# Patient Record
Sex: FEMALE | Race: WHITE | NOT HISPANIC OR LATINO | Employment: FULL TIME | URBAN - METROPOLITAN AREA
[De-identification: names, ages, dates, MRNs, and addresses within clinical notes are randomized per-mention and may not be internally consistent; named-entity substitution may affect disease eponyms.]

---

## 2017-07-20 ENCOUNTER — TRANSFERRED RECORDS (OUTPATIENT)
Dept: HEALTH INFORMATION MANAGEMENT | Facility: CLINIC | Age: 52
End: 2017-07-20

## 2017-08-21 ENCOUNTER — TRANSFERRED RECORDS (OUTPATIENT)
Dept: HEALTH INFORMATION MANAGEMENT | Facility: CLINIC | Age: 52
End: 2017-08-21

## 2017-09-10 ASSESSMENT — ENCOUNTER SYMPTOMS
MUSCLE CRAMPS: 0
SNORES LOUDLY: 0
POSTURAL DYSPNEA: 1
COUGH DISTURBING SLEEP: 0
NECK PAIN: 0
BACK PAIN: 0
RESPIRATORY PAIN: 1
ARTHRALGIAS: 1
SPUTUM PRODUCTION: 0
MYALGIAS: 1
JOINT SWELLING: 1
DYSPNEA ON EXERTION: 1
HEMOPTYSIS: 0
SHORTNESS OF BREATH: 1
WHEEZING: 1
COUGH: 0
STIFFNESS: 0
MUSCLE WEAKNESS: 0

## 2017-09-12 ENCOUNTER — PRE VISIT (OUTPATIENT)
Dept: CARDIOLOGY | Facility: CLINIC | Age: 52
End: 2017-09-12

## 2017-09-12 NOTE — TELEPHONE ENCOUNTER
New Pulmonary Hypertension Patient Form       Patient Name: Taina Singh Age: 52F  [birth year] MRN: [id number]   Referring Provider: Dr. Pattno       Date Test Results        7/20/2017 PFT FEV1/FVC: 77.0/80.1/96  FEV1: 2.20/2.97/74     FVC: 2.86/3.76/76  DLCO:      Not in Chart Echo, 6MWT, EKG, CT, VQ, Liver US, all labs, Angio

## 2017-09-13 ENCOUNTER — OFFICE VISIT (OUTPATIENT)
Dept: CARDIOLOGY | Facility: CLINIC | Age: 52
End: 2017-09-13
Attending: INTERNAL MEDICINE
Payer: COMMERCIAL

## 2017-09-13 VITALS
HEIGHT: 66 IN | WEIGHT: 254.4 LBS | DIASTOLIC BLOOD PRESSURE: 66 MMHG | BODY MASS INDEX: 40.88 KG/M2 | SYSTOLIC BLOOD PRESSURE: 102 MMHG | HEART RATE: 64 BPM | OXYGEN SATURATION: 97 %

## 2017-09-13 DIAGNOSIS — I27.20 PULMONARY HYPERTENSION (H): Primary | ICD-10-CM

## 2017-09-13 PROCEDURE — 99203 OFFICE O/P NEW LOW 30 MIN: CPT | Performed by: INTERNAL MEDICINE

## 2017-09-13 PROCEDURE — 99213 OFFICE O/P EST LOW 20 MIN: CPT | Mod: ZF

## 2017-09-13 RX ORDER — AMPICILLIN TRIHYDRATE 500 MG
1000 CAPSULE ORAL DAILY
COMMUNITY
Start: 2008-10-09 | End: 2019-06-27

## 2017-09-13 RX ORDER — METAPROTERENOL SULFATE 10 MG
500 TABLET ORAL DAILY
COMMUNITY
Start: 2017-01-01

## 2017-09-13 RX ORDER — NAPROXEN 500 MG/1
TABLET ORAL
Status: ON HOLD | COMMUNITY
Start: 2017-08-21 | End: 2018-03-14

## 2017-09-13 RX ORDER — HYDROXYCHLOROQUINE SULFATE 200 MG/1
200 TABLET, FILM COATED ORAL 2 TIMES DAILY
COMMUNITY
Start: 2017-07-24 | End: 2018-08-30

## 2017-09-13 RX ORDER — PILOCARPINE HYDROCHLORIDE 5 MG/1
5 TABLET, FILM COATED ORAL
COMMUNITY
Start: 2017-08-17 | End: 2017-12-29

## 2017-09-13 ASSESSMENT — PAIN SCALES - GENERAL: PAINLEVEL: NO PAIN (0)

## 2017-09-13 NOTE — PROGRESS NOTES
Eliezer Herbert M.D.  Cardiovascular Medicine    I personally saw and examined this patient, discussed care with housestaff and other consultants, reviewed current laboratories and imaging studies, and conveyed impression and diagnostic/therapeutic plan to patient.    Valentina Patton  [phone] (Work)  [phone] (Fax)  [address] [place] [address]    Tayler Aviles MD (Oncologist)  [phone] (Work)  [phone] (Fax)  [address] [facility] [place] [address]  [place] [address]    Problem List  1. Lupus  2. Sjogrens  3. Remote history MALT lymphoma (surgery)  4. Elevated BMI  5. Diastolic dysfunction of the left ventricle  6. Penicillin and sulfa  7. Polyserositis with pleurisy    8. History of iron deficiency  9  + KARLIE speckled 1:640, + rheumatoid factor, negative CCP  10. Vitamin D deficiency    Dear Doctors:    Thank you for allowing us to see your patient in the [facility] of the [facility].  We appreciate your thoughtfulness in thinking of us.      History    Ms Trent is a 52 year old female with a longstanding history of fairly quiescent SLE overlap who recently developed an increase in her disease burden characterized by polyserositis and arthritis, that has responded to medications an dietary changes.  She no longer has pleurisy or joint swelling.  She has no interim history of cerebritis, nephritis, skin rash, abdominal pain, eye changes.      She has no history, laboratory or imaging studies suggestive of congenital heart disease, liver disease, myeloproliferative disease, pulmonary embolism, clotting disorders, or other diseases associated with pulmonary hypertension.    She has no history of drug or alcohol excess.      She was following what turned out to be a high gluten diet but was without characteristic rash or GI symptoms.    She has no history of hypertension, sleep disordered breathing, and her lipids are not elevated.       General  "Symptoms: No  Skin Symptoms: No  HENT Symptoms: No  EYE SYMPTOMS: No  HEART SYMPTOMS: No  LUNG SYMPTOMS: Yes  INTESTINAL SYMPTOMS: No  URINARY SYMPTOMS: No  GYNECOLOGIC SYMPTOMS: No  BREAST SYMPTOMS: No  SKELETAL SYMPTOMS: Yes  BLOOD SYMPTOMS: No  NERVOUS SYSTEM SYMPTOMS: No  MENTAL HEALTH SYMPTOMS: No  Cough: No  Sputum or phlegm: No  Coughing up blood: No  Difficulty breating or shortness of breath: Yes  Snoring: No  Wheezing: Yes  Difficulty breathing on exertion: Yes  Respiratory pain: Yes  Nighttime Cough: No  Difficulty breathing when lying flat: Yes  Back pain: No  Muscle aches: Yes  Neck pain: No  Swollen joints: Yes  Joint pain: Yes  Bone pain: No  Muscle cramps: No  Muscle weakness: No  Joint stiffness: No  Bone fracture: No      Surgery: MALT lymphoma, tonsil removal, , endometrial biopsy    Family history: multiple auto-immune diseases males and females including alopecia, RA, Lupus      Objective  /66 (BP Location: Left arm, Cuff Size: Adult Large)  Pulse 64  Ht 1.676 m (5' 6\")  Wt 115.4 kg (254 lb 6.4 oz)  SpO2 97%  BMI 41.06 kg/m2   Constitutional: alert, oriented, normal gait and station, normal mentation.  Oral: moist mucous membrans  Lymph: without pathologic adenopathy  Chest: clear to ausculation and percussion  Cor: No evidence of left or right ventricular activity.  Rhythm is regular.  S1 normal, S2 split physiologically. Murmurs are not present  Abdomen: without tenderness, rebound, guarding, masses, ascites  Extremities: Edema not present  Neuro: no focal defects, normal mentation  Skin: without open lesions  Psych: oriented, verbal, mental status in tact    Wt Readings from Last 5 Encounters:   17 115.4 kg (254 lb 6.4 oz)       Meds  Current Outpatient Prescriptions   Medication     Calcium-Magnesium 300-300 MG TABS     hydroxychloroquine (PLAQUENIL) 200 MG tablet     Omega-3 Fatty Acids (OMEGA-3 FISH OIL) 500 MG CAPS     naproxen (NAPROSYN) 500 MG tablet     " "Multiple Vitamins-Minerals (WOMENS MULTI PO)     CALCIUM PO     pilocarpine (SALAGEN) 5 MG tablet     Cholecalciferol (D 1000) 1000 UNITS CAPS     No current facility-administered medications for this visit.          Labs      Imaging   [facility]   [address], [address], [place] [address]   Main: [phone]  [email or web address]                                                 Transthoracic Echo Report   CHILANGO GIBBONS ID: [id number] Age: 52 : 1965 Ordering Provider: NGUYEN PETERS   Exam Date: 2017 14:43 Gender: F Sonographer: BDB   Accession #: [id number] Height: 66 in BSA: 2.24 m  BP: 108 / 62   Weight: 261 lbs BMI: 42.1 kg/m          Site: [facility]   Location: Outpatient Rhythm: Normal Sinus Rhythm   Procedure Components: 2D imaging, Color Doppler, Spectral Doppler   Indications: Murmur; Systemic lupus erythematosus, unspecified SLE type, unspecified organ   involvement status   Technical Quality: Contrast: None     Final Conclusion   Visually estimated ejection fraction is 55-60%.   Mild left ventricular hypertrophy.   Estimated pulmonary artery pressure of 31 mmHg + RA pressure.   Dilated IVC size, <50% collapse with respiration.   Estimated EF: 55-60%   FINDINGS   Left Ventricle Normal left ventricular size. Visually estimated ejection fraction is 55-60%.   Mild left ventricular hypertrophy.   Diastolic Function \"Pseudonormal\" left ventricular filling pattern. E/e' ratio 8-15 is   indeterminate for filling pressure.   Right Ventricle Normal right ventricular size and function.   Left Atrium Mild left atrial enlargement.   Right Atrium Mild right atrial enlargement.   Aortic Valve Normal aortic valve. No aortic stenosis. No significant aortic regurgitation.   Mitral Valve Normal mitral valve. No mitral stenosis. Mild mitral regurgitation.   Tricuspid Valve Normal tricuspid valve. No tricuspid stenosis. Mild " tricuspid regurgitation.   Estimated pulmonary artery pressure   of 31 mmHg + RA pressure.   Pulmonic Valve Normal pulmonic valve without significant stenosis or regurgitation.   Pericardium Normal pericardium.   Aorta Measured aortic root diameter is normal in size.   Inferior Vena Cava Dilated IVC size, <50% collapse with respiration.   MEASUREMENTS  (Male / Female) Normal Values   2D MEASUREMENTS AND LV FUNCTION   IVS Diastolic Thickness           1.16 cm               < 1.1 cm / < 1.0 cm   LV Diastolic Diameter PLAX        5.36 cm               4.2 - 5.9 / 3.9 - 5.3 cm   LVPW Diastolic Thickness          1.09 cm               < 1.1 cm / < 1.0 cm   LV Systolic Diameter PLAX         2.89 cm   LVOT Diameter                     2 cm   LVOT Stroke Volume                91.4 ml   LA Systolic Diameter LX           4.95 cm               3.0 - 4.0 / 2.7 - 3.8 cm   LV Ejection Fraction MOD BP       62.9 %                >= 55  %   LA Volume Index                   32 ml/m               16 - 34 ml/m    Sinuses of Valsalva Diameter(d)   3.4 cm   Ascending Aorta Diameter(s)       3.1 cm                < 3.7 cm     DIASTOLOGY   Mitral E Point Velocity           1.09 m/sec            0.70 - 1.02 m/sec   Mitral A Point Velocity           0.698 m/sec           0.06 - 1.06 m/sec   Mitral E to A Ratio               1.56                  1.1 - 2.1   MV Deceleration Time              158 msec              167 - 231 msec   LV E' Lateral Velocity            0.0979 m/sec   Mitral E to LV E' Lateral Ratio   11.1   LV E' Septal Velocity             0.0783 m/sec   Mitral E to LV E' Septal Ratio    13.9     AORTIC VALVE   AV Peak Velocity                  1.82 m/sec            < 2.0 m/sec   AV Peak Gradient                  13.2 mmHg   AV Mean Gradient                  7 mmHg   AV Velocity Time Integral         37.9 cm   LVOT Peak Velocity                1.35 m/sec   LVOT Velocity Time Integral       29.1 cm   AV Area Cont Eq  "vti               2.41 cm    AV Area Cont Eq pk                2.33 cm    AV Dimensionless Index            0.768     MITRAL VALVE   MR Peak Velocity                  453 cm/sec   MR Velocity Time Integral         143 cm     TRICUSPID VALVE AND ESTIMATED PRESSURES   TR Peak Velocity                  2.77 m/sec   TR Peak Gradient                  30.7 mmHg    Assessment/Plan     We discussed the ambiguous findings of the IVC enlargement with normal respiratory variation, the evidence of left ventricular relaxation abnormalities and a PA pressure either at the upper limits of normal or slightly elevated.  We discussed right heart catherization or watchful waiting with follow-up echocardiogram and visit in 4 months.  She opted for the latte and I think this is reasonable as she is \"at risk\" but no definitely symptomatic.      We encouraged her to return soon should she be not doing well and then would proceed to right catherization.      Thank you again.    Eliezer Herbert M.D.    CC: physicians above      "

## 2017-09-13 NOTE — MR AVS SNAPSHOT
After Visit Summary   9/13/2017    Taina Singh    MRN: [id number]           Patient Information     Date Of Birth          1965        Visit Information        Provider Department      9/13/2017 9:00 AM Eliezer Herbert MD [facility]        Today's Diagnoses     Pulmonary hypertension (H)    -  1      Care Instructions    Medication Changes:  No medication changes at this time. Please continue current medication regiment.      Patient Instructions:  Continue staying active, eating a low sodium, low fat diet.    Check-In  Time Check-In Location Estimated Length Procedure   Name         60 minutes Echocardiogram (Echo)**   Procedure Preparations & Instructions     This is a non-invasive procedure and does NOT require any preparation             Follow up Appointment Information:  Follow up in February with Dr. Herbert with Labs and Echo prior to appointment.      We are located on the third floor of the Clinic and Surgery Center ([facility]) on the [facility].  Our address is     [address] [place] [address] [place] [address] [place] [address]    Thank you for allowing us to be a part of your care here at the [facility]    If you have questions or concerns please contact us at:    Alecia Lubin RN, BSN   Pablo Mcmillan (Schedule,P.A.)  Nurse Coordinator     Clinic   Pulmonary Hypertension   Pulmonary Hypertension  [facility]  (P)[phone]    (P) [phone]        (F)[phone]    ** Please note that you will NOT receive a reminder call regarding your scheduled testing, reminder calls are for provider appointments only.  If you are scheduled for testing within the [facility] system you may receive a call regarding pre-registration for billing purposes only.**     Remember to weigh yourself daily after voiding and before you consume any food  or beverages and log the numbers.  If you have gained/lost 2 pounds overnight or 5 pounds in a week contact us immediately for medication adjustments or further instructions.   **Please call us immediately if you have any syncope, chest pain, edema, or decline in your functional status.    Support Group:  Pulmonary Hypertension Association  https://www.phassociation.org/    [place] - [facility] Support Group  [place], [place]  Leader: Sudhir Pagan  Phone: [phone]  Email: [email or web address]            Follow-ups after your visit        Your next 10 appointments already scheduled     Feb 14, 2018  9:30 AM CST   Lab with  LAB    [facility]    [address] [place] [id number] [phone]            Feb 14, 2018 10:00 AM CST   Ech Complete with [id number]    [facility] Echo ([facility])    [address] [place] [id number] [phone]           1. Please bring or wear a comfortable two-piece outfit. 2. You may eat, drink and take your normal medicines. 3. For any questions that cannot be answered, please contact the ordering physician            Feb 14, 2018 11:00 AM CST   (Arrive by 10:45 AM)   RETURN PRIMARY PULMONARY with Eliezer Herbert MD   [facility] ([facility])    [address] [place] [id number] [phone]              Future tests that were ordered for you today     Open Future Orders        Priority Expected Expires Ordered    Echocardiogram Complete Routine  9/13/2018 9/13/2017            Who to contact     If you have questions or need follow up information about today's clinic visit or your schedule please contact [facility] directly at [phone].  Normal or non-critical lab and imaging results will be communicated to you by MyChart, letter or phone within 4 business days after the clinic has received the  "results. If you do not hear from us within 7 days, please contact the clinic through [employer] or phone. If you have a critical or abnormal lab result, we will notify you by phone as soon as possible.  Submit refill requests through [employer] or call your pharmacy and they will forward the refill request to us. Please allow 3 business days for your refill to be completed.          Additional Information About Your Visit        [facility] Information     [employer] gives you secure access to your electronic health record. If you see a primary care provider, you can also send messages to your care team and make appointments. If you have questions, please call your primary care clinic.  If you do not have a primary care provider, please call [phone] and they will assist you.        Care EveryWhere ID     This is your Care EveryWhere ID. This could be used by other organizations to access your [facility] medical records  [id number]        Your Vitals Were     Pulse Height Pulse Oximetry BMI (Body Mass Index)          64 1.676 m (5' 6\") 97% 41.06 kg/m2         Blood Pressure from Last 3 Encounters:   09/13/17 102/66    Weight from Last 3 Encounters:   09/13/17 115.4 kg (254 lb 6.4 oz)               Primary Care Provider    None Specified       No primary provider on file.        Equal Access to Services     ELIO BAKER : Errol valenciao Sopoly, waaxda lulucianadaha, qaybta kaalmada adeamberda, raisa mathew. So [facility] [phone].    ATENCIÓN: Si habla español, tiene a vyas disposición servicios gratuitos de asistencia lingüística. Llame al [phone].    We comply with applicable federal civil rights laws and [place] laws. We do not discriminate on the basis of race, color, national origin, age, disability sex, sexual orientation or gender identity.            Thank you!     Thank you for choosing [facility]  for your care. Our goal is always to provide you with excellent care. Hearing " back from our patients is one way we can continue to improve our services. Please take a few minutes to complete the written survey that you may receive in the mail after your visit with us. Thank you!             Your Updated Medication List - Protect others around you: Learn how to safely use, store and throw away your medicines at www.disposemymeds.org.          This list is accurate as of: 9/13/17 10:11 AM.  Always use your most recent med list.                   Brand Name Dispense Instructions for use Diagnosis    CALCIUM PO           Calcium-Magnesium 300-300 MG Tabs           D 1000 1000 UNITS Caps           hydroxychloroquine 200 MG tablet    PLAQUENIL          naproxen 500 MG tablet    NAPROSYN          Omega-3 Fish Oil 500 MG Caps           pilocarpine 5 MG tablet    SALAGEN     Take 5 mg by mouth        WOMENS MULTI PO

## 2017-09-13 NOTE — LETTER
9/13/2017      RE: Taina Singh  [address] [facility] [address]       Dear Colleague,    Thank you for the opportunity to participate in the care of your patient, Taina Singh, at the [facility] at [facility]. Please see a copy of my visit note below.          Eliezer Herbert M.D.  Cardiovascular Medicine    I personally saw and examined this patient, discussed care with housestaff and other consultants, reviewed current laboratories and imaging studies, and conveyed impression and diagnostic/therapeutic plan to patient.    Valentina Patton  [phone] (Work)  [phone] (Fax)  [address] [place] [address]    Tayler Aviles MD (Oncologist)  [phone] (Work)  [phone] (Fax)  [address] [facility] [address]  [place] [address]    Problem List  1. Lupus  2. Sjogrens  3. Remote history MALT lymphoma (surgery)  4. Elevated BMI  5. Diastolic dysfunction of the left ventricle  6. Penicillin and sulfa  7. Polyserositis with pleurisy    8. History of iron deficiency  9  + KARLIE speckled 1:640, + rheumatoid factor, negative CCP  10. Vitamin D deficiency    Dear Doctors:    Thank you for allowing us to see your patient in the [facility] of the [facility].  We appreciate your thoughtfulness in thinking of us.      History    Ms Trent is a 52 year old female with a longstanding history of fairly quiescent SLE overlap who recently developed an increase in her disease burden characterized by polyserositis and arthritis, that has responded to medications an dietary changes.  She no longer has pleurisy or joint swelling.  She has no interim history of cerebritis, nephritis, skin rash, abdominal pain, eye changes.      She has no history, laboratory or imaging studies suggestive of congenital heart disease, liver disease, myeloproliferative disease, pulmonary embolism, clotting disorders, or other diseases  "associated with pulmonary hypertension.    She has no history of drug or alcohol excess.      She was following what turned out to be a high gluten diet but was without characteristic rash or GI symptoms.    She has no history of hypertension, sleep disordered breathing, and her lipids are not elevated.       General Symptoms: No  Skin Symptoms: No  HENT Symptoms: No  EYE SYMPTOMS: No  HEART SYMPTOMS: No  LUNG SYMPTOMS: Yes  INTESTINAL SYMPTOMS: No  URINARY SYMPTOMS: No  GYNECOLOGIC SYMPTOMS: No  BREAST SYMPTOMS: No  SKELETAL SYMPTOMS: Yes  BLOOD SYMPTOMS: No  NERVOUS SYSTEM SYMPTOMS: No  MENTAL HEALTH SYMPTOMS: No  Cough: No  Sputum or phlegm: No  Coughing up blood: No  Difficulty breating or shortness of breath: Yes  Snoring: No  Wheezing: Yes  Difficulty breathing on exertion: Yes  Respiratory pain: Yes  Nighttime Cough: No  Difficulty breathing when lying flat: Yes  Back pain: No  Muscle aches: Yes  Neck pain: No  Swollen joints: Yes  Joint pain: Yes  Bone pain: No  Muscle cramps: No  Muscle weakness: No  Joint stiffness: No  Bone fracture: No      Surgery: MALT lymphoma, tonsil removal, , endometrial biopsy    Family history: multiple auto-immune diseases males and females including alopecia, RA, Lupus      Objective  /66 (BP Location: Left arm, Cuff Size: Adult Large)  Pulse 64  Ht 1.676 m (5' 6\")  Wt 115.4 kg (254 lb 6.4 oz)  SpO2 97%  BMI 41.06 kg/m2   Constitutional: alert, oriented, normal gait and station, normal mentation.  Oral: moist mucous membrans  Lymph: without pathologic adenopathy  Chest: clear to ausculation and percussion  Cor: No evidence of left or right ventricular activity.  Rhythm is regular.  S1 normal, S2 split physiologically. Murmurs are not present  Abdomen: without tenderness, rebound, guarding, masses, ascites  Extremities: Edema not present  Neuro: no focal defects, normal mentation  Skin: without open lesions  Psych: oriented, verbal, mental status in " "tact    Wt Readings from Last 5 Encounters:   17 115.4 kg (254 lb 6.4 oz)       Meds  Current Outpatient Prescriptions   Medication     Calcium-Magnesium 300-300 MG TABS     hydroxychloroquine (PLAQUENIL) 200 MG tablet     Omega-3 Fatty Acids (OMEGA-3 FISH OIL) 500 MG CAPS     naproxen (NAPROSYN) 500 MG tablet     Multiple Vitamins-Minerals (WOMENS MULTI PO)     CALCIUM PO     pilocarpine (SALAGEN) 5 MG tablet     Cholecalciferol (D 1000) 1000 UNITS CAPS     No current facility-administered medications for this visit.          Labs      Imaging   [facility]   [address], [address], [place] [address]   Main: [phone]  [email or web address]                                                 Transthoracic Echo Report   CHILANGO GIBBONS   [place] ID: [id number] Age: 52 : 1965 Ordering Provider: NGUYEN PETERS   Exam Date: 2017 14:43 Gender: F Sonographer: HAJA   Accession #: [id number] Height: 66 in BSA: 2.24 m  BP: 108 / 62   Weight: 261 lbs BMI: 42.1 kg/m          Site: [facility]   Location: Outpatient Rhythm: Normal Sinus Rhythm   Procedure Components: 2D imaging, Color Doppler, Spectral Doppler   Indications: Murmur; Systemic lupus erythematosus, unspecified SLE type, unspecified organ   involvement status   Technical Quality: Contrast: None     Final Conclusion   Visually estimated ejection fraction is 55-60%.   Mild left ventricular hypertrophy.   Estimated pulmonary artery pressure of 31 mmHg + RA pressure.   Dilated IVC size, <50% collapse with respiration.   Estimated EF: 55-60%   FINDINGS   Left Ventricle Normal left ventricular size. Visually estimated ejection fraction is 55-60%.   Mild left ventricular hypertrophy.   Diastolic Function \"Pseudonormal\" left ventricular filling pattern. E/e' ratio 8-15 is   indeterminate for filling pressure.   Right Ventricle Normal right ventricular size and function.   Left Atrium " Mild left atrial enlargement.   Right Atrium Mild right atrial enlargement.   Aortic Valve Normal aortic valve. No aortic stenosis. No significant aortic regurgitation.   Mitral Valve Normal mitral valve. No mitral stenosis. Mild mitral regurgitation.   Tricuspid Valve Normal tricuspid valve. No tricuspid stenosis. Mild tricuspid regurgitation.   Estimated pulmonary artery pressure   of 31 mmHg + RA pressure.   Pulmonic Valve Normal pulmonic valve without significant stenosis or regurgitation.   Pericardium Normal pericardium.   Aorta Measured aortic root diameter is normal in size.   Inferior Vena Cava Dilated IVC size, <50% collapse with respiration.   MEASUREMENTS  (Male / Female) Normal Values   2D MEASUREMENTS AND LV FUNCTION   IVS Diastolic Thickness           1.16 cm               < 1.1 cm / < 1.0 cm   LV Diastolic Diameter PLAX        5.36 cm               4.2 - 5.9 / 3.9 - 5.3 cm   LVPW Diastolic Thickness          1.09 cm               < 1.1 cm / < 1.0 cm   LV Systolic Diameter PLAX         2.89 cm   LVOT Diameter                     2 cm   LVOT Stroke Volume                91.4 ml   LA Systolic Diameter LX           4.95 cm               3.0 - 4.0 / 2.7 - 3.8 cm   LV Ejection Fraction MOD BP       62.9 %                >= 55  %   LA Volume Index                   32 ml/m               16 - 34 ml/m    Sinuses of Valsalva Diameter(d)   3.4 cm   Ascending Aorta Diameter(s)       3.1 cm                < 3.7 cm     DIASTOLOGY   Mitral E Point Velocity           1.09 m/sec            0.70 - 1.02 m/sec   Mitral A Point Velocity           0.698 m/sec           0.06 - 1.06 m/sec   Mitral E to A Ratio               1.56                  1.1 - 2.1   MV Deceleration Time              158 msec              167 - 231 msec   LV E' Lateral Velocity            0.0979 m/sec   Mitral E to LV E' Lateral Ratio   11.1   LV E' Septal Velocity             0.0783 m/sec   Mitral E to LV E' Septal Ratio    13.9     AORTIC VALVE    "AV Peak Velocity                  1.82 m/sec            < 2.0 m/sec   AV Peak Gradient                  13.2 mmHg   AV Mean Gradient                  7 mmHg   AV Velocity Time Integral         37.9 cm   LVOT Peak Velocity                1.35 m/sec   LVOT Velocity Time Integral       29.1 cm   AV Area Cont Eq vti               2.41 cm    AV Area Cont Eq pk                2.33 cm    AV Dimensionless Index            0.768     MITRAL VALVE   MR Peak Velocity                  453 cm/sec   MR Velocity Time Integral         143 cm     TRICUSPID VALVE AND ESTIMATED PRESSURES   TR Peak Velocity                  2.77 m/sec   TR Peak Gradient                  30.7 mmHg    Assessment/Plan     We discussed the ambiguous findings of the IVC enlargement with normal respiratory variation, the evidence of left ventricular relaxation abnormalities and a PA pressure either at the upper limits of normal or slightly elevated.  We discussed right heart catherization or watchful waiting with follow-up echocardiogram and visit in 4 months.  She opted for the latte and I think this is reasonable as she is \"at risk\" but no definitely symptomatic.      We encouraged her to return soon should she be not doing well and then would proceed to right catherization.      Thank you again.    Eliezer Herbert M.D.    CC: physicians above        "

## 2017-09-13 NOTE — PATIENT INSTRUCTIONS
Medication Changes:  No medication changes at this time. Please continue current medication regiment.      Patient Instructions:  Continue staying active, eating a low sodium, low fat diet.    Check-In  Time Check-In Location Estimated Length Procedure   Name         60 minutes Echocardiogram (Echo)**   Procedure Preparations & Instructions     This is a non-invasive procedure and does NOT require any preparation             Follow up Appointment Information:  Follow up in February with Dr. Herbert with Labs and Echo prior to appointment.      We are located on the third floor of the [facility] on the [facility].  Our address is     [address] [place] [address] on [address]rd [place] [address]    Thank you for allowing us to be a part of your care here at the [facility]    If you have questions or concerns please contact us at:    Alecia Lubin RN, BSN   Pablo Mcmillan (Schedule,P.A.)  Nurse Coordinator     Clinic   Pulmonary Hypertension   Pulmonary Hypertension  [facility]  (P)[phone]    (P) [phone]        (F)[phone]    ** Please note that you will NOT receive a reminder call regarding your scheduled testing, reminder calls are for provider appointments only.  If you are scheduled for testing within the [employer] system you may receive a call regarding pre-registration for billing purposes only.**     Remember to weigh yourself daily after voiding and before you consume any food or beverages and log the numbers.  If you have gained/lost 2 pounds overnight or 5 pounds in a week contact us immediately for medication adjustments or further instructions.   **Please call us immediately if you have any syncope, chest pain, edema, or decline in your functional status.    Support Group:  Pulmonary Hypertension  Association  [email or web address].[email or web address]    [place] - [facility] Support Group  [place], [place]  Leader: Sudhir Pagan  Phone: [phone]  Email: [email or web address]

## 2017-09-13 NOTE — NURSING NOTE
Cardiac Testing: Patient given instructions regarding  echocardiogram . Discussed purpose, preparation, procedure and when to expect results reported back to the patient. Patient demonstrated understanding of this information and agreed to call with further questions or concerns.    Med Reconcile: Reviewed and verified all current medications with the patient. The updated medication list was printed and given to the patient.    Return Appointment: Patient given instructions regarding scheduling next clinic visit. Patient demonstrated understanding of this information and agreed to call with further questions or concerns.    Patient stated she understood all health information given and agreed to call with further questions or concerns.    Medication Changes:  No medication changes at this time. Please continue current medication regiment.      Patient Instructions:  Continue staying active, eating a low sodium, low fat diet.    Check-In  Time Check-In Location Estimated Length Procedure   Name         60 minutes Echocardiogram (Echo)**   Procedure Preparations & Instructions     This is a non-invasive procedure and does NOT require any preparation             Follow up Appointment Information:  Follow up in February with Dr. Herbert with Labs and Echo prior to appointment.

## 2017-09-13 NOTE — NURSING NOTE
Chief Complaint   Patient presents with     Follow Up For     New PH patient referred by Dr. Patton     Vitals were taken and medications were reconciled.     Jose Kumari MA  8:23 AM

## 2017-10-01 ENCOUNTER — HEALTH MAINTENANCE LETTER (OUTPATIENT)
Age: 52
End: 2017-10-01

## 2017-10-30 ENCOUNTER — TELEPHONE (OUTPATIENT)
Dept: RHEUMATOLOGY | Facility: CLINIC | Age: 52
End: 2017-10-30

## 2017-10-30 NOTE — TELEPHONE ENCOUNTER
Patient has seen a Rheumatologist at [facility], [facility] and [facility]. Patient stated that this is not a 4th opinion, but patient would like to keep all her care in one location. Emailed release to patient at [email or web address] and confirmed that it was received by the patient. Awaiting release to complete Care Everywhere for [facility], [facility].   Jayleen Gan CMA  10/30/2017 10:23 AM

## 2017-10-30 NOTE — TELEPHONE ENCOUNTER
Lupus Patient Intake Form    Referring provider/clinic: Self    Primary privider/clinic: no specific provider, [facility]    Have you been in the hospital because of Lupus? No.     Has your doctor ever told you that you have SLE (systemic lupus erythematosus)? No. If yes, when were you diagnosed? 20 years ago.  What symptoms did you have? Butterfly rash, joint pain. What are your flare symptoms? Achy joints and fatigue.    Who diagnosed you with lupus? [facility], Dr. Tierney.    Have you ever been told that you have fibromyalgia? No .    The following set of questions can be answered yes, no or I don't know. Do you currently have or have you had in the past any of the following symptoms?      Butterfly rash on the face Yes     Sun sensativity (e.g. easy burning, feeling sick in the sun?) Yes     Scarring rash No    Mouth or nose sores Yes     Joint pain or swelling Yes      Fluid in your lungs or around your heart (serositis) No    Blood disorder (e.g. anemia, low blood count) Doesn't know      History of kidney problems No    Neurologic disorder (e.g. Seizures, stroke) No    Lupus blood tests showing that you have lupus Yes     Immune system disorder other than lupus Yes  If yes, what immune disorder did you have? sjogren's    High blood pressure No    Diabetes no    High cholesterol No    History of heart problems Yes  If yes, what heart problems do you have? Pulmonary hypertension    Any other disease that I haven't mentioned? no    Hair loss Yes     Raynaud's (hands sensitive to cold) Yes     Skin rashes No    Dry eyes Yes      Use of artificial tears for dry eyes Yes     Dry mouth Yes      History of blood clots No    History of miscarriages Yes, 6 miscarriages  If yes, at what week in pregnancy? Very early on, within the first month    Chronic cough, shortness of breath or chest pain with breathing Yes     Depression or anxiety No    Thinking or memory problems No    Have you been on any of these  medications:      Prednisone Yes     Methotrexate No    Imuran (Azathioprine) No     Cellcept (mycophenolate) No    Hydroxychloroquine Yes     Cytoxan (Cyclophosphamide) No    Do you currently smoke or have you ever smoked in the past? no     How many alcoholic beverages do you drink per week? 1    Do you use any stimulant drugs or cold medicine? no    Is there a family history or diagnosis of an autoimmune disease? Yes  If yes, please list: arthritis    Are there any other symptoms or concerns that I haven't mentioned that you would like to discuss with the doctor? no    Would you like us to contact you about taking part in furture research studies? Yes   Jayleen Gan CMA  10/30/2017 9:58 AM

## 2017-11-03 ENCOUNTER — DOCUMENTATION ONLY (OUTPATIENT)
Dept: RHEUMATOLOGY | Facility: CLINIC | Age: 52
End: 2017-11-03

## 2017-11-08 ENCOUNTER — CARE COORDINATION (OUTPATIENT)
Dept: CARDIOLOGY | Facility: CLINIC | Age: 52
End: 2017-11-08

## 2017-11-08 NOTE — PROGRESS NOTES
Patient left voicemail complaining of lung pain, and would like to know if this has anything to do with pulmonary hypertension. Writer left return voicemail requesting call back. Will discuss at that time.

## 2017-11-09 ENCOUNTER — CARE COORDINATION (OUTPATIENT)
Dept: CARDIOLOGY | Facility: CLINIC | Age: 52
End: 2017-11-09

## 2017-11-20 ENCOUNTER — OFFICE VISIT (OUTPATIENT)
Dept: CARDIOLOGY | Facility: CLINIC | Age: 52
End: 2017-11-20
Attending: NURSE PRACTITIONER
Payer: COMMERCIAL

## 2017-11-20 VITALS
BODY MASS INDEX: 41.75 KG/M2 | SYSTOLIC BLOOD PRESSURE: 123 MMHG | HEIGHT: 66 IN | OXYGEN SATURATION: 97 % | DIASTOLIC BLOOD PRESSURE: 73 MMHG | HEART RATE: 64 BPM | WEIGHT: 259.8 LBS

## 2017-11-20 DIAGNOSIS — I27.20 PULMONARY HYPERTENSION (H): Primary | ICD-10-CM

## 2017-11-20 DIAGNOSIS — R07.89 LEFT-SIDED CHEST WALL PAIN: Primary | ICD-10-CM

## 2017-11-20 DIAGNOSIS — R06.09 DYSPNEA ON EXERTION: ICD-10-CM

## 2017-11-20 DIAGNOSIS — I27.20 PULMONARY HYPERTENSION (H): ICD-10-CM

## 2017-11-20 LAB
ANION GAP SERPL CALCULATED.3IONS-SCNC: 6 MMOL/L (ref 3–14)
BUN SERPL-MCNC: 13 MG/DL (ref 7–30)
CALCIUM SERPL-MCNC: 9.1 MG/DL (ref 8.5–10.1)
CHLORIDE SERPL-SCNC: 102 MMOL/L (ref 94–109)
CO2 SERPL-SCNC: 30 MMOL/L (ref 20–32)
CREAT SERPL-MCNC: 0.55 MG/DL (ref 0.52–1.04)
ERYTHROCYTE [DISTWIDTH] IN BLOOD BY AUTOMATED COUNT: 14.6 % (ref 10–15)
ERYTHROCYTE [SEDIMENTATION RATE] IN BLOOD BY WESTERGREN METHOD: 71 MM/H (ref 0–30)
GFR SERPL CREATININE-BSD FRML MDRD: >90 ML/MIN/1.7M2
GLUCOSE SERPL-MCNC: 101 MG/DL (ref 70–99)
HCT VFR BLD AUTO: 35.5 % (ref 35–47)
HGB BLD-MCNC: 11.3 G/DL (ref 11.7–15.7)
MCH RBC QN AUTO: 26.4 PG (ref 26.5–33)
MCHC RBC AUTO-ENTMCNC: 31.8 G/DL (ref 31.5–36.5)
MCV RBC AUTO: 83 FL (ref 78–100)
NT-PROBNP SERPL-MCNC: 546 PG/ML (ref 0–125)
PLATELET # BLD AUTO: 215 10E9/L (ref 150–450)
POTASSIUM SERPL-SCNC: 4.2 MMOL/L (ref 3.4–5.3)
RBC # BLD AUTO: 4.28 10E12/L (ref 3.8–5.2)
SODIUM SERPL-SCNC: 137 MMOL/L (ref 133–144)
WBC # BLD AUTO: 4.9 10E9/L (ref 4–11)

## 2017-11-20 PROCEDURE — 93010 ELECTROCARDIOGRAM REPORT: CPT | Mod: ZP | Performed by: INTERNAL MEDICINE

## 2017-11-20 PROCEDURE — 80048 BASIC METABOLIC PNL TOTAL CA: CPT | Performed by: NURSE PRACTITIONER

## 2017-11-20 PROCEDURE — 99212 OFFICE O/P EST SF 10 MIN: CPT | Mod: ZF

## 2017-11-20 PROCEDURE — 93005 ELECTROCARDIOGRAM TRACING: CPT | Mod: ZF

## 2017-11-20 PROCEDURE — 83880 ASSAY OF NATRIURETIC PEPTIDE: CPT | Performed by: NURSE PRACTITIONER

## 2017-11-20 PROCEDURE — 85652 RBC SED RATE AUTOMATED: CPT | Performed by: NURSE PRACTITIONER

## 2017-11-20 PROCEDURE — 36415 COLL VENOUS BLD VENIPUNCTURE: CPT | Performed by: NURSE PRACTITIONER

## 2017-11-20 PROCEDURE — 85027 COMPLETE CBC AUTOMATED: CPT | Performed by: NURSE PRACTITIONER

## 2017-11-20 PROCEDURE — 99214 OFFICE O/P EST MOD 30 MIN: CPT | Mod: ZP | Performed by: NURSE PRACTITIONER

## 2017-11-20 ASSESSMENT — PAIN SCALES - GENERAL: PAINLEVEL: NO PAIN (0)

## 2017-11-20 NOTE — PROGRESS NOTES
November 20, 2017      Ms. Taina Singh is a pleasant 52 year old female with a past medical history of :    Problem List  1. Lupus  2. Sjogrens  3. Remote history MALT lymphoma (surgery)  4. Elevated BMI  5. Diastolic dysfunction of the left ventricle  6. Penicillin and sulfa allergy  7. Polyserositis with pleurisy    8. History of iron deficiency  9  + KARLIE speckled 1:640, + rheumatoid factor, negative CCP  10. Vitamin D deficiency    Today, she is accompanied by: no one; here alone      Current Symptoms  1. Any ER visits or hospital admissions since last appointment: No    2. Shortness of breath: No ; generally she does not have limitations from breathing.  3. Dizziness or lightheadedness: No  4. Any syncopal episodes or falls: No  5. Chest pain or chest tightness: No  6. Nausea, vomiting, diarrhea, constipation: No  7. Swelling in the legs: No  8. Bloating in the abdomen: No  9. PND; Waking up at night coughing, choking or SOB: No  10. Any medication intolerances: No  11. Reported headaches: No  12. Jaw pain or bone/joint pain: No  13. On IV Prostacyclin: No; current dose: N/A    Recent lupus flare in July 2016. Resulted in pleurisy which lead on to evaluation for pulmonary hypertension.  She has seen Dr. Herbert prior, but the plan was to let Taina become adjusted to her lupus medications.  She returns to clinic with complaints of soreness of inhalation of her left lung.  She also feels the sensation with coughing and sneezing. She also feels more sensitive to cold air.      PAST MEDICAL HISTORY:  No past medical history on file.      FAMILY HISTORY:  No family history on file.      SOCIAL HISTORY:  Social History   Substance Use Topics     Smoking status: Never Smoker     Smokeless tobacco: Not on file     Alcohol use Not on file         CURRENT MEDICATIONS:  Current Outpatient Prescriptions   Medication Sig Dispense Refill     Calcium-Magnesium 300-300 MG TABS daily        hydroxychloroquine (PLAQUENIL) 200  "MG tablet Take 200 mg by mouth daily        Omega-3 Fatty Acids (OMEGA-3 FISH OIL) 500 MG CAPS Take 500 mg by mouth daily        Multiple Vitamins-Minerals (WOMENS MULTI PO) Take by mouth daily        pilocarpine (SALAGEN) 5 MG tablet Take 5 mg by mouth       Cholecalciferol (D 1000) 1000 UNITS CAPS Take 1,000 Units by mouth daily        naproxen (NAPROSYN) 500 MG tablet        CALCIUM PO            ROS:   10 point ROS of systems including Constitutional, Eyes, Respiratory, Cardiovascular, Gastroenterology, Genitourinary, Integumentary, Muscularskeletal, Psychiatric were all negative except for pertinent positives noted in my HPI.    EXAM:  /73 (BP Location: Left arm, Patient Position: Chair, Cuff Size: Adult Large)  Pulse 64  Ht 1.676 m (5' 6\")  Wt 117.8 kg (259 lb 12.8 oz)  SpO2 97%  BMI 41.93 kg/m2  General: appears comfortable, alert and articulate  Head: normocephalic, atraumatic  Eyes: anicteric sclera, EOMI  Neck: no adenopathy  Orophyarynx: moist mucosa, no lesions, dentition intact  Heart: regular, S1/S2, no murmur, gallop, rub, estimated JVP wnl  Lungs: clear, no rales or wheezing  Abdomen: soft, non-tender, bowel sounds present, no hepatosplenomegaly  Extremities: no clubbing, cyanosis or edema  Neurological: normal speech and affect, no gross motor deficits      Weight  Wt Readings from Last 10 Encounters:   11/20/17 117.8 kg (259 lb 12.8 oz)   09/13/17 115.4 kg (254 lb 6.4 oz)       Labs:    CBC RESULTS:  Lab Results   Component Value Date    WBC 4.9 11/20/2017    RBC 4.28 11/20/2017    HGB 11.3 (L) 11/20/2017    HCT 35.5 11/20/2017    MCV 83 11/20/2017    MCH 26.4 (L) 11/20/2017    MCHC 31.8 11/20/2017    RDW 14.6 11/20/2017     11/20/2017       CMP RESULTS:  Lab Results   Component Value Date     11/20/2017    POTASSIUM 4.2 11/20/2017    CHLORIDE 102 11/20/2017    CO2 30 11/20/2017    ANIONGAP 6 11/20/2017     (H) 11/20/2017    BUN 13 11/20/2017    CR 0.55 11/20/2017    " "GFRESTIMATED >90 11/20/2017    GFRESTBLACK >90 11/20/2017    ANDERSON 9.1 11/20/2017        INR RESULTS:  No results found for: INR    BNP RESULTS:  No results found for: NTBNPI  No results found for: BNP      Recent Tests:      Echocardiogram (8/22/2017):  Final Conclusion   Visually estimated ejection fraction is 55-60%.   Mild left ventricular hypertrophy.   Estimated pulmonary artery pressure of 31 mmHg + RA pressure.   Dilated IVC size, <50% collapse with respiration.   Estimated EF: 55-60%   FINDINGS   Left Ventricle Normal left ventricular size. Visually estimated ejection fraction is 55-60%.   Mild left ventricular hypertrophy.   Diastolic Function \"Pseudonormal\" left ventricular filling pattern. E/e' ratio 8-15 is   indeterminate for filling pressure.   Right Ventricle Normal right ventricular size and function.   Left Atrium Mild left atrial enlargement.   Right Atrium Mild right atrial enlargement.   Aortic Valve Normal aortic valve. No aortic stenosis. No significant aortic regurgitation.   Mitral Valve Normal mitral valve. No mitral stenosis. Mild mitral regurgitation.   Tricuspid Valve Normal tricuspid valve. No tricuspid stenosis. Mild tricuspid regurgitation.   Estimated pulmonary artery pressure   of 31 mmHg + RA pressure.   Pulmonic Valve Normal pulmonic valve without significant stenosis or regurgitation.   Pericardium Normal pericardium.   Aorta Measured aortic root diameter is normal in size.      Assessment and Plan:   Ms. Taina Singh is a 52 year old female with pulmonary hypertension and Lupus with a history of pleurisy. WHO Group I, NYHA Functional Class I/II.    Mrs. Singh presents for follow up, after her initial visit with Dr. Herbert on 9/13/2017.  She has since noticed an increase in left chest-wall pain, with coughing and deep breathing. The pain is not reproducible with palpation or muscular in nature. An EKG was completed in clinic and showed NSR.  An ESR was also added on to her " lab work, and that result was elevated at 71 mm/h.  Clinically, VSS; her weight is elevated by 5 lbs, but she does not have JVD, lower leg edema, or increased abdominal girth.  She reports minimal symptoms otherwise.  Since the EKG was normal, we advised Mrs. Singh follow up with rheumatology to be seen for the elevated ESR and left lung pleuritic pain.  I also suggested she try over the counter anti-inflammatory medication such as 600 mg Ibuprofen, 2-3 times a day as needed. She will keep her scheduled follow up appointment on February 14, at 11 AM with Dr. Herbert. I advised she return to clinic sooner if her symptoms worsen.    It was a pleasure seeing Ms. Taina Singh at the [facility].       Sincerely,  Jennifer Young, LISSETT, CNP.

## 2017-11-20 NOTE — NURSING NOTE
Chief Complaint   Patient presents with     Follow Up For     Return PH - Follow up. OK per Alecia     Vitals were taken and medications were reconciled.  TERRI Rosado  1:36 PM

## 2017-11-20 NOTE — LETTER
11/20/2017      RE: Taina Singh  [address]  [place] [address]       Dear Colleague,    Thank you for the opportunity to participate in the care of your patient, Taina Singh, at the [facility]. Please see a copy of my visit note below.    November 20, 2017      Ms. Taina Singh is a pleasant 52 year old female with a past medical history of :    Problem List  1. Lupus  2. Sjogrens  3. Remote history MALT lymphoma (surgery)  4. Elevated BMI  5. Diastolic dysfunction of the left ventricle  6. Penicillin and sulfa allergy  7. Polyserositis with pleurisy    8. History of iron deficiency  9  + KARLIE speckled 1:640, + rheumatoid factor, negative CCP  10. Vitamin D deficiency    Today, she is accompanied by: no one; here alone      Current Symptoms  1. Any ER visits or hospital admissions since last appointment: No    2. Shortness of breath: No ; generally she does not have limitations from breathing.  3. Dizziness or lightheadedness: No  4. Any syncopal episodes or falls: No  5. Chest pain or chest tightness: No  6. Nausea, vomiting, diarrhea, constipation: No  7. Swelling in the legs: No  8. Bloating in the abdomen: No  9. PND; Waking up at night coughing, choking or SOB: No  10. Any medication intolerances: No  11. Reported headaches: No  12. Jaw pain or bone/joint pain: No  13. On IV Prostacyclin: No; current dose: N/A    Recent lupus flare in July 2016. Resulted in pleurisy which lead on to evaluation for pulmonary hypertension.  She has seen Dr. Herbert prior, but the plan was to let Taina become adjusted to her lupus medications.  She returns to clinic with complaints of soreness of inhalation of her left lung.  She also feels the sensation with coughing and sneezing. She also feels more sensitive to cold air.      PAST MEDICAL HISTORY:  No past medical history on file.      FAMILY HISTORY:  No family history on file.      SOCIAL HISTORY:  Social  "History   Substance Use Topics     Smoking status: Never Smoker     Smokeless tobacco: Not on file     Alcohol use Not on file         CURRENT MEDICATIONS:  Current Outpatient Prescriptions   Medication Sig Dispense Refill     Calcium-Magnesium 300-300 MG TABS daily        hydroxychloroquine (PLAQUENIL) 200 MG tablet Take 200 mg by mouth daily        Omega-3 Fatty Acids (OMEGA-3 FISH OIL) 500 MG CAPS Take 500 mg by mouth daily        Multiple Vitamins-Minerals (WOMENS MULTI PO) Take by mouth daily        pilocarpine (SALAGEN) 5 MG tablet Take 5 mg by mouth       Cholecalciferol (D 1000) 1000 UNITS CAPS Take 1,000 Units by mouth daily        naproxen (NAPROSYN) 500 MG tablet        CALCIUM PO            ROS:   10 point ROS of systems including Constitutional, Eyes, Respiratory, Cardiovascular, Gastroenterology, Genitourinary, Integumentary, Muscularskeletal, Psychiatric were all negative except for pertinent positives noted in my HPI.    EXAM:  /73 (BP Location: Left arm, Patient Position: Chair, Cuff Size: Adult Large)  Pulse 64  Ht 1.676 m (5' 6\")  Wt 117.8 kg (259 lb 12.8 oz)  SpO2 97%  BMI 41.93 kg/m2  General: appears comfortable, alert and articulate  Head: normocephalic, atraumatic  Eyes: anicteric sclera, EOMI  Neck: no adenopathy  Orophyarynx: moist mucosa, no lesions, dentition intact  Heart: regular, S1/S2, no murmur, gallop, rub, estimated JVP wnl  Lungs: clear, no rales or wheezing  Abdomen: soft, non-tender, bowel sounds present, no hepatosplenomegaly  Extremities: no clubbing, cyanosis or edema  Neurological: normal speech and affect, no gross motor deficits      Weight  Wt Readings from Last 10 Encounters:   11/20/17 117.8 kg (259 lb 12.8 oz)   09/13/17 115.4 kg (254 lb 6.4 oz)       Labs:    CBC RESULTS:  Lab Results   Component Value Date    WBC 4.9 11/20/2017    RBC 4.28 11/20/2017    HGB 11.3 (L) 11/20/2017    HCT 35.5 11/20/2017    MCV 83 11/20/2017    MCH 26.4 (L) 11/20/2017    MCHC " "31.8 11/20/2017    RDW 14.6 11/20/2017     11/20/2017       CMP RESULTS:  Lab Results   Component Value Date     11/20/2017    POTASSIUM 4.2 11/20/2017    CHLORIDE 102 11/20/2017    CO2 30 11/20/2017    ANIONGAP 6 11/20/2017     (H) 11/20/2017    BUN 13 11/20/2017    CR 0.55 11/20/2017    GFRESTIMATED >90 11/20/2017    GFRESTBLACK >90 11/20/2017    ANDERSON 9.1 11/20/2017        INR RESULTS:  No results found for: INR    BNP RESULTS:  No results found for: NTBNPI  No results found for: BNP      Recent Tests:      Echocardiogram (8/22/2017):  Final Conclusion   Visually estimated ejection fraction is 55-60%.   Mild left ventricular hypertrophy.   Estimated pulmonary artery pressure of 31 mmHg + RA pressure.   Dilated IVC size, <50% collapse with respiration.   Estimated EF: 55-60%   FINDINGS   Left Ventricle Normal left ventricular size. Visually estimated ejection fraction is 55-60%.   Mild left ventricular hypertrophy.   Diastolic Function \"Pseudonormal\" left ventricular filling pattern. E/e' ratio 8-15 is   indeterminate for filling pressure.   Right Ventricle Normal right ventricular size and function.   Left Atrium Mild left atrial enlargement.   Right Atrium Mild right atrial enlargement.   Aortic Valve Normal aortic valve. No aortic stenosis. No significant aortic regurgitation.   Mitral Valve Normal mitral valve. No mitral stenosis. Mild mitral regurgitation.   Tricuspid Valve Normal tricuspid valve. No tricuspid stenosis. Mild tricuspid regurgitation.   Estimated pulmonary artery pressure   of 31 mmHg + RA pressure.   Pulmonic Valve Normal pulmonic valve without significant stenosis or regurgitation.   Pericardium Normal pericardium.   Aorta Measured aortic root diameter is normal in size.      Assessment and Plan:   Ms. Taina Singh is a 52 year old female with pulmonary hypertension and Lupus with a history of pleurisy. WHO Group I, NYHA Functional Class I/II.    Mrs. Singh presents for " follow up, after her initial visit with Dr. Herbert on 9/13/2017.  She has since noticed an increase in left chest-wall pain, with coughing and deep breathing. The pain is not reproducible with palpation or muscular in nature. An EKG was completed in clinic and showed NSR.  An ESR was also added on to her lab work, and that result was elevated at 71 mm/h.  Clinically, VSS; her weight is elevated by 5 lbs, but she does not have JVD, lower leg edema, or increased abdominal girth.  She reports minimal symptoms otherwise.  Since the EKG was normal, we advised Mrs. Singh follow up with rheumatology to be seen for the elevated ESR and left lung pleuritic pain.  I also suggested she try over the counter anti-inflammatory medication such as 600 mg Ibuprofen, 2-3 times a day as needed. She will keep her scheduled follow up appointment on February 14, at 11 AM with Dr. Herbert. I advised she return to clinic sooner if her symptoms worsen.    It was a pleasure seeing Ms. Taina Singh at the [facility]pert[facility].       Sincerely,  Jennifer Young, APRN, CNP.

## 2017-11-20 NOTE — PATIENT INSTRUCTIONS
Medication Changes:   1. Try OTC ibuprofen 600 mg, 2-3 times a day as needed for discomfort.  See how you feel after 7 days, and if the pain improves.    Patient Instructions:  1. Continue staying active, eating a low sodium, low fat diet.    Follow up Appointment Information:  1. Follow up with Rheumatology (and we will try and get you in sooner).    2. Otherwise follow up as scheduled on February 14th at 11:00 with Dr. Herbert. Arrive at 10:45 A for the lab.    Results:  1. I will send you a MYChart note on the lab results.  We are located on the third floor of the [facility] ([facility] on the [facility].  Our address is     [address] [place] [address] [place] [address] [place] [address]      Thank you for allowing us to be a part of your care here at the [facility]    If you have questions or concerns please contact us at:    Alison Troncoso RN, BSN    Pablo Mcmillan (Schedule,P.A.)  Nurse Coordinator     Clinic   Pulmonary Hypertension   Pulmonary Hypertension  [facility]  [phone]    (P) [phone]        (F)[phone]                ** Please note that you will NOT receive a reminder call regarding your scheduled testing, reminder calls are for provider appointments only.  If you are scheduled for testing within the [place] system you may receive a call regarding pre-registration for billing purposes only.**     Remember to weigh yourself daily after voiding and before you consume any food or beverages and log the numbers.  If you have gained/lost 2 pounds overnight or 5 pounds in a week contact us immediately for medication adjustments or further instructions.  **Please call us immediately if you have any syncope, chest pain, edema, or decline in your functional status.    Support Group:  Pulmonary Hypertension  Association  Https://www.phassociation.org/  **Look at the Events Tab** They even have Support Groups that you can call into    [facility] Support Group  First Saturday of the Month from 1-3 PM   [facility] [address]  Leader: Sudhir Pagan  Phone: [phone]  Email: [email or web address]

## 2017-11-20 NOTE — MR AVS SNAPSHOT
After Visit Summary   11/20/2017    Taina Singh    MRN: [id number]           Patient Information     Date Of Birth          1965        Visit Information        Provider Department      11/20/2017 1:45 PM Jennifer Young APRN [facility]        Today's Diagnoses     Left-sided chest wall pain    -  1      Care Instructions    Medication Changes:   1. Try OTC ibuprofen 600 mg, 2-3 times a day as needed for discomfort.  See how you feel after 7 days, and if the pain improves.    Patient Instructions:  1. Continue staying active, eating a low sodium, low fat diet.    Follow up Appointment Information:  1. Follow up with Rheumatology (and we will try and get you in sooner).    2. Otherwise follow up as scheduled on February 14th at 11:00 with Dr. Herbert. Arrive at 10:45 A for the lab.    Results:  1. I will send you a MYChart note on the lab results.  We are located on the third floor of the Clinic and Surgery Center ([facility]) on the [facility].  Our address is     [address] [place] [address] on [address]rd [place] [address]      Thank you for allowing us to be a part of your care here at the [facility]    If you have questions or concerns please contact us at:    Alison Troncoso RN, BSN    Pablo Mcmillan (Schedule,P.A.)  Nurse Coordinator     Clinic   Pulmonary Hypertension   Pulmonary Hypertension  [facility]  [phone]    (P) [phone]        (F)[phone]                ** Please note that you will NOT receive a reminder call regarding your scheduled testing, reminder calls are for provider appointments only.  If you are scheduled for testing within the [place] system you may receive a call regarding pre-registration for billing purposes only.**     Remember to weigh yourself daily after voiding and before you consume any  food or beverages and log the numbers.  If you have gained/lost 2 pounds overnight or 5 pounds in a week contact us immediately for medication adjustments or further instructions.  **Please call us immediately if you have any syncope, chest pain, edema, or decline in your functional status.    Support Group:  Pulmonary Hypertension Association  Https://www.phassociation.org/  **Look at the Events Tab** They even have Support Groups that you can call into    [facility] PH Support Group  First Saturday of the Month from 1-3 PM   Location: [address] [place] [address]  Leader: Sudhir Pagan  Phone: [phone]  Email: [email or web address]              Follow-ups after your visit        Follow-up notes from your care team     Return for with Piedad, Return PH, with, Labs.      Your next 10 appointments already scheduled     Feb 14, 2018  9:30 AM CST   Lab with  LAB   [facility] ([facility])    [address] [place] [id number] [phone]            Feb 14, 2018 10:00 AM CST   Ech Complete with [id number]    [facility] ([facility])    [address] [place] [id number] [phone]           1. Please bring or wear a comfortable two-piece outfit. 2. You may eat, drink and take your normal medicines. 3. For any questions that cannot be answered, please contact the ordering physician            Feb 14, 2018 11:00 AM CST   (Arrive by 10:45 AM)   RETURN PRIMARY PULMONARY with Eliezer Herbert MD   [facility] ([facility])    [address] [place] [id number] [phone]              Who to contact     If you have questions or need follow up information about today's clinic visit or your schedule please contact [facility] directly at [phone].  Normal or non-critical lab and imaging results will be communicated to you by  "MyChart, letter or phone within 4 business days after the clinic has received the results. If you do not hear from us within 7 days, please contact the clinic through [employer] or phone. If you have a critical or abnormal lab result, we will notify you by phone as soon as possible.  Submit refill requests through [employer] or call your pharmacy and they will forward the refill request to us. Please allow 3 business days for your refill to be completed.          Additional Information About Your Visit        [employer] Information     [employer] gives you secure access to your electronic health record. If you see a primary care provider, you can also send messages to your care team and make appointments. If you have questions, please call your primary care clinic.  If you do not have a primary care provider, please call [phone] and they will assist you.        Care EveryWhere ID     This is your Care EveryWhere ID. This could be used by other organizations to access your [facility] medical records  [id number]        Your Vitals Were     Pulse Height Pulse Oximetry BMI (Body Mass Index)          64 1.676 m (5' 6\") 97% 41.93 kg/m2         Blood Pressure from Last 3 Encounters:   11/20/17 123/73   09/13/17 102/66    Weight from Last 3 Encounters:   11/20/17 117.8 kg (259 lb 12.8 oz)   09/13/17 115.4 kg (254 lb 6.4 oz)              We Performed the Following     CRP cardiac risk     EKG 12-lead, tracing only (Same Day)     Erythrocyte sedimentation rate auto        Primary Care Provider    Physician No Ref-Primary       NO REF-PRIMARY PHYSICIAN        Equal Access to Services     ELIO BAKER : Hadii aad ku hadasho Soomaali, waaxda luqadaha, qaybta kaalmada adeegyada, raisa crum . So [facility] [phone].    ATENCIÓN: Si habla español, tiene a vyas disposición servicios gratuitos de asistencia lingüística. Llame al [phone].    We comply with applicable federal civil rights laws and [place] " laws. We do not discriminate on the basis of race, color, national origin, age, disability, sex, sexual orientation, or gender identity.            Thank you!     Thank you for choosing [facility]  for your care. Our goal is always to provide you with excellent care. Hearing back from our patients is one way we can continue to improve our services. Please take a few minutes to complete the written survey that you may receive in the mail after your visit with us. Thank you!             Your Updated Medication List - Protect others around you: Learn how to safely use, store and throw away your medicines at www.disposemymeds.org.          This list is accurate as of: 11/20/17  2:41 PM.  Always use your most recent med list.                   Brand Name Dispense Instructions for use Diagnosis    CALCIUM PO           Calcium-Magnesium 300-300 MG Tabs      daily        D 1000 1000 UNITS Caps      Take 1,000 Units by mouth daily        hydroxychloroquine 200 MG tablet    PLAQUENIL     Take 200 mg by mouth daily        naproxen 500 MG tablet    NAPROSYN          Omega-3 Fish Oil 500 MG Caps      Take 500 mg by mouth daily        pilocarpine 5 MG tablet    SALAGEN     Take 5 mg by mouth        WOMENS MULTI PO      Take by mouth daily

## 2017-11-21 LAB — INTERPRETATION ECG - MUSE: NORMAL

## 2017-11-22 NOTE — TELEPHONE ENCOUNTER
Called patient's Home number on file [phone] (home) and spoke with patient and offered an appointment on 12/29/2017 with Dr. Marroquin. Patient accepted the date and time and denies any questions at this time. Patient was was instructed to arrive 15 minutes prior to appointment and asked to bring a current medication list. Patient verbalized understanding.    Care Everywhere is needed from [facility] and [facility].      Message has been sent to Clinic Coordinator for assistance with scheduling.  Jayleen Gan CMA  11/22/2017 8:42 AM

## 2017-12-13 ENCOUNTER — TRANSFERRED RECORDS (OUTPATIENT)
Dept: HEALTH INFORMATION MANAGEMENT | Facility: CLINIC | Age: 52
End: 2017-12-13

## 2017-12-13 LAB
HPV ABSTRACT: NORMAL
PAP-ABSTRACT: NORMAL

## 2017-12-29 ENCOUNTER — RADIANT APPOINTMENT (OUTPATIENT)
Dept: CT IMAGING | Facility: CLINIC | Age: 52
End: 2017-12-29
Attending: INTERNAL MEDICINE
Payer: COMMERCIAL

## 2017-12-29 ENCOUNTER — OFFICE VISIT (OUTPATIENT)
Dept: RHEUMATOLOGY | Facility: CLINIC | Age: 52
End: 2017-12-29
Attending: INTERNAL MEDICINE
Payer: COMMERCIAL

## 2017-12-29 VITALS
WEIGHT: 259.3 LBS | HEIGHT: 66 IN | OXYGEN SATURATION: 99 % | SYSTOLIC BLOOD PRESSURE: 124 MMHG | HEART RATE: 63 BPM | BODY MASS INDEX: 41.67 KG/M2 | DIASTOLIC BLOOD PRESSURE: 76 MMHG

## 2017-12-29 DIAGNOSIS — M32.9 SYSTEMIC LUPUS ERYTHEMATOSUS, UNSPECIFIED SLE TYPE, UNSPECIFIED ORGAN INVOLVEMENT STATUS (H): Primary | ICD-10-CM

## 2017-12-29 DIAGNOSIS — R07.89 LEFT-SIDED CHEST WALL PAIN: ICD-10-CM

## 2017-12-29 DIAGNOSIS — M32.9 SYSTEMIC LUPUS ERYTHEMATOSUS, UNSPECIFIED SLE TYPE, UNSPECIFIED ORGAN INVOLVEMENT STATUS (H): ICD-10-CM

## 2017-12-29 LAB
ALBUMIN UR-MCNC: NEGATIVE MG/DL
ALT SERPL W P-5'-P-CCNC: 35 U/L (ref 0–50)
APPEARANCE UR: CLEAR
AST SERPL W P-5'-P-CCNC: 26 U/L (ref 0–45)
BACTERIA #/AREA URNS HPF: ABNORMAL /HPF
BILIRUB UR QL STRIP: NEGATIVE
COLOR UR AUTO: ABNORMAL
CREAT UR-MCNC: 23 MG/DL
CREAT UR-MCNC: 23 MG/DL
CRP SERPL-MCNC: 3.2 MG/L (ref 0–8)
DAT POLY-SP REAG RBC QL: NORMAL
ERYTHROCYTE [SEDIMENTATION RATE] IN BLOOD BY WESTERGREN METHOD: 49 MM/H (ref 0–30)
FERRITIN SERPL-MCNC: 163 NG/ML (ref 8–252)
GLUCOSE UR STRIP-MCNC: NEGATIVE MG/DL
HGB UR QL STRIP: NEGATIVE
IRON SATN MFR SERPL: 19 % (ref 15–46)
IRON SERPL-MCNC: 58 UG/DL (ref 35–180)
KETONES UR STRIP-MCNC: NEGATIVE MG/DL
LEUKOCYTE ESTERASE UR QL STRIP: NEGATIVE
MUCOUS THREADS #/AREA URNS LPF: PRESENT /LPF
NITRATE UR QL: NEGATIVE
PH UR STRIP: 6 PH (ref 5–7)
PROT UR-MCNC: <0.05 G/L
PROT/CREAT 24H UR: NORMAL G/G CR (ref 0–0.2)
RADIOLOGIST FLAGS: NORMAL
RBC #/AREA URNS AUTO: <1 /HPF (ref 0–2)
SOURCE: ABNORMAL
SP GR UR STRIP: 1 (ref 1–1.03)
SQUAMOUS #/AREA URNS AUTO: <1 /HPF (ref 0–1)
TIBC SERPL-MCNC: 315 UG/DL (ref 240–430)
TSH SERPL DL<=0.005 MIU/L-ACNC: 0.87 MU/L (ref 0.4–4)
UROBILINOGEN UR STRIP-MCNC: 0 MG/DL (ref 0–2)
WBC #/AREA URNS AUTO: <1 /HPF (ref 0–2)

## 2017-12-29 PROCEDURE — 85652 RBC SED RATE AUTOMATED: CPT | Performed by: INTERNAL MEDICINE

## 2017-12-29 PROCEDURE — 82595 ASSAY OF CRYOGLOBULIN: CPT | Performed by: INTERNAL MEDICINE

## 2017-12-29 PROCEDURE — 86146 BETA-2 GLYCOPROTEIN ANTIBODY: CPT | Performed by: INTERNAL MEDICINE

## 2017-12-29 PROCEDURE — 82728 ASSAY OF FERRITIN: CPT | Performed by: INTERNAL MEDICINE

## 2017-12-29 PROCEDURE — 36415 COLL VENOUS BLD VENIPUNCTURE: CPT | Performed by: INTERNAL MEDICINE

## 2017-12-29 PROCEDURE — 99212 OFFICE O/P EST SF 10 MIN: CPT | Mod: ZF

## 2017-12-29 PROCEDURE — 83540 ASSAY OF IRON: CPT | Performed by: INTERNAL MEDICINE

## 2017-12-29 PROCEDURE — 86225 DNA ANTIBODY NATIVE: CPT | Performed by: INTERNAL MEDICINE

## 2017-12-29 PROCEDURE — 83516 IMMUNOASSAY NONANTIBODY: CPT | Performed by: INTERNAL MEDICINE

## 2017-12-29 PROCEDURE — 82784 ASSAY IGA/IGD/IGG/IGM EACH: CPT | Performed by: INTERNAL MEDICINE

## 2017-12-29 PROCEDURE — 84450 TRANSFERASE (AST) (SGOT): CPT | Performed by: INTERNAL MEDICINE

## 2017-12-29 PROCEDURE — 83516 IMMUNOASSAY NONANTIBODY: CPT | Mod: 91 | Performed by: INTERNAL MEDICINE

## 2017-12-29 PROCEDURE — 84443 ASSAY THYROID STIM HORMONE: CPT | Performed by: INTERNAL MEDICINE

## 2017-12-29 PROCEDURE — 86880 COOMBS TEST DIRECT: CPT | Performed by: INTERNAL MEDICINE

## 2017-12-29 PROCEDURE — 86140 C-REACTIVE PROTEIN: CPT | Performed by: INTERNAL MEDICINE

## 2017-12-29 PROCEDURE — 86160 COMPLEMENT ANTIGEN: CPT | Performed by: INTERNAL MEDICINE

## 2017-12-29 PROCEDURE — 00000402 ZZHCL STATISTIC TOTAL PROTEIN: Performed by: INTERNAL MEDICINE

## 2017-12-29 PROCEDURE — 81001 URINALYSIS AUTO W/SCOPE: CPT | Performed by: INTERNAL MEDICINE

## 2017-12-29 PROCEDURE — 83550 IRON BINDING TEST: CPT | Performed by: INTERNAL MEDICINE

## 2017-12-29 PROCEDURE — 86800 THYROGLOBULIN ANTIBODY: CPT | Performed by: INTERNAL MEDICINE

## 2017-12-29 PROCEDURE — 84460 ALANINE AMINO (ALT) (SGPT): CPT | Performed by: INTERNAL MEDICINE

## 2017-12-29 PROCEDURE — 86376 MICROSOMAL ANTIBODY EACH: CPT | Performed by: INTERNAL MEDICINE

## 2017-12-29 PROCEDURE — 84156 ASSAY OF PROTEIN URINE: CPT | Performed by: INTERNAL MEDICINE

## 2017-12-29 PROCEDURE — 86256 FLUORESCENT ANTIBODY TITER: CPT | Performed by: INTERNAL MEDICINE

## 2017-12-29 PROCEDURE — 84165 PROTEIN E-PHORESIS SERUM: CPT | Performed by: INTERNAL MEDICINE

## 2017-12-29 PROCEDURE — 82306 VITAMIN D 25 HYDROXY: CPT | Performed by: INTERNAL MEDICINE

## 2017-12-29 PROCEDURE — 81401 MOPATH PROCEDURE LEVEL 2: CPT | Performed by: INTERNAL MEDICINE

## 2017-12-29 RX ORDER — PILOCARPINE HYDROCHLORIDE 5 MG/1
5 TABLET, FILM COATED ORAL 4 TIMES DAILY PRN
Qty: 120 TABLET | Refills: 11 | Status: SHIPPED | OUTPATIENT
Start: 2017-12-29 | End: 2019-05-26

## 2017-12-29 RX ORDER — PREDNISONE 20 MG/1
TABLET ORAL
Qty: 60 TABLET | Refills: 1 | Status: SHIPPED | OUTPATIENT
Start: 2017-12-29 | End: 2018-04-04

## 2017-12-29 RX ORDER — PREDNISONE 5 MG/1
TABLET ORAL
Qty: 90 TABLET | Refills: 1 | Status: SHIPPED | OUTPATIENT
Start: 2017-12-29 | End: 2018-04-04

## 2017-12-29 ASSESSMENT — PAIN SCALES - GENERAL: PAINLEVEL: MILD PAIN (3)

## 2017-12-29 NOTE — LETTER
12/29/2017       RE: Taina Singh  [address] [place] [address]     Dear Colleague,    Thank you for referring your patient, Taina Singh, to the [facility] RHEUMATOLOGY at [facility]. Please see a copy of my visit note below.    Rheumatology Consult Note    Reason for referral: SLE    Referring physician: Delio Felton MD          HPI:    Taina Singh is a 52 year old  female who was referred to our clinic for evaluation and management of her SLE.      She was diagnosed with lupus at age 27. Her 1st sx were malar rash and fatigue. No skin biopsy was done (reportedly had +KARLIE). She was treated with prednisone taper and HCQ. HCQ helped and she tolerated it well. She was diagnosed with Sjogren's at the same time. Had dryness of eyes and mouth. No lip biopsy to confirm the Dx.      Reportedly she had very mild stable lupus for many years and eventually at some point, stopped taking HCQ (can't remember when)    She was diagnosed with MALT lymphoma at 44, had enlarged LN over R parotid gland, on biopsy it was MALT lymphoma. Tx was resection only, no radiation or chemo.    About 3 yr ago, had flu shot and 2 days later, developed pleuritic CP and was seen at urgent care. That's why she does not want to get flu shot. She was seen in ER 2 days after UC visit. She was treated with prednisone.      She lost 100 lbs by exercise over past 2 yr, felt amazing. In 7/2017, started not feeling well. She had extreme fatigue. Had AM stiffness and pain over hands. Touched base with her previous rheumatologist (Dr. Rangel) and was told to have lupus flare and was put on  mg qd. Afterwards, developed pleuritic CP which has not been resolved.    She was given prednisone 40 mg qd x 5 days (on 12/16/2017) by pulmonologist in [facility]. It helped a lot but pleuritic CP recurred after stopping it.    She is seeking 2nd opinion for m/o her lupus.    She was told to have pulm HTN  and was evaluated for it.    No triggers for current flare.    No flare of malar rash. Has fatigue. No current hair loss. Uses blink eyedrops, restasis burned her eyes. She has been prescribed salagen for dry mouth, has not used it. Arthritis of hands have resolved on HCQ.    Last HCQ eye exam was 2 mo ago.    ROS:  A comprehensive ROS was done, positives are per HPI.    It is negative for fever, discoid rash, photosensitivity, Raynaud's, oral ulcers, nasal ulcers, dysphagia, cough, h/o serositis, N/V, heartburn, diarrhea, abdominal pain, blood in stool or urine, h/o proteinuria, myalgia, headaches, seizures, psychosis, numbness, tingling, myelitis, easy bruising, depression, anxiety, memory problem, h/o thrombosis, or blood transfusion.    Past Medical History:   Diagnosis Date     Gluten intolerance      Iron deficiency      Lupus      MALT lymphoma (H) age 44     Sjogren's syndrome (H)      Vitamin D deficiency      Past Surgical History:   Procedure Laterality Date     BIOPSY/REMOVAL, LYMPH NODE(S)        SECTION  age 32     PAROTIDECTOMY       TONSILLECTOMY       Family History   Problem Relation Age of Onset     Alopecia Brother      Rheumatoid Arthritis Brother      Social History     Social History     Marital status:      Spouse name: N/A     Number of children: N/A     Years of education: 1     Occupational History           , 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    Allergies   Allergen Reactions     Penicillins Rash     Sulfa Drugs Rash       Outpatient Encounter Prescriptions as of 2017   Medication Sig Dispense Refill     Calcium-Magnesium 300-300 MG TABS daily        hydroxychloroquine  "(PLAQUENIL) 200 MG tablet Take 200 mg by mouth 2 times daily        Omega-3 Fatty Acids (OMEGA-3 FISH OIL) 500 MG CAPS Take 500 mg by mouth daily        Multiple Vitamins-Minerals (WOMENS MULTI PO) Take by mouth daily        CALCIUM PO        Cholecalciferol (D 1000) 1000 UNITS CAPS Take 1,000 Units by mouth daily        naproxen (NAPROSYN) 500 MG tablet        pilocarpine (SALAGEN) 5 MG tablet Take 5 mg by mouth       No facility-administered encounter medications on file as of 2017.          Her records were reviewed.    2D-Echo 2017:    ECHO COMPLETE WO CONTRAST CHILANGO GIBBONS 2017 14:43     [facility]   [address], [address], [place] [address]   Main: [phone]  [email or web address]                                                 Transthoracic Echo Report   CHILANGO GIBBONS   Excellian ID: [id number] Age: 52 : 1965 Ordering Provider: NGUYEN PETERS   Exam Date: 2017 14:43 Gender: F Sonographer: BDB   Accession #: [id number] Height: 66 in BSA: 2.24 m  BP: 108 / 62   Weight: 261 lbs BMI: 42.1 kg/m          Site: [facility]   Location: Outpatient Rhythm: Normal Sinus Rhythm   Procedure Components: 2D imaging, Color Doppler, Spectral Doppler   Indications: Murmur; Systemic lupus erythematosus, unspecified SLE type, unspecified organ   involvement status   Technical Quality: Contrast: None     Final Conclusion   Visually estimated ejection fraction is 55-60%.   Mild left ventricular hypertrophy.   Estimated pulmonary artery pressure of 31 mmHg + RA pressure.   Dilated IVC size, <50% collapse with respiration.   Estimated EF: 55-60%   FINDINGS   Left Ventricle Normal left ventricular size. Visually estimated ejection fraction is 55-60%.   Mild left ventricular hypertrophy.   Diastolic Function \"Pseudonormal\" left ventricular filling pattern. E/e' ratio 8-15 is   indeterminate for filling pressure.   Right Ventricle " "Normal right ventricular size and function.   Left Atrium Mild left atrial enlargement.   Right Atrium Mild right atrial enlargement.   Aortic Valve Normal aortic valve. No aortic stenosis. No significant aortic regurgitation.   Mitral Valve Normal mitral valve. No mitral stenosis. Mild mitral regurgitation.   Tricuspid Valve Normal tricuspid valve. No tricuspid stenosis. Mild tricuspid regurgitation.   Estimated pulmonary artery pressure   of 31 mmHg + RA pressure.   Pulmonic Valve Normal pulmonic valve without significant stenosis or regurgitation.   Pericardium Normal pericardium.   Aorta Measured aortic root diameter is normal in size.   Inferior Vena Cava Dilated IVC size, <50% collapse with respiration.    Component      Latest Ref Rng & Units 11/20/2017   Sodium      133 - 144 mmol/L 137   Potassium      3.4 - 5.3 mmol/L 4.2   Chloride      94 - 109 mmol/L 102   Carbon Dioxide      20 - 32 mmol/L 30   Anion Gap      3 - 14 mmol/L 6   Glucose      70 - 99 mg/dL 101 (H)   Urea Nitrogen      7 - 30 mg/dL 13   Creatinine      0.52 - 1.04 mg/dL 0.55   GFR Estimate      >60 mL/min/1.7m2 >90   GFR Estimate If Black      >60 mL/min/1.7m2 >90   Calcium      8.5 - 10.1 mg/dL 9.1   WBC      4.0 - 11.0 10e9/L 4.9   RBC Count      3.8 - 5.2 10e12/L 4.28   Hemoglobin      11.7 - 15.7 g/dL 11.3 (L)   Hematocrit      35.0 - 47.0 % 35.5   MCV      78 - 100 fl 83   MCH      26.5 - 33.0 pg 26.4 (L)   MCHC      31.5 - 36.5 g/dL 31.8   RDW      10.0 - 15.0 % 14.6   Platelet Count      150 - 450 10e9/L 215   N-Terminal Pro Bnp      0 - 125 pg/mL 546 (H)   Sed Rate      0 - 30 mm/h 71 (H)             Ph.E:    /76  Pulse 63  Ht 1.676 m (5' 6\")  Wt 117.6 kg (259 lb 4.8 oz)  SpO2 99%  BMI 41.85 kg/m2      Constitutional: WD/WN. Pleasant. In no acute distress.  Eyes: EOM intact, PERRLA, sclera anicteric, conj not injected  HEENT: No oral ulcers or thrush. Normal salivary pool.  Neck: No cervical LAP or thyromegaly  Chest: " Clear to auscultation bilaterally  CV: RRR, no murmurs/ rubs or gallops. No edema, clubbing or cyanosis.   GI: Abdomen is soft and non tender.   MS: No synovitis. Cool joints. No tenderness of the joints. No  joint deformities. Full ROM of the joints. No nodules. No Jaccoud's deformity.  Skin: No skin rash, malar rash, livedo, periungual erythema, alopecia, digital ulcers or nail changes.  Neuro: A&O x 3. Grossly non focal, muscular power 5/5 in all ext  Psych: NL affect and mood    Assessment/ plan:    1-SLE. 51 yo WF with +fh/o RA and personal hx of SLE presents for 2nd opinion of m/o her SLE. She was diagnosed with SLE at age 27. Her lupus has been marked by +KARLIE (highest titer 1:640, speckled and nucleolar patterns), arthritis, malar rash, serositis (pleuritic CP) supported by +SSA/SSB Ab, low C3, +RF, high ESR/CRP. She had neg anti-RNP, anti-Sm, anti-DNA, acL IgM/G/A, LAC, cryo and anti-CCP. Had NL C3. She was treated with prednisone and HCQ at the time of Dx. Can't remember how long she was on HCQ and why HCQ was stopped, but it probably was stopped because of stable disease, no report on HCQ toxicity.    Reportedly her SLE was mild all these years.    Has secondary Sjogren's with sicca, +SSA/SSB Ab and h/o MALT lymphoma at age 44 in remission. Uses blink eyedrops and salagen. No lip biopsy was done to confirm Dx of Sjogren's.    Her lupus flared in 7/2017 with no triggers when she presented with arthritis, HCQ was resumed, arthritis resolved. Mild pulm HTN on 2D-Echo 8/2017, will be monitored, R cardiac cath has been considered to be done in future.    In 12/2017, was prescribed prednisone 40 mg for only 5 days for pleuritic CP, CP recurred after stopping prednisone.     Her major complaint is ongoing CP. Her lupus is active with pleuritic CP. Lupus Dx and tx plan was discussed with her.    Recommend:    Labs today    Chest CT without contrast     Continue  mg bid    Prednisone taper 82-47-77-15-10-5  mg a day each for 5 days then stop    Consider adding AZA, if pleuritic CP recurs off prednisone.    Continue salagen    2-HCQ monitoring. Was reminded to have eye exam.      PLAN: Follow up in 3-4 months.      MEDICATION CHANGES: as above    Orders Placed This Encounter   Procedures     CT Chest w/o contrast (High Resolution)     Complement C3     Complement C4     CRP inflammation     DNA double stranded antibodies     Erythrocyte sedimentation rate auto     Vitamin D Deficiency     Routine UA with micro reflex to culture     Protein  random urine with Creat Ratio     Creatinine random urine     AST     ALT     Beta 2 Glycoprotein 1 Antibody IgG     Beta 2 Glycoprotein 1 Antibody IgM     Thyroid peroxidase antibody     Anti thyroglobulin antibody     TSH with free T4 reflex     Cryoglobulin quantitative     Protein electrophoresis     Immunoglobulins A G and M     Tissue transglutaminase antibody IgA     Deamidated Giladin Peptide Cari IgA IgG     Ferritin     Iron and iron binding capacity     Endomysial Antibody IgA by IFA     Thiopurine Methyltransferase Genotyping     Direct antiglobulin test (DATG)

## 2017-12-29 NOTE — MR AVS SNAPSHOT
After Visit Summary   12/29/2017    Taina Singh    MRN: [id number]           Patient Information     Date Of Birth          1965        Visit Information        Provider Department      12/29/2017 11:00 AM Killian Marroquin MD [facility] Rhe[facility]        Today's Diagnoses     Systemic lupus erythematosus, unspecified SLE type, unspecified organ involvement status (H)    -  1      Care Instructions    Labs today    Chest CT without contrast    Prednisone taper 48-20-98-15-10-5 mg a day each for 5 days then stop    Return in 3-4 months          Follow-ups after your visit        Your next 10 appointments already scheduled     Dec 29, 2017 12:45 PM CST   Lab with  LAB   [facility] ([facility])    [address] [place] [id number] [phone]            Feb 14, 2018  9:30 AM CST   Lab with  LAB   [facility] ([facility])    [address] [place] [id number]   [phone]            Feb 14, 2018 10:00 AM CST   Ech Complete with UCECHCR4   [facility] ([facility])    [address] [place] [address] [id number] [phone]           1. Please bring or wear a comfortable two-piece outfit. 2. You may eat, drink and take your normal medicines. 3. For any questions that cannot be answered, please contact the ordering physician            Feb 14, 2018 11:00 AM CST   (Arrive by 10:45 AM)   RETURN PRIMARY PULMONARY with Eliezer Herbert MD   [facility] ([facility])    [address] [place] [id number] [phone]            Apr 25, 2018 11:00 AM CDT   (Arrive by 10:45 AM)   RETURN LUPUS with Killian Marroquin MD   [facility] ([facility])    [address] [place] [phone] [phone]              Future tests that were  ordered for you today     Open Future Orders        Priority Expected Expires Ordered    CT Chest w/o contrast (High Resolution) Routine  7/27/2018 12/29/2017    Thiopurine Methyltransferase Genotyping Routine  12/29/2018 12/29/2017    Tissue transglutaminase antibody IgA Routine  1/28/2018 12/29/2017    Deamidated Giladin Peptide Cari IgA IgG Routine  1/28/2018 12/29/2017    Ferritin Routine  12/29/2018 12/29/2017    Iron and iron binding capacity Routine  12/29/2018 12/29/2017    Endomysial Antibody IgA by IFA Routine  1/28/2018 12/29/2017    Complement C3 Routine  12/29/2018 12/29/2017    Complement C4 Routine  12/29/2018 12/29/2017    CRP inflammation Routine  12/29/2018 12/29/2017    DNA double stranded antibodies Routine  12/29/2018 12/29/2017    Erythrocyte sedimentation rate auto Routine  12/29/2018 12/29/2017    Vitamin D Deficiency Routine  12/29/2018 12/29/2017    Routine UA with micro reflex to culture Routine  12/29/2018 12/29/2017    Protein  random urine with Creat Ratio Routine  12/29/2018 12/29/2017    Creatinine random urine Routine  12/29/2018 12/29/2017    AST Routine  12/29/2018 12/29/2017    ALT Routine  12/29/2018 12/29/2017    Direct antiglobulin test (DATG) Routine  12/29/2018 12/29/2017    Beta 2 Glycoprotein 1 Antibody IgG Routine  12/29/2018 12/29/2017    Beta 2 Glycoprotein 1 Antibody IgM Routine  12/29/2018 12/29/2017    Thyroid peroxidase antibody Routine  12/29/2018 12/29/2017    Anti thyroglobulin antibody Routine  12/29/2018 12/29/2017    TSH with free T4 reflex Routine  12/29/2018 12/29/2017    Cryoglobulin quantitative Routine  12/29/2018 12/29/2017    Protein electrophoresis Routine  12/29/2018 12/29/2017    Immunoglobulins A G and M Routine  12/29/2018 12/29/2017            Who to contact     If you have questions or need follow up information about today's clinic visit or your schedule please contact [facility] RHEUMATOLOGY directly at [phone].  Normal or non-critical lab and  "imaging results will be communicated to you by MyChart, letter or phone within 4 business days after the clinic has received the results. If you do not hear from us within 7 days, please contact the clinic through [employer] or phone. If you have a critical or abnormal lab result, we will notify you by phone as soon as possible.  Submit refill requests through [employer] or call your pharmacy and they will forward the refill request to us. Please allow 3 business days for your refill to be completed.          Additional Information About Your Visit        [employer] Information     [employer] gives you secure access to your electronic health record. If you see a primary care provider, you can also send messages to your care team and make appointments. If you have questions, please call your primary care clinic.  If you do not have a primary care provider, please call [phone] and they will assist you.        Care EveryWhere ID     This is your Care EveryWhere ID. This could be used by other organizations to access your [facility] medical records  [id number]        Your Vitals Were     Pulse Height Pulse Oximetry BMI (Body Mass Index)          63 1.676 m (5' 6\") 99% 41.85 kg/m2         Blood Pressure from Last 3 Encounters:   12/29/17 124/76   11/20/17 123/73   09/13/17 102/66    Weight from Last 3 Encounters:   12/29/17 117.6 kg (259 lb 4.8 oz)   11/20/17 117.8 kg (259 lb 12.8 oz)   09/13/17 115.4 kg (254 lb 6.4 oz)                 Today's Medication Changes          These changes are accurate as of: 12/29/17 12:28 PM.  If you have any questions, ask your nurse or doctor.               Start taking these medicines.        Dose/Directions    * predniSONE 20 MG tablet   Commonly known as:  DELTASONE   Used for:  Systemic lupus erythematosus, unspecified SLE type, unspecified organ involvement status (H)   Started by:  Killian Marroquin MD        46-48-49-15-10-5 mg a day each for 5 days then stop, take along with 5 mg size "   Quantity:  60 tablet   Refills:  1       * predniSONE 5 MG tablet   Commonly known as:  DELTASONE   Used for:  Systemic lupus erythematosus, unspecified SLE type, unspecified organ involvement status (H)   Started by:  Killian Marroquin MD        60-22-52-15-10-5 mg a day each for 5 days then stop, take along with 20 mg size   Quantity:  90 tablet   Refills:  1       * Notice:  This list has 2 medication(s) that are the same as other medications prescribed for you. Read the directions carefully, and ask your doctor or other care provider to review them with you.      These medicines have changed or have updated prescriptions.        Dose/Directions    pilocarpine 5 MG tablet   Commonly known as:  SALAGEN   This may have changed:    - when to take this  - reasons to take this   Used for:  Systemic lupus erythematosus, unspecified SLE type, unspecified organ involvement status (H)   Changed by:  Killian Marroquin MD        Dose:  5 mg   Take 1 tablet (5 mg) by mouth 4 times daily as needed   Quantity:  120 tablet   Refills:  11            Where to get your medicines      These medications were sent to [id number] - "[facility] [facility] [place] - [address] AT [address] [facility]  [address], "[employer] [place] [address]     Phone:  [phone]     pilocarpine 5 MG tablet    predniSONE 20 MG tablet    predniSONE 5 MG tablet                Primary Care Provider Fax #    Physician No Ref-Primary [phone]       No address on file        Equal Access to Services     ELIO BAKER AH: Hadii rose Elliott, waaxda luqadaha, qaybta kaalmada adeegyada, raisa mathew. So [facility] [phone].    ATENCIÓN: Si habla marcelloañol, tiene a vyas disposición servicios gratuitos de asistencia lingüística. Llame al [phone].    We comply with applicable federal civil rights laws and [place] laws. We do not discriminate on the basis of race, color, national  origin, age, disability, sex, sexual orientation, or gender identity.            Thank you!     Thank you for choosing [facility] RHEUMATOLOGY  for your care. Our goal is always to provide you with excellent care. Hearing back from our patients is one way we can continue to improve our services. Please take a few minutes to complete the written survey that you may receive in the mail after your visit with us. Thank you!             Your Updated Medication List - Protect others around you: Learn how to safely use, store and throw away your medicines at www.disposemymeds.org.          This list is accurate as of: 12/29/17 12:28 PM.  Always use your most recent med list.                   Brand Name Dispense Instructions for use Diagnosis    CALCIUM PO           Calcium-Magnesium 300-300 MG Tabs      daily        D 1000 1000 UNITS Caps      Take 1,000 Units by mouth daily        hydroxychloroquine 200 MG tablet    PLAQUENIL     Take 200 mg by mouth 2 times daily        naproxen 500 MG tablet    NAPROSYN          Omega-3 Fish Oil 500 MG Caps      Take 500 mg by mouth daily        pilocarpine 5 MG tablet    SALAGEN    120 tablet    Take 1 tablet (5 mg) by mouth 4 times daily as needed    Systemic lupus erythematosus, unspecified SLE type, unspecified organ involvement status (H)       * predniSONE 20 MG tablet    DELTASONE    60 tablet    48-85-87-15-10-5 mg a day each for 5 days then stop, take along with 5 mg size    Systemic lupus erythematosus, unspecified SLE type, unspecified organ involvement status (H)       * predniSONE 5 MG tablet    DELTASONE    90 tablet    07-04-09-15-10-5 mg a day each for 5 days then stop, take along with 20 mg size    Systemic lupus erythematosus, unspecified SLE type, unspecified organ involvement status (H)       WOMENS MULTI PO      Take by mouth daily        * Notice:  This list has 2 medication(s) that are the same as other medications prescribed for you. Read the directions carefully,  and ask your doctor or other care provider to review them with you.

## 2017-12-29 NOTE — NURSING NOTE
"Chief Complaint   Patient presents with     Consult     Consult for Lupus        Initial /76  Pulse 63  Ht 1.676 m (5' 6\")  Wt 117.6 kg (259 lb 4.8 oz)  SpO2 99%  BMI 41.85 kg/m2 Estimated body mass index is 41.85 kg/(m^2) as calculated from the following:    Height as of this encounter: 1.676 m (5' 6\").    Weight as of this encounter: 117.6 kg (259 lb 4.8 oz).  Medication Reconciliation: complete   Devora Dominguez CMA    "

## 2017-12-29 NOTE — LETTER
Patient:  Taina Singh  :   1965  MRN:     [id number]        Ms.Kelly Singh  [address] [facility] [address]        2017    Dear ,    We are writing to inform you of your test results. Some changes on CT but nothing major or worrisome, recommend follow up with your lung specialist and repeat CT in a year.      Resulted Orders   CT Chest w/o contrast (High Resolution)   Result Value Ref Range    Radiologist flags Lung nodule     Narrative    EXAMINATION: CT CHEST W/O CONTRAST, 2017 1:08 PM    TECHNIQUE:  Helical CT images from the thoracic inlet through the lung  bases were obtained without IV contrast during inspiration and  expiration according to high-resolution chest CT protocol. Contrast  dose: None    COMPARISON: None    HISTORY: eval for ILD, pleural effusion, pt has lupus, Sjogren's, h/o  MALT and pleurisy and SOB; Systemic lupus erythematosus, unspecified  SLE type, unspecified organ involvement status (H)    FINDINGS:    At least 75% collapse of the trachea and mainstem bronchi on the end  expiratory views. Diffuse lobular air trapping on end expiratory  images. Bibasilar platelike atelectasis. No pneumothorax or pleural  effusion. Scattered solid pulmonary nodules, for example a 4 mm  lingular nodule (series 5 image 145) and a 4 mm right upper lobe  nodule (image 73).     The heart size is normal. Mild three-vessel coronary artery calcific  atherosclerosis. Dilation of the main pulmonary artery to 4.1 cm. No  pericardial effusion. Normal caliber and configuration of the thoracic  great vessels. Numerous prominent though nonenlarged mediastinal lymph  nodes.    Limited views of the abdomen reveal no worrisome pathology. No  worrisome bony or soft tissue lesions.      Impression    IMPRESSION:   1. At least moderate tracheobronchomalacia.  2. 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  3. Scattered subcentimeter pulmonary nodules measuring up to 4 mm.  Consider follow-up chest CT in one year to establish stability.    [Consider Follow Up: Lung nodule]    This report will be copied to the [facility] to ensure a  provider acknowledges the finding.     I have personally reviewed the examination and initial interpretation  and I agree with the findings.    MD Killian KEATING MD

## 2017-12-29 NOTE — PATIENT INSTRUCTIONS
Labs today    Chest CT without contrast    Prednisone taper 12-54-17-15-10-5 mg a day each for 5 days then stop    Return in 3-4 months

## 2017-12-29 NOTE — LETTER
Patient:  Taina Singh  :   1965  MRN:     [id number]        Ms.Kelly Singh  [address] [facility] [address]        2018    Dear ,    We are writing to inform you of your test results. Celiac work up is negative. ESR is improved. Elevated anti-DNA and low C3/C4 support that lupus is flaring. TPMT is normal which means we could use imuran to control lupus, if prednisone taper turns out to be not enough.      Resulted Orders   Complement C3   Result Value Ref Range    Complement C3 72 (L) 76 - 169 mg/dL   Complement C4   Result Value Ref Range    Complement C4 6 (L) 15 - 50 mg/dL   CRP inflammation   Result Value Ref Range    CRP Inflammation 3.2 0.0 - 8.0 mg/L   DNA double stranded antibodies   Result Value Ref Range    DNA-ds >379 (H) <10 IU/mL      Comment:      Positive   Erythrocyte sedimentation rate auto   Result Value Ref Range    Sed Rate 49 (H) 0 - 30 mm/h   Vitamin D Deficiency   Result Value Ref Range    Vitamin D Deficiency screening 51 20 - 75 ug/L      Comment:      Season, race, dietary intake, and treatment affect the concentration of   25-hydroxy-Vitamin D. Values may decrease during winter months and increase   during summer months. Values 20-29 ug/L may indicate Vitamin D insufficiency   and values <20 ug/L may indicate Vitamin D deficiency.  Vitamin D determination is routinely performed by an immunoassay specific for   25 hydroxyvitamin D3.  If an individual is on vitamin D2 (ergocalciferol)   supplementation, please specify 25 OH vitamin D2 and D3 level determination by   LCMSMS test [id number].     Routine UA with micro reflex to culture   Result Value Ref Range    Color Urine Straw     Appearance Urine Clear     Glucose Urine Negative NEG^Negative mg/dL    Bilirubin Urine Negative NEG^Negative    Ketones Urine Negative NEG^Negative mg/dL    Specific Gravity Urine 1.005 1.003 - 1.035    Blood Urine Negative NEG^Negative    pH Urine 6.0 5.0 - 7.0 pH    Protein  Albumin Urine Negative NEG^Negative mg/dL    Urobilinogen mg/dL 0.0 0.0 - 2.0 mg/dL    Nitrite Urine Negative NEG^Negative    Leukocyte Esterase Urine Negative NEG^Negative    Source Midstream Urine     WBC Urine <1 0 - 2 /HPF    RBC Urine <1 0 - 2 /HPF    Bacteria Urine Few (A) NEG^Negative /HPF    Squamous Epithelial /HPF Urine <1 0 - 1 /HPF    Mucous Urine Present (A) NEG^Negative /LPF   Protein  random urine with Creat Ratio   Result Value Ref Range    Protein Random Urine <0.05 g/L    Protein Total Urine g/gr Creatinine Unable to calculate due to low value 0 - 0.2 g/g Cr   Creatinine random urine   Result Value Ref Range    Creatinine Urine Random 23 mg/dL   AST   Result Value Ref Range    AST 26 0 - 45 U/L   ALT   Result Value Ref Range    ALT 35 0 - 50 U/L   Direct antiglobulin test (DATG)   Result Value Ref Range    HEENA  Broad Spectrum Neg    Beta 2 Glycoprotein 1 Antibody IgG   Result Value Ref Range    Beta 2 Glycoprotein 1 Antibody IgG <0.6 <7 U/mL      Comment:      Negative   Beta 2 Glycoprotein 1 Antibody IgM   Result Value Ref Range    Beta 2 Glycoprotein 1 Antibody IgM <0.9 <7 U/mL      Comment:      Negative   Thyroid peroxidase antibody   Result Value Ref Range    Thyroid Peroxidase Antibody <10 <35 IU/mL   Anti thyroglobulin antibody   Result Value Ref Range    Thyroglobulin Antibody <20 <40 IU/mL   TSH with free T4 reflex   Result Value Ref Range    TSH 0.87 0.40 - 4.00 mU/L   Cryoglobulin quantitative   Result Value Ref Range    Cryoglobulin Trace (A) NEG^Negative %      Comment:      This test was developed and its performance characteristics determined by the   [facility],  Special Chemistry Laboratory. It has   not been cleared or approved by the FDA. The laboratory is regulated under   CLIA as qualified to perform high-complexity testing. This test is used for   clinical purposes. It should not be regarded as investigational or for   research.     Protein  electrophoresis   Result Value Ref Range    Albumin Fraction 4.2 3.7 - 5.1 g/dL    Alpha 1 Fraction 0.4 0.2 - 0.4 g/dL    Alpha 2 Fraction 0.8 0.5 - 0.9 g/dL    Beta Fraction 1.0 0.6 - 1.0 g/dL    Gamma Fraction 1.6 0.7 - 1.6 g/dL    Monoclonal Peak 0.0 0.0 g/dL    ELP Interpretation:       Essentially normal electrophoretic pattern. No monoclonal protein seen. Pathologic   significance requires clinical correlation. Olga Payton M.D., Ph.D.      Immunoglobulins A G and M   Result Value Ref Range    IGG 1560 695 - 1620 mg/dL     (H) 70 - 380 mg/dL    IGM 92 60 - 265 mg/dL   Tissue transglutaminase antibody IgA   Result Value Ref Range    Tissue Transglutaminase Antibody IgA 4 <7 U/mL      Comment:      Negative  The tTG-IgA assay has limited utility for patients with decreased levels of   IgA. Screening for celiac disease should include IgA testing to rule out   selective IgA deficiency and to guide selection and interpretation of   serological testing. tTG-IgG testing may be positive in celiac disease   patients with IgA deficiency.     Deamidated Giladin Peptide Cari IgA IgG   Result Value Ref Range    Deamidated Gliadin Cari, IgA 2 <7 U/mL      Comment:      Negative    Deamidated Gliadin Cari, IgG 5 <7 U/mL      Comment:      Negative   Ferritin   Result Value Ref Range    Ferritin 163 8 - 252 ng/mL   Iron and iron binding capacity   Result Value Ref Range    Iron 58 35 - 180 ug/dL    Iron Binding Cap 315 240 - 430 ug/dL    Iron Saturation Index 19 15 - 46 %   Endomysial Antibody IgA by IFA   Result Value Ref Range    Endomysial Antibody IgA by IFA <1:10 <1:10      Comment:      (Note)  INTERPRETIVE INFORMATION: Endomysial Antibody, IgA Titer  The endomysial antigen has been identified as the protein   cross-linking enzyme known as tissue transglutaminase.  Performed by [facility],  [address] [place] [id number] [phone]  [email or web address], Oscar Enriquez MD, Lab. Director     Thiopurine  Methyltransferase Genotyping   Result Value Ref Range    TPMT Genotype Specimen Whole Blood     TPMT Genotype Neg/Neg     TPMT Predicted Phenotype Normal       Comment:      (Note)  Interpretation: None of the analyzed thiopurine   S-methyltransferase (TPMT) gene variants were detected in   this individual. This result predicts normal thiopurine   metabolism and normal risk of drug-related myelotoxicity.  Recommendation: This result does not replace the need for   therapeutic drug or clinical monitoring. TPMT enzyme   activity and risk of adverse reactions to thiopurines may   be affected by additional genetic and non-genetic factors   that are not detected by this test.  This result has been reviewed and approved by Lucio Treadwell, Ph.D.  Background Information: Thiopurine Methyltransferase (TPMT)                          Genotyping, 4 Variants  Characteristics: Thiopurine therapy is used in the   treatment of autoimmune diseases, inflammatory bowel   disease and acute lymphoblastic leukemia and is also used   to prevent rejection after solid organ transplant.   Thiopurine drugs (eg, Azathioprine, 6-mercaptopurine,   6-thioguanine) are antimetabolites and must b  e metabolized   to 6-thioguanine nucleotides (6-TGN) for activity. The   inactivation of thiopurine drugs is primarily catalyzed by   TPMT. Variants in the TPMT gene can lead to low TPMT enzyme   activity, resulting in accumulation of cytotoxic   metabolites and increased risk for drug-related   myelotoxicity with standard doses of thiopurine drugs.   Incidence of TPMT deficiency: In the general population,   approximately 0.3 percent of individuals have low TPMT   activity and 10 percent have intermediate TPMT activity.   Allele Frequencies:   TPMT *2:  0.420575,  0.0,  0.02834,   Mediterranean 0.08046, Georgian 0.68520,    0.56841  TPMT *3A:  0.08152,  0.0369756,    0.0356, Mediterranean 0.0254, Mexican  0.0533, Middle   Eastern 0.0114  TPMT *3B:  0.0,  0.0,  0.086117,   Mediterranean 0.30975, Congolese 0.09867,    0.56621  TPMT *3C:  0.0495,  0.0157,  0.542572,   Mediterranean   0.71364, Congolese 0.93885,    0.77124  Inheritance: Autosomal co-dominant.  Cause: TPMT gene variants resulting in enzyme deficiency.   Variants Tested: TPMT deficiency alleles: *2 (c.238G>C;   p.Cdy76Tim), *3A (c.[460G>A;719A>G];   p.[Jvc430Akk;Qzg015Mor]), *3B (c.460G>A; p.Vhu256Yut), *3C   (c.719A>G; p.Qlj344Hqx).   No variants detected is predictive of *1 functional alleles   and normal TPMT enzyme activity.  (Variants are numbered according to [id number] transcript)  Clinical Sensitivity: 95 percent.   Methodology: Polymerase chain reaction (PCR) and   fluorescence monitoring.  Analytical Sensitivity and Specificity: 99 percent.  Limitations: Only the targeted TPMT variants will be   detected by this panel. Because the complex *3A allele   contains the variants found in the *3B and *3C alleles,   this test cannot distinguish the 3A/Negative genotype   (intermediate enzyme activity) from the rare *3B/*3C   genotype (no or low enzyme activity). This test does not   asse  ss for TPMT variants associated with ultra-high enzyme   activity. Genotyping will reflect donor status in patients   who have received allogenic stem cell or bone marrow   transplants. TPMT enzyme activity, drug metabolism and risk   for adverse reactions to thiopurines may be affected by   additional genetic and non-genetic factors not evaluated by   this test. Diagnostic errors can occur due to rare sequence   variations. Genotyping does not replace the need for   therapeutic drug monitoring and clinical observation.   Counseling and informed consent are recommended for genetic   testing. Consent forms are available online at   [email or web address].  Test developed and characteristics determined by [facility]    Laboratories. See Compliance Statement C: [facility]  Performed by [facility],  [address] [place] [id number] [phone]  [email or web address], Oscar Enriquez MD, Lab. Director     Creatinine urine calculation only   Result Value Ref Range    Creatinine Urine 23 mg/dL     Killian Marroquin MD

## 2017-12-30 NOTE — PROGRESS NOTES
Some changes on CT but nothing major or worrisome, recommend follow up with your lung specialist and repeat CT in a year.

## 2018-01-01 LAB — ENDOMYSIUM IGA TITR SER IF: NORMAL {TITER}

## 2018-01-02 LAB
ALBUMIN SERPL ELPH-MCNC: 4.2 G/DL (ref 3.7–5.1)
ALPHA1 GLOB SERPL ELPH-MCNC: 0.4 G/DL (ref 0.2–0.4)
ALPHA2 GLOB SERPL ELPH-MCNC: 0.8 G/DL (ref 0.5–0.9)
B-GLOBULIN SERPL ELPH-MCNC: 1 G/DL (ref 0.6–1)
B2 GLYCOPROT1 IGG SERPL IA-ACNC: <0.6 U/ML
B2 GLYCOPROT1 IGM SERPL IA-ACNC: <0.9 U/ML
C3 SERPL-MCNC: 72 MG/DL (ref 76–169)
C4 SERPL-MCNC: 6 MG/DL (ref 15–50)
DEPRECATED CALCIDIOL+CALCIFEROL SERPL-MC: 51 UG/L (ref 20–75)
DSDNA AB SER-ACNC: >379 IU/ML
GAMMA GLOB SERPL ELPH-MCNC: 1.6 G/DL (ref 0.7–1.6)
GLIADIN IGA SER-ACNC: 2 U/ML
GLIADIN IGG SER-ACNC: 5 U/ML
IGA SERPL-MCNC: 473 MG/DL (ref 70–380)
IGG SERPL-MCNC: 1560 MG/DL (ref 695–1620)
IGM SERPL-MCNC: 92 MG/DL (ref 60–265)
M PROTEIN SERPL ELPH-MCNC: 0 G/DL
PROT PATTERN SERPL ELPH-IMP: NORMAL
THYROGLOB AB SERPL IA-ACNC: <20 IU/ML (ref 0–40)
THYROPEROXIDASE AB SERPL-ACNC: <10 IU/ML
TTG IGA SER-ACNC: 4 U/ML

## 2018-01-03 NOTE — PROGRESS NOTES
Sed rate is improved but high anti-DNA and low C3/C4 are suggestive of active lupus which explains your chest pain. Is your pain any better?

## 2018-01-04 LAB — CRYOGLOB SER QL: ABNORMAL %

## 2018-01-05 LAB
CRP SERPL HS-MCNC: 2.9 MG/L
SPECIMEN SOURCE: NORMAL
TPMT GENE MUT ANL BLD/T: NORMAL
TPMT GENE MUT ANL BLD/T: NORMAL

## 2018-02-06 ASSESSMENT — ENCOUNTER SYMPTOMS
SHORTNESS OF BREATH: 1
POSTURAL DYSPNEA: 0
DYSPNEA ON EXERTION: 0
SPUTUM PRODUCTION: 0
HEMOPTYSIS: 0
COUGH: 0
WHEEZING: 0
COUGH DISTURBING SLEEP: 0
SNORES LOUDLY: 0

## 2018-02-13 ENCOUNTER — PRE VISIT (OUTPATIENT)
Dept: CARDIOLOGY | Facility: CLINIC | Age: 53
End: 2018-02-13

## 2018-02-13 DIAGNOSIS — R06.09 DOE (DYSPNEA ON EXERTION): ICD-10-CM

## 2018-02-13 DIAGNOSIS — I51.89 DIASTOLIC DYSFUNCTION: Primary | ICD-10-CM

## 2018-02-13 DIAGNOSIS — M32.8 OTHER FORMS OF SYSTEMIC LUPUS ERYTHEMATOSUS, UNSPECIFIED ORGAN INVOLVEMENT STATUS (H): ICD-10-CM

## 2018-02-13 PROBLEM — M35.00 SICCA SYNDROME (H): Status: ACTIVE | Noted: 2018-02-13

## 2018-02-13 PROBLEM — E61.1 IRON DEFICIENCY: Status: ACTIVE | Noted: 2018-02-13

## 2018-02-13 PROBLEM — C88.40 MALT LYMPHOMA: Status: ACTIVE | Noted: 2018-02-13

## 2018-02-13 PROBLEM — M32.9 SYSTEMIC LUPUS ERYTHEMATOSUS (H): Status: ACTIVE | Noted: 2018-02-13

## 2018-02-13 PROBLEM — M35.00 SJOGREN'S SYNDROME (H): Status: ACTIVE | Noted: 2018-02-13

## 2018-02-14 ENCOUNTER — RADIANT APPOINTMENT (OUTPATIENT)
Dept: CARDIOLOGY | Facility: CLINIC | Age: 53
End: 2018-02-14
Payer: COMMERCIAL

## 2018-02-14 ENCOUNTER — OFFICE VISIT (OUTPATIENT)
Dept: CARDIOLOGY | Facility: CLINIC | Age: 53
End: 2018-02-14
Attending: INTERNAL MEDICINE
Payer: COMMERCIAL

## 2018-02-14 VITALS
SYSTOLIC BLOOD PRESSURE: 107 MMHG | OXYGEN SATURATION: 97 % | HEIGHT: 66 IN | HEART RATE: 75 BPM | BODY MASS INDEX: 41.14 KG/M2 | WEIGHT: 256 LBS | DIASTOLIC BLOOD PRESSURE: 71 MMHG

## 2018-02-14 DIAGNOSIS — I51.89 DIASTOLIC DYSFUNCTION: ICD-10-CM

## 2018-02-14 DIAGNOSIS — M32.8 OTHER FORMS OF SYSTEMIC LUPUS ERYTHEMATOSUS, UNSPECIFIED ORGAN INVOLVEMENT STATUS (H): ICD-10-CM

## 2018-02-14 DIAGNOSIS — I27.20 PULMONARY HYPERTENSION (H): Primary | ICD-10-CM

## 2018-02-14 DIAGNOSIS — R06.09 DOE (DYSPNEA ON EXERTION): ICD-10-CM

## 2018-02-14 DIAGNOSIS — I27.20 PULMONARY HYPERTENSION (H): ICD-10-CM

## 2018-02-14 LAB
ANION GAP SERPL CALCULATED.3IONS-SCNC: 5 MMOL/L (ref 3–14)
BUN SERPL-MCNC: 14 MG/DL (ref 7–30)
CALCIUM SERPL-MCNC: 9 MG/DL (ref 8.5–10.1)
CHLORIDE SERPL-SCNC: 102 MMOL/L (ref 94–109)
CO2 SERPL-SCNC: 31 MMOL/L (ref 20–32)
CREAT SERPL-MCNC: 0.63 MG/DL (ref 0.52–1.04)
ERYTHROCYTE [DISTWIDTH] IN BLOOD BY AUTOMATED COUNT: 15 % (ref 10–15)
GFR SERPL CREATININE-BSD FRML MDRD: >90 ML/MIN/1.7M2
GLUCOSE SERPL-MCNC: 75 MG/DL (ref 70–99)
HCT VFR BLD AUTO: 39.5 % (ref 35–47)
HGB BLD-MCNC: 12.6 G/DL (ref 11.7–15.7)
MCH RBC QN AUTO: 27 PG (ref 26.5–33)
MCHC RBC AUTO-ENTMCNC: 31.9 G/DL (ref 31.5–36.5)
MCV RBC AUTO: 85 FL (ref 78–100)
NT-PROBNP SERPL-MCNC: 140 PG/ML (ref 0–125)
PLATELET # BLD AUTO: 231 10E9/L (ref 150–450)
POTASSIUM SERPL-SCNC: 3.9 MMOL/L (ref 3.4–5.3)
RBC # BLD AUTO: 4.67 10E12/L (ref 3.8–5.2)
SODIUM SERPL-SCNC: 138 MMOL/L (ref 133–144)
WBC # BLD AUTO: 4.9 10E9/L (ref 4–11)

## 2018-02-14 PROCEDURE — G0463 HOSPITAL OUTPT CLINIC VISIT: HCPCS | Mod: 25,ZF

## 2018-02-14 PROCEDURE — 99214 OFFICE O/P EST MOD 30 MIN: CPT | Mod: GC | Performed by: INTERNAL MEDICINE

## 2018-02-14 PROCEDURE — 85027 COMPLETE CBC AUTOMATED: CPT | Performed by: INTERNAL MEDICINE

## 2018-02-14 PROCEDURE — 80048 BASIC METABOLIC PNL TOTAL CA: CPT | Performed by: INTERNAL MEDICINE

## 2018-02-14 PROCEDURE — 83880 ASSAY OF NATRIURETIC PEPTIDE: CPT | Performed by: INTERNAL MEDICINE

## 2018-02-14 PROCEDURE — 36415 COLL VENOUS BLD VENIPUNCTURE: CPT | Performed by: INTERNAL MEDICINE

## 2018-02-14 RX ORDER — LIDOCAINE 40 MG/G
CREAM TOPICAL
Status: CANCELLED | OUTPATIENT
Start: 2018-02-14

## 2018-02-14 ASSESSMENT — PAIN SCALES - GENERAL: PAINLEVEL: NO PAIN (0)

## 2018-02-14 NOTE — NURSING NOTE
Procedures and/or Testing: Patient given instructions regarding  V/Q Scan. Discussed purpose, preparation, procedure and when to expect results reported back to the patient. Patient demonstrated understanding of this information and agreed to call with further questions or concerns.  Right Heart Catheterization: Patient was instructed regarding right heart catheterization, including discussion of the procedure, preparation, intra-procedural steps, and recovery at home. Patient demonstrated understanding of this information and agreed to call with further questions or concerns.  Med Reconcile: Reviewed and verified all current medications with the patient. The updated medication list was printed and given to the patient.  Diet: Patient instructed regarding a heart healthy diet, including discussion of reduced fat and sodium intake. Patient demonstrated understanding of this information and agreed to call with further questions or concerns.  Return Appointment: Patient given instructions regarding scheduling next clinic visit. Patient demonstrated understanding of this information and agreed to call with further questions or concerns.  Patient stated she understood all health information given and agreed to call with further questions or concerns.     Medication Changes:   No medication changes at this time. Please continue current medication regiment.     Patient Instructions:  Continue staying active and eat a heart healthy diet.      Check-In  Time Check-In Location Estimated Length Procedure   Name        [facility]  waiting room 60-90 minutes Right Heart Catheterization**     Procedure Preparations & Instructions     This is an invasive procedure that DOES require preparation:    - Nothing to eat for 6 hours   - You may have clear liquids up until the time of your procedure  - A ride should be arranged for you in the instance you are unable to drive home, however you should be able to function as you normally would  after the procedure     *For Patients with Diabetes: ? DO NOT take any oral diabetic medication, short-acting diabetes medications/insulin, humalog or regular insulin the morning of your test  ? Take   dose of long-acting insulin (Lantus, Levemir) the day of your test  ? Remember to  bring your glucometer and insulin with you to take after your test if needed     *For Patients on anticoagulants: ? Your Coumadin Clinic will give you instructions on medication adjustments or bridging prior to the procedure      Check-In  Time Check-In Location Estimated Length Procedure   Name        [facility]   waiting room 60 minutes VQ/Lung Perfusion Scan**   Procedure Preparations & Instructions     This is a non-invasive procedure and does NOT require any preparation       Follow up Appointment Information:  After testing    Results:  At today's appointment we discussed your Echocardiogram and Labs.  Component      Latest Ref Rng & Units 2/14/2018   Sodium      133 - 144 mmol/L 138   Potassium      3.4 - 5.3 mmol/L 3.9   Chloride      94 - 109 mmol/L 102   Carbon Dioxide      20 - 32 mmol/L 31   Anion Gap      3 - 14 mmol/L 5   Glucose      70 - 99 mg/dL 75   Urea Nitrogen      7 - 30 mg/dL 14   Creatinine      0.52 - 1.04 mg/dL 0.63   GFR Estimate      >60 mL/min/1.7m2 >90   GFR Estimate If Black      >60 mL/min/1.7m2 >90   Calcium      8.5 - 10.1 mg/dL 9.0   WBC      4.0 - 11.0 10e9/L 4.9   RBC Count      3.8 - 5.2 10e12/L 4.67   Hemoglobin      11.7 - 15.7 g/dL 12.6   Hematocrit      35.0 - 47.0 % 39.5   MCV      78 - 100 fl 85   MCH      26.5 - 33.0 pg 27.0   MCHC      31.5 - 36.5 g/dL 31.9   RDW      10.0 - 15.0 % 15.0   Platelet Count      150 - 450 10e9/L 231   N-Terminal Pro Bnp      0 - 125 pg/mL 140 (H)

## 2018-02-14 NOTE — LETTER
2/14/2018      RE: Taina Singh  [address]  [place] [address]       Dear Colleague,    Thank you for the opportunity to participate in the care of your patient, Taina Singh, at the [facility]. Please see a copy of my visit note below.      Ms. Taina Singh is a pleasant 52 year old female with a past medical history of :    Problem List  1. Lupus  2. Sjogrens  3. Remote history MALT lymphoma (surgery)  4. Elevated BMI  5. Diastolic dysfunction of the left ventricle  6. Penicillin and sulfa allergy  7. Polyserositis with pleurisy    8. History of iron deficiency  9  + KARLIE speckled 1:640, + rheumatoid factor, negative CCP  10. Vitamin D deficiency    Current Symptoms  1. Any ER visits or hospital admissions since last appointment: No  2. Shortness of breath: No ; generally she does not have limitations from breathing.  3. Dizziness or lightheadedness: No  4. Any syncopal episodes or falls: No  5. Chest pain or chest tightness: No  6. Nausea, vomiting, diarrhea, constipation: No  7. Swelling in the legs: No  8. Bloating in the abdomen: No  9. PND; Waking up at night coughing, choking or SOB: No  10. Any medication intolerances: No  11. Reported headaches: No  12. Jaw pain or bone/joint pain: No  13. On IV Prostacyclin: No; current dose: N/A    Last lupus flare in September 2016, Resulting in pleurisy which is now resolving with PO steroid taper.  This had led to evaluation for pulmonary hypertension.  She has seen Dr. Herbert prior, but the plan was to let Taina become adjusted to her lupus medications.  She was last seen November 2017 with complaints of soreness of inhalation of her left lung, sensation worse with coughing and sneezing. She switched to a new rheumatologist, placed on plaquenil and steroids, and feels improved from her last visit.  Still has some discomfort but much better than before.  Underwent a high resolution CT scan of her  chest in December that has some findings but nothing major.    PAST MEDICAL HISTORY:  Past Medical History:   Diagnosis Date     Diastolic dysfunction 2/13/2018     Gluten intolerance      Iron deficiency      Iron deficiency 2/13/2018     Lupus      MALT lymphoma (H) age 44     MALT lymphoma (H) 2/13/2018     Other forms of systemic lupus erythematosus (H) 2/13/2018     Sicca syndrome (H) 2/13/2018     Sjogren's syndrome (H)      Sjogren's syndrome (H) 2/13/2018     Systemic lupus erythematosus (H) 2/13/2018     Vitamin D deficiency      FAMILY HISTORY:  Family History   Problem Relation Age of Onset     Alopecia Brother      Rheumatoid Arthritis Brother      SOCIAL HISTORY:  Social History   Substance Use Topics     Smoking status: Never Smoker     Smokeless tobacco: Never Used     Alcohol use No     CURRENT MEDICATIONS:  Current Outpatient Prescriptions   Medication Sig Dispense Refill     pilocarpine (SALAGEN) 5 MG tablet Take 1 tablet (5 mg) by mouth 4 times daily as needed 120 tablet 11     predniSONE (DELTASONE) 5 MG tablet 91-04-63-15-10-5 mg a day each for 5 days then stop, take along with 20 mg size 90 tablet 1     Calcium-Magnesium 300-300 MG TABS daily        hydroxychloroquine (PLAQUENIL) 200 MG tablet Take 200 mg by mouth 2 times daily        Omega-3 Fatty Acids (OMEGA-3 FISH OIL) 500 MG CAPS Take 500 mg by mouth daily        Multiple Vitamins-Minerals (WOMENS MULTI PO) Take by mouth daily        Cholecalciferol (D 1000) 1000 UNITS CAPS Take 1,000 Units by mouth daily        predniSONE (DELTASONE) 20 MG tablet 00-72-34-15-10-5 mg a day each for 5 days then stop, take along with 5 mg size 60 tablet 1     naproxen (NAPROSYN) 500 MG tablet        CALCIUM PO        ROS:   10 point ROS of systems including Constitutional, Eyes, Respiratory, Cardiovascular, Gastroenterology, Genitourinary, Integumentary, Muscularskeletal, Psychiatric were all negative except for pertinent positives noted in my  "HPI.    EXAM:  /71 (BP Location: Right arm, Patient Position: Chair, Cuff Size: Adult Large)  Pulse 75  Ht 1.676 m (5' 6\")  Wt 116.1 kg (256 lb)  SpO2 97%  BMI 41.32 kg/m2  General: appears comfortable, alert and articulate.  obese  Head: normocephalic, atraumatic  Eyes: anicteric sclera, EOMI  Neck: no adenopathy  Orophyarynx: moist mucosa, no lesions, dentition intact  Heart: regular, S1/S2, no murmur, gallop, rub, estimated JVP wnl  Lungs: clear, no rales or wheezing  Abdomen: soft, non-tender, bowel sounds present, no hepatosplenomegaly  Extremities: no clubbing, cyanosis or edema  Neurological: normal speech and affect, no gross motor deficits    Weight  Wt Readings from Last 10 Encounters:   02/14/18 116.1 kg (256 lb)   12/29/17 117.6 kg (259 lb 4.8 oz)   11/20/17 117.8 kg (259 lb 12.8 oz)   09/13/17 115.4 kg (254 lb 6.4 oz)     Labs:  CBC RESULTS:  Lab Results   Component Value Date    WBC 4.9 02/14/2018    RBC 4.67 02/14/2018    HGB 12.6 02/14/2018    HCT 39.5 02/14/2018    MCV 85 02/14/2018    MCH 27.0 02/14/2018    MCHC 31.9 02/14/2018    RDW 15.0 02/14/2018     02/14/2018     CMP RESULTS:  Lab Results   Component Value Date     02/14/2018    POTASSIUM 3.9 02/14/2018    CHLORIDE 102 02/14/2018    CO2 31 02/14/2018    ANIONGAP 5 02/14/2018    GLC 75 02/14/2018    BUN 14 02/14/2018    CR 0.63 02/14/2018    GFRESTIMATED >90 02/14/2018    GFRESTBLACK >90 02/14/2018    ANDERSON 9.0 02/14/2018    ALT 35 12/29/2017    AST 26 12/29/2017      Recent Tests:  High resolution CT Chest  IMPRESSION:   1. At least moderate tracheobronchomalacia.  2. 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  3. Scattered subcentimeter pulmonary nodules measuring up to 4 mm.  Consider follow-up chest CT in one year to establish stability.    Echocardiogram (8/22/2017):  Final Conclusion   Visually " "estimated ejection fraction is 55-60%.   Mild left ventricular hypertrophy.   Estimated pulmonary artery pressure of 31 mmHg + RA pressure.   Dilated IVC size, <50% collapse with respiration.   Estimated EF: 55-60%   FINDINGS   Left Ventricle Normal left ventricular size. Visually estimated ejection fraction is 55-60%.   Mild left ventricular hypertrophy.   Diastolic Function \"Pseudonormal\" left ventricular filling pattern. E/e' ratio 8-15 is   indeterminate for filling pressure.   Right Ventricle Normal right ventricular size and function.   Left Atrium Mild left atrial enlargement.   Right Atrium Mild right atrial enlargement.   Aortic Valve Normal aortic valve. No aortic stenosis. No significant aortic regurgitation.   Mitral Valve Normal mitral valve. No mitral stenosis. Mild mitral regurgitation.   Tricuspid Valve Normal tricuspid valve. No tricuspid stenosis. Mild tricuspid regurgitation.   Estimated pulmonary artery pressure   of 31 mmHg + RA pressure.   Pulmonic Valve Normal pulmonic valve without significant stenosis or regurgitation.   Pericardium Normal pericardium.   Aorta Measured aortic root diameter is normal in size.      Assessment and Plan:   Problem List  1. Lupus  2. Sjogrens  3. Remote history MALT lymphoma (surgery)  4. Elevated BMI  5. Diastolic dysfunction of the left ventricle  6. Penicillin and sulfa allergy  7. Polyserositis with pleurisy    8. History of iron deficiency  9  + KARLIE speckled 1:640, + rheumatoid factor, negative CCP  10. Vitamin D deficiency    PA was enlarged on last CT scan.  She has not had a right heart catheterization.  We described the risks and benefits and patient agreed to to undergo the procedure at her convenience within the next month.  In addition we will get a ventilation perfusion scan to evaluate for blood clots since her symptoms of pleuritic pain have not completely resolved.  If these 2 studies checkout as normal then we mentioned to the patient that she " does not necessarily have to follow up with us and she can continue seeing her rheumatologist and her primary care doctor.  If she does have pulmonary hypertension then we will consider starting her on therapies for such.      It was a pleasure seeing Ms. Taina Singh at the [facility].     Mohan Gillespie MD PGY4  Cardiology Fellow  [phone]    I personally saw and examined this patient, reviewed imaging and laboratory studies, confirmed physical examination and discussed results and plan with patient and or family.    Please do not hesitate to contact me if you have any questions/concerns.     Sincerely,     Eliezer Herbert MD

## 2018-02-14 NOTE — MR AVS SNAPSHOT
After Visit Summary   2/14/2018    Taina Singh    MRN: [id number]           Patient Information     Date Of Birth          1965        Visit Information        Provider Department      2/14/2018 11:00 AM Eliezer Herbert MD [facility]        Today's Diagnoses     Pulmonary hypertension    -  1      Care Instructions    Medication Changes:   No medication changes at this time. Please continue current medication regiment.     Patient Instructions:  Continue staying active and eat a heart healthy diet.      Check-In  Time Check-In Location Estimated Length Procedure   Name        [facility]  waiting room 60-90 minutes Right Heart Catheterization**     Procedure Preparations & Instructions     This is an invasive procedure that DOES require preparation:    - Nothing to eat for 6 hours   - You may have clear liquids up until the time of your procedure  - A ride should be arranged for you in the instance you are unable to drive home, however you should be able to function as you normally would after the procedure     *For Patients with Diabetes: ? DO NOT take any oral diabetic medication, short-acting diabetes medications/insulin, humalog or regular insulin the morning of your test  ? Take   dose of long-acting insulin (Lantus, Levemir) the day of your test  ? Remember to  bring your glucometer and insulin with you to take after your test if needed     *For Patients on anticoagulants: ? Your Coumadin Clinic will give you instructions on medication adjustments or bridging prior to the procedure      Check-In  Time Check-In Location Estimated Length Procedure   Name        [facility]   waiting room 60 minutes VQ/Lung Perfusion Scan**   Procedure Preparations & Instructions     This is a non-invasive procedure and does NOT require any preparation       Follow up Appointment Information:  After testing    Results:  At today's appointment we discussed your Echocardiogram and Labs.  Component       Latest Ref Rng & Units 2/14/2018   Sodium      133 - 144 mmol/L 138   Potassium      3.4 - 5.3 mmol/L 3.9   Chloride      94 - 109 mmol/L 102   Carbon Dioxide      20 - 32 mmol/L 31   Anion Gap      3 - 14 mmol/L 5   Glucose      70 - 99 mg/dL 75   Urea Nitrogen      7 - 30 mg/dL 14   Creatinine      0.52 - 1.04 mg/dL 0.63   GFR Estimate      >60 mL/min/1.7m2 >90   GFR Estimate If Black      >60 mL/min/1.7m2 >90   Calcium      8.5 - 10.1 mg/dL 9.0   WBC      4.0 - 11.0 10e9/L 4.9   RBC Count      3.8 - 5.2 10e12/L 4.67   Hemoglobin      11.7 - 15.7 g/dL 12.6   Hematocrit      35.0 - 47.0 % 39.5   MCV      78 - 100 fl 85   MCH      26.5 - 33.0 pg 27.0   MCHC      31.5 - 36.5 g/dL 31.9   RDW      10.0 - 15.0 % 15.0   Platelet Count      150 - 450 10e9/L 231   N-Terminal Pro Bnp      0 - 125 pg/mL 140 (H)     We are located on the third floor of the Clinic and Surgery Center ([facility]) on the [facility].  Our address is     [address] [place] [address] on 3rd Floor   [place] [id number]      Thank you for allowing us to be a part of your care here at the [facility]    If you have questions or concerns please contact us at:    Alison Troncoso, RN, BSN    Pablo Mcmillan (Schedule,P.A.)  Nurse Coordinator     Clinic   Pulmonary Hypertension   Pulmonary Hypertension  [facility]  [phone]    (P) [phone]        (F)[phone]                ** Please note that you will NOT receive a reminder call regarding your scheduled testing, reminder calls are for provider appointments only.  If you are scheduled for testing within the [facility] system you may receive a call regarding pre-registration for billing purposes only.**     Remember to weigh yourself daily after voiding and before you consume any food or beverages and log the numbers.  If you have gained/lost 2 pounds overnight or 5  pounds in a week contact us immediately for medication adjustments or further instructions.  **Please call us immediately if you have any syncope, chest pain, edema, or decline in your functional status.    Support Group:  Pulmonary Hypertension Association  Https://www.phassociation.org/  **Look at the Events Tab** They even have Support Groups that you can call into    [facility] Support Group  First Saturday of the Month from 1-3 PM   Location: [address] [place] [address]  Leader: Sudhir Pagan  Phone: [phone]  Email: [email or web address]          Follow-ups after your visit        Your next 10 appointments already scheduled     Mar 14, 2018  8:00 AM CDT   (Arrive by 7:45 AM)   NM LUNG SCAN VENTILATION AND PERFUSION with UUNM2   [facility]    [address] [id number] [phone]           Please bring a list of your medicines to the exam. (Include vitamins, minerals and over-the-counter drugs.) You should wear comfortable clothes. Leave your valuables at home. Please bring related prior results and films.  Tell your doctor:   If you are breastfeeding or may be pregnant.   If you have had a barium test within the past few days. Barium may change the results of certain exams.   If you think you may need sedation (medicine to help you relax).  You may eat and drink as normal.  Please call your Imaging Department at your exam site with any questions.            Mar 14, 2018  9:30 AM CDT   Procedure - 2.5 hour with U2A ROOM 1   [address] [facility] ([facility])    [address] [address]               Mar 14, 2018 10:30 AM CDT   Heart Cath Right Heart Cath with [id number]   [facility]  [facility] ([facility])    [address] [id number] [phone]            Mar 14, 2018  12:30 PM CDT   (Arrive by 12:15 PM)   RETURN PRIMARY PULMONARY with Eliezer Herbert MD   [facility] ([facility])    [address]  [address]  [facility] [address] [phone]            Apr 25, 2018 11:00 AM CDT   (Arrive by 10:45 AM)   RETURN LUPUS with Killian Marroquin MD   [facility] Rheumatology ([facility])    [address]  [address]  [facility] [address] [phone]              Future tests that were ordered for you today     Open Future Orders        Priority Expected Expires Ordered    NM Lung Scan Ventilation and Perfusion Routine  2/14/2019 2/14/2018    Heart Cath Right heart cath Routine  2/14/2019 2/14/2018            Who to contact     If you have questions or need follow up information about today's clinic visit or your schedule please contact [facility] directly at [phone].  Normal or non-critical lab and imaging results will be communicated to you by Phraxishart, letter or phone within 4 business days after the clinic has received the results. If you do not hear from us within 7 days, please contact the clinic through [employer] or phone. If you have a critical or abnormal lab result, we will notify you by phone as soon as possible.  Submit refill requests through [employer]Ã¢â€žÂ¢ or call your pharmacy and they will forward the refill request to us. Please allow 3 business days for your refill to be completed.          Additional Information About Your Visit        Inside[employer]Ã¢â€žÂ¢ Information     [employer]Ã¢â€žÂ¢ gives you secure access to your electronic health record. If you see a primary care provider, you can also send messages to your care team and make appointments. If you have questions, please call your primary care clinic.  If you do not have a primary care provider, please call [phone] and they will assist you.        Care EveryWhere ID     This is your Care EveryWhere ID. This could be used by other  "organizations to access your [place] medical records  [id number]        Your Vitals Were     Pulse Height Pulse Oximetry BMI (Body Mass Index)          75 1.676 m (5' 6\") 97% 41.32 kg/m2         Blood Pressure from Last 3 Encounters:   02/14/18 107/71   12/29/17 124/76   11/20/17 123/73    Weight from Last 3 Encounters:   02/14/18 116.1 kg (256 lb)   12/29/17 117.6 kg (259 lb 4.8 oz)   11/20/17 117.8 kg (259 lb 12.8 oz)               Primary Care Provider Fax #    Physician No Ref-Primary [phone]       No address on file        Equal Access to Services     ELIO BAKER : Errol Elliott, roby hung, ben kaalmaelliott live, raisa crum . So [facility] [phone].    ATENCIÓN: Si habla español, tiene a vyas disposición servicios gratuitos de asistencia lingüística. Llame al [phone].    We comply with applicable federal civil rights laws and [place] laws. We do not discriminate on the basis of race, color, national origin, age, disability, sex, sexual orientation, or gender identity.            Thank you!     Thank you for choosing [facility]  for your care. Our goal is always to provide you with excellent care. Hearing back from our patients is one way we can continue to improve our services. Please take a few minutes to complete the written survey that you may receive in the mail after your visit with us. Thank you!             Your Updated Medication List - Protect others around you: Learn how to safely use, store and throw away your medicines at www.disposemymeds.org.          This list is accurate as of 2/14/18 12:17 PM.  Always use your most recent med list.                   Brand Name Dispense Instructions for use Diagnosis    CALCIUM PO           Calcium-Magnesium 300-300 MG Tabs      daily        D 1000 1000 UNITS Caps      Take 1,000 Units by mouth daily        hydroxychloroquine 200 MG tablet    PLAQUENIL     Take 200 mg by mouth 2 times " daily        naproxen 500 MG tablet    NAPROSYN          Omega-3 Fish Oil 500 MG Caps      Take 500 mg by mouth daily        pilocarpine 5 MG tablet    SALAGEN    120 tablet    Take 1 tablet (5 mg) by mouth 4 times daily as needed    Systemic lupus erythematosus, unspecified SLE type, unspecified organ involvement status (H)       * predniSONE 20 MG tablet    DELTASONE    60 tablet    05-91-08-15-10-5 mg a day each for 5 days then stop, take along with 5 mg size    Systemic lupus erythematosus, unspecified SLE type, unspecified organ involvement status (H)       * predniSONE 5 MG tablet    DELTASONE    90 tablet    96-17-31-15-10-5 mg a day each for 5 days then stop, take along with 20 mg size    Systemic lupus erythematosus, unspecified SLE type, unspecified organ involvement status (H)       WOMENS MULTI PO      Take by mouth daily        * Notice:  This list has 2 medication(s) that are the same as other medications prescribed for you. Read the directions carefully, and ask your doctor or other care provider to review them with you.

## 2018-02-14 NOTE — NURSING NOTE
Chief Complaint   Patient presents with     Follow Up For     f/u appt for pulmonary hypertension with echo and labs prior     Vitals were taken and medications were reconciled.   Liz Delgado    10:12 AM

## 2018-02-14 NOTE — PATIENT INSTRUCTIONS
Medication Changes:   No medication changes at this time. Please continue current medication regiment.     Patient Instructions:  Continue staying active and eat a heart healthy diet.      Check-In  Time Check-In Location Estimated Length Procedure   Name        [facility]  waiting room 60-90 minutes Right Heart Catheterization**     Procedure Preparations & Instructions     This is an invasive procedure that DOES require preparation:    - Nothing to eat for 6 hours   - You may have clear liquids up until the time of your procedure  - A ride should be arranged for you in the instance you are unable to drive home, however you should be able to function as you normally would after the procedure     *For Patients with Diabetes: ? DO NOT take any oral diabetic medication, short-acting diabetes medications/insulin, humalog or regular insulin the morning of your test  ? Take   dose of long-acting insulin (Lantus, Levemir) the day of your test  ? Remember to  bring your glucometer and insulin with you to take after your test if needed     *For Patients on anticoagulants: ? Your Coumadin Clinic will give you instructions on medication adjustments or bridging prior to the procedure      Check-In  Time Check-In Location Estimated Length Procedure   Name        [facility]   waiting room 60 minutes VQ/Lung Perfusion Scan**   Procedure Preparations & Instructions     This is a non-invasive procedure and does NOT require any preparation       Follow up Appointment Information:  After testing    Results:  At today's appointment we discussed your Echocardiogram and Labs.  Component      Latest Ref Rng & Units 2/14/2018   Sodium      133 - 144 mmol/L 138   Potassium      3.4 - 5.3 mmol/L 3.9   Chloride      94 - 109 mmol/L 102   Carbon Dioxide      20 - 32 mmol/L 31   Anion Gap      3 - 14 mmol/L 5   Glucose      70 - 99 mg/dL 75   Urea Nitrogen      7 - 30 mg/dL 14   Creatinine      0.52 - 1.04 mg/dL 0.63   GFR Estimate      >60 mL/min/1.7m2  >90   GFR Estimate If Black      >60 mL/min/1.7m2 >90   Calcium      8.5 - 10.1 mg/dL 9.0   WBC      4.0 - 11.0 10e9/L 4.9   RBC Count      3.8 - 5.2 10e12/L 4.67   Hemoglobin      11.7 - 15.7 g/dL 12.6   Hematocrit      35.0 - 47.0 % 39.5   MCV      78 - 100 fl 85   MCH      26.5 - 33.0 pg 27.0   MCHC      31.5 - 36.5 g/dL 31.9   RDW      10.0 - 15.0 % 15.0   Platelet Count      150 - 450 10e9/L 231   N-Terminal Pro Bnp      0 - 125 pg/mL 140 (H)     We are located on the third floor of the [facility] on the [facility].  Our address is     [address] [place] [address] on 3rd [place] [address]      Thank you for allowing us to be a part of your care here at the [facility]    If you have questions or concerns please contact us at:    Alison Troncoso RN, BSN    Pablo Mcmillan (Schedule,P.A.)  Nurse Coordinator     Clinic   Pulmonary Hypertension   Pulmonary Hypertension  [facility]  (P)[phone]    (P) [phone]        (F)[phone]                ** Please note that you will NOT receive a reminder call regarding your scheduled testing, reminder calls are for provider appointments only.  If you are scheduled for testing within the [facility] system you may receive a call regarding pre-registration for billing purposes only.**     Remember to weigh yourself daily after voiding and before you consume any food or beverages and log the numbers.  If you have gained/lost 2 pounds overnight or 5 pounds in a week contact us immediately for medication adjustments or further instructions.  **Please call us immediately if you have any syncope, chest pain, edema, or decline in your functional status.    Support Group:  Pulmonary Hypertension Association  Https://www.phassociation.org/  **Look at the Events Tab** They even have Support Groups that you can call  [facility] PH Support Group  First Saturday of the Month from 1-3 PM   Location: [address] [place] [address]  Leader: Sudhir Pagan  Phone: [phone]  Email: [email or web address]

## 2018-02-14 NOTE — PROGRESS NOTES
Ms. Taina Singh is a pleasant 52 year old female with a past medical history of :    Problem List  1. Lupus  2. Sjogrens  3. Remote history MALT lymphoma (surgery)  4. Elevated BMI  5. Diastolic dysfunction of the left ventricle  6. Penicillin and sulfa allergy  7. Polyserositis with pleurisy    8. History of iron deficiency  9  + KARLIE speckled 1:640, + rheumatoid factor, negative CCP  10. Vitamin D deficiency    Current Symptoms  1. Any ER visits or hospital admissions since last appointment: No  2. Shortness of breath: No ; generally she does not have limitations from breathing.  3. Dizziness or lightheadedness: No  4. Any syncopal episodes or falls: No  5. Chest pain or chest tightness: No  6. Nausea, vomiting, diarrhea, constipation: No  7. Swelling in the legs: No  8. Bloating in the abdomen: No  9. PND; Waking up at night coughing, choking or SOB: No  10. Any medication intolerances: No  11. Reported headaches: No  12. Jaw pain or bone/joint pain: No  13. On IV Prostacyclin: No; current dose: N/A    Last lupus flare in September 2016, Resulting in pleurisy which is now resolving with PO steroid taper.  This had led to evaluation for pulmonary hypertension.  She has seen Dr. Herbert prior, but the plan was to let Taina become adjusted to her lupus medications.  She was last seen November 2017 with complaints of soreness of inhalation of her left lung, sensation worse with coughing and sneezing. She switched to a new rheumatologist, placed on plaquenil and steroids, and feels improved from her last visit.  Still has some discomfort but much better than before.  Underwent a high resolution CT scan of her chest in December that has some findings but nothing major.    PAST MEDICAL HISTORY:  Past Medical History:   Diagnosis Date     Diastolic dysfunction 2/13/2018     Gluten intolerance      Iron deficiency      Iron deficiency 2/13/2018     Lupus      MALT lymphoma (H) age 44     MALT lymphoma (H) 2/13/2018  "    Other forms of systemic lupus erythematosus (H) 2/13/2018     Sicca syndrome (H) 2/13/2018     Sjogren's syndrome (H)      Sjogren's syndrome (H) 2/13/2018     Systemic lupus erythematosus (H) 2/13/2018     Vitamin D deficiency      FAMILY HISTORY:  Family History   Problem Relation Age of Onset     Alopecia Brother      Rheumatoid Arthritis Brother      SOCIAL HISTORY:  Social History   Substance Use Topics     Smoking status: Never Smoker     Smokeless tobacco: Never Used     Alcohol use No     CURRENT MEDICATIONS:  Current Outpatient Prescriptions   Medication Sig Dispense Refill     pilocarpine (SALAGEN) 5 MG tablet Take 1 tablet (5 mg) by mouth 4 times daily as needed 120 tablet 11     predniSONE (DELTASONE) 5 MG tablet 06-78-93-15-10-5 mg a day each for 5 days then stop, take along with 20 mg size 90 tablet 1     Calcium-Magnesium 300-300 MG TABS daily        hydroxychloroquine (PLAQUENIL) 200 MG tablet Take 200 mg by mouth 2 times daily        Omega-3 Fatty Acids (OMEGA-3 FISH OIL) 500 MG CAPS Take 500 mg by mouth daily        Multiple Vitamins-Minerals (WOMENS MULTI PO) Take by mouth daily        Cholecalciferol (D 1000) 1000 UNITS CAPS Take 1,000 Units by mouth daily        predniSONE (DELTASONE) 20 MG tablet 83-06-85-15-10-5 mg a day each for 5 days then stop, take along with 5 mg size 60 tablet 1     naproxen (NAPROSYN) 500 MG tablet        CALCIUM PO        ROS:   10 point ROS of systems including Constitutional, Eyes, Respiratory, Cardiovascular, Gastroenterology, Genitourinary, Integumentary, Muscularskeletal, Psychiatric were all negative except for pertinent positives noted in my HPI.    EXAM:  /71 (BP Location: Right arm, Patient Position: Chair, Cuff Size: Adult Large)  Pulse 75  Ht 1.676 m (5' 6\")  Wt 116.1 kg (256 lb)  SpO2 97%  BMI 41.32 kg/m2  General: appears comfortable, alert and articulate.  obese  Head: normocephalic, atraumatic  Eyes: anicteric sclera, EOMI  Neck: no " adenopathy  Orophyarynx: moist mucosa, no lesions, dentition intact  Heart: regular, S1/S2, no murmur, gallop, rub, estimated JVP wnl  Lungs: clear, no rales or wheezing  Abdomen: soft, non-tender, bowel sounds present, no hepatosplenomegaly  Extremities: no clubbing, cyanosis or edema  Neurological: normal speech and affect, no gross motor deficits    Weight  Wt Readings from Last 10 Encounters:   02/14/18 116.1 kg (256 lb)   12/29/17 117.6 kg (259 lb 4.8 oz)   11/20/17 117.8 kg (259 lb 12.8 oz)   09/13/17 115.4 kg (254 lb 6.4 oz)     Labs:  CBC RESULTS:  Lab Results   Component Value Date    WBC 4.9 02/14/2018    RBC 4.67 02/14/2018    HGB 12.6 02/14/2018    HCT 39.5 02/14/2018    MCV 85 02/14/2018    MCH 27.0 02/14/2018    MCHC 31.9 02/14/2018    RDW 15.0 02/14/2018     02/14/2018     CMP RESULTS:  Lab Results   Component Value Date     02/14/2018    POTASSIUM 3.9 02/14/2018    CHLORIDE 102 02/14/2018    CO2 31 02/14/2018    ANIONGAP 5 02/14/2018    GLC 75 02/14/2018    BUN 14 02/14/2018    CR 0.63 02/14/2018    GFRESTIMATED >90 02/14/2018    GFRESTBLACK >90 02/14/2018    ANDERSON 9.0 02/14/2018    ALT 35 12/29/2017    AST 26 12/29/2017      Recent Tests:  High resolution CT Chest  IMPRESSION:   1. At least moderate tracheobronchomalacia.  2. 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  3. Scattered subcentimeter pulmonary nodules measuring up to 4 mm.  Consider follow-up chest CT in one year to establish stability.    Echocardiogram (8/22/2017):  Final Conclusion   Visually estimated ejection fraction is 55-60%.   Mild left ventricular hypertrophy.   Estimated pulmonary artery pressure of 31 mmHg + RA pressure.   Dilated IVC size, <50% collapse with respiration.   Estimated EF: 55-60%   FINDINGS   Left Ventricle Normal left ventricular size. Visually estimated ejection fraction is  "55-60%.   Mild left ventricular hypertrophy.   Diastolic Function \"Pseudonormal\" left ventricular filling pattern. E/e' ratio 8-15 is   indeterminate for filling pressure.   Right Ventricle Normal right ventricular size and function.   Left Atrium Mild left atrial enlargement.   Right Atrium Mild right atrial enlargement.   Aortic Valve Normal aortic valve. No aortic stenosis. No significant aortic regurgitation.   Mitral Valve Normal mitral valve. No mitral stenosis. Mild mitral regurgitation.   Tricuspid Valve Normal tricuspid valve. No tricuspid stenosis. Mild tricuspid regurgitation.   Estimated pulmonary artery pressure   of 31 mmHg + RA pressure.   Pulmonic Valve Normal pulmonic valve without significant stenosis or regurgitation.   Pericardium Normal pericardium.   Aorta Measured aortic root diameter is normal in size.      Assessment and Plan:   Problem List  1. Lupus  2. Sjogrens  3. Remote history MALT lymphoma (surgery)  4. Elevated BMI  5. Diastolic dysfunction of the left ventricle  6. Penicillin and sulfa allergy  7. Polyserositis with pleurisy    8. History of iron deficiency  9  + KARLIE speckled 1:640, + rheumatoid factor, negative CCP  10. Vitamin D deficiency    PA was enlarged on last CT scan.  She has not had a right heart catheterization.  We described the risks and benefits and patient agreed to to undergo the procedure at her convenience within the next month.  In addition we will get a ventilation perfusion scan to evaluate for blood clots since her symptoms of pleuritic pain have not completely resolved.  If these 2 studies checkout as normal then we mentioned to the patient that she does not necessarily have to follow up with us and she can continue seeing her rheumatologist and her primary care doctor.  If she does have pulmonary hypertension then we will consider starting her on therapies for such.      It was a pleasure seeing Ms. Taina Singh at the [facility] " Hypertension Clinic.     Mohan Gillespie MD PGY4  Cardiology Fellow  [phone]    I personally saw and examined this patient, reviewed imaging and laboratory studies, confirmed physical examination and discussed results and plan with patient and or family.

## 2018-02-23 DIAGNOSIS — M32.9 SYSTEMIC LUPUS ERYTHEMATOSUS, UNSPECIFIED SLE TYPE, UNSPECIFIED ORGAN INVOLVEMENT STATUS (H): Primary | ICD-10-CM

## 2018-02-23 LAB
ALBUMIN UR-MCNC: NEGATIVE MG/DL
ALT SERPL W P-5'-P-CCNC: 31 U/L (ref 0–50)
APPEARANCE UR: CLEAR
AST SERPL W P-5'-P-CCNC: 27 U/L (ref 0–45)
BILIRUB UR QL STRIP: NEGATIVE
COLOR UR AUTO: YELLOW
CREAT UR-MCNC: 99 MG/DL
CRP SERPL-MCNC: 4.2 MG/L (ref 0–8)
ERYTHROCYTE [SEDIMENTATION RATE] IN BLOOD BY WESTERGREN METHOD: 35 MM/H (ref 0–30)
GLUCOSE UR STRIP-MCNC: NEGATIVE MG/DL
HGB UR QL STRIP: NEGATIVE
KETONES UR STRIP-MCNC: NEGATIVE MG/DL
LEUKOCYTE ESTERASE UR QL STRIP: NEGATIVE
NITRATE UR QL: NEGATIVE
PH UR STRIP: 5.5 PH (ref 5–7)
PROT UR-MCNC: 0.17 G/L
PROT/CREAT 24H UR: 0.18 G/G CR (ref 0–0.2)
SOURCE: NORMAL
SP GR UR STRIP: 1.02 (ref 1–1.03)
UROBILINOGEN UR STRIP-ACNC: 0.2 EU/DL (ref 0.2–1)

## 2018-02-23 PROCEDURE — 84156 ASSAY OF PROTEIN URINE: CPT | Performed by: INTERNAL MEDICINE

## 2018-02-23 PROCEDURE — 84450 TRANSFERASE (AST) (SGOT): CPT | Performed by: INTERNAL MEDICINE

## 2018-02-23 PROCEDURE — 36415 COLL VENOUS BLD VENIPUNCTURE: CPT | Performed by: INTERNAL MEDICINE

## 2018-02-23 PROCEDURE — 81003 URINALYSIS AUTO W/O SCOPE: CPT | Performed by: INTERNAL MEDICINE

## 2018-02-23 PROCEDURE — 86140 C-REACTIVE PROTEIN: CPT | Performed by: INTERNAL MEDICINE

## 2018-02-23 PROCEDURE — 86225 DNA ANTIBODY NATIVE: CPT | Performed by: INTERNAL MEDICINE

## 2018-02-23 PROCEDURE — 86160 COMPLEMENT ANTIGEN: CPT | Performed by: INTERNAL MEDICINE

## 2018-02-23 PROCEDURE — 85652 RBC SED RATE AUTOMATED: CPT | Performed by: INTERNAL MEDICINE

## 2018-02-23 PROCEDURE — 84460 ALANINE AMINO (ALT) (SGPT): CPT | Performed by: INTERNAL MEDICINE

## 2018-02-23 NOTE — LETTER
Patient:  Taina Singh  :   1965  MRN:     [id number]        Ms.Kelly Singh  [address] [facility] [address]        2018    Dear ,    We are writing to inform you of your test results. Sed rate is improved but lupus markers are still abnormal (anti-DNA, C3, C4) which is suggestive of active lupus.      Resulted Orders   Complement C3   Result Value Ref Range    Complement C3 64 (L) 76 - 169 mg/dL   Complement C4   Result Value Ref Range    Complement C4 5 (L) 15 - 50 mg/dL   CRP, inflammation   Result Value Ref Range    CRP Inflammation 4.2 0.0 - 8.0 mg/L   DNA double stranded antibodies   Result Value Ref Range    DNA-ds >379 (H) <10 IU/mL      Comment:      Positive   ESR: Erythrocyte sedimentation rate   Result Value Ref Range    Sed Rate 35 (H) 0 - 30 mm/h   AST   Result Value Ref Range    AST 27 0 - 45 U/L   ALT   Result Value Ref Range    ALT 31 0 - 50 U/L   *UA reflex to Microscopic and Culture ([place] [facility] (except [place] and [place])   Result Value Ref Range    Color Urine Yellow     Appearance Urine Clear     Glucose Urine Negative NEG^Negative mg/dL    Bilirubin Urine Negative NEG^Negative    Ketones Urine Negative NEG^Negative mg/dL    Specific Gravity Urine 1.025 1.003 - 1.035    Blood Urine Negative NEG^Negative    pH Urine 5.5 5.0 - 7.0 pH    Protein Albumin Urine Negative NEG^Negative mg/dL    Urobilinogen Urine 0.2 0.2 - 1.0 EU/dL    Nitrite Urine Negative NEG^Negative    Leukocyte Esterase Urine Negative NEG^Negative    Source Midstream Urine    Protein  random urine with Creat Ratio   Result Value Ref Range    Protein Random Urine 0.17 g/L    Protein Total Urine g/gr Creatinine 0.18 0 - 0.2 g/g Cr   Creatinine random urine   Result Value Ref Range    Creatinine Urine Random 99 mg/dL       Killian Marroquin MD

## 2018-02-26 LAB
C3 SERPL-MCNC: 64 MG/DL (ref 76–169)
C4 SERPL-MCNC: 5 MG/DL (ref 15–50)

## 2018-02-27 LAB — DSDNA AB SER-ACNC: >379 IU/ML

## 2018-02-28 NOTE — PROGRESS NOTES
Sed rate is improved but lupus markers are still abnormal (anti-DNA, C3, C4) which is suggestive of active lupus.

## 2018-03-09 DIAGNOSIS — I27.20 PULMONARY HYPERTENSION (H): ICD-10-CM

## 2018-03-09 DIAGNOSIS — R06.09 DYSPNEA ON EXERTION: ICD-10-CM

## 2018-03-13 NOTE — PROGRESS NOTES
Ms. Chilango Singh is a pleasant 52 year old female with a past medical history of :    Problem List  1. Lupus  2. Sjogrens  3. Remote history MALT lymphoma (surgery)  4. Elevated BMI  5. Diastolic dysfunction of the left ventricle  6. Penicillin and sulfa allergy  7. Polyserositis with pleurisy    8. History of iron deficiency  9  + KARLIE speckled 1:640, + rheumatoid factor, negative CCP  10. Vitamin D deficiency    1. HR 83 bpm  2. /79/114 mmHg  3. RA  3/8/3   4. RV 34/8  5. PA 34/15/20  6. PCW 10  7. PA sat 71.5%   8. PCW sat n/a  9. Hgb 10.7 g/dL  10. VO2: 407 ml/kgxmin  12. CO 11.9  13. CI 5.4   14. TD CO 8.6   15. TD CI 3.9   16. PVR 0.8   Name: CHILANGO SINGH  MRN: [id number]  : 1965  Study Date: 2018 09:21 AM  Age: 52 yrs  Gender: Female  [place] [facility] [place] Study: , Pulmonary hypertension  Ordering Physician: JOYCE CARDOSO  Referring Physician: JOYCE CARDOSO  Performed By: Wogn Gutierrez RDCS     BSA: 2.2 m2  Height: 66 in  Weight: 259 lb  BP: 124/76 mmHg  _____________________________________________________________________________  __        Procedure  Echocardiogram with two-dimensional, color and spectral Doppler performed.  _____________________________________________________________________________  __        Interpretation Summary  Normal biventricular size, thickness, global, and regional systolic function,  LVEF=60-65%.  PASP cannot be estimated, however PA pressure is probably normal based on  normal PA acceleration time (102 msec.)  No significant valvular abnormalities are noted.  Normal inferior vena cava with normal respiratory variation (estimated RA  pressure 3 mmHg.)  No pericardial effusion.  Previous study not available for comparison.  _____________________________________________________________________________  __        Left Ventricle  Left ventricular function, chamber size, wall motion, and wall thickness are  normal.The EF is  60-65%. Left ventricular diastolic function is normal.  Diastolic Doppler findings (E/E' ratio and/or other parameters) suggest left  ventricular filling pressures are normal.     Right Ventricle  Right ventricular function, chamber size, wall motion, and thickness are  normal. TAPSE 2.5 cm, S' 12 cm/s.     Atria  Both atria appear normal. The atrial septum is intact as assessed by color  Doppler .     Mitral Valve  The mitral valve is normal.        Aortic Valve  Aortic valve is normal in structure and function.     Tricuspid Valve  The tricuspid valve is normal. Trace tricuspid insufficiency is present. The  peak velocity of the tricuspid regurgitant jet is not obtainable. Pulmonary  artery systolic pressure cannot be assessed.     Pulmonic Valve  The pulmonic valve is normal. Mild pulmonic insufficiency is present.     Vessels  The aorta root is normal. The thoracic aorta is normal. The pulmonary artery  cannot be assessed. The inferior vena cava was normal in size with preserved  respiratory variability.     Pericardium  No pericardial effusion is present.      Examination:NM LUNG SCAN VENTILATION AND PERFUSION, 3/14/2018 8:24 AM      Indication:  Chronic PE; Pulmonary hypertension      Additional Information: none     Technique:     The patient received 2 mCi of Tc-99m DTPA aerosol for ventilation and  7 mCi of Tc-99m MAA for perfusion. Multiple images were obtained of  the lungs in Anterior, posterior, HERNANDES, RPO, LPO, and  Uzbek projections.     Comparison: Chest x-ray same-day     Findings:     There are matched ventilation/perfusion defects in both lungs. No  mismatched perfusion defect to suggest pulmonary emboli.          Impression:  Pulmonary emboli is not present.    Results for CHILANGO GIBBONS (MRN [id number]) as of 3/13/2018 17:02   Ref. Range 2/14/2018 08:19 2/14/2018 10:13 2/23/2018 14:43 2/23/2018 14:45   Sodium Latest Ref Range: 133 - 144 mmol/L 138      Potassium Latest Ref Range: 3.4 - 5.3 mmol/L  3.9      Chloride Latest Ref Range: 94 - 109 mmol/L 102      Carbon Dioxide Latest Ref Range: 20 - 32 mmol/L 31      Urea Nitrogen Latest Ref Range: 7 - 30 mg/dL 14      Creatinine Latest Ref Range: 0.52 - 1.04 mg/dL 0.63      GFR Estimate Latest Ref Range: >60 mL/min/1.7m2 >90      GFR Estimate If Black Latest Ref Range: >60 mL/min/1.7m2 >90      Calcium Latest Ref Range: 8.5 - 10.1 mg/dL 9.0      Anion Gap Latest Ref Range: 3 - 14 mmol/L 5      ALT Latest Ref Range: 0 - 50 U/L   31    AST Latest Ref Range: 0 - 45 U/L   27    Creatinine Urine Random Latest Units: mg/dL    99   CRP Inflammation Latest Ref Range: 0.0 - 8.0 mg/L   4.2    N-Terminal Pro Bnp Latest Ref Range: 0 - 125 pg/mL 140 (H)      Protein Random Urine Latest Units: g/L    0.17   Protein Total Urine g/gr Creatinine Latest Ref Range: 0 - 0.2 g/g Cr    0.18   Glucose Latest Ref Range: 70 - 99 mg/dL 75      WBC Latest Ref Range: 4.0 - 11.0 10e9/L 4.9      Hemoglobin Latest Ref Range: 11.7 - 15.7 g/dL 12.6      Hematocrit Latest Ref Range: 35.0 - 47.0 % 39.5      Platelet Count Latest Ref Range: 150 - 450 10e9/L 231      RBC Count Latest Ref Range: 3.8 - 5.2 10e12/L 4.67      MCV Latest Ref Range: 78 - 100 fl 85      MCH Latest Ref Range: 26.5 - 33.0 pg 27.0      MCHC Latest Ref Range: 31.5 - 36.5 g/dL 31.9      RDW Latest Ref Range: 10.0 - 15.0 % 15.0      Sed Rate Latest Ref Range: 0 - 30 mm/h   35 (H)        The heart catherization shows no evidence of PH and VQ scan without evidence of emboli.  Patient reassured and no further follow-up with us is necessary.

## 2018-03-14 ENCOUNTER — HOSPITAL ENCOUNTER (OUTPATIENT)
Dept: GENERAL RADIOLOGY | Facility: CLINIC | Age: 53
End: 2018-03-14
Attending: INTERNAL MEDICINE | Admitting: INTERNAL MEDICINE
Payer: COMMERCIAL

## 2018-03-14 ENCOUNTER — OFFICE VISIT (OUTPATIENT)
Dept: CARDIOLOGY | Facility: CLINIC | Age: 53
End: 2018-03-14
Attending: INTERNAL MEDICINE
Payer: COMMERCIAL

## 2018-03-14 ENCOUNTER — HOSPITAL ENCOUNTER (OUTPATIENT)
Dept: NUCLEAR MEDICINE | Facility: CLINIC | Age: 53
Setting detail: NUCLEAR MEDICINE
End: 2018-03-14
Attending: INTERNAL MEDICINE
Payer: COMMERCIAL

## 2018-03-14 ENCOUNTER — HOSPITAL ENCOUNTER (OUTPATIENT)
Facility: CLINIC | Age: 53
Discharge: HOME | End: 2018-03-14
Attending: INTERNAL MEDICINE | Admitting: INTERNAL MEDICINE
Payer: COMMERCIAL

## 2018-03-14 ENCOUNTER — APPOINTMENT (OUTPATIENT)
Dept: MEDSURG UNIT | Facility: CLINIC | Age: 53
End: 2018-03-14
Attending: INTERNAL MEDICINE
Payer: COMMERCIAL

## 2018-03-14 VITALS
BODY MASS INDEX: 43.66 KG/M2 | HEART RATE: 82 BPM | SYSTOLIC BLOOD PRESSURE: 128 MMHG | OXYGEN SATURATION: 94 % | HEIGHT: 66 IN | WEIGHT: 271.7 LBS | DIASTOLIC BLOOD PRESSURE: 62 MMHG

## 2018-03-14 VITALS
HEART RATE: 82 BPM | BODY MASS INDEX: 41.14 KG/M2 | SYSTOLIC BLOOD PRESSURE: 128 MMHG | TEMPERATURE: 97.8 F | WEIGHT: 256 LBS | HEIGHT: 66 IN | DIASTOLIC BLOOD PRESSURE: 62 MMHG | OXYGEN SATURATION: 94 % | RESPIRATION RATE: 18 BRPM

## 2018-03-14 DIAGNOSIS — R06.09 DYSPNEA ON EXERTION: ICD-10-CM

## 2018-03-14 DIAGNOSIS — I27.20 PULMONARY HYPERTENSION (H): ICD-10-CM

## 2018-03-14 DIAGNOSIS — I27.20 PULMONARY HYPERTENSION (H): Primary | ICD-10-CM

## 2018-03-14 PROCEDURE — 34300033 ZZH RX 343: Performed by: INTERNAL MEDICINE

## 2018-03-14 PROCEDURE — 27210982 ZZH KIT RT HC TOTES DISP CR7

## 2018-03-14 PROCEDURE — 27210788 ZZH MANIFOLD CR3

## 2018-03-14 PROCEDURE — G0463 HOSPITAL OUTPT CLINIC VISIT: HCPCS | Mod: 25

## 2018-03-14 PROCEDURE — A9540 TC99M MAA: HCPCS | Performed by: INTERNAL MEDICINE

## 2018-03-14 PROCEDURE — 25000125 ZZHC RX 250: Performed by: INTERNAL MEDICINE

## 2018-03-14 PROCEDURE — 27211181 ZZH BALLOON TIP PRESSURE CR5

## 2018-03-14 PROCEDURE — 27210210 NM LUNG SCAN VENTILATION AND PERFUSION

## 2018-03-14 PROCEDURE — 93451 RIGHT HEART CATH: CPT | Mod: 26 | Performed by: INTERNAL MEDICINE

## 2018-03-14 PROCEDURE — 40000166 ZZH STATISTIC PP CARE STAGE 1

## 2018-03-14 PROCEDURE — 93451 RIGHT HEART CATH: CPT

## 2018-03-14 PROCEDURE — A9567 TECHNETIUM TC-99M AEROSOL: HCPCS | Performed by: INTERNAL MEDICINE

## 2018-03-14 PROCEDURE — 40000986 XR CHEST 2 VW

## 2018-03-14 PROCEDURE — 99213 OFFICE O/P EST LOW 20 MIN: CPT | Mod: ZP | Performed by: INTERNAL MEDICINE

## 2018-03-14 PROCEDURE — 27210806 ZZH SHEATH CR5

## 2018-03-14 RX ORDER — LIDOCAINE 40 MG/G
CREAM TOPICAL
Status: COMPLETED | OUTPATIENT
Start: 2018-03-14 | End: 2018-03-14

## 2018-03-14 RX ADMIN — Medication 7 MCI.: at 07:40

## 2018-03-14 RX ADMIN — LIDOCAINE: 40 CREAM TOPICAL at 09:11

## 2018-03-14 RX ADMIN — KIT FOR THE PREPARATION OF TECHNETIUM TC 99M PENTETATE 2 MCI.: 20 INJECTION, POWDER, LYOPHILIZED, FOR SOLUTION INTRAVENOUS; RESPIRATORY (INHALATION) at 07:40

## 2018-03-14 ASSESSMENT — PAIN SCALES - GENERAL: PAINLEVEL: NO PAIN (0)

## 2018-03-14 NOTE — LETTER
3/14/2018      RE: Chilango Singh  [address] [facility] [address]       Dear Colleague,    Thank you for the opportunity to participate in the care of your patient, Chilango Singh, at the [facility] HEART [facility]. Please see a copy of my visit note below.      Ms. Chilango Singh is a pleasant 52 year old female with a past medical history of :    Problem List  1. Lupus  2. Sjogrens  3. Remote history MALT lymphoma (surgery)  4. Elevated BMI  5. Diastolic dysfunction of the left ventricle  6. Penicillin and sulfa allergy  7. Polyserositis with pleurisy    8. History of iron deficiency  9  + KARLIE speckled 1:640, + rheumatoid factor, negative CCP  10. Vitamin D deficiency    1. HR 83 bpm  2. /79/114 mmHg  3. RA  3/8/3   4. RV 34/8  5. PA 34/15/20  6. PCW 10  7. PA sat 71.5%   8. PCW sat n/a  9. Hgb 10.7 g/dL  10. VO2: 407 ml/kgxmin  12. CO 11.9  13. CI 5.4   14. TD CO 8.6   15. TD CI 3.9   16. PVR 0.8   Name: CHILANGO SINGH  MRN: [id number]  : 1965  Study Date: 2018 09:21 AM  Age: 52 yrs  Gender: Female  [place] [facility] [place]  Reason For Study: , Pulmonary hypertension  Ordering Physician: JOYCE CARDOSO  Referring Physician: JOYCE CARDOSO  Performed By: Wong Gutierrez RDCS     BSA: 2.2 m2  Height: 66 in  Weight: 259 lb  BP: 124/76 mmHg  _____________________________________________________________________________  __        Procedure  Echocardiogram with two-dimensional, color and spectral Doppler performed.  _____________________________________________________________________________  __        Interpretation Summary  Normal biventricular size, thickness, global, and regional systolic function,  LVEF=60-65%.  PASP cannot be estimated, however PA pressure is probably normal based on  normal PA acceleration time (102 msec.)  No significant valvular abnormalities are noted.  Normal inferior vena cava with normal respiratory variation  (estimated RA  pressure 3 mmHg.)  No pericardial effusion.  Previous study not available for comparison.  _____________________________________________________________________________  __        Left Ventricle  Left ventricular function, chamber size, wall motion, and wall thickness are  normal.The EF is 60-65%. Left ventricular diastolic function is normal.  Diastolic Doppler findings (E/E' ratio and/or other parameters) suggest left  ventricular filling pressures are normal.     Right Ventricle  Right ventricular function, chamber size, wall motion, and thickness are  normal. TAPSE 2.5 cm, S' 12 cm/s.     Atria  Both atria appear normal. The atrial septum is intact as assessed by color  Doppler .     Mitral Valve  The mitral valve is normal.        Aortic Valve  Aortic valve is normal in structure and function.     Tricuspid Valve  The tricuspid valve is normal. Trace tricuspid insufficiency is present. The  peak velocity of the tricuspid regurgitant jet is not obtainable. Pulmonary  artery systolic pressure cannot be assessed.     Pulmonic Valve  The pulmonic valve is normal. Mild pulmonic insufficiency is present.     Vessels  The aorta root is normal. The thoracic aorta is normal. The pulmonary artery  cannot be assessed. The inferior vena cava was normal in size with preserved  respiratory variability.     Pericardium  No pericardial effusion is present.      Examination:NM LUNG SCAN VENTILATION AND PERFUSION, 3/14/2018 8:24 AM      Indication:  Chronic PE; Pulmonary hypertension      Additional Information: none     Technique:     The patient received 2 mCi of Tc-99m DTPA aerosol for ventilation and  7 mCi of Tc-99m MAA for perfusion. Multiple images were obtained of  the lungs in Anterior, posterior, HERNANDES, RPO, LPO, and  ELYSE projections.     Comparison: Chest x-ray same-day     Findings:     There are matched ventilation/perfusion defects in both lungs. No  mismatched perfusion defect to suggest pulmonary  emboli.          Impression:  Pulmonary emboli is not present.    Results for CHILANGO GIBBONS (MRN [id number]) as of 3/13/2018 17:02   Ref. Range 2/14/2018 08:19 2/14/2018 10:13 2/23/2018 14:43 2/23/2018 14:45   Sodium Latest Ref Range: 133 - 144 mmol/L 138      Potassium Latest Ref Range: 3.4 - 5.3 mmol/L 3.9      Chloride Latest Ref Range: 94 - 109 mmol/L 102      Carbon Dioxide Latest Ref Range: 20 - 32 mmol/L 31      Urea Nitrogen Latest Ref Range: 7 - 30 mg/dL 14      Creatinine Latest Ref Range: 0.52 - 1.04 mg/dL 0.63      GFR Estimate Latest Ref Range: >60 mL/min/1.7m2 >90      GFR Estimate If Black Latest Ref Range: >60 mL/min/1.7m2 >90      Calcium Latest Ref Range: 8.5 - 10.1 mg/dL 9.0      Anion Gap Latest Ref Range: 3 - 14 mmol/L 5      ALT Latest Ref Range: 0 - 50 U/L   31    AST Latest Ref Range: 0 - 45 U/L   27    Creatinine Urine Random Latest Units: mg/dL    99   CRP Inflammation Latest Ref Range: 0.0 - 8.0 mg/L   4.2    N-Terminal Pro Bnp Latest Ref Range: 0 - 125 pg/mL 140 (H)      Protein Random Urine Latest Units: g/L    0.17   Protein Total Urine g/gr Creatinine Latest Ref Range: 0 - 0.2 g/g Cr    0.18   Glucose Latest Ref Range: 70 - 99 mg/dL 75      WBC Latest Ref Range: 4.0 - 11.0 10e9/L 4.9      Hemoglobin Latest Ref Range: 11.7 - 15.7 g/dL 12.6      Hematocrit Latest Ref Range: 35.0 - 47.0 % 39.5      Platelet Count Latest Ref Range: 150 - 450 10e9/L 231      RBC Count Latest Ref Range: 3.8 - 5.2 10e12/L 4.67      MCV Latest Ref Range: 78 - 100 fl 85      MCH Latest Ref Range: 26.5 - 33.0 pg 27.0      MCHC Latest Ref Range: 31.5 - 36.5 g/dL 31.9      RDW Latest Ref Range: 10.0 - 15.0 % 15.0      Sed Rate Latest Ref Range: 0 - 30 mm/h   35 (H)        The heart catherization shows no evidence of PH and VQ scan without evidence of emboli.  Patient reassured and no further follow-up with us is necessary.    Please do not hesitate to contact me if you have any questions/concerns.     Sincerely,      Eliezer Herbert MD

## 2018-03-14 NOTE — PROGRESS NOTES
Pt back from IR s/p RHC.  VSS.  Pt alert and oriented x4.  Pt denies any pain.  Rt neck site F/D/I.  Pt's son at the bedside.  1100-Pt tolerated po well.  D/C instructions went over with and given to pt, all questions answered.  PIV D/C'ed, catheter intact.  Pt tolerating ambulation in the hallway.  1115-Pt D/C'ed to home with her son.

## 2018-03-14 NOTE — PROCEDURES
PRELIMINARY CARDIAC CATH REPORT:     PROCEDURES PERFORMED:   Right Heart Catheterization    PHYSICIANS:  Attending Physician: Dr Rohit MD  Cardiology Fellow: Rafi Kramer MD    INDICATION:  Taina Singh is a 52 year old female with lupus, sjogren's, polyserositis with concern for PAH is here for RHC as outpatient on elective basis.    DESCRIPTION:  1. Consent obtained with discussion of risks.  All questions were answered.  2. Sterile prep and procedure.  3. Location with Sheaths:   Rt IJ  7 Fr 10 cm [short]  4. Access: Local anesthetic with lidocaine.  A micropuncture 21 guage needle with ultrasound guidance was used to establish vascular access using a modified Seldinger technique.  5. Diagnostic Catheters:   4 Fr  [place]  6. Estimated blood loss: < 5 ml    MEDICATIONS:  Heart rate, BP, respiration, oxygen saturation and patient responses were monitored throughout the procedure with the assistance of the RN under my supervision.    Procedures:    HEMODYNAMICS:  BSA 2.3  1. HR 83 bpm  2. /79/114 mmHg  3. RA  3/8/3   4. RV 34/8  5. PA 34/15/20  6. PCW 10  7. PA sat 71.5%   8. PCW sat n/a  9. Hgb 10.7 g/dL  10. VO2: 407 ml/kgxmin  12. CO 11.9  13. CI 5.4   14. TD CO 8.6   15. TD CI 3.9   16. PVR 0.8          Sheath Removal:  The R IJ sheath was manually removed in the cardiac catheterization laboratory.    Fluoroscopy Time: 0 min    COMPLICATIONS:  1. None    SUMMARY:   >> Normal right sided filling pressures.  >> Normal left sided filling pressures.  >> Normal PA pressures  >> Hyperdynamic cardiac output, 8.6 L/min with index 3.9 L/min/m2 by TD method                                                          PLAN:   >>. Discharge today per protocol    The attending interventional cardiologist was present and supervised all critical aspects the procedure.    See CVIS report for final draft.    Rafi Kramer MD  Cardiology Fellow    Dr. Bee  Cardiology Staff

## 2018-03-14 NOTE — DISCHARGE INSTRUCTIONS
[facility]                        Interventional Cardiology  Discharge Instructions   Post Right Heart Cath      AFTER YOU GO HOME:    DO drink plenty of fluids    DO resume your regular diet and medications unless otherwise instructed by your Primary Physician    Do Not scrub the procedure site vigorously    No lotion or powder to the puncture site for 3 days    CALL YOUR PRIMARY PHYSICIAN IF: You may resume all normal activity.  Monitor neck site for bleeding, swelling, or voice changes. If you notice bleeding or swelling immediately apply pressure to the site and call number below to speak with Cardiology Fellow.  If you experience any changes in your breathing you should call your doctor immediately or come to the closest Emergency Department.  Do not drive yourself.    ADDITIONAL INSTRUCTIONS: Medications: You are to resume all home medications including anticoagulation therapy unless otherwise advised by your primary cardiologist or nurse coordinator.    Follow Up: Per your primary cardiology team    If you have any questions or concerns regarding your procedure site please call [phone] at anytime and ask for Cardiology Fellow on call.  They are available 24 hours a day.  You may also contact the Cardiology Clinic after hours number at [phone].                                                       Telephone Numbers [phone] Monday-Friday 8:00 am to 4:30 pm    [phone] After 4:30 pm Monday-Friday, Weekends & Holidays  Ask for Interventional Cardiologist on call. Someone is on call 24 hours/day   [facility] toll free number [phone] Monday-Friday 8:00 am to 4:30 pm   [facility] Emergency Dept [phone]

## 2018-03-14 NOTE — IP AVS SNAPSHOT
[address] [address]                                       After Visit Summary   3/14/2018    Taina Singh    MRN: [id number]           After Visit Summary Signature Page     I have received my discharge instructions, and my questions have been answered. I have discussed any challenges I see with this plan with the nurse or doctor.    ..........................................................................................................................................  Patient/Patient Representative Signature      ..........................................................................................................................................  Patient Representative Print Name and Relationship to Patient    ..................................................               ................................................  Date                                            Time    ..........................................................................................................................................  Reviewed by Signature/Title    ...................................................              ..............................................  Date                                                            Time

## 2018-03-14 NOTE — NURSING NOTE
Chief Complaint   Patient presents with     Follow Up For     Return for PH F/U with V/Q and Right heart catheterization prior     Vitals were taken and medications were reconciled.  Shekhar Venegas, TERRI  11:53 AM

## 2018-03-14 NOTE — PATIENT INSTRUCTIONS
Medication Changes:  None    Patient Instructions:  Continue staying active and eat a heart healthy diet.      Follow up Appointment Information:  You will only need to follow up with us on an as needed basis.  Please call us if your symptoms worsen or fail to improve for a follow up appointment.  Thank you      Results:  During your clinic visit your VQ scan, right heart catheterization and CXR were reviewed with you.     We are located on the third floor of the [facility] on the [facility].  Our address is     [address] [place] [address] on [address]rd [place] [address]    Thank you for allowing us to be a part of your care here at the [facility]    If you have questions or concerns please contact us at:    Karen Alarcon, RN, BSN, PHN Pablo Mcmillan (Schedule,P.A.)  Nurse Coordinator     Clinic   Pulmonary Hypertension   Pulmonary Hypertension  [facility]  (P)[phone]    (P) [phone]        (F) [phone]    ** Please note that you will NOT receive a reminder call regarding your scheduled testing, reminder calls are for provider appointments only.  If you are scheduled for testing within the [facility] system you may receive a call regarding pre-registration for billing purposes only.**     Remember to weigh yourself daily after voiding and before you consume any food or beverages and log the numbers.  If you have gained/lost 2 pounds overnight or 5 pounds in a week contact us immediately for medication adjustments or further instructions.   **Please call us immediately if you have any syncope, chest pain, edema, or decline in your functional status.    Support Group:  Pulmonary Hypertension Association  Https://www.phassociation.org/  **Look at the Events Tab** They even have Support Groups that you can call into    [facility]  Support Group  First Saturday of the Month from 1-3 PM   Location: [address] [place] [address] [facility] [address]  Leader: Sudhir Pagan  Phone: [phone]  Email: [email or web address]

## 2018-03-14 NOTE — PROGRESS NOTES
D: Pt arrived in cath lab for Right Heart Catheterization  I: Right heart catheterization completed per MD.  7fr sheath removed from RIJ site, manual pressure applied until hemostasis achieved.  A: RIJsite clean, dry and intact. Soft, no hematoma.  P: Pt to transfer to  for discharge.  Pt educated on watching neck site for bleeding, hematoma, pressure on airway and voice changes.

## 2018-03-14 NOTE — MR AVS SNAPSHOT
After Visit Summary   3/14/2018    Taina Singh    MRN: [id number]           Patient Information     Date Of Birth          1965        Visit Information        Provider Department      3/14/2018 12:30 PM Eliezer Herbert MD [facility]        Today's Diagnoses     Pulmonary hypertension    -  1    Dyspnea on exertion          Care Instructions    Medication Changes:  None    Patient Instructions:  Continue staying active and eat a heart healthy diet.      Follow up Appointment Information:  You will only need to follow up with us on an as needed basis.  Please call us if your symptoms worsen or fail to improve for a follow up appointment.  Thank you      Results:  During your clinic visit your VQ scan, right heart catheterization and CXR were reviewed with you.     We are located on the third floor of the [facility] ([facility] on the [facility].  Our address is     [address] [place] [address] [place] [address] [place] [address]    Thank you for allowing us to be a part of your care here at the [facility]    If you have questions or concerns please contact us at:    Karen Alarcon RN, BSN, PHN Pablo Mcmillan (Schedule,P.A.)  Nurse Coordinator     Clinic   Pulmonary Hypertension   Pulmonary Hypertension  [facility]  (P)[phone]    (P) [phone]        (F) [phone]    ** Please note that you will NOT receive a reminder call regarding your scheduled testing, reminder calls are for provider appointments only.  If you are scheduled for testing within the [facility] system you may receive a call regarding pre-registration for billing purposes only.**     Remember to weigh yourself daily after voiding and before you consume any food or beverages and log the numbers.  If you have gained/lost 2 pounds overnight or 5 pounds in a  week contact us immediately for medication adjustments or further instructions.   **Please call us immediately if you have any syncope, chest pain, edema, or decline in your functional status.    Support Group:  Pulmonary Hypertension Association  Https://www.phassociation.org/  **Look at the Events Tab** They even have Support Groups that you can call into    [facility] Support Group  First Saturday of the Month from 1-3 PM   Location: [address] [place] [address]  Leader: Sudhir Pagan  Phone: [phone]  Email: [email or web address]            Follow-ups after your visit        Your next 10 appointments already scheduled     Apr 25, 2018 11:00 AM CDT   (Arrive by 10:45 AM)   RETURN LUPUS with Killian Marroquin MD   [facility] ([facility])    [address] [place] [address]  [address]  [facility] [address] [phone]              Future tests that were ordered for you today     Open Future Orders        Priority Expected Expires Ordered    XR Chest 2 Views Routine 3/14/2018 3/14/2019 3/14/2018            Who to contact     If you have questions or need follow up information about today's clinic visit or your schedule please contact [facility] directly at [phone].  Normal or non-critical lab and imaging results will be communicated to you by LogicLibraryhart, letter or phone within 4 business days after the clinic has received the results. If you do not hear from us within 7 days, please contact the clinic through LogicLibraryhart or phone. If you have a critical or abnormal lab result, we will notify you by phone as soon as possible.  Submit refill requests through [employer] or call your pharmacy and they will forward the refill request to us. Please allow 3 business days for your refill to be completed.          Additional Information About Your Visit        [employer]isp[employer] Information     [employer] gives you secure access to your electronic health record. If you see a primary care  "provider, you can also send messages to your care team and make appointments. If you have questions, please call your primary care clinic.  If you do not have a primary care provider, please call [phone] and they will assist you.        Care EveryWhere ID     This is your Care EveryWhere ID. This could be used by other organizations to access your [place] medical records  [id number]        Your Vitals Were     Pulse Height Pulse Oximetry BMI (Body Mass Index)          82 1.676 m (5' 5.98\") 94% 43.88 kg/m2         Blood Pressure from Last 3 Encounters:   03/14/18 128/62   03/14/18 128/62   02/14/18 107/71    Weight from Last 3 Encounters:   03/14/18 123.2 kg (271 lb 11.2 oz)   03/14/18 116.1 kg (256 lb)   02/14/18 116.1 kg (256 lb)              Today, you had the following     No orders found for display       Primary Care Provider Fax #    Physician No Ref-Primary [phone]       No address on file        Equal Access to Services     [facility] : Hadii aad ku hadasho Sopoly, waaxda luqadaha, qaybta kaalmada adedeliayaelliott, raisa crum . So [facility] [phone].    ATENCIÓN: Si habla español, tiene a vyas disposición servicios gratuitos de asistencia lingüística. Llame al [phone].    We comply with applicable federal civil rights laws and [place] laws. We do not discriminate on the basis of race, color, national origin, age, disability, sex, sexual orientation, or gender identity.            Thank you!     Thank you for choosing [facility]  for your care. Our goal is always to provide you with excellent care. Hearing back from our patients is one way we can continue to improve our services. Please take a few minutes to complete the written survey that you may receive in the mail after your visit with us. Thank you!             Your Updated Medication List - Protect others around you: Learn how to safely use, store and throw away your medicines at " www.disposemymeds.org.          This list is accurate as of 3/14/18  7:42 PM.  Always use your most recent med list.                   Brand Name Dispense Instructions for use Diagnosis    azaTHIOprine 50 MG tablet    IMURAN    30 tablet    Take 1 tablet (50 mg) by mouth daily TAKE BY MOUTH WITH FOOD.    Systemic lupus erythematosus, unspecified SLE type, unspecified organ involvement status (H)       Calcium-Magnesium 300-300 MG Tabs      daily        D 1000 1000 UNITS Caps      Take 1,000 Units by mouth daily        hydroxychloroquine 200 MG tablet    PLAQUENIL     Take 200 mg by mouth 2 times daily        Omega-3 Fish Oil 500 MG Caps      Take 500 mg by mouth daily        pilocarpine 5 MG tablet    SALAGEN    120 tablet    Take 1 tablet (5 mg) by mouth 4 times daily as needed    Systemic lupus erythematosus, unspecified SLE type, unspecified organ involvement status (H)       * predniSONE 20 MG tablet    DELTASONE    60 tablet    07-65-40-15-10-5 mg a day each for 5 days then stop, take along with 5 mg size    Systemic lupus erythematosus, unspecified SLE type, unspecified organ involvement status (H)       * predniSONE 5 MG tablet    DELTASONE    90 tablet    81-79-83-15-10-5 mg a day each for 5 days then stop, take along with 20 mg size    Systemic lupus erythematosus, unspecified SLE type, unspecified organ involvement status (H)       WOMENS MULTI PO      Take by mouth daily        * Notice:  This list has 2 medication(s) that are the same as other medications prescribed for you. Read the directions carefully, and ask your doctor or other care provider to review them with you.

## 2018-03-14 NOTE — IP AVS SNAPSHOT
MRN:[id number]                      After Visit Summary   3/14/2018    Taina Singh    MRN: [id number]           Visit Information        Department      3/14/2018  8:46 AM [address] [facility]          Review of your medicines      UNREVIEWED medicines. Ask your doctor about these medicines        Dose / Directions    azaTHIOprine 50 MG tablet   Commonly known as:  IMURAN   Used for:  Systemic lupus erythematosus, unspecified SLE type, unspecified organ involvement status (H)        Dose:  50 mg   Take 1 tablet (50 mg) by mouth daily TAKE BY MOUTH WITH FOOD.   Quantity:  30 tablet   Refills:  2       Calcium-Magnesium 300-300 MG Tabs        daily   Refills:  0       D 1000 1000 UNITS Caps        Dose:  1000 Units   Take 1,000 Units by mouth daily   Refills:  0       hydroxychloroquine 200 MG tablet   Commonly known as:  PLAQUENIL        Dose:  200 mg   Take 200 mg by mouth 2 times daily   Refills:  0       Omega-3 Fish Oil 500 MG Caps        Dose:  500 mg   Take 500 mg by mouth daily   Refills:  0       pilocarpine 5 MG tablet   Commonly known as:  SALAGEN   Used for:  Systemic lupus erythematosus, unspecified SLE type, unspecified organ involvement status (H)        Dose:  5 mg   Take 1 tablet (5 mg) by mouth 4 times daily as needed   Quantity:  120 tablet   Refills:  11       * predniSONE 20 MG tablet   Commonly known as:  DELTASONE   Used for:  Systemic lupus erythematosus, unspecified SLE type, unspecified organ involvement status (H)        72-13-80-15-10-5 mg a day each for 5 days then stop, take along with 5 mg size   Quantity:  60 tablet   Refills:  1       * predniSONE 5 MG tablet   Commonly known as:  DELTASONE   Used for:  Systemic lupus erythematosus, unspecified SLE type, unspecified organ involvement status (H)        58-41-67-15-10-5 mg a day each for 5 days then stop, take along with 20 mg size   Quantity:  90 tablet   Refills:  1       WOMENS MULTI PO        Take by mouth  daily   Refills:  0       * Notice:  This list has 2 medication(s) that are the same as other medications prescribed for you. Read the directions carefully, and ask your doctor or other care provider to review them with you.             Protect others around you: Learn how to safely use, store and throw away your medicines at www.disposemymeds.org.         Follow-ups after your visit        Your next 10 appointments already scheduled     Mar 14, 2018 12:00 PM CDT   (Arrive by 11:45 AM)   XR CHEST 2 VIEWS with UUXR3   [facility] [place],  Radiology ([facility] [place])    [address] [facility] [id number] [phone]           Please bring a list of your current medicines to your exam. (Include vitamins, minerals and over-thecounter medicines.) Leave your valuables at home.  Tell your doctor if there is a chance you may be pregnant.  You do not need to do anything special for this exam.            Mar 14, 2018 12:30 PM CDT   (Arrive by 12:15 PM)   RETURN PRIMARY PULMONARY with Eliezer Herbert MD   [facility] ([facility])    [address] [place] [address]  [address]  [facility] [id number] [phone]            Apr 25, 2018 11:00 AM CDT   (Arrive by 10:45 AM)   RETURN LUPUS with Killian Marroquin MD   [facility] Rheumatology ([facility])    [address] [place] [address]  [address]  [facility] [id number] [phone]               Care Instructions        Further instructions from your care team       [facility]                        Interventional Cardiology  Discharge Instructions   Post Right Heart Cath      AFTER YOU GO HOME:    DO drink plenty of fluids    DO resume your regular diet and medications unless otherwise instructed by your Primary Physician    Do Not scrub the procedure site vigorously    No lotion or powder to the puncture site for 3 days    CALL YOUR PRIMARY PHYSICIAN  IF: You may resume all normal activity.  Monitor neck site for bleeding, swelling, or voice changes. If you notice bleeding or swelling immediately apply pressure to the site and call number below to speak with Cardiology Fellow.  If you experience any changes in your breathing you should call your doctor immediately or come to the closest Emergency Department.  Do not drive yourself.    ADDITIONAL INSTRUCTIONS: Medications: You are to resume all home medications including anticoagulation therapy unless otherwise advised by your primary cardiologist or nurse coordinator.    Follow Up: Per your primary cardiology team    If you have any questions or concerns regarding your procedure site please call [phone] at anytime and ask for Cardiology Fellow on call.  They are available 24 hours a day.  You may also contact the Cardiology Clinic after hours number at [phone].                                                       Telephone Numbers [phone] Monday-Friday 8:00 am to 4:30 pm    [phone] After 4:30 pm Monday-Friday, Weekends & Holidays  Ask for Interventional Cardiologist on call. Someone is on call 24 hours/day   [facility] toll free number [phone] Monday-Friday 8:00 am to 4:30 pm   [facility] Emergency Dept [phone]                    Additional Information About Your Visit        [employer] Information     [employer] gives you secure access to your electronic health record. If you see a primary care provider, you can also send messages to your care team and make appointments. If you have questions, please call your primary care clinic.  If you do not have a primary care provider, please call [phone] and they will assist you.        Care EveryWhere ID     This is your Care EveryWhere ID. This could be used by other organizations to access your [place] medical records  [id number]        Your Vitals Were     Blood Pressure Pulse Temperature Respirations Height Weight    127/62 (BP  "Location: Right arm, Cuff Size: Adult Regular) 68 97.8  F (36.6  C) (Oral) 18 1.676 m (5' 5.98\") 116.1 kg (256 lb)    Pulse Oximetry BMI (Body Mass Index)                93% 41.34 kg/m2           Primary Care Provider Fax #    Physician No Ref-Primary [phone]      Equal Access to Services     [facility]: Hadii aad ku hadasho Soomaali, waaxda luqadaha, qaybta kaalmada adeegyada, waxay antelmoin elieln adedelia lew lamackenzien . So [facility] [phone].    ATENCIÓN: Si habla espjuan diego, tiene a vyas disposición servicios gratuitos de asistencia lingüística. Llame al [phone].    We comply with applicable federal civil rights laws and [place] laws. We do not discriminate on the basis of race, color, national origin, age, disability, sex, sexual orientation, or gender identity.            Thank you!     Thank you for choosing [facility] for your care. Our goal is always to provide you with excellent care. Hearing back from our patients is one way we can continue to improve our services. Please take a few minutes to complete the written survey that you may receive in the mail after you visit with us. Thank you!             Medication List: This is a list of all your medications and when to take them. Check marks below indicate your daily home schedule. Keep this list as a reference.      Medications           Morning Afternoon Evening Bedtime As Needed    azaTHIOprine 50 MG tablet   Commonly known as:  IMURAN   Take 1 tablet (50 mg) by mouth daily TAKE BY MOUTH WITH FOOD.                                Calcium-Magnesium 300-300 MG Tabs   daily                                D 1000 1000 UNITS Caps   Take 1,000 Units by mouth daily                                hydroxychloroquine 200 MG tablet   Commonly known as:  PLAQUENIL   Take 200 mg by mouth 2 times daily                                Omega-3 Fish Oil 500 MG Caps   Take 500 mg by mouth daily                                pilocarpine 5 MG tablet   Commonly known as:  " SALAGEN   Take 1 tablet (5 mg) by mouth 4 times daily as needed                                * predniSONE 20 MG tablet   Commonly known as:  DELTASONE   29-58-39-15-10-5 mg a day each for 5 days then stop, take along with 5 mg size                                * predniSONE 5 MG tablet   Commonly known as:  DELTASONE   53-84-89-15-10-5 mg a day each for 5 days then stop, take along with 20 mg size                                WOMENS MULTI PO   Take by mouth daily                                * Notice:  This list has 2 medication(s) that are the same as other medications prescribed for you. Read the directions carefully, and ask your doctor or other care provider to review them with you.

## 2018-03-15 ENCOUNTER — MYC MEDICAL ADVICE (OUTPATIENT)
Dept: RHEUMATOLOGY | Facility: CLINIC | Age: 53
End: 2018-03-15

## 2018-03-15 DIAGNOSIS — M32.9 SYSTEMIC LUPUS ERYTHEMATOSUS, UNSPECIFIED SLE TYPE, UNSPECIFIED ORGAN INVOLVEMENT STATUS (H): Primary | ICD-10-CM

## 2018-03-15 DIAGNOSIS — M32.9 SYSTEMIC LUPUS ERYTHEMATOSUS, UNSPECIFIED SLE TYPE, UNSPECIFIED ORGAN INVOLVEMENT STATUS (H): ICD-10-CM

## 2018-03-16 DIAGNOSIS — M32.9 SYSTEMIC LUPUS ERYTHEMATOSUS, UNSPECIFIED SLE TYPE, UNSPECIFIED ORGAN INVOLVEMENT STATUS (H): ICD-10-CM

## 2018-03-16 LAB
ALBUMIN SERPL-MCNC: 3.5 G/DL (ref 3.4–5)
ALT SERPL W P-5'-P-CCNC: 27 U/L (ref 0–50)
AST SERPL W P-5'-P-CCNC: 18 U/L (ref 0–45)
BASOPHILS # BLD AUTO: 0 10E9/L (ref 0–0.2)
BASOPHILS NFR BLD AUTO: 0.2 %
CREAT SERPL-MCNC: 0.51 MG/DL (ref 0.52–1.04)
DIFFERENTIAL METHOD BLD: ABNORMAL
EOSINOPHIL # BLD AUTO: 0.1 10E9/L (ref 0–0.7)
EOSINOPHIL NFR BLD AUTO: 1.3 %
ERYTHROCYTE [DISTWIDTH] IN BLOOD BY AUTOMATED COUNT: 14.5 % (ref 10–15)
GFR SERPL CREATININE-BSD FRML MDRD: >90 ML/MIN/1.7M2
HCT VFR BLD AUTO: 36.9 % (ref 35–47)
HGB BLD-MCNC: 12.1 G/DL (ref 11.7–15.7)
LYMPHOCYTES # BLD AUTO: 0.5 10E9/L (ref 0.8–5.3)
LYMPHOCYTES NFR BLD AUTO: 9.3 %
MCH RBC QN AUTO: 27.3 PG (ref 26.5–33)
MCHC RBC AUTO-ENTMCNC: 32.8 G/DL (ref 31.5–36.5)
MCV RBC AUTO: 83 FL (ref 78–100)
MONOCYTES # BLD AUTO: 0.4 10E9/L (ref 0–1.3)
MONOCYTES NFR BLD AUTO: 7.5 %
NEUTROPHILS # BLD AUTO: 4.6 10E9/L (ref 1.6–8.3)
NEUTROPHILS NFR BLD AUTO: 81.7 %
PLATELET # BLD AUTO: 224 10E9/L (ref 150–450)
RBC # BLD AUTO: 4.43 10E12/L (ref 3.8–5.2)
WBC # BLD AUTO: 5.6 10E9/L (ref 4–11)

## 2018-03-16 PROCEDURE — 85025 COMPLETE CBC W/AUTO DIFF WBC: CPT | Performed by: INTERNAL MEDICINE

## 2018-03-16 PROCEDURE — 84460 ALANINE AMINO (ALT) (SGPT): CPT | Performed by: INTERNAL MEDICINE

## 2018-03-16 PROCEDURE — 84450 TRANSFERASE (AST) (SGOT): CPT | Performed by: INTERNAL MEDICINE

## 2018-03-16 PROCEDURE — 36415 COLL VENOUS BLD VENIPUNCTURE: CPT | Performed by: INTERNAL MEDICINE

## 2018-03-16 PROCEDURE — 82565 ASSAY OF CREATININE: CPT | Performed by: INTERNAL MEDICINE

## 2018-03-16 PROCEDURE — 82040 ASSAY OF SERUM ALBUMIN: CPT | Performed by: INTERNAL MEDICINE

## 2018-03-16 NOTE — LETTER
Patient:  Taina Singh  :   1965  MRN:     [id number]        Ms.Kelly Singh  [address] [facility] [address]        2018    Dear ,    We are writing to inform you of your test results. Labs look good. Please increase imuran to 50 mg twice a day with repeat imuran monitoring labs in 4 weeks.      Resulted Orders   ALT   Result Value Ref Range    ALT 27 0 - 50 U/L   Albumin level   Result Value Ref Range    Albumin 3.5 3.4 - 5.0 g/dL   AST   Result Value Ref Range    AST 18 0 - 45 U/L   CBC with platelets differential   Result Value Ref Range    WBC 5.6 4.0 - 11.0 10e9/L    RBC Count 4.43 3.8 - 5.2 10e12/L    Hemoglobin 12.1 11.7 - 15.7 g/dL    Hematocrit 36.9 35.0 - 47.0 %    MCV 83 78 - 100 fl    MCH 27.3 26.5 - 33.0 pg    MCHC 32.8 31.5 - 36.5 g/dL    RDW 14.5 10.0 - 15.0 %    Platelet Count 224 150 - 450 10e9/L    Diff Method Automated Method     % Neutrophils 81.7 %    % Lymphocytes 9.3 %    % Monocytes 7.5 %    % Eosinophils 1.3 %    % Basophils 0.2 %    Absolute Neutrophil 4.6 1.6 - 8.3 10e9/L    Absolute Lymphocytes 0.5 (L) 0.8 - 5.3 10e9/L    Absolute Monocytes 0.4 0.0 - 1.3 10e9/L    Absolute Eosinophils 0.1 0.0 - 0.7 10e9/L    Absolute Basophils 0.0 0.0 - 0.2 10e9/L   Creatinine   Result Value Ref Range    Creatinine 0.51 (L) 0.52 - 1.04 mg/dL    GFR Estimate >90 >60 mL/min/1.7m2      Comment:      Non  GFR Calc    GFR Estimate If Black >90 >60 mL/min/1.7m2      Comment:       GFR Calc       Killian Marroquin MD

## 2018-03-19 RX ORDER — AZATHIOPRINE 50 MG/1
50 TABLET ORAL 2 TIMES DAILY
Qty: 60 TABLET | Refills: 2 | Status: SHIPPED | OUTPATIENT
Start: 2018-03-19 | End: 2018-04-04

## 2018-03-19 NOTE — PROGRESS NOTES
Labs look good. Please increase imuran to 50 mg twice a day with repeat imuran monitoring labs in 4 weeks.

## 2018-03-21 DIAGNOSIS — R06.09 DYSPNEA ON EXERTION: ICD-10-CM

## 2018-03-21 DIAGNOSIS — I27.20 PULMONARY HYPERTENSION (H): ICD-10-CM

## 2018-03-21 DIAGNOSIS — M32.9 SYSTEMIC LUPUS ERYTHEMATOSUS, UNSPECIFIED SLE TYPE, UNSPECIFIED ORGAN INVOLVEMENT STATUS (H): ICD-10-CM

## 2018-03-21 LAB
ALBUMIN UR-MCNC: NEGATIVE MG/DL
APPEARANCE UR: CLEAR
BILIRUB UR QL STRIP: NEGATIVE
COLOR UR AUTO: YELLOW
GLUCOSE UR STRIP-MCNC: NEGATIVE MG/DL
HCG SERPL QL: NEGATIVE
HGB UR QL STRIP: NEGATIVE
KETONES UR STRIP-MCNC: NEGATIVE MG/DL
LEUKOCYTE ESTERASE UR QL STRIP: NEGATIVE
NITRATE UR QL: NEGATIVE
NON-SQ EPI CELLS #/AREA URNS LPF: NORMAL /LPF
PH UR STRIP: 6.5 PH (ref 5–7)
RBC #/AREA URNS AUTO: NORMAL /HPF
SOURCE: NORMAL
SP GR UR STRIP: <=1.005 (ref 1–1.03)
UROBILINOGEN UR STRIP-ACNC: 0.2 EU/DL (ref 0.2–1)
WBC #/AREA URNS AUTO: NORMAL /HPF

## 2018-03-21 PROCEDURE — 86160 COMPLEMENT ANTIGEN: CPT | Mod: 59 | Performed by: INTERNAL MEDICINE

## 2018-03-21 PROCEDURE — 84156 ASSAY OF PROTEIN URINE: CPT | Performed by: INTERNAL MEDICINE

## 2018-03-21 PROCEDURE — 86160 COMPLEMENT ANTIGEN: CPT | Performed by: INTERNAL MEDICINE

## 2018-03-21 PROCEDURE — 81001 URINALYSIS AUTO W/SCOPE: CPT | Performed by: INTERNAL MEDICINE

## 2018-03-21 PROCEDURE — 84703 CHORIONIC GONADOTROPIN ASSAY: CPT | Performed by: INTERNAL MEDICINE

## 2018-03-21 PROCEDURE — 86225 DNA ANTIBODY NATIVE: CPT | Performed by: INTERNAL MEDICINE

## 2018-03-21 PROCEDURE — 36415 COLL VENOUS BLD VENIPUNCTURE: CPT | Performed by: INTERNAL MEDICINE

## 2018-03-21 NOTE — LETTER
Patient:  Taina Singh  :   1965  MRN:     [id number]        Ms.Kelly Singh  [address] [facility] [address]        2018    Dear ,    We are writing to inform you of your test results. Labs show stable but active lupus labs.      Resulted Orders   DNA double stranded antibodies   Result Value Ref Range    DNA-ds >379 (H) <10 IU/mL      Comment:      Positive   Complement C4   Result Value Ref Range    Complement C4 4 (L) 15 - 50 mg/dL   Complement C3   Result Value Ref Range    Complement C3 69 (L) 76 - 169 mg/dL   Protein  random urine with Creat Ratio   Result Value Ref Range    Protein Random Urine <0.05 g/L    Protein Total Urine g/gr Creatinine Unable to calculate due to low value 0 - 0.2 g/g Cr   Creatinine random urine   Result Value Ref Range    Creatinine Urine Random 17 mg/dL   Creatinine urine calculation only   Result Value Ref Range    Creatinine Urine 17 mg/dL       Killian Marroquin MD

## 2018-03-22 LAB
C3 SERPL-MCNC: 69 MG/DL (ref 76–169)
C4 SERPL-MCNC: 4 MG/DL (ref 15–50)
CREAT UR-MCNC: 17 MG/DL
CREAT UR-MCNC: 17 MG/DL
PROT UR-MCNC: <0.05 G/L
PROT/CREAT 24H UR: NORMAL G/G CR (ref 0–0.2)

## 2018-03-23 LAB — DSDNA AB SER-ACNC: >379 IU/ML

## 2018-04-04 ENCOUNTER — OFFICE VISIT (OUTPATIENT)
Dept: RHEUMATOLOGY | Facility: CLINIC | Age: 53
End: 2018-04-04
Attending: INTERNAL MEDICINE
Payer: COMMERCIAL

## 2018-04-04 VITALS
HEART RATE: 70 BPM | WEIGHT: 272.8 LBS | TEMPERATURE: 98.1 F | DIASTOLIC BLOOD PRESSURE: 77 MMHG | BODY MASS INDEX: 43.84 KG/M2 | SYSTOLIC BLOOD PRESSURE: 121 MMHG | HEIGHT: 66 IN | OXYGEN SATURATION: 96 %

## 2018-04-04 DIAGNOSIS — M32.9 SYSTEMIC LUPUS ERYTHEMATOSUS, UNSPECIFIED SLE TYPE, UNSPECIFIED ORGAN INVOLVEMENT STATUS (H): Primary | ICD-10-CM

## 2018-04-04 DIAGNOSIS — Z51.81 MEDICATION MONITORING ENCOUNTER: ICD-10-CM

## 2018-04-04 PROCEDURE — 90471 IMMUNIZATION ADMIN: CPT

## 2018-04-04 PROCEDURE — 25000125 ZZHC RX 250: Mod: ZF | Performed by: INTERNAL MEDICINE

## 2018-04-04 PROCEDURE — G0463 HOSPITAL OUTPT CLINIC VISIT: HCPCS | Mod: 25,ZF

## 2018-04-04 PROCEDURE — 90750 HZV VACC RECOMBINANT IM: CPT | Mod: ZF | Performed by: INTERNAL MEDICINE

## 2018-04-04 PROCEDURE — 96372 THER/PROPH/DIAG INJ SC/IM: CPT | Mod: ZF

## 2018-04-04 RX ORDER — AZATHIOPRINE 50 MG/1
50 TABLET ORAL 2 TIMES DAILY
Qty: 60 TABLET | Refills: 2 | Status: SHIPPED | OUTPATIENT
Start: 2018-04-04 | End: 2018-04-11

## 2018-04-04 RX ADMIN — ZOSTER VACCINE RECOMBINANT, ADJUVANTED 0.5 ML: KIT at 10:27

## 2018-04-04 ASSESSMENT — PAIN SCALES - GENERAL: PAINLEVEL: MODERATE PAIN (5)

## 2018-04-04 NOTE — LETTER
4/4/2018      RE: Taina Singh  [address] [place] [address]       Rheumatology F/U Note    Reason for visit: SLE, ongoing pleuritic CP          HPI:    Taina Singh is a 52 year old  female who was referred to our clinic for evaluation and management of her SLE.      She was diagnosed with lupus at age 27. Her 1st sx were malar rash and fatigue. No skin biopsy was done (reportedly had +KARLIE). She was treated with prednisone taper and HCQ. HCQ helped and she tolerated it well. She was diagnosed with Sjogren's at the same time. Had dryness of eyes and mouth. No lip biopsy to confirm the Dx.      Reportedly she had very mild stable lupus for many years and eventually at some point, stopped taking HCQ (can't remember when)    She was diagnosed with MALT lymphoma at 44, had enlarged LN over R parotid gland, on biopsy it was MALT lymphoma. Tx was resection only, no radiation or chemo.    About 3 yr ago, had flu shot and 2 days later, developed pleuritic CP and was seen at urgent care. That's why she does not want to get flu shot. She was seen in ER 2 days after UC visit. She was treated with prednisone.      She lost 100 lbs by exercise over past 2 yr, felt amazing. In 7/2017, started not feeling well. She had extreme fatigue. Had AM stiffness and pain over hands. Touched base with her previous rheumatologist (Dr. Rangel) and was told to have lupus flare and was put on  mg qd. Afterwards, developed pleuritic CP which has not been resolved.    She was given prednisone 40 mg qd x 5 days (on 12/16/2017) by pulmonologist in [facility]. It helped a lot but pleuritic CP recurred after stopping it.    She is seeking 2nd opinion for m/o her lupus.    She was told to have pulm HTN and was evaluated for it.    No triggers for current flare.    No flare of malar rash. Has fatigue. No current hair loss. Uses blink eyedrops, restasis burned her eyes. She has been prescribed salagen for dry mouth, has not used it.  Arthritis of hands have resolved on HCQ.    Last HCQ eye exam was 2 mo ago.    Today: On AZA 50 mg qd since 2018, increased to 100 mg qd in 3/19/2018. Her arthralgia is intermittent and mild, it's better. Has fatigue.    Her major compalint is continues pressure over her chest. Pain is pleuritic, it hurts by sneezing, taking deep breath.        ROS:  A comprehensive ROS was done, positives are per HPI.    It is negative for fever, discoid rash, photosensitivity, Raynaud's, oral ulcers, nasal ulcers, dysphagia, cough, h/o serositis, N/V, heartburn, diarrhea, abdominal pain, blood in stool or urine, h/o proteinuria, myalgia, headaches, seizures, psychosis, numbness, tingling, myelitis, easy bruising, depression, anxiety, memory problem, h/o thrombosis, or blood transfusion.    Past Medical History:   Diagnosis Date     Diastolic dysfunction 2018     Gluten intolerance     Patient is not gluten intolerant     Iron deficiency      Iron deficiency 2018     Lupus      MALT lymphoma (H) age 44     MALT lymphoma (H) 2018     Other forms of systemic lupus erythematosus (H) 2018     Sicca syndrome (H) 2018     Sjogren's syndrome (H)      Sjogren's syndrome (H) 2018     Systemic lupus erythematosus (H) 2018     Vitamin D deficiency      Past Surgical History:   Procedure Laterality Date     BIOPSY/REMOVAL, LYMPH NODE(S)        SECTION  age 32     HC HYSTEROS W PERMANENT FALLOPAIN IMPLANT       PAROTIDECTOMY       TONSILLECTOMY       Family History   Problem Relation Age of Onset     Alopecia Brother      Rheumatoid Arthritis Brother      Social History     Social History     Marital status:      Spouse name: N/A     Number of children: N/A     Years of education: 1     Occupational History           , 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    Allergies   Allergen Reactions     Penicillins Rash     Sulfa Drugs Rash       Outpatient Encounter Prescriptions as of 2018   Medication Sig Dispense Refill     azaTHIOprine (IMURAN) 50 MG tablet Take 1 tablet (50 mg) by mouth 2 times daily TAKE BY MOUTH WITH FOOD. 60 tablet 2     pilocarpine (SALAGEN) 5 MG tablet Take 1 tablet (5 mg) by mouth 4 times daily as needed 120 tablet 11     predniSONE (DELTASONE) 20 MG tablet 97-12-22-15-10-5 mg a day each for 5 days then stop, take along with 5 mg size 60 tablet 1     predniSONE (DELTASONE) 5 MG tablet 23-63-89-15-10-5 mg a day each for 5 days then stop, take along with 20 mg size 90 tablet 1     Calcium-Magnesium 300-300 MG TABS daily        hydroxychloroquine (PLAQUENIL) 200 MG tablet Take 200 mg by mouth 2 times daily        Omega-3 Fatty Acids (OMEGA-3 FISH OIL) 500 MG CAPS Take 500 mg by mouth daily        Multiple Vitamins-Minerals (WOMENS MULTI PO) Take by mouth daily        Cholecalciferol (D 1000) 1000 UNITS CAPS Take 1,000 Units by mouth daily        No facility-administered encounter medications on file as of 2018.          Her records were reviewed.    2D-Echo 2017:    ECHO COMPLETE WO CONTRAST CHILANGO GIBBONS 2017 14:43     [facility] [place] [address], [address], [place] [address]   Main: [phone]  [email or web address] In Store                                                 Transthoracic Echo Report   CHILANGO GIBBONS   Excellian ID: [id number] Age: 52 : 1965 Ordering Provider: NGUYEN PETERS   Exam Date: 2017 14:43 Gender: F Sonographer: HAJA   Accession #: [id number] Height: 66 in BSA: 2.24 m  BP: 108 / 62   Weight: 261 lbs BMI: 42.1 kg/m          Site: [facility]   Location: Outpatient Rhythm: Normal Sinus  "Rhythm   Procedure Components: 2D imaging, Color Doppler, Spectral Doppler   Indications: Murmur; Systemic lupus erythematosus, unspecified SLE type, unspecified organ   involvement status   Technical Quality: Contrast: None     Final Conclusion   Visually estimated ejection fraction is 55-60%.   Mild left ventricular hypertrophy.   Estimated pulmonary artery pressure of 31 mmHg + RA pressure.   Dilated IVC size, <50% collapse with respiration.   Estimated EF: 55-60%   FINDINGS   Left Ventricle Normal left ventricular size. Visually estimated ejection fraction is 55-60%.   Mild left ventricular hypertrophy.   Diastolic Function \"Pseudonormal\" left ventricular filling pattern. E/e' ratio 8-15 is   indeterminate for filling pressure.   Right Ventricle Normal right ventricular size and function.   Left Atrium Mild left atrial enlargement.   Right Atrium Mild right atrial enlargement.   Aortic Valve Normal aortic valve. No aortic stenosis. No significant aortic regurgitation.   Mitral Valve Normal mitral valve. No mitral stenosis. Mild mitral regurgitation.   Tricuspid Valve Normal tricuspid valve. No tricuspid stenosis. Mild tricuspid regurgitation.   Estimated pulmonary artery pressure   of 31 mmHg + RA pressure.   Pulmonic Valve Normal pulmonic valve without significant stenosis or regurgitation.   Pericardium Normal pericardium.   Aorta Measured aortic root diameter is normal in size.   Inferior Vena Cava Dilated IVC size, <50% collapse with respiration.    Component      Latest Ref Rng & Units 11/20/2017   Sodium      133 - 144 mmol/L 137   Potassium      3.4 - 5.3 mmol/L 4.2   Chloride      94 - 109 mmol/L 102   Carbon Dioxide      20 - 32 mmol/L 30   Anion Gap      3 - 14 mmol/L 6   Glucose      70 - 99 mg/dL 101 (H)   Urea Nitrogen      7 - 30 mg/dL 13   Creatinine      0.52 - 1.04 mg/dL 0.55   GFR Estimate      >60 mL/min/1.7m2 >90   GFR Estimate If Black      >60 mL/min/1.7m2 >90   Calcium      8.5 - 10.1 " mg/dL 9.1   WBC      4.0 - 11.0 10e9/L 4.9   RBC Count      3.8 - 5.2 10e12/L 4.28   Hemoglobin      11.7 - 15.7 g/dL 11.3 (L)   Hematocrit      35.0 - 47.0 % 35.5   MCV      78 - 100 fl 83   MCH      26.5 - 33.0 pg 26.4 (L)   MCHC      31.5 - 36.5 g/dL 31.8   RDW      10.0 - 15.0 % 14.6   Platelet Count      150 - 450 10e9/L 215   N-Terminal Pro Bnp      0 - 125 pg/mL 546 (H)   Sed Rate      0 - 30 mm/h 71 (H)     Component      Latest Ref Rng & Units 12/29/2017   Color Urine       Straw   Appearance Urine       Clear   Glucose Urine      NEG:Negative mg/dL Negative   Bilirubin Urine      NEG:Negative Negative   Ketones Urine      NEG:Negative mg/dL Negative   Specific Gravity Urine      1.003 - 1.035 1.005   Blood Urine      NEG:Negative Negative   pH Urine      5.0 - 7.0 pH 6.0   Protein Albumin Urine      NEG:Negative mg/dL Negative   Urobilinogen mg/dL      0.0 - 2.0 mg/dL 0.0   Nitrite Urine      NEG:Negative Negative   Leukocyte Esterase Urine      NEG:Negative Negative   Source       Midstream Urine   WBC Urine      0 - 2 /HPF <1   RBC Urine      0 - 2 /HPF <1   Bacteria Urine      NEG:Negative /HPF Few (A)   Squamous Epithelial /HPF Urine      0 - 1 /HPF <1   Mucous Urine      NEG:Negative /LPF Present (A)   Albumin Fraction      3.7 - 5.1 g/dL 4.2   Alpha 1 Fraction      0.2 - 0.4 g/dL 0.4   Alpha 2 Fraction      0.5 - 0.9 g/dL 0.8   Beta Fraction      0.6 - 1.0 g/dL 1.0   Gamma Fraction      0.7 - 1.6 g/dL 1.6   Monoclonal Peak      0.0 g/dL 0.0   ELP Interpretation:       Essentially normal electrophoretic pattern. No monoclonal protein seen. Pathologic . . .   IGG      695 - 1620 mg/dL 1560   IGA      70 - 380 mg/dL 473 (H)   IGM      60 - 265 mg/dL 92   Iron      35 - 180 ug/dL 58   Iron Binding Cap      240 - 430 ug/dL 315   Iron Saturation Index      15 - 46 % 19   TPMT Genotype Specimen       Whole Blood   TPMT Genotype       Neg/Neg   TPMT Predicted Phenotype       Normal   Protein Random Urine       g/L <0.05   Protein Total Urine g/gr Creatinine      0 - 0.2 g/g Cr Unable to calculate due to low value   Deamidated Gliadin Cari, IgA      <7 U/mL 2   Deamidated Gliadin Cari, IgG      <7 U/mL 5   Complement C3      76 - 169 mg/dL 72 (L)   Complement C4      15 - 50 mg/dL 6 (L)   CRP Inflammation      0.0 - 8.0 mg/L 3.2   DNA-ds      <10 IU/mL >379 (H)   Sed Rate      0 - 30 mm/h 49 (H)   Vitamin D Deficiency screening      20 - 75 ug/L 51   Creatinine Urine Random      mg/dL 23   AST      0 - 45 U/L 26   ALT      0 - 50 U/L 35   HEENA  Broad Spectrum       Neg   Beta 2 Glycoprotein 1 Antibody IgG      <7 U/mL <0.6   Beta 2 Glycoprotein 1 Antibody IgM      <7 U/mL <0.9   Thyroid Peroxidase Antibody      <35 IU/mL <10   Thyroglobulin Antibody      <40 IU/mL <20   TSH      0.40 - 4.00 mU/L 0.87   Cryoglobulin      NEG:Negative % Trace (A)   Tissue Transglutaminase Antibody IgA      <7 U/mL 4   Ferritin      8 - 252 ng/mL 163   Endomysial Antibody IgA by IFA      <1:10 <1:10   CRP Cardiac Risk      mg/L 2.9   Creatinine Urine      mg/dL 23     Component      Latest Ref Rng & Units 2/23/2018   Color Urine       Yellow   Appearance Urine       Clear   Glucose Urine      NEG:Negative mg/dL Negative   Bilirubin Urine      NEG:Negative Negative   Ketones Urine      NEG:Negative mg/dL Negative   Specific Gravity Urine      1.003 - 1.035 1.025   Blood Urine      NEG:Negative Negative   pH Urine      5.0 - 7.0 pH 5.5   Protein Albumin Urine      NEG:Negative mg/dL Negative   Urobilinogen Urine      0.2 - 1.0 EU/dL 0.2   Nitrite Urine      NEG:Negative Negative   Leukocyte Esterase Urine      NEG:Negative Negative   Source       Midstream Urine   Protein Random Urine      g/L 0.17   Protein Total Urine g/gr Creatinine      0 - 0.2 g/g Cr 0.18   Complement C3      76 - 169 mg/dL 64 (L)   Complement C4      15 - 50 mg/dL 5 (L)   CRP Inflammation      0.0 - 8.0 mg/L 4.2   DNA-ds      <10 IU/mL >379 (H)   Sed Rate      0 - 30 mm/h  35 (H)   AST      0 - 45 U/L 27   ALT      0 - 50 U/L 31   Creatinine Urine Random      mg/dL 99     Component      Latest Ref Rng & Units 3/16/2018   WBC      4.0 - 11.0 10e9/L 5.6   RBC Count      3.8 - 5.2 10e12/L 4.43   Hemoglobin      11.7 - 15.7 g/dL 12.1   Hematocrit      35.0 - 47.0 % 36.9   MCV      78 - 100 fl 83   MCH      26.5 - 33.0 pg 27.3   MCHC      31.5 - 36.5 g/dL 32.8   RDW      10.0 - 15.0 % 14.5   Platelet Count      150 - 450 10e9/L 224   Diff Method       Automated Method   % Neutrophils      % 81.7   % Lymphocytes      % 9.3   % Monocytes      % 7.5   % Eosinophils      % 1.3   % Basophils      % 0.2   Absolute Neutrophil      1.6 - 8.3 10e9/L 4.6   Absolute Lymphocytes      0.8 - 5.3 10e9/L 0.5 (L)   Absolute Monocytes      0.0 - 1.3 10e9/L 0.4   Absolute Eosinophils      0.0 - 0.7 10e9/L 0.1   Absolute Basophils      0.0 - 0.2 10e9/L 0.0   Creatinine      0.52 - 1.04 mg/dL 0.51 (L)   GFR Estimate      >60 mL/min/1.7m2 >90   GFR Estimate If Black      >60 mL/min/1.7m2 >90   ALT      0 - 50 U/L 27   Albumin      3.4 - 5.0 g/dL 3.5   AST      0 - 45 U/L 18       Component      Latest Ref Rng & Units 3/21/2018   Color Urine       Yellow   Appearance Urine       Clear   Glucose Urine      NEG:Negative mg/dL Negative   Bilirubin Urine      NEG:Negative Negative   Ketones Urine      NEG:Negative mg/dL Negative   Specific Gravity Urine      1.003 - 1.035 <=1.005   pH Urine      5.0 - 7.0 pH 6.5   Protein Albumin Urine      NEG:Negative mg/dL Negative   Urobilinogen Urine      0.2 - 1.0 EU/dL 0.2   Nitrite Urine      NEG:Negative Negative   Blood Urine      NEG:Negative Negative   Leukocyte Esterase Urine      NEG:Negative Negative   Source       Midstream Urine   WBC Urine      OTO5:0 - 5 /HPF 0 - 5   RBC Urine      OTO2:O - 2 /HPF O - 2   Squamous Epithelial /LPF Urine      FEW:Few /LPF Few   Protein Random Urine      g/L <0.05   Protein Total Urine g/gr Creatinine      0 - 0.2 g/g Cr Unable to  calculate due to low value   DNA-ds      <10 IU/mL >379 (H)   Complement C4      15 - 50 mg/dL 4 (L)   Complement C3      76 - 169 mg/dL 69 (L)   Creatinine Urine Random      mg/dL 17   Creatinine Urine      mg/dL 17     EXAMINATION: CT CHEST W/O CONTRAST, 12/29/2017 1:08 PM     TECHNIQUE:  Helical CT images from the thoracic inlet through the lung  bases were obtained without IV contrast during inspiration and  expiration according to high-resolution chest CT protocol. Contrast  dose: None     COMPARISON: None     HISTORY: eval for ILD, pleural effusion, pt has lupus, Sjogren's, h/o  MALT and pleurisy and SOB; Systemic lupus erythematosus, unspecified  SLE type, unspecified organ involvement status (H)     FINDINGS:     At least 75% collapse of the trachea and mainstem bronchi on the end  expiratory views. Diffuse lobular air trapping on end expiratory  images. Bibasilar platelike atelectasis. No pneumothorax or pleural  effusion. Scattered solid pulmonary nodules, for example a 4 mm  lingular nodule (series 5 image 145) and a 4 mm right upper lobe  nodule (image 73).      The heart size is normal. Mild three-vessel coronary artery calcific  atherosclerosis. Dilation of the main pulmonary artery to 4.1 cm. No  pericardial effusion. Normal caliber and configuration of the thoracic  great vessels. Numerous prominent though nonenlarged mediastinal lymph  nodes.     Limited views of the abdomen reveal no worrisome pathology. No  worrisome bony or soft tissue lesions.         IMPRESSION:   1. At least moderate tracheobronchomalacia.  2. 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  3. Scattered subcentimeter pulmonary nodules measuring up to 4 mm.  Consider follow-up chest CT in one year to establish stability.     [Consider Follow Up: Lung nodule]     This report will be copied to the [facility] to ensure a  provider acknowledges the finding.      I have personally reviewed the examination and initial interpretation  and I agree with the findings.     AUSTIN PACE MD    Examination:NM LUNG SCAN VENTILATION AND PERFUSION, 3/14/2018 8:24 AM      Indication:  Chronic PE; Pulmonary hypertension      Additional Information: none     Technique:     The patient received 2 mCi of Tc-99m DTPA aerosol for ventilation and  7 mCi of Tc-99m MAA for perfusion. Multiple images were obtained of  the lungs in Anterior, posterior, HERNANDES, RPO, LPO, and  ELYSE projections.     Comparison: Chest x-ray same-day     Findings:     There are matched ventilation/perfusion defects in both lungs. No  mismatched perfusion defect to suggest pulmonary emboli.          Impression:  Pulmonary emboli is not present.     I have personally reviewed the examination and initial interpretation  and I agree with the findings.     DONNIE GARCIA MD        Ph.E:    /77  Pulse 70  Temp 98.1  F (36.7  C) (Oral)  Ht 1.676 m (5' 5.98\")  Wt 123.7 kg (272 lb 12.8 oz)  SpO2 96%  BMI 44.06 kg/m2      Constitutional: WD/WN. Pleasant. In no acute distress.  Eyes: EOM intact, PERRLA, sclera anicteric, conj not injected  HEENT: No oral ulcers or thrush. Normal salivary pool.  Neck: No cervical LAP or thyromegaly  Chest: Clear to auscultation bilaterally  CV: RRR, no murmurs/ rubs or gallops. No edema, clubbing or cyanosis.   GI: Abdomen is soft and non tender.   MS: No synovitis. Cool joints. No tenderness of the joints. No  joint deformities. Full ROM of the joints. No nodules. No Jaccoud's deformity.  Skin: No skin rash, malar rash, livedo, periungual erythema, alopecia, digital ulcers or nail changes.  Neuro: A&O x 3. Grossly non focal, muscular power 5/5 in all ext  Psych: NL affect and mood    Assessment/ plan:    1-SLE. 54 yo WF with +fh/o RA and personal hx of SLE presented in 12/2017 for 2nd opinion of m/o her SLE. She was " diagnosed with SLE at age 27. Her lupus has been marked by +KARLIE (highest titer 1:640, speckled and nucleolar patterns), +anti-DNA, arthritis, malar rash, serositis (pleuritic CP) supported by +SSA/SSB Ab, low C3/low C4, +RF, high ESR/CRP. She had neg anti-RNP, anti-Sm, acL IgM/G/A, LAC, cryo and anti-CCP.     Had NL C3 at the time of Dx. She was treated with prednisone and HCQ at the time of Dx. Can't remember how long she was on HCQ and why HCQ was stopped, but it probably was stopped because of stable disease, no report on HCQ toxicity. Reportedly her SLE was mild all these years.    Has secondary Sjogren's with sicca, +SSA/SSB Ab and h/o MALT lymphoma at age 44 in remission. Uses blink eyedrops and salagen. No lip biopsy was done to confirm Dx of Sjogren's.    Her lupus flared in 7/2017 with no triggers when she presented with arthritis, HCQ was resumed, arthritis resolved. Mild pulm HTN on 2D-Echo 8/2017 but neg R cardiac cath and VQ scan in 3/2018, also neg 2D-Echo.    In 12/2017, was prescribed prednisone 40 mg for only 5 days for pleuritic CP, CP recurred after stopping prednisone.     Her major complaint at initial visit with me in 12/2017 was ongoing CP. AZA was added in 2/2018 with start dose of 50 mg qd and was increased to 50 mg bid about 2wk ago. Tolerates AZA well, no SEs, no toxicity on the labs. Now off prednisone. Both prednisone and ibuprofen (600 mg bid prn) help with pleuritic CP. Now on HCQ+AZA has ongoing pleuritic CP which is her major complaint. She is worries about taking prednisone or ibuprofen for so long given potential SES. Worried that ibuprofen affects her kidneys.      Her lupus is active with pleuritic CP. ESR improved since initial visit with me but +anti-DNA, low C3/C4 are unchanged. Chest CT in 12/2017 without contrast showed air trapping due to lupus/Sjogren's, no pleural effusion. Lung nodules need f/u in a year. No pericardail effusion on 2D-echo and neg VQ scan for PE in  3/2018 so chest CT with contrast is not needed. She is APL negative.    Lupus Dx and tx plan was discussed with her.    Recommend:    Labs on 4/9, if labs ok and still has symptoms, will increase imuran to 3 tab a day with follow up labs 3 weeks later    If after 4 weeks, still has symptoms, will switch imuran to cellcept 500 mg po bid    Ok to take ibuprofen up to 800 mg three times a day as needed    Continue  mg bid    Continue salagen for dry mouth    2-HCQ monitoring. Was reminded to have eye exam.    3-AZA monitoring. Labs on 4/9    4-NSAIDs monitoring. Labs q6 mo    5-Shingrix vaccine, 1st dose today. Recommended shingrix. CDC recommends this vaccine 2 doses,  by 2-6 months for adults 50 years and older. It is killed virus and ok to be used in immunocompromised patients. Shingrix reduces the risk of shingles and PHN by more than 90% in people 50 and older.         PLAN: Follow up in May      MEDICATION CHANGES: none    Orders Placed This Encounter   Procedures     Complement C3     Complement C4     Creatinine     CRP inflammation     Erythrocyte sedimentation rate auto     DNA double stranded antibodies     AST     ALT     Albumin level     CBC with platelets differential     Routine UA with micro reflex to culture     Protein  random urine with Creat Ratio     Creatinine random urine       Killian Marroquin MD

## 2018-04-04 NOTE — PROGRESS NOTES
Rheumatology F/U Note    Reason for visit: SLE, ongoing pleuritic CP          HPI:    Taina Singh is a 52 year old  female who was referred to our clinic for evaluation and management of her SLE.      She was diagnosed with lupus at age 27. Her 1st sx were malar rash and fatigue. No skin biopsy was done (reportedly had +KARLIE). She was treated with prednisone taper and HCQ. HCQ helped and she tolerated it well. She was diagnosed with Sjogren's at the same time. Had dryness of eyes and mouth. No lip biopsy to confirm the Dx.      Reportedly she had very mild stable lupus for many years and eventually at some point, stopped taking HCQ (can't remember when)    She was diagnosed with MALT lymphoma at 44, had enlarged LN over R parotid gland, on biopsy it was MALT lymphoma. Tx was resection only, no radiation or chemo.    About 3 yr ago, had flu shot and 2 days later, developed pleuritic CP and was seen at urgent care. That's why she does not want to get flu shot. She was seen in ER 2 days after UC visit. She was treated with prednisone.      She lost 100 lbs by exercise over past 2 yr, felt amazing. In 7/2017, started not feeling well. She had extreme fatigue. Had AM stiffness and pain over hands. Touched base with her previous rheumatologist (Dr. Rangel) and was told to have lupus flare and was put on  mg qd. Afterwards, developed pleuritic CP which has not been resolved.    She was given prednisone 40 mg qd x 5 days (on 12/16/2017) by pulmonologist in [facility]. It helped a lot but pleuritic CP recurred after stopping it.    She is seeking 2nd opinion for m/o her lupus.    She was told to have pulm HTN and was evaluated for it.    No triggers for current flare.    No flare of malar rash. Has fatigue. No current hair loss. Uses blink eyedrops, restasis burned her eyes. She has been prescribed salagen for dry mouth, has not used it. Arthritis of hands have resolved on HCQ.    Last HCQ eye exam was 2 mo  ago.    Today: On AZA 50 mg qd since 2018, increased to 100 mg qd in 3/19/2018. Her arthralgia is intermittent and mild, it's better. Has fatigue.    Her major compalint is continues pressure over her chest. Pain is pleuritic, it hurts by sneezing, taking deep breath.        ROS:  A comprehensive ROS was done, positives are per HPI.    It is negative for fever, discoid rash, photosensitivity, Raynaud's, oral ulcers, nasal ulcers, dysphagia, cough, h/o serositis, N/V, heartburn, diarrhea, abdominal pain, blood in stool or urine, h/o proteinuria, myalgia, headaches, seizures, psychosis, numbness, tingling, myelitis, easy bruising, depression, anxiety, memory problem, h/o thrombosis, or blood transfusion.    Past Medical History:   Diagnosis Date     Diastolic dysfunction 2018     Gluten intolerance     Patient is not gluten intolerant     Iron deficiency      Iron deficiency 2018     Lupus      MALT lymphoma (H) age 44     MALT lymphoma (H) 2018     Other forms of systemic lupus erythematosus (H) 2018     Sicca syndrome (H) 2018     Sjogren's syndrome (H)      Sjogren's syndrome (H) 2018     Systemic lupus erythematosus (H) 2018     Vitamin D deficiency      Past Surgical History:   Procedure Laterality Date     BIOPSY/REMOVAL, LYMPH NODE(S)        SECTION  age 32     HC HYSTEROS W PERMANENT FALLOPAIN IMPLANT       PAROTIDECTOMY       TONSILLECTOMY       Family History   Problem Relation Age of Onset     Alopecia Brother      Rheumatoid Arthritis Brother      Social History     Social History     Marital status:      Spouse name: N/A     Number of children: N/A     Years of education: 1     Occupational History           , 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    Allergies   Allergen Reactions     Penicillins Rash     Sulfa Drugs Rash       Outpatient Encounter Prescriptions as of 2018   Medication Sig Dispense Refill     azaTHIOprine (IMURAN) 50 MG tablet Take 1 tablet (50 mg) by mouth 2 times daily TAKE BY MOUTH WITH FOOD. 60 tablet 2     pilocarpine (SALAGEN) 5 MG tablet Take 1 tablet (5 mg) by mouth 4 times daily as needed 120 tablet 11     predniSONE (DELTASONE) 20 MG tablet 01-48-46-15-10-5 mg a day each for 5 days then stop, take along with 5 mg size 60 tablet 1     predniSONE (DELTASONE) 5 MG tablet 00-50-20-15-10-5 mg a day each for 5 days then stop, take along with 20 mg size 90 tablet 1     Calcium-Magnesium 300-300 MG TABS daily        hydroxychloroquine (PLAQUENIL) 200 MG tablet Take 200 mg by mouth 2 times daily        Omega-3 Fatty Acids (OMEGA-3 FISH OIL) 500 MG CAPS Take 500 mg by mouth daily        Multiple Vitamins-Minerals (WOMENS MULTI PO) Take by mouth daily        Cholecalciferol (D 1000) 1000 UNITS CAPS Take 1,000 Units by mouth daily        No facility-administered encounter medications on file as of 2018.          Her records were reviewed.    2D-Echo 2017:    ECHO COMPLETE WO CONTRAST CHILANGO GIBBONS 2017 14:43     [facility] [place] [id number] [facility], [address], [place] [address]   Main: [phone]  [email or web address]                                                 Transthoracic Echo Report   CHILANGO GIBBONS   Excellian ID: [id number] Age: 52 : 1965 Ordering Provider: NGUYEN PETERS   Exam Date: 2017 14:43 Gender: F Sonographer: HAJA   Accession #: [id number] Height: 66 in BSA: 2.24 m  BP: 108 / 62   Weight: 261 lbs BMI: 42.1 kg/m          Site: [facility]   Location: Outpatient Rhythm: Normal Sinus Rhythm   Procedure Components: 2D imaging, Color Doppler, Spectral Doppler    "Indications: Murmur; Systemic lupus erythematosus, unspecified SLE type, unspecified organ   involvement status   Technical Quality: Contrast: None     Final Conclusion   Visually estimated ejection fraction is 55-60%.   Mild left ventricular hypertrophy.   Estimated pulmonary artery pressure of 31 mmHg + RA pressure.   Dilated IVC size, <50% collapse with respiration.   Estimated EF: 55-60%   FINDINGS   Left Ventricle Normal left ventricular size. Visually estimated ejection fraction is 55-60%.   Mild left ventricular hypertrophy.   Diastolic Function \"Pseudonormal\" left ventricular filling pattern. E/e' ratio 8-15 is   indeterminate for filling pressure.   Right Ventricle Normal right ventricular size and function.   Left Atrium Mild left atrial enlargement.   Right Atrium Mild right atrial enlargement.   Aortic Valve Normal aortic valve. No aortic stenosis. No significant aortic regurgitation.   Mitral Valve Normal mitral valve. No mitral stenosis. Mild mitral regurgitation.   Tricuspid Valve Normal tricuspid valve. No tricuspid stenosis. Mild tricuspid regurgitation.   Estimated pulmonary artery pressure   of 31 mmHg + RA pressure.   Pulmonic Valve Normal pulmonic valve without significant stenosis or regurgitation.   Pericardium Normal pericardium.   Aorta Measured aortic root diameter is normal in size.   Inferior Vena Cava Dilated IVC size, <50% collapse with respiration.    Component      Latest Ref Rng & Units 11/20/2017   Sodium      133 - 144 mmol/L 137   Potassium      3.4 - 5.3 mmol/L 4.2   Chloride      94 - 109 mmol/L 102   Carbon Dioxide      20 - 32 mmol/L 30   Anion Gap      3 - 14 mmol/L 6   Glucose      70 - 99 mg/dL 101 (H)   Urea Nitrogen      7 - 30 mg/dL 13   Creatinine      0.52 - 1.04 mg/dL 0.55   GFR Estimate      >60 mL/min/1.7m2 >90   GFR Estimate If Black      >60 mL/min/1.7m2 >90   Calcium      8.5 - 10.1 mg/dL 9.1   WBC      4.0 - 11.0 10e9/L 4.9   RBC Count      3.8 - 5.2 10e12/L " 4.28   Hemoglobin      11.7 - 15.7 g/dL 11.3 (L)   Hematocrit      35.0 - 47.0 % 35.5   MCV      78 - 100 fl 83   MCH      26.5 - 33.0 pg 26.4 (L)   MCHC      31.5 - 36.5 g/dL 31.8   RDW      10.0 - 15.0 % 14.6   Platelet Count      150 - 450 10e9/L 215   N-Terminal Pro Bnp      0 - 125 pg/mL 546 (H)   Sed Rate      0 - 30 mm/h 71 (H)     Component      Latest Ref Rng & Units 12/29/2017   Color Urine       Straw   Appearance Urine       Clear   Glucose Urine      NEG:Negative mg/dL Negative   Bilirubin Urine      NEG:Negative Negative   Ketones Urine      NEG:Negative mg/dL Negative   Specific Gravity Urine      1.003 - 1.035 1.005   Blood Urine      NEG:Negative Negative   pH Urine      5.0 - 7.0 pH 6.0   Protein Albumin Urine      NEG:Negative mg/dL Negative   Urobilinogen mg/dL      0.0 - 2.0 mg/dL 0.0   Nitrite Urine      NEG:Negative Negative   Leukocyte Esterase Urine      NEG:Negative Negative   Source       Midstream Urine   WBC Urine      0 - 2 /HPF <1   RBC Urine      0 - 2 /HPF <1   Bacteria Urine      NEG:Negative /HPF Few (A)   Squamous Epithelial /HPF Urine      0 - 1 /HPF <1   Mucous Urine      NEG:Negative /LPF Present (A)   Albumin Fraction      3.7 - 5.1 g/dL 4.2   Alpha 1 Fraction      0.2 - 0.4 g/dL 0.4   Alpha 2 Fraction      0.5 - 0.9 g/dL 0.8   Beta Fraction      0.6 - 1.0 g/dL 1.0   Gamma Fraction      0.7 - 1.6 g/dL 1.6   Monoclonal Peak      0.0 g/dL 0.0   ELP Interpretation:       Essentially normal electrophoretic pattern. No monoclonal protein seen. Pathologic . . .   IGG      695 - 1620 mg/dL 1560   IGA      70 - 380 mg/dL 473 (H)   IGM      60 - 265 mg/dL 92   Iron      35 - 180 ug/dL 58   Iron Binding Cap      240 - 430 ug/dL 315   Iron Saturation Index      15 - 46 % 19   TPMT Genotype Specimen       Whole Blood   TPMT Genotype       Neg/Neg   TPMT Predicted Phenotype       Normal   Protein Random Urine      g/L <0.05   Protein Total Urine g/gr Creatinine      0 - 0.2 g/g Cr Unable  to calculate due to low value   Deamidated Gliadin Cari, IgA      <7 U/mL 2   Deamidated Gliadin Cari, IgG      <7 U/mL 5   Complement C3      76 - 169 mg/dL 72 (L)   Complement C4      15 - 50 mg/dL 6 (L)   CRP Inflammation      0.0 - 8.0 mg/L 3.2   DNA-ds      <10 IU/mL >379 (H)   Sed Rate      0 - 30 mm/h 49 (H)   Vitamin D Deficiency screening      20 - 75 ug/L 51   Creatinine Urine Random      mg/dL 23   AST      0 - 45 U/L 26   ALT      0 - 50 U/L 35   HEENA  Broad Spectrum       Neg   Beta 2 Glycoprotein 1 Antibody IgG      <7 U/mL <0.6   Beta 2 Glycoprotein 1 Antibody IgM      <7 U/mL <0.9   Thyroid Peroxidase Antibody      <35 IU/mL <10   Thyroglobulin Antibody      <40 IU/mL <20   TSH      0.40 - 4.00 mU/L 0.87   Cryoglobulin      NEG:Negative % Trace (A)   Tissue Transglutaminase Antibody IgA      <7 U/mL 4   Ferritin      8 - 252 ng/mL 163   Endomysial Antibody IgA by IFA      <1:10 <1:10   CRP Cardiac Risk      mg/L 2.9   Creatinine Urine      mg/dL 23     Component      Latest Ref Rng & Units 2/23/2018   Color Urine       Yellow   Appearance Urine       Clear   Glucose Urine      NEG:Negative mg/dL Negative   Bilirubin Urine      NEG:Negative Negative   Ketones Urine      NEG:Negative mg/dL Negative   Specific Gravity Urine      1.003 - 1.035 1.025   Blood Urine      NEG:Negative Negative   pH Urine      5.0 - 7.0 pH 5.5   Protein Albumin Urine      NEG:Negative mg/dL Negative   Urobilinogen Urine      0.2 - 1.0 EU/dL 0.2   Nitrite Urine      NEG:Negative Negative   Leukocyte Esterase Urine      NEG:Negative Negative   Source       Midstream Urine   Protein Random Urine      g/L 0.17   Protein Total Urine g/gr Creatinine      0 - 0.2 g/g Cr 0.18   Complement C3      76 - 169 mg/dL 64 (L)   Complement C4      15 - 50 mg/dL 5 (L)   CRP Inflammation      0.0 - 8.0 mg/L 4.2   DNA-ds      <10 IU/mL >379 (H)   Sed Rate      0 - 30 mm/h 35 (H)   AST      0 - 45 U/L 27   ALT      0 - 50 U/L 31   Creatinine Urine  Random      mg/dL 99     Component      Latest Ref Rng & Units 3/16/2018   WBC      4.0 - 11.0 10e9/L 5.6   RBC Count      3.8 - 5.2 10e12/L 4.43   Hemoglobin      11.7 - 15.7 g/dL 12.1   Hematocrit      35.0 - 47.0 % 36.9   MCV      78 - 100 fl 83   MCH      26.5 - 33.0 pg 27.3   MCHC      31.5 - 36.5 g/dL 32.8   RDW      10.0 - 15.0 % 14.5   Platelet Count      150 - 450 10e9/L 224   Diff Method       Automated Method   % Neutrophils      % 81.7   % Lymphocytes      % 9.3   % Monocytes      % 7.5   % Eosinophils      % 1.3   % Basophils      % 0.2   Absolute Neutrophil      1.6 - 8.3 10e9/L 4.6   Absolute Lymphocytes      0.8 - 5.3 10e9/L 0.5 (L)   Absolute Monocytes      0.0 - 1.3 10e9/L 0.4   Absolute Eosinophils      0.0 - 0.7 10e9/L 0.1   Absolute Basophils      0.0 - 0.2 10e9/L 0.0   Creatinine      0.52 - 1.04 mg/dL 0.51 (L)   GFR Estimate      >60 mL/min/1.7m2 >90   GFR Estimate If Black      >60 mL/min/1.7m2 >90   ALT      0 - 50 U/L 27   Albumin      3.4 - 5.0 g/dL 3.5   AST      0 - 45 U/L 18       Component      Latest Ref Rng & Units 3/21/2018   Color Urine       Yellow   Appearance Urine       Clear   Glucose Urine      NEG:Negative mg/dL Negative   Bilirubin Urine      NEG:Negative Negative   Ketones Urine      NEG:Negative mg/dL Negative   Specific Gravity Urine      1.003 - 1.035 <=1.005   pH Urine      5.0 - 7.0 pH 6.5   Protein Albumin Urine      NEG:Negative mg/dL Negative   Urobilinogen Urine      0.2 - 1.0 EU/dL 0.2   Nitrite Urine      NEG:Negative Negative   Blood Urine      NEG:Negative Negative   Leukocyte Esterase Urine      NEG:Negative Negative   Source       Midstream Urine   WBC Urine      OTO5:0 - 5 /HPF 0 - 5   RBC Urine      OTO2:O - 2 /HPF O - 2   Squamous Epithelial /LPF Urine      FEW:Few /LPF Few   Protein Random Urine      g/L <0.05   Protein Total Urine g/gr Creatinine      0 - 0.2 g/g Cr Unable to calculate due to low value   DNA-ds      <10 IU/mL >379 (H)   Complement C4       15 - 50 mg/dL 4 (L)   Complement C3      76 - 169 mg/dL 69 (L)   Creatinine Urine Random      mg/dL 17   Creatinine Urine      mg/dL 17     EXAMINATION: CT CHEST W/O CONTRAST, 12/29/2017 1:08 PM     TECHNIQUE:  Helical CT images from the thoracic inlet through the lung  bases were obtained without IV contrast during inspiration and  expiration according to high-resolution chest CT protocol. Contrast  dose: None     COMPARISON: None     HISTORY: eval for ILD, pleural effusion, pt has lupus, Sjogren's, h/o  MALT and pleurisy and SOB; Systemic lupus erythematosus, unspecified  SLE type, unspecified organ involvement status (H)     FINDINGS:     At least 75% collapse of the trachea and mainstem bronchi on the end  expiratory views. Diffuse lobular air trapping on end expiratory  images. Bibasilar platelike atelectasis. No pneumothorax or pleural  effusion. Scattered solid pulmonary nodules, for example a 4 mm  lingular nodule (series 5 image 145) and a 4 mm right upper lobe  nodule (image 73).      The heart size is normal. Mild three-vessel coronary artery calcific  atherosclerosis. Dilation of the main pulmonary artery to 4.1 cm. No  pericardial effusion. Normal caliber and configuration of the thoracic  great vessels. Numerous prominent though nonenlarged mediastinal lymph  nodes.     Limited views of the abdomen reveal no worrisome pathology. No  worrisome bony or soft tissue lesions.         IMPRESSION:   1. At least moderate tracheobronchomalacia.  2. 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  3. Scattered subcentimeter pulmonary nodules measuring up to 4 mm.  Consider follow-up chest CT in one year to establish stability.     [Consider Follow Up: Lung nodule]     This report will be copied to the [facility] to ensure a  provider acknowledges the finding.      I have personally  "reviewed the examination and initial interpretation  and I agree with the findings.     AUSTIN PACE MD    Examination:NM LUNG SCAN VENTILATION AND PERFUSION, 3/14/2018 8:24 AM      Indication:  Chronic PE; Pulmonary hypertension      Additional Information: none     Technique:     The patient received 2 mCi of Tc-99m DTPA aerosol for ventilation and  7 mCi of Tc-99m MAA for perfusion. Multiple images were obtained of  the lungs in Anterior, posterior, HERNANDES, RPO, LPO, and  Salvadorean projections.     Comparison: Chest x-ray same-day     Findings:     There are matched ventilation/perfusion defects in both lungs. No  mismatched perfusion defect to suggest pulmonary emboli.          Impression:  Pulmonary emboli is not present.     I have personally reviewed the examination and initial interpretation  and I agree with the findings.     DONNIE GARCIA MD        Ph.E:    /77  Pulse 70  Temp 98.1  F (36.7  C) (Oral)  Ht 1.676 m (5' 5.98\")  Wt 123.7 kg (272 lb 12.8 oz)  SpO2 96%  BMI 44.06 kg/m2      Constitutional: WD/WN. Pleasant. In no acute distress.  Eyes: EOM intact, PERRLA, sclera anicteric, conj not injected  HEENT: No oral ulcers or thrush. Normal salivary pool.  Neck: No cervical LAP or thyromegaly  Chest: Clear to auscultation bilaterally  CV: RRR, no murmurs/ rubs or gallops. No edema, clubbing or cyanosis.   GI: Abdomen is soft and non tender.   MS: No synovitis. Cool joints. No tenderness of the joints. No  joint deformities. Full ROM of the joints. No nodules. No Jaccoud's deformity.  Skin: No skin rash, malar rash, livedo, periungual erythema, alopecia, digital ulcers or nail changes.  Neuro: A&O x 3. Grossly non focal, muscular power 5/5 in all ext  Psych: NL affect and mood    Assessment/ plan:    1-SLE. 54 yo WF with +fh/o RA and personal hx of SLE presented in 12/2017 for 2nd opinion of m/o her SLE. She was diagnosed with SLE at age 27. Her lupus has been marked by +KARLIE (highest titer 1:640, " speckled and nucleolar patterns), +anti-DNA, arthritis, malar rash, serositis (pleuritic CP) supported by +SSA/SSB Ab, low C3/low C4, +RF, high ESR/CRP. She had neg anti-RNP, anti-Sm, acL IgM/G/A, LAC, cryo and anti-CCP.     Had NL C3 at the time of Dx. She was treated with prednisone and HCQ at the time of Dx. Can't remember how long she was on HCQ and why HCQ was stopped, but it probably was stopped because of stable disease, no report on HCQ toxicity. Reportedly her SLE was mild all these years.    Has secondary Sjogren's with sicca, +SSA/SSB Ab and h/o MALT lymphoma at age 44 in remission. Uses blink eyedrops and salagen. No lip biopsy was done to confirm Dx of Sjogren's.    Her lupus flared in 7/2017 with no triggers when she presented with arthritis, HCQ was resumed, arthritis resolved. Mild pulm HTN on 2D-Echo 8/2017 but neg R cardiac cath and VQ scan in 3/2018, also neg 2D-Echo.    In 12/2017, was prescribed prednisone 40 mg for only 5 days for pleuritic CP, CP recurred after stopping prednisone.     Her major complaint at initial visit with me in 12/2017 was ongoing CP. AZA was added in 2/2018 with start dose of 50 mg qd and was increased to 50 mg bid about 2wk ago. Tolerates AZA well, no SEs, no toxicity on the labs. Now off prednisone. Both prednisone and ibuprofen (600 mg bid prn) help with pleuritic CP. Now on HCQ+AZA has ongoing pleuritic CP which is her major complaint. She is worries about taking prednisone or ibuprofen for so long given potential SES. Worried that ibuprofen affects her kidneys.      Her lupus is active with pleuritic CP. ESR improved since initial visit with me but +anti-DNA, low C3/C4 are unchanged. Chest CT in 12/2017 without contrast showed air trapping due to lupus/Sjogren's, no pleural effusion. Lung nodules need f/u in a year. No pericardail effusion on 2D-echo and neg VQ scan for PE in 3/2018 so chest CT with contrast is not needed. She is APL negative.    Lupus Dx and tx  plan was discussed with her.    Recommend:    Labs on 4/9, if labs ok and still has symptoms, will increase imuran to 3 tab a day with follow up labs 3 weeks later    If after 4 weeks, still has symptoms, will switch imuran to cellcept 500 mg po bid    Ok to take ibuprofen up to 800 mg three times a day as needed    Continue  mg bid    Continue salagen for dry mouth    2-HCQ monitoring. Was reminded to have eye exam.    3-AZA monitoring. Labs on 4/9    4-NSAIDs monitoring. Labs q6 mo    5-Shingrix vaccine, 1st dose today. Recommended shingrix. CDC recommends this vaccine 2 doses,  by 2-6 months for adults 50 years and older. It is killed virus and ok to be used in immunocompromised patients. Shingrix reduces the risk of shingles and PHN by more than 90% in people 50 and older.         PLAN: Follow up in May      MEDICATION CHANGES: none    Orders Placed This Encounter   Procedures     Complement C3     Complement C4     Creatinine     CRP inflammation     Erythrocyte sedimentation rate auto     DNA double stranded antibodies     AST     ALT     Albumin level     CBC with platelets differential     Routine UA with micro reflex to culture     Protein  random urine with Creat Ratio     Creatinine random urine

## 2018-04-04 NOTE — NURSING NOTE
"Chief Complaint   Patient presents with     RECHECK     SLE/ Sjogrens       Initial /77  Pulse 70  Temp 98.1  F (36.7  C) (Oral)  Ht 1.676 m (5' 5.98\")  Wt 123.7 kg (272 lb 12.8 oz)  SpO2 96%  BMI 44.06 kg/m2 Estimated body mass index is 44.06 kg/(m^2) as calculated from the following:    Height as of this encounter: 1.676 m (5' 5.98\").    Weight as of this encounter: 123.7 kg (272 lb 12.8 oz).  Medication Reconciliation: complete   Julio Keane, ASUNCION      "
Improved

## 2018-04-04 NOTE — MR AVS SNAPSHOT
After Visit Summary   4/4/2018    Taina Singh    MRN: [id number]           Patient Information     Date Of Birth          1965        Visit Information        Provider Department      4/4/2018 9:00 AM Killian Marroquin MD [facility] Rheumatology        Today's Diagnoses     Systemic lupus erythematosus, unspecified SLE type, unspecified organ involvement status (H)    -  1    Medication monitoring encounter          Care Instructions    Labs on 4/9, if labs ok and still have symptoms, increase imuran to 3 tab a day with follow up labs 3 weeks later    If after 4 weeks, still have symptoms, will switch imuran to cellcept    Ok to take ibuprofen up to 800 mg three times a day as needed    No chest CT needed    Shingrix today and again in 2-6 months    May 23 at noon add on              Follow-ups after your visit        Future tests that were ordered for you today     Open Future Orders        Priority Expected Expires Ordered    Protein  random urine with Creat Ratio Routine  4/4/2019 4/4/2018    Creatinine random urine Routine  4/4/2019 4/4/2018    Complement C3 Routine  4/4/2019 4/4/2018    Complement C4 Routine  4/4/2019 4/4/2018    Creatinine Routine  4/4/2019 4/4/2018    CRP inflammation Routine  4/4/2019 4/4/2018    Erythrocyte sedimentation rate auto Routine  4/4/2019 4/4/2018    DNA double stranded antibodies Routine  4/4/2019 4/4/2018    AST Routine  4/4/2019 4/4/2018    ALT Routine  4/4/2019 4/4/2018    Albumin level Routine  4/4/2019 4/4/2018    CBC with platelets differential Routine  4/4/2019 4/4/2018    Routine UA with micro reflex to culture Routine  4/4/2019 4/4/2018            Who to contact     If you have questions or need follow up information about today's clinic visit or your schedule please contact [facility] RHEUMATOLOGY directly at [phone].  Normal or non-critical lab and imaging results will be communicated to you by MyChart, letter or phone within 4 business days  "after the clinic has received the results. If you do not hear from us within 7 days, please contact the clinic through [facility] [employer] or phone. If you have a critical or abnormal lab result, we will notify you by phone as soon as possible.  Submit refill requests through [employer] or call your pharmacy and they will forward the refill request to us. Please allow 3 business days for your refill to be completed.          Additional Information About Your Visit        [facility] [employer] Information     [facility] gives you secure access to your electronic health record. If you see a primary care provider, you can also send messages to your care team and make appointments. If you have questions, please call your primary care clinic.  If you do not have a primary care provider, please call [phone] and they will assist you.        Care EveryWhere ID     This is your Care EveryWhere ID. This could be used by other organizations to access your [facility] medical records  [id number]        Your Vitals Were     Pulse Temperature Height Pulse Oximetry BMI (Body Mass Index)       70 98.1  F (36.7  C) (Oral) 1.676 m (5' 5.98\") 96% 44.06 kg/m2        Blood Pressure from Last 3 Encounters:   04/04/18 121/77   03/14/18 128/62   03/14/18 128/62    Weight from Last 3 Encounters:   04/04/18 123.7 kg (272 lb 12.8 oz)   03/14/18 123.2 kg (271 lb 11.2 oz)   03/14/18 116.1 kg (256 lb)                 Today's Medication Changes          These changes are accurate as of 4/4/18  4:54 PM.  If you have any questions, ask your nurse or doctor.               Stop taking these medicines if you haven't already. Please contact your care team if you have questions.     predniSONE 20 MG tablet   Commonly known as:  DELTASONE   Stopped by:  Killian Marroquin MD           predniSONE 5 MG tablet   Commonly known as:  DELTASONE   Stopped by:  Killian Marroquin MD                Where to get your medicines      These medications were sent to [id number] - " [place], [place] - [address] [address] AT [facility] [address]  [address] [facility], [place] [address]    Hours:  24-hours Phone:  [phone]     azaTHIOprine 50 MG tablet                Primary Care Provider Fax #    Physician No Ref-Primary [phone]       No address on file        Equal Access to Services     [facility] : Hadii aad ku hadasho Soomaali, waaxda luqadaha, qaybta kaalmada adeegyada, waxay idiin hayaan adeeg joslynbenny laraven . So [facility] [phone].    ATENCIÓN: Si habla español, tiene a vyas disposición servicios gratuitos de asistencia lingüística. Primitivoame al [phone].    We comply with applicable federal civil rights laws and [place] laws. We do not discriminate on the basis of race, color, national origin, age, disability, sex, sexual orientation, or gender identity.            Thank you!     Thank you for choosing [facility] RHEUMATOLOGY  for your care. Our goal is always to provide you with excellent care. Hearing back from our patients is one way we can continue to improve our services. Please take a few minutes to complete the written survey that you may receive in the mail after your visit with us. Thank you!             Your Updated Medication List - Protect others around you: Learn how to safely use, store and throw away your medicines at www.disposemymeds.org.          This list is accurate as of 4/4/18  4:54 PM.  Always use your most recent med list.                   Brand Name Dispense Instructions for use Diagnosis    azaTHIOprine 50 MG tablet    IMURAN    60 tablet    Take 1 tablet (50 mg) by mouth 2 times daily TAKE BY MOUTH WITH FOOD.    Systemic lupus erythematosus, unspecified SLE type, unspecified organ involvement status (H)       Calcium-Magnesium 300-300 MG Tabs      daily        D 1000 1000 UNITS Caps      Take 1,000 Units by mouth daily        hydroxychloroquine 200 MG tablet    PLAQUENIL     Take 200 mg by mouth 2 times daily        Omega-3 Fish Oil  500 MG Caps      Take 500 mg by mouth daily        pilocarpine 5 MG tablet    SALAGEN    120 tablet    Take 1 tablet (5 mg) by mouth 4 times daily as needed    Systemic lupus erythematosus, unspecified SLE type, unspecified organ involvement status (H)       WOMENS MULTI PO      Take by mouth daily

## 2018-04-04 NOTE — PATIENT INSTRUCTIONS
Labs on 4/9, if labs ok and still have symptoms, increase imuran to 3 tab a day with follow up labs 3 weeks later    If after 4 weeks, still have symptoms, will switch imuran to cellcept    Ok to take ibuprofen up to 800 mg three times a day as needed    No chest CT needed    Shingrix today and again in 2-6 months    May 23 at noon add on

## 2018-04-11 DIAGNOSIS — M32.9 SYSTEMIC LUPUS ERYTHEMATOSUS, UNSPECIFIED SLE TYPE, UNSPECIFIED ORGAN INVOLVEMENT STATUS (H): ICD-10-CM

## 2018-04-11 DIAGNOSIS — Z51.81 MEDICATION MONITORING ENCOUNTER: ICD-10-CM

## 2018-04-11 LAB
ALBUMIN SERPL-MCNC: 3.4 G/DL (ref 3.4–5)
ALBUMIN UR-MCNC: NEGATIVE MG/DL
ALT SERPL W P-5'-P-CCNC: 26 U/L (ref 0–50)
APPEARANCE UR: CLEAR
AST SERPL W P-5'-P-CCNC: 26 U/L (ref 0–45)
BASOPHILS # BLD AUTO: 0 10E9/L (ref 0–0.2)
BASOPHILS NFR BLD AUTO: 0.3 %
BILIRUB UR QL STRIP: NEGATIVE
COLOR UR AUTO: YELLOW
CREAT SERPL-MCNC: 0.74 MG/DL (ref 0.52–1.04)
CREAT UR-MCNC: 22 MG/DL
CREAT UR-MCNC: 26 MG/DL
CRP SERPL-MCNC: 23.6 MG/L (ref 0–8)
DIFFERENTIAL METHOD BLD: ABNORMAL
EOSINOPHIL # BLD AUTO: 0.1 10E9/L (ref 0–0.7)
EOSINOPHIL NFR BLD AUTO: 3.6 %
ERYTHROCYTE [DISTWIDTH] IN BLOOD BY AUTOMATED COUNT: 14.1 % (ref 10–15)
ERYTHROCYTE [SEDIMENTATION RATE] IN BLOOD BY WESTERGREN METHOD: 69 MM/H (ref 0–30)
GFR SERPL CREATININE-BSD FRML MDRD: 82 ML/MIN/1.7M2
GLUCOSE UR STRIP-MCNC: NEGATIVE MG/DL
HCT VFR BLD AUTO: 35 % (ref 35–47)
HGB BLD-MCNC: 11.4 G/DL (ref 11.7–15.7)
HGB UR QL STRIP: NEGATIVE
KETONES UR STRIP-MCNC: NEGATIVE MG/DL
LEUKOCYTE ESTERASE UR QL STRIP: NEGATIVE
LYMPHOCYTES # BLD AUTO: 0.4 10E9/L (ref 0.8–5.3)
LYMPHOCYTES NFR BLD AUTO: 10.1 %
MCH RBC QN AUTO: 27 PG (ref 26.5–33)
MCHC RBC AUTO-ENTMCNC: 32.6 G/DL (ref 31.5–36.5)
MCV RBC AUTO: 83 FL (ref 78–100)
MONOCYTES # BLD AUTO: 0.4 10E9/L (ref 0–1.3)
MONOCYTES NFR BLD AUTO: 10.3 %
NEUTROPHILS # BLD AUTO: 2.7 10E9/L (ref 1.6–8.3)
NEUTROPHILS NFR BLD AUTO: 75.7 %
NITRATE UR QL: NEGATIVE
PH UR STRIP: 6 PH (ref 5–7)
PLATELET # BLD AUTO: 192 10E9/L (ref 150–450)
PROT UR-MCNC: <0.05 G/L
PROT/CREAT 24H UR: NORMAL G/G CR (ref 0–0.2)
RBC # BLD AUTO: 4.23 10E12/L (ref 3.8–5.2)
RBC #/AREA URNS AUTO: NORMAL /HPF
SOURCE: NORMAL
SP GR UR STRIP: <=1.005 (ref 1–1.03)
UROBILINOGEN UR STRIP-ACNC: 0.2 EU/DL (ref 0.2–1)
WBC # BLD AUTO: 3.6 10E9/L (ref 4–11)
WBC #/AREA URNS AUTO: NORMAL /HPF

## 2018-04-11 PROCEDURE — 86225 DNA ANTIBODY NATIVE: CPT | Performed by: INTERNAL MEDICINE

## 2018-04-11 PROCEDURE — 85652 RBC SED RATE AUTOMATED: CPT | Performed by: INTERNAL MEDICINE

## 2018-04-11 PROCEDURE — 81001 URINALYSIS AUTO W/SCOPE: CPT | Performed by: INTERNAL MEDICINE

## 2018-04-11 PROCEDURE — 82565 ASSAY OF CREATININE: CPT | Performed by: INTERNAL MEDICINE

## 2018-04-11 PROCEDURE — 85025 COMPLETE CBC W/AUTO DIFF WBC: CPT | Performed by: INTERNAL MEDICINE

## 2018-04-11 PROCEDURE — 82040 ASSAY OF SERUM ALBUMIN: CPT | Performed by: INTERNAL MEDICINE

## 2018-04-11 PROCEDURE — 84156 ASSAY OF PROTEIN URINE: CPT | Performed by: INTERNAL MEDICINE

## 2018-04-11 PROCEDURE — 36415 COLL VENOUS BLD VENIPUNCTURE: CPT | Performed by: INTERNAL MEDICINE

## 2018-04-11 PROCEDURE — 86140 C-REACTIVE PROTEIN: CPT | Performed by: INTERNAL MEDICINE

## 2018-04-11 PROCEDURE — 86160 COMPLEMENT ANTIGEN: CPT | Performed by: INTERNAL MEDICINE

## 2018-04-11 PROCEDURE — 84450 TRANSFERASE (AST) (SGOT): CPT | Performed by: INTERNAL MEDICINE

## 2018-04-11 PROCEDURE — 84460 ALANINE AMINO (ALT) (SGPT): CPT | Performed by: INTERNAL MEDICINE

## 2018-04-11 RX ORDER — AZATHIOPRINE 50 MG/1
150 TABLET ORAL 2 TIMES DAILY
Qty: 90 TABLET | Refills: 2 | Status: SHIPPED | OUTPATIENT
Start: 2018-04-11 | End: 2018-05-18

## 2018-04-11 NOTE — LETTER
Patient:  Taina Singh  :   1965  MRN:     [id number]        Ms.Kelly Singh  [address] [place] [address]        2018    Dear ,    We are writing to inform you of your test results. Labs are suggestive of active lupus. Will switch imuran to cellcept if no improvement after being on higher dose of imuran for a month.      Resulted Orders   Complement C3   Result Value Ref Range    Complement C3 48 (L) 76 - 169 mg/dL   Complement C4   Result Value Ref Range    Complement C4 <2 (L) 15 - 50 mg/dL   Creatinine   Result Value Ref Range    Creatinine 0.74 0.52 - 1.04 mg/dL    GFR Estimate 82 >60 mL/min/1.7m2      Comment:      Non  GFR Calc    GFR Estimate If Black >90 >60 mL/min/1.7m2      Comment:       GFR Calc   CRP inflammation   Result Value Ref Range    CRP Inflammation 23.6 (H) 0.0 - 8.0 mg/L   Erythrocyte sedimentation rate auto   Result Value Ref Range    Sed Rate 69 (H) 0 - 30 mm/h   DNA double stranded antibodies   Result Value Ref Range    DNA-ds >379 (H) <10 IU/mL      Comment:      Positive   AST   Result Value Ref Range    AST 26 0 - 45 U/L   ALT   Result Value Ref Range    ALT 26 0 - 50 U/L   Albumin level   Result Value Ref Range    Albumin 3.4 3.4 - 5.0 g/dL   CBC with platelets differential   Result Value Ref Range    WBC 3.6 (L) 4.0 - 11.0 10e9/L    RBC Count 4.23 3.8 - 5.2 10e12/L    Hemoglobin 11.4 (L) 11.7 - 15.7 g/dL    Hematocrit 35.0 35.0 - 47.0 %    MCV 83 78 - 100 fl    MCH 27.0 26.5 - 33.0 pg    MCHC 32.6 31.5 - 36.5 g/dL    RDW 14.1 10.0 - 15.0 %    Platelet Count 192 150 - 450 10e9/L    Diff Method Automated Method     % Neutrophils 75.7 %    % Lymphocytes 10.1 %    % Monocytes 10.3 %    % Eosinophils 3.6 %    % Basophils 0.3 %    Absolute Neutrophil 2.7 1.6 - 8.3 10e9/L    Absolute Lymphocytes 0.4 (L) 0.8 - 5.3 10e9/L    Absolute Monocytes 0.4 0.0 - 1.3 10e9/L    Absolute Eosinophils 0.1 0.0 - 0.7 10e9/L    Absolute  Basophils 0.0 0.0 - 0.2 10e9/L   Protein  random urine with Creat Ratio   Result Value Ref Range    Protein Random Urine <0.05 g/L    Protein Total Urine g/gr Creatinine Unable to calculate due to low value 0 - 0.2 g/g Cr   Creatinine random urine   Result Value Ref Range    Creatinine Urine Random 26 mg/dL   UA with Microscopic   Result Value Ref Range    Color Urine Yellow     Appearance Urine Clear     Glucose Urine Negative NEG^Negative mg/dL    Bilirubin Urine Negative NEG^Negative    Ketones Urine Negative NEG^Negative mg/dL    Specific Gravity Urine <=1.005 1.003 - 1.035    pH Urine 6.0 5.0 - 7.0 pH    Protein Albumin Urine Negative NEG^Negative mg/dL    Urobilinogen Urine 0.2 0.2 - 1.0 EU/dL    Nitrite Urine Negative NEG^Negative    Blood Urine Negative NEG^Negative    Leukocyte Esterase Urine Negative NEG^Negative    Source Midstream Urine     WBC Urine 0 - 5 OTO5^0 - 5 /HPF    RBC Urine O - 2 OTO2^O - 2 /HPF   Creatinine urine calculation only   Result Value Ref Range    Creatinine Urine 22 mg/dL     Killian Marroquin MD

## 2018-04-12 LAB
C3 SERPL-MCNC: 48 MG/DL (ref 76–169)
C4 SERPL-MCNC: <2 MG/DL (ref 15–50)

## 2018-04-15 LAB — DSDNA AB SER-ACNC: >379 IU/ML

## 2018-04-24 NOTE — PROGRESS NOTES
Labs are suggestive of active lupus. Will switch imuran to cellcept if no improvement after being on higher dose of imuran for a month.

## 2018-04-25 DIAGNOSIS — M32.9 SYSTEMIC LUPUS ERYTHEMATOSUS, UNSPECIFIED SLE TYPE, UNSPECIFIED ORGAN INVOLVEMENT STATUS (H): Primary | ICD-10-CM

## 2018-05-10 ENCOUNTER — MYC MEDICAL ADVICE (OUTPATIENT)
Dept: RHEUMATOLOGY | Facility: CLINIC | Age: 53
End: 2018-05-10

## 2018-05-10 DIAGNOSIS — M35.00 SJOGREN'S SYNDROME (H): ICD-10-CM

## 2018-05-10 DIAGNOSIS — M32.9 SYSTEMIC LUPUS ERYTHEMATOSUS, UNSPECIFIED SLE TYPE, UNSPECIFIED ORGAN INVOLVEMENT STATUS (H): Primary | ICD-10-CM

## 2018-05-11 NOTE — TELEPHONE ENCOUNTER
Urine Tests   Creatinine Random Urine   Protein Random Urine   Routine UA w/microscope Reflex to Culture     Blood   Albumin Level   ALT   AST   CBC with/Platelet Differential   Complement C3   Complement C4   Creatnine   CRP Inflammation   DNA Double Stranded Antibodies   Erythrocyte Sedimintation Rate     All of it!

## 2018-05-11 NOTE — TELEPHONE ENCOUNTER
Patient has standing order labs in, but no lab orders for additional testing requested (no complement C3 or C4, CRp, ESR, our DNA DS, urine studies)    Will ask provider if wanting these added as not on lab protocol

## 2018-05-12 DIAGNOSIS — M32.9 SYSTEMIC LUPUS ERYTHEMATOSUS, UNSPECIFIED SLE TYPE, UNSPECIFIED ORGAN INVOLVEMENT STATUS (H): ICD-10-CM

## 2018-05-12 LAB
ALBUMIN UR-MCNC: NEGATIVE MG/DL
APPEARANCE UR: CLEAR
BASOPHILS # BLD AUTO: 0 10E9/L (ref 0–0.2)
BASOPHILS NFR BLD AUTO: 0.3 %
BILIRUB UR QL STRIP: NEGATIVE
COLOR UR AUTO: YELLOW
CREAT UR-MCNC: 16 MG/DL
DIFFERENTIAL METHOD BLD: ABNORMAL
EOSINOPHIL # BLD AUTO: 0 10E9/L (ref 0–0.7)
EOSINOPHIL NFR BLD AUTO: 0.3 %
ERYTHROCYTE [DISTWIDTH] IN BLOOD BY AUTOMATED COUNT: 14.7 % (ref 10–15)
ERYTHROCYTE [SEDIMENTATION RATE] IN BLOOD BY WESTERGREN METHOD: 26 MM/H (ref 0–30)
GLUCOSE UR STRIP-MCNC: NEGATIVE MG/DL
HCT VFR BLD AUTO: 37.4 % (ref 35–47)
HGB BLD-MCNC: 12.1 G/DL (ref 11.7–15.7)
HGB UR QL STRIP: NEGATIVE
KETONES UR STRIP-MCNC: NEGATIVE MG/DL
LEUKOCYTE ESTERASE UR QL STRIP: NEGATIVE
LYMPHOCYTES # BLD AUTO: 0.3 10E9/L (ref 0.8–5.3)
LYMPHOCYTES NFR BLD AUTO: 4.8 %
MCH RBC QN AUTO: 27.4 PG (ref 26.5–33)
MCHC RBC AUTO-ENTMCNC: 32.4 G/DL (ref 31.5–36.5)
MCV RBC AUTO: 85 FL (ref 78–100)
MONOCYTES # BLD AUTO: 0.6 10E9/L (ref 0–1.3)
MONOCYTES NFR BLD AUTO: 8.4 %
NEUTROPHILS # BLD AUTO: 6.1 10E9/L (ref 1.6–8.3)
NEUTROPHILS NFR BLD AUTO: 86.2 %
NITRATE UR QL: NEGATIVE
PH UR STRIP: 5.5 PH (ref 5–7)
PLATELET # BLD AUTO: 226 10E9/L (ref 150–450)
PROT UR-MCNC: <0.05 G/L
PROT/CREAT 24H UR: NORMAL G/G CR (ref 0–0.2)
RBC # BLD AUTO: 4.42 10E12/L (ref 3.8–5.2)
SOURCE: NORMAL
SP GR UR STRIP: <=1.005 (ref 1–1.03)
UROBILINOGEN UR STRIP-ACNC: 0.2 EU/DL (ref 0.2–1)
WBC # BLD AUTO: 7.1 10E9/L (ref 4–11)

## 2018-05-12 PROCEDURE — 86160 COMPLEMENT ANTIGEN: CPT | Performed by: INTERNAL MEDICINE

## 2018-05-12 PROCEDURE — 85652 RBC SED RATE AUTOMATED: CPT | Performed by: INTERNAL MEDICINE

## 2018-05-12 PROCEDURE — 86225 DNA ANTIBODY NATIVE: CPT | Performed by: INTERNAL MEDICINE

## 2018-05-12 PROCEDURE — 84460 ALANINE AMINO (ALT) (SGPT): CPT | Performed by: INTERNAL MEDICINE

## 2018-05-12 PROCEDURE — 81003 URINALYSIS AUTO W/O SCOPE: CPT | Performed by: INTERNAL MEDICINE

## 2018-05-12 PROCEDURE — 84450 TRANSFERASE (AST) (SGOT): CPT | Performed by: INTERNAL MEDICINE

## 2018-05-12 PROCEDURE — 85025 COMPLETE CBC W/AUTO DIFF WBC: CPT | Performed by: INTERNAL MEDICINE

## 2018-05-12 PROCEDURE — 86140 C-REACTIVE PROTEIN: CPT | Performed by: INTERNAL MEDICINE

## 2018-05-12 PROCEDURE — 36415 COLL VENOUS BLD VENIPUNCTURE: CPT | Performed by: INTERNAL MEDICINE

## 2018-05-12 PROCEDURE — 82040 ASSAY OF SERUM ALBUMIN: CPT | Performed by: INTERNAL MEDICINE

## 2018-05-12 PROCEDURE — 82565 ASSAY OF CREATININE: CPT | Performed by: INTERNAL MEDICINE

## 2018-05-12 PROCEDURE — 84156 ASSAY OF PROTEIN URINE: CPT | Performed by: INTERNAL MEDICINE

## 2018-05-12 NOTE — LETTER
Patient:  Taina Singh  :   1965  MRN:     [id number]        Ms.Kelly Singh  [address] [place] [id number]        May 29, 2018    Dear ,    We are writing to inform you of your test results. As we discussed inflammation markers (ESR, CRP) are back to normal but anti-DNA and complements remained abnormal. We switched imuran to cellcept to have a better control of your lupus.      Results for orders placed or performed in visit on 18   Albumin level   Result Value Ref Range    Albumin 3.6 3.4 - 5.0 g/dL   ALT   Result Value Ref Range    ALT 28 0 - 50 U/L   AST   Result Value Ref Range    AST 20 0 - 45 U/L   CBC with platelets differential   Result Value Ref Range    WBC 7.1 4.0 - 11.0 10e9/L    RBC Count 4.42 3.8 - 5.2 10e12/L    Hemoglobin 12.1 11.7 - 15.7 g/dL    Hematocrit 37.4 35.0 - 47.0 %    MCV 85 78 - 100 fl    MCH 27.4 26.5 - 33.0 pg    MCHC 32.4 31.5 - 36.5 g/dL    RDW 14.7 10.0 - 15.0 %    Platelet Count 226 150 - 450 10e9/L    Diff Method Automated Method     % Neutrophils 86.2 %    % Lymphocytes 4.8 %    % Monocytes 8.4 %    % Eosinophils 0.3 %    % Basophils 0.3 %    Absolute Neutrophil 6.1 1.6 - 8.3 10e9/L    Absolute Lymphocytes 0.3 (L) 0.8 - 5.3 10e9/L    Absolute Monocytes 0.6 0.0 - 1.3 10e9/L    Absolute Eosinophils 0.0 0.0 - 0.7 10e9/L    Absolute Basophils 0.0 0.0 - 0.2 10e9/L   Creatinine   Result Value Ref Range    Creatinine 0.63 0.52 - 1.04 mg/dL    GFR Estimate >90 >60 mL/min/1.7m2    GFR Estimate If Black >90 >60 mL/min/1.7m2   Protein  random urine with Creat Ratio   Result Value Ref Range    Protein Random Urine <0.05 g/L    Protein Total Urine g/gr Creatinine Unable to calculate due to low value 0 - 0.2 g/g Cr   C3   Result Value Ref Range    Complement C3 46 (L) 76 - 169 mg/dL   C4   Result Value Ref Range    Complement C4 3 (L) 15 - 50 mg/dL   CRP inflammation   Result Value Ref Range    CRP Inflammation <2.9 0.0 - 8.0 mg/L   ESR   Result Value Ref Range     Sed Rate 26 0 - 30 mm/h   DNA double stranded antibodies   Result Value Ref Range    DNA-ds >379 (H) <10 IU/mL   *UA reflex to Microscopic and Culture ([place] [facility] (except [place] and [place])   Result Value Ref Range    Color Urine Yellow     Appearance Urine Clear     Glucose Urine Negative NEG^Negative mg/dL    Bilirubin Urine Negative NEG^Negative    Ketones Urine Negative NEG^Negative mg/dL    Specific Gravity Urine <=1.005 1.003 - 1.035    Blood Urine Negative NEG^Negative    pH Urine 5.5 5.0 - 7.0 pH    Protein Albumin Urine Negative NEG^Negative mg/dL    Urobilinogen Urine 0.2 0.2 - 1.0 EU/dL    Nitrite Urine Negative NEG^Negative    Leukocyte Esterase Urine Negative NEG^Negative    Source Midstream Urine    Creatinine urine calculation only   Result Value Ref Range    Creatinine Urine 16 mg/dL       Killian Marroquin MD

## 2018-05-14 LAB
ALBUMIN SERPL-MCNC: 3.6 G/DL (ref 3.4–5)
ALT SERPL W P-5'-P-CCNC: 28 U/L (ref 0–50)
AST SERPL W P-5'-P-CCNC: 20 U/L (ref 0–45)
C3 SERPL-MCNC: 46 MG/DL (ref 76–169)
C4 SERPL-MCNC: 3 MG/DL (ref 15–50)
CREAT SERPL-MCNC: 0.63 MG/DL (ref 0.52–1.04)
CRP SERPL-MCNC: <2.9 MG/L (ref 0–8)
GFR SERPL CREATININE-BSD FRML MDRD: >90 ML/MIN/1.7M2

## 2018-05-15 LAB — DSDNA AB SER-ACNC: >379 IU/ML

## 2018-05-17 ENCOUNTER — MYC MEDICAL ADVICE (OUTPATIENT)
Dept: RHEUMATOLOGY | Facility: CLINIC | Age: 53
End: 2018-05-17

## 2018-05-17 DIAGNOSIS — L93.0 LUPUS ERYTHEMATOSUS, UNSPECIFIED FORM: Primary | ICD-10-CM

## 2018-05-18 NOTE — TELEPHONE ENCOUNTER
Killian Marroquin MD Beard, Madeline, RN        Caller: Unspecified (Yesterday, 12:38 PM)       Phone Number: [phone]                     She probably  assumed I made the appointment when I said I would see her as add on but I do not make appointments. I have a cancellation on Fri 2:30 pm, could we offer it to her? AZA is not working, I recommend it to be switched to cellcept 500 mg bid with repeat labs in 1 month,recommend to continue prednisone at 15 mg a day.       Pt will take appt on Friday May 25th.  Have rescheduled PFTs for that day.

## 2018-05-19 RX ORDER — MYCOPHENOLATE MOFETIL 500 MG/1
500 TABLET ORAL 2 TIMES DAILY
Qty: 60 TABLET | Refills: 2 | Status: SHIPPED | OUTPATIENT
Start: 2018-05-19 | End: 2018-06-13

## 2018-05-25 ENCOUNTER — OFFICE VISIT (OUTPATIENT)
Dept: RHEUMATOLOGY | Facility: CLINIC | Age: 53
End: 2018-05-25
Attending: INTERNAL MEDICINE
Payer: COMMERCIAL

## 2018-05-25 VITALS
HEART RATE: 76 BPM | TEMPERATURE: 97.9 F | DIASTOLIC BLOOD PRESSURE: 77 MMHG | OXYGEN SATURATION: 96 % | BODY MASS INDEX: 43.94 KG/M2 | HEIGHT: 66 IN | SYSTOLIC BLOOD PRESSURE: 131 MMHG | WEIGHT: 273.4 LBS

## 2018-05-25 DIAGNOSIS — M35.00 SICCA SYNDROME (H): Primary | ICD-10-CM

## 2018-05-25 DIAGNOSIS — M35.00 SJOGREN'S SYNDROME (H): ICD-10-CM

## 2018-05-25 DIAGNOSIS — M32.9 SYSTEMIC LUPUS ERYTHEMATOSUS, UNSPECIFIED SLE TYPE, UNSPECIFIED ORGAN INVOLVEMENT STATUS (H): Primary | ICD-10-CM

## 2018-05-25 DIAGNOSIS — M32.9 SYSTEMIC LUPUS ERYTHEMATOSUS, UNSPECIFIED SLE TYPE, UNSPECIFIED ORGAN INVOLVEMENT STATUS (H): ICD-10-CM

## 2018-05-25 LAB
6 MIN WALK (FT): 1215 FT
6 MIN WALK (M): 370 M

## 2018-05-25 PROCEDURE — G0463 HOSPITAL OUTPT CLINIC VISIT: HCPCS | Mod: 25,ZF

## 2018-05-25 PROCEDURE — 25000128 H RX IP 250 OP 636: Mod: ZF | Performed by: INTERNAL MEDICINE

## 2018-05-25 PROCEDURE — 96372 THER/PROPH/DIAG INJ SC/IM: CPT | Mod: ZF

## 2018-05-25 RX ORDER — METHYLPREDNISOLONE ACETATE 80 MG/ML
80 INJECTION, SUSPENSION INTRA-ARTICULAR; INTRALESIONAL; INTRAMUSCULAR; SOFT TISSUE ONCE
Status: COMPLETED | OUTPATIENT
Start: 2018-05-25 | End: 2018-05-25

## 2018-05-25 RX ADMIN — METHYLPREDNISOLONE ACETATE 80 MG: 80 INJECTION, SUSPENSION INTRA-ARTICULAR; INTRALESIONAL; INTRAMUSCULAR; SOFT TISSUE at 15:32

## 2018-05-25 ASSESSMENT — PAIN SCALES - GENERAL: PAINLEVEL: MODERATE PAIN (5)

## 2018-05-25 NOTE — MR AVS SNAPSHOT
After Visit Summary   5/25/2018    Taina Singh    MRN: [id number]           Patient Information     Date Of Birth          1965        Visit Information        Provider Department      5/25/2018 2:30 PM Killian Marroquin MD [facility] Rheumatology        Today's Diagnoses     Systemic lupus erythematosus, unspecified SLE type, unspecified organ involvement status (H)    -  1      Care Instructions    Labs in 2 weeks from now, and if no toxicity will increase cellcept to 2 tab twice a day and start tapering prednisone down 12.5-10-7.5-5-2.5 mg a day each for one week then stop    July 11 at noon add on and will do second dose of shingrix then    Depomedrol 80 mg IM today              Follow-ups after your visit        Your next 10 appointments already scheduled     May 25, 2018  4:00 PM CDT   Six Minute Walk with UC PFL 6 MINUTE WALK 1   [facility] Pulmonary Function Testing ([facility])    [address]  [address] [facility] [address]   [phone]            May 25, 2018  4:30 PM CDT   FULL PULMONARY FUNCTION with UC PFL D   [facility] Pulmonary Function Testing ([facility])    [address]  [address] [facility] [address]   [phone]            Jul 11, 2018 12:00 PM CDT   (Arrive by 11:45 AM)   RETURN LUPUS with Killian Marroquin MD   [facility] Rheumatology ([facility])    [address] [place] [address]  [address] [place] [address]   [phone]              Future tests that were ordered for you today     Open Future Orders        Priority Expected Expires Ordered    CBC with platelets differential Routine  6/8/2018 5/25/2018    Complement C3 Routine  6/8/2018 5/25/2018    Complement C4 Routine  6/8/2018 5/25/2018    Creatinine Routine  6/8/2018 5/25/2018    CRP inflammation Routine  6/8/2018 5/25/2018    Erythrocyte sedimentation rate auto Routine  6/8/2018 5/25/2018    DNA double stranded  "antibodies Routine  6/8/2018 5/25/2018    Routine UA with micro reflex to culture Routine  6/8/2018 5/25/2018    Protein  random urine with Creat Ratio Routine  6/8/2018 5/25/2018    Creatinine random urine Routine  6/8/2018 5/25/2018    Albumin level Routine  6/8/2018 5/25/2018    ALT Routine  6/8/2018 5/25/2018    AST Routine  6/8/2018 5/25/2018            Who to contact     If you have questions or need follow up information about today's clinic visit or your schedule please contact [facility] RHEUMATOLOGY directly at [phone].  Normal or non-critical lab and imaging results will be communicated to you by [employer], letter or phone within 4 business days after the clinic has received the results. If you do not hear from us within 7 days, please contact the clinic through [facility] or phone. If you have a critical or abnormal lab result, we will notify you by phone as soon as possible.  Submit refill requests through [facility] or call your pharmacy and they will forward the refill request to us. Please allow 3 business days for your refill to be completed.          Additional Information About Your Visit        Fieldbook Information     [facility] gives you secure access to your electronic health record. If you see a primary care provider, you can also send messages to your care team and make appointments. If you have questions, please call your primary care clinic.  If you do not have a primary care provider, please call [phone] and they will assist you.        Care EveryWhere ID     This is your Care EveryWhere ID. This could be used by other organizations to access your [facility] medical records  [id number]        Your Vitals Were     Pulse Temperature Height Pulse Oximetry BMI (Body Mass Index)       76 97.9  F (36.6  C) (Oral) 1.676 m (5' 5.98\") 96% 44.15 kg/m2        Blood Pressure from Last 3 Encounters:   05/25/18 131/77   04/04/18 121/77   03/14/18 128/62    Weight from Last 3 Encounters:   05/25/18 124 kg " (273 lb 6.4 oz)   04/04/18 123.7 kg (272 lb 12.8 oz)   03/14/18 123.2 kg (271 lb 11.2 oz)               Primary Care Provider Fax #    Physician No Ref-Primary [phone]       No address on file        Equal Access to Services     ELIO SAMUEL : Errol teixeira eriktatiana Somadhuali, waaxda luqadaha, qaybta kaalmada adeegyada, waxmiguel angel deng elielfahad paris joslynbenny mathew. So [facility] [phone].    ATENCIÓN: Si habla español, tiene a vyas disposición servicios gratuitos de asistencia lingüística. Primitivoame al [phone].    We comply with applicable federal civil rights laws and [place] laws. We do not discriminate on the basis of race, color, national origin, age, disability, sex, sexual orientation, or gender identity.            Thank you!     Thank you for choosing [facility] RHEUMATOLOGY  for your care. Our goal is always to provide you with excellent care. Hearing back from our patients is one way we can continue to improve our services. Please take a few minutes to complete the written survey that you may receive in the mail after your visit with us. Thank you!             Your Updated Medication List - Protect others around you: Learn how to safely use, store and throw away your medicines at www.disposemymeds.org.          This list is accurate as of 5/25/18  3:34 PM.  Always use your most recent med list.                   Brand Name Dispense Instructions for use Diagnosis    Calcium-Magnesium 300-300 MG Tabs      daily        D 1000 1000 units Caps      Take 1,000 Units by mouth daily        hydroxychloroquine 200 MG tablet    PLAQUENIL     Take 200 mg by mouth 2 times daily        mycophenolate 500 MG tablet    GENERIC EQUIVALENT    60 tablet    Take 1 tablet (500 mg) by mouth 2 times daily    Lupus erythematosus, unspecified form       Omega-3 Fish Oil 500 MG Caps      Take 500 mg by mouth daily        pilocarpine 5 MG tablet    SALAGEN    120 tablet    Take 1 tablet (5 mg) by mouth 4 times daily as needed    Systemic  lupus erythematosus, unspecified SLE type, unspecified organ involvement status (H)       traMADol 50 MG tablet    ULTRAM    30 tablet    Take 1 tablet (50 mg) by mouth every 12 hours as needed for pain Take 1 tablet as needed for pain.  No driving or alcohol use while taking medication.    Systemic lupus erythematosus, unspecified SLE type, unspecified organ involvement status (H)       WOMENS MULTI PO      Take by mouth daily

## 2018-05-25 NOTE — PATIENT INSTRUCTIONS
Labs in 2 weeks from now, and if no toxicity will increase cellcept to 2 tab twice a day and start tapering prednisone down 12.5-10-7.5-5-2.5 mg a day each for one week then stop    July 11 at noon add on and will do second dose of shingrix then    Depomedrol 80 mg IM today

## 2018-05-25 NOTE — LETTER
5/25/2018      RE: Taina Singh  [address] [place] [address]       Rheumatology F/U Note    Reason for visit: SLE, ongoing pleuritic CP    Date of last visit: 4/4/2018    DOS: 5/25/2018      HPI:    Taina Singh is a 52 year old  female who was referred to our clinic for evaluation and management of her SLE.      She was diagnosed with lupus at age 27. Her 1st sx were malar rash and fatigue. No skin biopsy was done (reportedly had +KARLIE). She was treated with prednisone taper and HCQ. HCQ helped and she tolerated it well. She was diagnosed with Sjogren's at the same time. Had dryness of eyes and mouth. No lip biopsy to confirm the Dx.      Reportedly she had very mild stable lupus for many years and eventually at some point, stopped taking HCQ (can't remember when)    She was diagnosed with MALT lymphoma at 44, had enlarged LN over R parotid gland, on biopsy it was MALT lymphoma. Tx was resection only, no radiation or chemo.    About 3 yr ago, had flu shot and 2 days later, developed pleuritic CP and was seen at urgent care. That's why she does not want to get flu shot. She was seen in ER 2 days after UC visit. She was treated with prednisone.      She lost 100 lbs by exercise over past 2 yr, felt amazing. In 7/2017, started not feeling well. She had extreme fatigue. Had AM stiffness and pain over hands. Touched base with her previous rheumatologist (Dr. Rangel) and was told to have lupus flare and was put on  mg qd. Afterwards, developed pleuritic CP which has not been resolved.    She was given prednisone 40 mg qd x 5 days (on 12/16/2017) by pulmonologist in [facility]. It helped a lot but pleuritic CP recurred after stopping it.    She is seeking 2nd opinion for m/o her lupus.    She was told to have pulm HTN and was evaluated for it.    No triggers for current flare.    No flare of malar rash. Has fatigue. No current hair loss. Uses blink eyedrops, restasis burned her eyes. She has been  prescribed salagen for dry mouth, has not used it. Arthritis of hands have resolved on HCQ.    Last HCQ eye exam was 2 mo ago.    2018: On AZA 50 mg qd since 2018, increased to 100 mg qd in 3/19/2018. Her arthralgia is intermittent and mild, it's better. Has fatigue.    Her major compalint is continues pressure over her chest. Pain is pleuritic, it hurts by sneezing, taking deep breath.      Today: AZA was maxed at 150 mg qd, despite this, she continues to have active lupus with pleuritic CP. AZA was switched to MMF 1 gr qd on . Tolertaes it well. Still requires 20 mg of prednisone and ibuprofen prn to control the pleuritic CP. No fatigue, joint pain.    ROS:  A comprehensive ROS was done, positives are per HPI.    Past Medical History:   Diagnosis Date     Diastolic dysfunction 2018     Gluten intolerance     Patient is not gluten intolerant     Iron deficiency      Iron deficiency 2018     Lupus      MALT lymphoma (H) age 44     MALT lymphoma (H) 2018     Other forms of systemic lupus erythematosus (H) 2018     Sicca syndrome (H) 2018     Sjogren's syndrome (H)      Sjogren's syndrome (H) 2018     Systemic lupus erythematosus (H) 2018     Vitamin D deficiency      Past Surgical History:   Procedure Laterality Date     BIOPSY/REMOVAL, LYMPH NODE(S)        SECTION  age 32     HC HYSTEROS W PERMANENT FALLOPAIN IMPLANT       PAROTIDECTOMY       TONSILLECTOMY       Family History   Problem Relation Age of Onset     Alopecia Brother      Rheumatoid Arthritis Brother      Social History     Social History     Marital status:      Spouse name: N/A     Number of children: N/A     Years of education: 1     Occupational History           , 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     Social History Main Topics     Smoking status: Never Smoker     Smokeless tobacco: Never Used     Alcohol use No     Drug use: No     Sexual activity: Not on file     Other Topics Concern     Not on  file     Social History Narrative     Going through divorce but it's not stress factor for her.    Allergies   Allergen Reactions     Penicillins Rash     Sulfa Drugs Rash       Outpatient Encounter Prescriptions as of 2018   Medication Sig Dispense Refill     Calcium-Magnesium 300-300 MG TABS daily        Cholecalciferol (D 1000) 1000 UNITS CAPS Take 1,000 Units by mouth daily        hydroxychloroquine (PLAQUENIL) 200 MG tablet Take 200 mg by mouth 2 times daily        Multiple Vitamins-Minerals (WOMENS MULTI PO) Take by mouth daily        mycophenolate (GENERIC EQUIVALENT) 500 MG tablet Take 1 tablet (500 mg) by mouth 2 times daily 60 tablet 2     Omega-3 Fatty Acids (OMEGA-3 FISH OIL) 500 MG CAPS Take 500 mg by mouth daily        pilocarpine (SALAGEN) 5 MG tablet Take 1 tablet (5 mg) by mouth 4 times daily as needed 120 tablet 11     traMADol (ULTRAM) 50 MG tablet Take 1 tablet (50 mg) by mouth every 12 hours as needed for pain Take 1 tablet as needed for pain.  No driving or alcohol use while taking medication. 30 tablet 1     [] methylPREDNISolone acetate (DEPO-MEDROL) injection 80 mg        No facility-administered encounter medications on file as of 2018.          Her records were reviewed.    2D-Echo 2017:    ECHO COMPLETE WO CONTRAST EDWIGE, KELLY 2017 14:43     [facility] [address], [address], [place] [address]   Main: [phone]  [email or web address]                                                 Transthoracic Echo Report   EDWIGE, KELLY   Excellian ID: [id number] Age: 52 : 1965 Ordering Provider: NGUYEN PETERS   Exam Date: 2017 14:43 Gender: F Sonographer: BDB   Accession #: [id number] Height: 66 in BSA: 2.24 m  BP: 108 / 62   Weight: 261 lbs BMI: 42.1 kg/m          Site: [facility]   Location: Outpatient Rhythm: Normal Sinus Rhythm   Procedure Components: 2D imaging, Color Doppler,  "Spectral Doppler   Indications: Murmur; Systemic lupus erythematosus, unspecified SLE type, unspecified organ   involvement status   Technical Quality: Contrast: None     Final Conclusion   Visually estimated ejection fraction is 55-60%.   Mild left ventricular hypertrophy.   Estimated pulmonary artery pressure of 31 mmHg + RA pressure.   Dilated IVC size, <50% collapse with respiration.   Estimated EF: 55-60%   FINDINGS   Left Ventricle Normal left ventricular size. Visually estimated ejection fraction is 55-60%.   Mild left ventricular hypertrophy.   Diastolic Function \"Pseudonormal\" left ventricular filling pattern. E/e' ratio 8-15 is   indeterminate for filling pressure.   Right Ventricle Normal right ventricular size and function.   Left Atrium Mild left atrial enlargement.   Right Atrium Mild right atrial enlargement.   Aortic Valve Normal aortic valve. No aortic stenosis. No significant aortic regurgitation.   Mitral Valve Normal mitral valve. No mitral stenosis. Mild mitral regurgitation.   Tricuspid Valve Normal tricuspid valve. No tricuspid stenosis. Mild tricuspid regurgitation.   Estimated pulmonary artery pressure   of 31 mmHg + RA pressure.   Pulmonic Valve Normal pulmonic valve without significant stenosis or regurgitation.   Pericardium Normal pericardium.   Aorta Measured aortic root diameter is normal in size.   Inferior Vena Cava Dilated IVC size, <50% collapse with respiration.    Component      Latest Ref Rng & Units 11/20/2017   Sodium      133 - 144 mmol/L 137   Potassium      3.4 - 5.3 mmol/L 4.2   Chloride      94 - 109 mmol/L 102   Carbon Dioxide      20 - 32 mmol/L 30   Anion Gap      3 - 14 mmol/L 6   Glucose      70 - 99 mg/dL 101 (H)   Urea Nitrogen      7 - 30 mg/dL 13   Creatinine      0.52 - 1.04 mg/dL 0.55   GFR Estimate      >60 mL/min/1.7m2 >90   GFR Estimate If Black      >60 mL/min/1.7m2 >90   Calcium      8.5 - 10.1 mg/dL 9.1   WBC      4.0 - 11.0 10e9/L 4.9   RBC Count      " 3.8 - 5.2 10e12/L 4.28   Hemoglobin      11.7 - 15.7 g/dL 11.3 (L)   Hematocrit      35.0 - 47.0 % 35.5   MCV      78 - 100 fl 83   MCH      26.5 - 33.0 pg 26.4 (L)   MCHC      31.5 - 36.5 g/dL 31.8   RDW      10.0 - 15.0 % 14.6   Platelet Count      150 - 450 10e9/L 215   N-Terminal Pro Bnp      0 - 125 pg/mL 546 (H)   Sed Rate      0 - 30 mm/h 71 (H)     Component      Latest Ref Rng & Units 12/29/2017   Color Urine       Straw   Appearance Urine       Clear   Glucose Urine      NEG:Negative mg/dL Negative   Bilirubin Urine      NEG:Negative Negative   Ketones Urine      NEG:Negative mg/dL Negative   Specific Gravity Urine      1.003 - 1.035 1.005   Blood Urine      NEG:Negative Negative   pH Urine      5.0 - 7.0 pH 6.0   Protein Albumin Urine      NEG:Negative mg/dL Negative   Urobilinogen mg/dL      0.0 - 2.0 mg/dL 0.0   Nitrite Urine      NEG:Negative Negative   Leukocyte Esterase Urine      NEG:Negative Negative   Source       Midstream Urine   WBC Urine      0 - 2 /HPF <1   RBC Urine      0 - 2 /HPF <1   Bacteria Urine      NEG:Negative /HPF Few (A)   Squamous Epithelial /HPF Urine      0 - 1 /HPF <1   Mucous Urine      NEG:Negative /LPF Present (A)   Albumin Fraction      3.7 - 5.1 g/dL 4.2   Alpha 1 Fraction      0.2 - 0.4 g/dL 0.4   Alpha 2 Fraction      0.5 - 0.9 g/dL 0.8   Beta Fraction      0.6 - 1.0 g/dL 1.0   Gamma Fraction      0.7 - 1.6 g/dL 1.6   Monoclonal Peak      0.0 g/dL 0.0   ELP Interpretation:       Essentially normal electrophoretic pattern. No monoclonal protein seen. Pathologic . . .   IGG      695 - 1620 mg/dL 1560   IGA      70 - 380 mg/dL 473 (H)   IGM      60 - 265 mg/dL 92   Iron      35 - 180 ug/dL 58   Iron Binding Cap      240 - 430 ug/dL 315   Iron Saturation Index      15 - 46 % 19   TPMT Genotype Specimen       Whole Blood   TPMT Genotype       Neg/Neg   TPMT Predicted Phenotype       Normal   Protein Random Urine      g/L <0.05   Protein Total Urine g/gr Creatinine      0 -  0.2 g/g Cr Unable to calculate due to low value   Deamidated Gliadin Cari, IgA      <7 U/mL 2   Deamidated Gliadin Cari, IgG      <7 U/mL 5   Complement C3      76 - 169 mg/dL 72 (L)   Complement C4      15 - 50 mg/dL 6 (L)   CRP Inflammation      0.0 - 8.0 mg/L 3.2   DNA-ds      <10 IU/mL >379 (H)   Sed Rate      0 - 30 mm/h 49 (H)   Vitamin D Deficiency screening      20 - 75 ug/L 51   Creatinine Urine Random      mg/dL 23   AST      0 - 45 U/L 26   ALT      0 - 50 U/L 35   HEENA  Broad Spectrum       Neg   Beta 2 Glycoprotein 1 Antibody IgG      <7 U/mL <0.6   Beta 2 Glycoprotein 1 Antibody IgM      <7 U/mL <0.9   Thyroid Peroxidase Antibody      <35 IU/mL <10   Thyroglobulin Antibody      <40 IU/mL <20   TSH      0.40 - 4.00 mU/L 0.87   Cryoglobulin      NEG:Negative % Trace (A)   Tissue Transglutaminase Antibody IgA      <7 U/mL 4   Ferritin      8 - 252 ng/mL 163   Endomysial Antibody IgA by IFA      <1:10 <1:10   CRP Cardiac Risk      mg/L 2.9   Creatinine Urine      mg/dL 23     Component      Latest Ref Rng & Units 2/23/2018   Color Urine       Yellow   Appearance Urine       Clear   Glucose Urine      NEG:Negative mg/dL Negative   Bilirubin Urine      NEG:Negative Negative   Ketones Urine      NEG:Negative mg/dL Negative   Specific Gravity Urine      1.003 - 1.035 1.025   Blood Urine      NEG:Negative Negative   pH Urine      5.0 - 7.0 pH 5.5   Protein Albumin Urine      NEG:Negative mg/dL Negative   Urobilinogen Urine      0.2 - 1.0 EU/dL 0.2   Nitrite Urine      NEG:Negative Negative   Leukocyte Esterase Urine      NEG:Negative Negative   Source       Midstream Urine   Protein Random Urine      g/L 0.17   Protein Total Urine g/gr Creatinine      0 - 0.2 g/g Cr 0.18   Complement C3      76 - 169 mg/dL 64 (L)   Complement C4      15 - 50 mg/dL 5 (L)   CRP Inflammation      0.0 - 8.0 mg/L 4.2   DNA-ds      <10 IU/mL >379 (H)   Sed Rate      0 - 30 mm/h 35 (H)   AST      0 - 45 U/L 27   ALT      0 - 50 U/L 31    Creatinine Urine Random      mg/dL 99     Component      Latest Ref Rng & Units 3/16/2018   WBC      4.0 - 11.0 10e9/L 5.6   RBC Count      3.8 - 5.2 10e12/L 4.43   Hemoglobin      11.7 - 15.7 g/dL 12.1   Hematocrit      35.0 - 47.0 % 36.9   MCV      78 - 100 fl 83   MCH      26.5 - 33.0 pg 27.3   MCHC      31.5 - 36.5 g/dL 32.8   RDW      10.0 - 15.0 % 14.5   Platelet Count      150 - 450 10e9/L 224   Diff Method       Automated Method   % Neutrophils      % 81.7   % Lymphocytes      % 9.3   % Monocytes      % 7.5   % Eosinophils      % 1.3   % Basophils      % 0.2   Absolute Neutrophil      1.6 - 8.3 10e9/L 4.6   Absolute Lymphocytes      0.8 - 5.3 10e9/L 0.5 (L)   Absolute Monocytes      0.0 - 1.3 10e9/L 0.4   Absolute Eosinophils      0.0 - 0.7 10e9/L 0.1   Absolute Basophils      0.0 - 0.2 10e9/L 0.0   Creatinine      0.52 - 1.04 mg/dL 0.51 (L)   GFR Estimate      >60 mL/min/1.7m2 >90   GFR Estimate If Black      >60 mL/min/1.7m2 >90   ALT      0 - 50 U/L 27   Albumin      3.4 - 5.0 g/dL 3.5   AST      0 - 45 U/L 18       Component      Latest Ref Rng & Units 3/21/2018   Color Urine       Yellow   Appearance Urine       Clear   Glucose Urine      NEG:Negative mg/dL Negative   Bilirubin Urine      NEG:Negative Negative   Ketones Urine      NEG:Negative mg/dL Negative   Specific Gravity Urine      1.003 - 1.035 <=1.005   pH Urine      5.0 - 7.0 pH 6.5   Protein Albumin Urine      NEG:Negative mg/dL Negative   Urobilinogen Urine      0.2 - 1.0 EU/dL 0.2   Nitrite Urine      NEG:Negative Negative   Blood Urine      NEG:Negative Negative   Leukocyte Esterase Urine      NEG:Negative Negative   Source       Midstream Urine   WBC Urine      OTO5:0 - 5 /HPF 0 - 5   RBC Urine      OTO2:O - 2 /HPF O - 2   Squamous Epithelial /LPF Urine      FEW:Few /LPF Few   Protein Random Urine      g/L <0.05   Protein Total Urine g/gr Creatinine      0 - 0.2 g/g Cr Unable to calculate due to low value   DNA-ds      <10 IU/mL >379 (H)    Complement C4      15 - 50 mg/dL 4 (L)   Complement C3      76 - 169 mg/dL 69 (L)   Creatinine Urine Random      mg/dL 17   Creatinine Urine      mg/dL 17     EXAMINATION: CT CHEST W/O CONTRAST, 12/29/2017 1:08 PM     TECHNIQUE:  Helical CT images from the thoracic inlet through the lung  bases were obtained without IV contrast during inspiration and  expiration according to high-resolution chest CT protocol. Contrast  dose: None     COMPARISON: None     HISTORY: eval for ILD, pleural effusion, pt has lupus, Sjogren's, h/o  MALT and pleurisy and SOB; Systemic lupus erythematosus, unspecified  SLE type, unspecified organ involvement status (H)     FINDINGS:     At least 75% collapse of the trachea and mainstem bronchi on the end  expiratory views. Diffuse lobular air trapping on end expiratory  images. Bibasilar platelike atelectasis. No pneumothorax or pleural  effusion. Scattered solid pulmonary nodules, for example a 4 mm  lingular nodule (series 5 image 145) and a 4 mm right upper lobe  nodule (image 73).      The heart size is normal. Mild three-vessel coronary artery calcific  atherosclerosis. Dilation of the main pulmonary artery to 4.1 cm. No  pericardial effusion. Normal caliber and configuration of the thoracic  great vessels. Numerous prominent though nonenlarged mediastinal lymph  nodes.     Limited views of the abdomen reveal no worrisome pathology. No  worrisome bony or soft tissue lesions.         IMPRESSION:   1. At least moderate tracheobronchomalacia.  2. 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  3. Scattered subcentimeter pulmonary nodules measuring up to 4 mm.  Consider follow-up chest CT in one year to establish stability.     [Consider Follow Up: Lung nodule]     This report will be copied to the [facility] to ensure a  provider acknowledges the finding.      I  have personally reviewed the examination and initial interpretation  and I agree with the findings.     AUSTIN PACE MD    Examination:NM LUNG SCAN VENTILATION AND PERFUSION, 3/14/2018 8:24 AM      Indication:  Chronic PE; Pulmonary hypertension      Additional Information: none     Technique:     The patient received 2 mCi of Tc-99m DTPA aerosol for ventilation and  7 mCi of Tc-99m MAA for perfusion. Multiple images were obtained of  the lungs in Anterior, posterior, HERNANDES, RPO, LPO, and  ELYSE projections.     Comparison: Chest x-ray same-day     Findings:     There are matched ventilation/perfusion defects in both lungs. No  mismatched perfusion defect to suggest pulmonary emboli.          Impression:  Pulmonary emboli is not present.     I have personally reviewed the examination and initial interpretation  and I agree with the findings.     DONNIE GARCIA MD    Component      Latest Ref Rng & Units 5/12/2018   WBC      4.0 - 11.0 10e9/L 7.1   RBC Count      3.8 - 5.2 10e12/L 4.42   Hemoglobin      11.7 - 15.7 g/dL 12.1   Hematocrit      35.0 - 47.0 % 37.4   MCV      78 - 100 fl 85   MCH      26.5 - 33.0 pg 27.4   MCHC      31.5 - 36.5 g/dL 32.4   RDW      10.0 - 15.0 % 14.7   Platelet Count      150 - 450 10e9/L 226   Diff Method       Automated Method   % Neutrophils      % 86.2   % Lymphocytes      % 4.8   % Monocytes      % 8.4   % Eosinophils      % 0.3   % Basophils      % 0.3   Absolute Neutrophil      1.6 - 8.3 10e9/L 6.1   Absolute Lymphocytes      0.8 - 5.3 10e9/L 0.3 (L)   Absolute Monocytes      0.0 - 1.3 10e9/L 0.6   Absolute Eosinophils      0.0 - 0.7 10e9/L 0.0   Absolute Basophils      0.0 - 0.2 10e9/L 0.0   Color Urine       Yellow   Appearance Urine       Clear   Glucose Urine      NEG:Negative mg/dL Negative   Bilirubin Urine      NEG:Negative Negative   Ketones Urine      NEG:Negative mg/dL Negative   Specific Gravity Urine      1.003 - 1.035 <=1.005   Blood Urine      NEG:Negative  "Negative   pH Urine      5.0 - 7.0 pH 5.5   Protein Albumin Urine      NEG:Negative mg/dL Negative   Urobilinogen Urine      0.2 - 1.0 EU/dL 0.2   Nitrite Urine      NEG:Negative Negative   Leukocyte Esterase Urine      NEG:Negative Negative   Source       Midstream Urine   Creatinine      0.52 - 1.04 mg/dL 0.63   GFR Estimate      >60 mL/min/1.7m2 >90   GFR Estimate If Black      >60 mL/min/1.7m2 >90   Protein Random Urine      g/L <0.05   Protein Total Urine g/gr Creatinine      0 - 0.2 g/g Cr Unable to calculate due to low value   Albumin      3.4 - 5.0 g/dL 3.6   ALT      0 - 50 U/L 28   AST      0 - 45 U/L 20   Complement C3      76 - 169 mg/dL 46 (L)   Complement C4      15 - 50 mg/dL 3 (L)   CRP Inflammation      0.0 - 8.0 mg/L <2.9   Sed Rate      0 - 30 mm/h 26   DNA-ds      <10 IU/mL >379 (H)   Creatinine Urine      mg/dL 16       Ph.E:    /77  Pulse 76  Temp 97.9  F (36.6  C) (Oral)  Ht 1.676 m (5' 5.98\")  Wt 124 kg (273 lb 6.4 oz)  SpO2 96%  BMI 44.15 kg/m2      Constitutional: WD/WN. Pleasant. In no acute distress.  Eyes: EOM intact, PERRLA, sclera anicteric, conj not injected  HEENT: No oral ulcers or thrush. Normal salivary pool.  Neck: No cervical LAP or thyromegaly  Chest: Clear to auscultation bilaterally  CV: RRR, no murmurs/ rubs or gallops. No edema, clubbing or cyanosis.   GI: Abdomen is soft and non tender.   MS: No synovitis. Cool joints. No tenderness of the joints. No  joint deformities. Full ROM of the joints. No nodules. No Jaccoud's deformity.  Skin: No skin rash, malar rash, livedo, periungual erythema, alopecia, digital ulcers or nail changes.  Neuro: A&O x 3. Grossly non focal, muscular power 5/5 in all ext  Psych: NL affect and mood    Assessment/ plan:    1-SLE. 52 yo WF with +fh/o RA and personal hx of SLE presented in 12/2017 for 2nd opinion of m/o her SLE. She was diagnosed with SLE at age 27. Her lupus has been marked by +KARLIE (highest titer 1:640, speckled and " nucleolar patterns), +anti-DNA, arthritis, malar rash, serositis (pleuritic CP) supported by +SSA/SSB Ab, low C3/low C4, +RF, high ESR/CRP. She had neg anti-RNP, anti-Sm, acL IgM/G/A, LAC, cryo and anti-CCP.     Had NL C3 at the time of Dx. She was treated with prednisone and HCQ at the time of Dx. Can't remember how long she was on HCQ and why HCQ was stopped, but it probably was stopped because of stable disease, no report on HCQ toxicity. Reportedly her SLE was mild all these years.    Has secondary Sjogren's with sicca, +SSA/SSB Ab and h/o MALT lymphoma at age 44 in remission. Uses blink eyedrops and salagen. No lip biopsy was done to confirm Dx of Sjogren's.    Her lupus flared in 7/2017 with no triggers when she presented with arthritis, HCQ was resumed, arthritis resolved. Mild pulm HTN on 2D-Echo 8/2017 but neg R cardiac cath and VQ scan in 3/2018, also neg 2D-Echo.    In 12/2017, was prescribed prednisone 40 mg for only 5 days for pleuritic CP, CP recurred after stopping prednisone.     Her major complaint at initial visit with me in 12/2017 was ongoing CP. AZA was added in 2/2018 with start dose of 50 mg qd and slowly was increased to max 150 mg qd. Tolerated AZA well, no SEs, no toxicity on the labs but failed it and required to go back on prednisone 20 mg qd (current dose).     Her lupus is active with pleuritic CP. ESR/CRP normalized on prednisone+AZA+HCQ but +anti-DNA, low C3/C4 are unchanged. Chest CT in 12/2017 without contrast showed air trapping due to lupus/Sjogren's, no pleural effusion. Lung nodules need f/u in a year. No pericardail effusion on 2D-echo and neg VQ scan for PE in 3/2018 so chest CT with contrast is not needed. She is APL negative.    Lupus Dx and tx plan was discussed with her.    AZA was switched to  mg po bid on 5/18/2018 as she failed AZA.    Still has some residual pleuritic CP and get chest tightness feeling on taking a deep breath. 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    Will try depomedrol shot to see if it is more effective than oral prednisone.    Recommend:    Labs in 2 weeks from now, and if no toxicity will increase cellcept to 2 tab of 500 mg twice a day and start tapering prednisone down 12.5-10-7.5-5-2.5 mg a day each for one week then stop    Depomedrol 80 mg IM today    Will follow results of PFTs    Continue  mg bid    Continue salagen for dry mouth    2-HCQ monitoring. Was reminded to have eye exam.    3-MMF monitoring. Labs in 2 wk then q3mo    4-NSAIDs monitoring. Labs q6 mo    5-Shingrix vaccine, 1st dose 4/4/2018    PLAN: July 11 at noon add on and will do second dose of shingrix then    MEDICATION CHANGES: as above            Orders Placed This Encounter   Procedures     Albumin level     ALT     AST     CBC with platelets differential     Complement C3     Complement C4     Creatinine     CRP inflammation     Erythrocyte sedimentation rate auto     DNA double stranded antibodies     Routine UA with micro reflex to culture     Protein  random urine with Creat Ratio     Creatinine random urine           Killian Marroquin MD

## 2018-05-25 NOTE — PROGRESS NOTES
Rheumatology F/U Note    Reason for visit: SLE, ongoing pleuritic CP    Date of last visit: 4/4/2018    DOS: 5/25/2018      HPI:    Taina Singh is a 52 year old  female who was referred to our clinic for evaluation and management of her SLE.      She was diagnosed with lupus at age 27. Her 1st sx were malar rash and fatigue. No skin biopsy was done (reportedly had +KARLIE). She was treated with prednisone taper and HCQ. HCQ helped and she tolerated it well. She was diagnosed with Sjogren's at the same time. Had dryness of eyes and mouth. No lip biopsy to confirm the Dx.      Reportedly she had very mild stable lupus for many years and eventually at some point, stopped taking HCQ (can't remember when)    She was diagnosed with MALT lymphoma at 44, had enlarged LN over R parotid gland, on biopsy it was MALT lymphoma. Tx was resection only, no radiation or chemo.    About 3 yr ago, had flu shot and 2 days later, developed pleuritic CP and was seen at urgent care. That's why she does not want to get flu shot. She was seen in ER 2 days after UC visit. She was treated with prednisone.      She lost 100 lbs by exercise over past 2 yr, felt amazing. In 7/2017, started not feeling well. She had extreme fatigue. Had AM stiffness and pain over hands. Touched base with her previous rheumatologist (Dr. Rangel) and was told to have lupus flare and was put on  mg qd. Afterwards, developed pleuritic CP which has not been resolved.    She was given prednisone 40 mg qd x 5 days (on 12/16/2017) by pulmonologist in [facility]. It helped a lot but pleuritic CP recurred after stopping it.    She is seeking 2nd opinion for m/o her lupus.    She was told to have pulm HTN and was evaluated for it.    No triggers for current flare.    No flare of malar rash. Has fatigue. No current hair loss. Uses blink eyedrops, restasis burned her eyes. She has been prescribed salagen for dry mouth, has not used it. Arthritis of hands have  resolved on HCQ.    Last HCQ eye exam was 2 mo ago.    2018: On AZA 50 mg qd since 2018, increased to 100 mg qd in 3/19/2018. Her arthralgia is intermittent and mild, it's better. Has fatigue.    Her major compalint is continues pressure over her chest. Pain is pleuritic, it hurts by sneezing, taking deep breath.      Today: AZA was maxed at 150 mg qd, despite this, she continues to have active lupus with pleuritic CP. AZA was switched to MMF 1 gr qd on . Tolertaes it well. Still requires 20 mg of prednisone and ibuprofen prn to control the pleuritic CP. No fatigue, joint pain.    ROS:  A comprehensive ROS was done, positives are per HPI.    Past Medical History:   Diagnosis Date     Diastolic dysfunction 2018     Gluten intolerance     Patient is not gluten intolerant     Iron deficiency      Iron deficiency 2018     Lupus      MALT lymphoma (H) age 44     MALT lymphoma (H) 2018     Other forms of systemic lupus erythematosus (H) 2018     Sicca syndrome (H) 2018     Sjogren's syndrome (H)      Sjogren's syndrome (H) 2018     Systemic lupus erythematosus (H) 2018     Vitamin D deficiency      Past Surgical History:   Procedure Laterality Date     BIOPSY/REMOVAL, LYMPH NODE(S)        SECTION  age 32     HC HYSTEROS W PERMANENT FALLOPAIN IMPLANT       PAROTIDECTOMY       TONSILLECTOMY       Family History   Problem Relation Age of Onset     Alopecia Brother      Rheumatoid Arthritis Brother      Social History     Social History     Marital status:      Spouse name: N/A     Number of children: N/A     Years of education: 1     Occupational History           , 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     Social History Main Topics     Smoking status: Never Smoker     Smokeless tobacco: Never Used     Alcohol use No     Drug use: No     Sexual activity: Not on file     Other Topics Concern     Not on file     Social History Narrative     Going through divorce but it's not  stress factor for her.    Allergies   Allergen Reactions     Penicillins Rash     Sulfa Drugs Rash       Outpatient Encounter Prescriptions as of 2018   Medication Sig Dispense Refill     Calcium-Magnesium 300-300 MG TABS daily        Cholecalciferol (D 1000) 1000 UNITS CAPS Take 1,000 Units by mouth daily        hydroxychloroquine (PLAQUENIL) 200 MG tablet Take 200 mg by mouth 2 times daily        Multiple Vitamins-Minerals (WOMENS MULTI PO) Take by mouth daily        mycophenolate (GENERIC EQUIVALENT) 500 MG tablet Take 1 tablet (500 mg) by mouth 2 times daily 60 tablet 2     Omega-3 Fatty Acids (OMEGA-3 FISH OIL) 500 MG CAPS Take 500 mg by mouth daily        pilocarpine (SALAGEN) 5 MG tablet Take 1 tablet (5 mg) by mouth 4 times daily as needed 120 tablet 11     traMADol (ULTRAM) 50 MG tablet Take 1 tablet (50 mg) by mouth every 12 hours as needed for pain Take 1 tablet as needed for pain.  No driving or alcohol use while taking medication. 30 tablet 1     [] methylPREDNISolone acetate (DEPO-MEDROL) injection 80 mg        No facility-administered encounter medications on file as of 2018.          Her records were reviewed.    2D-Echo 2017:    ECHO COMPLETE WO CONTRAST CHILANGO GIBBONS 2017 14:43     [facility] [place] [id number], [address], [place] [address]   Main: [phone]  [email or web address]                                                 Transthoracic Echo Report   CHILANGO GIBBONS   Excellian ID: [id number] Age: 52 : 1965 Ordering Provider: NGUYEN PETERS   Exam Date: 2017 14:43 Gender: F Sonographer: HAJA   Accession #: [id number] Height: 66 in BSA: 2.24 m  BP: 108 / 62   Weight: 261 lbs BMI: 42.1 kg/m          Site: [facility]   Location: Outpatient Rhythm: Normal Sinus Rhythm   Procedure Components: 2D imaging, Color Doppler, Spectral Doppler   Indications: Murmur; Systemic lupus erythematosus,  "unspecified SLE type, unspecified organ   involvement status   Technical Quality: Contrast: None     Final Conclusion   Visually estimated ejection fraction is 55-60%.   Mild left ventricular hypertrophy.   Estimated pulmonary artery pressure of 31 mmHg + RA pressure.   Dilated IVC size, <50% collapse with respiration.   Estimated EF: 55-60%   FINDINGS   Left Ventricle Normal left ventricular size. Visually estimated ejection fraction is 55-60%.   Mild left ventricular hypertrophy.   Diastolic Function \"Pseudonormal\" left ventricular filling pattern. E/e' ratio 8-15 is   indeterminate for filling pressure.   Right Ventricle Normal right ventricular size and function.   Left Atrium Mild left atrial enlargement.   Right Atrium Mild right atrial enlargement.   Aortic Valve Normal aortic valve. No aortic stenosis. No significant aortic regurgitation.   Mitral Valve Normal mitral valve. No mitral stenosis. Mild mitral regurgitation.   Tricuspid Valve Normal tricuspid valve. No tricuspid stenosis. Mild tricuspid regurgitation.   Estimated pulmonary artery pressure   of 31 mmHg + RA pressure.   Pulmonic Valve Normal pulmonic valve without significant stenosis or regurgitation.   Pericardium Normal pericardium.   Aorta Measured aortic root diameter is normal in size.   Inferior Vena Cava Dilated IVC size, <50% collapse with respiration.    Component      Latest Ref Rng & Units 11/20/2017   Sodium      133 - 144 mmol/L 137   Potassium      3.4 - 5.3 mmol/L 4.2   Chloride      94 - 109 mmol/L 102   Carbon Dioxide      20 - 32 mmol/L 30   Anion Gap      3 - 14 mmol/L 6   Glucose      70 - 99 mg/dL 101 (H)   Urea Nitrogen      7 - 30 mg/dL 13   Creatinine      0.52 - 1.04 mg/dL 0.55   GFR Estimate      >60 mL/min/1.7m2 >90   GFR Estimate If Black      >60 mL/min/1.7m2 >90   Calcium      8.5 - 10.1 mg/dL 9.1   WBC      4.0 - 11.0 10e9/L 4.9   RBC Count      3.8 - 5.2 10e12/L 4.28   Hemoglobin      11.7 - 15.7 g/dL 11.3 (L) "   Hematocrit      35.0 - 47.0 % 35.5   MCV      78 - 100 fl 83   MCH      26.5 - 33.0 pg 26.4 (L)   MCHC      31.5 - 36.5 g/dL 31.8   RDW      10.0 - 15.0 % 14.6   Platelet Count      150 - 450 10e9/L 215   N-Terminal Pro Bnp      0 - 125 pg/mL 546 (H)   Sed Rate      0 - 30 mm/h 71 (H)     Component      Latest Ref Rng & Units 12/29/2017   Color Urine       Straw   Appearance Urine       Clear   Glucose Urine      NEG:Negative mg/dL Negative   Bilirubin Urine      NEG:Negative Negative   Ketones Urine      NEG:Negative mg/dL Negative   Specific Gravity Urine      1.003 - 1.035 1.005   Blood Urine      NEG:Negative Negative   pH Urine      5.0 - 7.0 pH 6.0   Protein Albumin Urine      NEG:Negative mg/dL Negative   Urobilinogen mg/dL      0.0 - 2.0 mg/dL 0.0   Nitrite Urine      NEG:Negative Negative   Leukocyte Esterase Urine      NEG:Negative Negative   Source       Midstream Urine   WBC Urine      0 - 2 /HPF <1   RBC Urine      0 - 2 /HPF <1   Bacteria Urine      NEG:Negative /HPF Few (A)   Squamous Epithelial /HPF Urine      0 - 1 /HPF <1   Mucous Urine      NEG:Negative /LPF Present (A)   Albumin Fraction      3.7 - 5.1 g/dL 4.2   Alpha 1 Fraction      0.2 - 0.4 g/dL 0.4   Alpha 2 Fraction      0.5 - 0.9 g/dL 0.8   Beta Fraction      0.6 - 1.0 g/dL 1.0   Gamma Fraction      0.7 - 1.6 g/dL 1.6   Monoclonal Peak      0.0 g/dL 0.0   ELP Interpretation:       Essentially normal electrophoretic pattern. No monoclonal protein seen. Pathologic . . .   IGG      695 - 1620 mg/dL 1560   IGA      70 - 380 mg/dL 473 (H)   IGM      60 - 265 mg/dL 92   Iron      35 - 180 ug/dL 58   Iron Binding Cap      240 - 430 ug/dL 315   Iron Saturation Index      15 - 46 % 19   TPMT Genotype Specimen       Whole Blood   TPMT Genotype       Neg/Neg   TPMT Predicted Phenotype       Normal   Protein Random Urine      g/L <0.05   Protein Total Urine g/gr Creatinine      0 - 0.2 g/g Cr Unable to calculate due to low value   Deamidated Gliadin  Cari, IgA      <7 U/mL 2   Deamidated Gliadin Cari, IgG      <7 U/mL 5   Complement C3      76 - 169 mg/dL 72 (L)   Complement C4      15 - 50 mg/dL 6 (L)   CRP Inflammation      0.0 - 8.0 mg/L 3.2   DNA-ds      <10 IU/mL >379 (H)   Sed Rate      0 - 30 mm/h 49 (H)   Vitamin D Deficiency screening      20 - 75 ug/L 51   Creatinine Urine Random      mg/dL 23   AST      0 - 45 U/L 26   ALT      0 - 50 U/L 35   HEENA  Broad Spectrum       Neg   Beta 2 Glycoprotein 1 Antibody IgG      <7 U/mL <0.6   Beta 2 Glycoprotein 1 Antibody IgM      <7 U/mL <0.9   Thyroid Peroxidase Antibody      <35 IU/mL <10   Thyroglobulin Antibody      <40 IU/mL <20   TSH      0.40 - 4.00 mU/L 0.87   Cryoglobulin      NEG:Negative % Trace (A)   Tissue Transglutaminase Antibody IgA      <7 U/mL 4   Ferritin      8 - 252 ng/mL 163   Endomysial Antibody IgA by IFA      <1:10 <1:10   CRP Cardiac Risk      mg/L 2.9   Creatinine Urine      mg/dL 23     Component      Latest Ref Rng & Units 2/23/2018   Color Urine       Yellow   Appearance Urine       Clear   Glucose Urine      NEG:Negative mg/dL Negative   Bilirubin Urine      NEG:Negative Negative   Ketones Urine      NEG:Negative mg/dL Negative   Specific Gravity Urine      1.003 - 1.035 1.025   Blood Urine      NEG:Negative Negative   pH Urine      5.0 - 7.0 pH 5.5   Protein Albumin Urine      NEG:Negative mg/dL Negative   Urobilinogen Urine      0.2 - 1.0 EU/dL 0.2   Nitrite Urine      NEG:Negative Negative   Leukocyte Esterase Urine      NEG:Negative Negative   Source       Midstream Urine   Protein Random Urine      g/L 0.17   Protein Total Urine g/gr Creatinine      0 - 0.2 g/g Cr 0.18   Complement C3      76 - 169 mg/dL 64 (L)   Complement C4      15 - 50 mg/dL 5 (L)   CRP Inflammation      0.0 - 8.0 mg/L 4.2   DNA-ds      <10 IU/mL >379 (H)   Sed Rate      0 - 30 mm/h 35 (H)   AST      0 - 45 U/L 27   ALT      0 - 50 U/L 31   Creatinine Urine Random      mg/dL 99     Component      Latest Ref  Rng & Units 3/16/2018   WBC      4.0 - 11.0 10e9/L 5.6   RBC Count      3.8 - 5.2 10e12/L 4.43   Hemoglobin      11.7 - 15.7 g/dL 12.1   Hematocrit      35.0 - 47.0 % 36.9   MCV      78 - 100 fl 83   MCH      26.5 - 33.0 pg 27.3   MCHC      31.5 - 36.5 g/dL 32.8   RDW      10.0 - 15.0 % 14.5   Platelet Count      150 - 450 10e9/L 224   Diff Method       Automated Method   % Neutrophils      % 81.7   % Lymphocytes      % 9.3   % Monocytes      % 7.5   % Eosinophils      % 1.3   % Basophils      % 0.2   Absolute Neutrophil      1.6 - 8.3 10e9/L 4.6   Absolute Lymphocytes      0.8 - 5.3 10e9/L 0.5 (L)   Absolute Monocytes      0.0 - 1.3 10e9/L 0.4   Absolute Eosinophils      0.0 - 0.7 10e9/L 0.1   Absolute Basophils      0.0 - 0.2 10e9/L 0.0   Creatinine      0.52 - 1.04 mg/dL 0.51 (L)   GFR Estimate      >60 mL/min/1.7m2 >90   GFR Estimate If Black      >60 mL/min/1.7m2 >90   ALT      0 - 50 U/L 27   Albumin      3.4 - 5.0 g/dL 3.5   AST      0 - 45 U/L 18       Component      Latest Ref Rng & Units 3/21/2018   Color Urine       Yellow   Appearance Urine       Clear   Glucose Urine      NEG:Negative mg/dL Negative   Bilirubin Urine      NEG:Negative Negative   Ketones Urine      NEG:Negative mg/dL Negative   Specific Gravity Urine      1.003 - 1.035 <=1.005   pH Urine      5.0 - 7.0 pH 6.5   Protein Albumin Urine      NEG:Negative mg/dL Negative   Urobilinogen Urine      0.2 - 1.0 EU/dL 0.2   Nitrite Urine      NEG:Negative Negative   Blood Urine      NEG:Negative Negative   Leukocyte Esterase Urine      NEG:Negative Negative   Source       Midstream Urine   WBC Urine      OTO5:0 - 5 /HPF 0 - 5   RBC Urine      OTO2:O - 2 /HPF O - 2   Squamous Epithelial /LPF Urine      FEW:Few /LPF Few   Protein Random Urine      g/L <0.05   Protein Total Urine g/gr Creatinine      0 - 0.2 g/g Cr Unable to calculate due to low value   DNA-ds      <10 IU/mL >379 (H)   Complement C4      15 - 50 mg/dL 4 (L)   Complement C3      76 -  169 mg/dL 69 (L)   Creatinine Urine Random      mg/dL 17   Creatinine Urine      mg/dL 17     EXAMINATION: CT CHEST W/O CONTRAST, 12/29/2017 1:08 PM     TECHNIQUE:  Helical CT images from the thoracic inlet through the lung  bases were obtained without IV contrast during inspiration and  expiration according to high-resolution chest CT protocol. Contrast  dose: None     COMPARISON: None     HISTORY: eval for ILD, pleural effusion, pt has lupus, Sjogren's, h/o  MALT and pleurisy and SOB; Systemic lupus erythematosus, unspecified  SLE type, unspecified organ involvement status (H)     FINDINGS:     At least 75% collapse of the trachea and mainstem bronchi on the end  expiratory views. Diffuse lobular air trapping on end expiratory  images. Bibasilar platelike atelectasis. No pneumothorax or pleural  effusion. Scattered solid pulmonary nodules, for example a 4 mm  lingular nodule (series 5 image 145) and a 4 mm right upper lobe  nodule (image 73).      The heart size is normal. Mild three-vessel coronary artery calcific  atherosclerosis. Dilation of the main pulmonary artery to 4.1 cm. No  pericardial effusion. Normal caliber and configuration of the thoracic  great vessels. Numerous prominent though nonenlarged mediastinal lymph  nodes.     Limited views of the abdomen reveal no worrisome pathology. No  worrisome bony or soft tissue lesions.         IMPRESSION:   1. At least moderate tracheobronchomalacia.  2. 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  3. Scattered subcentimeter pulmonary nodules measuring up to 4 mm.  Consider follow-up chest CT in one year to establish stability.     [Consider Follow Up: Lung nodule]     This report will be copied to the [facility] to ensure a  provider acknowledges the finding.      I have personally reviewed the examination and initial  interpretation  and I agree with the findings.     AUSTIN PACE MD    Examination:NM LUNG SCAN VENTILATION AND PERFUSION, 3/14/2018 8:24 AM      Indication:  Chronic PE; Pulmonary hypertension      Additional Information: none     Technique:     The patient received 2 mCi of Tc-99m DTPA aerosol for ventilation and  7 mCi of Tc-99m MAA for perfusion. Multiple images were obtained of  the lungs in Anterior, posterior, HERNANDES, RPO, LPO, and  Indian projections.     Comparison: Chest x-ray same-day     Findings:     There are matched ventilation/perfusion defects in both lungs. No  mismatched perfusion defect to suggest pulmonary emboli.          Impression:  Pulmonary emboli is not present.     I have personally reviewed the examination and initial interpretation  and I agree with the findings.     DONNIE GARCIA MD    Component      Latest Ref Rng & Units 5/12/2018   WBC      4.0 - 11.0 10e9/L 7.1   RBC Count      3.8 - 5.2 10e12/L 4.42   Hemoglobin      11.7 - 15.7 g/dL 12.1   Hematocrit      35.0 - 47.0 % 37.4   MCV      78 - 100 fl 85   MCH      26.5 - 33.0 pg 27.4   MCHC      31.5 - 36.5 g/dL 32.4   RDW      10.0 - 15.0 % 14.7   Platelet Count      150 - 450 10e9/L 226   Diff Method       Automated Method   % Neutrophils      % 86.2   % Lymphocytes      % 4.8   % Monocytes      % 8.4   % Eosinophils      % 0.3   % Basophils      % 0.3   Absolute Neutrophil      1.6 - 8.3 10e9/L 6.1   Absolute Lymphocytes      0.8 - 5.3 10e9/L 0.3 (L)   Absolute Monocytes      0.0 - 1.3 10e9/L 0.6   Absolute Eosinophils      0.0 - 0.7 10e9/L 0.0   Absolute Basophils      0.0 - 0.2 10e9/L 0.0   Color Urine       Yellow   Appearance Urine       Clear   Glucose Urine      NEG:Negative mg/dL Negative   Bilirubin Urine      NEG:Negative Negative   Ketones Urine      NEG:Negative mg/dL Negative   Specific Gravity Urine      1.003 - 1.035 <=1.005   Blood Urine      NEG:Negative Negative   pH Urine      5.0 - 7.0 pH 5.5   Protein Albumin  "Urine      NEG:Negative mg/dL Negative   Urobilinogen Urine      0.2 - 1.0 EU/dL 0.2   Nitrite Urine      NEG:Negative Negative   Leukocyte Esterase Urine      NEG:Negative Negative   Source       Midstream Urine   Creatinine      0.52 - 1.04 mg/dL 0.63   GFR Estimate      >60 mL/min/1.7m2 >90   GFR Estimate If Black      >60 mL/min/1.7m2 >90   Protein Random Urine      g/L <0.05   Protein Total Urine g/gr Creatinine      0 - 0.2 g/g Cr Unable to calculate due to low value   Albumin      3.4 - 5.0 g/dL 3.6   ALT      0 - 50 U/L 28   AST      0 - 45 U/L 20   Complement C3      76 - 169 mg/dL 46 (L)   Complement C4      15 - 50 mg/dL 3 (L)   CRP Inflammation      0.0 - 8.0 mg/L <2.9   Sed Rate      0 - 30 mm/h 26   DNA-ds      <10 IU/mL >379 (H)   Creatinine Urine      mg/dL 16       Ph.E:    /77  Pulse 76  Temp 97.9  F (36.6  C) (Oral)  Ht 1.676 m (5' 5.98\")  Wt 124 kg (273 lb 6.4 oz)  SpO2 96%  BMI 44.15 kg/m2      Constitutional: WD/WN. Pleasant. In no acute distress.  Eyes: EOM intact, PERRLA, sclera anicteric, conj not injected  HEENT: No oral ulcers or thrush. Normal salivary pool.  Neck: No cervical LAP or thyromegaly  Chest: Clear to auscultation bilaterally  CV: RRR, no murmurs/ rubs or gallops. No edema, clubbing or cyanosis.   GI: Abdomen is soft and non tender.   MS: No synovitis. Cool joints. No tenderness of the joints. No  joint deformities. Full ROM of the joints. No nodules. No Jaccoud's deformity.  Skin: No skin rash, malar rash, livedo, periungual erythema, alopecia, digital ulcers or nail changes.  Neuro: A&O x 3. Grossly non focal, muscular power 5/5 in all ext  Psych: NL affect and mood    Assessment/ plan:    1-SLE. 52 yo WF with +fh/o RA and personal hx of SLE presented in 12/2017 for 2nd opinion of m/o her SLE. She was diagnosed with SLE at age 27. Her lupus has been marked by +KARLIE (highest titer 1:640, speckled and nucleolar patterns), +anti-DNA, arthritis, malar rash, serositis " (pleuritic CP) supported by +SSA/SSB Ab, low C3/low C4, +RF, high ESR/CRP. She had neg anti-RNP, anti-Sm, acL IgM/G/A, LAC, cryo and anti-CCP.     Had NL C3 at the time of Dx. She was treated with prednisone and HCQ at the time of Dx. Can't remember how long she was on HCQ and why HCQ was stopped, but it probably was stopped because of stable disease, no report on HCQ toxicity. Reportedly her SLE was mild all these years.    Has secondary Sjogren's with sicca, +SSA/SSB Ab and h/o MALT lymphoma at age 44 in remission. Uses blink eyedrops and salagen. No lip biopsy was done to confirm Dx of Sjogren's.    Her lupus flared in 7/2017 with no triggers when she presented with arthritis, HCQ was resumed, arthritis resolved. Mild pulm HTN on 2D-Echo 8/2017 but neg R cardiac cath and VQ scan in 3/2018, also neg 2D-Echo.    In 12/2017, was prescribed prednisone 40 mg for only 5 days for pleuritic CP, CP recurred after stopping prednisone.     Her major complaint at initial visit with me in 12/2017 was ongoing CP. AZA was added in 2/2018 with start dose of 50 mg qd and slowly was increased to max 150 mg qd. Tolerated AZA well, no SEs, no toxicity on the labs but failed it and required to go back on prednisone 20 mg qd (current dose).     Her lupus is active with pleuritic CP. ESR/CRP normalized on prednisone+AZA+HCQ but +anti-DNA, low C3/C4 are unchanged. Chest CT in 12/2017 without contrast showed air trapping due to lupus/Sjogren's, no pleural effusion. Lung nodules need f/u in a year. No pericardail effusion on 2D-echo and neg VQ scan for PE in 3/2018 so chest CT with contrast is not needed. She is APL negative.    Lupus Dx and tx plan was discussed with her.    AZA was switched to  mg po bid on 5/18/2018 as she failed AZA.    Still has some residual pleuritic CP and get chest tightness feeling on taking a deep breath. Is scheduled to do PFTs for evaluation of lupus shrinking lung syndrome.Still has some residual  pleuritic CP and get chest tightness feeling on taking a deep breath. 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    Will try depomedrol shot to see if it is more effective than oral prednisone.    Recommend:    Labs in 2 weeks from now, and if no toxicity will increase cellcept to 2 tab of 500 mg twice a day and start tapering prednisone down 12.5-10-7.5-5-2.5 mg a day each for one week then stop    Depomedrol 80 mg IM today    Will follow results of PFTs    Continue  mg bid    Continue salagen for dry mouth    2-HCQ monitoring. Was reminded to have eye exam.    3-MMF monitoring. Labs in 2 wk then q3mo    4-NSAIDs monitoring. Labs q6 mo    5-Shingrix vaccine, 1st dose 4/4/2018    PLAN: July 11 at noon add on and will do second dose of shingrix then    MEDICATION CHANGES: as above            Orders Placed This Encounter   Procedures     Albumin level     ALT     AST     CBC with platelets differential     Complement C3     Complement C4     Creatinine     CRP inflammation     Erythrocyte sedimentation rate auto     DNA double stranded antibodies     Routine UA with micro reflex to culture     Protein  random urine with Creat Ratio     Creatinine random urine

## 2018-05-25 NOTE — LETTER
Patient:  Taina Singh  :   1965  MRN:     [id number]        Ms.Kelly Singh  [address] [place] [address]        2018    Dear ,    We are writing to inform you of your test results. Results are in, PFTs show moderate severe restriction of the lungs. This could be due to lupus as we suspected. I recommend an evaluation by a pulmonologist at the , also another chest CT (high resolution without contrast). Cellcept is 1st line recommended therapy for inflammation of the lungs due to lupus.      Results for orders placed or performed in visit on 18   General PFT Lab (Please always keep checked)   Result Value Ref Range    FVC-Pred 3.66 L    FVC-Pre 1.89 L    FVC-%Pred-Pre 51 %    FEV1-Pre 1.54 L    FEV1-%Pred-Pre 52 %    FEV1FVC-Pred 80 %    FEV1FVC-Pre 81 %    FEFMax-Pred 6.99 L/sec    FEFMax-Pre 4.71 L/sec    FEFMax-%Pred-Pre 67 %    FEF2575-Pred 2.79 L/sec    FEF2575-Pre 1.67 L/sec    VOO0981-%Pred-Pre 59 %    ExpTime-Pre 7.23 sec    FIFMax-Pre 2.82 L/sec    VC-Pred 3.70 L    VC-Pre 2.04 L    VC-%Pred-Pre 55 %    IC-Pred 3.34 L    IC-Pre 1.86 L    IC-%Pred-Pre 55 %    ERV-Pred 0.36 L    ERV-Pre 0.18 L    ERV-%Pred-Pre 50 %    FEV1FEV6-Pred 82 %    FEV1FEV6-Pre 81 %    FRCPleth-Pred 2.80 L    FRCPleth-Pre 1.71 L    FRCPleth-%Pred-Pre 61 %    RVPleth-Pred 1.85 L    RVPleth-Pre 1.53 L    RVPleth-%Pred-Pre 82 %    TLCPleth-Pred 5.26 L    TLCPleth-Pre 3.57 L    TLCPleth-%Pred-Pre 67 %    DLCOunc-Pred 23.38 ml/min/mmHg    DLCOunc-Pre 16.54 ml/min/mmHg    DLCOunc-%Pred-Pre 70 %    VA-Pre 2.98 L    VA-%Pred-Pre 55 %    FEV1SVC-Pred 79 %    FEV1SVC-Pre 75 %    Narrative    The FEV1 and FVC are reduced but the FEV1/FVC ratio is normal.  The inspiratory flow rates are reduced.  The TLC and FRC are reduced.  The diffusing capacity is normal.  However, the diffusing capacity was not corrected for the patient's hemoglobin.  IMPRESSION:  Moderately Severe  Restriction.  Normal  Diffusion.  Walk distance is normal on room air with significant desaturation but no hypoxia.  ____________________________________________KERVIN TOYN   6 minute walk test   Result Value Ref Range    6 min walk (FT) 1215 ft    6 Min Walk (M) 370 m       Killian Marroquin MD

## 2018-05-29 NOTE — PROGRESS NOTES
As we discussed inflammation markers (ESR, CRP) are back to normal but anti-DNA and complements remained abnormal. We switched imuran to cellcept to have a better control of your lupus.

## 2018-06-11 LAB
DLCOUNC-%PRED-PRE: 70 %
DLCOUNC-PRE: 16.54 ML/MIN/MMHG
DLCOUNC-PRED: 23.38 ML/MIN/MMHG
ERV-%PRED-PRE: 50 %
ERV-PRE: 0.18 L
ERV-PRED: 0.36 L
EXPTIME-PRE: 7.23 SEC
FEF2575-%PRED-PRE: 59 %
FEF2575-PRE: 1.67 L/SEC
FEF2575-PRED: 2.79 L/SEC
FEFMAX-%PRED-PRE: 67 %
FEFMAX-PRE: 4.71 L/SEC
FEFMAX-PRED: 6.99 L/SEC
FEV1-%PRED-PRE: 52 %
FEV1-PRE: 1.54 L
FEV1FEV6-PRE: 81 %
FEV1FEV6-PRED: 82 %
FEV1FVC-PRE: 81 %
FEV1FVC-PRED: 80 %
FEV1SVC-PRE: 75 %
FEV1SVC-PRED: 79 %
FIFMAX-PRE: 2.82 L/SEC
FRCPLETH-%PRED-PRE: 61 %
FRCPLETH-PRE: 1.71 L
FRCPLETH-PRED: 2.8 L
FVC-%PRED-PRE: 51 %
FVC-PRE: 1.89 L
FVC-PRED: 3.66 L
IC-%PRED-PRE: 55 %
IC-PRE: 1.86 L
IC-PRED: 3.34 L
RVPLETH-%PRED-PRE: 82 %
RVPLETH-PRE: 1.53 L
RVPLETH-PRED: 1.85 L
TLCPLETH-%PRED-PRE: 67 %
TLCPLETH-PRE: 3.57 L
TLCPLETH-PRED: 5.26 L
VA-%PRED-PRE: 55 %
VA-PRE: 2.98 L
VC-%PRED-PRE: 55 %
VC-PRE: 2.04 L
VC-PRED: 3.7 L

## 2018-06-12 ENCOUNTER — TELEPHONE (OUTPATIENT)
Dept: RHEUMATOLOGY | Facility: CLINIC | Age: 53
End: 2018-06-12

## 2018-06-12 ENCOUNTER — RADIANT APPOINTMENT (OUTPATIENT)
Dept: CT IMAGING | Facility: CLINIC | Age: 53
End: 2018-06-12
Attending: INTERNAL MEDICINE
Payer: COMMERCIAL

## 2018-06-12 DIAGNOSIS — M32.9 SYSTEMIC LUPUS ERYTHEMATOSUS, UNSPECIFIED SLE TYPE, UNSPECIFIED ORGAN INVOLVEMENT STATUS (H): ICD-10-CM

## 2018-06-12 DIAGNOSIS — M32.9 SYSTEMIC LUPUS ERYTHEMATOSUS, UNSPECIFIED SLE TYPE, UNSPECIFIED ORGAN INVOLVEMENT STATUS (H): Primary | ICD-10-CM

## 2018-06-12 LAB
ALBUMIN SERPL-MCNC: 3.6 G/DL (ref 3.4–5)
ALBUMIN UR-MCNC: NEGATIVE MG/DL
ALT SERPL W P-5'-P-CCNC: 35 U/L (ref 0–50)
APPEARANCE UR: ABNORMAL
AST SERPL W P-5'-P-CCNC: 23 U/L (ref 0–45)
BASOPHILS # BLD AUTO: 0 10E9/L (ref 0–0.2)
BASOPHILS NFR BLD AUTO: 0.4 %
BILIRUB UR QL STRIP: NEGATIVE
COLOR UR AUTO: YELLOW
CREAT SERPL-MCNC: 0.68 MG/DL (ref 0.52–1.04)
CREAT UR-MCNC: 25 MG/DL
CREAT UR-MCNC: 25 MG/DL
CRP SERPL-MCNC: 11 MG/L (ref 0–8)
DIFFERENTIAL METHOD BLD: ABNORMAL
EOSINOPHIL # BLD AUTO: 0 10E9/L (ref 0–0.7)
EOSINOPHIL NFR BLD AUTO: 0.3 %
ERYTHROCYTE [DISTWIDTH] IN BLOOD BY AUTOMATED COUNT: 15 % (ref 10–15)
ERYTHROCYTE [SEDIMENTATION RATE] IN BLOOD BY WESTERGREN METHOD: 34 MM/H (ref 0–30)
GFR SERPL CREATININE-BSD FRML MDRD: >90 ML/MIN/1.7M2
GLUCOSE UR STRIP-MCNC: NEGATIVE MG/DL
HCT VFR BLD AUTO: 38.3 % (ref 35–47)
HGB BLD-MCNC: 12.3 G/DL (ref 11.7–15.7)
HGB UR QL STRIP: ABNORMAL
IMM GRANULOCYTES # BLD: 0.1 10E9/L (ref 0–0.4)
IMM GRANULOCYTES NFR BLD: 1.2 %
KETONES UR STRIP-MCNC: NEGATIVE MG/DL
LEUKOCYTE ESTERASE UR QL STRIP: NEGATIVE
LYMPHOCYTES # BLD AUTO: 0.4 10E9/L (ref 0.8–5.3)
LYMPHOCYTES NFR BLD AUTO: 5.4 %
MCH RBC QN AUTO: 26.6 PG (ref 26.5–33)
MCHC RBC AUTO-ENTMCNC: 32.1 G/DL (ref 31.5–36.5)
MCV RBC AUTO: 83 FL (ref 78–100)
MONOCYTES # BLD AUTO: 0.4 10E9/L (ref 0–1.3)
MONOCYTES NFR BLD AUTO: 5.8 %
NEUTROPHILS # BLD AUTO: 6.4 10E9/L (ref 1.6–8.3)
NEUTROPHILS NFR BLD AUTO: 86.9 %
NITRATE UR QL: NEGATIVE
NRBC # BLD AUTO: 0 10*3/UL
NRBC BLD AUTO-RTO: 0 /100
PH UR STRIP: 7 PH (ref 5–7)
PLATELET # BLD AUTO: 248 10E9/L (ref 150–450)
PROT UR-MCNC: 0.05 G/L
PROT/CREAT 24H UR: 0.21 G/G CR (ref 0–0.2)
RBC # BLD AUTO: 4.62 10E12/L (ref 3.8–5.2)
RBC #/AREA URNS AUTO: 2 /HPF (ref 0–2)
SOURCE: ABNORMAL
SP GR UR STRIP: 1.01 (ref 1–1.03)
SQUAMOUS #/AREA URNS AUTO: <1 /HPF (ref 0–1)
UROBILINOGEN UR STRIP-MCNC: 0 MG/DL (ref 0–2)
WBC # BLD AUTO: 7.4 10E9/L (ref 4–11)
WBC #/AREA URNS AUTO: <1 /HPF (ref 0–5)

## 2018-06-12 NOTE — LETTER
Patient:  Taina Singh  :   1965  MRN:     [id number]        Ms.Kelly Singh  [address] [place] [id number]        2018    Dear ,    We are writing to inform you of your test results. Labs are unchanged except elevation of ESR/CRP (inflammation markers).        Results for orders placed or performed in visit on 18   Albumin level   Result Value Ref Range    Albumin 3.6 3.4 - 5.0 g/dL   ALT   Result Value Ref Range    ALT 35 0 - 50 U/L   AST   Result Value Ref Range    AST 23 0 - 45 U/L   CBC with platelets differential   Result Value Ref Range    WBC 7.4 4.0 - 11.0 10e9/L    RBC Count 4.62 3.8 - 5.2 10e12/L    Hemoglobin 12.3 11.7 - 15.7 g/dL    Hematocrit 38.3 35.0 - 47.0 %    MCV 83 78 - 100 fl    MCH 26.6 26.5 - 33.0 pg    MCHC 32.1 31.5 - 36.5 g/dL    RDW 15.0 10.0 - 15.0 %    Platelet Count 248 150 - 450 10e9/L    Diff Method Automated Method     % Neutrophils 86.9 %    % Lymphocytes 5.4 %    % Monocytes 5.8 %    % Eosinophils 0.3 %    % Basophils 0.4 %    % Immature Granulocytes 1.2 %    Nucleated RBCs 0 0 /100    Absolute Neutrophil 6.4 1.6 - 8.3 10e9/L    Absolute Lymphocytes 0.4 (L) 0.8 - 5.3 10e9/L    Absolute Monocytes 0.4 0.0 - 1.3 10e9/L    Absolute Eosinophils 0.0 0.0 - 0.7 10e9/L    Absolute Basophils 0.0 0.0 - 0.2 10e9/L    Abs Immature Granulocytes 0.1 0 - 0.4 10e9/L    Absolute Nucleated RBC 0.0    Creatinine   Result Value Ref Range    Creatinine 0.68 0.52 - 1.04 mg/dL    GFR Estimate >90 >60 mL/min/1.7m2    GFR Estimate If Black >90 >60 mL/min/1.7m2   Complement C3   Result Value Ref Range    Complement C3 50 (L) 76 - 169 mg/dL   Complement C4   Result Value Ref Range    Complement C4 3 (L) 15 - 50 mg/dL   CRP inflammation   Result Value Ref Range    CRP Inflammation 11.0 (H) 0.0 - 8.0 mg/L   Erythrocyte sedimentation rate auto   Result Value Ref Range    Sed Rate 34 (H) 0 - 30 mm/h   DNA double stranded antibodies   Result Value Ref Range    DNA-ds >379  (H) <10 IU/mL   Routine UA with micro reflex to culture   Result Value Ref Range    Color Urine Yellow     Appearance Urine Slightly Cloudy     Glucose Urine Negative NEG^Negative mg/dL    Bilirubin Urine Negative NEG^Negative    Ketones Urine Negative NEG^Negative mg/dL    Specific Gravity Urine 1.008 1.003 - 1.035    Blood Urine Small (A) NEG^Negative    pH Urine 7.0 5.0 - 7.0 pH    Protein Albumin Urine Negative NEG^Negative mg/dL    Urobilinogen mg/dL 0.0 0.0 - 2.0 mg/dL    Nitrite Urine Negative NEG^Negative    Leukocyte Esterase Urine Negative NEG^Negative    Source Midstream Urine     WBC Urine <1 0 - 5 /HPF    RBC Urine 2 0 - 2 /HPF    Squamous Epithelial /HPF Urine <1 0 - 1 /HPF   Protein  random urine with Creat Ratio   Result Value Ref Range    Protein Random Urine 0.05 g/L    Protein Total Urine g/gr Creatinine 0.21 (H) 0 - 0.2 g/g Cr   Creatinine random urine   Result Value Ref Range    Creatinine Urine Random 25 mg/dL   Creatinine urine calculation only   Result Value Ref Range    Creatinine Urine 25 mg/dL       Killian Marroquin MD

## 2018-06-12 NOTE — PROGRESS NOTES
Results are in, PFTs show moderate severe restriction of the lungs. This could be due to lupus as we suspected. I recommend an evaluation by a pulmonologist at the , also another chest CT (high resolution without contrast). Cellcept is 1st line recommended therapy for inflammation of the lungs due to lupus.

## 2018-06-12 NOTE — TELEPHONE ENCOUNTER
Notes      Killian Marroquin MD at 6/12/2018  1:15 AM      Status: Signed            Results are in, PFTs show moderate severe restriction of the lungs. This could be due to lupus as we suspected. I recommend an evaluation by a pulmonologist at the , also another chest CT (high resolution without contrast). Cellcept is 1st line recommended therapy for inflammation of the lungs due to lupus.                   Called and discussed with pt.  She is agreeable to plan and happy to be moving forward.  Pt had not further questions.  She will call me if she has any questions or needs help getting CT set up.    Sophie Alvarado RN  R[facility]ology [facility]

## 2018-06-12 NOTE — LETTER
Patient:  Taina Singh  :   1965  MRN:     [id number]        Ms.Kelly Singh  [address] [facility] [address]        2018    Dear ,    We are writing to inform you of your test results. There is no ILD, but please keep your appointment with pulmonary due to abnormal PFTs. Small fluid in R lung (pleural effusion) is most likely due to lupus. I know you already had a R heart cath for evaluation of pulmonary HTN.      Results for orders placed or performed in visit on 18   CT Chest w/o contrast (High Resolution)    Narrative    CT CHEST W/O CONTRAST     Technique: CT imaging obtained through the chest withoutintravenous  contrast. Coronal and axial MIP reformatted images produced.  Both  inspiratory and expiratory images obtained.    History: ; Systemic lupus erythematosus, unspecified SLE type,  unspecified organ involvement status (H)    Comparison: CT chest 2017    Findings: http://www.atsjournals.org/doi/pdf/10.1164/rccm.2009-040GL  Features of UIP:  Reticular abnormality: No  Subpleural, basal predominance: No  Honeycombing with or without traction bronchiectasis: No    Absence of features inconsistent with UIP pattern: No. Peribronchial  bronchovascular subsegmental atelectasis scattered in the right  greater than left lung bases.  https://www.thoracic.org/statements/resources/interstitial-lung-diseas  /kmsxopsdhzppfn-rc-PLKm.pdf  https://www.thoracic.org/statements/resources/interstitial-lung-diseas  /idio02.pdf    Remainder of exam:  Heart size is within normal limits. Trace pericardial effusion. Mild  scattered coronary artery calcifications. The main pulmonary artery is  enlarged and measures 3.9 cm. The thoracic aorta is normal caliber. No  enlarged thoracic lymph nodes by CT short axis size criteria, however  there are numerous prominent subcentimeter mediastinal lymph nodes  (particularly prevascular). No suspicious thyroid nodule. Asymmetric  left breast  glandular tissue stable.    Greater than 75% collapse of the tracheobronchial airways with  evidence of air trapping on expiratory images. Unchanged scattered  sub-5 mm pulmonary nodules, with example nodules as described below  from series 5:  Image 51: Solid 3 mm pulmonary nodule in the right upper lobe apex.  Image 108: Three solid 2 mm pulmonary nodules along the minor fissure.  Image 153: Solid subpleural 3 mm pulmonary nodule along the posterior  left lower lobe.  Image 117: Solid 2 mm pulmonary nodule in the left upper lobe.     Upper abdomen: Limited noncontrast evaluation of the upper abdomen  demonstrates normal adrenal glands and is unchanged small splenule  anterior to the spleen. Otherwise the upper abdomen is unremarkable.    No aggressive osseous lesion.      Impression    Impression:     1. New small right pleural effusion with overlying  atelectasis/consolidation.  2. Unchanged tracheobronchomalacia.  3. Stable sub-5 mm pulmonary nodules since 12/29/2017.  4. Scattered atelectasis in the lung bases.   5. Dilation of the main pulmonary artery to 3.9 cm, suggestive of  pulmonary hypertension.    I have personally reviewed the examination and initial interpretation  and I agree with the findings.    MD Killian AMARAL MD

## 2018-06-13 DIAGNOSIS — L93.0 LUPUS ERYTHEMATOSUS, UNSPECIFIED FORM: ICD-10-CM

## 2018-06-13 LAB
C3 SERPL-MCNC: 50 MG/DL (ref 76–169)
C4 SERPL-MCNC: 3 MG/DL (ref 15–50)
DSDNA AB SER-ACNC: >379 IU/ML

## 2018-06-13 RX ORDER — MYCOPHENOLATE MOFETIL 500 MG/1
1000 TABLET ORAL 2 TIMES DAILY
Qty: 120 TABLET | Refills: 2 | Status: SHIPPED | OUTPATIENT
Start: 2018-06-13 | End: 2018-06-22

## 2018-06-13 NOTE — PROGRESS NOTES
Increased inflammation markers, please increase your cellcept to 2 tab of 500 mg twice a day with repeat labs in 2 weeks.

## 2018-06-13 NOTE — TELEPHONE ENCOUNTER
Called and spoke with pt, reviewed Dr. Marroquin's note to her.  She will increase her cellcept to 1000mg BID.  Pt will also have labs repeated in 2 weeks.  No further questions, but does require a refill on her medication at the new dose.    Sophie Alvarado RN  Rheumatology Clinic

## 2018-06-13 NOTE — PROGRESS NOTES
There is no ILD, but please keep your appointment with pulmonary due to abnormal PFTs. Small fluid in R lung (pleural effusion) is most likely due to lupus. I know you already had a R heart cath for evaluation of pulmonary HTN.

## 2018-06-15 DIAGNOSIS — J84.9 ILD (INTERSTITIAL LUNG DISEASE) (H): Primary | ICD-10-CM

## 2018-06-18 ENCOUNTER — TRANSFERRED RECORDS (OUTPATIENT)
Dept: HEALTH INFORMATION MANAGEMENT | Facility: CLINIC | Age: 53
End: 2018-06-18

## 2018-06-20 ENCOUNTER — MYC MEDICAL ADVICE (OUTPATIENT)
Dept: RHEUMATOLOGY | Facility: CLINIC | Age: 53
End: 2018-06-20

## 2018-06-20 DIAGNOSIS — M32.9 SYSTEMIC LUPUS ERYTHEMATOSUS, UNSPECIFIED SLE TYPE, UNSPECIFIED ORGAN INVOLVEMENT STATUS (H): ICD-10-CM

## 2018-06-21 ENCOUNTER — MYC MEDICAL ADVICE (OUTPATIENT)
Dept: RHEUMATOLOGY | Facility: CLINIC | Age: 53
End: 2018-06-21

## 2018-06-21 DIAGNOSIS — M35.00 SJOGREN'S SYNDROME, WITH UNSPECIFIED ORGAN INVOLVEMENT (H): ICD-10-CM

## 2018-06-21 DIAGNOSIS — L93.0 LUPUS ERYTHEMATOSUS, UNSPECIFIED FORM: ICD-10-CM

## 2018-06-21 DIAGNOSIS — Z79.899 HIGH RISK MEDICATIONS (NOT ANTICOAGULANTS) LONG-TERM USE: Primary | ICD-10-CM

## 2018-06-21 DIAGNOSIS — M32.9 SYSTEMIC LUPUS ERYTHEMATOSUS, UNSPECIFIED SLE TYPE, UNSPECIFIED ORGAN INVOLVEMENT STATUS (H): ICD-10-CM

## 2018-06-21 RX ORDER — PREDNISONE 20 MG/1
30 TABLET ORAL DAILY
Qty: 45 TABLET | Refills: 3 | Status: SHIPPED | OUTPATIENT
Start: 2018-06-21 | End: 2018-07-11

## 2018-06-21 RX ORDER — PREDNISONE 5 MG/1
30 TABLET ORAL DAILY
Qty: 60 TABLET | Refills: 3 | Status: SHIPPED | OUTPATIENT
Start: 2018-06-21 | End: 2018-07-11

## 2018-06-21 NOTE — TELEPHONE ENCOUNTER
I spoke to pt regarding prednisone dose. Pt states she is on a taper & she is currently on 30 mg. Pt requested that EYAL Barrios handle her medication & other concerns.    Route to EYAL Barrios.    Callie Mejias LPN

## 2018-06-21 NOTE — TELEPHONE ENCOUNTER
Called and discussed with pt, she has increased prednisone back to 30 mg a day due to increased arthritis pain.  Pt is about to taper down to 25 mg a day but does need a refill.    Pt is also requesting a refill of tramadol due to it helping her to sleep at night.  Last seen: 5/25/18  Pending appt: 7/11/18  Medication: Tramadol   Last filled: 5/30/18  Qty: 30        Sophie Alvarado RN  Rheumatology Clinic

## 2018-06-22 RX ORDER — MYCOPHENOLATE MOFETIL 500 MG/1
1500 TABLET ORAL 2 TIMES DAILY
Qty: 180 TABLET | Refills: 2 | Status: SHIPPED | OUTPATIENT
Start: 2018-06-22 | End: 2018-08-30

## 2018-06-22 RX ORDER — TRAMADOL HYDROCHLORIDE 50 MG/1
50 TABLET ORAL EVERY 12 HOURS PRN
Qty: 30 TABLET | Refills: 2 | Status: SHIPPED | OUTPATIENT
Start: 2018-06-22 | End: 2018-08-29

## 2018-06-22 NOTE — TELEPHONE ENCOUNTER
Discussed with Dr. Marroquin, labs are ok to use from Monday.  Pt should increase cellcept to 1500 mg BID and then have labs, full set rechecked in one month.     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  This was just follow up for that due to lupus, they just like to do yearly checkups to make sure.    Pt will increase cellcept to 1500 mg BID.  Will send new prescription to pharmacy.  Pt will repeat all labs in one month.    Sophie Alvarado RN  Rheumatology Clinic

## 2018-07-02 ENCOUNTER — TRANSFERRED RECORDS (OUTPATIENT)
Dept: HEALTH INFORMATION MANAGEMENT | Facility: CLINIC | Age: 53
End: 2018-07-02

## 2018-07-03 LAB
ABSOLUTE BASOPHILS (EXTERNAL): 0 (ref 0–0.2)
ALBUMIN SERPL-MCNC: 3.7 G/DL (ref 3.4–5)
ALT SERPL-CCNC: 19 U/L (ref 7–52)
AST SERPL-CCNC: 22 U/L (ref 13–39)
BASOPHILS NFR BLD AUTO: 0.1 % (ref 0–2)
CREAT SERPL-MCNC: 1.16 MG/DL (ref 0.6–1.3)
EOSINOPHIL # BLD AUTO: 0 10*3/UL (ref 0–0.6)
EOSINOPHIL NFR BLD AUTO: 0.1 % (ref 0–7)
ERYTHROCYTE [DISTWIDTH] IN BLOOD BY AUTOMATED COUNT: ABNORMAL %
GFR SERPL CREATININE-BSD FRML MDRD: 54.4 ML/MIN/1.73M2
HCT VFR BLD AUTO: ABNORMAL %
HEMOGLOBIN: 12 G/DL (ref 11.3–15.2)
LYMPHOCYTES # BLD AUTO: 0.5 10*3/UL (ref 0.4–3.6)
LYMPHOCYTES NFR BLD AUTO: 5.7 % (ref 14–41)
MCH RBC QN AUTO: ABNORMAL PG
MCHC RBC AUTO-ENTMCNC: ABNORMAL G/DL
MCV RBC AUTO: ABNORMAL FL
MONOCYTES # BLD AUTO: 0.4 10*3/UL (ref 0.2–1.3)
MONOCYTES NFR BLD AUTO: 4.6 % (ref 6–15)
NEUTROPHILS # BLD AUTO: 8 10*3/UL (ref 1.3–6.6)
NEUTROPHILS NFR BLD AUTO: 88.8 % (ref 43–74)
PLATELET COUNT - QUEST: 241 10^9/L (ref 113–364)
RBC # BLD AUTO: ABNORMAL 10^12/L
WBC # BLD AUTO: 9.1 10^9/L (ref 3–8.9)

## 2018-07-03 NOTE — LETTER
Patient:  Taina Singh  :   1965  MRN:     [id number]        Ms.Kelly Singh  [address] [place] [address]        2018    Dear ,    We are writing to inform you of your test results. They are stable.      Results for orders placed or performed in visit on 18   Creatinine   Result Value Ref Range    Creatinine 1.16 0.6 - 1.3 mg/dL    GFR Estimate 54.4 ml/min/1.73m2   AST   Result Value Ref Range    AST 22 13.0 - 39.0 U/L   ALT   Result Value Ref Range    ALT 19 7.0 - 52.0 U/L   Albumin level   Result Value Ref Range    Albumin 3.7 3.4 - 5.0 g/dL   CBC with platelets differential   Result Value Ref Range    WBC 9.1 (A) 3.0 - 8.9 10^9/L    RBC Count  10^12/L    Hemoglobin 12.0 (A) 11.3 - 15.2 g/dL    Hematocrit  %    MCV  fl    MCH  pg    MCHC  g/dL    RDW  %    Platelet Count 241 113.0 - 364.0 10^9/L    % Neutrophils 88.8 (A) 43.0 - 74.0 %    % Lymphocytes 5.7 (A) 14.0 - 41.0 %    % Monocytes 4.6 (A) 6.0 - 15.0 %    % Eosinophils 0.1 0.0 - 7.0 %    % Basophils 0.1 0.0 - 2.0 %    Absolute Neutrophil 8.0 (A) 1.3 - 6.6    Absolute Lymphocytes 0.5 0.4 - 3.6    Absolute Monocytes 0.4 0.2 - 1.3    Absolute Eosinophils 0.0 0.0 - 0.6    Absolute Basophils (External) 0.0 0.0 - 0.2       Killian Marroquin MD

## 2018-07-07 DIAGNOSIS — M32.9 SYSTEMIC LUPUS ERYTHEMATOSUS, UNSPECIFIED SLE TYPE, UNSPECIFIED ORGAN INVOLVEMENT STATUS (H): ICD-10-CM

## 2018-07-07 DIAGNOSIS — M35.00 SJOGREN'S SYNDROME, WITH UNSPECIFIED ORGAN INVOLVEMENT (H): ICD-10-CM

## 2018-07-07 DIAGNOSIS — Z79.899 HIGH RISK MEDICATIONS (NOT ANTICOAGULANTS) LONG-TERM USE: ICD-10-CM

## 2018-07-07 LAB
ALBUMIN UR-MCNC: NEGATIVE MG/DL
APPEARANCE UR: CLEAR
BASOPHILS # BLD AUTO: 0 10E9/L (ref 0–0.2)
BASOPHILS NFR BLD AUTO: 0.1 %
BILIRUB UR QL STRIP: NEGATIVE
COLOR UR AUTO: YELLOW
CREAT UR-MCNC: 23 MG/DL
DIFFERENTIAL METHOD BLD: ABNORMAL
EOSINOPHIL # BLD AUTO: 0 10E9/L (ref 0–0.7)
EOSINOPHIL NFR BLD AUTO: 0.3 %
ERYTHROCYTE [DISTWIDTH] IN BLOOD BY AUTOMATED COUNT: 15.3 % (ref 10–15)
ERYTHROCYTE [SEDIMENTATION RATE] IN BLOOD BY WESTERGREN METHOD: 35 MM/H (ref 0–30)
GLUCOSE UR STRIP-MCNC: NEGATIVE MG/DL
HCT VFR BLD AUTO: 36 % (ref 35–47)
HGB BLD-MCNC: 11.7 G/DL (ref 11.7–15.7)
HGB UR QL STRIP: ABNORMAL
KETONES UR STRIP-MCNC: NEGATIVE MG/DL
LEUKOCYTE ESTERASE UR QL STRIP: NEGATIVE
LYMPHOCYTES # BLD AUTO: 0.3 10E9/L (ref 0.8–5.3)
LYMPHOCYTES NFR BLD AUTO: 4 %
MCH RBC QN AUTO: 26.9 PG (ref 26.5–33)
MCHC RBC AUTO-ENTMCNC: 32.5 G/DL (ref 31.5–36.5)
MCV RBC AUTO: 83 FL (ref 78–100)
MONOCYTES # BLD AUTO: 0.3 10E9/L (ref 0–1.3)
MONOCYTES NFR BLD AUTO: 3.7 %
NEUTROPHILS # BLD AUTO: 6.5 10E9/L (ref 1.6–8.3)
NEUTROPHILS NFR BLD AUTO: 91.9 %
NITRATE UR QL: NEGATIVE
NON-SQ EPI CELLS #/AREA URNS LPF: ABNORMAL /LPF
PH UR STRIP: 5.5 PH (ref 5–7)
PLATELET # BLD AUTO: 224 10E9/L (ref 150–450)
PROT UR-MCNC: 0.07 G/L
PROT/CREAT 24H UR: 0.29 G/G CR (ref 0–0.2)
RBC # BLD AUTO: 4.35 10E12/L (ref 3.8–5.2)
RBC #/AREA URNS AUTO: ABNORMAL /HPF
SOURCE: ABNORMAL
SP GR UR STRIP: 1.01 (ref 1–1.03)
UROBILINOGEN UR STRIP-ACNC: 0.2 EU/DL (ref 0.2–1)
WBC # BLD AUTO: 7 10E9/L (ref 4–11)
WBC #/AREA URNS AUTO: ABNORMAL /HPF

## 2018-07-07 PROCEDURE — 82040 ASSAY OF SERUM ALBUMIN: CPT | Performed by: INTERNAL MEDICINE

## 2018-07-07 PROCEDURE — 84450 TRANSFERASE (AST) (SGOT): CPT | Mod: 59 | Performed by: INTERNAL MEDICINE

## 2018-07-07 PROCEDURE — 85025 COMPLETE CBC W/AUTO DIFF WBC: CPT | Performed by: INTERNAL MEDICINE

## 2018-07-07 PROCEDURE — 84156 ASSAY OF PROTEIN URINE: CPT | Performed by: INTERNAL MEDICINE

## 2018-07-07 PROCEDURE — 86225 DNA ANTIBODY NATIVE: CPT | Performed by: INTERNAL MEDICINE

## 2018-07-07 PROCEDURE — 36415 COLL VENOUS BLD VENIPUNCTURE: CPT | Performed by: INTERNAL MEDICINE

## 2018-07-07 PROCEDURE — 86140 C-REACTIVE PROTEIN: CPT | Performed by: INTERNAL MEDICINE

## 2018-07-07 PROCEDURE — 85652 RBC SED RATE AUTOMATED: CPT | Performed by: INTERNAL MEDICINE

## 2018-07-07 PROCEDURE — 82565 ASSAY OF CREATININE: CPT | Performed by: INTERNAL MEDICINE

## 2018-07-07 PROCEDURE — 86160 COMPLEMENT ANTIGEN: CPT | Performed by: INTERNAL MEDICINE

## 2018-07-07 PROCEDURE — 84460 ALANINE AMINO (ALT) (SGPT): CPT | Performed by: INTERNAL MEDICINE

## 2018-07-07 PROCEDURE — 81001 URINALYSIS AUTO W/SCOPE: CPT | Performed by: INTERNAL MEDICINE

## 2018-07-07 NOTE — LETTER
Patient:  Taina Singh  :   1965  MRN:     [id number]        Ms.Kelly Singh  [address] [place] [id number]        2018    Dear ,    We are writing to inform you of your test results. Unchanged labs (as we discussed).      Results for orders placed or performed in visit on 18   Albumin level   Result Value Ref Range    Albumin 3.5 3.4 - 5.0 g/dL   ALT   Result Value Ref Range    ALT 27 0 - 50 U/L   AST   Result Value Ref Range    AST 21 0 - 45 U/L   CBC with platelets differential   Result Value Ref Range    WBC 7.0 4.0 - 11.0 10e9/L    RBC Count 4.35 3.8 - 5.2 10e12/L    Hemoglobin 11.7 11.7 - 15.7 g/dL    Hematocrit 36.0 35.0 - 47.0 %    MCV 83 78 - 100 fl    MCH 26.9 26.5 - 33.0 pg    MCHC 32.5 31.5 - 36.5 g/dL    RDW 15.3 (H) 10.0 - 15.0 %    Platelet Count 224 150 - 450 10e9/L    Diff Method Automated Method     % Neutrophils 91.9 %    % Lymphocytes 4.0 %    % Monocytes 3.7 %    % Eosinophils 0.3 %    % Basophils 0.1 %    Absolute Neutrophil 6.5 1.6 - 8.3 10e9/L    Absolute Lymphocytes 0.3 (L) 0.8 - 5.3 10e9/L    Absolute Monocytes 0.3 0.0 - 1.3 10e9/L    Absolute Eosinophils 0.0 0.0 - 0.7 10e9/L    Absolute Basophils 0.0 0.0 - 0.2 10e9/L   Creatinine   Result Value Ref Range    Creatinine 0.63 0.52 - 1.04 mg/dL    GFR Estimate >90 >60 mL/min/1.7m2    GFR Estimate If Black >90 >60 mL/min/1.7m2   Complement C3   Result Value Ref Range    Complement C3 51 (L) 76 - 169 mg/dL   Complement C4   Result Value Ref Range    Complement C4 5 (L) 15 - 50 mg/dL   Creatinine random urine   Result Value Ref Range    Creatinine Urine Random 24 mg/dL   CRP inflammation   Result Value Ref Range    CRP Inflammation 11.2 (H) 0.0 - 8.0 mg/L   DNA double stranded antibodies   Result Value Ref Range    DNA-ds >379 (H) <10 IU/mL   Protein  random urine with Creat Ratio   Result Value Ref Range    Protein Random Urine 0.07 g/L    Protein Total Urine g/gr Creatinine 0.29 (H) 0 - 0.2 g/g Cr    Erythrocyte sedimentation rate auto   Result Value Ref Range    Sed Rate 35 (H) 0 - 30 mm/h   UA with Microscopic   Result Value Ref Range    Color Urine Yellow     Appearance Urine Clear     Glucose Urine Negative NEG^Negative mg/dL    Bilirubin Urine Negative NEG^Negative    Ketones Urine Negative NEG^Negative mg/dL    Specific Gravity Urine 1.010 1.003 - 1.035    pH Urine 5.5 5.0 - 7.0 pH    Protein Albumin Urine Negative NEG^Negative mg/dL    Urobilinogen Urine 0.2 0.2 - 1.0 EU/dL    Nitrite Urine Negative NEG^Negative    Blood Urine Trace (A) NEG^Negative    Leukocyte Esterase Urine Negative NEG^Negative    Source Midstream Urine     WBC Urine 0 - 5 OTO5^0 - 5 /HPF    RBC Urine O - 2 OTO2^O - 2 /HPF    Squamous Epithelial /LPF Urine Few FEW^Few /LPF   Creatinine urine calculation only   Result Value Ref Range    Creatinine Urine 23 mg/dL       Killian Marroquin MD

## 2018-07-09 LAB
ALBUMIN SERPL-MCNC: 3.5 G/DL (ref 3.4–5)
ALT SERPL W P-5'-P-CCNC: 27 U/L (ref 0–50)
AST SERPL W P-5'-P-CCNC: 21 U/L (ref 0–45)
C3 SERPL-MCNC: 51 MG/DL (ref 76–169)
C4 SERPL-MCNC: 5 MG/DL (ref 15–50)
CREAT SERPL-MCNC: 0.63 MG/DL (ref 0.52–1.04)
CREAT UR-MCNC: 24 MG/DL
CRP SERPL-MCNC: 11.2 MG/L (ref 0–8)
DSDNA AB SER-ACNC: >379 IU/ML
GFR SERPL CREATININE-BSD FRML MDRD: >90 ML/MIN/1.7M2

## 2018-07-11 ENCOUNTER — OFFICE VISIT (OUTPATIENT)
Dept: RHEUMATOLOGY | Facility: CLINIC | Age: 53
End: 2018-07-11
Attending: INTERNAL MEDICINE
Payer: COMMERCIAL

## 2018-07-11 VITALS
OXYGEN SATURATION: 96 % | HEIGHT: 66 IN | SYSTOLIC BLOOD PRESSURE: 129 MMHG | BODY MASS INDEX: 44.03 KG/M2 | DIASTOLIC BLOOD PRESSURE: 80 MMHG | HEART RATE: 79 BPM | WEIGHT: 274 LBS

## 2018-07-11 DIAGNOSIS — M32.9 SYSTEMIC LUPUS ERYTHEMATOSUS, UNSPECIFIED SLE TYPE, UNSPECIFIED ORGAN INVOLVEMENT STATUS (H): Primary | ICD-10-CM

## 2018-07-11 DIAGNOSIS — Z23 ENCOUNTER FOR IMMUNIZATION: ICD-10-CM

## 2018-07-11 PROCEDURE — 90471 IMMUNIZATION ADMIN: CPT | Mod: ZF

## 2018-07-11 PROCEDURE — 25000125 ZZHC RX 250: Mod: ZF | Performed by: INTERNAL MEDICINE

## 2018-07-11 PROCEDURE — 90750 HZV VACC RECOMBINANT IM: CPT | Mod: ZF | Performed by: INTERNAL MEDICINE

## 2018-07-11 PROCEDURE — G0463 HOSPITAL OUTPT CLINIC VISIT: HCPCS

## 2018-07-11 RX ORDER — PREDNISONE 5 MG/1
TABLET ORAL
Qty: 30 TABLET | Refills: 0
Start: 2018-07-11 | End: 2018-11-16

## 2018-07-11 RX ADMIN — ZOSTER VACCINE RECOMBINANT, ADJUVANTED 0.5 ML: KIT at 13:51

## 2018-07-11 ASSESSMENT — PAIN SCALES - GENERAL: PAINLEVEL: NO PAIN (0)

## 2018-07-11 NOTE — MR AVS SNAPSHOT
After Visit Summary   7/11/2018    Taina Singh    MRN: [id number]           Patient Information     Date Of Birth          1965        Visit Information        Provider Department      7/11/2018 12:00 PM Killian Marroquin MD [facility] Rheumatology        Today's Diagnoses     Systemic lupus erythematosus, unspecified SLE type, unspecified organ involvement status (H)    -  1    Encounter for immunization          Care Instructions    Preventive Care:    Diabetic Eye Exam Screening: During our visit today, we discussed that it is recommended you receive diabetic eye exam screening. Please call or make an appointment with your primary care provider to discuss this with them. You may also call the [facility] scheduling line ([phone]) to set up an eye exam at one of the [facility]ics.    Stay on cellcept and plaquenil    Taper prednisone to 15 mg a day x one week then 10 mg a day and stay on that dose    Will wait for Dr. Marvin's opinion regarding adding rituximab, or benlysta    Labs on 8/24/2018    Shingrix today    Return in 3 months          Follow-ups after your visit        Follow-up notes from your care team     Return in about 3 months (around 10/11/2018).      Your next 10 appointments already scheduled     Aug 24, 2018  7:30 AM CDT   SIX MINUTE WALK with UC PFL B, UC PFL C   [facility] Pulmonary Function Testing ([facility])    [address] [place] [id number]   [phone]            Aug 24, 2018  9:00 AM CDT   (Arrive by 8:45 AM)   New Interstitial Lung with Joanne Marvin MD   [facility] ([facility])    [address] [place] [id number]   [phone]            Nov 16, 2018  3:00 PM CST   (Arrive by 2:45 PM)   RETURN LUPUS with Killian Marroquin MD   [facility] Rheumatology ([facility])    [address] [place] [address]  [address]  [facility] [address]   [phone]              Future tests that were ordered for you today     Open Future Orders        Priority Expected Expires Ordered    ALT Routine  7/11/2019 7/11/2018    Albumin level Routine  7/11/2019 7/11/2018    AST Routine  7/11/2019 7/11/2018    CBC with platelets differential Routine  7/11/2019 7/11/2018    Complement C3 Routine  7/11/2019 7/11/2018    Complement C4 Routine  7/11/2019 7/11/2018    Creatinine Routine  7/11/2019 7/11/2018    CRP inflammation Routine  7/11/2019 7/11/2018    Erythrocyte sedimentation rate auto Routine  7/11/2019 7/11/2018    DNA double stranded antibodies Routine  7/11/2019 7/11/2018    Routine UA with micro reflex to culture Routine  7/11/2019 7/11/2018    Protein  random urine with Creat Ratio Routine  7/11/2019 7/11/2018    Creatinine random urine Routine  7/11/2019 7/11/2018            Who to contact     If you have questions or need follow up information about today's clinic visit or your schedule please contact [facility] RHEUMATOLOGY directly at [phone].  Normal or non-critical lab and imaging results will be communicated to you by Kivahart, letter or phone within 4 business days after the clinic has received the results. If you do not hear from us within 7 days, please contact the clinic through [employer] or phone. If you have a critical or abnormal lab result, we will notify you by phone as soon as possible.  Submit refill requests through [email or web address] or call your pharmacy and they will forward the refill request to us. Please allow 3 business days for your refill to be completed.          Additional Information About Your Visit        Kivahart Information     [email or web address] gives you secure access to your electronic health record. If you see a primary care provider, you can also send messages to your care team and make appointments. If you have questions, please call your primary care clinic.  If you do not have a primary care provider, please  "call [phone] and they will assist you.        Care EveryWhere ID     This is your Care EveryWhere ID. This could be used by other organizations to access your [facility] medical records  [id number]        Your Vitals Were     Pulse Height Pulse Oximetry BMI (Body Mass Index)          79 1.676 m (5' 6\") 96% 44.22 kg/m2         Blood Pressure from Last 3 Encounters:   07/11/18 129/80   05/25/18 131/77   04/04/18 121/77    Weight from Last 3 Encounters:   07/11/18 124.3 kg (274 lb)   05/25/18 124 kg (273 lb 6.4 oz)   04/04/18 123.7 kg (272 lb 12.8 oz)                 Today's Medication Changes          These changes are accurate as of 7/11/18  1:40 PM.  If you have any questions, ask your nurse or doctor.               These medicines have changed or have updated prescriptions.        Dose/Directions    predniSONE 5 MG tablet   Commonly known as:  DELTASONE   This may have changed:    - how much to take  - how to take this  - when to take this  - additional instructions  - Another medication with the same name was removed. Continue taking this medication, and follow the directions you see here.   Used for:  Systemic lupus erythematosus, unspecified SLE type, unspecified organ involvement status (H)   Changed by:  Killian Marroquin MD        15 mg a day x 7 days then 10 mg a day   Quantity:  30 tablet   Refills:  0            Where to get your medicines      Some of these will need a paper prescription and others can be bought over the counter.  Ask your nurse if you have questions.     You don't need a prescription for these medications     predniSONE 5 MG tablet                Primary Care Provider Fax #    Physician No Ref-Primary [phone]       No address on file        Equal Access to Services     BLAYNE BAKER : roby Erazo qaybta kaalmada adeegyada, waxay idiin hayaan adeeg kharash la'aan ah. So [facility] [phone].    ATENCIÓN: Si habla español, tiene a vyas disposición " servicios gratuitos de asistencia lingüística. Gerry perez [phone].    We comply with applicable federal civil rights laws and [place] laws. We do not discriminate on the basis of race, color, national origin, age, disability, sex, sexual orientation, or gender identity.            Thank you!     Thank you for choosing [facility] RHEUMATOLOGY  for your care. Our goal is always to provide you with excellent care. Hearing back from our patients is one way we can continue to improve our services. Please take a few minutes to complete the written survey that you may receive in the mail after your visit with us. Thank you!             Your Updated Medication List - Protect others around you: Learn how to safely use, store and throw away your medicines at www.disposemymeds.org.          This list is accurate as of 7/11/18  1:40 PM.  Always use your most recent med list.                   Brand Name Dispense Instructions for use Diagnosis    Calcium-Magnesium 300-300 MG Tabs      daily        D 1000 1000 units Caps      Take 1,000 Units by mouth daily        hydroxychloroquine 200 MG tablet    PLAQUENIL     Take 200 mg by mouth 2 times daily        mycophenolate 500 MG tablet    GENERIC EQUIVALENT    180 tablet    Take 3 tablets (1,500 mg) by mouth 2 times daily    Lupus erythematosus, unspecified form       Omega-3 Fish Oil 500 MG Caps      Take 500 mg by mouth daily        pilocarpine 5 MG tablet    SALAGEN    120 tablet    Take 1 tablet (5 mg) by mouth 4 times daily as needed    Systemic lupus erythematosus, unspecified SLE type, unspecified organ involvement status (H)       predniSONE 5 MG tablet    DELTASONE    30 tablet    15 mg a day x 7 days then 10 mg a day    Systemic lupus erythematosus, unspecified SLE type, unspecified organ involvement status (H)       traMADol 50 MG tablet    ULTRAM    30 tablet    Take 1 tablet (50 mg) by mouth every 12 hours as needed for pain Take 1 tablet as needed for pain.  No  driving or alcohol use while taking medication.    Systemic lupus erythematosus, unspecified SLE type, unspecified organ involvement status (H)       WOMENS MULTI PO      Take by mouth daily

## 2018-07-11 NOTE — PROGRESS NOTES
Rheumatology F/U Note    Reason for visit: SLE, ongoing pleuritic CP    Date of last visit: 5/25/2018    DOS: 7/11/2018      HPI:    Taina Singh is a 52 year old  female who was referred to our clinic for evaluation and management of her SLE.    She was diagnosed with lupus at age 27. Her 1st sx were malar rash and fatigue. No skin biopsy was done (reportedly had +KARLIE). She was treated with prednisone taper and HCQ. HCQ helped and she tolerated it well. She was diagnosed with Sjogren's at the same time. Had dryness of eyes and mouth. No lip biopsy to confirm the Dx.    Reportedly she had very mild stable lupus for many years and eventually at some point, stopped taking HCQ (can't remember when)    She was diagnosed with MALT lymphoma at 44, had enlarged LN over R parotid gland, on biopsy it was MALT lymphoma. Tx was resection only, no radiation or chemo.    About 3 yr ago, had flu shot and 2 days later, developed pleuritic CP and was seen at urgent care. That's why she does not want to get flu shot. She was seen in ER 2 days after UC visit. She was treated with prednisone.    She lost 100 lbs by exercise over past 2 yr, felt amazing. In 7/2017, started not feeling well. She had extreme fatigue. Had AM stiffness and pain over hands. Touched base with her previous rheumatologist (Dr. Rangel) and was told to have lupus flare and was put on  mg qd. Afterwards, developed pleuritic CP which has not been resolved.    She was given prednisone 40 mg qd x 5 days (on 12/16/2017) by pulmonologist in [facility]. It helped a lot but pleuritic CP recurred after stopping it.    She was told to have pulm HTN and was evaluated for it.    No triggers for current flare.    No flare of malar rash. No current hair loss. Uses blink eyedrops, restasis burned her eyes. She has been prescribed salagen for dry mouth, has not used it. Arthritis of hands have resolved on HCQ.    Last HCQ eye exam was 1 week ago.    4/2018: On AZA  50 mg qd since 2/8/2018, increased to 100 mg qd in 3/19/2018. Her arthralgia is intermittent and mild, it's better. Has fatigue.  5/2018: Started on mycophenalate 1500 mg, continues prednisone 30 mg and plaquenil 200 mg twice a day.     Her major complaint is continues pressure over her chest. Pain is pleuritic, it hurts by sneezing, taking deep breath.      Today: Ms. Singh feels an improvement in her symptoms. She says there is a baseline pleuritic chest pain, but her fatigue and overall day-to-day function has improved to her satisfaction. She says morning stiffness usually only lasts about 10 minutes. She complains of occasional episodes of flashing lights in her left eye, however she is being seen by her opthamologist. She has a OTC scheduled for Monday as well. Ms. Singh feels as though the mycophenalate has made the biggest difference in her improvement. She continues to taper the prednisone, currently on 20 mg daily. She also has continued the plaquenil 200 mg twice a day.     ROS:  A comprehensive ROS was done, positives are per HPI.  General: fatigue improving   Eyes: Left eye has episodes of flashing lights that self resolve  ENT: Occasional running nose. No epistaxis, sinus pressure/pain, tinnitus  CV: Continues to have pleuritic chest pain, some SOB with exertion  Resp: No cough, sputum production, wheezing, SOB  GI: No N/V/D/C, abdominal pain  : No polyuria, dysuria, hematuria, incontinence  MSK: Joint pain has improved. No functional deficets or joint swelling  Integumentary: Skin breakdown on the corner of her mouths, but no rash, dryness, lesions, wounds  Neurological: No weakness, balance, seizures, mental function issues    Past Medical History:   Diagnosis Date     Diastolic dysfunction 2/13/2018     Gluten intolerance     Patient is not gluten intolerant     Iron deficiency      Iron deficiency 2/13/2018     Lupus      MALT lymphoma (H) age 44     MALT lymphoma (H) 2/13/2018     Other  forms of systemic lupus erythematosus (H) 2018     Sicca syndrome (H) 2018     Sjogren's syndrome (H)      Sjogren's syndrome (H) 2018     Systemic lupus erythematosus (H) 2018     Vitamin D deficiency      Past Surgical History:   Procedure Laterality Date     BIOPSY/REMOVAL, LYMPH NODE(S)        SECTION  age 32     HC HYSTEROS W PERMANENT FALLOPAIN IMPLANT       PAROTIDECTOMY       TONSILLECTOMY       Family History   Problem Relation Age of Onset     Alopecia Brother      Rheumatoid Arthritis Brother      Social History     Social History     Marital status:      Spouse name: N/A     Number of children: N/A     Years of education: 1     Occupational History           , 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    Allergies   Allergen Reactions     Penicillins Rash     Sulfa Drugs Rash       Outpatient Encounter Prescriptions as of 2018   Medication Sig Dispense Refill     Calcium-Magnesium 300-300 MG TABS daily        Cholecalciferol (D 1000) 1000 UNITS CAPS Take 1,000 Units by mouth daily        hydroxychloroquine (PLAQUENIL) 200 MG tablet Take 200 mg by mouth 2 times daily        Multiple Vitamins-Minerals (WOMENS MULTI PO) Take by mouth daily        mycophenolate (GENERIC EQUIVALENT) 500 MG tablet Take 3 tablets (1,500 mg) by mouth 2 times daily 180 tablet 2     Omega-3 Fatty Acids (OMEGA-3 FISH OIL) 500 MG CAPS Take 500 mg by mouth daily        pilocarpine (SALAGEN) 5 MG tablet Take 1 tablet (5 mg) by mouth 4 times daily as needed 120 tablet 11     predniSONE (DELTASONE) 5 MG tablet 15 mg a day x 7 days then 10 mg a day 30 tablet 0     traMADol (ULTRAM) 50 MG tablet Take 1 tablet (50 mg) by  mouth every 12 hours as needed for pain Take 1 tablet as needed for pain.  No driving or alcohol use while taking medication. 30 tablet 2     [DISCONTINUED] predniSONE (DELTASONE) 20 MG tablet Take 1.5 tablets (30 mg) by mouth daily 45 tablet 3     [DISCONTINUED] predniSONE (DELTASONE) 5 MG tablet Take 6 tablets (30 mg) by mouth daily 60 tablet 3     Facility-Administered Encounter Medications as of 2018   Medication Dose Route Frequency Provider Last Rate Last Dose     [COMPLETED] zoster vaccine recombinant adjuvanted (SHINGRIX) injection 0.5 mL  0.5 mL Intramuscular Once Killian Marroquin MD   0.5 mL at 18 1351         Her records were reviewed.    2D-Echo 2017:    ECHO COMPLETE WO CONTRAST CHILANGO GIBBONS 2017 14:43     [facility] [place] [id number], [address], [place] [address]   Main: [phone]  [email or web address]                                                 Transthoracic Echo Report   EDWIGE CHILANGO   Excellian ID: [id number] Age: 52 : 1965 Ordering Provider: NGUYEN PETERS   Exam Date: 2017 14:43 Gender: F Sonographer: BDB   Accession #: [id number] Height: 66 in BSA: 2.24 m  BP: 108 / 62   Weight: 261 lbs BMI: 42.1 kg/m          Site: [facility]   Location: Outpatient Rhythm: Normal Sinus Rhythm   Procedure Components: 2D imaging, Color Doppler, Spectral Doppler   Indications: Murmur; Systemic lupus erythematosus, unspecified SLE type, unspecified organ   involvement status   Technical Quality: Contrast: None     Final Conclusion   Visually estimated ejection fraction is 55-60%.   Mild left ventricular hypertrophy.   Estimated pulmonary artery pressure of 31 mmHg + RA pressure.   Dilated IVC size, <50% collapse with respiration.   Estimated EF: 55-60%   FINDINGS   Left Ventricle Normal left ventricular size. Visually estimated ejection fraction is 55-60%.   Mild left ventricular hypertrophy.    "Diastolic Function \"Pseudonormal\" left ventricular filling pattern. E/e' ratio 8-15 is   indeterminate for filling pressure.   Right Ventricle Normal right ventricular size and function.   Left Atrium Mild left atrial enlargement.   Right Atrium Mild right atrial enlargement.   Aortic Valve Normal aortic valve. No aortic stenosis. No significant aortic regurgitation.   Mitral Valve Normal mitral valve. No mitral stenosis. Mild mitral regurgitation.   Tricuspid Valve Normal tricuspid valve. No tricuspid stenosis. Mild tricuspid regurgitation.   Estimated pulmonary artery pressure   of 31 mmHg + RA pressure.   Pulmonic Valve Normal pulmonic valve without significant stenosis or regurgitation.   Pericardium Normal pericardium.   Aorta Measured aortic root diameter is normal in size.   Inferior Vena Cava Dilated IVC size, <50% collapse with respiration.    Component      Latest Ref Rng & Units 11/20/2017   Sodium      133 - 144 mmol/L 137   Potassium      3.4 - 5.3 mmol/L 4.2   Chloride      94 - 109 mmol/L 102   Carbon Dioxide      20 - 32 mmol/L 30   Anion Gap      3 - 14 mmol/L 6   Glucose      70 - 99 mg/dL 101 (H)   Urea Nitrogen      7 - 30 mg/dL 13   Creatinine      0.52 - 1.04 mg/dL 0.55   GFR Estimate      >60 mL/min/1.7m2 >90   GFR Estimate If Black      >60 mL/min/1.7m2 >90   Calcium      8.5 - 10.1 mg/dL 9.1   WBC      4.0 - 11.0 10e9/L 4.9   RBC Count      3.8 - 5.2 10e12/L 4.28   Hemoglobin      11.7 - 15.7 g/dL 11.3 (L)   Hematocrit      35.0 - 47.0 % 35.5   MCV      78 - 100 fl 83   MCH      26.5 - 33.0 pg 26.4 (L)   MCHC      31.5 - 36.5 g/dL 31.8   RDW      10.0 - 15.0 % 14.6   Platelet Count      150 - 450 10e9/L 215   N-Terminal Pro Bnp      0 - 125 pg/mL 546 (H)   Sed Rate      0 - 30 mm/h 71 (H)     Component      Latest Ref Rng & Units 12/29/2017   Color Urine       Straw   Appearance Urine       Clear   Glucose Urine      NEG:Negative mg/dL Negative   Bilirubin Urine      NEG:Negative Negative "   Ketones Urine      NEG:Negative mg/dL Negative   Specific Gravity Urine      1.003 - 1.035 1.005   Blood Urine      NEG:Negative Negative   pH Urine      5.0 - 7.0 pH 6.0   Protein Albumin Urine      NEG:Negative mg/dL Negative   Urobilinogen mg/dL      0.0 - 2.0 mg/dL 0.0   Nitrite Urine      NEG:Negative Negative   Leukocyte Esterase Urine      NEG:Negative Negative   Source       Midstream Urine   WBC Urine      0 - 2 /HPF <1   RBC Urine      0 - 2 /HPF <1   Bacteria Urine      NEG:Negative /HPF Few (A)   Squamous Epithelial /HPF Urine      0 - 1 /HPF <1   Mucous Urine      NEG:Negative /LPF Present (A)   Albumin Fraction      3.7 - 5.1 g/dL 4.2   Alpha 1 Fraction      0.2 - 0.4 g/dL 0.4   Alpha 2 Fraction      0.5 - 0.9 g/dL 0.8   Beta Fraction      0.6 - 1.0 g/dL 1.0   Gamma Fraction      0.7 - 1.6 g/dL 1.6   Monoclonal Peak      0.0 g/dL 0.0   ELP Interpretation:       Essentially normal electrophoretic pattern. No monoclonal protein seen. Pathologic . . .   IGG      695 - 1620 mg/dL 1560   IGA      70 - 380 mg/dL 473 (H)   IGM      60 - 265 mg/dL 92   Iron      35 - 180 ug/dL 58   Iron Binding Cap      240 - 430 ug/dL 315   Iron Saturation Index      15 - 46 % 19   TPMT Genotype Specimen       Whole Blood   TPMT Genotype       Neg/Neg   TPMT Predicted Phenotype       Normal   Protein Random Urine      g/L <0.05   Protein Total Urine g/gr Creatinine      0 - 0.2 g/g Cr Unable to calculate due to low value   Deamidated Gliadin Cari, IgA      <7 U/mL 2   Deamidated Gliadin Cari, IgG      <7 U/mL 5   Complement C3      76 - 169 mg/dL 72 (L)   Complement C4      15 - 50 mg/dL 6 (L)   CRP Inflammation      0.0 - 8.0 mg/L 3.2   DNA-ds      <10 IU/mL >379 (H)   Sed Rate      0 - 30 mm/h 49 (H)   Vitamin D Deficiency screening      20 - 75 ug/L 51   Creatinine Urine Random      mg/dL 23   AST      0 - 45 U/L 26   ALT      0 - 50 U/L 35   HEENA  Broad Spectrum       Neg   Beta 2 Glycoprotein 1 Antibody IgG      <7 U/mL  <0.6   Beta 2 Glycoprotein 1 Antibody IgM      <7 U/mL <0.9   Thyroid Peroxidase Antibody      <35 IU/mL <10   Thyroglobulin Antibody      <40 IU/mL <20   TSH      0.40 - 4.00 mU/L 0.87   Cryoglobulin      NEG:Negative % Trace (A)   Tissue Transglutaminase Antibody IgA      <7 U/mL 4   Ferritin      8 - 252 ng/mL 163   Endomysial Antibody IgA by IFA      <1:10 <1:10   CRP Cardiac Risk      mg/L 2.9   Creatinine Urine      mg/dL 23     Component      Latest Ref Rng & Units 2/23/2018   Color Urine       Yellow   Appearance Urine       Clear   Glucose Urine      NEG:Negative mg/dL Negative   Bilirubin Urine      NEG:Negative Negative   Ketones Urine      NEG:Negative mg/dL Negative   Specific Gravity Urine      1.003 - 1.035 1.025   Blood Urine      NEG:Negative Negative   pH Urine      5.0 - 7.0 pH 5.5   Protein Albumin Urine      NEG:Negative mg/dL Negative   Urobilinogen Urine      0.2 - 1.0 EU/dL 0.2   Nitrite Urine      NEG:Negative Negative   Leukocyte Esterase Urine      NEG:Negative Negative   Source       Midstream Urine   Protein Random Urine      g/L 0.17   Protein Total Urine g/gr Creatinine      0 - 0.2 g/g Cr 0.18   Complement C3      76 - 169 mg/dL 64 (L)   Complement C4      15 - 50 mg/dL 5 (L)   CRP Inflammation      0.0 - 8.0 mg/L 4.2   DNA-ds      <10 IU/mL >379 (H)   Sed Rate      0 - 30 mm/h 35 (H)   AST      0 - 45 U/L 27   ALT      0 - 50 U/L 31   Creatinine Urine Random      mg/dL 99     Component      Latest Ref Rng & Units 3/16/2018   WBC      4.0 - 11.0 10e9/L 5.6   RBC Count      3.8 - 5.2 10e12/L 4.43   Hemoglobin      11.7 - 15.7 g/dL 12.1   Hematocrit      35.0 - 47.0 % 36.9   MCV      78 - 100 fl 83   MCH      26.5 - 33.0 pg 27.3   MCHC      31.5 - 36.5 g/dL 32.8   RDW      10.0 - 15.0 % 14.5   Platelet Count      150 - 450 10e9/L 224   Diff Method       Automated Method   % Neutrophils      % 81.7   % Lymphocytes      % 9.3   % Monocytes      % 7.5   % Eosinophils      % 1.3   %  Basophils      % 0.2   Absolute Neutrophil      1.6 - 8.3 10e9/L 4.6   Absolute Lymphocytes      0.8 - 5.3 10e9/L 0.5 (L)   Absolute Monocytes      0.0 - 1.3 10e9/L 0.4   Absolute Eosinophils      0.0 - 0.7 10e9/L 0.1   Absolute Basophils      0.0 - 0.2 10e9/L 0.0   Creatinine      0.52 - 1.04 mg/dL 0.51 (L)   GFR Estimate      >60 mL/min/1.7m2 >90   GFR Estimate If Black      >60 mL/min/1.7m2 >90   ALT      0 - 50 U/L 27   Albumin      3.4 - 5.0 g/dL 3.5   AST      0 - 45 U/L 18       Component      Latest Ref Rng & Units 3/21/2018   Color Urine       Yellow   Appearance Urine       Clear   Glucose Urine      NEG:Negative mg/dL Negative   Bilirubin Urine      NEG:Negative Negative   Ketones Urine      NEG:Negative mg/dL Negative   Specific Gravity Urine      1.003 - 1.035 <=1.005   pH Urine      5.0 - 7.0 pH 6.5   Protein Albumin Urine      NEG:Negative mg/dL Negative   Urobilinogen Urine      0.2 - 1.0 EU/dL 0.2   Nitrite Urine      NEG:Negative Negative   Blood Urine      NEG:Negative Negative   Leukocyte Esterase Urine      NEG:Negative Negative   Source       Midstream Urine   WBC Urine      OTO5:0 - 5 /HPF 0 - 5   RBC Urine      OTO2:O - 2 /HPF O - 2   Squamous Epithelial /LPF Urine      FEW:Few /LPF Few   Protein Random Urine      g/L <0.05   Protein Total Urine g/gr Creatinine      0 - 0.2 g/g Cr Unable to calculate due to low value   DNA-ds      <10 IU/mL >379 (H)   Complement C4      15 - 50 mg/dL 4 (L)   Complement C3      76 - 169 mg/dL 69 (L)   Creatinine Urine Random      mg/dL 17   Creatinine Urine      mg/dL 17     EXAMINATION: CT CHEST W/O CONTRAST, 12/29/2017 1:08 PM   TECHNIQUE:  Helical CT images from the thoracic inlet through the lung  bases were obtained without IV contrast during inspiration and  expiration according to high-resolution chest CT protocol. Contrast  dose: None  COMPARISON: None   HISTORY: eval for ILD, pleural effusion, pt has lupus, Sjogren's, h/o  MALT and pleurisy and SOB;  Systemic lupus erythematosus, unspecified  SLE type, unspecified organ involvement status (H)   FINDINGS:  At least 75% collapse of the trachea and mainstem bronchi on the end  expiratory views. Diffuse lobular air trapping on end expiratory  images. Bibasilar platelike atelectasis. No pneumothorax or pleural  effusion. Scattered solid pulmonary nodules, for example a 4 mm  lingular nodule (series 5 image 145) and a 4 mm right upper lobe  nodule (image 73).    The heart size is normal. Mild three-vessel coronary artery calcific  atherosclerosis. Dilation of the main pulmonary artery to 4.1 cm. No  pericardial effusion. Normal caliber and configuration of the thoracic  great vessels. Numerous prominent though nonenlarged mediastinal lymph  nodes.  Limited views of the abdomen reveal no worrisome pathology. No  worrisome bony or soft tissue lesions.    IMPRESSION:   1. At least moderate tracheobronchomalacia.  2. 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  3. Scattered subcentimeter pulmonary nodules measuring up to 4 mm.  Consider follow-up chest CT in one year to establish stability.     [Consider Follow Up: Lung nodule]     This report will be copied to the [facility] to ensure a  provider acknowledges the finding.      I have personally reviewed the examination and initial interpretation  and I agree with the findings.     AUSTIN PACE MD    Examination:NM LUNG SCAN VENTILATION AND PERFUSION, 3/14/2018 8:24 AM    Indication:  Chronic PE; Pulmonary hypertension    Additional Information: none   Technique:   The patient received 2 mCi of Tc-99m DTPA aerosol for ventilation and  7 mCi of Tc-99m MAA for perfusion. Multiple images were obtained of  the lungs in Anterior, posterior, HERNANDES, RPO, LPO, and  Mongolian projections.   Comparison: Chest x-ray same-day   Findings:     There are matched  ventilation/perfusion defects in both lungs. No  mismatched perfusion defect to suggest pulmonary emboli.      Impression:  Pulmonary emboli is not present.     I have personally reviewed the examination and initial interpretation  and I agree with the findings.     DONNIE GARCIA MD    Component      Latest Ref Rng & Units 5/12/2018   WBC      4.0 - 11.0 10e9/L 7.1   RBC Count      3.8 - 5.2 10e12/L 4.42   Hemoglobin      11.7 - 15.7 g/dL 12.1   Hematocrit      35.0 - 47.0 % 37.4   MCV      78 - 100 fl 85   MCH      26.5 - 33.0 pg 27.4   MCHC      31.5 - 36.5 g/dL 32.4   RDW      10.0 - 15.0 % 14.7   Platelet Count      150 - 450 10e9/L 226   Diff Method       Automated Method   % Neutrophils      % 86.2   % Lymphocytes      % 4.8   % Monocytes      % 8.4   % Eosinophils      % 0.3   % Basophils      % 0.3   Absolute Neutrophil      1.6 - 8.3 10e9/L 6.1   Absolute Lymphocytes      0.8 - 5.3 10e9/L 0.3 (L)   Absolute Monocytes      0.0 - 1.3 10e9/L 0.6   Absolute Eosinophils      0.0 - 0.7 10e9/L 0.0   Absolute Basophils      0.0 - 0.2 10e9/L 0.0   Color Urine       Yellow   Appearance Urine       Clear   Glucose Urine      NEG:Negative mg/dL Negative   Bilirubin Urine      NEG:Negative Negative   Ketones Urine      NEG:Negative mg/dL Negative   Specific Gravity Urine      1.003 - 1.035 <=1.005   Blood Urine      NEG:Negative Negative   pH Urine      5.0 - 7.0 pH 5.5   Protein Albumin Urine      NEG:Negative mg/dL Negative   Urobilinogen Urine      0.2 - 1.0 EU/dL 0.2   Nitrite Urine      NEG:Negative Negative   Leukocyte Esterase Urine      NEG:Negative Negative   Source       Midstream Urine   Creatinine      0.52 - 1.04 mg/dL 0.63   GFR Estimate      >60 mL/min/1.7m2 >90   GFR Estimate If Black      >60 mL/min/1.7m2 >90   Protein Random Urine      g/L <0.05   Protein Total Urine g/gr Creatinine      0 - 0.2 g/g Cr Unable to calculate due to low value   Albumin      3.4 - 5.0 g/dL 3.6   ALT      0 - 50 U/L 28    AST      0 - 45 U/L 20   Complement C3      76 - 169 mg/dL 46 (L)   Complement C4      15 - 50 mg/dL 3 (L)   CRP Inflammation      0.0 - 8.0 mg/L <2.9   Sed Rate      0 - 30 mm/h 26   DNA-ds      <10 IU/mL >379 (H)   Creatinine Urine      mg/dL 16     Component      Latest Ref Rng & Units 7/7/2018   WBC      4.0 - 11.0 10e9/L 7.0   RBC Count      3.8 - 5.2 10e12/L 4.35   Hemoglobin      11.7 - 15.7 g/dL 11.7   Hematocrit      35.0 - 47.0 % 36.0   MCV      78 - 100 fl 83   MCH      26.5 - 33.0 pg 26.9   MCHC      31.5 - 36.5 g/dL 32.5   RDW      10.0 - 15.0 % 15.3 (H)   Platelet Count      150 - 450 10e9/L 224   Diff Method       Automated Method   % Neutrophils      % 91.9   % Lymphocytes      % 4.0   % Monocytes      % 3.7   % Eosinophils      % 0.3   % Basophils      % 0.1   Absolute Neutrophil      1.6 - 8.3 10e9/L 6.5   Absolute Lymphocytes      0.8 - 5.3 10e9/L 0.3 (L)   Absolute Monocytes      0.0 - 1.3 10e9/L 0.3   Absolute Eosinophils      0.0 - 0.7 10e9/L 0.0   Absolute Basophils      0.0 - 0.2 10e9/L 0.0   Color Urine       Yellow   Appearance Urine       Clear   Glucose Urine      NEG:Negative mg/dL Negative   Bilirubin Urine      NEG:Negative Negative   Ketones Urine      NEG:Negative mg/dL Negative   Specific Gravity Urine      1.003 - 1.035 1.010   pH Urine      5.0 - 7.0 pH 5.5   Protein Albumin Urine      NEG:Negative mg/dL Negative   Urobilinogen Urine      0.2 - 1.0 EU/dL 0.2   Nitrite Urine      NEG:Negative Negative   Blood Urine      NEG:Negative Trace (A)   Leukocyte Esterase Urine      NEG:Negative Negative   Source       Midstream Urine   WBC Urine      OTO5:0 - 5 /HPF 0 - 5   RBC Urine      OTO2:O - 2 /HPF O - 2   Squamous Epithelial /LPF Urine      FEW:Few /LPF Few   Creatinine      0.52 - 1.04 mg/dL 0.63   GFR Estimate      >60 mL/min/1.7m2 >90   GFR Estimate If Black      >60 mL/min/1.7m2 >90   Protein Random Urine      g/L 0.07   Protein Total Urine g/gr Creatinine      0 - 0.2 g/g Cr  "0.29 (H)   Albumin      3.4 - 5.0 g/dL 3.5   ALT      0 - 50 U/L 27   AST      0 - 45 U/L 21   Complement C3      76 - 169 mg/dL 51 (L)   Complement C4      15 - 50 mg/dL 5 (L)   Creatinine Urine Random      mg/dL 24   CRP Inflammation      0.0 - 8.0 mg/L 11.2 (H)   DNA-ds      <10 IU/mL >379 (H)   Sed Rate      0 - 30 mm/h 35 (H)   Creatinine Urine      mg/dL 23     PFTs 5/2018 (The FEV1 and FVC are reduced but the FEV1/FVC ratio is normal.  The inspiratory flow rates are reduced.  The TLC and FRC are reduced.  The diffusing capacity is normal.  However, the diffusing capacity was not corrected for the patient's hemoglobin.  IMPRESSION:  Moderately Severe  Restriction.  Normal Diffusion.  Walk distance is normal on room air with significant desaturation but no hypoxia.  ____________________________________________KERVIN TONY.    Ph.E:    /80  Pulse 79  Ht 1.676 m (5' 6\")  Wt 124.3 kg (274 lb)  SpO2 96%  BMI 44.22 kg/m2      Constitutional: WD/WN. Pleasant. In no acute distress.   Eyes: EOM intact, PERRLA, sclera anicteric, conj not injected  HEENT: No oral ulcers or thrush. Normal salivary pool. Skin breakdown on the corner of her mouth  Neck: No cervical LAP or thyromegaly  Chest: Clear to auscultation bilaterally  CV: RRR, no murmurs/ rubs or gallops. No edema, clubbing or cyanosis.   GI: Abdomen is soft and non tender.   MS: No synovitis. Cool joints. No tenderness of the joints. No swelling. Normal ROM.  No joint deformities. Full ROM of the joints. No nodules. No Jaccoud's deformity.  Skin: No skin rash, malar rash, livedo, periungual erythema, alopecia, digital ulcers or nail changes.  Neuro: A&O x 3. Grossly non focal, muscular power 5/5 in all ext  Psych: NL affect and mood    Assessment/ plan:    1-SLE. 54 yo WF with +fh/o RA and personal hx of SLE presented in 12/2017 for 2nd opinion of m/o her SLE. She was diagnosed with SLE at age 27. Her lupus has been marked by +KARLIE (highest titer " 1:640, speckled and nucleolar patterns), +anti-DNA, arthritis, malar rash, serositis (pleuritic CP) supported by +SSA/SSB Ab, low C3/low C4, +RF, high ESR/CRP. She had neg anti-RNP, anti-Sm, acL IgM/G/A, LAC, cryo and anti-CCP.     Had NL C3 at the time of Dx. She was treated with prednisone and HCQ at the time of Dx. Can't remember how long she was on HCQ and why HCQ was stopped, but it probably was stopped because of stable disease, no report on HCQ toxicity. Reportedly her SLE was mild all these years.    Has secondary Sjogren's with sicca, +SSA/SSB Ab and h/o MALT lymphoma at age 44 in remission. Uses blink eyedrops and [place]. No lip biopsy was done to confirm Dx of Sjogren's.    Her lupus flared in 7/2017 with no triggers when she presented with arthritis, HCQ was resumed, arthritis resolved. Mild pulm HTN on 2D-Echo 8/2017 but neg R cardiac cath and VQ scan in 3/2018, also neg 2D-Echo.    In 12/2017, was prescribed prednisone 40 mg for only 5 days for pleuritic CP, CP recurred after stopping prednisone.     Her major complaint at initial visit with me in 12/2017 was ongoing CP. AZA was added in 2/2018 with start dose of 50 mg qd and slowly was increased to max 150 mg qd. Tolerated AZA well, no SEs, no toxicity on the labs but failed it and required to go back on prednisone 20 mg qd (current dose).     Her lupus is active with pleuritic CP. ESR/CRP normalized on prednisone+AZA+HCQ but +anti-DNA, low C3/C4 are unchanged. Chest CT in 12/2017 without contrast showed air trapping due to lupus/Sjogren's, no pleural effusion. Lung nodules need f/u in a year. No pericardail effusion on 2D-echo and neg VQ scan for PE in 3/2018 so chest CT with contrast is not needed. She is APL negative.    Lupus Dx and tx plan was discussed with her.    AZA was switched to  mg po bid on 5/18/2018 as she failed AZA. She is now on max dose of MMF 1500 mg bid. Her labs are unchanged with persistently elevated anti-DNA, low C3/C4  and high ESR/CRP, but just started to feel a lot better (regarding fatigue, arthralgia, still has residual pleuritic CP) after adding MMF and is wiling to taper her prednisone down.     She is awaiting an appointment with Dr. Marvin in pulmonary clinic in 8/2018. 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    Will give MMF more time to see if it helps with serology and wait for Dr. Marvin's opinion regarding next Tx step, adding rituximab (if bad lung disease) vs belimumamb (good candidate for this FDA approved drug given positive lupus serology, being  and expect improvement of fatigue/arthralgia/serositis and should allow tapering prednisone down)        Recommend:    Patient is to get repeat labs at the time of her pulmonologist follow up in 8/2018  Will follow up with pulmonary in regards to ILD.     Continue the plaquenil 200 mg twice a day as well as mycophenalate 1500 mg bid  Prednisone to be tapered down to 15mg for one week, then 10 mg until follow up with pulmonologist    2-HCQ monitoring. Was reminded to have eye exam    3-MMF monitoring. Labs q3mo, no toxicity    4-Shingrix vaccine. Recommended shingrix. CDC recommends this vaccine 2 doses,  by 2-6 months for adults 50 years and older. It is killed virus and ok to be used in immunocompromised patients. Shingrix reduces the risk of shingles and PHN by more than 90% in people 50 and older.     AE include: pain, redness and swelling at the inj site, myalgia, fatigue, headaches, shivering, fever and stomach upset.    2nd dose of shingrix today      RTC 3 months        Orders Placed This Encounter   Procedures     ALT     Albumin level     AST     CBC with platelets differential     Complement C3     Complement C4     Creatinine     CRP inflammation      Erythrocyte sedimentation rate auto     DNA double stranded antibodies     Routine UA with micro reflex to culture     Protein  random urine with Creat Ratio     Creatinine random urine       Seen and staffed with Adela De Santiago DO    PGY-III IM resident    Attending Note: I saw and evaluated the patient with Dr. Camarena. I agree with the assessment and plan.    Killian Marroquin MD

## 2018-07-11 NOTE — LETTER
7/11/2018      RE: Taina Singh  [address] [place] [address]       Rheumatology F/U Note    Reason for visit: SLE, ongoing pleuritic CP    Date of last visit: 5/25/2018    DOS: 7/11/2018      HPI:    Taina Singh is a 52 year old  female who was referred to our clinic for evaluation and management of her SLE.    She was diagnosed with lupus at age 27. Her 1st sx were malar rash and fatigue. No skin biopsy was done (reportedly had +KARLIE). She was treated with prednisone taper and HCQ. HCQ helped and she tolerated it well. She was diagnosed with Sjogren's at the same time. Had dryness of eyes and mouth. No lip biopsy to confirm the Dx.    Reportedly she had very mild stable lupus for many years and eventually at some point, stopped taking HCQ (can't remember when)    She was diagnosed with MALT lymphoma at 44, had enlarged LN over R parotid gland, on biopsy it was MALT lymphoma. Tx was resection only, no radiation or chemo.    About 3 yr ago, had flu shot and 2 days later, developed pleuritic CP and was seen at urgent care. That's why she does not want to get flu shot. She was seen in ER 2 days after UC visit. She was treated with prednisone.    She lost 100 lbs by exercise over past 2 yr, felt amazing. In 7/2017, started not feeling well. She had extreme fatigue. Had AM stiffness and pain over hands. Touched base with her previous rheumatologist (Dr. Rangel) and was told to have lupus flare and was put on  mg qd. Afterwards, developed pleuritic CP which has not been resolved.    She was given prednisone 40 mg qd x 5 days (on 12/16/2017) by pulmonologist in [facility]. It helped a lot but pleuritic CP recurred after stopping it.    She was told to have pulm HTN and was evaluated for it.    No triggers for current flare.    No flare of malar rash. No current hair loss. Uses blink eyedrops, restasis burned her eyes. She has been prescribed salagen for dry mouth, has not used it. Arthritis of hands  have resolved on HCQ.    Last HCQ eye exam was 1 week ago.    4/2018: On AZA 50 mg qd since 2/8/2018, increased to 100 mg qd in 3/19/2018. Her arthralgia is intermittent and mild, it's better. Has fatigue.  5/2018: Started on mycophenalate 1500 mg, continues prednisone 30 mg and plaquenil 200 mg twice a day.     Her major complaint is continues pressure over her chest. Pain is pleuritic, it hurts by sneezing, taking deep breath.      Today: Ms. Singh feels an improvement in her symptoms. She says there is a baseline pleuritic chest pain, but her fatigue and overall day-to-day function has improved to her satisfaction. She says morning stiffness usually only lasts about 10 minutes. She complains of occasional episodes of flashing lights in her left eye, however she is being seen by her opthamologist. She has a OTC scheduled for Monday as well. Ms. Singh feels as though the mycophenalate has made the biggest difference in her improvement. She continues to taper the prednisone, currently on 20 mg daily. She also has continued the plaquenil 200 mg twice a day.     ROS:  A comprehensive ROS was done, positives are per HPI.  General: fatigue improving   Eyes: Left eye has episodes of flashing lights that self resolve  ENT: Occasional running nose. No epistaxis, sinus pressure/pain, tinnitus  CV: Continues to have pleuritic chest pain, some SOB with exertion  Resp: No cough, sputum production, wheezing, SOB  GI: No N/V/D/C, abdominal pain  : No polyuria, dysuria, hematuria, incontinence  MSK: Joint pain has improved. No functional deficets or joint swelling  Integumentary: Skin breakdown on the corner of her mouths, but no rash, dryness, lesions, wounds  Neurological: No weakness, balance, seizures, mental function issues    Past Medical History:   Diagnosis Date     Diastolic dysfunction 2/13/2018     Gluten intolerance     Patient is not gluten intolerant     Iron deficiency      Iron deficiency 2/13/2018      Lupus      MALT lymphoma (H) age 44     MALT lymphoma (H) 2018     Other forms of systemic lupus erythematosus (H) 2018     Sicca syndrome (H) 2018     Sjogren's syndrome (H)      Sjogren's syndrome (H) 2018     Systemic lupus erythematosus (H) 2018     Vitamin D deficiency      Past Surgical History:   Procedure Laterality Date     BIOPSY/REMOVAL, LYMPH NODE(S)        SECTION  age 32     HC HYSTEROS W PERMANENT FALLOPAIN IMPLANT       PAROTIDECTOMY       TONSILLECTOMY       Family History   Problem Relation Age of Onset     Alopecia Brother      Rheumatoid Arthritis Brother      Social History     Social History     Marital status:      Spouse name: N/A     Number of children: N/A     Years of education: 1     Occupational History           , 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    Allergies   Allergen Reactions     Penicillins Rash     Sulfa Drugs Rash       Outpatient Encounter Prescriptions as of 2018   Medication Sig Dispense Refill     Calcium-Magnesium 300-300 MG TABS daily        Cholecalciferol (D 1000) 1000 UNITS CAPS Take 1,000 Units by mouth daily        hydroxychloroquine (PLAQUENIL) 200 MG tablet Take 200 mg by mouth 2 times daily        Multiple Vitamins-Minerals (WOMENS MULTI PO) Take by mouth daily        mycophenolate (GENERIC EQUIVALENT) 500 MG tablet Take 3 tablets (1,500 mg) by mouth 2 times daily 180 tablet 2     Omega-3 Fatty Acids (OMEGA-3 FISH OIL) 500 MG CAPS Take 500 mg by mouth daily        pilocarpine (SALAGEN) 5 MG tablet Take 1 tablet (5 mg) by mouth 4 times daily as needed 120 tablet 11     predniSONE (DELTASONE) 5 MG tablet 15 mg a day x 7 days then 10 mg a  day 30 tablet 0     traMADol (ULTRAM) 50 MG tablet Take 1 tablet (50 mg) by mouth every 12 hours as needed for pain Take 1 tablet as needed for pain.  No driving or alcohol use while taking medication. 30 tablet 2     [DISCONTINUED] predniSONE (DELTASONE) 20 MG tablet Take 1.5 tablets (30 mg) by mouth daily 45 tablet 3     [DISCONTINUED] predniSONE (DELTASONE) 5 MG tablet Take 6 tablets (30 mg) by mouth daily 60 tablet 3     Facility-Administered Encounter Medications as of 2018   Medication Dose Route Frequency Provider Last Rate Last Dose     [COMPLETED] zoster vaccine recombinant adjuvanted (SHINGRIX) injection 0.5 mL  0.5 mL Intramuscular Once Killian Marroquin MD   0.5 mL at 18 1351         Her records were reviewed.    2D-Echo 2017:    ECHO COMPLETE WO CONTRAST CHILANGO GIBBONS 2017 14:43     [facility] [place] [id number] [facility], [address], [place] [address]   Main: [phone]  [email or web address]                                                 Transthoracic Echo Report   CHILANGO GIBBONS   Excellian ID: [id number] Age: 52 : 1965 Ordering Provider: NGUYEN PETERS   Exam Date: 2017 14:43 Gender: F Sonographer: HAJA   Accession #: [id number] Height: 66 in BSA: 2.24 m  BP: 108 / 62   Weight: 261 lbs BMI: 42.1 kg/m          Site: [facility]   Location: Outpatient Rhythm: Normal Sinus Rhythm   Procedure Components: 2D imaging, Color Doppler, Spectral Doppler   Indications: Murmur; Systemic lupus erythematosus, unspecified SLE type, unspecified organ   involvement status   Technical Quality: Contrast: None     Final Conclusion   Visually estimated ejection fraction is 55-60%.   Mild left ventricular hypertrophy.   Estimated pulmonary artery pressure of 31 mmHg + RA pressure.   Dilated IVC size, <50% collapse with respiration.   Estimated EF: 55-60%   FINDINGS   Left Ventricle Normal left ventricular size. Visually estimated  "ejection fraction is 55-60%.   Mild left ventricular hypertrophy.   Diastolic Function \"Pseudonormal\" left ventricular filling pattern. E/e' ratio 8-15 is   indeterminate for filling pressure.   Right Ventricle Normal right ventricular size and function.   Left Atrium Mild left atrial enlargement.   Right Atrium Mild right atrial enlargement.   Aortic Valve Normal aortic valve. No aortic stenosis. No significant aortic regurgitation.   Mitral Valve Normal mitral valve. No mitral stenosis. Mild mitral regurgitation.   Tricuspid Valve Normal tricuspid valve. No tricuspid stenosis. Mild tricuspid regurgitation.   Estimated pulmonary artery pressure   of 31 mmHg + RA pressure.   Pulmonic Valve Normal pulmonic valve without significant stenosis or regurgitation.   Pericardium Normal pericardium.   Aorta Measured aortic root diameter is normal in size.   Inferior Vena Cava Dilated IVC size, <50% collapse with respiration.    Component      Latest Ref Rng & Units 11/20/2017   Sodium      133 - 144 mmol/L 137   Potassium      3.4 - 5.3 mmol/L 4.2   Chloride      94 - 109 mmol/L 102   Carbon Dioxide      20 - 32 mmol/L 30   Anion Gap      3 - 14 mmol/L 6   Glucose      70 - 99 mg/dL 101 (H)   Urea Nitrogen      7 - 30 mg/dL 13   Creatinine      0.52 - 1.04 mg/dL 0.55   GFR Estimate      >60 mL/min/1.7m2 >90   GFR Estimate If Black      >60 mL/min/1.7m2 >90   Calcium      8.5 - 10.1 mg/dL 9.1   WBC      4.0 - 11.0 10e9/L 4.9   RBC Count      3.8 - 5.2 10e12/L 4.28   Hemoglobin      11.7 - 15.7 g/dL 11.3 (L)   Hematocrit      35.0 - 47.0 % 35.5   MCV      78 - 100 fl 83   MCH      26.5 - 33.0 pg 26.4 (L)   MCHC      31.5 - 36.5 g/dL 31.8   RDW      10.0 - 15.0 % 14.6   Platelet Count      150 - 450 10e9/L 215   N-Terminal Pro Bnp      0 - 125 pg/mL 546 (H)   Sed Rate      0 - 30 mm/h 71 (H)     Component      Latest Ref Rng & Units 12/29/2017   Color Urine       Straw   Appearance Urine       Clear   Glucose Urine      " NEG:Negative mg/dL Negative   Bilirubin Urine      NEG:Negative Negative   Ketones Urine      NEG:Negative mg/dL Negative   Specific Gravity Urine      1.003 - 1.035 1.005   Blood Urine      NEG:Negative Negative   pH Urine      5.0 - 7.0 pH 6.0   Protein Albumin Urine      NEG:Negative mg/dL Negative   Urobilinogen mg/dL      0.0 - 2.0 mg/dL 0.0   Nitrite Urine      NEG:Negative Negative   Leukocyte Esterase Urine      NEG:Negative Negative   Source       Midstream Urine   WBC Urine      0 - 2 /HPF <1   RBC Urine      0 - 2 /HPF <1   Bacteria Urine      NEG:Negative /HPF Few (A)   Squamous Epithelial /HPF Urine      0 - 1 /HPF <1   Mucous Urine      NEG:Negative /LPF Present (A)   Albumin Fraction      3.7 - 5.1 g/dL 4.2   Alpha 1 Fraction      0.2 - 0.4 g/dL 0.4   Alpha 2 Fraction      0.5 - 0.9 g/dL 0.8   Beta Fraction      0.6 - 1.0 g/dL 1.0   Gamma Fraction      0.7 - 1.6 g/dL 1.6   Monoclonal Peak      0.0 g/dL 0.0   ELP Interpretation:       Essentially normal electrophoretic pattern. No monoclonal protein seen. Pathologic . . .   IGG      695 - 1620 mg/dL 1560   IGA      70 - 380 mg/dL 473 (H)   IGM      60 - 265 mg/dL 92   Iron      35 - 180 ug/dL 58   Iron Binding Cap      240 - 430 ug/dL 315   Iron Saturation Index      15 - 46 % 19   TPMT Genotype Specimen       Whole Blood   TPMT Genotype       Neg/Neg   TPMT Predicted Phenotype       Normal   Protein Random Urine      g/L <0.05   Protein Total Urine g/gr Creatinine      0 - 0.2 g/g Cr Unable to calculate due to low value   Deamidated Gliadin Cari, IgA      <7 U/mL 2   Deamidated Gliadin Cari, IgG      <7 U/mL 5   Complement C3      76 - 169 mg/dL 72 (L)   Complement C4      15 - 50 mg/dL 6 (L)   CRP Inflammation      0.0 - 8.0 mg/L 3.2   DNA-ds      <10 IU/mL >379 (H)   Sed Rate      0 - 30 mm/h 49 (H)   Vitamin D Deficiency screening      20 - 75 ug/L 51   Creatinine Urine Random      mg/dL 23   AST      0 - 45 U/L 26   ALT      0 - 50 U/L 35   HEENA   Broad Spectrum       Neg   Beta 2 Glycoprotein 1 Antibody IgG      <7 U/mL <0.6   Beta 2 Glycoprotein 1 Antibody IgM      <7 U/mL <0.9   Thyroid Peroxidase Antibody      <35 IU/mL <10   Thyroglobulin Antibody      <40 IU/mL <20   TSH      0.40 - 4.00 mU/L 0.87   Cryoglobulin      NEG:Negative % Trace (A)   Tissue Transglutaminase Antibody IgA      <7 U/mL 4   Ferritin      8 - 252 ng/mL 163   Endomysial Antibody IgA by IFA      <1:10 <1:10   CRP Cardiac Risk      mg/L 2.9   Creatinine Urine      mg/dL 23     Component      Latest Ref Rng & Units 2/23/2018   Color Urine       Yellow   Appearance Urine       Clear   Glucose Urine      NEG:Negative mg/dL Negative   Bilirubin Urine      NEG:Negative Negative   Ketones Urine      NEG:Negative mg/dL Negative   Specific Gravity Urine      1.003 - 1.035 1.025   Blood Urine      NEG:Negative Negative   pH Urine      5.0 - 7.0 pH 5.5   Protein Albumin Urine      NEG:Negative mg/dL Negative   Urobilinogen Urine      0.2 - 1.0 EU/dL 0.2   Nitrite Urine      NEG:Negative Negative   Leukocyte Esterase Urine      NEG:Negative Negative   Source       Midstream Urine   Protein Random Urine      g/L 0.17   Protein Total Urine g/gr Creatinine      0 - 0.2 g/g Cr 0.18   Complement C3      76 - 169 mg/dL 64 (L)   Complement C4      15 - 50 mg/dL 5 (L)   CRP Inflammation      0.0 - 8.0 mg/L 4.2   DNA-ds      <10 IU/mL >379 (H)   Sed Rate      0 - 30 mm/h 35 (H)   AST      0 - 45 U/L 27   ALT      0 - 50 U/L 31   Creatinine Urine Random      mg/dL 99     Component      Latest Ref Rng & Units 3/16/2018   WBC      4.0 - 11.0 10e9/L 5.6   RBC Count      3.8 - 5.2 10e12/L 4.43   Hemoglobin      11.7 - 15.7 g/dL 12.1   Hematocrit      35.0 - 47.0 % 36.9   MCV      78 - 100 fl 83   MCH      26.5 - 33.0 pg 27.3   MCHC      31.5 - 36.5 g/dL 32.8   RDW      10.0 - 15.0 % 14.5   Platelet Count      150 - 450 10e9/L 224   Diff Method       Automated Method   % Neutrophils      % 81.7   %  Lymphocytes      % 9.3   % Monocytes      % 7.5   % Eosinophils      % 1.3   % Basophils      % 0.2   Absolute Neutrophil      1.6 - 8.3 10e9/L 4.6   Absolute Lymphocytes      0.8 - 5.3 10e9/L 0.5 (L)   Absolute Monocytes      0.0 - 1.3 10e9/L 0.4   Absolute Eosinophils      0.0 - 0.7 10e9/L 0.1   Absolute Basophils      0.0 - 0.2 10e9/L 0.0   Creatinine      0.52 - 1.04 mg/dL 0.51 (L)   GFR Estimate      >60 mL/min/1.7m2 >90   GFR Estimate If Black      >60 mL/min/1.7m2 >90   ALT      0 - 50 U/L 27   Albumin      3.4 - 5.0 g/dL 3.5   AST      0 - 45 U/L 18       Component      Latest Ref Rng & Units 3/21/2018   Color Urine       Yellow   Appearance Urine       Clear   Glucose Urine      NEG:Negative mg/dL Negative   Bilirubin Urine      NEG:Negative Negative   Ketones Urine      NEG:Negative mg/dL Negative   Specific Gravity Urine      1.003 - 1.035 <=1.005   pH Urine      5.0 - 7.0 pH 6.5   Protein Albumin Urine      NEG:Negative mg/dL Negative   Urobilinogen Urine      0.2 - 1.0 EU/dL 0.2   Nitrite Urine      NEG:Negative Negative   Blood Urine      NEG:Negative Negative   Leukocyte Esterase Urine      NEG:Negative Negative   Source       Midstream Urine   WBC Urine      OTO5:0 - 5 /HPF 0 - 5   RBC Urine      OTO2:O - 2 /HPF O - 2   Squamous Epithelial /LPF Urine      FEW:Few /LPF Few   Protein Random Urine      g/L <0.05   Protein Total Urine g/gr Creatinine      0 - 0.2 g/g Cr Unable to calculate due to low value   DNA-ds      <10 IU/mL >379 (H)   Complement C4      15 - 50 mg/dL 4 (L)   Complement C3      76 - 169 mg/dL 69 (L)   Creatinine Urine Random      mg/dL 17   Creatinine Urine      mg/dL 17     EXAMINATION: CT CHEST W/O CONTRAST, 12/29/2017 1:08 PM   TECHNIQUE:  Helical CT images from the thoracic inlet through the lung  bases were obtained without IV contrast during inspiration and  expiration according to high-resolution chest CT protocol. Contrast  dose: None  COMPARISON: None   HISTORY: eval for  ILD, pleural effusion, pt has lupus, Sjogren's, h/o  MALT and pleurisy and SOB; Systemic lupus erythematosus, unspecified  SLE type, unspecified organ involvement status (H)   FINDINGS:  At least 75% collapse of the trachea and mainstem bronchi on the end  expiratory views. Diffuse lobular air trapping on end expiratory  images. Bibasilar platelike atelectasis. No pneumothorax or pleural  effusion. Scattered solid pulmonary nodules, for example a 4 mm  lingular nodule (series 5 image 145) and a 4 mm right upper lobe  nodule (image 73).    The heart size is normal. Mild three-vessel coronary artery calcific  atherosclerosis. Dilation of the main pulmonary artery to 4.1 cm. No  pericardial effusion. Normal caliber and configuration of the thoracic  great vessels. Numerous prominent though nonenlarged mediastinal lymph  nodes.  Limited views of the abdomen reveal no worrisome pathology. No  worrisome bony or soft tissue lesions.    IMPRESSION:   1. At least moderate tracheobronchomalacia.  2. 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  3. Scattered subcentimeter pulmonary nodules measuring up to 4 mm.  Consider follow-up chest CT in one year to establish stability.     [Consider Follow Up: Lung nodule]     This report will be copied to the [facility] to ensure a  provider acknowledges the finding.      I have personally reviewed the examination and initial interpretation  and I agree with the findings.     AUSTIN PACE MD    Examination:NM LUNG SCAN VENTILATION AND PERFUSION, 3/14/2018 8:24 AM    Indication:  Chronic PE; Pulmonary hypertension    Additional Information: none   Technique:   The patient received 2 mCi of Tc-99m DTPA aerosol for ventilation and  7 mCi of Tc-99m MAA for perfusion. Multiple images were obtained of  the lungs in Anterior, posterior, HERNANDES, RPO, LPO, and  ELYSE  projections.   Comparison: Chest x-ray same-day   Findings:     There are matched ventilation/perfusion defects in both lungs. No  mismatched perfusion defect to suggest pulmonary emboli.      Impression:  Pulmonary emboli is not present.     I have personally reviewed the examination and initial interpretation  and I agree with the findings.     DONNIE GARCIA MD    Component      Latest Ref Rng & Units 5/12/2018   WBC      4.0 - 11.0 10e9/L 7.1   RBC Count      3.8 - 5.2 10e12/L 4.42   Hemoglobin      11.7 - 15.7 g/dL 12.1   Hematocrit      35.0 - 47.0 % 37.4   MCV      78 - 100 fl 85   MCH      26.5 - 33.0 pg 27.4   MCHC      31.5 - 36.5 g/dL 32.4   RDW      10.0 - 15.0 % 14.7   Platelet Count      150 - 450 10e9/L 226   Diff Method       Automated Method   % Neutrophils      % 86.2   % Lymphocytes      % 4.8   % Monocytes      % 8.4   % Eosinophils      % 0.3   % Basophils      % 0.3   Absolute Neutrophil      1.6 - 8.3 10e9/L 6.1   Absolute Lymphocytes      0.8 - 5.3 10e9/L 0.3 (L)   Absolute Monocytes      0.0 - 1.3 10e9/L 0.6   Absolute Eosinophils      0.0 - 0.7 10e9/L 0.0   Absolute Basophils      0.0 - 0.2 10e9/L 0.0   Color Urine       Yellow   Appearance Urine       Clear   Glucose Urine      NEG:Negative mg/dL Negative   Bilirubin Urine      NEG:Negative Negative   Ketones Urine      NEG:Negative mg/dL Negative   Specific Gravity Urine      1.003 - 1.035 <=1.005   Blood Urine      NEG:Negative Negative   pH Urine      5.0 - 7.0 pH 5.5   Protein Albumin Urine      NEG:Negative mg/dL Negative   Urobilinogen Urine      0.2 - 1.0 EU/dL 0.2   Nitrite Urine      NEG:Negative Negative   Leukocyte Esterase Urine      NEG:Negative Negative   Source       Midstream Urine   Creatinine      0.52 - 1.04 mg/dL 0.63   GFR Estimate      >60 mL/min/1.7m2 >90   GFR Estimate If Black      >60 mL/min/1.7m2 >90   Protein Random Urine      g/L <0.05   Protein Total Urine g/gr Creatinine      0 - 0.2 g/g Cr Unable to  calculate due to low value   Albumin      3.4 - 5.0 g/dL 3.6   ALT      0 - 50 U/L 28   AST      0 - 45 U/L 20   Complement C3      76 - 169 mg/dL 46 (L)   Complement C4      15 - 50 mg/dL 3 (L)   CRP Inflammation      0.0 - 8.0 mg/L <2.9   Sed Rate      0 - 30 mm/h 26   DNA-ds      <10 IU/mL >379 (H)   Creatinine Urine      mg/dL 16     Component      Latest Ref Rng & Units 7/7/2018   WBC      4.0 - 11.0 10e9/L 7.0   RBC Count      3.8 - 5.2 10e12/L 4.35   Hemoglobin      11.7 - 15.7 g/dL 11.7   Hematocrit      35.0 - 47.0 % 36.0   MCV      78 - 100 fl 83   MCH      26.5 - 33.0 pg 26.9   MCHC      31.5 - 36.5 g/dL 32.5   RDW      10.0 - 15.0 % 15.3 (H)   Platelet Count      150 - 450 10e9/L 224   Diff Method       Automated Method   % Neutrophils      % 91.9   % Lymphocytes      % 4.0   % Monocytes      % 3.7   % Eosinophils      % 0.3   % Basophils      % 0.1   Absolute Neutrophil      1.6 - 8.3 10e9/L 6.5   Absolute Lymphocytes      0.8 - 5.3 10e9/L 0.3 (L)   Absolute Monocytes      0.0 - 1.3 10e9/L 0.3   Absolute Eosinophils      0.0 - 0.7 10e9/L 0.0   Absolute Basophils      0.0 - 0.2 10e9/L 0.0   Color Urine       Yellow   Appearance Urine       Clear   Glucose Urine      NEG:Negative mg/dL Negative   Bilirubin Urine      NEG:Negative Negative   Ketones Urine      NEG:Negative mg/dL Negative   Specific Gravity Urine      1.003 - 1.035 1.010   pH Urine      5.0 - 7.0 pH 5.5   Protein Albumin Urine      NEG:Negative mg/dL Negative   Urobilinogen Urine      0.2 - 1.0 EU/dL 0.2   Nitrite Urine      NEG:Negative Negative   Blood Urine      NEG:Negative Trace (A)   Leukocyte Esterase Urine      NEG:Negative Negative   Source       Midstream Urine   WBC Urine      OTO5:0 - 5 /HPF 0 - 5   RBC Urine      OTO2:O - 2 /HPF O - 2   Squamous Epithelial /LPF Urine      FEW:Few /LPF Few   Creatinine      0.52 - 1.04 mg/dL 0.63   GFR Estimate      >60 mL/min/1.7m2 >90   GFR Estimate If Black      >60 mL/min/1.7m2 >90   Protein  "Random Urine      g/L 0.07   Protein Total Urine g/gr Creatinine      0 - 0.2 g/g Cr 0.29 (H)   Albumin      3.4 - 5.0 g/dL 3.5   ALT      0 - 50 U/L 27   AST      0 - 45 U/L 21   Complement C3      76 - 169 mg/dL 51 (L)   Complement C4      15 - 50 mg/dL 5 (L)   Creatinine Urine Random      mg/dL 24   CRP Inflammation      0.0 - 8.0 mg/L 11.2 (H)   DNA-ds      <10 IU/mL >379 (H)   Sed Rate      0 - 30 mm/h 35 (H)   Creatinine Urine      mg/dL 23     PFTs 5/2018 (The FEV1 and FVC are reduced but the FEV1/FVC ratio is normal.  The inspiratory flow rates are reduced.  The TLC and FRC are reduced.  The diffusing capacity is normal.  However, the diffusing capacity was not corrected for the patient's hemoglobin.  IMPRESSION:  Moderately Severe  Restriction.  Normal Diffusion.  Walk distance is normal on room air with significant desaturation but no hypoxia.  ____________________________________________KERVIN TONY.    Ph.E:    /80  Pulse 79  Ht 1.676 m (5' 6\")  Wt 124.3 kg (274 lb)  SpO2 96%  BMI 44.22 kg/m2      Constitutional: WD/WN. Pleasant. In no acute distress.   Eyes: EOM intact, PERRLA, sclera anicteric, conj not injected  HEENT: No oral ulcers or thrush. Normal salivary pool. Skin breakdown on the corner of her mouth  Neck: No cervical LAP or thyromegaly  Chest: Clear to auscultation bilaterally  CV: RRR, no murmurs/ rubs or gallops. No edema, clubbing or cyanosis.   GI: Abdomen is soft and non tender.   MS: No synovitis. Cool joints. No tenderness of the joints. No swelling. Normal ROM.  No joint deformities. Full ROM of the joints. No nodules. No Jaccoud's deformity.  Skin: No skin rash, malar rash, livedo, periungual erythema, alopecia, digital ulcers or nail changes.  Neuro: A&O x 3. Grossly non focal, muscular power 5/5 in all ext  Psych: NL affect and mood    Assessment/ plan:    1-SLE. 54 yo WF with +fh/o RA and personal hx of SLE presented in 12/2017 for 2nd opinion of m/o her SLE. She was " diagnosed with SLE at age 27. Her lupus has been marked by +KARLIE (highest titer 1:640, speckled and nucleolar patterns), +anti-DNA, arthritis, malar rash, serositis (pleuritic CP) supported by +SSA/SSB Ab, low C3/low C4, +RF, high ESR/CRP. She had neg anti-RNP, anti-Sm, acL IgM/G/A, LAC, cryo and anti-CCP.     Had NL C3 at the time of Dx. She was treated with prednisone and HCQ at the time of Dx. Can't remember how long she was on HCQ and why HCQ was stopped, but it probably was stopped because of stable disease, no report on HCQ toxicity. Reportedly her SLE was mild all these years.    Has secondary Sjogren's with sicca, +SSA/SSB Ab and h/o MALT lymphoma at age 44 in remission. Uses blink eyedrops and salagen. No lip biopsy was done to confirm Dx of Sjogren's.    Her lupus flared in 7/2017 with no triggers when she presented with arthritis, HCQ was resumed, arthritis resolved. Mild pulm HTN on 2D-Echo 8/2017 but neg R cardiac cath and VQ scan in 3/2018, also neg 2D-Echo.    In 12/2017, was prescribed prednisone 40 mg for only 5 days for pleuritic CP, CP recurred after stopping prednisone.     Her major complaint at initial visit with me in 12/2017 was ongoing CP. AZA was added in 2/2018 with start dose of 50 mg qd and slowly was increased to max 150 mg qd. Tolerated AZA well, no SEs, no toxicity on the labs but failed it and required to go back on prednisone 20 mg qd (current dose).     Her lupus is active with pleuritic CP. ESR/CRP normalized on prednisone+AZA+HCQ but +anti-DNA, low C3/C4 are unchanged. Chest CT in 12/2017 without contrast showed air trapping due to lupus/Sjogren's, no pleural effusion. Lung nodules need f/u in a year. No pericardail effusion on 2D-echo and neg VQ scan for PE in 3/2018 so chest CT with contrast is not needed. She is APL negative.    Lupus Dx and tx plan was discussed with her.    AZA was switched to  mg po bid on 5/18/2018 as she failed AZA. She is now on max dose of MMF  1500 mg bid. Her labs are unchanged with persistently elevated anti-DNA, low C3/C4 and high ESR/CRP, but just started to feel a lot better (regarding fatigue, arthralgia, still has residual pleuritic CP) after adding MMF and is wiling to taper her prednisone down.     She is awaiting an appointment with Dr. Marvin in pulmonary clinic in 8/2018. 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    Will give MMF more time to see if it helps with serology and wait for Dr. Marvin's opinion regarding next Tx step, adding rituximab (if bad lung disease) vs belimumamb (good candidate for this FDA approved drug given positive lupus serology, being  and expect improvement of fatigue/arthralgia/serositis and should allow tapering prednisone down)        Recommend:    Patient is to get repeat labs at the time of her pulmonologist follow up in 8/2018  Will follow up with pulmonary in regards to ILD.     Continue the plaquenil 200 mg twice a day as well as mycophenalate 1500 mg bid  Prednisone to be tapered down to 15mg for one week, then 10 mg until follow up with pulmonologist    2-HCQ monitoring. Was reminded to have eye exam    3-MMF monitoring. Labs q3mo, no toxicity    4-Shingrix vaccine. Recommended shingrix. CDC recommends this vaccine 2 doses,  by 2-6 months for adults 50 years and older. It is killed virus and ok to be used in immunocompromised patients. Shingrix reduces the risk of shingles and PHN by more than 90% in people 50 and older.     AE include: pain, redness and swelling at the inj site, myalgia, fatigue, headaches, shivering, fever and stomach upset.    2nd dose of shingrix today      RTC 3 months        Orders Placed This Encounter   Procedures     ALT     Albumin level     AST     CBC with platelets differential      Complement C3     Complement C4     Creatinine     CRP inflammation     Erythrocyte sedimentation rate auto     DNA double stranded antibodies     Routine UA with micro reflex to culture     Protein  random urine with Creat Ratio     Creatinine random urine       Seen and staffed with Adela De Santiago DO    PGY-III IM resident    Attending Note: I saw and evaluated the patient with Dr. Camarena. I agree with the assessment and plan.    Killian Marroquin MD

## 2018-07-11 NOTE — PATIENT INSTRUCTIONS
Preventive Care:    Diabetic Eye Exam Screening: During our visit today, we discussed that it is recommended you receive diabetic eye exam screening. Please call or make an appointment with your primary care provider to discuss this with them. You may also call the [facility] scheduling line ([phone]) to set up an eye exam at one of the [facility].    Stay on cellcept and plaquenil    Taper prednisone to 15 mg a day x one week then 10 mg a day and stay on that dose    Will wait for Dr. Marvin's opinion regarding adding rituximab, or benlysta    Labs on 8/24/2018    Shingrix today    Return in 3 months

## 2018-07-11 NOTE — NURSING NOTE
"Chief Complaint   Patient presents with     RECHECK     lupus      /80  Pulse 79  Ht 1.676 m (5' 6\")  Wt 124.3 kg (274 lb)  SpO2 96%  BMI 44.22 kg/m2  Julio Keane, CMA    "

## 2018-08-09 DIAGNOSIS — Z11.1 SCREENING FOR TUBERCULOSIS: Primary | ICD-10-CM

## 2018-08-10 DIAGNOSIS — L93.0 LUPUS ERYTHEMATOSUS, UNSPECIFIED FORM: ICD-10-CM

## 2018-08-10 DIAGNOSIS — M32.9 SYSTEMIC LUPUS ERYTHEMATOSUS, UNSPECIFIED SLE TYPE, UNSPECIFIED ORGAN INVOLVEMENT STATUS (H): ICD-10-CM

## 2018-08-10 LAB
ALBUMIN SERPL-MCNC: 3.5 G/DL (ref 3.4–5)
ALT SERPL W P-5'-P-CCNC: 29 U/L (ref 0–50)
AST SERPL W P-5'-P-CCNC: 20 U/L (ref 0–45)
BASOPHILS # BLD AUTO: 0 10E9/L (ref 0–0.2)
BASOPHILS NFR BLD AUTO: 0.3 %
CREAT SERPL-MCNC: 0.66 MG/DL (ref 0.52–1.04)
CRP SERPL-MCNC: 23.3 MG/L (ref 0–8)
DIFFERENTIAL METHOD BLD: ABNORMAL
EOSINOPHIL # BLD AUTO: 0 10E9/L (ref 0–0.7)
EOSINOPHIL NFR BLD AUTO: 0.3 %
ERYTHROCYTE [DISTWIDTH] IN BLOOD BY AUTOMATED COUNT: 14.9 % (ref 10–15)
ERYTHROCYTE [SEDIMENTATION RATE] IN BLOOD BY WESTERGREN METHOD: 34 MM/H (ref 0–30)
GFR SERPL CREATININE-BSD FRML MDRD: >90 ML/MIN/1.7M2
HCT VFR BLD AUTO: 35 % (ref 35–47)
HGB BLD-MCNC: 11.3 G/DL (ref 11.7–15.7)
LYMPHOCYTES # BLD AUTO: 0.3 10E9/L (ref 0.8–5.3)
LYMPHOCYTES NFR BLD AUTO: 5.4 %
MCH RBC QN AUTO: 27.1 PG (ref 26.5–33)
MCHC RBC AUTO-ENTMCNC: 32.3 G/DL (ref 31.5–36.5)
MCV RBC AUTO: 84 FL (ref 78–100)
MONOCYTES # BLD AUTO: 0.3 10E9/L (ref 0–1.3)
MONOCYTES NFR BLD AUTO: 5.6 %
NEUTROPHILS # BLD AUTO: 5.2 10E9/L (ref 1.6–8.3)
NEUTROPHILS NFR BLD AUTO: 88.4 %
PLATELET # BLD AUTO: 202 10E9/L (ref 150–450)
RBC # BLD AUTO: 4.17 10E12/L (ref 3.8–5.2)
WBC # BLD AUTO: 5.9 10E9/L (ref 4–11)

## 2018-08-10 PROCEDURE — 86160 COMPLEMENT ANTIGEN: CPT | Performed by: INTERNAL MEDICINE

## 2018-08-10 PROCEDURE — 84450 TRANSFERASE (AST) (SGOT): CPT | Performed by: INTERNAL MEDICINE

## 2018-08-10 PROCEDURE — 82040 ASSAY OF SERUM ALBUMIN: CPT | Performed by: INTERNAL MEDICINE

## 2018-08-10 PROCEDURE — 84460 ALANINE AMINO (ALT) (SGPT): CPT | Performed by: INTERNAL MEDICINE

## 2018-08-10 PROCEDURE — 85025 COMPLETE CBC W/AUTO DIFF WBC: CPT | Performed by: INTERNAL MEDICINE

## 2018-08-10 PROCEDURE — 82565 ASSAY OF CREATININE: CPT | Performed by: INTERNAL MEDICINE

## 2018-08-10 PROCEDURE — 86225 DNA ANTIBODY NATIVE: CPT | Performed by: INTERNAL MEDICINE

## 2018-08-10 PROCEDURE — 36415 COLL VENOUS BLD VENIPUNCTURE: CPT | Performed by: INTERNAL MEDICINE

## 2018-08-10 PROCEDURE — 85652 RBC SED RATE AUTOMATED: CPT | Performed by: INTERNAL MEDICINE

## 2018-08-10 PROCEDURE — 86140 C-REACTIVE PROTEIN: CPT | Performed by: INTERNAL MEDICINE

## 2018-08-13 DIAGNOSIS — Z11.1 SCREENING FOR TUBERCULOSIS: ICD-10-CM

## 2018-08-13 DIAGNOSIS — M32.9 SYSTEMIC LUPUS ERYTHEMATOSUS, UNSPECIFIED SLE TYPE, UNSPECIFIED ORGAN INVOLVEMENT STATUS (H): ICD-10-CM

## 2018-08-13 LAB
ALBUMIN UR-MCNC: NEGATIVE MG/DL
APPEARANCE UR: CLEAR
BILIRUB UR QL STRIP: NEGATIVE
COLOR UR AUTO: YELLOW
CREAT UR-MCNC: 24 MG/DL
GLUCOSE UR STRIP-MCNC: NEGATIVE MG/DL
HGB UR QL STRIP: ABNORMAL
KETONES UR STRIP-MCNC: NEGATIVE MG/DL
LEUKOCYTE ESTERASE UR QL STRIP: NEGATIVE
NITRATE UR QL: NEGATIVE
PH UR STRIP: 6 PH (ref 5–7)
PROT UR-MCNC: 0.05 G/L
PROT/CREAT 24H UR: 0.23 G/G CR (ref 0–0.2)
RBC #/AREA URNS AUTO: ABNORMAL /HPF
SOURCE: ABNORMAL
SP GR UR STRIP: 1 (ref 1–1.03)
UROBILINOGEN UR STRIP-ACNC: 0.2 EU/DL (ref 0.2–1)
WBC #/AREA URNS AUTO: ABNORMAL /HPF

## 2018-08-13 PROCEDURE — 36415 COLL VENOUS BLD VENIPUNCTURE: CPT | Performed by: INTERNAL MEDICINE

## 2018-08-13 PROCEDURE — 86480 TB TEST CELL IMMUN MEASURE: CPT | Performed by: INTERNAL MEDICINE

## 2018-08-13 PROCEDURE — 86704 HEP B CORE ANTIBODY TOTAL: CPT | Performed by: INTERNAL MEDICINE

## 2018-08-13 PROCEDURE — 87340 HEPATITIS B SURFACE AG IA: CPT | Performed by: INTERNAL MEDICINE

## 2018-08-13 PROCEDURE — 81001 URINALYSIS AUTO W/SCOPE: CPT | Performed by: INTERNAL MEDICINE

## 2018-08-13 PROCEDURE — 84156 ASSAY OF PROTEIN URINE: CPT | Performed by: INTERNAL MEDICINE

## 2018-08-13 PROCEDURE — 86803 HEPATITIS C AB TEST: CPT | Performed by: INTERNAL MEDICINE

## 2018-08-13 NOTE — LETTER
Patient:  Taina Singh  :   1965  MRN:     [id number]        Ms.Kelly Singh  [address] [facility] [address]        August 15, 2018    Dear ,    We are writing to inform you of your test results. Indeterminate TB test could be because of prednisone+cellcept, we could go ahead and schedule benlysta. Will repeat TB test in 3 months.      Results for orders placed or performed in visit on 18   Hepatitis B surface antigen   Result Value Ref Range    Hep B Surface Agn Nonreactive NR^Nonreactive   Hepatitis C antibody   Result Value Ref Range    Hepatitis C Antibody Nonreactive NR^Nonreactive   Hepatitis B core antibody   Result Value Ref Range    Hepatitis B Core Cari Nonreactive NR^Nonreactive   Protein  random urine with Creat Ratio   Result Value Ref Range    Protein Random Urine 0.05 g/L    Protein Total Urine g/gr Creatinine 0.23 (H) 0 - 0.2 g/g Cr   Creatinine random urine   Result Value Ref Range    Creatinine Urine Random 24 mg/dL   UA with Microscopic reflex to Culture   Result Value Ref Range    Color Urine Yellow     Appearance Urine Clear     Glucose Urine Negative NEG^Negative mg/dL    Bilirubin Urine Negative NEG^Negative    Ketones Urine Negative NEG^Negative mg/dL    Specific Gravity Urine 1.005 1.003 - 1.035    pH Urine 6.0 5.0 - 7.0 pH    Protein Albumin Urine Negative NEG^Negative mg/dL    Urobilinogen Urine 0.2 0.2 - 1.0 EU/dL    Nitrite Urine Negative NEG^Negative    Blood Urine Trace (A) NEG^Negative    Leukocyte Esterase Urine Negative NEG^Negative    Source Midstream Urine     WBC Urine 0 - 5 OTO5^0 - 5 /HPF    RBC Urine O - 2 OTO2^O - 2 /HPF   Quantiferon TB Gold Plus   Result Value Ref Range    Quantiferon-TB Gold Plus Result Indeterminate (A) NEG^Negative    TB1 Ag minus Nil Value 0.00 IU/mL    TB2 Ag minus Nil Value 0.00 IU/mL    Mitogen minus Nil Result 0.15 IU/mL    Nil Result 0.05 IU/mL       Killian Marroquin MD

## 2018-08-14 LAB
HBV CORE AB SERPL QL IA: NONREACTIVE
HBV SURFACE AG SERPL QL IA: NONREACTIVE
HCV AB SERPL QL IA: NONREACTIVE

## 2018-08-15 LAB
C3 SERPL-MCNC: 52 MG/DL (ref 76–169)
C4 SERPL-MCNC: 4 MG/DL (ref 15–50)
GAMMA INTERFERON BACKGROUND BLD IA-ACNC: 0.05 IU/ML
M TB IFN-G BLD-IMP: ABNORMAL
M TB IFN-G CD4+ BCKGRND COR BLD-ACNC: 0.15 IU/ML
MITOGEN IGNF BCKGRD COR BLD-ACNC: 0 IU/ML
MITOGEN IGNF BCKGRD COR BLD-ACNC: 0 IU/ML

## 2018-08-16 ENCOUNTER — MYC MEDICAL ADVICE (OUTPATIENT)
Dept: RHEUMATOLOGY | Facility: CLINIC | Age: 53
End: 2018-08-16

## 2018-08-16 DIAGNOSIS — L93.0 LUPUS ERYTHEMATOSUS, UNSPECIFIED FORM: ICD-10-CM

## 2018-08-16 LAB — DSDNA AB SER-ACNC: >379 IU/ML

## 2018-08-16 NOTE — TELEPHONE ENCOUNTER
Called and spoke with pt,    She is having pain on left hand side of her back at the bottom of her rib cage, it is constant sharp pain.  Taking a deep breath does not change the quality of pain.  Heating pad does help take away some of the pain.  Is using Ibuprofen and tramadol, does help with pain. She is wondering what it could be.      Will send onto provider for review.    Sophie Alvarado RN  R[facility]umatology Clinic

## 2018-08-16 NOTE — PROGRESS NOTES
Indeterminate TB test could be because of prednisone+cellcept, we could go ahead and schedule benlysta. Will repeat TB test in 3 months.

## 2018-08-16 NOTE — TELEPHONE ENCOUNTER
Notes      Killian Marroquin MD at 8/15/2018 10:54 PM      Status: Signed            Indeterminate TB test could be because of prednisone+cellcept, we could go ahead and schedule benlysta. Will repeat TB test in 3 months.               My Chart message sent to pt.    Called and updated infusion center that pt is ok to be scheduled.      Sophie Alvarado RN  Rheumatology Clinic

## 2018-08-24 ENCOUNTER — OFFICE VISIT (OUTPATIENT)
Dept: PULMONOLOGY | Facility: CLINIC | Age: 53
End: 2018-08-24
Attending: INTERNAL MEDICINE
Payer: COMMERCIAL

## 2018-08-24 VITALS
OXYGEN SATURATION: 97 % | DIASTOLIC BLOOD PRESSURE: 77 MMHG | WEIGHT: 282.7 LBS | SYSTOLIC BLOOD PRESSURE: 137 MMHG | BODY MASS INDEX: 45.63 KG/M2 | RESPIRATION RATE: 16 BRPM | HEART RATE: 98 BPM

## 2018-08-24 DIAGNOSIS — M32.9 SYSTEMIC LUPUS ERYTHEMATOSUS, UNSPECIFIED SLE TYPE, UNSPECIFIED ORGAN INVOLVEMENT STATUS (H): ICD-10-CM

## 2018-08-24 DIAGNOSIS — J90 PLEURAL EFFUSION: Primary | ICD-10-CM

## 2018-08-24 DIAGNOSIS — J84.9 ILD (INTERSTITIAL LUNG DISEASE) (H): ICD-10-CM

## 2018-08-24 DIAGNOSIS — J21.9 BRONCHIOLITIS: ICD-10-CM

## 2018-08-24 LAB
6 MIN WALK (FT): 1150 FT
6 MIN WALK (M): 351 M

## 2018-08-24 PROCEDURE — G0463 HOSPITAL OUTPT CLINIC VISIT: HCPCS | Mod: ZF

## 2018-08-24 RX ORDER — ALBUTEROL SULFATE 90 UG/1
2 AEROSOL, METERED RESPIRATORY (INHALATION) EVERY 6 HOURS PRN
Qty: 1 INHALER | Refills: 11 | Status: SHIPPED | OUTPATIENT
Start: 2018-08-24 | End: 2018-08-28

## 2018-08-24 ASSESSMENT — PAIN SCALES - GENERAL: PAINLEVEL: NO PAIN (0)

## 2018-08-24 NOTE — MR AVS SNAPSHOT
After Visit Summary   8/24/2018    Taina Singh    MRN: [id number]           Patient Information     Date Of Birth          1965        Visit Information        Provider Department      8/24/2018 9:00 AM Joanne Marvin MD [facility]        Today's Diagnoses     Pleural effusion    -  1    Systemic lupus erythematosus, unspecified SLE type, unspecified organ involvement status (H)        Bronchiolitis           Follow-ups after your visit        Follow-up notes from your care team     Return in about 6 months (around 2/24/2019).      Your next 10 appointments already scheduled     Nov 16, 2018  3:00 PM CST   (Arrive by 2:45 PM)   RETURN LUPUS with Killian Marroquin MD   [facility] ([facility])    [address]  [address]  [facility] [address] [phone]            Feb 28, 2019  7:30 AM CST   XR CHEST 2 VIEWS with UCXR1   [facility] [place] [facility] ([facility])    [address]  1st Floor  [facility] [address] [phone]           Please bring a list of your current medicines to your exam. (Include vitamins, minerals and over-thecounter medicines.) Leave your valuables at home.  Tell your doctor if there is a chance you may be pregnant.  You do not need to do anything special for this exam.            Feb 28, 2019  8:00 AM CST   FULL PULMONARY FUNCTION with  PFL B   [facility] ([facility])    [address]  3rd Floor  [facility] [address] [phone]            Feb 28, 2019  9:00 AM CST   (Arrive by 8:45 AM)   Return Interstitial Lung with Joanne Marvin MD   [facility] ([facility])    [address]  [address]  [facility] [address] [phone]              Future tests that were ordered for you today     Open Future Orders         Priority Expected Expires Ordered    General PFT Lab (Please always keep checked) Routine 2/24/2019 8/24/2019 8/24/2018    Pulmonary Function Test Routine 2/24/2019 8/24/2019 8/24/2018    XR Chest 2 Views Routine 8/24/2018 9/23/2018 8/24/2018            Who to contact     If you have questions or need follow up information about today's clinic visit or your schedule please contact [facility] directly at [phone].  Normal or non-critical lab and imaging results will be communicated to you by Invuphart, letter or phone within 4 business days after the clinic has received the results. If you do not hear from us within 7 days, please contact the clinic through Zscaler or phone. If you have a critical or abnormal lab result, we will notify you by phone as soon as possible.  Submit refill requests through Zscaler or call your pharmacy and they will forward the refill request to us. Please allow 3 business days for your refill to be completed.          Additional Information About Your Visit        Zscaler Information     Zscaler gives you secure access to your electronic health record. If you see a primary care provider, you can also send messages to your care team and make appointments. If you have questions, please call your primary care clinic.  If you do not have a primary care provider, please call [phone] and they will assist you.        Care EveryWhere ID     This is your Care EveryWhere ID. This could be used by other organizations to access your [place] medical records  [id number]        Your Vitals Were     Pulse Respirations Pulse Oximetry BMI (Body Mass Index)          98 16 97% 45.63 kg/m2         Blood Pressure from Last 3 Encounters:   08/24/18 137/77   07/11/18 129/80   05/25/18 131/77    Weight from Last 3 Encounters:   08/24/18 128.2 kg (282 lb 11.2 oz)   07/11/18 124.3 kg (274 lb)   05/25/18 124 kg (273 lb 6.4 oz)                 Today's Medication Changes           These changes are accurate as of 8/24/18  9:30 AM.  If you have any questions, ask your nurse or doctor.               Start taking these medicines.        Dose/Directions    albuterol 108 (90 Base) MCG/ACT inhaler   Commonly known as:  PROAIR HFA/PROVENTIL HFA/VENTOLIN HFA   Used for:  Bronchiolitis   Started by:  Joanne Marvin MD        Dose:  2 puff   Inhale 2 puffs into the lungs every 6 hours as needed for shortness of breath / dyspnea or wheezing   Quantity:  1 Inhaler   Refills:  11       mometasone-formoterol 100-5 MCG/ACT oral inhaler   Commonly known as:  DULERA   Used for:  Bronchiolitis   Started by:  Joanne Marvin MD        Dose:  2 puff   Inhale 2 puffs into the lungs 2 times daily   Quantity:  1 Inhaler   Refills:  11            Where to get your medicines      These medications [id number] [address]  [address] [place] [address] [facility] [address]    Hours:  24-hours Phone:  [phone]     albuterol 108 (90 Base) MCG/ACT inhaler    mometasone-formoterol 100-5 MCG/ACT oral inhaler                Primary Care Provider Fax #    Physician No Ref-Primary [phone]       No address on file        Equal Access to Services     BLAYNE BAKER : Hadsara valenciao Sopoly, waaxda luqadaha, qaybta kaalmada adeegyada, raisa crum . So [facility] [phone].    ATENCIÓN: Si habla español, tiene a vyas disposición servicios gratuitos de asistencia lingüística. Llame al [phone].    We comply with applicable federal civil rights laws and [place] laws. We do not discriminate on the basis of race, color, national origin, age, disability, sex, sexual orientation, or gender identity.            Thank you!     Thank you for choosing [facility]  for your care. Our goal is always to provide you with excellent care. Hearing back from our patients is one  way we can continue to improve our services. Please take a few minutes to complete the written survey that you may receive in the mail after your visit with us. Thank you!             Your Updated Medication List - Protect others around you: Learn how to safely use, store and throw away your medicines at www.disposemymeds.org.          This list is accurate as of 8/24/18  9:30 AM.  Always use your most recent med list.                   Brand Name Dispense Instructions for use Diagnosis    albuterol 108 (90 Base) MCG/ACT inhaler    PROAIR HFA/PROVENTIL HFA/VENTOLIN HFA    1 Inhaler    Inhale 2 puffs into the lungs every 6 hours as needed for shortness of breath / dyspnea or wheezing    Bronchiolitis       Calcium-Magnesium 300-300 MG Tabs      daily        D 1000 1000 units Caps      Take 1,000 Units by mouth daily        hydroxychloroquine 200 MG tablet    PLAQUENIL     Take 200 mg by mouth 2 times daily        mometasone-formoterol 100-5 MCG/ACT oral inhaler    DULERA    1 Inhaler    Inhale 2 puffs into the lungs 2 times daily    Bronchiolitis       mycophenolate 500 MG tablet    GENERIC EQUIVALENT    180 tablet    Take 3 tablets (1,500 mg) by mouth 2 times daily    Lupus erythematosus, unspecified form       Omega-3 Fish Oil 500 MG Caps      Take 500 mg by mouth daily        pilocarpine 5 MG tablet    SALAGEN    120 tablet    Take 1 tablet (5 mg) by mouth 4 times daily as needed    Systemic lupus erythematosus, unspecified SLE type, unspecified organ involvement status (H)       predniSONE 5 MG tablet    DELTASONE    30 tablet    15 mg a day x 7 days then 10 mg a day    Systemic lupus erythematosus, unspecified SLE type, unspecified organ involvement status (H)       traMADol 50 MG tablet    ULTRAM    30 tablet    Take 1 tablet (50 mg) by mouth every 12 hours as needed for pain Take 1 tablet as needed for pain.  No driving or alcohol use while taking medication.    Systemic lupus erythematosus, unspecified SLE  type, unspecified organ involvement status (H)       WOMENS MULTI PO      Take by mouth daily

## 2018-08-24 NOTE — TELEPHONE ENCOUNTER
Called to follow up on PA, will take 10 days for determination.    Sophie Alvarado RN  Rheumatology Clinic

## 2018-08-24 NOTE — PROGRESS NOTES
[facility]    Reason for Visit  Taina Singh is a 53 year old year old female who is being seen for Interstitial Lung Disease (ILD) (Patient is being seen for ILD follow up)    HPI    Ms. Singh is a 53-year-old who is a new patient referred by Dr. Shannon for evaluation of abnormal chest CT scan with possible ILD.  She has lupus which was diagnosed at age 27.  She was in her usual state of health, and 3 years ago embarked on weight loss and exercise.  She lost about 100 pounds over the course of a year and feels that that rapid weight loss and exercise might have triggered her lupus.  3 years ago ago, she was on no medications for her lupus.  She has had a lot of pain including pleuritic chest pain and shortness of breath.  She noted dyspnea approximately 1 year ago and then eventually developed pleuritic chest pain felt to be serositis secondary to lupus.  She feels as if she cannot get a full breath in.  She has not been able to exercise because of the lupus flare over the past year.  She had PFTs today and 3 months ago.  She was given albuterol today in the PFT lab and felt that it helped her breathing significantly.  She has been on prednisone for approximately the past year and currently is on 30 mg daily.  The lowest prednisone dose that she has been on since November 2017 is 15 mg daily.  She has gained approximately 40 pounds in the past year per her report.  She denies cough, fevers, new skin rashes, or wheezing.  Her joints are under good control with her current regimen.  She continues to have pleuritic chest pain but uses tramadol for the pain.  She takes mycophenolate 1500 mg twice daily in addition to the prednisone.  Dr. Shannon has started paperwork to start belimumab.    She denies exposure to asbestos, silica, mold, pet birds, hot tubs.  She used feather pillows until age 49.  She denies exposure to mold and does not play breast or wind  instruments.  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      Current Outpatient Prescriptions   Medication     albuterol (PROAIR HFA/PROVENTIL HFA/VENTOLIN HFA) 108 (90 Base) MCG/ACT inhaler     Calcium-Magnesium 300-300 MG TABS     Cholecalciferol (D 1000) 1000 UNITS CAPS     hydroxychloroquine (PLAQUENIL) 200 MG tablet     mometasone-formoterol (DULERA) 100-5 MCG/ACT oral inhaler     Multiple Vitamins-Minerals (WOMENS MULTI PO)     mycophenolate (GENERIC EQUIVALENT) 500 MG tablet     Omega-3 Fatty Acids (OMEGA-3 FISH OIL) 500 MG CAPS     pilocarpine (SALAGEN) 5 MG tablet     predniSONE (DELTASONE) 5 MG tablet     traMADol (ULTRAM) 50 MG tablet     No current facility-administered medications for this visit.      Allergies   Allergen Reactions     Penicillins Rash     Sulfa Drugs Rash     Past Medical History:   Diagnosis Date     Bronchiolitis      Diastolic dysfunction 2018     Gluten intolerance     Patient is not gluten intolerant     Iron deficiency      Iron deficiency 2018     Lupus     dx age 27; + DNA-ds, KARLIE, SSA, SSB and RF; malar rash, serositis (pleuritic CP)     MALT lymphoma (H) age 44    No chemo or radiation     Other forms of systemic lupus erythematosus (H) 2018     Sjogren's syndrome (H)     secondary Sjogrens, secondary to lupus     Systemic lupus erythematosus (H) 2018     Vitamin D deficiency        Past Surgical History:   Procedure Laterality Date     BIOPSY/REMOVAL, LYMPH NODE(S)        SECTION  age 32     HC HYSTEROS W PERMANENT FALLOPAIN IMPLANT       PAROTIDECTOMY       TONSILLECTOMY         Social History     Social History     Marital status:      Spouse name: N/A     Number of children: N/A     Years of education: 1     Occupational History           , 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     Social History Main Topics     Smoking status: Never Smoker     Smokeless tobacco: Never Used     Alcohol use No     Drug use: No     Sexual activity: Not on file     Other  Topics Concern     Not on file     Social History Narrative    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  Denies exposure to asbestos, silica, hot tubs, feather pillows (used to until age 49), pet birds, mold, does not play brass/wind instruments.        Family History   Problem Relation Age of Onset     Alopecia Brother      Rheumatoid Arthritis Brother      LUNG DISEASE No family hx of              ROS Pulmonary  A complete ROS was otherwise negative except as noted in the HPI.    Vitals: /77 (BP Location: Right arm, Patient Position: Chair, Cuff Size: Adult Large)  Pulse 98  Resp 16  Wt 128.2 kg (282 lb 11.2 oz)  SpO2 97%  BMI 45.63 kg/m2    Exam:   GENERAL APPEARANCE: Well developed, well nourished, obese, alert, and in no apparent distress.  LYMPHATICS: No significant cervical, or supraclavicular nodes.  RESP: good air flow throughout.  No crackles. No rhonchi. No wheezes.  CV: Normal S1, S2, regular rhythm, normal rate. + systolic murmur.  No LE edema.   ABD: BS+, soft, nontender.  MS: extremities normal. No clubbing. No cyanosis.  SKIN: no rash on limited exam.  NEURO: Mentation intact, speech normal, normal gait and stance.  PSYCH: mentation appears normal. and affect normal/bright.    Results:  Recent Results (from the past 168 hour(s))   6 minute walk test    Collection Time: 08/24/18 12:00 AM   Result Value Ref Range    6 min walk (FT) 1150 ft    6 Min Walk (M) 351 m   General PFT Lab (Please always keep checked)    Collection Time: 08/24/18  7:33 AM   Result Value Ref Range    FVC-Pred 3.65 L    FVC-Pre 1.49 L    FVC-%Pred-Pre 40 %    FEV1-Pre 1.23 L    FEV1-%Pred-Pre 42 %    FEV1FVC-Pred 80 %    FEV1FVC-Pre 82 %    FEFMax-Pred 6.98 L/sec    FEFMax-Pre 4.62 L/sec    FEFMax-%Pred-Pre 66 %    FEF2575-Pred 2.78 L/sec    FEF2575-Pre 1.30 L/sec    [id number]-%Pred-Pre 46 %    FEF2575-Post 2.02 L/sec    EJD4484-%Pred-Post 72 %    ExpTime-Pre 6.60 sec    FIFMax-Pre 3.53 L/sec    VC-Pred 3.69 L    VC-Pre 1.72 L     VC-%Pred-Pre 46 %    IC-Pred 3.38 L    IC-Pre 1.58 L    IC-%Pred-Pre 46 %    ERV-Pred 0.31 L    ERV-Pre 0.14 L    ERV-%Pred-Pre 44 %    FEV1FEV6-Pred 82 %    FEV1FEV6-Pre 82 %    FRCPleth-Pred 2.80 L    FRCPleth-Pre 1.70 L    FRCPleth-%Pred-Pre 60 %    RVPleth-Pred 1.85 L    RVPleth-Pre 1.56 L    RVPleth-%Pred-Pre 84 %    TLCPleth-Pred 5.26 L    TLCPleth-Pre 3.28 L    TLCPleth-%Pred-Pre 62 %    DLCOunc-Pred 23.35 ml/min/mmHg    DLCOunc-Pre 16.03 ml/min/mmHg    DLCOunc-%Pred-Pre 68 %    DLCOcor-Pre 17.25 ml/min/mmHg    DLCOcor-%Pred-Pre 73 %    VA-Pre 2.78 L    VA-%Pred-Pre 51 %    FEV1SVC-Pred 79 %    FEV1SVC-Pre 71 %       I reviewed pulmonary function test that was performed today along with six minute walk test.  PFT today shows severe restriction with normal diffusion.  There is no significant bronchodilator response.  Spirometry and TLC are reduced compared to 3 months ago, but diffusion is in the normal range and stable.  6 walk walk test today shows slightly reduced walk distance with no hypoxia on room air.  I also reviewed chest CT scan performed in June 2018.  There is no interstitial lung disease.  There are small multiple pulmonary nodules 2-3 mm in size that are unchanged compared to December 2017.  There is air trapping and a new small right pleural effusion.  There is also atelectasis in both lungs.    I reviewed results with the patient.      Assessment and plan:   Ms. Singh is a 53-year-old who is a new patient referred by Dr. Shannon for dyspnea and abnormal PFT.    1.  Bronchiolitis.  Dr. Shannon was concerned that she might have ILD based on her outside chest CT report from July.  However, our chest CT from June shows atelectasis and no ILD.  There is evidence of small airways disease (bronchiolitis) on the CT.  I do believe that her dyspnea is multifactorial including obesity but also small airways disease.  She had significant symptomatic benefit from albuterol in the PFT lab.  It would be  reasonable to start treatment for the bronchiolitis which I think is associated with her lupus.  I will start Dulera 100 2 puffs twice daily.  I counseled her to gargle with water after use.  I will have the clinic assistant teach her how to use the inhaler today and give her a spacer.  I also will give her a prescription for albuterol as needed.  I do not recommend making any other changes to her regimen at this time as she does not have interstitial lung disease.  She will return in 6 months with full PFT.  2.  Lung nodules.  The chest CT scan shows multiple lung nodules 2-3 mm in size.  Per Fleischner criteria, these do not require follow-up in a low risk patient.  3.  Right pleural effusion.  Chest CT in June showed a new small right pleural effusion.  This likely is due to lupus pleuritis.  I will get a follow-up chest x-ray today.    4.  Restrictive PFT.  This most likely is multifactorial including obesity, possible shrinking lung syndrome associated with lupus, and pleuritis.  I will recheck in 6 months when she returns to clinic.  She does not have interstitial lung disease on her chest CT scan.

## 2018-08-24 NOTE — LETTER
8/24/2018       RE: Taina Singh  [address] [place] [address]     Dear Colleague,    Thank you for referring your patient, Taina Singh, to the [facility] at [facility]. Please see a copy of my visit note below.    [facility]    Reason for Visit  Taina Singh is a 53 year old year old female who is being seen for Interstitial Lung Disease (ILD) (Patient is being seen for ILD follow up)    HPI    Ms. Singh is a 53-year-old who is a new patient referred by Dr. Shannon for evaluation of abnormal chest CT scan with possible ILD.  She has lupus which was diagnosed at age 27.  She was in her usual state of health, and 3 years ago embarked on weight loss and exercise.  She lost about 100 pounds over the course of a year and feels that that rapid weight loss and exercise might have triggered her lupus.  3 years ago ago, she was on no medications for her lupus.  She has had a lot of pain including pleuritic chest pain and shortness of breath.  She noted dyspnea approximately 1 year ago and then eventually developed pleuritic chest pain felt to be serositis secondary to lupus.  She feels as if she cannot get a full breath in.  She has not been able to exercise because of the lupus flare over the past year.  She had PFTs today and 3 months ago.  She was given albuterol today in the PFT lab and felt that it helped her breathing significantly.  She has been on prednisone for approximately the past year and currently is on 30 mg daily.  The lowest prednisone dose that she has been on since November 2017 is 15 mg daily.  She has gained approximately 40 pounds in the past year per her report.  She denies cough, fevers, new skin rashes, or wheezing.  Her joints are under good control with her current regimen.  She continues to have pleuritic chest pain but uses tramadol for the pain.  She takes  mycophenolate 1500 mg twice daily in addition to the prednisone.  Dr. Shannon has started paperwork to start belimumab.    She denies exposure to asbestos, silica, mold, pet birds, hot tubs.  She used feather pillows until age 49.  She denies exposure to mold and does not play breast or wind instruments.  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      Current Outpatient Prescriptions   Medication     albuterol (PROAIR HFA/PROVENTIL HFA/VENTOLIN HFA) 108 (90 Base) MCG/ACT inhaler     Calcium-Magnesium 300-300 MG TABS     Cholecalciferol (D 1000) 1000 UNITS CAPS     hydroxychloroquine (PLAQUENIL) 200 MG tablet     mometasone-formoterol (DULERA) 100-5 MCG/ACT oral inhaler     Multiple Vitamins-Minerals (WOMENS MULTI PO)     mycophenolate (GENERIC EQUIVALENT) 500 MG tablet     Omega-3 Fatty Acids (OMEGA-3 FISH OIL) 500 MG CAPS     pilocarpine (SALAGEN) 5 MG tablet     predniSONE (DELTASONE) 5 MG tablet     traMADol (ULTRAM) 50 MG tablet     No current facility-administered medications for this visit.      Allergies   Allergen Reactions     Penicillins Rash     Sulfa Drugs Rash     Past Medical History:   Diagnosis Date     Bronchiolitis      Diastolic dysfunction 2018     Gluten intolerance     Patient is not gluten intolerant     Iron deficiency      Iron deficiency 2018     Lupus     dx age 27; + DNA-ds, KARLIE, SSA, SSB and RF; malar rash, serositis (pleuritic CP)     MALT lymphoma (H) age 44    No chemo or radiation     Other forms of systemic lupus erythematosus (H) 2018     Sjogren's syndrome (H)     secondary Sjogrens, secondary to lupus     Systemic lupus erythematosus (H) 2018     Vitamin D deficiency        Past Surgical History:   Procedure Laterality Date     BIOPSY/REMOVAL, LYMPH NODE(S)        SECTION  age 32     HC HYSTEROS W PERMANENT FALLOPAIN IMPLANT       PAROTIDECTOMY       TONSILLECTOMY         Social History     Social History     Marital status:      Spouse name: N/A      Number of children: N/A     Years of education: 1     Occupational History           , 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     Social History Main Topics     Smoking status: Never Smoker     Smokeless tobacco: Never Used     Alcohol use No     Drug use: No     Sexual activity: Not on file     Other Topics Concern     Not on file     Social History Narrative    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  Denies exposure to asbestos, silica, hot tubs, feather pillows (used to until age 49), pet birds, mold, does not play brass/wind instruments.        Family History   Problem Relation Age of Onset     Alopecia Brother      Rheumatoid Arthritis Brother      LUNG DISEASE No family hx of              ROS Pulmonary  A complete ROS was otherwise negative except as noted in the HPI.    Vitals: /77 (BP Location: Right arm, Patient Position: Chair, Cuff Size: Adult Large)  Pulse 98  Resp 16  Wt 128.2 kg (282 lb 11.2 oz)  SpO2 97%  BMI 45.63 kg/m2    Exam:   GENERAL APPEARANCE: Well developed, well nourished, obese, alert, and in no apparent distress.  LYMPHATICS: No significant cervical, or supraclavicular nodes.  RESP: good air flow throughout.  No crackles. No rhonchi. No wheezes.  CV: Normal S1, S2, regular rhythm, normal rate. + systolic murmur.  No LE edema.   ABD: BS+, soft, nontender.  MS: extremities normal. No clubbing. No cyanosis.  SKIN: no rash on limited exam.  NEURO: Mentation intact, speech normal, normal gait and stance.  PSYCH: mentation appears normal. and affect normal/bright.    Results:  Recent Results (from the past 168 hour(s))   6 minute walk test    Collection Time: 18 12:00 AM   Result Value Ref Range    6 min walk (FT) 1150 ft    6 Min Walk (M) 351 m   General PFT Lab (Please always keep checked)    Collection Time: 18  7:33 AM   Result Value Ref Range    FVC-Pred 3.65 L    FVC-Pre 1.49 L    FVC-%Pred-Pre 40 %    FEV1-Pre 1.23 L    FEV1-%Pred-Pre 42 %    FEV1FVC-Pred 80 %    FEV1FVC-Pre 82 %     FEFMax-Pred 6.98 L/sec    FEFMax-Pre 4.62 L/sec    FEFMax-%Pred-Pre 66 %    FEF2575-Pred 2.78 L/sec    FEF2575-Pre 1.30 L/sec    FVS6239-%Pred-Pre 46 %    FEF2575-Post 2.02 L/sec    MXE2441-%Pred-Post 72 %    ExpTime-Pre 6.60 sec    FIFMax-Pre 3.53 L/sec    VC-Pred 3.69 L    VC-Pre 1.72 L    VC-%Pred-Pre 46 %    IC-Pred 3.38 L    IC-Pre 1.58 L    IC-%Pred-Pre 46 %    ERV-Pred 0.31 L    ERV-Pre 0.14 L    ERV-%Pred-Pre 44 %    FEV1FEV6-Pred 82 %    FEV1FEV6-Pre 82 %    FRCPleth-Pred 2.80 L    FRCPleth-Pre 1.70 L    FRCPleth-%Pred-Pre 60 %    RVPleth-Pred 1.85 L    RVPleth-Pre 1.56 L    RVPleth-%Pred-Pre 84 %    TLCPleth-Pred 5.26 L    TLCPleth-Pre 3.28 L    TLCPleth-%Pred-Pre 62 %    DLCOunc-Pred 23.35 ml/min/mmHg    DLCOunc-Pre 16.03 ml/min/mmHg    DLCOunc-%Pred-Pre 68 %    DLCOcor-Pre 17.25 ml/min/mmHg    DLCOcor-%Pred-Pre 73 %    VA-Pre 2.78 L    VA-%Pred-Pre 51 %    FEV1SVC-Pred 79 %    FEV1SVC-Pre 71 %       I reviewed pulmonary function test that was performed today along with six minute walk test.  PFT today shows severe restriction with normal diffusion.  There is no significant bronchodilator response.  Spirometry and TLC are reduced compared to 3 months ago, but diffusion is in the normal range and stable.  6 walk walk test today shows slightly reduced walk distance with no hypoxia on room air.  I also reviewed chest CT scan performed in June 2018.  There is no interstitial lung disease.  There are small multiple pulmonary nodules 2-3 mm in size that are unchanged compared to December 2017.  There is air trapping and a new small right pleural effusion.  There is also atelectasis in both lungs.    I reviewed results with the patient.      Assessment and plan:   Ms. Singh is a 53-year-old who is a new patient referred by Dr. Shannon for dyspnea and abnormal PFT.    1.  Bronchiolitis.  Dr. Shannon was concerned that she might have ILD based on her outside chest CT report from July.  However, our chest CT  from June shows atelectasis and no ILD.  There is evidence of small airways disease (bronchiolitis) on the CT.  I do believe that her dyspnea is multifactorial including obesity but also small airways disease.  She had significant symptomatic benefit from albuterol in the PFT lab.  It would be reasonable to start treatment for the bronchiolitis which I think is associated with her lupus.  I will start Dulera 100 2 puffs twice daily.  I counseled her to gargle with water after use.  I will have the clinic assistant teach her how to use the inhaler today and give her a spacer.  I also will give her a prescription for albuterol as needed.  I do not recommend making any other changes to her regimen at this time as she does not have interstitial lung disease.  She will return in 6 months with full PFT.  2.  Lung nodules.  The chest CT scan shows multiple lung nodules 2-3 mm in size.  Per Fleischner criteria, these do not require follow-up in a low risk patient.  3.  Right pleural effusion.  Chest CT in June showed a new small right pleural effusion.  This likely is due to lupus pleuritis.  I will get a follow-up chest x-ray today.    4.  Restrictive PFT.  This most likely is multifactorial including obesity, possible shrinking lung syndrome associated with lupus, and pleuritis.  I will recheck in 6 months when she returns to clinic.  She does not have interstitial lung disease on her chest CT scan.            Again, thank you for allowing me to participate in the care of your patient.      Sincerely,    Joanne Marvin MD

## 2018-08-24 NOTE — NURSING NOTE
Chief Complaint   Patient presents with     Interstitial Lung Disease (ILD)     Patient is being seen for ILD follow up      Ella Hwang CMA at 8:43 AM on 8/24/2018

## 2018-08-28 LAB
DLCOCOR-%PRED-PRE: 73 %
DLCOCOR-PRE: 17.25 ML/MIN/MMHG
DLCOUNC-%PRED-PRE: 68 %
DLCOUNC-PRE: 16.03 ML/MIN/MMHG
DLCOUNC-PRED: 23.35 ML/MIN/MMHG
ERV-%PRED-PRE: 44 %
ERV-PRE: 0.14 L
ERV-PRED: 0.31 L
EXPTIME-PRE: 6.6 SEC
FEF2575-%PRED-POST: 72 %
FEF2575-%PRED-PRE: 46 %
FEF2575-POST: 2.02 L/SEC
FEF2575-PRE: 1.3 L/SEC
FEF2575-PRED: 2.78 L/SEC
FEFMAX-%PRED-PRE: 66 %
FEFMAX-PRE: 4.62 L/SEC
FEFMAX-PRED: 6.98 L/SEC
FEV1-%PRED-PRE: 42 %
FEV1-PRE: 1.23 L
FEV1FEV6-PRE: 82 %
FEV1FEV6-PRED: 82 %
FEV1FVC-PRE: 82 %
FEV1FVC-PRED: 80 %
FEV1SVC-PRE: 71 %
FEV1SVC-PRED: 79 %
FIFMAX-PRE: 3.53 L/SEC
FRCPLETH-%PRED-PRE: 60 %
FRCPLETH-PRE: 1.7 L
FRCPLETH-PRED: 2.8 L
FVC-%PRED-PRE: 40 %
FVC-PRE: 1.49 L
FVC-PRED: 3.65 L
IC-%PRED-PRE: 46 %
IC-PRE: 1.58 L
IC-PRED: 3.38 L
RVPLETH-%PRED-PRE: 84 %
RVPLETH-PRE: 1.56 L
RVPLETH-PRED: 1.85 L
TLCPLETH-%PRED-PRE: 62 %
TLCPLETH-PRE: 3.28 L
TLCPLETH-PRED: 5.26 L
VA-%PRED-PRE: 51 %
VA-PRE: 2.78 L
VC-%PRED-PRE: 46 %
VC-PRE: 1.72 L
VC-PRED: 3.69 L

## 2018-08-29 ENCOUNTER — MYC REFILL (OUTPATIENT)
Dept: RHEUMATOLOGY | Facility: CLINIC | Age: 53
End: 2018-08-29

## 2018-08-29 ENCOUNTER — TELEPHONE (OUTPATIENT)
Dept: PULMONOLOGY | Facility: CLINIC | Age: 53
End: 2018-08-29

## 2018-08-29 DIAGNOSIS — M32.9 SYSTEMIC LUPUS ERYTHEMATOSUS, UNSPECIFIED SLE TYPE, UNSPECIFIED ORGAN INVOLVEMENT STATUS (H): ICD-10-CM

## 2018-08-29 RX ORDER — TRAMADOL HYDROCHLORIDE 50 MG/1
50 TABLET ORAL EVERY 12 HOURS PRN
Qty: 30 TABLET | Refills: 2 | Status: SHIPPED | OUTPATIENT
Start: 2018-08-29 | End: 2018-11-16

## 2018-08-29 NOTE — TELEPHONE ENCOUNTER
Last seen: 7/11/18   Pending appt: 11/16/18  Medication: Tramadol   Last filled: 8/8/18  Qty: 30     reviewed, no other narcotics from other providers.    Sophie Alvarado RN  Rheumatology Clinic

## 2018-08-29 NOTE — TELEPHONE ENCOUNTER
Central Prior Authorization Team   Phone: [phone]        Prior Authorization Retail Medication Request    Medication/Dose: mometasone-formoterol (DULERA) 100-5 MCG/ACT oral inhaler  ICD code (if different than what is on RX):  Bronchiolitis [J21.9]   Previously Tried and Failed:    Rationale:      Insurance Name:  [employer]  BIN: [id number]  Group: [id number]  Insurance ID:  [id number]  PCN: [id number]    Pharmacy Information (if different than what is on RX)  [facility]:  [facility]  Phone:  [phone]

## 2018-08-29 NOTE — TELEPHONE ENCOUNTER
Message from JacoboUpson:  Ankush Encinas RN Wed Aug 29, 2018 8:55 AM        ----- Message -----   From: Taina Singh   Sent: 8/29/2018 1:02 AM   To: Adult Rheum Triage[facility]  Subject: Medication Renewal Request     Original authorizing provider: MD Taina Graham would like a refill of the following medications:  traMADol (ULTRAM) 50 MG tablet [Killian Marroquin MD]    Preferred pharmacy: [id number] - [place] - [address] [facility]    Comment:

## 2018-08-30 RX ORDER — MYCOPHENOLATE MOFETIL 500 MG/1
1500 TABLET ORAL 2 TIMES DAILY
Qty: 540 TABLET | Refills: 0 | Status: SHIPPED | OUTPATIENT
Start: 2018-08-30 | End: 2018-11-16

## 2018-08-30 RX ORDER — HYDROXYCHLOROQUINE SULFATE 200 MG/1
200 TABLET, FILM COATED ORAL 2 TIMES DAILY
Qty: 180 TABLET | Refills: 1 | Status: SHIPPED | OUTPATIENT
Start: 2018-08-30 | End: 2018-11-16

## 2018-08-30 NOTE — TELEPHONE ENCOUNTER
PA has been approved.  [facility] is requesting a copy of PA when we have it. They will schedule pt after that.    Pt is aware.  Will keep tramadol and prednisone at [facility], will transfer HCQ and MMF to [facility].  Sent to Dr. Marroquin for signature.    Sophie Alvarado RN  Rheumatology Clinic

## 2018-08-30 NOTE — TELEPHONE ENCOUNTER
.Central Prior Authorization Team   [phone]    Prior Authorization Not Needed per Insurance    Medication: mometasone-formoterol (DULERA) 100-5 MCG/ACT oral inhaler-PA NOT NEEDED   Insurance Company: [facility] - Phone [phone] Fax [phone]  Expected CoPay:      Ph[facility] the Rx[facility] [address] [place] [address]  Pharmacy Notified: Yes  Patient Notified: Yes    Called the pharmacy and pharmacy stated that PA is not needed.

## 2018-09-04 DIAGNOSIS — J90 PLEURAL EFFUSION: ICD-10-CM

## 2018-09-04 PROCEDURE — 71046 X-RAY EXAM CHEST 2 VIEWS: CPT | Mod: FY

## 2018-09-07 ENCOUNTER — CARE COORDINATION (OUTPATIENT)
Dept: PULMONOLOGY | Facility: CLINIC | Age: 53
End: 2018-09-07

## 2018-09-07 NOTE — PROGRESS NOTES
Patient sent a [facility] message wanting to know the results of her chest x-ray from last week.  Notified patient that small pleural effusion is still present.  Dr. Marvin is out of town and will review x-ray next week.

## 2018-10-29 ENCOUNTER — MYC MEDICAL ADVICE (OUTPATIENT)
Dept: PULMONOLOGY | Facility: CLINIC | Age: 53
End: 2018-10-29

## 2018-11-12 DIAGNOSIS — Z79.899 HIGH RISK MEDICATIONS (NOT ANTICOAGULANTS) LONG-TERM USE: ICD-10-CM

## 2018-11-12 DIAGNOSIS — R53.83 OTHER FATIGUE: ICD-10-CM

## 2018-11-12 DIAGNOSIS — M32.9 SYSTEMIC LUPUS ERYTHEMATOSUS, UNSPECIFIED SLE TYPE, UNSPECIFIED ORGAN INVOLVEMENT STATUS (H): ICD-10-CM

## 2018-11-12 LAB
ALBUMIN SERPL-MCNC: 3.2 G/DL (ref 3.4–5)
ALBUMIN UR-MCNC: 30 MG/DL
ALT SERPL W P-5'-P-CCNC: 30 U/L (ref 0–50)
APPEARANCE UR: CLEAR
AST SERPL W P-5'-P-CCNC: 19 U/L (ref 0–45)
BACTERIA #/AREA URNS HPF: ABNORMAL /HPF
BILIRUB UR QL STRIP: NEGATIVE
COLOR UR AUTO: YELLOW
CREAT SERPL-MCNC: 0.83 MG/DL (ref 0.52–1.04)
CREAT UR-MCNC: 176 MG/DL
CRP SERPL-MCNC: 16.4 MG/L (ref 0–8)
ERYTHROCYTE [DISTWIDTH] IN BLOOD BY AUTOMATED COUNT: 14.9 % (ref 10–15)
ERYTHROCYTE [SEDIMENTATION RATE] IN BLOOD BY WESTERGREN METHOD: 26 MM/H (ref 0–30)
FERRITIN SERPL-MCNC: 160 NG/ML (ref 8–252)
GFR SERPL CREATININE-BSD FRML MDRD: 73 ML/MIN/1.7M2
GLUCOSE UR STRIP-MCNC: NEGATIVE MG/DL
HCT VFR BLD AUTO: 36 % (ref 35–47)
HGB BLD-MCNC: 11.3 G/DL (ref 11.7–15.7)
HGB UR QL STRIP: ABNORMAL
IRON SATN MFR SERPL: 10 % (ref 15–46)
IRON SERPL-MCNC: 27 UG/DL (ref 35–180)
KETONES UR STRIP-MCNC: NEGATIVE MG/DL
LEUKOCYTE ESTERASE UR QL STRIP: NEGATIVE
MCH RBC QN AUTO: 26.6 PG (ref 26.5–33)
MCHC RBC AUTO-ENTMCNC: 31.4 G/DL (ref 31.5–36.5)
MCV RBC AUTO: 85 FL (ref 78–100)
NITRATE UR QL: NEGATIVE
NON-SQ EPI CELLS #/AREA URNS LPF: ABNORMAL /LPF
PH UR STRIP: 6 PH (ref 5–7)
PLATELET # BLD AUTO: 255 10E9/L (ref 150–450)
RBC # BLD AUTO: 4.25 10E12/L (ref 3.8–5.2)
RBC #/AREA URNS AUTO: ABNORMAL /HPF
SOURCE: ABNORMAL
SP GR UR STRIP: 1.02 (ref 1–1.03)
TIBC SERPL-MCNC: 264 UG/DL (ref 240–430)
UROBILINOGEN UR STRIP-ACNC: 0.2 EU/DL (ref 0.2–1)
WBC # BLD AUTO: 7.3 10E9/L (ref 4–11)
WBC #/AREA URNS AUTO: ABNORMAL /HPF

## 2018-11-12 PROCEDURE — 86140 C-REACTIVE PROTEIN: CPT | Performed by: INTERNAL MEDICINE

## 2018-11-12 PROCEDURE — 86225 DNA ANTIBODY NATIVE: CPT | Performed by: INTERNAL MEDICINE

## 2018-11-12 PROCEDURE — 82565 ASSAY OF CREATININE: CPT | Performed by: INTERNAL MEDICINE

## 2018-11-12 PROCEDURE — 83550 IRON BINDING TEST: CPT | Performed by: INTERNAL MEDICINE

## 2018-11-12 PROCEDURE — 81001 URINALYSIS AUTO W/SCOPE: CPT | Performed by: INTERNAL MEDICINE

## 2018-11-12 PROCEDURE — 82728 ASSAY OF FERRITIN: CPT | Performed by: INTERNAL MEDICINE

## 2018-11-12 PROCEDURE — 85652 RBC SED RATE AUTOMATED: CPT | Performed by: INTERNAL MEDICINE

## 2018-11-12 PROCEDURE — 84460 ALANINE AMINO (ALT) (SGPT): CPT | Performed by: INTERNAL MEDICINE

## 2018-11-12 PROCEDURE — 84450 TRANSFERASE (AST) (SGOT): CPT | Performed by: INTERNAL MEDICINE

## 2018-11-12 PROCEDURE — 86160 COMPLEMENT ANTIGEN: CPT | Performed by: INTERNAL MEDICINE

## 2018-11-12 PROCEDURE — 36415 COLL VENOUS BLD VENIPUNCTURE: CPT | Performed by: INTERNAL MEDICINE

## 2018-11-12 PROCEDURE — 85027 COMPLETE CBC AUTOMATED: CPT | Performed by: INTERNAL MEDICINE

## 2018-11-12 PROCEDURE — 83540 ASSAY OF IRON: CPT | Performed by: INTERNAL MEDICINE

## 2018-11-12 PROCEDURE — 82040 ASSAY OF SERUM ALBUMIN: CPT | Performed by: INTERNAL MEDICINE

## 2018-11-12 PROCEDURE — 86160 COMPLEMENT ANTIGEN: CPT | Mod: 59 | Performed by: INTERNAL MEDICINE

## 2018-11-12 PROCEDURE — 84156 ASSAY OF PROTEIN URINE: CPT | Performed by: INTERNAL MEDICINE

## 2018-11-12 NOTE — LETTER
Patient:  Taina Singh  :   1965  MRN:     [id number]        Ms.Kelly Singh  [address] [facility] [address]        November 15, 2018    Dear ,    We are writing to inform you of your test results. Unchanged labs except improvement of inflammation markers (ESR, CRP), let's discuss plan of care tomorrow.      Results for orders placed or performed in visit on 18   Albumin level   Result Value Ref Range    Albumin 3.2 (L) 3.4 - 5.0 g/dL   ALT   Result Value Ref Range    ALT 30 0 - 50 U/L   AST   Result Value Ref Range    AST 19 0 - 45 U/L   CBC with platelets   Result Value Ref Range    WBC 7.3 4.0 - 11.0 10e9/L    RBC Count 4.25 3.8 - 5.2 10e12/L    Hemoglobin 11.3 (L) 11.7 - 15.7 g/dL    Hematocrit 36.0 35.0 - 47.0 %    MCV 85 78 - 100 fl    MCH 26.6 26.5 - 33.0 pg    MCHC 31.4 (L) 31.5 - 36.5 g/dL    RDW 14.9 10.0 - 15.0 %    Platelet Count 255 150 - 450 10e9/L   Complement C3   Result Value Ref Range    Complement C3 48 (L) 76 - 169 mg/dL   Complement C4   Result Value Ref Range    Complement C4 3 (L) 15 - 50 mg/dL   Creatinine   Result Value Ref Range    Creatinine 0.83 0.52 - 1.04 mg/dL    GFR Estimate 73 >60 mL/min/1.7m2    GFR Estimate If Black 88 >60 mL/min/1.7m2   CRP inflammation   Result Value Ref Range    CRP Inflammation 16.4 (H) 0.0 - 8.0 mg/L   DNA double stranded antibodies   Result Value Ref Range    DNA-ds >379 (H) <10 IU/mL   Erythrocyte sedimentation rate auto   Result Value Ref Range    Sed Rate 26 0 - 30 mm/h   Protein  random urine with Creat Ratio   Result Value Ref Range    Protein Random Urine 0.58 g/L    Protein Total Urine g/gr Creatinine 0.34 (H) 0 - 0.2 g/g Cr   Ferritin   Result Value Ref Range    Ferritin 160 8 - 252 ng/mL   Iron and iron binding capacity   Result Value Ref Range    Iron 27 (L) 35 - 180 ug/dL    Iron Binding Cap 264 240 - 430 ug/dL    Iron Saturation Index 10 (L) 15 - 46 %   UA with Microscopic reflex to Culture   Result Value Ref Range     Color Urine Yellow     Appearance Urine Clear     Glucose Urine Negative NEG^Negative mg/dL    Bilirubin Urine Negative NEG^Negative    Ketones Urine Negative NEG^Negative mg/dL    Specific Gravity Urine 1.025 1.003 - 1.035    pH Urine 6.0 5.0 - 7.0 pH    Protein Albumin Urine 30 (A) NEG^Negative mg/dL    Urobilinogen Urine 0.2 0.2 - 1.0 EU/dL    Nitrite Urine Negative NEG^Negative    Blood Urine Trace (A) NEG^Negative    Leukocyte Esterase Urine Negative NEG^Negative    Source Midstream Urine     WBC Urine 0 - 5 OTO5^0 - 5 /HPF    RBC Urine O - 2 OTO2^O - 2 /HPF    Squamous Epithelial /LPF Urine Few FEW^Few /LPF    Bacteria Urine Few (A) NEG^Negative /HPF   Creatinine urine calculation only   Result Value Ref Range    Creatinine Urine 176 mg/dL       Killian Marroquin MD

## 2018-11-13 LAB
C3 SERPL-MCNC: 48 MG/DL (ref 76–169)
C4 SERPL-MCNC: 3 MG/DL (ref 15–50)

## 2018-11-14 LAB
DSDNA AB SER-ACNC: >379 IU/ML
PROT UR-MCNC: 0.58 G/L
PROT/CREAT 24H UR: 0.34 G/G CR (ref 0–0.2)

## 2018-11-16 ENCOUNTER — OFFICE VISIT (OUTPATIENT)
Dept: RHEUMATOLOGY | Facility: CLINIC | Age: 53
End: 2018-11-16
Attending: INTERNAL MEDICINE
Payer: COMMERCIAL

## 2018-11-16 VITALS
WEIGHT: 282.6 LBS | HEART RATE: 79 BPM | OXYGEN SATURATION: 95 % | HEIGHT: 66 IN | DIASTOLIC BLOOD PRESSURE: 78 MMHG | BODY MASS INDEX: 45.42 KG/M2 | SYSTOLIC BLOOD PRESSURE: 136 MMHG | TEMPERATURE: 98.9 F

## 2018-11-16 DIAGNOSIS — M32.9 SYSTEMIC LUPUS ERYTHEMATOSUS, UNSPECIFIED SLE TYPE, UNSPECIFIED ORGAN INVOLVEMENT STATUS (H): ICD-10-CM

## 2018-11-16 DIAGNOSIS — L93.0 LUPUS ERYTHEMATOSUS, UNSPECIFIED FORM: ICD-10-CM

## 2018-11-16 PROCEDURE — G0463 HOSPITAL OUTPT CLINIC VISIT: HCPCS | Mod: ZF

## 2018-11-16 RX ORDER — METHYLPREDNISOLONE SODIUM SUCCINATE 125 MG/2ML
125 INJECTION, POWDER, LYOPHILIZED, FOR SOLUTION INTRAMUSCULAR; INTRAVENOUS ONCE
Status: CANCELLED | OUTPATIENT
Start: 2018-11-16

## 2018-11-16 RX ORDER — PREDNISONE 5 MG/1
TABLET ORAL
Qty: 150 TABLET | Refills: 1 | Status: SHIPPED | OUTPATIENT
Start: 2018-11-16 | End: 2018-12-31

## 2018-11-16 RX ORDER — MYCOPHENOLATE MOFETIL 500 MG/1
1500 TABLET ORAL 2 TIMES DAILY
Qty: 540 TABLET | Refills: 1 | Status: SHIPPED | OUTPATIENT
Start: 2018-11-16 | End: 2019-05-17

## 2018-11-16 RX ORDER — DIPHENHYDRAMINE HCL 25 MG
25 CAPSULE ORAL ONCE
Status: CANCELLED
Start: 2018-11-16

## 2018-11-16 RX ORDER — ACETAMINOPHEN 325 MG/1
650 TABLET ORAL ONCE
Status: CANCELLED
Start: 2018-11-16

## 2018-11-16 RX ORDER — HYDROXYCHLOROQUINE SULFATE 200 MG/1
200 TABLET, FILM COATED ORAL 2 TIMES DAILY
Qty: 180 TABLET | Refills: 1 | Status: SHIPPED | OUTPATIENT
Start: 2018-11-16 | End: 2019-10-16

## 2018-11-16 RX ORDER — TRAMADOL HYDROCHLORIDE 50 MG/1
50 TABLET ORAL EVERY 12 HOURS PRN
Qty: 30 TABLET | Refills: 2 | Status: SHIPPED | OUTPATIENT
Start: 2018-11-16 | End: 2019-02-21

## 2018-11-16 RX ORDER — PREDNISONE 2.5 MG/1
TABLET ORAL
Qty: 60 TABLET | Refills: 1 | Status: SHIPPED | OUTPATIENT
Start: 2018-11-16 | End: 2019-02-08

## 2018-11-16 ASSESSMENT — PAIN SCALES - GENERAL: PAINLEVEL: MILD PAIN (3)

## 2018-11-16 NOTE — PATIENT INSTRUCTIONS
Will change benlysta pre-meds to as needed    Start tapering the prednisone down: 17.5-15-12.5 mg a day each for one week then 10 mg a day    Labs in 2 months    Return in 3-4 months

## 2018-11-16 NOTE — PROGRESS NOTES
Rheumatology F/U Note    Reason for visit: SLE, ongoing pleuritic CP    Date of last visit: 7/11/2018    DOS: 11/16/2018      HPI:    Taina Singh is a 52 year old  female who was referred to our clinic for evaluation and management of her SLE.    She was diagnosed with lupus at age 27. Her 1st sx were malar rash and fatigue. No skin biopsy was done (reportedly had +KARLIE). She was treated with prednisone taper and HCQ. HCQ helped and she tolerated it well. She was diagnosed with Sjogren's at the same time. Had dryness of eyes and mouth. No lip biopsy to confirm the Dx.    Reportedly she had very mild stable lupus for many years and eventually at some point, stopped taking HCQ (can't remember when)    She was diagnosed with MALT lymphoma at 44, had enlarged LN over R parotid gland, on biopsy it was MALT lymphoma. Tx was resection only, no radiation or chemo.    About 3 yr ago, had flu shot and 2 days later, developed pleuritic CP and was seen at urgent care. That's why she does not want to get flu shot. She was seen in ER 2 days after UC visit. She was treated with prednisone.    She lost 100 lbs by exercise over past 2 yr, felt amazing. In 7/2017, started not feeling well. She had extreme fatigue. Had AM stiffness and pain over hands. Touched base with her previous rheumatologist (Dr. Rangel) and was told to have lupus flare and was put on  mg qd. Afterwards, developed pleuritic CP which has not been resolved.    She was given prednisone 40 mg qd x 5 days (on 12/16/2017) by pulmonologist in [facility]. It helped a lot but pleuritic CP recurred after stopping it.    She was told to have pulm HTN and was evaluated for it.    No triggers for current flare.    No flare of malar rash. No current hair loss. Uses blink eyedrops, restasis burned her eyes. She has been prescribed salagen for dry mouth, has not used it. Arthritis of hands have resolved on HCQ.    Last HCQ eye exam was 1 week ago.    4/2018: On  AZA 50 mg qd since 2/8/2018, increased to 100 mg qd in 3/19/2018. Her arthralgia is intermittent and mild, it's better. Has fatigue.  5/2018: Started on mycophenalate 1500 mg, continues prednisone 30 mg and plaquenil 200 mg twice a day.     Her major complaint is continues pressure over her chest. Pain is pleuritic, it hurts by sneezing, taking deep breath.      7/2018: Ms. Singh feels an improvement in her symptoms. She says there is a baseline pleuritic chest pain, but her fatigue and overall day-to-day function has improved to her satisfaction. She says morning stiffness usually only lasts about 10 minutes. She complains of occasional episodes of flashing lights in her left eye, however she is being seen by her opthamologist. She has a OTC scheduled for Monday as well. Ms. Singh feels as though the mycophenalate has made the biggest difference in her improvement. She continues to taper the prednisone, currently on 20 mg daily. She also has continued the plaquenil 200 mg twice a day.           Today: On benlysta infusion since beginning of Sept 2018. Chest still feels less tight, breathing is much better. Sometimes hands ache but it does lot last long.      Benlysta makes her tired, but overall feels her lupus sx are much improved on benlysta.        ROS:  A comprehensive ROS was done, positives are per HPI.  General: fatigue improving   Eyes: Left eye has episodes of flashing lights that self resolve  ENT: Occasional running nose. No epistaxis, sinus pressure/pain, tinnitus  CV: Continues to have pleuritic chest pain, some SOB with exertion  Resp: No cough, sputum production, wheezing, SOB  GI: No N/V/D/C, abdominal pain  : No polyuria, dysuria, hematuria, incontinence  MSK: Joint pain has improved. No functional deficets or joint swelling  Integumentary: Skin breakdown on the corner of her mouths, but no rash, dryness, lesions, wounds  Neurological: No weakness, balance, seizures, mental function  issues    Past Medical History:   Diagnosis Date     Bronchiolitis     Chest CT 2018 shows air trapping; bronchiolitis possibly related to lupus     Diastolic dysfunction 2018     Gluten intolerance     Patient is not gluten intolerant     Iron deficiency      Iron deficiency 2018     Lupus     dx age 27; + DNA-ds, KARLIE, SSA, SSB and RF; malar rash, serositis (pleuritic CP)     MALT lymphoma (H) age 44    No chemo or radiation     Other forms of systemic lupus erythematosus (H) 2018     Sjogren's syndrome (H)     secondary Sjogrens, secondary to lupus     Vitamin D deficiency      Past Surgical History:   Procedure Laterality Date     BIOPSY/REMOVAL, LYMPH NODE(S)        SECTION  age 32     HC HYSTEROS W PERMANENT FALLOPAIN IMPLANT       PAROTIDECTOMY       TONSILLECTOMY       Family History   Problem Relation Age of Onset     Alopecia Brother      Rheumatoid Arthritis Brother      LUNG DISEASE No family hx of      Social History     Social History     Marital status:      Spouse name: N/A     Number of children: N/A     Years of education: 1     Occupational History           , 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     Social History Main Topics     Smoking status: Never Smoker     Smokeless tobacco: Never Used     Alcohol use No     Drug use: No     Sexual activity: Not on file     Other Topics Concern     Not on file     Social History Narrative    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  Denies exposure to asbestos, silica, hot tubs, feather pillows (used to until age 49), pet birds, mold, does not play brass/wind instruments.      Going through divorce but it's not stress factor for her.    Allergies   Allergen Reactions     Penicillins Rash     Sulfa Drugs Rash       Outpatient Encounter Prescriptions as of 2018   Medication Sig Dispense Refill     albuterol (PROAIR HFA/PROVENTIL HFA/VENTOLIN HFA) 108 (90 Base) MCG/ACT inhaler Inhale 2 puffs into the lungs every 6 hours as needed for shortness of  breath / dyspnea or wheezing 3 Inhaler 3     Calcium-Magnesium 300-300 MG TABS daily        Cholecalciferol (D 1000) 1000 UNITS CAPS Take 1,000 Units by mouth daily        hydroxychloroquine (PLAQUENIL) 200 MG tablet Take 1 tablet (200 mg) by mouth 2 times daily 180 tablet 1     mometasone-formoterol (DULERA) 100-5 MCG/ACT oral inhaler Inhale 2 puffs into the lungs 2 times daily 3 Inhaler 3     Multiple Vitamins-Minerals (WOMENS MULTI PO) Take by mouth daily        mycophenolate (GENERIC EQUIVALENT) 500 MG tablet Take 3 tablets (1,500 mg) by mouth 2 times daily 540 tablet 0     Omega-3 Fatty Acids (OMEGA-3 FISH OIL) 500 MG CAPS Take 500 mg by mouth daily        pilocarpine (SALAGEN) 5 MG tablet Take 1 tablet (5 mg) by mouth 4 times daily as needed 120 tablet 11     predniSONE (DELTASONE) 5 MG tablet 15 mg a day x 7 days then 10 mg a day 30 tablet 0     traMADol (ULTRAM) 50 MG tablet Take 1 tablet (50 mg) by mouth every 12 hours as needed for pain Take 1 tablet as needed for pain.  No driving or alcohol use while taking medication. 30 tablet 2     No facility-administered encounter medications on file as of 2018.          Her records were reviewed.    2D-Echo 2017:    ECHO COMPLETE WO CONTRAST EDWIGE, KELLY 2017 14:43     [facility] [place] [id number], [address], [place] [address]   Main: [phone]  [email or web address]                                                 Transthoracic Echo Report   EDWIGE, KELLY   Kaeian ID: [id number] Age: 52 : 1965 Ordering Provider: NGUYEN PETERS   Exam Date: 2017 14:43 Gender: F Sonographer: BDB   Accession #: [id number] Height: 66 in BSA: 2.24 m  BP: 108 / 62   Weight: 261 lbs BMI: 42.1 kg/m          Site: [facility]   Location: Outpatient Rhythm: Normal Sinus Rhythm   Procedure Components: 2D imaging, Color Doppler, Spectral Doppler   Indications: Murmur; Systemic lupus  "erythematosus, unspecified SLE type, unspecified organ   involvement status   Technical Quality: Contrast: None     Final Conclusion   Visually estimated ejection fraction is 55-60%.   Mild left ventricular hypertrophy.   Estimated pulmonary artery pressure of 31 mmHg + RA pressure.   Dilated IVC size, <50% collapse with respiration.   Estimated EF: 55-60%   FINDINGS   Left Ventricle Normal left ventricular size. Visually estimated ejection fraction is 55-60%.   Mild left ventricular hypertrophy.   Diastolic Function \"Pseudonormal\" left ventricular filling pattern. E/e' ratio 8-15 is   indeterminate for filling pressure.   Right Ventricle Normal right ventricular size and function.   Left Atrium Mild left atrial enlargement.   Right Atrium Mild right atrial enlargement.   Aortic Valve Normal aortic valve. No aortic stenosis. No significant aortic regurgitation.   Mitral Valve Normal mitral valve. No mitral stenosis. Mild mitral regurgitation.   Tricuspid Valve Normal tricuspid valve. No tricuspid stenosis. Mild tricuspid regurgitation.   Estimated pulmonary artery pressure   of 31 mmHg + RA pressure.   Pulmonic Valve Normal pulmonic valve without significant stenosis or regurgitation.   Pericardium Normal pericardium.   Aorta Measured aortic root diameter is normal in size.   Inferior Vena Cava Dilated IVC size, <50% collapse with respiration.    Component      Latest Ref Rng & Units 11/20/2017   Sodium      133 - 144 mmol/L 137   Potassium      3.4 - 5.3 mmol/L 4.2   Chloride      94 - 109 mmol/L 102   Carbon Dioxide      20 - 32 mmol/L 30   Anion Gap      3 - 14 mmol/L 6   Glucose      70 - 99 mg/dL 101 (H)   Urea Nitrogen      7 - 30 mg/dL 13   Creatinine      0.52 - 1.04 mg/dL 0.55   GFR Estimate      >60 mL/min/1.7m2 >90   GFR Estimate If Black      >60 mL/min/1.7m2 >90   Calcium      8.5 - 10.1 mg/dL 9.1   WBC      4.0 - 11.0 10e9/L 4.9   RBC Count      3.8 - 5.2 10e12/L 4.28   Hemoglobin      11.7 - 15.7 g/dL " 11.3 (L)   Hematocrit      35.0 - 47.0 % 35.5   MCV      78 - 100 fl 83   MCH      26.5 - 33.0 pg 26.4 (L)   MCHC      31.5 - 36.5 g/dL 31.8   RDW      10.0 - 15.0 % 14.6   Platelet Count      150 - 450 10e9/L 215   N-Terminal Pro Bnp      0 - 125 pg/mL 546 (H)   Sed Rate      0 - 30 mm/h 71 (H)     Component      Latest Ref Rng & Units 12/29/2017   Color Urine       Straw   Appearance Urine       Clear   Glucose Urine      NEG:Negative mg/dL Negative   Bilirubin Urine      NEG:Negative Negative   Ketones Urine      NEG:Negative mg/dL Negative   Specific Gravity Urine      1.003 - 1.035 1.005   Blood Urine      NEG:Negative Negative   pH Urine      5.0 - 7.0 pH 6.0   Protein Albumin Urine      NEG:Negative mg/dL Negative   Urobilinogen mg/dL      0.0 - 2.0 mg/dL 0.0   Nitrite Urine      NEG:Negative Negative   Leukocyte Esterase Urine      NEG:Negative Negative   Source       Midstream Urine   WBC Urine      0 - 2 /HPF <1   RBC Urine      0 - 2 /HPF <1   Bacteria Urine      NEG:Negative /HPF Few (A)   Squamous Epithelial /HPF Urine      0 - 1 /HPF <1   Mucous Urine      NEG:Negative /LPF Present (A)   Albumin Fraction      3.7 - 5.1 g/dL 4.2   Alpha 1 Fraction      0.2 - 0.4 g/dL 0.4   Alpha 2 Fraction      0.5 - 0.9 g/dL 0.8   Beta Fraction      0.6 - 1.0 g/dL 1.0   Gamma Fraction      0.7 - 1.6 g/dL 1.6   Monoclonal Peak      0.0 g/dL 0.0   ELP Interpretation:       Essentially normal electrophoretic pattern. No monoclonal protein seen. Pathologic . . .   IGG      695 - 1620 mg/dL 1560   IGA      70 - 380 mg/dL 473 (H)   IGM      60 - 265 mg/dL 92   Iron      35 - 180 ug/dL 58   Iron Binding Cap      240 - 430 ug/dL 315   Iron Saturation Index      15 - 46 % 19   TPMT Genotype Specimen       Whole Blood   TPMT Genotype       Neg/Neg   TPMT Predicted Phenotype       Normal   Protein Random Urine      g/L <0.05   Protein Total Urine g/gr Creatinine      0 - 0.2 g/g Cr Unable to calculate due to low value    Deamidated Gliadin Cari, IgA      <7 U/mL 2   Deamidated Gliadin Cari, IgG      <7 U/mL 5   Complement C3      76 - 169 mg/dL 72 (L)   Complement C4      15 - 50 mg/dL 6 (L)   CRP Inflammation      0.0 - 8.0 mg/L 3.2   DNA-ds      <10 IU/mL >379 (H)   Sed Rate      0 - 30 mm/h 49 (H)   Vitamin D Deficiency screening      20 - 75 ug/L 51   Creatinine Urine Random      mg/dL 23   AST      0 - 45 U/L 26   ALT      0 - 50 U/L 35   HEENA  Broad Spectrum       Neg   Beta 2 Glycoprotein 1 Antibody IgG      <7 U/mL <0.6   Beta 2 Glycoprotein 1 Antibody IgM      <7 U/mL <0.9   Thyroid Peroxidase Antibody      <35 IU/mL <10   Thyroglobulin Antibody      <40 IU/mL <20   TSH      0.40 - 4.00 mU/L 0.87   Cryoglobulin      NEG:Negative % Trace (A)   Tissue Transglutaminase Antibody IgA      <7 U/mL 4   Ferritin      8 - 252 ng/mL 163   Endomysial Antibody IgA by IFA      <1:10 <1:10   CRP Cardiac Risk      mg/L 2.9   Creatinine Urine      mg/dL 23     Component      Latest Ref Rng & Units 2/23/2018   Color Urine       Yellow   Appearance Urine       Clear   Glucose Urine      NEG:Negative mg/dL Negative   Bilirubin Urine      NEG:Negative Negative   Ketones Urine      NEG:Negative mg/dL Negative   Specific Gravity Urine      1.003 - 1.035 1.025   Blood Urine      NEG:Negative Negative   pH Urine      5.0 - 7.0 pH 5.5   Protein Albumin Urine      NEG:Negative mg/dL Negative   Urobilinogen Urine      0.2 - 1.0 EU/dL 0.2   Nitrite Urine      NEG:Negative Negative   Leukocyte Esterase Urine      NEG:Negative Negative   Source       Midstream Urine   Protein Random Urine      g/L 0.17   Protein Total Urine g/gr Creatinine      0 - 0.2 g/g Cr 0.18   Complement C3      76 - 169 mg/dL 64 (L)   Complement C4      15 - 50 mg/dL 5 (L)   CRP Inflammation      0.0 - 8.0 mg/L 4.2   DNA-ds      <10 IU/mL >379 (H)   Sed Rate      0 - 30 mm/h 35 (H)   AST      0 - 45 U/L 27   ALT      0 - 50 U/L 31   Creatinine Urine Random      mg/dL 99      Component      Latest Ref Rng & Units 3/16/2018   WBC      4.0 - 11.0 10e9/L 5.6   RBC Count      3.8 - 5.2 10e12/L 4.43   Hemoglobin      11.7 - 15.7 g/dL 12.1   Hematocrit      35.0 - 47.0 % 36.9   MCV      78 - 100 fl 83   MCH      26.5 - 33.0 pg 27.3   MCHC      31.5 - 36.5 g/dL 32.8   RDW      10.0 - 15.0 % 14.5   Platelet Count      150 - 450 10e9/L 224   Diff Method       Automated Method   % Neutrophils      % 81.7   % Lymphocytes      % 9.3   % Monocytes      % 7.5   % Eosinophils      % 1.3   % Basophils      % 0.2   Absolute Neutrophil      1.6 - 8.3 10e9/L 4.6   Absolute Lymphocytes      0.8 - 5.3 10e9/L 0.5 (L)   Absolute Monocytes      0.0 - 1.3 10e9/L 0.4   Absolute Eosinophils      0.0 - 0.7 10e9/L 0.1   Absolute Basophils      0.0 - 0.2 10e9/L 0.0   Creatinine      0.52 - 1.04 mg/dL 0.51 (L)   GFR Estimate      >60 mL/min/1.7m2 >90   GFR Estimate If Black      >60 mL/min/1.7m2 >90   ALT      0 - 50 U/L 27   Albumin      3.4 - 5.0 g/dL 3.5   AST      0 - 45 U/L 18       Component      Latest Ref Rng & Units 3/21/2018   Color Urine       Yellow   Appearance Urine       Clear   Glucose Urine      NEG:Negative mg/dL Negative   Bilirubin Urine      NEG:Negative Negative   Ketones Urine      NEG:Negative mg/dL Negative   Specific Gravity Urine      1.003 - 1.035 <=1.005   pH Urine      5.0 - 7.0 pH 6.5   Protein Albumin Urine      NEG:Negative mg/dL Negative   Urobilinogen Urine      0.2 - 1.0 EU/dL 0.2   Nitrite Urine      NEG:Negative Negative   Blood Urine      NEG:Negative Negative   Leukocyte Esterase Urine      NEG:Negative Negative   Source       Midstream Urine   WBC Urine      OTO5:0 - 5 /HPF 0 - 5   RBC Urine      OTO2:O - 2 /HPF O - 2   Squamous Epithelial /LPF Urine      FEW:Few /LPF Few   Protein Random Urine      g/L <0.05   Protein Total Urine g/gr Creatinine      0 - 0.2 g/g Cr Unable to calculate due to low value   DNA-ds      <10 IU/mL >379 (H)   Complement C4      15 - 50 mg/dL 4  (L)   Complement C3      76 - 169 mg/dL 69 (L)   Creatinine Urine Random      mg/dL 17   Creatinine Urine      mg/dL 17     EXAMINATION: CT CHEST W/O CONTRAST, 12/29/2017 1:08 PM   TECHNIQUE:  Helical CT images from the thoracic inlet through the lung  bases were obtained without IV contrast during inspiration and  expiration according to high-resolution chest CT protocol. Contrast  dose: None  COMPARISON: None   HISTORY: eval for ILD, pleural effusion, pt has lupus, Sjogren's, h/o  MALT and pleurisy and SOB; Systemic lupus erythematosus, unspecified  SLE type, unspecified organ involvement status (H)   FINDINGS:  At least 75% collapse of the trachea and mainstem bronchi on the end  expiratory views. Diffuse lobular air trapping on end expiratory  images. Bibasilar platelike atelectasis. No pneumothorax or pleural  effusion. Scattered solid pulmonary nodules, for example a 4 mm  lingular nodule (series 5 image 145) and a 4 mm right upper lobe  nodule (image 73).    The heart size is normal. Mild three-vessel coronary artery calcific  atherosclerosis. Dilation of the main pulmonary artery to 4.1 cm. No  pericardial effusion. Normal caliber and configuration of the thoracic  great vessels. Numerous prominent though nonenlarged mediastinal lymph  nodes.  Limited views of the abdomen reveal no worrisome pathology. No  worrisome bony or soft tissue lesions.    IMPRESSION:   1. At least moderate tracheobronchomalacia.  2. 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  3. Scattered subcentimeter pulmonary nodules measuring up to 4 mm.  Consider follow-up chest CT in one year to establish stability.     [Consider Follow Up: Lung nodule]     This report will be copied to the [facility] to ensure a  provider acknowledges the finding.      I have personally reviewed the examination and initial  interpretation  and I agree with the findings.     AUSTIN PACE MD    Examination:NM LUNG SCAN VENTILATION AND PERFUSION, 3/14/2018 8:24 AM    Indication:  Chronic PE; Pulmonary hypertension    Additional Information: none   Technique:   The patient received 2 mCi of Tc-99m DTPA aerosol for ventilation and  7 mCi of Tc-99m MAA for perfusion. Multiple images were obtained of  the lungs in Anterior, posterior, HERNANDES, RPO, LPO, and  ELYSE projections.   Comparison: Chest x-ray same-day   Findings:     There are matched ventilation/perfusion defects in both lungs. No  mismatched perfusion defect to suggest pulmonary emboli.      Impression:  Pulmonary emboli is not present.     I have personally reviewed the examination and initial interpretation  and I agree with the findings.     DONNIE GARCIA MD    Component      Latest Ref Rng & Units 5/12/2018   WBC      4.0 - 11.0 10e9/L 7.1   RBC Count      3.8 - 5.2 10e12/L 4.42   Hemoglobin      11.7 - 15.7 g/dL 12.1   Hematocrit      35.0 - 47.0 % 37.4   MCV      78 - 100 fl 85   MCH      26.5 - 33.0 pg 27.4   MCHC      31.5 - 36.5 g/dL 32.4   RDW      10.0 - 15.0 % 14.7   Platelet Count      150 - 450 10e9/L 226   Diff Method       Automated Method   % Neutrophils      % 86.2   % Lymphocytes      % 4.8   % Monocytes      % 8.4   % Eosinophils      % 0.3   % Basophils      % 0.3   Absolute Neutrophil      1.6 - 8.3 10e9/L 6.1   Absolute Lymphocytes      0.8 - 5.3 10e9/L 0.3 (L)   Absolute Monocytes      0.0 - 1.3 10e9/L 0.6   Absolute Eosinophils      0.0 - 0.7 10e9/L 0.0   Absolute Basophils      0.0 - 0.2 10e9/L 0.0   Color Urine       Yellow   Appearance Urine       Clear   Glucose Urine      NEG:Negative mg/dL Negative   Bilirubin Urine      NEG:Negative Negative   Ketones Urine      NEG:Negative mg/dL Negative   Specific Gravity Urine      1.003 - 1.035 <=1.005   Blood Urine      NEG:Negative Negative   pH Urine      5.0 - 7.0 pH 5.5   Protein Albumin Urine       NEG:Negative mg/dL Negative   Urobilinogen Urine      0.2 - 1.0 EU/dL 0.2   Nitrite Urine      NEG:Negative Negative   Leukocyte Esterase Urine      NEG:Negative Negative   Source       Midstream Urine   Creatinine      0.52 - 1.04 mg/dL 0.63   GFR Estimate      >60 mL/min/1.7m2 >90   GFR Estimate If Black      >60 mL/min/1.7m2 >90   Protein Random Urine      g/L <0.05   Protein Total Urine g/gr Creatinine      0 - 0.2 g/g Cr Unable to calculate due to low value   Albumin      3.4 - 5.0 g/dL 3.6   ALT      0 - 50 U/L 28   AST      0 - 45 U/L 20   Complement C3      76 - 169 mg/dL 46 (L)   Complement C4      15 - 50 mg/dL 3 (L)   CRP Inflammation      0.0 - 8.0 mg/L <2.9   Sed Rate      0 - 30 mm/h 26   DNA-ds      <10 IU/mL >379 (H)   Creatinine Urine      mg/dL 16     Component      Latest Ref Rng & Units 7/7/2018   WBC      4.0 - 11.0 10e9/L 7.0   RBC Count      3.8 - 5.2 10e12/L 4.35   Hemoglobin      11.7 - 15.7 g/dL 11.7   Hematocrit      35.0 - 47.0 % 36.0   MCV      78 - 100 fl 83   MCH      26.5 - 33.0 pg 26.9   MCHC      31.5 - 36.5 g/dL 32.5   RDW      10.0 - 15.0 % 15.3 (H)   Platelet Count      150 - 450 10e9/L 224   Diff Method       Automated Method   % Neutrophils      % 91.9   % Lymphocytes      % 4.0   % Monocytes      % 3.7   % Eosinophils      % 0.3   % Basophils      % 0.1   Absolute Neutrophil      1.6 - 8.3 10e9/L 6.5   Absolute Lymphocytes      0.8 - 5.3 10e9/L 0.3 (L)   Absolute Monocytes      0.0 - 1.3 10e9/L 0.3   Absolute Eosinophils      0.0 - 0.7 10e9/L 0.0   Absolute Basophils      0.0 - 0.2 10e9/L 0.0   Color Urine       Yellow   Appearance Urine       Clear   Glucose Urine      NEG:Negative mg/dL Negative   Bilirubin Urine      NEG:Negative Negative   Ketones Urine      NEG:Negative mg/dL Negative   Specific Gravity Urine      1.003 - 1.035 1.010   pH Urine      5.0 - 7.0 pH 5.5   Protein Albumin Urine      NEG:Negative mg/dL Negative   Urobilinogen Urine      0.2 - 1.0 EU/dL 0.2    Nitrite Urine      NEG:Negative Negative   Blood Urine      NEG:Negative Trace (A)   Leukocyte Esterase Urine      NEG:Negative Negative   Source       Midstream Urine   WBC Urine      OTO5:0 - 5 /HPF 0 - 5   RBC Urine      OTO2:O - 2 /HPF O - 2   Squamous Epithelial /LPF Urine      FEW:Few /LPF Few   Creatinine      0.52 - 1.04 mg/dL 0.63   GFR Estimate      >60 mL/min/1.7m2 >90   GFR Estimate If Black      >60 mL/min/1.7m2 >90   Protein Random Urine      g/L 0.07   Protein Total Urine g/gr Creatinine      0 - 0.2 g/g Cr 0.29 (H)   Albumin      3.4 - 5.0 g/dL 3.5   ALT      0 - 50 U/L 27   AST      0 - 45 U/L 21   Complement C3      76 - 169 mg/dL 51 (L)   Complement C4      15 - 50 mg/dL 5 (L)   Creatinine Urine Random      mg/dL 24   CRP Inflammation      0.0 - 8.0 mg/L 11.2 (H)   DNA-ds      <10 IU/mL >379 (H)   Sed Rate      0 - 30 mm/h 35 (H)   Creatinine Urine      mg/dL 23     PFTs 5/2018 (The FEV1 and FVC are reduced but the FEV1/FVC ratio is normal.  The inspiratory flow rates are reduced.  The TLC and FRC are reduced.  The diffusing capacity is normal.  However, the diffusing capacity was not corrected for the patient's hemoglobin.  IMPRESSION:  Moderately Severe  Restriction.  Normal Diffusion.  Walk distance is normal on room air with significant desaturation but no hypoxia.  ____________________________________________KERVIN TONY.      Component      Latest Ref Rng & Units 11/12/2018   Color Urine       Yellow   Appearance Urine       Clear   Glucose Urine      NEG:Negative mg/dL Negative   Bilirubin Urine      NEG:Negative Negative   Ketones Urine      NEG:Negative mg/dL Negative   Specific Gravity Urine      1.003 - 1.035 1.025   pH Urine      5.0 - 7.0 pH 6.0   Protein Albumin Urine      NEG:Negative mg/dL 30 (A)   Urobilinogen Urine      0.2 - 1.0 EU/dL 0.2   Nitrite Urine      NEG:Negative Negative   Blood Urine      NEG:Negative Trace (A)   Leukocyte Esterase Urine      NEG:Negative  "Negative   Source       Midstream Urine   WBC Urine      OTO5:0 - 5 /HPF 0 - 5   RBC Urine      OTO2:O - 2 /HPF O - 2   Squamous Epithelial /LPF Urine      FEW:Few /LPF Few   Bacteria Urine      NEG:Negative /HPF Few (A)   WBC      4.0 - 11.0 10e9/L 7.3   RBC Count      3.8 - 5.2 10e12/L 4.25   Hemoglobin      11.7 - 15.7 g/dL 11.3 (L)   Hematocrit      35.0 - 47.0 % 36.0   MCV      78 - 100 fl 85   MCH      26.5 - 33.0 pg 26.6   MCHC      31.5 - 36.5 g/dL 31.4 (L)   RDW      10.0 - 15.0 % 14.9   Platelet Count      150 - 450 10e9/L 255   Creatinine      0.52 - 1.04 mg/dL 0.83   GFR Estimate      >60 mL/min/1.7m2 73   GFR Estimate If Black      >60 mL/min/1.7m2 88   Iron      35 - 180 ug/dL 27 (L)   Iron Binding Cap      240 - 430 ug/dL 264   Iron Saturation Index      15 - 46 % 10 (L)   Protein Random Urine      g/L 0.58   Protein Total Urine g/gr Creatinine      0 - 0.2 g/g Cr 0.34 (H)   Albumin      3.4 - 5.0 g/dL 3.2 (L)   ALT      0 - 50 U/L 30   AST      0 - 45 U/L 19   Complement C3      76 - 169 mg/dL 48 (L)   Complement C4      15 - 50 mg/dL 3 (L)   CRP Inflammation      0.0 - 8.0 mg/L 16.4 (H)   DNA-ds      <10 IU/mL >379 (H)   Sed Rate      0 - 30 mm/h 26   Ferritin      8 - 252 ng/mL 160   Creatinine Urine      mg/dL 176     Ph.E:    /78   Pulse 79   Temp 98.9  F (37.2  C) (Oral)   Ht 1.676 m (5' 6\")   Wt 128.2 kg (282 lb 9.6 oz)   SpO2 95%   BMI 45.61 kg/m        Constitutional: WD/WN. Pleasant. In no acute distress.   Eyes: EOM intact, PERRLA, sclera anicteric, conj not injected  HEENT: No oral ulcers or thrush. Normal salivary pool.   Neck: No cervical LAP or thyromegaly  Chest: Clear to auscultation bilaterally  CV: RRR, no murmurs/ rubs or gallops. No edema, clubbing or cyanosis.   GI: Abdomen is soft and non tender.   MS: No synovitis. Cool joints. No tenderness of the joints. No swelling. Normal ROM.  No joint deformities. Full ROM of the joints. No nodules. No Jaccoud's " deformity.  Skin: No skin rash, malar rash, livedo, periungual erythema, alopecia, digital ulcers or nail changes.  Neuro: A&O x 3. Grossly non focal, muscular power 5/5 in all ext  Psych: NL affect and mood    Assessment/ plan:    1-SLE. 52 yo WF with +fh/o RA and personal hx of SLE presented in 12/2017 for 2nd opinion of m/o her SLE. She was diagnosed with SLE at age 27. Her lupus has been marked by +KARLIE (highest titer 1:640, speckled and nucleolar patterns), +anti-DNA, arthritis, malar rash, serositis (pleuritic CP) supported by +SSA/SSB Ab, low C3/low C4, +RF, high ESR/CRP. She had neg anti-RNP, anti-Sm, acL IgM/G/A, LAC, cryo and anti-CCP.     Had NL C3 at the time of Dx. She was treated with prednisone and HCQ at the time of Dx. Can't remember how long she was on HCQ and why HCQ was stopped, but it probably was stopped because of stable disease, no report on HCQ toxicity. Reportedly her SLE was mild all these years.    Has secondary Sjogren's with sicca, +SSA/SSB Ab and h/o MALT lymphoma at age 44 in remission. Uses blink eyedrops and salagen. No lip biopsy was done to confirm Dx of Sjogren's.    Her lupus flared in 7/2017 with no triggers when she presented with arthritis, HCQ was resumed, arthritis resolved. Mild pulm HTN on 2D-Echo 8/2017 but neg R cardiac cath and VQ scan in 3/2018, also neg 2D-Echo.    In 12/2017, was prescribed prednisone 40 mg for only 5 days for pleuritic CP, CP recurred after stopping prednisone.     Her major complaint at initial visit with me in 12/2017 was ongoing CP. AZA was added in 2/2018 with start dose of 50 mg qd and slowly was increased to max 150 mg qd. Tolerated AZA well, no SEs, no toxicity on the labs but failed it and required to go back on prednisone 20 mg qd (current dose).     Her lupus is active with pleuritic CP. ESR/CRP normalized on prednisone+AZA+HCQ but +anti-DNA, low C3/C4 are unchanged. Chest CT in 12/2017 without contrast showed air trapping due to  lupus/Sjogren's, no pleural effusion. Lung nodules need f/u in a year. No pericardail effusion on 2D-echo and neg VQ scan for PE in 3/2018 so chest CT with contrast is not needed. She is APL negative.    Lupus Dx and tx plan was discussed with her.    AZA was switched to  mg po bid on 5/18/2018 as she failed AZA. She is now on max dose of MMF 1500 mg bid. Her labs are unchanged with persistently elevated anti-DNA, low C3/C4 and high ESR/CRP, but just started to feel a lot better (regarding fatigue, arthralgia, still has residual pleuritic CP) after adding MMF.     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 Also possible shrinking lung syn sec lupus or obesity is responsible for restrictive lung disease plus pleural effusion. She was prescribed albuterol and dulera inh which have helped.      Benlysta was added in 9/2018 to help with pleuritic CP. No change in lupus serology (anti-DNA, C3, C4), but ESR/CRP have improved. Overall she feels a lot better after adding benlysta but it makes her tired. Will remove pre-meds as no allergic reaction to benlysta and pre-meds could cause fatigue.      Recommend:      Will follow up with pulmonary.    Continue the plaquenil 200 mg twice a day as well as mycophenalate 1500 mg bid and benlysta infusion qmo (peak benefit is 6 mo)    Will change benlysta pre-meds to as needed    Start tapering the prednisone down: 17.5-15-12.5 mg a day each for one week then 10 mg a day    Labs in 2 months    2-HCQ monitoring. Was reminded to have eye exam    3-MMF monitoring. Labs q3mo, no toxicity        RTC 3 months        Orders Placed This Encounter   Procedures     Complement C4     Complement C3     CBC with platelets  differential     CRP inflammation     DNA double stranded antibodies     Erythrocyte sedimentation rate auto     Creatinine     AST     ALT     Albumin level     Routine UA with Micro Reflex to Culture     Protein  random urine with Creat Ratio     Creatinine random urine

## 2018-11-16 NOTE — LETTER
11/16/2018      RE: Taina Singh  [address]  [place] [address]           PaRheumatology F/U Note    Reason for visit: SLE, ongoing pleuritic CP    Date of last visit: 7/11/2018    DOS: 11/16/2018      HPI:    Taina Singh is a 52 year old  female who was referred to our clinic for evaluation and management of her SLE.    She was diagnosed with lupus at age 27. Her 1st sx were malar rash and fatigue. No skin biopsy was done (reportedly had +KARLIE). She was treated with prednisone taper and HCQ. HCQ helped and she tolerated it well. She was diagnosed with Sjogren's at the same time. Had dryness of eyes and mouth. No lip biopsy to confirm the Dx.    Reportedly she had very mild stable lupus for many years and eventually at some point, stopped taking HCQ (can't remember when)    She was diagnosed with MALT lymphoma at 44, had enlarged LN over R parotid gland, on biopsy it was MALT lymphoma. Tx was resection only, no radiation or chemo.    About 3 yr ago, had flu shot and 2 days later, developed pleuritic CP and was seen at urgent care. That's why she does not want to get flu shot. She was seen in ER 2 days after UC visit. She was treated with prednisone.    She lost 100 lbs by exercise over past 2 yr, felt amazing. In 7/2017, started not feeling well. She had extreme fatigue. Had AM stiffness and pain over hands. Touched base with her previous rheumatologist (Dr. Rangel) and was told to have lupus flare and was put on  mg qd. Afterwards, developed pleuritic CP which has not been resolved.    She was given prednisone 40 mg qd x 5 days (on 12/16/2017) by pulmonologist in [facility]. It helped a lot but pleuritic CP recurred after stopping it.    She was told to have pulm HTN and was evaluated for it.    No triggers for current flare.    No flare of malar rash. No current hair loss. Uses blink eyedrops, restasis burned her eyes. She has been prescribed salagen for dry mouth, has not used it. Arthritis of  hands have resolved on HCQ.    Last HCQ eye exam was 1 week ago.    4/2018: On AZA 50 mg qd since 2/8/2018, increased to 100 mg qd in 3/19/2018. Her arthralgia is intermittent and mild, it's better. Has fatigue.  5/2018: Started on mycophenalate 1500 mg, continues prednisone 30 mg and plaquenil 200 mg twice a day.     Her major complaint is continues pressure over her chest. Pain is pleuritic, it hurts by sneezing, taking deep breath.      7/2018: Ms. Singh feels an improvement in her symptoms. She says there is a baseline pleuritic chest pain, but her fatigue and overall day-to-day function has improved to her satisfaction. She says morning stiffness usually only lasts about 10 minutes. She complains of occasional episodes of flashing lights in her left eye, however she is being seen by her opthamologist. She has a OTC scheduled for Monday as well. Ms. Singh feels as though the mycophenalate has made the biggest difference in her improvement. She continues to taper the prednisone, currently on 20 mg daily. She also has continued the plaquenil 200 mg twice a day.           Today: On benlysta infusion since beginning of Sept 2018. Chest still feels less tight, breathing is much better. Sometimes hands ache but it does lot last long.      Benlysta makes her tired, but overall feels her lupus sx are much improved on benlysta.        ROS:  A comprehensive ROS was done, positives are per HPI.  General: fatigue improving   Eyes: Left eye has episodes of flashing lights that self resolve  ENT: Occasional running nose. No epistaxis, sinus pressure/pain, tinnitus  CV: Continues to have pleuritic chest pain, some SOB with exertion  Resp: No cough, sputum production, wheezing, SOB  GI: No N/V/D/C, abdominal pain  : No polyuria, dysuria, hematuria, incontinence  MSK: Joint pain has improved. No functional deficets or joint swelling  Integumentary: Skin breakdown on the corner of her mouths, but no rash, dryness,  lesions, wounds  Neurological: No weakness, balance, seizures, mental function issues    Past Medical History:   Diagnosis Date     Bronchiolitis     Chest CT 2018 shows air trapping; bronchiolitis possibly related to lupus     Diastolic dysfunction 2018     Gluten intolerance     Patient is not gluten intolerant     Iron deficiency      Iron deficiency 2018     Lupus     dx age 27; + DNA-ds, KARLIE, SSA, SSB and RF; malar rash, serositis (pleuritic CP)     MALT lymphoma (H) age 44    No chemo or radiation     Other forms of systemic lupus erythematosus (H) 2018     Sjogren's syndrome (H)     secondary Sjogrens, secondary to lupus     Vitamin D deficiency      Past Surgical History:   Procedure Laterality Date     BIOPSY/REMOVAL, LYMPH NODE(S)        SECTION  age 32     HC HYSTEROS W PERMANENT FALLOPAIN IMPLANT       PAROTIDECTOMY       TONSILLECTOMY       Family History   Problem Relation Age of Onset     Alopecia Brother      Rheumatoid Arthritis Brother      LUNG DISEASE No family hx of      Social History     Social History     Marital status:      Spouse name: N/A     Number of children: N/A     Years of education: 1     Occupational History           , 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     Social History Main Topics     Smoking status: Never Smoker     Smokeless tobacco: Never Used     Alcohol use No     Drug use: No     Sexual activity: Not on file     Other Topics Concern     Not on file     Social History Narrative    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  Denies exposure to asbestos, silica, hot tubs, feather pillows (used to until age 49), pet birds, mold, does not play brass/wind instruments.      Going through divorce but it's not stress factor for her.    Allergies   Allergen Reactions     Penicillins Rash     Sulfa Drugs Rash       Outpatient Encounter Prescriptions as of 2018   Medication Sig Dispense Refill     albuterol (PROAIR HFA/PROVENTIL HFA/VENTOLIN HFA) 108 (90 Base) MCG/ACT  inhaler Inhale 2 puffs into the lungs every 6 hours as needed for shortness of breath / dyspnea or wheezing 3 Inhaler 3     Calcium-Magnesium 300-300 MG TABS daily        Cholecalciferol (D 1000) 1000 UNITS CAPS Take 1,000 Units by mouth daily        hydroxychloroquine (PLAQUENIL) 200 MG tablet Take 1 tablet (200 mg) by mouth 2 times daily 180 tablet 1     mometasone-formoterol (DULERA) 100-5 MCG/ACT oral inhaler Inhale 2 puffs into the lungs 2 times daily 3 Inhaler 3     Multiple Vitamins-Minerals (WOMENS MULTI PO) Take by mouth daily        mycophenolate (GENERIC EQUIVALENT) 500 MG tablet Take 3 tablets (1,500 mg) by mouth 2 times daily 540 tablet 0     Omega-3 Fatty Acids (OMEGA-3 FISH OIL) 500 MG CAPS Take 500 mg by mouth daily        pilocarpine (SALAGEN) 5 MG tablet Take 1 tablet (5 mg) by mouth 4 times daily as needed 120 tablet 11     predniSONE (DELTASONE) 5 MG tablet 15 mg a day x 7 days then 10 mg a day 30 tablet 0     traMADol (ULTRAM) 50 MG tablet Take 1 tablet (50 mg) by mouth every 12 hours as needed for pain Take 1 tablet as needed for pain.  No driving or alcohol use while taking medication. 30 tablet 2     No facility-administered encounter medications on file as of 2018.          Her records were reviewed.    2D-Echo 2017:    ECHO COMPLETE WO CONTRAST CHILANGO GIBBONS 2017 14:43     [facility]   [address], [address], [place] [address]   Main: [phone]  [email or web address]                                                 Transthoracic Echo Report   CHILANGO GIBBONS   Excellian ID: [id number] Age: 52 : 1965 Ordering Provider: NGUYEN PETERS   Exam Date: 2017 14:43 Gender: F Sonographer: HAJA   Accession #: [id number] Height: 66 in BSA: 2.24 m  BP: 108 / 62   Weight: 261 lbs BMI: 42.1 kg/m          Site: [facility]   Location: Outpatient Rhythm: Normal Sinus Rhythm   Procedure Components: 2D  "imaging, Color Doppler, Spectral Doppler   Indications: Murmur; Systemic lupus erythematosus, unspecified SLE type, unspecified organ   involvement status   Technical Quality: Contrast: None     Final Conclusion   Visually estimated ejection fraction is 55-60%.   Mild left ventricular hypertrophy.   Estimated pulmonary artery pressure of 31 mmHg + RA pressure.   Dilated IVC size, <50% collapse with respiration.   Estimated EF: 55-60%   FINDINGS   Left Ventricle Normal left ventricular size. Visually estimated ejection fraction is 55-60%.   Mild left ventricular hypertrophy.   Diastolic Function \"Pseudonormal\" left ventricular filling pattern. E/e' ratio 8-15 is   indeterminate for filling pressure.   Right Ventricle Normal right ventricular size and function.   Left Atrium Mild left atrial enlargement.   Right Atrium Mild right atrial enlargement.   Aortic Valve Normal aortic valve. No aortic stenosis. No significant aortic regurgitation.   Mitral Valve Normal mitral valve. No mitral stenosis. Mild mitral regurgitation.   Tricuspid Valve Normal tricuspid valve. No tricuspid stenosis. Mild tricuspid regurgitation.   Estimated pulmonary artery pressure   of 31 mmHg + RA pressure.   Pulmonic Valve Normal pulmonic valve without significant stenosis or regurgitation.   Pericardium Normal pericardium.   Aorta Measured aortic root diameter is normal in size.   Inferior Vena Cava Dilated IVC size, <50% collapse with respiration.    Component      Latest Ref Rng & Units 11/20/2017   Sodium      133 - 144 mmol/L 137   Potassium      3.4 - 5.3 mmol/L 4.2   Chloride      94 - 109 mmol/L 102   Carbon Dioxide      20 - 32 mmol/L 30   Anion Gap      3 - 14 mmol/L 6   Glucose      70 - 99 mg/dL 101 (H)   Urea Nitrogen      7 - 30 mg/dL 13   Creatinine      0.52 - 1.04 mg/dL 0.55   GFR Estimate      >60 mL/min/1.7m2 >90   GFR Estimate If Black      >60 mL/min/1.7m2 >90   Calcium      8.5 - 10.1 mg/dL 9.1   WBC      4.0 - 11.0 10e9/L " 4.9   RBC Count      3.8 - 5.2 10e12/L 4.28   Hemoglobin      11.7 - 15.7 g/dL 11.3 (L)   Hematocrit      35.0 - 47.0 % 35.5   MCV      78 - 100 fl 83   MCH      26.5 - 33.0 pg 26.4 (L)   MCHC      31.5 - 36.5 g/dL 31.8   RDW      10.0 - 15.0 % 14.6   Platelet Count      150 - 450 10e9/L 215   N-Terminal Pro Bnp      0 - 125 pg/mL 546 (H)   Sed Rate      0 - 30 mm/h 71 (H)     Component      Latest Ref Rng & Units 12/29/2017   Color Urine       Straw   Appearance Urine       Clear   Glucose Urine      NEG:Negative mg/dL Negative   Bilirubin Urine      NEG:Negative Negative   Ketones Urine      NEG:Negative mg/dL Negative   Specific Gravity Urine      1.003 - 1.035 1.005   Blood Urine      NEG:Negative Negative   pH Urine      5.0 - 7.0 pH 6.0   Protein Albumin Urine      NEG:Negative mg/dL Negative   Urobilinogen mg/dL      0.0 - 2.0 mg/dL 0.0   Nitrite Urine      NEG:Negative Negative   Leukocyte Esterase Urine      NEG:Negative Negative   Source       Midstream Urine   WBC Urine      0 - 2 /HPF <1   RBC Urine      0 - 2 /HPF <1   Bacteria Urine      NEG:Negative /HPF Few (A)   Squamous Epithelial /HPF Urine      0 - 1 /HPF <1   Mucous Urine      NEG:Negative /LPF Present (A)   Albumin Fraction      3.7 - 5.1 g/dL 4.2   Alpha 1 Fraction      0.2 - 0.4 g/dL 0.4   Alpha 2 Fraction      0.5 - 0.9 g/dL 0.8   Beta Fraction      0.6 - 1.0 g/dL 1.0   Gamma Fraction      0.7 - 1.6 g/dL 1.6   Monoclonal Peak      0.0 g/dL 0.0   ELP Interpretation:       Essentially normal electrophoretic pattern. No monoclonal protein seen. Pathologic . . .   IGG      695 - 1620 mg/dL 1560   IGA      70 - 380 mg/dL 473 (H)   IGM      60 - 265 mg/dL 92   Iron      35 - 180 ug/dL 58   Iron Binding Cap      240 - 430 ug/dL 315   Iron Saturation Index      15 - 46 % 19   TPMT Genotype Specimen       Whole Blood   TPMT Genotype       Neg/Neg   TPMT Predicted Phenotype       Normal   Protein Random Urine      g/L <0.05   Protein Total Urine g/gr  Creatinine      0 - 0.2 g/g Cr Unable to calculate due to low value   Deamidated Gliadin Cari, IgA      <7 U/mL 2   Deamidated Gliadin Cari, IgG      <7 U/mL 5   Complement C3      76 - 169 mg/dL 72 (L)   Complement C4      15 - 50 mg/dL 6 (L)   CRP Inflammation      0.0 - 8.0 mg/L 3.2   DNA-ds      <10 IU/mL >379 (H)   Sed Rate      0 - 30 mm/h 49 (H)   Vitamin D Deficiency screening      20 - 75 ug/L 51   Creatinine Urine Random      mg/dL 23   AST      0 - 45 U/L 26   ALT      0 - 50 U/L 35   HEENA  Broad Spectrum       Neg   Beta 2 Glycoprotein 1 Antibody IgG      <7 U/mL <0.6   Beta 2 Glycoprotein 1 Antibody IgM      <7 U/mL <0.9   Thyroid Peroxidase Antibody      <35 IU/mL <10   Thyroglobulin Antibody      <40 IU/mL <20   TSH      0.40 - 4.00 mU/L 0.87   Cryoglobulin      NEG:Negative % Trace (A)   Tissue Transglutaminase Antibody IgA      <7 U/mL 4   Ferritin      8 - 252 ng/mL 163   Endomysial Antibody IgA by IFA      <1:10 <1:10   CRP Cardiac Risk      mg/L 2.9   Creatinine Urine      mg/dL 23     Component      Latest Ref Rng & Units 2/23/2018   Color Urine       Yellow   Appearance Urine       Clear   Glucose Urine      NEG:Negative mg/dL Negative   Bilirubin Urine      NEG:Negative Negative   Ketones Urine      NEG:Negative mg/dL Negative   Specific Gravity Urine      1.003 - 1.035 1.025   Blood Urine      NEG:Negative Negative   pH Urine      5.0 - 7.0 pH 5.5   Protein Albumin Urine      NEG:Negative mg/dL Negative   Urobilinogen Urine      0.2 - 1.0 EU/dL 0.2   Nitrite Urine      NEG:Negative Negative   Leukocyte Esterase Urine      NEG:Negative Negative   Source       Midstream Urine   Protein Random Urine      g/L 0.17   Protein Total Urine g/gr Creatinine      0 - 0.2 g/g Cr 0.18   Complement C3      76 - 169 mg/dL 64 (L)   Complement C4      15 - 50 mg/dL 5 (L)   CRP Inflammation      0.0 - 8.0 mg/L 4.2   DNA-ds      <10 IU/mL >379 (H)   Sed Rate      0 - 30 mm/h 35 (H)   AST      0 - 45 U/L 27   ALT       0 - 50 U/L 31   Creatinine Urine Random      mg/dL 99     Component      Latest Ref Rng & Units 3/16/2018   WBC      4.0 - 11.0 10e9/L 5.6   RBC Count      3.8 - 5.2 10e12/L 4.43   Hemoglobin      11.7 - 15.7 g/dL 12.1   Hematocrit      35.0 - 47.0 % 36.9   MCV      78 - 100 fl 83   MCH      26.5 - 33.0 pg 27.3   MCHC      31.5 - 36.5 g/dL 32.8   RDW      10.0 - 15.0 % 14.5   Platelet Count      150 - 450 10e9/L 224   Diff Method       Automated Method   % Neutrophils      % 81.7   % Lymphocytes      % 9.3   % Monocytes      % 7.5   % Eosinophils      % 1.3   % Basophils      % 0.2   Absolute Neutrophil      1.6 - 8.3 10e9/L 4.6   Absolute Lymphocytes      0.8 - 5.3 10e9/L 0.5 (L)   Absolute Monocytes      0.0 - 1.3 10e9/L 0.4   Absolute Eosinophils      0.0 - 0.7 10e9/L 0.1   Absolute Basophils      0.0 - 0.2 10e9/L 0.0   Creatinine      0.52 - 1.04 mg/dL 0.51 (L)   GFR Estimate      >60 mL/min/1.7m2 >90   GFR Estimate If Black      >60 mL/min/1.7m2 >90   ALT      0 - 50 U/L 27   Albumin      3.4 - 5.0 g/dL 3.5   AST      0 - 45 U/L 18       Component      Latest Ref Rng & Units 3/21/2018   Color Urine       Yellow   Appearance Urine       Clear   Glucose Urine      NEG:Negative mg/dL Negative   Bilirubin Urine      NEG:Negative Negative   Ketones Urine      NEG:Negative mg/dL Negative   Specific Gravity Urine      1.003 - 1.035 <=1.005   pH Urine      5.0 - 7.0 pH 6.5   Protein Albumin Urine      NEG:Negative mg/dL Negative   Urobilinogen Urine      0.2 - 1.0 EU/dL 0.2   Nitrite Urine      NEG:Negative Negative   Blood Urine      NEG:Negative Negative   Leukocyte Esterase Urine      NEG:Negative Negative   Source       Midstream Urine   WBC Urine      OTO5:0 - 5 /HPF 0 - 5   RBC Urine      OTO2:O - 2 /HPF O - 2   Squamous Epithelial /LPF Urine      FEW:Few /LPF Few   Protein Random Urine      g/L <0.05   Protein Total Urine g/gr Creatinine      0 - 0.2 g/g Cr Unable to calculate due to low value   DNA-ds       <10 IU/mL >379 (H)   Complement C4      15 - 50 mg/dL 4 (L)   Complement C3      76 - 169 mg/dL 69 (L)   Creatinine Urine Random      mg/dL 17   Creatinine Urine      mg/dL 17     EXAMINATION: CT CHEST W/O CONTRAST, 12/29/2017 1:08 PM   TECHNIQUE:  Helical CT images from the thoracic inlet through the lung  bases were obtained without IV contrast during inspiration and  expiration according to high-resolution chest CT protocol. Contrast  dose: None  COMPARISON: None   HISTORY: eval for ILD, pleural effusion, pt has lupus, Sjogren's, h/o  MALT and pleurisy and SOB; Systemic lupus erythematosus, unspecified  SLE type, unspecified organ involvement status (H)   FINDINGS:  At least 75% collapse of the trachea and mainstem bronchi on the end  expiratory views. Diffuse lobular air trapping on end expiratory  images. Bibasilar platelike atelectasis. No pneumothorax or pleural  effusion. Scattered solid pulmonary nodules, for example a 4 mm  lingular nodule (series 5 image 145) and a 4 mm right upper lobe  nodule (image 73).    The heart size is normal. Mild three-vessel coronary artery calcific  atherosclerosis. Dilation of the main pulmonary artery to 4.1 cm. No  pericardial effusion. Normal caliber and configuration of the thoracic  great vessels. Numerous prominent though nonenlarged mediastinal lymph  nodes.  Limited views of the abdomen reveal no worrisome pathology. No  worrisome bony or soft tissue lesions.    IMPRESSION:   1. At least moderate tracheobronchomalacia.  2. 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  3. Scattered subcentimeter pulmonary nodules measuring up to 4 mm.  Consider follow-up chest CT in one year to establish stability.     [Consider Follow Up: Lung nodule]     This report will be copied to the [facility] to ensure a  provider acknowledges the finding.      I  have personally reviewed the examination and initial interpretation  and I agree with the findings.     AUSTIN PACE MD    Examination:NM LUNG SCAN VENTILATION AND PERFUSION, 3/14/2018 8:24 AM    Indication:  Chronic PE; Pulmonary hypertension    Additional Information: none   Technique:   The patient received 2 mCi of Tc-99m DTPA aerosol for ventilation and  7 mCi of Tc-99m MAA for perfusion. Multiple images were obtained of  the lungs in Anterior, posterior, HERNANDES, RPO, LPO, and  ELYSE projections.   Comparison: Chest x-ray same-day   Findings:     There are matched ventilation/perfusion defects in both lungs. No  mismatched perfusion defect to suggest pulmonary emboli.      Impression:  Pulmonary emboli is not present.     I have personally reviewed the examination and initial interpretation  and I agree with the findings.     DONNIE GARCIA MD    Component      Latest Ref Rng & Units 5/12/2018   WBC      4.0 - 11.0 10e9/L 7.1   RBC Count      3.8 - 5.2 10e12/L 4.42   Hemoglobin      11.7 - 15.7 g/dL 12.1   Hematocrit      35.0 - 47.0 % 37.4   MCV      78 - 100 fl 85   MCH      26.5 - 33.0 pg 27.4   MCHC      31.5 - 36.5 g/dL 32.4   RDW      10.0 - 15.0 % 14.7   Platelet Count      150 - 450 10e9/L 226   Diff Method       Automated Method   % Neutrophils      % 86.2   % Lymphocytes      % 4.8   % Monocytes      % 8.4   % Eosinophils      % 0.3   % Basophils      % 0.3   Absolute Neutrophil      1.6 - 8.3 10e9/L 6.1   Absolute Lymphocytes      0.8 - 5.3 10e9/L 0.3 (L)   Absolute Monocytes      0.0 - 1.3 10e9/L 0.6   Absolute Eosinophils      0.0 - 0.7 10e9/L 0.0   Absolute Basophils      0.0 - 0.2 10e9/L 0.0   Color Urine       Yellow   Appearance Urine       Clear   Glucose Urine      NEG:Negative mg/dL Negative   Bilirubin Urine      NEG:Negative Negative   Ketones Urine      NEG:Negative mg/dL Negative   Specific Gravity Urine      1.003 - 1.035 <=1.005   Blood Urine      NEG:Negative Negative   pH Urine       5.0 - 7.0 pH 5.5   Protein Albumin Urine      NEG:Negative mg/dL Negative   Urobilinogen Urine      0.2 - 1.0 EU/dL 0.2   Nitrite Urine      NEG:Negative Negative   Leukocyte Esterase Urine      NEG:Negative Negative   Source       Midstream Urine   Creatinine      0.52 - 1.04 mg/dL 0.63   GFR Estimate      >60 mL/min/1.7m2 >90   GFR Estimate If Black      >60 mL/min/1.7m2 >90   Protein Random Urine      g/L <0.05   Protein Total Urine g/gr Creatinine      0 - 0.2 g/g Cr Unable to calculate due to low value   Albumin      3.4 - 5.0 g/dL 3.6   ALT      0 - 50 U/L 28   AST      0 - 45 U/L 20   Complement C3      76 - 169 mg/dL 46 (L)   Complement C4      15 - 50 mg/dL 3 (L)   CRP Inflammation      0.0 - 8.0 mg/L <2.9   Sed Rate      0 - 30 mm/h 26   DNA-ds      <10 IU/mL >379 (H)   Creatinine Urine      mg/dL 16     Component      Latest Ref Rng & Units 7/7/2018   WBC      4.0 - 11.0 10e9/L 7.0   RBC Count      3.8 - 5.2 10e12/L 4.35   Hemoglobin      11.7 - 15.7 g/dL 11.7   Hematocrit      35.0 - 47.0 % 36.0   MCV      78 - 100 fl 83   MCH      26.5 - 33.0 pg 26.9   MCHC      31.5 - 36.5 g/dL 32.5   RDW      10.0 - 15.0 % 15.3 (H)   Platelet Count      150 - 450 10e9/L 224   Diff Method       Automated Method   % Neutrophils      % 91.9   % Lymphocytes      % 4.0   % Monocytes      % 3.7   % Eosinophils      % 0.3   % Basophils      % 0.1   Absolute Neutrophil      1.6 - 8.3 10e9/L 6.5   Absolute Lymphocytes      0.8 - 5.3 10e9/L 0.3 (L)   Absolute Monocytes      0.0 - 1.3 10e9/L 0.3   Absolute Eosinophils      0.0 - 0.7 10e9/L 0.0   Absolute Basophils      0.0 - 0.2 10e9/L 0.0   Color Urine       Yellow   Appearance Urine       Clear   Glucose Urine      NEG:Negative mg/dL Negative   Bilirubin Urine      NEG:Negative Negative   Ketones Urine      NEG:Negative mg/dL Negative   Specific Gravity Urine      1.003 - 1.035 1.010   pH Urine      5.0 - 7.0 pH 5.5   Protein Albumin Urine      NEG:Negative mg/dL Negative    Urobilinogen Urine      0.2 - 1.0 EU/dL 0.2   Nitrite Urine      NEG:Negative Negative   Blood Urine      NEG:Negative Trace (A)   Leukocyte Esterase Urine      NEG:Negative Negative   Source       Midstream Urine   WBC Urine      OTO5:0 - 5 /HPF 0 - 5   RBC Urine      OTO2:O - 2 /HPF O - 2   Squamous Epithelial /LPF Urine      FEW:Few /LPF Few   Creatinine      0.52 - 1.04 mg/dL 0.63   GFR Estimate      >60 mL/min/1.7m2 >90   GFR Estimate If Black      >60 mL/min/1.7m2 >90   Protein Random Urine      g/L 0.07   Protein Total Urine g/gr Creatinine      0 - 0.2 g/g Cr 0.29 (H)   Albumin      3.4 - 5.0 g/dL 3.5   ALT      0 - 50 U/L 27   AST      0 - 45 U/L 21   Complement C3      76 - 169 mg/dL 51 (L)   Complement C4      15 - 50 mg/dL 5 (L)   Creatinine Urine Random      mg/dL 24   CRP Inflammation      0.0 - 8.0 mg/L 11.2 (H)   DNA-ds      <10 IU/mL >379 (H)   Sed Rate      0 - 30 mm/h 35 (H)   Creatinine Urine      mg/dL 23     PFTs 5/2018 (The FEV1 and FVC are reduced but the FEV1/FVC ratio is normal.  The inspiratory flow rates are reduced.  The TLC and FRC are reduced.  The diffusing capacity is normal.  However, the diffusing capacity was not corrected for the patient's hemoglobin.  IMPRESSION:  Moderately Severe  Restriction.  Normal Diffusion.  Walk distance is normal on room air with significant desaturation but no hypoxia.  ____________________________________________KERVIN TONY.      Component      Latest Ref Rng & Units 11/12/2018   Color Urine       Yellow   Appearance Urine       Clear   Glucose Urine      NEG:Negative mg/dL Negative   Bilirubin Urine      NEG:Negative Negative   Ketones Urine      NEG:Negative mg/dL Negative   Specific Gravity Urine      1.003 - 1.035 1.025   pH Urine      5.0 - 7.0 pH 6.0   Protein Albumin Urine      NEG:Negative mg/dL 30 (A)   Urobilinogen Urine      0.2 - 1.0 EU/dL 0.2   Nitrite Urine      NEG:Negative Negative   Blood Urine      NEG:Negative Trace (A)  "  Leukocyte Esterase Urine      NEG:Negative Negative   Source       Midstream Urine   WBC Urine      OTO5:0 - 5 /HPF 0 - 5   RBC Urine      OTO2:O - 2 /HPF O - 2   Squamous Epithelial /LPF Urine      FEW:Few /LPF Few   Bacteria Urine      NEG:Negative /HPF Few (A)   WBC      4.0 - 11.0 10e9/L 7.3   RBC Count      3.8 - 5.2 10e12/L 4.25   Hemoglobin      11.7 - 15.7 g/dL 11.3 (L)   Hematocrit      35.0 - 47.0 % 36.0   MCV      78 - 100 fl 85   MCH      26.5 - 33.0 pg 26.6   MCHC      31.5 - 36.5 g/dL 31.4 (L)   RDW      10.0 - 15.0 % 14.9   Platelet Count      150 - 450 10e9/L 255   Creatinine      0.52 - 1.04 mg/dL 0.83   GFR Estimate      >60 mL/min/1.7m2 73   GFR Estimate If Black      >60 mL/min/1.7m2 88   Iron      35 - 180 ug/dL 27 (L)   Iron Binding Cap      240 - 430 ug/dL 264   Iron Saturation Index      15 - 46 % 10 (L)   Protein Random Urine      g/L 0.58   Protein Total Urine g/gr Creatinine      0 - 0.2 g/g Cr 0.34 (H)   Albumin      3.4 - 5.0 g/dL 3.2 (L)   ALT      0 - 50 U/L 30   AST      0 - 45 U/L 19   Complement C3      76 - 169 mg/dL 48 (L)   Complement C4      15 - 50 mg/dL 3 (L)   CRP Inflammation      0.0 - 8.0 mg/L 16.4 (H)   DNA-ds      <10 IU/mL >379 (H)   Sed Rate      0 - 30 mm/h 26   Ferritin      8 - 252 ng/mL 160   Creatinine Urine      mg/dL 176     Ph.E:    /78   Pulse 79   Temp 98.9  F (37.2  C) (Oral)   Ht 1.676 m (5' 6\")   Wt 128.2 kg (282 lb 9.6 oz)   SpO2 95%   BMI 45.61 kg/m         Constitutional: WD/WN. Pleasant. In no acute distress.   Eyes: EOM intact, PERRLA, sclera anicteric, conj not injected  HEENT: No oral ulcers or thrush. Normal salivary pool.   Neck: No cervical LAP or thyromegaly  Chest: Clear to auscultation bilaterally  CV: RRR, no murmurs/ rubs or gallops. No edema, clubbing or cyanosis.   GI: Abdomen is soft and non tender.   MS: No synovitis. Cool joints. No tenderness of the joints. No swelling. Normal ROM.  No joint deformities. Full ROM of the " joints. No nodules. No Jaccoud's deformity.  Skin: No skin rash, malar rash, livedo, periungual erythema, alopecia, digital ulcers or nail changes.  Neuro: A&O x 3. Grossly non focal, muscular power 5/5 in all ext  Psych: NL affect and mood    Assessment/ plan:    1-SLE. 52 yo WF with +fh/o RA and personal hx of SLE presented in 12/2017 for 2nd opinion of m/o her SLE. She was diagnosed with SLE at age 27. Her lupus has been marked by +KARLIE (highest titer 1:640, speckled and nucleolar patterns), +anti-DNA, arthritis, malar rash, serositis (pleuritic CP) supported by +SSA/SSB Ab, low C3/low C4, +RF, high ESR/CRP. She had neg anti-RNP, anti-Sm, acL IgM/G/A, LAC, cryo and anti-CCP.     Had NL C3 at the time of Dx. She was treated with prednisone and HCQ at the time of Dx. Can't remember how long she was on HCQ and why HCQ was stopped, but it probably was stopped because of stable disease, no report on HCQ toxicity. Reportedly her SLE was mild all these years.    Has secondary Sjogren's with sicca, +SSA/SSB Ab and h/o MALT lymphoma at age 44 in remission. Uses blink eyedrops and salagen. No lip biopsy was done to confirm Dx of Sjogren's.    Her lupus flared in 7/2017 with no triggers when she presented with arthritis, HCQ was resumed, arthritis resolved. Mild pulm HTN on 2D-Echo 8/2017 but neg R cardiac cath and VQ scan in 3/2018, also neg 2D-Echo.    In 12/2017, was prescribed prednisone 40 mg for only 5 days for pleuritic CP, CP recurred after stopping prednisone.     Her major complaint at initial visit with me in 12/2017 was ongoing CP. AZA was added in 2/2018 with start dose of 50 mg qd and slowly was increased to max 150 mg qd. Tolerated AZA well, no SEs, no toxicity on the labs but failed it and required to go back on prednisone 20 mg qd (current dose).     Her lupus is active with pleuritic CP. ESR/CRP normalized on prednisone+AZA+HCQ but +anti-DNA, low C3/C4 are unchanged. Chest CT in 12/2017 without contrast  showed air trapping due to lupus/Sjogren's, no pleural effusion. Lung nodules need f/u in a year. No pericardail effusion on 2D-echo and neg VQ scan for PE in 3/2018 so chest CT with contrast is not needed. She is APL negative.    Lupus Dx and tx plan was discussed with her.    AZA was switched to  mg po bid on 5/18/2018 as she failed AZA. She is now on max dose of MMF 1500 mg bid. Her labs are unchanged with persistently elevated anti-DNA, low C3/C4 and high ESR/CRP, but just started to feel a lot better (regarding fatigue, arthralgia, still has residual pleuritic CP) after adding MMF.     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 Also possible shrinking lung syn sec lupus or obesity is responsible for restrictive lung disease plus pleural effusion. She was prescribed albuterol and dulera inh which have helped.      Benlysta was added in 9/2018 to help with pleuritic CP. No change in lupus serology (anti-DNA, C3, C4), but ESR/CRP have improved. Overall she feels a lot better after adding benlysta but it makes her tired. Will remove pre-meds as no allergic reaction to benlysta and pre-meds could cause fatigue.      Recommend:      Will follow up with pulmonary.    Continue the plaquenil 200 mg twice a day as well as mycophenalate 1500 mg bid and benlysta infusion qmo (peak benefit is 6 mo)    Will change benlysta pre-meds to as needed    Start tapering the prednisone down: 17.5-15-12.5 mg a day each for one week then 10 mg a day    Labs in 2 months    2-HCQ monitoring. Was reminded to have eye exam    3-MMF monitoring. Labs q3mo, no toxicity        RTC 3 months        Orders Placed This Encounter   Procedures     Complement C4     Complement  C3     CBC with platelets differential     CRP inflammation     DNA double stranded antibodies     Erythrocyte sedimentation rate auto     Creatinine     AST     ALT     Albumin level     Routine UA with Micro Reflex to Culture     Protein  random urine with Creat Ratio     Creatinine random urine         Killian Marroquin MD

## 2018-11-16 NOTE — MR AVS SNAPSHOT
After Visit Summary   11/16/2018    Taina Singh    MRN: [id number]           Patient Information     Date Of Birth          1965        Visit Information        Provider Department      11/16/2018 3:00 PM Killian Marroquin MD [facility] Rheumatology        Today's Diagnoses     Lupus erythematosus, unspecified form        Systemic lupus erythematosus, unspecified SLE type, unspecified organ involvement status (H)          Care Instructions    Will change benlysta pre-meds to as needed    Start tapering the prednisone down: 17.5-15-12.5 mg a day each for one week then 10 mg a day    Labs in 2 months    Return in 3-4 months          Follow-ups after your visit        Your next 10 appointments already scheduled     Feb 28, 2019  8:00 AM CST   FULL PULMONARY FUNCTION with  PFL B   [facility] Pulmonary Function Testing ([facility])    [address]  3rd Floor  [facility] [address]   [phone]            Feb 28, 2019  9:00 AM CST   (Arrive by 8:45 AM)   Return Interstitial Lung with Joanne Marvin MD   [facility] ([facility])    [address]  [address]  [facility] [address]   [phone]            Mar 22, 2019  1:30 PM CDT   (Arrive by 1:15 PM)   RETURN LUPUS with Killian Marroquin MD   [facility] Rheumatology ([facility])    [address] [place] [address]  [address]  [facility] [address]   [phone]              Future tests that were ordered for you today     Open Future Orders        Priority Expected Expires Ordered    Albumin level Routine  5/18/2019 11/16/2018    Routine UA with Micro Reflex to Culture Routine  5/18/2019 11/16/2018    Protein  random urine with Creat Ratio Routine  5/18/2019 11/16/2018    Creatinine random urine Routine  5/18/2019 11/16/2018    Complement C4 Routine  5/18/2019 11/16/2018    Complement C3 Routine  5/18/2019 11/16/2018    CBC with  "platelets differential Routine  5/18/2019 11/16/2018    CRP inflammation Routine  5/18/2019 11/16/2018    DNA double stranded antibodies Routine  5/18/2019 11/16/2018    Erythrocyte sedimentation rate auto Routine  5/18/2019 11/16/2018    Creatinine Routine  5/18/2019 11/16/2018    AST Routine  5/18/2019 11/16/2018    ALT Routine  5/18/2019 11/16/2018            Who to contact     If you have questions or need follow up information about today's clinic visit or your schedule please contact [facility] RHEUMATOLOGY directly at [phone].  Normal or non-critical lab and imaging results will be communicated to you by Fisgohart, letter or phone within 4 business days after the clinic has received the results. If you do not hear from us within 7 days, please contact the clinic through [employer] or phone. If you have a critical or abnormal lab result, we will notify you by phone as soon as possible.  Submit refill requests through [employer] or call your pharmacy and they will forward the refill request to us. Please allow 3 business days for your refill to be completed.          Additional Information About Your Visit        FisgoharStroho Information     [employer] gives you secure access to your electronic health record. If you see a primary care provider, you can also send messages to your care team and make appointments. If you have questions, please call your primary care clinic.  If you do not have a primary care provider, please call [phone] and they will assist you.        Care EveryWhere ID     This is your Care EveryWhere ID. This could be used by other organizations to access your [facility] medical records  [id number]        Your Vitals Were     Pulse Temperature Height Pulse Oximetry BMI (Body Mass Index)       79 98.9  F (37.2  C) (Oral) 1.676 m (5' 6\") 95% 45.61 kg/m2        Blood Pressure from Last 3 Encounters:   11/16/18 136/78   08/24/18 137/77   07/11/18 129/80    Weight from Last 3 Encounters:   11/16/18 128.2 " kg (282 lb 9.6 oz)   08/24/18 128.2 kg (282 lb 11.2 oz)   07/11/18 124.3 kg (274 lb)                 Today's Medication Changes          These changes are accurate as of 11/16/18  3:42 PM.  If you have any questions, ask your nurse or doctor.               These medicines have changed or have updated prescriptions.        Dose/Directions    * predniSONE 5 MG tablet   Commonly known as:  DELTASONE   This may have changed:  additional instructions   Used for:  Lupus erythematosus, unspecified form   Changed by:  Killian Marroquin MD        17.5-15-12.5 mg a day each for one week then 10 mg a day   Quantity:  150 tablet   Refills:  1       * predniSONE 2.5 MG tablet   Commonly known as:  DELTASONE   This may have changed:  You were already taking a medication with the same name, and this prescription was added. Make sure you understand how and when to take each.   Used for:  Lupus erythematosus, unspecified form   Changed by:  Killian Marroquin MD        17.5-15-12.5 mg a day each for one week then 10 mg a day   Quantity:  60 tablet   Refills:  1       * Notice:  This list has 2 medication(s) that are the same as other medications prescribed for you. Read the directions carefully, and ask your doctor or other care provider to review them with you.         Where to get your medicines      These medications were sent to [facility] [address] [address]     Phone:  [phone]     hydroxychloroquine 200 MG tablet    mycophenolate 500 MG tablet    predniSONE 2.5 MG tablet    predniSONE 5 MG tablet         Some of these will need a paper prescription and others can be bought over the counter.  Ask your nurse if you have questions.     Bring a paper prescription for each of these medications     traMADol 50 MG tablet               Information about OPIOIDS     PRESCRIPTION OPIOIDS: WHAT YOU NEED TO KNOW   We gave you an opioid (narcotic) pain  medicine. It is important to manage your pain, but opioids are not always the best choice. You should first try all the other options your care team gave you. Take this medicine for as short a time (and as few doses) as possible.    Some activities can increase your pain, such as bandage changes or therapy sessions. It may help to take your pain medicine 30 to 60 minutes before these activities. Reduce your stress by getting enough sleep, working on hobbies you enjoy and practicing relaxation or meditation. Talk to your care team about ways to manage your pain beyond prescription opioids.    These medicines have risks:    DO NOT drive when on new or higher doses of pain medicine. These medicines can affect your alertness and reaction times, and you could be arrested for driving under the influence (DUI). If you need to use opioids long-term, talk to your care team about driving.    DO NOT operate heavy machinery    DO NOT do any other dangerous activities while taking these medicines.    DO NOT drink any alcohol while taking these medicines.     If the opioid prescribed includes acetaminophen, DO NOT take with any other medicines that contain acetaminophen. Read all labels carefully. Look for the word  acetaminophen  or  Tylenol.  Ask your pharmacist if you have questions or are unsure.    You can get addicted to pain medicines, especially if you have a history of addiction (chemical, alcohol or substance dependence). Talk to your care team about ways to reduce this risk.    All opioids tend to cause constipation. Drink plenty of water and eat foods that have a lot of fiber, such as fruits, vegetables, prune juice, apple juice and high-fiber cereal. Take a laxative (Miralax, milk of magnesia, Colace, Senna) if you don t move your bowels at least every other day. Other side effects include upset stomach, sleepiness, dizziness, throwing up, tolerance (needing more of the medicine to have the same effect), physical  dependence and slowed breathing.    Store your pills in a secure place, locked if possible. We will not replace any lost or stolen medicine. If you don t finish your medicine, please throw away (dispose) as directed by your pharmacist. The [place] Pollution [employer] has more information about safe disposal: https://www.pca.[facility].[place].us/living-green/managing-unwanted-medications         Primary Care Provider Fax #    Physician No Ref-Primary [phone]       No address on file        Equal Access to Services     ELIO BAKER : Hadii aad ku hadasho Soomaali, waaxda luqadaha, qaybta kaalmada adeegyada, waxay idiin haymirin [place] richeydivinebenny crum . So [facility] [phone].    ATENCIÓN: Si habla español, tiene a vyas disposición servicios gratuitos de asistencia lingüística. [facility] [phone].    We comply with applicable federal civil rights laws and [place] laws. We do not discriminate on the basis of race, color, national origin, age, disability, sex, sexual orientation, or gender identity.            Thank you!     Thank you for choosing [facility] RHEUMATOLOGY  for your care. Our goal is always to provide you with excellent care. Hearing back from our patients is one way we can continue to improve our services. Please take a few minutes to complete the written survey that you may receive in the mail after your visit with us. Thank you!             Your Updated Medication List - Protect others around you: Learn how to safely use, store and throw away your medicines at www.disposemymeds.org.          This list is accurate as of 11/16/18  3:42 PM.  Always use your most recent med list.                   Brand Name Dispense Instructions for use Diagnosis    albuterol 108 (90 Base) MCG/ACT inhaler    PROAIR HFA/PROVENTIL HFA/VENTOLIN HFA    3 Inhaler    Inhale 2 puffs into the lungs every 6 hours as needed for shortness of breath / dyspnea or wheezing    Bronchiolitis       Calcium-Magnesium 300-300 MG Tabs       daily        D 1000 1000 units Caps      Take 1,000 Units by mouth daily        hydroxychloroquine 200 MG tablet    PLAQUENIL    180 tablet    Take 1 tablet (200 mg) by mouth 2 times daily    Lupus erythematosus, unspecified form       mometasone-formoterol 100-5 MCG/ACT oral inhaler    DULERA    3 Inhaler    Inhale 2 puffs into the lungs 2 times daily    Bronchiolitis       mycophenolate 500 MG tablet    GENERIC EQUIVALENT    540 tablet    Take 3 tablets (1,500 mg) by mouth 2 times daily    Lupus erythematosus, unspecified form       Omega-3 Fish Oil 500 MG capsule      Take 500 mg by mouth daily        pilocarpine 5 MG tablet    SALAGEN    120 tablet    Take 1 tablet (5 mg) by mouth 4 times daily as needed    Systemic lupus erythematosus, unspecified SLE type, unspecified organ involvement status (H)       * predniSONE 5 MG tablet    DELTASONE    150 tablet    17.5-15-12.5 mg a day each for one week then 10 mg a day    Lupus erythematosus, unspecified form       * predniSONE 2.5 MG tablet    DELTASONE    60 tablet    17.5-15-12.5 mg a day each for one week then 10 mg a day    Lupus erythematosus, unspecified form       traMADol 50 MG tablet    ULTRAM    30 tablet    Take 1 tablet (50 mg) by mouth every 12 hours as needed for pain Take 1 tablet as needed for pain.  No driving or alcohol use while taking medication.    Systemic lupus erythematosus, unspecified SLE type, unspecified organ involvement status (H)       WOMENS MULTI PO      Take by mouth daily        * Notice:  This list has 2 medication(s) that are the same as other medications prescribed for you. Read the directions carefully, and ask your doctor or other care provider to review them with you.

## 2018-11-16 NOTE — PROGRESS NOTES
Unchanged labs except improvement of inflammation markers (ESR, CRP), let's discuss plan of care tomorrow.

## 2018-12-20 ENCOUNTER — MYC MEDICAL ADVICE (OUTPATIENT)
Dept: RHEUMATOLOGY | Facility: CLINIC | Age: 53
End: 2018-12-20

## 2018-12-20 DIAGNOSIS — L93.0 LUPUS ERYTHEMATOSUS, UNSPECIFIED FORM: ICD-10-CM

## 2018-12-31 DIAGNOSIS — L93.0 LUPUS ERYTHEMATOSUS, UNSPECIFIED FORM: ICD-10-CM

## 2018-12-31 DIAGNOSIS — M32.9 SYSTEMIC LUPUS ERYTHEMATOSUS, UNSPECIFIED SLE TYPE, UNSPECIFIED ORGAN INVOLVEMENT STATUS (H): ICD-10-CM

## 2018-12-31 LAB
ALBUMIN SERPL-MCNC: 3.5 G/DL (ref 3.4–5)
ALBUMIN UR-MCNC: ABNORMAL MG/DL
ALT SERPL W P-5'-P-CCNC: 35 U/L (ref 0–50)
APPEARANCE UR: CLEAR
AST SERPL W P-5'-P-CCNC: 21 U/L (ref 0–45)
BASOPHILS # BLD AUTO: 0 10E9/L (ref 0–0.2)
BASOPHILS NFR BLD AUTO: 0.3 %
BILIRUB UR QL STRIP: NEGATIVE
COLOR UR AUTO: YELLOW
CREAT SERPL-MCNC: 0.58 MG/DL (ref 0.52–1.04)
CREAT UR-MCNC: 18 MG/DL
CRP SERPL-MCNC: 6 MG/L (ref 0–8)
DIFFERENTIAL METHOD BLD: ABNORMAL
EOSINOPHIL # BLD AUTO: 0 10E9/L (ref 0–0.7)
EOSINOPHIL NFR BLD AUTO: 0 %
ERYTHROCYTE [DISTWIDTH] IN BLOOD BY AUTOMATED COUNT: 14.6 % (ref 10–15)
ERYTHROCYTE [SEDIMENTATION RATE] IN BLOOD BY WESTERGREN METHOD: 35 MM/H (ref 0–30)
GFR SERPL CREATININE-BSD FRML MDRD: >90 ML/MIN/{1.73_M2}
GLUCOSE UR STRIP-MCNC: NEGATIVE MG/DL
HCT VFR BLD AUTO: 37.9 % (ref 35–47)
HGB BLD-MCNC: 12 G/DL (ref 11.7–15.7)
HGB UR QL STRIP: ABNORMAL
KETONES UR STRIP-MCNC: NEGATIVE MG/DL
LEUKOCYTE ESTERASE UR QL STRIP: NEGATIVE
LYMPHOCYTES # BLD AUTO: 0.4 10E9/L (ref 0.8–5.3)
LYMPHOCYTES NFR BLD AUTO: 5.3 %
MCH RBC QN AUTO: 26.8 PG (ref 26.5–33)
MCHC RBC AUTO-ENTMCNC: 31.7 G/DL (ref 31.5–36.5)
MCV RBC AUTO: 85 FL (ref 78–100)
MONOCYTES # BLD AUTO: 0.4 10E9/L (ref 0–1.3)
MONOCYTES NFR BLD AUTO: 6.3 %
NEUTROPHILS # BLD AUTO: 6.2 10E9/L (ref 1.6–8.3)
NEUTROPHILS NFR BLD AUTO: 88.1 %
NITRATE UR QL: NEGATIVE
PH UR STRIP: 7 PH (ref 5–7)
PLATELET # BLD AUTO: 234 10E9/L (ref 150–450)
PROT UR-MCNC: 0.16 G/L
PROT/CREAT 24H UR: 0.92 G/G CR (ref 0–0.2)
RBC # BLD AUTO: 4.48 10E12/L (ref 3.8–5.2)
RBC #/AREA URNS AUTO: NORMAL /HPF
SOURCE: ABNORMAL
SP GR UR STRIP: 1.01 (ref 1–1.03)
UROBILINOGEN UR STRIP-ACNC: 0.2 EU/DL (ref 0.2–1)
WBC # BLD AUTO: 7 10E9/L (ref 4–11)
WBC #/AREA URNS AUTO: NORMAL /HPF

## 2018-12-31 PROCEDURE — 36415 COLL VENOUS BLD VENIPUNCTURE: CPT | Performed by: INTERNAL MEDICINE

## 2018-12-31 PROCEDURE — 86225 DNA ANTIBODY NATIVE: CPT | Performed by: INTERNAL MEDICINE

## 2018-12-31 PROCEDURE — 82565 ASSAY OF CREATININE: CPT | Performed by: INTERNAL MEDICINE

## 2018-12-31 PROCEDURE — 84156 ASSAY OF PROTEIN URINE: CPT | Performed by: INTERNAL MEDICINE

## 2018-12-31 PROCEDURE — 82040 ASSAY OF SERUM ALBUMIN: CPT | Performed by: INTERNAL MEDICINE

## 2018-12-31 PROCEDURE — 81001 URINALYSIS AUTO W/SCOPE: CPT | Performed by: INTERNAL MEDICINE

## 2018-12-31 PROCEDURE — 84460 ALANINE AMINO (ALT) (SGPT): CPT | Performed by: INTERNAL MEDICINE

## 2018-12-31 PROCEDURE — 85652 RBC SED RATE AUTOMATED: CPT | Performed by: INTERNAL MEDICINE

## 2018-12-31 PROCEDURE — 86140 C-REACTIVE PROTEIN: CPT | Performed by: INTERNAL MEDICINE

## 2018-12-31 PROCEDURE — 84450 TRANSFERASE (AST) (SGOT): CPT | Performed by: INTERNAL MEDICINE

## 2018-12-31 PROCEDURE — 85025 COMPLETE CBC W/AUTO DIFF WBC: CPT | Performed by: INTERNAL MEDICINE

## 2018-12-31 PROCEDURE — 86160 COMPLEMENT ANTIGEN: CPT | Performed by: INTERNAL MEDICINE

## 2018-12-31 RX ORDER — PREDNISONE 5 MG/1
10 TABLET ORAL DAILY
Qty: 150 TABLET | Refills: 1 | Status: SHIPPED | OUTPATIENT
Start: 2018-12-31 | End: 2019-02-08

## 2018-12-31 NOTE — LETTER
Patient:  Taina Singh  :   1965  MRN:     [id number]        Ms.Kelly Singh  [address] [facility] [address]        2019    Dear ,    We are writing to inform you of your test results. Lupus markers (anti-DNA, C3, C4) are still unchanged, but CRP (inflammation marker) is back to normal. How are you feeling now? Benlysta peak benefit is 6 months (will be around 2019). Do you want to give it a chance to work? Or do you want to discuss changing meds?    Results for orders placed or performed in visit on 18   Protein  random urine with Creat Ratio   Result Value Ref Range    Protein Random Urine 0.16 g/L    Protein Total Urine g/gr Creatinine 0.92 (H) 0 - 0.2 g/g Cr   Albumin level   Result Value Ref Range    Albumin 3.5 3.4 - 5.0 g/dL   ALT   Result Value Ref Range    ALT 35 0 - 50 U/L   AST   Result Value Ref Range    AST 21 0 - 45 U/L   Creatinine   Result Value Ref Range    Creatinine 0.58 0.52 - 1.04 mg/dL    GFR Estimate >90 >60 mL/min/[1.73_m2]    GFR Estimate If Black >90 >60 mL/min/[1.73_m2]   Erythrocyte sedimentation rate auto   Result Value Ref Range    Sed Rate 35 (H) 0 - 30 mm/h   DNA double stranded antibodies   Result Value Ref Range    DNA-ds >379 (H) <10 IU/mL   CRP inflammation   Result Value Ref Range    CRP Inflammation 6.0 0.0 - 8.0 mg/L   CBC with platelets differential   Result Value Ref Range    WBC 7.0 4.0 - 11.0 10e9/L    RBC Count 4.48 3.8 - 5.2 10e12/L    Hemoglobin 12.0 11.7 - 15.7 g/dL    Hematocrit 37.9 35.0 - 47.0 %    MCV 85 78 - 100 fl    MCH 26.8 26.5 - 33.0 pg    MCHC 31.7 31.5 - 36.5 g/dL    RDW 14.6 10.0 - 15.0 %    Platelet Count 234 150 - 450 10e9/L    % Neutrophils 88.1 %    % Lymphocytes 5.3 %    % Monocytes 6.3 %    % Eosinophils 0.0 %    % Basophils 0.3 %    Absolute Neutrophil 6.2 1.6 - 8.3 10e9/L    Absolute Lymphocytes 0.4 (L) 0.8 - 5.3 10e9/L    Absolute Monocytes 0.4 0.0 - 1.3 10e9/L    Absolute Eosinophils 0.0 0.0 - 0.7  10e9/L    Absolute Basophils 0.0 0.0 - 0.2 10e9/L    Diff Method Automated Method    Complement C3   Result Value Ref Range    Complement C3 55 (L) 76 - 169 mg/dL   Complement C4   Result Value Ref Range    Complement C4 4 (L) 15 - 50 mg/dL   *UA reflex to Microscopic and Culture ([place] [facility] (except [place] and [place])   Result Value Ref Range    Color Urine Yellow     Appearance Urine Clear     Glucose Urine Negative NEG^Negative mg/dL    Bilirubin Urine Negative NEG^Negative    Ketones Urine Negative NEG^Negative mg/dL    Specific Gravity Urine 1.010 1.003 - 1.035    Blood Urine Trace (A) NEG^Negative    pH Urine 7.0 5.0 - 7.0 pH    Protein Albumin Urine Trace (A) NEG^Negative mg/dL    Urobilinogen Urine 0.2 0.2 - 1.0 EU/dL    Nitrite Urine Negative NEG^Negative    Leukocyte Esterase Urine Negative NEG^Negative    Source Midstream Urine    Creatinine urine calculation only   Result Value Ref Range    Creatinine Urine 18 mg/dL   Urine Microscopic   Result Value Ref Range    WBC Urine 0 - 5 OTO5^0 - 5 /HPF    RBC Urine O - 2 OTO2^O - 2 /HPF       Killian Marroquin MD

## 2019-01-02 NOTE — RESULT ENCOUNTER NOTE
Lupus markers (anti-DNA, C3, C4) are still unchanged, but CRP (inflammation marker) is back to normal. How are you feeling now? Zanelysta peak benefit is 6 months (will be around March 2019). Do you want to give it a chance to work? Or do you want to discuss changing meds?

## 2019-02-05 ENCOUNTER — MYC MEDICAL ADVICE (OUTPATIENT)
Dept: RHEUMATOLOGY | Facility: CLINIC | Age: 54
End: 2019-02-05

## 2019-02-05 DIAGNOSIS — L93.0 LUPUS ERYTHEMATOSUS, UNSPECIFIED FORM: ICD-10-CM

## 2019-02-08 NOTE — TELEPHONE ENCOUNTER
Called and spoke with pt, she got a sinus and double ear infection in January.  Feels like those had resolved, but did end up increasing prednisone to 30 mg a day.  She has tapered down the 20 mg a day and will continue to taper, however due to needing to increase prednisone.  Due to high co-pay through [facility], she is requesting that we send to [facility].    Discussed back pain, started around the time of when her illness started and has not improved.  She states that it is in between her shoulder blades, spreads down to her rib cage, and is dully and achy and constant.  Pt has been advised that if need be, any cardiac symptoms or anything that would indicate that it could be cardiac in nature, she should be assessed at the ER. She stated that she would.  She is wondering if this could be costochondritis.  Pt states that nothing makes it worse, and she has taken Ibuprofen and tylenol in the day and uses her tramadol at night.  Did advise pt that she should not take ibuprofen when she is on prednisone, pt understands.    Will send to Dr. Chamberlain to review and advise.  Pt is aware that Dr. chamberlain is out of the office and she may not hear anything until Monday.      Sophie Alvarado RN  Rheumatology Clinic

## 2019-02-10 RX ORDER — PREDNISONE 5 MG/1
TABLET ORAL
Qty: 150 TABLET | Refills: 1 | Status: SHIPPED | OUTPATIENT
Start: 2019-02-10 | End: 2019-03-06

## 2019-02-10 RX ORDER — PREDNISONE 2.5 MG/1
TABLET ORAL
Qty: 60 TABLET | Refills: 1 | Status: SHIPPED | OUTPATIENT
Start: 2019-02-10 | End: 2019-03-06

## 2019-02-11 NOTE — TELEPHONE ENCOUNTER
Pain could be still pleurisy sec active lupus; however I recommend her to see PCP to discuss stress testing as risk of CAD is higher in lupus pts. Rx are signed. Thanks

## 2019-02-13 NOTE — TELEPHONE ENCOUNTER
Called and and spoke with pt, relayed information from provider.  Pt will follow up and is aware that prednisone taper has been ordered.    Sophie Alvarado RN  Rheumatology Clinic

## 2019-02-21 ENCOUNTER — MYC REFILL (OUTPATIENT)
Dept: RHEUMATOLOGY | Facility: CLINIC | Age: 54
End: 2019-02-21

## 2019-02-21 ENCOUNTER — DOCUMENTATION ONLY (OUTPATIENT)
Dept: CARE COORDINATION | Facility: CLINIC | Age: 54
End: 2019-02-21

## 2019-02-21 DIAGNOSIS — M32.9 SYSTEMIC LUPUS ERYTHEMATOSUS, UNSPECIFIED SLE TYPE, UNSPECIFIED ORGAN INVOLVEMENT STATUS (H): ICD-10-CM

## 2019-02-22 RX ORDER — TRAMADOL HYDROCHLORIDE 50 MG/1
50 TABLET ORAL EVERY 12 HOURS PRN
Qty: 30 TABLET | Refills: 2 | Status: SHIPPED | OUTPATIENT
Start: 2019-02-22 | End: 2019-05-07

## 2019-02-22 NOTE — TELEPHONE ENCOUNTER
Last seen: 11/16/2018  Pending appt: 3/22/2019  Medication: Tramadol    Last filled: 1/27/19  Qty: 30     review:      Will send to provider for review and signature.    Sophie Alvarado RN  Rheumatology Clinic

## 2019-02-25 NOTE — TELEPHONE ENCOUNTER
Script faxed to [facility] [employer] on [address] [facility] per patient request. Patient notified via [employer] message.   Jayleen Gan CMA  2/25/2019 9:23 AM

## 2019-03-06 ENCOUNTER — OFFICE VISIT (OUTPATIENT)
Dept: RHEUMATOLOGY | Facility: CLINIC | Age: 54
End: 2019-03-06
Attending: INTERNAL MEDICINE
Payer: COMMERCIAL

## 2019-03-06 VITALS
HEART RATE: 75 BPM | BODY MASS INDEX: 46.56 KG/M2 | WEIGHT: 289.7 LBS | OXYGEN SATURATION: 96 % | DIASTOLIC BLOOD PRESSURE: 85 MMHG | HEIGHT: 66 IN | SYSTOLIC BLOOD PRESSURE: 136 MMHG | TEMPERATURE: 98.2 F

## 2019-03-06 DIAGNOSIS — L93.0 LUPUS ERYTHEMATOSUS, UNSPECIFIED FORM: ICD-10-CM

## 2019-03-06 DIAGNOSIS — M70.72 BURSITIS OF LEFT HIP, UNSPECIFIED BURSA: ICD-10-CM

## 2019-03-06 DIAGNOSIS — M32.9 SYSTEMIC LUPUS ERYTHEMATOSUS, UNSPECIFIED SLE TYPE, UNSPECIFIED ORGAN INVOLVEMENT STATUS (H): Primary | ICD-10-CM

## 2019-03-06 DIAGNOSIS — M32.9 SYSTEMIC LUPUS ERYTHEMATOSUS, UNSPECIFIED SLE TYPE, UNSPECIFIED ORGAN INVOLVEMENT STATUS (H): ICD-10-CM

## 2019-03-06 LAB
ALBUMIN SERPL-MCNC: 3.2 G/DL (ref 3.4–5)
ALBUMIN UR-MCNC: NEGATIVE MG/DL
ALT SERPL W P-5'-P-CCNC: 31 U/L (ref 0–50)
APPEARANCE UR: CLEAR
AST SERPL W P-5'-P-CCNC: 23 U/L (ref 0–45)
BASOPHILS # BLD AUTO: 0 10E9/L (ref 0–0.2)
BASOPHILS NFR BLD AUTO: 0.4 %
BILIRUB UR QL STRIP: NEGATIVE
C3 SERPL-MCNC: 50 MG/DL (ref 76–169)
C4 SERPL-MCNC: 3 MG/DL (ref 15–50)
COLOR UR AUTO: YELLOW
CREAT SERPL-MCNC: 0.58 MG/DL (ref 0.52–1.04)
CREAT UR-MCNC: 34 MG/DL
CRP SERPL-MCNC: 11.4 MG/L (ref 0–8)
DIFFERENTIAL METHOD BLD: ABNORMAL
EOSINOPHIL # BLD AUTO: 0 10E9/L (ref 0–0.7)
EOSINOPHIL NFR BLD AUTO: 0.4 %
ERYTHROCYTE [DISTWIDTH] IN BLOOD BY AUTOMATED COUNT: 13.9 % (ref 10–15)
ERYTHROCYTE [SEDIMENTATION RATE] IN BLOOD BY WESTERGREN METHOD: 62 MM/H (ref 0–30)
GFR SERPL CREATININE-BSD FRML MDRD: >90 ML/MIN/{1.73_M2}
GLUCOSE UR STRIP-MCNC: NEGATIVE MG/DL
HCT VFR BLD AUTO: 35.2 % (ref 35–47)
HGB BLD-MCNC: 11.2 G/DL (ref 11.7–15.7)
HGB UR QL STRIP: ABNORMAL
IMM GRANULOCYTES # BLD: 0 10E9/L (ref 0–0.4)
IMM GRANULOCYTES NFR BLD: 0.6 %
KETONES UR STRIP-MCNC: NEGATIVE MG/DL
LEUKOCYTE ESTERASE UR QL STRIP: ABNORMAL
LYMPHOCYTES # BLD AUTO: 0.6 10E9/L (ref 0.8–5.3)
LYMPHOCYTES NFR BLD AUTO: 12.3 %
MCH RBC QN AUTO: 26.9 PG (ref 26.5–33)
MCHC RBC AUTO-ENTMCNC: 31.8 G/DL (ref 31.5–36.5)
MCV RBC AUTO: 85 FL (ref 78–100)
MONOCYTES # BLD AUTO: 0.6 10E9/L (ref 0–1.3)
MONOCYTES NFR BLD AUTO: 11.9 %
NEUTROPHILS # BLD AUTO: 3.4 10E9/L (ref 1.6–8.3)
NEUTROPHILS NFR BLD AUTO: 74.4 %
NITRATE UR QL: NEGATIVE
NRBC # BLD AUTO: 0 10*3/UL
NRBC BLD AUTO-RTO: 0 /100
PH UR STRIP: 6 PH (ref 5–7)
PLATELET # BLD AUTO: 234 10E9/L (ref 150–450)
PROT UR-MCNC: 0.3 G/L
PROT/CREAT 24H UR: 0.89 G/G CR (ref 0–0.2)
RBC # BLD AUTO: 4.16 10E12/L (ref 3.8–5.2)
RBC #/AREA URNS AUTO: 3 /HPF (ref 0–2)
SOURCE: ABNORMAL
SP GR UR STRIP: 1.01 (ref 1–1.03)
SQUAMOUS #/AREA URNS AUTO: 1 /HPF (ref 0–1)
UROBILINOGEN UR STRIP-MCNC: 0 MG/DL (ref 0–2)
WBC # BLD AUTO: 4.6 10E9/L (ref 4–11)
WBC #/AREA URNS AUTO: 7 /HPF (ref 0–5)

## 2019-03-06 PROCEDURE — 82040 ASSAY OF SERUM ALBUMIN: CPT | Performed by: INTERNAL MEDICINE

## 2019-03-06 PROCEDURE — 86140 C-REACTIVE PROTEIN: CPT | Performed by: INTERNAL MEDICINE

## 2019-03-06 PROCEDURE — 86225 DNA ANTIBODY NATIVE: CPT | Performed by: INTERNAL MEDICINE

## 2019-03-06 PROCEDURE — 86160 COMPLEMENT ANTIGEN: CPT | Performed by: INTERNAL MEDICINE

## 2019-03-06 PROCEDURE — 81001 URINALYSIS AUTO W/SCOPE: CPT | Performed by: INTERNAL MEDICINE

## 2019-03-06 PROCEDURE — 84450 TRANSFERASE (AST) (SGOT): CPT | Performed by: INTERNAL MEDICINE

## 2019-03-06 PROCEDURE — 84156 ASSAY OF PROTEIN URINE: CPT | Performed by: INTERNAL MEDICINE

## 2019-03-06 PROCEDURE — G0463 HOSPITAL OUTPT CLINIC VISIT: HCPCS | Mod: ZF

## 2019-03-06 PROCEDURE — 36415 COLL VENOUS BLD VENIPUNCTURE: CPT | Performed by: INTERNAL MEDICINE

## 2019-03-06 PROCEDURE — 85025 COMPLETE CBC W/AUTO DIFF WBC: CPT | Performed by: INTERNAL MEDICINE

## 2019-03-06 PROCEDURE — 85652 RBC SED RATE AUTOMATED: CPT | Performed by: INTERNAL MEDICINE

## 2019-03-06 PROCEDURE — 82565 ASSAY OF CREATININE: CPT | Performed by: INTERNAL MEDICINE

## 2019-03-06 PROCEDURE — 84460 ALANINE AMINO (ALT) (SGPT): CPT | Performed by: INTERNAL MEDICINE

## 2019-03-06 RX ORDER — PREDNISONE 1 MG/1
TABLET ORAL
Qty: 120 TABLET | Refills: 2 | Status: SHIPPED | OUTPATIENT
Start: 2019-03-06 | End: 2019-05-17 | Stop reason: DRUGHIGH

## 2019-03-06 RX ORDER — LIDOCAINE 50 MG/G
1 PATCH TOPICAL EVERY 24 HOURS
Qty: 30 PATCH | Refills: 3 | Status: SHIPPED | OUTPATIENT
Start: 2019-03-06 | End: 2019-05-17

## 2019-03-06 RX ORDER — PREDNISONE 5 MG/1
TABLET ORAL
Qty: 30 TABLET | Refills: 0
Start: 2019-03-06 | End: 2019-05-14

## 2019-03-06 ASSESSMENT — MIFFLIN-ST. JEOR: SCORE: 1945.82

## 2019-03-06 ASSESSMENT — PAIN SCALES - GENERAL: PAINLEVEL: MODERATE PAIN (4)

## 2019-03-06 NOTE — PROGRESS NOTES
Rheumatology F/U Note    Reason for visit: SLE, ongoing pleuritic CP    Date of last visit: 11/16/2018    DOS: 3/6/2019      HPI:    Taina Singh is a 52 year old  female who was referred to our clinic for evaluation and management of her SLE.    She was diagnosed with lupus at age 27. Her 1st sx were malar rash and fatigue. No skin biopsy was done (reportedly had +KARLIE). She was treated with prednisone taper and HCQ. HCQ helped and she tolerated it well. She was diagnosed with Sjogren's at the same time. Had dryness of eyes and mouth. No lip biopsy to confirm the Dx.    Reportedly she had very mild stable lupus for many years and eventually at some point, stopped taking HCQ (can't remember when)    She was diagnosed with MALT lymphoma at 44, had enlarged LN over R parotid gland, on biopsy it was MALT lymphoma. Tx was resection only, no radiation or chemo.    About 3 yr ago, had flu shot and 2 days later, developed pleuritic CP and was seen at urgent care. That's why she does not want to get flu shot. She was seen in ER 2 days after UC visit. She was treated with prednisone.    She lost 100 lbs by exercise over past 2 yr, felt amazing. In 7/2017, started not feeling well. She had extreme fatigue. Had AM stiffness and pain over hands. Touched base with her previous rheumatologist (Dr. Rangel) and was told to have lupus flare and was put on  mg qd. Afterwards, developed pleuritic CP which has not been resolved.    She was given prednisone 40 mg qd x 5 days (on 12/16/2017) by pulmonologist in [facility]. It helped a lot but pleuritic CP recurred after stopping it.    She was told to have pulm HTN and was evaluated for it.    No triggers for current flare.    No flare of malar rash. No current hair loss. Uses blink eyedrops, restasis burned her eyes. She has been prescribed salagen for dry mouth, has not used it. Arthritis of hands have resolved on HCQ.    Last HCQ eye exam was 1 week ago.    4/2018: On AZA  50 mg qd since 2/8/2018, increased to 100 mg qd in 3/19/2018. Her arthralgia is intermittent and mild, it's better. Has fatigue.  5/2018: Started on mycophenalate 1500 mg, continues prednisone 30 mg and plaquenil 200 mg twice a day.     Her major complaint is continues pressure over her chest. Pain is pleuritic, it hurts by sneezing, taking deep breath.      7/2018: Ms. Singh feels an improvement in her symptoms. She says there is a baseline pleuritic chest pain, but her fatigue and overall day-to-day function has improved to her satisfaction. She says morning stiffness usually only lasts about 10 minutes. She complains of occasional episodes of flashing lights in her left eye, however she is being seen by her opthamologist. She has a OTC scheduled for Monday as well. Ms. Singh feels as though the mycophenalate has made the biggest difference in her improvement. She continues to taper the prednisone, currently on 20 mg daily. She also has continued the plaquenil 200 mg twice a day.           11/2018: On benlysta infusion since beginning of Sept 2018. Chest still feels less tight, breathing is much better. Sometimes hands ache but it does lot last long.      Benlysta makes her tired, but overall feels her lupus sx are much improved on benlysta.        Today: Feels tired and achy, it is intermittent and moves around. The L hip pain is consistent for the past 7 days, she moved up her appointment from 3/22 to today to get a bursitis shot. Had bad sinus and ear infection in 1/2019. She still thinks benlysta has helped her a lot. Last night, her R shoulder was hurting so bad that she could not move it but it's better today. Pain comes and goes. Breathing is better after adding benlysta, she is not gasping for air anymore which is huge improvement for her. She feels pressure over her chest and low back.    Is getting benlysta tomorrow.      Her prednisone dose is 10 mg a day.      Leaving to [place] for  vacation.      ROS:  A comprehensive ROS was done, positives are per HPI.    Past Medical History:   Diagnosis Date     Bronchiolitis     Chest CT 2018 shows air trapping; bronchiolitis possibly related to lupus     Diastolic dysfunction 2018     Gluten intolerance     Patient is not gluten intolerant     Iron deficiency      Iron deficiency 2018     Lupus     dx age 27; + DNA-ds, KARLIE, SSA, SSB and RF; malar rash, serositis (pleuritic CP)     MALT lymphoma (H) age 44    No chemo or radiation     Other forms of systemic lupus erythematosus (H) 2018     Sjogren's syndrome (H)     secondary Sjogrens, secondary to lupus     Vitamin D deficiency      Past Surgical History:   Procedure Laterality Date     BIOPSY/REMOVAL, LYMPH NODE(S)        SECTION  age 32     HC HYSTEROS W PERMANENT FALLOPAIN IMPLANT       PAROTIDECTOMY       TONSILLECTOMY       Family History   Problem Relation Age of Onset     Alopecia Brother      Rheumatoid Arthritis Brother      LUNG DISEASE No family hx of      Social History     Socioeconomic History     Marital status:      Spouse name: Not on file     Number of children: Not on file     Years of education: 1     Highest education level: Not on file   Occupational History     Comment: , 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   Social Needs     Financial resource strain: Not on file     Food insecurity:     Worry: Not on file     Inability: Not on file     Transportation needs:     Medical: Not on file     Non-medical: Not on file   Tobacco Use     Smoking status: Never Smoker     Smokeless tobacco: Never Used   Substance and Sexual Activity     Alcohol use: No     Drug use: No     Sexual activity: Not on file   Lifestyle     Physical activity:     Days per week: Not on file     Minutes per session: Not on file     Stress: Not on file   Relationships     Social connections:     Talks on phone: Not on file     Gets together: Not on file     Attends Orthodox service: Not on file      Active member of club or organization: Not on file     Attends meetings of clubs or organizations: Not on file     Relationship status: Not on file     Intimate partner violence:     Fear of current or ex partner: Not on file     Emotionally abused: Not on file     Physically abused: Not on file     Forced sexual activity: Not on file   Other Topics Concern     Parent/sibling w/ CABG, MI or angioplasty before 65F 55M? 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  Denies exposure to asbestos, silica, hot tubs, feather pillows (used to until age 49), pet birds, mold, does not play brass/wind instruments.      Going through divorce but it's not stress factor for her.    Allergies   Allergen Reactions     Penicillins Rash     Sulfa Drugs Rash       Outpatient Encounter Medications as of 3/6/2019   Medication Sig Dispense Refill     albuterol (PROAIR HFA/PROVENTIL HFA/VENTOLIN HFA) 108 (90 Base) MCG/ACT inhaler Inhale 2 puffs into the lungs every 6 hours as needed for shortness of breath / dyspnea or wheezing 3 Inhaler 3     Calcium-Magnesium 300-300 MG TABS daily        Cholecalciferol (D 1000) 1000 UNITS CAPS Take 1,000 Units by mouth daily        hydroxychloroquine (PLAQUENIL) 200 MG tablet Take 1 tablet (200 mg) by mouth 2 times daily 180 tablet 1     mometasone-formoterol (DULERA) 100-5 MCG/ACT oral inhaler Inhale 2 puffs into the lungs 2 times daily 3 Inhaler 3     Multiple Vitamins-Minerals (WOMENS MULTI PO) Take by mouth daily        mycophenolate (GENERIC EQUIVALENT) 500 MG tablet Take 3 tablets (1,500 mg) by mouth 2 times daily 540 tablet 1     Omega-3 Fatty Acids (OMEGA-3 FISH OIL) 500 MG CAPS Take 500 mg by mouth daily        pilocarpine (SALAGEN) 5 MG tablet Take 1 tablet (5 mg) by mouth 4 times daily as needed 120 tablet 11     predniSONE (DELTASONE) 2.5 MG tablet 17.5-15-12.5 mg a day each for one week then 10 mg a day. 60 tablet 1     predniSONE (DELTASONE) 5 MG tablet  "17.5-15-12.5 mg a day each for one week then 10 mg a day. 150 tablet 1     traMADol (ULTRAM) 50 MG tablet Take 1 tablet (50 mg) by mouth every 12 hours as needed for pain Take 1 tablet as needed for pain.  No driving or alcohol use while taking medication. 30 tablet 2     No facility-administered encounter medications on file as of 3/6/2019.          Her records were reviewed.    2D-Echo 2017:    ECHO COMPLETE WO CONTRAST CHILANGO GIBBONS 2017 14:43     [facility]   [address], [address], [place] [address]   Main: [phone]  [email or web address]                                                 Transthoracic Echo Report   CHILANGO GIBBONS   Excellian ID: [id number] Age: 52 : 1965 Ordering Provider: NGUYEN PETERS   Exam Date: 2017 14:43 Gender: F Sonographer: HAJA   Accession #: [id number] Height: 66 in BSA: 2.24 m  BP: 108 / 62   Weight: 261 lbs BMI: 42.1 kg/m          Site: [facility]   Location: Outpatient Rhythm: Normal Sinus Rhythm   Procedure Components: 2D imaging, Color Doppler, Spectral Doppler   Indications: Murmur; Systemic lupus erythematosus, unspecified SLE type, unspecified organ   involvement status   Technical Quality: Contrast: None     Final Conclusion   Visually estimated ejection fraction is 55-60%.   Mild left ventricular hypertrophy.   Estimated pulmonary artery pressure of 31 mmHg + RA pressure.   Dilated IVC size, <50% collapse with respiration.   Estimated EF: 55-60%   FINDINGS   Left Ventricle Normal left ventricular size. Visually estimated ejection fraction is 55-60%.   Mild left ventricular hypertrophy.   Diastolic Function \"Pseudonormal\" left ventricular filling pattern. E/e' ratio 8-15 is   indeterminate for filling pressure.   Right Ventricle Normal right ventricular size and function.   Left Atrium Mild left atrial enlargement.   Right Atrium Mild right atrial enlargement.   Aortic Valve " Normal aortic valve. No aortic stenosis. No significant aortic regurgitation.   Mitral Valve Normal mitral valve. No mitral stenosis. Mild mitral regurgitation.   Tricuspid Valve Normal tricuspid valve. No tricuspid stenosis. Mild tricuspid regurgitation.   Estimated pulmonary artery pressure   of 31 mmHg + RA pressure.   Pulmonic Valve Normal pulmonic valve without significant stenosis or regurgitation.   Pericardium Normal pericardium.   Aorta Measured aortic root diameter is normal in size.   Inferior Vena Cava Dilated IVC size, <50% collapse with respiration.    Component      Latest Ref Rng & Units 11/20/2017   Sodium      133 - 144 mmol/L 137   Potassium      3.4 - 5.3 mmol/L 4.2   Chloride      94 - 109 mmol/L 102   Carbon Dioxide      20 - 32 mmol/L 30   Anion Gap      3 - 14 mmol/L 6   Glucose      70 - 99 mg/dL 101 (H)   Urea Nitrogen      7 - 30 mg/dL 13   Creatinine      0.52 - 1.04 mg/dL 0.55   GFR Estimate      >60 mL/min/1.7m2 >90   GFR Estimate If Black      >60 mL/min/1.7m2 >90   Calcium      8.5 - 10.1 mg/dL 9.1   WBC      4.0 - 11.0 10e9/L 4.9   RBC Count      3.8 - 5.2 10e12/L 4.28   Hemoglobin      11.7 - 15.7 g/dL 11.3 (L)   Hematocrit      35.0 - 47.0 % 35.5   MCV      78 - 100 fl 83   MCH      26.5 - 33.0 pg 26.4 (L)   MCHC      31.5 - 36.5 g/dL 31.8   RDW      10.0 - 15.0 % 14.6   Platelet Count      150 - 450 10e9/L 215   N-Terminal Pro Bnp      0 - 125 pg/mL 546 (H)   Sed Rate      0 - 30 mm/h 71 (H)     Component      Latest Ref Rng & Units 12/29/2017   Color Urine       Straw   Appearance Urine       Clear   Glucose Urine      NEG:Negative mg/dL Negative   Bilirubin Urine      NEG:Negative Negative   Ketones Urine      NEG:Negative mg/dL Negative   Specific Gravity Urine      1.003 - 1.035 1.005   Blood Urine      NEG:Negative Negative   pH Urine      5.0 - 7.0 pH 6.0   Protein Albumin Urine      NEG:Negative mg/dL Negative   Urobilinogen mg/dL      0.0 - 2.0 mg/dL 0.0   Nitrite Urine       NEG:Negative Negative   Leukocyte Esterase Urine      NEG:Negative Negative   Source       Midstream Urine   WBC Urine      0 - 2 /HPF <1   RBC Urine      0 - 2 /HPF <1   Bacteria Urine      NEG:Negative /HPF Few (A)   Squamous Epithelial /HPF Urine      0 - 1 /HPF <1   Mucous Urine      NEG:Negative /LPF Present (A)   Albumin Fraction      3.7 - 5.1 g/dL 4.2   Alpha 1 Fraction      0.2 - 0.4 g/dL 0.4   Alpha 2 Fraction      0.5 - 0.9 g/dL 0.8   Beta Fraction      0.6 - 1.0 g/dL 1.0   Gamma Fraction      0.7 - 1.6 g/dL 1.6   Monoclonal Peak      0.0 g/dL 0.0   ELP Interpretation:       Essentially normal electrophoretic pattern. No monoclonal protein seen. Pathologic . . .   IGG      695 - 1620 mg/dL 1560   IGA      70 - 380 mg/dL 473 (H)   IGM      60 - 265 mg/dL 92   Iron      35 - 180 ug/dL 58   Iron Binding Cap      240 - 430 ug/dL 315   Iron Saturation Index      15 - 46 % 19   TPMT Genotype Specimen       Whole Blood   TPMT Genotype       Neg/Neg   TPMT Predicted Phenotype       Normal   Protein Random Urine      g/L <0.05   Protein Total Urine g/gr Creatinine      0 - 0.2 g/g Cr Unable to calculate due to low value   Deamidated Gliadin Cari, IgA      <7 U/mL 2   Deamidated Gliadin Cari, IgG      <7 U/mL 5   Complement C3      76 - 169 mg/dL 72 (L)   Complement C4      15 - 50 mg/dL 6 (L)   CRP Inflammation      0.0 - 8.0 mg/L 3.2   DNA-ds      <10 IU/mL >379 (H)   Sed Rate      0 - 30 mm/h 49 (H)   Vitamin D Deficiency screening      20 - 75 ug/L 51   Creatinine Urine Random      mg/dL 23   AST      0 - 45 U/L 26   ALT      0 - 50 U/L 35   HEENA  Broad Spectrum       Neg   Beta 2 Glycoprotein 1 Antibody IgG      <7 U/mL <0.6   Beta 2 Glycoprotein 1 Antibody IgM      <7 U/mL <0.9   Thyroid Peroxidase Antibody      <35 IU/mL <10   Thyroglobulin Antibody      <40 IU/mL <20   TSH      0.40 - 4.00 mU/L 0.87   Cryoglobulin      NEG:Negative % Trace (A)   Tissue Transglutaminase Antibody IgA      <7 U/mL 4    Ferritin      8 - 252 ng/mL 163   Endomysial Antibody IgA by IFA      <1:10 <1:10   CRP Cardiac Risk      mg/L 2.9   Creatinine Urine      mg/dL 23     Component      Latest Ref Rng & Units 2/23/2018   Color Urine       Yellow   Appearance Urine       Clear   Glucose Urine      NEG:Negative mg/dL Negative   Bilirubin Urine      NEG:Negative Negative   Ketones Urine      NEG:Negative mg/dL Negative   Specific Gravity Urine      1.003 - 1.035 1.025   Blood Urine      NEG:Negative Negative   pH Urine      5.0 - 7.0 pH 5.5   Protein Albumin Urine      NEG:Negative mg/dL Negative   Urobilinogen Urine      0.2 - 1.0 EU/dL 0.2   Nitrite Urine      NEG:Negative Negative   Leukocyte Esterase Urine      NEG:Negative Negative   Source       Midstream Urine   Protein Random Urine      g/L 0.17   Protein Total Urine g/gr Creatinine      0 - 0.2 g/g Cr 0.18   Complement C3      76 - 169 mg/dL 64 (L)   Complement C4      15 - 50 mg/dL 5 (L)   CRP Inflammation      0.0 - 8.0 mg/L 4.2   DNA-ds      <10 IU/mL >379 (H)   Sed Rate      0 - 30 mm/h 35 (H)   AST      0 - 45 U/L 27   ALT      0 - 50 U/L 31   Creatinine Urine Random      mg/dL 99     Component      Latest Ref Rng & Units 3/16/2018   WBC      4.0 - 11.0 10e9/L 5.6   RBC Count      3.8 - 5.2 10e12/L 4.43   Hemoglobin      11.7 - 15.7 g/dL 12.1   Hematocrit      35.0 - 47.0 % 36.9   MCV      78 - 100 fl 83   MCH      26.5 - 33.0 pg 27.3   MCHC      31.5 - 36.5 g/dL 32.8   RDW      10.0 - 15.0 % 14.5   Platelet Count      150 - 450 10e9/L 224   Diff Method       Automated Method   % Neutrophils      % 81.7   % Lymphocytes      % 9.3   % Monocytes      % 7.5   % Eosinophils      % 1.3   % Basophils      % 0.2   Absolute Neutrophil      1.6 - 8.3 10e9/L 4.6   Absolute Lymphocytes      0.8 - 5.3 10e9/L 0.5 (L)   Absolute Monocytes      0.0 - 1.3 10e9/L 0.4   Absolute Eosinophils      0.0 - 0.7 10e9/L 0.1   Absolute Basophils      0.0 - 0.2 10e9/L 0.0   Creatinine      0.52 -  1.04 mg/dL 0.51 (L)   GFR Estimate      >60 mL/min/1.7m2 >90   GFR Estimate If Black      >60 mL/min/1.7m2 >90   ALT      0 - 50 U/L 27   Albumin      3.4 - 5.0 g/dL 3.5   AST      0 - 45 U/L 18       Component      Latest Ref Rng & Units 3/21/2018   Color Urine       Yellow   Appearance Urine       Clear   Glucose Urine      NEG:Negative mg/dL Negative   Bilirubin Urine      NEG:Negative Negative   Ketones Urine      NEG:Negative mg/dL Negative   Specific Gravity Urine      1.003 - 1.035 <=1.005   pH Urine      5.0 - 7.0 pH 6.5   Protein Albumin Urine      NEG:Negative mg/dL Negative   Urobilinogen Urine      0.2 - 1.0 EU/dL 0.2   Nitrite Urine      NEG:Negative Negative   Blood Urine      NEG:Negative Negative   Leukocyte Esterase Urine      NEG:Negative Negative   Source       Midstream Urine   WBC Urine      OTO5:0 - 5 /HPF 0 - 5   RBC Urine      OTO2:O - 2 /HPF O - 2   Squamous Epithelial /LPF Urine      FEW:Few /LPF Few   Protein Random Urine      g/L <0.05   Protein Total Urine g/gr Creatinine      0 - 0.2 g/g Cr Unable to calculate due to low value   DNA-ds      <10 IU/mL >379 (H)   Complement C4      15 - 50 mg/dL 4 (L)   Complement C3      76 - 169 mg/dL 69 (L)   Creatinine Urine Random      mg/dL 17   Creatinine Urine      mg/dL 17     EXAMINATION: CT CHEST W/O CONTRAST, 12/29/2017 1:08 PM   TECHNIQUE:  Helical CT images from the thoracic inlet through the lung  bases were obtained without IV contrast during inspiration and  expiration according to high-resolution chest CT protocol. Contrast  dose: None  COMPARISON: None   HISTORY: eval for ILD, pleural effusion, pt has lupus, Sjogren's, h/o  MALT and pleurisy and SOB; Systemic lupus erythematosus, unspecified  SLE type, unspecified organ involvement status (H)   FINDINGS:  At least 75% collapse of the trachea and mainstem bronchi on the end  expiratory views. Diffuse lobular air trapping on end expiratory  images. Bibasilar platelike atelectasis. No  pneumothorax or pleural  effusion. Scattered solid pulmonary nodules, for example a 4 mm  lingular nodule (series 5 image 145) and a 4 mm right upper lobe  nodule (image 73).    The heart size is normal. Mild three-vessel coronary artery calcific  atherosclerosis. Dilation of the main pulmonary artery to 4.1 cm. No  pericardial effusion. Normal caliber and configuration of the thoracic  great vessels. Numerous prominent though nonenlarged mediastinal lymph  nodes.  Limited views of the abdomen reveal no worrisome pathology. No  worrisome bony or soft tissue lesions.    IMPRESSION:   1. At least moderate tracheobronchomalacia.  2. 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  3. Scattered subcentimeter pulmonary nodules measuring up to 4 mm.  Consider follow-up chest CT in one year to establish stability.     [Consider Follow Up: Lung nodule]     This report will be copied to the [facility] to ensure a  provider acknowledges the finding.      I have personally reviewed the examination and initial interpretation  and I agree with the findings.     AUSTIN PACE MD    Examination:NM LUNG SCAN VENTILATION AND PERFUSION, 3/14/2018 8:24 AM    Indication:  Chronic PE; Pulmonary hypertension    Additional Information: none   Technique:   The patient received 2 mCi of Tc-99m DTPA aerosol for ventilation and  7 mCi of Tc-99m MAA for perfusion. Multiple images were obtained of  the lungs in Anterior, posterior, HERNANDES, RPO, LPO, and  ELYSE projections.   Comparison: Chest x-ray same-day   Findings:     There are matched ventilation/perfusion defects in both lungs. No  mismatched perfusion defect to suggest pulmonary emboli.      Impression:  Pulmonary emboli is not present.     I have personally reviewed the examination and initial interpretation  and I agree with the findings.     DONNIE GARCIA,  MD    Component      Latest Ref Rng & Units 5/12/2018   WBC      4.0 - 11.0 10e9/L 7.1   RBC Count      3.8 - 5.2 10e12/L 4.42   Hemoglobin      11.7 - 15.7 g/dL 12.1   Hematocrit      35.0 - 47.0 % 37.4   MCV      78 - 100 fl 85   MCH      26.5 - 33.0 pg 27.4   MCHC      31.5 - 36.5 g/dL 32.4   RDW      10.0 - 15.0 % 14.7   Platelet Count      150 - 450 10e9/L 226   Diff Method       Automated Method   % Neutrophils      % 86.2   % Lymphocytes      % 4.8   % Monocytes      % 8.4   % Eosinophils      % 0.3   % Basophils      % 0.3   Absolute Neutrophil      1.6 - 8.3 10e9/L 6.1   Absolute Lymphocytes      0.8 - 5.3 10e9/L 0.3 (L)   Absolute Monocytes      0.0 - 1.3 10e9/L 0.6   Absolute Eosinophils      0.0 - 0.7 10e9/L 0.0   Absolute Basophils      0.0 - 0.2 10e9/L 0.0   Color Urine       Yellow   Appearance Urine       Clear   Glucose Urine      NEG:Negative mg/dL Negative   Bilirubin Urine      NEG:Negative Negative   Ketones Urine      NEG:Negative mg/dL Negative   Specific Gravity Urine      1.003 - 1.035 <=1.005   Blood Urine      NEG:Negative Negative   pH Urine      5.0 - 7.0 pH 5.5   Protein Albumin Urine      NEG:Negative mg/dL Negative   Urobilinogen Urine      0.2 - 1.0 EU/dL 0.2   Nitrite Urine      NEG:Negative Negative   Leukocyte Esterase Urine      NEG:Negative Negative   Source       Midstream Urine   Creatinine      0.52 - 1.04 mg/dL 0.63   GFR Estimate      >60 mL/min/1.7m2 >90   GFR Estimate If Black      >60 mL/min/1.7m2 >90   Protein Random Urine      g/L <0.05   Protein Total Urine g/gr Creatinine      0 - 0.2 g/g Cr Unable to calculate due to low value   Albumin      3.4 - 5.0 g/dL 3.6   ALT      0 - 50 U/L 28   AST      0 - 45 U/L 20   Complement C3      76 - 169 mg/dL 46 (L)   Complement C4      15 - 50 mg/dL 3 (L)   CRP Inflammation      0.0 - 8.0 mg/L <2.9   Sed Rate      0 - 30 mm/h 26   DNA-ds      <10 IU/mL >379 (H)   Creatinine Urine      mg/dL 16     Component      Latest Ref Rng &  Units 7/7/2018   WBC      4.0 - 11.0 10e9/L 7.0   RBC Count      3.8 - 5.2 10e12/L 4.35   Hemoglobin      11.7 - 15.7 g/dL 11.7   Hematocrit      35.0 - 47.0 % 36.0   MCV      78 - 100 fl 83   MCH      26.5 - 33.0 pg 26.9   MCHC      31.5 - 36.5 g/dL 32.5   RDW      10.0 - 15.0 % 15.3 (H)   Platelet Count      150 - 450 10e9/L 224   Diff Method       Automated Method   % Neutrophils      % 91.9   % Lymphocytes      % 4.0   % Monocytes      % 3.7   % Eosinophils      % 0.3   % Basophils      % 0.1   Absolute Neutrophil      1.6 - 8.3 10e9/L 6.5   Absolute Lymphocytes      0.8 - 5.3 10e9/L 0.3 (L)   Absolute Monocytes      0.0 - 1.3 10e9/L 0.3   Absolute Eosinophils      0.0 - 0.7 10e9/L 0.0   Absolute Basophils      0.0 - 0.2 10e9/L 0.0   Color Urine       Yellow   Appearance Urine       Clear   Glucose Urine      NEG:Negative mg/dL Negative   Bilirubin Urine      NEG:Negative Negative   Ketones Urine      NEG:Negative mg/dL Negative   Specific Gravity Urine      1.003 - 1.035 1.010   pH Urine      5.0 - 7.0 pH 5.5   Protein Albumin Urine      NEG:Negative mg/dL Negative   Urobilinogen Urine      0.2 - 1.0 EU/dL 0.2   Nitrite Urine      NEG:Negative Negative   Blood Urine      NEG:Negative Trace (A)   Leukocyte Esterase Urine      NEG:Negative Negative   Source       Midstream Urine   WBC Urine      OTO5:0 - 5 /HPF 0 - 5   RBC Urine      OTO2:O - 2 /HPF O - 2   Squamous Epithelial /LPF Urine      FEW:Few /LPF Few   Creatinine      0.52 - 1.04 mg/dL 0.63   GFR Estimate      >60 mL/min/1.7m2 >90   GFR Estimate If Black      >60 mL/min/1.7m2 >90   Protein Random Urine      g/L 0.07   Protein Total Urine g/gr Creatinine      0 - 0.2 g/g Cr 0.29 (H)   Albumin      3.4 - 5.0 g/dL 3.5   ALT      0 - 50 U/L 27   AST      0 - 45 U/L 21   Complement C3      76 - 169 mg/dL 51 (L)   Complement C4      15 - 50 mg/dL 5 (L)   Creatinine Urine Random      mg/dL 24   CRP Inflammation      0.0 - 8.0 mg/L 11.2 (H)   DNA-ds      <10  IU/mL >379 (H)   Sed Rate      0 - 30 mm/h 35 (H)   Creatinine Urine      mg/dL 23     PFTs 5/2018 (The FEV1 and FVC are reduced but the FEV1/FVC ratio is normal.  The inspiratory flow rates are reduced.  The TLC and FRC are reduced.  The diffusing capacity is normal.  However, the diffusing capacity was not corrected for the patient's hemoglobin.  IMPRESSION:  Moderately Severe  Restriction.  Normal Diffusion.  Walk distance is normal on room air with significant desaturation but no hypoxia.  ____________________________________________KERVIN TONY.      Component      Latest Ref Rng & Units 11/12/2018   Color Urine       Yellow   Appearance Urine       Clear   Glucose Urine      NEG:Negative mg/dL Negative   Bilirubin Urine      NEG:Negative Negative   Ketones Urine      NEG:Negative mg/dL Negative   Specific Gravity Urine      1.003 - 1.035 1.025   pH Urine      5.0 - 7.0 pH 6.0   Protein Albumin Urine      NEG:Negative mg/dL 30 (A)   Urobilinogen Urine      0.2 - 1.0 EU/dL 0.2   Nitrite Urine      NEG:Negative Negative   Blood Urine      NEG:Negative Trace (A)   Leukocyte Esterase Urine      NEG:Negative Negative   Source       Midstream Urine   WBC Urine      OTO5:0 - 5 /HPF 0 - 5   RBC Urine      OTO2:O - 2 /HPF O - 2   Squamous Epithelial /LPF Urine      FEW:Few /LPF Few   Bacteria Urine      NEG:Negative /HPF Few (A)   WBC      4.0 - 11.0 10e9/L 7.3   RBC Count      3.8 - 5.2 10e12/L 4.25   Hemoglobin      11.7 - 15.7 g/dL 11.3 (L)   Hematocrit      35.0 - 47.0 % 36.0   MCV      78 - 100 fl 85   MCH      26.5 - 33.0 pg 26.6   MCHC      31.5 - 36.5 g/dL 31.4 (L)   RDW      10.0 - 15.0 % 14.9   Platelet Count      150 - 450 10e9/L 255   Creatinine      0.52 - 1.04 mg/dL 0.83   GFR Estimate      >60 mL/min/1.7m2 73   GFR Estimate If Black      >60 mL/min/1.7m2 88   Iron      35 - 180 ug/dL 27 (L)   Iron Binding Cap      240 - 430 ug/dL 264   Iron Saturation Index      15 - 46 % 10 (L)   Protein Random  "Urine      g/L 0.58   Protein Total Urine g/gr Creatinine      0 - 0.2 g/g Cr 0.34 (H)   Albumin      3.4 - 5.0 g/dL 3.2 (L)   ALT      0 - 50 U/L 30   AST      0 - 45 U/L 19   Complement C3      76 - 169 mg/dL 48 (L)   Complement C4      15 - 50 mg/dL 3 (L)   CRP Inflammation      0.0 - 8.0 mg/L 16.4 (H)   DNA-ds      <10 IU/mL >379 (H)   Sed Rate      0 - 30 mm/h 26   Ferritin      8 - 252 ng/mL 160   Creatinine Urine      mg/dL 176     Ph.E:    /85   Pulse 75   Temp 98.2  F (36.8  C) (Oral)   Ht 1.676 m (5' 6\")   Wt 131.4 kg (289 lb 11.2 oz)   SpO2 96%   BMI 46.76 kg/m        Constitutional: WD/WN. Pleasant. In no acute distress.   Eyes: EOM intact, PERRLA, sclera anicteric, conj not injected  HEENT: No oral ulcers or thrush. Normal salivary pool.   Neck: No cervical LAP or thyromegaly  Chest: Clear to auscultation bilaterally  CV: RRR, no murmurs/ rubs or gallops. No edema, clubbing or cyanosis.   GI: Abdomen is soft and non tender.   MS: No synovitis. Cool joints. No tenderness of the joints. No swelling. Normal ROM.  No joint deformities. Full ROM of the joints. No nodules. No Jaccoud's deformity.  Skin: No skin rash, malar rash, livedo, periungual erythema, alopecia, digital ulcers or nail changes.  Neuro: A&O x 3. Grossly non focal, muscular power 5/5 in all ext  Psych: NL affect and mood    Assessment/ plan:    1-SLE. 54 yo WF with +fh/o RA and personal hx of SLE presented in 12/2017 for 2nd opinion of m/o her SLE. She was diagnosed with SLE at age 27. Her lupus has been marked by +KARLIE (highest titer 1:640, speckled and nucleolar patterns), +anti-DNA, arthritis, malar rash, serositis (pleuritic CP) supported by +SSA/SSB Ab, low C3/low C4, +RF, high ESR/CRP. She had neg anti-RNP, anti-Sm, acL IgM/G/A, LAC, cryo and anti-CCP.     Had NL C3 at the time of Dx. She was treated with prednisone and HCQ at the time of Dx. Can't remember how long she was on HCQ and why HCQ was stopped, but it probably " was stopped because of stable disease, no report on HCQ toxicity. Reportedly her SLE was mild all these years.    Has secondary Sjogren's with sicca, +SSA/SSB Ab and h/o MALT lymphoma at age 44 in remission. Uses blink eyedrops and salagen. No lip biopsy was done to confirm Dx of Sjogren's.    Her lupus flared in 7/2017 with no triggers when she presented with arthritis, HCQ was resumed, arthritis resolved. Mild pulm HTN on 2D-Echo 8/2017 but neg R cardiac cath and VQ scan in 3/2018, also neg 2D-Echo.    In 12/2017, was prescribed prednisone 40 mg for only 5 days for pleuritic CP, CP recurred after stopping prednisone.     Her major complaint at initial visit with me in 12/2017 was ongoing CP. AZA was added in 2/2018 with start dose of 50 mg qd and slowly was increased to max 150 mg qd. Tolerated AZA well, no SEs, no toxicity on the labs but failed it and required to go back on prednisone 20 mg qd (current dose).     Her lupus is active with pleuritic CP. ESR/CRP normalized on prednisone+AZA+HCQ but +anti-DNA, low C3/C4 are unchanged. Chest CT in 12/2017 without contrast showed air trapping due to lupus/Sjogren's, no pleural effusion. Lung nodules need f/u in a year. No pericardail effusion on 2D-echo and neg VQ scan for PE in 3/2018 so chest CT with contrast is not needed. She is APL negative.    Lupus Dx and tx plan was discussed with her.    AZA was switched to  mg po bid on 5/18/2018 as she failed AZA. She is now on max dose of MMF 1500 mg bid. Her labs are unchanged with persistently elevated anti-DNA, low C3/C4 and high ESR/CRP, but just started to feel a lot better (regarding fatigue, arthralgia, still has residual pleuritic CP) after adding MMF.     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 Also possible shrinking lung syn sec lupus or obesity is responsible for restrictive lung disease plus pleural effusion. She was prescribed albuterol and dulera inh which have helped.      Benlysta was added in 9/2018 to help with pleuritic CP. No change in lupus serology (anti-DNA, C3, C4), but ESR/CRP have improved. Overall she feels a lot better after adding benlysta but it makes her tired. Will remove pre-meds as no allergic reaction to benlysta and pre-meds could cause fatigue.      Recommend:      Will follow up with pulmonary.    Continue the plaquenil 200 mg twice a day as well as mycophenalate 1500 mg bid and benlysta infusion qmo (peak benefit is 6 mo)    Will change benlysta pre-meds to as needed    Start tapering the prednisone down: 17.5-15-12.5 mg a day each for one week then 10 mg a day    Labs in 2 months    2-HCQ monitoring. Was reminded to have eye exam    3-MMF monitoring. Labs q3mo, no toxicity        RTC 3 months        No orders of the defined types were placed in this encounter.

## 2019-03-06 NOTE — LETTER
3/6/2019      RE: Taina Singh  [address] [place] [address]       Rheumatology F/U Note    Reason for visit: SLE, ongoing pleuritic CP    Date of last visit: 11/16/2018    DOS: 3/6/2019      HPI:    Taina Singh is a 52 year old  female who was referred to our clinic for evaluation and management of her SLE.    She was diagnosed with lupus at age 27. Her 1st sx were malar rash and fatigue. No skin biopsy was done (reportedly had +KARLIE). She was treated with prednisone taper and HCQ. HCQ helped and she tolerated it well. She was diagnosed with Sjogren's at the same time. Had dryness of eyes and mouth. No lip biopsy to confirm the Dx.    Reportedly she had very mild stable lupus for many years and eventually at some point, stopped taking HCQ (can't remember when)    She was diagnosed with MALT lymphoma at 44, had enlarged LN over R parotid gland, on biopsy it was MALT lymphoma. Tx was resection only, no radiation or chemo.    About 3 yr ago, had flu shot and 2 days later, developed pleuritic CP and was seen at urgent care. That's why she does not want to get flu shot. She was seen in ER 2 days after UC visit. She was treated with prednisone.    She lost 100 lbs by exercise over past 2 yr, felt amazing. In 7/2017, started not feeling well. She had extreme fatigue. Had AM stiffness and pain over hands. Touched base with her previous rheumatologist (Dr. Rangel) and was told to have lupus flare and was put on  mg qd. Afterwards, developed pleuritic CP which has not been resolved.    She was given prednisone 40 mg qd x 5 days (on 12/16/2017) by pulmonologist in [facility]. It helped a lot but pleuritic CP recurred after stopping it.    She was told to have pulm HTN and was evaluated for it.    No triggers for current flare.    No flare of malar rash. No current hair loss. Uses blink eyedrops, restasis burned her eyes. She has been prescribed salagen for dry mouth, has not used it. Arthritis of hands have  resolved on HCQ.    Last HCQ eye exam was 1 week ago.    4/2018: On AZA 50 mg qd since 2/8/2018, increased to 100 mg qd in 3/19/2018. Her arthralgia is intermittent and mild, it's better. Has fatigue.  5/2018: Started on mycophenalate 1500 mg, continues prednisone 30 mg and plaquenil 200 mg twice a day.     Her major complaint is continues pressure over her chest. Pain is pleuritic, it hurts by sneezing, taking deep breath.      7/2018: Ms. Singh feels an improvement in her symptoms. She says there is a baseline pleuritic chest pain, but her fatigue and overall day-to-day function has improved to her satisfaction. She says morning stiffness usually only lasts about 10 minutes. She complains of occasional episodes of flashing lights in her left eye, however she is being seen by her opthamologist. She has a OTC scheduled for Monday as well. Ms. Singh feels as though the mycophenalate has made the biggest difference in her improvement. She continues to taper the prednisone, currently on 20 mg daily. She also has continued the plaquenil 200 mg twice a day.           11/2018: On benlysta infusion since beginning of Sept 2018. Chest still feels less tight, breathing is much better. Sometimes hands ache but it does lot last long.      Benlysta makes her tired, but overall feels her lupus sx are much improved on benlysta.        Today: Feels tired and achy, it is intermittent and moves around. The L hip pain is consistent for the past 7 days, she moved up her appointment from 3/22 to today to get a bursitis shot. Had bad sinus and ear infection in 1/2019. She still thinks benlysta has helped her a lot. Last night, her R shoulder was hurting so bad that she could not move it but it's better today. Pain comes and goes. Breathing is better after adding benlysta, she is not gasping for air anymore which is huge improvement for her. She feels pressure over her chest and low back.    Is getting benlysta tomorrow.      Her  prednisone dose is 10 mg a day.      Leaving to [place] for vacation.      ROS:  A comprehensive ROS was done, positives are per HPI.    Past Medical History:   Diagnosis Date     Bronchiolitis     Chest CT 2018 shows air trapping; bronchiolitis possibly related to lupus     Diastolic dysfunction 2018     Gluten intolerance     Patient is not gluten intolerant     Iron deficiency      Iron deficiency 2018     Lupus     dx age 27; + DNA-ds, KARLIE, SSA, SSB and RF; malar rash, serositis (pleuritic CP)     MALT lymphoma (H) age 44    No chemo or radiation     Other forms of systemic lupus erythematosus (H) 2018     Sjogren's syndrome (H)     secondary Sjogrens, secondary to lupus     Vitamin D deficiency      Past Surgical History:   Procedure Laterality Date     BIOPSY/REMOVAL, LYMPH NODE(S)        SECTION  age 32     HC HYSTEROS W PERMANENT FALLOPAIN IMPLANT       PAROTIDECTOMY       TONSILLECTOMY       Family History   Problem Relation Age of Onset     Alopecia Brother      Rheumatoid Arthritis Brother      LUNG DISEASE No family hx of      Social History     Socioeconomic History     Marital status:      Spouse name: Not on file     Number of children: Not on file     Years of education: 1     Highest education level: Not on file   Occupational History     Comment: , 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   Social Needs     Financial resource strain: Not on file     Food insecurity:     Worry: Not on file     Inability: Not on file     Transportation needs:     Medical: Not on file     Non-medical: Not on file   Tobacco Use     Smoking status: Never Smoker     Smokeless tobacco: Never Used   Substance and Sexual Activity     Alcohol use: No     Drug use: No     Sexual activity: Not on file   Lifestyle     Physical activity:     Days per week: Not on file     Minutes per session: Not on file     Stress: Not on file   Relationships     Social connections:     Talks on phone: Not on file     Gets  together: Not on file     Attends [employer]ism service: Not on file     Active member of club or organization: Not on file     Attends meetings of clubs or organizations: Not on file     Relationship status: Not on file     Intimate partner violence:     Fear of current or ex partner: Not on file     Emotionally abused: Not on file     Physically abused: Not on file     Forced sexual activity: Not on file   Other Topics Concern     Parent/sibling w/ CABG, MI or angioplasty before 65F 55M? 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  Denies exposure to asbestos, silica, hot tubs, feather pillows (used to until age 49), pet birds, mold, does not play brass/wind instruments.      Going through divorce but it's not stress factor for her.    Allergies   Allergen Reactions     Penicillins Rash     Sulfa Drugs Rash       Outpatient Encounter Medications as of 3/6/2019   Medication Sig Dispense Refill     albuterol (PROAIR HFA/PROVENTIL HFA/VENTOLIN HFA) 108 (90 Base) MCG/ACT inhaler Inhale 2 puffs into the lungs every 6 hours as needed for shortness of breath / dyspnea or wheezing 3 Inhaler 3     Calcium-Magnesium 300-300 MG TABS daily        Cholecalciferol (D 1000) 1000 UNITS CAPS Take 1,000 Units by mouth daily        hydroxychloroquine (PLAQUENIL) 200 MG tablet Take 1 tablet (200 mg) by mouth 2 times daily 180 tablet 1     mometasone-formoterol (DULERA) 100-5 MCG/ACT oral inhaler Inhale 2 puffs into the lungs 2 times daily 3 Inhaler 3     Multiple Vitamins-Minerals (WOMENS MULTI PO) Take by mouth daily        mycophenolate (GENERIC EQUIVALENT) 500 MG tablet Take 3 tablets (1,500 mg) by mouth 2 times daily 540 tablet 1     Omega-3 Fatty Acids (OMEGA-3 FISH OIL) 500 MG CAPS Take 500 mg by mouth daily        pilocarpine (SALAGEN) 5 MG tablet Take 1 tablet (5 mg) by mouth 4 times daily as needed 120 tablet 11     predniSONE (DELTASONE) 2.5 MG tablet 17.5-15-12.5 mg a day each for one week then 10 mg a  "day. 60 tablet 1     predniSONE (DELTASONE) 5 MG tablet 17.5-15-12.5 mg a day each for one week then 10 mg a day. 150 tablet 1     traMADol (ULTRAM) 50 MG tablet Take 1 tablet (50 mg) by mouth every 12 hours as needed for pain Take 1 tablet as needed for pain.  No driving or alcohol use while taking medication. 30 tablet 2     No facility-administered encounter medications on file as of 3/6/2019.          Her records were reviewed.    2D-Echo 2017:    ECHO COMPLETE WO CONTRAST CHILANGO GIBBONS 2017 14:43     [facility]   [address], [address], [place] [address]   Main: [phone]  [email or web address]                                                 Transthoracic Echo Report   CHILANGO GIBBONS   Excellian ID: [id number] Age: 52 : 1965 Ordering Provider: NGUYEN PETERS   Exam Date: 2017 14:43 Gender: F Sonographer: HAJA   Accession #: [id number] Height: 66 in BSA: 2.24 m  BP: 108 / 62   Weight: 261 lbs BMI: 42.1 kg/m          Site: [facility] [place]   Location: Outpatient Rhythm: Normal Sinus Rhythm   Procedure Components: 2D imaging, Color Doppler, Spectral Doppler   Indications: Murmur; Systemic lupus erythematosus, unspecified SLE type, unspecified organ   involvement status   Technical Quality: Contrast: None     Final Conclusion   Visually estimated ejection fraction is 55-60%.   Mild left ventricular hypertrophy.   Estimated pulmonary artery pressure of 31 mmHg + RA pressure.   Dilated IVC size, <50% collapse with respiration.   Estimated EF: 55-60%   FINDINGS   Left Ventricle Normal left ventricular size. Visually estimated ejection fraction is 55-60%.   Mild left ventricular hypertrophy.   Diastolic Function \"Pseudonormal\" left ventricular filling pattern. E/e' ratio 8-15 is   indeterminate for filling pressure.   Right Ventricle Normal right ventricular size and function.   Left Atrium Mild left atrial enlargement.   Right " Atrium Mild right atrial enlargement.   Aortic Valve Normal aortic valve. No aortic stenosis. No significant aortic regurgitation.   Mitral Valve Normal mitral valve. No mitral stenosis. Mild mitral regurgitation.   Tricuspid Valve Normal tricuspid valve. No tricuspid stenosis. Mild tricuspid regurgitation.   Estimated pulmonary artery pressure   of 31 mmHg + RA pressure.   Pulmonic Valve Normal pulmonic valve without significant stenosis or regurgitation.   Pericardium Normal pericardium.   Aorta Measured aortic root diameter is normal in size.   Inferior Vena Cava Dilated IVC size, <50% collapse with respiration.    Component      Latest Ref Rng & Units 11/20/2017   Sodium      133 - 144 mmol/L 137   Potassium      3.4 - 5.3 mmol/L 4.2   Chloride      94 - 109 mmol/L 102   Carbon Dioxide      20 - 32 mmol/L 30   Anion Gap      3 - 14 mmol/L 6   Glucose      70 - 99 mg/dL 101 (H)   Urea Nitrogen      7 - 30 mg/dL 13   Creatinine      0.52 - 1.04 mg/dL 0.55   GFR Estimate      >60 mL/min/1.7m2 >90   GFR Estimate If Black      >60 mL/min/1.7m2 >90   Calcium      8.5 - 10.1 mg/dL 9.1   WBC      4.0 - 11.0 10e9/L 4.9   RBC Count      3.8 - 5.2 10e12/L 4.28   Hemoglobin      11.7 - 15.7 g/dL 11.3 (L)   Hematocrit      35.0 - 47.0 % 35.5   MCV      78 - 100 fl 83   MCH      26.5 - 33.0 pg 26.4 (L)   MCHC      31.5 - 36.5 g/dL 31.8   RDW      10.0 - 15.0 % 14.6   Platelet Count      150 - 450 10e9/L 215   N-Terminal Pro Bnp      0 - 125 pg/mL 546 (H)   Sed Rate      0 - 30 mm/h 71 (H)     Component      Latest Ref Rng & Units 12/29/2017   Color Urine       Straw   Appearance Urine       Clear   Glucose Urine      NEG:Negative mg/dL Negative   Bilirubin Urine      NEG:Negative Negative   Ketones Urine      NEG:Negative mg/dL Negative   Specific Gravity Urine      1.003 - 1.035 1.005   Blood Urine      NEG:Negative Negative   pH Urine      5.0 - 7.0 pH 6.0   Protein Albumin Urine      NEG:Negative mg/dL Negative    Urobilinogen mg/dL      0.0 - 2.0 mg/dL 0.0   Nitrite Urine      NEG:Negative Negative   Leukocyte Esterase Urine      NEG:Negative Negative   Source       Midstream Urine   WBC Urine      0 - 2 /HPF <1   RBC Urine      0 - 2 /HPF <1   Bacteria Urine      NEG:Negative /HPF Few (A)   Squamous Epithelial /HPF Urine      0 - 1 /HPF <1   Mucous Urine      NEG:Negative /LPF Present (A)   Albumin Fraction      3.7 - 5.1 g/dL 4.2   Alpha 1 Fraction      0.2 - 0.4 g/dL 0.4   Alpha 2 Fraction      0.5 - 0.9 g/dL 0.8   Beta Fraction      0.6 - 1.0 g/dL 1.0   Gamma Fraction      0.7 - 1.6 g/dL 1.6   Monoclonal Peak      0.0 g/dL 0.0   ELP Interpretation:       Essentially normal electrophoretic pattern. No monoclonal protein seen. Pathologic . . .   IGG      695 - 1620 mg/dL 1560   IGA      70 - 380 mg/dL 473 (H)   IGM      60 - 265 mg/dL 92   Iron      35 - 180 ug/dL 58   Iron Binding Cap      240 - 430 ug/dL 315   Iron Saturation Index      15 - 46 % 19   TPMT Genotype Specimen       Whole Blood   TPMT Genotype       Neg/Neg   TPMT Predicted Phenotype       Normal   Protein Random Urine      g/L <0.05   Protein Total Urine g/gr Creatinine      0 - 0.2 g/g Cr Unable to calculate due to low value   Deamidated Gliadin Cari, IgA      <7 U/mL 2   Deamidated Gliadin Cari, IgG      <7 U/mL 5   Complement C3      76 - 169 mg/dL 72 (L)   Complement C4      15 - 50 mg/dL 6 (L)   CRP Inflammation      0.0 - 8.0 mg/L 3.2   DNA-ds      <10 IU/mL >379 (H)   Sed Rate      0 - 30 mm/h 49 (H)   Vitamin D Deficiency screening      20 - 75 ug/L 51   Creatinine Urine Random      mg/dL 23   AST      0 - 45 U/L 26   ALT      0 - 50 U/L 35   HEENA  Broad Spectrum       Neg   Beta 2 Glycoprotein 1 Antibody IgG      <7 U/mL <0.6   Beta 2 Glycoprotein 1 Antibody IgM      <7 U/mL <0.9   Thyroid Peroxidase Antibody      <35 IU/mL <10   Thyroglobulin Antibody      <40 IU/mL <20   TSH      0.40 - 4.00 mU/L 0.87   Cryoglobulin      NEG:Negative % Trace (A)    Tissue Transglutaminase Antibody IgA      <7 U/mL 4   Ferritin      8 - 252 ng/mL 163   Endomysial Antibody IgA by IFA      <1:10 <1:10   CRP Cardiac Risk      mg/L 2.9   Creatinine Urine      mg/dL 23     Component      Latest Ref Rng & Units 2/23/2018   Color Urine       Yellow   Appearance Urine       Clear   Glucose Urine      NEG:Negative mg/dL Negative   Bilirubin Urine      NEG:Negative Negative   Ketones Urine      NEG:Negative mg/dL Negative   Specific Gravity Urine      1.003 - 1.035 1.025   Blood Urine      NEG:Negative Negative   pH Urine      5.0 - 7.0 pH 5.5   Protein Albumin Urine      NEG:Negative mg/dL Negative   Urobilinogen Urine      0.2 - 1.0 EU/dL 0.2   Nitrite Urine      NEG:Negative Negative   Leukocyte Esterase Urine      NEG:Negative Negative   Source       Midstream Urine   Protein Random Urine      g/L 0.17   Protein Total Urine g/gr Creatinine      0 - 0.2 g/g Cr 0.18   Complement C3      76 - 169 mg/dL 64 (L)   Complement C4      15 - 50 mg/dL 5 (L)   CRP Inflammation      0.0 - 8.0 mg/L 4.2   DNA-ds      <10 IU/mL >379 (H)   Sed Rate      0 - 30 mm/h 35 (H)   AST      0 - 45 U/L 27   ALT      0 - 50 U/L 31   Creatinine Urine Random      mg/dL 99     Component      Latest Ref Rng & Units 3/16/2018   WBC      4.0 - 11.0 10e9/L 5.6   RBC Count      3.8 - 5.2 10e12/L 4.43   Hemoglobin      11.7 - 15.7 g/dL 12.1   Hematocrit      35.0 - 47.0 % 36.9   MCV      78 - 100 fl 83   MCH      26.5 - 33.0 pg 27.3   MCHC      31.5 - 36.5 g/dL 32.8   RDW      10.0 - 15.0 % 14.5   Platelet Count      150 - 450 10e9/L 224   Diff Method       Automated Method   % Neutrophils      % 81.7   % Lymphocytes      % 9.3   % Monocytes      % 7.5   % Eosinophils      % 1.3   % Basophils      % 0.2   Absolute Neutrophil      1.6 - 8.3 10e9/L 4.6   Absolute Lymphocytes      0.8 - 5.3 10e9/L 0.5 (L)   Absolute Monocytes      0.0 - 1.3 10e9/L 0.4   Absolute Eosinophils      0.0 - 0.7 10e9/L 0.1   Absolute  Basophils      0.0 - 0.2 10e9/L 0.0   Creatinine      0.52 - 1.04 mg/dL 0.51 (L)   GFR Estimate      >60 mL/min/1.7m2 >90   GFR Estimate If Black      >60 mL/min/1.7m2 >90   ALT      0 - 50 U/L 27   Albumin      3.4 - 5.0 g/dL 3.5   AST      0 - 45 U/L 18       Component      Latest Ref Rng & Units 3/21/2018   Color Urine       Yellow   Appearance Urine       Clear   Glucose Urine      NEG:Negative mg/dL Negative   Bilirubin Urine      NEG:Negative Negative   Ketones Urine      NEG:Negative mg/dL Negative   Specific Gravity Urine      1.003 - 1.035 <=1.005   pH Urine      5.0 - 7.0 pH 6.5   Protein Albumin Urine      NEG:Negative mg/dL Negative   Urobilinogen Urine      0.2 - 1.0 EU/dL 0.2   Nitrite Urine      NEG:Negative Negative   Blood Urine      NEG:Negative Negative   Leukocyte Esterase Urine      NEG:Negative Negative   Source       Midstream Urine   WBC Urine      OTO5:0 - 5 /HPF 0 - 5   RBC Urine      OTO2:O - 2 /HPF O - 2   Squamous Epithelial /LPF Urine      FEW:Few /LPF Few   Protein Random Urine      g/L <0.05   Protein Total Urine g/gr Creatinine      0 - 0.2 g/g Cr Unable to calculate due to low value   DNA-ds      <10 IU/mL >379 (H)   Complement C4      15 - 50 mg/dL 4 (L)   Complement C3      76 - 169 mg/dL 69 (L)   Creatinine Urine Random      mg/dL 17   Creatinine Urine      mg/dL 17     EXAMINATION: CT CHEST W/O CONTRAST, 12/29/2017 1:08 PM   TECHNIQUE:  Helical CT images from the thoracic inlet through the lung  bases were obtained without IV contrast during inspiration and  expiration according to high-resolution chest CT protocol. Contrast  dose: None  COMPARISON: None   HISTORY: eval for ILD, pleural effusion, pt has lupus, Sjogren's, h/o  MALT and pleurisy and SOB; Systemic lupus erythematosus, unspecified  SLE type, unspecified organ involvement status (H)   FINDINGS:  At least 75% collapse of the trachea and mainstem bronchi on the end  expiratory views. Diffuse lobular air trapping on end  expiratory  images. Bibasilar platelike atelectasis. No pneumothorax or pleural  effusion. Scattered solid pulmonary nodules, for example a 4 mm  lingular nodule (series 5 image 145) and a 4 mm right upper lobe  nodule (image 73).    The heart size is normal. Mild three-vessel coronary artery calcific  atherosclerosis. Dilation of the main pulmonary artery to 4.1 cm. No  pericardial effusion. Normal caliber and configuration of the thoracic  great vessels. Numerous prominent though nonenlarged mediastinal lymph  nodes.  Limited views of the abdomen reveal no worrisome pathology. No  worrisome bony or soft tissue lesions.    IMPRESSION:   1. At least moderate tracheobronchomalacia.  2. 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  3. Scattered subcentimeter pulmonary nodules measuring up to 4 mm.  Consider follow-up chest CT in one year to establish stability.     [Consider Follow Up: Lung nodule]     This report will be copied to the [facility] to ensure a  provider acknowledges the finding.      I have personally reviewed the examination and initial interpretation  and I agree with the findings.     AUSTIN PACE MD    Examination:NM LUNG SCAN VENTILATION AND PERFUSION, 3/14/2018 8:24 AM    Indication:  Chronic PE; Pulmonary hypertension    Additional Information: none   Technique:   The patient received 2 mCi of Tc-99m DTPA aerosol for ventilation and  7 mCi of Tc-99m MAA for perfusion. Multiple images were obtained of  the lungs in Anterior, posterior, HERNANDES, RPO, LPO, and  Greek projections.   Comparison: Chest x-ray same-day   Findings:     There are matched ventilation/perfusion defects in both lungs. No  mismatched perfusion defect to suggest pulmonary emboli.      Impression:  Pulmonary emboli is not present.     I have personally reviewed the examination and initial  interpretation  and I agree with the findings.     DONNIE GARCIA MD    Component      Latest Ref Rng & Units 5/12/2018   WBC      4.0 - 11.0 10e9/L 7.1   RBC Count      3.8 - 5.2 10e12/L 4.42   Hemoglobin      11.7 - 15.7 g/dL 12.1   Hematocrit      35.0 - 47.0 % 37.4   MCV      78 - 100 fl 85   MCH      26.5 - 33.0 pg 27.4   MCHC      31.5 - 36.5 g/dL 32.4   RDW      10.0 - 15.0 % 14.7   Platelet Count      150 - 450 10e9/L 226   Diff Method       Automated Method   % Neutrophils      % 86.2   % Lymphocytes      % 4.8   % Monocytes      % 8.4   % Eosinophils      % 0.3   % Basophils      % 0.3   Absolute Neutrophil      1.6 - 8.3 10e9/L 6.1   Absolute Lymphocytes      0.8 - 5.3 10e9/L 0.3 (L)   Absolute Monocytes      0.0 - 1.3 10e9/L 0.6   Absolute Eosinophils      0.0 - 0.7 10e9/L 0.0   Absolute Basophils      0.0 - 0.2 10e9/L 0.0   Color Urine       Yellow   Appearance Urine       Clear   Glucose Urine      NEG:Negative mg/dL Negative   Bilirubin Urine      NEG:Negative Negative   Ketones Urine      NEG:Negative mg/dL Negative   Specific Gravity Urine      1.003 - 1.035 <=1.005   Blood Urine      NEG:Negative Negative   pH Urine      5.0 - 7.0 pH 5.5   Protein Albumin Urine      NEG:Negative mg/dL Negative   Urobilinogen Urine      0.2 - 1.0 EU/dL 0.2   Nitrite Urine      NEG:Negative Negative   Leukocyte Esterase Urine      NEG:Negative Negative   Source       Midstream Urine   Creatinine      0.52 - 1.04 mg/dL 0.63   GFR Estimate      >60 mL/min/1.7m2 >90   GFR Estimate If Black      >60 mL/min/1.7m2 >90   Protein Random Urine      g/L <0.05   Protein Total Urine g/gr Creatinine      0 - 0.2 g/g Cr Unable to calculate due to low value   Albumin      3.4 - 5.0 g/dL 3.6   ALT      0 - 50 U/L 28   AST      0 - 45 U/L 20   Complement C3      76 - 169 mg/dL 46 (L)   Complement C4      15 - 50 mg/dL 3 (L)   CRP Inflammation      0.0 - 8.0 mg/L <2.9   Sed Rate      0 - 30 mm/h 26   DNA-ds      <10 IU/mL >379  (H)   Creatinine Urine      mg/dL 16     Component      Latest Ref Rng & Units 7/7/2018   WBC      4.0 - 11.0 10e9/L 7.0   RBC Count      3.8 - 5.2 10e12/L 4.35   Hemoglobin      11.7 - 15.7 g/dL 11.7   Hematocrit      35.0 - 47.0 % 36.0   MCV      78 - 100 fl 83   MCH      26.5 - 33.0 pg 26.9   MCHC      31.5 - 36.5 g/dL 32.5   RDW      10.0 - 15.0 % 15.3 (H)   Platelet Count      150 - 450 10e9/L 224   Diff Method       Automated Method   % Neutrophils      % 91.9   % Lymphocytes      % 4.0   % Monocytes      % 3.7   % Eosinophils      % 0.3   % Basophils      % 0.1   Absolute Neutrophil      1.6 - 8.3 10e9/L 6.5   Absolute Lymphocytes      0.8 - 5.3 10e9/L 0.3 (L)   Absolute Monocytes      0.0 - 1.3 10e9/L 0.3   Absolute Eosinophils      0.0 - 0.7 10e9/L 0.0   Absolute Basophils      0.0 - 0.2 10e9/L 0.0   Color Urine       Yellow   Appearance Urine       Clear   Glucose Urine      NEG:Negative mg/dL Negative   Bilirubin Urine      NEG:Negative Negative   Ketones Urine      NEG:Negative mg/dL Negative   Specific Gravity Urine      1.003 - 1.035 1.010   pH Urine      5.0 - 7.0 pH 5.5   Protein Albumin Urine      NEG:Negative mg/dL Negative   Urobilinogen Urine      0.2 - 1.0 EU/dL 0.2   Nitrite Urine      NEG:Negative Negative   Blood Urine      NEG:Negative Trace (A)   Leukocyte Esterase Urine      NEG:Negative Negative   Source       Midstream Urine   WBC Urine      OTO5:0 - 5 /HPF 0 - 5   RBC Urine      OTO2:O - 2 /HPF O - 2   Squamous Epithelial /LPF Urine      FEW:Few /LPF Few   Creatinine      0.52 - 1.04 mg/dL 0.63   GFR Estimate      >60 mL/min/1.7m2 >90   GFR Estimate If Black      >60 mL/min/1.7m2 >90   Protein Random Urine      g/L 0.07   Protein Total Urine g/gr Creatinine      0 - 0.2 g/g Cr 0.29 (H)   Albumin      3.4 - 5.0 g/dL 3.5   ALT      0 - 50 U/L 27   AST      0 - 45 U/L 21   Complement C3      76 - 169 mg/dL 51 (L)   Complement C4      15 - 50 mg/dL 5 (L)   Creatinine Urine Random      mg/dL  24   CRP Inflammation      0.0 - 8.0 mg/L 11.2 (H)   DNA-ds      <10 IU/mL >379 (H)   Sed Rate      0 - 30 mm/h 35 (H)   Creatinine Urine      mg/dL 23     PFTs 5/2018 (The FEV1 and FVC are reduced but the FEV1/FVC ratio is normal.  The inspiratory flow rates are reduced.  The TLC and FRC are reduced.  The diffusing capacity is normal.  However, the diffusing capacity was not corrected for the patient's hemoglobin.  IMPRESSION:  Moderately Severe  Restriction.  Normal Diffusion.  Walk distance is normal on room air with significant desaturation but no hypoxia.  ____________________________________________KERVIN TONY.      Component      Latest Ref Rng & Units 11/12/2018   Color Urine       Yellow   Appearance Urine       Clear   Glucose Urine      NEG:Negative mg/dL Negative   Bilirubin Urine      NEG:Negative Negative   Ketones Urine      NEG:Negative mg/dL Negative   Specific Gravity Urine      1.003 - 1.035 1.025   pH Urine      5.0 - 7.0 pH 6.0   Protein Albumin Urine      NEG:Negative mg/dL 30 (A)   Urobilinogen Urine      0.2 - 1.0 EU/dL 0.2   Nitrite Urine      NEG:Negative Negative   Blood Urine      NEG:Negative Trace (A)   Leukocyte Esterase Urine      NEG:Negative Negative   Source       Midstream Urine   WBC Urine      OTO5:0 - 5 /HPF 0 - 5   RBC Urine      OTO2:O - 2 /HPF O - 2   Squamous Epithelial /LPF Urine      FEW:Few /LPF Few   Bacteria Urine      NEG:Negative /HPF Few (A)   WBC      4.0 - 11.0 10e9/L 7.3   RBC Count      3.8 - 5.2 10e12/L 4.25   Hemoglobin      11.7 - 15.7 g/dL 11.3 (L)   Hematocrit      35.0 - 47.0 % 36.0   MCV      78 - 100 fl 85   MCH      26.5 - 33.0 pg 26.6   MCHC      31.5 - 36.5 g/dL 31.4 (L)   RDW      10.0 - 15.0 % 14.9   Platelet Count      150 - 450 10e9/L 255   Creatinine      0.52 - 1.04 mg/dL 0.83   GFR Estimate      >60 mL/min/1.7m2 73   GFR Estimate If Black      >60 mL/min/1.7m2 88   Iron      35 - 180 ug/dL 27 (L)   Iron Binding Cap      240 - 430 ug/dL 264  "  Iron Saturation Index      15 - 46 % 10 (L)   Protein Random Urine      g/L 0.58   Protein Total Urine g/gr Creatinine      0 - 0.2 g/g Cr 0.34 (H)   Albumin      3.4 - 5.0 g/dL 3.2 (L)   ALT      0 - 50 U/L 30   AST      0 - 45 U/L 19   Complement C3      76 - 169 mg/dL 48 (L)   Complement C4      15 - 50 mg/dL 3 (L)   CRP Inflammation      0.0 - 8.0 mg/L 16.4 (H)   DNA-ds      <10 IU/mL >379 (H)   Sed Rate      0 - 30 mm/h 26   Ferritin      8 - 252 ng/mL 160   Creatinine Urine      mg/dL 176     Ph.E:    /85   Pulse 75   Temp 98.2  F (36.8  C) (Oral)   Ht 1.676 m (5' 6\")   Wt 131.4 kg (289 lb 11.2 oz)   SpO2 96%   BMI 46.76 kg/m         Constitutional: WD/WN. Pleasant. In no acute distress.   Eyes: EOM intact, PERRLA, sclera anicteric, conj not injected  HEENT: No oral ulcers or thrush. Normal salivary pool.   Neck: No cervical LAP or thyromegaly  Chest: Clear to auscultation bilaterally  CV: RRR, no murmurs/ rubs or gallops. No edema, clubbing or cyanosis.   GI: Abdomen is soft and non tender.   MS: No synovitis. Cool joints. No tenderness of the joints. No swelling. Normal ROM.  No joint deformities. Full ROM of the joints. No nodules. No Jaccoud's deformity.  Skin: No skin rash, malar rash, livedo, periungual erythema, alopecia, digital ulcers or nail changes.  Neuro: A&O x 3. Grossly non focal, muscular power 5/5 in all ext  Psych: NL affect and mood    Assessment/ plan:    1-SLE. 52 yo WF with +fh/o RA and personal hx of SLE presented in 12/2017 for 2nd opinion of m/o her SLE. She was diagnosed with SLE at age 27. Her lupus has been marked by +KARLIE (highest titer 1:640, speckled and nucleolar patterns), +anti-DNA, arthritis, malar rash, serositis (pleuritic CP) supported by +SSA/SSB Ab, low C3/low C4, +RF, high ESR/CRP. She had neg anti-RNP, anti-Sm, acL IgM/G/A, LAC, cryo and anti-CCP.     Had NL C3 at the time of Dx. She was treated with prednisone and HCQ at the time of Dx. Can't remember how " long she was on HCQ and why HCQ was stopped, but it probably was stopped because of stable disease, no report on HCQ toxicity. Reportedly her SLE was mild all these years.    Has secondary Sjogren's with sicca, +SSA/SSB Ab and h/o MALT lymphoma at age 44 in remission. Uses blink eyedrops and salagen. No lip biopsy was done to confirm Dx of Sjogren's.    Her lupus flared in 7/2017 with no triggers when she presented with arthritis, HCQ was resumed, arthritis resolved. Mild pulm HTN on 2D-Echo 8/2017 but neg R cardiac cath and VQ scan in 3/2018, also neg 2D-Echo.    In 12/2017, was prescribed prednisone 40 mg for only 5 days for pleuritic CP, CP recurred after stopping prednisone.     Her major complaint at initial visit with me in 12/2017 was ongoing CP. AZA was added in 2/2018 with start dose of 50 mg qd and slowly was increased to max 150 mg qd. Tolerated AZA well, no SEs, no toxicity on the labs but failed it and required to go back on prednisone 20 mg qd (current dose).     Her lupus is active with pleuritic CP. ESR/CRP normalized on prednisone+AZA+HCQ but +anti-DNA, low C3/C4 are unchanged. Chest CT in 12/2017 without contrast showed air trapping due to lupus/Sjogren's, no pleural effusion. Lung nodules need f/u in a year. No pericardail effusion on 2D-echo and neg VQ scan for PE in 3/2018 so chest CT with contrast is not needed. She is APL negative.    Lupus Dx and tx plan was discussed with her.    AZA was switched to  mg po bid on 5/18/2018 as she failed AZA. She is now on max dose of MMF 1500 mg bid. Her labs are unchanged with persistently elevated anti-DNA, low C3/C4 and high ESR/CRP, but just started to feel a lot better (regarding fatigue, arthralgia, still has residual pleuritic CP) after adding MMF.     Had interstitial changes at the base of lung on outside chest CT (done 7/2018 for f/u MALT lymphoma, also showed stable pulm nodules with trace R pleural and pericardial effusion) and abnormal  PFTs (mod-severe restriction 5/2018).Was seen by Dr. 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 Also possible shrinking lung syn sec lupus or obesity is responsible for restrictive lung disease plus pleural effusion. She was prescribed albuterol and dulera inh which have helped.      Benlysta was added in 9/2018 to help with pleuritic CP. No change in lupus serology (anti-DNA, C3, C4), but ESR/CRP have improved. Overall she feels a lot better after adding benlysta but it makes her tired. Will remove pre-meds as no allergic reaction to benlysta and pre-meds could cause fatigue.      Recommend:      Will follow up with pulmonary.    Continue the plaquenil 200 mg twice a day as well as mycophenalate 1500 mg bid and benlysta infusion qmo (peak benefit is 6 mo)    Will change benlysta pre-meds to as needed    Start tapering the prednisone down: 17.5-15-12.5 mg a day each for one week then 10 mg a day    Labs in 2 months    2-HCQ monitoring. Was reminded to have eye exam    3-MMF monitoring. Labs q3mo, no toxicity        RTC 3 months        No orders of the defined types were placed in this encounter.      Killian Marroquin MD

## 2019-03-06 NOTE — LETTER
Patient:  Taina Singh  :   1965  MRN:     [id number]        Ms.Kelly Singh  [address] [facility] [address]        2019    Dear ,    We are writing to inform you of your test results. Increased sed rate/CRP and mild anemia correlate with increased pain/flare you have, hope it improves after your next benlysta dose.      Results for orders placed or performed in visit on 19   Protein  random urine with Creat Ratio   Result Value Ref Range    Protein Random Urine 0.30 g/L    Protein Total Urine g/gr Creatinine 0.89 (H) 0 - 0.2 g/g Cr   UA with Microscopic reflex to Culture   Result Value Ref Range    Color Urine Yellow     Appearance Urine Clear     Glucose Urine Negative NEG^Negative mg/dL    Bilirubin Urine Negative NEG^Negative    Ketones Urine Negative NEG^Negative mg/dL    Specific Gravity Urine 1.006 1.003 - 1.035    Blood Urine Small (A) NEG^Negative    pH Urine 6.0 5.0 - 7.0 pH    Protein Albumin Urine Negative NEG^Negative mg/dL    Urobilinogen mg/dL 0.0 0.0 - 2.0 mg/dL    Nitrite Urine Negative NEG^Negative    Leukocyte Esterase Urine Trace (A) NEG^Negative    Source Midstream Urine     WBC Urine 7 (H) 0 - 5 /HPF    RBC Urine 3 (H) 0 - 2 /HPF    Squamous Epithelial /HPF Urine 1 0 - 1 /HPF   Erythrocyte sedimentation rate auto   Result Value Ref Range    Sed Rate 62 (H) 0 - 30 mm/h   DNA double stranded antibodies   Result Value Ref Range    DNA-ds >379 (H) <10 IU/mL   CRP inflammation   Result Value Ref Range    CRP Inflammation 11.4 (H) 0.0 - 8.0 mg/L   Complement C3   Result Value Ref Range    Complement C3 50 (L) 76 - 169 mg/dL   Complement C4   Result Value Ref Range    Complement C4 3 (L) 15 - 50 mg/dL   Creatinine   Result Value Ref Range    Creatinine 0.58 0.52 - 1.04 mg/dL    GFR Estimate >90 >60 mL/min/[1.73_m2]    GFR Estimate If Black >90 >60 mL/min/[1.73_m2]   CBC with platelets differential   Result Value Ref Range    WBC 4.6 4.0 - 11.0 10e9/L    RBC Count  4.16 3.8 - 5.2 10e12/L    Hemoglobin 11.2 (L) 11.7 - 15.7 g/dL    Hematocrit 35.2 35.0 - 47.0 %    MCV 85 78 - 100 fl    MCH 26.9 26.5 - 33.0 pg    MCHC 31.8 31.5 - 36.5 g/dL    RDW 13.9 10.0 - 15.0 %    Platelet Count 234 150 - 450 10e9/L    Diff Method Automated Method     % Neutrophils 74.4 %    % Lymphocytes 12.3 %    % Monocytes 11.9 %    % Eosinophils 0.4 %    % Basophils 0.4 %    % Immature Granulocytes 0.6 %    Nucleated RBCs 0 0 /100    Absolute Neutrophil 3.4 1.6 - 8.3 10e9/L    Absolute Lymphocytes 0.6 (L) 0.8 - 5.3 10e9/L    Absolute Monocytes 0.6 0.0 - 1.3 10e9/L    Absolute Eosinophils 0.0 0.0 - 0.7 10e9/L    Absolute Basophils 0.0 0.0 - 0.2 10e9/L    Abs Immature Granulocytes 0.0 0 - 0.4 10e9/L    Absolute Nucleated RBC 0.0    AST   Result Value Ref Range    AST 23 0 - 45 U/L   Albumin level   Result Value Ref Range    Albumin 3.2 (L) 3.4 - 5.0 g/dL   ALT   Result Value Ref Range    ALT 31 0 - 50 U/L   Creatinine urine calculation only   Result Value Ref Range    Creatinine Urine 34 mg/dL       Killian Marroquin MD

## 2019-03-06 NOTE — PATIENT INSTRUCTIONS
Labs today    When you return from your trip, you could try going down on the prednsione 1 mg down every month till you reach 5 mg a day and stay on 5 mg a day    Recommend prevnar (pneumonia shot), you could my chart me when you return     Lidoderm patch for L hip pain    Return in 3-4 months

## 2019-03-07 LAB — DSDNA AB SER-ACNC: >379 IU/ML

## 2019-03-08 NOTE — RESULT ENCOUNTER NOTE
Increased sed rate/CRP and mild anemia correlate with increased pain/flare you have, hope it improves after your next benlysta dose.

## 2019-03-11 ENCOUNTER — TELEPHONE (OUTPATIENT)
Dept: RHEUMATOLOGY | Facility: CLINIC | Age: 54
End: 2019-03-11

## 2019-03-11 NOTE — TELEPHONE ENCOUNTER
Prior Authorization Retail Medication Request    Medication/Dose: lidocaine patch 5%  ICD code (if different than what is on RX):  M70.72   Previously Tried and Failed:    Rationale:      Insurance Name:    Insurance ID:        Pharmacy Information (if different than what is on RX)  Name:    Phone:

## 2019-03-11 NOTE — TELEPHONE ENCOUNTER
Central Prior Authorization Team   Phone: [phone]      PA Initiation    Medication: lidocaine patch 5%-PA Pending  Insurance Company: [employer] - Phone [phone] Fax [phone]  Pharmacy Filling the Rx: [facility] - [place], [place] - [address] [place] [address]  Filling Pharmacy Phone: [phone]  Filling Pharmacy Fax: [phone]  Start Date: 3/11/2019

## 2019-03-18 NOTE — TELEPHONE ENCOUNTER
PRIOR AUTHORIZATION DENIED    Medication: lidocaine patch 5%-DENIED    Denial Date: 3/13/2019    Denial Rational:           Appeal Information:

## 2019-03-21 NOTE — TELEPHONE ENCOUNTER
Called insurance to have denial faxed over as we have not received it yet.    Prior auth was denied due to diagnosis of Bursitis of left hip, unspecified bursa [M70.72] is not covered.    Covered diagnosis are below.

## 2019-04-28 ENCOUNTER — MYC MEDICAL ADVICE (OUTPATIENT)
Dept: RHEUMATOLOGY | Facility: CLINIC | Age: 54
End: 2019-04-28

## 2019-04-28 DIAGNOSIS — M32.9 SYSTEMIC LUPUS ERYTHEMATOSUS, UNSPECIFIED SLE TYPE, UNSPECIFIED ORGAN INVOLVEMENT STATUS (H): Primary | ICD-10-CM

## 2019-05-07 ENCOUNTER — MYC REFILL (OUTPATIENT)
Dept: RHEUMATOLOGY | Facility: CLINIC | Age: 54
End: 2019-05-07

## 2019-05-07 DIAGNOSIS — M32.9 SYSTEMIC LUPUS ERYTHEMATOSUS, UNSPECIFIED SLE TYPE, UNSPECIFIED ORGAN INVOLVEMENT STATUS (H): ICD-10-CM

## 2019-05-08 DIAGNOSIS — L93.0 LUPUS ERYTHEMATOSUS, UNSPECIFIED FORM: ICD-10-CM

## 2019-05-09 RX ORDER — TRAMADOL HYDROCHLORIDE 50 MG/1
50 TABLET ORAL EVERY 12 HOURS PRN
Qty: 30 TABLET | Refills: 2 | Status: SHIPPED | OUTPATIENT
Start: 2019-05-09 | End: 2019-07-03

## 2019-05-09 NOTE — TELEPHONE ENCOUNTER
Last seen: 3/6/19  Pending appt: 7/3/19  Medication: Tramadol   Last filled: 2/22/19  Qty: 30        Sophie Alvarado RN  [facility]ology [facility]

## 2019-05-09 NOTE — TELEPHONE ENCOUNTER
cellcept      Last Written Prescription Date:  11/16/18  Last Fill Quantity: 540 # refills: 1  Last Office Visit:3/6/19  Future Office visit:  7/3/19    CBC RESULTS:   Recent Labs   Lab Test 03/06/19  1046   WBC 4.6   RBC 4.16   HGB 11.2*   HCT 35.2   MCV 85   MCH 26.9   MCHC 31.8   RDW 13.9          Creatinine   Date Value Ref Range Status   03/06/2019 0.58 0.52 - 1.04 mg/dL Final   ]    Liver Function Studies -   Recent Labs   Lab Test 03/06/19  1046   ALBUMIN 3.2*   AST 23   ALT 31       Routing refill request to provider for review/approval because:  Drug not on the G, P or [facility] refill protocol

## 2019-05-09 NOTE — TELEPHONE ENCOUNTER
Signed tramadol script is ready for the patient and has been faxed to the [facility] pharmacy per patient request. Patient notified via [employer] message.   Jayleen Gan CMA  5/9/2019 3:01 PM

## 2019-05-10 ENCOUNTER — TELEPHONE (OUTPATIENT)
Dept: RHEUMATOLOGY | Facility: CLINIC | Age: 54
End: 2019-05-10

## 2019-05-10 DIAGNOSIS — M32.9 SYSTEMIC LUPUS ERYTHEMATOSUS, UNSPECIFIED SLE TYPE, UNSPECIFIED ORGAN INVOLVEMENT STATUS (H): Primary | ICD-10-CM

## 2019-05-10 RX ORDER — MYCOPHENOLATE MOFETIL 500 MG/1
TABLET ORAL
Qty: 540 TABLET | Refills: 0 | OUTPATIENT
Start: 2019-05-10

## 2019-05-10 NOTE — TELEPHONE ENCOUNTER
Discussed Cytoxan with pt, she really wants to go to [facility] as they will do it on the weekend.  She does understand that it may take a little longer due to the fact that they are not in our system.    Sophie Alvarado RN  Rheumatology Clinic

## 2019-05-14 ENCOUNTER — MYC MEDICAL ADVICE (OUTPATIENT)
Dept: RHEUMATOLOGY | Facility: CLINIC | Age: 54
End: 2019-05-14

## 2019-05-14 DIAGNOSIS — L93.0 LUPUS ERYTHEMATOSUS, UNSPECIFIED FORM: ICD-10-CM

## 2019-05-14 RX ORDER — PREDNISONE 5 MG/1
15 TABLET ORAL DAILY
Qty: 90 TABLET | Refills: 1 | Status: SHIPPED | OUTPATIENT
Start: 2019-05-14 | End: 2019-06-05

## 2019-05-17 ENCOUNTER — ALLIED HEALTH/NURSE VISIT (OUTPATIENT)
Dept: PHARMACY | Facility: CLINIC | Age: 54
End: 2019-05-17
Payer: COMMERCIAL

## 2019-05-17 DIAGNOSIS — M32.9 SYSTEMIC LUPUS ERYTHEMATOSUS, UNSPECIFIED SLE TYPE, UNSPECIFIED ORGAN INVOLVEMENT STATUS (H): Primary | ICD-10-CM

## 2019-05-17 DIAGNOSIS — Z78.9 TAKES DIETARY SUPPLEMENTS: ICD-10-CM

## 2019-05-17 DIAGNOSIS — J21.9 BRONCHIOLITIS: ICD-10-CM

## 2019-05-17 DIAGNOSIS — R06.00 DYSPNEA, UNSPECIFIED TYPE: ICD-10-CM

## 2019-05-17 PROCEDURE — 99605 MTMS BY PHARM NP 15 MIN: CPT | Performed by: PHARMACIST

## 2019-05-17 PROCEDURE — 99607 MTMS BY PHARM ADDL 15 MIN: CPT | Performed by: PHARMACIST

## 2019-05-17 NOTE — PROGRESS NOTES
SUBJECTIVE/OBJECTIVE:                           Taina Singh is a 54 year old female called for an initial visit for Medication Therapy Management.  She was referred to me from Dr. Marroquin.    Chief Complaint: Cytoxan education    Allergies/ADRs: Reviewed in Epic  Tobacco: No tobacco use  Alcohol: occassionally     Medication Adherence/Access:  no issues reported    SLE:   Hydroxychloroquine 200 mg BID  Prednisone 15 mg daily   vitamin D 1,000 units daily (not taking)  Tramadol 50 mg BID PRN  Pilocarpine 5 mg four times daily PRN    Reports taking tramadol more like once daily, sometimes not at all. Pain is mostly generalized or in chest sometimes. She did not try the lidocaine patch as it was not covered. The tramadol is effective for this pain. She is not using pilocarpine at this time, but would like this to stay on her medication list in the event she needs it again.    Plan in place to switch to cyclophosphamide (awaiting coverage) with both IV and oral Mesna. Other medications include IV fluid and ondansetron (IV and oral available). She was previously on Benlysta. Last seen by Dr. Marroquin 3/6/2019. See MyChart encounter from 4/28 for details on medication plan - stop cellcept and stop Benlysta. Plan for cytoxan monthly x 6 months.     We reviewed Cytoxan in depth today including general dosing and mechanism, risks/precuations, side effects (both common/serious -including infection risk as well as malignancy), and monitoring. She was able to read about cyclophosphamide online prior to our conversation. She was glad our conversation as aligning with what she read online. We also reviewed purpose of other medications including ondansetron, mesna, and IV fluids. Encouraged hydration especially around dosing days to help reduce bladder toxicity. Reviewed potential for hair loss which is typically reversible after the therapy is completed. Also emphasized infection risk given this is a potent immunosuppressant. We  also discussed notifying clinic if she were to have signs of infection/need an antibiotic while she is on cyclophosphamide, as we will want to err on the side of caution. She is aware of these risks given prior immunosuppression, and use of Benlysta. She would appreciate more information on the medication if available.    Appears she started hydroxychloroquine in July 2017 per Epic records. Appears she did have an eye exam in 2018 per chart notes, was reminded to have her eye exam at last visit with Dr. Marroquin. She did get two doses of Shingrix in 2018. Dr. Marroquin recommended she receive Prevnar-13.     SOB/bronchiolitis:   Albuterol HFA PRN (using once daily)    No reported concerns. She thinks the albuterol is helpful when needed. She was previously taking the Dulera but has not taken this in quite some time due to expense. She is not aware that she tried any formulary alternatives. She was last seen by Pulmonology 8/2018. Plan per Dr. Marroquin's note was to follow-up with pulmonology.    Supplements:   Fish oil 500 mg daily   Multivitamin daily  Calcium/Magnesium daily  Vitamin D (not taking)    She reports not taking the vitamin D supplement. No reported side effects or concerns.     Vitamin D Deficiency Screening Results:  Lab Results   Component Value Date    VITDT 51 12/29/2017     ASSESSMENT:                             Current medications were reviewed today.     Medication Adherence: fair, needs improvement - see below    SLE: Needs improvement. As above, plan in place for change to cyclophosphamide - awaiting determination. She did not have further questions about the medications at the end of our visit today. Will send medication guide via [employer]. No major drug-drug interactions with current medications and cytoxan (per [employer]). There is overlapping immunosuppressive risk with prednisone/cytoxan, however, the team is aware of these risks and will likely attempt taper once the new regimen has been  started.     SOB/bronchiolitis: Unchanged. Appears she stopped Dulera previously due to cost. Unclear if this is needed at this time based on patient report, however, we discussed ability to consider formulary alternatives if she needs going forward. She is appreciative of this information.     Supplements: Needs improvement. We reviewed bone health today given she continues on daily prednisone. Encouraged re-start of vitamin D because of this.     PLAN:                            Medications Deleted Today  -lidocaine patch (not-covered)  -Dulera (has not taken for a long time, d/t expense)  -mycophenolate (switching to Cytoxan)  -prednisone 1 mg (dose adjustment)    1. Taina to re-start vitamin D 1,000 units daily   2. Additional Cytoxan information available via [employer]     Future Considerations  -re-assess vaccine status    I spent 30 minutes with this patient today. I offer these suggestions for consideration by Dr. Marroquin. A copy of the visit note was provided to the patient's referring provider.    Will follow up in 1-month for medication check-in    The patient was sent via [employer] a summary of these recommendations as an after visit summary.     Elif GarciaD   MT[facility] Pharmacist   Adult Rheumatology and IBD  Phone: [phone]

## 2019-05-20 ENCOUNTER — HOSPITAL ENCOUNTER (OUTPATIENT)
Dept: MRI IMAGING | Facility: CLINIC | Age: 54
Discharge: HOME | End: 2019-05-20
Attending: INTERNAL MEDICINE | Admitting: INTERNAL MEDICINE
Payer: COMMERCIAL

## 2019-05-20 DIAGNOSIS — M32.9 SYSTEMIC LUPUS ERYTHEMATOSUS, UNSPECIFIED SLE TYPE, UNSPECIFIED ORGAN INVOLVEMENT STATUS (H): ICD-10-CM

## 2019-05-20 PROCEDURE — 25500064 ZZH RX 255 OP 636: Performed by: INTERNAL MEDICINE

## 2019-05-20 PROCEDURE — 75561 CARDIAC MRI FOR MORPH W/DYE: CPT

## 2019-05-20 PROCEDURE — A9585 GADOBUTROL INJECTION: HCPCS | Performed by: INTERNAL MEDICINE

## 2019-05-20 PROCEDURE — 75561 CARDIAC MRI FOR MORPH W/DYE: CPT | Mod: 26 | Performed by: INTERNAL MEDICINE

## 2019-05-20 RX ORDER — GADOBUTROL 604.72 MG/ML
7.5 INJECTION INTRAVENOUS ONCE
Status: COMPLETED | OUTPATIENT
Start: 2019-05-20 | End: 2019-05-20

## 2019-05-20 RX ADMIN — GADOBUTROL 7.5 ML: 604.72 INJECTION INTRAVENOUS at 12:15

## 2019-05-21 NOTE — PATIENT INSTRUCTIONS
Recommendations from today's MTM visit:                                                    MTM (medication therapy management) is a service provided by a clinical pharmacist designed to help you get the most of out of your medicines.   Today we reviewed what your medicines are for, how to know if they are working, that your medicines are safe and how to make your medicine regimen as easy as possible.     1. Consider restarting your vitamin D 1,000 units daily. As we discussed, this is especially important given your long term use of prednisone and its impact on your bone health. Your goal for calcium is roughly 1,200 mg daily (through diet and supplements) and around 1,000 units of vitamin D daily.    2. See below for more information regarding cyclophosphamide.     3. Please reach out if you have further questions.    Next MTM visit: 1 month after medication start for check-in    To schedule another MTM appointment, please call the clinic directly or you may call the MTM scheduling line at [phone] or toll-free at [phone].     My Clinical Pharmacist's contact information:                                                      It was a pleasure talking with you today!  Please feel free to contact me with any questions or concerns you have.      Elif GarciaD   MTM Pharmacist   [facility]ology [facility]  Phone: [phone]    You may receive a survey about the MTM services you received by email and/or US Mail.  I would appreciate your feedback to help me serve you better in the future. Your comments will be anonymous.      Patient Education     Cyclophosphamide injection  Brand Names: Cytoxan, Neosar  What is this medicine?  CYCLOPHOSPHAMIDE (sye kloe NEGRO fa mide) is a chemotherapy drug. It slows the growth of cancer cells. This medicine is used to treat many types of cancer like lymphoma, myeloma, leukemia, breast cancer, and ovarian cancer, to name a few.  How should I use this medicine?  This  drug is usually given as an injection into a vein or muscle or by infusion into a vein. It is administered in a hospital or clinic by a specially trained health care professional.  Talk to your pediatrician regarding the use of this medicine in children. Special care may be needed.  What side effects may I notice from receiving this medicine?  Side effects that you should report to your doctor or health care professional as soon as possible:    allergic reactions like skin rash, itching or hives, swelling of the face, lips, or tongue    low blood counts - this medicine may decrease the number of white blood cells, red blood cells and platelets. You may be at increased risk for infections and bleeding.    signs of infection - fever or chills, cough, sore throat, pain or difficulty passing urine    signs of decreased platelets or bleeding - bruising, pinpoint red spots on the skin, black, tarry stools, blood in the urine    signs of decreased red blood cells - unusually weak or tired, fainting spells, lightheadedness    breathing problems    dark urine    dizziness    palpitations    swelling of the ankles, feet, hands    trouble passing urine or change in the amount of urine    weight gain    yellowing of the eyes or skin  Side effects that usually do not require medical attention (report to your doctor or health care professional if they continue or are bothersome):    changes in nail or skin color    hair loss    missed menstrual periods    mouth sores    nausea, vomiting  What may interact with this medicine?  This medicine may interact with the following medications:    amiodarone    amphotericin B    azathioprine    certain antiviral medicines for HIV or AIDS such as protease inhibitors (e.g., indinavir, ritonavir) and zidovudine    certain blood pressure medications such as benazepril, captopril, enalapril, fosinopril, lisinopril, moexipril, monopril, perindopril, quinapril, ramipril, trandolapril    certain  cancer medications such as anthracyclines (e.g., daunorubicin, doxorubicin), busulfan, cytarabine, paclitaxel, pentostatin, tamoxifen, trastuzumab    certain diuretics such as chlorothiazide, chlorthalidone, hydrochlorothiazide, indapamide, metolazone    certain medicines that treat or prevent blood clots like warfarin    certain muscle relaxants such as succinylcholine    cyclosporine    etanercept    indomethacin    medicines to increase blood counts like filgrastim, pegfilgrastim, sargramostim    medicines used as general anesthesia    metronidazole    natalizumab  What if I miss a dose?  It is important not to miss your dose. Call your doctor or health care professional if you are unable to keep an appointment.  Where should I keep my medicine?  This drug is given in a hospital or clinic and will not be stored at home.  What should I tell my health care provider before I take this medicine?  They need to know if you have any of these conditions:    blood disorders    history of other chemotherapy    infection    kidney disease    liver disease    recent or ongoing radiation therapy    tumors in the bone marrow    an unusual or allergic reaction to cyclophosphamide, other chemotherapy, other medicines, foods, dyes, or preservatives    pregnant or trying to get pregnant    breast-feeding  What should I watch for while using this medicine?  Visit your doctor for checks on your progress. This drug may make you feel generally unwell. This is not uncommon, as chemotherapy can affect healthy cells as well as cancer cells. Report any side effects. Continue your course of treatment even though you feel ill unless your doctor tells you to stop.  Drink water or other fluids as directed. Urinate often, even at night.  In some cases, you may be given additional medicines to help with side effects. Follow all directions for their use.  Call your doctor or health care professional for advice if you get a fever, chills or sore  throat, or other symptoms of a cold or flu. Do not treat yourself. This drug decreases your body's ability to fight infections. Try to avoid being around people who are sick.  This medicine may increase your risk to bruise or bleed. Call your doctor or health care professional if you notice any unusual bleeding.  Be careful brushing and flossing your teeth or using a toothpick because you may get an infection or bleed more easily. If you have any dental work done, tell your dentist you are receiving this medicine.  You may get drowsy or dizzy. Do not drive, use machinery, or do anything that needs mental alertness until you know how this medicine affects you.  Do not become pregnant while taking this medicine or for 1 year after stopping it. Women should inform their doctor if they wish to become pregnant or think they might be pregnant. Men should not father a child while taking this medicine and for 4 months after stopping it. There is a potential for serious side effects to an unborn child. Talk to your health care professional or pharmacist for more information. Do not breast-feed an infant while taking this medicine.  This medicine may interfere with the ability to have a child. This medicine has caused ovarian failure in some women. This medicine has caused reduced sperm counts in some men. You should talk with your doctor or health care professional if you are concerned about your fertility.  If you are going to have surgery, tell your doctor or health care professional that you have taken this medicine.  NOTE:This sheet is a summary. It may not cover all possible information. If you have questions about this medicine, talk to your doctor, pharmacist, or health care provider. Copyright  2018 [employer]

## 2019-05-23 ENCOUNTER — TRANSFERRED RECORDS (OUTPATIENT)
Dept: HEALTH INFORMATION MANAGEMENT | Facility: CLINIC | Age: 54
End: 2019-05-23

## 2019-05-26 ENCOUNTER — MYC REFILL (OUTPATIENT)
Dept: RHEUMATOLOGY | Facility: CLINIC | Age: 54
End: 2019-05-26

## 2019-05-26 DIAGNOSIS — M32.9 SYSTEMIC LUPUS ERYTHEMATOSUS, UNSPECIFIED SLE TYPE, UNSPECIFIED ORGAN INVOLVEMENT STATUS (H): ICD-10-CM

## 2019-05-28 ENCOUNTER — MYC MEDICAL ADVICE (OUTPATIENT)
Dept: RHEUMATOLOGY | Facility: CLINIC | Age: 54
End: 2019-05-28

## 2019-05-28 DIAGNOSIS — M32.9 SYSTEMIC LUPUS ERYTHEMATOSUS, UNSPECIFIED SLE TYPE, UNSPECIFIED ORGAN INVOLVEMENT STATUS (H): Primary | ICD-10-CM

## 2019-05-28 RX ORDER — PREDNISONE 10 MG/1
30 TABLET ORAL DAILY
Qty: 90 TABLET | Refills: 1 | Status: SHIPPED | OUTPATIENT
Start: 2019-05-28 | End: 2019-06-05

## 2019-05-28 NOTE — TELEPHONE ENCOUNTER
"Pt called as she is miserable and her inflammation is \"through the roof\", as she waits for the approval of the new infusion.  Pt is taking tramadol twice a day and in between those doses, pt is taking tylenol and ibuprofen.    Pt is wondering if she can increase the prednisone, until the approval of the new infusion.    Please follow up with pt at [phone].  Thank you!  "

## 2019-05-28 NOTE — TELEPHONE ENCOUNTER
Killian Marroquin MD Beard, Madeline, RN   Caller: Unspecified (Today,  7:06 AM)             Yes. She could go up to 30 mg every day till she gets the infusion.     Called and updated pt with Dr. Marroquin's recommendation.  She is working with the infusion center to get infusion.    Called and spoke with gertrude Coughlin, pt is fine to go ahead with infusions, no prior authorizations is needed.    Sophie Alvarado RN  Rheumatology Clinic

## 2019-05-28 NOTE — TELEPHONE ENCOUNTER
Per Madyson in [facility] Utilization Management, pt does not need a PA, can call to schedule infusion.  Pt aware.    Sophie Alvarado RN  Rheumatology Clinic

## 2019-05-28 NOTE — TELEPHONE ENCOUNTER
Called and discussed with pt in previous telephone encounter, see for completion of this message.    Sophie Alvarado RN  Rheumatology Clinic

## 2019-05-29 RX ORDER — PILOCARPINE HYDROCHLORIDE 5 MG/1
5 TABLET, FILM COATED ORAL 4 TIMES DAILY PRN
Qty: 360 TABLET | Refills: 3 | Status: SHIPPED | OUTPATIENT
Start: 2019-05-29 | End: 2020-10-29

## 2019-06-01 ENCOUNTER — MYC MEDICAL ADVICE (OUTPATIENT)
Dept: RHEUMATOLOGY | Facility: CLINIC | Age: 54
End: 2019-06-01

## 2019-06-01 DIAGNOSIS — M32.9 SYSTEMIC LUPUS ERYTHEMATOSUS, UNSPECIFIED SLE TYPE, UNSPECIFIED ORGAN INVOLVEMENT STATUS (H): Primary | ICD-10-CM

## 2019-06-03 NOTE — TELEPHONE ENCOUNTER
Discussed urine culture results, with Dr. Marroquin, no UTI, she would like pt to have urine test prior to appt on Wednesday.  Pt understands, no further questions.    Sophie Alvarado RN  Rheumatology Clinic

## 2019-06-03 NOTE — TELEPHONE ENCOUNTER
Pt called to find out lab results from 6/1/19, as her cytoxan infusion had been cancelled due to blood in the urine.      Pt is feeling miserable and if there is an infection, she would like to get on antibiotics, ASAP.    Please follow up with pt as soon as possible, per pt.  Thank you!

## 2019-06-03 NOTE — TELEPHONE ENCOUNTER
Killian Marroquin MD Beard, Madeline, RN             Yes, have another opening on 6/5.     I asked them to order urine culture but they did not.      Called and spoke with pt,  She will come see Dr. Marroquin on 6/5/19 at 9:30.  They did do a urine culture but we have not seen the results of the culture report.  Pt has results, but is not able to get them to us.  Will call infusion center to get results.    Waiting for results to be faxed over.    Sophie Alvarado RN  Rheumatology Clinic

## 2019-06-05 ENCOUNTER — OFFICE VISIT (OUTPATIENT)
Dept: NEPHROLOGY | Facility: CLINIC | Age: 54
End: 2019-06-05
Attending: INTERNAL MEDICINE
Payer: COMMERCIAL

## 2019-06-05 ENCOUNTER — OFFICE VISIT (OUTPATIENT)
Dept: RHEUMATOLOGY | Facility: CLINIC | Age: 54
End: 2019-06-05
Attending: INTERNAL MEDICINE
Payer: COMMERCIAL

## 2019-06-05 VITALS
HEIGHT: 66 IN | RESPIRATION RATE: 18 BRPM | SYSTOLIC BLOOD PRESSURE: 141 MMHG | DIASTOLIC BLOOD PRESSURE: 82 MMHG | WEIGHT: 275.1 LBS | HEART RATE: 92 BPM | BODY MASS INDEX: 44.21 KG/M2

## 2019-06-05 VITALS
WEIGHT: 275.8 LBS | SYSTOLIC BLOOD PRESSURE: 138 MMHG | HEIGHT: 66 IN | HEART RATE: 97 BPM | TEMPERATURE: 98.6 F | DIASTOLIC BLOOD PRESSURE: 88 MMHG | OXYGEN SATURATION: 95 % | BODY MASS INDEX: 44.32 KG/M2

## 2019-06-05 DIAGNOSIS — R31.29 OTHER MICROSCOPIC HEMATURIA: ICD-10-CM

## 2019-06-05 DIAGNOSIS — N05.9 NEPHRITIS AND NEPHROPATHY: ICD-10-CM

## 2019-06-05 DIAGNOSIS — R80.9 PROTEINURIA: Primary | ICD-10-CM

## 2019-06-05 DIAGNOSIS — C85.80 OTHER SPECIFIED TYPE OF NON-HODGKIN LYMPHOMA, UNSPECIFIED BODY REGION (H): ICD-10-CM

## 2019-06-05 DIAGNOSIS — N04.9 NEPHROTIC SYNDROME: ICD-10-CM

## 2019-06-05 DIAGNOSIS — L93.0 LUPUS ERYTHEMATOSUS, UNSPECIFIED FORM: Primary | ICD-10-CM

## 2019-06-05 DIAGNOSIS — M32.9 SYSTEMIC LUPUS ERYTHEMATOSUS, UNSPECIFIED SLE TYPE, UNSPECIFIED ORGAN INVOLVEMENT STATUS (H): ICD-10-CM

## 2019-06-05 DIAGNOSIS — L93.0 LUPUS ERYTHEMATOSUS, UNSPECIFIED FORM: ICD-10-CM

## 2019-06-05 DIAGNOSIS — R31.9 HEMATURIA, UNSPECIFIED TYPE: Primary | ICD-10-CM

## 2019-06-05 LAB
ALBUMIN SERPL-MCNC: 2.9 G/DL (ref 3.4–5)
ALBUMIN UR-MCNC: >499 MG/DL
ANION GAP SERPL CALCULATED.3IONS-SCNC: 8 MMOL/L (ref 3–14)
APPEARANCE UR: ABNORMAL
BILIRUB UR QL STRIP: NEGATIVE
BUN SERPL-MCNC: 21 MG/DL (ref 7–30)
CALCIUM SERPL-MCNC: 9.2 MG/DL (ref 8.5–10.1)
CHLORIDE SERPL-SCNC: 101 MMOL/L (ref 94–109)
CHOLEST SERPL-MCNC: 208 MG/DL
CO2 SERPL-SCNC: 26 MMOL/L (ref 20–32)
COLOR UR AUTO: YELLOW
CREAT SERPL-MCNC: 0.49 MG/DL (ref 0.52–1.04)
CREAT UR-MCNC: 240 MG/DL
CREAT UR-MCNC: 258 MG/DL
CRP SERPL-MCNC: 25.7 MG/L (ref 0–8)
ERYTHROCYTE [DISTWIDTH] IN BLOOD BY AUTOMATED COUNT: 14.1 % (ref 10–15)
ERYTHROCYTE [SEDIMENTATION RATE] IN BLOOD BY WESTERGREN METHOD: 40 MM/H (ref 0–30)
GFR SERPL CREATININE-BSD FRML MDRD: >90 ML/MIN/{1.73_M2}
GLUCOSE SERPL-MCNC: 109 MG/DL (ref 70–99)
GLUCOSE UR STRIP-MCNC: NEGATIVE MG/DL
HCT VFR BLD AUTO: 39.3 % (ref 35–47)
HDLC SERPL-MCNC: 51 MG/DL
HGB BLD-MCNC: 12.3 G/DL (ref 11.7–15.7)
HGB UR QL STRIP: ABNORMAL
HIV 1+2 AB+HIV1 P24 AG SERPL QL IA: NONREACTIVE
HYALINE CASTS #/AREA URNS LPF: 1 /LPF (ref 0–2)
KETONES UR STRIP-MCNC: 5 MG/DL
LDH SERPL L TO P-CCNC: 252 U/L (ref 81–234)
LDLC SERPL CALC-MCNC: 129 MG/DL
LEUKOCYTE ESTERASE UR QL STRIP: ABNORMAL
MCH RBC QN AUTO: 25.6 PG (ref 26.5–33)
MCHC RBC AUTO-ENTMCNC: 31.3 G/DL (ref 31.5–36.5)
MCV RBC AUTO: 82 FL (ref 78–100)
MICROALBUMIN UR-MCNC: 2810 MG/L
MICROALBUMIN/CREAT UR: 1089.15 MG/G CR (ref 0–25)
MUCOUS THREADS #/AREA URNS LPF: PRESENT /LPF
NITRATE UR QL: NEGATIVE
NONHDLC SERPL-MCNC: 157 MG/DL
PH UR STRIP: 6 PH (ref 5–7)
PHOSPHATE SERPL-MCNC: 4.2 MG/DL (ref 2.5–4.5)
PLATELET # BLD AUTO: 302 10E9/L (ref 150–450)
POTASSIUM SERPL-SCNC: 4.3 MMOL/L (ref 3.4–5.3)
PROT UR-MCNC: 4.5 G/L
PROT/CREAT 24H UR: 1.88 G/G CR (ref 0–0.2)
RBC # BLD AUTO: 4.81 10E12/L (ref 3.8–5.2)
RBC #/AREA URNS AUTO: 24 /HPF (ref 0–2)
SODIUM SERPL-SCNC: 134 MMOL/L (ref 133–144)
SOURCE: ABNORMAL
SP GR UR STRIP: 1.03 (ref 1–1.03)
SQUAMOUS #/AREA URNS AUTO: 2 /HPF (ref 0–1)
TRIGL SERPL-MCNC: 138 MG/DL
UROBILINOGEN UR STRIP-MCNC: 2 MG/DL (ref 0–2)
WBC # BLD AUTO: 8.7 10E9/L (ref 4–11)
WBC #/AREA URNS AUTO: 16 /HPF (ref 0–5)

## 2019-06-05 PROCEDURE — 81001 URINALYSIS AUTO W/SCOPE: CPT | Performed by: INTERNAL MEDICINE

## 2019-06-05 PROCEDURE — 36415 COLL VENOUS BLD VENIPUNCTURE: CPT | Performed by: INTERNAL MEDICINE

## 2019-06-05 PROCEDURE — 86160 COMPLEMENT ANTIGEN: CPT | Performed by: INTERNAL MEDICINE

## 2019-06-05 PROCEDURE — 80061 LIPID PANEL: CPT | Performed by: INTERNAL MEDICINE

## 2019-06-05 PROCEDURE — 80069 RENAL FUNCTION PANEL: CPT | Performed by: INTERNAL MEDICINE

## 2019-06-05 PROCEDURE — 83615 LACTATE (LD) (LDH) ENZYME: CPT | Performed by: INTERNAL MEDICINE

## 2019-06-05 PROCEDURE — 86225 DNA ANTIBODY NATIVE: CPT | Performed by: INTERNAL MEDICINE

## 2019-06-05 PROCEDURE — G0463 HOSPITAL OUTPT CLINIC VISIT: HCPCS | Mod: 27

## 2019-06-05 PROCEDURE — 86255 FLUORESCENT ANTIBODY SCREEN: CPT | Performed by: INTERNAL MEDICINE

## 2019-06-05 PROCEDURE — G0463 HOSPITAL OUTPT CLINIC VISIT: HCPCS | Mod: ZF

## 2019-06-05 PROCEDURE — 84156 ASSAY OF PROTEIN URINE: CPT | Performed by: INTERNAL MEDICINE

## 2019-06-05 PROCEDURE — 82043 UR ALBUMIN QUANTITATIVE: CPT | Performed by: INTERNAL MEDICINE

## 2019-06-05 PROCEDURE — 85652 RBC SED RATE AUTOMATED: CPT | Performed by: INTERNAL MEDICINE

## 2019-06-05 PROCEDURE — 87389 HIV-1 AG W/HIV-1&-2 AB AG IA: CPT | Performed by: INTERNAL MEDICINE

## 2019-06-05 PROCEDURE — 85027 COMPLETE CBC AUTOMATED: CPT | Performed by: INTERNAL MEDICINE

## 2019-06-05 PROCEDURE — 87086 URINE CULTURE/COLONY COUNT: CPT | Performed by: INTERNAL MEDICINE

## 2019-06-05 PROCEDURE — 86140 C-REACTIVE PROTEIN: CPT | Performed by: INTERNAL MEDICINE

## 2019-06-05 RX ORDER — PREDNISONE 10 MG/1
TABLET ORAL
Qty: 100 TABLET | Refills: 1 | Status: SHIPPED | OUTPATIENT
Start: 2019-06-28 | End: 2019-07-16

## 2019-06-05 RX ORDER — LIDOCAINE 40 MG/G
CREAM TOPICAL
Status: CANCELLED | OUTPATIENT
Start: 2019-06-05

## 2019-06-05 RX ORDER — NICOTINE POLACRILEX 4 MG
15-30 LOZENGE BUCCAL
Status: CANCELLED | OUTPATIENT
Start: 2019-06-05

## 2019-06-05 RX ORDER — DEXTROSE MONOHYDRATE 25 G/50ML
25-50 INJECTION, SOLUTION INTRAVENOUS
Status: CANCELLED | OUTPATIENT
Start: 2019-06-05

## 2019-06-05 RX ORDER — PREDNISONE 20 MG/1
20 TABLET ORAL DAILY
Qty: 90 TABLET | Refills: 0 | Status: ON HOLD | OUTPATIENT
Start: 2019-06-05 | End: 2019-06-07

## 2019-06-05 RX ORDER — PREDNISONE 20 MG/1
60 TABLET ORAL DAILY
Qty: 90 TABLET | Refills: 1 | Status: SHIPPED | OUTPATIENT
Start: 2019-06-05 | End: 2019-06-05

## 2019-06-05 ASSESSMENT — MIFFLIN-ST. JEOR
SCORE: 1877.77
SCORE: 1874.6

## 2019-06-05 ASSESSMENT — PAIN SCALES - GENERAL
PAINLEVEL: SEVERE PAIN (6)
PAINLEVEL: MILD PAIN (3)

## 2019-06-05 NOTE — LETTER
6/5/2019       RE: Taina Singh  [address]  [place] [address]     Dear Colleague,    Thank you for referring your patient, Taina Singh, to the [facility] NEPHROLOGY at [facility]. Please see a copy of my visit note below.    Nephrology Clinic    Taina Singh MRN:[id number] YOB: 1965  Date of Service: 06/05/2019  Primary care provider: ([place]), [facility]  Requesting physician: Dr Marroquin     ASSESSMENT AND RECOMMENDATIONS:    Taina Singh is a 54 year old female who presents for evaluation of new hematuria and worsening proteinuria. 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   Other possibilities are MPGN (sec to Lymphoma , unlikely with no known lymphoma recurrence). With uncontrolled Lupus other etiologies are very unlikely , but a renal Bx is definitely indicated here for further plans of her care. Very low dose NSAIDs used over the past yr unlikely to explain the current pattern on renal findings.  UA with >24 RBC blood and protein  UPCR 1.88g/g from 0.2-0.4g/g since 6/2018, S/albumin 3.2-3.6  Cancer screens uptodate, Endometritis on endometrial Bx in 2016 , Mammogram -ve. Anti cardiolipins screens 2007 -ve, LPA -ve 2002, no hx of  clots.  -- ordered Renal panel , C3, C4 , ANCA , UACR , non fasting lipids, hep B/C -ve , will get HIV rechecked -ve in 2006  -- Renal USG  -- scheduled for a renal Bx on 6/7/19 , and will plan for a renal clinic follow up after that in 1-2 wks  -- Counseled at length regarding the treatment options and the Bx procedure  -- Rheumatology (dr Marroquin) planning for Cytoxan on 6/15 possibly and increasing prednisone to 60mg daily  -- She will need to be started on Losartan 25mg hopefully next wk after the Bx to help with proteinuria   -- Advised to avoid all NSAIDs for now  -- will follow on labs and Bx and update her     She was seen and discussed with Dr Chavarria       REASON FOR CONSULT: New onset hematuria and proteinuria    HISTORY OF PRESENT ILLNESS:   Taina Singh is a 54 year old female who presents for evaluation of new hematuria and worsening proteinuria  She has a hx of Lupus since age 29yrs diagnosed with arthralgia and 2 miscarriages, Sjogren , and sicca as well as MALT lymphoma (2006) sp Rt Parathyroidectomy (3/2006) with CD 20 + following with [place] oncology since then annually (never received Ritux), she had well controlled Lupus and stopped taking prednisone and HCQ until about 3 yrs back developed pleuritic CP and arthritis and restarted HCQ 200mg every day. Restarted on prednisone 40mg with some improvement in CP back in 11/2018, but unable to taper steroids and her main concerns remain CP ongoing with last cardiac MRI with pericarditis.  She has been using tramadol BiD , has used tylenol and Ibuprofen 400mg once daily for about a yr.  She had started working out and lost 100lbs.  She had Creatinine 0.5-0.6 wo any change in renal functions over the years, She had minimal proteinuria since 6/2018 with bland urine on UA. Urine with minimal proteins and trace blood since 6/2018. On her last UA while on schedule to get CYC on 6/3 she was noted to have microscopic hematuria and CYC was held , she has  presented to be seen in Rheum clinic today with 1.88g/g of UPCR and numerous RBC on her UA. Nephrology was consulted for possible LN concerns  She has had 5 pregnancies and 1 live birth , most miscarriages were in first trimester and no complications with eclampsia/pre-ecalampsia. She was on Lupus treatment at that time  She has no Hx of Nephrolithiasis.    Treatment hx:  No concerns for noncompliance  HCQ and pred on and off since 2006 4/2018: On AZA 50 mg qd since 2/8/2018, increased to 100 mg qd in 3/19/2018. Stopped with no response  5/2018: Started on mycophenalate 1500 mg, continues prednisone 30 mg and plaquenil 200 mg twice a day.   09/2018: On benlysta infusion since beginning of Sept 2018. Chest still feels less tight, breathing is much better. Sometimes hands ache but it does lot last long. Benlysta held for 7 wks now  In the pasty 7wks has been on steroids , plaquenil. 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 There is no  myocardial fibrosis or myocarditis. Normal biventricular size and systolic function; LVEF 62%, RVEF 60%.      PAST MEDICAL HISTORY:  Past Medical History:   Diagnosis Date     Bronchiolitis     Chest CT 6/2018 shows air trapping; bronchiolitis possibly related to lupus     Diastolic dysfunction 2/13/2018     Gluten intolerance     Patient is not gluten intolerant     Iron deficiency      Iron deficiency 2/13/2018     Lupus     dx age 27; + DNA-ds, KARLIE, SSA, SSB and RF; malar rash, serositis (pleuritic CP)     MALT lymphoma (H) age 44    No chemo or radiation     Other forms of systemic lupus erythematosus (H) 2/13/2018     Sjogren's syndrome (H)     secondary Sjogrens, secondary to lupus     Vitamin D deficiency      PAST SURGICAL HISTORY:  Past Surgical History:    Procedure Laterality Date     BIOPSY/REMOVAL, LYMPH NODE(S)        SECTION  age 32     HC HYSTEROS W PERMANENT FALLOPAIN IMPLANT       PAROTIDECTOMY       TONSILLECTOMY       MEDICATIONS:  Prescription Medications as of 2019       Rx Number Disp Refills Start End Last Dispensed Date Next Fill Date Owning Pharmacy    albuterol (PROAIR HFA/PROVENTIL HFA/VENTOLIN HFA) 108 (90 Base) MCG/ACT inhaler  3 Inhaler 3 2018    [facility] [address]    Sig: Inhale 2 puffs into the lungs every 6 hours as needed for shortness of breath / dyspnea or wheezing    Class: E-Prescribe    Route: Inhalation    Calcium-Magnesium 300-300 MG TABS    2017        Sig: daily     Class: Historical    hydroxychloroquine (PLAQUENIL) 200 MG tablet  180 tablet 1 2018    [facility] [address]    Sig: Take 1 tablet (200 mg) by mouth 2 times daily    Class: E-Prescribe    Route: Oral    Multiple Vitamins-Minerals (WOMENS MULTI PO)    2017        Sig: Take by mouth daily     Class: Historical    Route: Oral    Omega-3 Fatty Acids (OMEGA-3 FISH OIL) 500 MG CAPS    2017        Sig: Take 500 mg by mouth daily     Class: Historical    Route: Oral    predniSONE (DELTASONE) 10 MG tablet  100 tablet 1 2019    [facility] [address] [place] [address] [facility] [id number] [facility]    Sig: Take 50 mg daily x 7 days, then 40 mg daily x 7 days, then 30 mg daily x 7 days, then 25 mg daily    Class: E-Prescribe    predniSONE (DELTASONE) 20 MG tablet  90 tablet 0 2019    [facility] [id number]  [place], [place] - [address] [facility]    Sig: Take 1 tablet (20 mg) by mouth daily For one month    Class: E-Prescribe    Route: Oral    traMADol (ULTRAM) 50 MG tablet  30 tablet 2 2019        Sig: Take 1 tablet (50 mg) by mouth every 12 hours as needed for pain  Take 1 tablet as needed for pain.  No driving or alcohol use while taking medication.    Class: Local Print    Route: Oral    Cholecalciferol (D 1000) 1000 UNITS CAPS    10/9/2008        Sig: Take 1,000 Units by mouth daily     Class: Historical    Route: Oral    pilocarpine (SALAGEN) 5 MG tablet  360 tablet 3 2019    [facility] - [place], [place] - [address] [facility]    Sig: Take 1 tablet (5 mg) by mouth 4 times daily as needed    Class: E-Prescribe    Route: Oral         ALLERGIES:    Allergies   Allergen Reactions     Penicillins Rash     Sulfa Drugs Rash     REVIEW OF SYSTEMS:  A comprehensive review of systems was performed and found to be negative except as described here or above.   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   Social Needs     Financial resource strain: Not on file     Food insecurity:     Worry: Not on file     Inability: Not on file     Transportation needs:     Medical: Not on file     Non-medical: Not on file   Tobacco Use     Smoking status: Never Smoker     Smokeless tobacco: Never Used   Substance and Sexual Activity     Alcohol use: No     Drug use: No     Sexual activity: Not on file   Lifestyle     Physical activity:     Days per week: Not on file     Minutes per session: Not on file     Stress: Not on file   Relationships     Social connections:     Talks on phone: Not on file     Gets together: Not on file     Attends Christian service: Not on file     Active member of club or organization: Not on file     Attends meetings of clubs or organizations: Not on file     Relationship status: Not on file     Intimate partner violence:     Fear of current or ex partner: Not on file     Emotionally abused: Not on file      "Physically abused: Not on file     Forced sexual activity: Not on file   Other Topics Concern     Parent/sibling w/ CABG, MI or angioplasty before 65F 55M? 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  Denies exposure to asbestos, silica, hot tubs, feather pillows (used to until age 49), pet birds, mold, does not play brass/wind instruments.      FAMILY MEDICAL HISTORY:   Family History   Problem Relation Age of Onset     Alopecia Brother      Rheumatoid Arthritis Brother      LUNG DISEASE No family hx of      PHYSICAL EXAM:   /82   Pulse 92   Resp 18   Ht 1.676 m (5' 6\")   Wt 124.8 kg (275 lb 1.6 oz)   BMI 44.40 kg/m        138/88, 97/min, 98.6'F, BMI 44.5, Wt 275lbs    GENERAL APPEARANCE: alert and no distress  EYES: nonicteric  HENT: mouth without ulcers or lesions  NECK: supple, no adenopathy  RESP: lungs clear to auscultation   CV: regular rhythm, normal rate, + rub  ABDOMEN: soft, nontender, normal bowel sounds, no HSM   Extremities: no clubbing, cyanosis, or edema  MS: no evidence of inflammation in joints, no muscle tenderness  SKIN: no rash  NEURO: mentation intact and speech normal  PSYCH: affect normal/bright   LABS:   CMP  Recent Labs   Lab Test 03/06/19  1046 12/31/18  1330 11/12/18  1443 08/10/18  1052  02/14/18  0819  11/20/17  1240   NA  --   --   --   --   --  138  --  137   POTASSIUM  --   --   --   --   --  3.9  --  4.2   CHLORIDE  --   --   --   --   --  102  --  102   CO2  --   --   --   --   --  31  --  30   ANIONGAP  --   --   --   --   --  5  --  6   GLC  --   --   --   --   --  75  --  101*   BUN  --   --   --   --   --  14  --  13   CR 0.58 0.58 0.83 0.66   < > 0.63  --  0.55   GFRESTIMATED >90 >90 73 >90   < > >90  --  >90   GFRESTBLACK >90 >90 88 >90   < > >90  --  >90   ANDERSON  --   --   --   --   --  9.0  --  9.1   ALBUMIN 3.2* 3.5 3.2* 3.5   < >  --   --   --    AST 23 21 19 20   < >  --    < >  --    ALT 31 35 30 29   < >  --    < >  --     < > = values " in this interval not displayed.     CBC  Recent Labs   Lab Test 03/06/19  1046 12/31/18  1330 11/12/18  1443 08/10/18  1052   HGB 11.2* 12.0 11.3* 11.3*   WBC 4.6 7.0 7.3 5.9   RBC 4.16 4.48 4.25 4.17   HCT 35.2 37.9 36.0 35.0   MCV 85 85 85 84   MCH 26.9 26.8 26.6 27.1   MCHC 31.8 31.7 31.4* 32.3   RDW 13.9 14.6 14.9 14.9    234 255 202     INRNo lab results found.  ABGNo lab results found.   URINE STUDIES  Recent Labs   Lab Test 06/05/19  0829 03/06/19  1048 12/31/18  1351 11/12/18  1450 08/13/18  1610 07/07/18  1116   COLOR Yellow Yellow Yellow Yellow Yellow Yellow   APPEARANCE Slightly Cloudy Clear Clear Clear Clear Clear   URINEGLC Negative Negative Negative Negative Negative Negative   URINEBILI Negative Negative Negative Negative Negative Negative   URINEKETONE 5* Negative Negative Negative Negative Negative   SG 1.027 1.006 1.010 1.025 1.005 1.010   UBLD Small* Small* Trace* Trace* Trace* Trace*   URINEPH 6.0 6.0 7.0 6.0 6.0 5.5   PROTEIN >499* Negative Trace* 30* Negative Negative   UROBILINOGEN  --   --  0.2 0.2 0.2 0.2   NITRITE Negative Negative Negative Negative Negative Negative   LEUKEST Trace* Trace* Negative Negative Negative Negative   RBCU 24* 3* O - 2 O - 2 O - 2 O - 2   WBCU 16* 7* 0 - 5 0 - 5 0 - 5 0 - 5     Recent Labs   Lab Test 06/05/19  0829 03/06/19  1048 12/31/18  1350 11/12/18  1450 08/13/18  1610 07/07/18  1116 06/12/18  1042 05/12/18  1258 04/11/18  1655 03/21/18  1750 02/23/18  1445 12/29/17  1254   UTPG 1.88* 0.89* 0.92* 0.34* 0.23* 0.29* 0.21* Unable to calculate due to low value Unable to calculate due to low value Unable to calculate due to low value 0.18 Unable to calculate due to low value     PTH  No lab results found.  IRON STUDIES  Recent Labs   Lab Test 11/12/18  1443 12/29/17  1302   IRON 27* 58    315   IRONSAT 10* 19   COLLETTE 160 163     IMAGING:  All imaging studies reviewed by me.   Danae Elmore MD     I was present with the fellow during the history and  exam.  I discussed the case with the fellow and agree with the findings as documented in the assessment and plan.  Nedra Chavarria      Again, thank you for allowing me to participate in the care of your patient.      Sincerely,    Nedra Chavarria MD

## 2019-06-05 NOTE — LETTER
Patient:  Taina Singh  :   1965  MRN:     [id number]        Ms.Kelly Singh  [address]  [place] [address]        2019    Dear ,    We are writing to inform you of your test results. No UTI, will wait for biopsy result, hope it went well and you are not in pain.    Results for orders placed or performed in visit on 19   Protein  random urine with Creat Ratio   Result Value Ref Range    Protein Random Urine 4.50 g/L    Protein Total Urine g/gr Creatinine 1.88 (H) 0 - 0.2 g/g Cr   Routine UA with Micro Reflex to Culture   Result Value Ref Range    Color Urine Yellow     Appearance Urine Slightly Cloudy     Glucose Urine Negative NEG^Negative mg/dL    Bilirubin Urine Negative NEG^Negative    Ketones Urine 5 (A) NEG^Negative mg/dL    Specific Gravity Urine 1.027 1.003 - 1.035    Blood Urine Small (A) NEG^Negative    pH Urine 6.0 5.0 - 7.0 pH    Protein Albumin Urine >499 (A) NEG^Negative mg/dL    Urobilinogen mg/dL 2.0 0.0 - 2.0 mg/dL    Nitrite Urine Negative NEG^Negative    Leukocyte Esterase Urine Trace (A) NEG^Negative    Source Midstream Urine     WBC Urine 16 (H) 0 - 5 /HPF    RBC Urine 24 (H) 0 - 2 /HPF    Squamous Epithelial /HPF Urine 2 (H) 0 - 1 /HPF    Mucous Urine Present (A) NEG^Negative /LPF    Hyaline Casts 1 0 - 2 /LPF   Creatinine urine calculation only   Result Value Ref Range    Creatinine Urine 240 mg/dL   Urine Culture Aerobic Bacterial   Result Value Ref Range    Specimen Description Midstream Urine     Special Requests Specimen received in preservative     Culture Micro       10,000 to 50,000 colonies/mL  mixed urogenital pascual       Killian Marroquin MD

## 2019-06-05 NOTE — PROGRESS NOTES
Nephrology Clinic    Taina Singh MRN:[id number] YOB: 1965  Date of Service: 06/05/2019  Primary care provider: ([place]), [facility]  Requesting physician: Dr Marroquin     ASSESSMENT AND RECOMMENDATIONS:    Taina Singh is a 54 year old female who presents for evaluation of new hematuria and worsening proteinuria. 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   Other possibilities are MPGN (sec to Lymphoma , unlikely with no known lymphoma recurrence). With uncontrolled Lupus other etiologies are very unlikely , but a renal Bx is definitely indicated here for further plans of her care. Very low dose NSAIDs used over the past yr unlikely to explain the current pattern on renal findings.  UA with >24 RBC blood and protein  UPCR 1.88g/g from 0.2-0.4g/g since 6/2018, S/albumin 3.2-3.6  Cancer screens uptodate, Endometritis on endometrial Bx in 2016 , Mammogram -ve. Anti cardiolipins screens 2007 -ve, LPA -ve 2002, no hx of clots.  -- ordered Renal panel , C3, C4 , ANCA , UACR , non fasting lipids, hep B/C -ve , will get HIV rechecked -ve in 2006  -- Renal USG  -- scheduled for a renal Bx on 6/7/19 , and will plan for a renal clinic follow up after that in 1-2 wks  -- Counseled at length  regarding the treatment options and the Bx procedure  -- Rheumatology (dr Marroquin) planning for Cytoxan on 6/15 possibly and increasing prednisone to 60mg daily  -- She will need to be started on Losartan 25mg hopefully next wk after the Bx to help with proteinuria   -- Advised to avoid all NSAIDs for now  -- will follow on labs and Bx and update her     She was seen and discussed with Dr Chavarria       REASON FOR CONSULT: New onset hematuria and proteinuria    HISTORY OF PRESENT ILLNESS:   Taina Singh is a 54 year old female who presents for evaluation of new hematuria and worsening proteinuria  She has a hx of Lupus since age 29yrs diagnosed with arthralgia and 2 miscarriages, Sjogren , and sicca as well as MALT lymphoma (2006) sp Rt Parathyroidectomy (3/2006) with CD 20 + following with [place] oncology since then annually (never received Ritux), she had well controlled Lupus and stopped taking prednisone and HCQ until about 3 yrs back developed pleuritic CP and arthritis and restarted HCQ 200mg every day. Restarted on prednisone 40mg with some improvement in CP back in 11/2018, but unable to taper steroids and her main concerns remain CP ongoing with last cardiac MRI with pericarditis.  She has been using tramadol BiD , has used tylenol and Ibuprofen 400mg once daily for about a yr.  She had started working out and lost 100lbs.  She had Creatinine 0.5-0.6 wo any change in renal functions over the years, She had minimal proteinuria since 6/2018 with bland urine on UA. Urine with minimal proteins and trace blood since 6/2018. On her last UA while on schedule to get CYC on 6/3 she was noted to have microscopic hematuria and CYC was held , she has presented to be seen in Rheum clinic today with 1.88g/g of UPCR and numerous RBC on her UA. Nephrology was consulted for possible LN concerns  She has had 5 pregnancies and 1 live birth , most miscarriages were in first trimester and no complications with  eclampsia/pre-ecalampsia. She was on Lupus treatment at that time  She has no Hx of Nephrolithiasis.    Treatment hx:  No concerns for noncompliance  HCQ and pred on and off since 2018: On AZA 50 mg qd since 2018, increased to 100 mg qd in 3/19/2018. Stopped with no response  2018: Started on mycophenalate 1500 mg, continues prednisone 30 mg and plaquenil 200 mg twice a day.   2018: On benlysta infusion since beginning of 2018. Chest still feels less tight, breathing is much better. Sometimes hands ache but it does lot last long. Benlysta held for 7 wks now  In the pasty 7wks has been on steroids , plaquenil. 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 There is no  myocardial fibrosis or myocarditis. Normal biventricular size and systolic function; LVEF 62%, RVEF 60%.      PAST MEDICAL HISTORY:  Past Medical History:   Diagnosis Date     Bronchiolitis     Chest CT 2018 shows air trapping; bronchiolitis possibly related to lupus     Diastolic dysfunction 2018     Gluten intolerance     Patient is not gluten intolerant     Iron deficiency      Iron deficiency 2018     Lupus     dx age 27; + DNA-ds, KARLIE, SSA, SSB and RF; malar rash, serositis (pleuritic CP)     MALT lymphoma (H) age 44    No chemo or radiation     Other forms of systemic lupus erythematosus (H) 2018     Sjogren's syndrome (H)     secondary Sjogrens, secondary to lupus     Vitamin D deficiency      PAST SURGICAL HISTORY:  Past Surgical History:   Procedure Laterality Date     BIOPSY/REMOVAL, LYMPH NODE(S)        SECTION  age 32     HC HYSTEROS W PERMANENT FALLOPAIN IMPLANT       PAROTIDECTOMY       TONSILLECTOMY       MEDICATIONS:  Prescription Medications as of 2019       Rx Number Disp  Refills Start End Last Dispensed Date Next Fill Date Owning Pharmacy    albuterol (PROAIR HFA/PROVENTIL HFA/VENTOLIN HFA) 108 (90 Base) MCG/ACT inhaler  3 Inhaler 3 12/31/2018    [facility] - [address]    Sig: Inhale 2 puffs into the lungs every 6 hours as needed for shortness of breath / dyspnea or wheezing    Class: E-Prescribe    Route: Inhalation    Calcium-Magnesium 300-300 MG TABS    6/1/2017        Sig: daily     Class: Historical    hydroxychloroquine (PLAQUENIL) 200 MG tablet  180 tablet 1 11/16/2018    [facility] [address]    Sig: Take 1 tablet (200 mg) by mouth 2 times daily    Class: E-Prescribe    Route: Oral    Multiple Vitamins-Minerals (WOMENS MULTI PO)    8/1/2017        Sig: Take by mouth daily     Class: Historical    Route: Oral    Omega-3 Fatty Acids (OMEGA-3 FISH OIL) 500 MG CAPS    1/1/2017        Sig: Take 500 mg by mouth daily     Class: Historical    Route: Oral    predniSONE (DELTASONE) 10 MG tablet  100 tablet 1 6/28/2019    [id number] - [place], [place] - [address] [facility]    Sig: Take 50 mg daily x 7 days, then 40 mg daily x 7 days, then 30 mg daily x 7 days, then 25 mg daily    Class: E-Prescribe    predniSONE (DELTASONE) 20 MG tablet  90 tablet 0 6/5/2019    [facility] - [place], [place] - [address] [facility]    Sig: Take 1 tablet (20 mg) by mouth daily For one month    Class: E-Prescribe    Route: Oral    traMADol (ULTRAM) 50 MG tablet  30 tablet 2 5/9/2019        Sig: Take 1 tablet (50 mg) by mouth every 12 hours as needed for pain Take 1 tablet as needed for pain.  No driving or alcohol use while taking medication.    Class: Local Print    Route: Oral    Cholecalciferol (D 1000) 1000 UNITS CAPS    10/9/2008        Sig: Take 1,000 Units by mouth daily     Class: Historical    Route: Oral     pilocarpine (SALAGEN) 5 MG tablet  360 tablet 3 2019    [facility] - [place], [place] - [address] [facility]    Sig: Take 1 tablet (5 mg) by mouth 4 times daily as needed    Class: E-Prescribe    Route: Oral         ALLERGIES:    Allergies   Allergen Reactions     Penicillins Rash     Sulfa Drugs Rash     REVIEW OF SYSTEMS:  A comprehensive review of systems was performed and found to be negative except as described here or above.   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   Social Needs     Financial resource strain: Not on file     Food insecurity:     Worry: Not on file     Inability: Not on file     Transportation needs:     Medical: Not on file     Non-medical: Not on file   Tobacco Use     Smoking status: Never Smoker     Smokeless tobacco: Never Used   Substance and Sexual Activity     Alcohol use: No     Drug use: No     Sexual activity: Not on file   Lifestyle     Physical activity:     Days per week: Not on file     Minutes per session: Not on file     Stress: Not on file   Relationships     Social connections:     Talks on phone: Not on file     Gets together: Not on file     Attends Jewish service: Not on file     Active member of club or organization: Not on file     Attends meetings of clubs or organizations: Not on file     Relationship status: Not on file     Intimate partner violence:     Fear of current or ex partner: Not on file     Emotionally abused: Not on file     Physically abused: Not on file     Forced sexual activity: Not on file   Other Topics Concern     Parent/sibling w/ CABG, MI or angioplasty before 65F 55M? 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  Denies exposure to asbestos, silica, hot  "tubs, feather pillows (used to until age 49), pet birds, mold, does not play brass/wind instruments.      FAMILY MEDICAL HISTORY:   Family History   Problem Relation Age of Onset     Alopecia Brother      Rheumatoid Arthritis Brother      LUNG DISEASE No family hx of      PHYSICAL EXAM:   /82   Pulse 92   Resp 18   Ht 1.676 m (5' 6\")   Wt 124.8 kg (275 lb 1.6 oz)   BMI 44.40 kg/m       138/88, 97/min, 98.6'F, BMI 44.5, Wt 275lbs    GENERAL APPEARANCE: alert and no distress  EYES: nonicteric  HENT: mouth without ulcers or lesions  NECK: supple, no adenopathy  RESP: lungs clear to auscultation   CV: regular rhythm, normal rate, + rub  ABDOMEN: soft, nontender, normal bowel sounds, no HSM   Extremities: no clubbing, cyanosis, or edema  MS: no evidence of inflammation in joints, no muscle tenderness  SKIN: no rash  NEURO: mentation intact and speech normal  PSYCH: affect normal/bright   LABS:   CMP  Recent Labs   Lab Test 03/06/19  1046 12/31/18  1330 11/12/18  1443 08/10/18  1052  02/14/18  0819  11/20/17  1240   NA  --   --   --   --   --  138  --  137   POTASSIUM  --   --   --   --   --  3.9  --  4.2   CHLORIDE  --   --   --   --   --  102  --  102   CO2  --   --   --   --   --  31  --  30   ANIONGAP  --   --   --   --   --  5  --  6   GLC  --   --   --   --   --  75  --  101*   BUN  --   --   --   --   --  14  --  13   CR 0.58 0.58 0.83 0.66   < > 0.63  --  0.55   GFRESTIMATED >90 >90 73 >90   < > >90  --  >90   GFRESTBLACK >90 >90 88 >90   < > >90  --  >90   ANDERSON  --   --   --   --   --  9.0  --  9.1   ALBUMIN 3.2* 3.5 3.2* 3.5   < >  --   --   --    AST 23 21 19 20   < >  --    < >  --    ALT 31 35 30 29   < >  --    < >  --     < > = values in this interval not displayed.     CBC  Recent Labs   Lab Test 03/06/19  1046 12/31/18  1330 11/12/18  1443 08/10/18  1052   HGB 11.2* 12.0 11.3* 11.3*   WBC 4.6 7.0 7.3 5.9   RBC 4.16 4.48 4.25 4.17   HCT 35.2 37.9 36.0 35.0   MCV 85 85 85 84   MCH 26.9 26.8 26.6 " 27.1   MCHC 31.8 31.7 31.4* 32.3   RDW 13.9 14.6 14.9 14.9    234 255 202     INRNo lab results found.  ABGNo lab results found.   URINE STUDIES  Recent Labs   Lab Test 06/05/19  0829 03/06/19  1048 12/31/18  1351 11/12/18  1450 08/13/18  1610 07/07/18  1116   COLOR Yellow Yellow Yellow Yellow Yellow Yellow   APPEARANCE Slightly Cloudy Clear Clear Clear Clear Clear   URINEGLC Negative Negative Negative Negative Negative Negative   URINEBILI Negative Negative Negative Negative Negative Negative   URINEKETONE 5* Negative Negative Negative Negative Negative   SG 1.027 1.006 1.010 1.025 1.005 1.010   UBLD Small* Small* Trace* Trace* Trace* Trace*   URINEPH 6.0 6.0 7.0 6.0 6.0 5.5   PROTEIN >499* Negative Trace* 30* Negative Negative   UROBILINOGEN  --   --  0.2 0.2 0.2 0.2   NITRITE Negative Negative Negative Negative Negative Negative   LEUKEST Trace* Trace* Negative Negative Negative Negative   RBCU 24* 3* O - 2 O - 2 O - 2 O - 2   WBCU 16* 7* 0 - 5 0 - 5 0 - 5 0 - 5     Recent Labs   Lab Test 06/05/19  0829 03/06/19  1048 12/31/18  1350 11/12/18  1450 08/13/18  1610 07/07/18  1116 06/12/18  1042 05/12/18  1258 04/11/18  1655 03/21/18  1750 02/23/18  1445 12/29/17  1254   UTPG 1.88* 0.89* 0.92* 0.34* 0.23* 0.29* 0.21* Unable to calculate due to low value Unable to calculate due to low value Unable to calculate due to low value 0.18 Unable to calculate due to low value     PTH  No lab results found.  IRON STUDIES  Recent Labs   Lab Test 11/12/18  1443 12/29/17  1302   IRON 27* 58    315   IRONSAT 10* 19   COLLETTE 160 163     IMAGING:  All imaging studies reviewed by me.   Danae Elmore MD     I was present with the fellow during the history and exam.  I discussed the case with the fellow and agree with the findings as documented in the assessment and plan.  Nedra Chavarria

## 2019-06-05 NOTE — LETTER
Patient:  Taina Singh  :   1965  MRN:     [id number]        Ms.Kelly Singh  [address] [facility] [address]        2019    Dear ,    We are writing to inform you of your test results. They still show active lupus.      Results for orders placed or performed in visit on 19   Erythrocyte sedimentation rate auto   Result Value Ref Range    Sed Rate 40 (H) 0 - 30 mm/h   CRP inflammation   Result Value Ref Range    CRP Inflammation 25.7 (H) 0.0 - 8.0 mg/L   Complement C4   Result Value Ref Range    Complement C4 3 (L) 15 - 50 mg/dL   Complement C3   Result Value Ref Range    Complement C3 53 (L) 76 - 169 mg/dL   DNA double stranded antibodies   Result Value Ref Range    DNA-ds >379 (H) <10 IU/mL       Killian Marroquin MD

## 2019-06-05 NOTE — NURSING NOTE
"Chief Complaint   Patient presents with     Consult     Kidney consult     Blood pressure 141/82, pulse 92, resp. rate 18, height 1.676 m (5' 6\"), weight 124.8 kg (275 lb 1.6 oz).    Carmella Orlando CMA on 6/5/2019 at 10:40 AM    "

## 2019-06-05 NOTE — LETTER
6/5/2019      RE: Taina Singh  [address] [place] [address]       Rheumatology F/U Note    Reason for visit: urgent visit for lupus flare    Date of last visit: 3/6/2019    DOS: 6/5/2019      HPI:    Taina Singh is a 52 year old  female who was referred to our clinic for evaluation and management of her SLE.    She was diagnosed with lupus at age 27. Her 1st sx were malar rash and fatigue. No skin biopsy was done (reportedly had +KARLIE). She was treated with prednisone taper and HCQ. HCQ helped and she tolerated it well. She was diagnosed with Sjogren's at the same time. Had dryness of eyes and mouth. No lip biopsy to confirm the Dx.    Reportedly she had very mild stable lupus for many years and eventually at some point, stopped taking HCQ (can't remember when)    She was diagnosed with MALT lymphoma at 44, had enlarged LN over R parotid gland, on biopsy it was MALT lymphoma. Tx was resection only, no radiation or chemo.    About 3 yr ago, had flu shot and 2 days later, developed pleuritic CP and was seen at urgent care. That's why she does not want to get flu shot. She was seen in ER 2 days after UC visit. She was treated with prednisone.    She lost 100 lbs by exercise over past 2 yr, felt amazing. In 7/2017, started not feeling well. She had extreme fatigue. Had AM stiffness and pain over hands. Touched base with her previous rheumatologist (Dr. Rangel) and was told to have lupus flare and was put on  mg qd. Afterwards, developed pleuritic CP which has not been resolved.    She was given prednisone 40 mg qd x 5 days (on 12/16/2017) by pulmonologist in [facility]. It helped a lot but pleuritic CP recurred after stopping it.    She was told to have pulm HTN and was evaluated for it.    No triggers for current flare.    No flare of malar rash. No current hair loss. Uses blink eyedrops, restasis burned her eyes. She has been prescribed salagen for dry mouth, has not used it. Arthritis of hands have  resolved on HCQ.    Last HCQ eye exam was 1 week ago.    4/2018: On AZA 50 mg qd since 2/8/2018, increased to 100 mg qd in 3/19/2018. Her arthralgia is intermittent and mild, it's better. Has fatigue.  5/2018: Started on mycophenalate 1500 mg, continues prednisone 30 mg and plaquenil 200 mg twice a day.     Her major complaint is continues pressure over her chest. Pain is pleuritic, it hurts by sneezing, taking deep breath.      7/2018: Ms. Singh feels an improvement in her symptoms. She says there is a baseline pleuritic chest pain, but her fatigue and overall day-to-day function has improved to her satisfaction. She says morning stiffness usually only lasts about 10 minutes. She complains of occasional episodes of flashing lights in her left eye, however she is being seen by her opthamologist. She has a OTC scheduled for Monday as well. Ms. Singh feels as though the mycophenalate has made the biggest difference in her improvement. She continues to taper the prednisone, currently on 20 mg daily. She also has continued the plaquenil 200 mg twice a day.           11/2018: On benlysta infusion since beginning of Sept 2018. Chest still feels less tight, breathing is much better. Sometimes hands ache but it does lot last long.      Benlysta makes her tired, but overall feels her lupus sx are much improved on benlysta.        3/2019: Feels tired and achy, it is intermittent and moves around. The L hip pain is consistent for the past 7 days, she moved up her appointment from 3/22 to today to get a bursitis shot. Had bad sinus and ear infection in 1/2019. She still thinks benlysta has helped her a lot. Last night, her R shoulder was hurting so bad that she could not move it but it's better today. Pain comes and goes. Breathing is better after adding benlysta, she is not gasping for air anymore which is huge improvement for her. She feels pressure over her chest and low back.    Is getting benlysta tomorrow.      Her  prednisone dose is 10 mg a day.      Leaving to [place] for vacation.    Today: She reports severe diffuse arthralgia affecting all her joints with pain level 8/10, today is 6/10. No SOB. Feels pressure like CP.      Has severe fatigue.    Last benlysta was 7 wk ago.          ROS:  A comprehensive ROS was done, positives are per HPI.    Past Medical History:   Diagnosis Date     Bronchiolitis     Chest CT 2018 shows air trapping; bronchiolitis possibly related to lupus     Diastolic dysfunction 2018     Gluten intolerance     Patient is not gluten intolerant     Iron deficiency      Iron deficiency 2018     Lupus     dx age 27; + DNA-ds, KARLIE, SSA, SSB and RF; malar rash, serositis (pleuritic CP)     MALT lymphoma (H) age 44    No chemo or radiation     Other forms of systemic lupus erythematosus (H) 2018     Sjogren's syndrome (H)     secondary Sjogrens, secondary to lupus     Vitamin D deficiency      Past Surgical History:   Procedure Laterality Date     BIOPSY/REMOVAL, LYMPH NODE(S)        SECTION  age 32     HC HYSTEROS W PERMANENT FALLOPAIN IMPLANT       PAROTIDECTOMY       TONSILLECTOMY       Family History   Problem Relation Age of Onset     Alopecia Brother      Rheumatoid Arthritis Brother      LUNG DISEASE No family hx of      Social History     Socioeconomic History     Marital status:      Spouse name: Not on file     Number of children: Not on file     Years of education: 1     Highest education level: Not on file   Occupational History     Comment: , 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   Social Needs     Financial resource strain: Not on file     Food insecurity:     Worry: Not on file     Inability: Not on file     Transportation needs:     Medical: Not on file     Non-medical: Not on file   Tobacco Use     Smoking status: Never Smoker     Smokeless tobacco: Never Used   Substance and Sexual Activity     Alcohol use: No     Drug use: No     Sexual activity: Not on file    Lifestyle     Physical activity:     Days per week: Not on file     Minutes per session: Not on file     Stress: Not on file   Relationships     Social connections:     Talks on phone: Not on file     Gets together: Not on file     Attends Restoration service: Not on file     Active member of club or organization: Not on file     Attends meetings of clubs or organizations: Not on file     Relationship status: Not on file     Intimate partner violence:     Fear of current or ex partner: Not on file     Emotionally abused: Not on file     Physically abused: Not on file     Forced sexual activity: Not on file   Other Topics Concern     Parent/sibling w/ CABG, MI or angioplasty before 65F 55M? 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  Denies exposure to asbestos, silica, hot tubs, feather pillows (used to until age 49), pet birds, mold, does not play brass/wind instruments.      Going through divorce but it's not stress factor for her.    Allergies   Allergen Reactions     Penicillins Rash     Sulfa Drugs Rash       Outpatient Encounter Medications as of 6/5/2019   Medication Sig Dispense Refill     albuterol (PROAIR HFA/PROVENTIL HFA/VENTOLIN HFA) 108 (90 Base) MCG/ACT inhaler Inhale 2 puffs into the lungs every 6 hours as needed for shortness of breath / dyspnea or wheezing 3 Inhaler 3     Calcium-Magnesium 300-300 MG TABS daily        hydroxychloroquine (PLAQUENIL) 200 MG tablet Take 1 tablet (200 mg) by mouth 2 times daily 180 tablet 1     Multiple Vitamins-Minerals (WOMENS MULTI PO) Take by mouth daily        Omega-3 Fatty Acids (OMEGA-3 FISH OIL) 500 MG CAPS Take 500 mg by mouth daily        predniSONE (DELTASONE) 10 MG tablet Take 3 tablets (30 mg) by mouth daily 90 tablet 1     traMADol (ULTRAM) 50 MG tablet Take 1 tablet (50 mg) by mouth every 12 hours as needed for pain Take 1 tablet as needed for pain.  No driving or alcohol use while taking medication. 30 tablet 2      "Cholecalciferol (D 1000) 1000 UNITS CAPS Take 1,000 Units by mouth daily        pilocarpine (SALAGEN) 5 MG tablet Take 1 tablet (5 mg) by mouth 4 times daily as needed (Patient not taking: Reported on 2019) 360 tablet 3     predniSONE (DELTASONE) 5 MG tablet Take 15 mg by mouth daily. (Patient not taking: Reported on 2019) 90 tablet 1     No facility-administered encounter medications on file as of 2019.          Her records were reviewed.    2D-Echo 2017:    ECHO COMPLETE WO CONTRAST CHILANGO GIBBONS 2017 14:43     [facility]   [address], [address], [place] [address]   Main: [phone]  [email or web address]                                                 Transthoracic Echo Report   CHILANGO GIBBONS   Kaeian ID: [id number] Age: 52 : 1965 Ordering Provider: NGUYEN PETERS   Exam Date: 2017 14:43 Gender: F Sonographer: HAJA   Accession #: [id number] Height: 66 in BSA: 2.24 m  BP: 108 / 62   Weight: 261 lbs BMI: 42.1 kg/m          Site: [facility]   Location: Outpatient Rhythm: Normal Sinus Rhythm   Procedure Components: 2D imaging, Color Doppler, Spectral Doppler   Indications: Murmur; Systemic lupus erythematosus, unspecified SLE type, unspecified organ   involvement status   Technical Quality: Contrast: None     Final Conclusion   Visually estimated ejection fraction is 55-60%.   Mild left ventricular hypertrophy.   Estimated pulmonary artery pressure of 31 mmHg + RA pressure.   Dilated IVC size, <50% collapse with respiration.   Estimated EF: 55-60%   FINDINGS   Left Ventricle Normal left ventricular size. Visually estimated ejection fraction is 55-60%.   Mild left ventricular hypertrophy.   Diastolic Function \"Pseudonormal\" left ventricular filling pattern. E/e' ratio 8-15 is   indeterminate for filling pressure.   Right Ventricle Normal right ventricular size and function.   Left Atrium Mild left atrial " enlargement.   Right Atrium Mild right atrial enlargement.   Aortic Valve Normal aortic valve. No aortic stenosis. No significant aortic regurgitation.   Mitral Valve Normal mitral valve. No mitral stenosis. Mild mitral regurgitation.   Tricuspid Valve Normal tricuspid valve. No tricuspid stenosis. Mild tricuspid regurgitation.   Estimated pulmonary artery pressure   of 31 mmHg + RA pressure.   Pulmonic Valve Normal pulmonic valve without significant stenosis or regurgitation.   Pericardium Normal pericardium.   Aorta Measured aortic root diameter is normal in size.   Inferior Vena Cava Dilated IVC size, <50% collapse with respiration.    Component      Latest Ref Rng & Units 11/20/2017   Sodium      133 - 144 mmol/L 137   Potassium      3.4 - 5.3 mmol/L 4.2   Chloride      94 - 109 mmol/L 102   Carbon Dioxide      20 - 32 mmol/L 30   Anion Gap      3 - 14 mmol/L 6   Glucose      70 - 99 mg/dL 101 (H)   Urea Nitrogen      7 - 30 mg/dL 13   Creatinine      0.52 - 1.04 mg/dL 0.55   GFR Estimate      >60 mL/min/1.7m2 >90   GFR Estimate If Black      >60 mL/min/1.7m2 >90   Calcium      8.5 - 10.1 mg/dL 9.1   WBC      4.0 - 11.0 10e9/L 4.9   RBC Count      3.8 - 5.2 10e12/L 4.28   Hemoglobin      11.7 - 15.7 g/dL 11.3 (L)   Hematocrit      35.0 - 47.0 % 35.5   MCV      78 - 100 fl 83   MCH      26.5 - 33.0 pg 26.4 (L)   MCHC      31.5 - 36.5 g/dL 31.8   RDW      10.0 - 15.0 % 14.6   Platelet Count      150 - 450 10e9/L 215   N-Terminal Pro Bnp      0 - 125 pg/mL 546 (H)   Sed Rate      0 - 30 mm/h 71 (H)     Component      Latest Ref Rng & Units 12/29/2017   Color Urine       Straw   Appearance Urine       Clear   Glucose Urine      NEG:Negative mg/dL Negative   Bilirubin Urine      NEG:Negative Negative   Ketones Urine      NEG:Negative mg/dL Negative   Specific Gravity Urine      1.003 - 1.035 1.005   Blood Urine      NEG:Negative Negative   pH Urine      5.0 - 7.0 pH 6.0   Protein Albumin Urine      NEG:Negative mg/dL  Negative   Urobilinogen mg/dL      0.0 - 2.0 mg/dL 0.0   Nitrite Urine      NEG:Negative Negative   Leukocyte Esterase Urine      NEG:Negative Negative   Source       Midstream Urine   WBC Urine      0 - 2 /HPF <1   RBC Urine      0 - 2 /HPF <1   Bacteria Urine      NEG:Negative /HPF Few (A)   Squamous Epithelial /HPF Urine      0 - 1 /HPF <1   Mucous Urine      NEG:Negative /LPF Present (A)   Albumin Fraction      3.7 - 5.1 g/dL 4.2   Alpha 1 Fraction      0.2 - 0.4 g/dL 0.4   Alpha 2 Fraction      0.5 - 0.9 g/dL 0.8   Beta Fraction      0.6 - 1.0 g/dL 1.0   Gamma Fraction      0.7 - 1.6 g/dL 1.6   Monoclonal Peak      0.0 g/dL 0.0   ELP Interpretation:       Essentially normal electrophoretic pattern. No monoclonal protein seen. Pathologic . . .   IGG      695 - 1620 mg/dL 1560   IGA      70 - 380 mg/dL 473 (H)   IGM      60 - 265 mg/dL 92   Iron      35 - 180 ug/dL 58   Iron Binding Cap      240 - 430 ug/dL 315   Iron Saturation Index      15 - 46 % 19   TPMT Genotype Specimen       Whole Blood   TPMT Genotype       Neg/Neg   TPMT Predicted Phenotype       Normal   Protein Random Urine      g/L <0.05   Protein Total Urine g/gr Creatinine      0 - 0.2 g/g Cr Unable to calculate due to low value   Deamidated Gliadin Cari, IgA      <7 U/mL 2   Deamidated Gliadin Cari, IgG      <7 U/mL 5   Complement C3      76 - 169 mg/dL 72 (L)   Complement C4      15 - 50 mg/dL 6 (L)   CRP Inflammation      0.0 - 8.0 mg/L 3.2   DNA-ds      <10 IU/mL >379 (H)   Sed Rate      0 - 30 mm/h 49 (H)   Vitamin D Deficiency screening      20 - 75 ug/L 51   Creatinine Urine Random      mg/dL 23   AST      0 - 45 U/L 26   ALT      0 - 50 U/L 35   HEENA  Broad Spectrum       Neg   Beta 2 Glycoprotein 1 Antibody IgG      <7 U/mL <0.6   Beta 2 Glycoprotein 1 Antibody IgM      <7 U/mL <0.9   Thyroid Peroxidase Antibody      <35 IU/mL <10   Thyroglobulin Antibody      <40 IU/mL <20   TSH      0.40 - 4.00 mU/L 0.87   Cryoglobulin      NEG:Negative %  Trace (A)   Tissue Transglutaminase Antibody IgA      <7 U/mL 4   Ferritin      8 - 252 ng/mL 163   Endomysial Antibody IgA by IFA      <1:10 <1:10   CRP Cardiac Risk      mg/L 2.9   Creatinine Urine      mg/dL 23     Component      Latest Ref Rng & Units 2/23/2018   Color Urine       Yellow   Appearance Urine       Clear   Glucose Urine      NEG:Negative mg/dL Negative   Bilirubin Urine      NEG:Negative Negative   Ketones Urine      NEG:Negative mg/dL Negative   Specific Gravity Urine      1.003 - 1.035 1.025   Blood Urine      NEG:Negative Negative   pH Urine      5.0 - 7.0 pH 5.5   Protein Albumin Urine      NEG:Negative mg/dL Negative   Urobilinogen Urine      0.2 - 1.0 EU/dL 0.2   Nitrite Urine      NEG:Negative Negative   Leukocyte Esterase Urine      NEG:Negative Negative   Source       Midstream Urine   Protein Random Urine      g/L 0.17   Protein Total Urine g/gr Creatinine      0 - 0.2 g/g Cr 0.18   Complement C3      76 - 169 mg/dL 64 (L)   Complement C4      15 - 50 mg/dL 5 (L)   CRP Inflammation      0.0 - 8.0 mg/L 4.2   DNA-ds      <10 IU/mL >379 (H)   Sed Rate      0 - 30 mm/h 35 (H)   AST      0 - 45 U/L 27   ALT      0 - 50 U/L 31   Creatinine Urine Random      mg/dL 99     Component      Latest Ref Rng & Units 3/16/2018   WBC      4.0 - 11.0 10e9/L 5.6   RBC Count      3.8 - 5.2 10e12/L 4.43   Hemoglobin      11.7 - 15.7 g/dL 12.1   Hematocrit      35.0 - 47.0 % 36.9   MCV      78 - 100 fl 83   MCH      26.5 - 33.0 pg 27.3   MCHC      31.5 - 36.5 g/dL 32.8   RDW      10.0 - 15.0 % 14.5   Platelet Count      150 - 450 10e9/L 224   Diff Method       Automated Method   % Neutrophils      % 81.7   % Lymphocytes      % 9.3   % Monocytes      % 7.5   % Eosinophils      % 1.3   % Basophils      % 0.2   Absolute Neutrophil      1.6 - 8.3 10e9/L 4.6   Absolute Lymphocytes      0.8 - 5.3 10e9/L 0.5 (L)   Absolute Monocytes      0.0 - 1.3 10e9/L 0.4   Absolute Eosinophils      0.0 - 0.7 10e9/L 0.1   Absolute  Basophils      0.0 - 0.2 10e9/L 0.0   Creatinine      0.52 - 1.04 mg/dL 0.51 (L)   GFR Estimate      >60 mL/min/1.7m2 >90   GFR Estimate If Black      >60 mL/min/1.7m2 >90   ALT      0 - 50 U/L 27   Albumin      3.4 - 5.0 g/dL 3.5   AST      0 - 45 U/L 18       Component      Latest Ref Rng & Units 3/21/2018   Color Urine       Yellow   Appearance Urine       Clear   Glucose Urine      NEG:Negative mg/dL Negative   Bilirubin Urine      NEG:Negative Negative   Ketones Urine      NEG:Negative mg/dL Negative   Specific Gravity Urine      1.003 - 1.035 <=1.005   pH Urine      5.0 - 7.0 pH 6.5   Protein Albumin Urine      NEG:Negative mg/dL Negative   Urobilinogen Urine      0.2 - 1.0 EU/dL 0.2   Nitrite Urine      NEG:Negative Negative   Blood Urine      NEG:Negative Negative   Leukocyte Esterase Urine      NEG:Negative Negative   Source       Midstream Urine   WBC Urine      OTO5:0 - 5 /HPF 0 - 5   RBC Urine      OTO2:O - 2 /HPF O - 2   Squamous Epithelial /LPF Urine      FEW:Few /LPF Few   Protein Random Urine      g/L <0.05   Protein Total Urine g/gr Creatinine      0 - 0.2 g/g Cr Unable to calculate due to low value   DNA-ds      <10 IU/mL >379 (H)   Complement C4      15 - 50 mg/dL 4 (L)   Complement C3      76 - 169 mg/dL 69 (L)   Creatinine Urine Random      mg/dL 17   Creatinine Urine      mg/dL 17     EXAMINATION: CT CHEST W/O CONTRAST, 12/29/2017 1:08 PM   TECHNIQUE:  Helical CT images from the thoracic inlet through the lung  bases were obtained without IV contrast during inspiration and  expiration according to high-resolution chest CT protocol. Contrast  dose: None  COMPARISON: None   HISTORY: eval for ILD, pleural effusion, pt has lupus, Sjogren's, h/o  MALT and pleurisy and SOB; Systemic lupus erythematosus, unspecified  SLE type, unspecified organ involvement status (H)   FINDINGS:  At least 75% collapse of the trachea and mainstem bronchi on the end  expiratory views. Diffuse lobular air trapping on end  expiratory  images. Bibasilar platelike atelectasis. No pneumothorax or pleural  effusion. Scattered solid pulmonary nodules, for example a 4 mm  lingular nodule (series 5 image 145) and a 4 mm right upper lobe  nodule (image 73).    The heart size is normal. Mild three-vessel coronary artery calcific  atherosclerosis. Dilation of the main pulmonary artery to 4.1 cm. No  pericardial effusion. Normal caliber and configuration of the thoracic  great vessels. Numerous prominent though nonenlarged mediastinal lymph  nodes.  Limited views of the abdomen reveal no worrisome pathology. No  worrisome bony or soft tissue lesions.    IMPRESSION:   1. At least moderate tracheobronchomalacia.  2. 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  3. Scattered subcentimeter pulmonary nodules measuring up to 4 mm.  Consider follow-up chest CT in one year to establish stability.     [Consider Follow Up: Lung nodule]     This report will be copied to the [facility] to ensure a  provider acknowledges the finding.      I have personally reviewed the examination and initial interpretation  and I agree with the findings.     AUSTIN PACE MD    Examination:NM LUNG SCAN VENTILATION AND PERFUSION, 3/14/2018 8:24 AM    Indication:  Chronic PE; Pulmonary hypertension    Additional Information: none   Technique:   The patient received 2 mCi of Tc-99m DTPA aerosol for ventilation and  7 mCi of Tc-99m MAA for perfusion. Multiple images were obtained of  the lungs in Anterior, posterior, HERNANDES, RPO, LPO, and  Namibian projections.   Comparison: Chest x-ray same-day   Findings:     There are matched ventilation/perfusion defects in both lungs. No  mismatched perfusion defect to suggest pulmonary emboli.      Impression:  Pulmonary emboli is not present.     I have personally reviewed the examination and initial  interpretation  and I agree with the findings.     DONNIE GARCIA MD    Component      Latest Ref Rng & Units 5/12/2018   WBC      4.0 - 11.0 10e9/L 7.1   RBC Count      3.8 - 5.2 10e12/L 4.42   Hemoglobin      11.7 - 15.7 g/dL 12.1   Hematocrit      35.0 - 47.0 % 37.4   MCV      78 - 100 fl 85   MCH      26.5 - 33.0 pg 27.4   MCHC      31.5 - 36.5 g/dL 32.4   RDW      10.0 - 15.0 % 14.7   Platelet Count      150 - 450 10e9/L 226   Diff Method       Automated Method   % Neutrophils      % 86.2   % Lymphocytes      % 4.8   % Monocytes      % 8.4   % Eosinophils      % 0.3   % Basophils      % 0.3   Absolute Neutrophil      1.6 - 8.3 10e9/L 6.1   Absolute Lymphocytes      0.8 - 5.3 10e9/L 0.3 (L)   Absolute Monocytes      0.0 - 1.3 10e9/L 0.6   Absolute Eosinophils      0.0 - 0.7 10e9/L 0.0   Absolute Basophils      0.0 - 0.2 10e9/L 0.0   Color Urine       Yellow   Appearance Urine       Clear   Glucose Urine      NEG:Negative mg/dL Negative   Bilirubin Urine      NEG:Negative Negative   Ketones Urine      NEG:Negative mg/dL Negative   Specific Gravity Urine      1.003 - 1.035 <=1.005   Blood Urine      NEG:Negative Negative   pH Urine      5.0 - 7.0 pH 5.5   Protein Albumin Urine      NEG:Negative mg/dL Negative   Urobilinogen Urine      0.2 - 1.0 EU/dL 0.2   Nitrite Urine      NEG:Negative Negative   Leukocyte Esterase Urine      NEG:Negative Negative   Source       Midstream Urine   Creatinine      0.52 - 1.04 mg/dL 0.63   GFR Estimate      >60 mL/min/1.7m2 >90   GFR Estimate If Black      >60 mL/min/1.7m2 >90   Protein Random Urine      g/L <0.05   Protein Total Urine g/gr Creatinine      0 - 0.2 g/g Cr Unable to calculate due to low value   Albumin      3.4 - 5.0 g/dL 3.6   ALT      0 - 50 U/L 28   AST      0 - 45 U/L 20   Complement C3      76 - 169 mg/dL 46 (L)   Complement C4      15 - 50 mg/dL 3 (L)   CRP Inflammation      0.0 - 8.0 mg/L <2.9   Sed Rate      0 - 30 mm/h 26   DNA-ds      <10 IU/mL >379  (H)   Creatinine Urine      mg/dL 16     Component      Latest Ref Rng & Units 7/7/2018   WBC      4.0 - 11.0 10e9/L 7.0   RBC Count      3.8 - 5.2 10e12/L 4.35   Hemoglobin      11.7 - 15.7 g/dL 11.7   Hematocrit      35.0 - 47.0 % 36.0   MCV      78 - 100 fl 83   MCH      26.5 - 33.0 pg 26.9   MCHC      31.5 - 36.5 g/dL 32.5   RDW      10.0 - 15.0 % 15.3 (H)   Platelet Count      150 - 450 10e9/L 224   Diff Method       Automated Method   % Neutrophils      % 91.9   % Lymphocytes      % 4.0   % Monocytes      % 3.7   % Eosinophils      % 0.3   % Basophils      % 0.1   Absolute Neutrophil      1.6 - 8.3 10e9/L 6.5   Absolute Lymphocytes      0.8 - 5.3 10e9/L 0.3 (L)   Absolute Monocytes      0.0 - 1.3 10e9/L 0.3   Absolute Eosinophils      0.0 - 0.7 10e9/L 0.0   Absolute Basophils      0.0 - 0.2 10e9/L 0.0   Color Urine       Yellow   Appearance Urine       Clear   Glucose Urine      NEG:Negative mg/dL Negative   Bilirubin Urine      NEG:Negative Negative   Ketones Urine      NEG:Negative mg/dL Negative   Specific Gravity Urine      1.003 - 1.035 1.010   pH Urine      5.0 - 7.0 pH 5.5   Protein Albumin Urine      NEG:Negative mg/dL Negative   Urobilinogen Urine      0.2 - 1.0 EU/dL 0.2   Nitrite Urine      NEG:Negative Negative   Blood Urine      NEG:Negative Trace (A)   Leukocyte Esterase Urine      NEG:Negative Negative   Source       Midstream Urine   WBC Urine      OTO5:0 - 5 /HPF 0 - 5   RBC Urine      OTO2:O - 2 /HPF O - 2   Squamous Epithelial /LPF Urine      FEW:Few /LPF Few   Creatinine      0.52 - 1.04 mg/dL 0.63   GFR Estimate      >60 mL/min/1.7m2 >90   GFR Estimate If Black      >60 mL/min/1.7m2 >90   Protein Random Urine      g/L 0.07   Protein Total Urine g/gr Creatinine      0 - 0.2 g/g Cr 0.29 (H)   Albumin      3.4 - 5.0 g/dL 3.5   ALT      0 - 50 U/L 27   AST      0 - 45 U/L 21   Complement C3      76 - 169 mg/dL 51 (L)   Complement C4      15 - 50 mg/dL 5 (L)   Creatinine Urine Random      mg/dL  24   CRP Inflammation      0.0 - 8.0 mg/L 11.2 (H)   DNA-ds      <10 IU/mL >379 (H)   Sed Rate      0 - 30 mm/h 35 (H)   Creatinine Urine      mg/dL 23     PFTs 5/2018 (The FEV1 and FVC are reduced but the FEV1/FVC ratio is normal.  The inspiratory flow rates are reduced.  The TLC and FRC are reduced.  The diffusing capacity is normal.  However, the diffusing capacity was not corrected for the patient's hemoglobin.  IMPRESSION:  Moderately Severe  Restriction.  Normal Diffusion.  Walk distance is normal on room air with significant desaturation but no hypoxia.  ____________________________________________KERVIN TONY.      Component      Latest Ref Rng & Units 11/12/2018   Color Urine       Yellow   Appearance Urine       Clear   Glucose Urine      NEG:Negative mg/dL Negative   Bilirubin Urine      NEG:Negative Negative   Ketones Urine      NEG:Negative mg/dL Negative   Specific Gravity Urine      1.003 - 1.035 1.025   pH Urine      5.0 - 7.0 pH 6.0   Protein Albumin Urine      NEG:Negative mg/dL 30 (A)   Urobilinogen Urine      0.2 - 1.0 EU/dL 0.2   Nitrite Urine      NEG:Negative Negative   Blood Urine      NEG:Negative Trace (A)   Leukocyte Esterase Urine      NEG:Negative Negative   Source       Midstream Urine   WBC Urine      OTO5:0 - 5 /HPF 0 - 5   RBC Urine      OTO2:O - 2 /HPF O - 2   Squamous Epithelial /LPF Urine      FEW:Few /LPF Few   Bacteria Urine      NEG:Negative /HPF Few (A)   WBC      4.0 - 11.0 10e9/L 7.3   RBC Count      3.8 - 5.2 10e12/L 4.25   Hemoglobin      11.7 - 15.7 g/dL 11.3 (L)   Hematocrit      35.0 - 47.0 % 36.0   MCV      78 - 100 fl 85   MCH      26.5 - 33.0 pg 26.6   MCHC      31.5 - 36.5 g/dL 31.4 (L)   RDW      10.0 - 15.0 % 14.9   Platelet Count      150 - 450 10e9/L 255   Creatinine      0.52 - 1.04 mg/dL 0.83   GFR Estimate      >60 mL/min/1.7m2 73   GFR Estimate If Black      >60 mL/min/1.7m2 88   Iron      35 - 180 ug/dL 27 (L)   Iron Binding Cap      240 - 430 ug/dL 264    Iron Saturation Index      15 - 46 % 10 (L)   Protein Random Urine      g/L 0.58   Protein Total Urine g/gr Creatinine      0 - 0.2 g/g Cr 0.34 (H)   Albumin      3.4 - 5.0 g/dL 3.2 (L)   ALT      0 - 50 U/L 30   AST      0 - 45 U/L 19   Complement C3      76 - 169 mg/dL 48 (L)   Complement C4      15 - 50 mg/dL 3 (L)   CRP Inflammation      0.0 - 8.0 mg/L 16.4 (H)   DNA-ds      <10 IU/mL >379 (H)   Sed Rate      0 - 30 mm/h 26   Ferritin      8 - 252 ng/mL 160   Creatinine Urine      mg/dL 176     Component      Latest Ref Rng & Units 6/5/2019           8:29 AM   Color Urine       Yellow   Appearance Urine       Slightly Cloudy   Glucose Urine      NEG:Negative mg/dL Negative   Bilirubin Urine      NEG:Negative Negative   Ketones Urine      NEG:Negative mg/dL 5 (A)   Specific Gravity Urine      1.003 - 1.035 1.027   Blood Urine      NEG:Negative Small (A)   pH Urine      5.0 - 7.0 pH 6.0   Protein Albumin Urine      NEG:Negative mg/dL >499 (A)   Urobilinogen mg/dL      0.0 - 2.0 mg/dL 2.0   Nitrite Urine      NEG:Negative Negative   Leukocyte Esterase Urine      NEG:Negative Trace (A)   Source       Midstream Urine   WBC Urine      0 - 5 /HPF 16 (H)   RBC Urine      0 - 2 /HPF 24 (H)   Squamous Epithelial /HPF Urine      0 - 1 /HPF 2 (H)   Mucous Urine      NEG:Negative /LPF Present (A)   Hyaline Casts      0 - 2 /LPF 1   Sodium      133 - 144 mmol/L    Potassium      3.4 - 5.3 mmol/L    Chloride      94 - 109 mmol/L    Carbon Dioxide      20 - 32 mmol/L    Anion Gap      3 - 14 mmol/L    Glucose      70 - 99 mg/dL    Urea Nitrogen      7 - 30 mg/dL    Creatinine      0.52 - 1.04 mg/dL    GFR Estimate      >60 mL/min/1.73:m2    GFR Estimate If Black      >60 mL/min/1.73:m2    Calcium      8.5 - 10.1 mg/dL    Phosphorus      2.5 - 4.5 mg/dL    Albumin      3.4 - 5.0 g/dL    WBC      4.0 - 11.0 10e9/L    RBC Count      3.8 - 5.2 10e12/L    Hemoglobin      11.7 - 15.7 g/dL    Hematocrit      35.0 - 47.0 %    MCV       78 - 100 fl    MCH      26.5 - 33.0 pg    MCHC      31.5 - 36.5 g/dL    RDW      10.0 - 15.0 %    Platelet Count      150 - 450 10e9/L    Specimen Description       Midstream Urine   Special Requests       Specimen received in preservative   Culture Micro       10,000 to 50,000 colonies/mL . . .   Creatinine Urine      mg/dL 240   Albumin Urine mg/L      mg/L    Albumin Urine mg/g Cr      0 - 25 mg/g Cr    Protein Random Urine      g/L 4.50   Protein Total Urine g/gr Creatinine      0 - 0.2 g/g Cr 1.88 (H)   Neutrophil Cytoplasmic Antibody      <1:10 titer    Neutrophil Cytoplasmic Antibody Pattern          Lactate Dehydrogenase      81 - 234 U/L    HIV Antigen Antibody Combo      NR:Nonreactive        Sed Rate      0 - 30 mm/h    CRP Inflammation      0.0 - 8.0 mg/L    Complement C4      15 - 50 mg/dL    Complement C3      76 - 169 mg/dL    DNA-ds      <10 IU/mL      Component      Latest Ref Rng & Units 6/5/2019           8:29 AM   Color Urine          Appearance Urine          Glucose Urine      NEG:Negative mg/dL    Bilirubin Urine      NEG:Negative    Ketones Urine      NEG:Negative mg/dL    Specific Gravity Urine      1.003 - 1.035    Blood Urine      NEG:Negative    pH Urine      5.0 - 7.0 pH    Protein Albumin Urine      NEG:Negative mg/dL    Urobilinogen mg/dL      0.0 - 2.0 mg/dL    Nitrite Urine      NEG:Negative    Leukocyte Esterase Urine      NEG:Negative    Source          WBC Urine      0 - 5 /HPF    RBC Urine      0 - 2 /HPF    Squamous Epithelial /HPF Urine      0 - 1 /HPF    Mucous Urine      NEG:Negative /LPF    Hyaline Casts      0 - 2 /LPF    Sodium      133 - 144 mmol/L    Potassium      3.4 - 5.3 mmol/L    Chloride      94 - 109 mmol/L    Carbon Dioxide      20 - 32 mmol/L    Anion Gap      3 - 14 mmol/L    Glucose      70 - 99 mg/dL    Urea Nitrogen      7 - 30 mg/dL    Creatinine      0.52 - 1.04 mg/dL    GFR Estimate      >60 mL/min/1.73:m2    GFR Estimate If Black      >60 mL/min/1.73:m2     Calcium      8.5 - 10.1 mg/dL    Phosphorus      2.5 - 4.5 mg/dL    Albumin      3.4 - 5.0 g/dL    WBC      4.0 - 11.0 10e9/L    RBC Count      3.8 - 5.2 10e12/L    Hemoglobin      11.7 - 15.7 g/dL    Hematocrit      35.0 - 47.0 %    MCV      78 - 100 fl    MCH      26.5 - 33.0 pg    MCHC      31.5 - 36.5 g/dL    RDW      10.0 - 15.0 %    Platelet Count      150 - 450 10e9/L    Specimen Description          Special Requests          Culture Micro          Creatinine Urine      mg/dL 258   Albumin Urine mg/L      mg/L 2,810   Albumin Urine mg/g Cr      0 - 25 mg/g Cr 1,089.15 (H)   Protein Random Urine      g/L    Protein Total Urine g/gr Creatinine      0 - 0.2 g/g Cr    Neutrophil Cytoplasmic Antibody      <1:10 titer    Neutrophil Cytoplasmic Antibody Pattern          Lactate Dehydrogenase      81 - 234 U/L    HIV Antigen Antibody Combo      NR:Nonreactive        Sed Rate      0 - 30 mm/h    CRP Inflammation      0.0 - 8.0 mg/L    Complement C4      15 - 50 mg/dL    Complement C3      76 - 169 mg/dL    DNA-ds      <10 IU/mL      Component      Latest Ref Rng & Units 6/5/2019          11:57 AM   Color Urine          Appearance Urine          Glucose Urine      NEG:Negative mg/dL    Bilirubin Urine      NEG:Negative    Ketones Urine      NEG:Negative mg/dL    Specific Gravity Urine      1.003 - 1.035    Blood Urine      NEG:Negative    pH Urine      5.0 - 7.0 pH    Protein Albumin Urine      NEG:Negative mg/dL    Urobilinogen mg/dL      0.0 - 2.0 mg/dL    Nitrite Urine      NEG:Negative    Leukocyte Esterase Urine      NEG:Negative    Source          WBC Urine      0 - 5 /HPF    RBC Urine      0 - 2 /HPF    Squamous Epithelial /HPF Urine      0 - 1 /HPF    Mucous Urine      NEG:Negative /LPF    Hyaline Casts      0 - 2 /LPF    Sodium      133 - 144 mmol/L 134   Potassium      3.4 - 5.3 mmol/L 4.3   Chloride      94 - 109 mmol/L 101   Carbon Dioxide      20 - 32 mmol/L 26   Anion Gap      3 - 14 mmol/L 8    Glucose      70 - 99 mg/dL 109 (H)   Urea Nitrogen      7 - 30 mg/dL 21   Creatinine      0.52 - 1.04 mg/dL 0.49 (L)   GFR Estimate      >60 mL/min/1.73:m2 >90   GFR Estimate If Black      >60 mL/min/1.73:m2 >90   Calcium      8.5 - 10.1 mg/dL 9.2   Phosphorus      2.5 - 4.5 mg/dL 4.2   Albumin      3.4 - 5.0 g/dL 2.9 (L)   WBC      4.0 - 11.0 10e9/L 8.7   RBC Count      3.8 - 5.2 10e12/L 4.81   Hemoglobin      11.7 - 15.7 g/dL 12.3   Hematocrit      35.0 - 47.0 % 39.3   MCV      78 - 100 fl 82   MCH      26.5 - 33.0 pg 25.6 (L)   MCHC      31.5 - 36.5 g/dL 31.3 (L)   RDW      10.0 - 15.0 % 14.1   Platelet Count      150 - 450 10e9/L 302   Specimen Description          Special Requests          Culture Micro          Creatinine Urine      mg/dL    Albumin Urine mg/L      mg/L    Albumin Urine mg/g Cr      0 - 25 mg/g Cr    Protein Random Urine      g/L    Protein Total Urine g/gr Creatinine      0 - 0.2 g/g Cr    Neutrophil Cytoplasmic Antibody      <1:10 titer <1:10   Neutrophil Cytoplasmic Antibody Pattern       The ANCA IFA is <1:10.  No further testing will be performed.   Lactate Dehydrogenase      81 - 234 U/L 252 (H)   HIV Antigen Antibody Combo      NR:Nonreactive     Nonreactive   Sed Rate      0 - 30 mm/h 40 (H)   CRP Inflammation      0.0 - 8.0 mg/L 25.7 (H)   Complement C4      15 - 50 mg/dL 3 (L)   Complement C3      76 - 169 mg/dL 53 (L)   DNA-ds      <10 IU/mL >379 (H)   [facility]                                                      CMR Report       MRN:                  [id number]                                  Name:           CHILANGO GIBBONS                                   :                  1965                                  Scan Date:   2019 11:11:32                                   Electronically signed by Fer Corea 2019-May-20 14:19:00     SUMMARY    ==========================================================================================================     Clinical history: 54-year old female with SLE, poly-serositis, and chronic pleuritic chest pain. CMR to  assess for cardiac involvement (sepideh-myocarditis).  Comparison CMR: None.      1. The LV is normal in cavity size and wall thickness. The global systolic function is normal. The LVEF is  62%.   There are no regional wall motion abnormalities.     2. The RV is normal in cavity size. The global systolic function is normal. The RVEF is 60%.      3. Both atria are normal in size.     4. There is no significant valvular disease.      5. Late gadolinium enhancement imaging shows no MI, fibrosis or infiltrative disease.      6. The is mild pericardial thickening and tethering, with circumferential pericardial enhancement. There is  no pericardial effusion.       7. There is no intracardiac thrombus.     8. The main PA is moderately enlarged, measuring 3.8 cm.      CONCLUSIONS: Pericardial thickening and enhancement consistent with inflammatory pericarditis. There is no  myocardial fibrosis or myocarditis. Normal biventricular size and systolic function; LVEF 62%, RVEF 60%.      CORE EXAM   ==========================================================================================================     MEASUREMENTS   ----------------------------------------------------------------------------------------      VOLUMETRIC ANALYSIS       ----------------------------------------------  .--------------------------------------------------------.                    LV    Reference  RV    Reference   +------+-----------+------+-----------+------+-----------+   EDV   ml          162   ()   159   ()          ml/m^2     69.2   (56-90)   67.9   (53-90)     ESV   ml           62   (22-59)     64   (15-68)           ml/m^2     26.5   (14-33)   27.4   (11-37)     CO    L/min       7.30             6.93                     L/min/m^2   3.1              3.0               MASS                                                 SV    ml          100   ()    95   ()          ml/m^2     42.7   (37-62)   40.6   (36-60)     EF    %            62   (59-77)     60   (55-79)    '------+-----------+------+-----------+------+-----------'             CARDIAC OUTPUT HR:  73 BPM      LA DIMENSIONS (LV SYSTOLE)       ----------------------------------------------          DIAMETER:  3.9 cm         AORTIC ROOT DIMENSIONS       ----------------------------------------------          SINUS OF VALSALVA:  2.9 cm          AORTIC ROOT SIZE:  Normal        ANATOMY   ----------------------------------------------------------------------------------------      LEFT VENTRICLE       ----------------------------------------------          WALL THICKNESS:  Normal          CAVITY SIZE:  Normal         RIGHT VENTRICLE       ----------------------------------------------          WALL THICKNESS:  Normal          CONTRACTILITY:  Normal          CAVITY SIZE:  Normal         INTERVENTRICULAR SEPTUM       ----------------------------------------------               VENTRICULAR SEPTUM:  Normal          INTERATRIAL SEPTUM       ----------------------------------------------               ATRIAL SEPTUM:          LIPOMATOUS HYPERTROPHY          LEFT ATRIUM       ----------------------------------------------          CAVITY SIZE:  Normal         RIGHT ATRIUM       ----------------------------------------------          CAVITY SIZE:  Normal         PERICARDIUM       ----------------------------------------------          THICKENED          THICKNESS:  5 mm          EFFUSION:  None         PLEURAL EFFUSION       ----------------------------------------------               None         VALVES   ----------------------------------------------------------------------------------------       AORTIC VALVE       ----------------------------------------------          AORTIC VALVE LEAFLETS:  Trileaflet         MITRAL VALVE       ----------------------------------------------          MITRAL VALVE LEAFLETS:  Normal Leaflets         TRICUSPID VALVE       ----------------------------------------------          TRICUSPID VALVE LEAFLETS:  Normal Leaflets         PULMONIC VALVE       ----------------------------------------------          PULMONIC VALVE LEAFLETS:  Normal Leaflets        17 SEGMENT   ----------------------------------------------------------------------------------------  .------------------------------------------------------------------------------------------.   Segments            Wall Motion   Hyperenhancement  Stress Perfusion  Interpretation   +--------------------+--------------+------------------+------------------+----------------+   Base Anterior       Normal/Hyper  None                                Normal            Base Anteroseptal   Normal/Hyper  None                                Normal            Base Inferoseptal   Normal/Hyper  None                                Normal            Base Inferior       Normal/Hyper  None                                Normal            Base Inferolateral  Normal/Hyper  None                                Normal            Base Anterolateral  Normal/Hyper  None                                Normal            Mid Anterior        Normal/Hyper  None                                Normal            Mid Anteroseptal    Normal/Hyper  None                                Normal            Mid Inferoseptal    Normal/Hyper  None                                Normal            Mid Inferior        Normal/Hyper  None                                Normal            Mid Inferolateral   Normal/Hyper  None                                Normal            Mid Anterolateral    Normal/Hyper  None                                Normal            Apical Anterior     Normal/Hyper  None                                Normal            Apical Septal       Normal/Hyper  None                                Normal            Apical Inferior     Normal/Hyper  None                                Normal            Apical Lateral      Normal/Hyper  None                                Normal            Bradshaw                Normal/Hyper  None                                Normal           +--------------------+--------------+------------------+------------------+----------------+   RV Segments         Wall Motion   Hyperenhancement  Stress Perfusion  Interpretation   +--------------------+--------------+------------------+------------------+----------------+   RV Basal Anterior   Normal/Hyper  None                                Normal            RV Basal Inferior   Normal/Hyper  None                                Normal            RV Mid              Normal/Hyper  None                                Normal            RV Apical           Normal/Hyper  None                                Normal           '--------------------+--------------+------------------+------------------+----------------'         FINDINGS       ----------------------------------------------          INFARCT/SCAR SIZE:  0 %        SCAN INFO   ==========================================================================================================     GENERAL   ----------------------------------------------------------------------------------------      CONTRAST AGENT       ----------------------------------------------          TYPE:  Gadavist          VOLUME ADMINISTERED:  10 ml          DOSAGE FOR 0.5M:  0.04 mmol/kg          SERUM CREATININE:  0.58 sCr          CREATININE DATE:  2019-03-06 00:00:00          SEDATION        "----------------------------------------------          SEDATION USED?:  No         VITALS       ----------------------------------------------          HEIGHT:  66.00 in          HEIGHT:  167.64 cm          WEIGHT:  289.00 lbs          WEIGHT:  131.09 kgs          BSA:  2.34 m^2         PULSE SEQUENCES       ----------------------------------------------          Single-Shot SSFP, IR GRE - Segmented, IR SSFP - Single Shot, SSFP Cine          SETUP       ----------------------------------------------          TYPE:  Clinical          INPATIENT:  No          INCOMPLETE SCAN:  No          REASON(S) FOR SCAN:  Cardiomyopathy, Pericardial Evaluation           REFERRING PHYSICIAN:  ROBE VAZQUEZ          ATTENDING PHYSICIAN:  ORBE VAZQUEZ        Ph.E:    /88 (BP Location: Left arm, Patient Position: Sitting, Cuff Size: Adult Large)   Pulse 97   Temp 98.6  F (37  C) (Oral)   Ht 1.676 m (5' 6\")   Wt 125.1 kg (275 lb 12.8 oz)   SpO2 95%   BMI 44.52 kg/m         Constitutional: WD/WN. Pleasant. In no acute distress.   Eyes: EOM intact, PERRLA, sclera anicteric, conj not injected  HEENT: No oral ulcers or thrush. Normal salivary pool.   Neck: No cervical LAP or thyromegaly  Chest: Clear to auscultation bilaterally  CV: RRR, no murmurs/ rubs or gallops. No edema, clubbing or cyanosis.   GI: Abdomen is soft and non tender.   MS: No synovitis. Cool joints. No tenderness of the joints. No swelling. Normal ROM.  No joint deformities. Full ROM of the joints. No nodules. No Jaccoud's deformity.  Skin: No skin rash, malar rash, livedo, periungual erythema, alopecia, digital ulcers or nail changes.  Neuro: A&O x 3. Grossly non focal, muscular power 5/5 in all ext  Psych: NL affect and mood    Assessment/ plan:    1-SLE. 53 yo WF with +fh/o RA and personal hx of SLE presented in 12/2017 for 2nd opinion of m/o her SLE. She was diagnosed with SLE at age 27. Her lupus has been marked by +KARLIE (highest titer " 1:640, speckled and nucleolar patterns), +anti-DNA, arthritis, malar rash, serositis (pleuritic CP) supported by +SSA/SSB Ab, low C3/low C4, +RF, high ESR/CRP. She had neg anti-RNP, anti-Sm, acL IgM/G/A, LAC, cryo and anti-CCP.     Had NL C3 at the time of Dx. She was treated with prednisone and HCQ at the time of Dx. Can't remember how long she was on HCQ and why HCQ was stopped, but it probably was stopped because of stable disease, no report on HCQ toxicity. Reportedly her SLE was mild all these years.    Has secondary Sjogren's with sicca, +SSA/SSB Ab and h/o MALT lymphoma at age 44 in remission. Uses blink eyedrops and salagen. No lip biopsy was done to confirm Dx of Sjogren's.    Her lupus flared in 7/2017 with no triggers when she presented with arthritis, HCQ was resumed, arthritis resolved. Mild pulm HTN on 2D-Echo 8/2017 but neg R cardiac cath and VQ scan in 3/2018, also neg 2D-Echo.    In 12/2017, was prescribed prednisone 40 mg for only 5 days for pleuritic CP, CP recurred after stopping prednisone.     Her major complaint at initial visit with me in 12/2017 was ongoing CP. AZA was added in 2/2018 with start dose of 50 mg qd and slowly was increased to max 150 mg qd. Tolerated AZA well, no SEs, no toxicity on the labs but failed it and required to go back on prednisone 20 mg qd (current dose).     Her lupus is active with pleuritic CP. ESR/CRP normalized on prednisone+AZA+HCQ but +anti-DNA, low C3/C4 are unchanged. Chest CT in 12/2017 without contrast showed air trapping due to lupus/Sjogren's, no pleural effusion. Lung nodules need f/u in a year. No pericardail effusion on 2D-echo and neg VQ scan for PE in 3/2018 so chest CT with contrast is not needed. She is APL negative.    Lupus Dx and tx plan was discussed with her.    AZA was switched to  mg po bid on 5/18/2018 as she failed AZA. She is now on max dose of MMF 1500 mg bid. Her labs are unchanged with persistently elevated anti-DNA, low C3/C4  and high ESR/CRP, but just started to feel a lot better (regarding fatigue, arthralgia, still has residual pleuritic CP) after adding MMF.     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 Also possible shrinking lung syn sec lupus or obesity is responsible for restrictive lung disease plus pleural effusion. She was prescribed albuterol and dulera inh which have helped.      Benlysta was added in 9/2018 to help with pleuritic CP. No change in lupus serology (anti-DNA, C3, C4), but ESR/CRP have improved. Overall she felt a lot better after adding benlysta but it made her tired.     I could not taper her off the prednisone, she continued to have active IA, fatigue and pleuritic CP. I recommended switching benlysta to rituximab given her h/o lymphoma, unfortunately her insurance denied. At the same time, Taina misunderstood and stopped her cellcept as thought we were switching MMF to rituximab while the plan was to add rituximab to MMF. She has been off MMF for couple of months. Had cardiac MRI on 5/20 which showed active pericarditis. I advised to switch to cytoxan 750 mg/m2 qmo x 6 mo. On Saturday 6/1/2019, received a call from infusion center reporting blood in the urine and if it was ok to proceed with cytoxan. I was concerned as that was very new. We canceled the cytoxan infusion. UCx was negative for UTI. Repeat U/A today is showing protein and blood, with h/o stopping MMF, very concerning for lupus nephritis. She never had lupus nephritis as part of her lupus and it's possible that it was covered by MMF and showed up off MMF. Renal biopsy is till recommended to confirm Dx, evaluate degree of severity, chronicity and rule out other diagnoses. I spoke  to renal team and dr. Elmore and Aura kindly agreed to see her today as urgent consult as add on to their schedule and scheduled renal biopsy for Friday 6/7/2019. I would re-schedule cytoxan to 6/15/2019 and schedule pulse solumedrol 1 gr every day x 3 days. Meanwhile, will increase prednisone to 60 mg every day. Cytoxan risks were discussed again.        Recommend:      For today, increase prednisone to 60 mg a day    Starting Sat Madyson 15: Cytoxan as planned monthly x 6    Pulse IV solumedrol 1 gram a day x 3 days followed by prednisone 60 mg a day x one month, 50-40-30 mg a day each for one week then 25 mg a day after renal biopsy    Will follow up with pulmonary.    Continue the plaquenil 200 mg twice a day       2-HCQ monitoring. Was reminded to have eye exam    3-PCP prophylaxis. Pentamidine monthly        Keep July appointment, then schedule one for September        Orders Placed This Encounter   Procedures     DNA double stranded antibodies     Complement C3     Complement C4     CRP inflammation     Erythrocyte sedimentation rate auto             Killian Marroquin MD

## 2019-06-05 NOTE — PROGRESS NOTES
Rheumatology F/U Note    Reason for visit: urgent visit for lupus flare    Date of last visit: 3/6/2019    DOS: 6/5/2019      HPI:    Taina Singh is a 52 year old  female who was referred to our clinic for evaluation and management of her SLE.    She was diagnosed with lupus at age 27. Her 1st sx were malar rash and fatigue. No skin biopsy was done (reportedly had +KARLIE). She was treated with prednisone taper and HCQ. HCQ helped and she tolerated it well. She was diagnosed with Sjogren's at the same time. Had dryness of eyes and mouth. No lip biopsy to confirm the Dx.    Reportedly she had very mild stable lupus for many years and eventually at some point, stopped taking HCQ (can't remember when)    She was diagnosed with MALT lymphoma at 44, had enlarged LN over R parotid gland, on biopsy it was MALT lymphoma. Tx was resection only, no radiation or chemo.    About 3 yr ago, had flu shot and 2 days later, developed pleuritic CP and was seen at urgent care. That's why she does not want to get flu shot. She was seen in ER 2 days after UC visit. She was treated with prednisone.    She lost 100 lbs by exercise over past 2 yr, felt amazing. In 7/2017, started not feeling well. She had extreme fatigue. Had AM stiffness and pain over hands. Touched base with her previous rheumatologist (Dr. Rangel) and was told to have lupus flare and was put on  mg qd. Afterwards, developed pleuritic CP which has not been resolved.    She was given prednisone 40 mg qd x 5 days (on 12/16/2017) by pulmonologist in [facility]. It helped a lot but pleuritic CP recurred after stopping it.    She was told to have pulm HTN and was evaluated for it.    No triggers for current flare.    No flare of malar rash. No current hair loss. Uses blink eyedrops, restasis burned her eyes. She has been prescribed salagen for dry mouth, has not used it. Arthritis of hands have resolved on HCQ.    Last HCQ eye exam was 1 week ago.    4/2018: On  AZA 50 mg qd since 2/8/2018, increased to 100 mg qd in 3/19/2018. Her arthralgia is intermittent and mild, it's better. Has fatigue.  5/2018: Started on mycophenalate 1500 mg, continues prednisone 30 mg and plaquenil 200 mg twice a day.     Her major complaint is continues pressure over her chest. Pain is pleuritic, it hurts by sneezing, taking deep breath.      7/2018: Ms. Singh feels an improvement in her symptoms. She says there is a baseline pleuritic chest pain, but her fatigue and overall day-to-day function has improved to her satisfaction. She says morning stiffness usually only lasts about 10 minutes. She complains of occasional episodes of flashing lights in her left eye, however she is being seen by her opthamologist. She has a OTC scheduled for Monday as well. Ms. Singh feels as though the mycophenalate has made the biggest difference in her improvement. She continues to taper the prednisone, currently on 20 mg daily. She also has continued the plaquenil 200 mg twice a day.           11/2018: On benlysta infusion since beginning of Sept 2018. Chest still feels less tight, breathing is much better. Sometimes hands ache but it does lot last long.      Benlysta makes her tired, but overall feels her lupus sx are much improved on benlysta.        3/2019: Feels tired and achy, it is intermittent and moves around. The L hip pain is consistent for the past 7 days, she moved up her appointment from 3/22 to today to get a bursitis shot. Had bad sinus and ear infection in 1/2019. She still thinks benlysta has helped her a lot. Last night, her R shoulder was hurting so bad that she could not move it but it's better today. Pain comes and goes. Breathing is better after adding benlysta, she is not gasping for air anymore which is huge improvement for her. She feels pressure over her chest and low back.    Is getting benlysta tomorrow.      Her prednisone dose is 10 mg a day.      Leaving to [place] for  vacation.    Today: She reports severe diffuse arthralgia affecting all her joints with pain level 8/10, today is 6/10. No SOB. Feels pressure like CP.      Has severe fatigue.    Last benlysta was 7 wk ago.          ROS:  A comprehensive ROS was done, positives are per HPI.    Past Medical History:   Diagnosis Date     Bronchiolitis     Chest CT 2018 shows air trapping; bronchiolitis possibly related to lupus     Diastolic dysfunction 2018     Gluten intolerance     Patient is not gluten intolerant     Iron deficiency      Iron deficiency 2018     Lupus     dx age 27; + DNA-ds, KARLIE, SSA, SSB and RF; malar rash, serositis (pleuritic CP)     MALT lymphoma (H) age 44    No chemo or radiation     Other forms of systemic lupus erythematosus (H) 2018     Sjogren's syndrome (H)     secondary Sjogrens, secondary to lupus     Vitamin D deficiency      Past Surgical History:   Procedure Laterality Date     BIOPSY/REMOVAL, LYMPH NODE(S)        SECTION  age 32     HC HYSTEROS W PERMANENT FALLOPAIN IMPLANT       PAROTIDECTOMY       TONSILLECTOMY       Family History   Problem Relation Age of Onset     Alopecia Brother      Rheumatoid Arthritis Brother      LUNG DISEASE No family hx of      Social History     Socioeconomic History     Marital status:      Spouse name: Not on file     Number of children: Not on file     Years of education: 1     Highest education level: Not on file   Occupational History     Comment: , 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   Social Needs     Financial resource strain: Not on file     Food insecurity:     Worry: Not on file     Inability: Not on file     Transportation needs:     Medical: Not on file     Non-medical: Not on file   Tobacco Use     Smoking status: Never Smoker     Smokeless tobacco: Never Used   Substance and Sexual Activity     Alcohol use: No     Drug use: No     Sexual activity: Not on file   Lifestyle     Physical activity:     Days per week: Not on file      Minutes per session: Not on file     Stress: Not on file   Relationships     Social connections:     Talks on phone: Not on file     Gets together: Not on file     Attends Quaker service: Not on file     Active member of club or organization: Not on file     Attends meetings of clubs or organizations: Not on file     Relationship status: Not on file     Intimate partner violence:     Fear of current or ex partner: Not on file     Emotionally abused: Not on file     Physically abused: Not on file     Forced sexual activity: Not on file   Other Topics Concern     Parent/sibling w/ CABG, MI or angioplasty before 65F 55M? 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  Denies exposure to asbestos, silica, hot tubs, feather pillows (used to until age 49), pet birds, mold, does not play brass/wind instruments.      Going through divorce but it's not stress factor for her.    Allergies   Allergen Reactions     Penicillins Rash     Sulfa Drugs Rash       Outpatient Encounter Medications as of 6/5/2019   Medication Sig Dispense Refill     albuterol (PROAIR HFA/PROVENTIL HFA/VENTOLIN HFA) 108 (90 Base) MCG/ACT inhaler Inhale 2 puffs into the lungs every 6 hours as needed for shortness of breath / dyspnea or wheezing 3 Inhaler 3     Calcium-Magnesium 300-300 MG TABS daily        hydroxychloroquine (PLAQUENIL) 200 MG tablet Take 1 tablet (200 mg) by mouth 2 times daily 180 tablet 1     Multiple Vitamins-Minerals (WOMENS MULTI PO) Take by mouth daily        Omega-3 Fatty Acids (OMEGA-3 FISH OIL) 500 MG CAPS Take 500 mg by mouth daily        predniSONE (DELTASONE) 10 MG tablet Take 3 tablets (30 mg) by mouth daily 90 tablet 1     traMADol (ULTRAM) 50 MG tablet Take 1 tablet (50 mg) by mouth every 12 hours as needed for pain Take 1 tablet as needed for pain.  No driving or alcohol use while taking medication. 30 tablet 2     Cholecalciferol (D 1000) 1000 UNITS CAPS Take 1,000 Units by mouth daily         "pilocarpine (SALAGEN) 5 MG tablet Take 1 tablet (5 mg) by mouth 4 times daily as needed (Patient not taking: Reported on 2019) 360 tablet 3     predniSONE (DELTASONE) 5 MG tablet Take 15 mg by mouth daily. (Patient not taking: Reported on 2019) 90 tablet 1     No facility-administered encounter medications on file as of 2019.          Her records were reviewed.    2D-Echo 2017:    ECHO COMPLETE WO CONTRAST CHILANGO GIBBONS 2017 14:43     [facility], [place], [facility] by [facility]   [address], [address], [place] [address]   Main: [phone]  [email or web address]                                                 Transthoracic Echo Report   CHILANGO GIBBONS   Excellian ID: [id number] Age: 52 : 1965 Ordering Provider: NGUYEN PETERS   Exam Date: 2017 14:43 Gender: F Sonographer: BDB   Accession #: [id number] Height: 66 in BSA: 2.24 m  BP: 108 / 62   Weight: 261 lbs BMI: 42.1 kg/m          Site: [facility]   Location: Outpatient Rhythm: Normal Sinus Rhythm   Procedure Components: 2D imaging, Color Doppler, Spectral Doppler   Indications: Murmur; Systemic lupus erythematosus, unspecified SLE type, unspecified organ   involvement status   Technical Quality: Contrast: None     Final Conclusion   Visually estimated ejection fraction is 55-60%.   Mild left ventricular hypertrophy.   Estimated pulmonary artery pressure of 31 mmHg + RA pressure.   Dilated IVC size, <50% collapse with respiration.   Estimated EF: 55-60%   FINDINGS   Left Ventricle Normal left ventricular size. Visually estimated ejection fraction is 55-60%.   Mild left ventricular hypertrophy.   Diastolic Function \"Pseudonormal\" left ventricular filling pattern. E/e' ratio 8-15 is   indeterminate for filling pressure.   Right Ventricle Normal right ventricular size and function.   Left Atrium Mild left atrial enlargement.   Right Atrium Mild right atrial enlargement.   Aortic Valve Normal " aortic valve. No aortic stenosis. No significant aortic regurgitation.   Mitral Valve Normal mitral valve. No mitral stenosis. Mild mitral regurgitation.   Tricuspid Valve Normal tricuspid valve. No tricuspid stenosis. Mild tricuspid regurgitation.   Estimated pulmonary artery pressure   of 31 mmHg + RA pressure.   Pulmonic Valve Normal pulmonic valve without significant stenosis or regurgitation.   Pericardium Normal pericardium.   Aorta Measured aortic root diameter is normal in size.   Inferior Vena Cava Dilated IVC size, <50% collapse with respiration.    Component      Latest Ref Rng & Units 11/20/2017   Sodium      133 - 144 mmol/L 137   Potassium      3.4 - 5.3 mmol/L 4.2   Chloride      94 - 109 mmol/L 102   Carbon Dioxide      20 - 32 mmol/L 30   Anion Gap      3 - 14 mmol/L 6   Glucose      70 - 99 mg/dL 101 (H)   Urea Nitrogen      7 - 30 mg/dL 13   Creatinine      0.52 - 1.04 mg/dL 0.55   GFR Estimate      >60 mL/min/1.7m2 >90   GFR Estimate If Black      >60 mL/min/1.7m2 >90   Calcium      8.5 - 10.1 mg/dL 9.1   WBC      4.0 - 11.0 10e9/L 4.9   RBC Count      3.8 - 5.2 10e12/L 4.28   Hemoglobin      11.7 - 15.7 g/dL 11.3 (L)   Hematocrit      35.0 - 47.0 % 35.5   MCV      78 - 100 fl 83   MCH      26.5 - 33.0 pg 26.4 (L)   MCHC      31.5 - 36.5 g/dL 31.8   RDW      10.0 - 15.0 % 14.6   Platelet Count      150 - 450 10e9/L 215   N-Terminal Pro Bnp      0 - 125 pg/mL 546 (H)   Sed Rate      0 - 30 mm/h 71 (H)     Component      Latest Ref Rng & Units 12/29/2017   Color Urine       Straw   Appearance Urine       Clear   Glucose Urine      NEG:Negative mg/dL Negative   Bilirubin Urine      NEG:Negative Negative   Ketones Urine      NEG:Negative mg/dL Negative   Specific Gravity Urine      1.003 - 1.035 1.005   Blood Urine      NEG:Negative Negative   pH Urine      5.0 - 7.0 pH 6.0   Protein Albumin Urine      NEG:Negative mg/dL Negative   Urobilinogen mg/dL      0.0 - 2.0 mg/dL 0.0   Nitrite Urine       NEG:Negative Negative   Leukocyte Esterase Urine      NEG:Negative Negative   Source       Midstream Urine   WBC Urine      0 - 2 /HPF <1   RBC Urine      0 - 2 /HPF <1   Bacteria Urine      NEG:Negative /HPF Few (A)   Squamous Epithelial /HPF Urine      0 - 1 /HPF <1   Mucous Urine      NEG:Negative /LPF Present (A)   Albumin Fraction      3.7 - 5.1 g/dL 4.2   Alpha 1 Fraction      0.2 - 0.4 g/dL 0.4   Alpha 2 Fraction      0.5 - 0.9 g/dL 0.8   Beta Fraction      0.6 - 1.0 g/dL 1.0   Gamma Fraction      0.7 - 1.6 g/dL 1.6   Monoclonal Peak      0.0 g/dL 0.0   ELP Interpretation:       Essentially normal electrophoretic pattern. No monoclonal protein seen. Pathologic . . .   IGG      695 - 1620 mg/dL 1560   IGA      70 - 380 mg/dL 473 (H)   IGM      60 - 265 mg/dL 92   Iron      35 - 180 ug/dL 58   Iron Binding Cap      240 - 430 ug/dL 315   Iron Saturation Index      15 - 46 % 19   TPMT Genotype Specimen       Whole Blood   TPMT Genotype       Neg/Neg   TPMT Predicted Phenotype       Normal   Protein Random Urine      g/L <0.05   Protein Total Urine g/gr Creatinine      0 - 0.2 g/g Cr Unable to calculate due to low value   Deamidated Gliadin Cari, IgA      <7 U/mL 2   Deamidated Gliadin Cari, IgG      <7 U/mL 5   Complement C3      76 - 169 mg/dL 72 (L)   Complement C4      15 - 50 mg/dL 6 (L)   CRP Inflammation      0.0 - 8.0 mg/L 3.2   DNA-ds      <10 IU/mL >379 (H)   Sed Rate      0 - 30 mm/h 49 (H)   Vitamin D Deficiency screening      20 - 75 ug/L 51   Creatinine Urine Random      mg/dL 23   AST      0 - 45 U/L 26   ALT      0 - 50 U/L 35   HEENA  Broad Spectrum       Neg   Beta 2 Glycoprotein 1 Antibody IgG      <7 U/mL <0.6   Beta 2 Glycoprotein 1 Antibody IgM      <7 U/mL <0.9   Thyroid Peroxidase Antibody      <35 IU/mL <10   Thyroglobulin Antibody      <40 IU/mL <20   TSH      0.40 - 4.00 mU/L 0.87   Cryoglobulin      NEG:Negative % Trace (A)   Tissue Transglutaminase Antibody IgA      <7 U/mL 4   Ferritin       8 - 252 ng/mL 163   Endomysial Antibody IgA by IFA      <1:10 <1:10   CRP Cardiac Risk      mg/L 2.9   Creatinine Urine      mg/dL 23     Component      Latest Ref Rng & Units 2/23/2018   Color Urine       Yellow   Appearance Urine       Clear   Glucose Urine      NEG:Negative mg/dL Negative   Bilirubin Urine      NEG:Negative Negative   Ketones Urine      NEG:Negative mg/dL Negative   Specific Gravity Urine      1.003 - 1.035 1.025   Blood Urine      NEG:Negative Negative   pH Urine      5.0 - 7.0 pH 5.5   Protein Albumin Urine      NEG:Negative mg/dL Negative   Urobilinogen Urine      0.2 - 1.0 EU/dL 0.2   Nitrite Urine      NEG:Negative Negative   Leukocyte Esterase Urine      NEG:Negative Negative   Source       Midstream Urine   Protein Random Urine      g/L 0.17   Protein Total Urine g/gr Creatinine      0 - 0.2 g/g Cr 0.18   Complement C3      76 - 169 mg/dL 64 (L)   Complement C4      15 - 50 mg/dL 5 (L)   CRP Inflammation      0.0 - 8.0 mg/L 4.2   DNA-ds      <10 IU/mL >379 (H)   Sed Rate      0 - 30 mm/h 35 (H)   AST      0 - 45 U/L 27   ALT      0 - 50 U/L 31   Creatinine Urine Random      mg/dL 99     Component      Latest Ref Rng & Units 3/16/2018   WBC      4.0 - 11.0 10e9/L 5.6   RBC Count      3.8 - 5.2 10e12/L 4.43   Hemoglobin      11.7 - 15.7 g/dL 12.1   Hematocrit      35.0 - 47.0 % 36.9   MCV      78 - 100 fl 83   MCH      26.5 - 33.0 pg 27.3   MCHC      31.5 - 36.5 g/dL 32.8   RDW      10.0 - 15.0 % 14.5   Platelet Count      150 - 450 10e9/L 224   Diff Method       Automated Method   % Neutrophils      % 81.7   % Lymphocytes      % 9.3   % Monocytes      % 7.5   % Eosinophils      % 1.3   % Basophils      % 0.2   Absolute Neutrophil      1.6 - 8.3 10e9/L 4.6   Absolute Lymphocytes      0.8 - 5.3 10e9/L 0.5 (L)   Absolute Monocytes      0.0 - 1.3 10e9/L 0.4   Absolute Eosinophils      0.0 - 0.7 10e9/L 0.1   Absolute Basophils      0.0 - 0.2 10e9/L 0.0   Creatinine      0.52 - 1.04 mg/dL 0.51  (L)   GFR Estimate      >60 mL/min/1.7m2 >90   GFR Estimate If Black      >60 mL/min/1.7m2 >90   ALT      0 - 50 U/L 27   Albumin      3.4 - 5.0 g/dL 3.5   AST      0 - 45 U/L 18       Component      Latest Ref Rng & Units 3/21/2018   Color Urine       Yellow   Appearance Urine       Clear   Glucose Urine      NEG:Negative mg/dL Negative   Bilirubin Urine      NEG:Negative Negative   Ketones Urine      NEG:Negative mg/dL Negative   Specific Gravity Urine      1.003 - 1.035 <=1.005   pH Urine      5.0 - 7.0 pH 6.5   Protein Albumin Urine      NEG:Negative mg/dL Negative   Urobilinogen Urine      0.2 - 1.0 EU/dL 0.2   Nitrite Urine      NEG:Negative Negative   Blood Urine      NEG:Negative Negative   Leukocyte Esterase Urine      NEG:Negative Negative   Source       Midstream Urine   WBC Urine      OTO5:0 - 5 /HPF 0 - 5   RBC Urine      OTO2:O - 2 /HPF O - 2   Squamous Epithelial /LPF Urine      FEW:Few /LPF Few   Protein Random Urine      g/L <0.05   Protein Total Urine g/gr Creatinine      0 - 0.2 g/g Cr Unable to calculate due to low value   DNA-ds      <10 IU/mL >379 (H)   Complement C4      15 - 50 mg/dL 4 (L)   Complement C3      76 - 169 mg/dL 69 (L)   Creatinine Urine Random      mg/dL 17   Creatinine Urine      mg/dL 17     EXAMINATION: CT CHEST W/O CONTRAST, 12/29/2017 1:08 PM   TECHNIQUE:  Helical CT images from the thoracic inlet through the lung  bases were obtained without IV contrast during inspiration and  expiration according to high-resolution chest CT protocol. Contrast  dose: None  COMPARISON: None   HISTORY: eval for ILD, pleural effusion, pt has lupus, Sjogren's, h/o  MALT and pleurisy and SOB; Systemic lupus erythematosus, unspecified  SLE type, unspecified organ involvement status (H)   FINDINGS:  At least 75% collapse of the trachea and mainstem bronchi on the end  expiratory views. Diffuse lobular air trapping on end expiratory  images. Bibasilar platelike atelectasis. No pneumothorax or  pleural  effusion. Scattered solid pulmonary nodules, for example a 4 mm  lingular nodule (series 5 image 145) and a 4 mm right upper lobe  nodule (image 73).    The heart size is normal. Mild three-vessel coronary artery calcific  atherosclerosis. Dilation of the main pulmonary artery to 4.1 cm. No  pericardial effusion. Normal caliber and configuration of the thoracic  great vessels. Numerous prominent though nonenlarged mediastinal lymph  nodes.  Limited views of the abdomen reveal no worrisome pathology. No  worrisome bony or soft tissue lesions.    IMPRESSION:   1. At least moderate tracheobronchomalacia.  2. 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  3. Scattered subcentimeter pulmonary nodules measuring up to 4 mm.  Consider follow-up chest CT in one year to establish stability.     [Consider Follow Up: Lung nodule]     This report will be copied to the [facility] to ensure a  provider acknowledges the finding.      I have personally reviewed the examination and initial interpretation  and I agree with the findings.     AUSTIN PACE MD    Examination:NM LUNG SCAN VENTILATION AND PERFUSION, 3/14/2018 8:24 AM    Indication:  Chronic PE; Pulmonary hypertension    Additional Information: none   Technique:   The patient received 2 mCi of Tc-99m DTPA aerosol for ventilation and  7 mCi of Tc-99m MAA for perfusion. Multiple images were obtained of  the lungs in Anterior, posterior, HERNANDES, RPO, LPO, and  K[place]akh projections.   Comparison: Chest x-ray same-day   Findings:     There are matched ventilation/perfusion defects in both lungs. No  mismatched perfusion defect to suggest pulmonary emboli.      Impression:  Pulmonary emboli is not present.     I have personally reviewed the examination and initial interpretation  and I agree with the findings.     DONNIE GARCIA MD    Component       Latest Ref Rng & Units 5/12/2018   WBC      4.0 - 11.0 10e9/L 7.1   RBC Count      3.8 - 5.2 10e12/L 4.42   Hemoglobin      11.7 - 15.7 g/dL 12.1   Hematocrit      35.0 - 47.0 % 37.4   MCV      78 - 100 fl 85   MCH      26.5 - 33.0 pg 27.4   MCHC      31.5 - 36.5 g/dL 32.4   RDW      10.0 - 15.0 % 14.7   Platelet Count      150 - 450 10e9/L 226   Diff Method       Automated Method   % Neutrophils      % 86.2   % Lymphocytes      % 4.8   % Monocytes      % 8.4   % Eosinophils      % 0.3   % Basophils      % 0.3   Absolute Neutrophil      1.6 - 8.3 10e9/L 6.1   Absolute Lymphocytes      0.8 - 5.3 10e9/L 0.3 (L)   Absolute Monocytes      0.0 - 1.3 10e9/L 0.6   Absolute Eosinophils      0.0 - 0.7 10e9/L 0.0   Absolute Basophils      0.0 - 0.2 10e9/L 0.0   Color Urine       Yellow   Appearance Urine       Clear   Glucose Urine      NEG:Negative mg/dL Negative   Bilirubin Urine      NEG:Negative Negative   Ketones Urine      NEG:Negative mg/dL Negative   Specific Gravity Urine      1.003 - 1.035 <=1.005   Blood Urine      NEG:Negative Negative   pH Urine      5.0 - 7.0 pH 5.5   Protein Albumin Urine      NEG:Negative mg/dL Negative   Urobilinogen Urine      0.2 - 1.0 EU/dL 0.2   Nitrite Urine      NEG:Negative Negative   Leukocyte Esterase Urine      NEG:Negative Negative   Source       Midstream Urine   Creatinine      0.52 - 1.04 mg/dL 0.63   GFR Estimate      >60 mL/min/1.7m2 >90   GFR Estimate If Black      >60 mL/min/1.7m2 >90   Protein Random Urine      g/L <0.05   Protein Total Urine g/gr Creatinine      0 - 0.2 g/g Cr Unable to calculate due to low value   Albumin      3.4 - 5.0 g/dL 3.6   ALT      0 - 50 U/L 28   AST      0 - 45 U/L 20   Complement C3      76 - 169 mg/dL 46 (L)   Complement C4      15 - 50 mg/dL 3 (L)   CRP Inflammation      0.0 - 8.0 mg/L <2.9   Sed Rate      0 - 30 mm/h 26   DNA-ds      <10 IU/mL >379 (H)   Creatinine Urine      mg/dL 16     Component      Latest Ref Rng & Units 7/7/2018    WBC      4.0 - 11.0 10e9/L 7.0   RBC Count      3.8 - 5.2 10e12/L 4.35   Hemoglobin      11.7 - 15.7 g/dL 11.7   Hematocrit      35.0 - 47.0 % 36.0   MCV      78 - 100 fl 83   MCH      26.5 - 33.0 pg 26.9   MCHC      31.5 - 36.5 g/dL 32.5   RDW      10.0 - 15.0 % 15.3 (H)   Platelet Count      150 - 450 10e9/L 224   Diff Method       Automated Method   % Neutrophils      % 91.9   % Lymphocytes      % 4.0   % Monocytes      % 3.7   % Eosinophils      % 0.3   % Basophils      % 0.1   Absolute Neutrophil      1.6 - 8.3 10e9/L 6.5   Absolute Lymphocytes      0.8 - 5.3 10e9/L 0.3 (L)   Absolute Monocytes      0.0 - 1.3 10e9/L 0.3   Absolute Eosinophils      0.0 - 0.7 10e9/L 0.0   Absolute Basophils      0.0 - 0.2 10e9/L 0.0   Color Urine       Yellow   Appearance Urine       Clear   Glucose Urine      NEG:Negative mg/dL Negative   Bilirubin Urine      NEG:Negative Negative   Ketones Urine      NEG:Negative mg/dL Negative   Specific Gravity Urine      1.003 - 1.035 1.010   pH Urine      5.0 - 7.0 pH 5.5   Protein Albumin Urine      NEG:Negative mg/dL Negative   Urobilinogen Urine      0.2 - 1.0 EU/dL 0.2   Nitrite Urine      NEG:Negative Negative   Blood Urine      NEG:Negative Trace (A)   Leukocyte Esterase Urine      NEG:Negative Negative   Source       Midstream Urine   WBC Urine      OTO5:0 - 5 /HPF 0 - 5   RBC Urine      OTO2:O - 2 /HPF O - 2   Squamous Epithelial /LPF Urine      FEW:Few /LPF Few   Creatinine      0.52 - 1.04 mg/dL 0.63   GFR Estimate      >60 mL/min/1.7m2 >90   GFR Estimate If Black      >60 mL/min/1.7m2 >90   Protein Random Urine      g/L 0.07   Protein Total Urine g/gr Creatinine      0 - 0.2 g/g Cr 0.29 (H)   Albumin      3.4 - 5.0 g/dL 3.5   ALT      0 - 50 U/L 27   AST      0 - 45 U/L 21   Complement C3      76 - 169 mg/dL 51 (L)   Complement C4      15 - 50 mg/dL 5 (L)   Creatinine Urine Random      mg/dL 24   CRP Inflammation      0.0 - 8.0 mg/L 11.2 (H)   DNA-ds      <10 IU/mL >379 (H)    Sed Rate      0 - 30 mm/h 35 (H)   Creatinine Urine      mg/dL 23     PFTs 5/2018 (The FEV1 and FVC are reduced but the FEV1/FVC ratio is normal.  The inspiratory flow rates are reduced.  The TLC and FRC are reduced.  The diffusing capacity is normal.  However, the diffusing capacity was not corrected for the patient's hemoglobin.  IMPRESSION:  Moderately Severe  Restriction.  Normal Diffusion.  Walk distance is normal on room air with significant desaturation but no hypoxia.  ____________________________________________KERVIN TONY.      Component      Latest Ref Rng & Units 11/12/2018   Color Urine       Yellow   Appearance Urine       Clear   Glucose Urine      NEG:Negative mg/dL Negative   Bilirubin Urine      NEG:Negative Negative   Ketones Urine      NEG:Negative mg/dL Negative   Specific Gravity Urine      1.003 - 1.035 1.025   pH Urine      5.0 - 7.0 pH 6.0   Protein Albumin Urine      NEG:Negative mg/dL 30 (A)   Urobilinogen Urine      0.2 - 1.0 EU/dL 0.2   Nitrite Urine      NEG:Negative Negative   Blood Urine      NEG:Negative Trace (A)   Leukocyte Esterase Urine      NEG:Negative Negative   Source       Midstream Urine   WBC Urine      OTO5:0 - 5 /HPF 0 - 5   RBC Urine      OTO2:O - 2 /HPF O - 2   Squamous Epithelial /LPF Urine      FEW:Few /LPF Few   Bacteria Urine      NEG:Negative /HPF Few (A)   WBC      4.0 - 11.0 10e9/L 7.3   RBC Count      3.8 - 5.2 10e12/L 4.25   Hemoglobin      11.7 - 15.7 g/dL 11.3 (L)   Hematocrit      35.0 - 47.0 % 36.0   MCV      78 - 100 fl 85   MCH      26.5 - 33.0 pg 26.6   MCHC      31.5 - 36.5 g/dL 31.4 (L)   RDW      10.0 - 15.0 % 14.9   Platelet Count      150 - 450 10e9/L 255   Creatinine      0.52 - 1.04 mg/dL 0.83   GFR Estimate      >60 mL/min/1.7m2 73   GFR Estimate If Black      >60 mL/min/1.7m2 88   Iron      35 - 180 ug/dL 27 (L)   Iron Binding Cap      240 - 430 ug/dL 264   Iron Saturation Index      15 - 46 % 10 (L)   Protein Random Urine      g/L 0.58    Protein Total Urine g/gr Creatinine      0 - 0.2 g/g Cr 0.34 (H)   Albumin      3.4 - 5.0 g/dL 3.2 (L)   ALT      0 - 50 U/L 30   AST      0 - 45 U/L 19   Complement C3      76 - 169 mg/dL 48 (L)   Complement C4      15 - 50 mg/dL 3 (L)   CRP Inflammation      0.0 - 8.0 mg/L 16.4 (H)   DNA-ds      <10 IU/mL >379 (H)   Sed Rate      0 - 30 mm/h 26   Ferritin      8 - 252 ng/mL 160   Creatinine Urine      mg/dL 176     Component      Latest Ref Rng & Units 6/5/2019           8:29 AM   Color Urine       Yellow   Appearance Urine       Slightly Cloudy   Glucose Urine      NEG:Negative mg/dL Negative   Bilirubin Urine      NEG:Negative Negative   Ketones Urine      NEG:Negative mg/dL 5 (A)   Specific Gravity Urine      1.003 - 1.035 1.027   Blood Urine      NEG:Negative Small (A)   pH Urine      5.0 - 7.0 pH 6.0   Protein Albumin Urine      NEG:Negative mg/dL >499 (A)   Urobilinogen mg/dL      0.0 - 2.0 mg/dL 2.0   Nitrite Urine      NEG:Negative Negative   Leukocyte Esterase Urine      NEG:Negative Trace (A)   Source       Midstream Urine   WBC Urine      0 - 5 /HPF 16 (H)   RBC Urine      0 - 2 /HPF 24 (H)   Squamous Epithelial /HPF Urine      0 - 1 /HPF 2 (H)   Mucous Urine      NEG:Negative /LPF Present (A)   Hyaline Casts      0 - 2 /LPF 1   Sodium      133 - 144 mmol/L    Potassium      3.4 - 5.3 mmol/L    Chloride      94 - 109 mmol/L    Carbon Dioxide      20 - 32 mmol/L    Anion Gap      3 - 14 mmol/L    Glucose      70 - 99 mg/dL    Urea Nitrogen      7 - 30 mg/dL    Creatinine      0.52 - 1.04 mg/dL    GFR Estimate      >60 mL/min/1.73:m2    GFR Estimate If Black      >60 mL/min/1.73:m2    Calcium      8.5 - 10.1 mg/dL    Phosphorus      2.5 - 4.5 mg/dL    Albumin      3.4 - 5.0 g/dL    WBC      4.0 - 11.0 10e9/L    RBC Count      3.8 - 5.2 10e12/L    Hemoglobin      11.7 - 15.7 g/dL    Hematocrit      35.0 - 47.0 %    MCV      78 - 100 fl    MCH      26.5 - 33.0 pg    MCHC      31.5 - 36.5 g/dL    RDW       10.0 - 15.0 %    Platelet Count      150 - 450 10e9/L    Specimen Description       Midstream Urine   Special Requests       Specimen received in preservative   Culture Micro       10,000 to 50,000 colonies/mL . . .   Creatinine Urine      mg/dL 240   Albumin Urine mg/L      mg/L    Albumin Urine mg/g Cr      0 - 25 mg/g Cr    Protein Random Urine      g/L 4.50   Protein Total Urine g/gr Creatinine      0 - 0.2 g/g Cr 1.88 (H)   Neutrophil Cytoplasmic Antibody      <1:10 titer    Neutrophil Cytoplasmic Antibody Pattern          Lactate Dehydrogenase      81 - 234 U/L    HIV Antigen Antibody Combo      NR:Nonreactive        Sed Rate      0 - 30 mm/h    CRP Inflammation      0.0 - 8.0 mg/L    Complement C4      15 - 50 mg/dL    Complement C3      76 - 169 mg/dL    DNA-ds      <10 IU/mL      Component      Latest Ref Rng & Units 6/5/2019           8:29 AM   Color Urine          Appearance Urine          Glucose Urine      NEG:Negative mg/dL    Bilirubin Urine      NEG:Negative    Ketones Urine      NEG:Negative mg/dL    Specific Gravity Urine      1.003 - 1.035    Blood Urine      NEG:Negative    pH Urine      5.0 - 7.0 pH    Protein Albumin Urine      NEG:Negative mg/dL    Urobilinogen mg/dL      0.0 - 2.0 mg/dL    Nitrite Urine      NEG:Negative    Leukocyte Esterase Urine      NEG:Negative    Source          WBC Urine      0 - 5 /HPF    RBC Urine      0 - 2 /HPF    Squamous Epithelial /HPF Urine      0 - 1 /HPF    Mucous Urine      NEG:Negative /LPF    Hyaline Casts      0 - 2 /LPF    Sodium      133 - 144 mmol/L    Potassium      3.4 - 5.3 mmol/L    Chloride      94 - 109 mmol/L    Carbon Dioxide      20 - 32 mmol/L    Anion Gap      3 - 14 mmol/L    Glucose      70 - 99 mg/dL    Urea Nitrogen      7 - 30 mg/dL    Creatinine      0.52 - 1.04 mg/dL    GFR Estimate      >60 mL/min/1.73:m2    GFR Estimate If Black      >60 mL/min/1.73:m2    Calcium      8.5 - 10.1 mg/dL    Phosphorus      2.5 - 4.5 mg/dL    Albumin       3.4 - 5.0 g/dL    WBC      4.0 - 11.0 10e9/L    RBC Count      3.8 - 5.2 10e12/L    Hemoglobin      11.7 - 15.7 g/dL    Hematocrit      35.0 - 47.0 %    MCV      78 - 100 fl    MCH      26.5 - 33.0 pg    MCHC      31.5 - 36.5 g/dL    RDW      10.0 - 15.0 %    Platelet Count      150 - 450 10e9/L    Specimen Description          Special Requests          Culture Micro          Creatinine Urine      mg/dL 258   Albumin Urine mg/L      mg/L 2,810   Albumin Urine mg/g Cr      0 - 25 mg/g Cr 1,089.15 (H)   Protein Random Urine      g/L    Protein Total Urine g/gr Creatinine      0 - 0.2 g/g Cr    Neutrophil Cytoplasmic Antibody      <1:10 titer    Neutrophil Cytoplasmic Antibody Pattern          Lactate Dehydrogenase      81 - 234 U/L    HIV Antigen Antibody Combo      NR:Nonreactive        Sed Rate      0 - 30 mm/h    CRP Inflammation      0.0 - 8.0 mg/L    Complement C4      15 - 50 mg/dL    Complement C3      76 - 169 mg/dL    DNA-ds      <10 IU/mL      Component      Latest Ref Rng & Units 6/5/2019          11:57 AM   Color Urine          Appearance Urine          Glucose Urine      NEG:Negative mg/dL    Bilirubin Urine      NEG:Negative    Ketones Urine      NEG:Negative mg/dL    Specific Gravity Urine      1.003 - 1.035    Blood Urine      NEG:Negative    pH Urine      5.0 - 7.0 pH    Protein Albumin Urine      NEG:Negative mg/dL    Urobilinogen mg/dL      0.0 - 2.0 mg/dL    Nitrite Urine      NEG:Negative    Leukocyte Esterase Urine      NEG:Negative    Source          WBC Urine      0 - 5 /HPF    RBC Urine      0 - 2 /HPF    Squamous Epithelial /HPF Urine      0 - 1 /HPF    Mucous Urine      NEG:Negative /LPF    Hyaline Casts      0 - 2 /LPF    Sodium      133 - 144 mmol/L 134   Potassium      3.4 - 5.3 mmol/L 4.3   Chloride      94 - 109 mmol/L 101   Carbon Dioxide      20 - 32 mmol/L 26   Anion Gap      3 - 14 mmol/L 8   Glucose      70 - 99 mg/dL 109 (H)   Urea Nitrogen      7 - 30 mg/dL 21   Creatinine       0.52 - 1.04 mg/dL 0.49 (L)   GFR Estimate      >60 mL/min/1.73:m2 >90   GFR Estimate If Black      >60 mL/min/1.73:m2 >90   Calcium      8.5 - 10.1 mg/dL 9.2   Phosphorus      2.5 - 4.5 mg/dL 4.2   Albumin      3.4 - 5.0 g/dL 2.9 (L)   WBC      4.0 - 11.0 10e9/L 8.7   RBC Count      3.8 - 5.2 10e12/L 4.81   Hemoglobin      11.7 - 15.7 g/dL 12.3   Hematocrit      35.0 - 47.0 % 39.3   MCV      78 - 100 fl 82   MCH      26.5 - 33.0 pg 25.6 (L)   MCHC      31.5 - 36.5 g/dL 31.3 (L)   RDW      10.0 - 15.0 % 14.1   Platelet Count      150 - 450 10e9/L 302   Specimen Description          Special Requests          Culture Micro          Creatinine Urine      mg/dL    Albumin Urine mg/L      mg/L    Albumin Urine mg/g Cr      0 - 25 mg/g Cr    Protein Random Urine      g/L    Protein Total Urine g/gr Creatinine      0 - 0.2 g/g Cr    Neutrophil Cytoplasmic Antibody      <1:10 titer <1:10   Neutrophil Cytoplasmic Antibody Pattern       The ANCA IFA is <1:10.  No further testing will be performed.   Lactate Dehydrogenase      81 - 234 U/L 252 (H)   HIV Antigen Antibody Combo      NR:Nonreactive     Nonreactive   Sed Rate      0 - 30 mm/h 40 (H)   CRP Inflammation      0.0 - 8.0 mg/L 25.7 (H)   Complement C4      15 - 50 mg/dL 3 (L)   Complement C3      76 - 169 mg/dL 53 (L)   DNA-ds      <10 IU/mL >379 (H)   [facility]                                                      CMR Report       MRN:                  [id number]                                  Name:           CHILANGO GIBBONS                                   :                  1965                                  Scan Date:   2019 11:11:32                                   Electronically signed by Fer Corea 2019-May-20 14:19:00     SUMMARY   ==========================================================================================================     Clinical history: 54-year old female with SLE, poly-serositis, and chronic pleuritic  chest pain. CMR to  assess for cardiac involvement (sepideh-myocarditis).  Comparison CMR: None.      1. The LV is normal in cavity size and wall thickness. The global systolic function is normal. The LVEF is  62%.   There are no regional wall motion abnormalities.     2. The RV is normal in cavity size. The global systolic function is normal. The RVEF is 60%.      3. Both atria are normal in size.     4. There is no significant valvular disease.      5. Late gadolinium enhancement imaging shows no MI, fibrosis or infiltrative disease.      6. The is mild pericardial thickening and tethering, with circumferential pericardial enhancement. There is  no pericardial effusion.       7. There is no intracardiac thrombus.     8. The main PA is moderately enlarged, measuring 3.8 cm.      CONCLUSIONS: Pericardial thickening and enhancement consistent with inflammatory pericarditis. There is no  myocardial fibrosis or myocarditis. Normal biventricular size and systolic function; LVEF 62%, RVEF 60%.      CORE EXAM   ==========================================================================================================     MEASUREMENTS   ----------------------------------------------------------------------------------------      VOLUMETRIC ANALYSIS       ----------------------------------------------  .--------------------------------------------------------.                    LV    Reference  RV    Reference   +------+-----------+------+-----------+------+-----------+   EDV   ml          162   ()   159   ()          ml/m^2     69.2   (56-90)   67.9   (53-90)     ESV   ml           62   (22-59)     64   (15-68)           ml/m^2     26.5   (14-33)   27.4   (11-37)     CO    L/min      7.30             6.93                     L/min/m^2   3.1              3.0               MASS                                                 SV    ml          100   ()     95   ()          ml/m^2     42.7   (37-62)   40.6   (36-60)     EF    %            62   (59-77)     60   (55-79)    '------+-----------+------+-----------+------+-----------'             CARDIAC OUTPUT HR:  73 BPM      LA DIMENSIONS (LV SYSTOLE)       ----------------------------------------------          DIAMETER:  3.9 cm         AORTIC ROOT DIMENSIONS       ----------------------------------------------          SINUS OF VALSALVA:  2.9 cm          AORTIC ROOT SIZE:  Normal        ANATOMY   ----------------------------------------------------------------------------------------      LEFT VENTRICLE       ----------------------------------------------          WALL THICKNESS:  Normal          CAVITY SIZE:  Normal         RIGHT VENTRICLE       ----------------------------------------------          WALL THICKNESS:  Normal          CONTRACTILITY:  Normal          CAVITY SIZE:  Normal         INTERVENTRICULAR SEPTUM       ----------------------------------------------               VENTRICULAR SEPTUM:  Normal          INTERATRIAL SEPTUM       ----------------------------------------------               ATRIAL SEPTUM:          LIPOMATOUS HYPERTROPHY          LEFT ATRIUM       ----------------------------------------------          CAVITY SIZE:  Normal         RIGHT ATRIUM       ----------------------------------------------          CAVITY SIZE:  Normal         PERICARDIUM       ----------------------------------------------          THICKENED          THICKNESS:  5 mm          EFFUSION:  None         PLEURAL EFFUSION       ----------------------------------------------               None         VALVES   ----------------------------------------------------------------------------------------      AORTIC VALVE       ----------------------------------------------          AORTIC VALVE LEAFLETS:  Trileaflet         MITRAL VALVE       ----------------------------------------------          MITRAL  VALVE LEAFLETS:  Normal Leaflets         TRICUSPID VALVE       ----------------------------------------------          TRICUSPID VALVE LEAFLETS:  Normal Leaflets         PULMONIC VALVE       ----------------------------------------------          PULMONIC VALVE LEAFLETS:  Normal Leaflets        17 SEGMENT   ----------------------------------------------------------------------------------------  .------------------------------------------------------------------------------------------.   Segments            Wall Motion   Hyperenhancement  Stress Perfusion  Interpretation   +--------------------+--------------+------------------+------------------+----------------+   Base Anterior       Normal/Hyper  None                                Normal            Base Anteroseptal   Normal/Hyper  None                                Normal            Base Inferoseptal   Normal/Hyper  None                                Normal            Base Inferior       Normal/Hyper  None                                Normal            Base Inferolateral  Normal/Hyper  None                                Normal            Base Anterolateral  Normal/Hyper  None                                Normal            Mid Anterior        Normal/Hyper  None                                Normal            Mid Anteroseptal    Normal/Hyper  None                                Normal            Mid Inferoseptal    Normal/Hyper  None                                Normal            Mid Inferior        Normal/Hyper  None                                Normal            Mid Inferolateral   Normal/Hyper  None                                Normal            Mid Anterolateral   Normal/Hyper  None                                Normal            Apical Anterior     Normal/Hyper  None                                Normal            Apical Septal       Normal/Hyper  None                                 Normal            Apical Inferior     Normal/Hyper  None                                Normal            Apical Lateral      Normal/Hyper  None                                Normal            [place]                Normal/Hyper  None                                Normal           +--------------------+--------------+------------------+------------------+----------------+   RV Segments         Wall Motion   Hyperenhancement  Stress Perfusion  Interpretation   +--------------------+--------------+------------------+------------------+----------------+   RV Basal Anterior   Normal/Hyper  None                                Normal            RV Basal Inferior   Normal/Hyper  None                                Normal            RV Mid              Normal/Hyper  None                                Normal            RV Apical           Normal/Hyper  None                                Normal           '--------------------+--------------+------------------+------------------+----------------'         FINDINGS       ----------------------------------------------          INFARCT/SCAR SIZE:  0 %        SCAN INFO   ==========================================================================================================     GENERAL   ----------------------------------------------------------------------------------------      CONTRAST AGENT       ----------------------------------------------          TYPE:  Gadavist          VOLUME ADMINISTERED:  10 ml          DOSAGE FOR 0.5M:  0.04 mmol/kg          SERUM CREATININE:  0.58 sCr          CREATININE DATE:  2019-03-06 00:00:00          SEDATION       ----------------------------------------------          SEDATION USED?:  No         VITALS       ----------------------------------------------          HEIGHT:  66.00 in          HEIGHT:  167.64 cm          WEIGHT:  289.00 lbs          WEIGHT:  131.09  "kgs          BSA:  2.34 m^2         PULSE SEQUENCES       ----------------------------------------------          Single-Shot SSFP, IR GRE - Segmented, IR SSFP - Single Shot, SSFP Cine          SETUP       ----------------------------------------------          TYPE:  Clinical          INPATIENT:  No          INCOMPLETE SCAN:  No          REASON(S) FOR SCAN:  Cardiomyopathy, Pericardial Evaluation           REFERRING PHYSICIAN:  ROBE VAZQUEZ          ATTENDING PHYSICIAN:  ROBE VAZQUEZ        Ph.E:    /88 (BP Location: Left arm, Patient Position: Sitting, Cuff Size: Adult Large)   Pulse 97   Temp 98.6  F (37  C) (Oral)   Ht 1.676 m (5' 6\")   Wt 125.1 kg (275 lb 12.8 oz)   SpO2 95%   BMI 44.52 kg/m        Constitutional: WD/WN. Pleasant. In no acute distress.   Eyes: EOM intact, PERRLA, sclera anicteric, conj not injected  HEENT: No oral ulcers or thrush. Normal salivary pool.   Neck: No cervical LAP or thyromegaly  Chest: Clear to auscultation bilaterally  CV: RRR, no murmurs/ rubs or gallops. No edema, clubbing or cyanosis.   GI: Abdomen is soft and non tender.   MS: No synovitis. Cool joints. No tenderness of the joints. No swelling. Normal ROM.  No joint deformities. Full ROM of the joints. No nodules. No Jaccoud's deformity.  Skin: No skin rash, malar rash, livedo, periungual erythema, alopecia, digital ulcers or nail changes.  Neuro: A&O x 3. Grossly non focal, muscular power 5/5 in all ext  Psych: NL affect and mood    Assessment/ plan:    1-SLE. 55 yo WF with +fh/o RA and personal hx of SLE presented in 12/2017 for 2nd opinion of m/o her SLE. She was diagnosed with SLE at age 27. Her lupus has been marked by +KARLIE (highest titer 1:640, speckled and nucleolar patterns), +anti-DNA, arthritis, malar rash, serositis (pleuritic CP) supported by +SSA/SSB Ab, low C3/low C4, +RF, high ESR/CRP. She had neg anti-RNP, anti-Sm, acL IgM/G/A, LAC, cryo and anti-CCP.     Had NL C3 at the time " of Dx. She was treated with prednisone and HCQ at the time of Dx. Can't remember how long she was on HCQ and why HCQ was stopped, but it probably was stopped because of stable disease, no report on HCQ toxicity. Reportedly her SLE was mild all these years.    Has secondary Sjogren's with sicca, +SSA/SSB Ab and h/o MALT lymphoma at age 44 in remission. Uses blink eyedrops and salagen. No lip biopsy was done to confirm Dx of Sjogren's.    Her lupus flared in 7/2017 with no triggers when she presented with arthritis, HCQ was resumed, arthritis resolved. Mild pulm HTN on 2D-Echo 8/2017 but neg R cardiac cath and VQ scan in 3/2018, also neg 2D-Echo.    In 12/2017, was prescribed prednisone 40 mg for only 5 days for pleuritic CP, CP recurred after stopping prednisone.     Her major complaint at initial visit with me in 12/2017 was ongoing CP. AZA was added in 2/2018 with start dose of 50 mg qd and slowly was increased to max 150 mg qd. Tolerated AZA well, no SEs, no toxicity on the labs but failed it and required to go back on prednisone 20 mg qd (current dose).     Her lupus is active with pleuritic CP. ESR/CRP normalized on prednisone+AZA+HCQ but +anti-DNA, low C3/C4 are unchanged. Chest CT in 12/2017 without contrast showed air trapping due to lupus/Sjogren's, no pleural effusion. Lung nodules need f/u in a year. No pericardail effusion on 2D-echo and neg VQ scan for PE in 3/2018 so chest CT with contrast is not needed. She is APL negative.    Lupus Dx and tx plan was discussed with her.    AZA was switched to  mg po bid on 5/18/2018 as she failed AZA. She is now on max dose of MMF 1500 mg bid. Her labs are unchanged with persistently elevated anti-DNA, low C3/C4 and high ESR/CRP, but just started to feel a lot better (regarding fatigue, arthralgia, still has residual pleuritic CP) after adding MMF.     Had interstitial changes at the base of lung on outside chest CT (done 7/2018 for f/u MALT lymphoma, also  showed stable pulm nodules with trace R pleural and pericardial effusion) and abnormal PFTs (mod-severe restriction 5/2018).Was seen by Dr. 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 Also possible shrinking lung syn sec lupus or obesity is responsible for restrictive lung disease plus pleural effusion. She was prescribed albuterol and dulera inh which have helped.      Benlysta was added in 9/2018 to help with pleuritic CP. No change in lupus serology (anti-DNA, C3, C4), but ESR/CRP have improved. Overall she felt a lot better after adding benlysta but it made her tired.     I could not taper her off the prednisone, she continued to have active IA, fatigue and pleuritic CP. I recommended switching benlysta to rituximab given her h/o lymphoma, unfortunately her insurance denied. At the same time, Taina misunderstood and stopped her cellcept as thought we were switching MMF to rituximab while the plan was to add rituximab to MMF. She has been off MMF for couple of months. Had cardiac MRI on 5/20 which showed active pericarditis. I advised to switch to cytoxan 750 mg/m2 qmo x 6 mo. On Saturday 6/1/2019, received a call from infusion center reporting blood in the urine and if it was ok to proceed with cytoxan. I was concerned as that was very new. We canceled the cytoxan infusion. UCx was negative for UTI. Repeat U/A today is showing protein and blood, with h/o stopping MMF, very concerning for lupus nephritis. She never had lupus nephritis as part of her lupus and it's possible that it was covered by MMF and showed up off MMF. Renal biopsy is till recommended to confirm Dx, evaluate degree of severity, chronicity and rule out other diagnoses. I spoke to renal team and dr. Elmore and Aura kindly agreed to see her today as urgent consult as add on to their schedule and scheduled renal biopsy for Friday 6/7/2019. I would re-schedule cytoxan to 6/15/2019 and schedule pulse solumedrol 1 gr every day  x 3 days. Meanwhile, will increase prednisone to 60 mg every day. Cytoxan risks were discussed again.        Recommend:      For today, increase prednisone to 60 mg a day    Starting Sat Madyson 15: Cytoxan as planned monthly x 6    Pulse IV solumedrol 1 gram a day x 3 days followed by prednisone 60 mg a day x one month, 50-40-30 mg a day each for one week then 25 mg a day after renal biopsy    Will follow up with pulmonary.    Continue the plaquenil 200 mg twice a day       2-HCQ monitoring. Was reminded to have eye exam    3-PCP prophylaxis. Pentamidine monthly        Keep July appointment, then schedule one for September        Orders Placed This Encounter   Procedures     DNA double stranded antibodies     Complement C3     Complement C4     CRP inflammation     Erythrocyte sedimentation rate auto

## 2019-06-05 NOTE — PATIENT INSTRUCTIONS
For today, increase prednisone to 60 mg a day    Starting Sat Madyson 15: Cytoxan as planned monthly x 6    Pentamidine monthly    Pulse IV solumedrol 1 gram a day x 3 days followed by prednisone 60 mg a day x one month, 50-40-30 mg a day each for one week then 25 mg a day    Keep July appointment, then schedule one for September

## 2019-06-05 NOTE — NURSING NOTE
"Chief Complaint   Patient presents with     RECHECK     Lupus     /88 (BP Location: Left arm, Patient Position: Sitting, Cuff Size: Adult Large)   Pulse 97   Temp 98.6  F (37  C) (Oral)   Ht 1.676 m (5' 6\")   Wt 125.1 kg (275 lb 12.8 oz)   SpO2 95%   BMI 44.52 kg/m    Charu Elmore CMA  "

## 2019-06-06 ENCOUNTER — TELEPHONE (OUTPATIENT)
Dept: RHEUMATOLOGY | Facility: CLINIC | Age: 54
End: 2019-06-06

## 2019-06-06 DIAGNOSIS — L93.0 LUPUS ERYTHEMATOSUS, UNSPECIFIED FORM: ICD-10-CM

## 2019-06-06 DIAGNOSIS — Z29.89 NEED FOR PNEUMOCYSTIS PROPHYLAXIS: ICD-10-CM

## 2019-06-06 LAB
BACTERIA SPEC CULT: NORMAL
C3 SERPL-MCNC: 53 MG/DL (ref 76–169)
C4 SERPL-MCNC: 3 MG/DL (ref 15–50)
DSDNA AB SER-ACNC: >379 IU/ML
Lab: NORMAL
SPECIMEN SOURCE: NORMAL

## 2019-06-07 ENCOUNTER — HOSPITAL ENCOUNTER (OUTPATIENT)
Dept: ULTRASOUND IMAGING | Facility: CLINIC | Age: 54
End: 2019-06-07
Attending: RADIOLOGY | Admitting: RADIOLOGY
Payer: COMMERCIAL

## 2019-06-07 ENCOUNTER — ANCILLARY PROCEDURE (OUTPATIENT)
Dept: ULTRASOUND IMAGING | Facility: CLINIC | Age: 54
End: 2019-06-07
Payer: COMMERCIAL

## 2019-06-07 ENCOUNTER — APPOINTMENT (OUTPATIENT)
Dept: MEDSURG UNIT | Facility: CLINIC | Age: 54
End: 2019-06-07
Attending: RADIOLOGY
Payer: COMMERCIAL

## 2019-06-07 ENCOUNTER — HOSPITAL ENCOUNTER (OUTPATIENT)
Facility: CLINIC | Age: 54
Discharge: HOME | End: 2019-06-07
Attending: RADIOLOGY | Admitting: INTERNAL MEDICINE
Payer: COMMERCIAL

## 2019-06-07 VITALS
WEIGHT: 275 LBS | OXYGEN SATURATION: 95 % | HEIGHT: 66 IN | BODY MASS INDEX: 44.2 KG/M2 | SYSTOLIC BLOOD PRESSURE: 120 MMHG | RESPIRATION RATE: 16 BRPM | DIASTOLIC BLOOD PRESSURE: 62 MMHG

## 2019-06-07 VITALS
HEART RATE: 89 BPM | SYSTOLIC BLOOD PRESSURE: 134 MMHG | RESPIRATION RATE: 16 BRPM | TEMPERATURE: 98.7 F | DIASTOLIC BLOOD PRESSURE: 64 MMHG | OXYGEN SATURATION: 96 %

## 2019-06-07 DIAGNOSIS — N04.9 NEPHROTIC SYNDROME: ICD-10-CM

## 2019-06-07 DIAGNOSIS — R80.9 PROTEINURIA: ICD-10-CM

## 2019-06-07 PROBLEM — Z29.89 NEED FOR PNEUMOCYSTIS PROPHYLAXIS: Status: ACTIVE | Noted: 2019-06-07

## 2019-06-07 LAB
ANION GAP SERPL CALCULATED.3IONS-SCNC: 8 MMOL/L (ref 3–14)
BASOPHILS # BLD AUTO: 0 10E9/L (ref 0–0.2)
BASOPHILS NFR BLD AUTO: 0.1 %
BUN SERPL-MCNC: 26 MG/DL (ref 7–30)
CALCIUM SERPL-MCNC: 9.2 MG/DL (ref 8.5–10.1)
CHLORIDE SERPL-SCNC: 103 MMOL/L (ref 94–109)
CO2 SERPL-SCNC: 24 MMOL/L (ref 20–32)
CREAT SERPL-MCNC: 0.55 MG/DL (ref 0.52–1.04)
DIFFERENTIAL METHOD BLD: ABNORMAL
EOSINOPHIL # BLD AUTO: 0 10E9/L (ref 0–0.7)
EOSINOPHIL NFR BLD AUTO: 0 %
ERYTHROCYTE [DISTWIDTH] IN BLOOD BY AUTOMATED COUNT: 14.1 % (ref 10–15)
GFR SERPL CREATININE-BSD FRML MDRD: >90 ML/MIN/{1.73_M2}
GLUCOSE SERPL-MCNC: 116 MG/DL (ref 70–99)
HCT VFR BLD AUTO: 37.2 % (ref 35–47)
HGB BLD-MCNC: 11.4 G/DL (ref 11.7–15.7)
HGB BLD-MCNC: 11.4 G/DL (ref 11.7–15.7)
IMM GRANULOCYTES # BLD: 0.1 10E9/L (ref 0–0.4)
IMM GRANULOCYTES NFR BLD: 1.3 %
INR PPP: 0.95 (ref 0.86–1.14)
LYMPHOCYTES # BLD AUTO: 0.3 10E9/L (ref 0.8–5.3)
LYMPHOCYTES NFR BLD AUTO: 3.9 %
MCH RBC QN AUTO: 25.1 PG (ref 26.5–33)
MCHC RBC AUTO-ENTMCNC: 30.6 G/DL (ref 31.5–36.5)
MCV RBC AUTO: 82 FL (ref 78–100)
MONOCYTES # BLD AUTO: 0.3 10E9/L (ref 0–1.3)
MONOCYTES NFR BLD AUTO: 3.7 %
NEUTROPHILS # BLD AUTO: 7.2 10E9/L (ref 1.6–8.3)
NEUTROPHILS NFR BLD AUTO: 91 %
NRBC # BLD AUTO: 0 10*3/UL
NRBC BLD AUTO-RTO: 0 /100
PLATELET # BLD AUTO: 262 10E9/L (ref 150–450)
POTASSIUM SERPL-SCNC: 4.3 MMOL/L (ref 3.4–5.3)
RBC # BLD AUTO: 4.55 10E12/L (ref 3.8–5.2)
SODIUM SERPL-SCNC: 136 MMOL/L (ref 133–144)
WBC # BLD AUTO: 7.9 10E9/L (ref 4–11)

## 2019-06-07 PROCEDURE — 85018 HEMOGLOBIN: CPT | Performed by: RADIOLOGY

## 2019-06-07 PROCEDURE — 88350 IMFLUOR EA ADDL 1ANTB STN PX: CPT | Performed by: RADIOLOGY

## 2019-06-07 PROCEDURE — 88305 TISSUE EXAM BY PATHOLOGIST: CPT | Performed by: RADIOLOGY

## 2019-06-07 PROCEDURE — 25000125 ZZHC RX 250: Performed by: GENERAL ACUTE CARE HOSPITAL

## 2019-06-07 PROCEDURE — 25000132 ZZH RX MED GY IP 250 OP 250 PS 637: Performed by: GENERAL ACUTE CARE HOSPITAL

## 2019-06-07 PROCEDURE — 40000168 ZZH STATISTIC PP CARE STAGE 3

## 2019-06-07 PROCEDURE — 85025 COMPLETE CBC W/AUTO DIFF WBC: CPT | Performed by: INTERNAL MEDICINE

## 2019-06-07 PROCEDURE — 88313 SPECIAL STAINS GROUP 2: CPT | Performed by: RADIOLOGY

## 2019-06-07 PROCEDURE — 88346 IMFLUOR 1ST 1ANTB STAIN PX: CPT | Performed by: RADIOLOGY

## 2019-06-07 PROCEDURE — 80048 BASIC METABOLIC PNL TOTAL CA: CPT | Performed by: INTERNAL MEDICINE

## 2019-06-07 PROCEDURE — 76942 ECHO GUIDE FOR BIOPSY: CPT | Mod: TC

## 2019-06-07 PROCEDURE — 88348 ELECTRON MICROSCOPY DX: CPT | Performed by: RADIOLOGY

## 2019-06-07 PROCEDURE — 85610 PROTHROMBIN TIME: CPT | Performed by: INTERNAL MEDICINE

## 2019-06-07 RX ORDER — ALPRAZOLAM 0.5 MG
0.5 TABLET ORAL ONCE
Status: DISCONTINUED | OUTPATIENT
Start: 2019-06-07 | End: 2019-06-07

## 2019-06-07 RX ORDER — PREDNISONE 20 MG/1
60 TABLET ORAL DAILY
Qty: 90 TABLET | Refills: 0 | Status: SHIPPED | OUTPATIENT
Start: 2019-06-07 | End: 2019-09-06

## 2019-06-07 RX ORDER — NICOTINE POLACRILEX 4 MG
15-30 LOZENGE BUCCAL
Status: DISCONTINUED | OUTPATIENT
Start: 2019-06-07 | End: 2019-06-07 | Stop reason: HOSPADM

## 2019-06-07 RX ORDER — NICOTINE POLACRILEX 4 MG
15-30 LOZENGE BUCCAL
Status: DISCONTINUED | OUTPATIENT
Start: 2019-06-07 | End: 2019-06-08 | Stop reason: HOSPADM

## 2019-06-07 RX ORDER — LORAZEPAM 0.5 MG/1
1 TABLET ORAL ONCE
Status: COMPLETED | OUTPATIENT
Start: 2019-06-07 | End: 2019-06-07

## 2019-06-07 RX ORDER — DEXTROSE MONOHYDRATE 25 G/50ML
25-50 INJECTION, SOLUTION INTRAVENOUS
Status: DISCONTINUED | OUTPATIENT
Start: 2019-06-07 | End: 2019-06-08 | Stop reason: HOSPADM

## 2019-06-07 RX ORDER — ACETAMINOPHEN 325 MG/1
650 TABLET ORAL EVERY 4 HOURS PRN
Status: DISCONTINUED | OUTPATIENT
Start: 2019-06-07 | End: 2019-06-07 | Stop reason: HOSPADM

## 2019-06-07 RX ORDER — PENTAMIDINE ISETHIONATE 300 MG/300MG
300 INHALANT RESPIRATORY (INHALATION) ONCE
Status: CANCELLED | OUTPATIENT
Start: 2019-06-07

## 2019-06-07 RX ORDER — LIDOCAINE HYDROCHLORIDE 10 MG/ML
30 INJECTION, SOLUTION INFILTRATION; PERINEURAL ONCE
Status: COMPLETED | OUTPATIENT
Start: 2019-06-07 | End: 2019-06-07

## 2019-06-07 RX ORDER — ALBUTEROL SULFATE 0.83 MG/ML
2.5 SOLUTION RESPIRATORY (INHALATION) ONCE
Status: CANCELLED | OUTPATIENT
Start: 2019-06-07

## 2019-06-07 RX ORDER — LIDOCAINE 40 MG/G
CREAM TOPICAL
Status: DISCONTINUED | OUTPATIENT
Start: 2019-06-07 | End: 2019-06-08 | Stop reason: HOSPADM

## 2019-06-07 RX ORDER — DEXTROSE MONOHYDRATE 25 G/50ML
25-50 INJECTION, SOLUTION INTRAVENOUS
Status: DISCONTINUED | OUTPATIENT
Start: 2019-06-07 | End: 2019-06-07 | Stop reason: HOSPADM

## 2019-06-07 RX ADMIN — LIDOCAINE HYDROCHLORIDE 9 ML: 10 INJECTION, SOLUTION EPIDURAL; INFILTRATION; INTRACAUDAL; PERINEURAL at 10:03

## 2019-06-07 RX ADMIN — LORAZEPAM 1 MG: 0.5 TABLET ORAL at 09:17

## 2019-06-07 RX ADMIN — ACETAMINOPHEN 650 MG: 325 TABLET, FILM COATED ORAL at 12:42

## 2019-06-07 ASSESSMENT — MIFFLIN-ST. JEOR: SCORE: 1874.14

## 2019-06-07 NOTE — PROGRESS NOTES
Vital signs remained stable. Pt ate/drank/walked around the unit/voided without issues. Note: per pt voided urine was clear yellow without blood. Rt flank site remained CDI. No hematoma. Hg recheck 4 hours post procedure at 1414 was 11.4 (unchanged), Updated MD. ELIZABETH hood'jimenez Reviewed discharge information with patient. Patient verbalized understanding and did not have any further questions. Pt wheeled out of unit in a wheelchair by son at 1452.

## 2019-06-07 NOTE — DISCHARGE INSTRUCTIONS
[facility]    Interventional Radiology  Patient Instructions Following Biopsy    AFTER YOU GO HOME  ? If you were given sedation DO NOT drive or operate machinery at home or at work for at least 24 hours  ? DO relax and take it easy for 48 hours, no strenuous activity for 24 hours  ? DO drink plenty of fluids  ? DO resume your regular diet, unless otherwise instructed by your Primary Physician  ? Keep the dressing dry and in place for 24 hours.  ? DO NOT SMOKE FOR AT LEAST 24 HOURS, if you have been given any medications that were to help you relax or sedate you during your procedure  ? DO NOT drink alcoholic beverages the day of your procedure  ? DO NOT do any strenuous exercise or lifting (> 10 lbs) for at least 7 days following your procedure  ? DO NOT take a bath or shower for at least 12 hours following your procedure  ? Remove dressing after shower the next day. Replace with Band aid for 2 days.  Never leave a wet dressing in place.  ? DO NOT make any important or legal decisions for 24 hours following your procedure  ? There should be minimum drainage from the biopsy site    CALL THE PHYSICIAN IF:  ? You start bleeding from the procedure site.  If you do start to bleed from that site, lie down flat and hold pressure on the site for a minimum of 10 minutes.  Your physician will tell you if you need to return to the hospital  ? You develop nausea or vomiting  ? You have excessive swelling, redness, or tenderness at the site  ? You have drainage that looks like it is infected.  ? You experience severe pain  ? You develop hives or a rash or unexplained itching  ? You develop shortness of breath  ? You develop a temperature of 101 degrees F or greater  ? You develop bloody clots or red urine after you are discharged  ? You develop chest pain or cough up blood, lightheadedness or fainting    ADDITIONAL INSTRUCTIONS  If you are taking Coumadin, restart tonight.  Follow up with your Coumadin  Clinic or Primary Care MD to have your INR rechecked.    [facility] INTERVENTIONAL RADIOLOGY DEPARTMENT  Procedure Physician: Dr. Rogel, and Dr. Early        Date of procedure: June 7, 2019  Telephone Numbers: [phone] Monday-Friday 8:00 am to 4:30 pm  [phone] After 4:30 pm Monday-Friday, Weekends & Holidays.   Ask for the Interventional Radiologist on call.  Someone is on call 24 hrs/day  [facility] toll free number: [phone] Monday-Friday 8:00 am to 4:30 pm  [facility] Emergency Dept: [phone]

## 2019-06-07 NOTE — PROGRESS NOTES
Pt arrived to  for renal US biopsy. VSS. IV inserted and labs pending. H&P up to date. Consent needs to be signed.     0922: Pt administered 1mg ativan PO per orders. Labs WNL. Consent signed and patient wheeled in bed to procedure.

## 2019-06-07 NOTE — PROGRESS NOTES
Pt back from U/S s/p right kidney biopsy.  VSS.  Pt alert and oriented x4.  Pt denies any pain.  Rt flank site F/D/I.  Pt's family in the gold waiting room.

## 2019-06-07 NOTE — IP AVS SNAPSHOT
MRN:[id number]                      After Visit Summary   6/7/2019    Taina Singh    MRN: [id number]           Visit Information        Provider Department      6/7/2019  9:00 AM UUUS2 [facility], [place], Ultrasound           Review of your medicines      Notice    This visit is during an admission. Changes to the med list made in this visit will be reflected in the After Visit Summary of the admission.           Protect others around you: Learn how to safely use, store and throw away your medicines at www.disposemymeds.org.       Follow-ups after your visit       Your next 10 appointments already scheduled    Jun 13, 2019  7:30 AM CDT  FULL PULMONARY FUNCTION with  PFL C  [facility] Pulmonary Function Testing ([facility]) [address]  3rd Floor  [facility] [address]  [phone]      Jun 13, 2019  8:30 AM CDT  (Arrive by 8:15 AM)  Return Interstitial Lung with Joanne Marvin MD  [facility] ([facility]) [address]  [address]  [facility] [address]  [phone]      Jun 27, 2019 12:00 PM CDT  Lab with  LAB  [facility] ([facility]) [address]  1st Floor  [facility] [address]  [phone]      Jun 27, 2019  1:00 PM CDT  (Arrive by 12:30 PM)  Return Visit with Maynor Miranda MD  [facility] Nephrology ([facility]) [address]  [address]  [facility] [address]  [phone]      Jul 03, 2019  9:30 AM CDT  (Arrive by 9:15 AM)  RETURN LUPUS with Killian Marroquin MD  [facility] Rheumatology ([facility]) [address]  [address]  [facility] [address]  [phone]      Sep 06, 2019  3:00 PM CDT  (Arrive by 2:45 PM)  RETURN LUPUS with Killian Marroquin MD  [facility] Rheumatology ([facility]) [address] [place] [address]  [address]  [facility]  [id number] [phone]         Care Instructions       Further instructions from your care team       [facility]    Interventional Radiology  Patient Instructions Following Biopsy    AFTER YOU GO HOME  ? If you were given sedation DO NOT drive or operate machinery at home or at work for at least 24 hours  ? DO relax and take it easy for 48 hours, no strenuous activity for 24 hours  ? DO drink plenty of fluids  ? DO resume your regular diet, unless otherwise instructed by your Primary Physician  ? Keep the dressing dry and in place for 24 hours.  ? DO NOT SMOKE FOR AT LEAST 24 HOURS, if you have been given any medications that were to help you relax or sedate you during your procedure  ? DO NOT drink alcoholic beverages the day of your procedure  ? DO NOT do any strenuous exercise or lifting (> 10 lbs) for at least 7 days following your procedure  ? DO NOT take a bath or shower for at least 12 hours following your procedure  ? Remove dressing after shower the next day. Replace with Band aid for 2 days.  Never leave a wet dressing in place.  ? DO NOT make any important or legal decisions for 24 hours following your procedure  ? There should be minimum drainage from the biopsy site    CALL THE PHYSICIAN IF:  ? You start bleeding from the procedure site.  If you do start to bleed from that site, lie down flat and hold pressure on the site for a minimum of 10 minutes.  Your physician will tell you if you need to return to the hospital  ? You develop nausea or vomiting  ? You have excessive swelling, redness, or tenderness at the site  ? You have drainage that looks like it is infected.  ? You experience severe pain  ? You develop hives or a rash or unexplained itching  ? You develop shortness of breath  ? You develop a temperature of 101 degrees F or greater  ? You develop bloody clots or red urine after you are discharged  ? You develop chest pain or cough up blood, lightheadedness or  "fainting    ADDITIONAL INSTRUCTIONS  If you are taking Coumadin, restart tonight.  Follow up with your Coumadin Clinic or Primary Care MD to have your INR rechecked.    [facility] INTERVENTIONAL RADIOLOGY DEPARTMENT  Procedure Physician: Dr. Rogel, and Dr. Early        Date of procedure: June 7, 2019  Telephone Numbers: [phone] Monday-Friday 8:00 am to 4:30 pm  [phone] After 4:30 pm Monday-Friday, Weekends & Holidays.   Ask for the Interventional Radiologist on call.  Someone is on call 24 hrs/day  [facility] toll free number: [phone] Monday-Friday 8:00 am to 4:30 pm  [facility] Emergency Dept: [phone]          Additional Information About Your Visit       [employer] Information    [employer] gives you secure access to your electronic health record. If you see a primary care provider, you can also send messages to your care team and make appointments. If you have questions, please call your primary care clinic.  If you do not have a primary care provider, please call [phone] and they will assist you.       Care EveryWhere ID    This is your Care EveryWhere ID. This could be used by other organizations to access your [place] medical records  [id number]       Your Vitals Were     Blood Pressure   120/62 (BP Location: Left arm)          Respirations   16          Height   1.676 m (5' 6\")          Weight   124.7 kg (275 lb)          Pulse Oximetry   95%             BMI (Body Mass Index)   44.39 kg/m           Primary Care Provider    [facility]      Equal Access to Services    ELIO BAKER : Hadii aad ku hadasho Soomaali, waaxda luqadaha, qaybta kaalmada adeegyada, waxay idiin haymirin [place] crum . So [facility] [phone].    ATENCIÓN: Si habla español, tiene a vyas disposición servicios gratuitos de asistencia lingüística. Llame al [phone].    We comply with applicable federal civil rights laws and [place] laws. We do not discriminate on the basis of race, color, national origin, " age, disability, sex, sexual orientation, or gender identity.           Thank you!    Thank you for choosing [facility] for your care. Our goal is always to provide you with excellent care. Hearing back from our patients is one way we can continue to improve our services. Please take a few minutes to complete the written survey that you may receive in the mail after you visit with us. Thank you!         Medication List     Notice    This visit is during an admission. Changes to the med list made in this visit will be reflected in the After Visit Summary of the admission.

## 2019-06-07 NOTE — IP AVS SNAPSHOT
[facility], [place], [address] [address]  Phone:  [phone]                                    After Visit Summary   6/7/2019    Taina Singh    MRN: [id number]           After Visit Summary Signature Page    I have received my discharge instructions, and my questions have been answered. I have discussed any challenges I see with this plan with the nurse or doctor.    ..........................................................................................................................................  Patient/Patient Representative Signature      ..........................................................................................................................................  Patient Representative Print Name and Relationship to Patient    ..................................................               ................................................  Date                                   Time    ..........................................................................................................................................  Reviewed by Signature/Title    ...................................................              ..............................................  Date                                               Time          22EPIC Rev 08/18

## 2019-06-08 NOTE — PROGRESS NOTES
06/07/2019   Procedure: native kidney biopsy  Physician: Dann Gao MD   Consesnt:  Taina Singh     The indications and risks of the kidney biopsy, including bleeding severe enough to require hospitalization, transfusion, surgery, or even loss of the kidney or death, were explained, understood and agreed.  The kidney was visualized under ultrasound and biopsy site marked.  Patient was prepped and draped in the usual sterile manner.  Biopsy site was anesthetized with 1% lidocaine.  Biopsy passes with a 18 ga needle under direct ultrasound guidance were made and tissue was sent to pathology.  There were no immediate complications.  Pressure was held over biopsy site and patient will be observed for signs of bleeding or other complications. Attending Dr. Early was present for the key portions of the procedure.   Patient remained hemodynamically stable post procedure, he had no hematuria and post procedure hgb= 11.4 g/dl no change from the Hgb prior to the procedure in the morning.   Patient discharged home is stable condition.      Dann Gao M.D.  Nephrology Fellow  Pager: [phone]

## 2019-06-10 DIAGNOSIS — O12.13 PROTEINURIA AFFECTING PREGNANCY, ANTEPARTUM, THIRD TRIMESTER: Primary | ICD-10-CM

## 2019-06-11 LAB
ANCA AB PATTERN SER IF-IMP: NORMAL
C-ANCA TITR SER IF: NORMAL {TITER}

## 2019-06-14 ENCOUNTER — TELEPHONE (OUTPATIENT)
Dept: RHEUMATOLOGY | Facility: CLINIC | Age: 54
End: 2019-06-14

## 2019-06-14 NOTE — TELEPHONE ENCOUNTER
Spoke with Taina to connect with her PCP regarding the Xeralto medication; which is through [facility].  Told her to connect with them ASAP so they can get this medication figured out for her so she will not go without through the weekend.      Taina was in agreement and said she will connect with them as soon as we get off the phone.     Maria A Alfaro MSN, RN  Rheumatology RN Care Coordinator  [facility]

## 2019-06-14 NOTE — TELEPHONE ENCOUNTER
[facility] Call Center    Phone Message    May a detailed message be left on voicemail: yes    Reason for Call: Medication Question or concern regarding medication   Prescription Clarification  Name of Medication: Xeralto  Prescribing Provider: Lopez   Pharmacy: [facility]    What on the order needs clarification? Pt needs a different blood thinner as insurance will not cover this RX for Xerlato. Please call pt today as she just got hospital yesterday.           Action Taken: Message routed to:  [facility] ([facility] Rheum

## 2019-06-15 DIAGNOSIS — M32.9 SYSTEMIC LUPUS ERYTHEMATOSUS, UNSPECIFIED SLE TYPE, UNSPECIFIED ORGAN INVOLVEMENT STATUS (H): Primary | ICD-10-CM

## 2019-06-15 RX ORDER — ONDANSETRON 4 MG/1
4 TABLET, FILM COATED ORAL EVERY 6 HOURS PRN
Qty: 12 TABLET | Refills: 0 | Status: SHIPPED | OUTPATIENT
Start: 2019-06-15 | End: 2019-08-07

## 2019-06-17 ENCOUNTER — TELEPHONE (OUTPATIENT)
Dept: RHEUMATOLOGY | Facility: CLINIC | Age: 54
End: 2019-06-17

## 2019-06-18 ENCOUNTER — MYC MEDICAL ADVICE (OUTPATIENT)
Dept: RHEUMATOLOGY | Facility: CLINIC | Age: 54
End: 2019-06-18

## 2019-06-24 DIAGNOSIS — M32.9 SYSTEMIC LUPUS ERYTHEMATOSUS, UNSPECIFIED SLE TYPE, UNSPECIFIED ORGAN INVOLVEMENT STATUS (H): Primary | ICD-10-CM

## 2019-06-24 NOTE — TELEPHONE ENCOUNTER
Completed and faxed. Please see telephone encounter from 6/17/19.    Sophie Alvarado RN  Rheumatology Clinic

## 2019-06-24 NOTE — TELEPHONE ENCOUNTER
Called and reviewed form with pt.  Form and requested medical records have been sent to requested number.  Will mail copy of form to pt and upload into chart.    Sophie Alvarado RN  Rheumatology Clinic

## 2019-06-27 ENCOUNTER — OFFICE VISIT (OUTPATIENT)
Dept: NEPHROLOGY | Facility: CLINIC | Age: 54
End: 2019-06-27
Attending: INTERNAL MEDICINE
Payer: COMMERCIAL

## 2019-06-27 ENCOUNTER — TELEPHONE (OUTPATIENT)
Dept: RHEUMATOLOGY | Facility: CLINIC | Age: 54
End: 2019-06-27

## 2019-06-27 VITALS
SYSTOLIC BLOOD PRESSURE: 127 MMHG | HEART RATE: 87 BPM | TEMPERATURE: 98 F | OXYGEN SATURATION: 97 % | DIASTOLIC BLOOD PRESSURE: 78 MMHG | BODY MASS INDEX: 43.98 KG/M2 | WEIGHT: 272.5 LBS

## 2019-06-27 DIAGNOSIS — O12.13 PROTEINURIA AFFECTING PREGNANCY, ANTEPARTUM, THIRD TRIMESTER: ICD-10-CM

## 2019-06-27 DIAGNOSIS — N06.4 ISOLATED PROTEINURIA WITH DIFFUSE ENDOCAPILLARY PROLIFERATIVE GLOMERULONEPHRITIS: Primary | ICD-10-CM

## 2019-06-27 DIAGNOSIS — Z79.899 HIGH RISK MEDICATIONS (NOT ANTICOAGULANTS) LONG-TERM USE: ICD-10-CM

## 2019-06-27 DIAGNOSIS — M32.14 LUPUS NEPHRITIS, ISN/RPS CLASS IV (H): ICD-10-CM

## 2019-06-27 DIAGNOSIS — Z79.52 LONG TERM SYSTEMIC STEROID USER: ICD-10-CM

## 2019-06-27 DIAGNOSIS — M32.9 SYSTEMIC LUPUS ERYTHEMATOSUS, UNSPECIFIED SLE TYPE, UNSPECIFIED ORGAN INVOLVEMENT STATUS (H): ICD-10-CM

## 2019-06-27 LAB
ALBUMIN SERPL-MCNC: 3.3 G/DL (ref 3.4–5)
ALBUMIN UR-MCNC: >499 MG/DL
ALT SERPL W P-5'-P-CCNC: 39 U/L (ref 0–50)
ANION GAP SERPL CALCULATED.3IONS-SCNC: 4 MMOL/L (ref 3–14)
APPEARANCE UR: ABNORMAL
AST SERPL W P-5'-P-CCNC: 13 U/L (ref 0–45)
BACTERIA #/AREA URNS HPF: ABNORMAL /HPF
BASOPHILS # BLD AUTO: 0 10E9/L (ref 0–0.2)
BASOPHILS NFR BLD AUTO: 0.1 %
BILIRUB UR QL STRIP: NEGATIVE
BUN SERPL-MCNC: 33 MG/DL (ref 7–30)
CALCIUM SERPL-MCNC: 8.8 MG/DL (ref 8.5–10.1)
CAOX CRY #/AREA URNS HPF: ABNORMAL /HPF
CHLORIDE SERPL-SCNC: 104 MMOL/L (ref 94–109)
CO2 SERPL-SCNC: 28 MMOL/L (ref 20–32)
COLOR UR AUTO: YELLOW
CREAT SERPL-MCNC: 0.63 MG/DL (ref 0.52–1.04)
CREAT UR-MCNC: 244 MG/DL
CREAT UR-MCNC: 244 MG/DL
CRP SERPL-MCNC: <2.9 MG/L (ref 0–8)
DIFFERENTIAL METHOD BLD: ABNORMAL
EOSINOPHIL # BLD AUTO: 0 10E9/L (ref 0–0.7)
EOSINOPHIL NFR BLD AUTO: 0 %
ERYTHROCYTE [DISTWIDTH] IN BLOOD BY AUTOMATED COUNT: 16.4 % (ref 10–15)
ERYTHROCYTE [SEDIMENTATION RATE] IN BLOOD BY WESTERGREN METHOD: 8 MM/H (ref 0–30)
GFR SERPL CREATININE-BSD FRML MDRD: >90 ML/MIN/{1.73_M2}
GLUCOSE SERPL-MCNC: 107 MG/DL (ref 70–99)
GLUCOSE UR STRIP-MCNC: NEGATIVE MG/DL
HCT VFR BLD AUTO: 39.3 % (ref 35–47)
HGB BLD-MCNC: 11.8 G/DL (ref 11.7–15.7)
HGB UR QL STRIP: ABNORMAL
IMM GRANULOCYTES # BLD: 0.1 10E9/L (ref 0–0.4)
IMM GRANULOCYTES NFR BLD: 0.7 %
KETONES UR STRIP-MCNC: NEGATIVE MG/DL
LEUKOCYTE ESTERASE UR QL STRIP: ABNORMAL
LYMPHOCYTES # BLD AUTO: 0.2 10E9/L (ref 0.8–5.3)
LYMPHOCYTES NFR BLD AUTO: 2.1 %
MCH RBC QN AUTO: 25.9 PG (ref 26.5–33)
MCHC RBC AUTO-ENTMCNC: 30 G/DL (ref 31.5–36.5)
MCV RBC AUTO: 86 FL (ref 78–100)
MICROALBUMIN UR-MCNC: 2310 MG/L
MICROALBUMIN/CREAT UR: 946.72 MG/G CR (ref 0–25)
MONOCYTES # BLD AUTO: 0.6 10E9/L (ref 0–1.3)
MONOCYTES NFR BLD AUTO: 7.3 %
MUCOUS THREADS #/AREA URNS LPF: PRESENT /LPF
NEUTROPHILS # BLD AUTO: 6.8 10E9/L (ref 1.6–8.3)
NEUTROPHILS NFR BLD AUTO: 89.8 %
NITRATE UR QL: NEGATIVE
NRBC # BLD AUTO: 0 10*3/UL
NRBC BLD AUTO-RTO: 0 /100
PH UR STRIP: 5 PH (ref 5–7)
PHOSPHATE SERPL-MCNC: 3.4 MG/DL (ref 2.5–4.5)
PLATELET # BLD AUTO: 253 10E9/L (ref 150–450)
POTASSIUM SERPL-SCNC: 4.4 MMOL/L (ref 3.4–5.3)
PROT UR-MCNC: 3.47 G/L
PROT/CREAT 24H UR: 1.42 G/G CR (ref 0–0.2)
RBC # BLD AUTO: 4.55 10E12/L (ref 3.8–5.2)
RBC #/AREA URNS AUTO: 26 /HPF (ref 0–2)
SODIUM SERPL-SCNC: 135 MMOL/L (ref 133–144)
SOURCE: ABNORMAL
SP GR UR STRIP: 1.02 (ref 1–1.03)
SQUAMOUS #/AREA URNS AUTO: 5 /HPF (ref 0–1)
UROBILINOGEN UR STRIP-MCNC: 0 MG/DL (ref 0–2)
WBC # BLD AUTO: 7.5 10E9/L (ref 4–11)
WBC #/AREA URNS AUTO: 86 /HPF (ref 0–5)

## 2019-06-27 PROCEDURE — 84450 TRANSFERASE (AST) (SGOT): CPT | Performed by: INTERNAL MEDICINE

## 2019-06-27 PROCEDURE — 83876 ASSAY MYELOPEROXIDASE: CPT | Performed by: INTERNAL MEDICINE

## 2019-06-27 PROCEDURE — 85652 RBC SED RATE AUTOMATED: CPT | Performed by: INTERNAL MEDICINE

## 2019-06-27 PROCEDURE — 86160 COMPLEMENT ANTIGEN: CPT | Performed by: INTERNAL MEDICINE

## 2019-06-27 PROCEDURE — 82043 UR ALBUMIN QUANTITATIVE: CPT | Performed by: INTERNAL MEDICINE

## 2019-06-27 PROCEDURE — 84460 ALANINE AMINO (ALT) (SGPT): CPT | Performed by: INTERNAL MEDICINE

## 2019-06-27 PROCEDURE — 80069 RENAL FUNCTION PANEL: CPT | Performed by: INTERNAL MEDICINE

## 2019-06-27 PROCEDURE — G0463 HOSPITAL OUTPT CLINIC VISIT: HCPCS | Mod: ZF

## 2019-06-27 PROCEDURE — 85025 COMPLETE CBC W/AUTO DIFF WBC: CPT | Performed by: INTERNAL MEDICINE

## 2019-06-27 PROCEDURE — 87086 URINE CULTURE/COLONY COUNT: CPT | Performed by: INTERNAL MEDICINE

## 2019-06-27 PROCEDURE — 84156 ASSAY OF PROTEIN URINE: CPT | Performed by: INTERNAL MEDICINE

## 2019-06-27 PROCEDURE — 86140 C-REACTIVE PROTEIN: CPT | Performed by: INTERNAL MEDICINE

## 2019-06-27 PROCEDURE — 86255 FLUORESCENT ANTIBODY SCREEN: CPT | Performed by: INTERNAL MEDICINE

## 2019-06-27 PROCEDURE — 86225 DNA ANTIBODY NATIVE: CPT | Performed by: INTERNAL MEDICINE

## 2019-06-27 PROCEDURE — 36415 COLL VENOUS BLD VENIPUNCTURE: CPT | Performed by: INTERNAL MEDICINE

## 2019-06-27 PROCEDURE — 81001 URINALYSIS AUTO W/SCOPE: CPT | Performed by: INTERNAL MEDICINE

## 2019-06-27 PROCEDURE — 83516 IMMUNOASSAY NONANTIBODY: CPT | Performed by: INTERNAL MEDICINE

## 2019-06-27 RX ORDER — PANTOPRAZOLE SODIUM 20 MG/1
40 TABLET, DELAYED RELEASE ORAL 2 TIMES DAILY
COMMUNITY
Start: 2019-06-27 | End: 2022-01-17

## 2019-06-27 RX ORDER — CHOLECALCIFEROL (VITAMIN D3) 50 MCG
1 TABLET ORAL DAILY
Qty: 90 TABLET | Refills: 11 | Status: SHIPPED | OUTPATIENT
Start: 2019-06-27 | End: 2020-07-20

## 2019-06-27 RX ORDER — LISINOPRIL 10 MG/1
10 TABLET ORAL DAILY
Qty: 90 TABLET | Refills: 3 | Status: SHIPPED | OUTPATIENT
Start: 2019-06-27 | End: 2019-08-07

## 2019-06-27 ASSESSMENT — PAIN SCALES - GENERAL: PAINLEVEL: MILD PAIN (2)

## 2019-06-27 NOTE — PROGRESS NOTES
Nephrology Clinic    Maynor Miranda MD  2019     Name: Taina Singh  MRN: [id number]  Age: 54 year old  : 1965  Referring provider: Referred Self     Assessment and Plan:    # CKD Stage 1 / Lupus Nephritis Class IV:   - Baseline creatinine 0.5-0.6  - GFR is in the 90's  - Per kidney biopsy on 19: 14/ are affected by the lupus nephritis.    - Result: Diffuse global lupus nephritis, ISN/RPS class IV-S (a). Mild to moderate arteriosclerosis.   - Currently on 60 mg Prednisone and Cytoxan infusions.    - Will continue for 6 months; 1st Cytoxan infusion has been completed; next on 19.   - Will increase Cholecalciferol to 2000 mg daily.   - Discussed maintenance immunosuppression (ex. CellCept) after 6 months of current treatment.   - Will continue to monitor kidney functions (creatinine, proteinuria, hematuria, etc.).   - Discussed another biopsy in 6 months to gauge effect of the current treatment on the kidneys.   - Discussed starting dapsone in the future (cannot use Pentamidine or Bactrim).   - Will complete blood work per Rheumatology, and follow up in Neph clinic every 3 months.     # Hematuria and Proteinuria:  - Moderate proteinuria as of 19.   - Hematuria 24 as of 19.  - Protein albumin urine >499 as of 19.   - Will start Lisinopril to try and control proteinuria.    - Dosage: Lisinopril 10 mg daily    - Recommended measuring blood pressure at home occasionally.     # Hx of Systemic Lupus Erythematosus: ***   - Lupus has been uncontrolled on multiple treatments and now Rheum planning for Cytoxan in recent future (possibly 6/15/19). With Low complements and elevated dsDNA despite treatment , ELP -ve in 2017.   - Lupus Nephritis: Class IV.    - Currently on Plaquenil and steroids; prednisone 60 mg.   - With uncontrolled Lupus other etiologies are very unlikely, but a renal Bx is definitely indicated here for further plans of her care. Very low dose NSAIDs used  over the past yr unlikely to explain the current pattern on renal findings.   - Other possibilities are MPGN (sec to Lymphoma , unlikely with no known lymphoma recurrence).   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     Reason For Visit:   Taina Singh is a 54 year old female who presents for evaluation of Lupus-related symptoms.     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 Restarted on prednisone 40mg with some improvement in CP back in 11/2018, but unable to taper steroids and her main concerns remain CP ongoing with last cardiac MRI with pericarditis.    She has been using tramadol BiD , has used tylenol and Ibuprofen 400mg once daily for about a yr.  She had started working out and lost 100 lbs, which triggered a Lupus flare, which started approximately 2 years ago.   .  She had Creatinine 0.5-0.6 wo any change in renal functions over the years, She had minimal proteinuria since 6/2018 with bland urine on UA.  Urine with minimal proteins and trace blood since 6/2018. Her proteinuria recently spiked to 1.88 g/gr on 6/5/19. On her last UA while on schedule to get CYC on 6/3 she was noted to have microscopic hematuria and CYC was held, she has presented to be seen in Rheum clinic today with 1.88g/g of UPCR and numerous RBC on her UA. Nephrology was consulted for possible LN concerns. Her latest urinalysis on 6/5/19 showed the same concerns.     She has had 5 pregnancies and 1 live birth , most miscarriages were in first trimester and no complications with eclampsia/pre-ecalampsia. She was on Lupus treatment at that time.  She has no Hx of Nephrolithiasis.    Interval History:  She was last seen in clinic on 6/5/19 by Dr. Elmore; please see that note for further details. Since this last visit, the patient has been in and out of the hospital, and was most recently admitted at [facility] on 6/18/19 for abdominal pain. She was admitted to [facility] on 6/10/19 and [facility] on 6/7/19, also for the abdominal pain and shortness of breath. The patient denies any diarrhea accompanying the abdominal pain. The patient has also been experiencing an unknown chest issue that causes shortness of breath with activity and pain. The patient reports that her providers have come to the conclusion that all of these symptoms are related to her ongoing Lupus flare.    Today, the patient reports that she has continued to experience upper abdominal pain and the unknown chest issue. The patient describes feeling frustrated about lack of answers from other providers, but agrees that her symptoms are likely those of her Lupus flare. The patient exhibits bilateral lower leg edema today, but connects this to her recent hospital stay and expects improvement within a week. She denies pleural and joint pain today.      Review of Systems:   Pertinent items are noted in HPI or as below, remainder of complete ROS is negative.      Active  Medications:     Current Outpatient Medications:      albuterol (PROAIR HFA/PROVENTIL HFA/VENTOLIN HFA) 108 (90 Base) MCG/ACT inhaler, Inhale 2 puffs into the lungs every 6 hours as needed for shortness of breath / dyspnea or wheezing, Disp: 3 Inhaler, Rfl: 3     Calcium-Magnesium 300-300 MG TABS, daily , Disp: , Rfl:      hydroxychloroquine (PLAQUENIL) 200 MG tablet, Take 1 tablet (200 mg) by mouth 2 times daily, Disp: 180 tablet, Rfl: 1     lisinopril (PRINIVIL/ZESTRIL) 10 MG tablet, Take 1 tablet (10 mg) by mouth daily, Disp: 90 tablet, Rfl: 3     Multiple Vitamins-Minerals (WOMENS MULTI PO), Take by mouth daily , Disp: , Rfl:      Omega-3 Fatty Acids (OMEGA-3 FISH OIL) 500 MG CAPS, Take 500 mg by mouth daily , Disp: , Rfl:      pantoprazole (PROTONIX) 20 MG EC tablet, Take 2 tablets (40 mg) by mouth 2 times daily, Disp: , Rfl:      [START ON 6/28/2019] predniSONE (DELTASONE) 10 MG tablet, Take 50 mg daily x 7 days, then 40 mg daily x 7 days, then 30 mg daily x 7 days, then 25 mg daily, Disp: 100 tablet, Rfl: 1     predniSONE (DELTASONE) 20 MG tablet, Take 3 tablets (60 mg) by mouth daily For one month, Disp: 90 tablet, Rfl: 0     traMADol (ULTRAM) 50 MG tablet, Take 1 tablet (50 mg) by mouth every 12 hours as needed for pain Take 1 tablet as needed for pain.  No driving or alcohol use while taking medication., Disp: 30 tablet, Rfl: 2     vitamin D3 (CHOLECALCIFEROL) 2000 units (50 mcg) tablet, Take 1 tablet (2,000 Units) by mouth daily, Disp: 90 tablet, Rfl: 11     pilocarpine (SALAGEN) 5 MG tablet, Take 1 tablet (5 mg) by mouth 4 times daily as needed (Patient not taking: Reported on 6/5/2019), Disp: 360 tablet, Rfl: 3     Allergies:   Pentamidine; Penicillins; and Sulfa drugs      Past Medical History:  Bronchiolitis  Diastolic dysfunction  Iron deficiency  Lupus Class IV (H)  MALT Lymphoma (H)  Sjogren's Syndrome (H)  Vitamin D Deficiency   Other forms of Systemic Lupus Erythematosus (H)  Sicca Syndrome  (H)  Iron Deficiency  Hematuria  Other type of non-Hodgkin Lymphoma, unspecified body region (H)  Need for Pneumocystis Prophylaxis     Past Surgical History:  Biopsy/Removal Lymph Nodes   Section (age 32)  HC Hysteros W/ Permanent Fallopian Implant  Parotidectomy  Tonsillectomy    Family History:   Alopecia (Brother)  Rheumatoid Arthritis (brother)     Social History:   Social History     Socioeconomic History     Marital status:      Spouse name: Not on file     Number of children: Not on file     Years of education: 1     Highest education level: Not on file   Occupational History     Comment: , 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   Social Needs     Financial resource strain: Not on file     Food insecurity:     Worry: Not on file     Inability: Not on file     Transportation needs:     Medical: Not on file     Non-medical: Not on file   Tobacco Use     Smoking status: Never Smoker     Smokeless tobacco: Never Used   Substance and Sexual Activity     Alcohol use: No     Drug use: No     Sexual activity: Not on file   Lifestyle     Physical activity:     Days per week: Not on file     Minutes per session: Not on file     Stress: Not on file   Relationships     Social connections:     Talks on phone: Not on file     Gets together: Not on file     Attends Islam service: Not on file     Active member of club or organization: Not on file     Attends meetings of clubs or organizations: Not on file     Relationship status: Not on file     Intimate partner violence:     Fear of current or ex partner: Not on file     Emotionally abused: Not on file     Physically abused: Not on file     Forced sexual activity: Not on file   Other Topics Concern     Parent/sibling w/ CABG, MI or angioplasty before 65F 55M? 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  Denies exposure to asbestos, silica, hot tubs, feather pillows (used to until age 49), pet birds, mold, does not play brass/wind instruments.         Physical Exam:  /78   Pulse 87   Temp 98  F (36.7  C) (Oral)   Wt 123.6 kg (272 lb 8 oz)   SpO2 97%   BMI 43.98 kg/m       Laboratory:  CMP  Recent Labs   Lab Test 06/27/19  1209 06/07/19  0719 06/05/19  1157 03/06/19  1046 12/31/18  1330 11/12/18  1443  02/14/18  0819    136 134  --   --   --   --  138   POTASSIUM 4.4 4.3 4.3  --   --   --   --  3.9   CHLORIDE 104 103 101  --   --   --   --  102   CO2 28 24 26  --   --   --   --  31   ANIONGAP 4 8 8  --   --   --   --  5   * 116* 109*  --   --   --   --  75   BUN 33* 26 21  --   --   --   --  14   CR 0.63 0.55 0.49* 0.58 0.58 0.83   < > 0.63   GFRESTIMATED >90 >90 >90 >90 >90 73   < > >90   GFRESTBLACK >90 >90 >90 >90 >90 88   < > >90   ANDERSON 8.8 9.2 9.2  --   --   --   --  9.0   PHOS 3.4  --  4.2  --   --   --   --   --    ALBUMIN 3.3*  --  2.9* 3.2* 3.5 3.2*   < >  --    AST 13  --   --  23 21 19   < >  --    ALT 39  --   --  31 35 30   < >  --     < > = values in this interval not displayed.     CBC  Recent Labs   Lab Test 06/27/19  1209 06/07/19  1414 06/07/19  0719 06/05/19  1157 03/06/19  1046   HGB 11.8 11.4* 11.4* 12.3 11.2*   WBC 7.5  --  7.9 8.7 4.6   RBC 4.55  --  4.55 4.81 4.16   HCT 39.3  --  37.2 39.3 35.2   MCV 86  --  82 82 85   MCH 25.9*  --  25.1* 25.6* 26.9   MCHC 30.0*  --  30.6* 31.3* 31.8   RDW 16.4*  --  14.1 14.1 13.9     --  262 302 234     INR  Recent Labs   Lab Test 06/07/19  0719   INR 0.95     ABG  No lab results found.   URINE STUDIES  Recent Labs   Lab Test 06/27/19  1159 06/05/19  0829 03/06/19  1048 12/31/18  1351 11/12/18  1450 08/13/18  1610 07/07/18  1116   COLOR Yellow Yellow Yellow Yellow Yellow Yellow Yellow   APPEARANCE Cloudy Slightly Cloudy Clear Clear Clear Clear Clear   URINEGLC Negative Negative Negative Negative Negative Negative Negative   URINEBILI Negative Negative Negative Negative Negative Negative Negative   URINEKETONE Negative 5* Negative Negative Negative Negative Negative    SG 1.025 1.027 1.006 1.010 1.025 1.005 1.010   UBLD Small* Small* Small* Trace* Trace* Trace* Trace*   URINEPH 5.0 6.0 6.0 7.0 6.0 6.0 5.5   PROTEIN >499* >499* Negative Trace* 30* Negative Negative   UROBILINOGEN  --   --   --  0.2 0.2 0.2 0.2   NITRITE Negative Negative Negative Negative Negative Negative Negative   LEUKEST Large* Trace* Trace* Negative Negative Negative Negative   RBCU 26* 24* 3* O - 2 O - 2 O - 2 O - 2   WBCU 86* 16* 7* 0 - 5 0 - 5 0 - 5 0 - 5     Recent Labs   Lab Test 06/27/19  1159 06/05/19  0829 03/06/19  1048 12/31/18  1350 11/12/18  1450 08/13/18  1610 07/07/18  1116 06/12/18  1042 05/12/18  1258 04/11/18  1655 03/21/18  1750 02/23/18  1445 12/29/17  1254   UTPG 1.42* 1.88* 0.89* 0.92* 0.34* 0.23* 0.29* 0.21* Unable to calculate due to low value Unable to calculate due to low value Unable to calculate due to low value 0.18 Unable to calculate due to low value     PTH  No lab results found.  IRON STUDIES   Recent Labs   Lab Test 11/12/18  1443 12/29/17  1302   IRON 27* 58    315   IRONSAT 10* 19   COLLETTE 160 163       Imaging:     Scribe Disclosure:   Taylor MILLER, am serving as a scribe to document services personally performed by Maynor Miranda MD at this visit, based upon the provider's statements to me. All documentation has been reviewed by the aforementioned provider prior to being entered into the official medical record.

## 2019-06-27 NOTE — NURSING NOTE
"Chief Complaint   Patient presents with     RECHECK     f/u     Vital signs:  Temp: 98  F (36.7  C) Temp src: Oral BP: 127/78 Pulse: 87     SpO2: 97 %       Weight: 123.6 kg (272 lb 8 oz)  Estimated body mass index is 43.98 kg/m  as calculated from the following:    Height as of 6/7/19: 1.676 m (5' 6\").    Weight as of this encounter: 123.6 kg (272 lb 8 oz).        Alejandra Carter    "

## 2019-06-27 NOTE — PATIENT INSTRUCTIONS
-Please start lisinopril at 10 mg daily  -Please have your kidney function tested in 1 week (order has been placed in the computer)  -Otherwise, please return to clinic in 3 months  -Please let us know if you have questions at [phone]

## 2019-06-27 NOTE — LETTER
2019      RE: Taina Singh  [address] [place] [address]         Nephrology Clinic    Maynor Miranda MD  2019     Name: Taina Singh  MRN: [id number]  Age: 54 year old  : 1965  Referring provider: Referred Self     Assessment and Plan:    1. 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  Surprisingly endocapillary proliferation was not as profound in the context of so many necrotizing lesions (activity index  and chronicity index is ).  Of note, ANCA was negative, complements remain low and ds-DNA high with accompanying microscopic hematuria and albuminuria (~1000 mg/g).  She has preserved renal function with minimal chronic changes on renal biopsy.  She has received her first dose of cytoxan (1700 mg, NIH dosing) and continues on high dose prednisone.  - She will continue cytoxan Q month x 6 total with next dose in mid July   - She continues on prednisone at 40 mg BID to be tapered per Rheumatology  - She is also on plaquenil 200 mg BID  - We will start RAAS blockade with lisinopril today at 10 mg daily and recheck a renal panel in ~1 week  - She did not tolerate PJP prophylaxis with inhaled pentamidine and is expected to start atovaquone per Rheumatology  - We will increase cholecalciferol to 2000 units daily and continue calcium supplements and pantoprazole to minimize adverse events related to high dose prednisone  - Discussed maintenance immunosuppression (ex. CellCept) after 6 months of current treatment   - Will continue to monitor for remission (creatinine, proteinuria, hematuria, etc.)   - Discussed another biopsy in 6-12 months to gauge effect of the current treatment on the kidneys   - Will complete blood work per Rheumatology, and follow up in Neph clinic every 3 months    2. BP/Volume status: Normotensive (BP <130/80) and slightly volume expanded on exam.    - No pressing need for  diuretic at this time  - We will start lisinopril for renoprotection in the context of lupus nephritis with albuminuria and monitor for hypotension and electrolyte disturbances    3. Anemia: Mild (hgb 11.8) and likely due to inflammatory state.    -No intervention needed at this time    4.  Electrolytes & Acid/Base: No pressing issues.      5.  PE and splenic infarct: Incidental finding.  Unable to locate w/u for antiphospholipid antibodies.  On Xarelto.    -monitor for now    Follow-up: Return in about 3 months (around 9/27/2019).     Reason For Visit:   Taina Singh is a 54 year old female who presents for evaluation of Lupus-related symptoms.     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  She had well controlled Lupus and stopped taking prednisone and HCQ until about 3 yrs back developed pleuritic CP and arthritis and restarted HCQ 200mg every day. Restarted on prednisone 40mg with some improvement in CP back in 11/2018, but unable to taper steroids and her main concerns remain CP ongoing with last cardiac MRI with pericarditis.    She has been using tramadol BiD , has used tylenol and Ibuprofen 400mg once daily for about a yr.  She had started working out and lost 100 lbs, which triggered a Lupus flare, which started approximately 2 years ago.   .  She had Creatinine 0.5-0.6 wo any change in renal functions over the years, She had minimal proteinuria since 6/2018 with bland urine on UA. Urine with minimal proteins and trace blood since 6/2018. Her proteinuria recently spiked to 1.88 g/gr on 6/5/19. On her last UA while on schedule to get CYC on 6/3 she was noted to have microscopic hematuria and CYC was held, she has presented to be seen in  Rheum clinic today with 1.88g/g of UPCR and numerous RBC on her UA. Nephrology was consulted for possible LN concerns. Her latest urinalysis on 6/5/19 showed the same concerns.     She has had 5 pregnancies and 1 live birth , most miscarriages were in first trimester and no complications with eclampsia/pre-eclampsia. She was on Lupus treatment at that time.  She has no Hx of Nephrolithiasis.    Interval History:  She was last seen in clinic on 6/5/19 by Dr. Elmore; please see that note for further details. Since this last visit, the patient has been in and out of the hospital, and was most recently admitted at [facility] on 6/18/19 for abdominal pain. She was admitted to [facility] previously on 6/10/19 and [facility] on 6/7/19, also for the abdominal pain and shortness of breath. 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  The patient denies any diarrhea accompanying the abdominal pain. During the hospitalization a PE and splenic infarct were discovered as well.  She has since started Xarelto.      She mainly feels fatigue. Her chest pain seems improved though she does continue to have dyspnea with exertion.  The patient exhibits bilateral lower leg edema today, but connects this to her recent hospital stay and expects improvement within a week. She denies joint pain today.  Her recent kidney biopsy was explained in detail.  She recently started cytoxan without any adverse reaction. Her main complaint is fatigue.         Review of Systems:   Pertinent items are noted in HPI or as below, remainder of complete ROS is negative.      Active Medications:     Current Outpatient Medications:      albuterol (PROAIR HFA/PROVENTIL HFA/VENTOLIN HFA) 108 (90 Base) MCG/ACT inhaler, Inhale 2 puffs into the lungs every 6 hours as needed for shortness of breath / dyspnea or wheezing, Disp: 3 Inhaler, Rfl: 3     Calcium-Magnesium 300-300 MG TABS, daily , Disp: , Rfl:       hydroxychloroquine (PLAQUENIL) 200 MG tablet, Take 1 tablet (200 mg) by mouth 2 times daily, Disp: 180 tablet, Rfl: 1     lisinopril (PRINIVIL/ZESTRIL) 10 MG tablet, Take 1 tablet (10 mg) by mouth daily, Disp: 90 tablet, Rfl: 3     Multiple Vitamins-Minerals (WOMENS MULTI PO), Take by mouth daily , Disp: , Rfl:      Omega-3 Fatty Acids (OMEGA-3 FISH OIL) 500 MG CAPS, Take 500 mg by mouth daily , Disp: , Rfl:      pantoprazole (PROTONIX) 20 MG EC tablet, Take 2 tablets (40 mg) by mouth 2 times daily, Disp: , Rfl:      [START ON 2019] predniSONE (DELTASONE) 10 MG tablet, Take 50 mg daily x 7 days, then 40 mg daily x 7 days, then 30 mg daily x 7 days, then 25 mg daily, Disp: 100 tablet, Rfl: 1     predniSONE (DELTASONE) 20 MG tablet, Take 3 tablets (60 mg) by mouth daily For one month, Disp: 90 tablet, Rfl: 0     traMADol (ULTRAM) 50 MG tablet, Take 1 tablet (50 mg) by mouth every 12 hours as needed for pain Take 1 tablet as needed for pain.  No driving or alcohol use while taking medication., Disp: 30 tablet, Rfl: 2     vitamin D3 (CHOLECALCIFEROL) 2000 units (50 mcg) tablet, Take 1 tablet (2,000 Units) by mouth daily, Disp: 90 tablet, Rfl: 11     pilocarpine (SALAGEN) 5 MG tablet, Take 1 tablet (5 mg) by mouth 4 times daily as needed (Patient not taking: Reported on 2019), Disp: 360 tablet, Rfl: 3     Allergies:   Pentamidine; Penicillins; and Sulfa drugs      Past Medical History:  Bronchiolitis  Diastolic dysfunction  Iron deficiency  Lupus Class IV (H)  MALT Lymphoma (H)  Sjogren's Syndrome (H)  Vitamin D Deficiency   Other forms of Systemic Lupus Erythematosus (H)  Sicca Syndrome (H)  Iron Deficiency  Hematuria  Other type of non-Hodgkin Lymphoma, unspecified body region (H)  Need for Pneumocystis Prophylaxis     Past Surgical History:  Biopsy/Removal Lymph Nodes   Section (age 32)  HC Hysteros W/ Permanent Fallopian Implant  Parotidectomy  Tonsillectomy    Family History:   Alopecia  (Brother)  Rheumatoid Arthritis (brother)     Social History:   Social History     Socioeconomic History     Marital status:      Spouse name: Not on file     Number of children: Not on file     Years of education: 1     Highest education level: Not on file   Occupational History     Comment: , 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   Social Needs     Financial resource strain: Not on file     Food insecurity:     Worry: Not on file     Inability: Not on file     Transportation needs:     Medical: Not on file     Non-medical: Not on file   Tobacco Use     Smoking status: Never Smoker     Smokeless tobacco: Never Used   Substance and Sexual Activity     Alcohol use: No     Drug use: No     Sexual activity: Not on file   Lifestyle     Physical activity:     Days per week: Not on file     Minutes per session: Not on file     Stress: Not on file   Relationships     Social connections:     Talks on phone: Not on file     Gets together: Not on file     Attends Sabianism service: Not on file     Active member of club or organization: Not on file     Attends meetings of clubs or organizations: Not on file     Relationship status: Not on file     Intimate partner violence:     Fear of current or ex partner: Not on file     Emotionally abused: Not on file     Physically abused: Not on file     Forced sexual activity: Not on file   Other Topics Concern     Parent/sibling w/ CABG, MI or angioplasty before 65F 55M? 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  Denies exposure to asbestos, silica, hot tubs, feather pillows (used to until age 49), pet birds, mold, does not play brass/wind instruments.        Physical Exam:  /78   Pulse 87   Temp 98  F (36.7  C) (Oral)   Wt 123.6 kg (272 lb 8 oz)   SpO2 97%   BMI 43.98 kg/m      GENERAL APPEARANCE: alert and no distress  EYES: nonicteric  HENT: mouth without ulcers or lesions  NECK: supple, no adenopathy  RESP: lungs clear to auscultation   CV: regular  rhythm, normal rate,  ABDOMEN: soft, nontender, nondistended   Extremities: trace lower extremity edema  MS: no evidence of inflammation in joints, no muscle tenderness  SKIN: no concerning rash  NEURO: mentation intact and speech normal  PSYCH: affect normal/bright   Laboratory:  CMP  Recent Labs   Lab Test 06/27/19  1209 06/07/19  0719 06/05/19  1157 03/06/19  1046 12/31/18  1330 11/12/18  1443  02/14/18  0819    136 134  --   --   --   --  138   POTASSIUM 4.4 4.3 4.3  --   --   --   --  3.9   CHLORIDE 104 103 101  --   --   --   --  102   CO2 28 24 26  --   --   --   --  31   ANIONGAP 4 8 8  --   --   --   --  5   * 116* 109*  --   --   --   --  75   BUN 33* 26 21  --   --   --   --  14   CR 0.63 0.55 0.49* 0.58 0.58 0.83   < > 0.63   GFRESTIMATED >90 >90 >90 >90 >90 73   < > >90   GFRESTBLACK >90 >90 >90 >90 >90 88   < > >90   ANDERSON 8.8 9.2 9.2  --   --   --   --  9.0   PHOS 3.4  --  4.2  --   --   --   --   --    ALBUMIN 3.3*  --  2.9* 3.2* 3.5 3.2*   < >  --    AST 13  --   --  23 21 19   < >  --    ALT 39  --   --  31 35 30   < >  --     < > = values in this interval not displayed.     CBC  Recent Labs   Lab Test 06/27/19  1209 06/07/19  1414 06/07/19  0719 06/05/19  1157 03/06/19  1046   HGB 11.8 11.4* 11.4* 12.3 11.2*   WBC 7.5  --  7.9 8.7 4.6   RBC 4.55  --  4.55 4.81 4.16   HCT 39.3  --  37.2 39.3 35.2   MCV 86  --  82 82 85   MCH 25.9*  --  25.1* 25.6* 26.9   MCHC 30.0*  --  30.6* 31.3* 31.8   RDW 16.4*  --  14.1 14.1 13.9     --  262 302 234     INR  Recent Labs   Lab Test 06/07/19  0719   INR 0.95     URINE STUDIES  Recent Labs   Lab Test 06/27/19  1159 06/05/19  0829 03/06/19  1048 12/31/18  1351 11/12/18  1450 08/13/18  1610 07/07/18  1116   COLOR Yellow Yellow Yellow Yellow Yellow Yellow Yellow   APPEARANCE Cloudy Slightly Cloudy Clear Clear Clear Clear Clear   URINEGLC Negative Negative Negative Negative Negative Negative Negative   URINEBILI Negative Negative Negative Negative  Negative Negative Negative   URINEKETONE Negative 5* Negative Negative Negative Negative Negative   SG 1.025 1.027 1.006 1.010 1.025 1.005 1.010   UBLD Small* Small* Small* Trace* Trace* Trace* Trace*   URINEPH 5.0 6.0 6.0 7.0 6.0 6.0 5.5   PROTEIN >499* >499* Negative Trace* 30* Negative Negative   UROBILINOGEN  --   --   --  0.2 0.2 0.2 0.2   NITRITE Negative Negative Negative Negative Negative Negative Negative   LEUKEST Large* Trace* Trace* Negative Negative Negative Negative   RBCU 26* 24* 3* O - 2 O - 2 O - 2 O - 2   WBCU 86* 16* 7* 0 - 5 0 - 5 0 - 5 0 - 5     Recent Labs   Lab Test 06/27/19  1159 06/05/19  0829 03/06/19  1048 12/31/18  1350 11/12/18  1450 08/13/18  1610 07/07/18  1116 06/12/18  1042 05/12/18  1258 04/11/18  1655 03/21/18  1750 02/23/18  1445 12/29/17  1254   UTPG 1.42* 1.88* 0.89* 0.92* 0.34* 0.23* 0.29* 0.21* Unable to calculate due to low value Unable to calculate due to low value Unable to calculate due to low value 0.18 Unable to calculate due to low value     PTH  No lab results found.  IRON STUDIES   Recent Labs   Lab Test 11/12/18  1443 12/29/17  1302   IRON 27* 58    315   IRONSAT 10* 19   COLLETTE 160 163       Renal U/S:  6/7/2019:  Right kidney: Measures 13.7 cm in length. Parenchyma is of normal  thickness and echogenicity. No focal mass. No hydronephrosis.     Left kidney: Measures 14 cm in length. Parenchyma is of normal  thickness and echogenicity. No focal mass. No hydronephrosis.                                                                   Kidney Biopsy:  6/7/2019:  FINAL DIAGNOSIS:   Kidney; percutaneous needle biopsy:   -Diffuse global lupus nephritis, ISN/RPS class IV-S (a)   -Mild to moderate arteriosclerosis     COMMENT:   There is mild mesangial and endocapillary proliferation, focal leukocytic   infiltrates, and crescentic lesions   involving 14 out of 26 glomeruli, mainly cellular with necrotizing   lesions.  There are minimal chronic   changes.  The activity  index of the proliferative component is 9/24 and   the chronicity index is 1/12.     Scribe Disclosure:   I, Taylor Russ, am serving as a scribe to document services personally performed by Maynor Miranda MD at this visit, based upon the provider's statements to me. All documentation has been reviewed by the aforementioned provider prior to being entered into the official medical record.     Total time spent was 50 minutes, and more than 50% of face to face time was spent in counseling and/or coordination of care regarding principles of multidisciplinary care, medication management, and chronic kidney disease education.  Maynor Miranda MD

## 2019-06-27 NOTE — LETTER
Patient:  Taina Singh  :   1965  MRN:     [id number]        Ms.Kelly Singh  [address] [facility] [address]        2019    Dear ,    We are writing to inform you of your test results. Inflammation markers (ESR, CRP) are back to normal.      Results for orders placed or performed in visit on 19   DNA double stranded antibodies   Result Value Ref Range    DNA-ds >379 (H) <10 IU/mL   Complement C4   Result Value Ref Range    Complement C4 5 (L) 15 - 50 mg/dL   Complement C3   Result Value Ref Range    Complement C3 39 (L) 76 - 169 mg/dL   Albumin Random Urine Quantitative with Creat Ratio   Result Value Ref Range    Creatinine Urine 244 mg/dL    Albumin Urine mg/L 2,310 mg/L    Albumin Urine mg/g Cr 946.72 (H) 0 - 25 mg/g Cr   Protein  random urine with Creat Ratio   Result Value Ref Range    Protein Random Urine 3.47 g/L    Protein Total Urine g/gr Creatinine 1.42 (H) 0 - 0.2 g/g Cr   Renal panel   Result Value Ref Range    Sodium 135 133 - 144 mmol/L    Potassium 4.4 3.4 - 5.3 mmol/L    Chloride 104 94 - 109 mmol/L    Carbon Dioxide 28 20 - 32 mmol/L    Anion Gap 4 3 - 14 mmol/L    Glucose 107 (H) 70 - 99 mg/dL    Urea Nitrogen 33 (H) 7 - 30 mg/dL    Creatinine 0.63 0.52 - 1.04 mg/dL    GFR Estimate >90 >60 mL/min/[1.73_m2]    GFR Estimate If Black >90 >60 mL/min/[1.73_m2]    Calcium 8.8 8.5 - 10.1 mg/dL    Phosphorus 3.4 2.5 - 4.5 mg/dL    Albumin 3.3 (L) 3.4 - 5.0 g/dL   ANCA Vasculitis panel   Result Value Ref Range    Myeloperoxidase Antibody IgG <0.2 0.0 - 0.9 AI    Proteinase 3 Antibody IgG <0.2 0.0 - 0.9 AI   ANCA IgG by IFA with Reflex to Titer   Result Value Ref Range    Neutrophil Cytoplasmic Antibody <1:10 <1:10 [titer]    Neutrophil Cytoplasmic Antibody Pattern       The ANCA IFA is <1:10.  No further testing will be performed.   UA with Microscopic reflex to Culture   Result Value Ref Range    Color Urine Yellow     Appearance Urine Cloudy     Glucose Urine Negative  NEG^Negative mg/dL    Bilirubin Urine Negative NEG^Negative    Ketones Urine Negative NEG^Negative mg/dL    Specific Gravity Urine 1.025 1.003 - 1.035    Blood Urine Small (A) NEG^Negative    pH Urine 5.0 5.0 - 7.0 pH    Protein Albumin Urine >499 (A) NEG^Negative mg/dL    Urobilinogen mg/dL 0.0 0.0 - 2.0 mg/dL    Nitrite Urine Negative NEG^Negative    Leukocyte Esterase Urine Large (A) NEG^Negative    Source Midstream Urine     WBC Urine 86 (H) 0 - 5 /HPF    RBC Urine 26 (H) 0 - 2 /HPF    Bacteria Urine Few (A) NEG^Negative /HPF    Squamous Epithelial /HPF Urine 5 (H) 0 - 1 /HPF    Mucous Urine Present (A) NEG^Negative /LPF    Calcium Oxalate Many (A) NEG^Negative /HPF   Creatinine random urine   Result Value Ref Range    Creatinine Urine Random 244 mg/dL   Erythrocyte sedimentation rate auto   Result Value Ref Range    Sed Rate 8 0 - 30 mm/h   CRP inflammation   Result Value Ref Range    CRP Inflammation <2.9 0.0 - 8.0 mg/L   CBC with platelets differential   Result Value Ref Range    WBC 7.5 4.0 - 11.0 10e9/L    RBC Count 4.55 3.8 - 5.2 10e12/L    Hemoglobin 11.8 11.7 - 15.7 g/dL    Hematocrit 39.3 35.0 - 47.0 %    MCV 86 78 - 100 fl    MCH 25.9 (L) 26.5 - 33.0 pg    MCHC 30.0 (L) 31.5 - 36.5 g/dL    RDW 16.4 (H) 10.0 - 15.0 %    Platelet Count 253 150 - 450 10e9/L    Diff Method Automated Method     % Neutrophils 89.8 %    % Lymphocytes 2.1 %    % Monocytes 7.3 %    % Eosinophils 0.0 %    % Basophils 0.1 %    % Immature Granulocytes 0.7 %    Nucleated RBCs 0 0 /100    Absolute Neutrophil 6.8 1.6 - 8.3 10e9/L    Absolute Lymphocytes 0.2 (L) 0.8 - 5.3 10e9/L    Absolute Monocytes 0.6 0.0 - 1.3 10e9/L    Absolute Eosinophils 0.0 0.0 - 0.7 10e9/L    Absolute Basophils 0.0 0.0 - 0.2 10e9/L    Abs Immature Granulocytes 0.1 0 - 0.4 10e9/L    Absolute Nucleated RBC 0.0    AST   Result Value Ref Range    AST 13 0 - 45 U/L   ALT   Result Value Ref Range    ALT 39 0 - 50 U/L   Urine Culture Aerobic Bacterial   Result Value  Ref Range    Specimen Description Midstream Urine     Culture Micro       50,000 to 100,000 colonies/mL  mixed urogenital pascual      Culture Micro Susceptibility testing not routinely done        Killian Marroquin MD

## 2019-06-28 LAB
ANCA AB PATTERN SER IF-IMP: NORMAL
BACTERIA SPEC CULT: NORMAL
BACTERIA SPEC CULT: NORMAL
C-ANCA TITR SER IF: NORMAL {TITER}
C3 SERPL-MCNC: 39 MG/DL (ref 76–169)
C4 SERPL-MCNC: 5 MG/DL (ref 15–50)
DSDNA AB SER-ACNC: >379 IU/ML
MYELOPEROXIDASE AB SER-ACNC: <0.2 AI (ref 0–0.9)
PROTEINASE3 IGG SER-ACNC: <0.2 AI (ref 0–0.9)
SPECIMEN SOURCE: NORMAL

## 2019-07-03 ENCOUNTER — OFFICE VISIT (OUTPATIENT)
Dept: RHEUMATOLOGY | Facility: CLINIC | Age: 54
End: 2019-07-03
Attending: INTERNAL MEDICINE
Payer: COMMERCIAL

## 2019-07-03 VITALS
HEART RATE: 75 BPM | OXYGEN SATURATION: 98 % | HEIGHT: 66 IN | BODY MASS INDEX: 43.84 KG/M2 | SYSTOLIC BLOOD PRESSURE: 132 MMHG | WEIGHT: 272.8 LBS | DIASTOLIC BLOOD PRESSURE: 83 MMHG

## 2019-07-03 DIAGNOSIS — M32.9 SYSTEMIC LUPUS ERYTHEMATOSUS, UNSPECIFIED SLE TYPE, UNSPECIFIED ORGAN INVOLVEMENT STATUS (H): Primary | ICD-10-CM

## 2019-07-03 PROCEDURE — G0463 HOSPITAL OUTPT CLINIC VISIT: HCPCS | Mod: ZF

## 2019-07-03 RX ORDER — ATOVAQUONE 750 MG/5ML
1500 SUSPENSION ORAL DAILY
Qty: 210 ML | Refills: 5 | Status: SHIPPED | OUTPATIENT
Start: 2019-07-03 | End: 2020-06-19

## 2019-07-03 RX ORDER — TRAMADOL HYDROCHLORIDE 50 MG/1
50 TABLET ORAL EVERY 12 HOURS PRN
Qty: 30 TABLET | Refills: 2 | Status: SHIPPED | OUTPATIENT
Start: 2019-07-03 | End: 2019-08-22

## 2019-07-03 ASSESSMENT — PAIN SCALES - GENERAL: PAINLEVEL: NO PAIN (0)

## 2019-07-03 ASSESSMENT — MIFFLIN-ST. JEOR: SCORE: 1864.16

## 2019-07-03 NOTE — LETTER
7/3/2019       RE: Taina Singh  [address] [place] [address]     Dear Colleague,    Thank you for referring your patient, Taina Singh, to the [facility] RHEUMATOLOGY at [facility]. Please see a copy of my visit note below.      Rheumatology F/U Note    Reason for visit: f/u for lupus flare    Date of last visit: 6/5/2019    DOS: 7/3/2019      HPI:    Taina Singh is a 52 year old  female who was referred to our clinic for evaluation and management of her SLE.    She was diagnosed with lupus at age 27. Her 1st sx were malar rash and fatigue. No skin biopsy was done (reportedly had +KARLIE). She was treated with prednisone taper and HCQ. HCQ helped and she tolerated it well. She was diagnosed with Sjogren's at the same time. Had dryness of eyes and mouth. No lip biopsy to confirm the Dx.    Reportedly she had very mild stable lupus for many years and eventually at some point, stopped taking HCQ (can't remember when)    She was diagnosed with MALT lymphoma at 44, had enlarged LN over R parotid gland, on biopsy it was MALT lymphoma. Tx was resection only, no radiation or chemo.    About 3 yr ago, had flu shot and 2 days later, developed pleuritic CP and was seen at urgent care. That's why she does not want to get flu shot. She was seen in ER 2 days after UC visit. She was treated with prednisone.    She lost 100 lbs by exercise over past 2 yr, felt amazing. In 7/2017, started not feeling well. She had extreme fatigue. Had AM stiffness and pain over hands. Touched base with her previous rheumatologist (Dr. Rangel) and was told to have lupus flare and was put on  mg qd. Afterwards, developed pleuritic CP which has not been resolved.    She was given prednisone 40 mg qd x 5 days (on 12/16/2017) by pulmonologist in [facility]. It helped a lot but pleuritic CP recurred after stopping it.    She was told to have pulm HTN and was evaluated for it.    No triggers for current  flare.    No flare of malar rash. No current hair loss. Uses blink eyedrops, restasis burned her eyes. She has been prescribed salagen for dry mouth, has not used it. Arthritis of hands have resolved on HCQ.    Last HCQ eye exam was 1 week ago.    4/2018: On AZA 50 mg qd since 2/8/2018, increased to 100 mg qd in 3/19/2018. Her arthralgia is intermittent and mild, it's better. Has fatigue.  5/2018: Started on mycophenalate 1500 mg, continues prednisone 30 mg and plaquenil 200 mg twice a day.     Her major complaint is continues pressure over her chest. Pain is pleuritic, it hurts by sneezing, taking deep breath.      7/2018: Ms. Singh feels an improvement in her symptoms. She says there is a baseline pleuritic chest pain, but her fatigue and overall day-to-day function has improved to her satisfaction. She says morning stiffness usually only lasts about 10 minutes. She complains of occasional episodes of flashing lights in her left eye, however she is being seen by her opthamologist. She has a OTC scheduled for Monday as well. Ms. Singh feels as though the mycophenalate has made the biggest difference in her improvement. She continues to taper the prednisone, currently on 20 mg daily. She also has continued the plaquenil 200 mg twice a day.       11/2018: On benlysta infusion since beginning of Sept 2018. Chest still feels less tight, breathing is much better. Sometimes hands ache but it does lot last long. Benlysta makes her tired, but overall feels her lupus sx are much improved on benlysta.      3/2019: Feels tired and achy, it is intermittent and moves around. The L hip pain is consistent for the past 7 days, she moved up her appointment from 3/22 to today to get a bursitis shot. Had bad sinus and ear infection in 1/2019. She still thinks benlysta has helped her a lot. Last night, her R shoulder was hurting so bad that she could not move it but it's better today. Pain comes and goes. Breathing is better  after adding benlysta, she is not gasping for air anymore which is huge improvement for her. She feels pressure over her chest and low back.    Is getting benlysta tomorrow.    Her prednisone dose is 10 mg a day.    Leaving to [place] for vacation.    6/5/2019: She reports severe diffuse arthralgia affecting all her joints with pain level 8/10, today is 6/10. No SOB. Feels pressure like CP. Has severe fatigue. Last benlysta was 7 wk ago.    Today: Since last visit 1 month ago, patient was hospitalized x2 (both 6/10-6/13 & 6/18-6/22) at [facility] for acute abdominal pain. 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 Team felt that 2nd admission was 2/2 tapering of steroids. She was pulsed during first admission and then got Cytoxan as outpatient in between admissions on 6/15.     Incidentally, patient found to have small splenic infarct and acute PE on CT scan. She was started on Xarelto.    Also had her kidney biopsy on 6/7/19. Biopsy showed diffuse global lupus nephritis, ISN/RPS class IV. She has also followed up with nephrology this past week and started on lisinopril. No reports of hypotension.    Since leaving hospital, biggest complaint is leg strength/conditioning. She thinks this is related to laying in bed while hospitalized. Going to start PT as soon as it is set up.       ROS:  A comprehensive ROS was done, positives are per HPI.    Past Medical History:   Diagnosis Date     Bronchiolitis     Chest CT 6/2018 shows air trapping; bronchiolitis possibly related to lupus     Diastolic dysfunction 2/13/2018     Gluten intolerance     Patient is not gluten intolerant     Iron deficiency      Iron deficiency 2/13/2018     Lupus (H)     dx age 27; + DNA-ds, KARLIE, SSA, SSB and RF; malar rash, serositis (pleuritic CP)     MALT lymphoma (H) age 44    No chemo or radiation     Other forms of systemic lupus erythematosus (H) 2/13/2018     Sjogren's syndrome (H)     secondary Sjogrens, secondary to lupus     Vitamin D deficiency       Past Surgical History:   Procedure Laterality Date     BIOPSY/REMOVAL, LYMPH NODE(S)        SECTION  age 32     HC HYSTEROS W PERMANENT FALLOPAIN IMPLANT       PAROTIDECTOMY       TONSILLECTOMY       Family History   Problem Relation Age of Onset     Alopecia Brother      Rheumatoid Arthritis Brother      LUNG DISEASE No family hx of      Social History     Socioeconomic History     Marital status:      Spouse name: Not on file     Number of children: Not on file     Years of education: 1     Highest education level: Not on file   Occupational History     Comment: , 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   Social Needs     Financial resource strain: Not on file     Food insecurity:     Worry: Not on file     Inability: Not on file     Transportation needs:     Medical: Not on file     Non-medical: Not on file   Tobacco Use     Smoking status: Never Smoker     Smokeless tobacco: Never Used   Substance and Sexual Activity     Alcohol use: No     Drug use: No     Sexual activity: Not on file   Lifestyle     Physical activity:     Days per week: Not on file     Minutes per session: Not on file     Stress: Not on file   Relationships     Social connections:     Talks on phone: Not on file     Gets together: Not on file     Attends Rastafari service: Not on file     Active member of club or organization: Not on file     Attends meetings of clubs or organizations: Not on file     Relationship status: Not on file     Intimate partner violence:     Fear of current or ex partner: Not on file     Emotionally abused: Not on file     Physically abused: Not on file     Forced sexual activity: Not on file   Other Topics Concern     Parent/sibling w/ CABG, MI or angioplasty before 65F 55M? 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  Denies exposure to asbestos, silica, hot tubs, feather pillows (used to until age 49), pet birds, mold, does not play brass/wind instruments.      Going through divorce  but it's not stress factor for her.    Allergies   Allergen Reactions     Pentamidine Shortness Of Breath     Penicillins Rash     Sulfa Drugs Rash       Outpatient Encounter Medications as of 7/3/2019   Medication Sig Dispense Refill     albuterol (PROAIR HFA/PROVENTIL HFA/VENTOLIN HFA) 108 (90 Base) MCG/ACT inhaler Inhale 2 puffs into the lungs every 6 hours as needed for shortness of breath / dyspnea or wheezing 3 Inhaler 3     Calcium-Magnesium 300-300 MG TABS daily        hydroxychloroquine (PLAQUENIL) 200 MG tablet Take 1 tablet (200 mg) by mouth 2 times daily 180 tablet 1     lisinopril (PRINIVIL/ZESTRIL) 10 MG tablet Take 1 tablet (10 mg) by mouth daily 90 tablet 3     Multiple Vitamins-Minerals (WOMENS MULTI PO) Take by mouth daily        Omega-3 Fatty Acids (OMEGA-3 FISH OIL) 500 MG CAPS Take 500 mg by mouth daily        pantoprazole (PROTONIX) 20 MG EC tablet Take 2 tablets (40 mg) by mouth 2 times daily       pilocarpine (SALAGEN) 5 MG tablet Take 1 tablet (5 mg) by mouth 4 times daily as needed 360 tablet 3     predniSONE (DELTASONE) 10 MG tablet Take 50 mg daily x 7 days, then 40 mg daily x 7 days, then 30 mg daily x 7 days, then 25 mg daily 100 tablet 1     predniSONE (DELTASONE) 20 MG tablet Take 3 tablets (60 mg) by mouth daily For one month 90 tablet 0     traMADol (ULTRAM) 50 MG tablet Take 1 tablet (50 mg) by mouth every 12 hours as needed for pain Take 1 tablet as needed for pain.  No driving or alcohol use while taking medication. 30 tablet 2     vitamin D3 (CHOLECALCIFEROL) 2000 units (50 mcg) tablet Take 1 tablet (2,000 Units) by mouth daily 90 tablet 11     [] ondansetron (ZOFRAN) 4 MG tablet Take 1 tablet (4 mg) by mouth every 6 hours as needed for nausea 12 tablet 0     No facility-administered encounter medications on file as of 7/3/2019.          Her records were reviewed.    2D-Echo 2017:    ECHO COMPLETE WO CONTRAST CHILANGO GIBBONS 2017 14:43     [facility] and  "[facility]   [address], [address], [place] [address]   Main: [phone]  [email or web address]                                                 Transthoracic Echo Report   CHILANGO GIBBONS ID: [id number] Age: 52 : 1965 Ordering Provider: NGUYEN PETERS   Exam Date: 2017 14:43 Gender: F Sonographer: HAJA   Accession #: [id number] Height: 66 in BSA: 2.24 m  BP: 108 / 62   Weight: 261 lbs BMI: 42.1 kg/m        Site: [facility]   Location: Outpatient Rhythm: Normal Sinus Rhythm   Procedure Components: 2D imaging, Color Doppler, Spectral Doppler   Indications: Murmur; Systemic lupus erythematosus, unspecified SLE type, unspecified organ   involvement status   Technical Quality: Contrast: None     Final Conclusion   Visually estimated ejection fraction is 55-60%.   Mild left ventricular hypertrophy.   Estimated pulmonary artery pressure of 31 mmHg + RA pressure.   Dilated IVC size, <50% collapse with respiration.   Estimated EF: 55-60%   FINDINGS   Left Ventricle Normal left ventricular size. Visually estimated ejection fraction is 55-60%.   Mild left ventricular hypertrophy.   Diastolic Function \"Pseudonormal\" left ventricular filling pattern. E/e' ratio 8-15 is   indeterminate for filling pressure.   Right Ventricle Normal right ventricular size and function.   Left Atrium Mild left atrial enlargement.   Right Atrium Mild right atrial enlargement.   Aortic Valve Normal aortic valve. No aortic stenosis. No significant aortic regurgitation.   Mitral Valve Normal mitral valve. No mitral stenosis. Mild mitral regurgitation.   Tricuspid Valve Normal tricuspid valve. No tricuspid stenosis. Mild tricuspid regurgitation.   Estimated pulmonary artery pressure   of 31 mmHg + RA pressure.   Pulmonic Valve Normal pulmonic valve without significant stenosis or regurgitation.   Pericardium Normal pericardium.   Aorta Measured aortic root diameter is normal in " size.   Inferior Vena Cava Dilated IVC size, <50% collapse with respiration.    Component      Latest Ref Rng & Units 11/20/2017   Sodium      133 - 144 mmol/L 137   Potassium      3.4 - 5.3 mmol/L 4.2   Chloride      94 - 109 mmol/L 102   Carbon Dioxide      20 - 32 mmol/L 30   Anion Gap      3 - 14 mmol/L 6   Glucose      70 - 99 mg/dL 101 (H)   Urea Nitrogen      7 - 30 mg/dL 13   Creatinine      0.52 - 1.04 mg/dL 0.55   GFR Estimate      >60 mL/min/1.7m2 >90   GFR Estimate If Black      >60 mL/min/1.7m2 >90   Calcium      8.5 - 10.1 mg/dL 9.1   WBC      4.0 - 11.0 10e9/L 4.9   RBC Count      3.8 - 5.2 10e12/L 4.28   Hemoglobin      11.7 - 15.7 g/dL 11.3 (L)   Hematocrit      35.0 - 47.0 % 35.5   MCV      78 - 100 fl 83   MCH      26.5 - 33.0 pg 26.4 (L)   MCHC      31.5 - 36.5 g/dL 31.8   RDW      10.0 - 15.0 % 14.6   Platelet Count      150 - 450 10e9/L 215   N-Terminal Pro Bnp      0 - 125 pg/mL 546 (H)   Sed Rate      0 - 30 mm/h 71 (H)     Component      Latest Ref Rng & Units 12/29/2017   Color Urine       Straw   Appearance Urine       Clear   Glucose Urine      NEG:Negative mg/dL Negative   Bilirubin Urine      NEG:Negative Negative   Ketones Urine      NEG:Negative mg/dL Negative   Specific Gravity Urine      1.003 - 1.035 1.005   Blood Urine      NEG:Negative Negative   pH Urine      5.0 - 7.0 pH 6.0   Protein Albumin Urine      NEG:Negative mg/dL Negative   Urobilinogen mg/dL      0.0 - 2.0 mg/dL 0.0   Nitrite Urine      NEG:Negative Negative   Leukocyte Esterase Urine      NEG:Negative Negative   Source       Midstream Urine   WBC Urine      0 - 2 /HPF <1   RBC Urine      0 - 2 /HPF <1   Bacteria Urine      NEG:Negative /HPF Few (A)   Squamous Epithelial /HPF Urine      0 - 1 /HPF <1   Mucous Urine      NEG:Negative /LPF Present (A)   Albumin Fraction      3.7 - 5.1 g/dL 4.2   Alpha 1 Fraction      0.2 - 0.4 g/dL 0.4   Alpha 2 Fraction      0.5 - 0.9 g/dL 0.8   Beta Fraction      0.6 - 1.0 g/dL 1.0    Gamma Fraction      0.7 - 1.6 g/dL 1.6   Monoclonal Peak      0.0 g/dL 0.0   ELP Interpretation:       Essentially normal electrophoretic pattern. No monoclonal protein seen. Pathologic . . .   IGG      695 - 1620 mg/dL 1560   IGA      70 - 380 mg/dL 473 (H)   IGM      60 - 265 mg/dL 92   Iron      35 - 180 ug/dL 58   Iron Binding Cap      240 - 430 ug/dL 315   Iron Saturation Index      15 - 46 % 19   TPMT Genotype Specimen       Whole Blood   TPMT Genotype       Neg/Neg   TPMT Predicted Phenotype       Normal   Protein Random Urine      g/L <0.05   Protein Total Urine g/gr Creatinine      0 - 0.2 g/g Cr Unable to calculate due to low value   Deamidated Gliadin Cari, IgA      <7 U/mL 2   Deamidated Gliadin Cari, IgG      <7 U/mL 5   Complement C3      76 - 169 mg/dL 72 (L)   Complement C4      15 - 50 mg/dL 6 (L)   CRP Inflammation      0.0 - 8.0 mg/L 3.2   DNA-ds      <10 IU/mL >379 (H)   Sed Rate      0 - 30 mm/h 49 (H)   Vitamin D Deficiency screening      20 - 75 ug/L 51   Creatinine Urine Random      mg/dL 23   AST      0 - 45 U/L 26   ALT      0 - 50 U/L 35   HEENA  Broad Spectrum       Neg   Beta 2 Glycoprotein 1 Antibody IgG      <7 U/mL <0.6   Beta 2 Glycoprotein 1 Antibody IgM      <7 U/mL <0.9   Thyroid Peroxidase Antibody      <35 IU/mL <10   Thyroglobulin Antibody      <40 IU/mL <20   TSH      0.40 - 4.00 mU/L 0.87   Cryoglobulin      NEG:Negative % Trace (A)   Tissue Transglutaminase Antibody IgA      <7 U/mL 4   Ferritin      8 - 252 ng/mL 163   Endomysial Antibody IgA by IFA      <1:10 <1:10   CRP Cardiac Risk      mg/L 2.9   Creatinine Urine      mg/dL 23     Component      Latest Ref Rng & Units 2/23/2018   Color Urine       Yellow   Appearance Urine       Clear   Glucose Urine      NEG:Negative mg/dL Negative   Bilirubin Urine      NEG:Negative Negative   Ketones Urine      NEG:Negative mg/dL Negative   Specific Gravity Urine      1.003 - 1.035 1.025   Blood Urine      NEG:Negative Negative   pH  Urine      5.0 - 7.0 pH 5.5   Protein Albumin Urine      NEG:Negative mg/dL Negative   Urobilinogen Urine      0.2 - 1.0 EU/dL 0.2   Nitrite Urine      NEG:Negative Negative   Leukocyte Esterase Urine      NEG:Negative Negative   Source       Midstream Urine   Protein Random Urine      g/L 0.17   Protein Total Urine g/gr Creatinine      0 - 0.2 g/g Cr 0.18   Complement C3      76 - 169 mg/dL 64 (L)   Complement C4      15 - 50 mg/dL 5 (L)   CRP Inflammation      0.0 - 8.0 mg/L 4.2   DNA-ds      <10 IU/mL >379 (H)   Sed Rate      0 - 30 mm/h 35 (H)   AST      0 - 45 U/L 27   ALT      0 - 50 U/L 31   Creatinine Urine Random      mg/dL 99     Component      Latest Ref Rng & Units 3/16/2018   WBC      4.0 - 11.0 10e9/L 5.6   RBC Count      3.8 - 5.2 10e12/L 4.43   Hemoglobin      11.7 - 15.7 g/dL 12.1   Hematocrit      35.0 - 47.0 % 36.9   MCV      78 - 100 fl 83   MCH      26.5 - 33.0 pg 27.3   MCHC      31.5 - 36.5 g/dL 32.8   RDW      10.0 - 15.0 % 14.5   Platelet Count      150 - 450 10e9/L 224   Diff Method       Automated Method   % Neutrophils      % 81.7   % Lymphocytes      % 9.3   % Monocytes      % 7.5   % Eosinophils      % 1.3   % Basophils      % 0.2   Absolute Neutrophil      1.6 - 8.3 10e9/L 4.6   Absolute Lymphocytes      0.8 - 5.3 10e9/L 0.5 (L)   Absolute Monocytes      0.0 - 1.3 10e9/L 0.4   Absolute Eosinophils      0.0 - 0.7 10e9/L 0.1   Absolute Basophils      0.0 - 0.2 10e9/L 0.0   Creatinine      0.52 - 1.04 mg/dL 0.51 (L)   GFR Estimate      >60 mL/min/1.7m2 >90   GFR Estimate If Black      >60 mL/min/1.7m2 >90   ALT      0 - 50 U/L 27   Albumin      3.4 - 5.0 g/dL 3.5   AST      0 - 45 U/L 18       Component      Latest Ref Rng & Units 3/21/2018   Color Urine       Yellow   Appearance Urine       Clear   Glucose Urine      NEG:Negative mg/dL Negative   Bilirubin Urine      NEG:Negative Negative   Ketones Urine      NEG:Negative mg/dL Negative   Specific Gravity Urine      1.003 - 1.035 <=1.005    pH Urine      5.0 - 7.0 pH 6.5   Protein Albumin Urine      NEG:Negative mg/dL Negative   Urobilinogen Urine      0.2 - 1.0 EU/dL 0.2   Nitrite Urine      NEG:Negative Negative   Blood Urine      NEG:Negative Negative   Leukocyte Esterase Urine      NEG:Negative Negative   Source       Midstream Urine   WBC Urine      OTO5:0 - 5 /HPF 0 - 5   RBC Urine      OTO2:O - 2 /HPF O - 2   Squamous Epithelial /LPF Urine      FEW:Few /LPF Few   Protein Random Urine      g/L <0.05   Protein Total Urine g/gr Creatinine      0 - 0.2 g/g Cr Unable to calculate due to low value   DNA-ds      <10 IU/mL >379 (H)   Complement C4      15 - 50 mg/dL 4 (L)   Complement C3      76 - 169 mg/dL 69 (L)   Creatinine Urine Random      mg/dL 17   Creatinine Urine      mg/dL 17     EXAMINATION: CT CHEST W/O CONTRAST, 12/29/2017 1:08 PM   TECHNIQUE:  Helical CT images from the thoracic inlet through the lung  bases were obtained without IV contrast during inspiration and  expiration according to high-resolution chest CT protocol. Contrast  dose: None  COMPARISON: None   HISTORY: eval for ILD, pleural effusion, pt has lupus, Sjogren's, h/o  MALT and pleurisy and SOB; Systemic lupus erythematosus, unspecified  SLE type, unspecified organ involvement status (H)   FINDINGS:  At least 75% collapse of the trachea and mainstem bronchi on the end  expiratory views. Diffuse lobular air trapping on end expiratory  images. Bibasilar platelike atelectasis. No pneumothorax or pleural  effusion. Scattered solid pulmonary nodules, for example a 4 mm  lingular nodule (series 5 image 145) and a 4 mm right upper lobe  nodule (image 73).    The heart size is normal. Mild three-vessel coronary artery calcific  atherosclerosis. Dilation of the main pulmonary artery to 4.1 cm. No  pericardial effusion. Normal caliber and configuration of the thoracic  great vessels. Numerous prominent though nonenlarged mediastinal lymph  nodes.  Limited views of the abdomen reveal  no worrisome pathology. No  worrisome bony or soft tissue lesions.    IMPRESSION:   1. At least moderate tracheobronchomalacia.  2. 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  3. Scattered subcentimeter pulmonary nodules measuring up to 4 mm.  Consider follow-up chest CT in one year to establish stability.     [Consider Follow Up: Lung nodule]     This report will be copied to the [facility] to ensure a  provider acknowledges the finding.      I have personally reviewed the examination and initial interpretation  and I agree with the findings.     AUSTIN PACE MD    Examination:NM LUNG SCAN VENTILATION AND PERFUSION, 3/14/2018 8:24 AM    Indication:  Chronic PE; Pulmonary hypertension    Additional Information: none   Technique:   The patient received 2 mCi of Tc-99m DTPA aerosol for ventilation and  7 mCi of Tc-99m MAA for perfusion. Multiple images were obtained of  the lungs in Anterior, posterior, HERNANDES, RPO, LPO, and  ELYSE projections.   Comparison: Chest x-ray same-day   Findings:     There are matched ventilation/perfusion defects in both lungs. No  mismatched perfusion defect to suggest pulmonary emboli.      Impression:  Pulmonary emboli is not present.     I have personally reviewed the examination and initial interpretation  and I agree with the findings.     DONNIE GARCIA MD    Component      Latest Ref Rng & Units 5/12/2018   WBC      4.0 - 11.0 10e9/L 7.1   RBC Count      3.8 - 5.2 10e12/L 4.42   Hemoglobin      11.7 - 15.7 g/dL 12.1   Hematocrit      35.0 - 47.0 % 37.4   MCV      78 - 100 fl 85   MCH      26.5 - 33.0 pg 27.4   MCHC      31.5 - 36.5 g/dL 32.4   RDW      10.0 - 15.0 % 14.7   Platelet Count      150 - 450 10e9/L 226   Diff Method       Automated Method   % Neutrophils      % 86.2   % Lymphocytes      % 4.8   % Monocytes      % 8.4   % Eosinophils      %  0.3   % Basophils      % 0.3   Absolute Neutrophil      1.6 - 8.3 10e9/L 6.1   Absolute Lymphocytes      0.8 - 5.3 10e9/L 0.3 (L)   Absolute Monocytes      0.0 - 1.3 10e9/L 0.6   Absolute Eosinophils      0.0 - 0.7 10e9/L 0.0   Absolute Basophils      0.0 - 0.2 10e9/L 0.0   Color Urine       Yellow   Appearance Urine       Clear   Glucose Urine      NEG:Negative mg/dL Negative   Bilirubin Urine      NEG:Negative Negative   Ketones Urine      NEG:Negative mg/dL Negative   Specific Gravity Urine      1.003 - 1.035 <=1.005   Blood Urine      NEG:Negative Negative   pH Urine      5.0 - 7.0 pH 5.5   Protein Albumin Urine      NEG:Negative mg/dL Negative   Urobilinogen Urine      0.2 - 1.0 EU/dL 0.2   Nitrite Urine      NEG:Negative Negative   Leukocyte Esterase Urine      NEG:Negative Negative   Source       Midstream Urine   Creatinine      0.52 - 1.04 mg/dL 0.63   GFR Estimate      >60 mL/min/1.7m2 >90   GFR Estimate If Black      >60 mL/min/1.7m2 >90   Protein Random Urine      g/L <0.05   Protein Total Urine g/gr Creatinine      0 - 0.2 g/g Cr Unable to calculate due to low value   Albumin      3.4 - 5.0 g/dL 3.6   ALT      0 - 50 U/L 28   AST      0 - 45 U/L 20   Complement C3      76 - 169 mg/dL 46 (L)   Complement C4      15 - 50 mg/dL 3 (L)   CRP Inflammation      0.0 - 8.0 mg/L <2.9   Sed Rate      0 - 30 mm/h 26   DNA-ds      <10 IU/mL >379 (H)   Creatinine Urine      mg/dL 16     Component      Latest Ref Rng & Units 7/7/2018   WBC      4.0 - 11.0 10e9/L 7.0   RBC Count      3.8 - 5.2 10e12/L 4.35   Hemoglobin      11.7 - 15.7 g/dL 11.7   Hematocrit      35.0 - 47.0 % 36.0   MCV      78 - 100 fl 83   MCH      26.5 - 33.0 pg 26.9   MCHC      31.5 - 36.5 g/dL 32.5   RDW      10.0 - 15.0 % 15.3 (H)   Platelet Count      150 - 450 10e9/L 224   Diff Method       Automated Method   % Neutrophils      % 91.9   % Lymphocytes      % 4.0   % Monocytes      % 3.7   % Eosinophils      % 0.3   % Basophils      % 0.1    Absolute Neutrophil      1.6 - 8.3 10e9/L 6.5   Absolute Lymphocytes      0.8 - 5.3 10e9/L 0.3 (L)   Absolute Monocytes      0.0 - 1.3 10e9/L 0.3   Absolute Eosinophils      0.0 - 0.7 10e9/L 0.0   Absolute Basophils      0.0 - 0.2 10e9/L 0.0   Color Urine       Yellow   Appearance Urine       Clear   Glucose Urine      NEG:Negative mg/dL Negative   Bilirubin Urine      NEG:Negative Negative   Ketones Urine      NEG:Negative mg/dL Negative   Specific Gravity Urine      1.003 - 1.035 1.010   pH Urine      5.0 - 7.0 pH 5.5   Protein Albumin Urine      NEG:Negative mg/dL Negative   Urobilinogen Urine      0.2 - 1.0 EU/dL 0.2   Nitrite Urine      NEG:Negative Negative   Blood Urine      NEG:Negative Trace (A)   Leukocyte Esterase Urine      NEG:Negative Negative   Source       Midstream Urine   WBC Urine      OTO5:0 - 5 /HPF 0 - 5   RBC Urine      OTO2:O - 2 /HPF O - 2   Squamous Epithelial /LPF Urine      FEW:Few /LPF Few   Creatinine      0.52 - 1.04 mg/dL 0.63   GFR Estimate      >60 mL/min/1.7m2 >90   GFR Estimate If Black      >60 mL/min/1.7m2 >90   Protein Random Urine      g/L 0.07   Protein Total Urine g/gr Creatinine      0 - 0.2 g/g Cr 0.29 (H)   Albumin      3.4 - 5.0 g/dL 3.5   ALT      0 - 50 U/L 27   AST      0 - 45 U/L 21   Complement C3      76 - 169 mg/dL 51 (L)   Complement C4      15 - 50 mg/dL 5 (L)   Creatinine Urine Random      mg/dL 24   CRP Inflammation      0.0 - 8.0 mg/L 11.2 (H)   DNA-ds      <10 IU/mL >379 (H)   Sed Rate      0 - 30 mm/h 35 (H)   Creatinine Urine      mg/dL 23     PFTs 5/2018 (The FEV1 and FVC are reduced but the FEV1/FVC ratio is normal.  The inspiratory flow rates are reduced.  The TLC and FRC are reduced.  The diffusing capacity is normal.  However, the diffusing capacity was not corrected for the patient's hemoglobin.  IMPRESSION:  Moderately Severe  Restriction.  Normal Diffusion.  Walk distance is normal on room air with significant desaturation but no  hypoxia.  ____________________________________________[email or web address].DSusan  DANIELAKERVIN.      Component      Latest Ref Rng & Units 11/12/2018   Color Urine       Yellow   Appearance Urine       Clear   Glucose Urine      NEG:Negative mg/dL Negative   Bilirubin Urine      NEG:Negative Negative   Ketones Urine      NEG:Negative mg/dL Negative   Specific Gravity Urine      1.003 - 1.035 1.025   pH Urine      5.0 - 7.0 pH 6.0   Protein Albumin Urine      NEG:Negative mg/dL 30 (A)   Urobilinogen Urine      0.2 - 1.0 EU/dL 0.2   Nitrite Urine      NEG:Negative Negative   Blood Urine      NEG:Negative Trace (A)   Leukocyte Esterase Urine      NEG:Negative Negative   Source       Midstream Urine   WBC Urine      OTO5:0 - 5 /HPF 0 - 5   RBC Urine      OTO2:O - 2 /HPF O - 2   Squamous Epithelial /LPF Urine      FEW:Few /LPF Few   Bacteria Urine      NEG:Negative /HPF Few (A)   WBC      4.0 - 11.0 10e9/L 7.3   RBC Count      3.8 - 5.2 10e12/L 4.25   Hemoglobin      11.7 - 15.7 g/dL 11.3 (L)   Hematocrit      35.0 - 47.0 % 36.0   MCV      78 - 100 fl 85   MCH      26.5 - 33.0 pg 26.6   MCHC      31.5 - 36.5 g/dL 31.4 (L)   RDW      10.0 - 15.0 % 14.9   Platelet Count      150 - 450 10e9/L 255   Creatinine      0.52 - 1.04 mg/dL 0.83   GFR Estimate      >60 mL/min/1.7m2 73   GFR Estimate If Black      >60 mL/min/1.7m2 88   Iron      35 - 180 ug/dL 27 (L)   Iron Binding Cap      240 - 430 ug/dL 264   Iron Saturation Index      15 - 46 % 10 (L)   Protein Random Urine      g/L 0.58   Protein Total Urine g/gr Creatinine      0 - 0.2 g/g Cr 0.34 (H)   Albumin      3.4 - 5.0 g/dL 3.2 (L)   ALT      0 - 50 U/L 30   AST      0 - 45 U/L 19   Complement C3      76 - 169 mg/dL 48 (L)   Complement C4      15 - 50 mg/dL 3 (L)   CRP Inflammation      0.0 - 8.0 mg/L 16.4 (H)   DNA-ds      <10 IU/mL >379 (H)   Sed Rate      0 - 30 mm/h 26   Ferritin      8 - 252 ng/mL 160   Creatinine Urine      mg/dL 176     Component      Latest Ref Rng & Units 6/5/2019            8:29 AM   Color Urine       Yellow   Appearance Urine       Slightly Cloudy   Glucose Urine      NEG:Negative mg/dL Negative   Bilirubin Urine      NEG:Negative Negative   Ketones Urine      NEG:Negative mg/dL 5 (A)   Specific Gravity Urine      1.003 - 1.035 1.027   Blood Urine      NEG:Negative Small (A)   pH Urine      5.0 - 7.0 pH 6.0   Protein Albumin Urine      NEG:Negative mg/dL >499 (A)   Urobilinogen mg/dL      0.0 - 2.0 mg/dL 2.0   Nitrite Urine      NEG:Negative Negative   Leukocyte Esterase Urine      NEG:Negative Trace (A)   Source       Midstream Urine   WBC Urine      0 - 5 /HPF 16 (H)   RBC Urine      0 - 2 /HPF 24 (H)   Squamous Epithelial /HPF Urine      0 - 1 /HPF 2 (H)   Mucous Urine      NEG:Negative /LPF Present (A)   Hyaline Casts      0 - 2 /LPF 1   Sodium      133 - 144 mmol/L    Potassium      3.4 - 5.3 mmol/L    Chloride      94 - 109 mmol/L    Carbon Dioxide      20 - 32 mmol/L    Anion Gap      3 - 14 mmol/L    Glucose      70 - 99 mg/dL    Urea Nitrogen      7 - 30 mg/dL    Creatinine      0.52 - 1.04 mg/dL    GFR Estimate      >60 mL/min/1.73:m2    GFR Estimate If Black      >60 mL/min/1.73:m2    Calcium      8.5 - 10.1 mg/dL    Phosphorus      2.5 - 4.5 mg/dL    Albumin      3.4 - 5.0 g/dL    WBC      4.0 - 11.0 10e9/L    RBC Count      3.8 - 5.2 10e12/L    Hemoglobin      11.7 - 15.7 g/dL    Hematocrit      35.0 - 47.0 %    MCV      78 - 100 fl    MCH      26.5 - 33.0 pg    MCHC      31.5 - 36.5 g/dL    RDW      10.0 - 15.0 %    Platelet Count      150 - 450 10e9/L    Specimen Description       Midstream Urine   Special Requests       Specimen received in preservative   Culture Micro       10,000 to 50,000 colonies/mL . . .   Creatinine Urine      mg/dL 240   Albumin Urine mg/L      mg/L    Albumin Urine mg/g Cr      0 - 25 mg/g Cr    Protein Random Urine      g/L 4.50   Protein Total Urine g/gr Creatinine      0 - 0.2 g/g Cr 1.88 (H)   Neutrophil Cytoplasmic Antibody      <1:10  titer    Neutrophil Cytoplasmic Antibody Pattern          Lactate Dehydrogenase      81 - 234 U/L    HIV Antigen Antibody Combo      NR:Nonreactive        Sed Rate      0 - 30 mm/h    CRP Inflammation      0.0 - 8.0 mg/L    Complement C4      15 - 50 mg/dL    Complement C3      76 - 169 mg/dL    DNA-ds      <10 IU/mL      Component      Latest Ref Rng & Units 6/5/2019           8:29 AM   Color Urine          Appearance Urine          Glucose Urine      NEG:Negative mg/dL    Bilirubin Urine      NEG:Negative    Ketones Urine      NEG:Negative mg/dL    Specific Gravity Urine      1.003 - 1.035    Blood Urine      NEG:Negative    pH Urine      5.0 - 7.0 pH    Protein Albumin Urine      NEG:Negative mg/dL    Urobilinogen mg/dL      0.0 - 2.0 mg/dL    Nitrite Urine      NEG:Negative    Leukocyte Esterase Urine      NEG:Negative    Source          WBC Urine      0 - 5 /HPF    RBC Urine      0 - 2 /HPF    Squamous Epithelial /HPF Urine      0 - 1 /HPF    Mucous Urine      NEG:Negative /LPF    Hyaline Casts      0 - 2 /LPF    Sodium      133 - 144 mmol/L    Potassium      3.4 - 5.3 mmol/L    Chloride      94 - 109 mmol/L    Carbon Dioxide      20 - 32 mmol/L    Anion Gap      3 - 14 mmol/L    Glucose      70 - 99 mg/dL    Urea Nitrogen      7 - 30 mg/dL    Creatinine      0.52 - 1.04 mg/dL    GFR Estimate      >60 mL/min/1.73:m2    GFR Estimate If Black      >60 mL/min/1.73:m2    Calcium      8.5 - 10.1 mg/dL    Phosphorus      2.5 - 4.5 mg/dL    Albumin      3.4 - 5.0 g/dL    WBC      4.0 - 11.0 10e9/L    RBC Count      3.8 - 5.2 10e12/L    Hemoglobin      11.7 - 15.7 g/dL    Hematocrit      35.0 - 47.0 %    MCV      78 - 100 fl    MCH      26.5 - 33.0 pg    MCHC      31.5 - 36.5 g/dL    RDW      10.0 - 15.0 %    Platelet Count      150 - 450 10e9/L    Specimen Description          Special Requests          Culture Micro          Creatinine Urine      mg/dL 258   Albumin Urine mg/L      mg/L 2,810   Albumin Urine mg/g Cr       0 - 25 mg/g Cr 1,089.15 (H)   Protein Random Urine      g/L    Protein Total Urine g/gr Creatinine      0 - 0.2 g/g Cr    Neutrophil Cytoplasmic Antibody      <1:10 titer    Neutrophil Cytoplasmic Antibody Pattern          Lactate Dehydrogenase      81 - 234 U/L    HIV Antigen Antibody Combo      NR:Nonreactive        Sed Rate      0 - 30 mm/h    CRP Inflammation      0.0 - 8.0 mg/L    Complement C4      15 - 50 mg/dL    Complement C3      76 - 169 mg/dL    DNA-ds      <10 IU/mL      Component      Latest Ref Rng & Units 6/5/2019          11:57 AM   Color Urine          Appearance Urine          Glucose Urine      NEG:Negative mg/dL    Bilirubin Urine      NEG:Negative    Ketones Urine      NEG:Negative mg/dL    Specific Gravity Urine      1.003 - 1.035    Blood Urine      NEG:Negative    pH Urine      5.0 - 7.0 pH    Protein Albumin Urine      NEG:Negative mg/dL    Urobilinogen mg/dL      0.0 - 2.0 mg/dL    Nitrite Urine      NEG:Negative    Leukocyte Esterase Urine      NEG:Negative    Source          WBC Urine      0 - 5 /HPF    RBC Urine      0 - 2 /HPF    Squamous Epithelial /HPF Urine      0 - 1 /HPF    Mucous Urine      NEG:Negative /LPF    Hyaline Casts      0 - 2 /LPF    Sodium      133 - 144 mmol/L 134   Potassium      3.4 - 5.3 mmol/L 4.3   Chloride      94 - 109 mmol/L 101   Carbon Dioxide      20 - 32 mmol/L 26   Anion Gap      3 - 14 mmol/L 8   Glucose      70 - 99 mg/dL 109 (H)   Urea Nitrogen      7 - 30 mg/dL 21   Creatinine      0.52 - 1.04 mg/dL 0.49 (L)   GFR Estimate      >60 mL/min/1.73:m2 >90   GFR Estimate If Black      >60 mL/min/1.73:m2 >90   Calcium      8.5 - 10.1 mg/dL 9.2   Phosphorus      2.5 - 4.5 mg/dL 4.2   Albumin      3.4 - 5.0 g/dL 2.9 (L)   WBC      4.0 - 11.0 10e9/L 8.7   RBC Count      3.8 - 5.2 10e12/L 4.81   Hemoglobin      11.7 - 15.7 g/dL 12.3   Hematocrit      35.0 - 47.0 % 39.3   MCV      78 - 100 fl 82   MCH      26.5 - 33.0 pg 25.6 (L)   MCHC      31.5 - 36.5 g/dL  31.3 (L)   RDW      10.0 - 15.0 % 14.1   Platelet Count      150 - 450 10e9/L 302   Specimen Description          Special Requests          Culture Micro          Creatinine Urine      mg/dL    Albumin Urine mg/L      mg/L    Albumin Urine mg/g Cr      0 - 25 mg/g Cr    Protein Random Urine      g/L    Protein Total Urine g/gr Creatinine      0 - 0.2 g/g Cr    Neutrophil Cytoplasmic Antibody      <1:10 titer <1:10   Neutrophil Cytoplasmic Antibody Pattern       The ANCA IFA is <1:10.  No further testing will be performed.   Lactate Dehydrogenase      81 - 234 U/L 252 (H)   HIV Antigen Antibody Combo      NR:Nonreactive     Nonreactive   Sed Rate      0 - 30 mm/h 40 (H)   CRP Inflammation      0.0 - 8.0 mg/L 25.7 (H)   Complement C4      15 - 50 mg/dL 3 (L)   Complement C3      76 - 169 mg/dL 53 (L)   DNA-ds      <10 IU/mL >379 (H)   [facility]                                                      CMR Report       MRN:                  [id number]                                  Name:           CHILANGO GIBBONS                                   :                  1965                                  Scan Date:   2019 11:11:32                                   Electronically signed by Fer Corea 2019-May-20 14:19:00     SUMMARY   ==========================================================================================================     Clinical history: 54-year old female with SLE, poly-serositis, and chronic pleuritic chest pain. CMR to  assess for cardiac involvement (sepideh-myocarditis).  Comparison CMR: None.      1. The LV is normal in cavity size and wall thickness. The global systolic function is normal. The LVEF is  62%.   There are no regional wall motion abnormalities.     2. The RV is normal in cavity size. The global systolic function is normal. The RVEF is 60%.      3. Both atria are normal in size.     4. There is no significant valvular disease.      5. Late gadolinium  enhancement imaging shows no MI, fibrosis or infiltrative disease.      6. The is mild pericardial thickening and tethering, with circumferential pericardial enhancement. There is  no pericardial effusion.       7. There is no intracardiac thrombus.     8. The main PA is moderately enlarged, measuring 3.8 cm.      CONCLUSIONS: Pericardial thickening and enhancement consistent with inflammatory pericarditis. There is no  myocardial fibrosis or myocarditis. Normal biventricular size and systolic function; LVEF 62%, RVEF 60%.      CORE EXAM   ==========================================================================================================     MEASUREMENTS   ----------------------------------------------------------------------------------------      VOLUMETRIC ANALYSIS       ----------------------------------------------  .--------------------------------------------------------.                    LV    Reference  RV    Reference   +------+-----------+------+-----------+------+-----------+   EDV   ml          162   ()   159   ()          ml/m^2     69.2   (56-90)   67.9   (53-90)     ESV   ml           62   (22-59)     64   (15-68)           ml/m^2     26.5   (14-33)   27.4   (11-37)     CO    L/min      7.30             6.93                     L/min/m^2   3.1              3.0               MASS                                                 SV    ml          100   ()    95   ()          ml/m^2     42.7   (37-62)   40.6   (36-60)     EF    %            62   (59-77)     60   (55-79)    '------+-----------+------+-----------+------+-----------'             CARDIAC OUTPUT HR:  73 BPM      LA DIMENSIONS (LV SYSTOLE)       ----------------------------------------------          DIAMETER:  3.9 cm         AORTIC ROOT DIMENSIONS       ----------------------------------------------          SINUS OF VALSALVA:  2.9  cm          AORTIC ROOT SIZE:  Normal        ANATOMY   ----------------------------------------------------------------------------------------      LEFT VENTRICLE       ----------------------------------------------          WALL THICKNESS:  Normal          CAVITY SIZE:  Normal         RIGHT VENTRICLE       ----------------------------------------------          WALL THICKNESS:  Normal          CONTRACTILITY:  Normal          CAVITY SIZE:  Normal         INTERVENTRICULAR SEPTUM       ----------------------------------------------               VENTRICULAR SEPTUM:  Normal          INTERATRIAL SEPTUM       ----------------------------------------------               ATRIAL SEPTUM:          LIPOMATOUS HYPERTROPHY          LEFT ATRIUM       ----------------------------------------------          CAVITY SIZE:  Normal         RIGHT ATRIUM       ----------------------------------------------          CAVITY SIZE:  Normal         PERICARDIUM       ----------------------------------------------          THICKENED          THICKNESS:  5 mm          EFFUSION:  None         PLEURAL EFFUSION       ----------------------------------------------               None         VALVES   ----------------------------------------------------------------------------------------      AORTIC VALVE       ----------------------------------------------          AORTIC VALVE LEAFLETS:  Trileaflet         MITRAL VALVE       ----------------------------------------------          MITRAL VALVE LEAFLETS:  Normal Leaflets         TRICUSPID VALVE       ----------------------------------------------          TRICUSPID VALVE LEAFLETS:  Normal Leaflets         PULMONIC VALVE       ----------------------------------------------          PULMONIC VALVE LEAFLETS:  Normal Leaflets        17 SEGMENT    ----------------------------------------------------------------------------------------  .------------------------------------------------------------------------------------------.   Segments            Wall Motion   Hyperenhancement  Stress Perfusion  Interpretation   +--------------------+--------------+------------------+------------------+----------------+   Base Anterior       Normal/Hyper  None                                Normal            Base Anteroseptal   Normal/Hyper  None                                Normal            Base Inferoseptal   Normal/Hyper  None                                Normal            Base Inferior       Normal/Hyper  None                                Normal            Base Inferolateral  Normal/Hyper  None                                Normal            Base Anterolateral  Normal/Hyper  None                                Normal            Mid Anterior        Normal/Hyper  None                                Normal            Mid Anteroseptal    Normal/Hyper  None                                Normal            Mid Inferoseptal    Normal/Hyper  None                                Normal            Mid Inferior        Normal/Hyper  None                                Normal            Mid Inferolateral   Normal/Hyper  None                                Normal            Mid Anterolateral   Normal/Hyper  None                                Normal            Apical Anterior     Normal/Hyper  None                                Normal            Apical Septal       Normal/Hyper  None                                Normal            Apical Inferior     Normal/Hyper  None                                Normal            Apical Lateral      Normal/Hyper  None                                Normal            Little Neck                Normal/Hyper  None                                 Normal           +--------------------+--------------+------------------+------------------+----------------+   RV Segments         Wall Motion   Hyperenhancement  Stress Perfusion  Interpretation   +--------------------+--------------+------------------+------------------+----------------+   RV Basal Anterior   Normal/Hyper  None                                Normal            RV Basal Inferior   Normal/Hyper  None                                Normal            RV Mid              Normal/Hyper  None                                Normal            RV Apical           Normal/Hyper  None                                Normal           '--------------------+--------------+------------------+------------------+----------------'         FINDINGS       ----------------------------------------------          INFARCT/SCAR SIZE:  0 %        SCAN INFO   ==========================================================================================================     GENERAL   ----------------------------------------------------------------------------------------      CONTRAST AGENT       ----------------------------------------------          TYPE:  Gadavist          VOLUME ADMINISTERED:  10 ml          DOSAGE FOR 0.5M:  0.04 mmol/kg          SERUM CREATININE:  0.58 sCr          CREATININE DATE:  2019-03-06 00:00:00          SEDATION       ----------------------------------------------          SEDATION USED?:  No         VITALS       ----------------------------------------------          HEIGHT:  66.00 in          HEIGHT:  167.64 cm          WEIGHT:  289.00 lbs          WEIGHT:  131.09 kgs          BSA:  2.34 m^2         PULSE SEQUENCES       ----------------------------------------------          Single-Shot SSFP, IR GRE - Segmented, IR SSFP - Single Shot, SSFP Cine          SETUP       ----------------------------------------------          TYPE:  Clinical          INPATIENT:  No           "INCOMPLETE SCAN:  No          REASON(S) FOR SCAN:  Cardiomyopathy, Pericardial Evaluation           REFERRING PHYSICIAN:  ROBE VAZQUEZ          ATTENDING PHYSICIAN:  ROBE VAZQUEZ      Kidney Biopsy (6/7/19)  Patient Name: CHILANGO GIBBONS   MR#: [id number]   Specimen #: [id number]   Collected: 6/7/2019   Received: 6/7/2019   Reported: 6/10/2019 22:42   Ordering Phy(s): JOYCE CAMERON     For improved result formatting, select 'View Enhanced Report Format' under    Linked Documents section.     SPECIMEN(S):   Kidney biopsy, native with EM & Immunofluorescence, right     FINAL DIAGNOSIS:   Kidney; percutaneous needle biopsy:   -Diffuse global lupus nephritis, ISN/RPS class IV-S (a)   -Mild to moderate arteriosclerosis     COMMENT:   There is mild mesangial and endocapillary proliferation, focal leukocytic   infiltrates, and crescentic lesions   involving 14 out of 26 glomeruli, mainly cellular with necrotizing   lesions.  There are minimal chronic   changes.  The activity index of the proliferative component is 9/24 and   the chronicity index is 1/12 (Robert   et al, American Journal of Medicine 75:  382-391, 1983).     The presence of significant crescents with only mild mesangial and   endocapillary proliferation raises the   possibility of concurrent lupus nephritis and ANCA-associated crescentic   glomerulonephritis.  Thus it is   recommended to test for ANCA and clinical correlation is recommended.        Ph.E:    /83   Pulse 75   Ht 1.676 m (5' 6\")   Wt 123.7 kg (272 lb 12.8 oz)   SpO2 98%   BMI 44.03 kg/m         Constitutional: WD/WN. Pleasant. In no acute distress. Cushingoid facies.  Eyes: EOM intact, PERRLA, sclera anicteric, conj not injected  HEENT: No oral ulcers or thrush. Normal salivary pool.    Neck: No cervical LAP or thyromegaly.  Chest: Clear to auscultation bilaterally  CV: RRR, no murmurs/ rubs or gallops. No edema, clubbing or cyanosis.   GI: Abdomen is " soft and non tender.   MS: No synovitis. Cool joints. No tenderness of the joints. No swelling. Normal ROM.  No joint deformities. Full ROM of the joints. No nodules. No Jaccoud's deformity.  Skin: No skin rash, malar rash, livedo, periungual erythema, alopecia, digital ulcers or nail changes.  Neuro: A&O x 3. Grossly non focal, muscular power 5/5 in all ext  Psych: NL affect and mood    Assessment/ plan:    1-SLE. 53 yo WF with +fh/o RA and personal hx of SLE presented in 12/2017 for 2nd opinion of m/o her SLE. She was diagnosed with SLE at age 27. Her lupus has been marked by +KARLIE (highest titer 1:640, speckled and nucleolar patterns), +anti-DNA, arthritis, malar rash, serositis (pleuritic CP) supported by +SSA/SSB Ab, low C3/low C4, +RF, high ESR/CRP. She had neg anti-RNP, anti-Sm, acL IgM/G/A, LAC, cryo and anti-CCP.     Had NL C3 at the time of Dx. She was treated with prednisone and HCQ at the time of Dx. Can't remember how long she was on HCQ and why HCQ was stopped, but it probably was stopped because of stable disease, no report on HCQ toxicity. Reportedly her SLE was mild all these years.    Has secondary Sjogren's with sicca, +SSA/SSB Ab and h/o MALT lymphoma at age 44 in remission. Uses blink eyedrops and salagen. No lip biopsy was done to confirm Dx of Sjogren's.    Her lupus flared in 7/2017 with no triggers when she presented with arthritis, HCQ was resumed, arthritis resolved. Mild pulm HTN on 2D-Echo 8/2017 but neg R cardiac cath and VQ scan in 3/2018, also neg 2D-Echo.    In 12/2017, was prescribed prednisone 40 mg for only 5 days for pleuritic CP, CP recurred after stopping prednisone.     Her major complaint at initial visit with me in 12/2017 was ongoing CP. AZA was added in 2/2018 with start dose of 50 mg qd and slowly was increased to max 150 mg qd. Tolerated AZA well, no SEs, no toxicity on the labs but failed it and required to go back on prednisone 20 mg qd (current dose).     Her lupus is  active with pleuritic CP. ESR/CRP normalized on prednisone+AZA+HCQ but +anti-DNA, low C3/C4 are unchanged. Chest CT in 12/2017 without contrast showed air trapping due to lupus/Sjogren's, no pleural effusion. Lung nodules need f/u in a year. No pericardail effusion on 2D-echo and neg VQ scan for PE in 3/2018 so chest CT with contrast is not needed. She is APL negative.    AZA was switched to  mg po bid on 5/18/2018 as she failed AZA. She is now on max dose of MMF 1500 mg bid. Her labs are unchanged with persistently elevated anti-DNA, low C3/C4 and high ESR/CRP, but just started to feel a lot better (regarding fatigue, arthralgia, still has residual pleuritic CP) after adding MMF.     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 Also possible shrinking lung syn sec lupus or obesity is responsible for restrictive lung disease plus pleural effusion. She was prescribed albuterol and dulera inh which have helped.    Benlysta was added in 9/2018 to help with pleuritic CP. No change in lupus serology (anti-DNA, C3, C4), but ESR/CRP have improved. Overall she felt a lot better after adding benlysta but it made her tired.     I could not taper her off the prednisone, she continued to have active IA, fatigue and pleuritic CP. I recommended switching benlysta to rituximab given her h/o lymphoma, unfortunately her insurance denied. At the same time, Taina misunderstood and stopped her cellcept as thought we were switching MMF to rituximab while the plan was to add rituximab to MMF. She has been off MMF for couple of months. Had cardiac MRI on 5/20 which showed active pericarditis. I advised to switch to cytoxan 750 mg/m2 qmo x 6 mo. On Saturday 6/1/2019,  received a call from infusion center reporting blood in the urine and if it was ok to proceed with cytoxan. I was concerned as that was very new. We canceled the cytoxan infusion. UCx was negative for UTI. Repeat U/A today is showing protein and blood, with h/o stopping MMF, very concerning for lupus nephritis. She never had lupus nephritis as part of her lupus and it's possible that it was covered by MMF and showed up off MMF. Renal biopsy is still recommended to confirm Dx, evaluate degree of severity, chronicity and rule out other diagnoses. I spoke to renal team and dr. Elmore and Aura kindly agreed to see her today (6/5/19) as urgent consult as add on to their schedule and scheduled renal biopsy for Friday 6/7/2019. I would re-schedule cytoxan to 6/15/2019 and schedule pulse solumedrol 1 gr every day x 3 days. Meanwhile, will increase prednisone to 60 mg every day. Cytoxan risks were discussed again.    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 Patient received 1st dose of cyclophosphamide on 6/15/19. Tolerated well. Has since followed up with nephrology. She is now on lisinopril 10 mg every day. Was given pentamidine for PJP ppx, but did not tolerate very well. Per discussion with Dr. Marroquin since last hospital discharge, Taina has been taking 40 mg BID prednisone for the past 1.5 weeks. She tolerated the taper down from 100 to 80 well. Will plan to decrease to 60 mg daily today and stay at that dose for 1 month.       Recommend:    For today, decrease prednisone to 60 mg a day x1 month. Then decrease to 50 mg daily x 7 days, then 40 mg daily x 7 days, then 30 mg daily x 7 days, then 25 mg daily.     Plan on monthly Cytoxan. Next dose due around 7/15/19.     Return to get lupus labs in early August. (Orders placed)    Continue the plaquenil 200 mg twice a day       2-HCQ monitoring. Was reminded to have annual eye exam    3-PCP prophylaxis. Patient did not tolerate inhaled pentamidine. Avoiding  TMP-SMX given sulfa reaction and also increase SLE flare. I am hesitant to use dapsone given risk of hemolytic anemia. Will start atovaquone 10 mL (1500 mg) daily for prophylaxis.      Keep September appointment.    Patient seen and plan formulated with Rheumatology staff, Dr. Marroquin.    Jose Maria Camejo DO  Internal Medicine, PGY 3    Attending Note: I saw and evaluated the patient with Dr. Camejo. I agree with the assessment and plan.    Killian Marroquin MD        Orders Placed This Encounter   Procedures     ALT     Albumin level     AST     CBC with platelets differential     Creatinine     Complement C4     Complement C3     CRP inflammation     DNA double stranded antibodies     Erythrocyte sedimentation rate auto     UA with Microscopic reflex to Culture     Protein  random urine with Creat Ratio     Creatinine random urine

## 2019-07-03 NOTE — PATIENT INSTRUCTIONS
Plan on monthly cytoxan    Prednisone 60 mg a day x one month then Take 50 mg daily x 7 days, then 40 mg daily x 7 days, then 30 mg daily x 7 days, then 25 mg daily    Atovaquone daily for PCP prophylaxis    Lupus labs in early Aug    Keep 9/2019 appointment

## 2019-07-03 NOTE — LETTER
7/3/2019      RE: Taina Singh  [address] [place] [address]         Rheumatology F/U Note    Reason for visit: f/u for lupus flare    Date of last visit: 6/5/2019    DOS: 7/3/2019      HPI:    Taina Singh is a 52 year old  female who was referred to our clinic for evaluation and management of her SLE.    She was diagnosed with lupus at age 27. Her 1st sx were malar rash and fatigue. No skin biopsy was done (reportedly had +KARLIE). She was treated with prednisone taper and HCQ. HCQ helped and she tolerated it well. She was diagnosed with Sjogren's at the same time. Had dryness of eyes and mouth. No lip biopsy to confirm the Dx.    Reportedly she had very mild stable lupus for many years and eventually at some point, stopped taking HCQ (can't remember when)    She was diagnosed with MALT lymphoma at 44, had enlarged LN over R parotid gland, on biopsy it was MALT lymphoma. Tx was resection only, no radiation or chemo.    About 3 yr ago, had flu shot and 2 days later, developed pleuritic CP and was seen at urgent care. That's why she does not want to get flu shot. She was seen in ER 2 days after UC visit. She was treated with prednisone.    She lost 100 lbs by exercise over past 2 yr, felt amazing. In 7/2017, started not feeling well. She had extreme fatigue. Had AM stiffness and pain over hands. Touched base with her previous rheumatologist (Dr. Rangel) and was told to have lupus flare and was put on  mg qd. Afterwards, developed pleuritic CP which has not been resolved.    She was given prednisone 40 mg qd x 5 days (on 12/16/2017) by pulmonologist in [facility]. It helped a lot but pleuritic CP recurred after stopping it.    She was told to have pulm HTN and was evaluated for it.    No triggers for current flare.    No flare of malar rash. No current hair loss. Uses blink eyedrops, restasis burned her eyes. She has been prescribed salagen for dry mouth, has not used it. Arthritis of hands have  resolved on HCQ.    Last HCQ eye exam was 1 week ago.    4/2018: On AZA 50 mg qd since 2/8/2018, increased to 100 mg qd in 3/19/2018. Her arthralgia is intermittent and mild, it's better. Has fatigue.  5/2018: Started on mycophenalate 1500 mg, continues prednisone 30 mg and plaquenil 200 mg twice a day.     Her major complaint is continues pressure over her chest. Pain is pleuritic, it hurts by sneezing, taking deep breath.      7/2018: Ms. Singh feels an improvement in her symptoms. She says there is a baseline pleuritic chest pain, but her fatigue and overall day-to-day function has improved to her satisfaction. She says morning stiffness usually only lasts about 10 minutes. She complains of occasional episodes of flashing lights in her left eye, however she is being seen by her opthamologist. She has a OTC scheduled for Monday as well. Ms. Singh feels as though the mycophenalate has made the biggest difference in her improvement. She continues to taper the prednisone, currently on 20 mg daily. She also has continued the plaquenil 200 mg twice a day.       11/2018: On benlysta infusion since beginning of Sept 2018. Chest still feels less tight, breathing is much better. Sometimes hands ache but it does lot last long. Benlysta makes her tired, but overall feels her lupus sx are much improved on benlysta.      3/2019: Feels tired and achy, it is intermittent and moves around. The L hip pain is consistent for the past 7 days, she moved up her appointment from 3/22 to today to get a bursitis shot. Had bad sinus and ear infection in 1/2019. She still thinks benlysta has helped her a lot. Last night, her R shoulder was hurting so bad that she could not move it but it's better today. Pain comes and goes. Breathing is better after adding benlysta, she is not gasping for air anymore which is huge improvement for her. She feels pressure over her chest and low back.    Is getting benlysta tomorrow.    Her prednisone  dose is 10 mg a day.    Leaving to [place] for vacation.    2019: She reports severe diffuse arthralgia affecting all her joints with pain level 8/10, today is 6/10. No SOB. Feels pressure like CP. Has severe fatigue. Last benlysta was 7 wk ago.    Today: Since last visit 1 month ago, patient was hospitalized x2 (both 6/10- & -) at [facility] for acute abdominal pain. 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 Team felt that 2nd admission was 2/2 tapering of steroids. She was pulsed during first admission and then got Cytoxan as outpatient in between admissions on 6/15.     Incidentally, patient found to have small splenic infarct and acute PE on CT scan. She was started on Xarelto.    Also had her kidney biopsy on 19. Biopsy showed diffuse global lupus nephritis, ISN/RPS class IV. She has also followed up with nephrology this past week and started on lisinopril. No reports of hypotension.    Since leaving hospital, biggest complaint is leg strength/conditioning. She thinks this is related to laying in bed while hospitalized. Going to start PT as soon as it is set up.       ROS:  A comprehensive ROS was done, positives are per HPI.    Past Medical History:   Diagnosis Date     Bronchiolitis     Chest CT 2018 shows air trapping; bronchiolitis possibly related to lupus     Diastolic dysfunction 2018     Gluten intolerance     Patient is not gluten intolerant     Iron deficiency      Iron deficiency 2018     Lupus (H)     dx age 27; + DNA-ds, KARLIE, SSA, SSB and RF; malar rash, serositis (pleuritic CP)     MALT lymphoma (H) age 44    No chemo or radiation     Other forms of systemic lupus erythematosus (H) 2018     Sjogren's syndrome (H)     secondary Sjogrens, secondary to lupus     Vitamin D deficiency      Past Surgical History:   Procedure Laterality Date     BIOPSY/REMOVAL, LYMPH NODE(S)        SECTION  age 32     HC HYSTEROS W PERMANENT FALLOPAIN IMPLANT       PAROTIDECTOMY        TONSILLECTOMY       Family History   Problem Relation Age of Onset     Alopecia Brother      Rheumatoid Arthritis Brother      LUNG DISEASE No family hx of      Social History     Socioeconomic History     Marital status:      Spouse name: Not on file     Number of children: Not on file     Years of education: 1     Highest education level: Not on file   Occupational History     Comment: , 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   Social Needs     Financial resource strain: Not on file     Food insecurity:     Worry: Not on file     Inability: Not on file     Transportation needs:     Medical: Not on file     Non-medical: Not on file   Tobacco Use     Smoking status: Never Smoker     Smokeless tobacco: Never Used   Substance and Sexual Activity     Alcohol use: No     Drug use: No     Sexual activity: Not on file   Lifestyle     Physical activity:     Days per week: Not on file     Minutes per session: Not on file     Stress: Not on file   Relationships     Social connections:     Talks on phone: Not on file     Gets together: Not on file     Attends [employer]al service: Not on file     Active member of club or organization: Not on file     Attends meetings of clubs or organizations: Not on file     Relationship status: Not on file     Intimate partner violence:     Fear of current or ex partner: Not on file     Emotionally abused: Not on file     Physically abused: Not on file     Forced sexual activity: Not on file   Other Topics Concern     Parent/sibling w/ CABG, MI or angioplasty before 65F 55M? 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  Denies exposure to asbestos, silica, hot tubs, feather pillows (used to until age 49), pet birds, mold, does not play brass/wind instruments.      Going through divorce but it's not stress factor for her.    Allergies   Allergen Reactions     Pentamidine Shortness Of Breath     Penicillins Rash     Sulfa Drugs Rash       Outpatient Encounter Medications as of  7/3/2019   Medication Sig Dispense Refill     albuterol (PROAIR HFA/PROVENTIL HFA/VENTOLIN HFA) 108 (90 Base) MCG/ACT inhaler Inhale 2 puffs into the lungs every 6 hours as needed for shortness of breath / dyspnea or wheezing 3 Inhaler 3     Calcium-Magnesium 300-300 MG TABS daily        hydroxychloroquine (PLAQUENIL) 200 MG tablet Take 1 tablet (200 mg) by mouth 2 times daily 180 tablet 1     lisinopril (PRINIVIL/ZESTRIL) 10 MG tablet Take 1 tablet (10 mg) by mouth daily 90 tablet 3     Multiple Vitamins-Minerals (WOMENS MULTI PO) Take by mouth daily        Omega-3 Fatty Acids (OMEGA-3 FISH OIL) 500 MG CAPS Take 500 mg by mouth daily        pantoprazole (PROTONIX) 20 MG EC tablet Take 2 tablets (40 mg) by mouth 2 times daily       pilocarpine (SALAGEN) 5 MG tablet Take 1 tablet (5 mg) by mouth 4 times daily as needed 360 tablet 3     predniSONE (DELTASONE) 10 MG tablet Take 50 mg daily x 7 days, then 40 mg daily x 7 days, then 30 mg daily x 7 days, then 25 mg daily 100 tablet 1     predniSONE (DELTASONE) 20 MG tablet Take 3 tablets (60 mg) by mouth daily For one month 90 tablet 0     traMADol (ULTRAM) 50 MG tablet Take 1 tablet (50 mg) by mouth every 12 hours as needed for pain Take 1 tablet as needed for pain.  No driving or alcohol use while taking medication. 30 tablet 2     vitamin D3 (CHOLECALCIFEROL) 2000 units (50 mcg) tablet Take 1 tablet (2,000 Units) by mouth daily 90 tablet 11     [] ondansetron (ZOFRAN) 4 MG tablet Take 1 tablet (4 mg) by mouth every 6 hours as needed for nausea 12 tablet 0     No facility-administered encounter medications on file as of 7/3/2019.          Her records were reviewed.    2D-Echo 2017:    ECHO COMPLETE WO CONTRAST CHILANGO GIBBONS 2017 14:43     [facility]   [address], [address], [place] [address]   Main: [phone]  [email or web address]                                                 Transthoracic Echo  "Report   CHILANGO GIBBONS ID: [id number] Age: 52 : 1965 Ordering Provider: NGUYEN PETERS   Exam Date: 2017 14:43 Gender: F Sonographer: HAJA   Accession #: [id number] Height: 66 in BSA: 2.24 m  BP: 108 / 62   Weight: 261 lbs BMI: 42.1 kg/m        Site: [facility]   Location: Outpatient Rhythm: Normal Sinus Rhythm   Procedure Components: 2D imaging, Color Doppler, Spectral Doppler   Indications: Murmur; Systemic lupus erythematosus, unspecified SLE type, unspecified organ   involvement status   Technical Quality: Contrast: None     Final Conclusion   Visually estimated ejection fraction is 55-60%.   Mild left ventricular hypertrophy.   Estimated pulmonary artery pressure of 31 mmHg + RA pressure.   Dilated IVC size, <50% collapse with respiration.   Estimated EF: 55-60%   FINDINGS   Left Ventricle Normal left ventricular size. Visually estimated ejection fraction is 55-60%.   Mild left ventricular hypertrophy.   Diastolic Function \"Pseudonormal\" left ventricular filling pattern. E/e' ratio 8-15 is   indeterminate for filling pressure.   Right Ventricle Normal right ventricular size and function.   Left Atrium Mild left atrial enlargement.   Right Atrium Mild right atrial enlargement.   Aortic Valve Normal aortic valve. No aortic stenosis. No significant aortic regurgitation.   Mitral Valve Normal mitral valve. No mitral stenosis. Mild mitral regurgitation.   Tricuspid Valve Normal tricuspid valve. No tricuspid stenosis. Mild tricuspid regurgitation.   Estimated pulmonary artery pressure   of 31 mmHg + RA pressure.   Pulmonic Valve Normal pulmonic valve without significant stenosis or regurgitation.   Pericardium Normal pericardium.   Aorta Measured aortic root diameter is normal in size.   Inferior Vena Cava Dilated IVC size, <50% collapse with respiration.    Component      Latest Ref Rng & Units 2017   Sodium      133 - 144 mmol/L 137   Potassium      3.4 - 5.3 " mmol/L 4.2   Chloride      94 - 109 mmol/L 102   Carbon Dioxide      20 - 32 mmol/L 30   Anion Gap      3 - 14 mmol/L 6   Glucose      70 - 99 mg/dL 101 (H)   Urea Nitrogen      7 - 30 mg/dL 13   Creatinine      0.52 - 1.04 mg/dL 0.55   GFR Estimate      >60 mL/min/1.7m2 >90   GFR Estimate If Black      >60 mL/min/1.7m2 >90   Calcium      8.5 - 10.1 mg/dL 9.1   WBC      4.0 - 11.0 10e9/L 4.9   RBC Count      3.8 - 5.2 10e12/L 4.28   Hemoglobin      11.7 - 15.7 g/dL 11.3 (L)   Hematocrit      35.0 - 47.0 % 35.5   MCV      78 - 100 fl 83   MCH      26.5 - 33.0 pg 26.4 (L)   MCHC      31.5 - 36.5 g/dL 31.8   RDW      10.0 - 15.0 % 14.6   Platelet Count      150 - 450 10e9/L 215   N-Terminal Pro Bnp      0 - 125 pg/mL 546 (H)   Sed Rate      0 - 30 mm/h 71 (H)     Component      Latest Ref Rng & Units 12/29/2017   Color Urine       Straw   Appearance Urine       Clear   Glucose Urine      NEG:Negative mg/dL Negative   Bilirubin Urine      NEG:Negative Negative   Ketones Urine      NEG:Negative mg/dL Negative   Specific Gravity Urine      1.003 - 1.035 1.005   Blood Urine      NEG:Negative Negative   pH Urine      5.0 - 7.0 pH 6.0   Protein Albumin Urine      NEG:Negative mg/dL Negative   Urobilinogen mg/dL      0.0 - 2.0 mg/dL 0.0   Nitrite Urine      NEG:Negative Negative   Leukocyte Esterase Urine      NEG:Negative Negative   Source       Midstream Urine   WBC Urine      0 - 2 /HPF <1   RBC Urine      0 - 2 /HPF <1   Bacteria Urine      NEG:Negative /HPF Few (A)   Squamous Epithelial /HPF Urine      0 - 1 /HPF <1   Mucous Urine      NEG:Negative /LPF Present (A)   Albumin Fraction      3.7 - 5.1 g/dL 4.2   Alpha 1 Fraction      0.2 - 0.4 g/dL 0.4   Alpha 2 Fraction      0.5 - 0.9 g/dL 0.8   Beta Fraction      0.6 - 1.0 g/dL 1.0   Gamma Fraction      0.7 - 1.6 g/dL 1.6   Monoclonal Peak      0.0 g/dL 0.0   ELP Interpretation:       Essentially normal electrophoretic pattern. No monoclonal protein seen. Pathologic . . .    IGG      695 - 1620 mg/dL 1560   IGA      70 - 380 mg/dL 473 (H)   IGM      60 - 265 mg/dL 92   Iron      35 - 180 ug/dL 58   Iron Binding Cap      240 - 430 ug/dL 315   Iron Saturation Index      15 - 46 % 19   TPMT Genotype Specimen       Whole Blood   TPMT Genotype       Neg/Neg   TPMT Predicted Phenotype       Normal   Protein Random Urine      g/L <0.05   Protein Total Urine g/gr Creatinine      0 - 0.2 g/g Cr Unable to calculate due to low value   Deamidated Gliadin Cari, IgA      <7 U/mL 2   Deamidated Gliadin Cari, IgG      <7 U/mL 5   Complement C3      76 - 169 mg/dL 72 (L)   Complement C4      15 - 50 mg/dL 6 (L)   CRP Inflammation      0.0 - 8.0 mg/L 3.2   DNA-ds      <10 IU/mL >379 (H)   Sed Rate      0 - 30 mm/h 49 (H)   Vitamin D Deficiency screening      20 - 75 ug/L 51   Creatinine Urine Random      mg/dL 23   AST      0 - 45 U/L 26   ALT      0 - 50 U/L 35   HEENA  Broad Spectrum       Neg   Beta 2 Glycoprotein 1 Antibody IgG      <7 U/mL <0.6   Beta 2 Glycoprotein 1 Antibody IgM      <7 U/mL <0.9   Thyroid Peroxidase Antibody      <35 IU/mL <10   Thyroglobulin Antibody      <40 IU/mL <20   TSH      0.40 - 4.00 mU/L 0.87   Cryoglobulin      NEG:Negative % Trace (A)   Tissue Transglutaminase Antibody IgA      <7 U/mL 4   Ferritin      8 - 252 ng/mL 163   Endomysial Antibody IgA by IFA      <1:10 <1:10   CRP Cardiac Risk      mg/L 2.9   Creatinine Urine      mg/dL 23     Component      Latest Ref Rng & Units 2/23/2018   Color Urine       Yellow   Appearance Urine       Clear   Glucose Urine      NEG:Negative mg/dL Negative   Bilirubin Urine      NEG:Negative Negative   Ketones Urine      NEG:Negative mg/dL Negative   Specific Gravity Urine      1.003 - 1.035 1.025   Blood Urine      NEG:Negative Negative   pH Urine      5.0 - 7.0 pH 5.5   Protein Albumin Urine      NEG:Negative mg/dL Negative   Urobilinogen Urine      0.2 - 1.0 EU/dL 0.2   Nitrite Urine      NEG:Negative Negative   Leukocyte Esterase  Urine      NEG:Negative Negative   Source       Midstream Urine   Protein Random Urine      g/L 0.17   Protein Total Urine g/gr Creatinine      0 - 0.2 g/g Cr 0.18   Complement C3      76 - 169 mg/dL 64 (L)   Complement C4      15 - 50 mg/dL 5 (L)   CRP Inflammation      0.0 - 8.0 mg/L 4.2   DNA-ds      <10 IU/mL >379 (H)   Sed Rate      0 - 30 mm/h 35 (H)   AST      0 - 45 U/L 27   ALT      0 - 50 U/L 31   Creatinine Urine Random      mg/dL 99     Component      Latest Ref Rng & Units 3/16/2018   WBC      4.0 - 11.0 10e9/L 5.6   RBC Count      3.8 - 5.2 10e12/L 4.43   Hemoglobin      11.7 - 15.7 g/dL 12.1   Hematocrit      35.0 - 47.0 % 36.9   MCV      78 - 100 fl 83   MCH      26.5 - 33.0 pg 27.3   MCHC      31.5 - 36.5 g/dL 32.8   RDW      10.0 - 15.0 % 14.5   Platelet Count      150 - 450 10e9/L 224   Diff Method       Automated Method   % Neutrophils      % 81.7   % Lymphocytes      % 9.3   % Monocytes      % 7.5   % Eosinophils      % 1.3   % Basophils      % 0.2   Absolute Neutrophil      1.6 - 8.3 10e9/L 4.6   Absolute Lymphocytes      0.8 - 5.3 10e9/L 0.5 (L)   Absolute Monocytes      0.0 - 1.3 10e9/L 0.4   Absolute Eosinophils      0.0 - 0.7 10e9/L 0.1   Absolute Basophils      0.0 - 0.2 10e9/L 0.0   Creatinine      0.52 - 1.04 mg/dL 0.51 (L)   GFR Estimate      >60 mL/min/1.7m2 >90   GFR Estimate If Black      >60 mL/min/1.7m2 >90   ALT      0 - 50 U/L 27   Albumin      3.4 - 5.0 g/dL 3.5   AST      0 - 45 U/L 18       Component      Latest Ref Rng & Units 3/21/2018   Color Urine       Yellow   Appearance Urine       Clear   Glucose Urine      NEG:Negative mg/dL Negative   Bilirubin Urine      NEG:Negative Negative   Ketones Urine      NEG:Negative mg/dL Negative   Specific Gravity Urine      1.003 - 1.035 <=1.005   pH Urine      5.0 - 7.0 pH 6.5   Protein Albumin Urine      NEG:Negative mg/dL Negative   Urobilinogen Urine      0.2 - 1.0 EU/dL 0.2   Nitrite Urine      NEG:Negative Negative   Blood Urine       NEG:Negative Negative   Leukocyte Esterase Urine      NEG:Negative Negative   Source       Midstream Urine   WBC Urine      OTO5:0 - 5 /HPF 0 - 5   RBC Urine      OTO2:O - 2 /HPF O - 2   Squamous Epithelial /LPF Urine      FEW:Few /LPF Few   Protein Random Urine      g/L <0.05   Protein Total Urine g/gr Creatinine      0 - 0.2 g/g Cr Unable to calculate due to low value   DNA-ds      <10 IU/mL >379 (H)   Complement C4      15 - 50 mg/dL 4 (L)   Complement C3      76 - 169 mg/dL 69 (L)   Creatinine Urine Random      mg/dL 17   Creatinine Urine      mg/dL 17     EXAMINATION: CT CHEST W/O CONTRAST, 12/29/2017 1:08 PM   TECHNIQUE:  Helical CT images from the thoracic inlet through the lung  bases were obtained without IV contrast during inspiration and  expiration according to high-resolution chest CT protocol. Contrast  dose: None  COMPARISON: None   HISTORY: eval for ILD, pleural effusion, pt has lupus, Sjogren's, h/o  MALT and pleurisy and SOB; Systemic lupus erythematosus, unspecified  SLE type, unspecified organ involvement status (H)   FINDINGS:  At least 75% collapse of the trachea and mainstem bronchi on the end  expiratory views. Diffuse lobular air trapping on end expiratory  images. Bibasilar platelike atelectasis. No pneumothorax or pleural  effusion. Scattered solid pulmonary nodules, for example a 4 mm  lingular nodule (series 5 image 145) and a 4 mm right upper lobe  nodule (image 73).    The heart size is normal. Mild three-vessel coronary artery calcific  atherosclerosis. Dilation of the main pulmonary artery to 4.1 cm. No  pericardial effusion. Normal caliber and configuration of the thoracic  great vessels. Numerous prominent though nonenlarged mediastinal lymph  nodes.  Limited views of the abdomen reveal no worrisome pathology. No  worrisome bony or soft tissue lesions.    IMPRESSION:   1. At least moderate tracheobronchomalacia.  2. Diffuse lobular regions of air trapping likely secondary  to  tracheobronchomalacia versus follicular bronchiolitis (given history  of Sjogren syndrome and lupus).  3. Scattered subcentimeter pulmonary nodules measuring up to 4 mm.  Consider follow-up chest CT in one year to establish stability.     [Consider Follow Up: Lung nodule]     This report will be copied to the [facility] to ensure a  provider acknowledges the finding.      I have personally reviewed the examination and initial interpretation  and I agree with the findings.     AUSTIN PACE MD    Examination:NM LUNG SCAN VENTILATION AND PERFUSION, 3/14/2018 8:24 AM    Indication:  Chronic PE; Pulmonary hypertension    Additional Information: none   Technique:   The patient received 2 mCi of Tc-99m DTPA aerosol for ventilation and  7 mCi of Tc-99m MAA for perfusion. Multiple images were obtained of  the lungs in Anterior, posterior, HERNANDES, RPO, LPO, and  ELYSE projections.   Comparison: Chest x-ray same-day   Findings:     There are matched ventilation/perfusion defects in both lungs. No  mismatched perfusion defect to suggest pulmonary emboli.      Impression:  Pulmonary emboli is not present.     I have personally reviewed the examination and initial interpretation  and I agree with the findings.     DONNIE GARCIA MD    Component      Latest Ref Rng & Units 5/12/2018   WBC      4.0 - 11.0 10e9/L 7.1   RBC Count      3.8 - 5.2 10e12/L 4.42   Hemoglobin      11.7 - 15.7 g/dL 12.1   Hematocrit      35.0 - 47.0 % 37.4   MCV      78 - 100 fl 85   MCH      26.5 - 33.0 pg 27.4   MCHC      31.5 - 36.5 g/dL 32.4   RDW      10.0 - 15.0 % 14.7   Platelet Count      150 - 450 10e9/L 226   Diff Method       Automated Method   % Neutrophils      % 86.2   % Lymphocytes      % 4.8   % Monocytes      % 8.4   % Eosinophils      % 0.3   % Basophils      % 0.3   Absolute Neutrophil      1.6 - 8.3 10e9/L 6.1   Absolute Lymphocytes      0.8 - 5.3 10e9/L 0.3 (L)   Absolute Monocytes      0.0 - 1.3 10e9/L 0.6   Absolute  Eosinophils      0.0 - 0.7 10e9/L 0.0   Absolute Basophils      0.0 - 0.2 10e9/L 0.0   Color Urine       Yellow   Appearance Urine       Clear   Glucose Urine      NEG:Negative mg/dL Negative   Bilirubin Urine      NEG:Negative Negative   Ketones Urine      NEG:Negative mg/dL Negative   Specific Gravity Urine      1.003 - 1.035 <=1.005   Blood Urine      NEG:Negative Negative   pH Urine      5.0 - 7.0 pH 5.5   Protein Albumin Urine      NEG:Negative mg/dL Negative   Urobilinogen Urine      0.2 - 1.0 EU/dL 0.2   Nitrite Urine      NEG:Negative Negative   Leukocyte Esterase Urine      NEG:Negative Negative   Source       Midstream Urine   Creatinine      0.52 - 1.04 mg/dL 0.63   GFR Estimate      >60 mL/min/1.7m2 >90   GFR Estimate If Black      >60 mL/min/1.7m2 >90   Protein Random Urine      g/L <0.05   Protein Total Urine g/gr Creatinine      0 - 0.2 g/g Cr Unable to calculate due to low value   Albumin      3.4 - 5.0 g/dL 3.6   ALT      0 - 50 U/L 28   AST      0 - 45 U/L 20   Complement C3      76 - 169 mg/dL 46 (L)   Complement C4      15 - 50 mg/dL 3 (L)   CRP Inflammation      0.0 - 8.0 mg/L <2.9   Sed Rate      0 - 30 mm/h 26   DNA-ds      <10 IU/mL >379 (H)   Creatinine Urine      mg/dL 16     Component      Latest Ref Rng & Units 7/7/2018   WBC      4.0 - 11.0 10e9/L 7.0   RBC Count      3.8 - 5.2 10e12/L 4.35   Hemoglobin      11.7 - 15.7 g/dL 11.7   Hematocrit      35.0 - 47.0 % 36.0   MCV      78 - 100 fl 83   MCH      26.5 - 33.0 pg 26.9   MCHC      31.5 - 36.5 g/dL 32.5   RDW      10.0 - 15.0 % 15.3 (H)   Platelet Count      150 - 450 10e9/L 224   Diff Method       Automated Method   % Neutrophils      % 91.9   % Lymphocytes      % 4.0   % Monocytes      % 3.7   % Eosinophils      % 0.3   % Basophils      % 0.1   Absolute Neutrophil      1.6 - 8.3 10e9/L 6.5   Absolute Lymphocytes      0.8 - 5.3 10e9/L 0.3 (L)   Absolute Monocytes      0.0 - 1.3 10e9/L 0.3   Absolute Eosinophils      0.0 - 0.7 10e9/L  0.0   Absolute Basophils      0.0 - 0.2 10e9/L 0.0   Color Urine       Yellow   Appearance Urine       Clear   Glucose Urine      NEG:Negative mg/dL Negative   Bilirubin Urine      NEG:Negative Negative   Ketones Urine      NEG:Negative mg/dL Negative   Specific Gravity Urine      1.003 - 1.035 1.010   pH Urine      5.0 - 7.0 pH 5.5   Protein Albumin Urine      NEG:Negative mg/dL Negative   Urobilinogen Urine      0.2 - 1.0 EU/dL 0.2   Nitrite Urine      NEG:Negative Negative   Blood Urine      NEG:Negative Trace (A)   Leukocyte Esterase Urine      NEG:Negative Negative   Source       Midstream Urine   WBC Urine      OTO5:0 - 5 /HPF 0 - 5   RBC Urine      OTO2:O - 2 /HPF O - 2   Squamous Epithelial /LPF Urine      FEW:Few /LPF Few   Creatinine      0.52 - 1.04 mg/dL 0.63   GFR Estimate      >60 mL/min/1.7m2 >90   GFR Estimate If Black      >60 mL/min/1.7m2 >90   Protein Random Urine      g/L 0.07   Protein Total Urine g/gr Creatinine      0 - 0.2 g/g Cr 0.29 (H)   Albumin      3.4 - 5.0 g/dL 3.5   ALT      0 - 50 U/L 27   AST      0 - 45 U/L 21   Complement C3      76 - 169 mg/dL 51 (L)   Complement C4      15 - 50 mg/dL 5 (L)   Creatinine Urine Random      mg/dL 24   CRP Inflammation      0.0 - 8.0 mg/L 11.2 (H)   DNA-ds      <10 IU/mL >379 (H)   Sed Rate      0 - 30 mm/h 35 (H)   Creatinine Urine      mg/dL 23     PFTs 5/2018 (The FEV1 and FVC are reduced but the FEV1/FVC ratio is normal.  The inspiratory flow rates are reduced.  The TLC and FRC are reduced.  The diffusing capacity is normal.  However, the diffusing capacity was not corrected for the patient's hemoglobin.  IMPRESSION:  Moderately Severe  Restriction.  Normal Diffusion.  Walk distance is normal on room air with significant desaturation but no hypoxia.  ____________________________________________KERVIN TNOY.      Component      Latest Ref Rng & Units 11/12/2018   Color Urine       Yellow   Appearance Urine       Clear   Glucose Urine       NEG:Negative mg/dL Negative   Bilirubin Urine      NEG:Negative Negative   Ketones Urine      NEG:Negative mg/dL Negative   Specific Gravity Urine      1.003 - 1.035 1.025   pH Urine      5.0 - 7.0 pH 6.0   Protein Albumin Urine      NEG:Negative mg/dL 30 (A)   Urobilinogen Urine      0.2 - 1.0 EU/dL 0.2   Nitrite Urine      NEG:Negative Negative   Blood Urine      NEG:Negative Trace (A)   Leukocyte Esterase Urine      NEG:Negative Negative   Source       Midstream Urine   WBC Urine      OTO5:0 - 5 /HPF 0 - 5   RBC Urine      OTO2:O - 2 /HPF O - 2   Squamous Epithelial /LPF Urine      FEW:Few /LPF Few   Bacteria Urine      NEG:Negative /HPF Few (A)   WBC      4.0 - 11.0 10e9/L 7.3   RBC Count      3.8 - 5.2 10e12/L 4.25   Hemoglobin      11.7 - 15.7 g/dL 11.3 (L)   Hematocrit      35.0 - 47.0 % 36.0   MCV      78 - 100 fl 85   MCH      26.5 - 33.0 pg 26.6   MCHC      31.5 - 36.5 g/dL 31.4 (L)   RDW      10.0 - 15.0 % 14.9   Platelet Count      150 - 450 10e9/L 255   Creatinine      0.52 - 1.04 mg/dL 0.83   GFR Estimate      >60 mL/min/1.7m2 73   GFR Estimate If Black      >60 mL/min/1.7m2 88   Iron      35 - 180 ug/dL 27 (L)   Iron Binding Cap      240 - 430 ug/dL 264   Iron Saturation Index      15 - 46 % 10 (L)   Protein Random Urine      g/L 0.58   Protein Total Urine g/gr Creatinine      0 - 0.2 g/g Cr 0.34 (H)   Albumin      3.4 - 5.0 g/dL 3.2 (L)   ALT      0 - 50 U/L 30   AST      0 - 45 U/L 19   Complement C3      76 - 169 mg/dL 48 (L)   Complement C4      15 - 50 mg/dL 3 (L)   CRP Inflammation      0.0 - 8.0 mg/L 16.4 (H)   DNA-ds      <10 IU/mL >379 (H)   Sed Rate      0 - 30 mm/h 26   Ferritin      8 - 252 ng/mL 160   Creatinine Urine      mg/dL 176     Component      Latest Ref Rng & Units 6/5/2019           8:29 AM   Color Urine       Yellow   Appearance Urine       Slightly Cloudy   Glucose Urine      NEG:Negative mg/dL Negative   Bilirubin Urine      NEG:Negative Negative   Ketones Urine       NEG:Negative mg/dL 5 (A)   Specific Gravity Urine      1.003 - 1.035 1.027   Blood Urine      NEG:Negative Small (A)   pH Urine      5.0 - 7.0 pH 6.0   Protein Albumin Urine      NEG:Negative mg/dL >499 (A)   Urobilinogen mg/dL      0.0 - 2.0 mg/dL 2.0   Nitrite Urine      NEG:Negative Negative   Leukocyte Esterase Urine      NEG:Negative Trace (A)   Source       Midstream Urine   WBC Urine      0 - 5 /HPF 16 (H)   RBC Urine      0 - 2 /HPF 24 (H)   Squamous Epithelial /HPF Urine      0 - 1 /HPF 2 (H)   Mucous Urine      NEG:Negative /LPF Present (A)   Hyaline Casts      0 - 2 /LPF 1   Sodium      133 - 144 mmol/L    Potassium      3.4 - 5.3 mmol/L    Chloride      94 - 109 mmol/L    Carbon Dioxide      20 - 32 mmol/L    Anion Gap      3 - 14 mmol/L    Glucose      70 - 99 mg/dL    Urea Nitrogen      7 - 30 mg/dL    Creatinine      0.52 - 1.04 mg/dL    GFR Estimate      >60 mL/min/1.73:m2    GFR Estimate If Black      >60 mL/min/1.73:m2    Calcium      8.5 - 10.1 mg/dL    Phosphorus      2.5 - 4.5 mg/dL    Albumin      3.4 - 5.0 g/dL    WBC      4.0 - 11.0 10e9/L    RBC Count      3.8 - 5.2 10e12/L    Hemoglobin      11.7 - 15.7 g/dL    Hematocrit      35.0 - 47.0 %    MCV      78 - 100 fl    MCH      26.5 - 33.0 pg    MCHC      31.5 - 36.5 g/dL    RDW      10.0 - 15.0 %    Platelet Count      150 - 450 10e9/L    Specimen Description       Midstream Urine   Special Requests       Specimen received in preservative   Culture Micro       10,000 to 50,000 colonies/mL . . .   Creatinine Urine      mg/dL 240   Albumin Urine mg/L      mg/L    Albumin Urine mg/g Cr      0 - 25 mg/g Cr    Protein Random Urine      g/L 4.50   Protein Total Urine g/gr Creatinine      0 - 0.2 g/g Cr 1.88 (H)   Neutrophil Cytoplasmic Antibody      <1:10 titer    Neutrophil Cytoplasmic Antibody Pattern          Lactate Dehydrogenase      81 - 234 U/L    HIV Antigen Antibody Combo      NR:Nonreactive        Sed Rate      0 - 30 mm/h    CRP  Inflammation      0.0 - 8.0 mg/L    Complement C4      15 - 50 mg/dL    Complement C3      76 - 169 mg/dL    DNA-ds      <10 IU/mL      Component      Latest Ref Rng & Units 6/5/2019           8:29 AM   Color Urine          Appearance Urine          Glucose Urine      NEG:Negative mg/dL    Bilirubin Urine      NEG:Negative    Ketones Urine      NEG:Negative mg/dL    Specific Gravity Urine      1.003 - 1.035    Blood Urine      NEG:Negative    pH Urine      5.0 - 7.0 pH    Protein Albumin Urine      NEG:Negative mg/dL    Urobilinogen mg/dL      0.0 - 2.0 mg/dL    Nitrite Urine      NEG:Negative    Leukocyte Esterase Urine      NEG:Negative    Source          WBC Urine      0 - 5 /HPF    RBC Urine      0 - 2 /HPF    Squamous Epithelial /HPF Urine      0 - 1 /HPF    Mucous Urine      NEG:Negative /LPF    Hyaline Casts      0 - 2 /LPF    Sodium      133 - 144 mmol/L    Potassium      3.4 - 5.3 mmol/L    Chloride      94 - 109 mmol/L    Carbon Dioxide      20 - 32 mmol/L    Anion Gap      3 - 14 mmol/L    Glucose      70 - 99 mg/dL    Urea Nitrogen      7 - 30 mg/dL    Creatinine      0.52 - 1.04 mg/dL    GFR Estimate      >60 mL/min/1.73:m2    GFR Estimate If Black      >60 mL/min/1.73:m2    Calcium      8.5 - 10.1 mg/dL    Phosphorus      2.5 - 4.5 mg/dL    Albumin      3.4 - 5.0 g/dL    WBC      4.0 - 11.0 10e9/L    RBC Count      3.8 - 5.2 10e12/L    Hemoglobin      11.7 - 15.7 g/dL    Hematocrit      35.0 - 47.0 %    MCV      78 - 100 fl    MCH      26.5 - 33.0 pg    MCHC      31.5 - 36.5 g/dL    RDW      10.0 - 15.0 %    Platelet Count      150 - 450 10e9/L    Specimen Description          Special Requests          Culture Micro          Creatinine Urine      mg/dL 258   Albumin Urine mg/L      mg/L 2,810   Albumin Urine mg/g Cr      0 - 25 mg/g Cr 1,089.15 (H)   Protein Random Urine      g/L    Protein Total Urine g/gr Creatinine      0 - 0.2 g/g Cr    Neutrophil Cytoplasmic Antibody      <1:10 titer    Neutrophil  Cytoplasmic Antibody Pattern          Lactate Dehydrogenase      81 - 234 U/L    HIV Antigen Antibody Combo      NR:Nonreactive        Sed Rate      0 - 30 mm/h    CRP Inflammation      0.0 - 8.0 mg/L    Complement C4      15 - 50 mg/dL    Complement C3      76 - 169 mg/dL    DNA-ds      <10 IU/mL      Component      Latest Ref Rng & Units 6/5/2019          11:57 AM   Color Urine          Appearance Urine          Glucose Urine      NEG:Negative mg/dL    Bilirubin Urine      NEG:Negative    Ketones Urine      NEG:Negative mg/dL    Specific Gravity Urine      1.003 - 1.035    Blood Urine      NEG:Negative    pH Urine      5.0 - 7.0 pH    Protein Albumin Urine      NEG:Negative mg/dL    Urobilinogen mg/dL      0.0 - 2.0 mg/dL    Nitrite Urine      NEG:Negative    Leukocyte Esterase Urine      NEG:Negative    Source          WBC Urine      0 - 5 /HPF    RBC Urine      0 - 2 /HPF    Squamous Epithelial /HPF Urine      0 - 1 /HPF    Mucous Urine      NEG:Negative /LPF    Hyaline Casts      0 - 2 /LPF    Sodium      133 - 144 mmol/L 134   Potassium      3.4 - 5.3 mmol/L 4.3   Chloride      94 - 109 mmol/L 101   Carbon Dioxide      20 - 32 mmol/L 26   Anion Gap      3 - 14 mmol/L 8   Glucose      70 - 99 mg/dL 109 (H)   Urea Nitrogen      7 - 30 mg/dL 21   Creatinine      0.52 - 1.04 mg/dL 0.49 (L)   GFR Estimate      >60 mL/min/1.73:m2 >90   GFR Estimate If Black      >60 mL/min/1.73:m2 >90   Calcium      8.5 - 10.1 mg/dL 9.2   Phosphorus      2.5 - 4.5 mg/dL 4.2   Albumin      3.4 - 5.0 g/dL 2.9 (L)   WBC      4.0 - 11.0 10e9/L 8.7   RBC Count      3.8 - 5.2 10e12/L 4.81   Hemoglobin      11.7 - 15.7 g/dL 12.3   Hematocrit      35.0 - 47.0 % 39.3   MCV      78 - 100 fl 82   MCH      26.5 - 33.0 pg 25.6 (L)   MCHC      31.5 - 36.5 g/dL 31.3 (L)   RDW      10.0 - 15.0 % 14.1   Platelet Count      150 - 450 10e9/L 302   Specimen Description          Special Requests          Culture Micro          Creatinine Urine       mg/dL    Albumin Urine mg/L      mg/L    Albumin Urine mg/g Cr      0 - 25 mg/g Cr    Protein Random Urine      g/L    Protein Total Urine g/gr Creatinine      0 - 0.2 g/g Cr    Neutrophil Cytoplasmic Antibody      <1:10 titer <1:10   Neutrophil Cytoplasmic Antibody Pattern       The ANCA IFA is <1:10.  No further testing will be performed.   Lactate Dehydrogenase      81 - 234 U/L 252 (H)   HIV Antigen Antibody Combo      NR:Nonreactive     Nonreactive   Sed Rate      0 - 30 mm/h 40 (H)   CRP Inflammation      0.0 - 8.0 mg/L 25.7 (H)   Complement C4      15 - 50 mg/dL 3 (L)   Complement C3      76 - 169 mg/dL 53 (L)   DNA-ds      <10 IU/mL >379 (H)   [facility]                                                      CMR Report       MRN:                  [id number]                                  Name:           CHILANGO GIBBONS                                   :                  1965                                  Scan Date:   2019 11:11:32                                   Electronically signed by Fer Corea 2019-May-20 14:19:00     SUMMARY   ==========================================================================================================     Clinical history: 54-year old female with SLE, poly-serositis, and chronic pleuritic chest pain. CMR to  assess for cardiac involvement (sepideh-myocarditis).  Comparison CMR: None.      1. The LV is normal in cavity size and wall thickness. The global systolic function is normal. The LVEF is  62%.   There are no regional wall motion abnormalities.     2. The RV is normal in cavity size. The global systolic function is normal. The RVEF is 60%.      3. Both atria are normal in size.     4. There is no significant valvular disease.      5. Late gadolinium enhancement imaging shows no MI, fibrosis or infiltrative disease.      6. The is mild pericardial thickening and tethering, with circumferential pericardial enhancement. There is  no  pericardial effusion.       7. There is no intracardiac thrombus.     8. The main PA is moderately enlarged, measuring 3.8 cm.      CONCLUSIONS: Pericardial thickening and enhancement consistent with inflammatory pericarditis. There is no  myocardial fibrosis or myocarditis. Normal biventricular size and systolic function; LVEF 62%, RVEF 60%.      CORE EXAM   ==========================================================================================================     MEASUREMENTS   ----------------------------------------------------------------------------------------      VOLUMETRIC ANALYSIS       ----------------------------------------------  .--------------------------------------------------------.                    LV    Reference  RV    Reference   +------+-----------+------+-----------+------+-----------+   EDV   ml          162   ()   159   ()          ml/m^2     69.2   (56-90)   67.9   (53-90)     ESV   ml           62   (22-59)     64   (15-68)           ml/m^2     26.5   (14-33)   27.4   (11-37)     CO    L/min      7.30             6.93                     L/min/m^2   3.1              3.0               MASS                                                 SV    ml          100   ()    95   ()          ml/m^2     42.7   (37-62)   40.6   (36-60)     EF    %            62   (59-77)     60   (55-79)    '------+-----------+------+-----------+------+-----------'             CARDIAC OUTPUT HR:  73 BPM      LA DIMENSIONS (LV SYSTOLE)       ----------------------------------------------          DIAMETER:  3.9 cm         AORTIC ROOT DIMENSIONS       ----------------------------------------------          SINUS OF VALSALVA:  2.9 cm          AORTIC ROOT SIZE:  Normal        ANATOMY   ----------------------------------------------------------------------------------------      LEFT VENTRICLE        ----------------------------------------------          WALL THICKNESS:  Normal          CAVITY SIZE:  Normal         RIGHT VENTRICLE       ----------------------------------------------          WALL THICKNESS:  Normal          CONTRACTILITY:  Normal          CAVITY SIZE:  Normal         INTERVENTRICULAR SEPTUM       ----------------------------------------------               VENTRICULAR SEPTUM:  Normal          INTERATRIAL SEPTUM       ----------------------------------------------               ATRIAL SEPTUM:          LIPOMATOUS HYPERTROPHY          LEFT ATRIUM       ----------------------------------------------          CAVITY SIZE:  Normal         RIGHT ATRIUM       ----------------------------------------------          CAVITY SIZE:  Normal         PERICARDIUM       ----------------------------------------------          THICKENED          THICKNESS:  5 mm          EFFUSION:  None         PLEURAL EFFUSION       ----------------------------------------------               None         VALVES   ----------------------------------------------------------------------------------------      AORTIC VALVE       ----------------------------------------------          AORTIC VALVE LEAFLETS:  Trileaflet         MITRAL VALVE       ----------------------------------------------          MITRAL VALVE LEAFLETS:  Normal Leaflets         TRICUSPID VALVE       ----------------------------------------------          TRICUSPID VALVE LEAFLETS:  Normal Leaflets         PULMONIC VALVE       ----------------------------------------------          PULMONIC VALVE LEAFLETS:  Normal Leaflets        17 SEGMENT   ----------------------------------------------------------------------------------------  .------------------------------------------------------------------------------------------.   Segments            Wall Motion   Hyperenhancement  Stress Perfusion  Interpretation    +--------------------+--------------+------------------+------------------+----------------+   Base Anterior       Normal/Hyper  None                                Normal            Base Anteroseptal   Normal/Hyper  None                                Normal            Base Inferoseptal   Normal/Hyper  None                                Normal            Base Inferior       Normal/Hyper  None                                Normal            Base Inferolateral  Normal/Hyper  None                                Normal            Base Anterolateral  Normal/Hyper  None                                Normal            Mid Anterior        Normal/Hyper  None                                Normal            Mid Anteroseptal    Normal/Hyper  None                                Normal            Mid Inferoseptal    Normal/Hyper  None                                Normal            Mid Inferior        Normal/Hyper  None                                Normal            Mid Inferolateral   Normal/Hyper  None                                Normal            Mid Anterolateral   Normal/Hyper  None                                Normal            Apical Anterior     Normal/Hyper  None                                Normal            Apical Septal       Normal/Hyper  None                                Normal            Apical Inferior     Normal/Hyper  None                                Normal            Apical Lateral      Normal/Hyper  None                                Normal            Wallace                Normal/Hyper  None                                Normal           +--------------------+--------------+------------------+------------------+----------------+   RV Segments         Wall Motion   Hyperenhancement  Stress Perfusion  Interpretation    +--------------------+--------------+------------------+------------------+----------------+   RV Basal Anterior   Normal/Hyper  None                                Normal            RV Basal Inferior   Normal/Hyper  None                                Normal            RV Mid              Normal/Hyper  None                                Normal            RV Apical           Normal/Hyper  None                                Normal           '--------------------+--------------+------------------+------------------+----------------'         FINDINGS       ----------------------------------------------          INFARCT/SCAR SIZE:  0 %        SCAN INFO   ==========================================================================================================     GENERAL   ----------------------------------------------------------------------------------------      CONTRAST AGENT       ----------------------------------------------          TYPE:  Gadavist          VOLUME ADMINISTERED:  10 ml          DOSAGE FOR 0.5M:  0.04 mmol/kg          SERUM CREATININE:  0.58 sCr          CREATININE DATE:  2019-03-06 00:00:00          SEDATION       ----------------------------------------------          SEDATION USED?:  No         VITALS       ----------------------------------------------          HEIGHT:  66.00 in          HEIGHT:  167.64 cm          WEIGHT:  289.00 lbs          WEIGHT:  131.09 kgs          BSA:  2.34 m^2         PULSE SEQUENCES       ----------------------------------------------          Single-Shot SSFP, IR GRE - Segmented, IR SSFP - Single Shot, SSFP Cine          SETUP       ----------------------------------------------          TYPE:  Clinical          INPATIENT:  No          INCOMPLETE SCAN:  No          REASON(S) FOR SCAN:  Cardiomyopathy, Pericardial Evaluation           REFERRING PHYSICIAN:  ROBE VAZQUEZ          ATTENDING PHYSICIAN:  ROBE Hays  "[facility]      Kidney Biopsy (6/7/19)  Patient Name: CHILANGO GIBBONS   MR#: [id number]   Specimen #: [id number]   Collected: 6/7/2019   Received: 6/7/2019   Reported: 6/10/2019 22:42   Ordering Phy(s): JOYCE CAMERON     For improved result formatting, select 'View Enhanced Report Format' under    Linked Documents section.     SPECIMEN(S):   Kidney biopsy, native with EM & Immunofluorescence, right     FINAL DIAGNOSIS:   Kidney; percutaneous needle biopsy:   -Diffuse global lupus nephritis, ISN/RPS class IV-S (a)   -Mild to moderate arteriosclerosis     COMMENT:   There is mild mesangial and endocapillary proliferation, focal leukocytic   infiltrates, and crescentic lesions   involving 14 out of 26 glomeruli, mainly cellular with necrotizing   lesions.  There are minimal chronic   changes.  The activity index of the proliferative component is 9/24 and   the chronicity index is 1/12 (Robert   et al, American Journal of Medicine 75:  382-391, 1983).     The presence of significant crescents with only mild mesangial and   endocapillary proliferation raises the   possibility of concurrent lupus nephritis and ANCA-associated crescentic   glomerulonephritis.  Thus it is   recommended to test for ANCA and clinical correlation is recommended.        Ph.E:    /83   Pulse 75   Ht 1.676 m (5' 6\")   Wt 123.7 kg (272 lb 12.8 oz)   SpO2 98%   BMI 44.03 kg/m         Constitutional: WD/WN. Pleasant. In no acute distress. Cushingoid facies.  Eyes: EOM intact, PERRLA, sclera anicteric, conj not injected  HEENT: No oral ulcers or thrush. Normal salivary pool.    Neck: No cervical LAP or thyromegaly.  Chest: Clear to auscultation bilaterally  CV: RRR, no murmurs/ rubs or gallops. No edema, clubbing or cyanosis.   GI: Abdomen is soft and non tender.   MS: No synovitis. Cool joints. No tenderness of the joints. No swelling. Normal ROM.  No joint deformities. Full ROM of the joints. No nodules. No Jaccoud's deformity.  Skin: " No skin rash, malar rash, livedo, periungual erythema, alopecia, digital ulcers or nail changes.  Neuro: A&O x 3. Grossly non focal, muscular power 5/5 in all ext  Psych: NL affect and mood    Assessment/ plan:    1-SLE. 53 yo WF with +fh/o RA and personal hx of SLE presented in 12/2017 for 2nd opinion of m/o her SLE. She was diagnosed with SLE at age 27. Her lupus has been marked by +KARLIE (highest titer 1:640, speckled and nucleolar patterns), +anti-DNA, arthritis, malar rash, serositis (pleuritic CP) supported by +SSA/SSB Ab, low C3/low C4, +RF, high ESR/CRP. She had neg anti-RNP, anti-Sm, acL IgM/G/A, LAC, cryo and anti-CCP.     Had NL C3 at the time of Dx. She was treated with prednisone and HCQ at the time of Dx. Can't remember how long she was on HCQ and why HCQ was stopped, but it probably was stopped because of stable disease, no report on HCQ toxicity. Reportedly her SLE was mild all these years.    Has secondary Sjogren's with sicca, +SSA/SSB Ab and h/o MALT lymphoma at age 44 in remission. Uses blink eyedrops and salagen. No lip biopsy was done to confirm Dx of Sjogren's.    Her lupus flared in 7/2017 with no triggers when she presented with arthritis, HCQ was resumed, arthritis resolved. Mild pulm HTN on 2D-Echo 8/2017 but neg R cardiac cath and VQ scan in 3/2018, also neg 2D-Echo.    In 12/2017, was prescribed prednisone 40 mg for only 5 days for pleuritic CP, CP recurred after stopping prednisone.     Her major complaint at initial visit with me in 12/2017 was ongoing CP. AZA was added in 2/2018 with start dose of 50 mg qd and slowly was increased to max 150 mg qd. Tolerated AZA well, no SEs, no toxicity on the labs but failed it and required to go back on prednisone 20 mg qd (current dose).     Her lupus is active with pleuritic CP. ESR/CRP normalized on prednisone+AZA+HCQ but +anti-DNA, low C3/C4 are unchanged. Chest CT in 12/2017 without contrast showed air trapping due to lupus/Sjogren's, no pleural  effusion. Lung nodules need f/u in a year. No pericardail effusion on 2D-echo and neg VQ scan for PE in 3/2018 so chest CT with contrast is not needed. She is APL negative.    AZA was switched to  mg po bid on 5/18/2018 as she failed AZA. She is now on max dose of MMF 1500 mg bid. Her labs are unchanged with persistently elevated anti-DNA, low C3/C4 and high ESR/CRP, but just started to feel a lot better (regarding fatigue, arthralgia, still has residual pleuritic CP) after adding MMF.     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 Also possible shrinking lung syn sec lupus or obesity is responsible for restrictive lung disease plus pleural effusion. She was prescribed albuterol and dulera inh which have helped.    Benlysta was added in 9/2018 to help with pleuritic CP. No change in lupus serology (anti-DNA, C3, C4), but ESR/CRP have improved. Overall she felt a lot better after adding benlysta but it made her tired.     I could not taper her off the prednisone, she continued to have active IA, fatigue and pleuritic CP. I recommended switching benlysta to rituximab given her h/o lymphoma, unfortunately her insurance denied. At the same time, Taina misunderstood and stopped her cellcept as thought we were switching MMF to rituximab while the plan was to add rituximab to MMF. She has been off MMF for couple of months. Had cardiac MRI on 5/20 which showed active pericarditis. I advised to switch to cytoxan 750 mg/m2 qmo x 6 mo. On Saturday 6/1/2019, received a call from infusion center reporting blood in the urine and if it was ok to proceed with cytoxan. I was concerned as that was very new. We canceled the cytoxan infusion. UCx was negative for  UTI. Repeat U/A today is showing protein and blood, with h/o stopping MMF, very concerning for lupus nephritis. She never had lupus nephritis as part of her lupus and it's possible that it was covered by MMF and showed up off MMF. Renal biopsy is still recommended to confirm Dx, evaluate degree of severity, chronicity and rule out other diagnoses. I spoke to renal team and dr. Elmore and Aura kindly agreed to see her today (6/5/19) as urgent consult as add on to their schedule and scheduled renal biopsy for Friday 6/7/2019. I would re-schedule cytoxan to 6/15/2019 and schedule pulse solumedrol 1 gr every day x 3 days. Meanwhile, will increase prednisone to 60 mg every day. Cytoxan risks were discussed again.    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 Patient received 1st dose of cyclophosphamide on 6/15/19. Tolerated well. Has since followed up with nephrology. She is now on lisinopril 10 mg every day. Was given pentamidine for PJP ppx, but did not tolerate very well. Per discussion with Dr. Marroquin since last hospital discharge, Taina has been taking 40 mg BID prednisone for the past 1.5 weeks. She tolerated the taper down from 100 to 80 well. Will plan to decrease to 60 mg daily today and stay at that dose for 1 month.       Recommend:    For today, decrease prednisone to 60 mg a day x1 month. Then decrease to 50 mg daily x 7 days, then 40 mg daily x 7 days, then 30 mg daily x 7 days, then 25 mg daily.     Plan on monthly Cytoxan. Next dose due around 7/15/19.     Return to get lupus labs in early August. (Orders placed)    Continue the plaquenil 200 mg twice a day       2-HCQ monitoring. Was reminded to have annual eye exam    3-PCP prophylaxis. Patient did not tolerate inhaled pentamidine. Avoiding TMP-SMX given sulfa reaction and also increase SLE flare. I am hesitant to use dapsone given risk of hemolytic anemia. Will start atovaquone 10 mL (1500 mg) daily for prophylaxis.      Keep September  appointment.    Patient seen and plan formulated with Rheumatology staff, Dr. Marroquin.    Jose Maria Camejo DO  Internal Medicine, PGY 3    Attending Note: I saw and evaluated the patient with Dr. Camejo. I agree with the assessment and plan.    Killian Marroquin MD        Orders Placed This Encounter   Procedures     ALT     Albumin level     AST     CBC with platelets differential     Creatinine     Complement C4     Complement C3     CRP inflammation     DNA double stranded antibodies     Erythrocyte sedimentation rate auto     UA with Microscopic reflex to Culture     Protein  random urine with Creat Ratio     Creatinine random urine

## 2019-07-03 NOTE — PROGRESS NOTES
Rheumatology F/U Note    Reason for visit: f/u for lupus flare    Date of last visit: 6/5/2019    DOS: 7/3/2019      HPI:    Taina Singh is a 52 year old  female who was referred to our clinic for evaluation and management of her SLE.    She was diagnosed with lupus at age 27. Her 1st sx were malar rash and fatigue. No skin biopsy was done (reportedly had +KARLIE). She was treated with prednisone taper and HCQ. HCQ helped and she tolerated it well. She was diagnosed with Sjogren's at the same time. Had dryness of eyes and mouth. No lip biopsy to confirm the Dx.    Reportedly she had very mild stable lupus for many years and eventually at some point, stopped taking HCQ (can't remember when)    She was diagnosed with MALT lymphoma at 44, had enlarged LN over R parotid gland, on biopsy it was MALT lymphoma. Tx was resection only, no radiation or chemo.    About 3 yr ago, had flu shot and 2 days later, developed pleuritic CP and was seen at urgent care. That's why she does not want to get flu shot. She was seen in ER 2 days after UC visit. She was treated with prednisone.    She lost 100 lbs by exercise over past 2 yr, felt amazing. In 7/2017, started not feeling well. She had extreme fatigue. Had AM stiffness and pain over hands. Touched base with her previous rheumatologist (Dr. Rangel) and was told to have lupus flare and was put on  mg qd. Afterwards, developed pleuritic CP which has not been resolved.    She was given prednisone 40 mg qd x 5 days (on 12/16/2017) by pulmonologist in [facility]. It helped a lot but pleuritic CP recurred after stopping it.    She was told to have pulm HTN and was evaluated for it.    No triggers for current flare.    No flare of malar rash. No current hair loss. Uses blink eyedrops, restasis burned her eyes. She has been prescribed salagen for dry mouth, has not used it. Arthritis of hands have resolved on HCQ.    Last HCQ eye exam was 1 week ago.    4/2018: On AZA 50 mg  qd since 2/8/2018, increased to 100 mg qd in 3/19/2018. Her arthralgia is intermittent and mild, it's better. Has fatigue.  5/2018: Started on mycophenalate 1500 mg, continues prednisone 30 mg and plaquenil 200 mg twice a day.     Her major complaint is continues pressure over her chest. Pain is pleuritic, it hurts by sneezing, taking deep breath.      7/2018: Ms. Singh feels an improvement in her symptoms. She says there is a baseline pleuritic chest pain, but her fatigue and overall day-to-day function has improved to her satisfaction. She says morning stiffness usually only lasts about 10 minutes. She complains of occasional episodes of flashing lights in her left eye, however she is being seen by her opthamologist. She has a OTC scheduled for Monday as well. Ms. Singh feels as though the mycophenalate has made the biggest difference in her improvement. She continues to taper the prednisone, currently on 20 mg daily. She also has continued the plaquenil 200 mg twice a day.       11/2018: On benlysta infusion since beginning of Sept 2018. Chest still feels less tight, breathing is much better. Sometimes hands ache but it does lot last long. Benlysta makes her tired, but overall feels her lupus sx are much improved on benlysta.      3/2019: Feels tired and achy, it is intermittent and moves around. The L hip pain is consistent for the past 7 days, she moved up her appointment from 3/22 to today to get a bursitis shot. Had bad sinus and ear infection in 1/2019. She still thinks benlysta has helped her a lot. Last night, her R shoulder was hurting so bad that she could not move it but it's better today. Pain comes and goes. Breathing is better after adding benlysta, she is not gasping for air anymore which is huge improvement for her. She feels pressure over her chest and low back.    Is getting benlysta tomorrow.    Her prednisone dose is 10 mg a day.    Leaving to [place] for vacation.    6/5/2019: She  reports severe diffuse arthralgia affecting all her joints with pain level 8/10, today is 6/10. No SOB. Feels pressure like CP. Has severe fatigue. Last benlysta was 7 wk ago.    Today: Since last visit 1 month ago, patient was hospitalized x2 (both 6/10- & -) at [facility] for acute abdominal pain. 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 Team felt that 2nd admission was 2/2 tapering of steroids. She was pulsed during first admission and then got Cytoxan as outpatient in between admissions on 6/15.     Incidentally, patient found to have small splenic infarct and acute PE on CT scan. She was started on Xarelto.    Also had her kidney biopsy on 19. Biopsy showed diffuse global lupus nephritis, ISN/RPS class IV. She has also followed up with nephrology this past week and started on lisinopril. No reports of hypotension.    Since leaving hospital, biggest complaint is leg strength/conditioning. She thinks this is related to laying in bed while hospitalized. Going to start PT as soon as it is set up.       ROS:  A comprehensive ROS was done, positives are per HPI.    Past Medical History:   Diagnosis Date     Bronchiolitis     Chest CT 2018 shows air trapping; bronchiolitis possibly related to lupus     Diastolic dysfunction 2018     Gluten intolerance     Patient is not gluten intolerant     Iron deficiency      Iron deficiency 2018     Lupus (H)     dx age 27; + DNA-ds, KARLIE, SSA, SSB and RF; malar rash, serositis (pleuritic CP)     MALT lymphoma (H) age 44    No chemo or radiation     Other forms of systemic lupus erythematosus (H) 2018     Sjogren's syndrome (H)     secondary Sjogrens, secondary to lupus     Vitamin D deficiency      Past Surgical History:   Procedure Laterality Date     BIOPSY/REMOVAL, LYMPH NODE(S)        SECTION  age 32     HC HYSTEROS W PERMANENT FALLOPAIN IMPLANT       PAROTIDECTOMY       TONSILLECTOMY       Family History   Problem Relation Age of Onset     Alopecia  Brother      Rheumatoid Arthritis Brother      LUNG DISEASE No family hx of      Social History     Socioeconomic History     Marital status:      Spouse name: Not on file     Number of children: Not on file     Years of education: 1     Highest education level: Not on file   Occupational History     Comment: , 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   Social Needs     Financial resource strain: Not on file     Food insecurity:     Worry: Not on file     Inability: Not on file     Transportation needs:     Medical: Not on file     Non-medical: Not on file   Tobacco Use     Smoking status: Never Smoker     Smokeless tobacco: Never Used   Substance and Sexual Activity     Alcohol use: No     Drug use: No     Sexual activity: Not on file   Lifestyle     Physical activity:     Days per week: Not on file     Minutes per session: Not on file     Stress: Not on file   Relationships     Social connections:     Talks on phone: Not on file     Gets together: Not on file     Attends Christian service: Not on file     Active member of club or organization: Not on file     Attends meetings of clubs or organizations: Not on file     Relationship status: Not on file     Intimate partner violence:     Fear of current or ex partner: Not on file     Emotionally abused: Not on file     Physically abused: Not on file     Forced sexual activity: Not on file   Other Topics Concern     Parent/sibling w/ CABG, MI or angioplasty before 65F 55M? 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  Denies exposure to asbestos, silica, hot tubs, feather pillows (used to until age 49), pet birds, mold, does not play brass/wind instruments.      Going through divorce but it's not stress factor for her.    Allergies   Allergen Reactions     Pentamidine Shortness Of Breath     Penicillins Rash     Sulfa Drugs Rash       Outpatient Encounter Medications as of 7/3/2019   Medication Sig Dispense Refill     albuterol (PROAIR HFA/PROVENTIL  HFA/VENTOLIN HFA) 108 (90 Base) MCG/ACT inhaler Inhale 2 puffs into the lungs every 6 hours as needed for shortness of breath / dyspnea or wheezing 3 Inhaler 3     Calcium-Magnesium 300-300 MG TABS daily        hydroxychloroquine (PLAQUENIL) 200 MG tablet Take 1 tablet (200 mg) by mouth 2 times daily 180 tablet 1     lisinopril (PRINIVIL/ZESTRIL) 10 MG tablet Take 1 tablet (10 mg) by mouth daily 90 tablet 3     Multiple Vitamins-Minerals (WOMENS MULTI PO) Take by mouth daily        Omega-3 Fatty Acids (OMEGA-3 FISH OIL) 500 MG CAPS Take 500 mg by mouth daily        pantoprazole (PROTONIX) 20 MG EC tablet Take 2 tablets (40 mg) by mouth 2 times daily       pilocarpine (SALAGEN) 5 MG tablet Take 1 tablet (5 mg) by mouth 4 times daily as needed 360 tablet 3     predniSONE (DELTASONE) 10 MG tablet Take 50 mg daily x 7 days, then 40 mg daily x 7 days, then 30 mg daily x 7 days, then 25 mg daily 100 tablet 1     predniSONE (DELTASONE) 20 MG tablet Take 3 tablets (60 mg) by mouth daily For one month 90 tablet 0     traMADol (ULTRAM) 50 MG tablet Take 1 tablet (50 mg) by mouth every 12 hours as needed for pain Take 1 tablet as needed for pain.  No driving or alcohol use while taking medication. 30 tablet 2     vitamin D3 (CHOLECALCIFEROL) 2000 units (50 mcg) tablet Take 1 tablet (2,000 Units) by mouth daily 90 tablet 11     [] ondansetron (ZOFRAN) 4 MG tablet Take 1 tablet (4 mg) by mouth every 6 hours as needed for nausea 12 tablet 0     No facility-administered encounter medications on file as of 7/3/2019.          Her records were reviewed.    2D-Echo 2017:    ECHO COMPLETE WO CONTRAST CHILANGO GIBBONS 2017 14:43     [facility] [place] [id number], [address], [place] [address]   Main: [phone]  [email or web address]                                                 Transthoracic Echo Report   CHILANGO GIBBONS   Excellian ID: [id number] Age: 52 : 1965  "Ordering Provider: NGUYEN PETERS   Exam Date: 08/22/2017 14:43 Gender: F Sonographer: HAJA   Accession #: [id number] Height: 66 in BSA: 2.24 m  BP: 108 / 62   Weight: 261 lbs BMI: 42.1 kg/m        Site: [facility]   Location: Outpatient Rhythm: Normal Sinus Rhythm   Procedure Components: 2D imaging, Color Doppler, Spectral Doppler   Indications: Murmur; Systemic lupus erythematosus, unspecified SLE type, unspecified organ   involvement status   Technical Quality: Contrast: None     Final Conclusion   Visually estimated ejection fraction is 55-60%.   Mild left ventricular hypertrophy.   Estimated pulmonary artery pressure of 31 mmHg + RA pressure.   Dilated IVC size, <50% collapse with respiration.   Estimated EF: 55-60%   FINDINGS   Left Ventricle Normal left ventricular size. Visually estimated ejection fraction is 55-60%.   Mild left ventricular hypertrophy.   Diastolic Function \"Pseudonormal\" left ventricular filling pattern. E/e' ratio 8-15 is   indeterminate for filling pressure.   Right Ventricle Normal right ventricular size and function.   Left Atrium Mild left atrial enlargement.   Right Atrium Mild right atrial enlargement.   Aortic Valve Normal aortic valve. No aortic stenosis. No significant aortic regurgitation.   Mitral Valve Normal mitral valve. No mitral stenosis. Mild mitral regurgitation.   Tricuspid Valve Normal tricuspid valve. No tricuspid stenosis. Mild tricuspid regurgitation.   Estimated pulmonary artery pressure   of 31 mmHg + RA pressure.   Pulmonic Valve Normal pulmonic valve without significant stenosis or regurgitation.   Pericardium Normal pericardium.   Aorta Measured aortic root diameter is normal in size.   Inferior Vena Cava Dilated IVC size, <50% collapse with respiration.    Component      Latest Ref Rng & Units 11/20/2017   Sodium      133 - 144 mmol/L 137   Potassium      3.4 - 5.3 mmol/L 4.2   Chloride      94 - 109 mmol/L 102   Carbon Dioxide      20 - 32 " mmol/L 30   Anion Gap      3 - 14 mmol/L 6   Glucose      70 - 99 mg/dL 101 (H)   Urea Nitrogen      7 - 30 mg/dL 13   Creatinine      0.52 - 1.04 mg/dL 0.55   GFR Estimate      >60 mL/min/1.7m2 >90   GFR Estimate If Black      >60 mL/min/1.7m2 >90   Calcium      8.5 - 10.1 mg/dL 9.1   WBC      4.0 - 11.0 10e9/L 4.9   RBC Count      3.8 - 5.2 10e12/L 4.28   Hemoglobin      11.7 - 15.7 g/dL 11.3 (L)   Hematocrit      35.0 - 47.0 % 35.5   MCV      78 - 100 fl 83   MCH      26.5 - 33.0 pg 26.4 (L)   MCHC      31.5 - 36.5 g/dL 31.8   RDW      10.0 - 15.0 % 14.6   Platelet Count      150 - 450 10e9/L 215   N-Terminal Pro Bnp      0 - 125 pg/mL 546 (H)   Sed Rate      0 - 30 mm/h 71 (H)     Component      Latest Ref Rng & Units 12/29/2017   Color Urine       Straw   Appearance Urine       Clear   Glucose Urine      NEG:Negative mg/dL Negative   Bilirubin Urine      NEG:Negative Negative   Ketones Urine      NEG:Negative mg/dL Negative   Specific Gravity Urine      1.003 - 1.035 1.005   Blood Urine      NEG:Negative Negative   pH Urine      5.0 - 7.0 pH 6.0   Protein Albumin Urine      NEG:Negative mg/dL Negative   Urobilinogen mg/dL      0.0 - 2.0 mg/dL 0.0   Nitrite Urine      NEG:Negative Negative   Leukocyte Esterase Urine      NEG:Negative Negative   Source       Midstream Urine   WBC Urine      0 - 2 /HPF <1   RBC Urine      0 - 2 /HPF <1   Bacteria Urine      NEG:Negative /HPF Few (A)   Squamous Epithelial /HPF Urine      0 - 1 /HPF <1   Mucous Urine      NEG:Negative /LPF Present (A)   Albumin Fraction      3.7 - 5.1 g/dL 4.2   Alpha 1 Fraction      0.2 - 0.4 g/dL 0.4   Alpha 2 Fraction      0.5 - 0.9 g/dL 0.8   Beta Fraction      0.6 - 1.0 g/dL 1.0   Gamma Fraction      0.7 - 1.6 g/dL 1.6   Monoclonal Peak      0.0 g/dL 0.0   ELP Interpretation:       Essentially normal electrophoretic pattern. No monoclonal protein seen. Pathologic . . .   IGG      695 - 1620 mg/dL 1560   IGA      70 - 380 mg/dL 473 (H)   IGM       60 - 265 mg/dL 92   Iron      35 - 180 ug/dL 58   Iron Binding Cap      240 - 430 ug/dL 315   Iron Saturation Index      15 - 46 % 19   TPMT Genotype Specimen       Whole Blood   TPMT Genotype       Neg/Neg   TPMT Predicted Phenotype       Normal   Protein Random Urine      g/L <0.05   Protein Total Urine g/gr Creatinine      0 - 0.2 g/g Cr Unable to calculate due to low value   Deamidated Gliadin Cari, IgA      <7 U/mL 2   Deamidated Gliadin Cari, IgG      <7 U/mL 5   Complement C3      76 - 169 mg/dL 72 (L)   Complement C4      15 - 50 mg/dL 6 (L)   CRP Inflammation      0.0 - 8.0 mg/L 3.2   DNA-ds      <10 IU/mL >379 (H)   Sed Rate      0 - 30 mm/h 49 (H)   Vitamin D Deficiency screening      20 - 75 ug/L 51   Creatinine Urine Random      mg/dL 23   AST      0 - 45 U/L 26   ALT      0 - 50 U/L 35   HEENA  Broad Spectrum       Neg   Beta 2 Glycoprotein 1 Antibody IgG      <7 U/mL <0.6   Beta 2 Glycoprotein 1 Antibody IgM      <7 U/mL <0.9   Thyroid Peroxidase Antibody      <35 IU/mL <10   Thyroglobulin Antibody      <40 IU/mL <20   TSH      0.40 - 4.00 mU/L 0.87   Cryoglobulin      NEG:Negative % Trace (A)   Tissue Transglutaminase Antibody IgA      <7 U/mL 4   Ferritin      8 - 252 ng/mL 163   Endomysial Antibody IgA by IFA      <1:10 <1:10   CRP Cardiac Risk      mg/L 2.9   Creatinine Urine      mg/dL 23     Component      Latest Ref Rng & Units 2/23/2018   Color Urine       Yellow   Appearance Urine       Clear   Glucose Urine      NEG:Negative mg/dL Negative   Bilirubin Urine      NEG:Negative Negative   Ketones Urine      NEG:Negative mg/dL Negative   Specific Gravity Urine      1.003 - 1.035 1.025   Blood Urine      NEG:Negative Negative   pH Urine      5.0 - 7.0 pH 5.5   Protein Albumin Urine      NEG:Negative mg/dL Negative   Urobilinogen Urine      0.2 - 1.0 EU/dL 0.2   Nitrite Urine      NEG:Negative Negative   Leukocyte Esterase Urine      NEG:Negative Negative   Source       Midstream Urine   Protein  Random Urine      g/L 0.17   Protein Total Urine g/gr Creatinine      0 - 0.2 g/g Cr 0.18   Complement C3      76 - 169 mg/dL 64 (L)   Complement C4      15 - 50 mg/dL 5 (L)   CRP Inflammation      0.0 - 8.0 mg/L 4.2   DNA-ds      <10 IU/mL >379 (H)   Sed Rate      0 - 30 mm/h 35 (H)   AST      0 - 45 U/L 27   ALT      0 - 50 U/L 31   Creatinine Urine Random      mg/dL 99     Component      Latest Ref Rng & Units 3/16/2018   WBC      4.0 - 11.0 10e9/L 5.6   RBC Count      3.8 - 5.2 10e12/L 4.43   Hemoglobin      11.7 - 15.7 g/dL 12.1   Hematocrit      35.0 - 47.0 % 36.9   MCV      78 - 100 fl 83   MCH      26.5 - 33.0 pg 27.3   MCHC      31.5 - 36.5 g/dL 32.8   RDW      10.0 - 15.0 % 14.5   Platelet Count      150 - 450 10e9/L 224   Diff Method       Automated Method   % Neutrophils      % 81.7   % Lymphocytes      % 9.3   % Monocytes      % 7.5   % Eosinophils      % 1.3   % Basophils      % 0.2   Absolute Neutrophil      1.6 - 8.3 10e9/L 4.6   Absolute Lymphocytes      0.8 - 5.3 10e9/L 0.5 (L)   Absolute Monocytes      0.0 - 1.3 10e9/L 0.4   Absolute Eosinophils      0.0 - 0.7 10e9/L 0.1   Absolute Basophils      0.0 - 0.2 10e9/L 0.0   Creatinine      0.52 - 1.04 mg/dL 0.51 (L)   GFR Estimate      >60 mL/min/1.7m2 >90   GFR Estimate If Black      >60 mL/min/1.7m2 >90   ALT      0 - 50 U/L 27   Albumin      3.4 - 5.0 g/dL 3.5   AST      0 - 45 U/L 18       Component      Latest Ref Rng & Units 3/21/2018   Color Urine       Yellow   Appearance Urine       Clear   Glucose Urine      NEG:Negative mg/dL Negative   Bilirubin Urine      NEG:Negative Negative   Ketones Urine      NEG:Negative mg/dL Negative   Specific Gravity Urine      1.003 - 1.035 <=1.005   pH Urine      5.0 - 7.0 pH 6.5   Protein Albumin Urine      NEG:Negative mg/dL Negative   Urobilinogen Urine      0.2 - 1.0 EU/dL 0.2   Nitrite Urine      NEG:Negative Negative   Blood Urine      NEG:Negative Negative   Leukocyte Esterase Urine      NEG:Negative  Negative   Source       Midstream Urine   WBC Urine      OTO5:0 - 5 /HPF 0 - 5   RBC Urine      OTO2:O - 2 /HPF O - 2   Squamous Epithelial /LPF Urine      FEW:Few /LPF Few   Protein Random Urine      g/L <0.05   Protein Total Urine g/gr Creatinine      0 - 0.2 g/g Cr Unable to calculate due to low value   DNA-ds      <10 IU/mL >379 (H)   Complement C4      15 - 50 mg/dL 4 (L)   Complement C3      76 - 169 mg/dL 69 (L)   Creatinine Urine Random      mg/dL 17   Creatinine Urine      mg/dL 17     EXAMINATION: CT CHEST W/O CONTRAST, 12/29/2017 1:08 PM   TECHNIQUE:  Helical CT images from the thoracic inlet through the lung  bases were obtained without IV contrast during inspiration and  expiration according to high-resolution chest CT protocol. Contrast  dose: None  COMPARISON: None   HISTORY: eval for ILD, pleural effusion, pt has lupus, Sjogren's, h/o  MALT and pleurisy and SOB; Systemic lupus erythematosus, unspecified  SLE type, unspecified organ involvement status (H)   FINDINGS:  At least 75% collapse of the trachea and mainstem bronchi on the end  expiratory views. Diffuse lobular air trapping on end expiratory  images. Bibasilar platelike atelectasis. No pneumothorax or pleural  effusion. Scattered solid pulmonary nodules, for example a 4 mm  lingular nodule (series 5 image 145) and a 4 mm right upper lobe  nodule (image 73).    The heart size is normal. Mild three-vessel coronary artery calcific  atherosclerosis. Dilation of the main pulmonary artery to 4.1 cm. No  pericardial effusion. Normal caliber and configuration of the thoracic  great vessels. Numerous prominent though nonenlarged mediastinal lymph  nodes.  Limited views of the abdomen reveal no worrisome pathology. No  worrisome bony or soft tissue lesions.    IMPRESSION:   1. At least moderate tracheobronchomalacia.  2. 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  3. Scattered subcentimeter pulmonary nodules measuring up to 4 mm.  Consider follow-up chest CT in one year to establish stability.     [Consider Follow Up: Lung nodule]     This report will be copied to the [facility] to ensure a  provider acknowledges the finding.      I have personally reviewed the examination and initial interpretation  and I agree with the findings.     AUSTIN PACE MD    Examination:NM LUNG SCAN VENTILATION AND PERFUSION, 3/14/2018 8:24 AM    Indication:  Chronic PE; Pulmonary hypertension    Additional Information: none   Technique:   The patient received 2 mCi of Tc-99m DTPA aerosol for ventilation and  7 mCi of Tc-99m MAA for perfusion. Multiple images were obtained of  the lungs in Anterior, posterior, HERNANDES, RPO, LPO, and  Irish projections.   Comparison: Chest x-ray same-day   Findings:     There are matched ventilation/perfusion defects in both lungs. No  mismatched perfusion defect to suggest pulmonary emboli.      Impression:  Pulmonary emboli is not present.     I have personally reviewed the examination and initial interpretation  and I agree with the findings.     DONNIE GARCIA MD    Component      Latest Ref Rng & Units 5/12/2018   WBC      4.0 - 11.0 10e9/L 7.1   RBC Count      3.8 - 5.2 10e12/L 4.42   Hemoglobin      11.7 - 15.7 g/dL 12.1   Hematocrit      35.0 - 47.0 % 37.4   MCV      78 - 100 fl 85   MCH      26.5 - 33.0 pg 27.4   MCHC      31.5 - 36.5 g/dL 32.4   RDW      10.0 - 15.0 % 14.7   Platelet Count      150 - 450 10e9/L 226   Diff Method       Automated Method   % Neutrophils      % 86.2   % Lymphocytes      % 4.8   % Monocytes      % 8.4   % Eosinophils      % 0.3   % Basophils      % 0.3   Absolute Neutrophil      1.6 - 8.3 10e9/L 6.1   Absolute Lymphocytes      0.8 - 5.3 10e9/L 0.3 (L)   Absolute Monocytes      0.0 - 1.3 10e9/L 0.6   Absolute Eosinophils      0.0 - 0.7 10e9/L 0.0   Absolute Basophils      0.0 - 0.2  10e9/L 0.0   Color Urine       Yellow   Appearance Urine       Clear   Glucose Urine      NEG:Negative mg/dL Negative   Bilirubin Urine      NEG:Negative Negative   Ketones Urine      NEG:Negative mg/dL Negative   Specific Gravity Urine      1.003 - 1.035 <=1.005   Blood Urine      NEG:Negative Negative   pH Urine      5.0 - 7.0 pH 5.5   Protein Albumin Urine      NEG:Negative mg/dL Negative   Urobilinogen Urine      0.2 - 1.0 EU/dL 0.2   Nitrite Urine      NEG:Negative Negative   Leukocyte Esterase Urine      NEG:Negative Negative   Source       Midstream Urine   Creatinine      0.52 - 1.04 mg/dL 0.63   GFR Estimate      >60 mL/min/1.7m2 >90   GFR Estimate If Black      >60 mL/min/1.7m2 >90   Protein Random Urine      g/L <0.05   Protein Total Urine g/gr Creatinine      0 - 0.2 g/g Cr Unable to calculate due to low value   Albumin      3.4 - 5.0 g/dL 3.6   ALT      0 - 50 U/L 28   AST      0 - 45 U/L 20   Complement C3      76 - 169 mg/dL 46 (L)   Complement C4      15 - 50 mg/dL 3 (L)   CRP Inflammation      0.0 - 8.0 mg/L <2.9   Sed Rate      0 - 30 mm/h 26   DNA-ds      <10 IU/mL >379 (H)   Creatinine Urine      mg/dL 16     Component      Latest Ref Rng & Units 7/7/2018   WBC      4.0 - 11.0 10e9/L 7.0   RBC Count      3.8 - 5.2 10e12/L 4.35   Hemoglobin      11.7 - 15.7 g/dL 11.7   Hematocrit      35.0 - 47.0 % 36.0   MCV      78 - 100 fl 83   MCH      26.5 - 33.0 pg 26.9   MCHC      31.5 - 36.5 g/dL 32.5   RDW      10.0 - 15.0 % 15.3 (H)   Platelet Count      150 - 450 10e9/L 224   Diff Method       Automated Method   % Neutrophils      % 91.9   % Lymphocytes      % 4.0   % Monocytes      % 3.7   % Eosinophils      % 0.3   % Basophils      % 0.1   Absolute Neutrophil      1.6 - 8.3 10e9/L 6.5   Absolute Lymphocytes      0.8 - 5.3 10e9/L 0.3 (L)   Absolute Monocytes      0.0 - 1.3 10e9/L 0.3   Absolute Eosinophils      0.0 - 0.7 10e9/L 0.0   Absolute Basophils      0.0 - 0.2 10e9/L 0.0   Color Urine        Yellow   Appearance Urine       Clear   Glucose Urine      NEG:Negative mg/dL Negative   Bilirubin Urine      NEG:Negative Negative   Ketones Urine      NEG:Negative mg/dL Negative   Specific Gravity Urine      1.003 - 1.035 1.010   pH Urine      5.0 - 7.0 pH 5.5   Protein Albumin Urine      NEG:Negative mg/dL Negative   Urobilinogen Urine      0.2 - 1.0 EU/dL 0.2   Nitrite Urine      NEG:Negative Negative   Blood Urine      NEG:Negative Trace (A)   Leukocyte Esterase Urine      NEG:Negative Negative   Source       Midstream Urine   WBC Urine      OTO5:0 - 5 /HPF 0 - 5   RBC Urine      OTO2:O - 2 /HPF O - 2   Squamous Epithelial /LPF Urine      FEW:Few /LPF Few   Creatinine      0.52 - 1.04 mg/dL 0.63   GFR Estimate      >60 mL/min/1.7m2 >90   GFR Estimate If Black      >60 mL/min/1.7m2 >90   Protein Random Urine      g/L 0.07   Protein Total Urine g/gr Creatinine      0 - 0.2 g/g Cr 0.29 (H)   Albumin      3.4 - 5.0 g/dL 3.5   ALT      0 - 50 U/L 27   AST      0 - 45 U/L 21   Complement C3      76 - 169 mg/dL 51 (L)   Complement C4      15 - 50 mg/dL 5 (L)   Creatinine Urine Random      mg/dL 24   CRP Inflammation      0.0 - 8.0 mg/L 11.2 (H)   DNA-ds      <10 IU/mL >379 (H)   Sed Rate      0 - 30 mm/h 35 (H)   Creatinine Urine      mg/dL 23     PFTs 5/2018 (The FEV1 and FVC are reduced but the FEV1/FVC ratio is normal.  The inspiratory flow rates are reduced.  The TLC and FRC are reduced.  The diffusing capacity is normal.  However, the diffusing capacity was not corrected for the patient's hemoglobin.  IMPRESSION:  Moderately Severe  Restriction.  Normal Diffusion.  Walk distance is normal on room air with significant desaturation but no hypoxia.  ____________________________________________KERVIN TONY.      Component      Latest Ref Rng & Units 11/12/2018   Color Urine       Yellow   Appearance Urine       Clear   Glucose Urine      NEG:Negative mg/dL Negative   Bilirubin Urine      NEG:Negative Negative    Ketones Urine      NEG:Negative mg/dL Negative   Specific Gravity Urine      1.003 - 1.035 1.025   pH Urine      5.0 - 7.0 pH 6.0   Protein Albumin Urine      NEG:Negative mg/dL 30 (A)   Urobilinogen Urine      0.2 - 1.0 EU/dL 0.2   Nitrite Urine      NEG:Negative Negative   Blood Urine      NEG:Negative Trace (A)   Leukocyte Esterase Urine      NEG:Negative Negative   Source       Midstream Urine   WBC Urine      OTO5:0 - 5 /HPF 0 - 5   RBC Urine      OTO2:O - 2 /HPF O - 2   Squamous Epithelial /LPF Urine      FEW:Few /LPF Few   Bacteria Urine      NEG:Negative /HPF Few (A)   WBC      4.0 - 11.0 10e9/L 7.3   RBC Count      3.8 - 5.2 10e12/L 4.25   Hemoglobin      11.7 - 15.7 g/dL 11.3 (L)   Hematocrit      35.0 - 47.0 % 36.0   MCV      78 - 100 fl 85   MCH      26.5 - 33.0 pg 26.6   MCHC      31.5 - 36.5 g/dL 31.4 (L)   RDW      10.0 - 15.0 % 14.9   Platelet Count      150 - 450 10e9/L 255   Creatinine      0.52 - 1.04 mg/dL 0.83   GFR Estimate      >60 mL/min/1.7m2 73   GFR Estimate If Black      >60 mL/min/1.7m2 88   Iron      35 - 180 ug/dL 27 (L)   Iron Binding Cap      240 - 430 ug/dL 264   Iron Saturation Index      15 - 46 % 10 (L)   Protein Random Urine      g/L 0.58   Protein Total Urine g/gr Creatinine      0 - 0.2 g/g Cr 0.34 (H)   Albumin      3.4 - 5.0 g/dL 3.2 (L)   ALT      0 - 50 U/L 30   AST      0 - 45 U/L 19   Complement C3      76 - 169 mg/dL 48 (L)   Complement C4      15 - 50 mg/dL 3 (L)   CRP Inflammation      0.0 - 8.0 mg/L 16.4 (H)   DNA-ds      <10 IU/mL >379 (H)   Sed Rate      0 - 30 mm/h 26   Ferritin      8 - 252 ng/mL 160   Creatinine Urine      mg/dL 176     Component      Latest Ref Rng & Units 6/5/2019           8:29 AM   Color Urine       Yellow   Appearance Urine       Slightly Cloudy   Glucose Urine      NEG:Negative mg/dL Negative   Bilirubin Urine      NEG:Negative Negative   Ketones Urine      NEG:Negative mg/dL 5 (A)   Specific Gravity Urine      1.003 - 1.035 1.027    Blood Urine      NEG:Negative Small (A)   pH Urine      5.0 - 7.0 pH 6.0   Protein Albumin Urine      NEG:Negative mg/dL >499 (A)   Urobilinogen mg/dL      0.0 - 2.0 mg/dL 2.0   Nitrite Urine      NEG:Negative Negative   Leukocyte Esterase Urine      NEG:Negative Trace (A)   Source       Midstream Urine   WBC Urine      0 - 5 /HPF 16 (H)   RBC Urine      0 - 2 /HPF 24 (H)   Squamous Epithelial /HPF Urine      0 - 1 /HPF 2 (H)   Mucous Urine      NEG:Negative /LPF Present (A)   Hyaline Casts      0 - 2 /LPF 1   Sodium      133 - 144 mmol/L    Potassium      3.4 - 5.3 mmol/L    Chloride      94 - 109 mmol/L    Carbon Dioxide      20 - 32 mmol/L    Anion Gap      3 - 14 mmol/L    Glucose      70 - 99 mg/dL    Urea Nitrogen      7 - 30 mg/dL    Creatinine      0.52 - 1.04 mg/dL    GFR Estimate      >60 mL/min/1.73:m2    GFR Estimate If Black      >60 mL/min/1.73:m2    Calcium      8.5 - 10.1 mg/dL    Phosphorus      2.5 - 4.5 mg/dL    Albumin      3.4 - 5.0 g/dL    WBC      4.0 - 11.0 10e9/L    RBC Count      3.8 - 5.2 10e12/L    Hemoglobin      11.7 - 15.7 g/dL    Hematocrit      35.0 - 47.0 %    MCV      78 - 100 fl    MCH      26.5 - 33.0 pg    MCHC      31.5 - 36.5 g/dL    RDW      10.0 - 15.0 %    Platelet Count      150 - 450 10e9/L    Specimen Description       Midstream Urine   Special Requests       Specimen received in preservative   Culture Micro       10,000 to 50,000 colonies/mL . . .   Creatinine Urine      mg/dL 240   Albumin Urine mg/L      mg/L    Albumin Urine mg/g Cr      0 - 25 mg/g Cr    Protein Random Urine      g/L 4.50   Protein Total Urine g/gr Creatinine      0 - 0.2 g/g Cr 1.88 (H)   Neutrophil Cytoplasmic Antibody      <1:10 titer    Neutrophil Cytoplasmic Antibody Pattern          Lactate Dehydrogenase      81 - 234 U/L    HIV Antigen Antibody Combo      NR:Nonreactive        Sed Rate      0 - 30 mm/h    CRP Inflammation      0.0 - 8.0 mg/L    Complement C4      15 - 50 mg/dL    Complement  C3      76 - 169 mg/dL    DNA-ds      <10 IU/mL      Component      Latest Ref Rng & Units 6/5/2019           8:29 AM   Color Urine          Appearance Urine          Glucose Urine      NEG:Negative mg/dL    Bilirubin Urine      NEG:Negative    Ketones Urine      NEG:Negative mg/dL    Specific Gravity Urine      1.003 - 1.035    Blood Urine      NEG:Negative    pH Urine      5.0 - 7.0 pH    Protein Albumin Urine      NEG:Negative mg/dL    Urobilinogen mg/dL      0.0 - 2.0 mg/dL    Nitrite Urine      NEG:Negative    Leukocyte Esterase Urine      NEG:Negative    Source          WBC Urine      0 - 5 /HPF    RBC Urine      0 - 2 /HPF    Squamous Epithelial /HPF Urine      0 - 1 /HPF    Mucous Urine      NEG:Negative /LPF    Hyaline Casts      0 - 2 /LPF    Sodium      133 - 144 mmol/L    Potassium      3.4 - 5.3 mmol/L    Chloride      94 - 109 mmol/L    Carbon Dioxide      20 - 32 mmol/L    Anion Gap      3 - 14 mmol/L    Glucose      70 - 99 mg/dL    Urea Nitrogen      7 - 30 mg/dL    Creatinine      0.52 - 1.04 mg/dL    GFR Estimate      >60 mL/min/1.73:m2    GFR Estimate If Black      >60 mL/min/1.73:m2    Calcium      8.5 - 10.1 mg/dL    Phosphorus      2.5 - 4.5 mg/dL    Albumin      3.4 - 5.0 g/dL    WBC      4.0 - 11.0 10e9/L    RBC Count      3.8 - 5.2 10e12/L    Hemoglobin      11.7 - 15.7 g/dL    Hematocrit      35.0 - 47.0 %    MCV      78 - 100 fl    MCH      26.5 - 33.0 pg    MCHC      31.5 - 36.5 g/dL    RDW      10.0 - 15.0 %    Platelet Count      150 - 450 10e9/L    Specimen Description          Special Requests          Culture Micro          Creatinine Urine      mg/dL 258   Albumin Urine mg/L      mg/L 2,810   Albumin Urine mg/g Cr      0 - 25 mg/g Cr 1,089.15 (H)   Protein Random Urine      g/L    Protein Total Urine g/gr Creatinine      0 - 0.2 g/g Cr    Neutrophil Cytoplasmic Antibody      <1:10 titer    Neutrophil Cytoplasmic Antibody Pattern          Lactate Dehydrogenase      81 - 234 U/L     HIV Antigen Antibody Combo      NR:Nonreactive        Sed Rate      0 - 30 mm/h    CRP Inflammation      0.0 - 8.0 mg/L    Complement C4      15 - 50 mg/dL    Complement C3      76 - 169 mg/dL    DNA-ds      <10 IU/mL      Component      Latest Ref Rng & Units 6/5/2019          11:57 AM   Color Urine          Appearance Urine          Glucose Urine      NEG:Negative mg/dL    Bilirubin Urine      NEG:Negative    Ketones Urine      NEG:Negative mg/dL    Specific Gravity Urine      1.003 - 1.035    Blood Urine      NEG:Negative    pH Urine      5.0 - 7.0 pH    Protein Albumin Urine      NEG:Negative mg/dL    Urobilinogen mg/dL      0.0 - 2.0 mg/dL    Nitrite Urine      NEG:Negative    Leukocyte Esterase Urine      NEG:Negative    Source          WBC Urine      0 - 5 /HPF    RBC Urine      0 - 2 /HPF    Squamous Epithelial /HPF Urine      0 - 1 /HPF    Mucous Urine      NEG:Negative /LPF    Hyaline Casts      0 - 2 /LPF    Sodium      133 - 144 mmol/L 134   Potassium      3.4 - 5.3 mmol/L 4.3   Chloride      94 - 109 mmol/L 101   Carbon Dioxide      20 - 32 mmol/L 26   Anion Gap      3 - 14 mmol/L 8   Glucose      70 - 99 mg/dL 109 (H)   Urea Nitrogen      7 - 30 mg/dL 21   Creatinine      0.52 - 1.04 mg/dL 0.49 (L)   GFR Estimate      >60 mL/min/1.73:m2 >90   GFR Estimate If Black      >60 mL/min/1.73:m2 >90   Calcium      8.5 - 10.1 mg/dL 9.2   Phosphorus      2.5 - 4.5 mg/dL 4.2   Albumin      3.4 - 5.0 g/dL 2.9 (L)   WBC      4.0 - 11.0 10e9/L 8.7   RBC Count      3.8 - 5.2 10e12/L 4.81   Hemoglobin      11.7 - 15.7 g/dL 12.3   Hematocrit      35.0 - 47.0 % 39.3   MCV      78 - 100 fl 82   MCH      26.5 - 33.0 pg 25.6 (L)   MCHC      31.5 - 36.5 g/dL 31.3 (L)   RDW      10.0 - 15.0 % 14.1   Platelet Count      150 - 450 10e9/L 302   Specimen Description          Special Requests          Culture Micro          Creatinine Urine      mg/dL    Albumin Urine mg/L      mg/L    Albumin Urine mg/g Cr      0 - 25 mg/g Cr     Protein Random Urine      g/L    Protein Total Urine g/gr Creatinine      0 - 0.2 g/g Cr    Neutrophil Cytoplasmic Antibody      <1:10 titer <1:10   Neutrophil Cytoplasmic Antibody Pattern       The ANCA IFA is <1:10.  No further testing will be performed.   Lactate Dehydrogenase      81 - 234 U/L 252 (H)   HIV Antigen Antibody Combo      NR:Nonreactive     Nonreactive   Sed Rate      0 - 30 mm/h 40 (H)   CRP Inflammation      0.0 - 8.0 mg/L 25.7 (H)   Complement C4      15 - 50 mg/dL 3 (L)   Complement C3      76 - 169 mg/dL 53 (L)   DNA-ds      <10 IU/mL >379 (H)   [facility]                                                      CMR Report       MRN:                  [id number]                                  Name:           CHILANGO GIBBONS                                   :                  1965                                  Scan Date:   2019 11:11:32                                   Electronically signed by Fre Corea 2019-May-20 14:19:00     SUMMARY   ==========================================================================================================     Clinical history: 54-year old female with SLE, poly-serositis, and chronic pleuritic chest pain. CMR to  assess for cardiac involvement (sepideh-myocarditis).  Comparison CMR: None.      1. The LV is normal in cavity size and wall thickness. The global systolic function is normal. The LVEF is  62%.   There are no regional wall motion abnormalities.     2. The RV is normal in cavity size. The global systolic function is normal. The RVEF is 60%.      3. Both atria are normal in size.     4. There is no significant valvular disease.      5. Late gadolinium enhancement imaging shows no MI, fibrosis or infiltrative disease.      6. The is mild pericardial thickening and tethering, with circumferential pericardial enhancement. There is  no pericardial effusion.       7. There is no intracardiac thrombus.     8. The main PA is  moderately enlarged, measuring 3.8 cm.      CONCLUSIONS: Pericardial thickening and enhancement consistent with inflammatory pericarditis. There is no  myocardial fibrosis or myocarditis. Normal biventricular size and systolic function; LVEF 62%, RVEF 60%.      CORE EXAM   ==========================================================================================================     MEASUREMENTS   ----------------------------------------------------------------------------------------      VOLUMETRIC ANALYSIS       ----------------------------------------------  .--------------------------------------------------------.                    LV    Reference  RV    Reference   +------+-----------+------+-----------+------+-----------+   EDV   ml          162   ()   159   ()          ml/m^2     69.2   (56-90)   67.9   (53-90)     ESV   ml           62   (22-59)     64   (15-68)           ml/m^2     26.5   (14-33)   27.4   (11-37)     CO    L/min      7.30             6.93                     L/min/m^2   3.1              3.0               MASS                                                 SV    ml          100   ()    95   ()          ml/m^2     42.7   (37-62)   40.6   (36-60)     EF    %            62   (59-77)     60   (55-79)    '------+-----------+------+-----------+------+-----------'             CARDIAC OUTPUT HR:  73 BPM      LA DIMENSIONS (LV SYSTOLE)       ----------------------------------------------          DIAMETER:  3.9 cm         AORTIC ROOT DIMENSIONS       ----------------------------------------------          SINUS OF VALSALVA:  2.9 cm          AORTIC ROOT SIZE:  Normal        ANATOMY   ----------------------------------------------------------------------------------------      LEFT VENTRICLE       ----------------------------------------------          WALL THICKNESS:  Normal          CAVITY SIZE:   Normal         RIGHT VENTRICLE       ----------------------------------------------          WALL THICKNESS:  Normal          CONTRACTILITY:  Normal          CAVITY SIZE:  Normal         INTERVENTRICULAR SEPTUM       ----------------------------------------------               VENTRICULAR SEPTUM:  Normal          INTERATRIAL SEPTUM       ----------------------------------------------               ATRIAL SEPTUM:          LIPOMATOUS HYPERTROPHY          LEFT ATRIUM       ----------------------------------------------          CAVITY SIZE:  Normal         RIGHT ATRIUM       ----------------------------------------------          CAVITY SIZE:  Normal         PERICARDIUM       ----------------------------------------------          THICKENED          THICKNESS:  5 mm          EFFUSION:  None         PLEURAL EFFUSION       ----------------------------------------------               None         VALVES   ----------------------------------------------------------------------------------------      AORTIC VALVE       ----------------------------------------------          AORTIC VALVE LEAFLETS:  Trileaflet         MITRAL VALVE       ----------------------------------------------          MITRAL VALVE LEAFLETS:  Normal Leaflets         TRICUSPID VALVE       ----------------------------------------------          TRICUSPID VALVE LEAFLETS:  Normal Leaflets         PULMONIC VALVE       ----------------------------------------------          PULMONIC VALVE LEAFLETS:  Normal Leaflets        17 SEGMENT   ----------------------------------------------------------------------------------------  .------------------------------------------------------------------------------------------.   Segments            Wall Motion   Hyperenhancement  Stress Perfusion  Interpretation   +--------------------+--------------+------------------+------------------+----------------+   Base Anterior       Normal/Hyper  None                                 Normal            Base Anteroseptal   Normal/Hyper  None                                Normal            Base Inferoseptal   Normal/Hyper  None                                Normal            Base Inferior       Normal/Hyper  None                                Normal            Base Inferolateral  Normal/Hyper  None                                Normal            Base Anterolateral  Normal/Hyper  None                                Normal            Mid Anterior        Normal/Hyper  None                                Normal            Mid Anteroseptal    Normal/Hyper  None                                Normal            Mid Inferoseptal    Normal/Hyper  None                                Normal            Mid Inferior        Normal/Hyper  None                                Normal            Mid Inferolateral   Normal/Hyper  None                                Normal            Mid Anterolateral   Normal/Hyper  None                                Normal            Apical Anterior     Normal/Hyper  None                                Normal            Apical Septal       Normal/Hyper  None                                Normal            Apical Inferior     Normal/Hyper  None                                Normal            Apical Lateral      Normal/Hyper  None                                Normal            [place]                Normal/Hyper  None                                Normal           +--------------------+--------------+------------------+------------------+----------------+   RV Segments         Wall Motion   Hyperenhancement  Stress Perfusion  Interpretation   +--------------------+--------------+------------------+------------------+----------------+   RV Basal Anterior   Normal/Hyper  None                                Normal            RV Basal Inferior   Normal/Hyper   None                                Normal            RV Mid              Normal/Hyper  None                                Normal            RV Apical           Normal/Hyper  None                                Normal           '--------------------+--------------+------------------+------------------+----------------'         FINDINGS       ----------------------------------------------          INFARCT/SCAR SIZE:  0 %        SCAN INFO   ==========================================================================================================     GENERAL   ----------------------------------------------------------------------------------------      CONTRAST AGENT       ----------------------------------------------          TYPE:  Gadavist          VOLUME ADMINISTERED:  10 ml          DOSAGE FOR 0.5M:  0.04 mmol/kg          SERUM CREATININE:  0.58 sCr          CREATININE DATE:  2019-03-06 00:00:00          SEDATION       ----------------------------------------------          SEDATION USED?:  No         VITALS       ----------------------------------------------          HEIGHT:  66.00 in          HEIGHT:  167.64 cm          WEIGHT:  289.00 lbs          WEIGHT:  131.09 kgs          BSA:  2.34 m^2         PULSE SEQUENCES       ----------------------------------------------          Single-Shot SSFP, IR GRE - Segmented, IR SSFP - Single Shot, SSFP Cine          SETUP       ----------------------------------------------          TYPE:  Clinical          INPATIENT:  No          INCOMPLETE SCAN:  No          REASON(S) FOR SCAN:  Cardiomyopathy, Pericardial Evaluation           REFERRING PHYSICIAN:  ROBE VAZQUEZ          ATTENDING PHYSICIAN:  ROBE VAZQUEZ      Kidney Biopsy (6/7/19)  Patient Name: CHILANGO GIBBONS   MR#: [id number]   Specimen #: [id number]   Collected: 6/7/2019   Received: 6/7/2019   Reported: 6/10/2019 22:42   Ordering Phy(s): JOYCE mena  "result formatting, select 'View Enhanced Report Format' under    Linked Documents section.     SPECIMEN(S):   Kidney biopsy, native with EM & Immunofluorescence, right     FINAL DIAGNOSIS:   Kidney; percutaneous needle biopsy:   -Diffuse global lupus nephritis, ISN/RPS class IV-S (a)   -Mild to moderate arteriosclerosis     COMMENT:   There is mild mesangial and endocapillary proliferation, focal leukocytic   infiltrates, and crescentic lesions   involving 14 out of 26 glomeruli, mainly cellular with necrotizing   lesions.  There are minimal chronic   changes.  The activity index of the proliferative component is 9/24 and   the chronicity index is 1/12 (Robert   et al, American Journal of Medicine 75:  382-391, 1983).     The presence of significant crescents with only mild mesangial and   endocapillary proliferation raises the   possibility of concurrent lupus nephritis and ANCA-associated crescentic   glomerulonephritis.  Thus it is   recommended to test for ANCA and clinical correlation is recommended.        Ph.E:    /83   Pulse 75   Ht 1.676 m (5' 6\")   Wt 123.7 kg (272 lb 12.8 oz)   SpO2 98%   BMI 44.03 kg/m        Constitutional: WD/WN. Pleasant. In no acute distress. Cushingoid facies.  Eyes: EOM intact, PERRLA, sclera anicteric, conj not injected  HEENT: No oral ulcers or thrush. Normal salivary pool.    Neck: No cervical LAP or thyromegaly.  Chest: Clear to auscultation bilaterally  CV: RRR, no murmurs/ rubs or gallops. No edema, clubbing or cyanosis.   GI: Abdomen is soft and non tender.   MS: No synovitis. Cool joints. No tenderness of the joints. No swelling. Normal ROM.  No joint deformities. Full ROM of the joints. No nodules. No Jaccoud's deformity.  Skin: No skin rash, malar rash, livedo, periungual erythema, alopecia, digital ulcers or nail changes.  Neuro: A&O x 3. Grossly non focal, muscular power 5/5 in all ext  Psych: NL affect and mood    Assessment/ plan:    1-SLE. 55 yo WF with " +fh/o RA and personal hx of SLE presented in 12/2017 for 2nd opinion of m/o her SLE. She was diagnosed with SLE at age 27. Her lupus has been marked by +KARLIE (highest titer 1:640, speckled and nucleolar patterns), +anti-DNA, arthritis, malar rash, serositis (pleuritic CP) supported by +SSA/SSB Ab, low C3/low C4, +RF, high ESR/CRP. She had neg anti-RNP, anti-Sm, acL IgM/G/A, LAC, cryo and anti-CCP.     Had NL C3 at the time of Dx. She was treated with prednisone and HCQ at the time of Dx. Can't remember how long she was on HCQ and why HCQ was stopped, but it probably was stopped because of stable disease, no report on HCQ toxicity. Reportedly her SLE was mild all these years.    Has secondary Sjogren's with sicca, +SSA/SSB Ab and h/o MALT lymphoma at age 44 in remission. Uses blink eyedrops and salagen. No lip biopsy was done to confirm Dx of Sjogren's.    Her lupus flared in 7/2017 with no triggers when she presented with arthritis, HCQ was resumed, arthritis resolved. Mild pulm HTN on 2D-Echo 8/2017 but neg R cardiac cath and VQ scan in 3/2018, also neg 2D-Echo.    In 12/2017, was prescribed prednisone 40 mg for only 5 days for pleuritic CP, CP recurred after stopping prednisone.     Her major complaint at initial visit with me in 12/2017 was ongoing CP. AZA was added in 2/2018 with start dose of 50 mg qd and slowly was increased to max 150 mg qd. Tolerated AZA well, no SEs, no toxicity on the labs but failed it and required to go back on prednisone 20 mg qd (current dose).     Her lupus is active with pleuritic CP. ESR/CRP normalized on prednisone+AZA+HCQ but +anti-DNA, low C3/C4 are unchanged. Chest CT in 12/2017 without contrast showed air trapping due to lupus/Sjogren's, no pleural effusion. Lung nodules need f/u in a year. No pericardail effusion on 2D-echo and neg VQ scan for PE in 3/2018 so chest CT with contrast is not needed. She is APL negative.    AZA was switched to  mg po bid on 5/18/2018 as she  failed AZA. She is now on max dose of MMF 1500 mg bid. Her labs are unchanged with persistently elevated anti-DNA, low C3/C4 and high ESR/CRP, but just started to feel a lot better (regarding fatigue, arthralgia, still has residual pleuritic CP) after adding MMF.     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 Also possible shrinking lung syn sec lupus or obesity is responsible for restrictive lung disease plus pleural effusion. She was prescribed albuterol and dulera inh which have helped.    Benlysta was added in 9/2018 to help with pleuritic CP. No change in lupus serology (anti-DNA, C3, C4), but ESR/CRP have improved. Overall she felt a lot better after adding benlysta but it made her tired.     I could not taper her off the prednisone, she continued to have active IA, fatigue and pleuritic CP. I recommended switching benlysta to rituximab given her h/o lymphoma, unfortunately her insurance denied. At the same time, Taina misunderstood and stopped her cellcept as thought we were switching MMF to rituximab while the plan was to add rituximab to MMF. She has been off MMF for couple of months. Had cardiac MRI on 5/20 which showed active pericarditis. I advised to switch to cytoxan 750 mg/m2 qmo x 6 mo. On Saturday 6/1/2019, received a call from infusion center reporting blood in the urine and if it was ok to proceed with cytoxan. I was concerned as that was very new. We canceled the cytoxan infusion. UCx was negative for UTI. Repeat U/A today is showing protein and blood, with h/o stopping MMF, very concerning for lupus nephritis. She never had lupus nephritis as part of her lupus and it's possible that it was covered by MMF and showed up off MMF.  Renal biopsy is still recommended to confirm Dx, evaluate degree of severity, chronicity and rule out other diagnoses. I spoke to renal team and dr. Elmore and Aura kindly agreed to see her today (6/5/19) as urgent consult as add on to their schedule and scheduled renal biopsy for Friday 6/7/2019. I would re-schedule cytoxan to 6/15/2019 and schedule pulse solumedrol 1 gr every day x 3 days. Meanwhile, will increase prednisone to 60 mg every day. Cytoxan risks were discussed again.    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 Patient received 1st dose of cyclophosphamide on 6/15/19. Tolerated well. Has since followed up with nephrology. She is now on lisinopril 10 mg every day. Was given pentamidine for PJP ppx, but did not tolerate very well. Per discussion with Dr. Marroquin since last hospital discharge, Taina has been taking 40 mg BID prednisone for the past 1.5 weeks. She tolerated the taper down from 100 to 80 well. Will plan to decrease to 60 mg daily today and stay at that dose for 1 month.       Recommend:    For today, decrease prednisone to 60 mg a day x1 month. Then decrease to 50 mg daily x 7 days, then 40 mg daily x 7 days, then 30 mg daily x 7 days, then 25 mg daily.     Plan on monthly Cytoxan. Next dose due around 7/15/19.     Return to get lupus labs in early August. (Orders placed)    Continue the plaquenil 200 mg twice a day       2-HCQ monitoring. Was reminded to have annual eye exam    3-PCP prophylaxis. Patient did not tolerate inhaled pentamidine. Avoiding TMP-SMX given sulfa reaction and also increase SLE flare. I am hesitant to use dapsone given risk of hemolytic anemia. Will start atovaquone 10 mL (1500 mg) daily for prophylaxis.      Keep September appointment.    Patient seen and plan formulated with Rheumatology staff, Dr. Marroquin.    Jose Maria Camejo DO  Internal Medicine, PGY 3    Attending Note: I saw and evaluated the patient with Dr. Camejo. I agree with the assessment and  plan.    Killian Marroquin MD        Orders Placed This Encounter   Procedures     ALT     Albumin level     AST     CBC with platelets differential     Creatinine     Complement C4     Complement C3     CRP inflammation     DNA double stranded antibodies     Erythrocyte sedimentation rate auto     UA with Microscopic reflex to Culture     Protein  random urine with Creat Ratio     Creatinine random urine

## 2019-07-13 DIAGNOSIS — Z86.711 HISTORY OF PULMONARY EMBOLISM: ICD-10-CM

## 2019-07-13 DIAGNOSIS — R60.9 ASYMMETRIC EDEMA OF BOTH LOWER EXTREMITIES: Primary | ICD-10-CM

## 2019-07-13 PROBLEM — M32.14 LUPUS NEPHRITIS, ISN/RPS CLASS IV (H): Status: ACTIVE | Noted: 2019-07-13

## 2019-07-13 NOTE — PROGRESS NOTES
Called patient to discuss her MyChart questions which were answered in detail.  We also discussed her current clinical status.  Chest pain, dyspnea and abdominal pain essentially resolved.  She continues to be fatigued but is improving.  Her main complaint is that of persistent RLE edema.  Given her recent PE and hypercoagulable state it seems reasonable to evaluate for DVT.    -Will order bilateral lower extremity ultrasound.  Anticipate, imaging to be completed on 7/15/19.      Maynor Miranda MD   Nephrology  [phone]

## 2019-07-13 NOTE — PROGRESS NOTES
Nephrology Clinic    Maynor Miranda MD  2019     Name: Taina Singh  MRN: [id number]  Age: 54 year old  : 1965  Referring provider: Referred Self     Assessment and Plan:    1. 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  Surprisingly endocapillary proliferation was not as profound in the context of so many necrotizing lesions (activity index  and chronicity index is ).  Of note, ANCA was negative, complements remain low and ds-DNA high with accompanying microscopic hematuria and albuminuria (~1000 mg/g).  She has preserved renal function with minimal chronic changes on renal biopsy.  She has received her first dose of cytoxan (1700 mg, [facility] dosing) and continues on high dose prednisone.  - She will continue cytoxan Q month x 6 total with next dose in mid July   - She continues on prednisone at 40 mg BID to be tapered per Rheumatology  - She is also on plaquenil 200 mg BID  - We will start RAAS blockade with lisinopril today at 10 mg daily and recheck a renal panel in ~1 week  - She did not tolerate PJP prophylaxis with inhaled pentamidine and is expected to start atovaquone per Rheumatology  - We will increase cholecalciferol to 2000 units daily and continue calcium supplements and pantoprazole to minimize adverse events related to high dose prednisone  - Discussed maintenance immunosuppression (ex. CellCept) after 6 months of current treatment   - Will continue to monitor for remission (creatinine, proteinuria, hematuria, etc.)   - Discussed another biopsy in 6-12 months to gauge effect of the current treatment on the kidneys   - Will complete blood work per Rheumatology, and follow up in Neph clinic every 3 months    2. BP/Volume status: Normotensive (BP <130/80) and slightly volume expanded on exam.    - No pressing need for diuretic at this time  - We will start lisinopril for renoprotection in the  context of lupus nephritis with albuminuria and monitor for hypotension and electrolyte disturbances    3. Anemia: Mild (hgb 11.8) and likely due to inflammatory state.    -No intervention needed at this time    4.  Electrolytes & Acid/Base: No pressing issues.      5.  PE and splenic infarct: Incidental finding.  Unable to locate w/u for antiphospholipid antibodies.  On Xarelto.    -monitor for now    Follow-up: Return in about 3 months (around 9/27/2019).     Reason For Visit:   Taina Singh is a 54 year old female who presents for evaluation of Lupus-related symptoms.     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  She had well controlled Lupus and stopped taking prednisone and HCQ until about 3 yrs back developed pleuritic CP and arthritis and restarted HCQ 200mg every day. Restarted on prednisone 40mg with some improvement in CP back in 11/2018, but unable to taper steroids and her main concerns remain CP ongoing with last cardiac MRI with pericarditis.    She has been using tramadol BiD , has used tylenol and Ibuprofen 400mg once daily for about a yr.  She had started working out and lost 100 lbs, which triggered a Lupus flare, which started approximately 2 years ago.   .  She had Creatinine 0.5-0.6 wo any change in renal functions over the years, She had minimal proteinuria since 6/2018 with bland urine on UA. Urine with minimal proteins and trace blood since 6/2018. Her proteinuria recently spiked to 1.88 g/gr on 6/5/19. On her last UA while on schedule to get CYC on 6/3 she was noted to have microscopic hematuria and CYC was held, she has presented to be seen in Rheum clinic today with 1.88g/g of UPCR and numerous RBC on her UA.  Nephrology was consulted for possible LN concerns. Her latest urinalysis on 6/5/19 showed the same concerns.     She has had 5 pregnancies and 1 live birth , most miscarriages were in first trimester and no complications with eclampsia/pre-eclampsia. She was on Lupus treatment at that time.  She has no Hx of Nephrolithiasis.    Interval History:  She was last seen in clinic on 6/5/19 by Dr. Elmore; please see that note for further details. Since this last visit, the patient has been in and out of the hospital, and was most recently admitted at [facility] on 6/18/19 for abdominal pain. She was admitted to [facility] previously on 6/10/19 and [facility] on 6/7/19, also for the abdominal pain and shortness of breath. 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  The patient denies any diarrhea accompanying the abdominal pain. During the hospitalization a PE and splenic infarct were discovered as well.  She has since started Xarelto.      She mainly feels fatigue. Her chest pain seems improved though she does continue to have dyspnea with exertion.  The patient exhibits bilateral lower leg edema today, but connects this to her recent hospital stay and expects improvement within a week. She denies joint pain today.  Her recent kidney biopsy was explained in detail.  She recently started cytoxan without any adverse reaction. Her main complaint is fatigue.         Review of Systems:   Pertinent items are noted in HPI or as below, remainder of complete ROS is negative.      Active Medications:     Current Outpatient Medications:      albuterol (PROAIR HFA/PROVENTIL HFA/VENTOLIN HFA) 108 (90 Base) MCG/ACT inhaler, Inhale 2 puffs into the lungs every 6 hours as needed for shortness of breath / dyspnea or wheezing, Disp: 3 Inhaler, Rfl: 3     Calcium-Magnesium 300-300 MG TABS, daily , Disp: , Rfl:      hydroxychloroquine (PLAQUENIL) 200 MG tablet, Take 1 tablet (200 mg) by  mouth 2 times daily, Disp: 180 tablet, Rfl: 1     lisinopril (PRINIVIL/ZESTRIL) 10 MG tablet, Take 1 tablet (10 mg) by mouth daily, Disp: 90 tablet, Rfl: 3     Multiple Vitamins-Minerals (WOMENS MULTI PO), Take by mouth daily , Disp: , Rfl:      Omega-3 Fatty Acids (OMEGA-3 FISH OIL) 500 MG CAPS, Take 500 mg by mouth daily , Disp: , Rfl:      pantoprazole (PROTONIX) 20 MG EC tablet, Take 2 tablets (40 mg) by mouth 2 times daily, Disp: , Rfl:      [START ON 2019] predniSONE (DELTASONE) 10 MG tablet, Take 50 mg daily x 7 days, then 40 mg daily x 7 days, then 30 mg daily x 7 days, then 25 mg daily, Disp: 100 tablet, Rfl: 1     predniSONE (DELTASONE) 20 MG tablet, Take 3 tablets (60 mg) by mouth daily For one month, Disp: 90 tablet, Rfl: 0     traMADol (ULTRAM) 50 MG tablet, Take 1 tablet (50 mg) by mouth every 12 hours as needed for pain Take 1 tablet as needed for pain.  No driving or alcohol use while taking medication., Disp: 30 tablet, Rfl: 2     vitamin D3 (CHOLECALCIFEROL) 2000 units (50 mcg) tablet, Take 1 tablet (2,000 Units) by mouth daily, Disp: 90 tablet, Rfl: 11     pilocarpine (SALAGEN) 5 MG tablet, Take 1 tablet (5 mg) by mouth 4 times daily as needed (Patient not taking: Reported on 2019), Disp: 360 tablet, Rfl: 3     Allergies:   Pentamidine; Penicillins; and Sulfa drugs      Past Medical History:  Bronchiolitis  Diastolic dysfunction  Iron deficiency  Lupus Class IV (H)  MALT Lymphoma (H)  Sjogren's Syndrome (H)  Vitamin D Deficiency   Other forms of Systemic Lupus Erythematosus (H)  Sicca Syndrome (H)  Iron Deficiency  Hematuria  Other type of non-Hodgkin Lymphoma, unspecified body region (H)  Need for Pneumocystis Prophylaxis     Past Surgical History:  Biopsy/Removal Lymph Nodes   Section (age 32)  HC Hysteros W/ Permanent Fallopian Implant  Parotidectomy  Tonsillectomy    Family History:   Alopecia (Brother)  Rheumatoid Arthritis (brother)     Social History:   Social History      Socioeconomic History     Marital status:      Spouse name: Not on file     Number of children: Not on file     Years of education: 1     Highest education level: Not on file   Occupational History     Comment: , 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   Social Needs     Financial resource strain: Not on file     Food insecurity:     Worry: Not on file     Inability: Not on file     Transportation needs:     Medical: Not on file     Non-medical: Not on file   Tobacco Use     Smoking status: Never Smoker     Smokeless tobacco: Never Used   Substance and Sexual Activity     Alcohol use: No     Drug use: No     Sexual activity: Not on file   Lifestyle     Physical activity:     Days per week: Not on file     Minutes per session: Not on file     Stress: Not on file   Relationships     Social connections:     Talks on phone: Not on file     Gets together: Not on file     Attends Taoism service: Not on file     Active member of club or organization: Not on file     Attends meetings of clubs or organizations: Not on file     Relationship status: Not on file     Intimate partner violence:     Fear of current or ex partner: Not on file     Emotionally abused: Not on file     Physically abused: Not on file     Forced sexual activity: Not on file   Other Topics Concern     Parent/sibling w/ CABG, MI or angioplasty before 65F 55M? 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  Denies exposure to asbestos, silica, hot tubs, feather pillows (used to until age 49), pet birds, mold, does not play brass/wind instruments.        Physical Exam:  /78   Pulse 87   Temp 98  F (36.7  C) (Oral)   Wt 123.6 kg (272 lb 8 oz)   SpO2 97%   BMI 43.98 kg/m     GENERAL APPEARANCE: alert and no distress  EYES: nonicteric  HENT: mouth without ulcers or lesions  NECK: supple, no adenopathy  RESP: lungs clear to auscultation   CV: regular rhythm, normal rate,  ABDOMEN: soft, nontender, nondistended   Extremities:  trace lower extremity edema  MS: no evidence of inflammation in joints, no muscle tenderness  SKIN: no concerning rash  NEURO: mentation intact and speech normal  PSYCH: affect normal/bright   Laboratory:  CMP  Recent Labs   Lab Test 06/27/19  1209 06/07/19  0719 06/05/19  1157 03/06/19  1046 12/31/18  1330 11/12/18  1443  02/14/18  0819    136 134  --   --   --   --  138   POTASSIUM 4.4 4.3 4.3  --   --   --   --  3.9   CHLORIDE 104 103 101  --   --   --   --  102   CO2 28 24 26  --   --   --   --  31   ANIONGAP 4 8 8  --   --   --   --  5   * 116* 109*  --   --   --   --  75   BUN 33* 26 21  --   --   --   --  14   CR 0.63 0.55 0.49* 0.58 0.58 0.83   < > 0.63   GFRESTIMATED >90 >90 >90 >90 >90 73   < > >90   GFRESTBLACK >90 >90 >90 >90 >90 88   < > >90   ANDERSON 8.8 9.2 9.2  --   --   --   --  9.0   PHOS 3.4  --  4.2  --   --   --   --   --    ALBUMIN 3.3*  --  2.9* 3.2* 3.5 3.2*   < >  --    AST 13  --   --  23 21 19   < >  --    ALT 39  --   --  31 35 30   < >  --     < > = values in this interval not displayed.     CBC  Recent Labs   Lab Test 06/27/19  1209 06/07/19  1414 06/07/19  0719 06/05/19  1157 03/06/19  1046   HGB 11.8 11.4* 11.4* 12.3 11.2*   WBC 7.5  --  7.9 8.7 4.6   RBC 4.55  --  4.55 4.81 4.16   HCT 39.3  --  37.2 39.3 35.2   MCV 86  --  82 82 85   MCH 25.9*  --  25.1* 25.6* 26.9   MCHC 30.0*  --  30.6* 31.3* 31.8   RDW 16.4*  --  14.1 14.1 13.9     --  262 302 234     INR  Recent Labs   Lab Test 06/07/19  0719   INR 0.95     URINE STUDIES  Recent Labs   Lab Test 06/27/19  1159 06/05/19  0829 03/06/19  1048 12/31/18  1351 11/12/18  1450 08/13/18  1610 07/07/18  1116   COLOR Yellow Yellow Yellow Yellow Yellow Yellow Yellow   APPEARANCE Cloudy Slightly Cloudy Clear Clear Clear Clear Clear   URINEGLC Negative Negative Negative Negative Negative Negative Negative   URINEBILI Negative Negative Negative Negative Negative Negative Negative   URINEKETONE Negative 5* Negative Negative  Negative Negative Negative   SG 1.025 1.027 1.006 1.010 1.025 1.005 1.010   UBLD Small* Small* Small* Trace* Trace* Trace* Trace*   URINEPH 5.0 6.0 6.0 7.0 6.0 6.0 5.5   PROTEIN >499* >499* Negative Trace* 30* Negative Negative   UROBILINOGEN  --   --   --  0.2 0.2 0.2 0.2   NITRITE Negative Negative Negative Negative Negative Negative Negative   LEUKEST Large* Trace* Trace* Negative Negative Negative Negative   RBCU 26* 24* 3* O - 2 O - 2 O - 2 O - 2   WBCU 86* 16* 7* 0 - 5 0 - 5 0 - 5 0 - 5     Recent Labs   Lab Test 06/27/19  1159 06/05/19  0829 03/06/19  1048 12/31/18  1350 11/12/18  1450 08/13/18  1610 07/07/18  1116 06/12/18  1042 05/12/18  1258 04/11/18  1655 03/21/18  1750 02/23/18  1445 12/29/17  1254   UTPG 1.42* 1.88* 0.89* 0.92* 0.34* 0.23* 0.29* 0.21* Unable to calculate due to low value Unable to calculate due to low value Unable to calculate due to low value 0.18 Unable to calculate due to low value     PTH  No lab results found.  IRON STUDIES   Recent Labs   Lab Test 11/12/18  1443 12/29/17  1302   IRON 27* 58    315   IRONSAT 10* 19   COLLETTE 160 163       Renal U/S:  6/7/2019:  Right kidney: Measures 13.7 cm in length. Parenchyma is of normal  thickness and echogenicity. No focal mass. No hydronephrosis.     Left kidney: Measures 14 cm in length. Parenchyma is of normal  thickness and echogenicity. No focal mass. No hydronephrosis.                                                                   Kidney Biopsy:  6/7/2019:  FINAL DIAGNOSIS:   Kidney; percutaneous needle biopsy:   -Diffuse global lupus nephritis, ISN/RPS class IV-S (a)   -Mild to moderate arteriosclerosis     COMMENT:   There is mild mesangial and endocapillary proliferation, focal leukocytic   infiltrates, and crescentic lesions   involving 14 out of 26 glomeruli, mainly cellular with necrotizing   lesions.  There are minimal chronic   changes.  The activity index of the proliferative component is 9/24 and   the chronicity index  is 1/12.     Scribe Disclosure:   I, Taylor Russ, am serving as a scribe to document services personally performed by Maynor Miranda MD at this visit, based upon the provider's statements to me. All documentation has been reviewed by the aforementioned provider prior to being entered into the official medical record.     Total time spent was 50 minutes, and more than 50% of face to face time was spent in counseling and/or coordination of care regarding principles of multidisciplinary care, medication management, and chronic kidney disease education.  Maynor Miranda MD

## 2019-07-16 ENCOUNTER — OFFICE VISIT (OUTPATIENT)
Dept: FAMILY MEDICINE | Facility: CLINIC | Age: 54
End: 2019-07-16
Payer: COMMERCIAL

## 2019-07-16 ENCOUNTER — ANCILLARY PROCEDURE (OUTPATIENT)
Dept: ULTRASOUND IMAGING | Facility: CLINIC | Age: 54
End: 2019-07-16
Attending: INTERNAL MEDICINE
Payer: COMMERCIAL

## 2019-07-16 VITALS
BODY MASS INDEX: 45.52 KG/M2 | TEMPERATURE: 98.4 F | HEART RATE: 89 BPM | WEIGHT: 282 LBS | OXYGEN SATURATION: 97 % | SYSTOLIC BLOOD PRESSURE: 114 MMHG | RESPIRATION RATE: 18 BRPM | DIASTOLIC BLOOD PRESSURE: 78 MMHG

## 2019-07-16 DIAGNOSIS — L03.115 CELLULITIS OF RIGHT LOWER EXTREMITY: Primary | ICD-10-CM

## 2019-07-16 DIAGNOSIS — Z86.711 HISTORY OF PULMONARY EMBOLISM: ICD-10-CM

## 2019-07-16 DIAGNOSIS — R60.9 ASYMMETRIC EDEMA OF BOTH LOWER EXTREMITIES: ICD-10-CM

## 2019-07-16 DIAGNOSIS — M32.9 SYSTEMIC LUPUS ERYTHEMATOSUS, UNSPECIFIED SLE TYPE, UNSPECIFIED ORGAN INVOLVEMENT STATUS (H): ICD-10-CM

## 2019-07-16 PROCEDURE — 99214 OFFICE O/P EST MOD 30 MIN: CPT | Performed by: INTERNAL MEDICINE

## 2019-07-16 PROCEDURE — 93970 EXTREMITY STUDY: CPT

## 2019-07-16 RX ORDER — CEPHALEXIN 500 MG/1
500 CAPSULE ORAL 4 TIMES DAILY
Qty: 40 CAPSULE | Refills: 0 | Status: SHIPPED | OUTPATIENT
Start: 2019-07-16 | End: 2019-08-07

## 2019-07-16 NOTE — PROGRESS NOTES
SUBJECTIVE:  Taina Singh is an 54 year old female who presents for leg swelling and redness.  Right leg with black and blue saul last night and a shallow cut.  This morning leg was red and puffy and has worsened.  Not have much pain unless pushes on the area.  Has had some swelling in legs for a while.  Has h/o lupus and was admitted to hospital in  and had PE.  Had ultrasound this morning.  Ultrasound of legs was negative for dvt this morning.  No fevers, chills, sweats.  Has a couple scratches on legs but not sure how she got them.  No n/v/d.  Is on some medications that affect her immune system including prednisone 60 mg daily.  Is also on cytoxan monthly.      PMH:   has a past medical history of Bronchiolitis, Diastolic dysfunction (2018), Gluten intolerance, Iron deficiency, Iron deficiency (2018), Lupus (H), MALT lymphoma (H) (age 44), Other forms of systemic lupus erythematosus (H) (2018), Sjogren's syndrome (H), and Vitamin D deficiency.  Patient Active Problem List   Diagnosis     Sicca syndrome (H)     Systemic lupus erythematosus (H)     Sjogren's syndrome (H)     Iron deficiency     MALT lymphoma (H)     Diastolic dysfunction     Bronchiolitis     Hematuria, unspecified type     Other specified type of non-Hodgkin lymphoma, unspecified body region (H)     Other microscopic hematuria     Need for pneumocystis prophylaxis     Lupus nephritis, ISN/RPS class IV (H)     Social History     Socioeconomic History     Marital status:      Spouse name: None     Number of children: None     Years of education: 1     Highest education level: None   Occupational History     Comment: , 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   Social Needs     Financial resource strain: None     Food insecurity:     Worry: None     Inability: None     Transportation needs:     Medical: None     Non-medical: None   Tobacco Use     Smoking status: Never Smoker     Smokeless tobacco: Never Used   Substance and Sexual  Activity     Alcohol use: No     Drug use: No     Sexual activity: None   Lifestyle     Physical activity:     Days per week: None     Minutes per session: None     Stress: None   Relationships     Social connections:     Talks on phone: None     Gets together: None     Attends Lutheran service: None     Active member of club or organization: None     Attends meetings of clubs or organizations: None     Relationship status: None     Intimate partner violence:     Fear of current or ex partner: None     Emotionally abused: None     Physically abused: None     Forced sexual activity: None   Other Topics Concern     Parent/sibling w/ CABG, MI or angioplasty before 65F 55M? 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  Denies exposure to asbestos, silica, hot tubs, feather pillows (used to until age 49), pet birds, mold, does not play brass/wind instruments.      Family History   Problem Relation Age of Onset     Alopecia Brother      Rheumatoid Arthritis Brother      LUNG DISEASE No family hx of        ALLERGIES:  Pentamidine; Penicillins; and Sulfa drugs    Current Outpatient Medications   Medication     albuterol (PROAIR HFA/PROVENTIL HFA/VENTOLIN HFA) 108 (90 Base) MCG/ACT inhaler     atovaquone (MEPRON) 750 MG/5ML suspension     Calcium-Magnesium 300-300 MG TABS     cephALEXin (KEFLEX) 500 MG capsule     hydroxychloroquine (PLAQUENIL) 200 MG tablet     lisinopril (PRINIVIL/ZESTRIL) 10 MG tablet     Multiple Vitamins-Minerals (WOMENS MULTI PO)     Omega-3 Fatty Acids (OMEGA-3 FISH OIL) 500 MG CAPS     pantoprazole (PROTONIX) 20 MG EC tablet     pilocarpine (SALAGEN) 5 MG tablet     predniSONE (DELTASONE) 20 MG tablet     traMADol (ULTRAM) 50 MG tablet     vitamin D3 (CHOLECALCIFEROL) 2000 units (50 mcg) tablet     No current facility-administered medications for this visit.          ROS:  ROS is done and is negative for general/constitutional, eye, ENT, Respiratory, cardiovascular, GI, ,  Skin, musculoskeletal except as noted elsewhere.  All other review of systems negative except as noted elsewhere.      OBJECTIVE:  /78   Pulse 89   Temp 98.4  F (36.9  C) (Oral)   Resp 18   Wt 127.9 kg (282 lb)   SpO2 97%   BMI 45.52 kg/m    GENERAL APPEARANCE: Alert, in no acute distress  EYES: normal  NOSE:normal  OROPHARYNX:normal  NECK:No adenopathy,masses or thyromegaly  RESP: normal and clear to auscultation  CV:regular rate and rhythm and no murmurs, clicks, or gallops  ABDOMEN: Abdomen soft, non-tender. BS normal. No masses, organomegaly  SKIN: no ulcers, lesions or rash except as leg as noted below  MUSCULOSKELETAL: bilateral distal legs with mild edema.  Right calf with moderate edema with moderate erythema over area approx [id number] area over medial calf which is mildly tender and has mildly increased warmth to touch.  No drainage.  No fluctuance.  There is a shallow 2cm horizontal superficial scratch at lower edge of the erythematous area.      RESULTS  Bilateral ultrasound of LEs done this morning was negative for DVT.  No results found for this or any previous visit (from the past 48 hour(s)).    ASSESSMENT/PLAN:    ASSESSMENT / PLAN:  (L03.115) Cellulitis of right lower extremity  (primary encounter diagnosis)  Comment: will start her on keflex as no fluctuance or drainage.  Her h/o lupus and the medications she is on for that increase her risk of infection  Plan: cephALEXin (KEFLEX) 500 MG capsule        Reviewed medication instructions and side effects. Follow up if experiences side effects.. I reviewed supportive care, otc meds to use if needed, expected course, and signs of concern.  Follow up as needed or if she does not improve within 5 day(s) or if worsens in any way.  Reviewed red flag symptoms and is to go to the ER if experiences any of these.    (M32.9) Systemic lupus erythematosus, unspecified SLE type, unspecified organ involvement status (H)  Comment: has h/o.  Her medications  for lupus increase her risk of infection, and will tx cellulitis as above  Plan: continue her routine medications and routine f/u with rheum and nephrology.        See [facility] for orders, medications, letters, patient instructions    Olga Del Rosario M.D.

## 2019-07-18 DIAGNOSIS — M32.14 LUPUS NEPHRITIS, ISN/RPS CLASS IV (H): ICD-10-CM

## 2019-07-18 DIAGNOSIS — M32.9 SYSTEMIC LUPUS ERYTHEMATOSUS, UNSPECIFIED SLE TYPE, UNSPECIFIED ORGAN INVOLVEMENT STATUS (H): ICD-10-CM

## 2019-07-18 DIAGNOSIS — N06.4 ISOLATED PROTEINURIA WITH DIFFUSE ENDOCAPILLARY PROLIFERATIVE GLOMERULONEPHRITIS: ICD-10-CM

## 2019-07-18 DIAGNOSIS — R82.90 NONSPECIFIC FINDING ON EXAMINATION OF URINE: Primary | ICD-10-CM

## 2019-07-18 LAB
ALBUMIN SERPL-MCNC: 3 G/DL (ref 3.4–5)
ALBUMIN UR-MCNC: >=300 MG/DL
ALT SERPL W P-5'-P-CCNC: 36 U/L (ref 0–50)
ANION GAP SERPL CALCULATED.3IONS-SCNC: 5 MMOL/L (ref 3–14)
ANISOCYTOSIS BLD QL SMEAR: SLIGHT
APPEARANCE UR: CLEAR
AST SERPL W P-5'-P-CCNC: 21 U/L (ref 0–45)
BACTERIA #/AREA URNS HPF: ABNORMAL /HPF
BASOPHILS # BLD AUTO: 0 10E9/L (ref 0–0.2)
BASOPHILS NFR BLD AUTO: 0.3 %
BILIRUB UR QL STRIP: NEGATIVE
BUN SERPL-MCNC: 23 MG/DL (ref 7–30)
CALCIUM SERPL-MCNC: 8.8 MG/DL (ref 8.5–10.1)
CHLORIDE SERPL-SCNC: 107 MMOL/L (ref 94–109)
CO2 SERPL-SCNC: 28 MMOL/L (ref 20–32)
COLOR UR AUTO: YELLOW
CREAT SERPL-MCNC: 0.6 MG/DL (ref 0.52–1.04)
CREAT UR-MCNC: 48 MG/DL
CRP SERPL-MCNC: 45 MG/L (ref 0–8)
DACRYOCYTES BLD QL SMEAR: SLIGHT
DIFFERENTIAL METHOD BLD: ABNORMAL
ELLIPTOCYTES BLD QL SMEAR: ABNORMAL
EOSINOPHIL # BLD AUTO: 0 10E9/L (ref 0–0.7)
EOSINOPHIL NFR BLD AUTO: 0.4 %
ERYTHROCYTE [DISTWIDTH] IN BLOOD BY AUTOMATED COUNT: 18.4 % (ref 10–15)
ERYTHROCYTE [SEDIMENTATION RATE] IN BLOOD BY WESTERGREN METHOD: 32 MM/H (ref 0–30)
GFR SERPL CREATININE-BSD FRML MDRD: >90 ML/MIN/{1.73_M2}
GLUCOSE SERPL-MCNC: 98 MG/DL (ref 70–99)
GLUCOSE UR STRIP-MCNC: NEGATIVE MG/DL
HCT VFR BLD AUTO: 36.1 % (ref 35–47)
HGB BLD-MCNC: 11.2 G/DL (ref 11.7–15.7)
HGB UR QL STRIP: ABNORMAL
KETONES UR STRIP-MCNC: NEGATIVE MG/DL
LEUKOCYTE ESTERASE UR QL STRIP: ABNORMAL
LYMPHOCYTES # BLD AUTO: 0.3 10E9/L (ref 0.8–5.3)
LYMPHOCYTES NFR BLD AUTO: 3.2 %
MCH RBC QN AUTO: 27.5 PG (ref 26.5–33)
MCHC RBC AUTO-ENTMCNC: 31 G/DL (ref 31.5–36.5)
MCV RBC AUTO: 89 FL (ref 78–100)
MONOCYTES # BLD AUTO: 0.5 10E9/L (ref 0–1.3)
MONOCYTES NFR BLD AUTO: 5.3 %
NEUTROPHILS # BLD AUTO: 8.6 10E9/L (ref 1.6–8.3)
NEUTROPHILS NFR BLD AUTO: 90.8 %
NITRATE UR QL: NEGATIVE
NON-SQ EPI CELLS #/AREA URNS LPF: ABNORMAL /LPF
PH UR STRIP: 7 PH (ref 5–7)
PHOSPHATE SERPL-MCNC: 3.9 MG/DL (ref 2.5–4.5)
PLATELET # BLD AUTO: 198 10E9/L (ref 150–450)
PLATELET # BLD EST: ABNORMAL 10*3/UL
POTASSIUM SERPL-SCNC: 4.1 MMOL/L (ref 3.4–5.3)
PROT UR-MCNC: 1.4 G/L
PROT/CREAT 24H UR: 2.92 G/G CR (ref 0–0.2)
RBC # BLD AUTO: 4.07 10E12/L (ref 3.8–5.2)
RBC #/AREA URNS AUTO: ABNORMAL /HPF
SODIUM SERPL-SCNC: 140 MMOL/L (ref 133–144)
SOURCE: ABNORMAL
SP GR UR STRIP: 1.01 (ref 1–1.03)
UROBILINOGEN UR STRIP-ACNC: 1 EU/DL (ref 0.2–1)
WBC # BLD AUTO: 9.5 10E9/L (ref 4–11)
WBC #/AREA URNS AUTO: ABNORMAL /HPF

## 2019-07-18 PROCEDURE — 84460 ALANINE AMINO (ALT) (SGPT): CPT | Performed by: INTERNAL MEDICINE

## 2019-07-18 PROCEDURE — 81001 URINALYSIS AUTO W/SCOPE: CPT | Performed by: INTERNAL MEDICINE

## 2019-07-18 PROCEDURE — 86160 COMPLEMENT ANTIGEN: CPT | Mod: 59 | Performed by: INTERNAL MEDICINE

## 2019-07-18 PROCEDURE — 85025 COMPLETE CBC W/AUTO DIFF WBC: CPT | Performed by: INTERNAL MEDICINE

## 2019-07-18 PROCEDURE — 86140 C-REACTIVE PROTEIN: CPT | Performed by: INTERNAL MEDICINE

## 2019-07-18 PROCEDURE — 87086 URINE CULTURE/COLONY COUNT: CPT | Performed by: INTERNAL MEDICINE

## 2019-07-18 PROCEDURE — 86160 COMPLEMENT ANTIGEN: CPT | Performed by: INTERNAL MEDICINE

## 2019-07-18 PROCEDURE — 84156 ASSAY OF PROTEIN URINE: CPT | Performed by: INTERNAL MEDICINE

## 2019-07-18 PROCEDURE — 86225 DNA ANTIBODY NATIVE: CPT | Performed by: INTERNAL MEDICINE

## 2019-07-18 PROCEDURE — 36415 COLL VENOUS BLD VENIPUNCTURE: CPT | Performed by: INTERNAL MEDICINE

## 2019-07-18 PROCEDURE — 85652 RBC SED RATE AUTOMATED: CPT | Performed by: INTERNAL MEDICINE

## 2019-07-18 PROCEDURE — 80069 RENAL FUNCTION PANEL: CPT | Performed by: INTERNAL MEDICINE

## 2019-07-18 PROCEDURE — 84450 TRANSFERASE (AST) (SGOT): CPT | Performed by: INTERNAL MEDICINE

## 2019-07-19 LAB
BACTERIA SPEC CULT: NO GROWTH
C3 SERPL-MCNC: 53 MG/DL (ref 76–169)
C4 SERPL-MCNC: 5 MG/DL (ref 15–50)
DSDNA AB SER-ACNC: >379 IU/ML
SPECIMEN SOURCE: NORMAL

## 2019-07-23 DIAGNOSIS — J90 PLEURAL EFFUSION: Primary | ICD-10-CM

## 2019-07-24 DIAGNOSIS — J84.9 ILD (INTERSTITIAL LUNG DISEASE) (H): Primary | ICD-10-CM

## 2019-07-24 DIAGNOSIS — R60.0 EDEMA LEG: ICD-10-CM

## 2019-07-24 DIAGNOSIS — R60.0 EDEMA LEG: Primary | ICD-10-CM

## 2019-07-24 RX ORDER — CHLORTHALIDONE 25 MG/1
25 TABLET ORAL DAILY
Qty: 30 TABLET | Refills: 1 | Status: SHIPPED | OUTPATIENT
Start: 2019-07-24 | End: 2019-12-26

## 2019-07-24 RX ORDER — CHLORTHALIDONE 25 MG/1
25 TABLET ORAL DAILY
Qty: 30 TABLET | Refills: 1 | Status: SHIPPED | OUTPATIENT
Start: 2019-07-24 | End: 2019-07-24

## 2019-07-26 LAB — COPATH REPORT: NORMAL

## 2019-07-30 ENCOUNTER — TELEPHONE (OUTPATIENT)
Dept: NEPHROLOGY | Facility: CLINIC | Age: 54
End: 2019-07-30

## 2019-07-30 NOTE — TELEPHONE ENCOUNTER
Call to patient who reports that she picked up chlorthalidone about 4 days ago, is planning on getting renal panel drawn this Thursday or Friday. She reports that she lost 10 lbs since last week, saw her PCP from [facility], who gave the go ahead to proceed with cytoxan, scheduled for today as he cleared her cellulitis. Patient reports to be feeling tired, no fever, no new rash /68 and states that the redness and inflammation has gone down in right leg, but is still swollen with indentation.      Update sent to Dr. Miranda.  Jonna Harry, LOKESH  Nephrology  [phone]

## 2019-08-02 ENCOUNTER — TELEPHONE (OUTPATIENT)
Dept: NEPHROLOGY | Facility: CLINIC | Age: 54
End: 2019-08-02

## 2019-08-03 DIAGNOSIS — R60.0 EDEMA LEG: ICD-10-CM

## 2019-08-03 DIAGNOSIS — M32.14 LUPUS NEPHRITIS (H): ICD-10-CM

## 2019-08-03 PROCEDURE — 86146 BETA-2 GLYCOPROTEIN ANTIBODY: CPT | Mod: 59 | Performed by: INTERNAL MEDICINE

## 2019-08-03 PROCEDURE — 36415 COLL VENOUS BLD VENIPUNCTURE: CPT | Performed by: INTERNAL MEDICINE

## 2019-08-03 PROCEDURE — 86147 CARDIOLIPIN ANTIBODY EA IG: CPT | Mod: 59 | Performed by: INTERNAL MEDICINE

## 2019-08-03 PROCEDURE — 80069 RENAL FUNCTION PANEL: CPT | Performed by: INTERNAL MEDICINE

## 2019-08-03 PROCEDURE — 86146 BETA-2 GLYCOPROTEIN ANTIBODY: CPT | Performed by: INTERNAL MEDICINE

## 2019-08-03 PROCEDURE — 86147 CARDIOLIPIN ANTIBODY EA IG: CPT | Performed by: INTERNAL MEDICINE

## 2019-08-05 ENCOUNTER — MYC MEDICAL ADVICE (OUTPATIENT)
Dept: RHEUMATOLOGY | Facility: CLINIC | Age: 54
End: 2019-08-05

## 2019-08-05 DIAGNOSIS — M32.14 LUPUS NEPHRITIS (H): Primary | ICD-10-CM

## 2019-08-05 LAB
ALBUMIN SERPL-MCNC: 3.3 G/DL (ref 3.4–5)
ANION GAP SERPL CALCULATED.3IONS-SCNC: 6 MMOL/L (ref 3–14)
BUN SERPL-MCNC: 25 MG/DL (ref 7–30)
CALCIUM SERPL-MCNC: 9.4 MG/DL (ref 8.5–10.1)
CHLORIDE SERPL-SCNC: 100 MMOL/L (ref 94–109)
CO2 SERPL-SCNC: 29 MMOL/L (ref 20–32)
CREAT SERPL-MCNC: 0.64 MG/DL (ref 0.52–1.04)
GFR SERPL CREATININE-BSD FRML MDRD: >90 ML/MIN/{1.73_M2}
GLUCOSE SERPL-MCNC: 92 MG/DL (ref 70–99)
PHOSPHATE SERPL-MCNC: 3.3 MG/DL (ref 2.5–4.5)
POTASSIUM SERPL-SCNC: 4 MMOL/L (ref 3.4–5.3)
SODIUM SERPL-SCNC: 135 MMOL/L (ref 133–144)

## 2019-08-06 ENCOUNTER — ANCILLARY PROCEDURE (OUTPATIENT)
Dept: CT IMAGING | Facility: CLINIC | Age: 54
End: 2019-08-06
Attending: INTERNAL MEDICINE
Payer: COMMERCIAL

## 2019-08-06 DIAGNOSIS — J84.9 ILD (INTERSTITIAL LUNG DISEASE) (H): ICD-10-CM

## 2019-08-06 DIAGNOSIS — M32.9 SYSTEMIC LUPUS ERYTHEMATOSUS, UNSPECIFIED SLE TYPE, UNSPECIFIED ORGAN INVOLVEMENT STATUS (H): ICD-10-CM

## 2019-08-06 DIAGNOSIS — J90 PLEURAL EFFUSION: ICD-10-CM

## 2019-08-06 DIAGNOSIS — J21.9 BRONCHIOLITIS: ICD-10-CM

## 2019-08-06 LAB
B2 GLYCOPROT1 IGG SERPL IA-ACNC: <0.6 U/ML
B2 GLYCOPROT1 IGM SERPL IA-ACNC: <0.9 U/ML
CARDIOLIPIN ANTIBODY IGG: <1.6 GPL-U/ML (ref 0–19.9)
CARDIOLIPIN ANTIBODY IGM: <0.2 MPL-U/ML (ref 0–19.9)
DLCOUNC-%PRED-PRE: 69 %
DLCOUNC-PRE: 15.4 ML/MIN/MMHG
DLCOUNC-PRED: 22.25 ML/MIN/MMHG
ERV-%PRED-PRE: 40 %
ERV-PRE: 0.12 L
ERV-PRED: 0.31 L
EXPTIME-PRE: 6.48 SEC
FEF2575-%PRED-PRE: 53 %
FEF2575-PRE: 1.47 L/SEC
FEF2575-PRED: 2.75 L/SEC
FEFMAX-%PRED-PRE: 74 %
FEFMAX-PRE: 5.16 L/SEC
FEFMAX-PRED: 6.96 L/SEC
FEV1-%PRED-PRE: 55 %
FEV1-PRE: 1.62 L
FEV1FEV6-PRE: 78 %
FEV1FEV6-PRED: 82 %
FEV1FVC-PRE: 79 %
FEV1FVC-PRED: 80 %
FEV1SVC-PRE: 73 %
FEV1SVC-PRED: 78 %
FIFMAX-PRE: 3.24 L/SEC
FRCPLETH-%PRED-PRE: 59 %
FRCPLETH-PRE: 1.67 L
FRCPLETH-PRED: 2.8 L
FVC-%PRED-PRE: 56 %
FVC-PRE: 2.05 L
FVC-PRED: 3.63 L
IC-%PRED-PRE: 62 %
IC-PRE: 2.1 L
IC-PRED: 3.37 L
RVPLETH-%PRED-PRE: 83 %
RVPLETH-PRE: 1.55 L
RVPLETH-PRED: 1.86 L
TLCPLETH-%PRED-PRE: 71 %
TLCPLETH-PRE: 3.77 L
TLCPLETH-PRED: 5.26 L
VA-%PRED-PRE: 64 %
VA-PRE: 3.38 L
VC-%PRED-PRE: 60 %
VC-PRE: 2.22 L
VC-PRED: 3.68 L

## 2019-08-06 RX ORDER — IOPAMIDOL 755 MG/ML
77 INJECTION, SOLUTION INTRAVASCULAR ONCE
Status: COMPLETED | OUTPATIENT
Start: 2019-08-06 | End: 2019-08-06

## 2019-08-06 RX ADMIN — IOPAMIDOL 77 ML: 755 INJECTION, SOLUTION INTRAVASCULAR at 10:09

## 2019-08-06 NOTE — TELEPHONE ENCOUNTER
Called and spoke with pt.  She feels like she may be having some type of a minor allergic reaction.  Face gets puffy, not the moon face, as she feels that is going away.  She is feeling like the puffiness is in the parotid gland, she can feel them when they swell.  Apples of cheeks get hard and then it puffs up into her eyes. Doesn't last all day, starts in the morning after taking am pills, lasts until 5-6 in the evening.  No airway or  Tongue swelling, or SOB.  She says she was told her PFTs improved well.      Taking in the AM:  HCQ  Lisinopril  Multi pro vitamin  Omega 3  Pantoprozole  Salagen  Prednisone-40 mg  Vitamin D3    She noticed that swelling started she thinks after starting Lisinopril, but because of having the moon face, it was hard to tell.  She is seeing PCP tomorrow.     Will send to Dr. Marroquin and nephrology team for review.    Sophie Alvarado RN  Rheumatology Clinic

## 2019-08-06 NOTE — DISCHARGE INSTRUCTIONS

## 2019-08-07 ENCOUNTER — OFFICE VISIT (OUTPATIENT)
Dept: INTERNAL MEDICINE | Facility: CLINIC | Age: 54
End: 2019-08-07
Payer: COMMERCIAL

## 2019-08-07 VITALS
TEMPERATURE: 97.6 F | DIASTOLIC BLOOD PRESSURE: 65 MMHG | WEIGHT: 286 LBS | SYSTOLIC BLOOD PRESSURE: 109 MMHG | BODY MASS INDEX: 45.96 KG/M2 | HEIGHT: 66 IN | OXYGEN SATURATION: 96 % | RESPIRATION RATE: 16 BRPM | HEART RATE: 97 BPM

## 2019-08-07 DIAGNOSIS — M35.00 SJOGREN'S SYNDROME, WITH UNSPECIFIED ORGAN INVOLVEMENT (H): ICD-10-CM

## 2019-08-07 DIAGNOSIS — E66.01 MORBID OBESITY (H): ICD-10-CM

## 2019-08-07 DIAGNOSIS — J34.89 SINUS PRESSURE: ICD-10-CM

## 2019-08-07 DIAGNOSIS — M32.9 SYSTEMIC LUPUS ERYTHEMATOSUS, UNSPECIFIED SLE TYPE, UNSPECIFIED ORGAN INVOLVEMENT STATUS (H): Primary | ICD-10-CM

## 2019-08-07 DIAGNOSIS — M32.14 LUPUS NEPHRITIS, ISN/RPS CLASS IV (H): ICD-10-CM

## 2019-08-07 DIAGNOSIS — I26.99 ACUTE PULMONARY EMBOLISM WITHOUT ACUTE COR PULMONALE, UNSPECIFIED PULMONARY EMBOLISM TYPE (H): ICD-10-CM

## 2019-08-07 DIAGNOSIS — T38.0X5D ADVERSE EFFECT OF CORTICOSTEROIDS, SUBSEQUENT ENCOUNTER: ICD-10-CM

## 2019-08-07 PROBLEM — D50.9 IRON DEFICIENCY ANEMIA: Status: ACTIVE | Noted: 2019-08-07

## 2019-08-07 PROBLEM — D64.9 ANEMIA: Status: ACTIVE | Noted: 2019-08-07

## 2019-08-07 PROBLEM — D73.5 SPLENIC INFARCTION: Status: ACTIVE | Noted: 2019-08-07

## 2019-08-07 PROCEDURE — 99215 OFFICE O/P EST HI 40 MIN: CPT | Performed by: INTERNAL MEDICINE

## 2019-08-07 RX ORDER — LOSARTAN POTASSIUM 25 MG/1
25 TABLET ORAL DAILY
Qty: 90 TABLET | Refills: 3 | Status: SHIPPED | OUTPATIENT
Start: 2019-08-07 | End: 2019-12-26

## 2019-08-07 RX ORDER — FLUTICASONE PROPIONATE 50 MCG
1-2 SPRAY, SUSPENSION (ML) NASAL DAILY
Qty: 16 G | Refills: 0 | Status: SHIPPED | OUTPATIENT
Start: 2019-08-07 | End: 2020-10-29

## 2019-08-07 ASSESSMENT — MIFFLIN-ST. JEOR: SCORE: 1924.04

## 2019-08-07 NOTE — TELEPHONE ENCOUNTER
Killian Marroquin MD Beard, Madeline, RN   Caller: Unspecified (Yesterday,  3:57 PM)             She needs to see her PCP to see if she needs CT of her head and neck, also to make sure it is not infection or nothing else is going on.      Pt is going to see her PCP today, she will ask him about ordering CT and will discuss dizziness.  She is having dizziness when she goes from sitting or laying down to standing.      Sophie Alvarado RN  Rheumatology Clinic

## 2019-08-07 NOTE — PROGRESS NOTES
Subjective     Taina Singh is a 54 year old female who presents to clinic today for the following health issues:    HPI   New Patient/Transfer of Care  Acute Illness   Acute illness concerns: facial pressure  Onset: acute on chronic over last 2 weeks    Fever: no    Chills/Sweats: no    Headache (location?): yes    Sinus Pressure:YES    Conjunctivitis:  Eyes feel puffy    Ear Pain: pressure bilateral -- no pain but they do not feel normal    Rhinorrhea: no    Congestion: no    Sore Throat: no     Cough: no    Wheeze: no    Decreased Appetite: no    Nausea: no    Vomiting: no    Diarrhea:  no    Dysuria/Freq.: no    Fatigue/Achiness: YES    Sick/Strep Exposure: no     Therapies Tried and outcome: none     Notes symptoms have really been going on since ACE-inhibitor started late June or perhaps longer.  Denies post-nasal drip, history allergies, itchy/watery eyes, wheezing, shortness of breath.  No intranasal corticosteroid use.  Did have cephalexin course without improvement.      Blood clot      Duration: june    Description (location/character/radiation): went to [facility] due to abdominal pain        History (similar episodes/previous evaluation): lupus     Precipitating or alleviating factors: None    Therapies tried and outcome: xarelto     Tolerating NOAC/DOAC well.  Follow-up CT chest yesterday showed no evidence of pulmonary embolism remaining.    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        1. Systemic lupus erythematosus, unspecified SLE type, unspecified organ involvement status (H)    2. Morbid obesity (H)    3. Sjogren's syndrome, with unspecified organ involvement (H)    4. Sinus pressure    5. Adverse  "effect of corticosteroids, subsequent encounter    6. Lupus nephritis, ISN/RPS class IV (H)        PMHx: Updated and/or reviewed in chart.    PSHx: Updated and/or reviewed in chart.    FMHx: Updated and/or reviewed in chart.    ROS:  Constitutional, neuro, EMT, endocrine, pulmonary, cardiac, gastrointestinal, genitourinary, musculoskeletal, integument and psychiatric systems are otherwise negative.    Objective   OBJECTIVE:                                                    /65   Pulse 97   Temp 97.6  F (36.4  C) (Tympanic)   Resp 16   Ht 1.676 m (5' 6\")   Wt 129.7 kg (286 lb)   SpO2 96%   BMI 46.16 kg/m      GEN: No acute distress, moon facies, prominent posterior cervical fat pad  EYES: No conjunctival injection or icterus, EOMI grossly intact  EARS: TMs, canals normal  RESP: Unlabored, regular, clear to auscultation bilaterally  COR: regular rate and rhythm no murmurs, rubs, or gallops appreciated  ABDO: obese, soft  BACK: no deformity or CVA tenderness  NEURO: Normal gait, MAEx4, light touch sensation grossly intact  PSYCH: Normal mood and affect    Results for orders placed or performed in visit on 08/06/19   CT Chest Pulmonary Embolism w Contrast    Narrative    EXAMINATION: CTA pulmonary angiogram, 8/6/2019 10:17 AM     COMPARISON: CT chest 6/12/2018    HISTORY: PE suspected, low pretest prob; Pleural effusion    TECHNIQUE: Volumetric helical acquisition of CT images of the chest  from the lung apices to the kidneys were acquired after the  administration of 80 mL of Isovue-370 IV contrast.  Three-dimensional  (3D) post-processed angiographic images were reconstructed, archived  to PACS and used in interpretation of this study.     FINDINGS: The contrast bolus is adequate. No central filling defects  are identified within the pulmonary arteries. There is no evidence of  right heart strain. The heart is normal in size. No pericardial  effusion. Ascending aorta and main pulmonary artery normal in " caliber.  Mildly prominent subcentimeter prevascular lymph nodes. Normal caliber  and configuration of the great thoracic vessels. No enlarged axillary  lymph nodes. The visualized thyroid gland is unremarkable.    The central tracheobronchial tree is patent. No pleural effusion or  pneumothorax. No masses or consolidations.     Limited evaluation of the upper abdomen is unremarkable.    Bones and soft tissues: Mild degenerative changes of the spine. No  acute osseous abnormalities or suspicious osseous lesions.      Impression    IMPRESSION:   No pulmonary embolism.     In the event of a positive result for acute pulmonary embolism  resulting in right heart strain, please activate the PERT  Multidisciplinary group for consultation by paging [phone]  ([phone]).     PERT -- Pulmonary Embolism Response Team (Multidisciplinary team  including cardiology, interventional radiology, critical care,  hematology)    I have personally reviewed the examination and initial interpretation  and I agree with the findings.    SHAKILA VÁSQUEZ MD      Assessment & Plan   ASSESSMENT/PLAN:                                                    1. Systemic lupus erythematosus, unspecified SLE type, unspecified organ involvement status (H)  3. Sjogren's syndrome, with unspecified organ involvement (H)  2. Morbid obesity (H)  5. Adverse effect of corticosteroids, subsequent encounter  Tapering down prednisone but remains on 40 mg with cushingoid features.  Follows with [facility].  On Cytoxan, history Benlista (sp?) infusions, also on Plaquenil, atovaquone.    4. Sinus pressure & chronic sinusitis  Intranasal corticosteroid trial and CT neck without contrast given lupus nephritis history -- very unlikely atypical angioedema related to ACE-inhibitor but will switch lisinopril to losartan and ask her to monitor blood pressures for orthostatic hypotension symptoms -- may need to decrease anti-hypertensive.  Perhaps to ENT if worsening  with trial intranasal corticosteroid to rule-out invasive fungal or other sinusitis.  Higher risk in setting of immunosuppression for SLE.    History pulmonary embolism -- now resolved per imaging.  Continue NOAC/DOAC another 6 months before assessing for discontinuation if tolerating well.    Orders Placed This Encounter     rivaroxaban ANTICOAGULANT (XARELTO) 15 MG TABS tablet        Return in about 2 weeks (around 8/21/2019), or if symptoms worsen or fail to improve in 2 weeks, for physical 3-6 months.    Ramy Langston MD

## 2019-08-08 ENCOUNTER — ANCILLARY PROCEDURE (OUTPATIENT)
Dept: CT IMAGING | Facility: CLINIC | Age: 54
End: 2019-08-08
Attending: INTERNAL MEDICINE
Payer: COMMERCIAL

## 2019-08-08 DIAGNOSIS — J34.89 SINUS PRESSURE: ICD-10-CM

## 2019-08-08 PROCEDURE — 70490 CT SOFT TISSUE NECK W/O DYE: CPT | Mod: TC

## 2019-08-08 PROCEDURE — 70486 CT MAXILLOFACIAL W/O DYE: CPT | Mod: TC

## 2019-08-08 NOTE — TELEPHONE ENCOUNTER
Pt called into clinic today, she had Head and neck CT.   PCP did change her from Lisinopril to Losartan.  He is also concerned about her weight gain, and he thinks it could be her BP may be a little low for her which could be causing her dizziness, so he is going to monitor that.    She may want to extend her leave, she feels she is better than 2 months ago, but not as good as she would like to be.    Sophie Alvarado RN  Rheumatology Clinic

## 2019-08-09 ENCOUNTER — OFFICE VISIT (OUTPATIENT)
Dept: PULMONOLOGY | Facility: CLINIC | Age: 54
End: 2019-08-09
Attending: INTERNAL MEDICINE
Payer: COMMERCIAL

## 2019-08-09 VITALS
OXYGEN SATURATION: 97 % | WEIGHT: 286 LBS | SYSTOLIC BLOOD PRESSURE: 140 MMHG | HEIGHT: 66 IN | BODY MASS INDEX: 45.96 KG/M2 | HEART RATE: 95 BPM | DIASTOLIC BLOOD PRESSURE: 79 MMHG | RESPIRATION RATE: 18 BRPM

## 2019-08-09 DIAGNOSIS — J21.9 BRONCHIOLITIS: ICD-10-CM

## 2019-08-09 DIAGNOSIS — M32.14 SYSTEMIC LUPUS ERYTHEMATOSUS WITH GLOMERULAR DISEASE, UNSPECIFIED SLE TYPE (H): Primary | ICD-10-CM

## 2019-08-09 PROCEDURE — G0463 HOSPITAL OUTPT CLINIC VISIT: HCPCS | Mod: ZF

## 2019-08-09 ASSESSMENT — MIFFLIN-ST. JEOR: SCORE: 1923.79

## 2019-08-09 ASSESSMENT — PAIN SCALES - GENERAL: PAINLEVEL: NO PAIN (0)

## 2019-08-09 NOTE — NURSING NOTE
Chief Complaint   Patient presents with     Interstitial Lung Disease (ILD)     Bronchiolotis Lupus      Floridalma Tijerina CMA

## 2019-08-09 NOTE — LETTER
8/9/2019       RE: Taina Singh  [address] [place] [address]     Dear Colleague,    Thank you for referring your patient, Taina Singh, to the [facility] at [facility]. Please see a copy of my visit note below.    [facility]    Reason for Visit  Taina Singh is a 54 year old year old female who is being seen for Interstitial Lung Disease (ILD) (Bronchiolotis Lupus )    HPI     Ms. Singh is a 54-year-old with lupus and history of air trapping on CT scan who is here for follow-up.  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  She was started on higher dose prednisone and IV cyclophosphamide that started in June.  The plan is for monthly IV cyclophosphamide infusions.  Dr. Shannon is tapering her prednisone, and she currently is taking 40 mg daily.  She   takes atovaquone for pneumocystis prophylaxis.    She had an abdomen CT on June 11 at an outside hospital that showed an incidental right lower lobe PE.  She was started on Xarelto.  We got a CT angiogram and a high-resolution chest CT here on August 6, and there were no PEs.  When I saw her last year, she was having some dyspnea on exertion that improved with albuterol in the PFT lab.  I recommended starting ICS/LABA combination, but all the inhalers were too expensive.  Today, she reports that her breathing is better.  She has not needed to use albuterol in a long time.  She denies cough, fevers, chest pain, or wheezes.  She denies paroxysmal nocturnal dyspnea, new skin rashes, or joint pains.  The main limitation to her activity level is fatigue.  She is able to walk up a flight of stairs and does feel a little short of breath when she does so.  She tries to walk around the store when she is shopping so she gets more activity in during the day.  She does not get the flu vaccine because it  triggered a lupus flare several years ago.  She tried a pentamadine nebulizer in the hospital this summer, but she developed shakiness and increased shortness of breath, and it was stopped.  She is currently on medical leave.        Current Outpatient Medications   Medication     albuterol (PROAIR HFA/PROVENTIL HFA/VENTOLIN HFA) 108 (90 Base) MCG/ACT inhaler     atovaquone (MEPRON) 750 MG/5ML suspension     Calcium-Magnesium 300-300 MG TABS     chlorthalidone (HYGROTON) 25 MG tablet     fluticasone (FLONASE) 50 MCG/ACT nasal spray     hydroxychloroquine (PLAQUENIL) 200 MG tablet     losartan (COZAAR) 25 MG tablet     Multiple Vitamins-Minerals (WOMENS MULTI PO)     Omega-3 Fatty Acids (OMEGA-3 FISH OIL) 500 MG CAPS     pantoprazole (PROTONIX) 20 MG EC tablet     pilocarpine (SALAGEN) 5 MG tablet     predniSONE (DELTASONE) 20 MG tablet     rivaroxaban ANTICOAGULANT (XARELTO) 15 MG TABS tablet     traMADol (ULTRAM) 50 MG tablet     vitamin D3 (CHOLECALCIFEROL) 2000 units (50 mcg) tablet     zolpidem (AMBIEN) 5 MG tablet     No current facility-administered medications for this visit.      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44    No chemo or radiation     Other forms of systemic lupus erythematosus (H) 2/13/2018     Pulmonary embolism (H)     6/11/2019 seen on abd [facility] at [facility]     Sjogren's syndrome (H)     secondary Sjogrens, secondary to lupus      Vitamin D deficiency        Past Surgical History:   Procedure Laterality Date     BIOPSY/REMOVAL, LYMPH NODE(S)        SECTION  age 32     HC HYSTEROS W PERMANENT FALLOPAIN IMPLANT      Essure b/l     PAROTIDECTOMY Right 2006     TONSILLECTOMY         Social History     Socioeconomic History     Marital status:      Spouse name: Not on file     Number of children: Not on file     Years of education: 1     Highest education level: Not on file   Occupational History     Comment: , 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   Social Needs     Financial resource strain: Not on file     Food insecurity:     Worry: Not on file     Inability: Not on file     Transportation needs:     Medical: Not on file     Non-medical: Not on file   Tobacco Use     Smoking status: Never Smoker     Smokeless tobacco: Never Used   Substance and Sexual Activity     Alcohol use: No     Drug use: No     Sexual activity: Not on file   Lifestyle     Physical activity:     Days per week: Not on file     Minutes per session: Not on file     Stress: Not on file   Relationships     Social connections:     Talks on phone: Not on file     Gets together: Not on file     Attends Yazidi service: Not on file     Active member of club or organization: Not on file     Attends meetings of clubs or organizations: Not on file     Relationship status: Not on file     Intimate partner violence:     Fear of current or ex partner: Not on file     Emotionally abused: Not on file     Physically abused: Not on file     Forced sexual activity: Not on file   Other Topics Concern     Parent/sibling w/ CABG, MI or angioplasty before 65F 55M? 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  "       Family History   Problem Relation Age of Onset     Alopecia Brother      Rheumatoid Arthritis Brother      LUNG DISEASE No family hx of              ROS Pulmonary  A complete ROS was otherwise negative except as noted in the HPI.    Vitals: BP (!) 140/79 (BP Location: Right arm, Patient Position: Chair, Cuff Size: Adult Large)   Pulse 95   Resp 18   Ht 1.676 m (5' 5.98\")   Wt 129.7 kg (286 lb)   SpO2 97%   BMI 46.18 kg/m       Exam:   GENERAL APPEARANCE: Well developed, well nourished, alert, and in no apparent distress.  Cushingoid.  RESP: good air flow throughout.  No crackles. No rhonchi. No wheezes. No squeaks.  CV: Normal S1, S2, regular rhythm, normal rate. No murmur.  No LE edema.   MS: extremities normal. No clubbing. No cyanosis.  SKIN: no rash on limited exam.  NEURO: Mentation intact, speech normal, normal gait and stance.  PSYCH: mentation appears normal. and affect normal/bright.    Results:  Recent Results (from the past 168 hour(s))   Renal panel    Collection Time: 08/03/19 10:31 AM   Result Value Ref Range    Sodium 135 133 - 144 mmol/L    Potassium 4.0 3.4 - 5.3 mmol/L    Chloride 100 94 - 109 mmol/L    Carbon Dioxide 29 20 - 32 mmol/L    Anion Gap 6 3 - 14 mmol/L    Glucose 92 70 - 99 mg/dL    Urea Nitrogen 25 7 - 30 mg/dL    Creatinine 0.64 0.52 - 1.04 mg/dL    GFR Estimate >90 >60 mL/min/[1.73_m2]    GFR Estimate If Black >90 >60 mL/min/[1.73_m2]    Calcium 9.4 8.5 - 10.1 mg/dL    Phosphorus 3.3 2.5 - 4.5 mg/dL    Albumin 3.3 (L) 3.4 - 5.0 g/dL   Beta 2 Glycoprotein Antibodies IGG IGM    Collection Time: 08/03/19 10:31 AM   Result Value Ref Range    Beta 2 Glycoprotein 1 Antibody IgG <0.6 <7 U/mL    Beta 2 Glycoprotein 1 Antibody IgM <0.9 <7 U/mL   Cardiolipin Cari IgG and IgM    Collection Time: 08/03/19 10:31 AM   Result Value Ref Range    Cardiolipin Antibody IgG <1.6 0.0 - 19.9 GPL-U/mL    Cardiolipin Antibody IgM <0.2 0.0 - 19.9 MPL-U/mL   General PFT Lab (Please always keep " checked)    Collection Time: 08/06/19  9:01 AM   Result Value Ref Range    FVC-Pred 3.63 L    FVC-Pre 2.05 L    FVC-%Pred-Pre 56 %    FEV1-Pre 1.62 L    FEV1-%Pred-Pre 55 %    FEV1FVC-Pred 80 %    FEV1FVC-Pre 79 %    FEFMax-Pred 6.96 L/sec    FEFMax-Pre 5.16 L/sec    FEFMax-%Pred-Pre 74 %    FEF2575-Pred 2.75 L/sec    FEF2575-Pre 1.47 L/sec    QYL3942-%Pred-Pre 53 %    ExpTime-Pre 6.48 sec    FIFMax-Pre 3.24 L/sec    VC-Pred 3.68 L    VC-Pre 2.22 L    VC-%Pred-Pre 60 %    IC-Pred 3.37 L    IC-Pre 2.10 L    IC-%Pred-Pre 62 %    ERV-Pred 0.31 L    ERV-Pre 0.12 L    ERV-%Pred-Pre 40 %    FEV1FEV6-Pred 82 %    FEV1FEV6-Pre 78 %    FRCPleth-Pred 2.80 L    FRCPleth-Pre 1.67 L    FRCPleth-%Pred-Pre 59 %    RVPleth-Pred 1.86 L    RVPleth-Pre 1.55 L    RVPleth-%Pred-Pre 83 %    TLCPleth-Pred 5.26 L    TLCPleth-Pre 3.77 L    TLCPleth-%Pred-Pre 71 %    DLCOunc-Pred 22.25 ml/min/mmHg    DLCOunc-Pre 15.40 ml/min/mmHg    DLCOunc-%Pred-Pre 69 %    VA-Pre 3.38 L    VA-%Pred-Pre 64 %    FEV1SVC-Pred 78 %    FEV1SVC-Pre 73 %       I reviewed pulmonary function test that was performed on August 6.  This shows moderately severe restrictive lung disease with a mild diffusion defect.  Diffusion is stable compared to last year, and spirometry and lung volumes are improved compared to last year.  Also reviewed chest CT scan that was performed on August 6.  The PE study showed no PEs.  The high-resolution study shows no ILD.  There is air trapping on expiratory images with with dynamic collapse of the trachea and large airways.  It was present last year as well.  There is no pleural effusion.    I reviewed results with the patient.      Assessment and plan:   Ms. Singh is a 54-year-old with lupus and air trapping on chest CT was here for follow-up.    1.  Air trapping on chest CT with restrictive PFT.  She reports that her breathing is better, and she has not needed to use albuterol in a long time.  PFT shows improvement in spirometry  and lung volumes compared to last year.  She still has restriction, which likely could be related to her obesity plus weakness and deconditioning with a component of lupus shrinking lung syndrome.  Since she has no shortness of breath, there is no need to start new medications.  There is some tracheobronchomalacia on the chest CT scan, and if she becomes more symptomatic we can pursue further evaluation.  I encouraged her to walk for 30 to 60 minutes/day.  She will return in 6 months with repeat full PFT.    2.  Enlarged pulmonary artery on chest CT.  My suspicion for pulmonary hypertension is low as the diffusion is stable compared to last year.  However, it is reasonable to get an echocardiogram for screening.  Her last echocardiogram was a year and a half ago, and PA systolic pressure could not be estimated but was probably normal.  Patient is in agreement with this plan.      Again, thank you for allowing me to participate in the care of your patient.      Sincerely,    Joanne Marvin MD

## 2019-08-09 NOTE — PROGRESS NOTES
[facility]    Reason for Visit  Taina Singh is a 54 year old year old female who is being seen for Interstitial Lung Disease (ILD) (Bronchiolotis Lupus )    HPI     Ms. Singh is a 54-year-old with lupus and history of air trapping on CT scan who is here for follow-up.  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  She was started on higher dose prednisone and IV cyclophosphamide that started in June.  The plan is for monthly IV cyclophosphamide infusions.  Dr. Shannon is tapering her prednisone, and she currently is taking 40 mg daily.  She   takes atovaquone for pneumocystis prophylaxis.    She had an abdomen CT on June 11 at an outside hospital that showed an incidental right lower lobe PE.  She was started on Xarelto.  We got a CT angiogram and a high-resolution chest CT here on August 6, and there were no PEs.  When I saw her last year, she was having some dyspnea on exertion that improved with albuterol in the PFT lab.  I recommended starting ICS/LABA combination, but all the inhalers were too expensive.  Today, she reports that her breathing is better.  She has not needed to use albuterol in a long time.  She denies cough, fevers, chest pain, or wheezes.  She denies paroxysmal nocturnal dyspnea, new skin rashes, or joint pains.  The main limitation to her activity level is fatigue.  She is able to walk up a flight of stairs and does feel a little short of breath when she does so.  She tries to walk around the store when she is shopping so she gets more activity in during the day.  She does not get the flu vaccine because it triggered a lupus flare several years ago.  She tried a pentamadine nebulizer in the hospital this summer, but she developed shakiness and increased shortness of breath, and it was stopped.  She is currently on medical leave.        Current Outpatient Medications   Medication     albuterol  (PROAIR HFA/PROVENTIL HFA/VENTOLIN HFA) 108 (90 Base) MCG/ACT inhaler     atovaquone (MEPRON) 750 MG/5ML suspension     Calcium-Magnesium 300-300 MG TABS     chlorthalidone (HYGROTON) 25 MG tablet     fluticasone (FLONASE) 50 MCG/ACT nasal spray     hydroxychloroquine (PLAQUENIL) 200 MG tablet     losartan (COZAAR) 25 MG tablet     Multiple Vitamins-Minerals (WOMENS MULTI PO)     Omega-3 Fatty Acids (OMEGA-3 FISH OIL) 500 MG CAPS     pantoprazole (PROTONIX) 20 MG EC tablet     pilocarpine (SALAGEN) 5 MG tablet     predniSONE (DELTASONE) 20 MG tablet     rivaroxaban ANTICOAGULANT (XARELTO) 15 MG TABS tablet     traMADol (ULTRAM) 50 MG tablet     vitamin D3 (CHOLECALCIFEROL) 2000 units (50 mcg) tablet     zolpidem (AMBIEN) 5 MG tablet     No current facility-administered medications for this visit.      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44    No chemo or radiation     Other forms of systemic lupus erythematosus (H) 2018     Pulmonary embolism (H)     2019 [facility] on abd [facility]     Sjogren's syndrome (H)     secondary Sjogrens, secondary to lupus     Vitamin D deficiency        Past Surgical History:   Procedure Laterality Date     BIOPSY/REMOVAL, LYMPH NODE(S)        SECTION  age 32     HC HYSTEROS W PERMANENT FALLOPAIN IMPLANT      Essure b/l     PAROTIDECTOMY Right 2006     TONSILLECTOMY         Social History     Socioeconomic  History     Marital status:      Spouse name: Not on file     Number of children: Not on file     Years of education: 1     Highest education level: Not on file   Occupational History     Comment: , 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   Social Needs     Financial resource strain: Not on file     Food insecurity:     Worry: Not on file     Inability: Not on file     Transportation needs:     Medical: Not on file     Non-medical: Not on file   Tobacco Use     Smoking status: Never Smoker     Smokeless tobacco: Never Used   Substance and Sexual Activity     Alcohol use: No     Drug use: No     Sexual activity: Not on file   Lifestyle     Physical activity:     Days per week: Not on file     Minutes per session: Not on file     Stress: Not on file   Relationships     Social connections:     Talks on phone: Not on file     Gets together: Not on file     Attends Alevism service: Not on file     Active member of club or organization: Not on file     Attends meetings of clubs or organizations: Not on file     Relationship status: Not on file     Intimate partner violence:     Fear of current or ex partner: Not on file     Emotionally abused: Not on file     Physically abused: Not on file     Forced sexual activity: Not on file   Other Topics Concern     Parent/sibling w/ CABG, MI or angioplasty before 65F 55M? 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        Family History   Problem Relation Age of Onset     Alopecia Brother      Rheumatoid Arthritis Brother      LUNG DISEASE No family hx of              ROS Pulmonary  A complete ROS was otherwise negative except as noted in the HPI.    Vitals: BP (!) 140/79 (BP Location: Right arm,  "Patient Position: Chair, Cuff Size: Adult Large)   Pulse 95   Resp 18   Ht 1.676 m (5' 5.98\")   Wt 129.7 kg (286 lb)   SpO2 97%   BMI 46.18 kg/m      Exam:   GENERAL APPEARANCE: Well developed, well nourished, alert, and in no apparent distress.  Cushingoid.  RESP: good air flow throughout.  No crackles. No rhonchi. No wheezes. No squeaks.  CV: Normal S1, S2, regular rhythm, normal rate. No murmur.  No LE edema.   MS: extremities normal. No clubbing. No cyanosis.  SKIN: no rash on limited exam.  NEURO: Mentation intact, speech normal, normal gait and stance.  PSYCH: mentation appears normal. and affect normal/bright.    Results:  Recent Results (from the past 168 hour(s))   Renal panel    Collection Time: 08/03/19 10:31 AM   Result Value Ref Range    Sodium 135 133 - 144 mmol/L    Potassium 4.0 3.4 - 5.3 mmol/L    Chloride 100 94 - 109 mmol/L    Carbon Dioxide 29 20 - 32 mmol/L    Anion Gap 6 3 - 14 mmol/L    Glucose 92 70 - 99 mg/dL    Urea Nitrogen 25 7 - 30 mg/dL    Creatinine 0.64 0.52 - 1.04 mg/dL    GFR Estimate >90 >60 mL/min/[1.73_m2]    GFR Estimate If Black >90 >60 mL/min/[1.73_m2]    Calcium 9.4 8.5 - 10.1 mg/dL    Phosphorus 3.3 2.5 - 4.5 mg/dL    Albumin 3.3 (L) 3.4 - 5.0 g/dL   Beta 2 Glycoprotein Antibodies IGG IGM    Collection Time: 08/03/19 10:31 AM   Result Value Ref Range    Beta 2 Glycoprotein 1 Antibody IgG <0.6 <7 U/mL    Beta 2 Glycoprotein 1 Antibody IgM <0.9 <7 U/mL   Cardiolipin Cari IgG and IgM    Collection Time: 08/03/19 10:31 AM   Result Value Ref Range    Cardiolipin Antibody IgG <1.6 0.0 - 19.9 GPL-U/mL    Cardiolipin Antibody IgM <0.2 0.0 - 19.9 MPL-U/mL   General PFT Lab (Please always keep checked)    Collection Time: 08/06/19  9:01 AM   Result Value Ref Range    FVC-Pred 3.63 L    FVC-Pre 2.05 L    FVC-%Pred-Pre 56 %    FEV1-Pre 1.62 L    FEV1-%Pred-Pre 55 %    FEV1FVC-Pred 80 %    FEV1FVC-Pre 79 %    FEFMax-Pred 6.96 L/sec    FEFMax-Pre 5.16 L/sec    FEFMax-%Pred-Pre 74 % "    FEF2575-Pred 2.75 L/sec    FEF2575-Pre 1.47 L/sec    SPY2275-%Pred-Pre 53 %    ExpTime-Pre 6.48 sec    FIFMax-Pre 3.24 L/sec    VC-Pred 3.68 L    VC-Pre 2.22 L    VC-%Pred-Pre 60 %    IC-Pred 3.37 L    IC-Pre 2.10 L    IC-%Pred-Pre 62 %    ERV-Pred 0.31 L    ERV-Pre 0.12 L    ERV-%Pred-Pre 40 %    FEV1FEV6-Pred 82 %    FEV1FEV6-Pre 78 %    FRCPleth-Pred 2.80 L    FRCPleth-Pre 1.67 L    FRCPleth-%Pred-Pre 59 %    RVPleth-Pred 1.86 L    RVPleth-Pre 1.55 L    RVPleth-%Pred-Pre 83 %    TLCPleth-Pred 5.26 L    TLCPleth-Pre 3.77 L    TLCPleth-%Pred-Pre 71 %    DLCOunc-Pred 22.25 ml/min/mmHg    DLCOunc-Pre 15.40 ml/min/mmHg    DLCOunc-%Pred-Pre 69 %    VA-Pre 3.38 L    VA-%Pred-Pre 64 %    FEV1SVC-Pred 78 %    FEV1SVC-Pre 73 %       I reviewed pulmonary function test that was performed on August 6.  This shows moderately severe restrictive lung disease with a mild diffusion defect.  Diffusion is stable compared to last year, and spirometry and lung volumes are improved compared to last year.  Also reviewed chest CT scan that was performed on August 6.  The PE study showed no PEs.  The high-resolution study shows no ILD.  There is air trapping on expiratory images with with dynamic collapse of the trachea and large airways.  It was present last year as well.  There is no pleural effusion.    I reviewed results with the patient.      Assessment and plan:   Ms. Singh is a 54-year-old with lupus and air trapping on chest CT was here for follow-up.    1.  Air trapping on chest CT with restrictive PFT.  She reports that her breathing is better, and she has not needed to use albuterol in a long time.  PFT shows improvement in spirometry and lung volumes compared to last year.  She still has restriction, which likely could be related to her obesity plus weakness and deconditioning with a component of lupus shrinking lung syndrome.  Since she has no shortness of breath, there is no need to start new medications.  There is  some tracheobronchomalacia on the chest CT scan, and if she becomes more symptomatic we can pursue further evaluation.  I encouraged her to walk for 30 to 60 minutes/day.  She will return in 6 months with repeat full PFT.    2.  Enlarged pulmonary artery on chest CT.  My suspicion for pulmonary hypertension is low as the diffusion is stable compared to last year.  However, it is reasonable to get an echocardiogram for screening.  Her last echocardiogram was a year and a half ago, and PA systolic pressure could not be estimated but was probably normal.  Patient is in agreement with this plan.    Addendum: Echo looks within normal limits with estimated PA systolic pressure of 32 mmHg.

## 2019-08-15 ENCOUNTER — ANCILLARY PROCEDURE (OUTPATIENT)
Dept: CARDIOLOGY | Facility: CLINIC | Age: 54
End: 2019-08-15
Attending: INTERNAL MEDICINE
Payer: COMMERCIAL

## 2019-08-15 PROCEDURE — 93306 TTE W/DOPPLER COMPLETE: CPT | Performed by: INTERNAL MEDICINE

## 2019-08-22 ENCOUNTER — DOCUMENTATION ONLY (OUTPATIENT)
Dept: CARE COORDINATION | Facility: CLINIC | Age: 54
End: 2019-08-22

## 2019-08-22 ENCOUNTER — TELEPHONE (OUTPATIENT)
Dept: RHEUMATOLOGY | Facility: CLINIC | Age: 54
End: 2019-08-22

## 2019-08-22 DIAGNOSIS — M32.9 SYSTEMIC LUPUS ERYTHEMATOSUS, UNSPECIFIED SLE TYPE, UNSPECIFIED ORGAN INVOLVEMENT STATUS (H): ICD-10-CM

## 2019-08-22 NOTE — TELEPHONE ENCOUNTER
Form Request Documentation    Name of Form: Attending Physicians Statement    Date Received: 8/21/2019    Mode Received: fax     Provider: Lopez    Date Needed By: 8/23/2019    Date Given to Provider: 8/22/2019    Jayleen Gan CMA CMA  8/22/2019 2:40 PM

## 2019-08-22 NOTE — TELEPHONE ENCOUNTER
Last Seen: 7/3/2019  Next Appointment: 9/6/2019  Medication: tramadol 50mg  Last Filled: 7/13/2019  Qty: #30     reviewed as follows:        Request sent to provider for review and signature.    Jayleen Gan CMA CMA  8/22/2019 3:28 PM

## 2019-08-23 ENCOUNTER — MYC MEDICAL ADVICE (OUTPATIENT)
Dept: NEPHROLOGY | Facility: CLINIC | Age: 54
End: 2019-08-23

## 2019-08-23 ENCOUNTER — MYC MEDICAL ADVICE (OUTPATIENT)
Dept: INTERNAL MEDICINE | Facility: CLINIC | Age: 54
End: 2019-08-23

## 2019-08-23 DIAGNOSIS — Z79.899 HIGH RISK MEDICATIONS (NOT ANTICOAGULANTS) LONG-TERM USE: ICD-10-CM

## 2019-08-23 DIAGNOSIS — M32.9 SYSTEMIC LUPUS ERYTHEMATOSUS, UNSPECIFIED SLE TYPE, UNSPECIFIED ORGAN INVOLVEMENT STATUS (H): ICD-10-CM

## 2019-08-23 DIAGNOSIS — M32.14 LUPUS NEPHRITIS (H): ICD-10-CM

## 2019-08-23 LAB
ALBUMIN SERPL-MCNC: 3.2 G/DL (ref 3.4–5)
ALBUMIN UR-MCNC: 100 MG/DL
ALT SERPL W P-5'-P-CCNC: 34 U/L (ref 0–50)
ANISOCYTOSIS BLD QL SMEAR: SLIGHT
APPEARANCE UR: ABNORMAL
AST SERPL W P-5'-P-CCNC: 18 U/L (ref 0–45)
BACTERIA #/AREA URNS HPF: ABNORMAL /HPF
BILIRUB UR QL STRIP: NEGATIVE
COLOR UR AUTO: YELLOW
CREAT SERPL-MCNC: 0.69 MG/DL (ref 0.52–1.04)
CREAT UR-MCNC: 96 MG/DL
CRP SERPL-MCNC: 6 MG/L (ref 0–8)
DIFFERENTIAL METHOD BLD: ABNORMAL
ELLIPTOCYTES BLD QL SMEAR: ABNORMAL
ERYTHROCYTE [DISTWIDTH] IN BLOOD BY AUTOMATED COUNT: 17.1 % (ref 10–15)
ERYTHROCYTE [SEDIMENTATION RATE] IN BLOOD BY WESTERGREN METHOD: 25 MM/H (ref 0–30)
GFR SERPL CREATININE-BSD FRML MDRD: >90 ML/MIN/{1.73_M2}
GLUCOSE UR STRIP-MCNC: NEGATIVE MG/DL
HCT VFR BLD AUTO: 36 % (ref 35–47)
HGB BLD-MCNC: 11.2 G/DL (ref 11.7–15.7)
HGB UR QL STRIP: ABNORMAL
KETONES UR STRIP-MCNC: NEGATIVE MG/DL
LEUKOCYTE ESTERASE UR QL STRIP: ABNORMAL
LYMPHOCYTES # BLD AUTO: 0.6 10E9/L (ref 0.8–5.3)
LYMPHOCYTES NFR BLD AUTO: 5 %
MCH RBC QN AUTO: 27.9 PG (ref 26.5–33)
MCHC RBC AUTO-ENTMCNC: 31.1 G/DL (ref 31.5–36.5)
MCV RBC AUTO: 90 FL (ref 78–100)
MONOCYTES # BLD AUTO: 0.9 10E9/L (ref 0–1.3)
MONOCYTES NFR BLD AUTO: 7 %
MUCOUS THREADS #/AREA URNS LPF: PRESENT /LPF
NEUTROPHILS # BLD AUTO: 10.8 10E9/L (ref 1.6–8.3)
NEUTROPHILS NFR BLD AUTO: 88 %
NITRATE UR QL: NEGATIVE
NON-SQ EPI CELLS #/AREA URNS LPF: ABNORMAL /LPF
PH UR STRIP: 7 PH (ref 5–7)
PLATELET # BLD AUTO: 222 10E9/L (ref 150–450)
PLATELET # BLD EST: ABNORMAL 10*3/UL
PROT UR-MCNC: 1.08 G/L
PROT/CREAT 24H UR: 1.12 G/G CR (ref 0–0.2)
RBC # BLD AUTO: 4.02 10E12/L (ref 3.8–5.2)
RBC #/AREA URNS AUTO: ABNORMAL /HPF
SOURCE: ABNORMAL
SP GR UR STRIP: 1.02 (ref 1–1.03)
UROBILINOGEN UR STRIP-ACNC: 0.2 EU/DL (ref 0.2–1)
WBC # BLD AUTO: 12.3 10E9/L (ref 4–11)
WBC #/AREA URNS AUTO: ABNORMAL /HPF

## 2019-08-23 PROCEDURE — 86140 C-REACTIVE PROTEIN: CPT | Performed by: INTERNAL MEDICINE

## 2019-08-23 PROCEDURE — 00000167 ZZHCL STATISTIC INR NC: Performed by: INTERNAL MEDICINE

## 2019-08-23 PROCEDURE — 86225 DNA ANTIBODY NATIVE: CPT | Performed by: INTERNAL MEDICINE

## 2019-08-23 PROCEDURE — 84450 TRANSFERASE (AST) (SGOT): CPT | Performed by: INTERNAL MEDICINE

## 2019-08-23 PROCEDURE — 86160 COMPLEMENT ANTIGEN: CPT | Performed by: INTERNAL MEDICINE

## 2019-08-23 PROCEDURE — 84156 ASSAY OF PROTEIN URINE: CPT | Performed by: INTERNAL MEDICINE

## 2019-08-23 PROCEDURE — 81001 URINALYSIS AUTO W/SCOPE: CPT | Performed by: INTERNAL MEDICINE

## 2019-08-23 PROCEDURE — 85652 RBC SED RATE AUTOMATED: CPT | Performed by: INTERNAL MEDICINE

## 2019-08-23 PROCEDURE — 85025 COMPLETE CBC W/AUTO DIFF WBC: CPT | Performed by: INTERNAL MEDICINE

## 2019-08-23 PROCEDURE — 00000401 ZZHCL STATISTIC THROMBIN TIME NC: Performed by: INTERNAL MEDICINE

## 2019-08-23 PROCEDURE — 82565 ASSAY OF CREATININE: CPT | Performed by: INTERNAL MEDICINE

## 2019-08-23 PROCEDURE — 36415 COLL VENOUS BLD VENIPUNCTURE: CPT | Performed by: INTERNAL MEDICINE

## 2019-08-23 PROCEDURE — 82040 ASSAY OF SERUM ALBUMIN: CPT | Performed by: INTERNAL MEDICINE

## 2019-08-23 PROCEDURE — 87086 URINE CULTURE/COLONY COUNT: CPT | Performed by: INTERNAL MEDICINE

## 2019-08-23 PROCEDURE — 84460 ALANINE AMINO (ALT) (SGPT): CPT | Performed by: INTERNAL MEDICINE

## 2019-08-23 PROCEDURE — 86160 COMPLEMENT ANTIGEN: CPT | Mod: 59 | Performed by: INTERNAL MEDICINE

## 2019-08-23 RX ORDER — TRAMADOL HYDROCHLORIDE 50 MG/1
50 TABLET ORAL EVERY 12 HOURS PRN
Qty: 30 TABLET | Refills: 2 | Status: SHIPPED | OUTPATIENT
Start: 2019-08-23 | End: 2019-11-14

## 2019-08-23 NOTE — LETTER
Patient:  Taina Singh  :   1965  MRN:     [id number]        Ms.Kelly Singh  [address] [facility] [address]        2019    Dear ,    We are writing to inform you of your test results. Urine protein and inflammation markers are improved.      Results for orders placed or performed in visit on 19   Protein  random urine with Creat Ratio   Result Value Ref Range    Protein Random Urine 1.08 g/L    Protein Total Urine g/gr Creatinine 1.12 (H) 0 - 0.2 g/g Cr   Complement C4   Result Value Ref Range    Complement C4 5 (L) 15 - 50 mg/dL   Complement C3   Result Value Ref Range    Complement C3 50 (L) 76 - 169 mg/dL   DNA double stranded antibodies   Result Value Ref Range    DNA-ds >379 (H) <10 IU/mL   Erythrocyte sedimentation rate auto   Result Value Ref Range    Sed Rate 25 0 - 30 mm/h   CRP inflammation   Result Value Ref Range    CRP Inflammation 6.0 0.0 - 8.0 mg/L   Creatinine   Result Value Ref Range    Creatinine 0.69 0.52 - 1.04 mg/dL    GFR Estimate >90 >60 mL/min/[1.73_m2]    GFR Estimate If Black >90 >60 mL/min/[1.73_m2]   CBC with platelets differential   Result Value Ref Range    WBC 12.3 (H) 4.0 - 11.0 10e9/L    RBC Count 4.02 3.8 - 5.2 10e12/L    Hemoglobin 11.2 (L) 11.7 - 15.7 g/dL    Hematocrit 36.0 35.0 - 47.0 %    MCV 90 78 - 100 fl    MCH 27.9 26.5 - 33.0 pg    MCHC 31.1 (L) 31.5 - 36.5 g/dL    RDW 17.1 (H) 10.0 - 15.0 %    Platelet Count 222 150 - 450 10e9/L    % Neutrophils 88.0 %    % Lymphocytes 5.0 %    % Monocytes 7.0 %    Absolute Neutrophil 10.8 (H) 1.6 - 8.3 10e9/L    Absolute Lymphocytes 0.6 (L) 0.8 - 5.3 10e9/L    Absolute Monocytes 0.9 0.0 - 1.3 10e9/L    Anisocytosis Slight     Elliptocytes Moderate     Platelet Estimate       Automated count confirmed.  Platelet morphology is normal.    Diff Method Automated Method    AST   Result Value Ref Range    AST 18 0 - 45 U/L   ALT   Result Value Ref Range    ALT 34 0 - 50 U/L   Albumin level   Result Value  Ref Range    Albumin 3.2 (L) 3.4 - 5.0 g/dL   *UA reflex to Microscopic and Culture ([place] and [facility] (except [place] and [place])   Result Value Ref Range    Color Urine Yellow     Appearance Urine Slightly Cloudy     Glucose Urine Negative NEG^Negative mg/dL    Bilirubin Urine Negative NEG^Negative    Ketones Urine Negative NEG^Negative mg/dL    Specific Gravity Urine 1.025 1.003 - 1.035    Blood Urine Trace (A) NEG^Negative    pH Urine 7.0 5.0 - 7.0 pH    Protein Albumin Urine 100 (A) NEG^Negative mg/dL    Urobilinogen Urine 0.2 0.2 - 1.0 EU/dL    Nitrite Urine Negative NEG^Negative    Leukocyte Esterase Urine Trace (A) NEG^Negative    Source Midstream Urine    Creatinine urine calculation only   Result Value Ref Range    Creatinine Urine 96 mg/dL   Urine Microscopic   Result Value Ref Range    WBC Urine 25-50 (A) OTO5^0 - 5 /HPF    RBC Urine O - 2 OTO2^O - 2 /HPF    Squamous Epithelial /LPF Urine Few FEW^Few /LPF    Bacteria Urine Moderate (A) NEG^Negative /HPF    Mucous Urine Present (A) NEG^Negative /LPF   Urine Culture Aerobic Bacterial   Result Value Ref Range    Specimen Description Midstream Urine     Culture Micro       <10,000 colonies/mL  mixed urogenital pascual  Susceptibility testing not routinely done       Killian Marroquin MD

## 2019-08-24 LAB
BACTERIA SPEC CULT: NORMAL
SPECIMEN SOURCE: NORMAL

## 2019-08-26 LAB
C3 SERPL-MCNC: 50 MG/DL (ref 76–169)
C4 SERPL-MCNC: 5 MG/DL (ref 15–50)
DSDNA AB SER-ACNC: >379 IU/ML

## 2019-08-27 ENCOUNTER — TELEPHONE (OUTPATIENT)
Dept: RHEUMATOLOGY | Facility: CLINIC | Age: 54
End: 2019-08-27

## 2019-08-27 LAB — LA PPP-IMP: ABNORMAL

## 2019-08-27 NOTE — TELEPHONE ENCOUNTER
Form Request Documentation    Provider Agreed to complete form? ye    Date Returned to support Staff: 8/26/2019    Date patient notified: 8/27/2019     Disposition of Form: faxed to Mary Ellen Lechuga at [phone], scanned to chart and mailed copy yo patient    Jayleen Gan, [facility]  8/27/2019 2:40 PM

## 2019-08-27 NOTE — TELEPHONE ENCOUNTER
[facility] Call Center    Phone Message    May a detailed message be left on voicemail: yes    Reason for Call: Other: Cely from [facility] is asking how often the pt is supposed to have cytoxin in her infusion? Please call Cely at [phone]. Thanks.     Action Taken: Message routed to:  [facility] ([facility]

## 2019-08-28 NOTE — TELEPHONE ENCOUNTER
Order was found by infusion center, clarified with them, that it is every 28 days x 6 doses.    Sophie Alvarado RN  [facility]

## 2019-08-29 ENCOUNTER — TELEPHONE (OUTPATIENT)
Dept: RHEUMATOLOGY | Facility: CLINIC | Age: 54
End: 2019-08-29

## 2019-08-29 NOTE — TELEPHONE ENCOUNTER
"Has a severe headache, weak, neck hurts, upper part of stomach is starting to feel like when she has lupus enteritis.  Pt is nauseated, unable to keep anything down.  Is SOB, can feel something funny in her chest, just feels overall horrible.    Due to \"funny feeling in chest\" and SOB, have advised pt to call 911 and go to ER immediately.  Pt has never had this feeling before, but do not want to take any chances due to SOB.    Sophie Alvarado RN  Rheumatology Clinic    "

## 2019-09-04 DIAGNOSIS — M32.14 LUPUS NEPHRITIS (H): Primary | ICD-10-CM

## 2019-09-04 DIAGNOSIS — R80.9 PROTEINURIA: ICD-10-CM

## 2019-09-06 ENCOUNTER — OFFICE VISIT (OUTPATIENT)
Dept: RHEUMATOLOGY | Facility: CLINIC | Age: 54
End: 2019-09-06
Attending: INTERNAL MEDICINE
Payer: COMMERCIAL

## 2019-09-06 VITALS
SYSTOLIC BLOOD PRESSURE: 150 MMHG | WEIGHT: 293 LBS | OXYGEN SATURATION: 96 % | DIASTOLIC BLOOD PRESSURE: 78 MMHG | BODY MASS INDEX: 48.25 KG/M2 | HEART RATE: 75 BPM

## 2019-09-06 DIAGNOSIS — M32.9 SYSTEMIC LUPUS ERYTHEMATOSUS, UNSPECIFIED SLE TYPE, UNSPECIFIED ORGAN INVOLVEMENT STATUS (H): Primary | ICD-10-CM

## 2019-09-06 DIAGNOSIS — L93.0 LUPUS ERYTHEMATOSUS, UNSPECIFIED FORM: ICD-10-CM

## 2019-09-06 DIAGNOSIS — Z23 ENCOUNTER FOR IMMUNIZATION: ICD-10-CM

## 2019-09-06 PROCEDURE — 25000128 H RX IP 250 OP 636: Mod: ZF | Performed by: INTERNAL MEDICINE

## 2019-09-06 PROCEDURE — G0463 HOSPITAL OUTPT CLINIC VISIT: HCPCS

## 2019-09-06 PROCEDURE — 90670 PCV13 VACCINE IM: CPT | Mod: ZF | Performed by: INTERNAL MEDICINE

## 2019-09-06 PROCEDURE — G0009 ADMIN PNEUMOCOCCAL VACCINE: HCPCS | Mod: ZF

## 2019-09-06 RX ORDER — PREDNISONE 2.5 MG/1
TABLET ORAL
Qty: 100 TABLET | Refills: 3 | Status: SHIPPED | OUTPATIENT
Start: 2019-09-06 | End: 2020-10-29

## 2019-09-06 RX ORDER — PREDNISONE 10 MG/1
1 TABLET ORAL EVERY MORNING
COMMUNITY
Start: 2019-06-22 | End: 2019-11-25

## 2019-09-06 RX ADMIN — PNEUMOCOCCAL 13-VALENT CONJUGATE VACCINE 0.5 ML: 2.2; 2.2; 2.2; 2.2; 2.2; 4.4; 2.2; 2.2; 2.2; 2.2; 2.2; 2.2; 2.2 INJECTION, SUSPENSION INTRAMUSCULAR at 15:14

## 2019-09-06 ASSESSMENT — PAIN SCALES - GENERAL: PAINLEVEL: MILD PAIN (3)

## 2019-09-06 NOTE — LETTER
9/6/2019       RE: Taina Singh  [address] [place] [address]     Dear Colleague,    Thank you for referring your patient, Taina Singh, to the [facility] RHEUMATOLOGY at [facility]. Please see a copy of my visit note below.      Rheumatology F/U Note    Reason for visit: f/u for lupus flare    Date of last visit: 7/3/2019    DOS: 9/6/2019      HPI:    Taina Singh is a 52 year old  female who was referred to our clinic for evaluation and management of her SLE.    She was diagnosed with lupus at age 27. Her 1st sx were malar rash and fatigue. No skin biopsy was done (reportedly had +KARLIE). She was treated with prednisone taper and HCQ. HCQ helped and she tolerated it well. She was diagnosed with Sjogren's at the same time. Had dryness of eyes and mouth. No lip biopsy to confirm the Dx.    Reportedly she had very mild stable lupus for many years and eventually at some point, stopped taking HCQ (can't remember when)    She was diagnosed with MALT lymphoma at 44, had enlarged LN over R parotid gland, on biopsy it was MALT lymphoma. Tx was resection only, no radiation or chemo.    About 3 yr ago, had flu shot and 2 days later, developed pleuritic CP and was seen at urgent care. That's why she does not want to get flu shot. She was seen in ER 2 days after UC visit. She was treated with prednisone.    She lost 100 lbs by exercise over past 2 yr, felt amazing. In 7/2017, started not feeling well. She had extreme fatigue. Had AM stiffness and pain over hands. Touched base with her previous rheumatologist (Dr. Rangel) and was told to have lupus flare and was put on  mg qd. Afterwards, developed pleuritic CP which has not been resolved.    She was given prednisone 40 mg qd x 5 days (on 12/16/2017) by pulmonologist in [facility]. It helped a lot but pleuritic CP recurred after stopping it.    She was told to have pulm HTN and was evaluated for it.    No triggers for current  flare.    No flare of malar rash. No current hair loss. Uses blink eyedrops, restasis burned her eyes. She has been prescribed salagen for dry mouth, has not used it. Arthritis of hands have resolved on HCQ.    Last HCQ eye exam was 1 week ago.    4/2018: On AZA 50 mg qd since 2/8/2018, increased to 100 mg qd in 3/19/2018. Her arthralgia is intermittent and mild, it's better. Has fatigue.  5/2018: Started on mycophenalate 1500 mg, continues prednisone 30 mg and plaquenil 200 mg twice a day.     Her major complaint is continues pressure over her chest. Pain is pleuritic, it hurts by sneezing, taking deep breath.      7/2018: Ms. Singh feels an improvement in her symptoms. She says there is a baseline pleuritic chest pain, but her fatigue and overall day-to-day function has improved to her satisfaction. She says morning stiffness usually only lasts about 10 minutes. She complains of occasional episodes of flashing lights in her left eye, however she is being seen by her opthamologist. She has a OTC scheduled for Monday as well. Ms. Singh feels as though the mycophenalate has made the biggest difference in her improvement. She continues to taper the prednisone, currently on 20 mg daily. She also has continued the plaquenil 200 mg twice a day.       11/2018: On benlysta infusion since beginning of Sept 2018. Chest still feels less tight, breathing is much better. Sometimes hands ache but it does lot last long. Benlysta makes her tired, but overall feels her lupus sx are much improved on benlysta.      3/2019: Feels tired and achy, it is intermittent and moves around. The L hip pain is consistent for the past 7 days, she moved up her appointment from 3/22 to today to get a bursitis shot. Had bad sinus and ear infection in 1/2019. She still thinks benlysta has helped her a lot. Last night, her R shoulder was hurting so bad that she could not move it but it's better today. Pain comes and goes. Breathing is better  after adding benlysta, she is not gasping for air anymore which is huge improvement for her. She feels pressure over her chest and low back.    Is getting benlysta tomorrow.    Her prednisone dose is 10 mg a day.    Leaving to [place] for vacation.    6/5/2019: She reports severe diffuse arthralgia affecting all her joints with pain level 8/10, today is 6/10. No SOB. Feels pressure like CP. Has severe fatigue. Last benlysta was 7 wk ago.    7/2019: Since last visit 1 month ago, patient was hospitalized x2 (both 6/10-6/13 & 6/18-6/22) at [facility] for acute abdominal pain. 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 Team felt that 2nd admission was 2/2 tapering of steroids. She was pulsed during first admission and then got Cytoxan as outpatient in between admissions on 6/15.     Incidentally, patient found to have small splenic infarct and acute PE on CT scan. She was started on Xarelto.    Also had her kidney biopsy on 6/7/19. Biopsy showed diffuse global lupus nephritis, ISN/RPS class IV. She has also followed up with nephrology this past week and started on lisinopril. No reports of hypotension.    Since leaving hospital, biggest complaint is leg strength/conditioning. She thinks this is related to laying in bed while hospitalized. Going to start PT as soon as it is set up.       Today 9/6/2019: Last Wed had her cytoxan infusion, everything went well. Did not sleep well on Wed evening. Called our clinic the next Thu as had diffuse body pain and N/V.also was a on a new BP med x 1 mo which made her dizzy. Her legs were swollen. She was seen in ED that Thu, her BP found to be low. She was instructed to stop BP med and her facial swelling, dizziness, LE swelling resolved, lost 10 lbs. That ended up being reaction to med. Does not know if it was lisinopril or losartan.      S/p 3 cytoxan infusions.      Overall she is feeling better, minimal joint pain, no SOB/CP/abdomianl pain. Has malar rash. No oral ulcers.    She has been on  prednisone 30 mg a day x 2 weeks.      ROS:  A comprehensive ROS was done, positives are per HPI.    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44    No chemo or radiation     Other forms of systemic lupus erythematosus (H) 2018     Pulmonary embolism (H)     2019 seen on abd CT at [facility]     Sjogren's syndrome (H)     secondary Sjogrens, secondary to lupus     Vitamin D deficiency      Past Surgical History:   Procedure Laterality Date     BIOPSY/REMOVAL, LYMPH NODE(S)        SECTION  age 32     HC HYSTEROS W PERMANENT FALLOPAIN IMPLANT      Essure b/l     PAROTIDECTOMY Right 2006     TONSILLECTOMY       Family History   Problem Relation Age of Onset     Alopecia Brother      Rheumatoid Arthritis Brother      LUNG DISEASE No family hx of      Social History     Socioeconomic History     Marital status:      Spouse name: Not on file     Number of children: Not on file     Years of education: 1     Highest education level: Not on file   Occupational History     Comment: , 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   Social Needs     Financial resource strain: Not on file     Food insecurity:     Worry: Not on file     Inability: Not on file     Transportation needs:     Medical: Not on file     Non-medical: Not on file   Tobacco Use     Smoking status: Never Smoker     Smokeless tobacco: Never Used   Substance and Sexual Activity     Alcohol use: No     Drug use: No     Sexual activity: Not on file   Lifestyle     Physical activity:     Days per week: Not on  file     Minutes per session: Not on file     Stress: Not on file   Relationships     Social connections:     Talks on phone: Not on file     Gets together: Not on file     Attends Yarsani service: Not on file     Active member of club or organization: Not on file     Attends meetings of clubs or organizations: Not on file     Relationship status: Not on file     Intimate partner violence:     Fear of current or ex partner: Not on file     Emotionally abused: Not on file     Physically abused: Not on file     Forced sexual activity: Not on file   Other Topics Concern     Parent/sibling w/ CABG, MI or angioplasty before 65F 55M? 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      Going through divorce but it's not stress factor for her.    Allergies   Allergen Reactions     Pentamidine Shortness Of Breath     Penicillins Rash     Sulfa Drugs Rash       Outpatient Encounter Medications as of 9/6/2019   Medication Sig Dispense Refill     atovaquone (MEPRON) 750 MG/5ML suspension Take 10 mLs (1,500 mg) by mouth daily 210 mL 5     Calcium-Magnesium 300-300 MG TABS daily        fluticasone (FLONASE) 50 MCG/ACT nasal spray Spray 1-2 sprays into both nostrils daily Use 1 spray per nostril per day for 2 weeks.  May increase to 2 sprays per nostril per day after. 16 g 0     hydroxychloroquine (PLAQUENIL) 200 MG tablet Take 1 tablet (200 mg) by mouth 2 times daily 180 tablet 1     losartan (COZAAR) 25 MG tablet Take 1 tablet (25 mg) by mouth daily 90 tablet 3     Multiple Vitamins-Minerals (WOMENS MULTI PO) Take by mouth daily        Omega-3 Fatty Acids (OMEGA-3 FISH OIL) 500 MG CAPS Take 500 mg by mouth daily        pantoprazole (PROTONIX) 20  MG EC tablet Take 2 tablets (40 mg) by mouth 2 times daily       pilocarpine (SALAGEN) 5 MG tablet Take 1 tablet (5 mg) by mouth 4 times daily as needed 360 tablet 3     predniSONE (DELTASONE) 10 MG tablet Take 1 tablet by mouth every morning       predniSONE (DELTASONE) 20 MG tablet Take 3 tablets (60 mg) by mouth daily For one month (Patient taking differently: Take 20 mg by mouth daily For one month) 90 tablet 0     rivaroxaban ANTICOAGULANT (XARELTO) 15 MG TABS tablet Take 15 mg by mouth 2 times daily (with meals)       traMADol (ULTRAM) 50 MG tablet Take 1 tablet (50 mg) by mouth every 12 hours as needed for pain Take 1 tablet as needed for pain.  No driving or alcohol use while taking medication. 30 tablet 2     vitamin D3 (CHOLECALCIFEROL) 2000 units (50 mcg) tablet Take 1 tablet (2,000 Units) by mouth daily 90 tablet 11     zolpidem (AMBIEN) 5 MG tablet Take 1 tablet (5 mg) by mouth nightly as needed for sleep 30 tablet 1     albuterol (PROAIR HFA/PROVENTIL HFA/VENTOLIN HFA) 108 (90 Base) MCG/ACT inhaler Inhale 2 puffs into the lungs every 6 hours as needed for shortness of breath / dyspnea or wheezing (Patient not taking: Reported on 2019) 3 Inhaler 3     chlorthalidone (HYGROTON) 25 MG tablet Take 1 tablet (25 mg) by mouth daily (Patient not taking: Reported on 2019) 30 tablet 1     No facility-administered encounter medications on file as of 2019.          Her records were reviewed.    2D-Echo 2017:    ECHO COMPLETE WO CONTRAST CHILANGO GIBBONS 2017 14:43     [facility]   [address], [address], [place] [address]   Main: [phone]  [email or web address]                                                 Transthoracic Echo Report   CHILANGO GIBBONS   Kaeian ID: [id number] Age: 52 : 1965 Ordering Provider: NGUYEN PEETRS   Exam Date: 2017 14:43 Gender: F Sonographer: BDB   Accession #: [id number] Height: 66  "in BSA: 2.24 m  BP: 108 / 62   Weight: 261 lbs BMI: 42.1 kg/m        Site: [facility]   Location: Outpatient Rhythm: Normal Sinus Rhythm   Procedure Components: 2D imaging, Color Doppler, Spectral Doppler   Indications: Murmur; Systemic lupus erythematosus, unspecified SLE type, unspecified organ   involvement status   Technical Quality: Contrast: None     Final Conclusion   Visually estimated ejection fraction is 55-60%.   Mild left ventricular hypertrophy.   Estimated pulmonary artery pressure of 31 mmHg + RA pressure.   Dilated IVC size, <50% collapse with respiration.   Estimated EF: 55-60%   FINDINGS   Left Ventricle Normal left ventricular size. Visually estimated ejection fraction is 55-60%.   Mild left ventricular hypertrophy.   Diastolic Function \"Pseudonormal\" left ventricular filling pattern. E/e' ratio 8-15 is   indeterminate for filling pressure.   Right Ventricle Normal right ventricular size and function.   Left Atrium Mild left atrial enlargement.   Right Atrium Mild right atrial enlargement.   Aortic Valve Normal aortic valve. No aortic stenosis. No significant aortic regurgitation.   Mitral Valve Normal mitral valve. No mitral stenosis. Mild mitral regurgitation.   Tricuspid Valve Normal tricuspid valve. No tricuspid stenosis. Mild tricuspid regurgitation.   Estimated pulmonary artery pressure   of 31 mmHg + RA pressure.   Pulmonic Valve Normal pulmonic valve without significant stenosis or regurgitation.   Pericardium Normal pericardium.   Aorta Measured aortic root diameter is normal in size.   Inferior Vena Cava Dilated IVC size, <50% collapse with respiration.    Component      Latest Ref Rng & Units 11/20/2017   Sodium      133 - 144 mmol/L 137   Potassium      3.4 - 5.3 mmol/L 4.2   Chloride      94 - 109 mmol/L 102   Carbon Dioxide      20 - 32 mmol/L 30   Anion Gap      3 - 14 mmol/L 6   Glucose      70 - 99 mg/dL 101 (H)   Urea Nitrogen      7 - 30 mg/dL 13   Creatinine      0.52 - " 1.04 mg/dL 0.55   GFR Estimate      >60 mL/min/1.7m2 >90   GFR Estimate If Black      >60 mL/min/1.7m2 >90   Calcium      8.5 - 10.1 mg/dL 9.1   WBC      4.0 - 11.0 10e9/L 4.9   RBC Count      3.8 - 5.2 10e12/L 4.28   Hemoglobin      11.7 - 15.7 g/dL 11.3 (L)   Hematocrit      35.0 - 47.0 % 35.5   MCV      78 - 100 fl 83   MCH      26.5 - 33.0 pg 26.4 (L)   MCHC      31.5 - 36.5 g/dL 31.8   RDW      10.0 - 15.0 % 14.6   Platelet Count      150 - 450 10e9/L 215   N-Terminal Pro Bnp      0 - 125 pg/mL 546 (H)   Sed Rate      0 - 30 mm/h 71 (H)     Component      Latest Ref Rng & Units 12/29/2017   Color Urine       Straw   Appearance Urine       Clear   Glucose Urine      NEG:Negative mg/dL Negative   Bilirubin Urine      NEG:Negative Negative   Ketones Urine      NEG:Negative mg/dL Negative   Specific Gravity Urine      1.003 - 1.035 1.005   Blood Urine      NEG:Negative Negative   pH Urine      5.0 - 7.0 pH 6.0   Protein Albumin Urine      NEG:Negative mg/dL Negative   Urobilinogen mg/dL      0.0 - 2.0 mg/dL 0.0   Nitrite Urine      NEG:Negative Negative   Leukocyte Esterase Urine      NEG:Negative Negative   Source       Midstream Urine   WBC Urine      0 - 2 /HPF <1   RBC Urine      0 - 2 /HPF <1   Bacteria Urine      NEG:Negative /HPF Few (A)   Squamous Epithelial /HPF Urine      0 - 1 /HPF <1   Mucous Urine      NEG:Negative /LPF Present (A)   Albumin Fraction      3.7 - 5.1 g/dL 4.2   Alpha 1 Fraction      0.2 - 0.4 g/dL 0.4   Alpha 2 Fraction      0.5 - 0.9 g/dL 0.8   Beta Fraction      0.6 - 1.0 g/dL 1.0   Gamma Fraction      0.7 - 1.6 g/dL 1.6   Monoclonal Peak      0.0 g/dL 0.0   ELP Interpretation:       Essentially normal electrophoretic pattern. No monoclonal protein seen. Pathologic . . .   IGG      695 - 1620 mg/dL 1560   IGA      70 - 380 mg/dL 473 (H)   IGM      60 - 265 mg/dL 92   Iron      35 - 180 ug/dL 58   Iron Binding Cap      240 - 430 ug/dL 315   Iron Saturation Index      15 - 46 % 19   TPMT  Genotype Specimen       Whole Blood   TPMT Genotype       Neg/Neg   TPMT Predicted Phenotype       Normal   Protein Random Urine      g/L <0.05   Protein Total Urine g/gr Creatinine      0 - 0.2 g/g Cr Unable to calculate due to low value   Deamidated Gliadin Cari, IgA      <7 U/mL 2   Deamidated Gliadin Cari, IgG      <7 U/mL 5   Complement C3      76 - 169 mg/dL 72 (L)   Complement C4      15 - 50 mg/dL 6 (L)   CRP Inflammation      0.0 - 8.0 mg/L 3.2   DNA-ds      <10 IU/mL >379 (H)   Sed Rate      0 - 30 mm/h 49 (H)   Vitamin D Deficiency screening      20 - 75 ug/L 51   Creatinine Urine Random      mg/dL 23   AST      0 - 45 U/L 26   ALT      0 - 50 U/L 35   HEENA  Broad Spectrum       Neg   Beta 2 Glycoprotein 1 Antibody IgG      <7 U/mL <0.6   Beta 2 Glycoprotein 1 Antibody IgM      <7 U/mL <0.9   Thyroid Peroxidase Antibody      <35 IU/mL <10   Thyroglobulin Antibody      <40 IU/mL <20   TSH      0.40 - 4.00 mU/L 0.87   Cryoglobulin      NEG:Negative % Trace (A)   Tissue Transglutaminase Antibody IgA      <7 U/mL 4   Ferritin      8 - 252 ng/mL 163   Endomysial Antibody IgA by IFA      <1:10 <1:10   CRP Cardiac Risk      mg/L 2.9   Creatinine Urine      mg/dL 23     Component      Latest Ref Rng & Units 2/23/2018   Color Urine       Yellow   Appearance Urine       Clear   Glucose Urine      NEG:Negative mg/dL Negative   Bilirubin Urine      NEG:Negative Negative   Ketones Urine      NEG:Negative mg/dL Negative   Specific Gravity Urine      1.003 - 1.035 1.025   Blood Urine      NEG:Negative Negative   pH Urine      5.0 - 7.0 pH 5.5   Protein Albumin Urine      NEG:Negative mg/dL Negative   Urobilinogen Urine      0.2 - 1.0 EU/dL 0.2   Nitrite Urine      NEG:Negative Negative   Leukocyte Esterase Urine      NEG:Negative Negative   Source       Midstream Urine   Protein Random Urine      g/L 0.17   Protein Total Urine g/gr Creatinine      0 - 0.2 g/g Cr 0.18   Complement C3      76 - 169 mg/dL 64 (L)   Complement  C4      15 - 50 mg/dL 5 (L)   CRP Inflammation      0.0 - 8.0 mg/L 4.2   DNA-ds      <10 IU/mL >379 (H)   Sed Rate      0 - 30 mm/h 35 (H)   AST      0 - 45 U/L 27   ALT      0 - 50 U/L 31   Creatinine Urine Random      mg/dL 99     Component      Latest Ref Rng & Units 3/16/2018   WBC      4.0 - 11.0 10e9/L 5.6   RBC Count      3.8 - 5.2 10e12/L 4.43   Hemoglobin      11.7 - 15.7 g/dL 12.1   Hematocrit      35.0 - 47.0 % 36.9   MCV      78 - 100 fl 83   MCH      26.5 - 33.0 pg 27.3   MCHC      31.5 - 36.5 g/dL 32.8   RDW      10.0 - 15.0 % 14.5   Platelet Count      150 - 450 10e9/L 224   Diff Method       Automated Method   % Neutrophils      % 81.7   % Lymphocytes      % 9.3   % Monocytes      % 7.5   % Eosinophils      % 1.3   % Basophils      % 0.2   Absolute Neutrophil      1.6 - 8.3 10e9/L 4.6   Absolute Lymphocytes      0.8 - 5.3 10e9/L 0.5 (L)   Absolute Monocytes      0.0 - 1.3 10e9/L 0.4   Absolute Eosinophils      0.0 - 0.7 10e9/L 0.1   Absolute Basophils      0.0 - 0.2 10e9/L 0.0   Creatinine      0.52 - 1.04 mg/dL 0.51 (L)   GFR Estimate      >60 mL/min/1.7m2 >90   GFR Estimate If Black      >60 mL/min/1.7m2 >90   ALT      0 - 50 U/L 27   Albumin      3.4 - 5.0 g/dL 3.5   AST      0 - 45 U/L 18       Component      Latest Ref Rng & Units 3/21/2018   Color Urine       Yellow   Appearance Urine       Clear   Glucose Urine      NEG:Negative mg/dL Negative   Bilirubin Urine      NEG:Negative Negative   Ketones Urine      NEG:Negative mg/dL Negative   Specific Gravity Urine      1.003 - 1.035 <=1.005   pH Urine      5.0 - 7.0 pH 6.5   Protein Albumin Urine      NEG:Negative mg/dL Negative   Urobilinogen Urine      0.2 - 1.0 EU/dL 0.2   Nitrite Urine      NEG:Negative Negative   Blood Urine      NEG:Negative Negative   Leukocyte Esterase Urine      NEG:Negative Negative   Source       Midstream Urine   WBC Urine      OTO5:0 - 5 /HPF 0 - 5   RBC Urine      OTO2:O - 2 /HPF O - 2   Squamous Epithelial /LPF  Urine      FEW:Few /LPF Few   Protein Random Urine      g/L <0.05   Protein Total Urine g/gr Creatinine      0 - 0.2 g/g Cr Unable to calculate due to low value   DNA-ds      <10 IU/mL >379 (H)   Complement C4      15 - 50 mg/dL 4 (L)   Complement C3      76 - 169 mg/dL 69 (L)   Creatinine Urine Random      mg/dL 17   Creatinine Urine      mg/dL 17     EXAMINATION: CT CHEST W/O CONTRAST, 12/29/2017 1:08 PM   TECHNIQUE:  Helical CT images from the thoracic inlet through the lung  bases were obtained without IV contrast during inspiration and  expiration according to high-resolution chest CT protocol. Contrast  dose: None  COMPARISON: None   HISTORY: eval for ILD, pleural effusion, pt has lupus, Sjogren's, h/o  MALT and pleurisy and SOB; Systemic lupus erythematosus, unspecified  SLE type, unspecified organ involvement status (H)   FINDINGS:  At least 75% collapse of the trachea and mainstem bronchi on the end  expiratory views. Diffuse lobular air trapping on end expiratory  images. Bibasilar platelike atelectasis. No pneumothorax or pleural  effusion. Scattered solid pulmonary nodules, for example a 4 mm  lingular nodule (series 5 image 145) and a 4 mm right upper lobe  nodule (image 73).    The heart size is normal. Mild three-vessel coronary artery calcific  atherosclerosis. Dilation of the main pulmonary artery to 4.1 cm. No  pericardial effusion. Normal caliber and configuration of the thoracic  great vessels. Numerous prominent though nonenlarged mediastinal lymph  nodes.  Limited views of the abdomen reveal no worrisome pathology. No  worrisome bony or soft tissue lesions.    IMPRESSION:   1. At least moderate tracheobronchomalacia.  2. 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  3. Scattered subcentimeter pulmonary nodules measuring up to 4 mm.  Consider follow-up chest CT in one year to  establish stability.     [Consider Follow Up: Lung nodule]     This report will be copied to the [facility] to ensure a  provider acknowledges the finding.      I have personally reviewed the examination and initial interpretation  and I agree with the findings.     AUSTIN PACE MD    Examination:NM LUNG SCAN VENTILATION AND PERFUSION, 3/14/2018 8:24 AM    Indication:  Chronic PE; Pulmonary hypertension    Additional Information: none   Technique:   The patient received 2 mCi of Tc-99m DTPA aerosol for ventilation and  7 mCi of Tc-99m MAA for perfusion. Multiple images were obtained of  the lungs in Anterior, posterior, HERNANDES, RPO, LPO, and  [place]ian projections.   Comparison: Chest x-ray same-day   Findings:     There are matched ventilation/perfusion defects in both lungs. No  mismatched perfusion defect to suggest pulmonary emboli.      Impression:  Pulmonary emboli is not present.     I have personally reviewed the examination and initial interpretation  and I agree with the findings.     DONNIE GARCIA MD    Component      Latest Ref Rng & Units 5/12/2018   WBC      4.0 - 11.0 10e9/L 7.1   RBC Count      3.8 - 5.2 10e12/L 4.42   Hemoglobin      11.7 - 15.7 g/dL 12.1   Hematocrit      35.0 - 47.0 % 37.4   MCV      78 - 100 fl 85   MCH      26.5 - 33.0 pg 27.4   MCHC      31.5 - 36.5 g/dL 32.4   RDW      10.0 - 15.0 % 14.7   Platelet Count      150 - 450 10e9/L 226   Diff Method       Automated Method   % Neutrophils      % 86.2   % Lymphocytes      % 4.8   % Monocytes      % 8.4   % Eosinophils      % 0.3   % Basophils      % 0.3   Absolute Neutrophil      1.6 - 8.3 10e9/L 6.1   Absolute Lymphocytes      0.8 - 5.3 10e9/L 0.3 (L)   Absolute Monocytes      0.0 - 1.3 10e9/L 0.6   Absolute Eosinophils      0.0 - 0.7 10e9/L 0.0   Absolute Basophils      0.0 - 0.2 10e9/L 0.0   Color Urine       Yellow   Appearance Urine       Clear   Glucose Urine      NEG:Negative mg/dL Negative   Bilirubin Urine       NEG:Negative Negative   Ketones Urine      NEG:Negative mg/dL Negative   Specific Gravity Urine      1.003 - 1.035 <=1.005   Blood Urine      NEG:Negative Negative   pH Urine      5.0 - 7.0 pH 5.5   Protein Albumin Urine      NEG:Negative mg/dL Negative   Urobilinogen Urine      0.2 - 1.0 EU/dL 0.2   Nitrite Urine      NEG:Negative Negative   Leukocyte Esterase Urine      NEG:Negative Negative   Source       Midstream Urine   Creatinine      0.52 - 1.04 mg/dL 0.63   GFR Estimate      >60 mL/min/1.7m2 >90   GFR Estimate If Black      >60 mL/min/1.7m2 >90   Protein Random Urine      g/L <0.05   Protein Total Urine g/gr Creatinine      0 - 0.2 g/g Cr Unable to calculate due to low value   Albumin      3.4 - 5.0 g/dL 3.6   ALT      0 - 50 U/L 28   AST      0 - 45 U/L 20   Complement C3      76 - 169 mg/dL 46 (L)   Complement C4      15 - 50 mg/dL 3 (L)   CRP Inflammation      0.0 - 8.0 mg/L <2.9   Sed Rate      0 - 30 mm/h 26   DNA-ds      <10 IU/mL >379 (H)   Creatinine Urine      mg/dL 16     Component      Latest Ref Rng & Units 7/7/2018   WBC      4.0 - 11.0 10e9/L 7.0   RBC Count      3.8 - 5.2 10e12/L 4.35   Hemoglobin      11.7 - 15.7 g/dL 11.7   Hematocrit      35.0 - 47.0 % 36.0   MCV      78 - 100 fl 83   MCH      26.5 - 33.0 pg 26.9   MCHC      31.5 - 36.5 g/dL 32.5   RDW      10.0 - 15.0 % 15.3 (H)   Platelet Count      150 - 450 10e9/L 224   Diff Method       Automated Method   % Neutrophils      % 91.9   % Lymphocytes      % 4.0   % Monocytes      % 3.7   % Eosinophils      % 0.3   % Basophils      % 0.1   Absolute Neutrophil      1.6 - 8.3 10e9/L 6.5   Absolute Lymphocytes      0.8 - 5.3 10e9/L 0.3 (L)   Absolute Monocytes      0.0 - 1.3 10e9/L 0.3   Absolute Eosinophils      0.0 - 0.7 10e9/L 0.0   Absolute Basophils      0.0 - 0.2 10e9/L 0.0   Color Urine       Yellow   Appearance Urine       Clear   Glucose Urine      NEG:Negative mg/dL Negative   Bilirubin Urine      NEG:Negative Negative   Ketones  Urine      NEG:Negative mg/dL Negative   Specific Gravity Urine      1.003 - 1.035 1.010   pH Urine      5.0 - 7.0 pH 5.5   Protein Albumin Urine      NEG:Negative mg/dL Negative   Urobilinogen Urine      0.2 - 1.0 EU/dL 0.2   Nitrite Urine      NEG:Negative Negative   Blood Urine      NEG:Negative Trace (A)   Leukocyte Esterase Urine      NEG:Negative Negative   Source       Midstream Urine   WBC Urine      OTO5:0 - 5 /HPF 0 - 5   RBC Urine      OTO2:O - 2 /HPF O - 2   Squamous Epithelial /LPF Urine      FEW:Few /LPF Few   Creatinine      0.52 - 1.04 mg/dL 0.63   GFR Estimate      >60 mL/min/1.7m2 >90   GFR Estimate If Black      >60 mL/min/1.7m2 >90   Protein Random Urine      g/L 0.07   Protein Total Urine g/gr Creatinine      0 - 0.2 g/g Cr 0.29 (H)   Albumin      3.4 - 5.0 g/dL 3.5   ALT      0 - 50 U/L 27   AST      0 - 45 U/L 21   Complement C3      76 - 169 mg/dL 51 (L)   Complement C4      15 - 50 mg/dL 5 (L)   Creatinine Urine Random      mg/dL 24   CRP Inflammation      0.0 - 8.0 mg/L 11.2 (H)   DNA-ds      <10 IU/mL >379 (H)   Sed Rate      0 - 30 mm/h 35 (H)   Creatinine Urine      mg/dL 23     PFTs 5/2018 (The FEV1 and FVC are reduced but the FEV1/FVC ratio is normal.  The inspiratory flow rates are reduced.  The TLC and FRC are reduced.  The diffusing capacity is normal.  However, the diffusing capacity was not corrected for the patient's hemoglobin.  IMPRESSION:  Moderately Severe  Restriction.  Normal Diffusion.  Walk distance is normal on room air with significant desaturation but no hypoxia.  ____________________________________________KERVIN TONY.      Component      Latest Ref Rng & Units 11/12/2018   Color Urine       Yellow   Appearance Urine       Clear   Glucose Urine      NEG:Negative mg/dL Negative   Bilirubin Urine      NEG:Negative Negative   Ketones Urine      NEG:Negative mg/dL Negative   Specific Gravity Urine      1.003 - 1.035 1.025   pH Urine      5.0 - 7.0 pH 6.0   Protein  Albumin Urine      NEG:Negative mg/dL 30 (A)   Urobilinogen Urine      0.2 - 1.0 EU/dL 0.2   Nitrite Urine      NEG:Negative Negative   Blood Urine      NEG:Negative Trace (A)   Leukocyte Esterase Urine      NEG:Negative Negative   Source       Midstream Urine   WBC Urine      OTO5:0 - 5 /HPF 0 - 5   RBC Urine      OTO2:O - 2 /HPF O - 2   Squamous Epithelial /LPF Urine      FEW:Few /LPF Few   Bacteria Urine      NEG:Negative /HPF Few (A)   WBC      4.0 - 11.0 10e9/L 7.3   RBC Count      3.8 - 5.2 10e12/L 4.25   Hemoglobin      11.7 - 15.7 g/dL 11.3 (L)   Hematocrit      35.0 - 47.0 % 36.0   MCV      78 - 100 fl 85   MCH      26.5 - 33.0 pg 26.6   MCHC      31.5 - 36.5 g/dL 31.4 (L)   RDW      10.0 - 15.0 % 14.9   Platelet Count      150 - 450 10e9/L 255   Creatinine      0.52 - 1.04 mg/dL 0.83   GFR Estimate      >60 mL/min/1.7m2 73   GFR Estimate If Black      >60 mL/min/1.7m2 88   Iron      35 - 180 ug/dL 27 (L)   Iron Binding Cap      240 - 430 ug/dL 264   Iron Saturation Index      15 - 46 % 10 (L)   Protein Random Urine      g/L 0.58   Protein Total Urine g/gr Creatinine      0 - 0.2 g/g Cr 0.34 (H)   Albumin      3.4 - 5.0 g/dL 3.2 (L)   ALT      0 - 50 U/L 30   AST      0 - 45 U/L 19   Complement C3      76 - 169 mg/dL 48 (L)   Complement C4      15 - 50 mg/dL 3 (L)   CRP Inflammation      0.0 - 8.0 mg/L 16.4 (H)   DNA-ds      <10 IU/mL >379 (H)   Sed Rate      0 - 30 mm/h 26   Ferritin      8 - 252 ng/mL 160   Creatinine Urine      mg/dL 176     Component      Latest Ref Rng & Units 6/5/2019           8:29 AM   Color Urine       Yellow   Appearance Urine       Slightly Cloudy   Glucose Urine      NEG:Negative mg/dL Negative   Bilirubin Urine      NEG:Negative Negative   Ketones Urine      NEG:Negative mg/dL 5 (A)   Specific Gravity Urine      1.003 - 1.035 1.027   Blood Urine      NEG:Negative Small (A)   pH Urine      5.0 - 7.0 pH 6.0   Protein Albumin Urine      NEG:Negative mg/dL >499 (A)   Urobilinogen  mg/dL      0.0 - 2.0 mg/dL 2.0   Nitrite Urine      NEG:Negative Negative   Leukocyte Esterase Urine      NEG:Negative Trace (A)   Source       Midstream Urine   WBC Urine      0 - 5 /HPF 16 (H)   RBC Urine      0 - 2 /HPF 24 (H)   Squamous Epithelial /HPF Urine      0 - 1 /HPF 2 (H)   Mucous Urine      NEG:Negative /LPF Present (A)   Hyaline Casts      0 - 2 /LPF 1   Sodium      133 - 144 mmol/L    Potassium      3.4 - 5.3 mmol/L    Chloride      94 - 109 mmol/L    Carbon Dioxide      20 - 32 mmol/L    Anion Gap      3 - 14 mmol/L    Glucose      70 - 99 mg/dL    Urea Nitrogen      7 - 30 mg/dL    Creatinine      0.52 - 1.04 mg/dL    GFR Estimate      >60 mL/min/1.73:m2    GFR Estimate If Black      >60 mL/min/1.73:m2    Calcium      8.5 - 10.1 mg/dL    Phosphorus      2.5 - 4.5 mg/dL    Albumin      3.4 - 5.0 g/dL    WBC      4.0 - 11.0 10e9/L    RBC Count      3.8 - 5.2 10e12/L    Hemoglobin      11.7 - 15.7 g/dL    Hematocrit      35.0 - 47.0 %    MCV      78 - 100 fl    MCH      26.5 - 33.0 pg    MCHC      31.5 - 36.5 g/dL    RDW      10.0 - 15.0 %    Platelet Count      150 - 450 10e9/L    Specimen Description       Midstream Urine   Special Requests       Specimen received in preservative   Culture Micro       10,000 to 50,000 colonies/mL . . .   Creatinine Urine      mg/dL 240   Albumin Urine mg/L      mg/L    Albumin Urine mg/g Cr      0 - 25 mg/g Cr    Protein Random Urine      g/L 4.50   Protein Total Urine g/gr Creatinine      0 - 0.2 g/g Cr 1.88 (H)   Neutrophil Cytoplasmic Antibody      <1:10 titer    Neutrophil Cytoplasmic Antibody Pattern          Lactate Dehydrogenase      81 - 234 U/L    HIV Antigen Antibody Combo      NR:Nonreactive        Sed Rate      0 - 30 mm/h    CRP Inflammation      0.0 - 8.0 mg/L    Complement C4      15 - 50 mg/dL    Complement C3      76 - 169 mg/dL    DNA-ds      <10 IU/mL      Component      Latest Ref Rng & Units 6/5/2019           8:29 AM   Color Urine           Appearance Urine          Glucose Urine      NEG:Negative mg/dL    Bilirubin Urine      NEG:Negative    Ketones Urine      NEG:Negative mg/dL    Specific Gravity Urine      1.003 - 1.035    Blood Urine      NEG:Negative    pH Urine      5.0 - 7.0 pH    Protein Albumin Urine      NEG:Negative mg/dL    Urobilinogen mg/dL      0.0 - 2.0 mg/dL    Nitrite Urine      NEG:Negative    Leukocyte Esterase Urine      NEG:Negative    Source          WBC Urine      0 - 5 /HPF    RBC Urine      0 - 2 /HPF    Squamous Epithelial /HPF Urine      0 - 1 /HPF    Mucous Urine      NEG:Negative /LPF    Hyaline Casts      0 - 2 /LPF    Sodium      133 - 144 mmol/L    Potassium      3.4 - 5.3 mmol/L    Chloride      94 - 109 mmol/L    Carbon Dioxide      20 - 32 mmol/L    Anion Gap      3 - 14 mmol/L    Glucose      70 - 99 mg/dL    Urea Nitrogen      7 - 30 mg/dL    Creatinine      0.52 - 1.04 mg/dL    GFR Estimate      >60 mL/min/1.73:m2    GFR Estimate If Black      >60 mL/min/1.73:m2    Calcium      8.5 - 10.1 mg/dL    Phosphorus      2.5 - 4.5 mg/dL    Albumin      3.4 - 5.0 g/dL    WBC      4.0 - 11.0 10e9/L    RBC Count      3.8 - 5.2 10e12/L    Hemoglobin      11.7 - 15.7 g/dL    Hematocrit      35.0 - 47.0 %    MCV      78 - 100 fl    MCH      26.5 - 33.0 pg    MCHC      31.5 - 36.5 g/dL    RDW      10.0 - 15.0 %    Platelet Count      150 - 450 10e9/L    Specimen Description          Special Requests          Culture Micro          Creatinine Urine      mg/dL 258   Albumin Urine mg/L      mg/L 2,810   Albumin Urine mg/g Cr      0 - 25 mg/g Cr 1,089.15 (H)   Protein Random Urine      g/L    Protein Total Urine g/gr Creatinine      0 - 0.2 g/g Cr    Neutrophil Cytoplasmic Antibody      <1:10 titer    Neutrophil Cytoplasmic Antibody Pattern          Lactate Dehydrogenase      81 - 234 U/L    HIV Antigen Antibody Combo      NR:Nonreactive        Sed Rate      0 - 30 mm/h    CRP Inflammation      0.0 - 8.0 mg/L    Complement C4       15 - 50 mg/dL    Complement C3      76 - 169 mg/dL    DNA-ds      <10 IU/mL      Component      Latest Ref Rng & Units 6/5/2019          11:57 AM   Color Urine          Appearance Urine          Glucose Urine      NEG:Negative mg/dL    Bilirubin Urine      NEG:Negative    Ketones Urine      NEG:Negative mg/dL    Specific Gravity Urine      1.003 - 1.035    Blood Urine      NEG:Negative    pH Urine      5.0 - 7.0 pH    Protein Albumin Urine      NEG:Negative mg/dL    Urobilinogen mg/dL      0.0 - 2.0 mg/dL    Nitrite Urine      NEG:Negative    Leukocyte Esterase Urine      NEG:Negative    Source          WBC Urine      0 - 5 /HPF    RBC Urine      0 - 2 /HPF    Squamous Epithelial /HPF Urine      0 - 1 /HPF    Mucous Urine      NEG:Negative /LPF    Hyaline Casts      0 - 2 /LPF    Sodium      133 - 144 mmol/L 134   Potassium      3.4 - 5.3 mmol/L 4.3   Chloride      94 - 109 mmol/L 101   Carbon Dioxide      20 - 32 mmol/L 26   Anion Gap      3 - 14 mmol/L 8   Glucose      70 - 99 mg/dL 109 (H)   Urea Nitrogen      7 - 30 mg/dL 21   Creatinine      0.52 - 1.04 mg/dL 0.49 (L)   GFR Estimate      >60 mL/min/1.73:m2 >90   GFR Estimate If Black      >60 mL/min/1.73:m2 >90   Calcium      8.5 - 10.1 mg/dL 9.2   Phosphorus      2.5 - 4.5 mg/dL 4.2   Albumin      3.4 - 5.0 g/dL 2.9 (L)   WBC      4.0 - 11.0 10e9/L 8.7   RBC Count      3.8 - 5.2 10e12/L 4.81   Hemoglobin      11.7 - 15.7 g/dL 12.3   Hematocrit      35.0 - 47.0 % 39.3   MCV      78 - 100 fl 82   MCH      26.5 - 33.0 pg 25.6 (L)   MCHC      31.5 - 36.5 g/dL 31.3 (L)   RDW      10.0 - 15.0 % 14.1   Platelet Count      150 - 450 10e9/L 302   Specimen Description          Special Requests          Culture Micro          Creatinine Urine      mg/dL    Albumin Urine mg/L      mg/L    Albumin Urine mg/g Cr      0 - 25 mg/g Cr    Protein Random Urine      g/L    Protein Total Urine g/gr Creatinine      0 - 0.2 g/g Cr    Neutrophil Cytoplasmic Antibody      <1:10 titer  <1:10   Neutrophil Cytoplasmic Antibody Pattern       The ANCA IFA is <1:10.  No further testing will be performed.   Lactate Dehydrogenase      81 - 234 U/L 252 (H)   HIV Antigen Antibody Combo      NR:Nonreactive     Nonreactive   Sed Rate      0 - 30 mm/h 40 (H)   CRP Inflammation      0.0 - 8.0 mg/L 25.7 (H)   Complement C4      15 - 50 mg/dL 3 (L)   Complement C3      76 - 169 mg/dL 53 (L)   DNA-ds      <10 IU/mL >379 (H)   [facility]                                                      CMR Report       MRN:                  [id number]                                  Name:           CHILANGO GIBBONS                                   :                  1965                                  Scan Date:   2019 11:11:32                                   Electronically signed by Fer Corea 2019-May-20 14:19:00     SUMMARY   ==========================================================================================================     Clinical history: 54-year old female with SLE, poly-serositis, and chronic pleuritic chest pain. CMR to  assess for cardiac involvement (sepideh-myocarditis).  Comparison CMR: None.      1. The LV is normal in cavity size and wall thickness. The global systolic function is normal. The LVEF is  62%.   There are no regional wall motion abnormalities.     2. The RV is normal in cavity size. The global systolic function is normal. The RVEF is 60%.      3. Both atria are normal in size.     4. There is no significant valvular disease.      5. Late gadolinium enhancement imaging shows no MI, fibrosis or infiltrative disease.      6. The is mild pericardial thickening and tethering, with circumferential pericardial enhancement. There is  no pericardial effusion.       7. There is no intracardiac thrombus.     8. The main PA is moderately enlarged, measuring 3.8 cm.      CONCLUSIONS: Pericardial thickening and enhancement consistent with inflammatory pericarditis. There  is no  myocardial fibrosis or myocarditis. Normal biventricular size and systolic function; LVEF 62%, RVEF 60%.      CORE EXAM   ==========================================================================================================     MEASUREMENTS   ----------------------------------------------------------------------------------------      VOLUMETRIC ANALYSIS       ----------------------------------------------  .--------------------------------------------------------.                    LV    Reference  RV    Reference   +------+-----------+------+-----------+------+-----------+   EDV   ml          162   ()   159   ()          ml/m^2     69.2   (56-90)   67.9   (53-90)     ESV   ml           62   (22-59)     64   (15-68)           ml/m^2     26.5   (14-33)   27.4   (11-37)     CO    L/min      7.30             6.93                     L/min/m^2   3.1              3.0               MASS                                                 SV    ml          100   ()    95   ()          ml/m^2     42.7   (37-62)   40.6   (36-60)     EF    %            62   (59-77)     60   (55-79)    '------+-----------+------+-----------+------+-----------'             CARDIAC OUTPUT HR:  73 BPM      LA DIMENSIONS (LV SYSTOLE)       ----------------------------------------------          DIAMETER:  3.9 cm         AORTIC ROOT DIMENSIONS       ----------------------------------------------          SINUS OF VALSALVA:  2.9 cm          AORTIC ROOT SIZE:  Normal        ANATOMY   ----------------------------------------------------------------------------------------      LEFT VENTRICLE       ----------------------------------------------          WALL THICKNESS:  Normal          CAVITY SIZE:  Normal         RIGHT VENTRICLE       ----------------------------------------------          WALL THICKNESS:  Normal          CONTRACTILITY:   Normal          CAVITY SIZE:  Normal         INTERVENTRICULAR SEPTUM       ----------------------------------------------               VENTRICULAR SEPTUM:  Normal          INTERATRIAL SEPTUM       ----------------------------------------------               ATRIAL SEPTUM:          LIPOMATOUS HYPERTROPHY          LEFT ATRIUM       ----------------------------------------------          CAVITY SIZE:  Normal         RIGHT ATRIUM       ----------------------------------------------          CAVITY SIZE:  Normal         PERICARDIUM       ----------------------------------------------          THICKENED          THICKNESS:  5 mm          EFFUSION:  None         PLEURAL EFFUSION       ----------------------------------------------               None         VALVES   ----------------------------------------------------------------------------------------      AORTIC VALVE       ----------------------------------------------          AORTIC VALVE LEAFLETS:  Trileaflet         MITRAL VALVE       ----------------------------------------------          MITRAL VALVE LEAFLETS:  Normal Leaflets         TRICUSPID VALVE       ----------------------------------------------          TRICUSPID VALVE LEAFLETS:  Normal Leaflets         PULMONIC VALVE       ----------------------------------------------          PULMONIC VALVE LEAFLETS:  Normal Leaflets        17 SEGMENT   ----------------------------------------------------------------------------------------  .------------------------------------------------------------------------------------------.   Segments            Wall Motion   Hyperenhancement  Stress Perfusion  Interpretation   +--------------------+--------------+------------------+------------------+----------------+   Base Anterior       Normal/Hyper  None                                Normal            Base Anteroseptal   Normal/Hyper  None                                Normal            Base  Inferoseptal   Normal/Hyper  None                                Normal            Base Inferior       Normal/Hyper  None                                Normal            Base Inferolateral  Normal/Hyper  None                                Normal            Base Anterolateral  Normal/Hyper  None                                Normal            Mid Anterior        Normal/Hyper  None                                Normal            Mid Anteroseptal    Normal/Hyper  None                                Normal            Mid Inferoseptal    Normal/Hyper  None                                Normal            Mid Inferior        Normal/Hyper  None                                Normal            Mid Inferolateral   Normal/Hyper  None                                Normal            Mid Anterolateral   Normal/Hyper  None                                Normal            Apical Anterior     Normal/Hyper  None                                Normal            Apical Septal       Normal/Hyper  None                                Normal            Apical Inferior     Normal/Hyper  None                                Normal            Apical Lateral      Normal/Hyper  None                                Normal            [place]                Normal/Hyper  None                                Normal           +--------------------+--------------+------------------+------------------+----------------+   RV Segments         Wall Motion   Hyperenhancement  Stress Perfusion  Interpretation   +--------------------+--------------+------------------+------------------+----------------+   RV Basal Anterior   Normal/Hyper  None                                Normal            RV Basal Inferior   Normal/Hyper  None                                Normal            RV Mid              Normal/Hyper  None                                 Normal            RV Apical           Normal/Hyper  None                                Normal           '--------------------+--------------+------------------+------------------+----------------'         FINDINGS       ----------------------------------------------          INFARCT/SCAR SIZE:  0 %        SCAN INFO   ==========================================================================================================     GENERAL   ----------------------------------------------------------------------------------------      CONTRAST AGENT       ----------------------------------------------          TYPE:  Gadavist          VOLUME ADMINISTERED:  10 ml          DOSAGE FOR 0.5M:  0.04 mmol/kg          SERUM CREATININE:  0.58 sCr          CREATININE DATE:  2019-03-06 00:00:00          SEDATION       ----------------------------------------------          SEDATION USED?:  No         VITALS       ----------------------------------------------          HEIGHT:  66.00 in          HEIGHT:  167.64 cm          WEIGHT:  289.00 lbs          WEIGHT:  131.09 kgs          BSA:  2.34 m^2         PULSE SEQUENCES       ----------------------------------------------          Single-Shot SSFP, IR GRE - Segmented, IR SSFP - Single Shot, SSFP Cine          SETUP       ----------------------------------------------          TYPE:  Clinical          INPATIENT:  No          INCOMPLETE SCAN:  No          REASON(S) FOR SCAN:  Cardiomyopathy, Pericardial Evaluation           REFERRING PHYSICIAN:  ROBE VAZQUEZ          ATTENDING PHYSICIAN:  ROBE VAZQUEZ      Kidney Biopsy (6/7/19)  Patient Name: CHILANGO GIBBONS   MR#: [id number]   Specimen #: [id number]   Collected: 6/7/2019   Received: 6/7/2019   Reported: 6/10/2019 22:42   Ordering Phy(s): JOYCE CAMERON     For improved result formatting, select 'View Enhanced Report Format' under    Linked Documents section.     SPECIMEN(S):   Kidney biopsy, native  with EM & Immunofluorescence, right     FINAL DIAGNOSIS:   Kidney; percutaneous needle biopsy:   -Diffuse global lupus nephritis, ISN/RPS class IV-S (a)   -Mild to moderate arteriosclerosis     COMMENT:   There is mild mesangial and endocapillary proliferation, focal leukocytic   infiltrates, and crescentic lesions   involving 14 out of 26 glomeruli, mainly cellular with necrotizing   lesions.  There are minimal chronic   changes.  The activity index of the proliferative component is 9/24 and   the chronicity index is 1/12 (Robert   et al, American Journal of Medicine 75:  382-391, 1983).     The presence of significant crescents with only mild mesangial and   endocapillary proliferation raises the   possibility of concurrent lupus nephritis and ANCA-associated crescentic   glomerulonephritis.  Thus it is   recommended to test for ANCA and clinical correlation is recommended.        Ph.E:    BP (!) 150/78 (BP Location: Other (Comment))   Pulse 75   Wt 135.5 kg (298 lb 12.8 oz)   SpO2 96%   BMI 48.25 kg/m         Constitutional: WD/WN. Pleasant. In no acute distress. Cushingoid facies.  Eyes: EOM intact, PERRLA, sclera anicteric, conj not injected  HEENT: No oral ulcers or thrush. Normal salivary pool.    Neck: No cervical LAP or thyromegaly.  Chest: Clear to auscultation bilaterally  CV: RRR, no murmurs/ rubs or gallops. No clubbing or cyanosis. 1+edema.  GI: Abdomen is soft and non tender.   MS: No synovitis. Cool joints. No tenderness of the joints. No swelling. Normal ROM.  No joint deformities. Full ROM of the joints. No nodules. No Jaccoud's deformity.  Skin: No skin rash, malar rash, livedo, periungual erythema, alopecia, digital ulcers or nail changes.  Neuro: A&O x 3. Grossly non focal, muscular power 5/5 in all ext  Psych: NL affect and mood    Assessment/ plan:    1-SLE. 53 yo WF with +fh/o RA and personal hx of SLE presented in 12/2017 for 2nd opinion of m/o her SLE. She was diagnosed with SLE at age  27. Her lupus has been marked by +KARLIE (highest titer 1:640, speckled and nucleolar patterns), +anti-DNA, arthritis, malar rash, serositis (pleuritic CP) supported by +SSA/SSB Ab, low C3/low C4, +RF, high ESR/CRP. She had neg anti-RNP, anti-Sm, acL IgM/G/A, LAC, cryo and anti-CCP.     Had NL C3 at the time of Dx. She was treated with prednisone and HCQ at the time of Dx. Can't remember how long she was on HCQ and why HCQ was stopped, but it probably was stopped because of stable disease, no report on HCQ toxicity. Reportedly her SLE was mild all these years.    Has secondary Sjogren's with sicca, +SSA/SSB Ab and h/o MALT lymphoma at age 44 in remission. Uses blink eyedrops and salagen. No lip biopsy was done to confirm Dx of Sjogren's.    Her lupus flared in 7/2017 with no triggers when she presented with arthritis, HCQ was resumed, arthritis resolved. Mild pulm HTN on 2D-Echo 8/2017 but neg R cardiac cath and VQ scan in 3/2018, also neg 2D-Echo.    In 12/2017, was prescribed prednisone 40 mg for only 5 days for pleuritic CP, CP recurred after stopping prednisone.     Her major complaint at initial visit with me in 12/2017 was ongoing CP. AZA was added in 2/2018 with start dose of 50 mg qd and slowly was increased to max 150 mg qd. Tolerated AZA well, no SEs, no toxicity on the labs but failed it and required to go back on prednisone 20 mg qd (current dose).     Her lupus is active with pleuritic CP. ESR/CRP normalized on prednisone+AZA+HCQ but +anti-DNA, low C3/C4 are unchanged. Chest CT in 12/2017 without contrast showed air trapping due to lupus/Sjogren's, no pleural effusion. Lung nodules need f/u in a year. No pericardail effusion on 2D-echo and neg VQ scan for PE in 3/2018 so chest CT with contrast is not needed. She is APL negative.    AZA was switched to  mg po bid on 5/18/2018 as she failed AZA. She is now on max dose of MMF 1500 mg bid. Her labs are unchanged with persistently elevated anti-DNA, low  C3/C4 and high ESR/CRP, but just started to feel a lot better (regarding fatigue, arthralgia, still has residual pleuritic CP) after adding MMF.     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 Also possible shrinking lung syn sec lupus or obesity is responsible for restrictive lung disease plus pleural effusion. She was prescribed albuterol and dulera inh which have helped.    Benlysta was added in 9/2018 to help with pleuritic CP. No change in lupus serology (anti-DNA, C3, C4), but ESR/CRP have improved. Overall she felt a lot better after adding benlysta but it made her tired.     I could not taper her off the prednisone, she continued to have active IA, fatigue and pleuritic CP. I recommended switching benlysta to rituximab given her h/o lymphoma, unfortunately her insurance denied. At the same time, Taina misunderstood and stopped her cellcept as thought we were switching MMF to rituximab while the plan was to add rituximab to MMF. She has been off MMF for couple of months. Had cardiac MRI on 5/20 which showed active pericarditis. I advised to switch to cytoxan 750 mg/m2 qmo x 6 mo. On Saturday 6/1/2019, received a call from infusion center reporting blood in the urine and if it was ok to proceed with cytoxan. I was concerned as that was very new. We canceled the cytoxan infusion. UCx was negative for UTI. Repeat U/A today is showing protein and blood, with h/o stopping MMF, very concerning for lupus nephritis. She never had lupus nephritis as part of her lupus and it's possible that it was covered by MMF and showed up off MMF. Renal biopsy is still recommended to confirm Dx, evaluate degree of severity, chronicity and rule out other diagnoses. I  spoke to renal team and dr. Elmore and Aura kindly agreed to see her today (6/5/19) as urgent consult as add on to their schedule and scheduled renal biopsy for Friday 6/7/2019. I would re-schedule cytoxan to 6/15/2019 and schedule pulse solumedrol 1 gr every day x 3 days. Meanwhile, will increase prednisone to 60 mg every day. Cytoxan risks were discussed again.    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 Patient received 1st dose of cyclophosphamide on 6/15/19. Tolerated well. Has since followed up with nephrology.     S/p 3 cytoxan for lupus nephritis, enteritis, arthritis, pleuritic CP. Still active lupus, but improved sx, down on prednisone from 80 mg every day last visit to 30 mg every day today. Feeling better. No flare of enteritis.    Recommend:    Labs on 9/30    Prednisone 25-20-17.5-15-12.5 mg a day each for one week then 10 mg a day    Will order rituximab again, initially got denied by insurance but per lupus nephritis guideline, it would be next step, also h/o lymphoma    Prevnar today to prevent pneumonia    Continue the plaquenil 200 mg twice a day       2-HCQ monitoring. Was reminded to have annual eye exam    3-PCP prophylaxis. Patient did not tolerate inhaled pentamidine. Avoiding TMP-SMX given sulfa reaction and also increase SLE flare. I am hesitant to use dapsone given risk of hemolytic anemia. Will start atovaquone 10 mL (1500 mg) daily for prophylaxis.      RTC 3-4 months            Orders Placed This Encounter   Procedures     ALT     Albumin level     AST     CBC with platelets differential     Creatinine     Complement C4     Complement C3     CRP inflammation     DNA double stranded antibodies     Erythrocyte sedimentation rate auto     UA with Microscopic reflex to Culture     Protein  random urine with Creat Ratio     Creatinine random urine             Again, thank you for allowing me to participate in the care of your patient.      Sincerely,    Killian Marroquin,  MD

## 2019-09-06 NOTE — PROGRESS NOTES
Rheumatology F/U Note    Reason for visit: f/u for lupus flare    Date of last visit: 7/3/2019    DOS: 9/6/2019      HPI:    Taina Singh is a 52 year old  female who was referred to our clinic for evaluation and management of her SLE.    She was diagnosed with lupus at age 27. Her 1st sx were malar rash and fatigue. No skin biopsy was done (reportedly had +KARLIE). She was treated with prednisone taper and HCQ. HCQ helped and she tolerated it well. She was diagnosed with Sjogren's at the same time. Had dryness of eyes and mouth. No lip biopsy to confirm the Dx.    Reportedly she had very mild stable lupus for many years and eventually at some point, stopped taking HCQ (can't remember when)    She was diagnosed with MALT lymphoma at 44, had enlarged LN over R parotid gland, on biopsy it was MALT lymphoma. Tx was resection only, no radiation or chemo.    About 3 yr ago, had flu shot and 2 days later, developed pleuritic CP and was seen at urgent care. That's why she does not want to get flu shot. She was seen in ER 2 days after UC visit. She was treated with prednisone.    She lost 100 lbs by exercise over past 2 yr, felt amazing. In 7/2017, started not feeling well. She had extreme fatigue. Had AM stiffness and pain over hands. Touched base with her previous rheumatologist (Dr. Rangel) and was told to have lupus flare and was put on  mg qd. Afterwards, developed pleuritic CP which has not been resolved.    She was given prednisone 40 mg qd x 5 days (on 12/16/2017) by pulmonologist in [facility]. It helped a lot but pleuritic CP recurred after stopping it.    She was told to have pulm HTN and was evaluated for it.    No triggers for current flare.    No flare of malar rash. No current hair loss. Uses blink eyedrops, restasis burned her eyes. She has been prescribed salagen for dry mouth, has not used it. Arthritis of hands have resolved on HCQ.    Last HCQ eye exam was 1 week ago.    4/2018: On AZA 50 mg  qd since 2/8/2018, increased to 100 mg qd in 3/19/2018. Her arthralgia is intermittent and mild, it's better. Has fatigue.  5/2018: Started on mycophenalate 1500 mg, continues prednisone 30 mg and plaquenil 200 mg twice a day.     Her major complaint is continues pressure over her chest. Pain is pleuritic, it hurts by sneezing, taking deep breath.      7/2018: Ms. Singh feels an improvement in her symptoms. She says there is a baseline pleuritic chest pain, but her fatigue and overall day-to-day function has improved to her satisfaction. She says morning stiffness usually only lasts about 10 minutes. She complains of occasional episodes of flashing lights in her left eye, however she is being seen by her opthamologist. She has a OTC scheduled for Monday as well. Ms. Singh feels as though the mycophenalate has made the biggest difference in her improvement. She continues to taper the prednisone, currently on 20 mg daily. She also has continued the plaquenil 200 mg twice a day.       11/2018: On benlysta infusion since beginning of Sept 2018. Chest still feels less tight, breathing is much better. Sometimes hands ache but it does lot last long. Benlysta makes her tired, but overall feels her lupus sx are much improved on benlysta.      3/2019: Feels tired and achy, it is intermittent and moves around. The L hip pain is consistent for the past 7 days, she moved up her appointment from 3/22 to today to get a bursitis shot. Had bad sinus and ear infection in 1/2019. She still thinks benlysta has helped her a lot. Last night, her R shoulder was hurting so bad that she could not move it but it's better today. Pain comes and goes. Breathing is better after adding benlysta, she is not gasping for air anymore which is huge improvement for her. She feels pressure over her chest and low back.    Is getting benlysta tomorrow.    Her prednisone dose is 10 mg a day.    Leaving to [place] for vacation.    6/5/2019: She  reports severe diffuse arthralgia affecting all her joints with pain level 8/10, today is 6/10. No SOB. Feels pressure like CP. Has severe fatigue. Last benlysta was 7 wk ago.    7/2019: Since last visit 1 month ago, patient was hospitalized x2 (both 6/10-6/13 & 6/18-6/22) at [facility] for acute abdominal pain. 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 Team felt that 2nd admission was 2/2 tapering of steroids. She was pulsed during first admission and then got Cytoxan as outpatient in between admissions on 6/15.     Incidentally, patient found to have small splenic infarct and acute PE on CT scan. She was started on Xarelto.    Also had her kidney biopsy on 6/7/19. Biopsy showed diffuse global lupus nephritis, ISN/RPS class IV. She has also followed up with nephrology this past week and started on lisinopril. No reports of hypotension.    Since leaving hospital, biggest complaint is leg strength/conditioning. She thinks this is related to laying in bed while hospitalized. Going to start PT as soon as it is set up.       Today 9/6/2019: Last Wed had her cytoxan infusion, everything went well. Did not sleep well on Wed evening. Called our clinic the next Thu as had diffuse body pain and N/V.also was a on a new BP med x 1 mo which made her dizzy. Her legs were swollen. She was seen in ED that Thu, her BP found to be low. She was instructed to stop BP med and her facial swelling, dizziness, LE swelling resolved, lost 10 lbs. That ended up being reaction to med. Does not know if it was lisinopril or losartan.      S/p 3 cytoxan infusions.      Overall she is feeling better, minimal joint pain, no SOB/CP/abdomianl pain. Has malar rash. No oral ulcers.    She has been on prednisone 30 mg a day x 2 weeks.      ROS:  A comprehensive ROS was done, positives are per HPI.    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 age 44    No chemo or radiation     Other forms of systemic lupus erythematosus (H) 2018     Pulmonary embolism (H)     2019 seen on abd CT at [facility]     Sjogren's syndrome (H)     secondary Sjogrens, secondary to lupus     Vitamin D deficiency      Past Surgical History:   Procedure Laterality Date     BIOPSY/REMOVAL, LYMPH NODE(S)        SECTION  age 32     HC HYSTEROS W PERMANENT FALLOPAIN IMPLANT      Essure b/l     PAROTIDECTOMY Right 2006     TONSILLECTOMY       Family History   Problem Relation Age of Onset     Alopecia Brother      Rheumatoid Arthritis Brother      LUNG DISEASE No family hx of      Social History     Socioeconomic History     Marital status:      Spouse name: Not on file     Number of children: Not on file     Years of education: 1     Highest education level: Not on file   Occupational History     Comment: , 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   Social Needs     Financial resource strain: Not on file     Food insecurity:     Worry: Not on file     Inability: Not on file     Transportation needs:     Medical: Not on file     Non-medical: Not on file   Tobacco Use     Smoking status: Never Smoker     Smokeless tobacco: Never Used   Substance and Sexual Activity     Alcohol use: No     Drug use: No     Sexual activity: Not on file   Lifestyle     Physical activity:     Days per week: Not on file     Minutes per session: Not on file     Stress: Not on file   Relationships     Social connections:     Talks on phone: Not on file     Gets together: Not on file     Attends [employer]ism service: Not on file     Active member of club or organization: Not on file      Attends meetings of clubs or organizations: Not on file     Relationship status: Not on file     Intimate partner violence:     Fear of current or ex partner: Not on file     Emotionally abused: Not on file     Physically abused: Not on file     Forced sexual activity: Not on file   Other Topics Concern     Parent/sibling w/ CABG, MI or angioplasty before 65F 55M? 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      Going through divorce but it's not stress factor for her.    Allergies   Allergen Reactions     Pentamidine Shortness Of Breath     Penicillins Rash     Sulfa Drugs Rash       Outpatient Encounter Medications as of 9/6/2019   Medication Sig Dispense Refill     atovaquone (MEPRON) 750 MG/5ML suspension Take 10 mLs (1,500 mg) by mouth daily 210 mL 5     Calcium-Magnesium 300-300 MG TABS daily        fluticasone (FLONASE) 50 MCG/ACT nasal spray Spray 1-2 sprays into both nostrils daily Use 1 spray per nostril per day for 2 weeks.  May increase to 2 sprays per nostril per day after. 16 g 0     hydroxychloroquine (PLAQUENIL) 200 MG tablet Take 1 tablet (200 mg) by mouth 2 times daily 180 tablet 1     losartan (COZAAR) 25 MG tablet Take 1 tablet (25 mg) by mouth daily 90 tablet 3     Multiple Vitamins-Minerals (WOMENS MULTI PO) Take by mouth daily        Omega-3 Fatty Acids (OMEGA-3 FISH OIL) 500 MG CAPS Take 500 mg by mouth daily        pantoprazole (PROTONIX) 20 MG EC tablet Take 2 tablets (40 mg) by mouth 2 times daily       pilocarpine (SALAGEN) 5 MG tablet Take 1 tablet (5 mg) by mouth 4 times daily as needed 360 tablet 3     predniSONE (DELTASONE) 10 MG tablet Take 1 tablet by mouth every morning       predniSONE  (DELTASONE) 20 MG tablet Take 3 tablets (60 mg) by mouth daily For one month (Patient taking differently: Take 20 mg by mouth daily For one month) 90 tablet 0     rivaroxaban ANTICOAGULANT (XARELTO) 15 MG TABS tablet Take 15 mg by mouth 2 times daily (with meals)       traMADol (ULTRAM) 50 MG tablet Take 1 tablet (50 mg) by mouth every 12 hours as needed for pain Take 1 tablet as needed for pain.  No driving or alcohol use while taking medication. 30 tablet 2     vitamin D3 (CHOLECALCIFEROL) 2000 units (50 mcg) tablet Take 1 tablet (2,000 Units) by mouth daily 90 tablet 11     zolpidem (AMBIEN) 5 MG tablet Take 1 tablet (5 mg) by mouth nightly as needed for sleep 30 tablet 1     albuterol (PROAIR HFA/PROVENTIL HFA/VENTOLIN HFA) 108 (90 Base) MCG/ACT inhaler Inhale 2 puffs into the lungs every 6 hours as needed for shortness of breath / dyspnea or wheezing (Patient not taking: Reported on 2019) 3 Inhaler 3     chlorthalidone (HYGROTON) 25 MG tablet Take 1 tablet (25 mg) by mouth daily (Patient not taking: Reported on 2019) 30 tablet 1     No facility-administered encounter medications on file as of 2019.          Her records were reviewed.    2D-Echo 2017:    ECHO COMPLETE WO FRANCISCO CHILANGO GIBBONS 2017 14:43     [facility] [id number] [facility], [address], [place] [address]   Main: [phone]  [email or web address]                                                 Transthoracic Echo Report   CHILANGO GIBBONS   Excellian ID: [id number] Age: 52 : 1965 Ordering Provider: NGUYEN PETERS   Exam Date: 2017 14:43 Gender: F Sonographer: HAJA   Accession #: [id number] Height: 66 in BSA: 2.24 m  BP: 108 / 62   Weight: 261 lbs BMI: 42.1 kg/m        Site: [facility]   Location: Outpatient Rhythm: Normal Sinus Rhythm   Procedure Components: 2D imaging, Color Doppler, Spectral Doppler   Indications: Murmur; Systemic lupus  "erythematosus, unspecified SLE type, unspecified organ   involvement status   Technical Quality: Contrast: None     Final Conclusion   Visually estimated ejection fraction is 55-60%.   Mild left ventricular hypertrophy.   Estimated pulmonary artery pressure of 31 mmHg + RA pressure.   Dilated IVC size, <50% collapse with respiration.   Estimated EF: 55-60%   FINDINGS   Left Ventricle Normal left ventricular size. Visually estimated ejection fraction is 55-60%.   Mild left ventricular hypertrophy.   Diastolic Function \"Pseudonormal\" left ventricular filling pattern. E/e' ratio 8-15 is   indeterminate for filling pressure.   Right Ventricle Normal right ventricular size and function.   Left Atrium Mild left atrial enlargement.   Right Atrium Mild right atrial enlargement.   Aortic Valve Normal aortic valve. No aortic stenosis. No significant aortic regurgitation.   Mitral Valve Normal mitral valve. No mitral stenosis. Mild mitral regurgitation.   Tricuspid Valve Normal tricuspid valve. No tricuspid stenosis. Mild tricuspid regurgitation.   Estimated pulmonary artery pressure   of 31 mmHg + RA pressure.   Pulmonic Valve Normal pulmonic valve without significant stenosis or regurgitation.   Pericardium Normal pericardium.   Aorta Measured aortic root diameter is normal in size.   Inferior Vena Cava Dilated IVC size, <50% collapse with respiration.    Component      Latest Ref Rng & Units 11/20/2017   Sodium      133 - 144 mmol/L 137   Potassium      3.4 - 5.3 mmol/L 4.2   Chloride      94 - 109 mmol/L 102   Carbon Dioxide      20 - 32 mmol/L 30   Anion Gap      3 - 14 mmol/L 6   Glucose      70 - 99 mg/dL 101 (H)   Urea Nitrogen      7 - 30 mg/dL 13   Creatinine      0.52 - 1.04 mg/dL 0.55   GFR Estimate      >60 mL/min/1.7m2 >90   GFR Estimate If Black      >60 mL/min/1.7m2 >90   Calcium      8.5 - 10.1 mg/dL 9.1   WBC      4.0 - 11.0 10e9/L 4.9   RBC Count      3.8 - 5.2 10e12/L 4.28   Hemoglobin      11.7 - 15.7 g/dL " 11.3 (L)   Hematocrit      35.0 - 47.0 % 35.5   MCV      78 - 100 fl 83   MCH      26.5 - 33.0 pg 26.4 (L)   MCHC      31.5 - 36.5 g/dL 31.8   RDW      10.0 - 15.0 % 14.6   Platelet Count      150 - 450 10e9/L 215   N-Terminal Pro Bnp      0 - 125 pg/mL 546 (H)   Sed Rate      0 - 30 mm/h 71 (H)     Component      Latest Ref Rng & Units 12/29/2017   Color Urine       Straw   Appearance Urine       Clear   Glucose Urine      NEG:Negative mg/dL Negative   Bilirubin Urine      NEG:Negative Negative   Ketones Urine      NEG:Negative mg/dL Negative   Specific Gravity Urine      1.003 - 1.035 1.005   Blood Urine      NEG:Negative Negative   pH Urine      5.0 - 7.0 pH 6.0   Protein Albumin Urine      NEG:Negative mg/dL Negative   Urobilinogen mg/dL      0.0 - 2.0 mg/dL 0.0   Nitrite Urine      NEG:Negative Negative   Leukocyte Esterase Urine      NEG:Negative Negative   Source       Midstream Urine   WBC Urine      0 - 2 /HPF <1   RBC Urine      0 - 2 /HPF <1   Bacteria Urine      NEG:Negative /HPF Few (A)   Squamous Epithelial /HPF Urine      0 - 1 /HPF <1   Mucous Urine      NEG:Negative /LPF Present (A)   Albumin Fraction      3.7 - 5.1 g/dL 4.2   Alpha 1 Fraction      0.2 - 0.4 g/dL 0.4   Alpha 2 Fraction      0.5 - 0.9 g/dL 0.8   Beta Fraction      0.6 - 1.0 g/dL 1.0   Gamma Fraction      0.7 - 1.6 g/dL 1.6   Monoclonal Peak      0.0 g/dL 0.0   ELP Interpretation:       Essentially normal electrophoretic pattern. No monoclonal protein seen. Pathologic . . .   IGG      695 - 1620 mg/dL 1560   IGA      70 - 380 mg/dL 473 (H)   IGM      60 - 265 mg/dL 92   Iron      35 - 180 ug/dL 58   Iron Binding Cap      240 - 430 ug/dL 315   Iron Saturation Index      15 - 46 % 19   TPMT Genotype Specimen       Whole Blood   TPMT Genotype       Neg/Neg   TPMT Predicted Phenotype       Normal   Protein Random Urine      g/L <0.05   Protein Total Urine g/gr Creatinine      0 - 0.2 g/g Cr Unable to calculate due to low value    Deamidated Gliadin Cari, IgA      <7 U/mL 2   Deamidated Gliadin Cari, IgG      <7 U/mL 5   Complement C3      76 - 169 mg/dL 72 (L)   Complement C4      15 - 50 mg/dL 6 (L)   CRP Inflammation      0.0 - 8.0 mg/L 3.2   DNA-ds      <10 IU/mL >379 (H)   Sed Rate      0 - 30 mm/h 49 (H)   Vitamin D Deficiency screening      20 - 75 ug/L 51   Creatinine Urine Random      mg/dL 23   AST      0 - 45 U/L 26   ALT      0 - 50 U/L 35   HEENA  Broad Spectrum       Neg   Beta 2 Glycoprotein 1 Antibody IgG      <7 U/mL <0.6   Beta 2 Glycoprotein 1 Antibody IgM      <7 U/mL <0.9   Thyroid Peroxidase Antibody      <35 IU/mL <10   Thyroglobulin Antibody      <40 IU/mL <20   TSH      0.40 - 4.00 mU/L 0.87   Cryoglobulin      NEG:Negative % Trace (A)   Tissue Transglutaminase Antibody IgA      <7 U/mL 4   Ferritin      8 - 252 ng/mL 163   Endomysial Antibody IgA by IFA      <1:10 <1:10   CRP Cardiac Risk      mg/L 2.9   Creatinine Urine      mg/dL 23     Component      Latest Ref Rng & Units 2/23/2018   Color Urine       Yellow   Appearance Urine       Clear   Glucose Urine      NEG:Negative mg/dL Negative   Bilirubin Urine      NEG:Negative Negative   Ketones Urine      NEG:Negative mg/dL Negative   Specific Gravity Urine      1.003 - 1.035 1.025   Blood Urine      NEG:Negative Negative   pH Urine      5.0 - 7.0 pH 5.5   Protein Albumin Urine      NEG:Negative mg/dL Negative   Urobilinogen Urine      0.2 - 1.0 EU/dL 0.2   Nitrite Urine      NEG:Negative Negative   Leukocyte Esterase Urine      NEG:Negative Negative   Source       Midstream Urine   Protein Random Urine      g/L 0.17   Protein Total Urine g/gr Creatinine      0 - 0.2 g/g Cr 0.18   Complement C3      76 - 169 mg/dL 64 (L)   Complement C4      15 - 50 mg/dL 5 (L)   CRP Inflammation      0.0 - 8.0 mg/L 4.2   DNA-ds      <10 IU/mL >379 (H)   Sed Rate      0 - 30 mm/h 35 (H)   AST      0 - 45 U/L 27   ALT      0 - 50 U/L 31   Creatinine Urine Random      mg/dL 99      Component      Latest Ref Rng & Units 3/16/2018   WBC      4.0 - 11.0 10e9/L 5.6   RBC Count      3.8 - 5.2 10e12/L 4.43   Hemoglobin      11.7 - 15.7 g/dL 12.1   Hematocrit      35.0 - 47.0 % 36.9   MCV      78 - 100 fl 83   MCH      26.5 - 33.0 pg 27.3   MCHC      31.5 - 36.5 g/dL 32.8   RDW      10.0 - 15.0 % 14.5   Platelet Count      150 - 450 10e9/L 224   Diff Method       Automated Method   % Neutrophils      % 81.7   % Lymphocytes      % 9.3   % Monocytes      % 7.5   % Eosinophils      % 1.3   % Basophils      % 0.2   Absolute Neutrophil      1.6 - 8.3 10e9/L 4.6   Absolute Lymphocytes      0.8 - 5.3 10e9/L 0.5 (L)   Absolute Monocytes      0.0 - 1.3 10e9/L 0.4   Absolute Eosinophils      0.0 - 0.7 10e9/L 0.1   Absolute Basophils      0.0 - 0.2 10e9/L 0.0   Creatinine      0.52 - 1.04 mg/dL 0.51 (L)   GFR Estimate      >60 mL/min/1.7m2 >90   GFR Estimate If Black      >60 mL/min/1.7m2 >90   ALT      0 - 50 U/L 27   Albumin      3.4 - 5.0 g/dL 3.5   AST      0 - 45 U/L 18       Component      Latest Ref Rng & Units 3/21/2018   Color Urine       Yellow   Appearance Urine       Clear   Glucose Urine      NEG:Negative mg/dL Negative   Bilirubin Urine      NEG:Negative Negative   Ketones Urine      NEG:Negative mg/dL Negative   Specific Gravity Urine      1.003 - 1.035 <=1.005   pH Urine      5.0 - 7.0 pH 6.5   Protein Albumin Urine      NEG:Negative mg/dL Negative   Urobilinogen Urine      0.2 - 1.0 EU/dL 0.2   Nitrite Urine      NEG:Negative Negative   Blood Urine      NEG:Negative Negative   Leukocyte Esterase Urine      NEG:Negative Negative   Source       Midstream Urine   WBC Urine      OTO5:0 - 5 /HPF 0 - 5   RBC Urine      OTO2:O - 2 /HPF O - 2   Squamous Epithelial /LPF Urine      FEW:Few /LPF Few   Protein Random Urine      g/L <0.05   Protein Total Urine g/gr Creatinine      0 - 0.2 g/g Cr Unable to calculate due to low value   DNA-ds      <10 IU/mL >379 (H)   Complement C4      15 - 50 mg/dL 4  (L)   Complement C3      76 - 169 mg/dL 69 (L)   Creatinine Urine Random      mg/dL 17   Creatinine Urine      mg/dL 17     EXAMINATION: CT CHEST W/O CONTRAST, 12/29/2017 1:08 PM   TECHNIQUE:  Helical CT images from the thoracic inlet through the lung  bases were obtained without IV contrast during inspiration and  expiration according to high-resolution chest CT protocol. Contrast  dose: None  COMPARISON: None   HISTORY: eval for ILD, pleural effusion, pt has lupus, Sjogren's, h/o  MALT and pleurisy and SOB; Systemic lupus erythematosus, unspecified  SLE type, unspecified organ involvement status (H)   FINDINGS:  At least 75% collapse of the trachea and mainstem bronchi on the end  expiratory views. Diffuse lobular air trapping on end expiratory  images. Bibasilar platelike atelectasis. No pneumothorax or pleural  effusion. Scattered solid pulmonary nodules, for example a 4 mm  lingular nodule (series 5 image 145) and a 4 mm right upper lobe  nodule (image 73).    The heart size is normal. Mild three-vessel coronary artery calcific  atherosclerosis. Dilation of the main pulmonary artery to 4.1 cm. No  pericardial effusion. Normal caliber and configuration of the thoracic  great vessels. Numerous prominent though nonenlarged mediastinal lymph  nodes.  Limited views of the abdomen reveal no worrisome pathology. No  worrisome bony or soft tissue lesions.    IMPRESSION:   1. At least moderate tracheobronchomalacia.  2. 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  3. Scattered subcentimeter pulmonary nodules measuring up to 4 mm.  Consider follow-up chest CT in one year to establish stability.     [Consider Follow Up: Lung nodule]     This report will be copied to the [facility] to ensure a  provider acknowledges the finding.      I have personally reviewed the examination and initial  interpretation  and I agree with the findings.     AUSTIN PACE MD    Examination:NM LUNG SCAN VENTILATION AND PERFUSION, 3/14/2018 8:24 AM    Indication:  Chronic PE; Pulmonary hypertension    Additional Information: none   Technique:   The patient received 2 mCi of Tc-99m DTPA aerosol for ventilation and  7 mCi of Tc-99m MAA for perfusion. Multiple images were obtained of  the lungs in Anterior, posterior, HERNANDES, RPO, LPO, and  ELYSE projections.   Comparison: Chest x-ray same-day   Findings:     There are matched ventilation/perfusion defects in both lungs. No  mismatched perfusion defect to suggest pulmonary emboli.      Impression:  Pulmonary emboli is not present.     I have personally reviewed the examination and initial interpretation  and I agree with the findings.     DONNIE GARCIA MD    Component      Latest Ref Rng & Units 5/12/2018   WBC      4.0 - 11.0 10e9/L 7.1   RBC Count      3.8 - 5.2 10e12/L 4.42   Hemoglobin      11.7 - 15.7 g/dL 12.1   Hematocrit      35.0 - 47.0 % 37.4   MCV      78 - 100 fl 85   MCH      26.5 - 33.0 pg 27.4   MCHC      31.5 - 36.5 g/dL 32.4   RDW      10.0 - 15.0 % 14.7   Platelet Count      150 - 450 10e9/L 226   Diff Method       Automated Method   % Neutrophils      % 86.2   % Lymphocytes      % 4.8   % Monocytes      % 8.4   % Eosinophils      % 0.3   % Basophils      % 0.3   Absolute Neutrophil      1.6 - 8.3 10e9/L 6.1   Absolute Lymphocytes      0.8 - 5.3 10e9/L 0.3 (L)   Absolute Monocytes      0.0 - 1.3 10e9/L 0.6   Absolute Eosinophils      0.0 - 0.7 10e9/L 0.0   Absolute Basophils      0.0 - 0.2 10e9/L 0.0   Color Urine       Yellow   Appearance Urine       Clear   Glucose Urine      NEG:Negative mg/dL Negative   Bilirubin Urine      NEG:Negative Negative   Ketones Urine      NEG:Negative mg/dL Negative   Specific Gravity Urine      1.003 - 1.035 <=1.005   Blood Urine      NEG:Negative Negative   pH Urine      5.0 - 7.0 pH 5.5   Protein Albumin Urine       NEG:Negative mg/dL Negative   Urobilinogen Urine      0.2 - 1.0 EU/dL 0.2   Nitrite Urine      NEG:Negative Negative   Leukocyte Esterase Urine      NEG:Negative Negative   Source       Midstream Urine   Creatinine      0.52 - 1.04 mg/dL 0.63   GFR Estimate      >60 mL/min/1.7m2 >90   GFR Estimate If Black      >60 mL/min/1.7m2 >90   Protein Random Urine      g/L <0.05   Protein Total Urine g/gr Creatinine      0 - 0.2 g/g Cr Unable to calculate due to low value   Albumin      3.4 - 5.0 g/dL 3.6   ALT      0 - 50 U/L 28   AST      0 - 45 U/L 20   Complement C3      76 - 169 mg/dL 46 (L)   Complement C4      15 - 50 mg/dL 3 (L)   CRP Inflammation      0.0 - 8.0 mg/L <2.9   Sed Rate      0 - 30 mm/h 26   DNA-ds      <10 IU/mL >379 (H)   Creatinine Urine      mg/dL 16     Component      Latest Ref Rng & Units 7/7/2018   WBC      4.0 - 11.0 10e9/L 7.0   RBC Count      3.8 - 5.2 10e12/L 4.35   Hemoglobin      11.7 - 15.7 g/dL 11.7   Hematocrit      35.0 - 47.0 % 36.0   MCV      78 - 100 fl 83   MCH      26.5 - 33.0 pg 26.9   MCHC      31.5 - 36.5 g/dL 32.5   RDW      10.0 - 15.0 % 15.3 (H)   Platelet Count      150 - 450 10e9/L 224   Diff Method       Automated Method   % Neutrophils      % 91.9   % Lymphocytes      % 4.0   % Monocytes      % 3.7   % Eosinophils      % 0.3   % Basophils      % 0.1   Absolute Neutrophil      1.6 - 8.3 10e9/L 6.5   Absolute Lymphocytes      0.8 - 5.3 10e9/L 0.3 (L)   Absolute Monocytes      0.0 - 1.3 10e9/L 0.3   Absolute Eosinophils      0.0 - 0.7 10e9/L 0.0   Absolute Basophils      0.0 - 0.2 10e9/L 0.0   Color Urine       Yellow   Appearance Urine       Clear   Glucose Urine      NEG:Negative mg/dL Negative   Bilirubin Urine      NEG:Negative Negative   Ketones Urine      NEG:Negative mg/dL Negative   Specific Gravity Urine      1.003 - 1.035 1.010   pH Urine      5.0 - 7.0 pH 5.5   Protein Albumin Urine      NEG:Negative mg/dL Negative   Urobilinogen Urine      0.2 - 1.0 EU/dL 0.2    Nitrite Urine      NEG:Negative Negative   Blood Urine      NEG:Negative Trace (A)   Leukocyte Esterase Urine      NEG:Negative Negative   Source       Midstream Urine   WBC Urine      OTO5:0 - 5 /HPF 0 - 5   RBC Urine      OTO2:O - 2 /HPF O - 2   Squamous Epithelial /LPF Urine      FEW:Few /LPF Few   Creatinine      0.52 - 1.04 mg/dL 0.63   GFR Estimate      >60 mL/min/1.7m2 >90   GFR Estimate If Black      >60 mL/min/1.7m2 >90   Protein Random Urine      g/L 0.07   Protein Total Urine g/gr Creatinine      0 - 0.2 g/g Cr 0.29 (H)   Albumin      3.4 - 5.0 g/dL 3.5   ALT      0 - 50 U/L 27   AST      0 - 45 U/L 21   Complement C3      76 - 169 mg/dL 51 (L)   Complement C4      15 - 50 mg/dL 5 (L)   Creatinine Urine Random      mg/dL 24   CRP Inflammation      0.0 - 8.0 mg/L 11.2 (H)   DNA-ds      <10 IU/mL >379 (H)   Sed Rate      0 - 30 mm/h 35 (H)   Creatinine Urine      mg/dL 23     PFTs 5/2018 (The FEV1 and FVC are reduced but the FEV1/FVC ratio is normal.  The inspiratory flow rates are reduced.  The TLC and FRC are reduced.  The diffusing capacity is normal.  However, the diffusing capacity was not corrected for the patient's hemoglobin.  IMPRESSION:  Moderately Severe  Restriction.  Normal Diffusion.  Walk distance is normal on room air with significant desaturation but no hypoxia.  ____________________________________________KERVIN TONY.      Component      Latest Ref Rng & Units 11/12/2018   Color Urine       Yellow   Appearance Urine       Clear   Glucose Urine      NEG:Negative mg/dL Negative   Bilirubin Urine      NEG:Negative Negative   Ketones Urine      NEG:Negative mg/dL Negative   Specific Gravity Urine      1.003 - 1.035 1.025   pH Urine      5.0 - 7.0 pH 6.0   Protein Albumin Urine      NEG:Negative mg/dL 30 (A)   Urobilinogen Urine      0.2 - 1.0 EU/dL 0.2   Nitrite Urine      NEG:Negative Negative   Blood Urine      NEG:Negative Trace (A)   Leukocyte Esterase Urine      NEG:Negative  Negative   Source       Midstream Urine   WBC Urine      OTO5:0 - 5 /HPF 0 - 5   RBC Urine      OTO2:O - 2 /HPF O - 2   Squamous Epithelial /LPF Urine      FEW:Few /LPF Few   Bacteria Urine      NEG:Negative /HPF Few (A)   WBC      4.0 - 11.0 10e9/L 7.3   RBC Count      3.8 - 5.2 10e12/L 4.25   Hemoglobin      11.7 - 15.7 g/dL 11.3 (L)   Hematocrit      35.0 - 47.0 % 36.0   MCV      78 - 100 fl 85   MCH      26.5 - 33.0 pg 26.6   MCHC      31.5 - 36.5 g/dL 31.4 (L)   RDW      10.0 - 15.0 % 14.9   Platelet Count      150 - 450 10e9/L 255   Creatinine      0.52 - 1.04 mg/dL 0.83   GFR Estimate      >60 mL/min/1.7m2 73   GFR Estimate If Black      >60 mL/min/1.7m2 88   Iron      35 - 180 ug/dL 27 (L)   Iron Binding Cap      240 - 430 ug/dL 264   Iron Saturation Index      15 - 46 % 10 (L)   Protein Random Urine      g/L 0.58   Protein Total Urine g/gr Creatinine      0 - 0.2 g/g Cr 0.34 (H)   Albumin      3.4 - 5.0 g/dL 3.2 (L)   ALT      0 - 50 U/L 30   AST      0 - 45 U/L 19   Complement C3      76 - 169 mg/dL 48 (L)   Complement C4      15 - 50 mg/dL 3 (L)   CRP Inflammation      0.0 - 8.0 mg/L 16.4 (H)   DNA-ds      <10 IU/mL >379 (H)   Sed Rate      0 - 30 mm/h 26   Ferritin      8 - 252 ng/mL 160   Creatinine Urine      mg/dL 176     Component      Latest Ref Rng & Units 6/5/2019           8:29 AM   Color Urine       Yellow   Appearance Urine       Slightly Cloudy   Glucose Urine      NEG:Negative mg/dL Negative   Bilirubin Urine      NEG:Negative Negative   Ketones Urine      NEG:Negative mg/dL 5 (A)   Specific Gravity Urine      1.003 - 1.035 1.027   Blood Urine      NEG:Negative Small (A)   pH Urine      5.0 - 7.0 pH 6.0   Protein Albumin Urine      NEG:Negative mg/dL >499 (A)   Urobilinogen mg/dL      0.0 - 2.0 mg/dL 2.0   Nitrite Urine      NEG:Negative Negative   Leukocyte Esterase Urine      NEG:Negative Trace (A)   Source       Midstream Urine   WBC Urine      0 - 5 /HPF 16 (H)   RBC Urine      0 - 2 /HPF  24 (H)   Squamous Epithelial /HPF Urine      0 - 1 /HPF 2 (H)   Mucous Urine      NEG:Negative /LPF Present (A)   Hyaline Casts      0 - 2 /LPF 1   Sodium      133 - 144 mmol/L    Potassium      3.4 - 5.3 mmol/L    Chloride      94 - 109 mmol/L    Carbon Dioxide      20 - 32 mmol/L    Anion Gap      3 - 14 mmol/L    Glucose      70 - 99 mg/dL    Urea Nitrogen      7 - 30 mg/dL    Creatinine      0.52 - 1.04 mg/dL    GFR Estimate      >60 mL/min/1.73:m2    GFR Estimate If Black      >60 mL/min/1.73:m2    Calcium      8.5 - 10.1 mg/dL    Phosphorus      2.5 - 4.5 mg/dL    Albumin      3.4 - 5.0 g/dL    WBC      4.0 - 11.0 10e9/L    RBC Count      3.8 - 5.2 10e12/L    Hemoglobin      11.7 - 15.7 g/dL    Hematocrit      35.0 - 47.0 %    MCV      78 - 100 fl    MCH      26.5 - 33.0 pg    MCHC      31.5 - 36.5 g/dL    RDW      10.0 - 15.0 %    Platelet Count      150 - 450 10e9/L    Specimen Description       Midstream Urine   Special Requests       Specimen received in preservative   Culture Micro       10,000 to 50,000 colonies/mL . . .   Creatinine Urine      mg/dL 240   Albumin Urine mg/L      mg/L    Albumin Urine mg/g Cr      0 - 25 mg/g Cr    Protein Random Urine      g/L 4.50   Protein Total Urine g/gr Creatinine      0 - 0.2 g/g Cr 1.88 (H)   Neutrophil Cytoplasmic Antibody      <1:10 titer    Neutrophil Cytoplasmic Antibody Pattern          Lactate Dehydrogenase      81 - 234 U/L    HIV Antigen Antibody Combo      NR:Nonreactive        Sed Rate      0 - 30 mm/h    CRP Inflammation      0.0 - 8.0 mg/L    Complement C4      15 - 50 mg/dL    Complement C3      76 - 169 mg/dL    DNA-ds      <10 IU/mL      Component      Latest Ref Rng & Units 6/5/2019           8:29 AM   Color Urine          Appearance Urine          Glucose Urine      NEG:Negative mg/dL    Bilirubin Urine      NEG:Negative    Ketones Urine      NEG:Negative mg/dL    Specific Gravity Urine      1.003 - 1.035    Blood Urine      NEG:Negative    pH  Urine      5.0 - 7.0 pH    Protein Albumin Urine      NEG:Negative mg/dL    Urobilinogen mg/dL      0.0 - 2.0 mg/dL    Nitrite Urine      NEG:Negative    Leukocyte Esterase Urine      NEG:Negative    Source          WBC Urine      0 - 5 /HPF    RBC Urine      0 - 2 /HPF    Squamous Epithelial /HPF Urine      0 - 1 /HPF    Mucous Urine      NEG:Negative /LPF    Hyaline Casts      0 - 2 /LPF    Sodium      133 - 144 mmol/L    Potassium      3.4 - 5.3 mmol/L    Chloride      94 - 109 mmol/L    Carbon Dioxide      20 - 32 mmol/L    Anion Gap      3 - 14 mmol/L    Glucose      70 - 99 mg/dL    Urea Nitrogen      7 - 30 mg/dL    Creatinine      0.52 - 1.04 mg/dL    GFR Estimate      >60 mL/min/1.73:m2    GFR Estimate If Black      >60 mL/min/1.73:m2    Calcium      8.5 - 10.1 mg/dL    Phosphorus      2.5 - 4.5 mg/dL    Albumin      3.4 - 5.0 g/dL    WBC      4.0 - 11.0 10e9/L    RBC Count      3.8 - 5.2 10e12/L    Hemoglobin      11.7 - 15.7 g/dL    Hematocrit      35.0 - 47.0 %    MCV      78 - 100 fl    MCH      26.5 - 33.0 pg    MCHC      31.5 - 36.5 g/dL    RDW      10.0 - 15.0 %    Platelet Count      150 - 450 10e9/L    Specimen Description          Special Requests          Culture Micro          Creatinine Urine      mg/dL 258   Albumin Urine mg/L      mg/L 2,810   Albumin Urine mg/g Cr      0 - 25 mg/g Cr 1,089.15 (H)   Protein Random Urine      g/L    Protein Total Urine g/gr Creatinine      0 - 0.2 g/g Cr    Neutrophil Cytoplasmic Antibody      <1:10 titer    Neutrophil Cytoplasmic Antibody Pattern          Lactate Dehydrogenase      81 - 234 U/L    HIV Antigen Antibody Combo      NR:Nonreactive        Sed Rate      0 - 30 mm/h    CRP Inflammation      0.0 - 8.0 mg/L    Complement C4      15 - 50 mg/dL    Complement C3      76 - 169 mg/dL    DNA-ds      <10 IU/mL      Component      Latest Ref Rng & Units 6/5/2019          11:57 AM   Color Urine          Appearance Urine          Glucose Urine      NEG:Negative  mg/dL    Bilirubin Urine      NEG:Negative    Ketones Urine      NEG:Negative mg/dL    Specific Gravity Urine      1.003 - 1.035    Blood Urine      NEG:Negative    pH Urine      5.0 - 7.0 pH    Protein Albumin Urine      NEG:Negative mg/dL    Urobilinogen mg/dL      0.0 - 2.0 mg/dL    Nitrite Urine      NEG:Negative    Leukocyte Esterase Urine      NEG:Negative    Source          WBC Urine      0 - 5 /HPF    RBC Urine      0 - 2 /HPF    Squamous Epithelial /HPF Urine      0 - 1 /HPF    Mucous Urine      NEG:Negative /LPF    Hyaline Casts      0 - 2 /LPF    Sodium      133 - 144 mmol/L 134   Potassium      3.4 - 5.3 mmol/L 4.3   Chloride      94 - 109 mmol/L 101   Carbon Dioxide      20 - 32 mmol/L 26   Anion Gap      3 - 14 mmol/L 8   Glucose      70 - 99 mg/dL 109 (H)   Urea Nitrogen      7 - 30 mg/dL 21   Creatinine      0.52 - 1.04 mg/dL 0.49 (L)   GFR Estimate      >60 mL/min/1.73:m2 >90   GFR Estimate If Black      >60 mL/min/1.73:m2 >90   Calcium      8.5 - 10.1 mg/dL 9.2   Phosphorus      2.5 - 4.5 mg/dL 4.2   Albumin      3.4 - 5.0 g/dL 2.9 (L)   WBC      4.0 - 11.0 10e9/L 8.7   RBC Count      3.8 - 5.2 10e12/L 4.81   Hemoglobin      11.7 - 15.7 g/dL 12.3   Hematocrit      35.0 - 47.0 % 39.3   MCV      78 - 100 fl 82   MCH      26.5 - 33.0 pg 25.6 (L)   MCHC      31.5 - 36.5 g/dL 31.3 (L)   RDW      10.0 - 15.0 % 14.1   Platelet Count      150 - 450 10e9/L 302   Specimen Description          Special Requests          Culture Micro          Creatinine Urine      mg/dL    Albumin Urine mg/L      mg/L    Albumin Urine mg/g Cr      0 - 25 mg/g Cr    Protein Random Urine      g/L    Protein Total Urine g/gr Creatinine      0 - 0.2 g/g Cr    Neutrophil Cytoplasmic Antibody      <1:10 titer <1:10   Neutrophil Cytoplasmic Antibody Pattern       The ANCA IFA is <1:10.  No further testing will be performed.   Lactate Dehydrogenase      81 - 234 U/L 252 (H)   HIV Antigen Antibody Combo      NR:Nonreactive      Nonreactive   Sed Rate      0 - 30 mm/h 40 (H)   CRP Inflammation      0.0 - 8.0 mg/L 25.7 (H)   Complement C4      15 - 50 mg/dL 3 (L)   Complement C3      76 - 169 mg/dL 53 (L)   DNA-ds      <10 IU/mL >379 (H)   [facility]                                                      CMR Report       MRN:                  [id number]                                  Name:           CHILANGO GIBBONS                                   :                  1965                                  Scan Date:   2019 11:11:32                                   Electronically signed by Fer Corea 2019-May-20 14:19:00     SUMMARY   ==========================================================================================================     Clinical history: 54-year old female with SLE, poly-serositis, and chronic pleuritic chest pain. CMR to  assess for cardiac involvement (sepideh-myocarditis).  Comparison CMR: None.      1. The LV is normal in cavity size and wall thickness. The global systolic function is normal. The LVEF is  62%.   There are no regional wall motion abnormalities.     2. The RV is normal in cavity size. The global systolic function is normal. The RVEF is 60%.      3. Both atria are normal in size.     4. There is no significant valvular disease.      5. Late gadolinium enhancement imaging shows no MI, fibrosis or infiltrative disease.      6. The is mild pericardial thickening and tethering, with circumferential pericardial enhancement. There is  no pericardial effusion.       7. There is no intracardiac thrombus.     8. The main PA is moderately enlarged, measuring 3.8 cm.      CONCLUSIONS: Pericardial thickening and enhancement consistent with inflammatory pericarditis. There is no  myocardial fibrosis or myocarditis. Normal biventricular size and systolic function; LVEF 62%, RVEF 60%.      CORE EXAM    ==========================================================================================================     MEASUREMENTS   ----------------------------------------------------------------------------------------      VOLUMETRIC ANALYSIS       ----------------------------------------------  .--------------------------------------------------------.                    LV    Reference  RV    Reference   +------+-----------+------+-----------+------+-----------+   EDV   ml          162   ()   159   ()          ml/m^2     69.2   (56-90)   67.9   (53-90)     ESV   ml           62   (22-59)     64   (15-68)           ml/m^2     26.5   (14-33)   27.4   (11-37)     CO    L/min      7.30             6.93                     L/min/m^2   3.1              3.0               MASS                                                 SV    ml          100   ()    95   ()          ml/m^2     42.7   (37-62)   40.6   (36-60)     EF    %            62   (59-77)     60   (55-79)    '------+-----------+------+-----------+------+-----------'             CARDIAC OUTPUT HR:  73 BPM      LA DIMENSIONS (LV SYSTOLE)       ----------------------------------------------          DIAMETER:  3.9 cm         AORTIC ROOT DIMENSIONS       ----------------------------------------------          SINUS OF VALSALVA:  2.9 cm          AORTIC ROOT SIZE:  Normal        ANATOMY   ----------------------------------------------------------------------------------------      LEFT VENTRICLE       ----------------------------------------------          WALL THICKNESS:  Normal          CAVITY SIZE:  Normal         RIGHT VENTRICLE       ----------------------------------------------          WALL THICKNESS:  Normal          CONTRACTILITY:  Normal          CAVITY SIZE:  Normal         INTERVENTRICULAR SEPTUM       ----------------------------------------------                VENTRICULAR SEPTUM:  Normal          INTERATRIAL SEPTUM       ----------------------------------------------               ATRIAL SEPTUM:          LIPOMATOUS HYPERTROPHY          LEFT ATRIUM       ----------------------------------------------          CAVITY SIZE:  Normal         RIGHT ATRIUM       ----------------------------------------------          CAVITY SIZE:  Normal         PERICARDIUM       ----------------------------------------------          THICKENED          THICKNESS:  5 mm          EFFUSION:  None         PLEURAL EFFUSION       ----------------------------------------------               None         VALVES   ----------------------------------------------------------------------------------------      AORTIC VALVE       ----------------------------------------------          AORTIC VALVE LEAFLETS:  Trileaflet         MITRAL VALVE       ----------------------------------------------          MITRAL VALVE LEAFLETS:  Normal Leaflets         TRICUSPID VALVE       ----------------------------------------------          TRICUSPID VALVE LEAFLETS:  Normal Leaflets         PULMONIC VALVE       ----------------------------------------------          PULMONIC VALVE LEAFLETS:  Normal Leaflets        17 SEGMENT   ----------------------------------------------------------------------------------------  .------------------------------------------------------------------------------------------.   Segments            Wall Motion   Hyperenhancement  Stress Perfusion  Interpretation   +--------------------+--------------+------------------+------------------+----------------+   Base Anterior       Normal/Hyper  None                                Normal            Base Anteroseptal   Normal/Hyper  None                                Normal            Base Inferoseptal   Normal/Hyper  None                                Normal            Base Inferior       Normal/Hyper  None                                 Normal            Base Inferolateral  Normal/Hyper  None                                Normal            Base Anterolateral  Normal/Hyper  None                                Normal            Mid Anterior        Normal/Hyper  None                                Normal            Mid Anteroseptal    Normal/Hyper  None                                Normal            Mid Inferoseptal    Normal/Hyper  None                                Normal            Mid Inferior        Normal/Hyper  None                                Normal            Mid Inferolateral   Normal/Hyper  None                                Normal            Mid Anterolateral   Normal/Hyper  None                                Normal            Apical Anterior     Normal/Hyper  None                                Normal            Apical Septal       Normal/Hyper  None                                Normal            Apical Inferior     Normal/Hyper  None                                Normal            Apical Lateral      Normal/Hyper  None                                Normal            [place]                Normal/Hyper  None                                Normal           +--------------------+--------------+------------------+------------------+----------------+   RV Segments         Wall Motion   Hyperenhancement  Stress Perfusion  Interpretation   +--------------------+--------------+------------------+------------------+----------------+   RV Basal Anterior   Normal/Hyper  None                                Normal            RV Basal Inferior   Normal/Hyper  None                                Normal            RV Mid              Normal/Hyper  None                                Normal            RV Apical           Normal/Hyper  None                                Normal            '--------------------+--------------+------------------+------------------+----------------'         FINDINGS       ----------------------------------------------          INFARCT/SCAR SIZE:  0 %        SCAN INFO   ==========================================================================================================     GENERAL   ----------------------------------------------------------------------------------------      CONTRAST AGENT       ----------------------------------------------          TYPE:  Gadavist          VOLUME ADMINISTERED:  10 ml          DOSAGE FOR 0.5M:  0.04 mmol/kg          SERUM CREATININE:  0.58 sCr          CREATININE DATE:  2019-03-06 00:00:00          SEDATION       ----------------------------------------------          SEDATION USED?:  No         VITALS       ----------------------------------------------          HEIGHT:  66.00 in          HEIGHT:  167.64 cm          WEIGHT:  289.00 lbs          WEIGHT:  131.09 kgs          BSA:  2.34 m^2         PULSE SEQUENCES       ----------------------------------------------          Single-Shot SSFP, IR GRE - Segmented, IR SSFP - Single Shot, SSFP Cine          SETUP       ----------------------------------------------          TYPE:  Clinical          INPATIENT:  No          INCOMPLETE SCAN:  No          REASON(S) FOR SCAN:  Cardiomyopathy, Pericardial Evaluation           REFERRING PHYSICIAN:  ROBE VAZQUEZ          ATTENDING PHYSICIAN:  ROBE VAZQUEZ      Kidney Biopsy (6/7/19)  Patient Name: CHILANGO GIBBONS   MR#: [id number]   Specimen #: [id number]   Collected: 6/7/2019   Received: 6/7/2019   Reported: 6/10/2019 22:42   Ordering Phy(s): JOYCE CAMERON     For improved result formatting, select 'View Enhanced Report Format' under    Linked Documents section.     SPECIMEN(S):   Kidney biopsy, native with EM & Immunofluorescence, right     FINAL DIAGNOSIS:   Kidney; percutaneous needle biopsy:   -Diffuse  global lupus nephritis, ISN/RPS class IV-S (a)   -Mild to moderate arteriosclerosis     COMMENT:   There is mild mesangial and endocapillary proliferation, focal leukocytic   infiltrates, and crescentic lesions   involving 14 out of 26 glomeruli, mainly cellular with necrotizing   lesions.  There are minimal chronic   changes.  The activity index of the proliferative component is 9/24 and   the chronicity index is 1/12 (Robert   et al, American Journal of Medicine 75:  382-391, 1983).     The presence of significant crescents with only mild mesangial and   endocapillary proliferation raises the   possibility of concurrent lupus nephritis and ANCA-associated crescentic   glomerulonephritis.  Thus it is   recommended to test for ANCA and clinical correlation is recommended.        Ph.E:    BP (!) 150/78 (BP Location: Other (Comment))   Pulse 75   Wt 135.5 kg (298 lb 12.8 oz)   SpO2 96%   BMI 48.25 kg/m        Constitutional: WD/WN. Pleasant. In no acute distress. Cushingoid facies.  Eyes: EOM intact, PERRLA, sclera anicteric, conj not injected  HEENT: No oral ulcers or thrush. Normal salivary pool.    Neck: No cervical LAP or thyromegaly.  Chest: Clear to auscultation bilaterally  CV: RRR, no murmurs/ rubs or gallops. No clubbing or cyanosis. 1+edema.  GI: Abdomen is soft and non tender.   MS: No synovitis. Cool joints. No tenderness of the joints. No swelling. Normal ROM.  No joint deformities. Full ROM of the joints. No nodules. No Jaccoud's deformity.  Skin: No skin rash, malar rash, livedo, periungual erythema, alopecia, digital ulcers or nail changes.  Neuro: A&O x 3. Grossly non focal, muscular power 5/5 in all ext  Psych: NL affect and mood    Assessment/ plan:    1-SLE. 55 yo WF with +fh/o RA and personal hx of SLE presented in 12/2017 for 2nd opinion of m/o her SLE. She was diagnosed with SLE at age 27. Her lupus has been marked by +KARLIE (highest titer 1:640, speckled and nucleolar patterns), +anti-DNA,  arthritis, malar rash, serositis (pleuritic CP) supported by +SSA/SSB Ab, low C3/low C4, +RF, high ESR/CRP. She had neg anti-RNP, anti-Sm, acL IgM/G/A, LAC, cryo and anti-CCP.     Had NL C3 at the time of Dx. She was treated with prednisone and HCQ at the time of Dx. Can't remember how long she was on HCQ and why HCQ was stopped, but it probably was stopped because of stable disease, no report on HCQ toxicity. Reportedly her SLE was mild all these years.    Has secondary Sjogren's with sicca, +SSA/SSB Ab and h/o MALT lymphoma at age 44 in remission. Uses blink eyedrops and salagen. No lip biopsy was done to confirm Dx of Sjogren's.    Her lupus flared in 7/2017 with no triggers when she presented with arthritis, HCQ was resumed, arthritis resolved. Mild pulm HTN on 2D-Echo 8/2017 but neg R cardiac cath and VQ scan in 3/2018, also neg 2D-Echo.    In 12/2017, was prescribed prednisone 40 mg for only 5 days for pleuritic CP, CP recurred after stopping prednisone.     Her major complaint at initial visit with me in 12/2017 was ongoing CP. AZA was added in 2/2018 with start dose of 50 mg qd and slowly was increased to max 150 mg qd. Tolerated AZA well, no SEs, no toxicity on the labs but failed it and required to go back on prednisone 20 mg qd (current dose).     Her lupus is active with pleuritic CP. ESR/CRP normalized on prednisone+AZA+HCQ but +anti-DNA, low C3/C4 are unchanged. Chest CT in 12/2017 without contrast showed air trapping due to lupus/Sjogren's, no pleural effusion. Lung nodules need f/u in a year. No pericardail effusion on 2D-echo and neg VQ scan for PE in 3/2018 so chest CT with contrast is not needed. She is APL negative.    AZA was switched to  mg po bid on 5/18/2018 as she failed AZA. She is now on max dose of MMF 1500 mg bid. Her labs are unchanged with persistently elevated anti-DNA, low C3/C4 and high ESR/CRP, but just started to feel a lot better (regarding fatigue, arthralgia, still has  residual pleuritic CP) after adding MMF.     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 Also possible shrinking lung syn sec lupus or obesity is responsible for restrictive lung disease plus pleural effusion. She was prescribed albuterol and dulera inh which have helped.    Benlysta was added in 9/2018 to help with pleuritic CP. No change in lupus serology (anti-DNA, C3, C4), but ESR/CRP have improved. Overall she felt a lot better after adding benlysta but it made her tired.     I could not taper her off the prednisone, she continued to have active IA, fatigue and pleuritic CP. I recommended switching benlysta to rituximab given her h/o lymphoma, unfortunately her insurance denied. At the same time, Taina misunderstood and stopped her cellcept as thought we were switching MMF to rituximab while the plan was to add rituximab to MMF. She has been off MMF for couple of months. Had cardiac MRI on 5/20 which showed active pericarditis. I advised to switch to cytoxan 750 mg/m2 qmo x 6 mo. On Saturday 6/1/2019, received a call from infusion center reporting blood in the urine and if it was ok to proceed with cytoxan. I was concerned as that was very new. We canceled the cytoxan infusion. UCx was negative for UTI. Repeat U/A today is showing protein and blood, with h/o stopping MMF, very concerning for lupus nephritis. She never had lupus nephritis as part of her lupus and it's possible that it was covered by MMF and showed up off MMF. Renal biopsy is still recommended to confirm Dx, evaluate degree of severity, chronicity and rule out other diagnoses. I spoke to renal team and dr. Elmore and Aura kindly agreed to see her today (6/5/19) as urgent consult as  add on to their schedule and scheduled renal biopsy for Friday 6/7/2019. I would re-schedule cytoxan to 6/15/2019 and schedule pulse solumedrol 1 gr every day x 3 days. Meanwhile, will increase prednisone to 60 mg every day. Cytoxan risks were discussed again.    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 Patient received 1st dose of cyclophosphamide on 6/15/19. Tolerated well. Has since followed up with nephrology.     S/p 3 cytoxan for lupus nephritis, enteritis, arthritis, pleuritic CP. Still active lupus, but improved sx, down on prednisone from 80 mg every day last visit to 30 mg every day today. Feeling better. No flare of enteritis.    Recommend:    Labs on 9/30    Prednisone 25-20-17.5-15-12.5 mg a day each for one week then 10 mg a day    Will order rituximab again, initially got denied by insurance but per lupus nephritis guideline, it would be next step, also h/o lymphoma    Prevnar today to prevent pneumonia    Continue the plaquenil 200 mg twice a day       2-HCQ monitoring. Was reminded to have annual eye exam    3-PCP prophylaxis. Patient did not tolerate inhaled pentamidine. Avoiding TMP-SMX given sulfa reaction and also increase SLE flare. I am hesitant to use dapsone given risk of hemolytic anemia. Will start atovaquone 10 mL (1500 mg) daily for prophylaxis.      RTC 3-4 months            Orders Placed This Encounter   Procedures     ALT     Albumin level     AST     CBC with platelets differential     Creatinine     Complement C4     Complement C3     CRP inflammation     DNA double stranded antibodies     Erythrocyte sedimentation rate auto     UA with Microscopic reflex to Culture     Protein  random urine with Creat Ratio     Creatinine random urine

## 2019-09-06 NOTE — LETTER
9/6/2019      RE: Taina Singh  [address] [place] [address]         Rheumatology F/U Note    Reason for visit: f/u for lupus flare    Date of last visit: 7/3/2019    DOS: 9/6/2019      HPI:    Taina Singh is a 52 year old  female who was referred to our clinic for evaluation and management of her SLE.    She was diagnosed with lupus at age 27. Her 1st sx were malar rash and fatigue. No skin biopsy was done (reportedly had +KARLIE). She was treated with prednisone taper and HCQ. HCQ helped and she tolerated it well. She was diagnosed with Sjogren's at the same time. Had dryness of eyes and mouth. No lip biopsy to confirm the Dx.    Reportedly she had very mild stable lupus for many years and eventually at some point, stopped taking HCQ (can't remember when)    She was diagnosed with MALT lymphoma at 44, had enlarged LN over R parotid gland, on biopsy it was MALT lymphoma. Tx was resection only, no radiation or chemo.    About 3 yr ago, had flu shot and 2 days later, developed pleuritic CP and was seen at urgent care. That's why she does not want to get flu shot. She was seen in ER 2 days after UC visit. She was treated with prednisone.    She lost 100 lbs by exercise over past 2 yr, felt amazing. In 7/2017, started not feeling well. She had extreme fatigue. Had AM stiffness and pain over hands. Touched base with her previous rheumatologist (Dr. Rangel) and was told to have lupus flare and was put on  mg qd. Afterwards, developed pleuritic CP which has not been resolved.    She was given prednisone 40 mg qd x 5 days (on 12/16/2017) by pulmonologist in [facility]. It helped a lot but pleuritic CP recurred after stopping it.    She was told to have pulm HTN and was evaluated for it.    No triggers for current flare.    No flare of malar rash. No current hair loss. Uses blink eyedrops, restasis burned her eyes. She has been prescribed salagen for dry mouth, has not used it. Arthritis of hands have  resolved on HCQ.    Last HCQ eye exam was 1 week ago.    4/2018: On AZA 50 mg qd since 2/8/2018, increased to 100 mg qd in 3/19/2018. Her arthralgia is intermittent and mild, it's better. Has fatigue.  5/2018: Started on mycophenalate 1500 mg, continues prednisone 30 mg and plaquenil 200 mg twice a day.     Her major complaint is continues pressure over her chest. Pain is pleuritic, it hurts by sneezing, taking deep breath.      7/2018: Ms. Singh feels an improvement in her symptoms. She says there is a baseline pleuritic chest pain, but her fatigue and overall day-to-day function has improved to her satisfaction. She says morning stiffness usually only lasts about 10 minutes. She complains of occasional episodes of flashing lights in her left eye, however she is being seen by her opthamologist. She has a OTC scheduled for Monday as well. Ms. Singh feels as though the mycophenalate has made the biggest difference in her improvement. She continues to taper the prednisone, currently on 20 mg daily. She also has continued the plaquenil 200 mg twice a day.       11/2018: On benlysta infusion since beginning of Sept 2018. Chest still feels less tight, breathing is much better. Sometimes hands ache but it does lot last long. Benlysta makes her tired, but overall feels her lupus sx are much improved on benlysta.      3/2019: Feels tired and achy, it is intermittent and moves around. The L hip pain is consistent for the past 7 days, she moved up her appointment from 3/22 to today to get a bursitis shot. Had bad sinus and ear infection in 1/2019. She still thinks benlysta has helped her a lot. Last night, her R shoulder was hurting so bad that she could not move it but it's better today. Pain comes and goes. Breathing is better after adding benlysta, she is not gasping for air anymore which is huge improvement for her. She feels pressure over her chest and low back.    Is getting benlysta tomorrow.    Her prednisone  dose is 10 mg a day.    Leaving to [place] for vacation.    6/5/2019: She reports severe diffuse arthralgia affecting all her joints with pain level 8/10, today is 6/10. No SOB. Feels pressure like CP. Has severe fatigue. Last benlysta was 7 wk ago.    7/2019: Since last visit 1 month ago, patient was hospitalized x2 (both 6/10-6/13 & 6/18-6/22) at [facility] for acute abdominal pain. 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 Team felt that 2nd admission was 2/2 tapering of steroids. She was pulsed during first admission and then got Cytoxan as outpatient in between admissions on 6/15.     Incidentally, patient found to have small splenic infarct and acute PE on CT scan. She was started on Xarelto.    Also had her kidney biopsy on 6/7/19. Biopsy showed diffuse global lupus nephritis, ISN/RPS class IV. She has also followed up with nephrology this past week and started on lisinopril. No reports of hypotension.    Since leaving hospital, biggest complaint is leg strength/conditioning. She thinks this is related to laying in bed while hospitalized. Going to start PT as soon as it is set up.       Today 9/6/2019: Last Wed had her cytoxan infusion, everything went well. Did not sleep well on Wed evening. Called our clinic the next Thu as had diffuse body pain and N/V.also was a on a new BP med x 1 mo which made her dizzy. Her legs were swollen. She was seen in ED that Thu, her BP found to be low. She was instructed to stop BP med and her facial swelling, dizziness, LE swelling resolved, lost 10 lbs. That ended up being reaction to med. Does not know if it was lisinopril or losartan.      S/p 3 cytoxan infusions.      Overall she is feeling better, minimal joint pain, no SOB/CP/abdomianl pain. Has malar rash. No oral ulcers.    She has been on prednisone 30 mg a day x 2 weeks.      ROS:  A comprehensive ROS was done, positives are per HPI.    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 age 44    No chemo or radiation     Other forms of systemic lupus erythematosus (H) 2018     Pulmonary embolism (H)     2019 seen on abd CT at [facility]     Sjogren's syndrome (H)     secondary Sjogrens, secondary to lupus     Vitamin D deficiency      Past Surgical History:   Procedure Laterality Date     BIOPSY/REMOVAL, LYMPH NODE(S)        SECTION  age 32     HC HYSTEROS W PERMANENT FALLOPAIN IMPLANT      Essure b/l     PAROTIDECTOMY Right 2006     TONSILLECTOMY       Family History   Problem Relation Age of Onset     Alopecia Brother      Rheumatoid Arthritis Brother      LUNG DISEASE No family hx of      Social History     Socioeconomic History     Marital status:      Spouse name: Not on file     Number of children: Not on file     Years of education: 1     Highest education level: Not on file   Occupational History     Comment: , 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   Social Needs     Financial resource strain: Not on file     Food insecurity:     Worry: Not on file     Inability: Not on file     Transportation needs:     Medical: Not on file     Non-medical: Not on file   Tobacco Use     Smoking status: Never Smoker     Smokeless tobacco: Never Used   Substance and Sexual Activity     Alcohol use: No     Drug use: No     Sexual activity: Not on file   Lifestyle     Physical activity:     Days per week: Not on file     Minutes per session: Not on file     Stress: Not on file   Relationships     Social connections:     Talks on phone: Not on file     Gets together: Not on file     Attends Taoist  service: Not on file     Active member of club or organization: Not on file     Attends meetings of clubs or organizations: Not on file     Relationship status: Not on file     Intimate partner violence:     Fear of current or ex partner: Not on file     Emotionally abused: Not on file     Physically abused: Not on file     Forced sexual activity: Not on file   Other Topics Concern     Parent/sibling w/ CABG, MI or angioplasty before 65F 55M? 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      Going through divorce but it's not stress factor for her.    Allergies   Allergen Reactions     Pentamidine Shortness Of Breath     Penicillins Rash     Sulfa Drugs Rash       Outpatient Encounter Medications as of 9/6/2019   Medication Sig Dispense Refill     atovaquone (MEPRON) 750 MG/5ML suspension Take 10 mLs (1,500 mg) by mouth daily 210 mL 5     Calcium-Magnesium 300-300 MG TABS daily        fluticasone (FLONASE) 50 MCG/ACT nasal spray Spray 1-2 sprays into both nostrils daily Use 1 spray per nostril per day for 2 weeks.  May increase to 2 sprays per nostril per day after. 16 g 0     hydroxychloroquine (PLAQUENIL) 200 MG tablet Take 1 tablet (200 mg) by mouth 2 times daily 180 tablet 1     losartan (COZAAR) 25 MG tablet Take 1 tablet (25 mg) by mouth daily 90 tablet 3     Multiple Vitamins-Minerals (WOMENS MULTI PO) Take by mouth daily        Omega-3 Fatty Acids (OMEGA-3 FISH OIL) 500 MG CAPS Take 500 mg by mouth daily        pantoprazole (PROTONIX) 20 MG EC tablet Take 2 tablets (40 mg) by mouth 2 times daily       pilocarpine (SALAGEN) 5 MG tablet Take 1 tablet (5 mg) by mouth 4 times daily as needed 360 tablet 3     predniSONE  (DELTASONE) 10 MG tablet Take 1 tablet by mouth every morning       predniSONE (DELTASONE) 20 MG tablet Take 3 tablets (60 mg) by mouth daily For one month (Patient taking differently: Take 20 mg by mouth daily For one month) 90 tablet 0     rivaroxaban ANTICOAGULANT (XARELTO) 15 MG TABS tablet Take 15 mg by mouth 2 times daily (with meals)       traMADol (ULTRAM) 50 MG tablet Take 1 tablet (50 mg) by mouth every 12 hours as needed for pain Take 1 tablet as needed for pain.  No driving or alcohol use while taking medication. 30 tablet 2     vitamin D3 (CHOLECALCIFEROL) 2000 units (50 mcg) tablet Take 1 tablet (2,000 Units) by mouth daily 90 tablet 11     zolpidem (AMBIEN) 5 MG tablet Take 1 tablet (5 mg) by mouth nightly as needed for sleep 30 tablet 1     albuterol (PROAIR HFA/PROVENTIL HFA/VENTOLIN HFA) 108 (90 Base) MCG/ACT inhaler Inhale 2 puffs into the lungs every 6 hours as needed for shortness of breath / dyspnea or wheezing (Patient not taking: Reported on 2019) 3 Inhaler 3     chlorthalidone (HYGROTON) 25 MG tablet Take 1 tablet (25 mg) by mouth daily (Patient not taking: Reported on 2019) 30 tablet 1     No facility-administered encounter medications on file as of 2019.          Her records were reviewed.    2D-Echo 2017:    ECHO COMPLETE WO CONTRAST CHILANGO GIBBONS 2017 14:43     [facility] [place] [id number] [facility], [address], [place] [address]   Main: [phone]  [email or web address]                                                 Transthoracic Echo Report   CHILANGO GIBBONS   Excellian ID: [id number] Age: 52 : 1965 Ordering Provider: NGUYEN PETERS   Exam Date: 2017 14:43 Gender: F Sonographer: HAJA   Accession #: [id number] Height: 66 in BSA: 2.24 m  BP: 108 / 62   Weight: 261 lbs BMI: 42.1 kg/m        Site: [facility]   Location: Outpatient Rhythm: Normal Sinus Rhythm   Procedure Components: 2D imaging,  "Color Doppler, Spectral Doppler   Indications: Murmur; Systemic lupus erythematosus, unspecified SLE type, unspecified organ   involvement status   Technical Quality: Contrast: None     Final Conclusion   Visually estimated ejection fraction is 55-60%.   Mild left ventricular hypertrophy.   Estimated pulmonary artery pressure of 31 mmHg + RA pressure.   Dilated IVC size, <50% collapse with respiration.   Estimated EF: 55-60%   FINDINGS   Left Ventricle Normal left ventricular size. Visually estimated ejection fraction is 55-60%.   Mild left ventricular hypertrophy.   Diastolic Function \"Pseudonormal\" left ventricular filling pattern. E/e' ratio 8-15 is   indeterminate for filling pressure.   Right Ventricle Normal right ventricular size and function.   Left Atrium Mild left atrial enlargement.   Right Atrium Mild right atrial enlargement.   Aortic Valve Normal aortic valve. No aortic stenosis. No significant aortic regurgitation.   Mitral Valve Normal mitral valve. No mitral stenosis. Mild mitral regurgitation.   Tricuspid Valve Normal tricuspid valve. No tricuspid stenosis. Mild tricuspid regurgitation.   Estimated pulmonary artery pressure   of 31 mmHg + RA pressure.   Pulmonic Valve Normal pulmonic valve without significant stenosis or regurgitation.   Pericardium Normal pericardium.   Aorta Measured aortic root diameter is normal in size.   Inferior Vena Cava Dilated IVC size, <50% collapse with respiration.    Component      Latest Ref Rng & Units 11/20/2017   Sodium      133 - 144 mmol/L 137   Potassium      3.4 - 5.3 mmol/L 4.2   Chloride      94 - 109 mmol/L 102   Carbon Dioxide      20 - 32 mmol/L 30   Anion Gap      3 - 14 mmol/L 6   Glucose      70 - 99 mg/dL 101 (H)   Urea Nitrogen      7 - 30 mg/dL 13   Creatinine      0.52 - 1.04 mg/dL 0.55   GFR Estimate      >60 mL/min/1.7m2 >90   GFR Estimate If Black      >60 mL/min/1.7m2 >90   Calcium      8.5 - 10.1 mg/dL 9.1   WBC      4.0 - 11.0 10e9/L 4.9 "   RBC Count      3.8 - 5.2 10e12/L 4.28   Hemoglobin      11.7 - 15.7 g/dL 11.3 (L)   Hematocrit      35.0 - 47.0 % 35.5   MCV      78 - 100 fl 83   MCH      26.5 - 33.0 pg 26.4 (L)   MCHC      31.5 - 36.5 g/dL 31.8   RDW      10.0 - 15.0 % 14.6   Platelet Count      150 - 450 10e9/L 215   N-Terminal Pro Bnp      0 - 125 pg/mL 546 (H)   Sed Rate      0 - 30 mm/h 71 (H)     Component      Latest Ref Rng & Units 12/29/2017   Color Urine       Straw   Appearance Urine       Clear   Glucose Urine      NEG:Negative mg/dL Negative   Bilirubin Urine      NEG:Negative Negative   Ketones Urine      NEG:Negative mg/dL Negative   Specific Gravity Urine      1.003 - 1.035 1.005   Blood Urine      NEG:Negative Negative   pH Urine      5.0 - 7.0 pH 6.0   Protein Albumin Urine      NEG:Negative mg/dL Negative   Urobilinogen mg/dL      0.0 - 2.0 mg/dL 0.0   Nitrite Urine      NEG:Negative Negative   Leukocyte Esterase Urine      NEG:Negative Negative   Source       Midstream Urine   WBC Urine      0 - 2 /HPF <1   RBC Urine      0 - 2 /HPF <1   Bacteria Urine      NEG:Negative /HPF Few (A)   Squamous Epithelial /HPF Urine      0 - 1 /HPF <1   Mucous Urine      NEG:Negative /LPF Present (A)   Albumin Fraction      3.7 - 5.1 g/dL 4.2   Alpha 1 Fraction      0.2 - 0.4 g/dL 0.4   Alpha 2 Fraction      0.5 - 0.9 g/dL 0.8   Beta Fraction      0.6 - 1.0 g/dL 1.0   Gamma Fraction      0.7 - 1.6 g/dL 1.6   Monoclonal Peak      0.0 g/dL 0.0   ELP Interpretation:       Essentially normal electrophoretic pattern. No monoclonal protein seen. Pathologic . . .   IGG      695 - 1620 mg/dL 1560   IGA      70 - 380 mg/dL 473 (H)   IGM      60 - 265 mg/dL 92   Iron      35 - 180 ug/dL 58   Iron Binding Cap      240 - 430 ug/dL 315   Iron Saturation Index      15 - 46 % 19   TPMT Genotype Specimen       Whole Blood   TPMT Genotype       Neg/Neg   TPMT Predicted Phenotype       Normal   Protein Random Urine      g/L <0.05   Protein Total Urine g/gr  Creatinine      0 - 0.2 g/g Cr Unable to calculate due to low value   Deamidated Gliadin Cari, IgA      <7 U/mL 2   Deamidated Gliadin Cari, IgG      <7 U/mL 5   Complement C3      76 - 169 mg/dL 72 (L)   Complement C4      15 - 50 mg/dL 6 (L)   CRP Inflammation      0.0 - 8.0 mg/L 3.2   DNA-ds      <10 IU/mL >379 (H)   Sed Rate      0 - 30 mm/h 49 (H)   Vitamin D Deficiency screening      20 - 75 ug/L 51   Creatinine Urine Random      mg/dL 23   AST      0 - 45 U/L 26   ALT      0 - 50 U/L 35   HEENA  Broad Spectrum       Neg   Beta 2 Glycoprotein 1 Antibody IgG      <7 U/mL <0.6   Beta 2 Glycoprotein 1 Antibody IgM      <7 U/mL <0.9   Thyroid Peroxidase Antibody      <35 IU/mL <10   Thyroglobulin Antibody      <40 IU/mL <20   TSH      0.40 - 4.00 mU/L 0.87   Cryoglobulin      NEG:Negative % Trace (A)   Tissue Transglutaminase Antibody IgA      <7 U/mL 4   Ferritin      8 - 252 ng/mL 163   Endomysial Antibody IgA by IFA      <1:10 <1:10   CRP Cardiac Risk      mg/L 2.9   Creatinine Urine      mg/dL 23     Component      Latest Ref Rng & Units 2/23/2018   Color Urine       Yellow   Appearance Urine       Clear   Glucose Urine      NEG:Negative mg/dL Negative   Bilirubin Urine      NEG:Negative Negative   Ketones Urine      NEG:Negative mg/dL Negative   Specific Gravity Urine      1.003 - 1.035 1.025   Blood Urine      NEG:Negative Negative   pH Urine      5.0 - 7.0 pH 5.5   Protein Albumin Urine      NEG:Negative mg/dL Negative   Urobilinogen Urine      0.2 - 1.0 EU/dL 0.2   Nitrite Urine      NEG:Negative Negative   Leukocyte Esterase Urine      NEG:Negative Negative   Source       Midstream Urine   Protein Random Urine      g/L 0.17   Protein Total Urine g/gr Creatinine      0 - 0.2 g/g Cr 0.18   Complement C3      76 - 169 mg/dL 64 (L)   Complement C4      15 - 50 mg/dL 5 (L)   CRP Inflammation      0.0 - 8.0 mg/L 4.2   DNA-ds      <10 IU/mL >379 (H)   Sed Rate      0 - 30 mm/h 35 (H)   AST      0 - 45 U/L 27   ALT       0 - 50 U/L 31   Creatinine Urine Random      mg/dL 99     Component      Latest Ref Rng & Units 3/16/2018   WBC      4.0 - 11.0 10e9/L 5.6   RBC Count      3.8 - 5.2 10e12/L 4.43   Hemoglobin      11.7 - 15.7 g/dL 12.1   Hematocrit      35.0 - 47.0 % 36.9   MCV      78 - 100 fl 83   MCH      26.5 - 33.0 pg 27.3   MCHC      31.5 - 36.5 g/dL 32.8   RDW      10.0 - 15.0 % 14.5   Platelet Count      150 - 450 10e9/L 224   Diff Method       Automated Method   % Neutrophils      % 81.7   % Lymphocytes      % 9.3   % Monocytes      % 7.5   % Eosinophils      % 1.3   % Basophils      % 0.2   Absolute Neutrophil      1.6 - 8.3 10e9/L 4.6   Absolute Lymphocytes      0.8 - 5.3 10e9/L 0.5 (L)   Absolute Monocytes      0.0 - 1.3 10e9/L 0.4   Absolute Eosinophils      0.0 - 0.7 10e9/L 0.1   Absolute Basophils      0.0 - 0.2 10e9/L 0.0   Creatinine      0.52 - 1.04 mg/dL 0.51 (L)   GFR Estimate      >60 mL/min/1.7m2 >90   GFR Estimate If Black      >60 mL/min/1.7m2 >90   ALT      0 - 50 U/L 27   Albumin      3.4 - 5.0 g/dL 3.5   AST      0 - 45 U/L 18       Component      Latest Ref Rng & Units 3/21/2018   Color Urine       Yellow   Appearance Urine       Clear   Glucose Urine      NEG:Negative mg/dL Negative   Bilirubin Urine      NEG:Negative Negative   Ketones Urine      NEG:Negative mg/dL Negative   Specific Gravity Urine      1.003 - 1.035 <=1.005   pH Urine      5.0 - 7.0 pH 6.5   Protein Albumin Urine      NEG:Negative mg/dL Negative   Urobilinogen Urine      0.2 - 1.0 EU/dL 0.2   Nitrite Urine      NEG:Negative Negative   Blood Urine      NEG:Negative Negative   Leukocyte Esterase Urine      NEG:Negative Negative   Source       Midstream Urine   WBC Urine      OTO5:0 - 5 /HPF 0 - 5   RBC Urine      OTO2:O - 2 /HPF O - 2   Squamous Epithelial /LPF Urine      FEW:Few /LPF Few   Protein Random Urine      g/L <0.05   Protein Total Urine g/gr Creatinine      0 - 0.2 g/g Cr Unable to calculate due to low value   DNA-ds       <10 IU/mL >379 (H)   Complement C4      15 - 50 mg/dL 4 (L)   Complement C3      76 - 169 mg/dL 69 (L)   Creatinine Urine Random      mg/dL 17   Creatinine Urine      mg/dL 17     EXAMINATION: CT CHEST W/O CONTRAST, 12/29/2017 1:08 PM   TECHNIQUE:  Helical CT images from the thoracic inlet through the lung  bases were obtained without IV contrast during inspiration and  expiration according to high-resolution chest CT protocol. Contrast  dose: None  COMPARISON: None   HISTORY: eval for ILD, pleural effusion, pt has lupus, Sjogren's, h/o  MALT and pleurisy and SOB; Systemic lupus erythematosus, unspecified  SLE type, unspecified organ involvement status (H)   FINDINGS:  At least 75% collapse of the trachea and mainstem bronchi on the end  expiratory views. Diffuse lobular air trapping on end expiratory  images. Bibasilar platelike atelectasis. No pneumothorax or pleural  effusion. Scattered solid pulmonary nodules, for example a 4 mm  lingular nodule (series 5 image 145) and a 4 mm right upper lobe  nodule (image 73).    The heart size is normal. Mild three-vessel coronary artery calcific  atherosclerosis. Dilation of the main pulmonary artery to 4.1 cm. No  pericardial effusion. Normal caliber and configuration of the thoracic  great vessels. Numerous prominent though nonenlarged mediastinal lymph  nodes.  Limited views of the abdomen reveal no worrisome pathology. No  worrisome bony or soft tissue lesions.    IMPRESSION:   1. At least moderate tracheobronchomalacia.  2. 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  3. Scattered subcentimeter pulmonary nodules measuring up to 4 mm.  Consider follow-up chest CT in one year to establish stability.     [Consider Follow Up: Lung nodule]     This report will be copied to the [facility] to ensure a  provider acknowledges the finding.      I  have personally reviewed the examination and initial interpretation  and I agree with the findings.     AUSTIN PACE MD    Examination:NM LUNG SCAN VENTILATION AND PERFUSION, 3/14/2018 8:24 AM    Indication:  Chronic PE; Pulmonary hypertension    Additional Information: none   Technique:   The patient received 2 mCi of Tc-99m DTPA aerosol for ventilation and  7 mCi of Tc-99m MAA for perfusion. Multiple images were obtained of  the lungs in Anterior, posterior, HERNANDES, RPO, LPO, and  ELYSE projections.   Comparison: Chest x-ray same-day   Findings:     There are matched ventilation/perfusion defects in both lungs. No  mismatched perfusion defect to suggest pulmonary emboli.      Impression:  Pulmonary emboli is not present.     I have personally reviewed the examination and initial interpretation  and I agree with the findings.     DONNIE GARCIA MD    Component      Latest Ref Rng & Units 5/12/2018   WBC      4.0 - 11.0 10e9/L 7.1   RBC Count      3.8 - 5.2 10e12/L 4.42   Hemoglobin      11.7 - 15.7 g/dL 12.1   Hematocrit      35.0 - 47.0 % 37.4   MCV      78 - 100 fl 85   MCH      26.5 - 33.0 pg 27.4   MCHC      31.5 - 36.5 g/dL 32.4   RDW      10.0 - 15.0 % 14.7   Platelet Count      150 - 450 10e9/L 226   Diff Method       Automated Method   % Neutrophils      % 86.2   % Lymphocytes      % 4.8   % Monocytes      % 8.4   % Eosinophils      % 0.3   % Basophils      % 0.3   Absolute Neutrophil      1.6 - 8.3 10e9/L 6.1   Absolute Lymphocytes      0.8 - 5.3 10e9/L 0.3 (L)   Absolute Monocytes      0.0 - 1.3 10e9/L 0.6   Absolute Eosinophils      0.0 - 0.7 10e9/L 0.0   Absolute Basophils      0.0 - 0.2 10e9/L 0.0   Color Urine       Yellow   Appearance Urine       Clear   Glucose Urine      NEG:Negative mg/dL Negative   Bilirubin Urine      NEG:Negative Negative   Ketones Urine      NEG:Negative mg/dL Negative   Specific Gravity Urine      1.003 - 1.035 <=1.005   Blood Urine      NEG:Negative Negative   pH Urine       5.0 - 7.0 pH 5.5   Protein Albumin Urine      NEG:Negative mg/dL Negative   Urobilinogen Urine      0.2 - 1.0 EU/dL 0.2   Nitrite Urine      NEG:Negative Negative   Leukocyte Esterase Urine      NEG:Negative Negative   Source       Midstream Urine   Creatinine      0.52 - 1.04 mg/dL 0.63   GFR Estimate      >60 mL/min/1.7m2 >90   GFR Estimate If Black      >60 mL/min/1.7m2 >90   Protein Random Urine      g/L <0.05   Protein Total Urine g/gr Creatinine      0 - 0.2 g/g Cr Unable to calculate due to low value   Albumin      3.4 - 5.0 g/dL 3.6   ALT      0 - 50 U/L 28   AST      0 - 45 U/L 20   Complement C3      76 - 169 mg/dL 46 (L)   Complement C4      15 - 50 mg/dL 3 (L)   CRP Inflammation      0.0 - 8.0 mg/L <2.9   Sed Rate      0 - 30 mm/h 26   DNA-ds      <10 IU/mL >379 (H)   Creatinine Urine      mg/dL 16     Component      Latest Ref Rng & Units 7/7/2018   WBC      4.0 - 11.0 10e9/L 7.0   RBC Count      3.8 - 5.2 10e12/L 4.35   Hemoglobin      11.7 - 15.7 g/dL 11.7   Hematocrit      35.0 - 47.0 % 36.0   MCV      78 - 100 fl 83   MCH      26.5 - 33.0 pg 26.9   MCHC      31.5 - 36.5 g/dL 32.5   RDW      10.0 - 15.0 % 15.3 (H)   Platelet Count      150 - 450 10e9/L 224   Diff Method       Automated Method   % Neutrophils      % 91.9   % Lymphocytes      % 4.0   % Monocytes      % 3.7   % Eosinophils      % 0.3   % Basophils      % 0.1   Absolute Neutrophil      1.6 - 8.3 10e9/L 6.5   Absolute Lymphocytes      0.8 - 5.3 10e9/L 0.3 (L)   Absolute Monocytes      0.0 - 1.3 10e9/L 0.3   Absolute Eosinophils      0.0 - 0.7 10e9/L 0.0   Absolute Basophils      0.0 - 0.2 10e9/L 0.0   Color Urine       Yellow   Appearance Urine       Clear   Glucose Urine      NEG:Negative mg/dL Negative   Bilirubin Urine      NEG:Negative Negative   Ketones Urine      NEG:Negative mg/dL Negative   Specific Gravity Urine      1.003 - 1.035 1.010   pH Urine      5.0 - 7.0 pH 5.5   Protein Albumin Urine      NEG:Negative mg/dL Negative    Urobilinogen Urine      0.2 - 1.0 EU/dL 0.2   Nitrite Urine      NEG:Negative Negative   Blood Urine      NEG:Negative Trace (A)   Leukocyte Esterase Urine      NEG:Negative Negative   Source       Midstream Urine   WBC Urine      OTO5:0 - 5 /HPF 0 - 5   RBC Urine      OTO2:O - 2 /HPF O - 2   Squamous Epithelial /LPF Urine      FEW:Few /LPF Few   Creatinine      0.52 - 1.04 mg/dL 0.63   GFR Estimate      >60 mL/min/1.7m2 >90   GFR Estimate If Black      >60 mL/min/1.7m2 >90   Protein Random Urine      g/L 0.07   Protein Total Urine g/gr Creatinine      0 - 0.2 g/g Cr 0.29 (H)   Albumin      3.4 - 5.0 g/dL 3.5   ALT      0 - 50 U/L 27   AST      0 - 45 U/L 21   Complement C3      76 - 169 mg/dL 51 (L)   Complement C4      15 - 50 mg/dL 5 (L)   Creatinine Urine Random      mg/dL 24   CRP Inflammation      0.0 - 8.0 mg/L 11.2 (H)   DNA-ds      <10 IU/mL >379 (H)   Sed Rate      0 - 30 mm/h 35 (H)   Creatinine Urine      mg/dL 23     PFTs 5/2018 (The FEV1 and FVC are reduced but the FEV1/FVC ratio is normal.  The inspiratory flow rates are reduced.  The TLC and FRC are reduced.  The diffusing capacity is normal.  However, the diffusing capacity was not corrected for the patient's hemoglobin.  IMPRESSION:  Moderately Severe  Restriction.  Normal Diffusion.  Walk distance is normal on room air with significant desaturation but no hypoxia.  ____________________________________________KERVIN TONY.      Component      Latest Ref Rng & Units 11/12/2018   Color Urine       Yellow   Appearance Urine       Clear   Glucose Urine      NEG:Negative mg/dL Negative   Bilirubin Urine      NEG:Negative Negative   Ketones Urine      NEG:Negative mg/dL Negative   Specific Gravity Urine      1.003 - 1.035 1.025   pH Urine      5.0 - 7.0 pH 6.0   Protein Albumin Urine      NEG:Negative mg/dL 30 (A)   Urobilinogen Urine      0.2 - 1.0 EU/dL 0.2   Nitrite Urine      NEG:Negative Negative   Blood Urine      NEG:Negative Trace (A)    Leukocyte Esterase Urine      NEG:Negative Negative   Source       Midstream Urine   WBC Urine      OTO5:0 - 5 /HPF 0 - 5   RBC Urine      OTO2:O - 2 /HPF O - 2   Squamous Epithelial /LPF Urine      FEW:Few /LPF Few   Bacteria Urine      NEG:Negative /HPF Few (A)   WBC      4.0 - 11.0 10e9/L 7.3   RBC Count      3.8 - 5.2 10e12/L 4.25   Hemoglobin      11.7 - 15.7 g/dL 11.3 (L)   Hematocrit      35.0 - 47.0 % 36.0   MCV      78 - 100 fl 85   MCH      26.5 - 33.0 pg 26.6   MCHC      31.5 - 36.5 g/dL 31.4 (L)   RDW      10.0 - 15.0 % 14.9   Platelet Count      150 - 450 10e9/L 255   Creatinine      0.52 - 1.04 mg/dL 0.83   GFR Estimate      >60 mL/min/1.7m2 73   GFR Estimate If Black      >60 mL/min/1.7m2 88   Iron      35 - 180 ug/dL 27 (L)   Iron Binding Cap      240 - 430 ug/dL 264   Iron Saturation Index      15 - 46 % 10 (L)   Protein Random Urine      g/L 0.58   Protein Total Urine g/gr Creatinine      0 - 0.2 g/g Cr 0.34 (H)   Albumin      3.4 - 5.0 g/dL 3.2 (L)   ALT      0 - 50 U/L 30   AST      0 - 45 U/L 19   Complement C3      76 - 169 mg/dL 48 (L)   Complement C4      15 - 50 mg/dL 3 (L)   CRP Inflammation      0.0 - 8.0 mg/L 16.4 (H)   DNA-ds      <10 IU/mL >379 (H)   Sed Rate      0 - 30 mm/h 26   Ferritin      8 - 252 ng/mL 160   Creatinine Urine      mg/dL 176     Component      Latest Ref Rng & Units 6/5/2019           8:29 AM   Color Urine       Yellow   Appearance Urine       Slightly Cloudy   Glucose Urine      NEG:Negative mg/dL Negative   Bilirubin Urine      NEG:Negative Negative   Ketones Urine      NEG:Negative mg/dL 5 (A)   Specific Gravity Urine      1.003 - 1.035 1.027   Blood Urine      NEG:Negative Small (A)   pH Urine      5.0 - 7.0 pH 6.0   Protein Albumin Urine      NEG:Negative mg/dL >499 (A)   Urobilinogen mg/dL      0.0 - 2.0 mg/dL 2.0   Nitrite Urine      NEG:Negative Negative   Leukocyte Esterase Urine      NEG:Negative Trace (A)   Source       Midstream Urine   WBC Urine       0 - 5 /HPF 16 (H)   RBC Urine      0 - 2 /HPF 24 (H)   Squamous Epithelial /HPF Urine      0 - 1 /HPF 2 (H)   Mucous Urine      NEG:Negative /LPF Present (A)   Hyaline Casts      0 - 2 /LPF 1   Sodium      133 - 144 mmol/L    Potassium      3.4 - 5.3 mmol/L    Chloride      94 - 109 mmol/L    Carbon Dioxide      20 - 32 mmol/L    Anion Gap      3 - 14 mmol/L    Glucose      70 - 99 mg/dL    Urea Nitrogen      7 - 30 mg/dL    Creatinine      0.52 - 1.04 mg/dL    GFR Estimate      >60 mL/min/1.73:m2    GFR Estimate If Black      >60 mL/min/1.73:m2    Calcium      8.5 - 10.1 mg/dL    Phosphorus      2.5 - 4.5 mg/dL    Albumin      3.4 - 5.0 g/dL    WBC      4.0 - 11.0 10e9/L    RBC Count      3.8 - 5.2 10e12/L    Hemoglobin      11.7 - 15.7 g/dL    Hematocrit      35.0 - 47.0 %    MCV      78 - 100 fl    MCH      26.5 - 33.0 pg    MCHC      31.5 - 36.5 g/dL    RDW      10.0 - 15.0 %    Platelet Count      150 - 450 10e9/L    Specimen Description       Midstream Urine   Special Requests       Specimen received in preservative   Culture Micro       10,000 to 50,000 colonies/mL . . .   Creatinine Urine      mg/dL 240   Albumin Urine mg/L      mg/L    Albumin Urine mg/g Cr      0 - 25 mg/g Cr    Protein Random Urine      g/L 4.50   Protein Total Urine g/gr Creatinine      0 - 0.2 g/g Cr 1.88 (H)   Neutrophil Cytoplasmic Antibody      <1:10 titer    Neutrophil Cytoplasmic Antibody Pattern          Lactate Dehydrogenase      81 - 234 U/L    HIV Antigen Antibody Combo      NR:Nonreactive        Sed Rate      0 - 30 mm/h    CRP Inflammation      0.0 - 8.0 mg/L    Complement C4      15 - 50 mg/dL    Complement C3      76 - 169 mg/dL    DNA-ds      <10 IU/mL      Component      Latest Ref Rng & Units 6/5/2019           8:29 AM   Color Urine          Appearance Urine          Glucose Urine      NEG:Negative mg/dL    Bilirubin Urine      NEG:Negative    Ketones Urine      NEG:Negative mg/dL    Specific Gravity Urine      1.003 -  1.035    Blood Urine      NEG:Negative    pH Urine      5.0 - 7.0 pH    Protein Albumin Urine      NEG:Negative mg/dL    Urobilinogen mg/dL      0.0 - 2.0 mg/dL    Nitrite Urine      NEG:Negative    Leukocyte Esterase Urine      NEG:Negative    Source          WBC Urine      0 - 5 /HPF    RBC Urine      0 - 2 /HPF    Squamous Epithelial /HPF Urine      0 - 1 /HPF    Mucous Urine      NEG:Negative /LPF    Hyaline Casts      0 - 2 /LPF    Sodium      133 - 144 mmol/L    Potassium      3.4 - 5.3 mmol/L    Chloride      94 - 109 mmol/L    Carbon Dioxide      20 - 32 mmol/L    Anion Gap      3 - 14 mmol/L    Glucose      70 - 99 mg/dL    Urea Nitrogen      7 - 30 mg/dL    Creatinine      0.52 - 1.04 mg/dL    GFR Estimate      >60 mL/min/1.73:m2    GFR Estimate If Black      >60 mL/min/1.73:m2    Calcium      8.5 - 10.1 mg/dL    Phosphorus      2.5 - 4.5 mg/dL    Albumin      3.4 - 5.0 g/dL    WBC      4.0 - 11.0 10e9/L    RBC Count      3.8 - 5.2 10e12/L    Hemoglobin      11.7 - 15.7 g/dL    Hematocrit      35.0 - 47.0 %    MCV      78 - 100 fl    MCH      26.5 - 33.0 pg    MCHC      31.5 - 36.5 g/dL    RDW      10.0 - 15.0 %    Platelet Count      150 - 450 10e9/L    Specimen Description          Special Requests          Culture Micro          Creatinine Urine      mg/dL 258   Albumin Urine mg/L      mg/L 2,810   Albumin Urine mg/g Cr      0 - 25 mg/g Cr 1,089.15 (H)   Protein Random Urine      g/L    Protein Total Urine g/gr Creatinine      0 - 0.2 g/g Cr    Neutrophil Cytoplasmic Antibody      <1:10 titer    Neutrophil Cytoplasmic Antibody Pattern          Lactate Dehydrogenase      81 - 234 U/L    HIV Antigen Antibody Combo      NR:Nonreactive        Sed Rate      0 - 30 mm/h    CRP Inflammation      0.0 - 8.0 mg/L    Complement C4      15 - 50 mg/dL    Complement C3      76 - 169 mg/dL    DNA-ds      <10 IU/mL      Component      Latest Ref Rng & Units 6/5/2019          11:57 AM   Color Urine          Appearance  Urine          Glucose Urine      NEG:Negative mg/dL    Bilirubin Urine      NEG:Negative    Ketones Urine      NEG:Negative mg/dL    Specific Gravity Urine      1.003 - 1.035    Blood Urine      NEG:Negative    pH Urine      5.0 - 7.0 pH    Protein Albumin Urine      NEG:Negative mg/dL    Urobilinogen mg/dL      0.0 - 2.0 mg/dL    Nitrite Urine      NEG:Negative    Leukocyte Esterase Urine      NEG:Negative    Source          WBC Urine      0 - 5 /HPF    RBC Urine      0 - 2 /HPF    Squamous Epithelial /HPF Urine      0 - 1 /HPF    Mucous Urine      NEG:Negative /LPF    Hyaline Casts      0 - 2 /LPF    Sodium      133 - 144 mmol/L 134   Potassium      3.4 - 5.3 mmol/L 4.3   Chloride      94 - 109 mmol/L 101   Carbon Dioxide      20 - 32 mmol/L 26   Anion Gap      3 - 14 mmol/L 8   Glucose      70 - 99 mg/dL 109 (H)   Urea Nitrogen      7 - 30 mg/dL 21   Creatinine      0.52 - 1.04 mg/dL 0.49 (L)   GFR Estimate      >60 mL/min/1.73:m2 >90   GFR Estimate If Black      >60 mL/min/1.73:m2 >90   Calcium      8.5 - 10.1 mg/dL 9.2   Phosphorus      2.5 - 4.5 mg/dL 4.2   Albumin      3.4 - 5.0 g/dL 2.9 (L)   WBC      4.0 - 11.0 10e9/L 8.7   RBC Count      3.8 - 5.2 10e12/L 4.81   Hemoglobin      11.7 - 15.7 g/dL 12.3   Hematocrit      35.0 - 47.0 % 39.3   MCV      78 - 100 fl 82   MCH      26.5 - 33.0 pg 25.6 (L)   MCHC      31.5 - 36.5 g/dL 31.3 (L)   RDW      10.0 - 15.0 % 14.1   Platelet Count      150 - 450 10e9/L 302   Specimen Description          Special Requests          Culture Micro          Creatinine Urine      mg/dL    Albumin Urine mg/L      mg/L    Albumin Urine mg/g Cr      0 - 25 mg/g Cr    Protein Random Urine      g/L    Protein Total Urine g/gr Creatinine      0 - 0.2 g/g Cr    Neutrophil Cytoplasmic Antibody      <1:10 titer <1:10   Neutrophil Cytoplasmic Antibody Pattern       The ANCA IFA is <1:10.  No further testing will be performed.   Lactate Dehydrogenase      81 - 234 U/L 252 (H)   HIV Antigen  Antibody Combo      NR:Nonreactive     Nonreactive   Sed Rate      0 - 30 mm/h 40 (H)   CRP Inflammation      0.0 - 8.0 mg/L 25.7 (H)   Complement C4      15 - 50 mg/dL 3 (L)   Complement C3      76 - 169 mg/dL 53 (L)   DNA-ds      <10 IU/mL >379 (H)   [facility]                                                      CMR Report       MRN:                  [id number]                                  Name:           CHILANGO GIBBONS                                   :                  1965                                  Scan Date:   2019 11:11:32                                   Electronically signed by Fer Corea 2019-May-20 14:19:00     SUMMARY   ==========================================================================================================     Clinical history: 54-year old female with SLE, poly-serositis, and chronic pleuritic chest pain. CMR to  assess for cardiac involvement (sepideh-myocarditis).  Comparison CMR: None.      1. The LV is normal in cavity size and wall thickness. The global systolic function is normal. The LVEF is  62%.   There are no regional wall motion abnormalities.     2. The RV is normal in cavity size. The global systolic function is normal. The RVEF is 60%.      3. Both atria are normal in size.     4. There is no significant valvular disease.      5. Late gadolinium enhancement imaging shows no MI, fibrosis or infiltrative disease.      6. The is mild pericardial thickening and tethering, with circumferential pericardial enhancement. There is  no pericardial effusion.       7. There is no intracardiac thrombus.     8. The main PA is moderately enlarged, measuring 3.8 cm.      CONCLUSIONS: Pericardial thickening and enhancement consistent with inflammatory pericarditis. There is no  myocardial fibrosis or myocarditis. Normal biventricular size and systolic function; LVEF 62%, RVEF 60%.      CORE EXAM    ==========================================================================================================     MEASUREMENTS   ----------------------------------------------------------------------------------------      VOLUMETRIC ANALYSIS       ----------------------------------------------  .--------------------------------------------------------.                    LV    Reference  RV    Reference   +------+-----------+------+-----------+------+-----------+   EDV   ml          162   ()   159   ()          ml/m^2     69.2   (56-90)   67.9   (53-90)     ESV   ml           62   (22-59)     64   (15-68)           ml/m^2     26.5   (14-33)   27.4   (11-37)     CO    L/min      7.30             6.93                     L/min/m^2   3.1              3.0               MASS                                                 SV    ml          100   ()    95   ()          ml/m^2     42.7   (37-62)   40.6   (36-60)     EF    %            62   (59-77)     60   (55-79)    '------+-----------+------+-----------+------+-----------'             CARDIAC OUTPUT HR:  73 BPM      LA DIMENSIONS (LV SYSTOLE)       ----------------------------------------------          DIAMETER:  3.9 cm         AORTIC ROOT DIMENSIONS       ----------------------------------------------          SINUS OF VALSALVA:  2.9 cm          AORTIC ROOT SIZE:  Normal        ANATOMY   ----------------------------------------------------------------------------------------      LEFT VENTRICLE       ----------------------------------------------          WALL THICKNESS:  Normal          CAVITY SIZE:  Normal         RIGHT VENTRICLE       ----------------------------------------------          WALL THICKNESS:  Normal          CONTRACTILITY:  Normal          CAVITY SIZE:  Normal         INTERVENTRICULAR SEPTUM       ----------------------------------------------                VENTRICULAR SEPTUM:  Normal          INTERATRIAL SEPTUM       ----------------------------------------------               ATRIAL SEPTUM:          LIPOMATOUS HYPERTROPHY          LEFT ATRIUM       ----------------------------------------------          CAVITY SIZE:  Normal         RIGHT ATRIUM       ----------------------------------------------          CAVITY SIZE:  Normal         PERICARDIUM       ----------------------------------------------          THICKENED          THICKNESS:  5 mm          EFFUSION:  None         PLEURAL EFFUSION       ----------------------------------------------               None         VALVES   ----------------------------------------------------------------------------------------      AORTIC VALVE       ----------------------------------------------          AORTIC VALVE LEAFLETS:  Trileaflet         MITRAL VALVE       ----------------------------------------------          MITRAL VALVE LEAFLETS:  Normal Leaflets         TRICUSPID VALVE       ----------------------------------------------          TRICUSPID VALVE LEAFLETS:  Normal Leaflets         PULMONIC VALVE       ----------------------------------------------          PULMONIC VALVE LEAFLETS:  Normal Leaflets        17 SEGMENT   ----------------------------------------------------------------------------------------  .------------------------------------------------------------------------------------------.   Segments            Wall Motion   Hyperenhancement  Stress Perfusion  Interpretation   +--------------------+--------------+------------------+------------------+----------------+   Base Anterior       Normal/Hyper  None                                Normal            Base Anteroseptal   Normal/Hyper  None                                Normal            Base Inferoseptal   Normal/Hyper  None                                Normal            Base Inferior       Normal/Hyper  None                                 Normal            Base Inferolateral  Normal/Hyper  None                                Normal            Base Anterolateral  Normal/Hyper  None                                Normal            Mid Anterior        Normal/Hyper  None                                Normal            Mid Anteroseptal    Normal/Hyper  None                                Normal            Mid Inferoseptal    Normal/Hyper  None                                Normal            Mid Inferior        Normal/Hyper  None                                Normal            Mid Inferolateral   Normal/Hyper  None                                Normal            Mid Anterolateral   Normal/Hyper  None                                Normal            Apical Anterior     Normal/Hyper  None                                Normal            Apical Septal       Normal/Hyper  None                                Normal            Apical Inferior     Normal/Hyper  None                                Normal            Apical Lateral      Normal/Hyper  None                                Normal            [place]                Normal/Hyper  None                                Normal           +--------------------+--------------+------------------+------------------+----------------+   RV Segments         Wall Motion   Hyperenhancement  Stress Perfusion  Interpretation   +--------------------+--------------+------------------+------------------+----------------+   RV Basal Anterior   Normal/Hyper  None                                Normal            RV Basal Inferior   Normal/Hyper  None                                Normal            RV Mid              Normal/Hyper  None                                Normal            RV Apical           Normal/Hyper  None                                Normal            '--------------------+--------------+------------------+------------------+----------------'         FINDINGS       ----------------------------------------------          INFARCT/SCAR SIZE:  0 %        SCAN INFO   ==========================================================================================================     GENERAL   ----------------------------------------------------------------------------------------      CONTRAST AGENT       ----------------------------------------------          TYPE:  Gadavist          VOLUME ADMINISTERED:  10 ml          DOSAGE FOR 0.5M:  0.04 mmol/kg          SERUM CREATININE:  0.58 sCr          CREATININE DATE:  2019-03-06 00:00:00          SEDATION       ----------------------------------------------          SEDATION USED?:  No         VITALS       ----------------------------------------------          HEIGHT:  66.00 in          HEIGHT:  167.64 cm          WEIGHT:  289.00 lbs          WEIGHT:  131.09 kgs          BSA:  2.34 m^2         PULSE SEQUENCES       ----------------------------------------------          Single-Shot SSFP, IR GRE - Segmented, IR SSFP - Single Shot, SSFP Cine          SETUP       ----------------------------------------------          TYPE:  Clinical          INPATIENT:  No          INCOMPLETE SCAN:  No          REASON(S) FOR SCAN:  Cardiomyopathy, Pericardial Evaluation           REFERRING PHYSICIAN:  ROBE VAZQUEZ          ATTENDING PHYSICIAN:  ROBE VAZQUEZ      Kidney Biopsy (6/7/19)  Patient Name: CHILANGO GIBBONS   MR#: [id number]   Specimen #: [id number]   Collected: 6/7/2019   Received: 6/7/2019   Reported: 6/10/2019 22:42   Ordering Phy(s): JOYCE CAMERON     For improved result formatting, select 'View Enhanced Report Format' under    Linked Documents section.     SPECIMEN(S):   Kidney biopsy, native with EM & Immunofluorescence, right     FINAL DIAGNOSIS:   Kidney; percutaneous needle biopsy:   -Diffuse  global lupus nephritis, ISN/RPS class IV-S (a)   -Mild to moderate arteriosclerosis     COMMENT:   There is mild mesangial and endocapillary proliferation, focal leukocytic   infiltrates, and crescentic lesions   involving 14 out of 26 glomeruli, mainly cellular with necrotizing   lesions.  There are minimal chronic   changes.  The activity index of the proliferative component is 9/24 and   the chronicity index is 1/12 (Robert   et al, American Journal of Medicine 75:  382-391, 1983).     The presence of significant crescents with only mild mesangial and   endocapillary proliferation raises the   possibility of concurrent lupus nephritis and ANCA-associated crescentic   glomerulonephritis.  Thus it is   recommended to test for ANCA and clinical correlation is recommended.        Ph.E:    BP (!) 150/78 (BP Location: Other (Comment))   Pulse 75   Wt 135.5 kg (298 lb 12.8 oz)   SpO2 96%   BMI 48.25 kg/m         Constitutional: WD/WN. Pleasant. In no acute distress. Cushingoid facies.  Eyes: EOM intact, PERRLA, sclera anicteric, conj not injected  HEENT: No oral ulcers or thrush. Normal salivary pool.    Neck: No cervical LAP or thyromegaly.  Chest: Clear to auscultation bilaterally  CV: RRR, no murmurs/ rubs or gallops. No clubbing or cyanosis. 1+edema.  GI: Abdomen is soft and non tender.   MS: No synovitis. Cool joints. No tenderness of the joints. No swelling. Normal ROM.  No joint deformities. Full ROM of the joints. No nodules. No Jaccoud's deformity.  Skin: No skin rash, malar rash, livedo, periungual erythema, alopecia, digital ulcers or nail changes.  Neuro: A&O x 3. Grossly non focal, muscular power 5/5 in all ext  Psych: NL affect and mood    Assessment/ plan:    1-SLE. 53 yo WF with +fh/o RA and personal hx of SLE presented in 12/2017 for 2nd opinion of m/o her SLE. She was diagnosed with SLE at age 27. Her lupus has been marked by +KARLIE (highest titer 1:640, speckled and nucleolar patterns), +anti-DNA,  arthritis, malar rash, serositis (pleuritic CP) supported by +SSA/SSB Ab, low C3/low C4, +RF, high ESR/CRP. She had neg anti-RNP, anti-Sm, acL IgM/G/A, LAC, cryo and anti-CCP.     Had NL C3 at the time of Dx. She was treated with prednisone and HCQ at the time of Dx. Can't remember how long she was on HCQ and why HCQ was stopped, but it probably was stopped because of stable disease, no report on HCQ toxicity. Reportedly her SLE was mild all these years.    Has secondary Sjogren's with sicca, +SSA/SSB Ab and h/o MALT lymphoma at age 44 in remission. Uses blink eyedrops and salagen. No lip biopsy was done to confirm Dx of Sjogren's.    Her lupus flared in 7/2017 with no triggers when she presented with arthritis, HCQ was resumed, arthritis resolved. Mild pulm HTN on 2D-Echo 8/2017 but neg R cardiac cath and VQ scan in 3/2018, also neg 2D-Echo.    In 12/2017, was prescribed prednisone 40 mg for only 5 days for pleuritic CP, CP recurred after stopping prednisone.     Her major complaint at initial visit with me in 12/2017 was ongoing CP. AZA was added in 2/2018 with start dose of 50 mg qd and slowly was increased to max 150 mg qd. Tolerated AZA well, no SEs, no toxicity on the labs but failed it and required to go back on prednisone 20 mg qd (current dose).     Her lupus is active with pleuritic CP. ESR/CRP normalized on prednisone+AZA+HCQ but +anti-DNA, low C3/C4 are unchanged. Chest CT in 12/2017 without contrast showed air trapping due to lupus/Sjogren's, no pleural effusion. Lung nodules need f/u in a year. No pericardail effusion on 2D-echo and neg VQ scan for PE in 3/2018 so chest CT with contrast is not needed. She is APL negative.    AZA was switched to  mg po bid on 5/18/2018 as she failed AZA. She is now on max dose of MMF 1500 mg bid. Her labs are unchanged with persistently elevated anti-DNA, low C3/C4 and high ESR/CRP, but just started to feel a lot better (regarding fatigue, arthralgia, still has  residual pleuritic CP) after adding MMF.     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 Also possible shrinking lung syn sec lupus or obesity is responsible for restrictive lung disease plus pleural effusion. She was prescribed albuterol and dulera inh which have helped.    Benlysta was added in 9/2018 to help with pleuritic CP. No change in lupus serology (anti-DNA, C3, C4), but ESR/CRP have improved. Overall she felt a lot better after adding benlysta but it made her tired.     I could not taper her off the prednisone, she continued to have active IA, fatigue and pleuritic CP. I recommended switching benlysta to rituximab given her h/o lymphoma, unfortunately her insurance denied. At the same time, Taina misunderstood and stopped her cellcept as thought we were switching MMF to rituximab while the plan was to add rituximab to MMF. She has been off MMF for couple of months. Had cardiac MRI on 5/20 which showed active pericarditis. I advised to switch to cytoxan 750 mg/m2 qmo x 6 mo. On Saturday 6/1/2019, received a call from infusion center reporting blood in the urine and if it was ok to proceed with cytoxan. I was concerned as that was very new. We canceled the cytoxan infusion. UCx was negative for UTI. Repeat U/A today is showing protein and blood, with h/o stopping MMF, very concerning for lupus nephritis. She never had lupus nephritis as part of her lupus and it's possible that it was covered by MMF and showed up off MMF. Renal biopsy is still recommended to confirm Dx, evaluate degree of severity, chronicity and rule out other diagnoses. I spoke to renal team and dr. Elmore and Aura kindly agreed to see her today (6/5/19) as urgent consult as  add on to their schedule and scheduled renal biopsy for Friday 6/7/2019. I would re-schedule cytoxan to 6/15/2019 and schedule pulse solumedrol 1 gr every day x 3 days. Meanwhile, will increase prednisone to 60 mg every day. Cytoxan risks were discussed again.    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 Patient received 1st dose of cyclophosphamide on 6/15/19. Tolerated well. Has since followed up with nephrology.     S/p 3 cytoxan for lupus nephritis, enteritis, arthritis, pleuritic CP. Still active lupus, but improved sx, down on prednisone from 80 mg every day last visit to 30 mg every day today. Feeling better. No flare of enteritis.    Recommend:    Labs on 9/30    Prednisone 25-20-17.5-15-12.5 mg a day each for one week then 10 mg a day    Will order rituximab again, initially got denied by insurance but per lupus nephritis guideline, it would be next step, also h/o lymphoma    Prevnar today to prevent pneumonia    Continue the plaquenil 200 mg twice a day       2-HCQ monitoring. Was reminded to have annual eye exam    3-PCP prophylaxis. Patient did not tolerate inhaled pentamidine. Avoiding TMP-SMX given sulfa reaction and also increase SLE flare. I am hesitant to use dapsone given risk of hemolytic anemia. Will start atovaquone 10 mL (1500 mg) daily for prophylaxis.      RTC 3-4 months            Orders Placed This Encounter   Procedures     ALT     Albumin level     AST     CBC with platelets differential     Creatinine     Complement C4     Complement C3     CRP inflammation     DNA double stranded antibodies     Erythrocyte sedimentation rate auto     UA with Microscopic reflex to Culture     Protein  random urine with Creat Ratio     Creatinine random urine             Killian Marroquin MD

## 2019-09-06 NOTE — NURSING NOTE
The following medication was given:     MEDICATION: Prevnar 13   ROUTE: IM  SITE: Deltoid - Right  DOSE: 0.5 mL  LOT #: [id number]  :  [employer]  EXPIRATION DATE:  05/21  NDC: 5477-2618-72    Callie Chung CMA

## 2019-09-06 NOTE — NURSING NOTE
Chief Complaint   Patient presents with     RECHECK     2 month F/U SLE     Blood pressure (!) 150/78, pulse 75, weight 135.5 kg (298 lb 12.8 oz), SpO2 96 %.    Callie Chung, CMA

## 2019-09-06 NOTE — PATIENT INSTRUCTIONS
Labs on 9/30    Prednisone 25-20-17.5-15-12.5 mg a day each for one week then 10 mg a day    Will write for rituximab again    Prevnar today to prevent pneumonia    Return in 3-4 months

## 2019-09-09 ENCOUNTER — MYC MEDICAL ADVICE (OUTPATIENT)
Dept: INTERNAL MEDICINE | Facility: CLINIC | Age: 54
End: 2019-09-09

## 2019-09-09 ENCOUNTER — TELEPHONE (OUTPATIENT)
Dept: RHEUMATOLOGY | Facility: CLINIC | Age: 54
End: 2019-09-09

## 2019-09-09 DIAGNOSIS — M06.09 RHEUMATOID ARTHRITIS, SERONEGATIVE, MULTIPLE SITES (H): ICD-10-CM

## 2019-09-09 RX ORDER — METHYLPREDNISOLONE SODIUM SUCCINATE 125 MG/2ML
125 INJECTION, POWDER, LYOPHILIZED, FOR SOLUTION INTRAMUSCULAR; INTRAVENOUS ONCE
Status: CANCELLED | OUTPATIENT
Start: 2019-09-09

## 2019-09-09 RX ORDER — ACETAMINOPHEN 325 MG/1
650 TABLET ORAL ONCE
Status: CANCELLED
Start: 2019-09-09

## 2019-09-09 NOTE — LETTER
October 1, 2019    Taina Singh  [address] [place] [address]      To Whom It May Concern:    Taina Singh is my patient whom I am treating for Systemic Lupus Erythematosus(SLE) and Stage IV Lupus Nephritis.  At this time patient has failed all approved treatment, she has been on Mycophenolate, Hydroxychloroquine, Azathioprine, Benlysta, and Cyclophosphamide.  Despite all these treatments, pt continues to have protein in her urine.  According to ACR guidelines for the treatment of Lupus Nephritis, Rituximab should be used next.  As she is already completed 4 doses of Cyclophosphamide and continues to have protein in her urine, she should not be denied Rituximab.  This is needed to save her kidneys, prevent going on dialysis, and we see no reason to deny this medication at this time.    We will await your approval of Rituximab at this time.    Sincerely,    Killian Marroquin MD    Division of Rheumatic and Autoimmune Diseases

## 2019-09-09 NOTE — TELEPHONE ENCOUNTER
Pt is ok with getting infusions at [facility].  Will have PA team work on authorization knowing it will need to be appealed as it was denied before.    Sophie Alvarado RN  [facility]

## 2019-09-09 NOTE — TELEPHONE ENCOUNTER
----- Message from Killian Marroquin MD sent at 9/6/2019  2:22 PM CDT -----  We need to re-submit rituximab to insurance again, she still has active lupus

## 2019-09-10 ENCOUNTER — TELEPHONE (OUTPATIENT)
Dept: RHEUMATOLOGY | Facility: CLINIC | Age: 54
End: 2019-09-10

## 2019-09-10 NOTE — TELEPHONE ENCOUNTER
Received another request to fill out Attending Physicians Statement. This was just completed and faxed on 8/27/2019. I called [employer] at [phone] and spoke to Mary Ellen and asked what specific information was needed. She stated that they need a specific return to work date. I have printed off page 2 of the form and have given it to Dr. Marroquin to complete. Mary Ellen also stated that she needs the most recent OV note. Patient was recently in for an appointment on 9/6/2019, however, these notes are not signed. I let Mary Ellen know that as soon as they are complete, I will fax them to her.   Jayleen Gan CMA   9/10/2019 11:34 AM

## 2019-09-10 NOTE — TELEPHONE ENCOUNTER
OK to be seen -- 2nd or 3rd request about this or similar issue in last few weeks.  I would not double book.  OK to be seen by partner.

## 2019-09-11 ENCOUNTER — OFFICE VISIT (OUTPATIENT)
Dept: FAMILY MEDICINE | Facility: CLINIC | Age: 54
End: 2019-09-11
Payer: COMMERCIAL

## 2019-09-11 VITALS
WEIGHT: 293 LBS | HEART RATE: 95 BPM | DIASTOLIC BLOOD PRESSURE: 80 MMHG | OXYGEN SATURATION: 96 % | HEIGHT: 66 IN | BODY MASS INDEX: 47.09 KG/M2 | TEMPERATURE: 98.7 F | SYSTOLIC BLOOD PRESSURE: 136 MMHG

## 2019-09-11 DIAGNOSIS — L03.115 CELLULITIS OF RIGHT LOWER EXTREMITY: Primary | ICD-10-CM

## 2019-09-11 DIAGNOSIS — Z79.899 HIGH RISK MEDICATION USE: ICD-10-CM

## 2019-09-11 PROCEDURE — 99213 OFFICE O/P EST LOW 20 MIN: CPT | Performed by: FAMILY MEDICINE

## 2019-09-11 RX ORDER — DOXYCYCLINE HYCLATE 100 MG
100 TABLET ORAL 2 TIMES DAILY
Qty: 20 TABLET | Refills: 0 | Status: SHIPPED | OUTPATIENT
Start: 2019-09-11 | End: 2019-09-21

## 2019-09-11 ASSESSMENT — MIFFLIN-ST. JEOR: SCORE: 1982.76

## 2019-09-11 NOTE — PROGRESS NOTES
Subjective     Taina Singh is a 54 year old female who presents to clinic today for the following health issues:    HPI     Leg Problem      Duration: started Monday night    Description (location/character/radiation): redness, warm to touch and tenderness in the back of right lower leg    Intensity:  moderate    Accompanying signs and symptoms: swelling    History (similar episodes/previous evaluation): previous history of cellulitis in the same leg 5-6 weeks ago and Lupus    Precipitating or alleviating factors: None    Therapies tried and outcome: took a couple of left over doses of Doxycycline which she can't tell if this has helped         Patient Active Problem List   Diagnosis     Sicca syndrome (H)     Systemic lupus erythematosus (H)     Sjogren's syndrome (H)     Iron deficiency     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     Diastolic dysfunction     Bronchiolitis     Hematuria, unspecified type     Other microscopic hematuria     Need for pneumocystis prophylaxis     Lupus nephritis, ISN/RPS class IV (H)     Morbid obesity (H)     Pulmonary embolism (H)     Splenic infarction     Iron deficiency anemia     Hearing loss     High risk medication use     Past Surgical History:   Procedure Laterality Date     BIOPSY/REMOVAL, LYMPH NODE(S)        SECTION  age 32     HC HYSTEROS W PERMANENT FALLOPAIN IMPLANT      Essure b/l     PAROTIDECTOMY Right 2006     TONSILLECTOMY         Social History     Tobacco Use     Smoking status: Never Smoker     Smokeless tobacco: Never Used   Substance Use Topics     Alcohol use: No     Family History   Problem Relation Age of Onset     Alopecia Brother      Rheumatoid Arthritis Brother      LUNG DISEASE No family hx of          Current Outpatient Medications   Medication Sig Dispense Refill     albuterol (PROAIR HFA/PROVENTIL HFA/VENTOLIN HFA) 108 (90 Base) MCG/ACT inhaler Inhale 2 puffs into the lungs every 6 hours as needed for shortness of breath /  dyspnea or wheezing 3 Inhaler 3     atovaquone (MEPRON) 750 MG/5ML suspension Take 10 mLs (1,500 mg) by mouth daily 210 mL 5     Calcium-Magnesium 300-300 MG TABS daily        doxycycline hyclate (VIBRA-TABS) 100 MG tablet Take 1 tablet (100 mg) by mouth 2 times daily for 10 days 20 tablet 0     hydroxychloroquine (PLAQUENIL) 200 MG tablet Take 1 tablet (200 mg) by mouth 2 times daily 180 tablet 1     Multiple Vitamins-Minerals (WOMENS MULTI PO) Take by mouth daily        Omega-3 Fatty Acids (OMEGA-3 FISH OIL) 500 MG CAPS Take 500 mg by mouth daily        pantoprazole (PROTONIX) 20 MG EC tablet Take 2 tablets (40 mg) by mouth 2 times daily       pilocarpine (SALAGEN) 5 MG tablet Take 1 tablet (5 mg) by mouth 4 times daily as needed 360 tablet 3     predniSONE (DELTASONE) 10 MG tablet Take 1 tablet by mouth every morning       predniSONE (DELTASONE) 2.5 MG tablet 25-20-17.5-15-12.5 mg a day each for one week then 10 mg a day, (take along with 10 and 5 mg size tabs) 100 tablet 3     rivaroxaban ANTICOAGULANT (XARELTO) 15 MG TABS tablet Take 15 mg by mouth 2 times daily (with meals)       traMADol (ULTRAM) 50 MG tablet Take 1 tablet (50 mg) by mouth every 12 hours as needed for pain Take 1 tablet as needed for pain.  No driving or alcohol use while taking medication. 30 tablet 2     vitamin D3 (CHOLECALCIFEROL) 2000 units (50 mcg) tablet Take 1 tablet (2,000 Units) by mouth daily 90 tablet 11     zolpidem (AMBIEN) 5 MG tablet Take 1 tablet (5 mg) by mouth nightly as needed for sleep 30 tablet 1     chlorthalidone (HYGROTON) 25 MG tablet Take 1 tablet (25 mg) by mouth daily (Patient not taking: Reported on 9/11/2019) 30 tablet 1     fluticasone (FLONASE) 50 MCG/ACT nasal spray Spray 1-2 sprays into both nostrils daily Use 1 spray per nostril per day for 2 weeks.  May increase to 2 sprays per nostril per day after. (Patient not taking: Reported on 9/11/2019) 16 g 0     losartan (COZAAR) 25 MG tablet Take 1 tablet (25 mg)  "by mouth daily (Patient not taking: Reported on 9/11/2019) 90 tablet 3     Allergies   Allergen Reactions     Pentamidine Shortness Of Breath     Penicillins Rash     Sulfa Drugs Rash     Recent Labs   Lab Test 08/23/19  0939 08/03/19  1031 07/18/19  1100 06/27/19  1209  06/05/19  1157  12/29/17  1302   LDL  --   --   --   --   --  129*  --   --    HDL  --   --   --   --   --  51  --   --    TRIG  --   --   --   --   --  138  --   --    ALT 34  --  36 39  --   --    < > 35   CR 0.69 0.64 0.60 0.63   < > 0.49*   < >  --    GFRESTIMATED >90 >90 >90 >90   < > >90   < >  --    GFRESTBLACK >90 >90 >90 >90   < > >90   < >  --    POTASSIUM  --  4.0 4.1 4.4   < > 4.3   < >  --    TSH  --   --   --   --   --   --   --  0.87    < > = values in this interval not displayed.      BP Readings from Last 3 Encounters:   09/11/19 136/80   09/06/19 (!) 150/78   08/09/19 (!) 140/79    Wt Readings from Last 3 Encounters:   09/11/19 135.6 kg (299 lb)   09/06/19 135.5 kg (298 lb 12.8 oz)   08/09/19 129.7 kg (286 lb)                      Reviewed and updated as needed this visit by Provider  Tobacco  Allergies  Meds  Problems  Med Hx  Surg Hx  Fam Hx         Review of Systems   ROS COMP: CONSTITUTIONAL: NEGATIVE for fever, chills, change in weight  RESP: NEGATIVE for significant cough or SOB  CV: NEGATIVE for chest pain, palpitations or peripheral edema  MUSCULOSKELETAL: as above  Pt has Lupus nephritis and sees specialist      Objective    /80   Pulse 95   Temp 98.7  F (37.1  C) (Oral)   Ht 1.676 m (5' 5.98\")   Wt 135.6 kg (299 lb)   SpO2 96%   BMI 48.28 kg/m    Body mass index is 48.28 kg/m .  Physical Exam   GENERAL: healthy, alert and no distress  GENERAL: healthy, alert, no distress and obese  RESP: lungs clear to auscultation - no rales, rhonchi or wheezes  CV: regular rate and rhythm, normal S1 S2, no S3 or S4, no murmur, click or rub, no peripheral edema and peripheral pulses strong  MS: no gross musculoskeletal " defects noted, no edema  SKIN: erythema Right Lower extremity-back of lower leg area about 15 cm x 10 cm    Diagnostic Test Results:  Labs reviewed in Epic  none         Assessment & Plan     1. Cellulitis of right lower extremity  SEE [facility] care orders  The potential side effects of this medication have been discussed with the patient.  Call if any significant problems with these are experienced.  Follow up 2 days-sooner if worse  - doxycycline hyclate (VIBRA-TABS) 100 MG tablet; Take 1 tablet (100 mg) by mouth 2 times daily for 10 days  Dispense: 20 tablet; Refill: 0    2. High risk medication use  Sees Rheumatology       Return in about 2 days (around 9/13/2019) for recheck.    Zahira Barron MD  [facility]

## 2019-09-12 ENCOUNTER — MYC MEDICAL ADVICE (OUTPATIENT)
Dept: RHEUMATOLOGY | Facility: CLINIC | Age: 54
End: 2019-09-12

## 2019-09-12 NOTE — TELEPHONE ENCOUNTER
[facility] Call Center    Phone Message    May a detailed message be left on voicemail: yes    Reason for Call: Other: The pt is asking for a call back regarding this paperwork. Thanks.     Action Taken: Message routed to:  [facility] ([facility]

## 2019-09-26 NOTE — PROGRESS NOTES
Nephrology Clinic    Maynor Miranda MD  2019     Name: Taina Singh  MRN: [id number]  Age: 54 year old  : 1965  Referring provider: Referred Self    Assessment and Plan:    1. 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  Surprisingly endocapillary proliferation was not as profound in the context of so many necrotizing lesions (activity index  and chronicity index is ).  Of note, ANCA was negative, complements remain low and ds-DNA high with accompanying microscopic hematuria and albuminuria though much improved (~1000 to 120 mg/g) after starting therapy with cytoxan.  She has preserved renal function with minimal chronic changes on renal biopsy.  She has received her fourth dose of cytoxan ([facility] dosing) and now leukopenic and undergoing a prednisone taper.  - She will continue induction with cytoxan Q month x 6 total with next dose in mid October (#5).  Rituximab being considered for maintenance.    - She continues on prednisone taper per Rheumatology  - She is also on plaquenil 200 mg BID  - We will hold on RAAS blockade given her adverse reaction to both lisinopril and losartan.  However, suspect adverse reaction may not have been due to these medications and thus will consider a rechallenge especially if she continues to have albuminuria (>1000 mg/g) after completion of induction therapy with cytoxan.    - She did not tolerate PJP prophylaxis with inhaled pentamidine and has started atovaquone per Rheumatology  - We will increase cholecalciferol to 2000 units daily and continue calcium supplements and pantoprazole to minimize adverse events related to high dose prednisone  - Discussed maintenance immunosuppression (eg. CellCept vs Rituximab) after 6 months of current treatment   - Will continue to monitor for remission (creatinine, proteinuria, hematuria, etc.)   - Discussed another biopsy in 6-12 months  to gauge effect of the current treatment on the kidneys   - RTC in 3 months     2. BP/Volume status: Slightly above goal in clinic today (goal BP <130/80) and mildly volume expanded on exam.  Suspect home BP is at goal and volume will normalize after completion of prednisone taper.     - No pressing need for diuretic at this time.  Of note she did not tolerate chlorthalidone due to dizziness.    - As mentioned, will hold off on RAAS given reaction to lisinopril and then later losartan (see problem 1)  - Measure home BP and report to clinic if consistently above goal.    - Anticipate rechallenging with low dose RAAS blockade or diuretic next     3. Anemia: Mild (hgb 10.8) and likely due to inflammatory state.    -No intervention needed at this time     4.  Electrolytes & Acid/Base: No pressing issues.       5.  PE and splenic infarct: Incidental finding.  Unable to locate w/u for antiphospholipid antibodies.  On Xarelto.    -monitor for now      Reason For Visit:   Follow-up.    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  She had well controlled Lupus and stopped taking prednisone and HCQ until about 3 years back developed pleuritic CP and arthritis and restarted HCQ 200mg every day. Restarted on prednisone 40mg with some improvement in CP back in 11/2018, but unable to taper steroids and her main concerns remain CP ongoing with last cardiac MRI with pericarditis.     She has been using tramadol BiD , has used tylenol and Ibuprofen 400mg once daily for about a yr.  She had started working out and lost 100 lbs, which triggered a Lupus flare, which started approximately 2 years ago.   .  She had Creatinine 0.5-0.6 wo any change in renal functions over the years,  She had minimal proteinuria since 6/2018 with bland urine on UA. Urine with minimal proteins and trace blood since 6/2018. Her proteinuria recently spiked to 1.88 g/gr on 6/5/19. On her last UA while on schedule to get CYC on 6/3 she was noted to have microscopic hematuria and CYC was held, she has presented to be seen in Rheum clinic today with 1.88g/g of UPCR and numerous RBC on her UA. Nephrology was consulted for possible LN concerns. Her latest urinalysis on 6/5/19 showed the same concerns.      She has had 5 pregnancies and 1 live birth , most miscarriages were in first trimester and no complications with eclampsia/pre-eclampsia. She was on Lupus treatment at that time.  She has no Hx of Nephrolithiasis.    Interval history:   The patient is doing okay overall. She recently saw a primary care doctor for right lower leg swelling, erythema and pain.  She was treated with doxycycline for  presumed cellulitis. She reports that the chest pain she was experiencing has improved. She hs yet to  a blood pressure cuff and so does not know her home blood pressures.  She is beginning to taper her prednisone dosing and may start rituximab for maintenance after completion of cytoxan induction. She remains off both lisinopril and losartan as she potentially had a reaction to these medications including facial swelling, worsening edema and dizziness all of which resolved after stopping each medication on separate occasions.  Upon physical examination, patient reports that she has a bump on her right lower leg that tend to be the site of cellulitis during each infection. She reports that the lower part of her right leg is weaker than the left leg. She is wondering who she can see regarding recurrent cellulitis.     Review of Systems:   Pertinent items are noted in HPI or as below, remainder of complete ROS is negative.      Active Medications:     Current Outpatient Medications:      albuterol (PROAIR HFA/PROVENTIL  HFA/VENTOLIN HFA) 108 (90 Base) MCG/ACT inhaler, Inhale 2 puffs into the lungs every 6 hours as needed for shortness of breath / dyspnea or wheezing, Disp: 3 Inhaler, Rfl: 3     atovaquone (MEPRON) 750 MG/5ML suspension, Take 10 mLs (1,500 mg) by mouth daily, Disp: 210 mL, Rfl: 5     Calcium-Magnesium 300-300 MG TABS, daily , Disp: , Rfl:      hydroxychloroquine (PLAQUENIL) 200 MG tablet, Take 1 tablet (200 mg) by mouth 2 times daily, Disp: 180 tablet, Rfl: 1     Multiple Vitamins-Minerals (WOMENS MULTI PO), Take by mouth daily , Disp: , Rfl:      Omega-3 Fatty Acids (OMEGA-3 FISH OIL) 500 MG CAPS, Take 500 mg by mouth daily , Disp: , Rfl:      pantoprazole (PROTONIX) 20 MG EC tablet, Take 2 tablets (40 mg) by mouth 2 times daily, Disp: , Rfl:      pilocarpine (SALAGEN) 5 MG tablet, Take 1 tablet (5 mg) by mouth 4 times daily as needed, Disp: 360 tablet, Rfl: 3     predniSONE (DELTASONE) 10 MG tablet, Take 1 tablet by mouth every morning, Disp: , Rfl:      predniSONE (DELTASONE) 2.5 MG tablet, 25-20-17.5-15-12.5 mg a day each for one week then 10 mg a day, (take along with 10 and 5 mg size tabs), Disp: 100 tablet, Rfl: 3     rivaroxaban ANTICOAGULANT (XARELTO) 15 MG TABS tablet, Take 15 mg by mouth 2 times daily (with meals), Disp: , Rfl:      traMADol (ULTRAM) 50 MG tablet, Take 1 tablet (50 mg) by mouth every 12 hours as needed for pain Take 1 tablet as needed for pain.  No driving or alcohol use while taking medication., Disp: 30 tablet, Rfl: 2     vitamin D3 (CHOLECALCIFEROL) 2000 units (50 mcg) tablet, Take 1 tablet (2,000 Units) by mouth daily, Disp: 90 tablet, Rfl: 11     zolpidem (AMBIEN) 5 MG tablet, Take 1 tablet (5 mg) by mouth nightly as needed for sleep, Disp: 30 tablet, Rfl: 1     chlorthalidone (HYGROTON) 25 MG tablet, Take 1 tablet (25 mg) by mouth daily (Patient not taking: Reported on 9/11/2019), Disp: 30 tablet, Rfl: 1     fluticasone (FLONASE) 50 MCG/ACT nasal spray, Spray 1-2 sprays into both  nostrils daily Use 1 spray per nostril per day for 2 weeks.  May increase to 2 sprays per nostril per day after. (Patient not taking: Reported on 2019), Disp: 16 g, Rfl: 0     losartan (COZAAR) 25 MG tablet, Take 1 tablet (25 mg) by mouth daily (Patient not taking: Reported on 2019), Disp: 90 tablet, Rfl: 3     Allergies:   Pentamidine; Penicillins; and Sulfa drugs      Past Medical History:  - Bronchiolitis   - Chest CT 2018 shows air trapping; bronchiolitis possibly related to lupus  - Diastolic dysfunction  - 2018  - Gluten intolerance    - Patient is not gluten intolerant  - Iron deficiency   - Lupus nephritis, ISN/RPS class IV   - dx age 27; + DNA-ds, KARLIE, SSA, SSB and RF; malar rash, serositis (pleuritic CP)  - Cyclophosphamide started 2019  - 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 - age 44   - No chemo or radiation  - Other forms of systemic lupus erythematosus - 2018  - Pulmonary embolism   - 2019 seen on abd [facility]  - Sjogren's syndrome    - Secondary Sjogrens, secondary to lupus  - Vitamin D deficiency   - Sicca syndrome  - Diastolic dysfunction  - Hematuria  - Need for pneumocystis prophylaxis  - Morbid obesity  - Splenic infarction  - Hearing loss  - High risk medication use     Past Surgical History:  - Biopsy/removal, lymph nodes  -  section- age 32  - HC hysteros with permanent fallopian implant   - Essure b/I  - Parotidectomy - 2006  - Tonsillectomy     Family History:   - Alopecia - Brother   - Rheumatoid Arthritis - Brother      Social History:   Presents to clinic alone.  Tobacco Use: No  Alcohol Use: No  PCP: [facility] ([place])    Physical Exam:  /84   Pulse 95   Wt 137.9 kg (304 lb)   SpO2 95%   Breastfeeding? No   BMI 49.09 kg/m     GENERAL APPEARANCE: alert and no distress  EYES: PERRL, no scleral icterus  HENT: mouth without ulcers or lesions  NECK: supple, no adenopathy  RESP: lungs clear to auscultation   CV: regular  rhythm, normal rate, no rub  : soft, NT, ND  : no CVA tenderness  Extremities: 1+ edema in lower extremities bilaterally  SKIN: no concerning rash  NEURO: mentation intact and speech normal    Laboratory:  CMP  Recent Labs   Lab Test 09/30/19  0656 08/23/19  0939 08/03/19  1031 07/18/19  1100 06/27/19  1209     --  135 140 135   POTASSIUM 3.9  --  4.0 4.1 4.4   CHLORIDE 104  --  100 107 104   CO2 30  --  29 28 28   ANIONGAP 5  --  6 5 4   GLC 85  --  92 98 107*   BUN 20  --  25 23 33*   CR 0.60 0.69 0.64 0.60 0.63   GFRESTIMATED >90 >90 >90 >90 >90   GFRESTBLACK >90 >90 >90 >90 >90   ANDERSON 8.8  --  9.4 8.8 8.8   PHOS 4.2  --  3.3 3.9 3.4   ALBUMIN 2.9* 3.2* 3.3* 3.0* 3.3*   AST 19 18  --  21 13   ALT 27 34  --  36 39     CBC  Recent Labs   Lab Test 09/30/19  0656 08/23/19  0939 07/18/19  1100 06/27/19  1209   HGB 10.8* 11.2* 11.2* 11.8   WBC 3.5* 12.3* 9.5 7.5   RBC 3.84 4.02 4.07 4.55   HCT 35.0 36.0 36.1 39.3   MCV 91 90 89 86   MCH 28.1 27.9 27.5 25.9*   MCHC 30.9* 31.1* 31.0* 30.0*   RDW 13.9 17.1* 18.4* 16.4*    222 198 253     INR  Recent Labs   Lab Test 06/07/19  0719   INR 0.95     ABG  No lab results found.   URINE STUDIES  Recent Labs   Lab Test 09/30/19  0658 08/23/19  0953 07/18/19  1114 06/27/19  1159  12/31/18  1351 11/12/18  1450   COLOR Straw Yellow Yellow Yellow   < > Yellow Yellow   APPEARANCE Clear Slightly Cloudy Clear Cloudy   < > Clear Clear   URINEGLC Negative Negative Negative Negative   < > Negative Negative   URINEBILI Negative Negative Negative Negative   < > Negative Negative   URINEKETONE Negative Negative Negative Negative   < > Negative Negative   SG 1.009 1.025 1.015 1.025   < > 1.010 1.025   UBLD Negative Trace* Moderate* Small*   < > Trace* Trace*   URINEPH 7.0 7.0 7.0 5.0   < > 7.0 6.0   PROTEIN 30* 100* >=300* >499*   < > Trace* 30*   UROBILINOGEN  --  0.2 1.0  --   --  0.2 0.2   NITRITE Negative Negative Negative Negative   < > Negative Negative   LEUKEST Trace*  Trace* Moderate* Large*   < > Negative Negative   RBCU 2 O - 2 2-5* 26*   < > O - 2 O - 2   WBCU 20* 25-50* 10-25* 86*   < > 0 - 5 0 - 5    < > = values in this interval not displayed.     Recent Labs   Lab Test 09/30/19  0658 08/23/19  0953 07/18/19  1114 06/27/19  1159 06/05/19  0829 03/06/19  1048 12/31/18  1350 11/12/18  1450 08/13/18  1610 07/07/18  1116 06/12/18  1042 05/12/18  1258 04/11/18  1655 03/21/18  1750 02/23/18  1445 12/29/17  1254   UTPG 1.20* 1.12* 2.92* 1.42* 1.88* 0.89* 0.92* 0.34* 0.23* 0.29* 0.21* Unable to calculate due to low value Unable to calculate due to low value Unable to calculate due to low value 0.18 Unable to calculate due to low value     PTH  No lab results found.  IRON STUDIES   Recent Labs   Lab Test 11/12/18  1443 12/29/17  1302   IRON 27* 58    315   IRONSAT 10* 19   COLLETTE 160 163         Scribe Disclosure:   Ameya MILLER, am serving as a scribe to document services personally performed by Maynor Miranda MD at this visit, based upon the provider's statements to me. All documentation has been reviewed by the aforementioned provider prior to being entered into the official medical record.     Ameya MILLER, served as a scribe preparing the chart for the clinic encounter through chart review for the provider team.     Total time spent was >40 minutes, and more than 50% of face to face time was spent in counseling and/or coordination of care regarding principles of multidisciplinary care, medication management, and chronic kidney disease education.  Maynor Miranda MD

## 2019-09-30 ENCOUNTER — OFFICE VISIT (OUTPATIENT)
Dept: NEPHROLOGY | Facility: CLINIC | Age: 54
End: 2019-09-30
Attending: INTERNAL MEDICINE
Payer: COMMERCIAL

## 2019-09-30 VITALS
HEART RATE: 95 BPM | BODY MASS INDEX: 49.09 KG/M2 | SYSTOLIC BLOOD PRESSURE: 132 MMHG | DIASTOLIC BLOOD PRESSURE: 84 MMHG | OXYGEN SATURATION: 95 % | WEIGHT: 293 LBS

## 2019-09-30 DIAGNOSIS — M32.14 LUPUS NEPHRITIS (H): ICD-10-CM

## 2019-09-30 DIAGNOSIS — M32.14 LUPUS NEPHRITIS, ISN/RPS CLASS IV (H): Primary | ICD-10-CM

## 2019-09-30 DIAGNOSIS — R80.9 PROTEINURIA: ICD-10-CM

## 2019-09-30 DIAGNOSIS — M32.9 SYSTEMIC LUPUS ERYTHEMATOSUS, UNSPECIFIED SLE TYPE, UNSPECIFIED ORGAN INVOLVEMENT STATUS (H): ICD-10-CM

## 2019-09-30 LAB
ALBUMIN SERPL-MCNC: 2.9 G/DL (ref 3.4–5)
ALBUMIN UR-MCNC: 30 MG/DL
ALT SERPL W P-5'-P-CCNC: 27 U/L (ref 0–50)
ANION GAP SERPL CALCULATED.3IONS-SCNC: 5 MMOL/L (ref 3–14)
APPEARANCE UR: CLEAR
AST SERPL W P-5'-P-CCNC: 19 U/L (ref 0–45)
BASOPHILS # BLD AUTO: 0 10E9/L (ref 0–0.2)
BASOPHILS NFR BLD AUTO: 0.3 %
BILIRUB UR QL STRIP: NEGATIVE
BUN SERPL-MCNC: 20 MG/DL (ref 7–30)
C3 SERPL-MCNC: 50 MG/DL (ref 76–169)
C4 SERPL-MCNC: 4 MG/DL (ref 15–50)
CALCIUM SERPL-MCNC: 8.8 MG/DL (ref 8.5–10.1)
CHLORIDE SERPL-SCNC: 104 MMOL/L (ref 94–109)
CO2 SERPL-SCNC: 30 MMOL/L (ref 20–32)
COLOR UR AUTO: ABNORMAL
CREAT SERPL-MCNC: 0.6 MG/DL (ref 0.52–1.04)
CREAT UR-MCNC: 41 MG/DL
CRP SERPL-MCNC: 6 MG/L (ref 0–8)
DIFFERENTIAL METHOD BLD: ABNORMAL
EOSINOPHIL # BLD AUTO: 0.1 10E9/L (ref 0–0.7)
EOSINOPHIL NFR BLD AUTO: 2.6 %
ERYTHROCYTE [DISTWIDTH] IN BLOOD BY AUTOMATED COUNT: 13.9 % (ref 10–15)
ERYTHROCYTE [SEDIMENTATION RATE] IN BLOOD BY WESTERGREN METHOD: 39 MM/H (ref 0–30)
GFR SERPL CREATININE-BSD FRML MDRD: >90 ML/MIN/{1.73_M2}
GLUCOSE SERPL-MCNC: 85 MG/DL (ref 70–99)
GLUCOSE UR STRIP-MCNC: NEGATIVE MG/DL
HCT VFR BLD AUTO: 35 % (ref 35–47)
HGB BLD-MCNC: 10.8 G/DL (ref 11.7–15.7)
HGB UR QL STRIP: NEGATIVE
IMM GRANULOCYTES # BLD: 0 10E9/L (ref 0–0.4)
IMM GRANULOCYTES NFR BLD: 0.9 %
KETONES UR STRIP-MCNC: NEGATIVE MG/DL
LEUKOCYTE ESTERASE UR QL STRIP: ABNORMAL
LYMPHOCYTES # BLD AUTO: 0.2 10E9/L (ref 0.8–5.3)
LYMPHOCYTES NFR BLD AUTO: 4.3 %
MCH RBC QN AUTO: 28.1 PG (ref 26.5–33)
MCHC RBC AUTO-ENTMCNC: 30.9 G/DL (ref 31.5–36.5)
MCV RBC AUTO: 91 FL (ref 78–100)
MICROALBUMIN UR-MCNC: 318 MG/L
MICROALBUMIN/CREAT UR: 773.72 MG/G CR (ref 0–25)
MONOCYTES # BLD AUTO: 0.2 10E9/L (ref 0–1.3)
MONOCYTES NFR BLD AUTO: 6.6 %
MUCOUS THREADS #/AREA URNS LPF: PRESENT /LPF
NEUTROPHILS # BLD AUTO: 3 10E9/L (ref 1.6–8.3)
NEUTROPHILS NFR BLD AUTO: 85.3 %
NITRATE UR QL: NEGATIVE
NRBC # BLD AUTO: 0 10*3/UL
NRBC BLD AUTO-RTO: 0 /100
PH UR STRIP: 7 PH (ref 5–7)
PHOSPHATE SERPL-MCNC: 4.2 MG/DL (ref 2.5–4.5)
PLATELET # BLD AUTO: 194 10E9/L (ref 150–450)
POTASSIUM SERPL-SCNC: 3.9 MMOL/L (ref 3.4–5.3)
PROT UR-MCNC: 0.49 G/L
PROT/CREAT 24H UR: 1.2 G/G CR (ref 0–0.2)
PTH-INTACT SERPL-MCNC: 23 PG/ML (ref 18–80)
RBC # BLD AUTO: 3.84 10E12/L (ref 3.8–5.2)
RBC #/AREA URNS AUTO: 2 /HPF (ref 0–2)
SODIUM SERPL-SCNC: 138 MMOL/L (ref 133–144)
SOURCE: ABNORMAL
SP GR UR STRIP: 1.01 (ref 1–1.03)
SQUAMOUS #/AREA URNS AUTO: <1 /HPF (ref 0–1)
UROBILINOGEN UR STRIP-MCNC: 0 MG/DL (ref 0–2)
WBC # BLD AUTO: 3.5 10E9/L (ref 4–11)
WBC #/AREA URNS AUTO: 20 /HPF (ref 0–5)

## 2019-09-30 PROCEDURE — 84460 ALANINE AMINO (ALT) (SGPT): CPT | Performed by: INTERNAL MEDICINE

## 2019-09-30 PROCEDURE — 36415 COLL VENOUS BLD VENIPUNCTURE: CPT | Performed by: INTERNAL MEDICINE

## 2019-09-30 PROCEDURE — 80069 RENAL FUNCTION PANEL: CPT | Performed by: INTERNAL MEDICINE

## 2019-09-30 PROCEDURE — 86160 COMPLEMENT ANTIGEN: CPT | Performed by: INTERNAL MEDICINE

## 2019-09-30 PROCEDURE — 82306 VITAMIN D 25 HYDROXY: CPT | Performed by: INTERNAL MEDICINE

## 2019-09-30 PROCEDURE — 87086 URINE CULTURE/COLONY COUNT: CPT | Performed by: INTERNAL MEDICINE

## 2019-09-30 PROCEDURE — 84156 ASSAY OF PROTEIN URINE: CPT | Performed by: INTERNAL MEDICINE

## 2019-09-30 PROCEDURE — 81001 URINALYSIS AUTO W/SCOPE: CPT | Performed by: INTERNAL MEDICINE

## 2019-09-30 PROCEDURE — 83970 ASSAY OF PARATHORMONE: CPT | Performed by: INTERNAL MEDICINE

## 2019-09-30 PROCEDURE — 82043 UR ALBUMIN QUANTITATIVE: CPT | Performed by: INTERNAL MEDICINE

## 2019-09-30 PROCEDURE — 85652 RBC SED RATE AUTOMATED: CPT | Performed by: INTERNAL MEDICINE

## 2019-09-30 PROCEDURE — G0463 HOSPITAL OUTPT CLINIC VISIT: HCPCS

## 2019-09-30 PROCEDURE — 84450 TRANSFERASE (AST) (SGOT): CPT | Performed by: INTERNAL MEDICINE

## 2019-09-30 PROCEDURE — 86140 C-REACTIVE PROTEIN: CPT | Performed by: INTERNAL MEDICINE

## 2019-09-30 PROCEDURE — 85025 COMPLETE CBC W/AUTO DIFF WBC: CPT | Performed by: INTERNAL MEDICINE

## 2019-09-30 PROCEDURE — 86225 DNA ANTIBODY NATIVE: CPT | Performed by: INTERNAL MEDICINE

## 2019-09-30 NOTE — NURSING NOTE
Chief Complaint   Patient presents with     Follow Up     3 month f/u     Blood pressure 132/84, pulse 95, weight 137.9 kg (304 lb), SpO2 95 %, not currently breastfeeding.    Denise Mccray, CMA

## 2019-09-30 NOTE — LETTER
Patient:  Taina Singh  :   1965  MRN:     [id number]        Ms.Kelly Singh  [address] [facility] [address]        2019    Dear ,    We are writing to inform you of your test results. Stable/unchanged labs. We are appealing rituximab.      Results for orders placed or performed in visit on 19   Erythrocyte sedimentation rate auto   Result Value Ref Range    Sed Rate 39 (H) 0 - 30 mm/h   DNA double stranded antibodies   Result Value Ref Range    DNA-ds >379 (H) <10 IU/mL   CRP inflammation   Result Value Ref Range    CRP Inflammation 6.0 0.0 - 8.0 mg/L   Complement C3   Result Value Ref Range    Complement C3 50 (L) 76 - 169 mg/dL   Complement C4   Result Value Ref Range    Complement C4 4 (L) 15 - 50 mg/dL   CBC with platelets differential   Result Value Ref Range    WBC 3.5 (L) 4.0 - 11.0 10e9/L    RBC Count 3.84 3.8 - 5.2 10e12/L    Hemoglobin 10.8 (L) 11.7 - 15.7 g/dL    Hematocrit 35.0 35.0 - 47.0 %    MCV 91 78 - 100 fl    MCH 28.1 26.5 - 33.0 pg    MCHC 30.9 (L) 31.5 - 36.5 g/dL    RDW 13.9 10.0 - 15.0 %    Platelet Count 194 150 - 450 10e9/L    Diff Method Automated Method     % Neutrophils 85.3 %    % Lymphocytes 4.3 %    % Monocytes 6.6 %    % Eosinophils 2.6 %    % Basophils 0.3 %    % Immature Granulocytes 0.9 %    Nucleated RBCs 0 0 /100    Absolute Neutrophil 3.0 1.6 - 8.3 10e9/L    Absolute Lymphocytes 0.2 (L) 0.8 - 5.3 10e9/L    Absolute Monocytes 0.2 0.0 - 1.3 10e9/L    Absolute Eosinophils 0.1 0.0 - 0.7 10e9/L    Absolute Basophils 0.0 0.0 - 0.2 10e9/L    Abs Immature Granulocytes 0.0 0 - 0.4 10e9/L    Absolute Nucleated RBC 0.0    AST   Result Value Ref Range    AST 19 0 - 45 U/L   ALT   Result Value Ref Range    ALT 27 0 - 50 U/L   Routine UA with micro reflex to culture   Result Value Ref Range    Color Urine Straw     Appearance Urine Clear     Glucose Urine Negative NEG^Negative mg/dL    Bilirubin Urine Negative NEG^Negative    Ketones Urine Negative  NEG^Negative mg/dL    Specific Gravity Urine 1.009 1.003 - 1.035    Blood Urine Negative NEG^Negative    pH Urine 7.0 5.0 - 7.0 pH    Protein Albumin Urine 30 (A) NEG^Negative mg/dL    Urobilinogen mg/dL 0.0 0.0 - 2.0 mg/dL    Nitrite Urine Negative NEG^Negative    Leukocyte Esterase Urine Trace (A) NEG^Negative    Source Midstream Urine     WBC Urine 20 (H) 0 - 5 /HPF    RBC Urine 2 0 - 2 /HPF    Squamous Epithelial /HPF Urine <1 0 - 1 /HPF    Mucous Urine Present (A) NEG^Negative /LPF   Parathyroid Hormone Intact   Result Value Ref Range    Parathyroid Hormone Intact 23 18 - 80 pg/mL   25 Hydroxyvitamin D2 and D3   Result Value Ref Range    25 OH Vit D2 <5 ug/L    25 OH Vit D3 36 ug/L    25 OH Vit D total <41 20 - 75 ug/L   Albumin Random Urine Quantitative with Creat Ratio   Result Value Ref Range    Creatinine Urine 41 mg/dL    Albumin Urine mg/L 318 mg/L    Albumin Urine mg/g Cr 773.72 (H) 0 - 25 mg/g Cr   Protein  random urine with Creat Ratio   Result Value Ref Range    Protein Random Urine 0.49 g/L    Protein Total Urine g/gr Creatinine 1.20 (H) 0 - 0.2 g/g Cr   Renal panel   Result Value Ref Range    Sodium 138 133 - 144 mmol/L    Potassium 3.9 3.4 - 5.3 mmol/L    Chloride 104 94 - 109 mmol/L    Carbon Dioxide 30 20 - 32 mmol/L    Anion Gap 5 3 - 14 mmol/L    Glucose 85 70 - 99 mg/dL    Urea Nitrogen 20 7 - 30 mg/dL    Creatinine 0.60 0.52 - 1.04 mg/dL    GFR Estimate >90 >60 mL/min/[1.73_m2]    GFR Estimate If Black >90 >60 mL/min/[1.73_m2]    Calcium 8.8 8.5 - 10.1 mg/dL    Phosphorus 4.2 2.5 - 4.5 mg/dL    Albumin 2.9 (L) 3.4 - 5.0 g/dL   Urine Culture Aerobic Bacterial   Result Value Ref Range    Specimen Description Midstream Urine     Special Requests Specimen received in preservative     Culture Micro       <10,000 colonies/mL  mixed urogenital pascual  Susceptibility testing not routinely done         Killian Marroquin MD

## 2019-09-30 NOTE — LETTER
2019       RE: Taina Singh  [address]  [place] [address]     Dear Colleague,    Thank you for referring your patient, Taina Singh, to the [facility] NEPHROLOGY at [facility]. Please see a copy of my visit note below.      Nephrology Clinic    Maynor Miranda MD  2019     Name: Taina Singh  MRN: [id number]  Age: 54 year old  : 1965  Referring provider: Referred Self    Assessment and Plan:    1. 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  Surprisingly endocapillary proliferation was not as profound in the context of so many necrotizing lesions (activity index  and chronicity index is ).  Of note, ANCA was negative, complements remain low and ds-DNA high with accompanying microscopic hematuria and albuminuria though much improved (~1000 to 120 mg/g) after starting therapy with cytoxan.  She has preserved renal function with minimal chronic changes on renal biopsy.  She has received her fourth dose of cytoxan (NIH dosing) and now leukopenic and undergoing a prednisone  taper.  - She will continue induction with cytoxan Q month x 6 total with next dose in mid October (#5).  Rituximab being considered for maintenance.    - She continues on prednisone taper per Rheumatology  - She is also on plaquenil 200 mg BID  - We will hold on RAAS blockade given her adverse reaction to both lisinopril and losartan.  However, suspect adverse reaction may not have been due to these medications and thus will consider a rechallenge especially if she continues to have albuminuria (>1000 mg/g) after completion of induction therapy with cytoxan.    - She did not tolerate PJP prophylaxis with inhaled pentamidine and has started atovaquone per Rheumatology  - We will increase cholecalciferol to 2000 units daily and continue calcium supplements and pantoprazole to minimize adverse events  related to high dose prednisone  - Discussed maintenance immunosuppression (eg. CellCept vs Rituximab) after 6 months of current treatment   - Will continue to monitor for remission (creatinine, proteinuria, hematuria, etc.)   - Discussed another biopsy in 6-12 months to gauge effect of the current treatment on the kidneys   - RTC in 3 months     2. BP/Volume status:  Slightly above goal in clinic today (goal BP <130/80) and mildly volume expanded on exam.  Suspect home BP is at goal and volume will normalize after completion of prednisone taper.     - No pressing need for diuretic at this time.  Of note she did not tolerate chlorthalidone due to dizziness.    - As mentioned, will hold off on RAAS given reaction to lisinopril and then later losartan (see problem 1)  - Measure home BP and report to clinic if consistently above goal.    - Anticipate rechallenging with low dose RAAS blockade or diuretic next     3. Anemia: Mild (hgb 10.8) and likely due to inflammatory state.    -No intervention needed at this time     4.  Electrolytes & Acid/Base: No pressing issues.       5.  PE and splenic infarct: Incidental finding.  Unable to locate w/u for antiphospholipid antibodies.  On Xarelto.    -monitor for now      Reason For Visit:   Follow-up.    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  She had well controlled Lupus and stopped taking prednisone and HCQ until about 3 years back developed pleuritic CP and arthritis and restarted HCQ 200mg every day. Restarted on prednisone 40mg with some improvement in CP back in 11/2018, but unable to taper steroids and her main concerns remain CP ongoing with last cardiac MRI with pericarditis.     She has been using tramadol  BiD , has used tylenol and Ibuprofen 400mg once daily for about a yr.  She had started working out and lost 100 lbs, which triggered a Lupus flare, which started approximately 2 years ago.   .  She had Creatinine 0.5-0.6 wo any change in renal functions over the years, She had minimal proteinuria since 6/2018 with bland urine on UA. Urine with minimal proteins and trace blood since 6/2018. Her proteinuria recently spiked to 1.88 g/gr on 6/5/19. On her last UA while on schedule to get CYC on 6/3 she was noted to have microscopic hematuria and CYC was held, she has presented to be seen in Rheum clinic today with 1.88g/g of UPCR and numerous RBC on her UA. Nephrology was consulted for possible LN concerns. Her latest urinalysis on 6/5/19 showed the same concerns.      She has had 5 pregnancies and 1 live birth , most miscarriages were in first trimester and no complications with eclampsia/pre-eclampsia. She was on Lupus treatment at that time.  She has no Hx of Nephrolithiasis.    Interval history:   The patient is doing okay overall. She recently saw a primary care doctor for right lower leg swelling, erythema and pain.  She was treated with doxycycline for  presumed cellulitis. She reports that the chest pain she was experiencing has improved. She hs yet to  a blood pressure cuff and so does not know her home blood pressures.  She is beginning to taper her prednisone dosing and may start rituximab for maintenance after completion of cytoxan induction. She remains off both lisinopril and losartan as she potentially had a reaction to these medications including facial swelling, worsening edema and dizziness all of which resolved after stopping each medication on separate occasions.  Upon physical examination, patient reports that she has a bump on her right lower leg that tend to be the site of cellulitis during each infection. She reports that the lower part of her right leg is weaker than the left leg. She is  wondering who she can see regarding recurrent cellulitis.     Review of Systems:   Pertinent items are noted in HPI or as below, remainder of complete ROS is negative.      Active Medications:     Current Outpatient Medications:      albuterol (PROAIR HFA/PROVENTIL HFA/VENTOLIN HFA) 108 (90 Base) MCG/ACT inhaler, Inhale 2 puffs into the lungs every 6 hours as needed for shortness of breath / dyspnea or wheezing, Disp: 3 Inhaler, Rfl: 3     atovaquone (MEPRON) 750 MG/5ML suspension, Take 10 mLs (1,500 mg) by mouth daily, Disp: 210 mL, Rfl: 5     Calcium-Magnesium 300-300 MG TABS, daily , Disp: , Rfl:      hydroxychloroquine (PLAQUENIL) 200 MG tablet, Take 1 tablet (200 mg) by mouth 2 times daily, Disp: 180 tablet, Rfl: 1     Multiple Vitamins-Minerals (WOMENS MULTI PO), Take by mouth daily , Disp: , Rfl:      Omega-3 Fatty Acids (OMEGA-3 FISH OIL) 500 MG CAPS, Take 500 mg by mouth daily , Disp: , Rfl:      pantoprazole (PROTONIX) 20 MG EC tablet, Take 2 tablets (40 mg) by mouth 2 times daily, Disp: , Rfl:      pilocarpine (SALAGEN) 5 MG tablet, Take 1 tablet (5 mg) by mouth 4 times daily as needed, Disp: 360 tablet, Rfl: 3     predniSONE (DELTASONE) 10 MG tablet, Take 1 tablet by mouth every morning, Disp: , Rfl:      predniSONE (DELTASONE) 2.5 MG tablet, 25-20-17.5-15-12.5 mg a day each for one week then 10 mg a day, (take along with 10 and 5 mg size tabs), Disp: 100 tablet, Rfl: 3     rivaroxaban ANTICOAGULANT (XARELTO) 15 MG TABS tablet, Take 15 mg by mouth 2 times daily (with meals), Disp: , Rfl:      traMADol (ULTRAM) 50 MG tablet, Take 1 tablet (50 mg) by mouth every 12 hours as needed for pain Take 1 tablet as needed for pain.  No driving or alcohol use while taking medication., Disp: 30 tablet, Rfl: 2     vitamin D3 (CHOLECALCIFEROL) 2000 units (50 mcg) tablet, Take 1 tablet (2,000 Units) by mouth daily, Disp: 90 tablet, Rfl: 11     zolpidem (AMBIEN) 5 MG tablet, Take 1 tablet (5 mg) by mouth nightly as  needed for sleep, Disp: 30 tablet, Rfl: 1     chlorthalidone (HYGROTON) 25 MG tablet, Take 1 tablet (25 mg) by mouth daily (Patient not taking: Reported on 2019), Disp: 30 tablet, Rfl: 1     fluticasone (FLONASE) 50 MCG/ACT nasal spray, Spray 1-2 sprays into both nostrils daily Use 1 spray per nostril per day for 2 weeks.  May increase to 2 sprays per nostril per day after. (Patient not taking: Reported on 2019), Disp: 16 g, Rfl: 0     losartan (COZAAR) 25 MG tablet, Take 1 tablet (25 mg) by mouth daily (Patient not taking: Reported on 2019), Disp: 90 tablet, Rfl: 3     Allergies:   Pentamidine; Penicillins; and Sulfa drugs      Past Medical History:  - Bronchiolitis   - Chest CT 2018 shows air trapping; bronchiolitis possibly related to lupus  - Diastolic dysfunction  - 2018  - Gluten intolerance    - Patient is not gluten intolerant  - Iron deficiency   - Lupus nephritis, ISN/RPS class IV   - dx age 27; + DNA-ds, KARLIE, SSA, SSB and RF; malar rash, serositis (pleuritic CP)  - Cyclophosphamide started 2019  - 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 - age 44   - No chemo or radiation  - Other forms of systemic lupus erythematosus - 2018  - Pulmonary embolism   - 2019 seen on abd CT at [facility]  - Sjogren's syndrome    - Secondary Sjogrens, secondary to lupus  - Vitamin D deficiency   - Sicca syndrome  - Diastolic dysfunction  - Hematuria  - Need for pneumocystis prophylaxis  - Morbid obesity  - Splenic infarction  - Hearing loss  - High risk medication use     Past Surgical History:  - Biopsy/removal, lymph nodes  -  section- age 32  - HC hysteros with permanent fallopian implant   - Essure b/I  - Parotidectomy - 2006  - Tonsillectomy     Family History:   - Alopecia - Brother   - Rheumatoid Arthritis - Brother      Social History:   Presents to clinic alone.  Tobacco Use: No  Alcohol Use: No  PCP: [facility] ([place])    Physical Exam:  /84   Pulse 95    Wt 137.9 kg (304 lb)   SpO2 95%   Breastfeeding? No   BMI 49.09 kg/m      GENERAL APPEARANCE: alert and no distress  EYES: PERRL, no scleral icterus  HENT: mouth without ulcers or lesions  NECK: supple, no adenopathy  RESP: lungs clear to auscultation   CV: regular rhythm, normal rate, no rub  : soft, NT, ND  : no CVA tenderness  Extremities: 1+ edema in lower extremities bilaterally  SKIN: no concerning rash  NEURO: mentation intact and speech normal    Laboratory:  CMP  Recent Labs   Lab Test 09/30/19  0656 08/23/19  0939 08/03/19  1031 07/18/19  1100 06/27/19  1209     --  135 140 135   POTASSIUM 3.9  --  4.0 4.1 4.4   CHLORIDE 104  --  100 107 104   CO2 30  --  29 28 28   ANIONGAP 5  --  6 5 4   GLC 85  --  92 98 107*   BUN 20  --  25 23 33*   CR 0.60 0.69 0.64 0.60 0.63   GFRESTIMATED >90 >90 >90 >90 >90   GFRESTBLACK >90 >90 >90 >90 >90   ANDERSON 8.8  --  9.4 8.8 8.8   PHOS 4.2  --  3.3 3.9 3.4   ALBUMIN 2.9* 3.2* 3.3* 3.0* 3.3*   AST 19 18  --  21 13   ALT 27 34  --  36 39     CBC  Recent Labs   Lab Test 09/30/19  0656 08/23/19  0939 07/18/19  1100 06/27/19  1209   HGB 10.8* 11.2* 11.2* 11.8   WBC 3.5* 12.3* 9.5 7.5   RBC 3.84 4.02 4.07 4.55   HCT 35.0 36.0 36.1 39.3   MCV 91 90 89 86   MCH 28.1 27.9 27.5 25.9*   MCHC 30.9* 31.1* 31.0* 30.0*   RDW 13.9 17.1* 18.4* 16.4*    222 198 253     INR  Recent Labs   Lab Test 06/07/19  0719   INR 0.95     ABG  No lab results found.   URINE STUDIES  Recent Labs   Lab Test 09/30/19  0658 08/23/19  0953 07/18/19  1114 06/27/19  1159  12/31/18  1351 11/12/18  1450   COLOR Straw Yellow Yellow Yellow   < > Yellow Yellow   APPEARANCE Clear Slightly Cloudy Clear Cloudy   < > Clear Clear   URINEGLC Negative Negative Negative Negative   < > Negative Negative   URINEBILI Negative Negative Negative Negative   < > Negative Negative   URINEKETONE Negative Negative Negative Negative   < > Negative Negative   SG 1.009 1.025 1.015 1.025   < > 1.010 1.025   UBLD  Negative Trace* Moderate* Small*   < > Trace* Trace*   URINEPH 7.0 7.0 7.0 5.0   < > 7.0 6.0   PROTEIN 30* 100* >=300* >499*   < > Trace* 30*   UROBILINOGEN  --  0.2 1.0  --   --  0.2 0.2   NITRITE Negative Negative Negative Negative   < > Negative Negative   LEUKEST Trace* Trace* Moderate* Large*   < > Negative Negative   RBCU 2 O - 2 2-5* 26*   < > O - 2 O - 2   WBCU 20* 25-50* 10-25* 86*   < > 0 - 5 0 - 5    < > = values in this interval not displayed.     Recent Labs   Lab Test 09/30/19  0658 08/23/19  0953 07/18/19  1114 06/27/19  1159 06/05/19  0829 03/06/19  1048 12/31/18  1350 11/12/18  1450 08/13/18  1610 07/07/18  1116 06/12/18  1042 05/12/18  1258 04/11/18  1655 03/21/18  1750 02/23/18  1445 12/29/17  1254   UTPG 1.20* 1.12* 2.92* 1.42* 1.88* 0.89* 0.92* 0.34* 0.23* 0.29* 0.21* Unable to calculate due to low value Unable to calculate due to low value Unable to calculate due to low value 0.18 Unable to calculate due to low value     PTH  No lab results found.  IRON STUDIES   Recent Labs   Lab Test 11/12/18  1443 12/29/17  1302   IRON 27* 58    315   IRONSAT 10* 19   COLLETTE 160 163         Scribe Disclosure:   Ameya MILLER, am serving as a scribe to document services personally performed by Maynor Miranda MD at this visit, based upon the provider's statements to me. All documentation has been reviewed by the aforementioned provider prior to being entered into the official medical record.     Ameya MILLER, served as a scribe preparing the chart for the clinic encounter through chart review for the provider team.     Total time spent was >40 minutes, and more than 50% of face to face time was spent in counseling and/or coordination of care regarding principles of multidisciplinary care, medication management, and chronic kidney disease education.  Maynor Miranda MD

## 2019-10-01 LAB
BACTERIA SPEC CULT: NORMAL
DEPRECATED CALCIDIOL+CALCIFEROL SERPL-MC: <41 UG/L (ref 20–75)
DSDNA AB SER-ACNC: >379 IU/ML
Lab: NORMAL
SPECIMEN SOURCE: NORMAL
VITAMIN D2 SERPL-MCNC: <5 UG/L
VITAMIN D3 SERPL-MCNC: 36 UG/L

## 2019-10-04 NOTE — TELEPHONE ENCOUNTER
Office visit notes completed from 9/6/2019 and have been faxed to [facility]. Patient has been informed via [employer] message.   Jayleen Gan CMA   10/4/2019 10:31 AM

## 2019-10-08 DIAGNOSIS — M32.9 SYSTEMIC LUPUS ERYTHEMATOSUS, UNSPECIFIED SLE TYPE, UNSPECIFIED ORGAN INVOLVEMENT STATUS (H): Primary | ICD-10-CM

## 2019-10-14 ENCOUNTER — MYC MEDICAL ADVICE (OUTPATIENT)
Dept: RHEUMATOLOGY | Facility: CLINIC | Age: 54
End: 2019-10-14

## 2019-10-16 DIAGNOSIS — L93.0 LUPUS ERYTHEMATOSUS, UNSPECIFIED FORM: ICD-10-CM

## 2019-10-18 RX ORDER — HYDROXYCHLOROQUINE SULFATE 200 MG/1
200 TABLET, FILM COATED ORAL 2 TIMES DAILY
Qty: 60 TABLET | Refills: 0 | Status: SHIPPED | OUTPATIENT
Start: 2019-10-18 | End: 2019-11-11

## 2019-10-18 NOTE — TELEPHONE ENCOUNTER
.  hydroxychloroquine (PLAQUENIL) 200 MG tablet      Last Written Prescription Date:  11/16/18  Last Fill Quantity: 180,   # refills: 1  Last Office Visit: 9/6/19  Future Office visit:  1/23/20  Last Eye Exam: unable to locate       CBC RESULTS:   Recent Labs   Lab Test 09/30/19  0656   WBC 3.5*   RBC 3.84   HGB 10.8*   HCT 35.0   MCV 91   MCH 28.1   MCHC 30.9*   RDW 13.9          Creatinine   Date Value Ref Range Status   09/30/2019 0.60 0.52 - 1.04 mg/dL Final   ]    Liver Function Studies -   Recent Labs   Lab Test 09/30/19  0656   ALBUMIN 2.9*   AST 19   ALT 27       Routing refill request to provider for review/approval because:  Eye exam: no documentation found

## 2019-10-22 ENCOUNTER — TELEPHONE (OUTPATIENT)
Dept: FAMILY MEDICINE | Facility: CLINIC | Age: 54
End: 2019-10-22

## 2019-10-22 NOTE — LETTER
October 22, 2019          Taina Singh,  [address]  [place] [address]        Dear Taina Singh      Monitoring and managing your preventative and chronic health conditions are very important to us. Our records indicate that you have not scheduled for Pap Smear, Colonoscopy and Annual physical exam  which was recommended by Dr. Barron.      If you have received your health care elsewhere, please call the clinic so the information can be documented in your chart.    Please call [phone] or message us through your [employer] account to schedule an appointment or provide information for your chart.     Feel free to contact us if you have any questions or concerns!    I look forward to seeing you and working with you on your health care needs.     Sincerely,       [facility] Team/LW

## 2019-10-22 NOTE — TELEPHONE ENCOUNTER
Panel Management Review      Patient has the following on her problem list: None      Composite cancer screening  Chart review shows that this patient is due/due soon for the following Pap Smear  Summary:    Patient is due/failing the following:   COLONOSCOPY, PAP and PHYSICAL    Action needed:   Patient needs office visit for physical w/pap.    Type of outreach:    Sent letter.    Questions for provider review:    None                                                                                                                                    TERESA Cortés CMA ([facility])       Chart routed to none .

## 2019-10-29 ENCOUNTER — TELEPHONE (OUTPATIENT)
Dept: RHEUMATOLOGY | Facility: CLINIC | Age: 54
End: 2019-10-29

## 2019-10-29 NOTE — TELEPHONE ENCOUNTER
Form Request Documentation    Name of Form: Attending Physician Statement    Date Received: 10/29/2019    Mode Received: fax     Provider: Lopez    Date Needed By: 10/31/2019    Date Given to Provider: 10/29/2019    Jayleen Gan CMA   10/29/2019 2:29 PM

## 2019-10-29 NOTE — TELEPHONE ENCOUNTER
Disability parking form filled out and mailed to patient. Copy scanned to chart. Patient notified via [employer] message.   Jayleen Gan CMA   10/29/2019 9:15 AM

## 2019-10-30 ENCOUNTER — TELEPHONE (OUTPATIENT)
Dept: RHEUMATOLOGY | Facility: CLINIC | Age: 54
End: 2019-10-30

## 2019-10-30 NOTE — TELEPHONE ENCOUNTER
[facility] Call Center    Phone Message    May a detailed message be left on voicemail: yes    Reason for Call: Other: Mary Ellen, with  [employer], called in and stated that the pt told her she is able to work from home part time. Mary Ellen is needing a verbal stating the pt is able to work from home part time and for how long. Mary Ellen also stated that she faxed some paperwork to Jayleen that needs to be completed and faxed back. Please advise    Action Taken: Message routed to:  [facility] Rheum

## 2019-10-31 ENCOUNTER — MYC MEDICAL ADVICE (OUTPATIENT)
Dept: RHEUMATOLOGY | Facility: CLINIC | Age: 54
End: 2019-10-31

## 2019-10-31 ENCOUNTER — MYC REFILL (OUTPATIENT)
Dept: RHEUMATOLOGY | Facility: CLINIC | Age: 54
End: 2019-10-31

## 2019-10-31 DIAGNOSIS — M32.9 SYSTEMIC LUPUS ERYTHEMATOSUS, UNSPECIFIED SLE TYPE, UNSPECIFIED ORGAN INVOLVEMENT STATUS (H): ICD-10-CM

## 2019-10-31 RX ORDER — TRAMADOL HYDROCHLORIDE 50 MG/1
50 TABLET ORAL EVERY 12 HOURS PRN
Qty: 30 TABLET | Refills: 2 | Status: CANCELLED | OUTPATIENT
Start: 2019-10-31

## 2019-10-31 NOTE — TELEPHONE ENCOUNTER
Form completed, signed, and faxed to [facility] at [phone]. A copy has been mailed to patient and scanned to chart. Patient notified via [employer] that this was done.   Jayleen Gan CMA   10/31/2019 11:19 AM

## 2019-10-31 NOTE — LETTER
November 1, 2019    Taina Singh  [address] [place] [address]      To Whom It May Concern:    Taina Singh is my patient whom I am treating for Systemic Lupus Erythematosus(SLE).  At this time, Taina Singh is able to work from home up to four hours per day (Monday through Friday) until an estimated date of December 14, 2019.   Please note that Taina will be unable to work the day and the day after the IV medication infusion completed on a monthly basis.    Please feel free to call me with any questions.    Sincerely,    Killian Marroquin MD    Division of Rheumatic and Autoimmune Diseases

## 2019-11-01 ENCOUNTER — MYC REFILL (OUTPATIENT)
Dept: RHEUMATOLOGY | Facility: CLINIC | Age: 54
End: 2019-11-01

## 2019-11-01 DIAGNOSIS — M32.9 SYSTEMIC LUPUS ERYTHEMATOSUS, UNSPECIFIED SLE TYPE, UNSPECIFIED ORGAN INVOLVEMENT STATUS (H): ICD-10-CM

## 2019-11-01 RX ORDER — TRAMADOL HYDROCHLORIDE 50 MG/1
50 TABLET ORAL EVERY 12 HOURS PRN
Qty: 30 TABLET | Refills: 2 | Status: CANCELLED | OUTPATIENT
Start: 2019-11-01

## 2019-11-01 NOTE — TELEPHONE ENCOUNTER
Left message with Mary Ellen requesting a return call.  Letter has been started.    Sophie Alvarado RN  Rheumatology Clinic

## 2019-11-04 NOTE — TELEPHONE ENCOUNTER
This has been completed in telephone encounter from 11/1/19.    Sophie Alvarado RN  Rheumatology Clinic

## 2019-11-04 NOTE — TELEPHONE ENCOUNTER
Discussed with pt, she would like to try going back to work, up to 4 hours a day from home.  Discussed that letter needs to be sent as this was not communicated to provider before paperwork was sent and was filled out for pt to be off.  Called and discussed with Dr. Marroquin, if pt is fine with trying to go back to work, Dr. Marroquin supports it.  Will reassess how pt is doing after starting work again.    Discussed with Mary Ellen at [facility], she is fine if a letter is sent allowing pt to return to work for limited hours.  No need to re fill out paperwork.  Have faxed letter to [facility] for use.    Sophie Alvarado RN  Rheumatology Clinic

## 2019-11-04 NOTE — TELEPHONE ENCOUNTER
Tramadol was filled on 10/14/2019 and is not due until 11/13/2019. Patient has been notified via [employer] message.  Jayleen Gan CMA   11/4/2019 8:58 AM

## 2019-11-11 ENCOUNTER — MYC MEDICAL ADVICE (OUTPATIENT)
Dept: RHEUMATOLOGY | Facility: CLINIC | Age: 54
End: 2019-11-11

## 2019-11-11 DIAGNOSIS — L93.0 LUPUS ERYTHEMATOSUS, UNSPECIFIED FORM: ICD-10-CM

## 2019-11-11 NOTE — TELEPHONE ENCOUNTER
"Called Taina regarding the rash that she has been developing on her face. This began 2 weeks ago and is getting worse. She described it as looking like \"leopard spots\". She says that none are itchy except for those that are around her eye. They cover her nose, are above the eyebrow, and also on the lip. She described the spots as being very red and kind of bumpy. She went down to 10 mg on her prednisone on Sunday.   I recommended that [facility] so we can have a better idea of the appearance of the rash, and she stated that she will try.    Kelley Nelson, TANGELAN RN   Rheumatology Clinic Nurse   [facility]    "

## 2019-11-12 RX ORDER — HYDROXYCHLOROQUINE SULFATE 200 MG/1
200 TABLET, FILM COATED ORAL 2 TIMES DAILY
Qty: 180 TABLET | Refills: 0 | Status: SHIPPED | OUTPATIENT
Start: 2019-11-12 | End: 2020-02-20

## 2019-11-12 NOTE — TELEPHONE ENCOUNTER
My understanding is that she has been receiving cyclophosphamide monthly, not rituximab, and she is due for infusion #6 this month.    The oral ulcers and malar rash are consistent with lupus flaring as she tapers down her prednisone.     I recommend that she increase her prednisone back to 20 mg daily until she can discuss this with Dr. Marroquin.  She should follow up with her primary care provider to r/o infection. If she is determined to be free of infection then she can receive her next cyclophosphamide infusion.

## 2019-11-12 NOTE — TELEPHONE ENCOUNTER
Called Taina to let her know what Dr. Rajan recommended:    My understanding is that she has been receiving cyclophosphamide monthly, not rituximab, and she is due for infusion #6 this month.     The oral ulcers and malar rash are consistent with lupus flaring as she tapers down her prednisone.      I recommend that she increase her prednisone back to 20 mg daily until she can discuss this with Dr. Marroquin.  She should follow up with her primary care provider to r/o infection. If she is determined to be free of infection then she can receive her next cyclophosphamide infusion.     She will go up on her prednisone and says that she has enough and will let us know if she needs more. She will also see primary care this week and get an assessment on whether she has an infection or not.     Kelley Nelson, TANGELAN RN   Rheumatology Clinic Nurse   [facility]

## 2019-11-12 NOTE — TELEPHONE ENCOUNTER
Addressed in separate encounter.    Kelley Nelson, BSN RN   Rheumatology Clinic Nurse   [facility]

## 2019-11-12 NOTE — TELEPHONE ENCOUNTER
She will need an eye evaluation and rheum clinic visit before any additional refills will be provided.

## 2019-11-13 NOTE — TELEPHONE ENCOUNTER
Agree with Dr. Rajan's plan about Taina. Unfortunately she has refractory severe lupus, almost failing cytoxan and her insurance denied appeal for rituximab.

## 2019-11-14 DIAGNOSIS — M32.9 SYSTEMIC LUPUS ERYTHEMATOSUS, UNSPECIFIED SLE TYPE, UNSPECIFIED ORGAN INVOLVEMENT STATUS (H): ICD-10-CM

## 2019-11-14 RX ORDER — TRAMADOL HYDROCHLORIDE 50 MG/1
50 TABLET ORAL EVERY 12 HOURS PRN
Qty: 60 TABLET | Refills: 1 | Status: SHIPPED | OUTPATIENT
Start: 2019-11-14 | End: 2019-12-27

## 2019-11-14 NOTE — TELEPHONE ENCOUNTER
Called and spoke with pt, rash has improved on prednisone, but pt continues to have cough despite treatment for Bronchitis and pink eye, she was seen today by PCP and they did rule out pneumonia.  Pt does not feel well enough to get Cytoxan next week, so she would like to hold it for a week. Will discuss with Dr Marroquin to see if she is ok with holding last dose of Cytoxan for a week.    Sophie Alvarado RN  Rheumatology Clinic

## 2019-11-14 NOTE — TELEPHONE ENCOUNTER
Last Office Visit:  9/6/2019  Next Enc:  1/23/20  Medication: Tramadol   Last filled: 10/14/19  Qty: 30        Prescription was only filled for 15 days.    Sophie Alvarado RN  Rheumatology Clinic

## 2019-11-15 NOTE — TELEPHONE ENCOUNTER
Killian Marroquin MD Beard, Madeline, RN   Caller: Unspecified (Yesterday,  4:40 PM)   Phone Number: [phone]             Yes!    Previous Messages      ----- Message -----   From: Sophie Alvarado RN   Sent: 11/14/2019   5:02 PM CST   To: Killian Marroquin MD     ----- Message from Sophie Alvarado RN sent at 11/14/2019  5:02 PM CST -----   Pt has her last dose of cytoxan next week, but has been sick, has finished antibiotics but is still not feeling well.  She would like to push out her cytoxan to the next week.  Are you ok with that?   Thanks Sophie        Called and updated pt. She will wait until she is feeling better to get Cytoxan.    Sophie Alvarado RN  Rheumatology Clinic

## 2019-11-15 NOTE — TELEPHONE ENCOUNTER
Appeal has been denied.  Working on second level appeal and free drug.    Pt aware.    Sophie Alvarado RN  Rheumatology Clinic

## 2019-11-25 DIAGNOSIS — M32.9 SYSTEMIC LUPUS ERYTHEMATOSUS, UNSPECIFIED SLE TYPE, UNSPECIFIED ORGAN INVOLVEMENT STATUS (H): Primary | ICD-10-CM

## 2019-11-25 PROBLEM — M06.09 RHEUMATOID ARTHRITIS, SERONEGATIVE, MULTIPLE SITES (H): Status: ACTIVE | Noted: 2019-11-25

## 2019-11-25 RX ORDER — PREDNISONE 10 MG/1
20 TABLET ORAL DAILY
Qty: 180 TABLET | Refills: 3 | Status: SHIPPED | OUTPATIENT
Start: 2019-11-25 | End: 2020-10-29

## 2019-11-25 NOTE — TELEPHONE ENCOUNTER
Killian Marroquin MD Beard, Madeline, RN   Cc: Luis Eduardo Paredes MD   Caller: Unspecified (Today, 11:34 AM)             Should stay on pred 20 every day, she is not doing well, her today's urine is suggestive of active lupus nephritis, she failed cytoxan (today was the last dose), if we can't have rituximab approved, recommend to try tacrolimus plus cellcept. CCed Dr. Paredes. She is going to see him and have full lupus panel in Dec 2019.      Called and updated pt with above, she understands.      Sophie Alvarado RN  Rheumatology Clinic

## 2019-11-25 NOTE — TELEPHONE ENCOUNTER
Pt called into discuss prednisone, she went back to 20 mg when she was ill. She did have to use 30 mg a day several times last week, due to sinus infection. Rash has currently cleared up, and she is feeling better, but would like to stay on 20 mg a day for another week. Then wondering if it would be ok to taper at that point.      Refill requested for 10 mg tablets of prednisone.     Sophie Alvarado RN  Rheumatology Clinic

## 2019-12-11 ENCOUNTER — MYC MEDICAL ADVICE (OUTPATIENT)
Dept: RHEUMATOLOGY | Facility: CLINIC | Age: 54
End: 2019-12-11

## 2019-12-12 ENCOUNTER — TELEPHONE (OUTPATIENT)
Dept: RHEUMATOLOGY | Facility: CLINIC | Age: 54
End: 2019-12-12

## 2019-12-12 NOTE — TELEPHONE ENCOUNTER
Letter approved by Dr. Marroquin, faxed to [facility].  Updated pt that letter has been printed and faxed to [facility].    Sophie Alvarado RN  Rheumatology Clinic

## 2019-12-12 NOTE — TELEPHONE ENCOUNTER
Free drug has been approved, pt heard from [employer].  Will update infusion finance team and Dr. Marroquin.    Will see if there is a waiting period between Cytoxan and Rituxan.    Sophie Alvarado RN  Rheumatology Clinic

## 2019-12-12 NOTE — LETTER
December 12, 2019    Taina Singh  [address]  [place] [address]      To Whom It May Concern:    Taina Singh is my patient whom I treat for Systemic Lupus Erythematosus(SLE).  She is fine at this point to resume working full time.  We will be continuing to treat her underlying illness.    Please let me know if you have any questions.    Sincerely,    Killian Marroquin MD    Division of Rheumatic and Autoimmune Diseases

## 2019-12-12 NOTE — TELEPHONE ENCOUNTER
Received call from pt regarding needing a letter to return to work.      Pt is feeling well enough to start working 40 hours a week.      She is going to start at home through the new year, doing all hours at home.  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 She would like to be released to return to work to set her schedule as she is able per her health.  Pt would like to return to work full time next week.    This is separate from form long term disability form requested in my chart message.    Pt would like to have letter faxed to Mary Ellen Lechuga @ [facility] at [phone](fax).      Will send to Dr. Marroquin to review and advise.    Sophie Alvarado RN  Rheumatology Clinic

## 2019-12-13 DIAGNOSIS — R80.9 PROTEINURIA: ICD-10-CM

## 2019-12-13 DIAGNOSIS — M32.14 LUPUS NEPHRITIS, ISN/RPS CLASS IV (H): Primary | ICD-10-CM

## 2019-12-13 DIAGNOSIS — R60.0 EDEMA LEG: ICD-10-CM

## 2019-12-13 NOTE — TELEPHONE ENCOUNTER
Killian Marroquin MD  Cc: Sophie Alvarado RN [facility]    Caller: Unspecified (3 months ago)             That's great news! Rituximab should be given ASAP as it takes 1 month to deplete B cells and Taina continues to have active LN.      Discussed with Luci approval has been obtained, Pt updated and will schedule her infusion for next available appt.      Sophie Alvarado RN  Rheumatology Clinic

## 2019-12-18 DIAGNOSIS — M32.14 LUPUS NEPHRITIS, ISN/RPS CLASS IV (H): ICD-10-CM

## 2019-12-18 DIAGNOSIS — R80.9 PROTEINURIA: Primary | ICD-10-CM

## 2019-12-20 ENCOUNTER — MYC MEDICAL ADVICE (OUTPATIENT)
Dept: RHEUMATOLOGY | Facility: CLINIC | Age: 54
End: 2019-12-20

## 2019-12-20 DIAGNOSIS — I77.82 ANCA-NEGATIVE VASCULITIS (H): ICD-10-CM

## 2019-12-23 ENCOUNTER — TELEPHONE (OUTPATIENT)
Dept: NEPHROLOGY | Facility: CLINIC | Age: 54
End: 2019-12-23

## 2019-12-23 DIAGNOSIS — G47.00 INSOMNIA, UNSPECIFIED TYPE: ICD-10-CM

## 2019-12-23 RX ORDER — ZOLPIDEM TARTRATE 5 MG/1
5 TABLET ORAL
Qty: 30 TABLET | Refills: 0 | Status: SHIPPED | OUTPATIENT
Start: 2019-12-23 | End: 2020-01-31

## 2019-12-23 NOTE — TELEPHONE ENCOUNTER
Last Seen: 9/6/2019  Next Appointment: 1/23/2020  Medication: zolpidem tartrate 5mg tab  Last Filled: 10/14/2019  Qty: #30     reviewed as follows:      Request sent to provider for review and signature.    Jayleen Gan CMA   12/23/2019 2:48 PM

## 2019-12-24 DIAGNOSIS — M32.14 LUPUS NEPHRITIS, ISN/RPS CLASS IV (H): ICD-10-CM

## 2019-12-24 DIAGNOSIS — R80.9 PROTEINURIA: ICD-10-CM

## 2019-12-24 DIAGNOSIS — M32.9 SYSTEMIC LUPUS ERYTHEMATOSUS, UNSPECIFIED SLE TYPE, UNSPECIFIED ORGAN INVOLVEMENT STATUS (H): ICD-10-CM

## 2019-12-24 LAB
ALBUMIN UR-MCNC: >=300 MG/DL
APPEARANCE UR: ABNORMAL
BACTERIA #/AREA URNS HPF: ABNORMAL /HPF
BASOPHILS # BLD AUTO: 0 10E9/L (ref 0–0.2)
BASOPHILS NFR BLD AUTO: 0.3 %
BILIRUB UR QL STRIP: NEGATIVE
COLOR UR AUTO: YELLOW
CREAT UR-MCNC: 70 MG/DL
DIFFERENTIAL METHOD BLD: ABNORMAL
EOSINOPHIL # BLD AUTO: 0 10E9/L (ref 0–0.7)
EOSINOPHIL NFR BLD AUTO: 0.1 %
ERYTHROCYTE [DISTWIDTH] IN BLOOD BY AUTOMATED COUNT: 15.7 % (ref 10–15)
ERYTHROCYTE [SEDIMENTATION RATE] IN BLOOD BY WESTERGREN METHOD: 50 MM/H (ref 0–30)
GLUCOSE UR STRIP-MCNC: NEGATIVE MG/DL
HCT VFR BLD AUTO: 34.1 % (ref 35–47)
HGB BLD-MCNC: 10.8 G/DL (ref 11.7–15.7)
HGB UR QL STRIP: ABNORMAL
KETONES UR STRIP-MCNC: NEGATIVE MG/DL
LEUKOCYTE ESTERASE UR QL STRIP: NEGATIVE
LYMPHOCYTES # BLD AUTO: 0.3 10E9/L (ref 0.8–5.3)
LYMPHOCYTES NFR BLD AUTO: 4.2 %
MCH RBC QN AUTO: 26.7 PG (ref 26.5–33)
MCHC RBC AUTO-ENTMCNC: 31.7 G/DL (ref 31.5–36.5)
MCV RBC AUTO: 84 FL (ref 78–100)
MONOCYTES # BLD AUTO: 0.4 10E9/L (ref 0–1.3)
MONOCYTES NFR BLD AUTO: 4.9 %
MUCOUS THREADS #/AREA URNS LPF: PRESENT /LPF
NEUTROPHILS # BLD AUTO: 7.1 10E9/L (ref 1.6–8.3)
NEUTROPHILS NFR BLD AUTO: 90.5 %
NITRATE UR QL: NEGATIVE
NON-SQ EPI CELLS #/AREA URNS LPF: ABNORMAL /LPF
PH UR STRIP: 7 PH (ref 5–7)
PLATELET # BLD AUTO: 210 10E9/L (ref 150–450)
PROT UR-MCNC: 2.44 G/L
PROT/CREAT 24H UR: 3.51 G/G CR (ref 0–0.2)
RBC # BLD AUTO: 4.04 10E12/L (ref 3.8–5.2)
RBC #/AREA URNS AUTO: ABNORMAL /HPF
SOURCE: ABNORMAL
SP GR UR STRIP: >1.03 (ref 1–1.03)
UROBILINOGEN UR STRIP-ACNC: 0.2 EU/DL (ref 0.2–1)
WBC # BLD AUTO: 7.8 10E9/L (ref 4–11)
WBC #/AREA URNS AUTO: ABNORMAL /HPF

## 2019-12-24 PROCEDURE — 85652 RBC SED RATE AUTOMATED: CPT | Performed by: INTERNAL MEDICINE

## 2019-12-24 PROCEDURE — 80069 RENAL FUNCTION PANEL: CPT | Performed by: INTERNAL MEDICINE

## 2019-12-24 PROCEDURE — 36415 COLL VENOUS BLD VENIPUNCTURE: CPT | Performed by: INTERNAL MEDICINE

## 2019-12-24 PROCEDURE — 84460 ALANINE AMINO (ALT) (SGPT): CPT | Performed by: INTERNAL MEDICINE

## 2019-12-24 PROCEDURE — 84156 ASSAY OF PROTEIN URINE: CPT | Performed by: INTERNAL MEDICINE

## 2019-12-24 PROCEDURE — 86160 COMPLEMENT ANTIGEN: CPT | Mod: 59 | Performed by: INTERNAL MEDICINE

## 2019-12-24 PROCEDURE — 99000 SPECIMEN HANDLING OFFICE-LAB: CPT | Performed by: INTERNAL MEDICINE

## 2019-12-24 PROCEDURE — 82610 CYSTATIN C: CPT | Mod: 90 | Performed by: INTERNAL MEDICINE

## 2019-12-24 PROCEDURE — 86140 C-REACTIVE PROTEIN: CPT | Performed by: INTERNAL MEDICINE

## 2019-12-24 PROCEDURE — 81001 URINALYSIS AUTO W/SCOPE: CPT | Performed by: INTERNAL MEDICINE

## 2019-12-24 PROCEDURE — 84450 TRANSFERASE (AST) (SGOT): CPT | Performed by: INTERNAL MEDICINE

## 2019-12-24 PROCEDURE — 86225 DNA ANTIBODY NATIVE: CPT | Performed by: INTERNAL MEDICINE

## 2019-12-24 PROCEDURE — 85025 COMPLETE CBC W/AUTO DIFF WBC: CPT | Performed by: INTERNAL MEDICINE

## 2019-12-26 ENCOUNTER — OFFICE VISIT (OUTPATIENT)
Dept: NEPHROLOGY | Facility: CLINIC | Age: 54
End: 2019-12-26
Attending: INTERNAL MEDICINE
Payer: COMMERCIAL

## 2019-12-26 VITALS
BODY MASS INDEX: 51.51 KG/M2 | SYSTOLIC BLOOD PRESSURE: 163 MMHG | HEART RATE: 80 BPM | DIASTOLIC BLOOD PRESSURE: 92 MMHG | WEIGHT: 293 LBS | OXYGEN SATURATION: 97 % | TEMPERATURE: 98.3 F

## 2019-12-26 DIAGNOSIS — M32.14 LUPUS NEPHRITIS, ISN/RPS CLASS IV (H): Primary | ICD-10-CM

## 2019-12-26 PROBLEM — I77.82: Status: ACTIVE | Noted: 2019-12-26

## 2019-12-26 LAB
ALBUMIN SERPL-MCNC: 2.7 G/DL (ref 3.4–5)
ALT SERPL W P-5'-P-CCNC: 29 U/L (ref 0–50)
ANION GAP SERPL CALCULATED.3IONS-SCNC: 3 MMOL/L (ref 3–14)
AST SERPL W P-5'-P-CCNC: 19 U/L (ref 0–45)
BUN SERPL-MCNC: 16 MG/DL (ref 7–30)
C3 SERPL-MCNC: 63 MG/DL (ref 81–157)
C4 SERPL-MCNC: 8 MG/DL (ref 13–39)
CALCIUM SERPL-MCNC: 9.1 MG/DL (ref 8.5–10.1)
CHLORIDE SERPL-SCNC: 106 MMOL/L (ref 94–109)
CO2 SERPL-SCNC: 31 MMOL/L (ref 20–32)
CREAT SERPL-MCNC: 0.55 MG/DL (ref 0.52–1.04)
CREAT UR-MCNC: 69 MG/DL
CRP SERPL-MCNC: 13.2 MG/L (ref 0–8)
GFR SERPL CREATININE-BSD FRML MDRD: >90 ML/MIN/{1.73_M2}
GLUCOSE SERPL-MCNC: 94 MG/DL (ref 70–99)
PHOSPHATE SERPL-MCNC: 4.1 MG/DL (ref 2.5–4.5)
POTASSIUM SERPL-SCNC: 4.5 MMOL/L (ref 3.4–5.3)
SODIUM SERPL-SCNC: 140 MMOL/L (ref 133–144)

## 2019-12-26 PROCEDURE — G0463 HOSPITAL OUTPT CLINIC VISIT: HCPCS | Mod: ZF

## 2019-12-26 RX ORDER — WARFARIN SODIUM 5 MG/1
TABLET ORAL
Refills: 1 | COMMUNITY
Start: 2019-11-12

## 2019-12-26 RX ORDER — MYCOPHENOLATE MOFETIL 500 MG/1
1500 TABLET ORAL 2 TIMES DAILY
Qty: 540 TABLET | Refills: 3 | Status: SHIPPED | OUTPATIENT
Start: 2019-12-26 | End: 2020-04-09

## 2019-12-26 RX ORDER — FUROSEMIDE 20 MG
20 TABLET ORAL DAILY
Qty: 90 TABLET | Refills: 0 | Status: SHIPPED | OUTPATIENT
Start: 2019-12-26 | End: 2020-04-09

## 2019-12-26 ASSESSMENT — PAIN SCALES - GENERAL: PAINLEVEL: MILD PAIN (3)

## 2019-12-26 NOTE — LETTER
12/26/2019      RE: Taina Singh  [address] [place] [address]         Nephrology Initial Consult  December 26, 2019      Taina Singh MRN:[id number] YOB: 1965  Primary care provider: Daniel Dominguez  Requesting physician: Miriam Sherman MD      REASON FOR CONSULT:   Follow up for lupus nephritis    HISTORY OF PRESENT ILLNESS:  Patient is a 55-year-old female here for follow-up of lupus nephritis.  She was originally diagnosed with systemic lupus erythematosus at age 27 and was placed on hydroxychloroquine with good control of her disease.  This eventually was discontinued.  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      She was in her usual state of health until July 2017 which developed pleuritic chest pain joint pain.  She was placed back on hydroxychloroquine which short courses of steroids.  Eventually in February 2018 she was started on azathioprine with up titration of the dose 100 mg daily.  In May 2018 she was switched to CellCept as she continued to have active joint pain.  Her dose of CellCept was eventually uptitrated to a total of 1500 mg twice daily.  At that time she was also on prednisone at least 20 mg daily.  In September 2018 Benlysta was added.  She continued to have issues with joint pain and pleurisy.  Around April 2019 she discontinued her CellCept as she thought she was switching to rituximab.  She presented by June 2019 with active lupus nephritis which was biopsy-proven.  The biopsy did show features of necrosis in addition to possible TMA.  At that time she also had lupus enteritis.  Therefore in June 2019 she was initiated on IV Cytoxan 750 mg/m2 once monthly and completed her last dose end of November. She remains on prednisone 20 mg daily in addition to hydroxychloroquine.  She is on atovaquone for PCP prophylaxis    Her UA today is  active.  She has 20-50 RBCs per high-power field and her protein to creatinine has risen to 3.5.  Her complements from today are pending but in September were low and her double-stranded DNA was significantly elevated.    She is hypertensive today.  She no longer takes her antihypertensives.  She was previously on chlorthalidone and losartan.    PAST MEDICAL HISTORY:  Reviewed with patient on 12/26/2019     MEDICATIONS:  Reviewed    ALLERGIES:    Reviewed    REVIEW OF SYSTEMS:  A comprehensive of systems was negative except as noted above. She does endorse fatigue and aches all over, she has dyspnea on exertion.     SOCIAL HISTORY:   Reviewed with patient     FAMILY MEDICAL HISTORY:   Reviewed    PHYSICAL EXAM:   Vital signs: BP (!) 163/92 (BP Location: Right arm, Cuff Size: Adult Large)   Pulse 80   Temp 98.3  F (36.8  C) (Oral)   Wt 144.7 kg (319 lb 0.1 oz)   SpO2 97%   BMI 51.51 kg/m       Physical Exam  Constitutional:       Appearance: Normal appearance. She is obese.      Comments: Cushinoid   HENT:      Nose: Nose normal.      Mouth/Throat:      Mouth: Mucous membranes are moist.      Pharynx: No oropharyngeal exudate or posterior oropharyngeal erythema.      Comments: Healing ulcer at the roof of the mouth  Cardiovascular:      Rate and Rhythm: Normal rate and regular rhythm.      Heart sounds: No murmur.   Pulmonary:      Effort: Pulmonary effort is normal. No respiratory distress.      Breath sounds: Normal breath sounds. No wheezing.   Abdominal:      General: Bowel sounds are normal.      Palpations: Abdomen is soft.      Tenderness: There is no abdominal tenderness.   Musculoskeletal:      Right lower leg: Edema present.      Left lower leg: Edema present.   Lymphadenopathy:      Cervical: No cervical adenopathy.   Neurological:      Mental Status: She is alert and oriented to person, place, and time.   Psychiatric:         Mood and Affect: Mood normal.         Behavior: Behavior normal.        ASSESSMENT AND RECOMMENDATIONS:   #1 Active lupus nephritis (calss IV based on biopsy from June 2019)  #2 LE edema  #3 Hypertension  #4 Weight gain    Patient returns today for follow-up of her lupus nephritis.  Unfortunately despite 6 months of treatment with high-dose IV Cytoxan her disease remains quite active.  She has worsening hematuria and proteinuria and her complements remain low with elevated double-stranded DNA.  I have therefore advised her to initiate CellCept.  We will start 500 mg twice daily and uptitrate every week until she reaches a dose of 1500 mg twice daily.  She certainly would require additional therapy and I discussed with her best option would be rituximab in the setting of resistant lupus.  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  We will resubmit again to her insurance to see if she can get coverage for the rituximab.  In the interim she will initiate a CellCept and continue with current dose of prednisone and hydroxychloroquine.    Her blood pressures are not well controlled today and she is edematous on exam.  I have started her on Lasix 20 mg daily with recheck of labs in 2 weeks.  She will contact us on Tuesday to let us know her blood pressure readings.  If they remain elevated we will reinitiate losartan again.  Given her blood pressure is elevated and significant weight gain I have also sent for overnight oximetry to screen her for sleep apnea.  Initially she has had mild pulmonary hypertension the past as well and therefore dates important to rule out sleep apnea.    I will see her back in 4 weeks.      Miriam Sherman MD

## 2019-12-26 NOTE — TELEPHONE ENCOUNTER
Form Request Documentation    Provider Agreed to complete form? yes    Date Returned to support Staff: 12/23/2019    Date patient notified: 12/26/2019     Disposition of Form: Faxed to [phone], scanned to patient's chart, mailed a copy to the patient    Jayleen Gan CMA   12/26/2019 12:42 PM

## 2019-12-26 NOTE — NURSING NOTE
"Chief Complaint   Patient presents with     Consult     Consult Lupus nephritis     Vital signs:  Temp: 98.3  F (36.8  C) Temp src: Oral BP: (!) 163/92 Pulse: 80     SpO2: 97 %       Weight: 144.7 kg (319 lb 0.1 oz)  Estimated body mass index is 51.51 kg/m  as calculated from the following:    Height as of 9/11/19: 1.676 m (5' 5.98\").    Weight as of this encounter: 144.7 kg (319 lb 0.1 oz).      Taina Davis, CMA    "

## 2019-12-26 NOTE — PROGRESS NOTES
Nephrology Initial Consult  December 26, 2019      Taina Singh MRN:[id number] YOB: 1965  Primary care provider: Dnaiel Dominguez  Requesting physician: Miriam Sherman MD      REASON FOR CONSULT:   Follow up for lupus nephritis    HISTORY OF PRESENT ILLNESS:  Patient is a 55-year-old female here for follow-up of lupus nephritis.  She was originally diagnosed with systemic lupus erythematosus at age 27 and was placed on hydroxychloroquine with good control of her disease.  This eventually was discontinued.  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      She was in her usual state of health until July 2017 which developed pleuritic chest pain joint pain.  She was placed back on hydroxychloroquine which short courses of steroids.  Eventually in February 2018 she was started on azathioprine with up titration of the dose 100 mg daily.  In May 2018 she was switched to CellCept as she continued to have active joint pain.  Her dose of CellCept was eventually uptitrated to a total of 1500 mg twice daily.  At that time she was also on prednisone at least 20 mg daily.  In September 2018 Benlysta was added.  She continued to have issues with joint pain and pleurisy.  Around April 2019 she discontinued her CellCept as she thought she was switching to rituximab.  She presented by June 2019 with active lupus nephritis which was biopsy-proven.  The biopsy did show features of necrosis in addition to possible TMA.  At that time she also had lupus enteritis.  Therefore in June 2019 she was initiated on IV Cytoxan 750 mg/m2 once monthly and completed her last dose end of November. She remains on prednisone 20 mg daily in addition to hydroxychloroquine.  She is on atovaquone for PCP prophylaxis    Her UA today is active.  She has 20-50 RBCs per high-power field and her protein to  creatinine has risen to 3.5.  Her complements from today are pending but in September were low and her double-stranded DNA was significantly elevated.    She is hypertensive today.  She no longer takes her antihypertensives.  She was previously on chlorthalidone and losartan.    PAST MEDICAL HISTORY:  Reviewed with patient on 12/26/2019     MEDICATIONS:  Reviewed    ALLERGIES:    Reviewed    REVIEW OF SYSTEMS:  A comprehensive of systems was negative except as noted above. She does endorse fatigue and aches all over, she has dyspnea on exertion.     SOCIAL HISTORY:   Reviewed with patient     FAMILY MEDICAL HISTORY:   Reviewed    PHYSICAL EXAM:   Vital signs: BP (!) 163/92 (BP Location: Right arm, Cuff Size: Adult Large)   Pulse 80   Temp 98.3  F (36.8  C) (Oral)   Wt 144.7 kg (319 lb 0.1 oz)   SpO2 97%   BMI 51.51 kg/m      Physical Exam  Constitutional:       Appearance: Normal appearance. She is obese.      Comments: Cushinoid   HENT:      Nose: Nose normal.      Mouth/Throat:      Mouth: Mucous membranes are moist.      Pharynx: No oropharyngeal exudate or posterior oropharyngeal erythema.      Comments: Healing ulcer at the roof of the mouth  Cardiovascular:      Rate and Rhythm: Normal rate and regular rhythm.      Heart sounds: No murmur.   Pulmonary:      Effort: Pulmonary effort is normal. No respiratory distress.      Breath sounds: Normal breath sounds. No wheezing.   Abdominal:      General: Bowel sounds are normal.      Palpations: Abdomen is soft.      Tenderness: There is no abdominal tenderness.   Musculoskeletal:      Right lower leg: Edema present.      Left lower leg: Edema present.   Lymphadenopathy:      Cervical: No cervical adenopathy.   Neurological:      Mental Status: She is alert and oriented to person, place, and time.   Psychiatric:         Mood and Affect: Mood normal.         Behavior: Behavior normal.       ASSESSMENT AND RECOMMENDATIONS:   #1 Active lupus nephritis (calss IV  based on biopsy from June 2019)  #2 LE edema  #3 Hypertension  #4 Weight gain    Patient returns today for follow-up of her lupus nephritis.  Unfortunately despite 6 months of treatment with high-dose IV Cytoxan her disease remains quite active.  She has worsening hematuria and proteinuria and her complements remain low with elevated double-stranded DNA.  I have therefore advised her to initiate CellCept.  We will start 500 mg twice daily and uptitrate every week until she reaches a dose of 1500 mg twice daily.  She certainly would require additional therapy and I discussed with her best option would be rituximab in the setting of resistant lupus.  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  We will resubmit again to her insurance to see if she can get coverage for the rituximab.  In the interim she will initiate a CellCept and continue with current dose of prednisone and hydroxychloroquine.    Her blood pressures are not well controlled today and she is edematous on exam.  I have started her on Lasix 20 mg daily with recheck of labs in 2 weeks.  She will contact us on Tuesday to let us know her blood pressure readings.  If they remain elevated we will reinitiate losartan again.  Given her blood pressure is elevated and significant weight gain I have also sent for overnight oximetry to screen her for sleep apnea.  Initially she has had mild pulmonary hypertension the past as well and therefore dates important to rule out sleep apnea.    I will see her back in 4 weeks.

## 2019-12-27 ENCOUNTER — TELEPHONE (OUTPATIENT)
Dept: RHEUMATOLOGY | Facility: CLINIC | Age: 54
End: 2019-12-27

## 2019-12-27 DIAGNOSIS — M32.9 SYSTEMIC LUPUS ERYTHEMATOSUS, UNSPECIFIED SLE TYPE, UNSPECIFIED ORGAN INVOLVEMENT STATUS (H): ICD-10-CM

## 2019-12-27 LAB
DSDNA AB SER-ACNC: >379 IU/ML
LAB SCANNED RESULT: ABNORMAL

## 2019-12-27 RX ORDER — TRAMADOL HYDROCHLORIDE 50 MG/1
50 TABLET ORAL EVERY 12 HOURS PRN
Qty: 60 TABLET | Refills: 1 | Status: SHIPPED | OUTPATIENT
Start: 2019-12-27 | End: 2020-04-06

## 2019-12-27 NOTE — TELEPHONE ENCOUNTER
Pt called into clinic and left message letting Dr. Sherman know that she has filled out paperwork for [facility], and is wondering if she can delay Rituximab to find out if she is covered by them, can take up to 30 days to find out.      She is also reporting that she took laxix yesterday and it worked very well, she was up over night every couple of hours to urinate.    Will see if Dr. Sherman is ok with her waiting on her Rituximab.    Sophie Alvarado RN  Rheumatology Clinic

## 2019-12-27 NOTE — TELEPHONE ENCOUNTER
Last Office Visit:  9/6/2019  Next Enc:  1/23/20  Medication: Tramadol   Last filled: 11/15/19  Qty: 60        Sophie Alvarado RN  Rheumatology Clinic

## 2019-12-27 NOTE — TELEPHONE ENCOUNTER
Message sent to pt in My chart that Dr. Sherman and Lopez are ok if pt waits until she sees approval from [facility] to get infusion.    Sophie Alvarado RN  Rheumatology Clinic

## 2019-12-30 PROCEDURE — 81050 URINALYSIS VOLUME MEASURE: CPT | Performed by: INTERNAL MEDICINE

## 2019-12-30 PROCEDURE — 84156 ASSAY OF PROTEIN URINE: CPT | Performed by: INTERNAL MEDICINE

## 2019-12-30 NOTE — TELEPHONE ENCOUNTER
Spoke with Jonna CAMPA in Nephrology. Can get pt scheduled with Dr Sherman at 11 am on 1/23/19. Appointment changed and I contacted pt.    Taina is appreciative of this earlier appointment. Has no additional questions or concerns at this time.    CEDRIC Tomas RN  Rheumatology Care Coordinator  [facility]

## 2019-12-31 DIAGNOSIS — R80.9 PROTEINURIA: ICD-10-CM

## 2019-12-31 DIAGNOSIS — M32.14 LUPUS NEPHRITIS, ISN/RPS CLASS IV (H): ICD-10-CM

## 2019-12-31 LAB
COLLECT DURATION TIME UR: 24 H
CREAT 24H UR-MRATE: 1.31 G/(24.H) (ref 0.8–1.8)
CREAT UR-MCNC: 44 MG/DL
PROT 24H UR-MRATE: 5.07 G/(24.H) (ref 0.04–0.23)
PROT UR-MCNC: 1.69 G/L
PROT/CREAT 24H UR: 3.88 G/G CR (ref 0–0.2)
SPECIMEN VOL UR: 3000 ML

## 2020-01-02 ENCOUNTER — TELEPHONE (OUTPATIENT)
Dept: FAMILY MEDICINE | Facility: CLINIC | Age: 55
End: 2020-01-02

## 2020-01-02 NOTE — TELEPHONE ENCOUNTER
Please abstract the following data from this visit with this patient into the appropriate field in Epic:    Tests that can be patient reported without a hard copy:      Other Tests found in the patient's chart through Chart Review/Care Everywhere:    Pap smear done by this group [facility] this date: 12/13/17 and HPV done by this group [facility] on this date: 12/13/17    Note to [employer]: If this section is blank, no results were found via Chart Review/Care Everywhere.

## 2020-01-06 NOTE — TELEPHONE ENCOUNTER
Received call from Luci HERNANDEZ, infusion  at [facility], Rituximab has been approved.  Have left message letting pt know that it has been approved.      Sophie Alvarado RN  Rheumatology Clinic

## 2020-01-10 NOTE — TELEPHONE ENCOUNTER
Pt will get infusion on 2/14 and 2/28. She is waiting as she would like them on Fridays so she has the weekend to recover.    Sophie Alvarado RN  Rheumatology Clinic

## 2020-01-10 NOTE — TELEPHONE ENCOUNTER
Dr. Marroquin and Dr. Sherman request that pt get it done sooner.  Called and spoke with pt, she is fine with that request and will call and rescheduled to sooner.    Sophie Alvarado RN  Rheumatology Clinic

## 2020-01-20 DIAGNOSIS — M32.9 SYSTEMIC LUPUS ERYTHEMATOSUS, UNSPECIFIED SLE TYPE, UNSPECIFIED ORGAN INVOLVEMENT STATUS (H): Primary | ICD-10-CM

## 2020-01-20 DIAGNOSIS — Z79.899 HIGH RISK MEDICATIONS (NOT ANTICOAGULANTS) LONG-TERM USE: ICD-10-CM

## 2020-01-21 ENCOUNTER — TELEPHONE (OUTPATIENT)
Dept: RHEUMATOLOGY | Facility: CLINIC | Age: 55
End: 2020-01-21

## 2020-01-21 NOTE — TELEPHONE ENCOUNTER
Spoke to patient , called with a reminder about there upcoming appointment , also instructed to bring medications or medication list.    Denise Mccray CMA

## 2020-01-22 ENCOUNTER — TELEPHONE (OUTPATIENT)
Dept: NEPHROLOGY | Facility: CLINIC | Age: 55
End: 2020-01-22

## 2020-01-22 ENCOUNTER — INFUSION THERAPY VISIT (OUTPATIENT)
Dept: INFUSION THERAPY | Facility: CLINIC | Age: 55
End: 2020-01-22
Payer: COMMERCIAL

## 2020-01-22 VITALS
WEIGHT: 293 LBS | HEART RATE: 79 BPM | BODY MASS INDEX: 50.48 KG/M2 | OXYGEN SATURATION: 94 % | RESPIRATION RATE: 18 BRPM | TEMPERATURE: 98.9 F | DIASTOLIC BLOOD PRESSURE: 85 MMHG | SYSTOLIC BLOOD PRESSURE: 134 MMHG

## 2020-01-22 DIAGNOSIS — Z79.899 HIGH RISK MEDICATIONS (NOT ANTICOAGULANTS) LONG-TERM USE: ICD-10-CM

## 2020-01-22 DIAGNOSIS — R82.90 NONSPECIFIC FINDING ON EXAMINATION OF URINE: ICD-10-CM

## 2020-01-22 DIAGNOSIS — C88.40 MALT LYMPHOMA: ICD-10-CM

## 2020-01-22 DIAGNOSIS — M32.14 LUPUS NEPHRITIS, ISN/RPS CLASS IV (H): Primary | ICD-10-CM

## 2020-01-22 DIAGNOSIS — M32.9 SYSTEMIC LUPUS ERYTHEMATOSUS, UNSPECIFIED SLE TYPE, UNSPECIFIED ORGAN INVOLVEMENT STATUS (H): ICD-10-CM

## 2020-01-22 LAB
ALBUMIN SERPL-MCNC: 2.7 G/DL (ref 3.4–5)
ALBUMIN UR-MCNC: 100 MG/DL
ALT SERPL W P-5'-P-CCNC: 19 U/L (ref 0–50)
ANION GAP SERPL CALCULATED.3IONS-SCNC: 4 MMOL/L (ref 3–14)
APPEARANCE UR: CLEAR
AST SERPL W P-5'-P-CCNC: 14 U/L (ref 0–45)
BACTERIA #/AREA URNS HPF: ABNORMAL /HPF
BASOPHILS # BLD AUTO: 0 10E9/L (ref 0–0.2)
BASOPHILS NFR BLD AUTO: 0.3 %
BILIRUB UR QL STRIP: NEGATIVE
BUN SERPL-MCNC: 19 MG/DL (ref 7–30)
C3 SERPL-MCNC: 66 MG/DL (ref 81–157)
C4 SERPL-MCNC: 9 MG/DL (ref 13–39)
CALCIUM SERPL-MCNC: 8.7 MG/DL (ref 8.5–10.1)
CD19 CELLS # BLD: 2 CELLS/UL (ref 107–698)
CD19 CELLS NFR BLD: <1 % (ref 6–27)
CHLORIDE SERPL-SCNC: 107 MMOL/L (ref 94–109)
CO2 SERPL-SCNC: 27 MMOL/L (ref 20–32)
COLOR UR AUTO: ABNORMAL
CREAT SERPL-MCNC: 0.66 MG/DL (ref 0.52–1.04)
CREAT UR-MCNC: 40 MG/DL
CREAT UR-MCNC: 42 MG/DL
CRP SERPL-MCNC: 9.3 MG/L (ref 0–8)
DIFFERENTIAL METHOD BLD: ABNORMAL
EOSINOPHIL # BLD AUTO: 0 10E9/L (ref 0–0.7)
EOSINOPHIL NFR BLD AUTO: 0.2 %
ERYTHROCYTE [DISTWIDTH] IN BLOOD BY AUTOMATED COUNT: 15.9 % (ref 10–15)
ERYTHROCYTE [SEDIMENTATION RATE] IN BLOOD BY WESTERGREN METHOD: 47 MM/H (ref 0–30)
GFR SERPL CREATININE-BSD FRML MDRD: >90 ML/MIN/{1.73_M2}
GLUCOSE SERPL-MCNC: 100 MG/DL (ref 70–99)
GLUCOSE UR STRIP-MCNC: NEGATIVE MG/DL
HBV CORE AB SERPL QL IA: NONREACTIVE
HBV SURFACE AB SERPL IA-ACNC: 1.03 M[IU]/ML
HBV SURFACE AG SERPL QL IA: NONREACTIVE
HCT VFR BLD AUTO: 35.3 % (ref 35–47)
HCV AB SERPL QL IA: NONREACTIVE
HGB BLD-MCNC: 11.3 G/DL (ref 11.7–15.7)
HGB UR QL STRIP: ABNORMAL
IGA SERPL-MCNC: 287 MG/DL (ref 84–499)
IGG SERPL-MCNC: 379 MG/DL (ref 610–1616)
IGM SERPL-MCNC: 41 MG/DL (ref 35–242)
IMM GRANULOCYTES # BLD: 0.1 10E9/L (ref 0–0.4)
IMM GRANULOCYTES NFR BLD: 1.1 %
KETONES UR STRIP-MCNC: NEGATIVE MG/DL
LEUKOCYTE ESTERASE UR QL STRIP: ABNORMAL
LYMPHOCYTES # BLD AUTO: 0.2 10E9/L (ref 0.8–5.3)
LYMPHOCYTES NFR BLD AUTO: 3.4 %
MCH RBC QN AUTO: 26.7 PG (ref 26.5–33)
MCHC RBC AUTO-ENTMCNC: 32 G/DL (ref 31.5–36.5)
MCV RBC AUTO: 83 FL (ref 78–100)
MONOCYTES # BLD AUTO: 0.4 10E9/L (ref 0–1.3)
MONOCYTES NFR BLD AUTO: 5.6 %
NEUTROPHILS # BLD AUTO: 5.7 10E9/L (ref 1.6–8.3)
NEUTROPHILS NFR BLD AUTO: 89.4 %
NITRATE UR QL: NEGATIVE
NON-SQ EPI CELLS #/AREA URNS LPF: ABNORMAL /LPF
PH UR STRIP: 6 PH (ref 5–7)
PLATELET # BLD AUTO: 230 10E9/L (ref 150–450)
POTASSIUM SERPL-SCNC: 4.1 MMOL/L (ref 3.4–5.3)
PROT UR-MCNC: 2.15 G/L
PROT/CREAT 24H UR: 5.1 G/G CR (ref 0–0.2)
RBC # BLD AUTO: 4.24 10E12/L (ref 3.8–5.2)
RBC #/AREA URNS AUTO: ABNORMAL /HPF
SODIUM SERPL-SCNC: 138 MMOL/L (ref 133–144)
SOURCE: ABNORMAL
SP GR UR STRIP: 1.01 (ref 1–1.03)
UROBILINOGEN UR STRIP-MCNC: NORMAL MG/DL (ref 0–2)
WBC # BLD AUTO: 6.4 10E9/L (ref 4–11)
WBC #/AREA URNS AUTO: ABNORMAL /HPF
YEAST #/AREA URNS HPF: ABNORMAL /HPF

## 2020-01-22 PROCEDURE — 99000 SPECIMEN HANDLING OFFICE-LAB: CPT | Performed by: INTERNAL MEDICINE

## 2020-01-22 PROCEDURE — 96415 CHEMO IV INFUSION ADDL HR: CPT | Performed by: INTERNAL MEDICINE

## 2020-01-22 PROCEDURE — 82784 ASSAY IGA/IGD/IGG/IGM EACH: CPT | Performed by: INTERNAL MEDICINE

## 2020-01-22 PROCEDURE — 82040 ASSAY OF SERUM ALBUMIN: CPT | Performed by: INTERNAL MEDICINE

## 2020-01-22 PROCEDURE — 81001 URINALYSIS AUTO W/SCOPE: CPT | Performed by: INTERNAL MEDICINE

## 2020-01-22 PROCEDURE — 86706 HEP B SURFACE ANTIBODY: CPT | Performed by: INTERNAL MEDICINE

## 2020-01-22 PROCEDURE — 86355 B CELLS TOTAL COUNT: CPT | Performed by: INTERNAL MEDICINE

## 2020-01-22 PROCEDURE — 86146 BETA-2 GLYCOPROTEIN ANTIBODY: CPT | Performed by: INTERNAL MEDICINE

## 2020-01-22 PROCEDURE — 86225 DNA ANTIBODY NATIVE: CPT | Performed by: INTERNAL MEDICINE

## 2020-01-22 PROCEDURE — 96375 TX/PRO/DX INJ NEW DRUG ADDON: CPT | Performed by: INTERNAL MEDICINE

## 2020-01-22 PROCEDURE — 85025 COMPLETE CBC W/AUTO DIFF WBC: CPT | Performed by: INTERNAL MEDICINE

## 2020-01-22 PROCEDURE — 85730 THROMBOPLASTIN TIME PARTIAL: CPT | Performed by: INTERNAL MEDICINE

## 2020-01-22 PROCEDURE — 86481 TB AG RESPONSE T-CELL SUSP: CPT | Performed by: INTERNAL MEDICINE

## 2020-01-22 PROCEDURE — 85597 PHOSPHOLIPID PLTLT NEUTRALIZ: CPT | Performed by: INTERNAL MEDICINE

## 2020-01-22 PROCEDURE — 00000401 ZZHCL STATISTIC THROMBIN TIME NC: Performed by: INTERNAL MEDICINE

## 2020-01-22 PROCEDURE — 86704 HEP B CORE ANTIBODY TOTAL: CPT | Performed by: INTERNAL MEDICINE

## 2020-01-22 PROCEDURE — 80048 BASIC METABOLIC PNL TOTAL CA: CPT | Performed by: INTERNAL MEDICINE

## 2020-01-22 PROCEDURE — 87340 HEPATITIS B SURFACE AG IA: CPT | Performed by: INTERNAL MEDICINE

## 2020-01-22 PROCEDURE — 84450 TRANSFERASE (AST) (SGOT): CPT | Performed by: INTERNAL MEDICINE

## 2020-01-22 PROCEDURE — 84460 ALANINE AMINO (ALT) (SGPT): CPT | Performed by: INTERNAL MEDICINE

## 2020-01-22 PROCEDURE — 86140 C-REACTIVE PROTEIN: CPT | Performed by: INTERNAL MEDICINE

## 2020-01-22 PROCEDURE — 86160 COMPLEMENT ANTIGEN: CPT | Performed by: INTERNAL MEDICINE

## 2020-01-22 PROCEDURE — 82570 ASSAY OF URINE CREATININE: CPT | Performed by: INTERNAL MEDICINE

## 2020-01-22 PROCEDURE — 85613 RUSSELL VIPER VENOM DILUTED: CPT | Performed by: INTERNAL MEDICINE

## 2020-01-22 PROCEDURE — 87086 URINE CULTURE/COLONY COUNT: CPT | Performed by: INTERNAL MEDICINE

## 2020-01-22 PROCEDURE — 36415 COLL VENOUS BLD VENIPUNCTURE: CPT | Performed by: INTERNAL MEDICINE

## 2020-01-22 PROCEDURE — 00000167 ZZHCL STATISTIC INR NC: Performed by: INTERNAL MEDICINE

## 2020-01-22 PROCEDURE — 96367 TX/PROPH/DG ADDL SEQ IV INF: CPT | Performed by: INTERNAL MEDICINE

## 2020-01-22 PROCEDURE — 99207 ZZC NO CHARGE LOS: CPT

## 2020-01-22 PROCEDURE — 86803 HEPATITIS C AB TEST: CPT | Performed by: INTERNAL MEDICINE

## 2020-01-22 PROCEDURE — 85732 THROMBOPLASTIN TIME PARTIAL: CPT | Performed by: INTERNAL MEDICINE

## 2020-01-22 PROCEDURE — 85652 RBC SED RATE AUTOMATED: CPT | Performed by: INTERNAL MEDICINE

## 2020-01-22 PROCEDURE — 96413 CHEMO IV INFUSION 1 HR: CPT | Performed by: INTERNAL MEDICINE

## 2020-01-22 PROCEDURE — 84156 ASSAY OF PROTEIN URINE: CPT | Performed by: INTERNAL MEDICINE

## 2020-01-22 PROCEDURE — 82610 CYSTATIN C: CPT | Mod: 90 | Performed by: INTERNAL MEDICINE

## 2020-01-22 PROCEDURE — 86147 CARDIOLIPIN ANTIBODY EA IG: CPT | Performed by: INTERNAL MEDICINE

## 2020-01-22 RX ORDER — METHYLPREDNISOLONE SODIUM SUCCINATE 125 MG/2ML
125 INJECTION, POWDER, LYOPHILIZED, FOR SOLUTION INTRAMUSCULAR; INTRAVENOUS ONCE
Status: CANCELLED | OUTPATIENT
Start: 2020-02-05

## 2020-01-22 RX ORDER — METHYLPREDNISOLONE SODIUM SUCCINATE 125 MG/2ML
125 INJECTION, POWDER, LYOPHILIZED, FOR SOLUTION INTRAMUSCULAR; INTRAVENOUS ONCE
Status: COMPLETED | OUTPATIENT
Start: 2020-01-22 | End: 2020-01-22

## 2020-01-22 RX ORDER — ACETAMINOPHEN 325 MG/1
650 TABLET ORAL ONCE
Status: CANCELLED
Start: 2020-02-05

## 2020-01-22 RX ORDER — ACETAMINOPHEN 325 MG/1
650 TABLET ORAL ONCE
Status: COMPLETED | OUTPATIENT
Start: 2020-01-22 | End: 2020-01-22

## 2020-01-22 RX ADMIN — METHYLPREDNISOLONE SODIUM SUCCINATE 125 MG: 125 INJECTION INTRAMUSCULAR; INTRAVENOUS at 08:02

## 2020-01-22 RX ADMIN — ACETAMINOPHEN 650 MG: 325 TABLET ORAL at 07:42

## 2020-01-22 RX ADMIN — Medication 250 ML: at 08:01

## 2020-01-22 ASSESSMENT — PAIN SCALES - GENERAL: PAINLEVEL: NO PAIN (0)

## 2020-01-22 NOTE — TELEPHONE ENCOUNTER
Patient contacted and reminded of upcoming appointment.  Patient confirmed they will be attending.  Patient instructed to bring updated medications list to appointment.Liza Pichardo/[facility]  January 22, 2020 11:58 AM

## 2020-01-22 NOTE — PROGRESS NOTES
Infusion Nursing Note:  Taina Singh presents today for Rituxan.    Patient seen by provider today: No   present during visit today: Not Applicable.    Note: N/A.    Intravenous Access:  Peripheral IV placed.    Treatment Conditions:  Biological Infusion Checklist:  ~~~ NOTE: If the patient answers yes to any of the questions below, hold the infusion and contact ordering provider or on-call provider.    1. Have you recently had an elevated temperature, fever, chills, productive cough, coughing for 3 weeks or longer or hemoptysis, abnormal vital signs, night sweats,  chest pain or have you noticed a decrease in your appetite, unexplained weight loss or fatigue? No  2. Do you have any open wounds or new incisions? No  3. Do you have any recent or upcoming hospitalizations, surgeries or dental procedures? No  4. Do you currently have or recently have had any signs of illness or infection or are you on any antibiotics? No  5. Have you had any new, sudden or worsening abdominal pain? No  6. Have you or anyone in your household received a live vaccination in the past 4 weeks? Please note:  No live vaccines while on biologic/chemotherapy until 6 months after the last treatment.  Patient can receive the flu vaccine (shot only) and the pneumovax.  It is optimal for the patient to get these vaccines mid cycle, but they can be given at any time as long as it is not on the day of the infusion. No  7. Have you recently been diagnosed with any new nervous system diseases (ie. Multiple sclerosis, Guillain [place], seizures, neurological changes) or cancer diagnosis? No  8. Are you on any form of radiation or chemotherapy? No  9. Are you pregnant or breast feeding or do you have plans of pregnancy in the future? No  10. Have there been any other new onset medical symptoms? No  Sadie Avalos RN

## 2020-01-22 NOTE — LETTER
Patient:  Taina Singh  :   1965  MRN:     [id number]        Ms.Kelly Singh  [address] [facility] [address]        2020    Dear ,    We are writing to inform you of your test results. Improved anemia and inflammation markers. Could you repeat labs at the end of Feb?      Results for orders placed or performed in visit on 20   CD19 B Cell Count     Status: Abnormal   Result Value Ref Range    CD19 B Cells <1 (L) 6 - 27 %    Absolute CD19 2 (L) 107 - 698 cells/uL   Immunoglobulins A G and M     Status: Abnormal   Result Value Ref Range     (L) 610 - 1,616 mg/dL     84 - 499 mg/dL    IGM 41 35 - 242 mg/dL   Hepatitis C antibody     Status: None   Result Value Ref Range    Hepatitis C Antibody Nonreactive NR^Nonreactive   Hepatitis B surface antigen     Status: None   Result Value Ref Range    Hep B Surface Agn Nonreactive NR^Nonreactive   Hepatitis B core antibody     Status: None   Result Value Ref Range    Hepatitis B Core Cari Nonreactive NR^Nonreactive   Creatinine random urine     Status: None   Result Value Ref Range    Creatinine Urine Random 40 mg/dL   UA with Microscopic reflex to Culture     Status: Abnormal   Result Value Ref Range    Color Urine Light Yellow     Appearance Urine Clear     Glucose Urine Negative NEG^Negative mg/dL    Bilirubin Urine Negative NEG^Negative    Ketones Urine Negative NEG^Negative mg/dL    Specific Gravity Urine 1.012 1.003 - 1.035    Blood Urine Moderate (A) NEG^Negative    pH Urine 6.0 5.0 - 7.0 pH    Protein Albumin Urine 100 (A) NEG^Negative mg/dL    Urobilinogen mg/dL Normal 0.0 - 2.0 mg/dL    Nitrite Urine Negative NEG^Negative    Leukocyte Esterase Urine Trace (A) NEG^Negative    Source Midstream Urine     WBC Urine 10-25 (A) OTO5^0 - 5 /HPF    RBC Urine 5-10 (A) OTO2^O - 2 /HPF    Bacteria Urine Few (A) NEG^Negative /HPF    Yeast Urine Few (A) NEG^Negative /HPF    Squamous Epithelial /LPF Urine Few FEW^Few /LPF    Erythrocyte sedimentation rate auto     Status: Abnormal   Result Value Ref Range    Sed Rate 47 (H) 0 - 30 mm/h   DNA double stranded antibodies     Status: Abnormal   Result Value Ref Range    DNA-ds >379 (H) <10 IU/mL   CRP inflammation     Status: Abnormal   Result Value Ref Range    CRP Inflammation 9.3 (H) 0.0 - 8.0 mg/L   Complement C3     Status: Abnormal   Result Value Ref Range    Complement C3 66 (L) 81 - 157 mg/dL   Complement C4     Status: Abnormal   Result Value Ref Range    Complement C4 9 (L) 13 - 39 mg/dL   CBC with platelets differential     Status: Abnormal   Result Value Ref Range    WBC 6.4 4.0 - 11.0 10e9/L    RBC Count 4.24 3.8 - 5.2 10e12/L    Hemoglobin 11.3 (L) 11.7 - 15.7 g/dL    Hematocrit 35.3 35.0 - 47.0 %    MCV 83 78 - 100 fl    MCH 26.7 26.5 - 33.0 pg    MCHC 32.0 31.5 - 36.5 g/dL    RDW 15.9 (H) 10.0 - 15.0 %    Platelet Count 230 150 - 450 10e9/L    % Neutrophils 89.4 %    % Lymphocytes 3.4 %    % Monocytes 5.6 %    % Eosinophils 0.2 %    % Basophils 0.3 %    % Immature Granulocytes 1.1 %    Absolute Neutrophil 5.7 1.6 - 8.3 10e9/L    Absolute Lymphocytes 0.2 (L) 0.8 - 5.3 10e9/L    Absolute Monocytes 0.4 0.0 - 1.3 10e9/L    Absolute Eosinophils 0.0 0.0 - 0.7 10e9/L    Absolute Basophils 0.0 0.0 - 0.2 10e9/L    Abs Immature Granulocytes 0.1 0 - 0.4 10e9/L    Diff Method Automated Method    AST     Status: None   Result Value Ref Range    AST 14 0 - 45 U/L   Albumin level     Status: Abnormal   Result Value Ref Range    Albumin 2.7 (L) 3.4 - 5.0 g/dL   ALT     Status: None   Result Value Ref Range    ALT 19 0 - 50 U/L   Cardiolipin Cari IgG and IgM     Status: None   Result Value Ref Range    Cardiolipin Antibody IgG <1.6 0.0 - 19.9 GPL-U/mL    Cardiolipin Antibody IgM <0.2 0.0 - 19.9 MPL-U/mL   Beta 2 Glycoprotein Antibodies IGG IGM     Status: None   Result Value Ref Range    Beta 2 Glycoprotein 1 Antibody IgG <0.6 <7 U/mL    Beta 2 Glycoprotein 1 Antibody IgM <2.9 <7 U/mL    Lupus Anticoagulant Panel     Status: Abnormal   Result Value Ref Range    Lupus Result Positive (A) NEG^Negative   Basic metabolic panel     Status: Abnormal   Result Value Ref Range    Sodium 138 133 - 144 mmol/L    Potassium 4.1 3.4 - 5.3 mmol/L    Chloride 107 94 - 109 mmol/L    Carbon Dioxide 27 20 - 32 mmol/L    Anion Gap 4 3 - 14 mmol/L    Glucose 100 (H) 70 - 99 mg/dL    Urea Nitrogen 19 7 - 30 mg/dL    Creatinine 0.66 0.52 - 1.04 mg/dL    GFR Estimate >90 >60 mL/min/[1.73_m2]    GFR Estimate If Black >90 >60 mL/min/[1.73_m2]    Calcium 8.7 8.5 - 10.1 mg/dL   Protein  random urine with Creat Ratio     Status: Abnormal   Result Value Ref Range    Protein Random Urine 2.15 g/L    Protein Total Urine g/gr Creatinine 5.10 (H) 0 - 0.2 g/g Cr   Hepatitis B Surface Antibody     Status: None   Result Value Ref Range    Hepatitis B Surface Antibody 1.03 <8.00 m[IU]/mL   Cystatin C with GFR     Status: None   Result Value Ref Range    Lab Scanned Result CYSTATIN C GFR-Scanned    Creatinine urine calculation only     Status: None   Result Value Ref Range    Creatinine Urine 42 mg/dL   Quantiferon TB Gold Plus     Status: Abnormal   Result Value Ref Range    Quantiferon-TB Gold Plus Result Indeterminate (A) NEG^Negative    TB1 Ag minus Nil Value 0.00 IU/mL    TB2 Ag minus Nil Value 0.01 IU/mL    Mitogen minus Nil Result 0.03 IU/mL    Nil Result 0.03 IU/mL   Urine Culture Aerobic Bacterial     Status: None   Result Value Ref Range    Specimen Description Midstream Urine     Culture Micro No growth        Killian Marroquin MD

## 2020-01-22 NOTE — PROGRESS NOTES
Assumed patient care at about 0845.      Rituxan Rates:  50 ml/hr, increased 50 ml/hr every 30 min to a max rate of 400 ml/hr      Post Infusion Assessment:  Patient tolerated infusion without incident.       Discharge Plan:    Patient will return 2/6/20 for next appointment.   Patient discharged in stable condition accompanied by: son.  Departure Mode: Ambulatory.    Kimberley Hebert RN

## 2020-01-23 ENCOUNTER — OFFICE VISIT (OUTPATIENT)
Dept: NEPHROLOGY | Facility: CLINIC | Age: 55
End: 2020-01-23
Attending: INTERNAL MEDICINE
Payer: COMMERCIAL

## 2020-01-23 ENCOUNTER — OFFICE VISIT (OUTPATIENT)
Dept: RHEUMATOLOGY | Facility: CLINIC | Age: 55
End: 2020-01-23
Attending: INTERNAL MEDICINE
Payer: COMMERCIAL

## 2020-01-23 VITALS
OXYGEN SATURATION: 96 % | SYSTOLIC BLOOD PRESSURE: 128 MMHG | BODY MASS INDEX: 51.34 KG/M2 | HEART RATE: 86 BPM | WEIGHT: 293 LBS | DIASTOLIC BLOOD PRESSURE: 81 MMHG

## 2020-01-23 VITALS
DIASTOLIC BLOOD PRESSURE: 71 MMHG | BODY MASS INDEX: 51.34 KG/M2 | HEART RATE: 86 BPM | WEIGHT: 293 LBS | OXYGEN SATURATION: 96 % | SYSTOLIC BLOOD PRESSURE: 109 MMHG

## 2020-01-23 DIAGNOSIS — Z23 ENCOUNTER FOR IMMUNIZATION: ICD-10-CM

## 2020-01-23 DIAGNOSIS — M32.14 LUPUS NEPHRITIS (H): Primary | ICD-10-CM

## 2020-01-23 DIAGNOSIS — M32.9 SYSTEMIC LUPUS ERYTHEMATOSUS, UNSPECIFIED SLE TYPE, UNSPECIFIED ORGAN INVOLVEMENT STATUS (H): Primary | ICD-10-CM

## 2020-01-23 LAB
B2 GLYCOPROT1 IGG SERPL IA-ACNC: <0.6 U/ML
B2 GLYCOPROT1 IGM SERPL IA-ACNC: <2.9 U/ML
BACTERIA SPEC CULT: NO GROWTH
CARDIOLIPIN ANTIBODY IGG: <1.6 GPL-U/ML (ref 0–19.9)
CARDIOLIPIN ANTIBODY IGM: <0.2 MPL-U/ML (ref 0–19.9)
DSDNA AB SER-ACNC: >379 IU/ML
GAMMA INTERFERON BACKGROUND BLD IA-ACNC: 0.03 IU/ML
LA PPP-IMP: POSITIVE
M TB IFN-G BLD-IMP: ABNORMAL
M TB IFN-G CD4+ BCKGRND COR BLD-ACNC: 0.03 IU/ML
MITOGEN IGNF BCKGRD COR BLD-ACNC: 0 IU/ML
MITOGEN IGNF BCKGRD COR BLD-ACNC: 0.01 IU/ML
SPECIMEN SOURCE: NORMAL

## 2020-01-23 PROCEDURE — G0463 HOSPITAL OUTPT CLINIC VISIT: HCPCS | Mod: ZF

## 2020-01-23 ASSESSMENT — PAIN SCALES - GENERAL
PAINLEVEL: MILD PAIN (2)
PAINLEVEL: MILD PAIN (2)

## 2020-01-23 NOTE — LETTER
1/23/2020      RE: Taina Singh  [address] [place] [address]       Chief complaint  Follow up visit for lupus nephritis    HPI  Patient is a 54 year old female here for follow up of lupus nephritis. Please refer to my previous note regarding patients past history. Since I met with the patient last she was started on Cellcept currently at a dose of 1500 mg twice daily. Rituximab was approved by insurance and sheh as received one dose so far (received first dose 1/22/20). She remains on 20 mg of prednisone daily.     Her blood work was reviewed. Creatinine is stable at 0.6. UA shows 5-10 RBC/HPF which is improved from a month ago. Urine pr/cr is elevated at 5.1. Serology shows stable C3 and C4. Ds-DNA is pending.      Medication, allergies, and past medical history were reviewed with the patient in detial.     Vital signs: /71   Pulse 86   Wt 144.2 kg (317 lb 14.5 oz)   SpO2 96%   BMI 51.34 kg/m       Physical exam:   General: in NAD  ENT: moist oral mucosa, no oral lesions  Lungs: CTA bilaterally  Heart: S1, S2, no murmur, RRR  Abdomen: soft and nontender, no renal bruit  Ext: trace-1+ edema    Assessment and plan   #1 Lupus nephritis  Patient has been tolerating Cellcept well and has started rituximab. She is feeling better. Her hematuria is improving. Will continue with current management and I will see her back in 2 months.     #2 Likely antiphospholipid syndrome   Lupus anticoaulant is positive (this is in the setting of being on warfarin) but in the setting of lupus, prior PE this would be consistent with APLA.     #3 Hypertension, well-controlled   Much better controlled since starting furosemide. Will continue.     Miriam Sherman MD

## 2020-01-23 NOTE — LETTER
1/23/2020       RE: Taina Singh  [address] [place] [address]     Dear Colleague,    Thank you for referring your patient, Taina Singh, to the [facility] RHEUMATOLOGY at [facility]. Please see a copy of my visit note below.    Rheumatology Follow-up Note    Reason for visit: f/u for lupus flare    Date of last visit: 9/6/2019    DOS: 1/23/2020      History of Present Illness:    Taina Singh is a 54 year old  female who was referred to our clinic for evaluation and management of her SLE.    She was diagnosed with lupus at age 27. Her 1st sx were malar rash and fatigue. No skin biopsy was done (reportedly had +KARLIE). She was treated with prednisone taper and HCQ. HCQ helped and she tolerated it well. She was diagnosed with Sjogren's at the same time. Had dryness of eyes and mouth. No lip biopsy to confirm the Dx. Reportedly she had very mild stable lupus for many years and eventually at some point, stopped taking HCQ (can't remember when)    She was diagnosed with MALT lymphoma at 44, had enlarged LN over R parotid gland, on biopsy it was MALT lymphoma. Tx was resection only, no radiation or chemo.    About 3 yr ago, had flu shot and 2 days later, developed pleuritic CP and was seen at urgent care. That's why she does not want to get flu shot. She was seen in ER 2 days after UC visit. She was treated with prednisone.    She lost 100 lbs by exercise over past 2 yr, felt amazing. In 7/2017, started not feeling well. She had extreme fatigue. Had AM stiffness and pain over hands. Touched base with her previous rheumatologist (Dr. Rangel) and was told to have lupus flare and was put on  mg qd. Afterwards, developed pleuritic CP which has not been resolved.    She was given prednisone 40 mg qd x 5 days (on 12/16/2017) by pulmonologist in [facility]. It helped a lot but pleuritic CP recurred after stopping it. She was told to have pulm HTN and was evaluated for  it.    No triggers for current flare. No flare of malar rash. No current hair loss. Uses blink eyedrops, restasis burned her eyes. She has been prescribed salagen for dry mouth, has not used it. Arthritis of hands have resolved on HCQ. Last HCQ eye exam was 1 week ago.    4/2018: On AZA 50 mg qd since 2/8/2018, increased to 100 mg qd in 3/19/2018. Her arthralgia is intermittent and mild, it's better. Has fatigue.    5/2018: Started on mycophenalate 1500 mg, continues prednisone 30 mg and plaquenil 200 mg twice a day. Her major complaint is continues pressure over her chest. Pain is pleuritic, it hurts by sneezing, taking deep breath.    7/2018: Ms. Singh feels an improvement in her symptoms. She says there is a baseline pleuritic chest pain, but her fatigue and overall day-to-day function has improved to her satisfaction. She says morning stiffness usually only lasts about 10 minutes. She complains of occasional episodes of flashing lights in her left eye, however she is being seen by her opthamologist. She has a OTC scheduled for Monday as well. Ms. Singh feels as though the mycophenalate has made the biggest difference in her improvement. She continues to taper the prednisone, currently on 20 mg daily. She also has continued the plaquenil 200 mg twice a day.     11/2018: On benlysta infusion since beginning of Sept 2018. Chest still feels less tight, breathing is much better. Sometimes hands ache but it does lot last long. Benlysta makes her tired, but overall feels her lupus sx are much improved on benlysta.    3/2019: Feels tired and achy, it is intermittent and moves around. The L hip pain is consistent for the past 7 days, she moved up her appointment from 3/22 to today to get a bursitis shot. Had bad sinus and ear infection in 1/2019. She still thinks benlysta has helped her a lot. Last night, her R shoulder was hurting so bad that she could not move it but it's better today. Pain comes and goes.  Breathing is better after adding benlysta, she is not gasping for air anymore which is huge improvement for her. She feels pressure over her chest and low back. Is getting benlysta tomorrow. Her prednisone dose is 10 mg a day. Leaving to [place] for vacation.    6/5/2019: She reports severe diffuse arthralgia affecting all her joints with pain level 8/10, today is 6/10. No SOB. Feels pressure like CP. Has severe fatigue. Last benlysta was 7 wk ago.    7/2019: Since last visit 1 month ago, patient was hospitalized x2 (both 6/10-6/13 & 6/18-6/22) at [facility] for acute abdominal pain. 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 Team felt that 2nd admission was 2/2 tapering of steroids. She was pulsed during first admission and then got Cytoxan as outpatient in between admissions on 6/15. Incidentally, patient found to have small splenic infarct and acute PE on CT scan. She was started on Xarelto. Also had her kidney biopsy on 6/7/19. Biopsy showed diffuse global lupus nephritis, ISN/RPS class IV. She has also followed up with nephrology this past week and started on lisinopril. No reports of hypotension. Since leaving hospital, biggest complaint is leg strength/conditioning. She thinks this is related to laying in bed while hospitalized. Going to start PT as soon as it is set up.     9/6/2019: Last Wed had her cytoxan infusion, everything went well. Did not sleep well on Wed evening. Called our clinic the next Thu as had diffuse body pain and N/V, also was a on a new BP med x 1 mo which made her dizzy. Her legs were swollen. She was seen in ED that Thu, her BP found to be low. She was instructed to stop BP med and her facial swelling, dizziness, LE swelling resolved, lost 10 lbs. That ended up being reaction to med. Does not know if it was lisinopril or losartan. S/p 3 cytoxan infusions. Overall she is feeling better, minimal joint pain, no SOB/CP/abdominal pain. Has malar rash. No oral ulcers. She has been on prednisone 30 mg a day  x 2 weeks.      Today, 2020: Patient started Rituxin infusions yesterday and denies any side effects. Overall she is feeling better than she has in a long time. She denies significant flares in the past 3 months currently on max dose Cellcept, HCQ and daily prednisone 20 mg. She has an intermittent malar rash worsened with stress and cold weather. Today she denies any joint pain or swelling, new rash, ulcers, or worsened GI symptoms since last visit. She notes improvement in her leg swelling now on HCTZ. She denies urinary symptoms. Patient also reports that she is sleeping better and no longer takes Ambien nightly.      ROS:  A comprehensive ROS was done, positives are per HPI.    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44    No chemo or radiation     Other forms of systemic lupus erythematosus (H) 2018     Pulmonary embolism (H)     2019 seen on abd CT at [facility]     Sjogren's syndrome (H)     secondary Sjogrens, secondary to lupus     Vitamin D deficiency      Past Surgical History:   Procedure Laterality Date     BIOPSY/REMOVAL, LYMPH NODE(S)        SECTION  age 32     HC HYSTEROS W PERMANENT FALLOPAIN IMPLANT      Essure b/l     PAROTIDECTOMY Right 2006     TONSILLECTOMY       Family History   Problem Relation Age of Onset     Alopecia Brother      Rheumatoid Arthritis Brother      LUNG DISEASE No family hx of      Social History     Socioeconomic History     Marital status:      Spouse name: Not on file     Number of children: Not  on file     Years of education: 1     Highest education level: Not on file   Occupational History     Comment: , 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   Social Needs     Financial resource strain: Not on file     Food insecurity:     Worry: Not on file     Inability: Not on file     Transportation needs:     Medical: Not on file     Non-medical: Not on file   Tobacco Use     Smoking status: Never Smoker     Smokeless tobacco: Never Used   Substance and Sexual Activity     Alcohol use: No     Drug use: No     Sexual activity: Not on file   Lifestyle     Physical activity:     Days per week: Not on file     Minutes per session: Not on file     Stress: Not on file   Relationships     Social connections:     Talks on phone: Not on file     Gets together: Not on file     Attends Protestant service: Not on file     Active member of club or organization: Not on file     Attends meetings of clubs or organizations: Not on file     Relationship status: Not on file     Intimate partner violence:     Fear of current or ex partner: Not on file     Emotionally abused: Not on file     Physically abused: Not on file     Forced sexual activity: Not on file   Other Topics Concern     Parent/sibling w/ CABG, MI or angioplasty before 65F 55M? 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      Going through divorce but it's not stress factor for her.    Allergies   Allergen Reactions     Pentamidine Shortness Of Breath     Penicillins Rash     Sulfa Drugs Rash       Outpatient Encounter Medications as of 2020   Medication Sig Dispense Refill     atovaquone (MEPRON) 750 MG/5ML suspension Take 10 mLs (1,500 mg) by mouth daily 210 mL 5     Calcium-Magnesium 300-300  MG TABS daily        furosemide (LASIX) 20 MG tablet Take 1 tablet (20 mg) by mouth daily 90 tablet 0     hydroxychloroquine (PLAQUENIL) 200 MG tablet Take 1 tablet (200 mg) by mouth 2 times daily 180 tablet 0     Multiple Vitamins-Minerals (WOMENS MULTI PO) Take by mouth daily        mycophenolate (GENERIC EQUIVALENT) 500 MG tablet Take 3 tablets (1,500 mg) by mouth 2 times daily 540 tablet 3     Omega-3 Fatty Acids (OMEGA-3 FISH OIL) 500 MG CAPS Take 500 mg by mouth daily        pantoprazole (PROTONIX) 20 MG EC tablet Take 2 tablets (40 mg) by mouth 2 times daily       predniSONE (DELTASONE) 10 MG tablet Take 2 tablets (20 mg) by mouth daily 180 tablet 3     predniSONE (DELTASONE) 2.5 MG tablet 25-20-17.5-15-12.5 mg a day each for one week then 10 mg a day, (take along with 10 and 5 mg size tabs) 100 tablet 3     traMADol (ULTRAM) 50 MG tablet Take 1 tablet (50 mg) by mouth every 12 hours as needed for pain Prn pain 60 tablet 1     vitamin D3 (CHOLECALCIFEROL) 2000 units (50 mcg) tablet Take 1 tablet (2,000 Units) by mouth daily 90 tablet 11     warfarin ANTICOAGULANT (COUMADIN) 5 MG tablet   1     zolpidem (AMBIEN) 5 MG tablet Take 1 tablet (5 mg) by mouth nightly as needed for sleep 30 tablet 0     albuterol (PROAIR HFA/PROVENTIL HFA/VENTOLIN HFA) 108 (90 Base) MCG/ACT inhaler Inhale 2 puffs into the lungs every 6 hours as needed for shortness of breath / dyspnea or wheezing (Patient not taking: Reported on 2020) 3 Inhaler 3     [] doxycycline hyclate (VIBRA-TABS) 100 MG tablet Take 1 tablet (100 mg) by mouth 2 times daily for 10 days 20 tablet 0     fluticasone (FLONASE) 50 MCG/ACT nasal spray Spray 1-2 sprays into both nostrils daily Use 1 spray per nostril per day for 2 weeks.  May increase to 2 sprays per nostril per day after. (Patient not taking: Reported on 2019) 16 g 0     pilocarpine (SALAGEN) 5 MG tablet Take 1 tablet (5 mg) by mouth 4 times daily as needed (Patient not taking:  Reported on 12/26/2019) 360 tablet 3     Facility-Administered Encounter Medications as of 1/23/2020   Medication Dose Route Frequency Provider Last Rate Last Dose     [COMPLETED] 0.9% sodium chloride BOLUS  250 mL Intravenous Once Killian Marroquin MD   Stopped at 01/22/20 1251     [COMPLETED] diphenhydrAMINE (BENADRYL) 50 mg in sodium chloride 0.9 % intermittent infusion  50 mg Intravenous Once Killian Marroquin MD   Stopped at 01/22/20 0838     [COMPLETED] riTUXimab (RITUXAN) 1,000 mg in sodium chloride 0.9 % 1,000 mL non-oncology use  1,000 mg Intravenous Once Killian Marroquin MD   Stopped at 01/22/20 1246       Results:    RHEUM RESULTS Latest Ref Rng & Units 9/30/2019 12/24/2019 1/22/2020   COMPLEMENT C3 81 - 157 mg/dL 50(L) 63(L) 66(L)   COMPLEMENT C4 13 - 39 mg/dL 4(L) 8(L) 9(L)   DNA-DS <10 IU/mL >379(H) >379(H) -   SED RATE 0 - 30 mm/h 39(H) 50(H) 47(H)   CRP, INFLAMMATION 0.0 - 8.0 mg/L 6.0 13.2(H) 9.3(H)   AST 0 - 45 U/L 19 19 14   ALT 0 - 50 U/L 27 29 19   ALBUMIN 3.4 - 5.0 g/dL 2.9(L) 2.7(L) 2.7(L)   WBC 4.0 - 11.0 10e9/L 3.5(L) 7.8 6.4   RBC 3.8 - 5.2 10e12/L 3.84 4.04 4.24   HGB 11.7 - 15.7 g/dL 10.8(L) 10.8(L) 11.3(L)   HCT 35.0 - 47.0 % 35.0 34.1(L) 35.3   MCV 78 - 100 fl 91 84 83   MCHC 31.5 - 36.5 g/dL 30.9(L) 31.7 32.0   RDW 10.0 - 15.0 % 13.9 15.7(H) 15.9(H)    - 450 10e9/L 194 210 230   CREATININE 0.52 - 1.04 mg/dL 0.60 0.55 0.66   GFR ESTIMATE, IF BLACK >60 mL/min/[1.73:m2] >90 >90 >90   GFR ESTIMATE >60 mL/min/[1.73:m2] >90 >90 >90    - 1,616 mg/dL - - 379(L)   IGA 84 - 499 mg/dL - - 287   IGM 35 - 242 mg/dL - - 41   HEPATITIS C ANTIBODY NR:Nonreactive - - Nonreactive       Her records were reviewed.    2D-Echo 8/2017:    ECHO COMPLETE WO CONTRAST CHILANGO GIBBONS 08/22/2017 14:43     [facility]   [address], [address], [place] [address]   Main: [phone]  [phone]  [email or web address]                                                 Transthoracic Echo Report   CHILANGO GIBBONS ID: [id number] Age: 52 : 1965 Ordering Provider: NGUYEN PETERS   Exam Date: 2017 14:43 Gender: F Sonographer: HAJA   Accession #: [id number] Height: 66 in BSA: 2.24 m  BP: 108 / 62   Weight: 261 lbs BMI: 42.1 kg/m        Site: [facility]   Location: Outpatient Rhythm: Normal Sinus Rhythm   Procedure Components: 2D imaging, Color Doppler, Spectral Doppler   Indications: Murmur; Systemic lupus erythematosus, unspecified SLE type, unspecified organ   involvement status   Technical Quality: Contrast: None     Final Conclusion   Visually estimated ejection fraction is 55-60%.   Mild left ventricular hypertrophy.   Estimated pulmonary artery pressure of 31 mmHg + RA pressure.   Dilated IVC size, <50% collapse with respiration.   Estimated EF: 55-60%   FINDINGS   Left Ventricle Normal left ventricular size. Visually estimated ejection fraction is 55-60%.   Mild left ventricular hypertrophy.   Diastolic Function \"Pseudonormal\" left ventricular filling pattern. E/e' ratio 8-15 is   indeterminate for filling pressure.   Right Ventricle Normal right ventricular size and function.   Left Atrium Mild left atrial enlargement.   Right Atrium Mild right atrial enlargement.   Aortic Valve Normal aortic valve. No aortic stenosis. No significant aortic regurgitation.   Mitral Valve Normal mitral valve. No mitral stenosis. Mild mitral regurgitation.   Tricuspid Valve Normal tricuspid valve. No tricuspid stenosis. Mild tricuspid regurgitation.   Estimated pulmonary artery pressure   of 31 mmHg + RA pressure.   Pulmonic Valve Normal pulmonic valve without significant stenosis or regurgitation.   Pericardium Normal pericardium.   Aorta Measured aortic root diameter is normal in size.   Inferior Vena Cava Dilated IVC size, <50% collapse with respiration.    Component      Latest Ref " Rng & Units 11/20/2017   Sodium      133 - 144 mmol/L 137   Potassium      3.4 - 5.3 mmol/L 4.2   Chloride      94 - 109 mmol/L 102   Carbon Dioxide      20 - 32 mmol/L 30   Anion Gap      3 - 14 mmol/L 6   Glucose      70 - 99 mg/dL 101 (H)   Urea Nitrogen      7 - 30 mg/dL 13   Creatinine      0.52 - 1.04 mg/dL 0.55   GFR Estimate      >60 mL/min/1.7m2 >90   GFR Estimate If Black      >60 mL/min/1.7m2 >90   Calcium      8.5 - 10.1 mg/dL 9.1   WBC      4.0 - 11.0 10e9/L 4.9   RBC Count      3.8 - 5.2 10e12/L 4.28   Hemoglobin      11.7 - 15.7 g/dL 11.3 (L)   Hematocrit      35.0 - 47.0 % 35.5   MCV      78 - 100 fl 83   MCH      26.5 - 33.0 pg 26.4 (L)   MCHC      31.5 - 36.5 g/dL 31.8   RDW      10.0 - 15.0 % 14.6   Platelet Count      150 - 450 10e9/L 215   N-Terminal Pro Bnp      0 - 125 pg/mL 546 (H)   Sed Rate      0 - 30 mm/h 71 (H)     Component      Latest Ref Rng & Units 12/29/2017   Color Urine       Straw   Appearance Urine       Clear   Glucose Urine      NEG:Negative mg/dL Negative   Bilirubin Urine      NEG:Negative Negative   Ketones Urine      NEG:Negative mg/dL Negative   Specific Gravity Urine      1.003 - 1.035 1.005   Blood Urine      NEG:Negative Negative   pH Urine      5.0 - 7.0 pH 6.0   Protein Albumin Urine      NEG:Negative mg/dL Negative   Urobilinogen mg/dL      0.0 - 2.0 mg/dL 0.0   Nitrite Urine      NEG:Negative Negative   Leukocyte Esterase Urine      NEG:Negative Negative   Source       Midstream Urine   WBC Urine      0 - 2 /HPF <1   RBC Urine      0 - 2 /HPF <1   Bacteria Urine      NEG:Negative /HPF Few (A)   Squamous Epithelial /HPF Urine      0 - 1 /HPF <1   Mucous Urine      NEG:Negative /LPF Present (A)   Albumin Fraction      3.7 - 5.1 g/dL 4.2   Alpha 1 Fraction      0.2 - 0.4 g/dL 0.4   Alpha 2 Fraction      0.5 - 0.9 g/dL 0.8   Beta Fraction      0.6 - 1.0 g/dL 1.0   Gamma Fraction      0.7 - 1.6 g/dL 1.6   Monoclonal Peak      0.0 g/dL 0.0   ELP Interpretation:        Essentially normal electrophoretic pattern. No monoclonal protein seen. Pathologic . . .   IGG      695 - 1620 mg/dL 1560   IGA      70 - 380 mg/dL 473 (H)   IGM      60 - 265 mg/dL 92   Iron      35 - 180 ug/dL 58   Iron Binding Cap      240 - 430 ug/dL 315   Iron Saturation Index      15 - 46 % 19   TPMT Genotype Specimen       Whole Blood   TPMT Genotype       Neg/Neg   TPMT Predicted Phenotype       Normal   Protein Random Urine      g/L <0.05   Protein Total Urine g/gr Creatinine      0 - 0.2 g/g Cr Unable to calculate due to low value   Deamidated Gliadin Cari, IgA      <7 U/mL 2   Deamidated Gliadin Cari, IgG      <7 U/mL 5   Complement C3      76 - 169 mg/dL 72 (L)   Complement C4      15 - 50 mg/dL 6 (L)   CRP Inflammation      0.0 - 8.0 mg/L 3.2   DNA-ds      <10 IU/mL >379 (H)   Sed Rate      0 - 30 mm/h 49 (H)   Vitamin D Deficiency screening      20 - 75 ug/L 51   Creatinine Urine Random      mg/dL 23   AST      0 - 45 U/L 26   ALT      0 - 50 U/L 35   HEENA  Broad Spectrum       Neg   Beta 2 Glycoprotein 1 Antibody IgG      <7 U/mL <0.6   Beta 2 Glycoprotein 1 Antibody IgM      <7 U/mL <0.9   Thyroid Peroxidase Antibody      <35 IU/mL <10   Thyroglobulin Antibody      <40 IU/mL <20   TSH      0.40 - 4.00 mU/L 0.87   Cryoglobulin      NEG:Negative % Trace (A)   Tissue Transglutaminase Antibody IgA      <7 U/mL 4   Ferritin      8 - 252 ng/mL 163   Endomysial Antibody IgA by IFA      <1:10 <1:10   CRP Cardiac Risk      mg/L 2.9   Creatinine Urine      mg/dL 23     Component      Latest Ref Rng & Units 2/23/2018   Color Urine       Yellow   Appearance Urine       Clear   Glucose Urine      NEG:Negative mg/dL Negative   Bilirubin Urine      NEG:Negative Negative   Ketones Urine      NEG:Negative mg/dL Negative   Specific Gravity Urine      1.003 - 1.035 1.025   Blood Urine      NEG:Negative Negative   pH Urine      5.0 - 7.0 pH 5.5   Protein Albumin Urine      NEG:Negative mg/dL Negative   Urobilinogen Urine       0.2 - 1.0 EU/dL 0.2   Nitrite Urine      NEG:Negative Negative   Leukocyte Esterase Urine      NEG:Negative Negative   Source       Midstream Urine   Protein Random Urine      g/L 0.17   Protein Total Urine g/gr Creatinine      0 - 0.2 g/g Cr 0.18   Complement C3      76 - 169 mg/dL 64 (L)   Complement C4      15 - 50 mg/dL 5 (L)   CRP Inflammation      0.0 - 8.0 mg/L 4.2   DNA-ds      <10 IU/mL >379 (H)   Sed Rate      0 - 30 mm/h 35 (H)   AST      0 - 45 U/L 27   ALT      0 - 50 U/L 31   Creatinine Urine Random      mg/dL 99     Component      Latest Ref Rng & Units 3/16/2018   WBC      4.0 - 11.0 10e9/L 5.6   RBC Count      3.8 - 5.2 10e12/L 4.43   Hemoglobin      11.7 - 15.7 g/dL 12.1   Hematocrit      35.0 - 47.0 % 36.9   MCV      78 - 100 fl 83   MCH      26.5 - 33.0 pg 27.3   MCHC      31.5 - 36.5 g/dL 32.8   RDW      10.0 - 15.0 % 14.5   Platelet Count      150 - 450 10e9/L 224   Diff Method       Automated Method   % Neutrophils      % 81.7   % Lymphocytes      % 9.3   % Monocytes      % 7.5   % Eosinophils      % 1.3   % Basophils      % 0.2   Absolute Neutrophil      1.6 - 8.3 10e9/L 4.6   Absolute Lymphocytes      0.8 - 5.3 10e9/L 0.5 (L)   Absolute Monocytes      0.0 - 1.3 10e9/L 0.4   Absolute Eosinophils      0.0 - 0.7 10e9/L 0.1   Absolute Basophils      0.0 - 0.2 10e9/L 0.0   Creatinine      0.52 - 1.04 mg/dL 0.51 (L)   GFR Estimate      >60 mL/min/1.7m2 >90   GFR Estimate If Black      >60 mL/min/1.7m2 >90   ALT      0 - 50 U/L 27   Albumin      3.4 - 5.0 g/dL 3.5   AST      0 - 45 U/L 18       Component      Latest Ref Rng & Units 3/21/2018   Color Urine       Yellow   Appearance Urine       Clear   Glucose Urine      NEG:Negative mg/dL Negative   Bilirubin Urine      NEG:Negative Negative   Ketones Urine      NEG:Negative mg/dL Negative   Specific Gravity Urine      1.003 - 1.035 <=1.005   pH Urine      5.0 - 7.0 pH 6.5   Protein Albumin Urine      NEG:Negative mg/dL Negative   Urobilinogen  Urine      0.2 - 1.0 EU/dL 0.2   Nitrite Urine      NEG:Negative Negative   Blood Urine      NEG:Negative Negative   Leukocyte Esterase Urine      NEG:Negative Negative   Source       Midstream Urine   WBC Urine      OTO5:0 - 5 /HPF 0 - 5   RBC Urine      OTO2:O - 2 /HPF O - 2   Squamous Epithelial /LPF Urine      FEW:Few /LPF Few   Protein Random Urine      g/L <0.05   Protein Total Urine g/gr Creatinine      0 - 0.2 g/g Cr Unable to calculate due to low value   DNA-ds      <10 IU/mL >379 (H)   Complement C4      15 - 50 mg/dL 4 (L)   Complement C3      76 - 169 mg/dL 69 (L)   Creatinine Urine Random      mg/dL 17   Creatinine Urine      mg/dL 17     EXAMINATION: CT CHEST W/O CONTRAST, 12/29/2017 1:08 PM   TECHNIQUE:  Helical CT images from the thoracic inlet through the lung  bases were obtained without IV contrast during inspiration and  expiration according to high-resolution chest CT protocol. Contrast  dose: None  COMPARISON: None   HISTORY: eval for ILD, pleural effusion, pt has lupus, Sjogren's, h/o  MALT and pleurisy and SOB; Systemic lupus erythematosus, unspecified  SLE type, unspecified organ involvement status (H)   FINDINGS:  At least 75% collapse of the trachea and mainstem bronchi on the end  expiratory views. Diffuse lobular air trapping on end expiratory  images. Bibasilar platelike atelectasis. No pneumothorax or pleural  effusion. Scattered solid pulmonary nodules, for example a 4 mm  lingular nodule (series 5 image 145) and a 4 mm right upper lobe  nodule (image 73).    The heart size is normal. Mild three-vessel coronary artery calcific  atherosclerosis. Dilation of the main pulmonary artery to 4.1 cm. No  pericardial effusion. Normal caliber and configuration of the thoracic  great vessels. Numerous prominent though nonenlarged mediastinal lymph  nodes.  Limited views of the abdomen reveal no worrisome pathology. No  worrisome bony or soft tissue lesions.    IMPRESSION:   1. At least moderate  tracheobronchomalacia.  2. 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  3. Scattered subcentimeter pulmonary nodules measuring up to 4 mm.  Consider follow-up chest CT in one year to establish stability.     [Consider Follow Up: Lung nodule]     This report will be copied to the [facility] to ensure a  provider acknowledges the finding.      I have personally reviewed the examination and initial interpretation  and I agree with the findings.     AUSTIN PACE MD    Examination:NM LUNG SCAN VENTILATION AND PERFUSION, 3/14/2018 8:24 AM    Indication:  Chronic PE; Pulmonary hypertension    Additional Information: none   Technique:   The patient received 2 mCi of Tc-99m DTPA aerosol for ventilation and  7 mCi of Tc-99m MAA for perfusion. Multiple images were obtained of  the lungs in Anterior, posterior, HERNANDES, RPO, LPO, and  [place] projections.   Comparison: Chest x-ray same-day   Findings:     There are matched ventilation/perfusion defects in both lungs. No  mismatched perfusion defect to suggest pulmonary emboli.      Impression:  Pulmonary emboli is not present.     I have personally reviewed the examination and initial interpretation  and I agree with the findings.     DONNIE GARCIA MD    Component      Latest Ref Rng & Units 5/12/2018   WBC      4.0 - 11.0 10e9/L 7.1   RBC Count      3.8 - 5.2 10e12/L 4.42   Hemoglobin      11.7 - 15.7 g/dL 12.1   Hematocrit      35.0 - 47.0 % 37.4   MCV      78 - 100 fl 85   MCH      26.5 - 33.0 pg 27.4   MCHC      31.5 - 36.5 g/dL 32.4   RDW      10.0 - 15.0 % 14.7   Platelet Count      150 - 450 10e9/L 226   Diff Method       Automated Method   % Neutrophils      % 86.2   % Lymphocytes      % 4.8   % Monocytes      % 8.4   % Eosinophils      % 0.3   % Basophils      % 0.3   Absolute Neutrophil      1.6 - 8.3 10e9/L 6.1   Absolute Lymphocytes       0.8 - 5.3 10e9/L 0.3 (L)   Absolute Monocytes      0.0 - 1.3 10e9/L 0.6   Absolute Eosinophils      0.0 - 0.7 10e9/L 0.0   Absolute Basophils      0.0 - 0.2 10e9/L 0.0   Color Urine       Yellow   Appearance Urine       Clear   Glucose Urine      NEG:Negative mg/dL Negative   Bilirubin Urine      NEG:Negative Negative   Ketones Urine      NEG:Negative mg/dL Negative   Specific Gravity Urine      1.003 - 1.035 <=1.005   Blood Urine      NEG:Negative Negative   pH Urine      5.0 - 7.0 pH 5.5   Protein Albumin Urine      NEG:Negative mg/dL Negative   Urobilinogen Urine      0.2 - 1.0 EU/dL 0.2   Nitrite Urine      NEG:Negative Negative   Leukocyte Esterase Urine      NEG:Negative Negative   Source       Midstream Urine   Creatinine      0.52 - 1.04 mg/dL 0.63   GFR Estimate      >60 mL/min/1.7m2 >90   GFR Estimate If Black      >60 mL/min/1.7m2 >90   Protein Random Urine      g/L <0.05   Protein Total Urine g/gr Creatinine      0 - 0.2 g/g Cr Unable to calculate due to low value   Albumin      3.4 - 5.0 g/dL 3.6   ALT      0 - 50 U/L 28   AST      0 - 45 U/L 20   Complement C3      76 - 169 mg/dL 46 (L)   Complement C4      15 - 50 mg/dL 3 (L)   CRP Inflammation      0.0 - 8.0 mg/L <2.9   Sed Rate      0 - 30 mm/h 26   DNA-ds      <10 IU/mL >379 (H)   Creatinine Urine      mg/dL 16     Component      Latest Ref Rng & Units 7/7/2018   WBC      4.0 - 11.0 10e9/L 7.0   RBC Count      3.8 - 5.2 10e12/L 4.35   Hemoglobin      11.7 - 15.7 g/dL 11.7   Hematocrit      35.0 - 47.0 % 36.0   MCV      78 - 100 fl 83   MCH      26.5 - 33.0 pg 26.9   MCHC      31.5 - 36.5 g/dL 32.5   RDW      10.0 - 15.0 % 15.3 (H)   Platelet Count      150 - 450 10e9/L 224   Diff Method       Automated Method   % Neutrophils      % 91.9   % Lymphocytes      % 4.0   % Monocytes      % 3.7   % Eosinophils      % 0.3   % Basophils      % 0.1   Absolute Neutrophil      1.6 - 8.3 10e9/L 6.5   Absolute Lymphocytes      0.8 - 5.3 10e9/L 0.3 (L)   Absolute  Monocytes      0.0 - 1.3 10e9/L 0.3   Absolute Eosinophils      0.0 - 0.7 10e9/L 0.0   Absolute Basophils      0.0 - 0.2 10e9/L 0.0   Color Urine       Yellow   Appearance Urine       Clear   Glucose Urine      NEG:Negative mg/dL Negative   Bilirubin Urine      NEG:Negative Negative   Ketones Urine      NEG:Negative mg/dL Negative   Specific Gravity Urine      1.003 - 1.035 1.010   pH Urine      5.0 - 7.0 pH 5.5   Protein Albumin Urine      NEG:Negative mg/dL Negative   Urobilinogen Urine      0.2 - 1.0 EU/dL 0.2   Nitrite Urine      NEG:Negative Negative   Blood Urine      NEG:Negative Trace (A)   Leukocyte Esterase Urine      NEG:Negative Negative   Source       Midstream Urine   WBC Urine      OTO5:0 - 5 /HPF 0 - 5   RBC Urine      OTO2:O - 2 /HPF O - 2   Squamous Epithelial /LPF Urine      FEW:Few /LPF Few   Creatinine      0.52 - 1.04 mg/dL 0.63   GFR Estimate      >60 mL/min/1.7m2 >90   GFR Estimate If Black      >60 mL/min/1.7m2 >90   Protein Random Urine      g/L 0.07   Protein Total Urine g/gr Creatinine      0 - 0.2 g/g Cr 0.29 (H)   Albumin      3.4 - 5.0 g/dL 3.5   ALT      0 - 50 U/L 27   AST      0 - 45 U/L 21   Complement C3      76 - 169 mg/dL 51 (L)   Complement C4      15 - 50 mg/dL 5 (L)   Creatinine Urine Random      mg/dL 24   CRP Inflammation      0.0 - 8.0 mg/L 11.2 (H)   DNA-ds      <10 IU/mL >379 (H)   Sed Rate      0 - 30 mm/h 35 (H)   Creatinine Urine      mg/dL 23     PFTs 5/2018 (The FEV1 and FVC are reduced but the FEV1/FVC ratio is normal.  The inspiratory flow rates are reduced.  The TLC and FRC are reduced.  The diffusing capacity is normal.  However, the diffusing capacity was not corrected for the patient's hemoglobin.  IMPRESSION:  Moderately Severe  Restriction.  Normal Diffusion.  Walk distance is normal on room air with significant desaturation but no hypoxia.  ____________________________________________KERVIN TONY.      Component      Latest Ref Rng & Units 11/12/2018    Color Urine       Yellow   Appearance Urine       Clear   Glucose Urine      NEG:Negative mg/dL Negative   Bilirubin Urine      NEG:Negative Negative   Ketones Urine      NEG:Negative mg/dL Negative   Specific Gravity Urine      1.003 - 1.035 1.025   pH Urine      5.0 - 7.0 pH 6.0   Protein Albumin Urine      NEG:Negative mg/dL 30 (A)   Urobilinogen Urine      0.2 - 1.0 EU/dL 0.2   Nitrite Urine      NEG:Negative Negative   Blood Urine      NEG:Negative Trace (A)   Leukocyte Esterase Urine      NEG:Negative Negative   Source       Midstream Urine   WBC Urine      OTO5:0 - 5 /HPF 0 - 5   RBC Urine      OTO2:O - 2 /HPF O - 2   Squamous Epithelial /LPF Urine      FEW:Few /LPF Few   Bacteria Urine      NEG:Negative /HPF Few (A)   WBC      4.0 - 11.0 10e9/L 7.3   RBC Count      3.8 - 5.2 10e12/L 4.25   Hemoglobin      11.7 - 15.7 g/dL 11.3 (L)   Hematocrit      35.0 - 47.0 % 36.0   MCV      78 - 100 fl 85   MCH      26.5 - 33.0 pg 26.6   MCHC      31.5 - 36.5 g/dL 31.4 (L)   RDW      10.0 - 15.0 % 14.9   Platelet Count      150 - 450 10e9/L 255   Creatinine      0.52 - 1.04 mg/dL 0.83   GFR Estimate      >60 mL/min/1.7m2 73   GFR Estimate If Black      >60 mL/min/1.7m2 88   Iron      35 - 180 ug/dL 27 (L)   Iron Binding Cap      240 - 430 ug/dL 264   Iron Saturation Index      15 - 46 % 10 (L)   Protein Random Urine      g/L 0.58   Protein Total Urine g/gr Creatinine      0 - 0.2 g/g Cr 0.34 (H)   Albumin      3.4 - 5.0 g/dL 3.2 (L)   ALT      0 - 50 U/L 30   AST      0 - 45 U/L 19   Complement C3      76 - 169 mg/dL 48 (L)   Complement C4      15 - 50 mg/dL 3 (L)   CRP Inflammation      0.0 - 8.0 mg/L 16.4 (H)   DNA-ds      <10 IU/mL >379 (H)   Sed Rate      0 - 30 mm/h 26   Ferritin      8 - 252 ng/mL 160   Creatinine Urine      mg/dL 176     Component      Latest Ref Rng & Units 6/5/2019           8:29 AM   Color Urine       Yellow   Appearance Urine       Slightly Cloudy   Glucose Urine      NEG:Negative mg/dL  Negative   Bilirubin Urine      NEG:Negative Negative   Ketones Urine      NEG:Negative mg/dL 5 (A)   Specific Gravity Urine      1.003 - 1.035 1.027   Blood Urine      NEG:Negative Small (A)   pH Urine      5.0 - 7.0 pH 6.0   Protein Albumin Urine      NEG:Negative mg/dL >499 (A)   Urobilinogen mg/dL      0.0 - 2.0 mg/dL 2.0   Nitrite Urine      NEG:Negative Negative   Leukocyte Esterase Urine      NEG:Negative Trace (A)   Source       Midstream Urine   WBC Urine      0 - 5 /HPF 16 (H)   RBC Urine      0 - 2 /HPF 24 (H)   Squamous Epithelial /HPF Urine      0 - 1 /HPF 2 (H)   Mucous Urine      NEG:Negative /LPF Present (A)   Hyaline Casts      0 - 2 /LPF 1   Sodium      133 - 144 mmol/L    Potassium      3.4 - 5.3 mmol/L    Chloride      94 - 109 mmol/L    Carbon Dioxide      20 - 32 mmol/L    Anion Gap      3 - 14 mmol/L    Glucose      70 - 99 mg/dL    Urea Nitrogen      7 - 30 mg/dL    Creatinine      0.52 - 1.04 mg/dL    GFR Estimate      >60 mL/min/1.73:m2    GFR Estimate If Black      >60 mL/min/1.73:m2    Calcium      8.5 - 10.1 mg/dL    Phosphorus      2.5 - 4.5 mg/dL    Albumin      3.4 - 5.0 g/dL    WBC      4.0 - 11.0 10e9/L    RBC Count      3.8 - 5.2 10e12/L    Hemoglobin      11.7 - 15.7 g/dL    Hematocrit      35.0 - 47.0 %    MCV      78 - 100 fl    MCH      26.5 - 33.0 pg    MCHC      31.5 - 36.5 g/dL    RDW      10.0 - 15.0 %    Platelet Count      150 - 450 10e9/L    Specimen Description       Midstream Urine   Special Requests       Specimen received in preservative   Culture Micro       10,000 to 50,000 colonies/mL . . .   Creatinine Urine      mg/dL 240   Albumin Urine mg/L      mg/L    Albumin Urine mg/g Cr      0 - 25 mg/g Cr    Protein Random Urine      g/L 4.50   Protein Total Urine g/gr Creatinine      0 - 0.2 g/g Cr 1.88 (H)   Neutrophil Cytoplasmic Antibody      <1:10 titer    Neutrophil Cytoplasmic Antibody Pattern          Lactate Dehydrogenase      81 - 234 U/L    HIV Antigen  Antibody Combo      NR:Nonreactive        Sed Rate      0 - 30 mm/h    CRP Inflammation      0.0 - 8.0 mg/L    Complement C4      15 - 50 mg/dL    Complement C3      76 - 169 mg/dL    DNA-ds      <10 IU/mL      Component      Latest Ref Rng & Units 6/5/2019           8:29 AM   Color Urine          Appearance Urine          Glucose Urine      NEG:Negative mg/dL    Bilirubin Urine      NEG:Negative    Ketones Urine      NEG:Negative mg/dL    Specific Gravity Urine      1.003 - 1.035    Blood Urine      NEG:Negative    pH Urine      5.0 - 7.0 pH    Protein Albumin Urine      NEG:Negative mg/dL    Urobilinogen mg/dL      0.0 - 2.0 mg/dL    Nitrite Urine      NEG:Negative    Leukocyte Esterase Urine      NEG:Negative    Source          WBC Urine      0 - 5 /HPF    RBC Urine      0 - 2 /HPF    Squamous Epithelial /HPF Urine      0 - 1 /HPF    Mucous Urine      NEG:Negative /LPF    Hyaline Casts      0 - 2 /LPF    Sodium      133 - 144 mmol/L    Potassium      3.4 - 5.3 mmol/L    Chloride      94 - 109 mmol/L    Carbon Dioxide      20 - 32 mmol/L    Anion Gap      3 - 14 mmol/L    Glucose      70 - 99 mg/dL    Urea Nitrogen      7 - 30 mg/dL    Creatinine      0.52 - 1.04 mg/dL    GFR Estimate      >60 mL/min/1.73:m2    GFR Estimate If Black      >60 mL/min/1.73:m2    Calcium      8.5 - 10.1 mg/dL    Phosphorus      2.5 - 4.5 mg/dL    Albumin      3.4 - 5.0 g/dL    WBC      4.0 - 11.0 10e9/L    RBC Count      3.8 - 5.2 10e12/L    Hemoglobin      11.7 - 15.7 g/dL    Hematocrit      35.0 - 47.0 %    MCV      78 - 100 fl    MCH      26.5 - 33.0 pg    MCHC      31.5 - 36.5 g/dL    RDW      10.0 - 15.0 %    Platelet Count      150 - 450 10e9/L    Specimen Description          Special Requests          Culture Micro          Creatinine Urine      mg/dL 258   Albumin Urine mg/L      mg/L 2,810   Albumin Urine mg/g Cr      0 - 25 mg/g Cr 1,089.15 (H)   Protein Random Urine      g/L    Protein Total Urine g/gr Creatinine      0 -  0.2 g/g Cr    Neutrophil Cytoplasmic Antibody      <1:10 titer    Neutrophil Cytoplasmic Antibody Pattern          Lactate Dehydrogenase      81 - 234 U/L    HIV Antigen Antibody Combo      NR:Nonreactive        Sed Rate      0 - 30 mm/h    CRP Inflammation      0.0 - 8.0 mg/L    Complement C4      15 - 50 mg/dL    Complement C3      76 - 169 mg/dL    DNA-ds      <10 IU/mL      Component      Latest Ref Rng & Units 6/5/2019          11:57 AM   Color Urine          Appearance Urine          Glucose Urine      NEG:Negative mg/dL    Bilirubin Urine      NEG:Negative    Ketones Urine      NEG:Negative mg/dL    Specific Gravity Urine      1.003 - 1.035    Blood Urine      NEG:Negative    pH Urine      5.0 - 7.0 pH    Protein Albumin Urine      NEG:Negative mg/dL    Urobilinogen mg/dL      0.0 - 2.0 mg/dL    Nitrite Urine      NEG:Negative    Leukocyte Esterase Urine      NEG:Negative    Source          WBC Urine      0 - 5 /HPF    RBC Urine      0 - 2 /HPF    Squamous Epithelial /HPF Urine      0 - 1 /HPF    Mucous Urine      NEG:Negative /LPF    Hyaline Casts      0 - 2 /LPF    Sodium      133 - 144 mmol/L 134   Potassium      3.4 - 5.3 mmol/L 4.3   Chloride      94 - 109 mmol/L 101   Carbon Dioxide      20 - 32 mmol/L 26   Anion Gap      3 - 14 mmol/L 8   Glucose      70 - 99 mg/dL 109 (H)   Urea Nitrogen      7 - 30 mg/dL 21   Creatinine      0.52 - 1.04 mg/dL 0.49 (L)   GFR Estimate      >60 mL/min/1.73:m2 >90   GFR Estimate If Black      >60 mL/min/1.73:m2 >90   Calcium      8.5 - 10.1 mg/dL 9.2   Phosphorus      2.5 - 4.5 mg/dL 4.2   Albumin      3.4 - 5.0 g/dL 2.9 (L)   WBC      4.0 - 11.0 10e9/L 8.7   RBC Count      3.8 - 5.2 10e12/L 4.81   Hemoglobin      11.7 - 15.7 g/dL 12.3   Hematocrit      35.0 - 47.0 % 39.3   MCV      78 - 100 fl 82   MCH      26.5 - 33.0 pg 25.6 (L)   MCHC      31.5 - 36.5 g/dL 31.3 (L)   RDW      10.0 - 15.0 % 14.1   Platelet Count      150 - 450 10e9/L 302   Specimen Description           Special Requests          Culture Micro          Creatinine Urine      mg/dL    Albumin Urine mg/L      mg/L    Albumin Urine mg/g Cr      0 - 25 mg/g Cr    Protein Random Urine      g/L    Protein Total Urine g/gr Creatinine      0 - 0.2 g/g Cr    Neutrophil Cytoplasmic Antibody      <1:10 titer <1:10   Neutrophil Cytoplasmic Antibody Pattern       The ANCA IFA is <1:10.  No further testing will be performed.   Lactate Dehydrogenase      81 - 234 U/L 252 (H)   HIV Antigen Antibody Combo      NR:Nonreactive     Nonreactive   Sed Rate      0 - 30 mm/h 40 (H)   CRP Inflammation      0.0 - 8.0 mg/L 25.7 (H)   Complement C4      15 - 50 mg/dL 3 (L)   Complement C3      76 - 169 mg/dL 53 (L)   DNA-ds      <10 IU/mL >379 (H)   [facility]                                                      CMR Report       MRN:                  [id number]                                  Name:           CHILANGO GIBBONS                                   :                  1965                                  Scan Date:   2019 11:11:32                                   Electronically signed by Fer Corea 2019-May-20 14:19:00     SUMMARY   ==========================================================================================================     Clinical history: 54-year old female with SLE, poly-serositis, and chronic pleuritic chest pain. CMR to  assess for cardiac involvement (sepideh-myocarditis).  Comparison CMR: None.      1. The LV is normal in cavity size and wall thickness. The global systolic function is normal. The LVEF is  62%.   There are no regional wall motion abnormalities.     2. The RV is normal in cavity size. The global systolic function is normal. The RVEF is 60%.      3. Both atria are normal in size.     4. There is no significant valvular disease.      5. Late gadolinium enhancement imaging shows no MI, fibrosis or infiltrative disease.      6. The is mild pericardial thickening and  tethering, with circumferential pericardial enhancement. There is  no pericardial effusion.       7. There is no intracardiac thrombus.     8. The main PA is moderately enlarged, measuring 3.8 cm.      CONCLUSIONS: Pericardial thickening and enhancement consistent with inflammatory pericarditis. There is no  myocardial fibrosis or myocarditis. Normal biventricular size and systolic function; LVEF 62%, RVEF 60%.      CORE EXAM   ==========================================================================================================     MEASUREMENTS   ----------------------------------------------------------------------------------------      VOLUMETRIC ANALYSIS       ----------------------------------------------  .--------------------------------------------------------.                    LV    Reference  RV    Reference   +------+-----------+------+-----------+------+-----------+   EDV   ml          162   ()   159   ()          ml/m^2     69.2   (56-90)   67.9   (53-90)     ESV   ml           62   (22-59)     64   (15-68)           ml/m^2     26.5   (14-33)   27.4   (11-37)     CO    L/min      7.30             6.93                     L/min/m^2   3.1              3.0               MASS                                                 SV    ml          100   ()    95   ()          ml/m^2     42.7   (37-62)   40.6   (36-60)     EF    %            62   (59-77)     60   (55-79)    '------+-----------+------+-----------+------+-----------'             CARDIAC OUTPUT HR:  73 BPM      LA DIMENSIONS (LV SYSTOLE)       ----------------------------------------------          DIAMETER:  3.9 cm         AORTIC ROOT DIMENSIONS       ----------------------------------------------          SINUS OF VALSALVA:  2.9 cm          AORTIC ROOT SIZE:  Normal        ANATOMY    ----------------------------------------------------------------------------------------      LEFT VENTRICLE       ----------------------------------------------          WALL THICKNESS:  Normal          CAVITY SIZE:  Normal         RIGHT VENTRICLE       ----------------------------------------------          WALL THICKNESS:  Normal          CONTRACTILITY:  Normal          CAVITY SIZE:  Normal         INTERVENTRICULAR SEPTUM       ----------------------------------------------               VENTRICULAR SEPTUM:  Normal          INTERATRIAL SEPTUM       ----------------------------------------------               ATRIAL SEPTUM:          LIPOMATOUS HYPERTROPHY          LEFT ATRIUM       ----------------------------------------------          CAVITY SIZE:  Normal         RIGHT ATRIUM       ----------------------------------------------          CAVITY SIZE:  Normal         PERICARDIUM       ----------------------------------------------          THICKENED          THICKNESS:  5 mm          EFFUSION:  None         PLEURAL EFFUSION       ----------------------------------------------               None         VALVES   ----------------------------------------------------------------------------------------      AORTIC VALVE       ----------------------------------------------          AORTIC VALVE LEAFLETS:  Trileaflet         MITRAL VALVE       ----------------------------------------------          MITRAL VALVE LEAFLETS:  Normal Leaflets         TRICUSPID VALVE       ----------------------------------------------          TRICUSPID VALVE LEAFLETS:  Normal Leaflets         PULMONIC VALVE       ----------------------------------------------          PULMONIC VALVE LEAFLETS:  Normal Leaflets        17 SEGMENT   ----------------------------------------------------------------------------------------  .------------------------------------------------------------------------------------------.   Segments            Wall  Motion   Hyperenhancement  Stress Perfusion  Interpretation   +--------------------+--------------+------------------+------------------+----------------+   Base Anterior       Normal/Hyper  None                                Normal            Base Anteroseptal   Normal/Hyper  None                                Normal            Base Inferoseptal   Normal/Hyper  None                                Normal            Base Inferior       Normal/Hyper  None                                Normal            Base Inferolateral  Normal/Hyper  None                                Normal            Base Anterolateral  Normal/Hyper  None                                Normal            Mid Anterior        Normal/Hyper  None                                Normal            Mid Anteroseptal    Normal/Hyper  None                                Normal            Mid Inferoseptal    Normal/Hyper  None                                Normal            Mid Inferior        Normal/Hyper  None                                Normal            Mid Inferolateral   Normal/Hyper  None                                Normal            Mid Anterolateral   Normal/Hyper  None                                Normal            Apical Anterior     Normal/Hyper  None                                Normal            Apical Septal       Normal/Hyper  None                                Normal            Apical Inferior     Normal/Hyper  None                                Normal            Apical Lateral      Normal/Hyper  None                                Normal            [place]                Normal/Hyper  None                                Normal           +--------------------+--------------+------------------+------------------+----------------+   RV Segments         Wall Motion   Hyperenhancement  Stress Perfusion  Interpretation    +--------------------+--------------+------------------+------------------+----------------+   RV Basal Anterior   Normal/Hyper  None                                Normal            RV Basal Inferior   Normal/Hyper  None                                Normal            RV Mid              Normal/Hyper  None                                Normal            RV Apical           Normal/Hyper  None                                Normal           '--------------------+--------------+------------------+------------------+----------------'         FINDINGS       ----------------------------------------------          INFARCT/SCAR SIZE:  0 %        SCAN INFO   ==========================================================================================================     GENERAL   ----------------------------------------------------------------------------------------      CONTRAST AGENT       ----------------------------------------------          TYPE:  Gadavist          VOLUME ADMINISTERED:  10 ml          DOSAGE FOR 0.5M:  0.04 mmol/kg          SERUM CREATININE:  0.58 sCr          CREATININE DATE:  2019-03-06 00:00:00          SEDATION       ----------------------------------------------          SEDATION USED?:  No         VITALS       ----------------------------------------------          HEIGHT:  66.00 in          HEIGHT:  167.64 cm          WEIGHT:  289.00 lbs          WEIGHT:  131.09 kgs          BSA:  2.34 m^2         PULSE SEQUENCES       ----------------------------------------------          Single-Shot SSFP, IR GRE - Segmented, IR SSFP - Single Shot, SSFP Cine          SETUP       ----------------------------------------------          TYPE:  Clinical          INPATIENT:  No          INCOMPLETE SCAN:  No          REASON(S) FOR SCAN:  Cardiomyopathy, Pericardial Evaluation           REFERRING PHYSICIAN:  ROBE VAZQUEZ          ATTENDING PHYSICIAN:  [facility]BE Hays  [facility]      Kidney Biopsy (6/7/19)  Patient Name: CHILANGO GIBBONS   MR#: [id number]   Specimen #: [id number]   Collected: 6/7/2019   Received: 6/7/2019   Reported: 6/10/2019 22:42   Ordering Phy(s): JOYCE CAMERON     For improved result formatting, select 'View Enhanced Report Format' under    Linked Documents section.     SPECIMEN(S):   Kidney biopsy, native with EM & Immunofluorescence, right     FINAL DIAGNOSIS:   Kidney; percutaneous needle biopsy:   -Diffuse global lupus nephritis, ISN/RPS class IV-S (a)   -Mild to moderate arteriosclerosis     COMMENT:   There is mild mesangial and endocapillary proliferation, focal leukocytic   infiltrates, and crescentic lesions   involving 14 out of 26 glomeruli, mainly cellular with necrotizing   lesions.  There are minimal chronic   changes.  The activity index of the proliferative component is 9/24 and   the chronicity index is 1/12 (Robert   et al, American Journal of Medicine 75:  382-391, 1983).     The presence of significant crescents with only mild mesangial and   endocapillary proliferation raises the   possibility of concurrent lupus nephritis and ANCA-associated crescentic   glomerulonephritis.  Thus it is   recommended to test for ANCA and clinical correlation is recommended.       Physical Exam:    /81   Pulse 86   Wt 144.2 kg (317 lb 14.4 oz)   SpO2 96%   BMI 51.34 kg/m       Constitutional: Pleasant in NAD, WN, WD, [place]ush[place].  Eyes: EOMI, PERRLA, sclera anicteric, conj not injected.  HEENT: AT/NC. no oral ulcers or thrush. Normal salivary pool.    Neck: No neck mass or thyromegaly.  Chest: CTAB, nl effort.  CV: RRR, grade II/VI systolic murmur, no g/r. no clubbing or cyanosis. 1+ edema.  GI: Soft, ND, NTTP, no HSM.  MS: No synovitis. Cool joints. No tenderness of the joints. No swelling. Normal ROM.  No joint deformities. Full ROM of the joints. No nodules. No Jaccoud's deformity.  Skin: No skin rash, malar rash, livedo, periungual erythema,  alopecia, digital ulcers or nail changes.  Neuro: CN grossly intact without focal deficit, sensation intact and equal b/l, 5/5 strength b/l UE and LE.  Psych: Appropriate mood, judgment, insight, memory, affect.    Assessment/Plan:    SLE. 55 yo WF with +FH RA and personal hx of SLE presented in 12/2017 for 2nd opinion of m/o her SLE. She was diagnosed with SLE at age 27. Her lupus has been marked by +KARLIE (highest titer 1:640, speckled and nucleolar patterns), +anti-DNA, arthritis, malar rash, serositis (pleuritic CP) supported by +SSA/SSB Ab, low C3/low C4, +RF, high ESR/CRP. She had neg anti-RNP, anti-Sm, acL IgM/G/A, LAC, cryo and anti-CCP.     Had NL C3 at the time of Dx. She was treated with prednisone and HCQ at the time of Dx. Can't remember how long she was on HCQ and why HCQ was stopped, but it probably was stopped because of stable disease, no report on HCQ toxicity. Reportedly her SLE was mild all these years.    Has secondary Sjogren's with sicca, +SSA/SSB Ab and h/o MALT lymphoma at age 44 in remission. Uses blink eyedrops and salagen. No lip biopsy was done to confirm Dx of Sjogren's.    Her lupus flared in 7/2017 with no triggers when she presented with arthritis, HCQ was resumed, arthritis resolved. Mild pulm HTN on 2D-Echo 8/2017 but neg R cardiac cath and VQ scan in 3/2018, also neg 2D-Echo.    In 12/2017, was prescribed prednisone 40 mg for only 5 days for pleuritic CP, CP recurred after stopping prednisone.     Her major complaint at initial visit with me in 12/2017 was ongoing CP. AZA was added in 2/2018 with start dose of 50 mg qd and slowly was increased to max 150 mg qd. Tolerated AZA well, no SEs, no toxicity on the labs but failed it and required to go back on prednisone 20 mg qd (current dose).     Her lupus is active with pleuritic CP. ESR/CRP normalized on prednisone+AZA+HCQ but +anti-DNA, low C3/C4 are unchanged. Chest CT in 12/2017 without contrast showed air trapping due to  lupus/Sjogren's, no pleural effusion. Lung nodules need f/u in a year. No pericardail effusion on 2D-echo and neg VQ scan for PE in 3/2018 so chest CT with contrast is not needed. She is APL negative.    AZA was switched to  mg po bid on 5/18/2018 as she failed AZA. She is now on max dose of MMF 1500 mg bid. Her labs are unchanged with persistently elevated anti-DNA, low C3/C4 and high ESR/CRP, but just started to feel a lot better (regarding fatigue, arthralgia, still has residual pleuritic CP) after adding MMF.     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 Also possible shrinking lung syn sec lupus or obesity is responsible for restrictive lung disease plus pleural effusion. She was prescribed albuterol and dulera inh which have helped.    Benlysta was added in 9/2018 to help with pleuritic CP. No change in lupus serology (anti-DNA, C3, C4), but ESR/CRP have improved. Overall she felt a lot better after adding benlysta but it made her tired.     I could not taper her off the prednisone, she continued to have active IA, fatigue and pleuritic CP. I recommended switching benlysta to rituximab given her h/o lymphoma, unfortunately her insurance denied. At the same time, [facility] misunderstood and stopped her cellcept as thought we were switching MMF to rituximab while the plan was to add rituximab to MMF. She has been off MMF for couple of months. Had cardiac MRI on 5/20 which showed active pericarditis. I advised to switch to cytoxan 750 mg/m2 qmo x 6 mo. On Saturday 6/1/2019, received a call from infusion center reporting blood in the urine and if it was ok to proceed with cytoxan. I was concerned as that was very new. We canceled the cytoxan  infusion. UCx was negative for UTI. Repeat U/A today is showing protein and blood, with h/o stopping MMF, very concerning for lupus nephritis. She never had lupus nephritis as part of her lupus and it's possible that it was covered by MMF and showed up off MMF. Renal biopsy is still recommended to confirm Dx, evaluate degree of severity, chronicity and rule out other diagnoses. I spoke to renal team and dr. Elmore and Aura kindly agreed to see her today (6/5/19) as urgent consult as add on to their schedule and scheduled renal biopsy for Friday 6/7/2019. I would re-schedule cytoxan to 6/15/2019 and schedule pulse solumedrol 1 gr every day x 3 days. Meanwhile, will increase prednisone to 60 mg every day. Cytoxan risks were discussed again.    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 Patient received 1st dose cyclophosphamide on 6/15/19 and tolerated well. Initiated Rituxin 1/22/2020. Follow-up 1/23 with Dr. Sherman, nephrology.      Overall, patient is feeling better today. S/p 3 cytoxan for lupus nephritis, enteritis, arthritis, pleurisy. Still active lupus with intermittent malar rash but denies arthralgia, ulcers, rash. States improvement in fatigue, is sleeping better, and is increasing work 2 to 3 times per week. No signs of active synovitis on exam, swelling is improving on HCTZ per nephrology.    Recommend:    - Labs monthly since initiating Rituxin  - Continue Cellcept 1500 mg BID and  mg BID  - Continue prednisone 20 mg daily for now, discuss reducing to 10 mg next appt  - Annual eye exam is current for HCQ monitoring  - PCP prophylaxis on atovaquone 10 mL (1500 mg). Patient did not tolerate inhaled pentamidine. Avoiding TMP-SMX given sulfa reaction and potential increase in SLE flare. Hesitant to use dapsone given risk of hemolytic anemia.    - Follow-up in 4-5 months        Orders Placed This Encounter   Procedures     ALT     Albumin level     AST     CBC with platelets differential     Creatinine      Complement C4     Complement C3     CRP inflammation     DNA double stranded antibodies     Erythrocyte sedimentation rate auto     UA with Microscopic reflex to Culture     Protein  random urine with Creat Ratio     Creatinine random urine       I saw and examined the patient with Dr. Marroquin. I acted as scribe for Dr. Marroquin.      Jama [place]  Medical Student  Rheumatology  P: [phone]    Attending Note: I saw and examined the patient with medical student Jama. This note was written by him who acted as scribe for me. I agree with findings and recommendations written in this note. The note reflects decisions made by me.    Killian Marroquin MD

## 2020-01-23 NOTE — NURSING NOTE
Chief Complaint   Patient presents with     RECHECK     4 months follow up- SLE     Blood pressure 128/81, pulse 86, weight 144.2 kg (317 lb 14.4 oz), SpO2 96 %, not currently breastfeeding.    Liza Pichardo/ASUNCION  January 23, 2020 8:35 AM

## 2020-01-23 NOTE — LETTER
1/23/2020      RE: Taina Singh  [address] [place] [address]       Rheumatology Follow-up Note    Reason for visit: f/u for lupus flare    Date of last visit: 9/6/2019    DOS: 1/23/2020      History of Present Illness:    Taina Singh is a 54 year old  female who was referred to our clinic for evaluation and management of her SLE.    She was diagnosed with lupus at age 27. Her 1st sx were malar rash and fatigue. No skin biopsy was done (reportedly had +KARLIE). She was treated with prednisone taper and HCQ. HCQ helped and she tolerated it well. She was diagnosed with Sjogren's at the same time. Had dryness of eyes and mouth. No lip biopsy to confirm the Dx. Reportedly she had very mild stable lupus for many years and eventually at some point, stopped taking HCQ (can't remember when)    She was diagnosed with MALT lymphoma at 44, had enlarged LN over R parotid gland, on biopsy it was MALT lymphoma. Tx was resection only, no radiation or chemo.    About 3 yr ago, had flu shot and 2 days later, developed pleuritic CP and was seen at urgent care. That's why she does not want to get flu shot. She was seen in ER 2 days after UC visit. She was treated with prednisone.    She lost 100 lbs by exercise over past 2 yr, felt amazing. In 7/2017, started not feeling well. She had extreme fatigue. Had AM stiffness and pain over hands. Touched base with her previous rheumatologist (Dr. Rangel) and was told to have lupus flare and was put on  mg qd. Afterwards, developed pleuritic CP which has not been resolved.    She was given prednisone 40 mg qd x 5 days (on 12/16/2017) by pulmonologist in [facility]. It helped a lot but pleuritic CP recurred after stopping it. She was told to have pulm HTN and was evaluated for it.    No triggers for current flare. No flare of malar rash. No current hair loss. Uses blink eyedrops, restasis burned her eyes. She has been prescribed salagen for dry mouth, has not used it. Arthritis  of hands have resolved on HCQ. Last HCQ eye exam was 1 week ago.    4/2018: On AZA 50 mg qd since 2/8/2018, increased to 100 mg qd in 3/19/2018. Her arthralgia is intermittent and mild, it's better. Has fatigue.    5/2018: Started on mycophenalate 1500 mg, continues prednisone 30 mg and plaquenil 200 mg twice a day. Her major complaint is continues pressure over her chest. Pain is pleuritic, it hurts by sneezing, taking deep breath.    7/2018: Ms. Singh feels an improvement in her symptoms. She says there is a baseline pleuritic chest pain, but her fatigue and overall day-to-day function has improved to her satisfaction. She says morning stiffness usually only lasts about 10 minutes. She complains of occasional episodes of flashing lights in her left eye, however she is being seen by her opthamologist. She has a OTC scheduled for Monday as well. Ms. Singh feels as though the mycophenalate has made the biggest difference in her improvement. She continues to taper the prednisone, currently on 20 mg daily. She also has continued the plaquenil 200 mg twice a day.     11/2018: On benlysta infusion since beginning of Sept 2018. Chest still feels less tight, breathing is much better. Sometimes hands ache but it does lot last long. Benlysta makes her tired, but overall feels her lupus sx are much improved on benlysta.    3/2019: Feels tired and achy, it is intermittent and moves around. The L hip pain is consistent for the past 7 days, she moved up her appointment from 3/22 to today to get a bursitis shot. Had bad sinus and ear infection in 1/2019. She still thinks benlysta has helped her a lot. Last night, her R shoulder was hurting so bad that she could not move it but it's better today. Pain comes and goes. Breathing is better after adding benlysta, she is not gasping for air anymore which is huge improvement for her. She feels pressure over her chest and low back. Is getting benlysta tomorrow. Her prednisone dose  is 10 mg a day. Leaving to [place] for vacation.    6/5/2019: She reports severe diffuse arthralgia affecting all her joints with pain level 8/10, today is 6/10. No SOB. Feels pressure like CP. Has severe fatigue. Last benlysta was 7 wk ago.    7/2019: Since last visit 1 month ago, patient was hospitalized x2 (both 6/10-6/13 & 6/18-6/22) at [facility] for acute abdominal pain. 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 Team felt that 2nd admission was 2/2 tapering of steroids. She was pulsed during first admission and then got Cytoxan as outpatient in between admissions on 6/15. Incidentally, patient found to have small splenic infarct and acute PE on CT scan. She was started on Xarelto. Also had her kidney biopsy on 6/7/19. Biopsy showed diffuse global lupus nephritis, ISN/RPS class IV. She has also followed up with nephrology this past week and started on lisinopril. No reports of hypotension. Since leaving hospital, biggest complaint is leg strength/conditioning. She thinks this is related to laying in bed while hospitalized. Going to start PT as soon as it is set up.     9/6/2019: Last Wed had her cytoxan infusion, everything went well. Did not sleep well on Wed evening. Called our clinic the next Thu as had diffuse body pain and N/V, also was a on a new BP med x 1 mo which made her dizzy. Her legs were swollen. She was seen in ED that Thu, her BP found to be low. She was instructed to stop BP med and her facial swelling, dizziness, LE swelling resolved, lost 10 lbs. That ended up being reaction to med. Does not know if it was lisinopril or losartan. S/p 3 cytoxan infusions. Overall she is feeling better, minimal joint pain, no SOB/CP/abdominal pain. Has malar rash. No oral ulcers. She has been on prednisone 30 mg a day x 2 weeks.      Today, 1/23/2020: Patient started Rituxin infusions yesterday and denies any side effects. Overall she is feeling better than she has in a long time. She denies significant flares in the past 3  months currently on max dose Cellcept, HCQ and daily prednisone 20 mg. She has an intermittent malar rash worsened with stress and cold weather. Today she denies any joint pain or swelling, new rash, ulcers, or worsened GI symptoms since last visit. She notes improvement in her leg swelling now on HCTZ. She denies urinary symptoms. Patient also reports that she is sleeping better and no longer takes Ambien nightly.      ROS:  A comprehensive ROS was done, positives are per HPI.    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44    No chemo or radiation     Other forms of systemic lupus erythematosus (H) 2018     Pulmonary embolism (H)     2019 seen on abd CT at [facility]     Sjogren's syndrome (H)     secondary Sjogrens, secondary to lupus     Vitamin D deficiency      Past Surgical History:   Procedure Laterality Date     BIOPSY/REMOVAL, LYMPH NODE(S)        SECTION  age 32     HC HYSTEROS W PERMANENT FALLOPAIN IMPLANT      Essure b/l     PAROTIDECTOMY Right 2006     TONSILLECTOMY       Family History   Problem Relation Age of Onset     Alopecia Brother      Rheumatoid Arthritis Brother      LUNG DISEASE No family hx of      Social History     Socioeconomic History     Marital status:      Spouse name: Not on file     Number of children: Not on file     Years of education: 1     Highest education level: Not on file   Occupational History     Comment: , 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   Social Needs     Financial resource strain: Not on file     Food  insecurity:     Worry: Not on file     Inability: Not on file     Transportation needs:     Medical: Not on file     Non-medical: Not on file   Tobacco Use     Smoking status: Never Smoker     Smokeless tobacco: Never Used   Substance and Sexual Activity     Alcohol use: No     Drug use: No     Sexual activity: Not on file   Lifestyle     Physical activity:     Days per week: Not on file     Minutes per session: Not on file     Stress: Not on file   Relationships     Social connections:     Talks on phone: Not on file     Gets together: Not on file     Attends Hindu service: Not on file     Active member of club or organization: Not on file     Attends meetings of clubs or organizations: Not on file     Relationship status: Not on file     Intimate partner violence:     Fear of current or ex partner: Not on file     Emotionally abused: Not on file     Physically abused: Not on file     Forced sexual activity: Not on file   Other Topics Concern     Parent/sibling w/ CABG, MI or angioplasty before 65F 55M? 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      Going through divorce but it's not stress factor for her.    Allergies   Allergen Reactions     Pentamidine Shortness Of Breath     Penicillins Rash     Sulfa Drugs Rash       Outpatient Encounter Medications as of 1/23/2020   Medication Sig Dispense Refill     atovaquone (MEPRON) 750 MG/5ML suspension Take 10 mLs (1,500 mg) by mouth daily 210 mL 5     Calcium-Magnesium 300-300 MG TABS daily        furosemide (LASIX) 20 MG tablet Take 1 tablet (20 mg) by mouth daily 90 tablet 0     hydroxychloroquine (PLAQUENIL) 200 MG tablet Take 1 tablet (200 mg) by mouth 2 times daily 180  tablet 0     Multiple Vitamins-Minerals (WOMENS MULTI PO) Take by mouth daily        mycophenolate (GENERIC EQUIVALENT) 500 MG tablet Take 3 tablets (1,500 mg) by mouth 2 times daily 540 tablet 3     Omega-3 Fatty Acids (OMEGA-3 FISH OIL) 500 MG CAPS Take 500 mg by mouth daily        pantoprazole (PROTONIX) 20 MG EC tablet Take 2 tablets (40 mg) by mouth 2 times daily       predniSONE (DELTASONE) 10 MG tablet Take 2 tablets (20 mg) by mouth daily 180 tablet 3     predniSONE (DELTASONE) 2.5 MG tablet 25-20-17.5-15-12.5 mg a day each for one week then 10 mg a day, (take along with 10 and 5 mg size tabs) 100 tablet 3     traMADol (ULTRAM) 50 MG tablet Take 1 tablet (50 mg) by mouth every 12 hours as needed for pain Prn pain 60 tablet 1     vitamin D3 (CHOLECALCIFEROL) 2000 units (50 mcg) tablet Take 1 tablet (2,000 Units) by mouth daily 90 tablet 11     warfarin ANTICOAGULANT (COUMADIN) 5 MG tablet   1     zolpidem (AMBIEN) 5 MG tablet Take 1 tablet (5 mg) by mouth nightly as needed for sleep 30 tablet 0     albuterol (PROAIR HFA/PROVENTIL HFA/VENTOLIN HFA) 108 (90 Base) MCG/ACT inhaler Inhale 2 puffs into the lungs every 6 hours as needed for shortness of breath / dyspnea or wheezing (Patient not taking: Reported on 2020) 3 Inhaler 3     [] doxycycline hyclate (VIBRA-TABS) 100 MG tablet Take 1 tablet (100 mg) by mouth 2 times daily for 10 days 20 tablet 0     fluticasone (FLONASE) 50 MCG/ACT nasal spray Spray 1-2 sprays into both nostrils daily Use 1 spray per nostril per day for 2 weeks.  May increase to 2 sprays per nostril per day after. (Patient not taking: Reported on 2019) 16 g 0     pilocarpine (SALAGEN) 5 MG tablet Take 1 tablet (5 mg) by mouth 4 times daily as needed (Patient not taking: Reported on 2019) 360 tablet 3     Facility-Administered Encounter Medications as of 2020   Medication Dose Route Frequency Provider Last Rate Last Dose     [COMPLETED] 0.9% sodium chloride BOLUS   250 mL Intravenous Once Killian Marroquin MD   Stopped at 20 1251     [COMPLETED] diphenhydrAMINE (BENADRYL) 50 mg in sodium chloride 0.9 % intermittent infusion  50 mg Intravenous Once Killian Marroquin MD   Stopped at 20 0838     [COMPLETED] riTUXimab (RITUXAN) 1,000 mg in sodium chloride 0.9 % 1,000 mL non-oncology use  1,000 mg Intravenous Once Killian Marroquin MD   Stopped at 20 1246       Results:    RHEUM RESULTS Latest Ref Rng & Units 2019   COMPLEMENT C3 81 - 157 mg/dL 50(L) 63(L) 66(L)   COMPLEMENT C4 13 - 39 mg/dL 4(L) 8(L) 9(L)   DNA-DS <10 IU/mL >379(H) >379(H) -   SED RATE 0 - 30 mm/h 39(H) 50(H) 47(H)   CRP, INFLAMMATION 0.0 - 8.0 mg/L 6.0 13.2(H) 9.3(H)   AST 0 - 45 U/L 19 19 14   ALT 0 - 50 U/L 27 29 19   ALBUMIN 3.4 - 5.0 g/dL 2.9(L) 2.7(L) 2.7(L)   WBC 4.0 - 11.0 10e9/L 3.5(L) 7.8 6.4   RBC 3.8 - 5.2 10e12/L 3.84 4.04 4.24   HGB 11.7 - 15.7 g/dL 10.8(L) 10.8(L) 11.3(L)   HCT 35.0 - 47.0 % 35.0 34.1(L) 35.3   MCV 78 - 100 fl 91 84 83   MCHC 31.5 - 36.5 g/dL 30.9(L) 31.7 32.0   RDW 10.0 - 15.0 % 13.9 15.7(H) 15.9(H)    - 450 10e9/L 194 210 230   CREATININE 0.52 - 1.04 mg/dL 0.60 0.55 0.66   GFR ESTIMATE, IF BLACK >60 mL/min/[1.73:m2] >90 >90 >90   GFR ESTIMATE >60 mL/min/[1.73:m2] >90 >90 >90    - 1,616 mg/dL - - 379(L)   IGA 84 - 499 mg/dL - - 287   IGM 35 - 242 mg/dL - - 41   HEPATITIS C ANTIBODY NR:Nonreactive - - Nonreactive       Her records were reviewed.    2D-Echo 2017:    ECHO COMPLETE WO CONTRAST CHILANGO GIBBONS 2017 14:43     [facility] [place] [id number] [facility], [address], [place] [address]   Main: [phone]  [email or web address]                                                 Transthoracic Echo Report   CHILANGO GIBBONS   Excellian ID: [id number] Age: 52 : 1965 Ordering Provider: NGUYEN PETERS   Exam Date: 2017 14:43 Gender: F Sonographer: HAJA    "Accession #: [id number] Height: 66 in BSA: 2.24 m  BP: 108 / 62   Weight: 261 lbs BMI: 42.1 kg/m        Site: [facility]   Location: Outpatient Rhythm: Normal Sinus Rhythm   Procedure Components: 2D imaging, Color Doppler, Spectral Doppler   Indications: Murmur; Systemic lupus erythematosus, unspecified SLE type, unspecified organ   involvement status   Technical Quality: Contrast: None     Final Conclusion   Visually estimated ejection fraction is 55-60%.   Mild left ventricular hypertrophy.   Estimated pulmonary artery pressure of 31 mmHg + RA pressure.   Dilated IVC size, <50% collapse with respiration.   Estimated EF: 55-60%   FINDINGS   Left Ventricle Normal left ventricular size. Visually estimated ejection fraction is 55-60%.   Mild left ventricular hypertrophy.   Diastolic Function \"Pseudonormal\" left ventricular filling pattern. E/e' ratio 8-15 is   indeterminate for filling pressure.   Right Ventricle Normal right ventricular size and function.   Left Atrium Mild left atrial enlargement.   Right Atrium Mild right atrial enlargement.   Aortic Valve Normal aortic valve. No aortic stenosis. No significant aortic regurgitation.   Mitral Valve Normal mitral valve. No mitral stenosis. Mild mitral regurgitation.   Tricuspid Valve Normal tricuspid valve. No tricuspid stenosis. Mild tricuspid regurgitation.   Estimated pulmonary artery pressure   of 31 mmHg + RA pressure.   Pulmonic Valve Normal pulmonic valve without significant stenosis or regurgitation.   Pericardium Normal pericardium.   Aorta Measured aortic root diameter is normal in size.   Inferior Vena Cava Dilated IVC size, <50% collapse with respiration.    Component      Latest Ref Rng & Units 11/20/2017   Sodium      133 - 144 mmol/L 137   Potassium      3.4 - 5.3 mmol/L 4.2   Chloride      94 - 109 mmol/L 102   Carbon Dioxide      20 - 32 mmol/L 30   Anion Gap      3 - 14 mmol/L 6   Glucose      70 - 99 mg/dL 101 (H)   Urea Nitrogen      7 - 30 " mg/dL 13   Creatinine      0.52 - 1.04 mg/dL 0.55   GFR Estimate      >60 mL/min/1.7m2 >90   GFR Estimate If Black      >60 mL/min/1.7m2 >90   Calcium      8.5 - 10.1 mg/dL 9.1   WBC      4.0 - 11.0 10e9/L 4.9   RBC Count      3.8 - 5.2 10e12/L 4.28   Hemoglobin      11.7 - 15.7 g/dL 11.3 (L)   Hematocrit      35.0 - 47.0 % 35.5   MCV      78 - 100 fl 83   MCH      26.5 - 33.0 pg 26.4 (L)   MCHC      31.5 - 36.5 g/dL 31.8   RDW      10.0 - 15.0 % 14.6   Platelet Count      150 - 450 10e9/L 215   N-Terminal Pro Bnp      0 - 125 pg/mL 546 (H)   Sed Rate      0 - 30 mm/h 71 (H)     Component      Latest Ref Rng & Units 12/29/2017   Color Urine       Straw   Appearance Urine       Clear   Glucose Urine      NEG:Negative mg/dL Negative   Bilirubin Urine      NEG:Negative Negative   Ketones Urine      NEG:Negative mg/dL Negative   Specific Gravity Urine      1.003 - 1.035 1.005   Blood Urine      NEG:Negative Negative   pH Urine      5.0 - 7.0 pH 6.0   Protein Albumin Urine      NEG:Negative mg/dL Negative   Urobilinogen mg/dL      0.0 - 2.0 mg/dL 0.0   Nitrite Urine      NEG:Negative Negative   Leukocyte Esterase Urine      NEG:Negative Negative   Source       Midstream Urine   WBC Urine      0 - 2 /HPF <1   RBC Urine      0 - 2 /HPF <1   Bacteria Urine      NEG:Negative /HPF Few (A)   Squamous Epithelial /HPF Urine      0 - 1 /HPF <1   Mucous Urine      NEG:Negative /LPF Present (A)   Albumin Fraction      3.7 - 5.1 g/dL 4.2   Alpha 1 Fraction      0.2 - 0.4 g/dL 0.4   Alpha 2 Fraction      0.5 - 0.9 g/dL 0.8   Beta Fraction      0.6 - 1.0 g/dL 1.0   Gamma Fraction      0.7 - 1.6 g/dL 1.6   Monoclonal Peak      0.0 g/dL 0.0   ELP Interpretation:       Essentially normal electrophoretic pattern. No monoclonal protein seen. Pathologic . . .   IGG      695 - 1620 mg/dL 1560   IGA      70 - 380 mg/dL 473 (H)   IGM      60 - 265 mg/dL 92   Iron      35 - 180 ug/dL 58   Iron Binding Cap      240 - 430 ug/dL 315   Iron  Saturation Index      15 - 46 % 19   TPMT Genotype Specimen       Whole Blood   TPMT Genotype       Neg/Neg   TPMT Predicted Phenotype       Normal   Protein Random Urine      g/L <0.05   Protein Total Urine g/gr Creatinine      0 - 0.2 g/g Cr Unable to calculate due to low value   Deamidated Gliadin Cari, IgA      <7 U/mL 2   Deamidated Gliadin Cari, IgG      <7 U/mL 5   Complement C3      76 - 169 mg/dL 72 (L)   Complement C4      15 - 50 mg/dL 6 (L)   CRP Inflammation      0.0 - 8.0 mg/L 3.2   DNA-ds      <10 IU/mL >379 (H)   Sed Rate      0 - 30 mm/h 49 (H)   Vitamin D Deficiency screening      20 - 75 ug/L 51   Creatinine Urine Random      mg/dL 23   AST      0 - 45 U/L 26   ALT      0 - 50 U/L 35   HEENA  Broad Spectrum       Neg   Beta 2 Glycoprotein 1 Antibody IgG      <7 U/mL <0.6   Beta 2 Glycoprotein 1 Antibody IgM      <7 U/mL <0.9   Thyroid Peroxidase Antibody      <35 IU/mL <10   Thyroglobulin Antibody      <40 IU/mL <20   TSH      0.40 - 4.00 mU/L 0.87   Cryoglobulin      NEG:Negative % Trace (A)   Tissue Transglutaminase Antibody IgA      <7 U/mL 4   Ferritin      8 - 252 ng/mL 163   Endomysial Antibody IgA by IFA      <1:10 <1:10   CRP Cardiac Risk      mg/L 2.9   Creatinine Urine      mg/dL 23     Component      Latest Ref Rng & Units 2/23/2018   Color Urine       Yellow   Appearance Urine       Clear   Glucose Urine      NEG:Negative mg/dL Negative   Bilirubin Urine      NEG:Negative Negative   Ketones Urine      NEG:Negative mg/dL Negative   Specific Gravity Urine      1.003 - 1.035 1.025   Blood Urine      NEG:Negative Negative   pH Urine      5.0 - 7.0 pH 5.5   Protein Albumin Urine      NEG:Negative mg/dL Negative   Urobilinogen Urine      0.2 - 1.0 EU/dL 0.2   Nitrite Urine      NEG:Negative Negative   Leukocyte Esterase Urine      NEG:Negative Negative   Source       Midstream Urine   Protein Random Urine      g/L 0.17   Protein Total Urine g/gr Creatinine      0 - 0.2 g/g Cr 0.18   Complement  C3      76 - 169 mg/dL 64 (L)   Complement C4      15 - 50 mg/dL 5 (L)   CRP Inflammation      0.0 - 8.0 mg/L 4.2   DNA-ds      <10 IU/mL >379 (H)   Sed Rate      0 - 30 mm/h 35 (H)   AST      0 - 45 U/L 27   ALT      0 - 50 U/L 31   Creatinine Urine Random      mg/dL 99     Component      Latest Ref Rng & Units 3/16/2018   WBC      4.0 - 11.0 10e9/L 5.6   RBC Count      3.8 - 5.2 10e12/L 4.43   Hemoglobin      11.7 - 15.7 g/dL 12.1   Hematocrit      35.0 - 47.0 % 36.9   MCV      78 - 100 fl 83   MCH      26.5 - 33.0 pg 27.3   MCHC      31.5 - 36.5 g/dL 32.8   RDW      10.0 - 15.0 % 14.5   Platelet Count      150 - 450 10e9/L 224   Diff Method       Automated Method   % Neutrophils      % 81.7   % Lymphocytes      % 9.3   % Monocytes      % 7.5   % Eosinophils      % 1.3   % Basophils      % 0.2   Absolute Neutrophil      1.6 - 8.3 10e9/L 4.6   Absolute Lymphocytes      0.8 - 5.3 10e9/L 0.5 (L)   Absolute Monocytes      0.0 - 1.3 10e9/L 0.4   Absolute Eosinophils      0.0 - 0.7 10e9/L 0.1   Absolute Basophils      0.0 - 0.2 10e9/L 0.0   Creatinine      0.52 - 1.04 mg/dL 0.51 (L)   GFR Estimate      >60 mL/min/1.7m2 >90   GFR Estimate If Black      >60 mL/min/1.7m2 >90   ALT      0 - 50 U/L 27   Albumin      3.4 - 5.0 g/dL 3.5   AST      0 - 45 U/L 18       Component      Latest Ref Rng & Units 3/21/2018   Color Urine       Yellow   Appearance Urine       Clear   Glucose Urine      NEG:Negative mg/dL Negative   Bilirubin Urine      NEG:Negative Negative   Ketones Urine      NEG:Negative mg/dL Negative   Specific Gravity Urine      1.003 - 1.035 <=1.005   pH Urine      5.0 - 7.0 pH 6.5   Protein Albumin Urine      NEG:Negative mg/dL Negative   Urobilinogen Urine      0.2 - 1.0 EU/dL 0.2   Nitrite Urine      NEG:Negative Negative   Blood Urine      NEG:Negative Negative   Leukocyte Esterase Urine      NEG:Negative Negative   Source       Midstream Urine   WBC Urine      OTO5:0 - 5 /HPF 0 - 5   RBC Urine      OTO2:O - 2  /HPF O - 2   Squamous Epithelial /LPF Urine      FEW:Few /LPF Few   Protein Random Urine      g/L <0.05   Protein Total Urine g/gr Creatinine      0 - 0.2 g/g Cr Unable to calculate due to low value   DNA-ds      <10 IU/mL >379 (H)   Complement C4      15 - 50 mg/dL 4 (L)   Complement C3      76 - 169 mg/dL 69 (L)   Creatinine Urine Random      mg/dL 17   Creatinine Urine      mg/dL 17     EXAMINATION: CT CHEST W/O CONTRAST, 12/29/2017 1:08 PM   TECHNIQUE:  Helical CT images from the thoracic inlet through the lung  bases were obtained without IV contrast during inspiration and  expiration according to high-resolution chest CT protocol. Contrast  dose: None  COMPARISON: None   HISTORY: eval for ILD, pleural effusion, pt has lupus, Sjogren's, h/o  MALT and pleurisy and SOB; Systemic lupus erythematosus, unspecified  SLE type, unspecified organ involvement status (H)   FINDINGS:  At least 75% collapse of the trachea and mainstem bronchi on the end  expiratory views. Diffuse lobular air trapping on end expiratory  images. Bibasilar platelike atelectasis. No pneumothorax or pleural  effusion. Scattered solid pulmonary nodules, for example a 4 mm  lingular nodule (series 5 image 145) and a 4 mm right upper lobe  nodule (image 73).    The heart size is normal. Mild three-vessel coronary artery calcific  atherosclerosis. Dilation of the main pulmonary artery to 4.1 cm. No  pericardial effusion. Normal caliber and configuration of the thoracic  great vessels. Numerous prominent though nonenlarged mediastinal lymph  nodes.  Limited views of the abdomen reveal no worrisome pathology. No  worrisome bony or soft tissue lesions.    IMPRESSION:   1. At least moderate tracheobronchomalacia.  2. 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  3. Scattered subcentimeter pulmonary nodules measuring up to 4 mm.  Consider  follow-up chest CT in one year to establish stability.     [Consider Follow Up: Lung nodule]     This report will be copied to the [facility] to ensure a  provider acknowledges the finding.      I have personally reviewed the examination and initial interpretation  and I agree with the findings.     AUSTIN PACE MD    Examination:NM LUNG SCAN VENTILATION AND PERFUSION, 3/14/2018 8:24 AM    Indication:  Chronic PE; Pulmonary hypertension    Additional Information: none   Technique:   The patient received 2 mCi of Tc-99m DTPA aerosol for ventilation and  7 mCi of Tc-99m MAA for perfusion. Multiple images were obtained of  the lungs in Anterior, posterior, HERNANDES, RPO, LPO, and  [place] projections.   Comparison: Chest x-ray same-day   Findings:     There are matched ventilation/perfusion defects in both lungs. No  mismatched perfusion defect to suggest pulmonary emboli.      Impression:  Pulmonary emboli is not present.     I have personally reviewed the examination and initial interpretation  and I agree with the findings.     DONNIE GARCIA MD    Component      Latest Ref Rng & Units 5/12/2018   WBC      4.0 - 11.0 10e9/L 7.1   RBC Count      3.8 - 5.2 10e12/L 4.42   Hemoglobin      11.7 - 15.7 g/dL 12.1   Hematocrit      35.0 - 47.0 % 37.4   MCV      78 - 100 fl 85   MCH      26.5 - 33.0 pg 27.4   MCHC      31.5 - 36.5 g/dL 32.4   RDW      10.0 - 15.0 % 14.7   Platelet Count      150 - 450 10e9/L 226   Diff Method       Automated Method   % Neutrophils      % 86.2   % Lymphocytes      % 4.8   % Monocytes      % 8.4   % Eosinophils      % 0.3   % Basophils      % 0.3   Absolute Neutrophil      1.6 - 8.3 10e9/L 6.1   Absolute Lymphocytes      0.8 - 5.3 10e9/L 0.3 (L)   Absolute Monocytes      0.0 - 1.3 10e9/L 0.6   Absolute Eosinophils      0.0 - 0.7 10e9/L 0.0   Absolute Basophils      0.0 - 0.2 10e9/L 0.0   Color Urine       Yellow   Appearance Urine       Clear   Glucose Urine      NEG:Negative mg/dL  Negative   Bilirubin Urine      NEG:Negative Negative   Ketones Urine      NEG:Negative mg/dL Negative   Specific Gravity Urine      1.003 - 1.035 <=1.005   Blood Urine      NEG:Negative Negative   pH Urine      5.0 - 7.0 pH 5.5   Protein Albumin Urine      NEG:Negative mg/dL Negative   Urobilinogen Urine      0.2 - 1.0 EU/dL 0.2   Nitrite Urine      NEG:Negative Negative   Leukocyte Esterase Urine      NEG:Negative Negative   Source       Midstream Urine   Creatinine      0.52 - 1.04 mg/dL 0.63   GFR Estimate      >60 mL/min/1.7m2 >90   GFR Estimate If Black      >60 mL/min/1.7m2 >90   Protein Random Urine      g/L <0.05   Protein Total Urine g/gr Creatinine      0 - 0.2 g/g Cr Unable to calculate due to low value   Albumin      3.4 - 5.0 g/dL 3.6   ALT      0 - 50 U/L 28   AST      0 - 45 U/L 20   Complement C3      76 - 169 mg/dL 46 (L)   Complement C4      15 - 50 mg/dL 3 (L)   CRP Inflammation      0.0 - 8.0 mg/L <2.9   Sed Rate      0 - 30 mm/h 26   DNA-ds      <10 IU/mL >379 (H)   Creatinine Urine      mg/dL 16     Component      Latest Ref Rng & Units 7/7/2018   WBC      4.0 - 11.0 10e9/L 7.0   RBC Count      3.8 - 5.2 10e12/L 4.35   Hemoglobin      11.7 - 15.7 g/dL 11.7   Hematocrit      35.0 - 47.0 % 36.0   MCV      78 - 100 fl 83   MCH      26.5 - 33.0 pg 26.9   MCHC      31.5 - 36.5 g/dL 32.5   RDW      10.0 - 15.0 % 15.3 (H)   Platelet Count      150 - 450 10e9/L 224   Diff Method       Automated Method   % Neutrophils      % 91.9   % Lymphocytes      % 4.0   % Monocytes      % 3.7   % Eosinophils      % 0.3   % Basophils      % 0.1   Absolute Neutrophil      1.6 - 8.3 10e9/L 6.5   Absolute Lymphocytes      0.8 - 5.3 10e9/L 0.3 (L)   Absolute Monocytes      0.0 - 1.3 10e9/L 0.3   Absolute Eosinophils      0.0 - 0.7 10e9/L 0.0   Absolute Basophils      0.0 - 0.2 10e9/L 0.0   Color Urine       Yellow   Appearance Urine       Clear   Glucose Urine      NEG:Negative mg/dL Negative   Bilirubin Urine       NEG:Negative Negative   Ketones Urine      NEG:Negative mg/dL Negative   Specific Gravity Urine      1.003 - 1.035 1.010   pH Urine      5.0 - 7.0 pH 5.5   Protein Albumin Urine      NEG:Negative mg/dL Negative   Urobilinogen Urine      0.2 - 1.0 EU/dL 0.2   Nitrite Urine      NEG:Negative Negative   Blood Urine      NEG:Negative Trace (A)   Leukocyte Esterase Urine      NEG:Negative Negative   Source       Midstream Urine   WBC Urine      OTO5:0 - 5 /HPF 0 - 5   RBC Urine      OTO2:O - 2 /HPF O - 2   Squamous Epithelial /LPF Urine      FEW:Few /LPF Few   Creatinine      0.52 - 1.04 mg/dL 0.63   GFR Estimate      >60 mL/min/1.7m2 >90   GFR Estimate If Black      >60 mL/min/1.7m2 >90   Protein Random Urine      g/L 0.07   Protein Total Urine g/gr Creatinine      0 - 0.2 g/g Cr 0.29 (H)   Albumin      3.4 - 5.0 g/dL 3.5   ALT      0 - 50 U/L 27   AST      0 - 45 U/L 21   Complement C3      76 - 169 mg/dL 51 (L)   Complement C4      15 - 50 mg/dL 5 (L)   Creatinine Urine Random      mg/dL 24   CRP Inflammation      0.0 - 8.0 mg/L 11.2 (H)   DNA-ds      <10 IU/mL >379 (H)   Sed Rate      0 - 30 mm/h 35 (H)   Creatinine Urine      mg/dL 23     PFTs 5/2018 (The FEV1 and FVC are reduced but the FEV1/FVC ratio is normal.  The inspiratory flow rates are reduced.  The TLC and FRC are reduced.  The diffusing capacity is normal.  However, the diffusing capacity was not corrected for the patient's hemoglobin.  IMPRESSION:  Moderately Severe  Restriction.  Normal Diffusion.  Walk distance is normal on room air with significant desaturation but no hypoxia.  ____________________________________________KERVIN TONY.      Component      Latest Ref Rng & Units 11/12/2018   Color Urine       Yellow   Appearance Urine       Clear   Glucose Urine      NEG:Negative mg/dL Negative   Bilirubin Urine      NEG:Negative Negative   Ketones Urine      NEG:Negative mg/dL Negative   Specific Gravity Urine      1.003 - 1.035 1.025   pH Urine       5.0 - 7.0 pH 6.0   Protein Albumin Urine      NEG:Negative mg/dL 30 (A)   Urobilinogen Urine      0.2 - 1.0 EU/dL 0.2   Nitrite Urine      NEG:Negative Negative   Blood Urine      NEG:Negative Trace (A)   Leukocyte Esterase Urine      NEG:Negative Negative   Source       Midstream Urine   WBC Urine      OTO5:0 - 5 /HPF 0 - 5   RBC Urine      OTO2:O - 2 /HPF O - 2   Squamous Epithelial /LPF Urine      FEW:Few /LPF Few   Bacteria Urine      NEG:Negative /HPF Few (A)   WBC      4.0 - 11.0 10e9/L 7.3   RBC Count      3.8 - 5.2 10e12/L 4.25   Hemoglobin      11.7 - 15.7 g/dL 11.3 (L)   Hematocrit      35.0 - 47.0 % 36.0   MCV      78 - 100 fl 85   MCH      26.5 - 33.0 pg 26.6   MCHC      31.5 - 36.5 g/dL 31.4 (L)   RDW      10.0 - 15.0 % 14.9   Platelet Count      150 - 450 10e9/L 255   Creatinine      0.52 - 1.04 mg/dL 0.83   GFR Estimate      >60 mL/min/1.7m2 73   GFR Estimate If Black      >60 mL/min/1.7m2 88   Iron      35 - 180 ug/dL 27 (L)   Iron Binding Cap      240 - 430 ug/dL 264   Iron Saturation Index      15 - 46 % 10 (L)   Protein Random Urine      g/L 0.58   Protein Total Urine g/gr Creatinine      0 - 0.2 g/g Cr 0.34 (H)   Albumin      3.4 - 5.0 g/dL 3.2 (L)   ALT      0 - 50 U/L 30   AST      0 - 45 U/L 19   Complement C3      76 - 169 mg/dL 48 (L)   Complement C4      15 - 50 mg/dL 3 (L)   CRP Inflammation      0.0 - 8.0 mg/L 16.4 (H)   DNA-ds      <10 IU/mL >379 (H)   Sed Rate      0 - 30 mm/h 26   Ferritin      8 - 252 ng/mL 160   Creatinine Urine      mg/dL 176     Component      Latest Ref Rng & Units 6/5/2019           8:29 AM   Color Urine       Yellow   Appearance Urine       Slightly Cloudy   Glucose Urine      NEG:Negative mg/dL Negative   Bilirubin Urine      NEG:Negative Negative   Ketones Urine      NEG:Negative mg/dL 5 (A)   Specific Gravity Urine      1.003 - 1.035 1.027   Blood Urine      NEG:Negative Small (A)   pH Urine      5.0 - 7.0 pH 6.0   Protein Albumin Urine      NEG:Negative  mg/dL >499 (A)   Urobilinogen mg/dL      0.0 - 2.0 mg/dL 2.0   Nitrite Urine      NEG:Negative Negative   Leukocyte Esterase Urine      NEG:Negative Trace (A)   Source       Midstream Urine   WBC Urine      0 - 5 /HPF 16 (H)   RBC Urine      0 - 2 /HPF 24 (H)   Squamous Epithelial /HPF Urine      0 - 1 /HPF 2 (H)   Mucous Urine      NEG:Negative /LPF Present (A)   Hyaline Casts      0 - 2 /LPF 1   Sodium      133 - 144 mmol/L    Potassium      3.4 - 5.3 mmol/L    Chloride      94 - 109 mmol/L    Carbon Dioxide      20 - 32 mmol/L    Anion Gap      3 - 14 mmol/L    Glucose      70 - 99 mg/dL    Urea Nitrogen      7 - 30 mg/dL    Creatinine      0.52 - 1.04 mg/dL    GFR Estimate      >60 mL/min/1.73:m2    GFR Estimate If Black      >60 mL/min/1.73:m2    Calcium      8.5 - 10.1 mg/dL    Phosphorus      2.5 - 4.5 mg/dL    Albumin      3.4 - 5.0 g/dL    WBC      4.0 - 11.0 10e9/L    RBC Count      3.8 - 5.2 10e12/L    Hemoglobin      11.7 - 15.7 g/dL    Hematocrit      35.0 - 47.0 %    MCV      78 - 100 fl    MCH      26.5 - 33.0 pg    MCHC      31.5 - 36.5 g/dL    RDW      10.0 - 15.0 %    Platelet Count      150 - 450 10e9/L    Specimen Description       Midstream Urine   Special Requests       Specimen received in preservative   Culture Micro       10,000 to 50,000 colonies/mL . . .   Creatinine Urine      mg/dL 240   Albumin Urine mg/L      mg/L    Albumin Urine mg/g Cr      0 - 25 mg/g Cr    Protein Random Urine      g/L 4.50   Protein Total Urine g/gr Creatinine      0 - 0.2 g/g Cr 1.88 (H)   Neutrophil Cytoplasmic Antibody      <1:10 titer    Neutrophil Cytoplasmic Antibody Pattern          Lactate Dehydrogenase      81 - 234 U/L    HIV Antigen Antibody Combo      NR:Nonreactive        Sed Rate      0 - 30 mm/h    CRP Inflammation      0.0 - 8.0 mg/L    Complement C4      15 - 50 mg/dL    Complement C3      76 - 169 mg/dL    DNA-ds      <10 IU/mL      Component      Latest Ref Rng & Units 6/5/2019           8:29  AM   Color Urine          Appearance Urine          Glucose Urine      NEG:Negative mg/dL    Bilirubin Urine      NEG:Negative    Ketones Urine      NEG:Negative mg/dL    Specific Gravity Urine      1.003 - 1.035    Blood Urine      NEG:Negative    pH Urine      5.0 - 7.0 pH    Protein Albumin Urine      NEG:Negative mg/dL    Urobilinogen mg/dL      0.0 - 2.0 mg/dL    Nitrite Urine      NEG:Negative    Leukocyte Esterase Urine      NEG:Negative    Source          WBC Urine      0 - 5 /HPF    RBC Urine      0 - 2 /HPF    Squamous Epithelial /HPF Urine      0 - 1 /HPF    Mucous Urine      NEG:Negative /LPF    Hyaline Casts      0 - 2 /LPF    Sodium      133 - 144 mmol/L    Potassium      3.4 - 5.3 mmol/L    Chloride      94 - 109 mmol/L    Carbon Dioxide      20 - 32 mmol/L    Anion Gap      3 - 14 mmol/L    Glucose      70 - 99 mg/dL    Urea Nitrogen      7 - 30 mg/dL    Creatinine      0.52 - 1.04 mg/dL    GFR Estimate      >60 mL/min/1.73:m2    GFR Estimate If Black      >60 mL/min/1.73:m2    Calcium      8.5 - 10.1 mg/dL    Phosphorus      2.5 - 4.5 mg/dL    Albumin      3.4 - 5.0 g/dL    WBC      4.0 - 11.0 10e9/L    RBC Count      3.8 - 5.2 10e12/L    Hemoglobin      11.7 - 15.7 g/dL    Hematocrit      35.0 - 47.0 %    MCV      78 - 100 fl    MCH      26.5 - 33.0 pg    MCHC      31.5 - 36.5 g/dL    RDW      10.0 - 15.0 %    Platelet Count      150 - 450 10e9/L    Specimen Description          Special Requests          Culture Micro          Creatinine Urine      mg/dL 258   Albumin Urine mg/L      mg/L 2,810   Albumin Urine mg/g Cr      0 - 25 mg/g Cr 1,089.15 (H)   Protein Random Urine      g/L    Protein Total Urine g/gr Creatinine      0 - 0.2 g/g Cr    Neutrophil Cytoplasmic Antibody      <1:10 titer    Neutrophil Cytoplasmic Antibody Pattern          Lactate Dehydrogenase      81 - 234 U/L    HIV Antigen Antibody Combo      NR:Nonreactive        Sed Rate      0 - 30 mm/h    CRP Inflammation      0.0 - 8.0  mg/L    Complement C4      15 - 50 mg/dL    Complement C3      76 - 169 mg/dL    DNA-ds      <10 IU/mL      Component      Latest Ref Rng & Units 6/5/2019          11:57 AM   Color Urine          Appearance Urine          Glucose Urine      NEG:Negative mg/dL    Bilirubin Urine      NEG:Negative    Ketones Urine      NEG:Negative mg/dL    Specific Gravity Urine      1.003 - 1.035    Blood Urine      NEG:Negative    pH Urine      5.0 - 7.0 pH    Protein Albumin Urine      NEG:Negative mg/dL    Urobilinogen mg/dL      0.0 - 2.0 mg/dL    Nitrite Urine      NEG:Negative    Leukocyte Esterase Urine      NEG:Negative    Source          WBC Urine      0 - 5 /HPF    RBC Urine      0 - 2 /HPF    Squamous Epithelial /HPF Urine      0 - 1 /HPF    Mucous Urine      NEG:Negative /LPF    Hyaline Casts      0 - 2 /LPF    Sodium      133 - 144 mmol/L 134   Potassium      3.4 - 5.3 mmol/L 4.3   Chloride      94 - 109 mmol/L 101   Carbon Dioxide      20 - 32 mmol/L 26   Anion Gap      3 - 14 mmol/L 8   Glucose      70 - 99 mg/dL 109 (H)   Urea Nitrogen      7 - 30 mg/dL 21   Creatinine      0.52 - 1.04 mg/dL 0.49 (L)   GFR Estimate      >60 mL/min/1.73:m2 >90   GFR Estimate If Black      >60 mL/min/1.73:m2 >90   Calcium      8.5 - 10.1 mg/dL 9.2   Phosphorus      2.5 - 4.5 mg/dL 4.2   Albumin      3.4 - 5.0 g/dL 2.9 (L)   WBC      4.0 - 11.0 10e9/L 8.7   RBC Count      3.8 - 5.2 10e12/L 4.81   Hemoglobin      11.7 - 15.7 g/dL 12.3   Hematocrit      35.0 - 47.0 % 39.3   MCV      78 - 100 fl 82   MCH      26.5 - 33.0 pg 25.6 (L)   MCHC      31.5 - 36.5 g/dL 31.3 (L)   RDW      10.0 - 15.0 % 14.1   Platelet Count      150 - 450 10e9/L 302   Specimen Description          Special Requests          Culture Micro          Creatinine Urine      mg/dL    Albumin Urine mg/L      mg/L    Albumin Urine mg/g Cr      0 - 25 mg/g Cr    Protein Random Urine      g/L    Protein Total Urine g/gr Creatinine      0 - 0.2 g/g Cr    Neutrophil Cytoplasmic  Antibody      <1:10 titer <1:10   Neutrophil Cytoplasmic Antibody Pattern       The ANCA IFA is <1:10.  No further testing will be performed.   Lactate Dehydrogenase      81 - 234 U/L 252 (H)   HIV Antigen Antibody Combo      NR:Nonreactive     Nonreactive   Sed Rate      0 - 30 mm/h 40 (H)   CRP Inflammation      0.0 - 8.0 mg/L 25.7 (H)   Complement C4      15 - 50 mg/dL 3 (L)   Complement C3      76 - 169 mg/dL 53 (L)   DNA-ds      <10 IU/mL >379 (H)   [facility]                                                      CMR Report       MRN:                  [id number]                                  Name:           CHILANGO GIBBONS                                   :                  1965                                  Scan Date:   2019 11:11:32                                   Electronically signed by Fer Corea 2019-May-20 14:19:00     SUMMARY   ==========================================================================================================     Clinical history: 54-year old female with SLE, poly-serositis, and chronic pleuritic chest pain. CMR to  assess for cardiac involvement (sepideh-myocarditis).  Comparison CMR: None.      1. The LV is normal in cavity size and wall thickness. The global systolic function is normal. The LVEF is  62%.   There are no regional wall motion abnormalities.     2. The RV is normal in cavity size. The global systolic function is normal. The RVEF is 60%.      3. Both atria are normal in size.     4. There is no significant valvular disease.      5. Late gadolinium enhancement imaging shows no MI, fibrosis or infiltrative disease.      6. The is mild pericardial thickening and tethering, with circumferential pericardial enhancement. There is  no pericardial effusion.       7. There is no intracardiac thrombus.     8. The main PA is moderately enlarged, measuring 3.8 cm.      CONCLUSIONS: Pericardial thickening and enhancement consistent with  inflammatory pericarditis. There is no  myocardial fibrosis or myocarditis. Normal biventricular size and systolic function; LVEF 62%, RVEF 60%.      CORE EXAM   ==========================================================================================================     MEASUREMENTS   ----------------------------------------------------------------------------------------      VOLUMETRIC ANALYSIS       ----------------------------------------------  .--------------------------------------------------------.                    LV    Reference  RV    Reference   +------+-----------+------+-----------+------+-----------+   EDV   ml          162   ()   159   ()          ml/m^2     69.2   (56-90)   67.9   (53-90)     ESV   ml           62   (22-59)     64   (15-68)           ml/m^2     26.5   (14-33)   27.4   (11-37)     CO    L/min      7.30             6.93                     L/min/m^2   3.1              3.0               MASS                                                 SV    ml          100   ()    95   ()          ml/m^2     42.7   (37-62)   40.6   (36-60)     EF    %            62   (59-77)     60   (55-79)    '------+-----------+------+-----------+------+-----------'             CARDIAC OUTPUT HR:  73 BPM      LA DIMENSIONS (LV SYSTOLE)       ----------------------------------------------          DIAMETER:  3.9 cm         AORTIC ROOT DIMENSIONS       ----------------------------------------------          SINUS OF VALSALVA:  2.9 cm          AORTIC ROOT SIZE:  Normal        ANATOMY   ----------------------------------------------------------------------------------------      LEFT VENTRICLE       ----------------------------------------------          WALL THICKNESS:  Normal          CAVITY SIZE:  Normal         RIGHT VENTRICLE       ----------------------------------------------          WALL THICKNESS:   Normal          CONTRACTILITY:  Normal          CAVITY SIZE:  Normal         INTERVENTRICULAR SEPTUM       ----------------------------------------------               VENTRICULAR SEPTUM:  Normal          INTERATRIAL SEPTUM       ----------------------------------------------               ATRIAL SEPTUM:          LIPOMATOUS HYPERTROPHY          LEFT ATRIUM       ----------------------------------------------          CAVITY SIZE:  Normal         RIGHT ATRIUM       ----------------------------------------------          CAVITY SIZE:  Normal         PERICARDIUM       ----------------------------------------------          THICKENED          THICKNESS:  5 mm          EFFUSION:  None         PLEURAL EFFUSION       ----------------------------------------------               None         VALVES   ----------------------------------------------------------------------------------------      AORTIC VALVE       ----------------------------------------------          AORTIC VALVE LEAFLETS:  Trileaflet         MITRAL VALVE       ----------------------------------------------          MITRAL VALVE LEAFLETS:  Normal Leaflets         TRICUSPID VALVE       ----------------------------------------------          TRICUSPID VALVE LEAFLETS:  Normal Leaflets         PULMONIC VALVE       ----------------------------------------------          PULMONIC VALVE LEAFLETS:  Normal Leaflets        17 SEGMENT   ----------------------------------------------------------------------------------------  .------------------------------------------------------------------------------------------.   Segments            Wall Motion   Hyperenhancement  Stress Perfusion  Interpretation   +--------------------+--------------+------------------+------------------+----------------+   Base Anterior       Normal/Hyper  None                                Normal            Base Anteroseptal   Normal/Hyper  None                                 Normal            Base Inferoseptal   Normal/Hyper  None                                Normal            Base Inferior       Normal/Hyper  None                                Normal            Base Inferolateral  Normal/Hyper  None                                Normal            Base Anterolateral  Normal/Hyper  None                                Normal            Mid Anterior        Normal/Hyper  None                                Normal            Mid Anteroseptal    Normal/Hyper  None                                Normal            Mid Inferoseptal    Normal/Hyper  None                                Normal            Mid Inferior        Normal/Hyper  None                                Normal            Mid Inferolateral   Normal/Hyper  None                                Normal            Mid Anterolateral   Normal/Hyper  None                                Normal            Apical Anterior     Normal/Hyper  None                                Normal            Apical Septal       Normal/Hyper  None                                Normal            Apical Inferior     Normal/Hyper  None                                Normal            Apical Lateral      Normal/Hyper  None                                Normal            [place]                Normal/Hyper  None                                Normal           +--------------------+--------------+------------------+------------------+----------------+   RV Segments         Wall Motion   Hyperenhancement  Stress Perfusion  Interpretation   +--------------------+--------------+------------------+------------------+----------------+   RV Basal Anterior   Normal/Hyper  None                                Normal            RV Basal Inferior   Normal/Hyper  None                                Normal            RV Mid              Normal/Hyper  None                                 Normal            RV Apical           Normal/Hyper  None                                Normal           '--------------------+--------------+------------------+------------------+----------------'         FINDINGS       ----------------------------------------------          INFARCT/SCAR SIZE:  0 %        SCAN INFO   ==========================================================================================================     GENERAL   ----------------------------------------------------------------------------------------      CONTRAST AGENT       ----------------------------------------------          TYPE:  Gadavist          VOLUME ADMINISTERED:  10 ml          DOSAGE FOR 0.5M:  0.04 mmol/kg          SERUM CREATININE:  0.58 sCr          CREATININE DATE:  2019-03-06 00:00:00          SEDATION       ----------------------------------------------          SEDATION USED?:  No         VITALS       ----------------------------------------------          HEIGHT:  66.00 in          HEIGHT:  167.64 cm          WEIGHT:  289.00 lbs          WEIGHT:  131.09 kgs          BSA:  2.34 m^2         PULSE SEQUENCES       ----------------------------------------------          Single-Shot SSFP, IR GRE - Segmented, IR SSFP - Single Shot, SSFP Cine          SETUP       ----------------------------------------------          TYPE:  Clinical          INPATIENT:  No          INCOMPLETE SCAN:  No          REASON(S) FOR SCAN:  Cardiomyopathy, Pericardial Evaluation           REFERRING PHYSICIAN:  ROBE VAZQUEZ          ATTENDING PHYSICIAN:  ROBE VAZQUEZ      Kidney Biopsy (6/7/19)  Patient Name: CHILANGO GIBBONS   MR#: [id number]   Specimen #: [id number]   Collected: 6/7/2019   Received: 6/7/2019   Reported: 6/10/2019 22:42   Ordering Phy(s): JOYCE CAMERON     For improved result formatting, select 'View Enhanced Report Format' under    Linked Documents section.     SPECIMEN(S):    Kidney biopsy, native with EM & Immunofluorescence, right     FINAL DIAGNOSIS:   Kidney; percutaneous needle biopsy:   -Diffuse global lupus nephritis, ISN/RPS class IV-S (a)   -Mild to moderate arteriosclerosis     COMMENT:   There is mild mesangial and endocapillary proliferation, focal leukocytic   infiltrates, and crescentic lesions   involving 14 out of 26 glomeruli, mainly cellular with necrotizing   lesions.  There are minimal chronic   changes.  The activity index of the proliferative component is 9/24 and   the chronicity index is 1/12 (Robert   et al, American Journal of Medicine 75:  382-391, 1983).     The presence of significant crescents with only mild mesangial and   endocapillary proliferation raises the   possibility of concurrent lupus nephritis and ANCA-associated crescentic   glomerulonephritis.  Thus it is   recommended to test for ANCA and clinical correlation is recommended.       Physical Exam:    /81   Pulse 86   Wt 144.2 kg (317 lb 14.4 oz)   SpO2 96%   BMI 51.34 kg/m       Constitutional: Pleasant in NAD, WN, WD, [place]ushings.  Eyes: EOMI, PERRLA, sclera anicteric, conj not injected.  HEENT: AT/NC. no oral ulcers or thrush. Normal salivary pool.    Neck: No neck mass or thyromegaly.  Chest: CTAB, nl effort.  CV: RRR, grade II/VI systolic murmur, no g/r. no clubbing or cyanosis. 1+ edema.  GI: Soft, ND, NTTP, no HSM.  MS: No synovitis. Cool joints. No tenderness of the joints. No swelling. Normal ROM.  No joint deformities. Full ROM of the joints. No nodules. No Jaccoud's deformity.  Skin: No skin rash, malar rash, livedo, periungual erythema, alopecia, digital ulcers or nail changes.  Neuro: CN grossly intact without focal deficit, sensation intact and equal b/l, 5/5 strength b/l UE and LE.  Psych: Appropriate mood, judgment, insight, memory, affect.    Assessment/Plan:    SLE. 55 yo WF with +FH RA and personal hx of SLE presented in 12/2017 for 2nd opinion of m/o her SLE. She was  diagnosed with SLE at age 27. Her lupus has been marked by +KARLIE (highest titer 1:640, speckled and nucleolar patterns), +anti-DNA, arthritis, malar rash, serositis (pleuritic CP) supported by +SSA/SSB Ab, low C3/low C4, +RF, high ESR/CRP. She had neg anti-RNP, anti-Sm, acL IgM/G/A, LAC, cryo and anti-CCP.     Had NL C3 at the time of Dx. She was treated with prednisone and HCQ at the time of Dx. Can't remember how long she was on HCQ and why HCQ was stopped, but it probably was stopped because of stable disease, no report on HCQ toxicity. Reportedly her SLE was mild all these years.    Has secondary Sjogren's with sicca, +SSA/SSB Ab and h/o MALT lymphoma at age 44 in remission. Uses blink eyedrops and salagen. No lip biopsy was done to confirm Dx of Sjogren's.    Her lupus flared in 7/2017 with no triggers when she presented with arthritis, HCQ was resumed, arthritis resolved. Mild pulm HTN on 2D-Echo 8/2017 but neg R cardiac cath and VQ scan in 3/2018, also neg 2D-Echo.    In 12/2017, was prescribed prednisone 40 mg for only 5 days for pleuritic CP, CP recurred after stopping prednisone.     Her major complaint at initial visit with me in 12/2017 was ongoing CP. AZA was added in 2/2018 with start dose of 50 mg qd and slowly was increased to max 150 mg qd. Tolerated AZA well, no SEs, no toxicity on the labs but failed it and required to go back on prednisone 20 mg qd (current dose).     Her lupus is active with pleuritic CP. ESR/CRP normalized on prednisone+AZA+HCQ but +anti-DNA, low C3/C4 are unchanged. Chest CT in 12/2017 without contrast showed air trapping due to lupus/Sjogren's, no pleural effusion. Lung nodules need f/u in a year. No pericardail effusion on 2D-echo and neg VQ scan for PE in 3/2018 so chest CT with contrast is not needed. She is APL negative.    AZA was switched to  mg po bid on 5/18/2018 as she failed AZA. She is now on max dose of MMF 1500 mg bid. Her labs are unchanged with  persistently elevated anti-DNA, low C3/C4 and high ESR/CRP, but just started to feel a lot better (regarding fatigue, arthralgia, still has residual pleuritic CP) after adding MMF.     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 Also possible shrinking lung syn sec lupus or obesity is responsible for restrictive lung disease plus pleural effusion. She was prescribed albuterol and dulera inh which have helped.    Benlysta was added in 9/2018 to help with pleuritic CP. No change in lupus serology (anti-DNA, C3, C4), but ESR/CRP have improved. Overall she felt a lot better after adding benlysta but it made her tired.     I could not taper her off the prednisone, she continued to have active IA, fatigue and pleuritic CP. I recommended switching benlysta to rituximab given her h/o lymphoma, unfortunately her insurance denied. At the same time, Taina misunderstood and stopped her cellcept as thought we were switching MMF to rituximab while the plan was to add rituximab to MMF. She has been off MMF for couple of months. Had cardiac MRI on 5/20 which showed active pericarditis. I advised to switch to cytoxan 750 mg/m2 qmo x 6 mo. On Saturday 6/1/2019, received a call from infusion center reporting blood in the urine and if it was ok to proceed with cytoxan. I was concerned as that was very new. We canceled the cytoxan infusion. UCx was negative for UTI. Repeat U/A today is showing protein and blood, with h/o stopping MMF, very concerning for lupus nephritis. She never had lupus nephritis as part of her lupus and it's possible that it was covered by MMF and showed up off MMF. Renal biopsy is still recommended to confirm Dx, evaluate degree of severity,  chronicity and rule out other diagnoses. I spoke to renal team and dr. Elmore and Aura kindly agreed to see her today (6/5/19) as urgent consult as add on to their schedule and scheduled renal biopsy for Friday 6/7/2019. I would re-schedule cytoxan to 6/15/2019 and schedule pulse solumedrol 1 gr every day x 3 days. Meanwhile, will increase prednisone to 60 mg every day. Cytoxan risks were discussed again.    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 Patient received 1st dose cyclophosphamide on 6/15/19 and tolerated well. Initiated Rituxin 1/22/2020. Follow-up 1/23 with Dr. Sherman, nephrology.      Overall, patient is feeling better today. S/p 3 cytoxan for lupus nephritis, enteritis, arthritis, pleurisy. Still active lupus with intermittent malar rash but denies arthralgia, ulcers, rash. States improvement in fatigue, is sleeping better, and is increasing work 2 to 3 times per week. No signs of active synovitis on exam, swelling is improving on HCTZ per nephrology.    Recommend:    - Labs monthly since initiating Rituxin  - Continue Cellcept 1500 mg BID and  mg BID  - Continue prednisone 20 mg daily for now, discuss reducing to 10 mg next appt  - Annual eye exam is current for HCQ monitoring  - PCP prophylaxis on atovaquone 10 mL (1500 mg). Patient did not tolerate inhaled pentamidine. Avoiding TMP-SMX given sulfa reaction and potential increase in SLE flare. Hesitant to use dapsone given risk of hemolytic anemia.    - Follow-up in 4-5 months        Orders Placed This Encounter   Procedures     ALT     Albumin level     AST     CBC with platelets differential     Creatinine     Complement C4     Complement C3     CRP inflammation     DNA double stranded antibodies     Erythrocyte sedimentation rate auto     UA with Microscopic reflex to Culture     Protein  random urine with Creat Ratio     Creatinine random urine       I saw and examined the patient with Dr. Marroquin. I acted as scribe for   Lopez.      Jama Irwin  Medical Student  Rheumatology  P: [phone]    Attending Note: I saw and examined the patient with medical student Jama. This note was written by him who acted as scribe for me. I agree with findings and recommendations written in this note. The note reflects decisions made by me.    Killian Marroquin MD

## 2020-01-23 NOTE — NURSING NOTE
Chief Complaint   Patient presents with     RECHECK     Lupus Nephritis       /74 (BP Location: Left arm, Patient Position: Sitting)   Pulse 86   Wt 144.2 kg (317 lb 14.5 oz)   SpO2 96%   BMI 51.34 kg/m              Charu Elmore CMA    1/23/2020 10:36 AM

## 2020-01-23 NOTE — PROGRESS NOTES
Chief complaint  Follow up visit for lupus nephritis    HPI  Patient is a 54 year old female here for follow up of lupus nephritis. Please refer to my previous note regarding patients past history. Since I met with the patient last she was started on Cellcept currently at a dose of 1500 mg twice daily. Rituximab was approved by insurance and she as received one dose so far (received first dose 1/22/20). She remains on 20 mg of prednisone daily.     Her blood work was reviewed. Creatinine is stable at 0.6. UA shows 5-10 RBC/HPF which is improved from a month ago. Urine pr/cr is elevated at 5.1. Serology shows stable C3 and C4. Ds-DNA is pending.      Medication, allergies, and past medical history were reviewed with the patient in detial.     Vital signs: /71   Pulse 86   Wt 144.2 kg (317 lb 14.5 oz)   SpO2 96%   BMI 51.34 kg/m      Physical exam:   General: in NAD  ENT: moist oral mucosa, no oral lesions  Lungs: CTA bilaterally  Heart: S1, S2, no murmur, RRR  Abdomen: soft and nontender, no renal bruit  Ext: trace-1+ edema    Assessment and plan   #1 Lupus nephritis  Patient has been tolerating Cellcept well and has started rituximab. She is feeling better. Her hematuria is improving. Will continue with current management and I will see her back in 2 months.     #2 Likely antiphospholipid syndrome   Lupus anticoaulant is positive (this is in the setting of being on warfarin) but in the setting of lupus, prior PE this would be consistent with APLA.     #3 Hypertension, well-controlled   Much better controlled since starting furosemide. Will continue.

## 2020-01-24 LAB — LAB SCANNED RESULT: NORMAL

## 2020-01-31 DIAGNOSIS — G47.00 INSOMNIA, UNSPECIFIED TYPE: ICD-10-CM

## 2020-01-31 RX ORDER — ZOLPIDEM TARTRATE 5 MG/1
5 TABLET ORAL
Qty: 30 TABLET | Refills: 0 | Status: SHIPPED | OUTPATIENT
Start: 2020-01-31 | End: 2020-03-12

## 2020-01-31 NOTE — TELEPHONE ENCOUNTER
Last Seen: 1/23/2020  Next Appointment: 6/4/2020  Medication: ambien 5mg  Last Filled: 12/23/2019  Qty: #30     reviewed as follows:      Request sent to provider for review and signature.    Jayleen Gan CMA   1/31/2020 9:15 AM

## 2020-02-06 ENCOUNTER — INFUSION THERAPY VISIT (OUTPATIENT)
Dept: INFUSION THERAPY | Facility: CLINIC | Age: 55
End: 2020-02-06
Payer: COMMERCIAL

## 2020-02-06 VITALS
DIASTOLIC BLOOD PRESSURE: 79 MMHG | TEMPERATURE: 98.2 F | WEIGHT: 293 LBS | BODY MASS INDEX: 50.58 KG/M2 | RESPIRATION RATE: 16 BRPM | OXYGEN SATURATION: 98 % | SYSTOLIC BLOOD PRESSURE: 134 MMHG | HEART RATE: 70 BPM

## 2020-02-06 DIAGNOSIS — M32.9 SYSTEMIC LUPUS ERYTHEMATOSUS, UNSPECIFIED SLE TYPE, UNSPECIFIED ORGAN INVOLVEMENT STATUS (H): ICD-10-CM

## 2020-02-06 DIAGNOSIS — M32.14 LUPUS NEPHRITIS, ISN/RPS CLASS IV (H): Primary | ICD-10-CM

## 2020-02-06 DIAGNOSIS — C88.40 MALT LYMPHOMA: ICD-10-CM

## 2020-02-06 PROCEDURE — 96367 TX/PROPH/DG ADDL SEQ IV INF: CPT | Performed by: NURSE PRACTITIONER

## 2020-02-06 PROCEDURE — 96415 CHEMO IV INFUSION ADDL HR: CPT | Performed by: NURSE PRACTITIONER

## 2020-02-06 PROCEDURE — 96413 CHEMO IV INFUSION 1 HR: CPT | Performed by: NURSE PRACTITIONER

## 2020-02-06 PROCEDURE — 96375 TX/PRO/DX INJ NEW DRUG ADDON: CPT | Performed by: NURSE PRACTITIONER

## 2020-02-06 PROCEDURE — 99207 ZZC NO CHARGE LOS: CPT

## 2020-02-06 RX ORDER — METHYLPREDNISOLONE SODIUM SUCCINATE 125 MG/2ML
125 INJECTION, POWDER, LYOPHILIZED, FOR SOLUTION INTRAMUSCULAR; INTRAVENOUS ONCE
Status: CANCELLED | OUTPATIENT
Start: 2020-02-19

## 2020-02-06 RX ORDER — ACETAMINOPHEN 325 MG/1
650 TABLET ORAL ONCE
Status: CANCELLED
Start: 2020-02-19

## 2020-02-06 RX ORDER — ACETAMINOPHEN 325 MG/1
650 TABLET ORAL ONCE
Status: COMPLETED | OUTPATIENT
Start: 2020-02-06 | End: 2020-02-06

## 2020-02-06 RX ORDER — METHYLPREDNISOLONE SODIUM SUCCINATE 125 MG/2ML
125 INJECTION, POWDER, LYOPHILIZED, FOR SOLUTION INTRAMUSCULAR; INTRAVENOUS ONCE
Status: COMPLETED | OUTPATIENT
Start: 2020-02-06 | End: 2020-02-06

## 2020-02-06 RX ADMIN — Medication 250 ML: at 07:53

## 2020-02-06 RX ADMIN — ACETAMINOPHEN 650 MG: 325 TABLET ORAL at 07:43

## 2020-02-06 RX ADMIN — METHYLPREDNISOLONE SODIUM SUCCINATE 125 MG: 125 INJECTION INTRAMUSCULAR; INTRAVENOUS at 07:54

## 2020-02-06 ASSESSMENT — PAIN SCALES - GENERAL: PAINLEVEL: NO PAIN (0)

## 2020-02-06 NOTE — PROGRESS NOTES
Infusion Nursing Note:  Taina Singh presents today for Rituxan D15 .    Patient seen by provider today: No   present during visit today: Not Applicable.    Note: Rituxan rates today : started at 100ml/hr and increased every 30 minutes to a max rate of 400ml/hr.  Tolerated infusion without incident.    Intravenous Access:  Peripheral IV placed.    Treatment Conditions:  Biological Infusion Checklist:  ~~~ NOTE: If the patient answers yes to any of the questions below, hold the infusion and contact ordering provider or on-call provider.    1. Have you recently had an elevated temperature, fever, chills, productive cough, coughing for 3 weeks or longer or hemoptysis, abnormal vital signs, night sweats,  chest pain or have you noticed a decrease in your appetite, unexplained weight loss or fatigue? No  2. Do you have any open wounds or new incisions? No  3. Do you have any recent or upcoming hospitalizations, surgeries or dental procedures? No  4. Do you currently have or recently have had any signs of illness or infection or are you on any antibiotics? No  5. Have you had any new, sudden or worsening abdominal pain? No  6. Have you or anyone in your household received a live vaccination in the past 4 weeks? Please note:  No live vaccines while on biologic/chemotherapy until 6 months after the last treatment.  Patient can receive the flu vaccine (shot only) and the pneumovax.  It is optimal for the patient to get these vaccines mid cycle, but they can be given at any time as long as it is not on the day of the infusion. No  7. Have you recently been diagnosed with any new nervous system diseases (ie. Multiple sclerosis, Guillain [place], seizures, neurological changes) or cancer diagnosis? No  8. Are you on any form of radiation or chemotherapy? No  9. Are you pregnant or breast feeding or do you have plans of pregnancy in the future? No  10. Have you been having any signs of worsening depression or suicidal  ideations?  (benlysta only) No  11. Have there been any other new onset medical symptoms? No        Post Infusion Assessment:  Patient tolerated infusion without incident.  No evidence of extravasations.  Access discontinued per protocol.  Biologic Infusion Post Education: Call the triage nurse at your clinic or seek medical attention if you have chills and/or temperature greater than or equal to 100.5, uncontrolled nausea/vomiting, diarrhea, constipation, dizziness, shortness of breath, chest pain, heart palpitations, weakness or any other new or concerning symptoms, questions or concerns.  You cannot have any live virus vaccines prior to or during treatment or up to 6 months post infusion.  If you have an upcoming surgery, medical procedure or dental procedure during treatment, this should be discussed with your ordering physician and your surgeon/dentist.  If you are having any concerning symptom, if you are unsure if you should get your next infusion or wish to speak to a provider before your next infusion, please call your care coordinator or triage nurse at your clinic to notify them so we can adequately serve you.       Discharge Plan:   Discharge instructions reviewed with: Patient.  Patient and/or family verbalized understanding of discharge instructions and all questions answered.  Patient discharged in stable condition accompanied by: son.  Departure Mode: Ambulatory.    Gemma Grady RN

## 2020-02-06 NOTE — PROGRESS NOTES
SPIRITUAL HEALTH SERVICES Progress Note  [facility]    Visited Taina Singh and her son Vincent in the infusion area to introduce Spiritual Health support here at [facility].       Illness Narrative - Patient states that she has been diagnosed with Lupus for some time but is trying a new medication.       Distress - None discussed or noted.       Coping - Patient voiced she is hopeful that this medication will take care of some of her on going symptoms.       Meaning-Making - Not discussed.       Plan - I gave the patient my contact information and she knows that she can request a  visit as needed.     Sania Hall  Staff   [phone]

## 2020-02-12 ENCOUNTER — TELEPHONE (OUTPATIENT)
Dept: RHEUMATOLOGY | Facility: CLINIC | Age: 55
End: 2020-02-12

## 2020-02-12 NOTE — TELEPHONE ENCOUNTER
Pt called and left a message today to discuss upcoming dental work.  She is going to have a crown done and just finished Rituximab last week. Discussed that our clinic does not normally prescribe antibiotics for dental work prophylactically.  She should discuss with her dentist the fact that she has gotten Rituximab and they can decide if she needs any antibiotics.  Pt understands, also understands that if there is any major work that unless it is urgent, should wait until about 4-5 months after Rituximab.  Pt understands.    Sophie Alvarado RN  Rheumatology Clinic

## 2020-02-13 NOTE — TELEPHONE ENCOUNTER
Miriam Sherman MD Beard, Madeline, RN   Caller: Unspecified (Yesterday, 12:52 PM)             No. I agree with you recommendations. Best to wait if not urgent.      Explained to patient.  If routine dental care, ok to proceed. Anything major would wait.  Pt understands.  She is going to have crown placed, no major dental care.    Sophie Alvarado RN  Rheumatology Clinic

## 2020-02-18 DIAGNOSIS — L93.0 LUPUS ERYTHEMATOSUS, UNSPECIFIED FORM: ICD-10-CM

## 2020-02-20 RX ORDER — HYDROXYCHLOROQUINE SULFATE 200 MG/1
200 TABLET, FILM COATED ORAL 2 TIMES DAILY
Qty: 180 TABLET | Refills: 0 | Status: SHIPPED | OUTPATIENT
Start: 2020-02-20 | End: 2020-05-04

## 2020-02-20 NOTE — TELEPHONE ENCOUNTER
Plaquenil      Last Written Prescription Date:  11/12/19  Last Fill Quantity: 180,   # refills: 0  Last Office Visit: 1/23/20  Future Office visit: 6/4/20  Last Eye Exam:  - Annual eye exam is current for HCQ monitoring      Routing refill request to provider for review/approval because:  No record of recent eye exam in [facility] - however dictation shows she is current    Please follow up.  Thank you

## 2020-02-21 ENCOUNTER — MYC MEDICAL ADVICE (OUTPATIENT)
Dept: RHEUMATOLOGY | Facility: CLINIC | Age: 55
End: 2020-02-21

## 2020-02-21 DIAGNOSIS — L93.0 LUPUS ERYTHEMATOSUS, UNSPECIFIED FORM: Primary | ICD-10-CM

## 2020-02-21 NOTE — TELEPHONE ENCOUNTER
Sent pt a mychart to ask the pt when her most recent eye exam was.    Kelley Nelson, BSN RN   Rheumatology Clinic Nurse   MARYAM Stokes

## 2020-02-23 ENCOUNTER — HEALTH MAINTENANCE LETTER (OUTPATIENT)
Age: 55
End: 2020-02-23

## 2020-02-24 RX ORDER — HYDROXYCHLOROQUINE SULFATE 200 MG/1
200 TABLET, FILM COATED ORAL 2 TIMES DAILY
Qty: 28 TABLET | Refills: 0 | Status: SHIPPED | OUTPATIENT
Start: 2020-02-24 | End: 2020-06-26

## 2020-02-24 NOTE — TELEPHONE ENCOUNTER
Pt left message requesting urgent fill of HCQ as her mail order is behind.  She is requesting 2 week fill be sent to [facility] as she is out of the medication.    Sophie Alvarado RN  Rheumatology Clinic

## 2020-03-05 DIAGNOSIS — M32.9 SYSTEMIC LUPUS ERYTHEMATOSUS, UNSPECIFIED SLE TYPE, UNSPECIFIED ORGAN INVOLVEMENT STATUS (H): ICD-10-CM

## 2020-03-05 DIAGNOSIS — R82.90 NONSPECIFIC FINDING ON EXAMINATION OF URINE: Primary | ICD-10-CM

## 2020-03-05 LAB
ALBUMIN SERPL-MCNC: 2.8 G/DL (ref 3.4–5)
ALBUMIN UR-MCNC: >=300 MG/DL
ALT SERPL W P-5'-P-CCNC: 22 U/L (ref 0–50)
APPEARANCE UR: CLEAR
AST SERPL W P-5'-P-CCNC: 14 U/L (ref 0–45)
BACTERIA #/AREA URNS HPF: ABNORMAL /HPF
BASOPHILS # BLD AUTO: 0 10E9/L (ref 0–0.2)
BASOPHILS NFR BLD AUTO: 0.4 %
BILIRUB UR QL STRIP: NEGATIVE
COLOR UR AUTO: YELLOW
CREAT SERPL-MCNC: 0.62 MG/DL (ref 0.52–1.04)
CREAT UR-MCNC: 51 MG/DL
CREAT UR-MCNC: 53 MG/DL
CRP SERPL-MCNC: 10.5 MG/L (ref 0–8)
DIFFERENTIAL METHOD BLD: ABNORMAL
EOSINOPHIL # BLD AUTO: 0 10E9/L (ref 0–0.7)
EOSINOPHIL NFR BLD AUTO: 0.1 %
ERYTHROCYTE [DISTWIDTH] IN BLOOD BY AUTOMATED COUNT: 15.1 % (ref 10–15)
ERYTHROCYTE [SEDIMENTATION RATE] IN BLOOD BY WESTERGREN METHOD: 42 MM/H (ref 0–30)
GFR SERPL CREATININE-BSD FRML MDRD: >90 ML/MIN/{1.73_M2}
GLUCOSE UR STRIP-MCNC: NEGATIVE MG/DL
HCT VFR BLD AUTO: 36.1 % (ref 35–47)
HGB BLD-MCNC: 11.3 G/DL (ref 11.7–15.7)
HGB UR QL STRIP: ABNORMAL
KETONES UR STRIP-MCNC: NEGATIVE MG/DL
LEUKOCYTE ESTERASE UR QL STRIP: NEGATIVE
LYMPHOCYTES # BLD AUTO: 0.3 10E9/L (ref 0.8–5.3)
LYMPHOCYTES NFR BLD AUTO: 4.5 %
MCH RBC QN AUTO: 27.4 PG (ref 26.5–33)
MCHC RBC AUTO-ENTMCNC: 31.3 G/DL (ref 31.5–36.5)
MCV RBC AUTO: 87 FL (ref 78–100)
MONOCYTES # BLD AUTO: 0.5 10E9/L (ref 0–1.3)
MONOCYTES NFR BLD AUTO: 6.2 %
NEUTROPHILS # BLD AUTO: 6.8 10E9/L (ref 1.6–8.3)
NEUTROPHILS NFR BLD AUTO: 88.8 %
NITRATE UR QL: NEGATIVE
NON-SQ EPI CELLS #/AREA URNS LPF: ABNORMAL /LPF
PH UR STRIP: 5.5 PH (ref 5–7)
PLATELET # BLD AUTO: 228 10E9/L (ref 150–450)
PLATELET # BLD EST: ABNORMAL 10*3/UL
PROT UR-MCNC: 2.24 G/L
PROT/CREAT 24H UR: 4.38 G/G CR (ref 0–0.2)
RBC # BLD AUTO: 4.13 10E12/L (ref 3.8–5.2)
RBC #/AREA URNS AUTO: ABNORMAL /HPF
RBC MORPH BLD: NORMAL
SOURCE: ABNORMAL
SP GR UR STRIP: 1.02 (ref 1–1.03)
UROBILINOGEN UR STRIP-ACNC: 0.2 EU/DL (ref 0.2–1)
WBC # BLD AUTO: 7.6 10E9/L (ref 4–11)
WBC #/AREA URNS AUTO: ABNORMAL /HPF

## 2020-03-05 PROCEDURE — 84460 ALANINE AMINO (ALT) (SGPT): CPT | Performed by: INTERNAL MEDICINE

## 2020-03-05 PROCEDURE — 86225 DNA ANTIBODY NATIVE: CPT | Performed by: INTERNAL MEDICINE

## 2020-03-05 PROCEDURE — 84450 TRANSFERASE (AST) (SGOT): CPT | Performed by: INTERNAL MEDICINE

## 2020-03-05 PROCEDURE — 36415 COLL VENOUS BLD VENIPUNCTURE: CPT | Performed by: INTERNAL MEDICINE

## 2020-03-05 PROCEDURE — 85652 RBC SED RATE AUTOMATED: CPT | Performed by: INTERNAL MEDICINE

## 2020-03-05 PROCEDURE — 87086 URINE CULTURE/COLONY COUNT: CPT | Performed by: INTERNAL MEDICINE

## 2020-03-05 PROCEDURE — 82565 ASSAY OF CREATININE: CPT | Performed by: INTERNAL MEDICINE

## 2020-03-05 PROCEDURE — 82040 ASSAY OF SERUM ALBUMIN: CPT | Performed by: INTERNAL MEDICINE

## 2020-03-05 PROCEDURE — 86160 COMPLEMENT ANTIGEN: CPT | Performed by: INTERNAL MEDICINE

## 2020-03-05 PROCEDURE — 86140 C-REACTIVE PROTEIN: CPT | Performed by: INTERNAL MEDICINE

## 2020-03-05 PROCEDURE — 81001 URINALYSIS AUTO W/SCOPE: CPT | Performed by: INTERNAL MEDICINE

## 2020-03-05 PROCEDURE — 85025 COMPLETE CBC W/AUTO DIFF WBC: CPT | Performed by: INTERNAL MEDICINE

## 2020-03-05 PROCEDURE — 84156 ASSAY OF PROTEIN URINE: CPT | Performed by: INTERNAL MEDICINE

## 2020-03-05 NOTE — LETTER
March 30, 2020      Taina Signh  [address] [place] [address]        Dear ,    We are writing to inform you of your test results.    Improved urine protein and C4. The rest of your labs are stable.    Resulted Orders   Creatinine random urine   Result Value Ref Range    Creatinine Urine Random 53 mg/dL   Protein  random urine with Creat Ratio   Result Value Ref Range    Protein Random Urine 2.24 g/L    Protein Total Urine g/gr Creatinine 4.38 (H) 0 - 0.2 g/g Cr   Erythrocyte sedimentation rate auto   Result Value Ref Range    Sed Rate 42 (H) 0 - 30 mm/h   DNA double stranded antibodies   Result Value Ref Range    DNA-ds >379 (H) <10 IU/mL      Comment:      Positive   CRP inflammation   Result Value Ref Range    CRP Inflammation 10.5 (H) 0.0 - 8.0 mg/L   Complement C3   Result Value Ref Range    Complement C3 64 (L) 81 - 157 mg/dL   Complement C4   Result Value Ref Range    Complement C4 14 13 - 39 mg/dL   Creatinine   Result Value Ref Range    Creatinine 0.62 0.52 - 1.04 mg/dL    GFR Estimate >90 >60 mL/min/[1.73_m2]      Comment:      Non  GFR Calc  Starting 12/18/2018, serum creatinine based estimated GFR (eGFR) will be   calculated using the Chronic Kidney Disease Epidemiology Collaboration   (CKD-EPI) equation.      GFR Estimate If Black >90 >60 mL/min/[1.73_m2]      Comment:       GFR Calc  Starting 12/18/2018, serum creatinine based estimated GFR (eGFR) will be   calculated using the Chronic Kidney Disease Epidemiology Collaboration   (CKD-EPI) equation.     CBC with platelets differential   Result Value Ref Range    WBC 7.6 4.0 - 11.0 10e9/L    RBC Count 4.13 3.8 - 5.2 10e12/L    Hemoglobin 11.3 (L) 11.7 - 15.7 g/dL    Hematocrit 36.1 35.0 - 47.0 %    MCV 87 78 - 100 fl    MCH 27.4 26.5 - 33.0 pg    MCHC 31.3 (L) 31.5 - 36.5 g/dL    RDW 15.1 (H) 10.0 - 15.0 %    Platelet Count 228 150 - 450 10e9/L    % Neutrophils 88.8 %    % Lymphocytes 4.5 %    % Monocytes  6.2 %    % Eosinophils 0.1 %    % Basophils 0.4 %    Absolute Neutrophil 6.8 1.6 - 8.3 10e9/L      Comment:      CORRECTED ON 03/05 AT 1117: PREVIOUSLY REPORTED AS 6.7    Absolute Lymphocytes 0.3 (L) 0.8 - 5.3 10e9/L    Absolute Monocytes 0.5 0.0 - 1.3 10e9/L    Absolute Eosinophils 0.0 0.0 - 0.7 10e9/L    Absolute Basophils 0.0 0.0 - 0.2 10e9/L    Diff Method Automated Method     RBC Morphology Normal     Platelet Estimate       Automated count confirmed.  Platelet morphology is normal.   AST   Result Value Ref Range    AST 14 0 - 45 U/L   Albumin level   Result Value Ref Range    Albumin 2.8 (L) 3.4 - 5.0 g/dL   ALT   Result Value Ref Range    ALT 22 0 - 50 U/L   *UA reflex to Microscopic and Culture ([place] and [facility] (except [place] and [place])   Result Value Ref Range    Color Urine Yellow     Appearance Urine Clear     Glucose Urine Negative NEG^Negative mg/dL    Bilirubin Urine Negative NEG^Negative    Ketones Urine Negative NEG^Negative mg/dL    Specific Gravity Urine 1.020 1.003 - 1.035    Blood Urine Moderate (A) NEG^Negative    pH Urine 5.5 5.0 - 7.0 pH    Protein Albumin Urine >=300 (A) NEG^Negative mg/dL    Urobilinogen Urine 0.2 0.2 - 1.0 EU/dL    Nitrite Urine Negative NEG^Negative    Leukocyte Esterase Urine Negative NEG^Negative    Source Midstream Urine    Creatinine urine calculation only   Result Value Ref Range    Creatinine Urine 51 mg/dL   Urine Microscopic   Result Value Ref Range    WBC Urine 10-25 (A) OTO5^0 - 5 /HPF    RBC Urine O - 2 OTO2^O - 2 /HPF    Squamous Epithelial /LPF Urine Few FEW^Few /LPF    Bacteria Urine Few (A) NEG^Negative /HPF   Urine Culture Aerobic Bacterial   Result Value Ref Range    Specimen Description Midstream Urine     Culture Micro       10,000 to 50,000 colonies/mL  mixed urogenital pascual  Susceptibility testing not routinely done         Killian Marroquin MD

## 2020-03-06 LAB
BACTERIA SPEC CULT: NORMAL
C3 SERPL-MCNC: 64 MG/DL (ref 81–157)
C4 SERPL-MCNC: 14 MG/DL (ref 13–39)
DSDNA AB SER-ACNC: >379 IU/ML
SPECIMEN SOURCE: NORMAL

## 2020-03-12 DIAGNOSIS — G47.00 INSOMNIA, UNSPECIFIED TYPE: ICD-10-CM

## 2020-03-12 RX ORDER — ZOLPIDEM TARTRATE 5 MG/1
5 TABLET ORAL
Qty: 30 TABLET | Refills: 0 | Status: SHIPPED | OUTPATIENT
Start: 2020-03-12 | End: 2020-04-06

## 2020-03-12 NOTE — TELEPHONE ENCOUNTER
Last Seen: 1/23/2020  Next Appointment: 6/4/2020  Medication: ambiel 5mg  Last Filled: 1/31/2020  Qty: #30     reviewed as follows:      Request sent to provider for review and signature.    Jayleen Gan CMA   3/12/2020 9:33 AM

## 2020-04-02 ENCOUNTER — DOCUMENTATION ONLY (OUTPATIENT)
Dept: CARE COORDINATION | Facility: CLINIC | Age: 55
End: 2020-04-02

## 2020-04-06 ENCOUNTER — MYC REFILL (OUTPATIENT)
Dept: RHEUMATOLOGY | Facility: CLINIC | Age: 55
End: 2020-04-06

## 2020-04-06 DIAGNOSIS — M32.9 SYSTEMIC LUPUS ERYTHEMATOSUS, UNSPECIFIED SLE TYPE, UNSPECIFIED ORGAN INVOLVEMENT STATUS (H): ICD-10-CM

## 2020-04-06 DIAGNOSIS — G47.00 INSOMNIA, UNSPECIFIED TYPE: ICD-10-CM

## 2020-04-06 RX ORDER — ZOLPIDEM TARTRATE 5 MG/1
5 TABLET ORAL
Qty: 30 TABLET | Refills: 0 | Status: SHIPPED | OUTPATIENT
Start: 2020-04-06 | End: 2020-06-26

## 2020-04-06 RX ORDER — TRAMADOL HYDROCHLORIDE 50 MG/1
50 TABLET ORAL EVERY 12 HOURS PRN
Qty: 60 TABLET | Refills: 1 | Status: SHIPPED | OUTPATIENT
Start: 2020-04-06 | End: 2020-06-26

## 2020-04-06 NOTE — TELEPHONE ENCOUNTER
zolpidem (AMBIEN) 5 MG tablet   Last Written Prescription Date:  3/12/2020  Last Fill Quantity: 30,   # refills: 0  Last Office Visit : 1/23/2020  Future Office visit:  6/4/2020    Routing refill request to provider for review/approval because:  Drug not on the [facility], [facility] or [facility] refill protocol or controlled substance      Gemma Hebert RN  Central Triage Red Flags/Med Refills

## 2020-04-06 NOTE — TELEPHONE ENCOUNTER
Last Office Visit:  1/23/2020  Next Enc:  6/4/2020  Medication: Tramadol   Last filled: 1/24/2020  Qty: 60        Sophie Alvarado RN  Rheumatology Clinic

## 2020-04-09 ENCOUNTER — VIRTUAL VISIT (OUTPATIENT)
Dept: NEPHROLOGY | Facility: CLINIC | Age: 55
End: 2020-04-09
Attending: INTERNAL MEDICINE
Payer: COMMERCIAL

## 2020-04-09 DIAGNOSIS — M32.14 LUPUS NEPHRITIS, ISN/RPS CLASS IV (H): Primary | ICD-10-CM

## 2020-04-09 RX ORDER — FUROSEMIDE 20 MG
20 TABLET ORAL DAILY
Qty: 90 TABLET | Refills: 3 | Status: SHIPPED | OUTPATIENT
Start: 2020-04-09 | End: 2020-10-29

## 2020-04-09 RX ORDER — MYCOPHENOLATE MOFETIL 500 MG/1
1500 TABLET ORAL 2 TIMES DAILY
Qty: 540 TABLET | Refills: 3 | Status: SHIPPED | OUTPATIENT
Start: 2020-04-09 | End: 2021-05-26

## 2020-04-09 NOTE — PROGRESS NOTES
Nephrology Follow Up  April 9, 2020      Taina Singh   MRN:[id number]   YOB: 1965      REASON FOR FOLLOW UP:   Lupus nephritis     HISTORY OF PRESENT ILLNESS:  This was not a face to face visit. The visit took place over the phone. Time spent over the phone was 14 min.     Patient is a 55-year-old female. She was originally diagnosed with systemic lupus erythematosus at age 27 and was placed on hydroxychloroquine with good control of her disease.  This eventually was discontinued.  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       She was in her usual state of health until July 2017 which developed pleuritic chest pain joint pain.  She was placed back on hydroxychloroquine which short courses of steroids.  Eventually in February 2018 she was started on azathioprine with up titration of the dose 100 mg daily.  In May 2018 she was switched to CellCept as she continued to have active joint pain.  Her dose of CellCept was eventually uptitrated to a total of 1500 mg twice daily.  At that time she was also on prednisone at least 20 mg daily.  In September 2018 Benlysta was added.  She continued to have issues with joint pain and pleurisy.  Around April 2019 she discontinued her CellCept as she thought she was switching to rituximab.  She presented by June 2019 with active lupus nephritis which was biopsy-proven.  The biopsy did show features of necrosis in addition to possible TMA.  At that time she also had lupus enteritis.  Therefore in June 2019 she was initiated on IV Cytoxan 750 mg/m2 once monthly and completed her last dose end of November. She remains on prednisone 20 mg daily in addition to hydroxychloroquine.  She is on atovaquone for PCP prophylaxis.     She is currently on CellCept 1500 mg bid and received Rituximab first dose on 1/22 and second dose on 2/7.  "She was then continued on prednisone 20 mg daily. Over the last week she was having more aches and pains in her joint and her malar rash so her prednisone was increased to 30 mg daily and plan to go down 20 mg in 1 week.     Patient has not checked her BP regularly. She believes that her last BP at clinic visit was ok.     PAST MEDICAL HISTORY:  Reviewed with patient on 04/09/2020   As per HPI    MEDICATIONS:  Reviewed with the patient in detail    ALLERGIES:    Reviewed with the patient in detail    REVIEW OF SYSTEMS:  A comprehensive of systems was negative except as noted above.    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 Recently due to increased joint pain her prednisone dose was increased to 30 mg daily. I do not have updated labs but her labs back in March showed that her complements were improving and her hematuria had resolved and proteinuria was coming down.     The plan for now is to continue with the current regimen and reduced prednisone to 20 mg daily next week. Will recheck labs in 4-6 weeks and if all stable ideally will start a slow taper of prednisone. I would also like her to continue with every 6 months of rituximab infusion (which will be do end of June) but will reassess next visit before moving ahead with the infusion.     I also discussed that ideally I would like her to be on either an ACE-I or ARB. She did not tolerate this well before due to \"diziness\". I have asked her to monitor her BP and next visit if renal function is stable can start low dose losartan.     I will touch base with her through phone visit in 6 weeks.         "

## 2020-04-09 NOTE — PATIENT INSTRUCTIONS
Repeat blood work in 4-6 weeks along with a phone visit.     Blood work should include CBC + BMP + albumin + ESR + CRP + C3 + C4+ ds-DNA + UA with microscopy + urine pr/cr

## 2020-04-24 DIAGNOSIS — M32.14 LUPUS NEPHRITIS, ISN/RPS CLASS IV (H): Primary | ICD-10-CM

## 2020-04-27 DIAGNOSIS — M32.9 SYSTEMIC LUPUS ERYTHEMATOSUS, UNSPECIFIED SLE TYPE, UNSPECIFIED ORGAN INVOLVEMENT STATUS (H): ICD-10-CM

## 2020-04-27 LAB
ALBUMIN SERPL-MCNC: 3.4 G/DL (ref 3.4–5)
ALBUMIN UR-MCNC: >=300 MG/DL
ALT SERPL W P-5'-P-CCNC: 33 U/L (ref 0–50)
APPEARANCE UR: CLEAR
AST SERPL W P-5'-P-CCNC: 23 U/L (ref 0–45)
BACTERIA #/AREA URNS HPF: ABNORMAL /HPF
BASOPHILS # BLD AUTO: 0.1 10E9/L (ref 0–0.2)
BASOPHILS NFR BLD AUTO: 0.7 %
BILIRUB UR QL STRIP: NEGATIVE
COLOR UR AUTO: YELLOW
CREAT SERPL-MCNC: 0.78 MG/DL (ref 0.52–1.04)
CREAT UR-MCNC: 40 MG/DL
CREAT UR-MCNC: 43 MG/DL
CRP SERPL-MCNC: 3.6 MG/L (ref 0–8)
DIFFERENTIAL METHOD BLD: ABNORMAL
EOSINOPHIL # BLD AUTO: 0 10E9/L (ref 0–0.7)
EOSINOPHIL NFR BLD AUTO: 0.1 %
ERYTHROCYTE [DISTWIDTH] IN BLOOD BY AUTOMATED COUNT: 14.8 % (ref 10–15)
ERYTHROCYTE [SEDIMENTATION RATE] IN BLOOD BY WESTERGREN METHOD: 22 MM/H (ref 0–30)
GFR SERPL CREATININE-BSD FRML MDRD: 86 ML/MIN/{1.73_M2}
GLUCOSE UR STRIP-MCNC: NEGATIVE MG/DL
HCT VFR BLD AUTO: 41.7 % (ref 35–47)
HGB BLD-MCNC: 13 G/DL (ref 11.7–15.7)
HGB UR QL STRIP: ABNORMAL
KETONES UR STRIP-MCNC: NEGATIVE MG/DL
LEUKOCYTE ESTERASE UR QL STRIP: NEGATIVE
LYMPHOCYTES # BLD AUTO: 0.4 10E9/L (ref 0.8–5.3)
LYMPHOCYTES NFR BLD AUTO: 3.5 %
MCH RBC QN AUTO: 27.5 PG (ref 26.5–33)
MCHC RBC AUTO-ENTMCNC: 31.2 G/DL (ref 31.5–36.5)
MCV RBC AUTO: 88 FL (ref 78–100)
MONOCYTES # BLD AUTO: 0.3 10E9/L (ref 0–1.3)
MONOCYTES NFR BLD AUTO: 3.2 %
NEUTROPHILS # BLD AUTO: 9.7 10E9/L (ref 1.6–8.3)
NEUTROPHILS NFR BLD AUTO: 92.5 %
NITRATE UR QL: NEGATIVE
NON-SQ EPI CELLS #/AREA URNS LPF: ABNORMAL /LPF
PH UR STRIP: 6 PH (ref 5–7)
PLATELET # BLD AUTO: 271 10E9/L (ref 150–450)
PLATELET # BLD EST: ABNORMAL 10*3/UL
PROT UR-MCNC: 1.07 G/L
PROT/CREAT 24H UR: 2.5 G/G CR (ref 0–0.2)
RBC # BLD AUTO: 4.72 10E12/L (ref 3.8–5.2)
RBC #/AREA URNS AUTO: ABNORMAL /HPF
RBC MORPH BLD: NORMAL
SOURCE: ABNORMAL
SP GR UR STRIP: 1.01 (ref 1–1.03)
UROBILINOGEN UR STRIP-ACNC: 0.2 EU/DL (ref 0.2–1)
WBC # BLD AUTO: 10.5 10E9/L (ref 4–11)
WBC #/AREA URNS AUTO: ABNORMAL /HPF

## 2020-04-27 PROCEDURE — 86160 COMPLEMENT ANTIGEN: CPT | Performed by: INTERNAL MEDICINE

## 2020-04-27 PROCEDURE — 84460 ALANINE AMINO (ALT) (SGPT): CPT | Performed by: INTERNAL MEDICINE

## 2020-04-27 PROCEDURE — 85025 COMPLETE CBC W/AUTO DIFF WBC: CPT | Performed by: INTERNAL MEDICINE

## 2020-04-27 PROCEDURE — 84156 ASSAY OF PROTEIN URINE: CPT | Performed by: INTERNAL MEDICINE

## 2020-04-27 PROCEDURE — 82040 ASSAY OF SERUM ALBUMIN: CPT | Performed by: INTERNAL MEDICINE

## 2020-04-27 PROCEDURE — 81001 URINALYSIS AUTO W/SCOPE: CPT | Performed by: INTERNAL MEDICINE

## 2020-04-27 PROCEDURE — 86225 DNA ANTIBODY NATIVE: CPT | Performed by: INTERNAL MEDICINE

## 2020-04-27 PROCEDURE — 84450 TRANSFERASE (AST) (SGOT): CPT | Performed by: INTERNAL MEDICINE

## 2020-04-27 PROCEDURE — 36415 COLL VENOUS BLD VENIPUNCTURE: CPT | Performed by: INTERNAL MEDICINE

## 2020-04-27 PROCEDURE — 86140 C-REACTIVE PROTEIN: CPT | Performed by: INTERNAL MEDICINE

## 2020-04-27 PROCEDURE — 85652 RBC SED RATE AUTOMATED: CPT | Performed by: INTERNAL MEDICINE

## 2020-04-27 PROCEDURE — 82565 ASSAY OF CREATININE: CPT | Performed by: INTERNAL MEDICINE

## 2020-04-27 NOTE — LETTER
July 19, 2020      Taina Singh  [address] [place] [address]        Dear ,    We are writing to inform you of your test results.    {results letter list:[id number]}    Resulted Orders   Creatinine random urine   Result Value Ref Range    Creatinine Urine Random 40 mg/dL   Protein  random urine with Creat Ratio   Result Value Ref Range    Protein Random Urine 1.07 g/L    Protein Total Urine g/gr Creatinine 2.50 (H) 0 - 0.2 g/g Cr   Erythrocyte sedimentation rate auto   Result Value Ref Range    Sed Rate 22 0 - 30 mm/h   DNA double stranded antibodies   Result Value Ref Range    DNA-ds 368 (H) <10 IU/mL      Comment:      Positive   CRP inflammation   Result Value Ref Range    CRP Inflammation 3.6 0.0 - 8.0 mg/L   Complement C3   Result Value Ref Range    Complement C3 77 (L) 81 - 157 mg/dL   Complement C4   Result Value Ref Range    Complement C4 12 (L) 13 - 39 mg/dL   Creatinine   Result Value Ref Range    Creatinine 0.78 0.52 - 1.04 mg/dL    GFR Estimate 86 >60 mL/min/[1.73_m2]      Comment:      Non  GFR Calc  Starting 12/18/2018, serum creatinine based estimated GFR (eGFR) will be   calculated using the Chronic Kidney Disease Epidemiology Collaboration   (CKD-EPI) equation.      GFR Estimate If Black >90 >60 mL/min/[1.73_m2]      Comment:       GFR Calc  Starting 12/18/2018, serum creatinine based estimated GFR (eGFR) will be   calculated using the Chronic Kidney Disease Epidemiology Collaboration   (CKD-EPI) equation.     CBC with platelets differential   Result Value Ref Range    WBC 10.5 4.0 - 11.0 10e9/L    RBC Count 4.72 3.8 - 5.2 10e12/L    Hemoglobin 13.0 11.7 - 15.7 g/dL    Hematocrit 41.7 35.0 - 47.0 %    MCV 88 78 - 100 fl    MCH 27.5 26.5 - 33.0 pg    MCHC 31.2 (L) 31.5 - 36.5 g/dL    RDW 14.8 10.0 - 15.0 %    Platelet Count 271 150 - 450 10e9/L    % Neutrophils 92.5 %    % Lymphocytes 3.5 %    % Monocytes 3.2 %    % Eosinophils 0.1 %    % Basophils 0.7 %     Absolute Neutrophil 9.7 (H) 1.6 - 8.3 10e9/L    Absolute Lymphocytes 0.4 (L) 0.8 - 5.3 10e9/L    Absolute Monocytes 0.3 0.0 - 1.3 10e9/L    Absolute Eosinophils 0.0 0.0 - 0.7 10e9/L    Absolute Basophils 0.1 0.0 - 0.2 10e9/L    Diff Method Automated Method     RBC Morphology Normal     Platelet Estimate       Automated count confirmed.  Platelet morphology is normal.   AST   Result Value Ref Range    AST 23 0 - 45 U/L   Albumin level   Result Value Ref Range    Albumin 3.4 3.4 - 5.0 g/dL   ALT   Result Value Ref Range    ALT 33 0 - 50 U/L   *UA reflex to Microscopic and Culture ([facility] (except [place] and [place])   Result Value Ref Range    Color Urine Yellow     Appearance Urine Clear     Glucose Urine Negative NEG^Negative mg/dL    Bilirubin Urine Negative NEG^Negative    Ketones Urine Negative NEG^Negative mg/dL    Specific Gravity Urine 1.010 1.003 - 1.035    Blood Urine Trace (A) NEG^Negative    pH Urine 6.0 5.0 - 7.0 pH    Protein Albumin Urine >=300 (A) NEG^Negative mg/dL    Urobilinogen Urine 0.2 0.2 - 1.0 EU/dL    Nitrite Urine Negative NEG^Negative    Leukocyte Esterase Urine Negative NEG^Negative    Source Midstream Urine    Creatinine urine calculation only   Result Value Ref Range    Creatinine Urine 43 mg/dL   Urine Microscopic   Result Value Ref Range    WBC Urine 0 - 5 OTO5^0 - 5 /HPF    RBC Urine O - 2 OTO2^O - 2 /HPF    Squamous Epithelial /LPF Urine Few FEW^Few /LPF    Bacteria Urine Few (A) NEG^Negative /HPF       If you have any questions or concerns, please call the clinic at the number listed above.       Sincerely,        [facility]

## 2020-04-29 LAB
C3 SERPL-MCNC: 77 MG/DL (ref 81–157)
C4 SERPL-MCNC: 12 MG/DL (ref 13–39)
DSDNA AB SER-ACNC: 368 IU/ML

## 2020-05-01 DIAGNOSIS — L93.0 LUPUS ERYTHEMATOSUS, UNSPECIFIED FORM: ICD-10-CM

## 2020-05-04 ENCOUNTER — MYC REFILL (OUTPATIENT)
Dept: RHEUMATOLOGY | Facility: CLINIC | Age: 55
End: 2020-05-04

## 2020-05-04 DIAGNOSIS — L93.0 LUPUS ERYTHEMATOSUS, UNSPECIFIED FORM: ICD-10-CM

## 2020-05-04 RX ORDER — HYDROXYCHLOROQUINE SULFATE 200 MG/1
200 TABLET, FILM COATED ORAL 2 TIMES DAILY
Qty: 180 TABLET | Refills: 0 | Status: CANCELLED | OUTPATIENT
Start: 2020-05-04

## 2020-05-04 RX ORDER — HYDROXYCHLOROQUINE SULFATE 200 MG/1
200 TABLET, FILM COATED ORAL 2 TIMES DAILY
Qty: 180 TABLET | Refills: 1 | Status: SHIPPED | OUTPATIENT
Start: 2020-05-04 | End: 2020-06-24

## 2020-05-04 NOTE — TELEPHONE ENCOUNTER
"Last Clinic Visit: 1/23/2020  [facility]    *per above last visit note \"Annual eye exam is current for HCQ monitoring\"  "

## 2020-05-20 NOTE — TELEPHONE ENCOUNTER
Killian Marroquin MD Beard, Sophie, RN   Caller: Unspecified (Today,  1:19 PM)             Recommend 40 mg bid x 7 days then 60 mg a day x one month then the rest based on her response.      Relayed taper to pt, she is ok with that.  She will decrease to 40 mg BID tomorrow.    Sophie Alvarado RN  Rheumatology Clinic    
Pt is currently on 50 mg BID of prednisone.  Provider at [facility] had indicated that pt should decrease prednisone to 60 mg every day tomorrow.  Pt is uncomfortable doing that due to last time, everything flared up. She is wondering if Dr. Marroquin has any recommendations for tapering.    Pt is tired, but otherwise has no pain or symptoms.    Will send to Dr Marroquin to review and advise.    Sophie Alvarado RN  Rheumatology Clinic    
none

## 2020-06-03 ENCOUNTER — MYC REFILL (OUTPATIENT)
Dept: PULMONOLOGY | Facility: CLINIC | Age: 55
End: 2020-06-03

## 2020-06-03 DIAGNOSIS — J21.9 BRONCHIOLITIS: ICD-10-CM

## 2020-06-03 RX ORDER — ALBUTEROL SULFATE 90 UG/1
2 AEROSOL, METERED RESPIRATORY (INHALATION) EVERY 6 HOURS PRN
Qty: 3 INHALER | Refills: 3 | Status: CANCELLED | OUTPATIENT
Start: 2020-06-03

## 2020-06-04 ENCOUNTER — ANCILLARY PROCEDURE (OUTPATIENT)
Dept: CT IMAGING | Facility: CLINIC | Age: 55
End: 2020-06-04
Attending: INTERNAL MEDICINE
Payer: COMMERCIAL

## 2020-06-04 ENCOUNTER — VIRTUAL VISIT (OUTPATIENT)
Dept: RHEUMATOLOGY | Facility: CLINIC | Age: 55
End: 2020-06-04
Attending: INTERNAL MEDICINE
Payer: COMMERCIAL

## 2020-06-04 ENCOUNTER — MYC REFILL (OUTPATIENT)
Dept: PULMONOLOGY | Facility: CLINIC | Age: 55
End: 2020-06-04

## 2020-06-04 DIAGNOSIS — M32.9 SYSTEMIC LUPUS ERYTHEMATOSUS, UNSPECIFIED SLE TYPE, UNSPECIFIED ORGAN INVOLVEMENT STATUS (H): ICD-10-CM

## 2020-06-04 DIAGNOSIS — M32.9 SYSTEMIC LUPUS ERYTHEMATOSUS, UNSPECIFIED SLE TYPE, UNSPECIFIED ORGAN INVOLVEMENT STATUS (H): Primary | ICD-10-CM

## 2020-06-04 DIAGNOSIS — J21.9 BRONCHIOLITIS: ICD-10-CM

## 2020-06-04 LAB
ALBUMIN SERPL-MCNC: 3.2 G/DL (ref 3.4–5)
ALT SERPL W P-5'-P-CCNC: 22 U/L (ref 0–50)
AST SERPL W P-5'-P-CCNC: 19 U/L (ref 0–45)
CREAT SERPL-MCNC: 0.74 MG/DL (ref 0.52–1.04)
CRP SERPL-MCNC: 56.8 MG/L (ref 0–8)
ERYTHROCYTE [SEDIMENTATION RATE] IN BLOOD BY WESTERGREN METHOD: 44 MM/H (ref 0–30)
GFR SERPL CREATININE-BSD FRML MDRD: >90 ML/MIN/{1.73_M2}

## 2020-06-04 PROCEDURE — 36415 COLL VENOUS BLD VENIPUNCTURE: CPT | Performed by: INTERNAL MEDICINE

## 2020-06-04 PROCEDURE — 86160 COMPLEMENT ANTIGEN: CPT | Mod: GT | Performed by: INTERNAL MEDICINE

## 2020-06-04 PROCEDURE — 86160 COMPLEMENT ANTIGEN: CPT | Performed by: INTERNAL MEDICINE

## 2020-06-04 PROCEDURE — 82565 ASSAY OF CREATININE: CPT | Performed by: INTERNAL MEDICINE

## 2020-06-04 PROCEDURE — 85025 COMPLETE CBC W/AUTO DIFF WBC: CPT | Performed by: INTERNAL MEDICINE

## 2020-06-04 PROCEDURE — 82306 VITAMIN D 25 HYDROXY: CPT | Performed by: INTERNAL MEDICINE

## 2020-06-04 PROCEDURE — 86225 DNA ANTIBODY NATIVE: CPT | Performed by: INTERNAL MEDICINE

## 2020-06-04 PROCEDURE — 82040 ASSAY OF SERUM ALBUMIN: CPT | Performed by: INTERNAL MEDICINE

## 2020-06-04 PROCEDURE — 85652 RBC SED RATE AUTOMATED: CPT | Performed by: INTERNAL MEDICINE

## 2020-06-04 PROCEDURE — 86160 COMPLEMENT ANTIGEN: CPT | Mod: 59 | Performed by: INTERNAL MEDICINE

## 2020-06-04 PROCEDURE — 84450 TRANSFERASE (AST) (SGOT): CPT | Performed by: INTERNAL MEDICINE

## 2020-06-04 PROCEDURE — 71250 CT THORAX DX C-: CPT | Mod: TC

## 2020-06-04 PROCEDURE — 84460 ALANINE AMINO (ALT) (SGPT): CPT | Performed by: INTERNAL MEDICINE

## 2020-06-04 PROCEDURE — 86140 C-REACTIVE PROTEIN: CPT | Performed by: INTERNAL MEDICINE

## 2020-06-04 RX ORDER — ALBUTEROL SULFATE 90 UG/1
2 AEROSOL, METERED RESPIRATORY (INHALATION) EVERY 6 HOURS PRN
Qty: 3 INHALER | Refills: 3 | Status: SHIPPED | OUTPATIENT
Start: 2020-06-04

## 2020-06-04 ASSESSMENT — PAIN SCALES - GENERAL: PAINLEVEL: MODERATE PAIN (5)

## 2020-06-04 NOTE — LETTER
"6/4/2020       RE: Taina Singh  [address]  [place] [address]     Dear Colleague,    Thank you for referring your patient, Taina Singh, to the [facility] RHEUMATOLOGY at [facility]. Please see a copy of my visit note below.    Taina Singh is a 55 year old female who is being evaluated via a billable video visit.      The patient has been notified of following:     \"This video visit will be conducted via a call between you and your physician/provider. We have found that certain health care needs can be provided without the need for an in-person physical exam.  This service lets us provide the care you need with a video conversation.  If a prescription is necessary we can send it directly to your pharmacy.  If lab work is needed we can place an order for that and you can then stop by our lab to have the test done at a later time.    Video visits are billed at different rates depending on your insurance coverage.  Please reach out to your insurance provider with any questions.    If during the course of the call the physician/provider feels a video visit is not appropriate, you will not be charged for this service.\"    Patient has given verbal consent for Video visit? Yes    How would you like to obtain your AVS? [email or web address]    Patient would like the video invitation sent by: Email: [email or web address]    Will anyone else be joining your video visit? No        Video-Visit Details    Type of service:  Video Visit    Video Start Time: 9:09 AM  Video End Time: 9:25 AM    Originating Location (pt. Location): Home    Distant Location (provider location):  [facility] RHEUMATOLOGY     Platform used for Video Visit: Pradeep Marroquin MD            Rheumatology Follow-up Virtual Visit Note    Reason for visit: f/u for lupus (this is a video visit due to COVID-19 outbreak), patient agreed      Date of last visit: 1/23/2020    DOS: 6/4/2020      History of Present Illness:    Taina" Samantha is a 54 year old  female who was referred to our clinic for evaluation and management of her SLE.    She was diagnosed with lupus at age 27. Her 1st sx were malar rash and fatigue. No skin biopsy was done (reportedly had +KARLIE). She was treated with prednisone taper and HCQ. HCQ helped and she tolerated it well. She was diagnosed with Sjogren's at the same time. Had dryness of eyes and mouth. No lip biopsy to confirm the Dx. Reportedly she had very mild stable lupus for many years and eventually at some point, stopped taking HCQ (can't remember when)    She was diagnosed with MALT lymphoma at 44, had enlarged LN over R parotid gland, on biopsy it was MALT lymphoma. Tx was resection only, no radiation or chemo.    About 3 yr ago, had flu shot and 2 days later, developed pleuritic CP and was seen at urgent care. That's why she does not want to get flu shot. She was seen in ER 2 days after UC visit. She was treated with prednisone.    She lost 100 lbs by exercise over past 2 yr, felt amazing. In 7/2017, started not feeling well. She had extreme fatigue. Had AM stiffness and pain over hands. Touched base with her previous rheumatologist (Dr. Rangel) and was told to have lupus flare and was put on  mg qd. Afterwards, developed pleuritic CP which has not been resolved.    She was given prednisone 40 mg qd x 5 days (on 12/16/2017) by pulmonologist in [facility]. It helped a lot but pleuritic CP recurred after stopping it. She was told to have pulm HTN and was evaluated for it.    No triggers for current flare. No flare of malar rash. No current hair loss. Uses blink eyedrops, restasis burned her eyes. She has been prescribed salagen for dry mouth, has not used it. Arthritis of hands have resolved on HCQ. Last HCQ eye exam was 1 week ago.    4/2018: On AZA 50 mg qd since 2/8/2018, increased to 100 mg qd in 3/19/2018. Her arthralgia is intermittent and mild, it's better. Has fatigue.    5/2018: Started on  mycophenalate 1500 mg, continues prednisone 30 mg and plaquenil 200 mg twice a day. Her major complaint is continues pressure over her chest. Pain is pleuritic, it hurts by sneezing, taking deep breath.    7/2018: Ms. Singh feels an improvement in her symptoms. She says there is a baseline pleuritic chest pain, but her fatigue and overall day-to-day function has improved to her satisfaction. She says morning stiffness usually only lasts about 10 minutes. She complains of occasional episodes of flashing lights in her left eye, however she is being seen by her opthamologist. She has a OTC scheduled for Monday as well. Ms. Singh feels as though the mycophenalate has made the biggest difference in her improvement. She continues to taper the prednisone, currently on 20 mg daily. She also has continued the plaquenil 200 mg twice a day.     11/2018: On benlysta infusion since beginning of Sept 2018. Chest still feels less tight, breathing is much better. Sometimes hands ache but it does lot last long. Benlysta makes her tired, but overall feels her lupus sx are much improved on benlysta.    3/2019: Feels tired and achy, it is intermittent and moves around. The L hip pain is consistent for the past 7 days, she moved up her appointment from 3/22 to today to get a bursitis shot. Had bad sinus and ear infection in 1/2019. She still thinks benlysta has helped her a lot. Last night, her R shoulder was hurting so bad that she could not move it but it's better today. Pain comes and goes. Breathing is better after adding benlysta, she is not gasping for air anymore which is huge improvement for her. She feels pressure over her chest and low back. Is getting benlysta tomorrow. Her prednisone dose is 10 mg a day. Leaving to [place] for vacation.    6/5/2019: She reports severe diffuse arthralgia affecting all her joints with pain level 8/10, today is 6/10. No SOB. Feels pressure like CP. Has severe fatigue. Last benlysta was  7 wk ago.    7/2019: Since last visit 1 month ago, patient was hospitalized x2 (both 6/10-6/13 & 6/18-6/22) at [facility] for acute abdominal pain. 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 Team felt that 2nd admission was 2/2 tapering of steroids. She was pulsed during first admission and then got Cytoxan as outpatient in between admissions on 6/15. Incidentally, patient found to have small splenic infarct and acute PE on CT scan. She was started on Xarelto. Also had her kidney biopsy on 6/7/19. Biopsy showed diffuse global lupus nephritis, ISN/RPS class IV. She has also followed up with nephrology this past week and started on lisinopril. No reports of hypotension. Since leaving hospital, biggest complaint is leg strength/conditioning. She thinks this is related to laying in bed while hospitalized. Going to start PT as soon as it is set up.     9/6/2019: Last Wed had her cytoxan infusion, everything went well. Did not sleep well on Wed evening. Called our clinic the next Thu as had diffuse body pain and N/V, also was a on a new BP med x 1 mo which made her dizzy. Her legs were swollen. She was seen in ED that Thu, her BP found to be low. She was instructed to stop BP med and her facial swelling, dizziness, LE swelling resolved, lost 10 lbs. That ended up being reaction to med. Does not know if it was lisinopril or losartan. S/p 3 cytoxan infusions. Overall she is feeling better, minimal joint pain, no SOB/CP/abdominal pain. Has malar rash. No oral ulcers. She has been on prednisone 30 mg a day x 2 weeks.     1/23/2020: Patient started Rituxin infusions yesterday and denies any side effects. Overall she is feeling better than she has in a long time. She denies significant flares in the past 3 months currently on max dose Cellcept, HCQ and daily prednisone 20 mg. She has an intermittent malar rash worsened with stress and cold weather. Today she denies any joint pain or swelling, new rash, ulcers, or worsened GI symptoms since last  visit. She notes improvement in her leg swelling now on HCTZ. She denies urinary symptoms. Patient also reports that she is sleeping better and no longer takes Ambien nightly.    Today 2020:    Had rituxiamb  2020, 2020.    Scheduled for rituximab , 2020.    On mepron for PJP prophylaxis. On  mg bid, MMF 1500 mg bid and prednisone.    In 2020, had URI and was treated with Augmentin, zyrtec. Reports having sinus drainage with clear phlegm at that time. But now, has exertional SOB and cough, not feeling well. Joints all ache despite being on prednisone 25 mg every day. Wondering if some of these sx could be anxiety related given COVID pandemic.    ROS:  A comprehensive ROS was done, positives are per HPI.    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44    No chemo or radiation     Other forms of systemic lupus erythematosus (H) 2018     Pulmonary embolism (H)     2019 seen on abd [facility]     Sjogren's syndrome (H)     secondary Sjogrens, secondary to lupus     Vitamin D deficiency      Past Surgical History:   Procedure Laterality Date     BIOPSY/REMOVAL, LYMPH NODE(S)        SECTION  age 32     HC HYSTEROS W PERMANENT FALLOPAIN IMPLANT      Essure b/l     PAROTIDECTOMY Right 2006     TONSILLECTOMY       Family History   Problem Relation Age of Onset     Alopecia Brother      Rheumatoid Arthritis Brother      LUNG DISEASE No family hx of      Social History     Socioeconomic History     Marital status:       Spouse name: Not on file     Number of children: Not on file     Years of education: 1     Highest education level: Not on file   Occupational History     Comment: , 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   Social Needs     Financial resource strain: Not on file     Food insecurity     Worry: Not on file     Inability: Not on file     Transportation needs     Medical: Not on file     Non-medical: Not on file   Tobacco Use     Smoking status: Never Smoker     Smokeless tobacco: Never Used   Substance and Sexual Activity     Alcohol use: No     Drug use: No     Sexual activity: Not on file   Lifestyle     Physical activity     Days per week: Not on file     Minutes per session: Not on file     Stress: Not on file   Relationships     Social connections     Talks on phone: Not on file     Gets together: Not on file     Attends Congregation service: Not on file     Active member of club or organization: Not on file     Attends meetings of clubs or organizations: Not on file     Relationship status: Not on file     Intimate partner violence     Fear of current or ex partner: Not on file     Emotionally abused: Not on file     Physically abused: Not on file     Forced sexual activity: Not on file   Other Topics Concern     Parent/sibling w/ CABG, MI or angioplasty before 65F 55M? 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      Going through divorce but it's not stress factor for her.    Allergies   Allergen Reactions     Pentamidine Shortness Of Breath     Penicillins Rash     Sulfa Drugs Rash       Outpatient Encounter Medications as of 2020   Medication Sig Dispense Refill     atovaquone (MEPRON) 750 MG/5ML suspension Take 10 mLs (1,500 mg) by  mouth daily 210 mL 5     Calcium-Magnesium 300-300 MG TABS daily        fluticasone (FLONASE) 50 MCG/ACT nasal spray Spray 1-2 sprays into both nostrils daily Use 1 spray per nostril per day for 2 weeks.  May increase to 2 sprays per nostril per day after. 16 g 0     hydroxychloroquine (PLAQUENIL) 200 MG tablet Take 1 tablet (200 mg) by mouth 2 times daily 180 tablet 1     Multiple Vitamins-Minerals (WOMENS MULTI PO) Take by mouth daily        mycophenolate (GENERIC EQUIVALENT) 500 MG tablet Take 3 tablets (1,500 mg) by mouth 2 times daily 540 tablet 3     Omega-3 Fatty Acids (OMEGA-3 FISH OIL) 500 MG CAPS Take 500 mg by mouth daily        pantoprazole (PROTONIX) 20 MG EC tablet Take 2 tablets (40 mg) by mouth 2 times daily       predniSONE (DELTASONE) 10 MG tablet Take 2 tablets (20 mg) by mouth daily 180 tablet 3     predniSONE (DELTASONE) 2.5 MG tablet 25-20-17.5-15-12.5 mg a day each for one week then 10 mg a day, (take along with 10 and 5 mg size tabs) 100 tablet 3     traMADol (ULTRAM) 50 MG tablet Take 1 tablet (50 mg) by mouth every 12 hours as needed for pain Prn pain 60 tablet 1     vitamin D3 (CHOLECALCIFEROL) 2000 units (50 mcg) tablet Take 1 tablet (2,000 Units) by mouth daily 90 tablet 11     warfarin ANTICOAGULANT (COUMADIN) 5 MG tablet   1     zolpidem (AMBIEN) 5 MG tablet Take 1 tablet (5 mg) by mouth nightly as needed for sleep 30 tablet 0     [DISCONTINUED] albuterol (PROAIR HFA/PROVENTIL HFA/VENTOLIN HFA) 108 (90 Base) MCG/ACT inhaler Inhale 2 puffs into the lungs every 6 hours as needed for shortness of breath / dyspnea or wheezing 3 Inhaler 3     furosemide (LASIX) 20 MG tablet Take 1 tablet (20 mg) by mouth daily (Patient not taking: Reported on 6/4/2020) 90 tablet 3     hydroxychloroquine (PLAQUENIL) 200 MG tablet Take 1 tablet (200 mg) by mouth 2 times daily Annual Plaquenil toxicity eye screening required. (Patient not taking: Reported on 6/4/2020) 28 tablet 0     pilocarpine (SALAGEN) 5 MG  tablet Take 1 tablet (5 mg) by mouth 4 times daily as needed (Patient not taking: Reported on 2019) 360 tablet 3     No facility-administered encounter medications on file as of 2020.        Results:    RHEUM RESULTS Latest Ref Rng & Units 2020 3/5/2020 2020   COMPLEMENT C3 81 - 157 mg/dL 66(L) 64(L) 77(L)   COMPLEMENT C4 13 - 39 mg/dL 9(L) 14 12(L)   DNA-DS <10 IU/mL >379(H) >379(H) 368(H)   SED RATE 0 - 30 mm/h 47(H) 42(H) 22   CRP, INFLAMMATION 0.0 - 8.0 mg/L 9.3(H) 10.5(H) 3.6   AST 0 - 45 U/L 14 14 23   ALT 0 - 50 U/L 19 22 33   ALBUMIN 3.4 - 5.0 g/dL 2.7(L) 2.8(L) 3.4   WBC 4.0 - 11.0 10e9/L 6.4 7.6 10.5   RBC 3.8 - 5.2 10e12/L 4.24 4.13 4.72   HGB 11.7 - 15.7 g/dL 11.3(L) 11.3(L) 13.0   HCT 35.0 - 47.0 % 35.3 36.1 41.7   MCV 78 - 100 fl 83 87 88   MCHC 31.5 - 36.5 g/dL 32.0 31.3(L) 31.2(L)   RDW 10.0 - 15.0 % 15.9(H) 15.1(H) 14.8    - 450 10e9/L 230 228 271   CREATININE 0.52 - 1.04 mg/dL 0.66 0.62 0.78   GFR ESTIMATE, IF BLACK >60 mL/min/[1.73:m2] >90 >90 >90   GFR ESTIMATE >60 mL/min/[1.73:m2] >90 >90 86    - 1,616 mg/dL 379(L) - -   IGA 84 - 499 mg/dL 287 - -   IGM 35 - 242 mg/dL 41 - -   HEPATITIS C ANTIBODY NR:Nonreactive Nonreactive - -       Her records were reviewed.    2D-Echo 2017:    ECHO COMPLETE WO CONTRAST CHILANGO GIBBONS 2017 14:43     [facility]   [address], [address], [place] [address]   Main: [phone]  [email or web address]                                                 Transthoracic Echo Report   CHILANGO GIBBONS   Jessica ID: [id number] Age: 52 : 1965 Ordering Provider: NGUYEN PETERS   Exam Date: 2017 14:43 Gender: F Sonographer: HAJA   Accession #: [id number] Height: 66 in BSA: 2.24 m  BP: 108 / 62   Weight: 261 lbs BMI: 42.1 kg/m        Site: [facility]   Location: Outpatient Rhythm: Normal Sinus Rhythm   Procedure Components: 2D imaging, Color Doppler,  "Spectral Doppler   Indications: Murmur; Systemic lupus erythematosus, unspecified SLE type, unspecified organ   involvement status   Technical Quality: Contrast: None     Final Conclusion   Visually estimated ejection fraction is 55-60%.   Mild left ventricular hypertrophy.   Estimated pulmonary artery pressure of 31 mmHg + RA pressure.   Dilated IVC size, <50% collapse with respiration.   Estimated EF: 55-60%   FINDINGS   Left Ventricle Normal left ventricular size. Visually estimated ejection fraction is 55-60%.   Mild left ventricular hypertrophy.   Diastolic Function \"Pseudonormal\" left ventricular filling pattern. E/e' ratio 8-15 is   indeterminate for filling pressure.   Right Ventricle Normal right ventricular size and function.   Left Atrium Mild left atrial enlargement.   Right Atrium Mild right atrial enlargement.   Aortic Valve Normal aortic valve. No aortic stenosis. No significant aortic regurgitation.   Mitral Valve Normal mitral valve. No mitral stenosis. Mild mitral regurgitation.   Tricuspid Valve Normal tricuspid valve. No tricuspid stenosis. Mild tricuspid regurgitation.   Estimated pulmonary artery pressure   of 31 mmHg + RA pressure.   Pulmonic Valve Normal pulmonic valve without significant stenosis or regurgitation.   Pericardium Normal pericardium.   Aorta Measured aortic root diameter is normal in size.   Inferior Vena Cava Dilated IVC size, <50% collapse with respiration.    Component      Latest Ref Rng & Units 11/20/2017   Sodium      133 - 144 mmol/L 137   Potassium      3.4 - 5.3 mmol/L 4.2   Chloride      94 - 109 mmol/L 102   Carbon Dioxide      20 - 32 mmol/L 30   Anion Gap      3 - 14 mmol/L 6   Glucose      70 - 99 mg/dL 101 (H)   Urea Nitrogen      7 - 30 mg/dL 13   Creatinine      0.52 - 1.04 mg/dL 0.55   GFR Estimate      >60 mL/min/1.7m2 >90   GFR Estimate If Black      >60 mL/min/1.7m2 >90   Calcium      8.5 - 10.1 mg/dL 9.1   WBC      4.0 - 11.0 10e9/L 4.9   RBC Count      " 3.8 - 5.2 10e12/L 4.28   Hemoglobin      11.7 - 15.7 g/dL 11.3 (L)   Hematocrit      35.0 - 47.0 % 35.5   MCV      78 - 100 fl 83   MCH      26.5 - 33.0 pg 26.4 (L)   MCHC      31.5 - 36.5 g/dL 31.8   RDW      10.0 - 15.0 % 14.6   Platelet Count      150 - 450 10e9/L 215   N-Terminal Pro Bnp      0 - 125 pg/mL 546 (H)   Sed Rate      0 - 30 mm/h 71 (H)     Component      Latest Ref Rng & Units 12/29/2017   Color Urine       Straw   Appearance Urine       Clear   Glucose Urine      NEG:Negative mg/dL Negative   Bilirubin Urine      NEG:Negative Negative   Ketones Urine      NEG:Negative mg/dL Negative   Specific Gravity Urine      1.003 - 1.035 1.005   Blood Urine      NEG:Negative Negative   pH Urine      5.0 - 7.0 pH 6.0   Protein Albumin Urine      NEG:Negative mg/dL Negative   Urobilinogen mg/dL      0.0 - 2.0 mg/dL 0.0   Nitrite Urine      NEG:Negative Negative   Leukocyte Esterase Urine      NEG:Negative Negative   Source       Midstream Urine   WBC Urine      0 - 2 /HPF <1   RBC Urine      0 - 2 /HPF <1   Bacteria Urine      NEG:Negative /HPF Few (A)   Squamous Epithelial /HPF Urine      0 - 1 /HPF <1   Mucous Urine      NEG:Negative /LPF Present (A)   Albumin Fraction      3.7 - 5.1 g/dL 4.2   Alpha 1 Fraction      0.2 - 0.4 g/dL 0.4   Alpha 2 Fraction      0.5 - 0.9 g/dL 0.8   Beta Fraction      0.6 - 1.0 g/dL 1.0   Gamma Fraction      0.7 - 1.6 g/dL 1.6   Monoclonal Peak      0.0 g/dL 0.0   ELP Interpretation:       Essentially normal electrophoretic pattern. No monoclonal protein seen. Pathologic . . .   IGG      695 - 1620 mg/dL 1560   IGA      70 - 380 mg/dL 473 (H)   IGM      60 - 265 mg/dL 92   Iron      35 - 180 ug/dL 58   Iron Binding Cap      240 - 430 ug/dL 315   Iron Saturation Index      15 - 46 % 19   TPMT Genotype Specimen       Whole Blood   TPMT Genotype       Neg/Neg   TPMT Predicted Phenotype       Normal   Protein Random Urine      g/L <0.05   Protein Total Urine g/gr Creatinine      0 -  0.2 g/g Cr Unable to calculate due to low value   Deamidated Gliadin Cari, IgA      <7 U/mL 2   Deamidated Gliadin Acri, IgG      <7 U/mL 5   Complement C3      76 - 169 mg/dL 72 (L)   Complement C4      15 - 50 mg/dL 6 (L)   CRP Inflammation      0.0 - 8.0 mg/L 3.2   DNA-ds      <10 IU/mL >379 (H)   Sed Rate      0 - 30 mm/h 49 (H)   Vitamin D Deficiency screening      20 - 75 ug/L 51   Creatinine Urine Random      mg/dL 23   AST      0 - 45 U/L 26   ALT      0 - 50 U/L 35   HEENA  Broad Spectrum       Neg   Beta 2 Glycoprotein 1 Antibody IgG      <7 U/mL <0.6   Beta 2 Glycoprotein 1 Antibody IgM      <7 U/mL <0.9   Thyroid Peroxidase Antibody      <35 IU/mL <10   Thyroglobulin Antibody      <40 IU/mL <20   TSH      0.40 - 4.00 mU/L 0.87   Cryoglobulin      NEG:Negative % Trace (A)   Tissue Transglutaminase Antibody IgA      <7 U/mL 4   Ferritin      8 - 252 ng/mL 163   Endomysial Antibody IgA by IFA      <1:10 <1:10   CRP Cardiac Risk      mg/L 2.9   Creatinine Urine      mg/dL 23     Component      Latest Ref Rng & Units 2/23/2018   Color Urine       Yellow   Appearance Urine       Clear   Glucose Urine      NEG:Negative mg/dL Negative   Bilirubin Urine      NEG:Negative Negative   Ketones Urine      NEG:Negative mg/dL Negative   Specific Gravity Urine      1.003 - 1.035 1.025   Blood Urine      NEG:Negative Negative   pH Urine      5.0 - 7.0 pH 5.5   Protein Albumin Urine      NEG:Negative mg/dL Negative   Urobilinogen Urine      0.2 - 1.0 EU/dL 0.2   Nitrite Urine      NEG:Negative Negative   Leukocyte Esterase Urine      NEG:Negative Negative   Source       Midstream Urine   Protein Random Urine      g/L 0.17   Protein Total Urine g/gr Creatinine      0 - 0.2 g/g Cr 0.18   Complement C3      76 - 169 mg/dL 64 (L)   Complement C4      15 - 50 mg/dL 5 (L)   CRP Inflammation      0.0 - 8.0 mg/L 4.2   DNA-ds      <10 IU/mL >379 (H)   Sed Rate      0 - 30 mm/h 35 (H)   AST      0 - 45 U/L 27   ALT      0 - 50 U/L 31    Creatinine Urine Random      mg/dL 99     Component      Latest Ref Rng & Units 3/16/2018   WBC      4.0 - 11.0 10e9/L 5.6   RBC Count      3.8 - 5.2 10e12/L 4.43   Hemoglobin      11.7 - 15.7 g/dL 12.1   Hematocrit      35.0 - 47.0 % 36.9   MCV      78 - 100 fl 83   MCH      26.5 - 33.0 pg 27.3   MCHC      31.5 - 36.5 g/dL 32.8   RDW      10.0 - 15.0 % 14.5   Platelet Count      150 - 450 10e9/L 224   Diff Method       Automated Method   % Neutrophils      % 81.7   % Lymphocytes      % 9.3   % Monocytes      % 7.5   % Eosinophils      % 1.3   % Basophils      % 0.2   Absolute Neutrophil      1.6 - 8.3 10e9/L 4.6   Absolute Lymphocytes      0.8 - 5.3 10e9/L 0.5 (L)   Absolute Monocytes      0.0 - 1.3 10e9/L 0.4   Absolute Eosinophils      0.0 - 0.7 10e9/L 0.1   Absolute Basophils      0.0 - 0.2 10e9/L 0.0   Creatinine      0.52 - 1.04 mg/dL 0.51 (L)   GFR Estimate      >60 mL/min/1.7m2 >90   GFR Estimate If Black      >60 mL/min/1.7m2 >90   ALT      0 - 50 U/L 27   Albumin      3.4 - 5.0 g/dL 3.5   AST      0 - 45 U/L 18       Component      Latest Ref Rng & Units 3/21/2018   Color Urine       Yellow   Appearance Urine       Clear   Glucose Urine      NEG:Negative mg/dL Negative   Bilirubin Urine      NEG:Negative Negative   Ketones Urine      NEG:Negative mg/dL Negative   Specific Gravity Urine      1.003 - 1.035 <=1.005   pH Urine      5.0 - 7.0 pH 6.5   Protein Albumin Urine      NEG:Negative mg/dL Negative   Urobilinogen Urine      0.2 - 1.0 EU/dL 0.2   Nitrite Urine      NEG:Negative Negative   Blood Urine      NEG:Negative Negative   Leukocyte Esterase Urine      NEG:Negative Negative   Source       Midstream Urine   WBC Urine      OTO5:0 - 5 /HPF 0 - 5   RBC Urine      OTO2:O - 2 /HPF O - 2   Squamous Epithelial /LPF Urine      FEW:Few /LPF Few   Protein Random Urine      g/L <0.05   Protein Total Urine g/gr Creatinine      0 - 0.2 g/g Cr Unable to calculate due to low value   DNA-ds      <10 IU/mL >379 (H)    Complement C4      15 - 50 mg/dL 4 (L)   Complement C3      76 - 169 mg/dL 69 (L)   Creatinine Urine Random      mg/dL 17   Creatinine Urine      mg/dL 17     EXAMINATION: CT CHEST W/O CONTRAST, 12/29/2017 1:08 PM   TECHNIQUE:  Helical CT images from the thoracic inlet through the lung  bases were obtained without IV contrast during inspiration and  expiration according to high-resolution chest CT protocol. Contrast  dose: None  COMPARISON: None   HISTORY: eval for ILD, pleural effusion, pt has lupus, Sjogren's, h/o  MALT and pleurisy and SOB; Systemic lupus erythematosus, unspecified  SLE type, unspecified organ involvement status (H)   FINDINGS:  At least 75% collapse of the trachea and mainstem bronchi on the end  expiratory views. Diffuse lobular air trapping on end expiratory  images. Bibasilar platelike atelectasis. No pneumothorax or pleural  effusion. Scattered solid pulmonary nodules, for example a 4 mm  lingular nodule (series 5 image 145) and a 4 mm right upper lobe  nodule (image 73).    The heart size is normal. Mild three-vessel coronary artery calcific  atherosclerosis. Dilation of the main pulmonary artery to 4.1 cm. No  pericardial effusion. Normal caliber and configuration of the thoracic  great vessels. Numerous prominent though nonenlarged mediastinal lymph  nodes.  Limited views of the abdomen reveal no worrisome pathology. No  worrisome bony or soft tissue lesions.    IMPRESSION:   1. At least moderate tracheobronchomalacia.  2. 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  3. Scattered subcentimeter pulmonary nodules measuring up to 4 mm.  Consider follow-up chest CT in one year to establish stability.     [Consider Follow Up: Lung nodule]     This report will be copied to the [facility] to ensure a  provider acknowledges the finding.      I have personally  reviewed the examination and initial interpretation  and I agree with the findings.     AUSTIN PACE MD    Examination:NM LUNG SCAN VENTILATION AND PERFUSION, 3/14/2018 8:24 AM    Indication:  Chronic PE; Pulmonary hypertension    Additional Information: none   Technique:   The patient received 2 mCi of Tc-99m DTPA aerosol for ventilation and  7 mCi of Tc-99m MAA for perfusion. Multiple images were obtained of  the lungs in Anterior, posterior, HERNANDES, RPO, LPO, and  Portuguese projections.   Comparison: Chest x-ray same-day   Findings:     There are matched ventilation/perfusion defects in both lungs. No  mismatched perfusion defect to suggest pulmonary emboli.      Impression:  Pulmonary emboli is not present.     I have personally reviewed the examination and initial interpretation  and I agree with the findings.     DONNIE GARCIA MD    Component      Latest Ref Rng & Units 5/12/2018   WBC      4.0 - 11.0 10e9/L 7.1   RBC Count      3.8 - 5.2 10e12/L 4.42   Hemoglobin      11.7 - 15.7 g/dL 12.1   Hematocrit      35.0 - 47.0 % 37.4   MCV      78 - 100 fl 85   MCH      26.5 - 33.0 pg 27.4   MCHC      31.5 - 36.5 g/dL 32.4   RDW      10.0 - 15.0 % 14.7   Platelet Count      150 - 450 10e9/L 226   Diff Method       Automated Method   % Neutrophils      % 86.2   % Lymphocytes      % 4.8   % Monocytes      % 8.4   % Eosinophils      % 0.3   % Basophils      % 0.3   Absolute Neutrophil      1.6 - 8.3 10e9/L 6.1   Absolute Lymphocytes      0.8 - 5.3 10e9/L 0.3 (L)   Absolute Monocytes      0.0 - 1.3 10e9/L 0.6   Absolute Eosinophils      0.0 - 0.7 10e9/L 0.0   Absolute Basophils      0.0 - 0.2 10e9/L 0.0   Color Urine       Yellow   Appearance Urine       Clear   Glucose Urine      NEG:Negative mg/dL Negative   Bilirubin Urine      NEG:Negative Negative   Ketones Urine      NEG:Negative mg/dL Negative   Specific Gravity Urine      1.003 - 1.035 <=1.005   Blood Urine      NEG:Negative Negative   pH Urine      5.0 - 7.0 pH  5.5   Protein Albumin Urine      NEG:Negative mg/dL Negative   Urobilinogen Urine      0.2 - 1.0 EU/dL 0.2   Nitrite Urine      NEG:Negative Negative   Leukocyte Esterase Urine      NEG:Negative Negative   Source       Midstream Urine   Creatinine      0.52 - 1.04 mg/dL 0.63   GFR Estimate      >60 mL/min/1.7m2 >90   GFR Estimate If Black      >60 mL/min/1.7m2 >90   Protein Random Urine      g/L <0.05   Protein Total Urine g/gr Creatinine      0 - 0.2 g/g Cr Unable to calculate due to low value   Albumin      3.4 - 5.0 g/dL 3.6   ALT      0 - 50 U/L 28   AST      0 - 45 U/L 20   Complement C3      76 - 169 mg/dL 46 (L)   Complement C4      15 - 50 mg/dL 3 (L)   CRP Inflammation      0.0 - 8.0 mg/L <2.9   Sed Rate      0 - 30 mm/h 26   DNA-ds      <10 IU/mL >379 (H)   Creatinine Urine      mg/dL 16     Component      Latest Ref Rng & Units 7/7/2018   WBC      4.0 - 11.0 10e9/L 7.0   RBC Count      3.8 - 5.2 10e12/L 4.35   Hemoglobin      11.7 - 15.7 g/dL 11.7   Hematocrit      35.0 - 47.0 % 36.0   MCV      78 - 100 fl 83   MCH      26.5 - 33.0 pg 26.9   MCHC      31.5 - 36.5 g/dL 32.5   RDW      10.0 - 15.0 % 15.3 (H)   Platelet Count      150 - 450 10e9/L 224   Diff Method       Automated Method   % Neutrophils      % 91.9   % Lymphocytes      % 4.0   % Monocytes      % 3.7   % Eosinophils      % 0.3   % Basophils      % 0.1   Absolute Neutrophil      1.6 - 8.3 10e9/L 6.5   Absolute Lymphocytes      0.8 - 5.3 10e9/L 0.3 (L)   Absolute Monocytes      0.0 - 1.3 10e9/L 0.3   Absolute Eosinophils      0.0 - 0.7 10e9/L 0.0   Absolute Basophils      0.0 - 0.2 10e9/L 0.0   Color Urine       Yellow   Appearance Urine       Clear   Glucose Urine      NEG:Negative mg/dL Negative   Bilirubin Urine      NEG:Negative Negative   Ketones Urine      NEG:Negative mg/dL Negative   Specific Gravity Urine      1.003 - 1.035 1.010   pH Urine      5.0 - 7.0 pH 5.5   Protein Albumin Urine      NEG:Negative mg/dL Negative   Urobilinogen  Urine      0.2 - 1.0 EU/dL 0.2   Nitrite Urine      NEG:Negative Negative   Blood Urine      NEG:Negative Trace (A)   Leukocyte Esterase Urine      NEG:Negative Negative   Source       Midstream Urine   WBC Urine      OTO5:0 - 5 /HPF 0 - 5   RBC Urine      OTO2:O - 2 /HPF O - 2   Squamous Epithelial /LPF Urine      FEW:Few /LPF Few   Creatinine      0.52 - 1.04 mg/dL 0.63   GFR Estimate      >60 mL/min/1.7m2 >90   GFR Estimate If Black      >60 mL/min/1.7m2 >90   Protein Random Urine      g/L 0.07   Protein Total Urine g/gr Creatinine      0 - 0.2 g/g Cr 0.29 (H)   Albumin      3.4 - 5.0 g/dL 3.5   ALT      0 - 50 U/L 27   AST      0 - 45 U/L 21   Complement C3      76 - 169 mg/dL 51 (L)   Complement C4      15 - 50 mg/dL 5 (L)   Creatinine Urine Random      mg/dL 24   CRP Inflammation      0.0 - 8.0 mg/L 11.2 (H)   DNA-ds      <10 IU/mL >379 (H)   Sed Rate      0 - 30 mm/h 35 (H)   Creatinine Urine      mg/dL 23     PFTs 5/2018 (The FEV1 and FVC are reduced but the FEV1/FVC ratio is normal.  The inspiratory flow rates are reduced.  The TLC and FRC are reduced.  The diffusing capacity is normal.  However, the diffusing capacity was not corrected for the patient's hemoglobin.  IMPRESSION:  Moderately Severe  Restriction.  Normal Diffusion.  Walk distance is normal on room air with significant desaturation but no hypoxia.  ____________________________________________KERVIN TONY.      Component      Latest Ref Rng & Units 11/12/2018   Color Urine       Yellow   Appearance Urine       Clear   Glucose Urine      NEG:Negative mg/dL Negative   Bilirubin Urine      NEG:Negative Negative   Ketones Urine      NEG:Negative mg/dL Negative   Specific Gravity Urine      1.003 - 1.035 1.025   pH Urine      5.0 - 7.0 pH 6.0   Protein Albumin Urine      NEG:Negative mg/dL 30 (A)   Urobilinogen Urine      0.2 - 1.0 EU/dL 0.2   Nitrite Urine      NEG:Negative Negative   Blood Urine      NEG:Negative Trace (A)   Leukocyte Esterase  Urine      NEG:Negative Negative   Source       Midstream Urine   WBC Urine      OTO5:0 - 5 /HPF 0 - 5   RBC Urine      OTO2:O - 2 /HPF O - 2   Squamous Epithelial /LPF Urine      FEW:Few /LPF Few   Bacteria Urine      NEG:Negative /HPF Few (A)   WBC      4.0 - 11.0 10e9/L 7.3   RBC Count      3.8 - 5.2 10e12/L 4.25   Hemoglobin      11.7 - 15.7 g/dL 11.3 (L)   Hematocrit      35.0 - 47.0 % 36.0   MCV      78 - 100 fl 85   MCH      26.5 - 33.0 pg 26.6   MCHC      31.5 - 36.5 g/dL 31.4 (L)   RDW      10.0 - 15.0 % 14.9   Platelet Count      150 - 450 10e9/L 255   Creatinine      0.52 - 1.04 mg/dL 0.83   GFR Estimate      >60 mL/min/1.7m2 73   GFR Estimate If Black      >60 mL/min/1.7m2 88   Iron      35 - 180 ug/dL 27 (L)   Iron Binding Cap      240 - 430 ug/dL 264   Iron Saturation Index      15 - 46 % 10 (L)   Protein Random Urine      g/L 0.58   Protein Total Urine g/gr Creatinine      0 - 0.2 g/g Cr 0.34 (H)   Albumin      3.4 - 5.0 g/dL 3.2 (L)   ALT      0 - 50 U/L 30   AST      0 - 45 U/L 19   Complement C3      76 - 169 mg/dL 48 (L)   Complement C4      15 - 50 mg/dL 3 (L)   CRP Inflammation      0.0 - 8.0 mg/L 16.4 (H)   DNA-ds      <10 IU/mL >379 (H)   Sed Rate      0 - 30 mm/h 26   Ferritin      8 - 252 ng/mL 160   Creatinine Urine      mg/dL 176     Component      Latest Ref Rng & Units 6/5/2019           8:29 AM   Color Urine       Yellow   Appearance Urine       Slightly Cloudy   Glucose Urine      NEG:Negative mg/dL Negative   Bilirubin Urine      NEG:Negative Negative   Ketones Urine      NEG:Negative mg/dL 5 (A)   Specific Gravity Urine      1.003 - 1.035 1.027   Blood Urine      NEG:Negative Small (A)   pH Urine      5.0 - 7.0 pH 6.0   Protein Albumin Urine      NEG:Negative mg/dL >499 (A)   Urobilinogen mg/dL      0.0 - 2.0 mg/dL 2.0   Nitrite Urine      NEG:Negative Negative   Leukocyte Esterase Urine      NEG:Negative Trace (A)   Source       Midstream Urine   WBC Urine      0 - 5 /HPF 16 (H)    RBC Urine      0 - 2 /HPF 24 (H)   Squamous Epithelial /HPF Urine      0 - 1 /HPF 2 (H)   Mucous Urine      NEG:Negative /LPF Present (A)   Hyaline Casts      0 - 2 /LPF 1   Sodium      133 - 144 mmol/L    Potassium      3.4 - 5.3 mmol/L    Chloride      94 - 109 mmol/L    Carbon Dioxide      20 - 32 mmol/L    Anion Gap      3 - 14 mmol/L    Glucose      70 - 99 mg/dL    Urea Nitrogen      7 - 30 mg/dL    Creatinine      0.52 - 1.04 mg/dL    GFR Estimate      >60 mL/min/1.73:m2    GFR Estimate If Black      >60 mL/min/1.73:m2    Calcium      8.5 - 10.1 mg/dL    Phosphorus      2.5 - 4.5 mg/dL    Albumin      3.4 - 5.0 g/dL    WBC      4.0 - 11.0 10e9/L    RBC Count      3.8 - 5.2 10e12/L    Hemoglobin      11.7 - 15.7 g/dL    Hematocrit      35.0 - 47.0 %    MCV      78 - 100 fl    MCH      26.5 - 33.0 pg    MCHC      31.5 - 36.5 g/dL    RDW      10.0 - 15.0 %    Platelet Count      150 - 450 10e9/L    Specimen Description       Midstream Urine   Special Requests       Specimen received in preservative   Culture Micro       10,000 to 50,000 colonies/mL . . .   Creatinine Urine      mg/dL 240   Albumin Urine mg/L      mg/L    Albumin Urine mg/g Cr      0 - 25 mg/g Cr    Protein Random Urine      g/L 4.50   Protein Total Urine g/gr Creatinine      0 - 0.2 g/g Cr 1.88 (H)   Neutrophil Cytoplasmic Antibody      <1:10 titer    Neutrophil Cytoplasmic Antibody Pattern          Lactate Dehydrogenase      81 - 234 U/L    HIV Antigen Antibody Combo      NR:Nonreactive        Sed Rate      0 - 30 mm/h    CRP Inflammation      0.0 - 8.0 mg/L    Complement C4      15 - 50 mg/dL    Complement C3      76 - 169 mg/dL    DNA-ds      <10 IU/mL      Component      Latest Ref Rng & Units 6/5/2019           8:29 AM   Color Urine          Appearance Urine          Glucose Urine      NEG:Negative mg/dL    Bilirubin Urine      NEG:Negative    Ketones Urine      NEG:Negative mg/dL    Specific Gravity Urine      1.003 - 1.035    Blood  Urine      NEG:Negative    pH Urine      5.0 - 7.0 pH    Protein Albumin Urine      NEG:Negative mg/dL    Urobilinogen mg/dL      0.0 - 2.0 mg/dL    Nitrite Urine      NEG:Negative    Leukocyte Esterase Urine      NEG:Negative    Source          WBC Urine      0 - 5 /HPF    RBC Urine      0 - 2 /HPF    Squamous Epithelial /HPF Urine      0 - 1 /HPF    Mucous Urine      NEG:Negative /LPF    Hyaline Casts      0 - 2 /LPF    Sodium      133 - 144 mmol/L    Potassium      3.4 - 5.3 mmol/L    Chloride      94 - 109 mmol/L    Carbon Dioxide      20 - 32 mmol/L    Anion Gap      3 - 14 mmol/L    Glucose      70 - 99 mg/dL    Urea Nitrogen      7 - 30 mg/dL    Creatinine      0.52 - 1.04 mg/dL    GFR Estimate      >60 mL/min/1.73:m2    GFR Estimate If Black      >60 mL/min/1.73:m2    Calcium      8.5 - 10.1 mg/dL    Phosphorus      2.5 - 4.5 mg/dL    Albumin      3.4 - 5.0 g/dL    WBC      4.0 - 11.0 10e9/L    RBC Count      3.8 - 5.2 10e12/L    Hemoglobin      11.7 - 15.7 g/dL    Hematocrit      35.0 - 47.0 %    MCV      78 - 100 fl    MCH      26.5 - 33.0 pg    MCHC      31.5 - 36.5 g/dL    RDW      10.0 - 15.0 %    Platelet Count      150 - 450 10e9/L    Specimen Description          Special Requests          Culture Micro          Creatinine Urine      mg/dL 258   Albumin Urine mg/L      mg/L 2,810   Albumin Urine mg/g Cr      0 - 25 mg/g Cr 1,089.15 (H)   Protein Random Urine      g/L    Protein Total Urine g/gr Creatinine      0 - 0.2 g/g Cr    Neutrophil Cytoplasmic Antibody      <1:10 titer    Neutrophil Cytoplasmic Antibody Pattern          Lactate Dehydrogenase      81 - 234 U/L    HIV Antigen Antibody Combo      NR:Nonreactive        Sed Rate      0 - 30 mm/h    CRP Inflammation      0.0 - 8.0 mg/L    Complement C4      15 - 50 mg/dL    Complement C3      76 - 169 mg/dL    DNA-ds      <10 IU/mL      Component      Latest Ref Rng & Units 6/5/2019          11:57 AM   Color Urine          Appearance Urine           Glucose Urine      NEG:Negative mg/dL    Bilirubin Urine      NEG:Negative    Ketones Urine      NEG:Negative mg/dL    Specific Gravity Urine      1.003 - 1.035    Blood Urine      NEG:Negative    pH Urine      5.0 - 7.0 pH    Protein Albumin Urine      NEG:Negative mg/dL    Urobilinogen mg/dL      0.0 - 2.0 mg/dL    Nitrite Urine      NEG:Negative    Leukocyte Esterase Urine      NEG:Negative    Source          WBC Urine      0 - 5 /HPF    RBC Urine      0 - 2 /HPF    Squamous Epithelial /HPF Urine      0 - 1 /HPF    Mucous Urine      NEG:Negative /LPF    Hyaline Casts      0 - 2 /LPF    Sodium      133 - 144 mmol/L 134   Potassium      3.4 - 5.3 mmol/L 4.3   Chloride      94 - 109 mmol/L 101   Carbon Dioxide      20 - 32 mmol/L 26   Anion Gap      3 - 14 mmol/L 8   Glucose      70 - 99 mg/dL 109 (H)   Urea Nitrogen      7 - 30 mg/dL 21   Creatinine      0.52 - 1.04 mg/dL 0.49 (L)   GFR Estimate      >60 mL/min/1.73:m2 >90   GFR Estimate If Black      >60 mL/min/1.73:m2 >90   Calcium      8.5 - 10.1 mg/dL 9.2   Phosphorus      2.5 - 4.5 mg/dL 4.2   Albumin      3.4 - 5.0 g/dL 2.9 (L)   WBC      4.0 - 11.0 10e9/L 8.7   RBC Count      3.8 - 5.2 10e12/L 4.81   Hemoglobin      11.7 - 15.7 g/dL 12.3   Hematocrit      35.0 - 47.0 % 39.3   MCV      78 - 100 fl 82   MCH      26.5 - 33.0 pg 25.6 (L)   MCHC      31.5 - 36.5 g/dL 31.3 (L)   RDW      10.0 - 15.0 % 14.1   Platelet Count      150 - 450 10e9/L 302   Specimen Description          Special Requests          Culture Micro          Creatinine Urine      mg/dL    Albumin Urine mg/L      mg/L    Albumin Urine mg/g Cr      0 - 25 mg/g Cr    Protein Random Urine      g/L    Protein Total Urine g/gr Creatinine      0 - 0.2 g/g Cr    Neutrophil Cytoplasmic Antibody      <1:10 titer <1:10   Neutrophil Cytoplasmic Antibody Pattern       The ANCA IFA is <1:10.  No further testing will be performed.   Lactate Dehydrogenase      81 - 234 U/L 252 (H)   HIV Antigen Antibody  Combo      NR:Nonreactive     Nonreactive   Sed Rate      0 - 30 mm/h 40 (H)   CRP Inflammation      0.0 - 8.0 mg/L 25.7 (H)   Complement C4      15 - 50 mg/dL 3 (L)   Complement C3      76 - 169 mg/dL 53 (L)   DNA-ds      <10 IU/mL >379 (H)   [facility]                                                      CMR Report       MRN:                  [id number]                                  Name:           CHILANGO GIBBONS                                   :                  1965                                  Scan Date:   2019 11:11:32                                   Electronically signed by Fer Corea 2019-May-20 14:19:00     SUMMARY   ==========================================================================================================     Clinical history: 54-year old female with SLE, poly-serositis, and chronic pleuritic chest pain. CMR to  assess for cardiac involvement (sepideh-myocarditis).  Comparison CMR: None.      1. The LV is normal in cavity size and wall thickness. The global systolic function is normal. The LVEF is  62%.   There are no regional wall motion abnormalities.     2. The RV is normal in cavity size. The global systolic function is normal. The RVEF is 60%.      3. Both atria are normal in size.     4. There is no significant valvular disease.      5. Late gadolinium enhancement imaging shows no MI, fibrosis or infiltrative disease.      6. The is mild pericardial thickening and tethering, with circumferential pericardial enhancement. There is  no pericardial effusion.       7. There is no intracardiac thrombus.     8. The main PA is moderately enlarged, measuring 3.8 cm.      CONCLUSIONS: Pericardial thickening and enhancement consistent with inflammatory pericarditis. There is no  myocardial fibrosis or myocarditis. Normal biventricular size and systolic function; LVEF 62%, RVEF 60%.      CORE EXAM    ==========================================================================================================     MEASUREMENTS   ----------------------------------------------------------------------------------------      VOLUMETRIC ANALYSIS       ----------------------------------------------  .--------------------------------------------------------.                    LV    Reference  RV    Reference   +------+-----------+------+-----------+------+-----------+   EDV   ml          162   ()   159   ()          ml/m^2     69.2   (56-90)   67.9   (53-90)     ESV   ml           62   (22-59)     64   (15-68)           ml/m^2     26.5   (14-33)   27.4   (11-37)     CO    L/min      7.30             6.93                     L/min/m^2   3.1              3.0               MASS                                                 SV    ml          100   ()    95   ()          ml/m^2     42.7   (37-62)   40.6   (36-60)     EF    %            62   (59-77)     60   (55-79)    '------+-----------+------+-----------+------+-----------'             CARDIAC OUTPUT HR:  73 BPM      LA DIMENSIONS (LV SYSTOLE)       ----------------------------------------------          DIAMETER:  3.9 cm         AORTIC ROOT DIMENSIONS       ----------------------------------------------          SINUS OF VALSALVA:  2.9 cm          AORTIC ROOT SIZE:  Normal        ANATOMY   ----------------------------------------------------------------------------------------      LEFT VENTRICLE       ----------------------------------------------          WALL THICKNESS:  Normal          CAVITY SIZE:  Normal         RIGHT VENTRICLE       ----------------------------------------------          WALL THICKNESS:  Normal          CONTRACTILITY:  Normal          CAVITY SIZE:  Normal         INTERVENTRICULAR SEPTUM       ----------------------------------------------                VENTRICULAR SEPTUM:  Normal          INTERATRIAL SEPTUM       ----------------------------------------------               ATRIAL SEPTUM:          LIPOMATOUS HYPERTROPHY          LEFT ATRIUM       ----------------------------------------------          CAVITY SIZE:  Normal         RIGHT ATRIUM       ----------------------------------------------          CAVITY SIZE:  Normal         PERICARDIUM       ----------------------------------------------          THICKENED          THICKNESS:  5 mm          EFFUSION:  None         PLEURAL EFFUSION       ----------------------------------------------               None         VALVES   ----------------------------------------------------------------------------------------      AORTIC VALVE       ----------------------------------------------          AORTIC VALVE LEAFLETS:  Trileaflet         MITRAL VALVE       ----------------------------------------------          MITRAL VALVE LEAFLETS:  Normal Leaflets         TRICUSPID VALVE       ----------------------------------------------          TRICUSPID VALVE LEAFLETS:  Normal Leaflets         PULMONIC VALVE       ----------------------------------------------          PULMONIC VALVE LEAFLETS:  Normal Leaflets        17 SEGMENT   ----------------------------------------------------------------------------------------  .------------------------------------------------------------------------------------------.   Segments            Wall Motion   Hyperenhancement  Stress Perfusion  Interpretation   +--------------------+--------------+------------------+------------------+----------------+   Base Anterior       Normal/Hyper  None                                Normal            Base Anteroseptal   Normal/Hyper  None                                Normal            Base Inferoseptal   Normal/Hyper  None                                Normal            Base Inferior       Normal/Hyper  None                                 Normal            Base Inferolateral  Normal/Hyper  None                                Normal            Base Anterolateral  Normal/Hyper  None                                Normal            Mid Anterior        Normal/Hyper  None                                Normal            Mid Anteroseptal    Normal/Hyper  None                                Normal            Mid Inferoseptal    Normal/Hyper  None                                Normal            Mid Inferior        Normal/Hyper  None                                Normal            Mid Inferolateral   Normal/Hyper  None                                Normal            Mid Anterolateral   Normal/Hyper  None                                Normal            Apical Anterior     Normal/Hyper  None                                Normal            Apical Septal       Normal/Hyper  None                                Normal            Apical Inferior     Normal/Hyper  None                                Normal            Apical Lateral      Normal/Hyper  None                                Normal            [place]                Normal/Hyper  None                                Normal           +--------------------+--------------+------------------+------------------+----------------+   RV Segments         Wall Motion   Hyperenhancement  Stress Perfusion  Interpretation   +--------------------+--------------+------------------+------------------+----------------+   RV Basal Anterior   Normal/Hyper  None                                Normal            RV Basal Inferior   Normal/Hyper  None                                Normal            RV Mid              Normal/Hyper  None                                Normal            RV Apical           Normal/Hyper  None                                Normal            '--------------------+--------------+------------------+------------------+----------------'         FINDINGS       ----------------------------------------------          INFARCT/SCAR SIZE:  0 %        SCAN INFO   ==========================================================================================================     GENERAL   ----------------------------------------------------------------------------------------      CONTRAST AGENT       ----------------------------------------------          TYPE:  Gadavist          VOLUME ADMINISTERED:  10 ml          DOSAGE FOR 0.5M:  0.04 mmol/kg          SERUM CREATININE:  0.58 sCr          CREATININE DATE:  2019-03-06 00:00:00          SEDATION       ----------------------------------------------          SEDATION USED?:  No         VITALS       ----------------------------------------------          HEIGHT:  66.00 in          HEIGHT:  167.64 cm          WEIGHT:  289.00 lbs          WEIGHT:  131.09 kgs          BSA:  2.34 m^2         PULSE SEQUENCES       ----------------------------------------------          Single-Shot SSFP, IR GRE - Segmented, IR SSFP - Single Shot, SSFP Cine          SETUP       ----------------------------------------------          TYPE:  Clinical          INPATIENT:  No          INCOMPLETE SCAN:  No          REASON(S) FOR SCAN:  Cardiomyopathy, Pericardial Evaluation           REFERRING PHYSICIAN:  ROBE VAZQUEZ          ATTENDING PHYSICIAN:  ROBE VAZQUEZ      Kidney Biopsy (6/7/19)  Patient Name: CHILANGO GIBBONS   MR#: [id number]   Specimen #: [id number]   Collected: 6/7/2019   Received: 6/7/2019   Reported: 6/10/2019 22:42   Ordering Phy(s): JOYCE CAMERON     For improved result formatting, select 'View Enhanced Report Format' under    Linked Documents section.     SPECIMEN(S):   Kidney biopsy, native with EM & Immunofluorescence, right     FINAL DIAGNOSIS:   Kidney; percutaneous needle biopsy:   -Diffuse  global lupus nephritis, ISN/RPS class IV-S (a)   -Mild to moderate arteriosclerosis     COMMENT:   There is mild mesangial and endocapillary proliferation, focal leukocytic   infiltrates, and crescentic lesions   involving 14 out of 26 glomeruli, mainly cellular with necrotizing   lesions.  There are minimal chronic   changes.  The activity index of the proliferative component is 9/24 and   the chronicity index is 1/12 (Robert   et al, American Journal of Medicine 75:  382-391, 1983).     The presence of significant crescents with only mild mesangial and   endocapillary proliferation raises the   possibility of concurrent lupus nephritis and ANCA-associated crescentic   glomerulonephritis.  Thus it is   recommended to test for ANCA and clinical correlation is recommended.     Component      Latest Ref Rng & Units 3/5/2020   WBC      4.0 - 11.0 10e9/L 7.6   RBC Count      3.8 - 5.2 10e12/L 4.13   Hemoglobin      11.7 - 15.7 g/dL 11.3 (L)   Hematocrit      35.0 - 47.0 % 36.1   MCV      78 - 100 fl 87   MCH      26.5 - 33.0 pg 27.4   MCHC      31.5 - 36.5 g/dL 31.3 (L)   RDW      10.0 - 15.0 % 15.1 (H)   Platelet Count      150 - 450 10e9/L 228   % Neutrophils      % 88.8   % Lymphocytes      % 4.5   % Monocytes      % 6.2   % Eosinophils      % 0.1   % Basophils      % 0.4   Absolute Neutrophil      1.6 - 8.3 10e9/L 6.8   Absolute Lymphocytes      0.8 - 5.3 10e9/L 0.3 (L)   Absolute Monocytes      0.0 - 1.3 10e9/L 0.5   Absolute Eosinophils      0.0 - 0.7 10e9/L 0.0   Absolute Basophils      0.0 - 0.2 10e9/L 0.0   Diff Method       Automated Method   RBC Morphology       Normal   Platelet Estimate       Automated count confirmed.  Platelet morphology is normal.   Color Urine       Yellow   Appearance Urine       Clear   Glucose Urine      NEG:Negative mg/dL Negative   Bilirubin Urine      NEG:Negative Negative   Ketones Urine      NEG:Negative mg/dL Negative   Specific Gravity Urine      1.003 - 1.035 1.020   Blood  Urine      NEG:Negative Moderate (A)   pH Urine      5.0 - 7.0 pH 5.5   Protein Albumin Urine      NEG:Negative mg/dL >=300 (A)   Urobilinogen Urine      0.2 - 1.0 EU/dL 0.2   Nitrite Urine      NEG:Negative Negative   Leukocyte Esterase Urine      NEG:Negative Negative   Source       Midstream Urine   WBC Urine      OTO5:0 - 5 /HPF 10-25 (A)   RBC Urine      OTO2:O - 2 /HPF O - 2   Squamous Epithelial /LPF Urine      FEW:Few /LPF Few   Bacteria Urine      NEG:Negative /HPF Few (A)   Creatinine      0.52 - 1.04 mg/dL 0.62   GFR Estimate      >60 mL/min/1.73:m2 >90   GFR Estimate If Black      >60 mL/min/1.73:m2 >90   Protein Random Urine      g/L 2.24   Protein Total Urine g/gr Creatinine      0 - 0.2 g/g Cr 4.38 (H)   Specimen Description       Midstream Urine   Culture Micro       10,000 to 50,000 colonies/mL . . .   Creatinine Urine Random      mg/dL 53   Sed Rate      0 - 30 mm/h 42 (H)   DNA-ds      <10 IU/mL >379 (H)   CRP Inflammation      0.0 - 8.0 mg/L 10.5 (H)   Complement C3      81 - 157 mg/dL 64 (L)   Complement C4      13 - 39 mg/dL 14   AST      0 - 45 U/L 14   Albumin      3.4 - 5.0 g/dL 2.8 (L)   ALT      0 - 50 U/L 22   Creatinine Urine      mg/dL 51     Component      Latest Ref Rng & Units 4/27/2020   WBC      4.0 - 11.0 10e9/L 10.5   RBC Count      3.8 - 5.2 10e12/L 4.72   Hemoglobin      11.7 - 15.7 g/dL 13.0   Hematocrit      35.0 - 47.0 % 41.7   MCV      78 - 100 fl 88   MCH      26.5 - 33.0 pg 27.5   MCHC      31.5 - 36.5 g/dL 31.2 (L)   RDW      10.0 - 15.0 % 14.8   Platelet Count      150 - 450 10e9/L 271   % Neutrophils      % 92.5   % Lymphocytes      % 3.5   % Monocytes      % 3.2   % Eosinophils      % 0.1   % Basophils      % 0.7   Absolute Neutrophil      1.6 - 8.3 10e9/L 9.7 (H)   Absolute Lymphocytes      0.8 - 5.3 10e9/L 0.4 (L)   Absolute Monocytes      0.0 - 1.3 10e9/L 0.3   Absolute Eosinophils      0.0 - 0.7 10e9/L 0.0   Absolute Basophils      0.0 - 0.2 10e9/L 0.1   Diff  Method       Automated Method   RBC Morphology       Normal   Platelet Estimate       Automated count confirmed.  Platelet morphology is normal.   Color Urine       Yellow   Appearance Urine       Clear   Glucose Urine      NEG:Negative mg/dL Negative   Bilirubin Urine      NEG:Negative Negative   Ketones Urine      NEG:Negative mg/dL Negative   Specific Gravity Urine      1.003 - 1.035 1.010   Blood Urine      NEG:Negative Trace (A)   pH Urine      5.0 - 7.0 pH 6.0   Protein Albumin Urine      NEG:Negative mg/dL >=300 (A)   Urobilinogen Urine      0.2 - 1.0 EU/dL 0.2   Nitrite Urine      NEG:Negative Negative   Leukocyte Esterase Urine      NEG:Negative Negative   Source       Midstream Urine   WBC Urine      OTO5:0 - 5 /HPF 0 - 5   RBC Urine      OTO2:O - 2 /HPF O - 2   Squamous Epithelial /LPF Urine      FEW:Few /LPF Few   Bacteria Urine      NEG:Negative /HPF Few (A)   Creatinine      0.52 - 1.04 mg/dL 0.78   GFR Estimate      >60 mL/min/1.73:m2 86   GFR Estimate If Black      >60 mL/min/1.73:m2 >90   Protein Random Urine      g/L 1.07   Protein Total Urine g/gr Creatinine      0 - 0.2 g/g Cr 2.50 (H)   Specimen Description          Culture Micro          Creatinine Urine Random      mg/dL 40   Sed Rate      0 - 30 mm/h 22   DNA-ds      <10 IU/mL 368 (H)   CRP Inflammation      0.0 - 8.0 mg/L 3.6   Complement C3      81 - 157 mg/dL 77 (L)   Complement C4      13 - 39 mg/dL 12 (L)   AST      0 - 45 U/L 23   Albumin      3.4 - 5.0 g/dL 3.4   ALT      0 - 50 U/L 33   Creatinine Urine      mg/dL 43     Component      Latest Ref Rng & Units 4/27/2020   WBC      4.0 - 11.0 10e9/L 10.5   RBC Count      3.8 - 5.2 10e12/L 4.72   Hemoglobin      11.7 - 15.7 g/dL 13.0   Hematocrit      35.0 - 47.0 % 41.7   MCV      78 - 100 fl 88   MCH      26.5 - 33.0 pg 27.5   MCHC      31.5 - 36.5 g/dL 31.2 (L)   RDW      10.0 - 15.0 % 14.8   Platelet Count      150 - 450 10e9/L 271   % Neutrophils      % 92.5   % Lymphocytes      % 3.5    % Monocytes      % 3.2   % Eosinophils      % 0.1   % Basophils      % 0.7   Absolute Neutrophil      1.6 - 8.3 10e9/L 9.7 (H)   Absolute Lymphocytes      0.8 - 5.3 10e9/L 0.4 (L)   Absolute Monocytes      0.0 - 1.3 10e9/L 0.3   Absolute Eosinophils      0.0 - 0.7 10e9/L 0.0   Absolute Basophils      0.0 - 0.2 10e9/L 0.1   Diff Method       Automated Method   RBC Morphology       Normal   Platelet Estimate       Automated count confirmed.  Platelet morphology is normal.   Color Urine       Yellow   Appearance Urine       Clear   Glucose Urine      NEG:Negative mg/dL Negative   Bilirubin Urine      NEG:Negative Negative   Ketones Urine      NEG:Negative mg/dL Negative   Specific Gravity Urine      1.003 - 1.035 1.010   Blood Urine      NEG:Negative Trace (A)   pH Urine      5.0 - 7.0 pH 6.0   Protein Albumin Urine      NEG:Negative mg/dL >=300 (A)   Urobilinogen Urine      0.2 - 1.0 EU/dL 0.2   Nitrite Urine      NEG:Negative Negative   Leukocyte Esterase Urine      NEG:Negative Negative   Source       Midstream Urine   WBC Urine      OTO5:0 - 5 /HPF 0 - 5   RBC Urine      OTO2:O - 2 /HPF O - 2   Squamous Epithelial /LPF Urine      FEW:Few /LPF Few   Bacteria Urine      NEG:Negative /HPF Few (A)   Creatinine      0.52 - 1.04 mg/dL 0.78   GFR Estimate      >60 mL/min/1.73:m2 86   GFR Estimate If Black      >60 mL/min/1.73:m2 >90   Protein Random Urine      g/L 1.07   Protein Total Urine g/gr Creatinine      0 - 0.2 g/g Cr 2.50 (H)   Creatinine Urine Random      mg/dL 40   Sed Rate      0 - 30 mm/h 22   DNA-ds      <10 IU/mL 368 (H)   CRP Inflammation      0.0 - 8.0 mg/L 3.6   Complement C3      81 - 157 mg/dL 77 (L)   Complement C4      13 - 39 mg/dL 12 (L)   AST      0 - 45 U/L 23   Albumin      3.4 - 5.0 g/dL 3.4   ALT      0 - 50 U/L 33   Creatinine Urine      mg/dL 43     Physical Exam:      Constitutional: Pleasant, WN, WD, Cushings. NAD  Eyes: EOMI, sclera anicteric, conj not injected.  HEENT: NL external  ears, lips  MS: No synovitis.   Skin: No skin rash  Neuro: CN grossly intact without focal deficit  Psych: Appropriate affect    Assessment/Plan:    SLE. 56 yo WF with +FH RA and personal hx of SLE presented in 12/2017 for 2nd opinion of m/o her SLE. She was diagnosed with SLE at age 27. Her lupus has been marked by +KARLIE (highest titer 1:640, speckled and nucleolar patterns), +anti-DNA, arthritis, malar rash, serositis (pleuritic CP) supported by +SSA/SSB Ab, low C3/low C4, +RF, high ESR/CRP. She had neg anti-RNP, anti-Sm, acL IgM/G/A, LAC, cryo and anti-CCP.     Had NL C3 at the time of Dx. She was treated with prednisone and HCQ at the time of Dx. Can't remember how long she was on HCQ and why HCQ was stopped, but it probably was stopped because of stable disease, no report on HCQ toxicity. Reportedly her SLE was mild all these years.    Has secondary Sjogren's with sicca, +SSA/SSB Ab and h/o MALT lymphoma at age 44 in remission. Uses blink eyedrops and salagen. No lip biopsy was done to confirm Dx of Sjogren's.    Her lupus flared in 7/2017 with no triggers when she presented with arthritis, HCQ was resumed, arthritis resolved. Mild pulm HTN on 2D-Echo 8/2017 but neg R cardiac cath and VQ scan in 3/2018, also neg 2D-Echo.    In 12/2017, was prescribed prednisone 40 mg for only 5 days for pleuritic CP, CP recurred after stopping prednisone.     Her major complaint at initial visit with me in 12/2017 was ongoing CP. AZA was added in 2/2018 with start dose of 50 mg qd and slowly was increased to max 150 mg qd. Tolerated AZA well, no SEs, no toxicity on the labs but failed it and required to go back on prednisone 20 mg qd (current dose).     Her lupus is active with pleuritic CP. ESR/CRP normalized on prednisone+AZA+HCQ but +anti-DNA, low C3/C4 are unchanged. Chest CT in 12/2017 without contrast showed air trapping due to lupus/Sjogren's, no pleural effusion. Lung nodules need f/u in a year. No pericardail effusion  on 2D-echo and neg VQ scan for PE in 3/2018 so chest CT with contrast is not needed. She is APL negative.    AZA was switched to  mg po bid on 5/18/2018 as she failed AZA. She is now on max dose of MMF 1500 mg bid. Her labs are unchanged with persistently elevated anti-DNA, low C3/C4 and high ESR/CRP, but just started to feel a lot better (regarding fatigue, arthralgia, still has residual pleuritic CP) after adding MMF.     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 Also possible shrinking lung syn sec lupus or obesity is responsible for restrictive lung disease plus pleural effusion. She was prescribed albuterol and dulera inh which have helped.    Benlysta was added in 9/2018 to help with pleuritic CP. No change in lupus serology (anti-DNA, C3, C4), but ESR/CRP have improved. Overall she felt a lot better after adding benlysta but it made her tired.     I could not taper her off the prednisone, she continued to have active IA, fatigue and pleuritic CP. I recommended switching benlysta to rituximab given her h/o lymphoma, unfortunately her insurance denied. At the same time, Taina misunderstood and stopped her cellcept as thought we were switching MMF to rituximab while the plan was to add rituximab to MMF. She has been off MMF for couple of months. Had cardiac MRI on 5/20 which showed active pericarditis. I advised to switch to cytoxan 750 mg/m2 qmo x 6 mo. On Saturday 6/1/2019, received a call from infusion center reporting blood in the urine and if it was ok to proceed with cytoxan. I was concerned as that was very new. We canceled the cytoxan infusion. UCx was negative for UTI. Repeat U/A today is showing protein and blood, with h/o  stopping MMF, very concerning for lupus nephritis. She never had lupus nephritis as part of her lupus and it's possible that it was covered by MMF and showed up off MMF. Renal biopsy is still recommended to confirm Dx, evaluate degree of severity, chronicity and rule out other diagnoses. I spoke to renal team and dr. Hoyt kindly agreed to see her today (6/5/19) as urgent consult as add on to their schedule and scheduled renal biopsy for Friday 6/7/2019. I would re-schedule cytoxan to 6/15/2019 and schedule pulse solumedrol 1 gr every day x 3 days. Meanwhile, will increase prednisone to 60 mg every day. Cytoxan risks were discussed again.    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 Patient received 1st dose cyclophosphamide on 6/15/19 and tolerated well. Initiated Rituxin 1/22/2020. Follow-up 1/23 with Dr. Sherman, nephrology.      S/p 6 cytoxan for lupus nephritis, enteritis, arthritis, pleurisy. Still active lupus with intermittent malar rash, LN with 2.5 gr proteinuria in 4/2020, arthritis and pos serology. S/p rituximab on 1/22/2020 and 2/6/2020, on prednisone 25 mg every day (it was 20 mg every day last visit), MMF 3 gr every day and  mg a day. Recommend repeat rituximab scheduled for 6/9, 6/23/2020 as tx options are limited (also necrotizing lesions on renal biopsy, can not rule out ANCA neg vasculitis overlap and rituximab is FDA approved for GPA/MPA); however today has new SOB and needs work up before proceeding with rituxiamb. Needs urgent CT to diagnose ILD exacerbation (unlikely) from infection.    Recommend:    -High resolution chest CT today  -Labs  -Discuss anxiety with your PCP and get a COVID test    - Continue Cellcept 1500 mg BID and  mg BID  - Continue prednisone 25 mg daily for now  - Annual eye exam is current for HCQ monitoring  -Cellcept albs [id number]  - PCP prophylaxis on atovaquone 10 mL (1500 mg). Patient did not tolerate inhaled pentamidine. Avoiding TMP-SMX given  sulfa reaction and potential increase in SLE flare. Hesitant to use dapsone given risk of hemolytic anemia.    - Follow-up in 3-4 months        Orders Placed This Encounter   Procedures     CT Chest w/o contrast (High Resolution)     Albumin level     ALT     AST     CBC with platelets differential     Complement C3     Complement C4     Creatinine random urine     Creatinine     CRP inflammation     DNA double stranded antibodies     Erythrocyte sedimentation rate auto     UA with Microscopic reflex to Culture     Vitamin D Deficiency     Protein  random urine with Creat Ratio         Video visit: 9:09-9:25 AM    Killian Marroquin MD      Again, thank you for allowing me to participate in the care of your patient.      Sincerely,    Killian Marroquin MD

## 2020-06-04 NOTE — LETTER
July 15, 2020      Taina Singh  [address] [place] [address]        Dear ,    We are writing to inform you of your test results.    You were treated for pneumonia.    Resulted Orders   CT Chest w/o contrast (High Resolution)    Narrative    CT CHEST WITHOUT CONTRAST  6/4/2020 2:03 PM    HISTORY:  Shortness of breath. History of interstitial lung disease,  lupus, worsening ESOB and cough. Systemic lupus erythematosus,  unspecified SLE type, unspecified organ involvement status (H).    TECHNIQUE:  Scans obtained from the apices through the diaphragm  without IV contrast. Radiation dose for this scan was reduced using  automated exposure control, adjustment of the mA and/or kV according  to patient size, or iterative reconstruction technique.    COMPARISON:  Chest CT on 8/6/2019.    FINDINGS:    Chest/mediastinum: Mild heterogenous appearance of the visualized  portion of the right thyroid lobe. No cardiomegaly or significant  pericardial effusion. The main pulmonary artery is significantly  enlarged measuring 4.1 cm in diameter, this can be seen with pulmonary  hypertension. Multiple prominent but not significantly enlarged  mediastinal and hilar lymph nodes, likely reactive. Scattered  atherosclerotic vascular calcification of the coronary arteries.    Lung/pleura: No pleural effusion or pneumothorax. Mild diffuse mosaic  attenuation, nonspecific, could be due to underlying chronic small  vessel or chronic small airway disease. Small focal patchy  consolidative pulmonary opacity in the medial aspect of the right lung  apex (series 6 image 49), new as compared to 8/6/2019 exam. Mild left  basilar platelike atelectasis. Few stable scattered pulmonary nodules  including 6 mm posterior right lower lobe nodule (series 6 image 69)  and 4 mm left lower lobe nodule (series 6 image 197).    Upper abdomen: Limited evaluation of the upper abdomen due to lack of  coverage and contrast as well as the  motion/respiratory artifact. A  small splenule along the anterior aspect of the spleen.    Bones and soft tissue: No suspicious osseous lesion. Degenerative  changes of the spine.      Impression    IMPRESSION:    1. Small focal patchy consolidative opacity in the medial aspect of  the right lung apex, new as compared to 8/6/2019 exam, could be  infectious or atelectatic.  2. Diffuse mosaic attenuation of the lungs, nonspecific, can be seen  with chronic small vessel or chronic small airway disease.  3. The main pulmonary artery is significantly enlarged measuring 4.1  cm in diameter, this can be seen with pulmonary hypertension.  4. Few stable pulmonary nodules measuring up to 6 mm in the right  upper lobe, likely benign given the interval stability.    MARYLOU KELLER MD       If you have any questions or concerns, please call the clinic at the number listed above.       Sincerely,        Killian Marroquin MD

## 2020-06-04 NOTE — LETTER
June 5, 2020      Taina Lebee  [address] [place] [address]        Dear ,    We are writing to inform you of your test results.    ILD does not seem to be worse (CCed Dr. Marvin). It looks like there is a new pneumonia there and is good to treat before rituxiumab Tx on 6/9. Recommend Levaquin 750 mg a day x 5 days starting 6/5/2020. There is a potential rare risk of tendon rupture with this antibiotic. Which pharmacy do you use? You could keep your rituximab appointment on 6/9/2020.    Resulted Orders   CT Chest w/o contrast (High Resolution)    Narrative    CT CHEST WITHOUT CONTRAST  6/4/2020 2:03 PM    HISTORY:  Shortness of breath. History of interstitial lung disease,  lupus, worsening ESOB and cough. Systemic lupus erythematosus,  unspecified SLE type, unspecified organ involvement status (H).    TECHNIQUE:  Scans obtained from the apices through the diaphragm  without IV contrast. Radiation dose for this scan was reduced using  automated exposure control, adjustment of the mA and/or kV according  to patient size, or iterative reconstruction technique.    COMPARISON:  Chest CT on 8/6/2019.    FINDINGS:    Chest/mediastinum: Mild heterogenous appearance of the visualized  portion of the right thyroid lobe. No cardiomegaly or significant  pericardial effusion. The main pulmonary artery is significantly  enlarged measuring 4.1 cm in diameter, this can be seen with pulmonary  hypertension. Multiple prominent but not significantly enlarged  mediastinal and hilar lymph nodes, likely reactive. Scattered  atherosclerotic vascular calcification of the coronary arteries.    Lung/pleura: No pleural effusion or pneumothorax. Mild diffuse mosaic  attenuation, nonspecific, could be due to underlying chronic small  vessel or chronic small airway disease. Small focal patchy  consolidative pulmonary opacity in the medial aspect of the right lung  apex (series 6 image 49), new as compared to 8/6/2019 exam. Mild  left  basilar platelike atelectasis. Few stable scattered pulmonary nodules  including 6 mm posterior right lower lobe nodule (series 6 image 69)  and 4 mm left lower lobe nodule (series 6 image 197).    Upper abdomen: Limited evaluation of the upper abdomen due to lack of  coverage and contrast as well as the motion/respiratory artifact. A  small splenule along the anterior aspect of the spleen.    Bones and soft tissue: No suspicious osseous lesion. Degenerative  changes of the spine.      Impression    IMPRESSION:    1. Small focal patchy consolidative opacity in the medial aspect of  the right lung apex, new as compared to 8/6/2019 exam, could be  infectious or atelectatic.  2. Diffuse mosaic attenuation of the lungs, nonspecific, can be seen  with chronic small vessel or chronic small airway disease.  3. The main pulmonary artery is significantly enlarged measuring 4.1  cm in diameter, this can be seen with pulmonary hypertension.  4. Few stable pulmonary nodules measuring up to 6 mm in the right  upper lobe, likely benign given the interval stability.    MARYLOU KELLER MD       If you have any questions or concerns, please call the clinic at the number listed above.       Sincerely,        Killian Marroquin MD

## 2020-06-04 NOTE — PROGRESS NOTES
"Taina Singh is a 55 year old female who is being evaluated via a billable video visit.      The patient has been notified of following:     \"This video visit will be conducted via a call between you and your physician/provider. We have found that certain health care needs can be provided without the need for an in-person physical exam.  This service lets us provide the care you need with a video conversation.  If a prescription is necessary we can send it directly to your pharmacy.  If lab work is needed we can place an order for that and you can then stop by our lab to have the test done at a later time.    Video visits are billed at different rates depending on your insurance coverage.  Please reach out to your insurance provider with any questions.    If during the course of the call the physician/provider feels a video visit is not appropriate, you will not be charged for this service.\"    Patient has given verbal consent for Video visit? Yes    How would you like to obtain your AVS? Kandy    Patient would like the video invitation sent by: Email: [email or web address]    Will anyone else be joining your video visit? No        Video-Visit Details    Type of service:  Video Visit    Video Start Time: 9:09 AM  Video End Time: 9:25 AM    Originating Location (pt. Location): Home    Distant Location (provider location):  [facility] RHEUM[facility]     Platform used for Video Visit: Pradeep Marroquin MD          "

## 2020-06-04 NOTE — PROGRESS NOTES
Rheumatology Follow-up Virtual Visit Note    Reason for visit: f/u for lupus (this is a video visit due to COVID-19 outbreak), patient agreed      Date of last visit: 1/23/2020    DOS: 6/4/2020      History of Present Illness:    Taina Singh is a 54 year old  female who was referred to our clinic for evaluation and management of her SLE.    She was diagnosed with lupus at age 27. Her 1st sx were malar rash and fatigue. No skin biopsy was done (reportedly had +KARLIE). She was treated with prednisone taper and HCQ. HCQ helped and she tolerated it well. She was diagnosed with Sjogren's at the same time. Had dryness of eyes and mouth. No lip biopsy to confirm the Dx. Reportedly she had very mild stable lupus for many years and eventually at some point, stopped taking HCQ (can't remember when)    She was diagnosed with MALT lymphoma at 44, had enlarged LN over R parotid gland, on biopsy it was MALT lymphoma. Tx was resection only, no radiation or chemo.    About 3 yr ago, had flu shot and 2 days later, developed pleuritic CP and was seen at urgent care. That's why she does not want to get flu shot. She was seen in ER 2 days after UC visit. She was treated with prednisone.    She lost 100 lbs by exercise over past 2 yr, felt amazing. In 7/2017, started not feeling well. She had extreme fatigue. Had AM stiffness and pain over hands. Touched base with her previous rheumatologist (Dr. Rangel) and was told to have lupus flare and was put on  mg qd. Afterwards, developed pleuritic CP which has not been resolved.    She was given prednisone 40 mg qd x 5 days (on 12/16/2017) by pulmonologist in [facility]. It helped a lot but pleuritic CP recurred after stopping it. She was told to have pulm HTN and was evaluated for it.    No triggers for current flare. No flare of malar rash. No current hair loss. Uses blink eyedrops, restasis burned her eyes. She has been prescribed salagen for dry mouth, has not used it.  Arthritis of hands have resolved on HCQ. Last HCQ eye exam was 1 week ago.    4/2018: On AZA 50 mg qd since 2/8/2018, increased to 100 mg qd in 3/19/2018. Her arthralgia is intermittent and mild, it's better. Has fatigue.    5/2018: Started on mycophenalate 1500 mg, continues prednisone 30 mg and plaquenil 200 mg twice a day. Her major complaint is continues pressure over her chest. Pain is pleuritic, it hurts by sneezing, taking deep breath.    7/2018: Ms. Singh feels an improvement in her symptoms. She says there is a baseline pleuritic chest pain, but her fatigue and overall day-to-day function has improved to her satisfaction. She says morning stiffness usually only lasts about 10 minutes. She complains of occasional episodes of flashing lights in her left eye, however she is being seen by her opthamologist. She has a OTC scheduled for Monday as well. Ms. Singh feels as though the mycophenalate has made the biggest difference in her improvement. She continues to taper the prednisone, currently on 20 mg daily. She also has continued the plaquenil 200 mg twice a day.     11/2018: On benlysta infusion since beginning of Sept 2018. Chest still feels less tight, breathing is much better. Sometimes hands ache but it does lot last long. Benlysta makes her tired, but overall feels her lupus sx are much improved on benlysta.    3/2019: Feels tired and achy, it is intermittent and moves around. The L hip pain is consistent for the past 7 days, she moved up her appointment from 3/22 to today to get a bursitis shot. Had bad sinus and ear infection in 1/2019. She still thinks benlysta has helped her a lot. Last night, her R shoulder was hurting so bad that she could not move it but it's better today. Pain comes and goes. Breathing is better after adding benlysta, she is not gasping for air anymore which is huge improvement for her. She feels pressure over her chest and low back. Is getting benlysta tomorrow. Her  prednisone dose is 10 mg a day. Leaving to [place] for vacation.    6/5/2019: She reports severe diffuse arthralgia affecting all her joints with pain level 8/10, today is 6/10. No SOB. Feels pressure like CP. Has severe fatigue. Last benlysta was 7 wk ago.    7/2019: Since last visit 1 month ago, patient was hospitalized x2 (both 6/10-6/13 & 6/18-6/22) at [facility] for acute abdominal pain. 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 Team felt that 2nd admission was 2/2 tapering of steroids. She was pulsed during first admission and then got Cytoxan as outpatient in between admissions on 6/15. Incidentally, patient found to have small splenic infarct and acute PE on CT scan. She was started on Xarelto. Also had her kidney biopsy on 6/7/19. Biopsy showed diffuse global lupus nephritis, ISN/RPS class IV. She has also followed up with nephrology this past week and started on lisinopril. No reports of hypotension. Since leaving hospital, biggest complaint is leg strength/conditioning. She thinks this is related to laying in bed while hospitalized. Going to start PT as soon as it is set up.     9/6/2019: Last Wed had her cytoxan infusion, everything went well. Did not sleep well on Wed evening. Called our clinic the next Thu as had diffuse body pain and N/V, also was a on a new BP med x 1 mo which made her dizzy. Her legs were swollen. She was seen in ED that Thu, her BP found to be low. She was instructed to stop BP med and her facial swelling, dizziness, LE swelling resolved, lost 10 lbs. That ended up being reaction to med. Does not know if it was lisinopril or losartan. S/p 3 cytoxan infusions. Overall she is feeling better, minimal joint pain, no SOB/CP/abdominal pain. Has malar rash. No oral ulcers. She has been on prednisone 30 mg a day x 2 weeks.     1/23/2020: Patient started Rituxin infusions yesterday and denies any side effects. Overall she is feeling better than she has in a long time. She denies significant flares in the  past 3 months currently on max dose Cellcept, HCQ and daily prednisone 20 mg. She has an intermittent malar rash worsened with stress and cold weather. Today she denies any joint pain or swelling, new rash, ulcers, or worsened GI symptoms since last visit. She notes improvement in her leg swelling now on HCTZ. She denies urinary symptoms. Patient also reports that she is sleeping better and no longer takes Ambien nightly.    Today 2020:    Had rituxiamb  2020, 2020.    Scheduled for rituximab , 2020.    On mepron for PJP prophylaxis. On  mg bid, MMF 1500 mg bid and prednisone.    In 2020, had URI and was treated with Augmentin, zyrtec. Reports having sinus drainage with clear phlegm at that time. But now, has exertional SOB and cough, not feeling well. Joints all ache despite being on prednisone 25 mg every day. Wondering if some of these sx could be anxiety related given COVID pandemic.    ROS:  A comprehensive ROS was done, positives are per HPI.    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44    No chemo or radiation     Other forms of systemic lupus erythematosus (H) 2018     Pulmonary embolism (H)     2019 seen on abd CT at [facility]     Sjogren's syndrome (H)     secondary Sjogrens, secondary to lupus     Vitamin D deficiency      Past Surgical History:   Procedure Laterality Date     BIOPSY/REMOVAL, LYMPH NODE(S)        SECTION  age 32     HC HYSTEROS W PERMANENT FALLOPAIN IMPLANT      Essure b/l     PAROTIDECTOMY  Right 2006     TONSILLECTOMY       Family History   Problem Relation Age of Onset     Alopecia Brother      Rheumatoid Arthritis Brother      LUNG DISEASE No family hx of      Social History     Socioeconomic History     Marital status:      Spouse name: Not on file     Number of children: Not on file     Years of education: 1     Highest education level: Not on file   Occupational History     Comment: , 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   Social Needs     Financial resource strain: Not on file     Food insecurity     Worry: Not on file     Inability: Not on file     Transportation needs     Medical: Not on file     Non-medical: Not on file   Tobacco Use     Smoking status: Never Smoker     Smokeless tobacco: Never Used   Substance and Sexual Activity     Alcohol use: No     Drug use: No     Sexual activity: Not on file   Lifestyle     Physical activity     Days per week: Not on file     Minutes per session: Not on file     Stress: Not on file   Relationships     Social connections     Talks on phone: Not on file     Gets together: Not on file     Attends [employer] service: Not on file     Active member of club or organization: Not on file     Attends meetings of clubs or organizations: Not on file     Relationship status: Not on file     Intimate partner violence     Fear of current or ex partner: Not on file     Emotionally abused: Not on file     Physically abused: Not on file     Forced sexual activity: Not on file   Other Topics Concern     Parent/sibling w/ CABG, MI or angioplasty before 65F 55M? 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      Going through divorce but it's not stress factor for her.    Allergies   Allergen  Reactions     Pentamidine Shortness Of Breath     Penicillins Rash     Sulfa Drugs Rash       Outpatient Encounter Medications as of 6/4/2020   Medication Sig Dispense Refill     atovaquone (MEPRON) 750 MG/5ML suspension Take 10 mLs (1,500 mg) by mouth daily 210 mL 5     Calcium-Magnesium 300-300 MG TABS daily        fluticasone (FLONASE) 50 MCG/ACT nasal spray Spray 1-2 sprays into both nostrils daily Use 1 spray per nostril per day for 2 weeks.  May increase to 2 sprays per nostril per day after. 16 g 0     hydroxychloroquine (PLAQUENIL) 200 MG tablet Take 1 tablet (200 mg) by mouth 2 times daily 180 tablet 1     Multiple Vitamins-Minerals (WOMENS MULTI PO) Take by mouth daily        mycophenolate (GENERIC EQUIVALENT) 500 MG tablet Take 3 tablets (1,500 mg) by mouth 2 times daily 540 tablet 3     Omega-3 Fatty Acids (OMEGA-3 FISH OIL) 500 MG CAPS Take 500 mg by mouth daily        pantoprazole (PROTONIX) 20 MG EC tablet Take 2 tablets (40 mg) by mouth 2 times daily       predniSONE (DELTASONE) 10 MG tablet Take 2 tablets (20 mg) by mouth daily 180 tablet 3     predniSONE (DELTASONE) 2.5 MG tablet 25-20-17.5-15-12.5 mg a day each for one week then 10 mg a day, (take along with 10 and 5 mg size tabs) 100 tablet 3     traMADol (ULTRAM) 50 MG tablet Take 1 tablet (50 mg) by mouth every 12 hours as needed for pain Prn pain 60 tablet 1     vitamin D3 (CHOLECALCIFEROL) 2000 units (50 mcg) tablet Take 1 tablet (2,000 Units) by mouth daily 90 tablet 11     warfarin ANTICOAGULANT (COUMADIN) 5 MG tablet   1     zolpidem (AMBIEN) 5 MG tablet Take 1 tablet (5 mg) by mouth nightly as needed for sleep 30 tablet 0     [DISCONTINUED] albuterol (PROAIR HFA/PROVENTIL HFA/VENTOLIN HFA) 108 (90 Base) MCG/ACT inhaler Inhale 2 puffs into the lungs every 6 hours as needed for shortness of breath / dyspnea or wheezing 3 Inhaler 3     furosemide (LASIX) 20 MG tablet Take 1 tablet (20 mg) by mouth daily (Patient not taking: Reported on  2020) 90 tablet 3     hydroxychloroquine (PLAQUENIL) 200 MG tablet Take 1 tablet (200 mg) by mouth 2 times daily Annual Plaquenil toxicity eye screening required. (Patient not taking: Reported on 2020) 28 tablet 0     pilocarpine (SALAGEN) 5 MG tablet Take 1 tablet (5 mg) by mouth 4 times daily as needed (Patient not taking: Reported on 2019) 360 tablet 3     No facility-administered encounter medications on file as of 2020.        Results:    RHEUM RESULTS Latest Ref Rng & Units 2020 3/5/2020 2020   COMPLEMENT C3 81 - 157 mg/dL 66(L) 64(L) 77(L)   COMPLEMENT C4 13 - 39 mg/dL 9(L) 14 12(L)   DNA-DS <10 IU/mL >379(H) >379(H) 368(H)   SED RATE 0 - 30 mm/h 47(H) 42(H) 22   CRP, INFLAMMATION 0.0 - 8.0 mg/L 9.3(H) 10.5(H) 3.6   AST 0 - 45 U/L 14 14 23   ALT 0 - 50 U/L 19 22 33   ALBUMIN 3.4 - 5.0 g/dL 2.7(L) 2.8(L) 3.4   WBC 4.0 - 11.0 10e9/L 6.4 7.6 10.5   RBC 3.8 - 5.2 10e12/L 4.24 4.13 4.72   HGB 11.7 - 15.7 g/dL 11.3(L) 11.3(L) 13.0   HCT 35.0 - 47.0 % 35.3 36.1 41.7   MCV 78 - 100 fl 83 87 88   MCHC 31.5 - 36.5 g/dL 32.0 31.3(L) 31.2(L)   RDW 10.0 - 15.0 % 15.9(H) 15.1(H) 14.8    - 450 10e9/L 230 228 271   CREATININE 0.52 - 1.04 mg/dL 0.66 0.62 0.78   GFR ESTIMATE, IF BLACK >60 mL/min/[1.73:m2] >90 >90 >90   GFR ESTIMATE >60 mL/min/[1.73:m2] >90 >90 86    - 1,616 mg/dL 379(L) - -   IGA 84 - 499 mg/dL 287 - -   IGM 35 - 242 mg/dL 41 - -   HEPATITIS C ANTIBODY NR:Nonreactive Nonreactive - -       Her records were reviewed.    2D-Echo 2017:    ECHO COMPLETE WO CONTRAST CHILANGO GIBBONS 2017 14:43     [facility] [place] [id number] [facility], [address], [place] [address]   Main: [phone]  [email or web address]                                                 Transthoracic Echo Report   CHILANGO GIBBONS   Excellian ID: [id number] Age: 52 : 1965 Ordering Provider: NGUYEN PETERS   Exam Date: 2017 14:43  "Gender: F Sonographer: HAJA   Accession #: [id number] Height: 66 in BSA: 2.24 m  BP: 108 / 62   Weight: 261 lbs BMI: 42.1 kg/m        Site: [facility]   Location: Outpatient Rhythm: Normal Sinus Rhythm   Procedure Components: 2D imaging, Color Doppler, Spectral Doppler   Indications: Murmur; Systemic lupus erythematosus, unspecified SLE type, unspecified organ   involvement status   Technical Quality: Contrast: None     Final Conclusion   Visually estimated ejection fraction is 55-60%.   Mild left ventricular hypertrophy.   Estimated pulmonary artery pressure of 31 mmHg + RA pressure.   Dilated IVC size, <50% collapse with respiration.   Estimated EF: 55-60%   FINDINGS   Left Ventricle Normal left ventricular size. Visually estimated ejection fraction is 55-60%.   Mild left ventricular hypertrophy.   Diastolic Function \"Pseudonormal\" left ventricular filling pattern. E/e' ratio 8-15 is   indeterminate for filling pressure.   Right Ventricle Normal right ventricular size and function.   Left Atrium Mild left atrial enlargement.   Right Atrium Mild right atrial enlargement.   Aortic Valve Normal aortic valve. No aortic stenosis. No significant aortic regurgitation.   Mitral Valve Normal mitral valve. No mitral stenosis. Mild mitral regurgitation.   Tricuspid Valve Normal tricuspid valve. No tricuspid stenosis. Mild tricuspid regurgitation.   Estimated pulmonary artery pressure   of 31 mmHg + RA pressure.   Pulmonic Valve Normal pulmonic valve without significant stenosis or regurgitation.   Pericardium Normal pericardium.   Aorta Measured aortic root diameter is normal in size.   Inferior Vena Cava Dilated IVC size, <50% collapse with respiration.    Component      Latest Ref Rng & Units 11/20/2017   Sodium      133 - 144 mmol/L 137   Potassium      3.4 - 5.3 mmol/L 4.2   Chloride      94 - 109 mmol/L 102   Carbon Dioxide      20 - 32 mmol/L 30   Anion Gap      3 - 14 mmol/L 6   Glucose      70 - 99 mg/dL 101 (H) "   Urea Nitrogen      7 - 30 mg/dL 13   Creatinine      0.52 - 1.04 mg/dL 0.55   GFR Estimate      >60 mL/min/1.7m2 >90   GFR Estimate If Black      >60 mL/min/1.7m2 >90   Calcium      8.5 - 10.1 mg/dL 9.1   WBC      4.0 - 11.0 10e9/L 4.9   RBC Count      3.8 - 5.2 10e12/L 4.28   Hemoglobin      11.7 - 15.7 g/dL 11.3 (L)   Hematocrit      35.0 - 47.0 % 35.5   MCV      78 - 100 fl 83   MCH      26.5 - 33.0 pg 26.4 (L)   MCHC      31.5 - 36.5 g/dL 31.8   RDW      10.0 - 15.0 % 14.6   Platelet Count      150 - 450 10e9/L 215   N-Terminal Pro Bnp      0 - 125 pg/mL 546 (H)   Sed Rate      0 - 30 mm/h 71 (H)     Component      Latest Ref Rng & Units 12/29/2017   Color Urine       Straw   Appearance Urine       Clear   Glucose Urine      NEG:Negative mg/dL Negative   Bilirubin Urine      NEG:Negative Negative   Ketones Urine      NEG:Negative mg/dL Negative   Specific Gravity Urine      1.003 - 1.035 1.005   Blood Urine      NEG:Negative Negative   pH Urine      5.0 - 7.0 pH 6.0   Protein Albumin Urine      NEG:Negative mg/dL Negative   Urobilinogen mg/dL      0.0 - 2.0 mg/dL 0.0   Nitrite Urine      NEG:Negative Negative   Leukocyte Esterase Urine      NEG:Negative Negative   Source       Midstream Urine   WBC Urine      0 - 2 /HPF <1   RBC Urine      0 - 2 /HPF <1   Bacteria Urine      NEG:Negative /HPF Few (A)   Squamous Epithelial /HPF Urine      0 - 1 /HPF <1   Mucous Urine      NEG:Negative /LPF Present (A)   Albumin Fraction      3.7 - 5.1 g/dL 4.2   Alpha 1 Fraction      0.2 - 0.4 g/dL 0.4   Alpha 2 Fraction      0.5 - 0.9 g/dL 0.8   Beta Fraction      0.6 - 1.0 g/dL 1.0   Gamma Fraction      0.7 - 1.6 g/dL 1.6   Monoclonal Peak      0.0 g/dL 0.0   ELP Interpretation:       Essentially normal electrophoretic pattern. No monoclonal protein seen. Pathologic . . .   IGG      695 - 1620 mg/dL 1560   IGA      70 - 380 mg/dL 473 (H)   IGM      60 - 265 mg/dL 92   Iron      35 - 180 ug/dL 58   Iron Binding Cap      240 -  430 ug/dL 315   Iron Saturation Index      15 - 46 % 19   TPMT Genotype Specimen       Whole Blood   TPMT Genotype       Neg/Neg   TPMT Predicted Phenotype       Normal   Protein Random Urine      g/L <0.05   Protein Total Urine g/gr Creatinine      0 - 0.2 g/g Cr Unable to calculate due to low value   Deamidated Gliadin Cari, IgA      <7 U/mL 2   Deamidated Gliadin Cari, IgG      <7 U/mL 5   Complement C3      76 - 169 mg/dL 72 (L)   Complement C4      15 - 50 mg/dL 6 (L)   CRP Inflammation      0.0 - 8.0 mg/L 3.2   DNA-ds      <10 IU/mL >379 (H)   Sed Rate      0 - 30 mm/h 49 (H)   Vitamin D Deficiency screening      20 - 75 ug/L 51   Creatinine Urine Random      mg/dL 23   AST      0 - 45 U/L 26   ALT      0 - 50 U/L 35   HEENA  Broad Spectrum       Neg   Beta 2 Glycoprotein 1 Antibody IgG      <7 U/mL <0.6   Beta 2 Glycoprotein 1 Antibody IgM      <7 U/mL <0.9   Thyroid Peroxidase Antibody      <35 IU/mL <10   Thyroglobulin Antibody      <40 IU/mL <20   TSH      0.40 - 4.00 mU/L 0.87   Cryoglobulin      NEG:Negative % Trace (A)   Tissue Transglutaminase Antibody IgA      <7 U/mL 4   Ferritin      8 - 252 ng/mL 163   Endomysial Antibody IgA by IFA      <1:10 <1:10   CRP Cardiac Risk      mg/L 2.9   Creatinine Urine      mg/dL 23     Component      Latest Ref Rng & Units 2/23/2018   Color Urine       Yellow   Appearance Urine       Clear   Glucose Urine      NEG:Negative mg/dL Negative   Bilirubin Urine      NEG:Negative Negative   Ketones Urine      NEG:Negative mg/dL Negative   Specific Gravity Urine      1.003 - 1.035 1.025   Blood Urine      NEG:Negative Negative   pH Urine      5.0 - 7.0 pH 5.5   Protein Albumin Urine      NEG:Negative mg/dL Negative   Urobilinogen Urine      0.2 - 1.0 EU/dL 0.2   Nitrite Urine      NEG:Negative Negative   Leukocyte Esterase Urine      NEG:Negative Negative   Source       Midstream Urine   Protein Random Urine      g/L 0.17   Protein Total Urine g/gr Creatinine      0 - 0.2 g/g  Cr 0.18   Complement C3      76 - 169 mg/dL 64 (L)   Complement C4      15 - 50 mg/dL 5 (L)   CRP Inflammation      0.0 - 8.0 mg/L 4.2   DNA-ds      <10 IU/mL >379 (H)   Sed Rate      0 - 30 mm/h 35 (H)   AST      0 - 45 U/L 27   ALT      0 - 50 U/L 31   Creatinine Urine Random      mg/dL 99     Component      Latest Ref Rng & Units 3/16/2018   WBC      4.0 - 11.0 10e9/L 5.6   RBC Count      3.8 - 5.2 10e12/L 4.43   Hemoglobin      11.7 - 15.7 g/dL 12.1   Hematocrit      35.0 - 47.0 % 36.9   MCV      78 - 100 fl 83   MCH      26.5 - 33.0 pg 27.3   MCHC      31.5 - 36.5 g/dL 32.8   RDW      10.0 - 15.0 % 14.5   Platelet Count      150 - 450 10e9/L 224   Diff Method       Automated Method   % Neutrophils      % 81.7   % Lymphocytes      % 9.3   % Monocytes      % 7.5   % Eosinophils      % 1.3   % Basophils      % 0.2   Absolute Neutrophil      1.6 - 8.3 10e9/L 4.6   Absolute Lymphocytes      0.8 - 5.3 10e9/L 0.5 (L)   Absolute Monocytes      0.0 - 1.3 10e9/L 0.4   Absolute Eosinophils      0.0 - 0.7 10e9/L 0.1   Absolute Basophils      0.0 - 0.2 10e9/L 0.0   Creatinine      0.52 - 1.04 mg/dL 0.51 (L)   GFR Estimate      >60 mL/min/1.7m2 >90   GFR Estimate If Black      >60 mL/min/1.7m2 >90   ALT      0 - 50 U/L 27   Albumin      3.4 - 5.0 g/dL 3.5   AST      0 - 45 U/L 18       Component      Latest Ref Rng & Units 3/21/2018   Color Urine       Yellow   Appearance Urine       Clear   Glucose Urine      NEG:Negative mg/dL Negative   Bilirubin Urine      NEG:Negative Negative   Ketones Urine      NEG:Negative mg/dL Negative   Specific Gravity Urine      1.003 - 1.035 <=1.005   pH Urine      5.0 - 7.0 pH 6.5   Protein Albumin Urine      NEG:Negative mg/dL Negative   Urobilinogen Urine      0.2 - 1.0 EU/dL 0.2   Nitrite Urine      NEG:Negative Negative   Blood Urine      NEG:Negative Negative   Leukocyte Esterase Urine      NEG:Negative Negative   Source       Midstream Urine   WBC Urine      OTO5:0 - 5 /HPF 0 - 5   RBC  Urine      OTO2:O - 2 /HPF O - 2   Squamous Epithelial /LPF Urine      FEW:Few /LPF Few   Protein Random Urine      g/L <0.05   Protein Total Urine g/gr Creatinine      0 - 0.2 g/g Cr Unable to calculate due to low value   DNA-ds      <10 IU/mL >379 (H)   Complement C4      15 - 50 mg/dL 4 (L)   Complement C3      76 - 169 mg/dL 69 (L)   Creatinine Urine Random      mg/dL 17   Creatinine Urine      mg/dL 17     EXAMINATION: CT CHEST W/O CONTRAST, 12/29/2017 1:08 PM   TECHNIQUE:  Helical CT images from the thoracic inlet through the lung  bases were obtained without IV contrast during inspiration and  expiration according to high-resolution chest CT protocol. Contrast  dose: None  COMPARISON: None   HISTORY: eval for ILD, pleural effusion, pt has lupus, Sjogren's, h/o  MALT and pleurisy and SOB; Systemic lupus erythematosus, unspecified  SLE type, unspecified organ involvement status (H)   FINDINGS:  At least 75% collapse of the trachea and mainstem bronchi on the end  expiratory views. Diffuse lobular air trapping on end expiratory  images. Bibasilar platelike atelectasis. No pneumothorax or pleural  effusion. Scattered solid pulmonary nodules, for example a 4 mm  lingular nodule (series 5 image 145) and a 4 mm right upper lobe  nodule (image 73).    The heart size is normal. Mild three-vessel coronary artery calcific  atherosclerosis. Dilation of the main pulmonary artery to 4.1 cm. No  pericardial effusion. Normal caliber and configuration of the thoracic  great vessels. Numerous prominent though nonenlarged mediastinal lymph  nodes.  Limited views of the abdomen reveal no worrisome pathology. No  worrisome bony or soft tissue lesions.    IMPRESSION:   1. At least moderate tracheobronchomalacia.  2. 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  3. Scattered subcentimeter pulmonary nodules measuring  up to 4 mm.  Consider follow-up chest CT in one year to establish stability.     [Consider Follow Up: Lung nodule]     This report will be copied to the [facility] to ensure a  provider acknowledges the finding.      I have personally reviewed the examination and initial interpretation  and I agree with the findings.     AUSTIN PACE MD    Examination:NM LUNG SCAN VENTILATION AND PERFUSION, 3/14/2018 8:24 AM    Indication:  Chronic PE; Pulmonary hypertension    Additional Information: none   Technique:   The patient received 2 mCi of Tc-99m DTPA aerosol for ventilation and  7 mCi of Tc-99m MAA for perfusion. Multiple images were obtained of  the lungs in Anterior, posterior, HERNANDES, RPO, LPO, and  [place] projections.   Comparison: Chest x-ray same-day   Findings:     There are matched ventilation/perfusion defects in both lungs. No  mismatched perfusion defect to suggest pulmonary emboli.      Impression:  Pulmonary emboli is not present.     I have personally reviewed the examination and initial interpretation  and I agree with the findings.     DONNIE GARCIA MD    Component      Latest Ref Rng & Units 5/12/2018   WBC      4.0 - 11.0 10e9/L 7.1   RBC Count      3.8 - 5.2 10e12/L 4.42   Hemoglobin      11.7 - 15.7 g/dL 12.1   Hematocrit      35.0 - 47.0 % 37.4   MCV      78 - 100 fl 85   MCH      26.5 - 33.0 pg 27.4   MCHC      31.5 - 36.5 g/dL 32.4   RDW      10.0 - 15.0 % 14.7   Platelet Count      150 - 450 10e9/L 226   Diff Method       Automated Method   % Neutrophils      % 86.2   % Lymphocytes      % 4.8   % Monocytes      % 8.4   % Eosinophils      % 0.3   % Basophils      % 0.3   Absolute Neutrophil      1.6 - 8.3 10e9/L 6.1   Absolute Lymphocytes      0.8 - 5.3 10e9/L 0.3 (L)   Absolute Monocytes      0.0 - 1.3 10e9/L 0.6   Absolute Eosinophils      0.0 - 0.7 10e9/L 0.0   Absolute Basophils      0.0 - 0.2 10e9/L 0.0   Color Urine       Yellow   Appearance Urine       Clear   Glucose Urine       NEG:Negative mg/dL Negative   Bilirubin Urine      NEG:Negative Negative   Ketones Urine      NEG:Negative mg/dL Negative   Specific Gravity Urine      1.003 - 1.035 <=1.005   Blood Urine      NEG:Negative Negative   pH Urine      5.0 - 7.0 pH 5.5   Protein Albumin Urine      NEG:Negative mg/dL Negative   Urobilinogen Urine      0.2 - 1.0 EU/dL 0.2   Nitrite Urine      NEG:Negative Negative   Leukocyte Esterase Urine      NEG:Negative Negative   Source       Midstream Urine   Creatinine      0.52 - 1.04 mg/dL 0.63   GFR Estimate      >60 mL/min/1.7m2 >90   GFR Estimate If Black      >60 mL/min/1.7m2 >90   Protein Random Urine      g/L <0.05   Protein Total Urine g/gr Creatinine      0 - 0.2 g/g Cr Unable to calculate due to low value   Albumin      3.4 - 5.0 g/dL 3.6   ALT      0 - 50 U/L 28   AST      0 - 45 U/L 20   Complement C3      76 - 169 mg/dL 46 (L)   Complement C4      15 - 50 mg/dL 3 (L)   CRP Inflammation      0.0 - 8.0 mg/L <2.9   Sed Rate      0 - 30 mm/h 26   DNA-ds      <10 IU/mL >379 (H)   Creatinine Urine      mg/dL 16     Component      Latest Ref Rng & Units 7/7/2018   WBC      4.0 - 11.0 10e9/L 7.0   RBC Count      3.8 - 5.2 10e12/L 4.35   Hemoglobin      11.7 - 15.7 g/dL 11.7   Hematocrit      35.0 - 47.0 % 36.0   MCV      78 - 100 fl 83   MCH      26.5 - 33.0 pg 26.9   MCHC      31.5 - 36.5 g/dL 32.5   RDW      10.0 - 15.0 % 15.3 (H)   Platelet Count      150 - 450 10e9/L 224   Diff Method       Automated Method   % Neutrophils      % 91.9   % Lymphocytes      % 4.0   % Monocytes      % 3.7   % Eosinophils      % 0.3   % Basophils      % 0.1   Absolute Neutrophil      1.6 - 8.3 10e9/L 6.5   Absolute Lymphocytes      0.8 - 5.3 10e9/L 0.3 (L)   Absolute Monocytes      0.0 - 1.3 10e9/L 0.3   Absolute Eosinophils      0.0 - 0.7 10e9/L 0.0   Absolute Basophils      0.0 - 0.2 10e9/L 0.0   Color Urine       Yellow   Appearance Urine       Clear   Glucose Urine      NEG:Negative mg/dL Negative    Bilirubin Urine      NEG:Negative Negative   Ketones Urine      NEG:Negative mg/dL Negative   Specific Gravity Urine      1.003 - 1.035 1.010   pH Urine      5.0 - 7.0 pH 5.5   Protein Albumin Urine      NEG:Negative mg/dL Negative   Urobilinogen Urine      0.2 - 1.0 EU/dL 0.2   Nitrite Urine      NEG:Negative Negative   Blood Urine      NEG:Negative Trace (A)   Leukocyte Esterase Urine      NEG:Negative Negative   Source       Midstream Urine   WBC Urine      OTO5:0 - 5 /HPF 0 - 5   RBC Urine      OTO2:O - 2 /HPF O - 2   Squamous Epithelial /LPF Urine      FEW:Few /LPF Few   Creatinine      0.52 - 1.04 mg/dL 0.63   GFR Estimate      >60 mL/min/1.7m2 >90   GFR Estimate If Black      >60 mL/min/1.7m2 >90   Protein Random Urine      g/L 0.07   Protein Total Urine g/gr Creatinine      0 - 0.2 g/g Cr 0.29 (H)   Albumin      3.4 - 5.0 g/dL 3.5   ALT      0 - 50 U/L 27   AST      0 - 45 U/L 21   Complement C3      76 - 169 mg/dL 51 (L)   Complement C4      15 - 50 mg/dL 5 (L)   Creatinine Urine Random      mg/dL 24   CRP Inflammation      0.0 - 8.0 mg/L 11.2 (H)   DNA-ds      <10 IU/mL >379 (H)   Sed Rate      0 - 30 mm/h 35 (H)   Creatinine Urine      mg/dL 23     PFTs 5/2018 (The FEV1 and FVC are reduced but the FEV1/FVC ratio is normal.  The inspiratory flow rates are reduced.  The TLC and FRC are reduced.  The diffusing capacity is normal.  However, the diffusing capacity was not corrected for the patient's hemoglobin.  IMPRESSION:  Moderately Severe  Restriction.  Normal Diffusion.  Walk distance is normal on room air with significant desaturation but no hypoxia.  ____________________________________________KERVIN TONY.      Component      Latest Ref Rng & Units 11/12/2018   Color Urine       Yellow   Appearance Urine       Clear   Glucose Urine      NEG:Negative mg/dL Negative   Bilirubin Urine      NEG:Negative Negative   Ketones Urine      NEG:Negative mg/dL Negative   Specific Gravity Urine      1.003 -  1.035 1.025   pH Urine      5.0 - 7.0 pH 6.0   Protein Albumin Urine      NEG:Negative mg/dL 30 (A)   Urobilinogen Urine      0.2 - 1.0 EU/dL 0.2   Nitrite Urine      NEG:Negative Negative   Blood Urine      NEG:Negative Trace (A)   Leukocyte Esterase Urine      NEG:Negative Negative   Source       Midstream Urine   WBC Urine      OTO5:0 - 5 /HPF 0 - 5   RBC Urine      OTO2:O - 2 /HPF O - 2   Squamous Epithelial /LPF Urine      FEW:Few /LPF Few   Bacteria Urine      NEG:Negative /HPF Few (A)   WBC      4.0 - 11.0 10e9/L 7.3   RBC Count      3.8 - 5.2 10e12/L 4.25   Hemoglobin      11.7 - 15.7 g/dL 11.3 (L)   Hematocrit      35.0 - 47.0 % 36.0   MCV      78 - 100 fl 85   MCH      26.5 - 33.0 pg 26.6   MCHC      31.5 - 36.5 g/dL 31.4 (L)   RDW      10.0 - 15.0 % 14.9   Platelet Count      150 - 450 10e9/L 255   Creatinine      0.52 - 1.04 mg/dL 0.83   GFR Estimate      >60 mL/min/1.7m2 73   GFR Estimate If Black      >60 mL/min/1.7m2 88   Iron      35 - 180 ug/dL 27 (L)   Iron Binding Cap      240 - 430 ug/dL 264   Iron Saturation Index      15 - 46 % 10 (L)   Protein Random Urine      g/L 0.58   Protein Total Urine g/gr Creatinine      0 - 0.2 g/g Cr 0.34 (H)   Albumin      3.4 - 5.0 g/dL 3.2 (L)   ALT      0 - 50 U/L 30   AST      0 - 45 U/L 19   Complement C3      76 - 169 mg/dL 48 (L)   Complement C4      15 - 50 mg/dL 3 (L)   CRP Inflammation      0.0 - 8.0 mg/L 16.4 (H)   DNA-ds      <10 IU/mL >379 (H)   Sed Rate      0 - 30 mm/h 26   Ferritin      8 - 252 ng/mL 160   Creatinine Urine      mg/dL 176     Component      Latest Ref Rng & Units 6/5/2019           8:29 AM   Color Urine       Yellow   Appearance Urine       Slightly Cloudy   Glucose Urine      NEG:Negative mg/dL Negative   Bilirubin Urine      NEG:Negative Negative   Ketones Urine      NEG:Negative mg/dL 5 (A)   Specific Gravity Urine      1.003 - 1.035 1.027   Blood Urine      NEG:Negative Small (A)   pH Urine      5.0 - 7.0 pH 6.0   Protein Albumin  Urine      NEG:Negative mg/dL >499 (A)   Urobilinogen mg/dL      0.0 - 2.0 mg/dL 2.0   Nitrite Urine      NEG:Negative Negative   Leukocyte Esterase Urine      NEG:Negative Trace (A)   Source       Midstream Urine   WBC Urine      0 - 5 /HPF 16 (H)   RBC Urine      0 - 2 /HPF 24 (H)   Squamous Epithelial /HPF Urine      0 - 1 /HPF 2 (H)   Mucous Urine      NEG:Negative /LPF Present (A)   Hyaline Casts      0 - 2 /LPF 1   Sodium      133 - 144 mmol/L    Potassium      3.4 - 5.3 mmol/L    Chloride      94 - 109 mmol/L    Carbon Dioxide      20 - 32 mmol/L    Anion Gap      3 - 14 mmol/L    Glucose      70 - 99 mg/dL    Urea Nitrogen      7 - 30 mg/dL    Creatinine      0.52 - 1.04 mg/dL    GFR Estimate      >60 mL/min/1.73:m2    GFR Estimate If Black      >60 mL/min/1.73:m2    Calcium      8.5 - 10.1 mg/dL    Phosphorus      2.5 - 4.5 mg/dL    Albumin      3.4 - 5.0 g/dL    WBC      4.0 - 11.0 10e9/L    RBC Count      3.8 - 5.2 10e12/L    Hemoglobin      11.7 - 15.7 g/dL    Hematocrit      35.0 - 47.0 %    MCV      78 - 100 fl    MCH      26.5 - 33.0 pg    MCHC      31.5 - 36.5 g/dL    RDW      10.0 - 15.0 %    Platelet Count      150 - 450 10e9/L    Specimen Description       Midstream Urine   Special Requests       Specimen received in preservative   Culture Micro       10,000 to 50,000 colonies/mL . . .   Creatinine Urine      mg/dL 240   Albumin Urine mg/L      mg/L    Albumin Urine mg/g Cr      0 - 25 mg/g Cr    Protein Random Urine      g/L 4.50   Protein Total Urine g/gr Creatinine      0 - 0.2 g/g Cr 1.88 (H)   Neutrophil Cytoplasmic Antibody      <1:10 titer    Neutrophil Cytoplasmic Antibody Pattern          Lactate Dehydrogenase      81 - 234 U/L    HIV Antigen Antibody Combo      NR:Nonreactive        Sed Rate      0 - 30 mm/h    CRP Inflammation      0.0 - 8.0 mg/L    Complement C4      15 - 50 mg/dL    Complement C3      76 - 169 mg/dL    DNA-ds      <10 IU/mL      Component      Latest Ref Rng & Units  6/5/2019           8:29 AM   Color Urine          Appearance Urine          Glucose Urine      NEG:Negative mg/dL    Bilirubin Urine      NEG:Negative    Ketones Urine      NEG:Negative mg/dL    Specific Gravity Urine      1.003 - 1.035    Blood Urine      NEG:Negative    pH Urine      5.0 - 7.0 pH    Protein Albumin Urine      NEG:Negative mg/dL    Urobilinogen mg/dL      0.0 - 2.0 mg/dL    Nitrite Urine      NEG:Negative    Leukocyte Esterase Urine      NEG:Negative    Source          WBC Urine      0 - 5 /HPF    RBC Urine      0 - 2 /HPF    Squamous Epithelial /HPF Urine      0 - 1 /HPF    Mucous Urine      NEG:Negative /LPF    Hyaline Casts      0 - 2 /LPF    Sodium      133 - 144 mmol/L    Potassium      3.4 - 5.3 mmol/L    Chloride      94 - 109 mmol/L    Carbon Dioxide      20 - 32 mmol/L    Anion Gap      3 - 14 mmol/L    Glucose      70 - 99 mg/dL    Urea Nitrogen      7 - 30 mg/dL    Creatinine      0.52 - 1.04 mg/dL    GFR Estimate      >60 mL/min/1.73:m2    GFR Estimate If Black      >60 mL/min/1.73:m2    Calcium      8.5 - 10.1 mg/dL    Phosphorus      2.5 - 4.5 mg/dL    Albumin      3.4 - 5.0 g/dL    WBC      4.0 - 11.0 10e9/L    RBC Count      3.8 - 5.2 10e12/L    Hemoglobin      11.7 - 15.7 g/dL    Hematocrit      35.0 - 47.0 %    MCV      78 - 100 fl    MCH      26.5 - 33.0 pg    MCHC      31.5 - 36.5 g/dL    RDW      10.0 - 15.0 %    Platelet Count      150 - 450 10e9/L    Specimen Description          Special Requests          Culture Micro          Creatinine Urine      mg/dL 258   Albumin Urine mg/L      mg/L 2,810   Albumin Urine mg/g Cr      0 - 25 mg/g Cr 1,089.15 (H)   Protein Random Urine      g/L    Protein Total Urine g/gr Creatinine      0 - 0.2 g/g Cr    Neutrophil Cytoplasmic Antibody      <1:10 titer    Neutrophil Cytoplasmic Antibody Pattern          Lactate Dehydrogenase      81 - 234 U/L    HIV Antigen Antibody Combo      NR:Nonreactive        Sed Rate      0 - 30 mm/h    CRP  Inflammation      0.0 - 8.0 mg/L    Complement C4      15 - 50 mg/dL    Complement C3      76 - 169 mg/dL    DNA-ds      <10 IU/mL      Component      Latest Ref Rng & Units 6/5/2019          11:57 AM   Color Urine          Appearance Urine          Glucose Urine      NEG:Negative mg/dL    Bilirubin Urine      NEG:Negative    Ketones Urine      NEG:Negative mg/dL    Specific Gravity Urine      1.003 - 1.035    Blood Urine      NEG:Negative    pH Urine      5.0 - 7.0 pH    Protein Albumin Urine      NEG:Negative mg/dL    Urobilinogen mg/dL      0.0 - 2.0 mg/dL    Nitrite Urine      NEG:Negative    Leukocyte Esterase Urine      NEG:Negative    Source          WBC Urine      0 - 5 /HPF    RBC Urine      0 - 2 /HPF    Squamous Epithelial /HPF Urine      0 - 1 /HPF    Mucous Urine      NEG:Negative /LPF    Hyaline Casts      0 - 2 /LPF    Sodium      133 - 144 mmol/L 134   Potassium      3.4 - 5.3 mmol/L 4.3   Chloride      94 - 109 mmol/L 101   Carbon Dioxide      20 - 32 mmol/L 26   Anion Gap      3 - 14 mmol/L 8   Glucose      70 - 99 mg/dL 109 (H)   Urea Nitrogen      7 - 30 mg/dL 21   Creatinine      0.52 - 1.04 mg/dL 0.49 (L)   GFR Estimate      >60 mL/min/1.73:m2 >90   GFR Estimate If Black      >60 mL/min/1.73:m2 >90   Calcium      8.5 - 10.1 mg/dL 9.2   Phosphorus      2.5 - 4.5 mg/dL 4.2   Albumin      3.4 - 5.0 g/dL 2.9 (L)   WBC      4.0 - 11.0 10e9/L 8.7   RBC Count      3.8 - 5.2 10e12/L 4.81   Hemoglobin      11.7 - 15.7 g/dL 12.3   Hematocrit      35.0 - 47.0 % 39.3   MCV      78 - 100 fl 82   MCH      26.5 - 33.0 pg 25.6 (L)   MCHC      31.5 - 36.5 g/dL 31.3 (L)   RDW      10.0 - 15.0 % 14.1   Platelet Count      150 - 450 10e9/L 302   Specimen Description          Special Requests          Culture Micro          Creatinine Urine      mg/dL    Albumin Urine mg/L      mg/L    Albumin Urine mg/g Cr      0 - 25 mg/g Cr    Protein Random Urine      g/L    Protein Total Urine g/gr Creatinine      0 - 0.2 g/g  Cr    Neutrophil Cytoplasmic Antibody      <1:10 titer <1:10   Neutrophil Cytoplasmic Antibody Pattern       The ANCA IFA is <1:10.  No further testing will be performed.   Lactate Dehydrogenase      81 - 234 U/L 252 (H)   HIV Antigen Antibody Combo      NR:Nonreactive     Nonreactive   Sed Rate      0 - 30 mm/h 40 (H)   CRP Inflammation      0.0 - 8.0 mg/L 25.7 (H)   Complement C4      15 - 50 mg/dL 3 (L)   Complement C3      76 - 169 mg/dL 53 (L)   DNA-ds      <10 IU/mL >379 (H)   [facility]                                                      CMR Report       MRN:                  [id number]                                  Name:           CHILANGO GIBBONS                                   :                  1965                                  Scan Date:   2019 11:11:32                                   Electronically signed by Fer Corea 2019-May-20 14:19:00     SUMMARY   ==========================================================================================================     Clinical history: 54-year old female with SLE, poly-serositis, and chronic pleuritic chest pain. CMR to  assess for cardiac involvement (sepideh-myocarditis).  Comparison CMR: None.      1. The LV is normal in cavity size and wall thickness. The global systolic function is normal. The LVEF is  62%.   There are no regional wall motion abnormalities.     2. The RV is normal in cavity size. The global systolic function is normal. The RVEF is 60%.      3. Both atria are normal in size.     4. There is no significant valvular disease.      5. Late gadolinium enhancement imaging shows no MI, fibrosis or infiltrative disease.      6. The is mild pericardial thickening and tethering, with circumferential pericardial enhancement. There is  no pericardial effusion.       7. There is no intracardiac thrombus.     8. The main PA is moderately enlarged, measuring 3.8 cm.      CONCLUSIONS: Pericardial thickening and  enhancement consistent with inflammatory pericarditis. There is no  myocardial fibrosis or myocarditis. Normal biventricular size and systolic function; LVEF 62%, RVEF 60%.      CORE EXAM   ==========================================================================================================     MEASUREMENTS   ----------------------------------------------------------------------------------------      VOLUMETRIC ANALYSIS       ----------------------------------------------  .--------------------------------------------------------.                    LV    Reference  RV    Reference   +------+-----------+------+-----------+------+-----------+   EDV   ml          162   ()   159   ()          ml/m^2     69.2   (56-90)   67.9   (53-90)     ESV   ml           62   (22-59)     64   (15-68)           ml/m^2     26.5   (14-33)   27.4   (11-37)     CO    L/min      7.30             6.93                     L/min/m^2   3.1              3.0               MASS                                                 SV    ml          100   ()    95   ()          ml/m^2     42.7   (37-62)   40.6   (36-60)     EF    %            62   (59-77)     60   (55-79)    '------+-----------+------+-----------+------+-----------'             CARDIAC OUTPUT HR:  73 BPM      LA DIMENSIONS (LV SYSTOLE)       ----------------------------------------------          DIAMETER:  3.9 cm         AORTIC ROOT DIMENSIONS       ----------------------------------------------          SINUS OF VALSALVA:  2.9 cm          AORTIC ROOT SIZE:  Normal        ANATOMY   ----------------------------------------------------------------------------------------      LEFT VENTRICLE       ----------------------------------------------          WALL THICKNESS:  Normal          CAVITY SIZE:  Normal         RIGHT VENTRICLE       ----------------------------------------------           WALL THICKNESS:  Normal          CONTRACTILITY:  Normal          CAVITY SIZE:  Normal         INTERVENTRICULAR SEPTUM       ----------------------------------------------               VENTRICULAR SEPTUM:  Normal          INTERATRIAL SEPTUM       ----------------------------------------------               ATRIAL SEPTUM:          LIPOMATOUS HYPERTROPHY          LEFT ATRIUM       ----------------------------------------------          CAVITY SIZE:  Normal         RIGHT ATRIUM       ----------------------------------------------          CAVITY SIZE:  Normal         PERICARDIUM       ----------------------------------------------          THICKENED          THICKNESS:  5 mm          EFFUSION:  None         PLEURAL EFFUSION       ----------------------------------------------               None         VALVES   ----------------------------------------------------------------------------------------      AORTIC VALVE       ----------------------------------------------          AORTIC VALVE LEAFLETS:  Trileaflet         MITRAL VALVE       ----------------------------------------------          MITRAL VALVE LEAFLETS:  Normal Leaflets         TRICUSPID VALVE       ----------------------------------------------          TRICUSPID VALVE LEAFLETS:  Normal Leaflets         PULMONIC VALVE       ----------------------------------------------          PULMONIC VALVE LEAFLETS:  Normal Leaflets        17 SEGMENT   ----------------------------------------------------------------------------------------  .------------------------------------------------------------------------------------------.   Segments            Wall Motion   Hyperenhancement  Stress Perfusion  Interpretation   +--------------------+--------------+------------------+------------------+----------------+   Base Anterior       Normal/Hyper  None                                Normal            Base Anteroseptal   Normal/Hyper  None                                 Normal            Base Inferoseptal   Normal/Hyper  None                                Normal            Base Inferior       Normal/Hyper  None                                Normal            Base Inferolateral  Normal/Hyper  None                                Normal            Base Anterolateral  Normal/Hyper  None                                Normal            Mid Anterior        Normal/Hyper  None                                Normal            Mid Anteroseptal    Normal/Hyper  None                                Normal            Mid Inferoseptal    Normal/Hyper  None                                Normal            Mid Inferior        Normal/Hyper  None                                Normal            Mid Inferolateral   Normal/Hyper  None                                Normal            Mid Anterolateral   Normal/Hyper  None                                Normal            Apical Anterior     Normal/Hyper  None                                Normal            Apical Septal       Normal/Hyper  None                                Normal            Apical Inferior     Normal/Hyper  None                                Normal            Apical Lateral      Normal/Hyper  None                                Normal            Lawtey                Normal/Hyper  None                                Normal           +--------------------+--------------+------------------+------------------+----------------+   RV Segments         Wall Motion   Hyperenhancement  Stress Perfusion  Interpretation   +--------------------+--------------+------------------+------------------+----------------+   RV Basal Anterior   Normal/Hyper  None                                Normal            RV Basal Inferior   Normal/Hyper  None                                Normal            RV Mid               Normal/Hyper  None                                Normal            RV Apical           Normal/Hyper  None                                Normal           '--------------------+--------------+------------------+------------------+----------------'         FINDINGS       ----------------------------------------------          INFARCT/SCAR SIZE:  0 %        SCAN INFO   ==========================================================================================================     GENERAL   ----------------------------------------------------------------------------------------      CONTRAST AGENT       ----------------------------------------------          TYPE:  Gadavist          VOLUME ADMINISTERED:  10 ml          DOSAGE FOR 0.5M:  0.04 mmol/kg          SERUM CREATININE:  0.58 sCr          CREATININE DATE:  2019-03-06 00:00:00          SEDATION       ----------------------------------------------          SEDATION USED?:  No         VITALS       ----------------------------------------------          HEIGHT:  66.00 in          HEIGHT:  167.64 cm          WEIGHT:  289.00 lbs          WEIGHT:  131.09 kgs          BSA:  2.34 m^2         PULSE SEQUENCES       ----------------------------------------------          Single-Shot SSFP, IR GRE - Segmented, IR SSFP - Single Shot, SSFP Cine          SETUP       ----------------------------------------------          TYPE:  Clinical          INPATIENT:  No          INCOMPLETE SCAN:  No          REASON(S) FOR SCAN:  Cardiomyopathy, Pericardial Evaluation           REFERRING PHYSICIAN:  ROBE VAZQUEZ          ATTENDING PHYSICIAN:  ROBE VAZQUEZ      Kidney Biopsy (6/7/19)  Patient Name: CHILANGO GIBBONS   MR#: [id number]   Specimen #: [id number]   Collected: 6/7/2019   Received: 6/7/2019   Reported: 6/10/2019 22:42   Ordering Phy(s): JOYCE CAMERON     For improved result formatting, select 'View Enhanced Report Format' under    Linked Documents  section.     SPECIMEN(S):   Kidney biopsy, native with EM & Immunofluorescence, right     FINAL DIAGNOSIS:   Kidney; percutaneous needle biopsy:   -Diffuse global lupus nephritis, ISN/RPS class IV-S (a)   -Mild to moderate arteriosclerosis     COMMENT:   There is mild mesangial and endocapillary proliferation, focal leukocytic   infiltrates, and crescentic lesions   involving 14 out of 26 glomeruli, mainly cellular with necrotizing   lesions.  There are minimal chronic   changes.  The activity index of the proliferative component is 9/24 and   the chronicity index is 1/12 (Robert   et al, American Journal of Medicine 75:  382-391, 1983).     The presence of significant crescents with only mild mesangial and   endocapillary proliferation raises the   possibility of concurrent lupus nephritis and ANCA-associated crescentic   glomerulonephritis.  Thus it is   recommended to test for ANCA and clinical correlation is recommended.     Component      Latest Ref Rng & Units 3/5/2020   WBC      4.0 - 11.0 10e9/L 7.6   RBC Count      3.8 - 5.2 10e12/L 4.13   Hemoglobin      11.7 - 15.7 g/dL 11.3 (L)   Hematocrit      35.0 - 47.0 % 36.1   MCV      78 - 100 fl 87   MCH      26.5 - 33.0 pg 27.4   MCHC      31.5 - 36.5 g/dL 31.3 (L)   RDW      10.0 - 15.0 % 15.1 (H)   Platelet Count      150 - 450 10e9/L 228   % Neutrophils      % 88.8   % Lymphocytes      % 4.5   % Monocytes      % 6.2   % Eosinophils      % 0.1   % Basophils      % 0.4   Absolute Neutrophil      1.6 - 8.3 10e9/L 6.8   Absolute Lymphocytes      0.8 - 5.3 10e9/L 0.3 (L)   Absolute Monocytes      0.0 - 1.3 10e9/L 0.5   Absolute Eosinophils      0.0 - 0.7 10e9/L 0.0   Absolute Basophils      0.0 - 0.2 10e9/L 0.0   Diff Method       Automated Method   RBC Morphology       Normal   Platelet Estimate       Automated count confirmed.  Platelet morphology is normal.   Color Urine       Yellow   Appearance Urine       Clear   Glucose Urine      NEG:Negative mg/dL  Negative   Bilirubin Urine      NEG:Negative Negative   Ketones Urine      NEG:Negative mg/dL Negative   Specific Gravity Urine      1.003 - 1.035 1.020   Blood Urine      NEG:Negative Moderate (A)   pH Urine      5.0 - 7.0 pH 5.5   Protein Albumin Urine      NEG:Negative mg/dL >=300 (A)   Urobilinogen Urine      0.2 - 1.0 EU/dL 0.2   Nitrite Urine      NEG:Negative Negative   Leukocyte Esterase Urine      NEG:Negative Negative   Source       Midstream Urine   WBC Urine      OTO5:0 - 5 /HPF 10-25 (A)   RBC Urine      OTO2:O - 2 /HPF O - 2   Squamous Epithelial /LPF Urine      FEW:Few /LPF Few   Bacteria Urine      NEG:Negative /HPF Few (A)   Creatinine      0.52 - 1.04 mg/dL 0.62   GFR Estimate      >60 mL/min/1.73:m2 >90   GFR Estimate If Black      >60 mL/min/1.73:m2 >90   Protein Random Urine      g/L 2.24   Protein Total Urine g/gr Creatinine      0 - 0.2 g/g Cr 4.38 (H)   Specimen Description       Midstream Urine   Culture Micro       10,000 to 50,000 colonies/mL . . .   Creatinine Urine Random      mg/dL 53   Sed Rate      0 - 30 mm/h 42 (H)   DNA-ds      <10 IU/mL >379 (H)   CRP Inflammation      0.0 - 8.0 mg/L 10.5 (H)   Complement C3      81 - 157 mg/dL 64 (L)   Complement C4      13 - 39 mg/dL 14   AST      0 - 45 U/L 14   Albumin      3.4 - 5.0 g/dL 2.8 (L)   ALT      0 - 50 U/L 22   Creatinine Urine      mg/dL 51     Component      Latest Ref Rng & Units 4/27/2020   WBC      4.0 - 11.0 10e9/L 10.5   RBC Count      3.8 - 5.2 10e12/L 4.72   Hemoglobin      11.7 - 15.7 g/dL 13.0   Hematocrit      35.0 - 47.0 % 41.7   MCV      78 - 100 fl 88   MCH      26.5 - 33.0 pg 27.5   MCHC      31.5 - 36.5 g/dL 31.2 (L)   RDW      10.0 - 15.0 % 14.8   Platelet Count      150 - 450 10e9/L 271   % Neutrophils      % 92.5   % Lymphocytes      % 3.5   % Monocytes      % 3.2   % Eosinophils      % 0.1   % Basophils      % 0.7   Absolute Neutrophil      1.6 - 8.3 10e9/L 9.7 (H)   Absolute Lymphocytes      0.8 - 5.3 10e9/L  0.4 (L)   Absolute Monocytes      0.0 - 1.3 10e9/L 0.3   Absolute Eosinophils      0.0 - 0.7 10e9/L 0.0   Absolute Basophils      0.0 - 0.2 10e9/L 0.1   Diff Method       Automated Method   RBC Morphology       Normal   Platelet Estimate       Automated count confirmed.  Platelet morphology is normal.   Color Urine       Yellow   Appearance Urine       Clear   Glucose Urine      NEG:Negative mg/dL Negative   Bilirubin Urine      NEG:Negative Negative   Ketones Urine      NEG:Negative mg/dL Negative   Specific Gravity Urine      1.003 - 1.035 1.010   Blood Urine      NEG:Negative Trace (A)   pH Urine      5.0 - 7.0 pH 6.0   Protein Albumin Urine      NEG:Negative mg/dL >=300 (A)   Urobilinogen Urine      0.2 - 1.0 EU/dL 0.2   Nitrite Urine      NEG:Negative Negative   Leukocyte Esterase Urine      NEG:Negative Negative   Source       Midstream Urine   WBC Urine      OTO5:0 - 5 /HPF 0 - 5   RBC Urine      OTO2:O - 2 /HPF O - 2   Squamous Epithelial /LPF Urine      FEW:Few /LPF Few   Bacteria Urine      NEG:Negative /HPF Few (A)   Creatinine      0.52 - 1.04 mg/dL 0.78   GFR Estimate      >60 mL/min/1.73:m2 86   GFR Estimate If Black      >60 mL/min/1.73:m2 >90   Protein Random Urine      g/L 1.07   Protein Total Urine g/gr Creatinine      0 - 0.2 g/g Cr 2.50 (H)   Specimen Description          Culture Micro          Creatinine Urine Random      mg/dL 40   Sed Rate      0 - 30 mm/h 22   DNA-ds      <10 IU/mL 368 (H)   CRP Inflammation      0.0 - 8.0 mg/L 3.6   Complement C3      81 - 157 mg/dL 77 (L)   Complement C4      13 - 39 mg/dL 12 (L)   AST      0 - 45 U/L 23   Albumin      3.4 - 5.0 g/dL 3.4   ALT      0 - 50 U/L 33   Creatinine Urine      mg/dL 43     Component      Latest Ref Rng & Units 4/27/2020   WBC      4.0 - 11.0 10e9/L 10.5   RBC Count      3.8 - 5.2 10e12/L 4.72   Hemoglobin      11.7 - 15.7 g/dL 13.0   Hematocrit      35.0 - 47.0 % 41.7   MCV      78 - 100 fl 88   MCH      26.5 - 33.0 pg 27.5    MCHC      31.5 - 36.5 g/dL 31.2 (L)   RDW      10.0 - 15.0 % 14.8   Platelet Count      150 - 450 10e9/L 271   % Neutrophils      % 92.5   % Lymphocytes      % 3.5   % Monocytes      % 3.2   % Eosinophils      % 0.1   % Basophils      % 0.7   Absolute Neutrophil      1.6 - 8.3 10e9/L 9.7 (H)   Absolute Lymphocytes      0.8 - 5.3 10e9/L 0.4 (L)   Absolute Monocytes      0.0 - 1.3 10e9/L 0.3   Absolute Eosinophils      0.0 - 0.7 10e9/L 0.0   Absolute Basophils      0.0 - 0.2 10e9/L 0.1   Diff Method       Automated Method   RBC Morphology       Normal   Platelet Estimate       Automated count confirmed.  Platelet morphology is normal.   Color Urine       Yellow   Appearance Urine       Clear   Glucose Urine      NEG:Negative mg/dL Negative   Bilirubin Urine      NEG:Negative Negative   Ketones Urine      NEG:Negative mg/dL Negative   Specific Gravity Urine      1.003 - 1.035 1.010   Blood Urine      NEG:Negative Trace (A)   pH Urine      5.0 - 7.0 pH 6.0   Protein Albumin Urine      NEG:Negative mg/dL >=300 (A)   Urobilinogen Urine      0.2 - 1.0 EU/dL 0.2   Nitrite Urine      NEG:Negative Negative   Leukocyte Esterase Urine      NEG:Negative Negative   Source       Midstream Urine   WBC Urine      OTO5:0 - 5 /HPF 0 - 5   RBC Urine      OTO2:O - 2 /HPF O - 2   Squamous Epithelial /LPF Urine      FEW:Few /LPF Few   Bacteria Urine      NEG:Negative /HPF Few (A)   Creatinine      0.52 - 1.04 mg/dL 0.78   GFR Estimate      >60 mL/min/1.73:m2 86   GFR Estimate If Black      >60 mL/min/1.73:m2 >90   Protein Random Urine      g/L 1.07   Protein Total Urine g/gr Creatinine      0 - 0.2 g/g Cr 2.50 (H)   Creatinine Urine Random      mg/dL 40   Sed Rate      0 - 30 mm/h 22   DNA-ds      <10 IU/mL 368 (H)   CRP Inflammation      0.0 - 8.0 mg/L 3.6   Complement C3      81 - 157 mg/dL 77 (L)   Complement C4      13 - 39 mg/dL 12 (L)   AST      0 - 45 U/L 23   Albumin      3.4 - 5.0 g/dL 3.4   ALT      0 - 50 U/L 33   Creatinine  Urine      mg/dL 43     Physical Exam:      Constitutional: Pleasant, WN, WD, Cushings. NAD  Eyes: EOMI, sclera anicteric, conj not injected.  HEENT: NL external ears, lips  MS: No synovitis.   Skin: No skin rash  Neuro: CN grossly intact without focal deficit  Psych: Appropriate affect    Assessment/Plan:    SLE. 54 yo WF with +FH RA and personal hx of SLE presented in 12/2017 for 2nd opinion of m/o her SLE. She was diagnosed with SLE at age 27. Her lupus has been marked by +KARLIE (highest titer 1:640, speckled and nucleolar patterns), +anti-DNA, arthritis, malar rash, serositis (pleuritic CP) supported by +SSA/SSB Ab, low C3/low C4, +RF, high ESR/CRP. She had neg anti-RNP, anti-Sm, acL IgM/G/A, LAC, cryo and anti-CCP.     Had NL C3 at the time of Dx. She was treated with prednisone and HCQ at the time of Dx. Can't remember how long she was on HCQ and why HCQ was stopped, but it probably was stopped because of stable disease, no report on HCQ toxicity. Reportedly her SLE was mild all these years.    Has secondary Sjogren's with sicca, +SSA/SSB Ab and h/o MALT lymphoma at age 44 in remission. Uses blink eyedrops and salagen. No lip biopsy was done to confirm Dx of Sjogren's.    Her lupus flared in 7/2017 with no triggers when she presented with arthritis, HCQ was resumed, arthritis resolved. Mild pulm HTN on 2D-Echo 8/2017 but neg R cardiac cath and VQ scan in 3/2018, also neg 2D-Echo.    In 12/2017, was prescribed prednisone 40 mg for only 5 days for pleuritic CP, CP recurred after stopping prednisone.     Her major complaint at initial visit with me in 12/2017 was ongoing CP. AZA was added in 2/2018 with start dose of 50 mg qd and slowly was increased to max 150 mg qd. Tolerated AZA well, no SEs, no toxicity on the labs but failed it and required to go back on prednisone 20 mg qd (current dose).     Her lupus is active with pleuritic CP. ESR/CRP normalized on prednisone+AZA+HCQ but +anti-DNA, low C3/C4 are  unchanged. Chest CT in 12/2017 without contrast showed air trapping due to lupus/Sjogren's, no pleural effusion. Lung nodules need f/u in a year. No pericardail effusion on 2D-echo and neg VQ scan for PE in 3/2018 so chest CT with contrast is not needed. She is APL negative.    AZA was switched to  mg po bid on 5/18/2018 as she failed AZA. She is now on max dose of MMF 1500 mg bid. Her labs are unchanged with persistently elevated anti-DNA, low C3/C4 and high ESR/CRP, but just started to feel a lot better (regarding fatigue, arthralgia, still has residual pleuritic CP) after adding MMF.     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 Also possible shrinking lung syn sec lupus or obesity is responsible for restrictive lung disease plus pleural effusion. She was prescribed albuterol and dulera inh which have helped.    Benlysta was added in 9/2018 to help with pleuritic CP. No change in lupus serology (anti-DNA, C3, C4), but ESR/CRP have improved. Overall she felt a lot better after adding benlysta but it made her tired.     I could not taper her off the prednisone, she continued to have active IA, fatigue and pleuritic CP. I recommended switching benlysta to rituximab given her h/o lymphoma, unfortunately her insurance denied. At the same time, Taina misunderstood and stopped her cellcept as thought we were switching MMF to rituximab while the plan was to add rituximab to MMF. She has been off MMF for couple of months. Had cardiac MRI on 5/20 which showed active pericarditis. I advised to switch to cytoxan 750 mg/m2 qmo x 6 mo. On Saturday 6/1/2019, received a call from infusion center reporting blood in the urine and if it was ok to proceed with  cytoxan. I was concerned as that was very new. We canceled the cytoxan infusion. UCx was negative for UTI. Repeat U/A today is showing protein and blood, with h/o stopping MMF, very concerning for lupus nephritis. She never had lupus nephritis as part of her lupus and it's possible that it was covered by MMF and showed up off MMF. Renal biopsy is still recommended to confirm Dx, evaluate degree of severity, chronicity and rule out other diagnoses. I spoke to renal team and dr. Hoyt kindly agreed to see her today (6/5/19) as urgent consult as add on to their schedule and scheduled renal biopsy for Friday 6/7/2019. I would re-schedule cytoxan to 6/15/2019 and schedule pulse solumedrol 1 gr every day x 3 days. Meanwhile, will increase prednisone to 60 mg every day. Cytoxan risks were discussed again.    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 Patient received 1st dose cyclophosphamide on 6/15/19 and tolerated well. Initiated Rituxin 1/22/2020. Follow-up 1/23 with Dr. Sherman, nephrology.      S/p 6 cytoxan for lupus nephritis, enteritis, arthritis, pleurisy. Still active lupus with intermittent malar rash, LN with 2.5 gr proteinuria in 4/2020, arthritis and pos serology. S/p rituximab on 1/22/2020 and 2/6/2020, on prednisone 25 mg every day (it was 20 mg every day last visit), MMF 3 gr every day and  mg a day. Recommend repeat rituximab scheduled for 6/9, 6/23/2020 as tx options are limited (also necrotizing lesions on renal biopsy, can not rule out ANCA neg vasculitis overlap and rituximab is FDA approved for GPA/MPA); however today has new SOB and needs work up before proceeding with rituxiamb. Needs urgent CT to diagnose ILD exacerbation (unlikely) from infection.    Recommend:    -High resolution chest CT today  -Labs  -Discuss anxiety with your PCP and get a COVID test    - Continue Cellcept 1500 mg BID and  mg BID  - Continue prednisone 25 mg daily for now  - Annual eye exam is current for  HCQ monitoring  -Cellcept albs [id number]  - PCP prophylaxis on atovaquone 10 mL (1500 mg). Patient did not tolerate inhaled pentamidine. Avoiding TMP-SMX given sulfa reaction and potential increase in SLE flare. Hesitant to use dapsone given risk of hemolytic anemia.    - Follow-up in 3-4 months        Orders Placed This Encounter   Procedures     CT Chest w/o contrast (High Resolution)     Albumin level     ALT     AST     CBC with platelets differential     Complement C3     Complement C4     Creatinine random urine     Creatinine     CRP inflammation     DNA double stranded antibodies     Erythrocyte sedimentation rate auto     UA with Microscopic reflex to Culture     Vitamin D Deficiency     Protein  random urine with Creat Ratio         Video visit: 9:09-9:25 AM    Killian Marroquin MD

## 2020-06-04 NOTE — PATIENT INSTRUCTIONS
High resolution chest CT    Labs    Discuss anxiety with your PCP and get a COVID test    Return in 3-4 months

## 2020-06-04 NOTE — LETTER
July 20, 2020      Taina Singh  [address] [place] [address]        Dear ,    We are writing to inform you of your test results.    Improved anti-DNA, C3/C4 which is good news. High ESR/CRP was due to pneumonia. Vit D was refilled.    Please ask the infusion nurse to draw blood on 7/20.    Resulted Orders   Vitamin D Deficiency   Result Value Ref Range    Vitamin D Deficiency screening 35 20 - 75 ug/L      Comment:      Season, race, dietary intake, and treatment affect the concentration of   25-hydroxy-Vitamin D. Values may decrease during winter months and increase   during summer months. Values 20-29 ug/L may indicate Vitamin D insufficiency   and values <20 ug/L may indicate Vitamin D deficiency.  Vitamin D determination is routinely performed by an immunoassay specific for   25 hydroxyvitamin D3.  If an individual is on vitamin D2 (ergocalciferol)   supplementation, please specify 25 OH vitamin D2 and D3 level determination by   LCMSMS test [id number].     Erythrocyte sedimentation rate auto   Result Value Ref Range    Sed Rate 44 (H) 0 - 30 mm/h   DNA double stranded antibodies   Result Value Ref Range    DNA-ds 69 (H) <10 IU/mL      Comment:      Positive   CRP inflammation   Result Value Ref Range    CRP Inflammation 56.8 (H) 0.0 - 8.0 mg/L   Creatinine   Result Value Ref Range    Creatinine 0.74 0.52 - 1.04 mg/dL    GFR Estimate >90 >60 mL/min/[1.73_m2]      Comment:      Non  GFR Calc  Starting 12/18/2018, serum creatinine based estimated GFR (eGFR) will be   calculated using the Chronic Kidney Disease Epidemiology Collaboration   (CKD-EPI) equation.      GFR Estimate If Black >90 >60 mL/min/[1.73_m2]      Comment:       GFR Calc  Starting 12/18/2018, serum creatinine based estimated GFR (eGFR) will be   calculated using the Chronic Kidney Disease Epidemiology Collaboration   (CKD-EPI) equation.     Complement C4   Result Value Ref Range    Complement C4 18 13 -  39 mg/dL   Complement C3   Result Value Ref Range    Complement C3 89 81 - 157 mg/dL   CBC with platelets differential   Result Value Ref Range    WBC 10.5 4.0 - 11.0 10e9/L    RBC Count 4.24 3.8 - 5.2 10e12/L    Hemoglobin 11.4 (L) 11.7 - 15.7 g/dL    Hematocrit 36.6 35.0 - 47.0 %    MCV 86 78 - 100 fl    MCH 26.9 26.5 - 33.0 pg    MCHC 31.1 (L) 31.5 - 36.5 g/dL    RDW 14.3 10.0 - 15.0 %    Platelet Count 272 150 - 450 10e9/L    % Neutrophils 90.0 %    % Lymphocytes 2.0 %    % Monocytes 7.0 %    % Basophils 1.0 %    Absolute Neutrophil 9.5 (H) 1.6 - 8.3 10e9/L    Absolute Lymphocytes 0.2 (L) 0.8 - 5.3 10e9/L    Absolute Monocytes 0.7 0.0 - 1.3 10e9/L    Absolute Basophils 0.1 0.0 - 0.2 10e9/L    Elliptocytes Slight     Platelet Estimate       Automated count confirmed.  Platelet morphology is normal.    Diff Method Manual Differential       Comment:      CORRECTED ON 06/05 AT 0912: PREVIOUSLY REPORTED AS Automated Method   AST   Result Value Ref Range    AST 19 0 - 45 U/L   ALT   Result Value Ref Range    ALT 22 0 - 50 U/L   Albumin level   Result Value Ref Range    Albumin 3.2 (L) 3.4 - 5.0 g/dL       If you have any questions or concerns, please call the clinic at the number listed above.       Sincerely,      Killian Marroquin MD

## 2020-06-04 NOTE — PROGRESS NOTES
"Tania Singh is a 55 year old female who is being evaluated via a billable video visit.      The patient has been notified of following:     \"This video visit will be conducted via a call between you and your physician/provider. We have found that certain health care needs can be provided without the need for an in-person physical exam.  This service lets us provide the care you need with a video conversation.  If a prescription is necessary we can send it directly to your pharmacy.  If lab work is needed we can place an order for that and you can then stop by our lab to have the test done at a later time.    Video visits are billed at different rates depending on your insurance coverage.  Please reach out to your insurance provider with any questions.    If during the course of the call the physician/provider feels a video visit is not appropriate, you will not be charged for this service.\"    Patient has given verbal consent for Video visit? Yes    How would you like to obtain your AVS? Kandy    Patient would like the video invitation sent by: Send to e-mail at: [email or web address]    Will anyone else be joining your video visit? No  {If patient encounters technical issues they should call [phone] :[phone]}      Video-Visit Details    Type of service:  Video Visit    Video Start Time: {video visit start/end time:152948}  Video End Time: {video visit start/end time:152948}    Originating Location (pt. Location): {video visit patient location:059163::\"Home\"}    Distant Location (provider location):  [facility]     Platform used for Video Visit: {Virtual Visit Platforms:505210::\"[email or web address]\"}    {signature options:495693}        "

## 2020-06-05 ENCOUNTER — MYC MEDICAL ADVICE (OUTPATIENT)
Dept: RHEUMATOLOGY | Facility: CLINIC | Age: 55
End: 2020-06-05

## 2020-06-05 DIAGNOSIS — J18.9 PNEUMONIA DUE TO INFECTIOUS ORGANISM, UNSPECIFIED LATERALITY, UNSPECIFIED PART OF LUNG: Primary | ICD-10-CM

## 2020-06-05 LAB
BASOPHILS # BLD AUTO: 0.1 10E9/L (ref 0–0.2)
BASOPHILS NFR BLD AUTO: 1 %
C3 SERPL-MCNC: 89 MG/DL (ref 81–157)
C4 SERPL-MCNC: 18 MG/DL (ref 13–39)
DEPRECATED CALCIDIOL+CALCIFEROL SERPL-MC: 35 UG/L (ref 20–75)
DIFFERENTIAL METHOD BLD: ABNORMAL
ELLIPTOCYTES BLD QL SMEAR: SLIGHT
ERYTHROCYTE [DISTWIDTH] IN BLOOD BY AUTOMATED COUNT: 14.3 % (ref 10–15)
HCT VFR BLD AUTO: 36.6 % (ref 35–47)
HGB BLD-MCNC: 11.4 G/DL (ref 11.7–15.7)
LYMPHOCYTES # BLD AUTO: 0.2 10E9/L (ref 0.8–5.3)
LYMPHOCYTES NFR BLD AUTO: 2 %
MCH RBC QN AUTO: 26.9 PG (ref 26.5–33)
MCHC RBC AUTO-ENTMCNC: 31.1 G/DL (ref 31.5–36.5)
MCV RBC AUTO: 86 FL (ref 78–100)
MONOCYTES # BLD AUTO: 0.7 10E9/L (ref 0–1.3)
MONOCYTES NFR BLD AUTO: 7 %
NEUTROPHILS # BLD AUTO: 9.5 10E9/L (ref 1.6–8.3)
NEUTROPHILS NFR BLD AUTO: 90 %
PLATELET # BLD AUTO: 272 10E9/L (ref 150–450)
PLATELET # BLD EST: ABNORMAL 10*3/UL
RBC # BLD AUTO: 4.24 10E12/L (ref 3.8–5.2)
WBC # BLD AUTO: 10.5 10E9/L (ref 4–11)

## 2020-06-05 RX ORDER — LEVOFLOXACIN 750 MG/1
750 TABLET, FILM COATED ORAL DAILY
Qty: 5 TABLET | Refills: 0 | Status: SHIPPED | OUTPATIENT
Start: 2020-06-05 | End: 2020-06-26

## 2020-06-05 NOTE — RESULT ENCOUNTER NOTE
ILD does not seem to be worse (CCed Dr. Marvin). It looks like there is a new pneumonia there and is good to treat before rituxiumab Tx on 6/9. Recommend Levaquin 750 mg a day x 5 days starting 6/5/2020. There is a potential rare risk of tendon rupture with this antibiotic. Which pharmacy do you use? You could keep your rituximab appointment on 6/9/2020.

## 2020-06-08 LAB — DSDNA AB SER-ACNC: 69 IU/ML

## 2020-06-10 NOTE — TELEPHONE ENCOUNTER
Received call from pt who wanted to let Dr. Marroquin know that she is feeling dramatically improved this morning.  Her cough has improved, she can breathe much easier and has a lot more energy. She also saw her labs and is excited that her dsDNA is down at a number that can register.    Will update Dr. Marroquin.    Sophie Alvarado RN  [facility]

## 2020-06-11 ENCOUNTER — VIRTUAL VISIT (OUTPATIENT)
Dept: NEPHROLOGY | Facility: CLINIC | Age: 55
End: 2020-06-11
Attending: INTERNAL MEDICINE
Payer: COMMERCIAL

## 2020-06-11 DIAGNOSIS — M32.14 LUPUS NEPHRITIS (H): Primary | ICD-10-CM

## 2020-06-11 NOTE — LETTER
"6/11/2020       RE: Taina Singh  [address]  [place] [address]     Dear Colleague,    Thank you for referring your patient, Taina Singh, to the [facility] NEPHROLOGY at [facility]. Please see a copy of my visit note below.    Taina Singh is a 55 year old female who is being evaluated via a billable video visit.      The patient has been notified of following:     \"This video visit will be conducted via a call between you and your physician/provider. We have found that certain health care needs can be provided without the need for an in-person physical exam.  This service lets us provide the care you need with a video conversation.  If a prescription is necessary we can send it directly to your pharmacy.  If lab work is needed we can place an order for that and you can then stop by our lab to have the test done at a later time.    Video visits are billed at different rates depending on your insurance coverage.  Please reach out to your insurance provider with any questions.    If during the course of the call the physician/provider feels a video visit is not appropriate, you will not be charged for this service.\"    Patient has given verbal consent for Video visit? Yes    Will anyone else be joining your video visit? No        Video-Visit Details    Type of service:  Video Visit    Video Start Time: 11:00  Video End Time: 11:20    Originating Location (pt. Location): Home    Distant Location (provider location):  [facility] NEPHROLOGY     Platform used for Video Visit: [employer]    I personally visited with the patient through video. I agree with the documentation as noted by Dr. Vaughn dated 6/11/20.     Patient is well-known to me. She has LN. She is currently maintained on rituximab and CellCept and prednisone. She was recently diagnosed with pneumonia and treated with levofloxacin from which is recovering. Her complements have now normalized and ds-DNA is down to 69 which " is the lowest it has been. Creatinine is normal. She was not able to do her urine studies and will do so next week.     Plan is to continue with her current dose of CellCept and rituximab every 6 months (due for her infusion next week). She is on a taper of her prednisone.     I will have a video visit with her in 3 months.           Nephrology video clinic visit       55-year-old female patient being followed through nephrology video clinic visit for her history of lupus nephritis.She was diagnosed with SLE at age 27 and was initially on hydroxychloroquine with good control.  It was eventually discontinued.  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  Did not require chemotherapy.  In 2017 she had lupus flareup characterized by pleuritic chest pain and joint pain and she was restarted on hydroxychloroquine and short course of steroid.  She was tried on different medications from there onwards.  She was placed on azathioprine which on May 2018 was switched to CellCept along with prednisone.  She was started on Benlysta in September 2018.  She discontinued CellCept around April 2019 as she was considering starting rituximab.  Unfortunately she landed up having a lupus flare in June 2019.  This was biopsy-proven.  Biopsy had shown necrosis in addition to possible TMA.  She also had lupus enteritis.  During this time she was treated with IV Cytoxan 750 mg/m   She completed her last dose of Cytoxan in November 2019.  Subsequently she has been on hydroxychloroquine and prednisone and rituximab.    This combination seems to be working for her.  She was feeling well until she developed shortness of breath and was diagnosed to have pneumonia during first week of June.  CT scan confirmed infiltrates in the right lung.  She was started on Levaquin.  She is currently feeling much better.  Her shortness of breath is improved.  She denies any chest pain  or cough denies any fever or chills no sick contacts.  She denies any abdominal pain, nausea, vomiting.  No skin rash.  She is making good amount of urine without any voiding difficulties.  She denies any leg swelling.    REVIEW OF SYSTEM  10 point review of system negative except for that mentioned in HPI.    Pertinent test results/imaging reviewed  Labs done on 6/4/2020 shows dsDNA of 69, reduced from 368 in April.  Her C4 is finally normal at 18, C3 89.  Creatinine 0.74.  We do not have an updated urine analysis.  However UPCR has improved to 2.50.  It was 4.38 last March.    Pertinent notes reviewed.    ASSESSMENT AND PLAN    LUPUS NEPHRITIS  Stable.  She is tolerating her current regimen of CellCept, prednisone taper and rituximab infusions.  Overall she feels she is feeling better.      Possible antiphospholipid syndrome  Patient's lupus anticoagulant was positive in the setting of being on warfarin.  However she does have lupus, prior history of PE.  This could be consistent with APLA.    Hypertension, stable  She is not on any antihypertensive medications currently.  She does have history of taking losartan and chlorthalidone in the past.  Currently she has discontinued her Lasix also.  She has no leg swelling.    Follow-up: In 3 months with Dr. Sherman with labs and UA.  Patient verbalized understanding of discharge plan     Patient staffed with Dr Sherman        Again, thank you for allowing me to participate in the care of your patient.      Sincerely,    Miriam Sherman MD

## 2020-06-11 NOTE — PROGRESS NOTES
"Taina Singh is a 55 year old female who is being evaluated via a billable video visit.      The patient has been notified of following:     \"This video visit will be conducted via a call between you and your physician/provider. We have found that certain health care needs can be provided without the need for an in-person physical exam.  This service lets us provide the care you need with a video conversation.  If a prescription is necessary we can send it directly to your pharmacy.  If lab work is needed we can place an order for that and you can then stop by our lab to have the test done at a later time.    Video visits are billed at different rates depending on your insurance coverage.  Please reach out to your insurance provider with any questions.    If during the course of the call the physician/provider feels a video visit is not appropriate, you will not be charged for this service.\"    Patient has given verbal consent for Video visit? Yes    Will anyone else be joining your video visit? No        Video-Visit Details    Type of service:  Video Visit    Video Start Time: 11:00  Video End Time: 11:20    Originating Location (pt. Location): Home    Distant Location (provider location):   [facility]     Platform used for Video Visit: [employer]    I personally visited with the patient through video. I agree with the documentation as noted by Dr. Vaughn dated 6/11/20.     Patient is well-known to me. She has LN. She is currently maintained on rituximab and CellCept and prednisone. She was recently diagnosed with pneumonia and treated with levofloxacin from which is recovering. Her complements have now normalized and ds-DNA is down to 69 which is the lowest it has been. Creatinine is normal. She was not able to do her urine studies and will do so next week.     Plan is to continue with her current dose of CellCept and rituximab every 6 months (due for her infusion next week). She is on a taper of her " prednisone.     I will have a video visit with her in 3 months.

## 2020-06-11 NOTE — PROGRESS NOTES
Nephrology video clinic visit       55-year-old female patient being followed through nephrology video clinic visit for her history of lupus nephritis.She was diagnosed with SLE at age 27 and was initially on hydroxychloroquine with good control.  It was eventually discontinued.  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  Did not require chemotherapy.  In 2017 she had lupus flareup characterized by pleuritic chest pain and joint pain and she was restarted on hydroxychloroquine and short course of steroid.  She was tried on different medications from there onwards.  She was placed on azathioprine which on May 2018 was switched to CellCept along with prednisone.  She was started on Benlysta in September 2018.  She discontinued CellCept around April 2019 as she was considering starting rituximab.  Unfortunately she landed up having a lupus flare in June 2019.  This was biopsy-proven.  Biopsy had shown necrosis in addition to possible TMA.  She also had lupus enteritis.  During this time she was treated with IV Cytoxan 750 mg/m   She completed her last dose of Cytoxan in November 2019.  Subsequently she has been on hydroxychloroquine and prednisone and rituximab.    This combination seems to be working for her.  She was feeling well until she developed shortness of breath and was diagnosed to have pneumonia during first week of June.  CT scan confirmed infiltrates in the right lung.  She was started on Levaquin.  She is currently feeling much better.  Her shortness of breath is improved.  She denies any chest pain or cough denies any fever or chills no sick contacts.  She denies any abdominal pain, nausea, vomiting.  No skin rash.  She is making good amount of urine without any voiding difficulties.  She denies any leg swelling.    REVIEW OF SYSTEM  10 point review of system negative except for that mentioned in HPI.    Pertinent test  results/imaging reviewed  Labs done on 6/4/2020 shows dsDNA of 69, reduced from 368 in April.  Her C4 is finally normal at 18, C3 89.  Creatinine 0.74.  We do not have an updated urine analysis.  However UPCR has improved to 2.50.  It was 4.38 last March.    Pertinent notes reviewed.    ASSESSMENT AND PLAN    LUPUS NEPHRITIS  Stable.  She is tolerating her current regimen of CellCept, prednisone taper and rituximab infusions.  Overall she feels she is feeling better.      Possible antiphospholipid syndrome  Patient's lupus anticoagulant was positive in the setting of being on warfarin.  However she does have lupus, prior history of PE.  This could be consistent with APLA.    Hypertension, stable  She is not on any antihypertensive medications currently.  She does have history of taking losartan and chlorthalidone in the past.  Currently she has discontinued her Lasix also.  She has no leg swelling.    Follow-up: In 3 months with Dr. Sherman with labs and UA.  Patient verbalized understanding of discharge plan     Patient staffed with Dr Sherman

## 2020-06-11 NOTE — TELEPHONE ENCOUNTER
Killian Marroquin MD Beard, Madeline, RN    Caller: Unspecified (6 days ago,  6:55 AM)               No more antibiotics,  good idea to re-schedule infusion to next wk.

## 2020-06-15 NOTE — TELEPHONE ENCOUNTER
Killian Marroquin MD Beard, Madeline, EYAL    Phone Number: [phone]               Is there anyway for her to be seen by pulm or ID before Thursday? Worried to proceed with rituximab before she gets back to baseline. She is seeing Dr. Marvin.      Called and spoke pt, relayed Dr. Marroquin's recommendation, and appt date and time with ID to pt.  She understands.  No further questions.      Sophie Alvarado RN  Rheumatology Clinic

## 2020-06-15 NOTE — TELEPHONE ENCOUNTER
RECORDS RECEIVED FROM: [facility]   DATE RECEIVED: 6/16/2020   NOTES (Gather within 2 years) STATUS DETAILS   OFFICE NOTE from referring provider       OFFICE NOTE from other specialist Internal Nephrology, Rheumatology     Pt has a history of lupus   DISCHARGE SUMMARY from hospital N/A    DISCHARGE REPORT from the ER N/A    LABS (any labs) Internal Labs last updated on 6/11/2020    MEDICATION LIST Internal    IMAGING  (NEED IMAGES AND REPORTS)     Osteomyelitis: Foot imaging  N/A    Liver Abscess: Abdominal imaging Internal  CT Abdomen Pelvis    Other (anything related to diagnoses Internal CT Chest 6/4/2020, 8/6/2019     CT Soft Tissue 8/8/2019       Requested- [facility]  Xray Chest 2019      Action    Action Taken 6/15/2020 2:41pm    I called [facility] to have IMG pushed to PACS  [facility] Rm: [phone]- their phone went to .     6/16/2020 8:18am   I called [facility] to have IMG pushed to PACS.   I resolved three IMGs in PACs- Request abdominal imaging too.     9:10am   I called [facility] back again- they are going to push additional IMG to PACS.

## 2020-06-16 ENCOUNTER — PRE VISIT (OUTPATIENT)
Dept: INFECTIOUS DISEASES | Facility: CLINIC | Age: 55
End: 2020-06-16

## 2020-06-16 ENCOUNTER — VIRTUAL VISIT (OUTPATIENT)
Dept: INFECTIOUS DISEASES | Facility: CLINIC | Age: 55
End: 2020-06-16
Attending: INTERNAL MEDICINE
Payer: COMMERCIAL

## 2020-06-16 DIAGNOSIS — J18.9 PNEUMONIA OF RIGHT LUNG DUE TO INFECTIOUS ORGANISM, UNSPECIFIED PART OF LUNG: Primary | ICD-10-CM

## 2020-06-16 RX ORDER — LEVOFLOXACIN 500 MG/1
500 TABLET, FILM COATED ORAL DAILY
Qty: 7 TABLET | Refills: 0 | Status: SHIPPED | OUTPATIENT
Start: 2020-06-16 | End: 2020-06-26

## 2020-06-16 ASSESSMENT — PAIN SCALES - GENERAL: PAINLEVEL: MILD PAIN (3)

## 2020-06-16 NOTE — PROGRESS NOTES
"Taina Singh is a 55 year old female who is being evaluated via a billable video visit.      The patient has been notified of following:     \"This video visit will be conducted via a call between you and your physician/provider. We have found that certain health care needs can be provided without the need for an in-person physical exam.  This service lets us provide the care you need with a video conversation.  If a prescription is necessary we can send it directly to your pharmacy.  If lab work is needed we can place an order for that and you can then stop by our lab to have the test done at a later time.    Video visits are billed at different rates depending on your insurance coverage.  Please reach out to your insurance provider with any questions.    If during the course of the call the physician/provider feels a video visit is not appropriate, you will not be charged for this service.\"    Patient has given verbal consent for Video visit? Yes    Will anyone else be joining your video visit? No        Video-Visit Details    Type of service:  Video Visit    Start: 06/16/2020 02:26 pm   Stop: 06/16/2020 02:38 pm    Originating Location (pt. Location): Home    Distant Location (provider location):  [facility] AND INFECTIOUS DISEASES     Platform used for Video Visit: [employer]     The patient is a 55 year old female with a hx of lupus who has been on a number of immunosuppresants including hydroxychloroquine and mycophenolate. She got rituxan for in January and February and she says she was feeling really well. However, then in April she says she got a sinus infection. That continued in spite of antibiotics (augmentin- well tolerated). She says she was feeling like things were running down her throat. They tried singulair, flonaise and zyrtec. However she still was having a cough and feeling poorly. She talked with Dr. Shannon in Rheumatology 10 days ago after getting a CT chest. That showed a R apical " infiltrate which was read as infectious vs atalectasis. She says she was given 5 days of levaquin which she completed 8 days ago. She says that really seemed to help. She had been having a productive cough with white sputum and dyspnea on exertion. However, then 2 days ago she started feeling worse again with cough and chest pain.     She denies any sick contacts. She reports she is self-isolating and has not seen anyone for a long time.     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44    No chemo or radiation     Other forms of systemic lupus erythematosus (H) 2/13/2018     Pulmonary embolism (H)     6/11/2019 seen on abd CT at [facility]     Sjogren's syndrome (H)     secondary Sjogrens, secondary to lupus     Vitamin D deficiency      Past Medical, social, family histories, medications, and allergies reviewed and updated    Patient appears chronically ill and slightly cushinoid  Breathing comfortably on RA but with frequent coughing  No cyanosis or diaphoresis  No skin rashes  HEENT wnl  Neuro- grossly non focal  Anicteric sclera and no signs of liver disease  Affect is calm and appropriate    CT chest 6/4/20  CT CHEST WITHOUT CONTRAST  6/4/2020 2:03 PM     HISTORY:  Shortness of breath. History of interstitial lung disease,  lupus, worsening ESOB and cough. Systemic lupus erythematosus,  unspecified SLE type, unspecified organ  involvement status (H).     TECHNIQUE:  Scans obtained from the apices through the diaphragm  without IV contrast. Radiation dose for this scan was reduced using  automated exposure control, adjustment of the mA and/or kV according  to patient size, or iterative reconstruction technique.     COMPARISON:  Chest CT on 8/6/2019.     FINDINGS:    Chest/mediastinum: Mild heterogenous appearance of the visualized  portion of the right thyroid lobe. No cardiomegaly or significant  pericardial effusion. The main pulmonary artery is significantly  enlarged measuring 4.1 cm in diameter, this can be seen with pulmonary  hypertension. Multiple prominent but not significantly enlarged  mediastinal and hilar lymph nodes, likely reactive. Scattered  atherosclerotic vascular calcification of the coronary arteries.     Lung/pleura: No pleural effusion or pneumothorax. Mild diffuse mosaic  attenuation, nonspecific, could be due to underlying chronic small  vessel or chronic small airway disease. Small focal patchy  consolidative pulmonary opacity in the medial aspect of the right lung  apex (series 6 image 49), new as compared to 8/6/2019 exam. Mild left  basilar platelike atelectasis. Few stable scattered pulmonary nodules  including 6 mm posterior right lower lobe nodule (series 6 image 69)  and 4 mm left lower lobe nodule (series 6 image 197).     Upper abdomen: Limited evaluation of the upper abdomen due to lack of  coverage and contrast as well as the motion/respiratory artifact. A  small splenule along the anterior aspect of the spleen.     Bones and soft tissue: No suspicious osseous lesion. Degenerative  changes of the spine.                                                                      IMPRESSION:    1. Small focal patchy consolidative opacity in the medial aspect of  the right lung apex, new as compared to 8/6/2019 exam, could be  infectious or atelectatic.  2. Diffuse mosaic attenuation of the lungs, nonspecific,  can be seen  with chronic small vessel or chronic small airway disease.  3. The main pulmonary artery is significantly enlarged measuring 4.1  cm in diameter, this can be seen with pulmonary hypertension.  4. Few stable pulmonary nodules measuring up to 6 mm in the right  upper lobe, likely benign given the interval stability.    A&P  Patient is a 55 year old with lupus on multiple immunosuppresants who had a pneumonia which improved with levaquin but symptoms did not completely resolve. I think it may just be that she needed a longer course. She says the levaquin helped but did not completely resolve the symptoms. I will order her another week of po levaquin.     She is also quite adamant she has been self isolating which I believe and would hold off for covid testing.  But if she does not improve I would still check her for covid19.     Plan  -levaquin 7 days  -check in via my chart in 10 days  -continue to hold rituxan until symptoms of pneumonia resolve    Karen Manzo MD

## 2020-06-16 NOTE — LETTER
"6/16/2020       RE: Taina Singh  [address]  [place] [address]     Dear Colleague,    Thank you for referring your patient, Taina Singh, to the [facility] AND INFECTIOUS DISEASES at [facility]. Please see a copy of my visit note below.    Taina Singh is a 55 year old female who is being evaluated via a billable video visit.      The patient has been notified of following:     \"This video visit will be conducted via a call between you and your physician/provider. We have found that certain health care needs can be provided without the need for an in-person physical exam.  This service lets us provide the care you need with a video conversation.  If a prescription is necessary we can send it directly to your pharmacy.  If lab work is needed we can place an order for that and you can then stop by our lab to have the test done at a later time.    Video visits are billed at different rates depending on your insurance coverage.  Please reach out to your insurance provider with any questions.    If during the course of the call the physician/provider feels a video visit is not appropriate, you will not be charged for this service.\"    Patient has given verbal consent for Video visit? Yes    Will anyone else be joining your video visit? No        Video-Visit Details    Type of service:  Video Visit    Start: 06/16/2020 02:26 pm   Stop: 06/16/2020 02:38 pm    Originating Location (pt. Location): Home    Distant Location (provider location):  [facility] AND INFECTIOUS DISEASES     Platform used for Video Visit: American Board of Addiction Medicine (ABAM)     The patient is a 55 year old female with a hx of lupus who has been on a number of immunosuppresants including hydroxychloroquine and mycophenolate. She got rituxan for in January and February and she says she was feeling really well. However, then in April she says she got a sinus infection. That continued in spite of antibiotics " (augmentin- well tolerated). She says she was feeling like things were running down her throat. They tried singulair, flonaise and zyrtec. However she still was having a cough and feeling poorly. She talked with Dr. Shannon in Rheumatology 10 days ago after getting a CT chest. That showed a R apical infiltrate which was read as infectious vs atalectasis. She says she was given 5 days of levaquin which she completed 8 days ago. She says that really seemed to help. She had been having a productive cough with white sputum and dyspnea on exertion. However, then 2 days ago she started feeling worse again with cough and chest pain.     She denies any sick contacts. She reports she is self-isolating and has not seen anyone for a long time.     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44    No chemo or radiation     Other forms of systemic lupus erythematosus (H) 2/13/2018     Pulmonary embolism (H)     6/11/2019 seen on abd CT at [facility]     Sjogren's syndrome (H)     secondary Sjogrens, secondary to lupus     Vitamin D deficiency      Past Medical, social, family histories, medications, and allergies reviewed and updated    Patient appears chronically ill and slightly cushinoid  Breathing comfortably on RA but with frequent coughing  No cyanosis or diaphoresis  No skin rashes  HEENT wnl  Neuro- grossly non focal  Anicteric sclera and  no signs of liver disease  Affect is calm and appropriate    CT chest 6/4/20  CT CHEST WITHOUT CONTRAST  6/4/2020 2:03 PM     HISTORY:  Shortness of breath. History of interstitial lung disease,  lupus, worsening ESOB and cough. Systemic lupus erythematosus,  unspecified SLE type, unspecified organ involvement status (H).     TECHNIQUE:  Scans obtained from the apices through the diaphragm  without IV contrast. Radiation dose for this scan was reduced using  automated exposure control, adjustment of the mA and/or kV according  to patient size, or iterative reconstruction technique.     COMPARISON:  Chest CT on 8/6/2019.     FINDINGS:    Chest/mediastinum: Mild heterogenous appearance of the visualized  portion of the right thyroid lobe. No cardiomegaly or significant  pericardial effusion. The main pulmonary artery is significantly  enlarged measuring 4.1 cm in diameter, this can be seen with pulmonary  hypertension. Multiple prominent but not significantly enlarged  mediastinal and hilar lymph nodes, likely reactive. Scattered  atherosclerotic vascular calcification of the coronary arteries.     Lung/pleura: No pleural effusion or pneumothorax. Mild diffuse mosaic  attenuation, nonspecific, could be due to underlying chronic small  vessel or chronic small airway disease. Small focal patchy  consolidative pulmonary opacity in the medial aspect of the right lung  apex (series 6 image 49), new as compared to 8/6/2019 exam. Mild left  basilar platelike atelectasis. Few stable scattered pulmonary nodules  including 6 mm posterior right lower lobe nodule (series 6 image 69)  and 4 mm left lower lobe nodule (series 6 image 197).     Upper abdomen: Limited evaluation of the upper abdomen due to lack of  coverage and contrast as well as the motion/respiratory artifact. A  small splenule along the anterior aspect of the spleen.     Bones and soft tissue: No suspicious osseous lesion. Degenerative  changes of the spine.                                                                       IMPRESSION:    1. Small focal patchy consolidative opacity in the medial aspect of  the right lung apex, new as compared to 8/6/2019 exam, could be  infectious or atelectatic.  2. Diffuse mosaic attenuation of the lungs, nonspecific, can be seen  with chronic small vessel or chronic small airway disease.  3. The main pulmonary artery is significantly enlarged measuring 4.1  cm in diameter, this can be seen with pulmonary hypertension.  4. Few stable pulmonary nodules measuring up to 6 mm in the right  upper lobe, likely benign given the interval stability.    A&P  Patient is a 55 year old with lupus on multiple immunosuppresants who had a pneumonia which improved with levaquin but symptoms did not completely resolve. I think it may just be that she needed a longer course. She says the levaquin helped but did not completely resolve the symptoms. I will order her another week of po levaquin.     She is also quite adamant she has been self isolating which I believe and would hold off for covid testing.  But if she does not improve I would still check her for covid19.     Plan  -levaquin 7 days  -check in via my chart in 10 days  -continue to hold rituxan until symptoms of pneumonia resolve    Karen Manzo MD

## 2020-06-19 ENCOUNTER — VIRTUAL VISIT (OUTPATIENT)
Dept: PULMONOLOGY | Facility: CLINIC | Age: 55
End: 2020-06-19
Attending: INTERNAL MEDICINE
Payer: COMMERCIAL

## 2020-06-19 DIAGNOSIS — J21.9 BRONCHIOLITIS: ICD-10-CM

## 2020-06-19 DIAGNOSIS — M32.9 SYSTEMIC LUPUS ERYTHEMATOSUS, UNSPECIFIED SLE TYPE, UNSPECIFIED ORGAN INVOLVEMENT STATUS (H): ICD-10-CM

## 2020-06-19 DIAGNOSIS — Z29.89 NEED FOR PNEUMOCYSTIS PROPHYLAXIS: Primary | ICD-10-CM

## 2020-06-19 RX ORDER — ATOVAQUONE 750 MG/5ML
1500 SUSPENSION ORAL DAILY
Qty: 900 ML | Refills: 3 | Status: SHIPPED | OUTPATIENT
Start: 2020-06-19 | End: 2021-06-17

## 2020-06-19 NOTE — PROGRESS NOTES
"Taina Singh is a 55 year old female who is being evaluated via a billable video visit.      The patient has been notified of following:     \"This video visit will be conducted via a call between you and your physician/provider. We have found that certain health care needs can be provided without the need for an in-person physical exam.  This service lets us provide the care you need with a video conversation.  If a prescription is necessary we can send it directly to your pharmacy.  If lab work is needed we can place an order for that and you can then stop by our lab to have the test done at a later time.    Video visits are billed at different rates depending on your insurance coverage.  Please reach out to your insurance provider with any questions.    If during the course of the call the physician/provider feels a video visit is not appropriate, you will not be charged for this service.\"    Patient has given verbal consent for Video visit? Yes    Will anyone else be joining your video visit? No        Video-Visit Details    Type of service:  Video Visit    Video Start Time:  11:05 AM  Video End Time:  11:18 AM    Originating Location (pt. Location): Home    Distant Location (provider location):  [facility] FOR LUNG SCIENCE AND HEALTH     Platform used for Video Visit:  [facility]    Reason for Visit  Taina Singh is a 55 year old year old female who is being seen for Abnormal PFT.  HPI  Ms. Singh is a 55-year-old with lupus and history of air trapping on CT scan and restrictive PFT with whom I had a video visit today.  She started rituximab for her lupus in January, and she thinks that has helped her symptoms significantly.  She was doing \"doing good\" until a couple weeks ago when she developed more shortness of breath and cough with slimy white sputum.  She denied having fevers.  A chest CT was performed on June 4 that showed a new opacity in " the right lung apex.  She was treated with levofloxacin initially for 5 days and is now on her second course of levofloxacin for 7 days.  She reports that her breathing feels better, and her cough has improved.  She was doing well prior to this pneumonia.  She has no new rashes.  She continues to take mycophenolate 1500 mg twice a day and prednisone 25 mg daily.  She reports that Dr. Shannon plans to taper her prednisone after her next rituximab infusion.  The next rituximab infusion is delayed until after she is treated for her pneumonia.  She is socially distancing and wears a mask when she leaves the house.  She lives with her 21-year-old son.          Current Outpatient Medications   Medication     albuterol (PROAIR HFA/PROVENTIL HFA/VENTOLIN HFA) 108 (90 Base) MCG/ACT inhaler     atovaquone (MEPRON) 750 MG/5ML suspension     Calcium-Magnesium 300-300 MG TABS     fluticasone (FLONASE) 50 MCG/ACT nasal spray     furosemide (LASIX) 20 MG tablet     hydroxychloroquine (PLAQUENIL) 200 MG tablet     hydroxychloroquine (PLAQUENIL) 200 MG tablet     levofloxacin (LEVAQUIN) 500 MG tablet     levofloxacin (LEVAQUIN) 750 MG tablet     Multiple Vitamins-Minerals (WOMENS MULTI PO)     mycophenolate (GENERIC EQUIVALENT) 500 MG tablet     Omega-3 Fatty Acids (OMEGA-3 FISH OIL) 500 MG CAPS     pantoprazole (PROTONIX) 20 MG EC tablet     pilocarpine (SALAGEN) 5 MG tablet     predniSONE (DELTASONE) 10 MG tablet     predniSONE (DELTASONE) 2.5 MG tablet     traMADol (ULTRAM) 50 MG tablet     vitamin D3 (CHOLECALCIFEROL) 2000 units (50 mcg) tablet     warfarin ANTICOAGULANT (COUMADIN) 5 MG tablet     zolpidem (AMBIEN) 5 MG tablet     No current facility-administered medications for this visit.      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44    No chemo or radiation     Other forms of systemic lupus erythematosus (H) 2018     Pulmonary embolism (H)     2019 seen on abd CT [facility]     Sjogren's syndrome (H)     secondary Sjogrens, secondary to lupus     Vitamin D deficiency        Past Surgical History:   Procedure Laterality Date     BIOPSY/REMOVAL, LYMPH NODE(S)        SECTION  age 32     HC HYSTEROS W PERMANENT FALLOPAIN IMPLANT      Essure b/l     PAROTIDECTOMY Right 2006     TONSILLECTOMY         Social History     Socioeconomic History     Marital status:      Spouse name: Not on file     Number of children: Not on file     Years of education: 1     Highest education level: Not on file   Occupational History     Comment: , 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   Social Needs     Financial resource strain: Not on file     Food insecurity     Worry: Not on file     Inability: Not on file     Transportation needs     Medical: Not on file     Non-medical: Not on file   Tobacco Use     Smoking status: Never Smoker     Smokeless tobacco: Never Used   Substance and Sexual Activity     Alcohol use: No     Drug use: No     Sexual activity: Not on file   Lifestyle     Physical activity     Days per week: Not on file     Minutes per session: Not on file     Stress: Not on file   Relationships     Social connections     Talks on phone: Not on file     Gets together: Not on file     Attends Congregation service: Not on file     Active member of club or organization: Not on file     Attends meetings of clubs or organizations: Not on file     Relationship status: Not on file  "    Intimate partner violence     Fear of current or ex partner: Not on file     Emotionally abused: Not on file     Physically abused: Not on file     Forced sexual activity: Not on file   Other Topics Concern     Parent/sibling w/ CABG, MI or angioplasty before 65F 55M? 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        Family History   Problem Relation Age of Onset     Alopecia Brother      Rheumatoid Arthritis Brother      LUNG DISEASE No family hx of              ROS Pulmonary  A complete ROS was otherwise negative except as noted in the HPI.    Vitals: There were no vitals taken for this visit.    Exam:   \"GENERAL: Healthy, alert and no distress\",\"EYES: Eyes grossly normal to inspection.  No discharge or erythema, or obvious scleral/conjunctival abnormalities.\",\"RESP: No audible wheeze, cough, or visible cyanosis.  No visible retractions or increased work of breathing.  \",\"SKIN: Visible skin clear. No significant rash, abnormal pigmentation or lesions.\",\"NEURO: Cranial nerves grossly intact.  Mentation and speech appropriate for age.\",\"PSYCH: Mentation appears normal, affect normal/bright, judgement and insight intact, normal speech and appearance well-groomed.\"    Results:  I reviewed the chest CT scan that was performed on June 4.  This shows mosaic attenuation and an opacity in the right lung apex.    I reviewed results with the patient.      Assessment and plan:  Ms. Singh is a 55-year-old with lupus and history of air trapping on CT scan and restrictive PFT with whom I had a video visit today.  There is no PFT for me to review today as today's visit is a video visit.  However, symptomatically she appears to be stable " other than her recent symptoms due to the pneumonia.  Her breathing and cough are improving with antibiotics.  I agree with holding rituximab until her infection is treated.  Her prednisone dose and immunosuppression is being managed by Dr. Shannon.  She should continue atovaquone for pneumocystis prophylaxis, and I faxed a new prescription for her.  If she becomes more short of breath or wheezing in the future, then we could potentially treat with inhalers.  In the past, we did try ICS/LABA combination, but she could not afford it.  Chest CT scan reports a large PA, however we did get an echocardiogram 10 months ago that showed normal estimated PA systolic pressure of 32 mmHg.  I encouraged her to try to walk daily.  I will see her back in 6 months with full PFT.

## 2020-06-19 NOTE — LETTER
"    6/19/2020         RE: Taina Singh  [address] [place] [address]        Dear Colleague,    Thank you for referring your patient, Taina Singh, to the [facility]. Please see a copy of my visit note below.    Taina Singh is a 55 year old female who is being evaluated via a billable video visit.      The patient has been notified of following:     \"This video visit will be conducted via a call between you and your physician/provider. We have found that certain health care needs can be provided without the need for an in-person physical exam.  This service lets us provide the care you need with a video conversation.  If a prescription is necessary we can send it directly to your pharmacy.  If lab work is needed we can place an order for that and you can then stop by our lab to have the test done at a later time.    Video visits are billed at different rates depending on your insurance coverage.  Please reach out to your insurance provider with any questions.    If during the course of the call the physician/provider feels a video visit is not appropriate, you will not be charged for this service.\"    Patient has given verbal consent for Video visit? Yes    Will anyone else be joining your video visit? No        Video-Visit Details    Type of service:  Video Visit    Video Start Time:  11:05 AM  Video End Time:  11:18 AM    Originating Location (pt. Location): Home    Distant Location (provider location):  [facility]     Platform used for Video Visit:  [facility]    Reason for Visit  Taina Singh is a 55 year old year old female who is being seen for Abnormal PFT.  HPI  Ms. Singh is a 55-year-old with lupus and history of air trapping on CT scan and restrictive PFT with whom I had a video visit today.  She started rituximab for her lupus in January, and she thinks that has helped " "her symptoms significantly.  She was doing \"doing good\" until a couple weeks ago when she developed more shortness of breath and cough with slimy white sputum.  She denied having fevers.  A chest CT was performed on June 4 that showed a new opacity in the right lung apex.  She was treated with levofloxacin initially for 5 days and is now on her second course of levofloxacin for 7 days.  She reports that her breathing feels better, and her cough has improved.  She was doing well prior to this pneumonia.  She has no new rashes.  She continues to take mycophenolate 1500 mg twice a day and prednisone 25 mg daily.  She reports that Dr. Shannon plans to taper her prednisone after her next rituximab infusion.  The next rituximab infusion is delayed until after she is treated for her pneumonia.  She is socially distancing and wears a mask when she leaves the house.  She lives with her 21-year-old son.          Current Outpatient Medications   Medication     albuterol (PROAIR HFA/PROVENTIL HFA/VENTOLIN HFA) 108 (90 Base) MCG/ACT inhaler     atovaquone (MEPRON) 750 MG/5ML suspension     Calcium-Magnesium 300-300 MG TABS     fluticasone (FLONASE) 50 MCG/ACT nasal spray     furosemide (LASIX) 20 MG tablet     hydroxychloroquine (PLAQUENIL) 200 MG tablet     hydroxychloroquine (PLAQUENIL) 200 MG tablet     levofloxacin (LEVAQUIN) 500 MG tablet     levofloxacin (LEVAQUIN) 750 MG tablet     Multiple Vitamins-Minerals (WOMENS MULTI PO)     mycophenolate (GENERIC EQUIVALENT) 500 MG tablet     Omega-3 Fatty Acids (OMEGA-3 FISH OIL) 500 MG CAPS     pantoprazole (PROTONIX) 20 MG EC tablet     pilocarpine (SALAGEN) 5 MG tablet     predniSONE (DELTASONE) 10 MG tablet     predniSONE (DELTASONE) 2.5 MG tablet     traMADol (ULTRAM) 50 MG tablet     vitamin D3 (CHOLECALCIFEROL) 2000 units (50 mcg) tablet     warfarin ANTICOAGULANT (COUMADIN) 5 MG tablet     zolpidem (AMBIEN) 5 MG tablet     No current facility-administered medications for " this visit.      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44    No chemo or radiation     Other forms of systemic lupus erythematosus (H) 2018     Pulmonary embolism (H)     2019 seen on abd [facility]     Sjogren's syndrome (H)     secondary Sjogrens, secondary to lupus     Vitamin D deficiency        Past Surgical History:   Procedure Laterality Date     BIOPSY/REMOVAL, LYMPH NODE(S)        SECTION  age 32     HC HYSTEROS W PERMANENT FALLOPAIN IMPLANT      Essure b/l     PAROTIDECTOMY Right 2006     TONSILLECTOMY         Social History     Socioeconomic History     Marital status:      Spouse name: Not on file     Number of children: Not on file     Years of education: 1     Highest education level: Not on file   Occupational History     Comment: , 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   Social Needs     Financial resource strain: Not on file     Food insecurity     Worry: Not on file     Inability: Not on file     Transportation needs     Medical: Not on file     Non-medical: Not on file   Tobacco Use     Smoking status: Never Smoker     Smokeless tobacco: Never Used   Substance and Sexual Activity     Alcohol use: No     Drug use: No     Sexual activity: Not on file   Lifestyle     Physical activity     Days per week: Not on file     Minutes per session: Not on file     Stress: Not on file   Relationships     Social connections     Talks  "on phone: Not on file     Gets together: Not on file     Attends Druze service: Not on file     Active member of club or organization: Not on file     Attends meetings of clubs or organizations: Not on file     Relationship status: Not on file     Intimate partner violence     Fear of current or ex partner: Not on file     Emotionally abused: Not on file     Physically abused: Not on file     Forced sexual activity: Not on file   Other Topics Concern     Parent/sibling w/ CABG, MI or angioplasty before 65F 55M? 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        Family History   Problem Relation Age of Onset     Alopecia Brother      Rheumatoid Arthritis Brother      LUNG DISEASE No family hx of              ROS Pulmonary  A complete ROS was otherwise negative except as noted in the HPI.    Vitals: There were no vitals taken for this visit.    Exam:   \"GENERAL: Healthy, alert and no distress\",\"EYES: Eyes grossly normal to inspection.  No discharge or erythema, or obvious scleral/conjunctival abnormalities.\",\"RESP: No audible wheeze, cough, or visible cyanosis.  No visible retractions or increased work of breathing.  \",\"SKIN: Visible skin clear. No significant rash, abnormal pigmentation or lesions.\",\"NEURO: Cranial nerves grossly intact.  Mentation and speech appropriate for age.\",\"PSYCH: Mentation appears normal, affect normal/bright, judgement and insight intact, normal speech and appearance well-groomed.\"    Results:  I reviewed the chest CT scan that was performed on June 4.  This shows mosaic attenuation and an opacity in the right lung apex.    I reviewed results with the patient.      Assessment and plan:  Ms. Singh is " a 55-year-old with lupus and history of air trapping on CT scan and restrictive PFT with whom I had a video visit today.  There is no PFT for me to review today as today's visit is a video visit.  However, symptomatically she appears to be stable other than her recent symptoms due to the pneumonia.  Her breathing and cough are improving with antibiotics.  I agree with holding rituximab until her infection is treated.  Her prednisone dose and immunosuppression is being managed by Dr. Shannon.  She should continue atovaquone for pneumocystis prophylaxis, and I faxed a new prescription for her.  If she becomes more short of breath or wheezing in the future, then we could potentially treat with inhalers.  In the past, we did try ICS/LABA combination, but she could not afford it.  Chest CT scan reports a large PA, however we did get an echocardiogram 10 months ago that showed normal estimated PA systolic pressure of 32 mmHg.  I encouraged her to try to walk daily.  I will see her back in 6 months with full PFT.    Again, thank you for allowing me to participate in the care of your patient.        Sincerely,        Joanne Marvin MD

## 2020-06-24 DIAGNOSIS — L93.0 LUPUS ERYTHEMATOSUS, UNSPECIFIED FORM: ICD-10-CM

## 2020-06-24 NOTE — TELEPHONE ENCOUNTER
Called and spoke with pt, she finished her antibiotics yesterday and is feeling much better. Continues to have cough, that is non-productive but junky sounding.  Does get OWENS.  Some SOB with talking as well.  She is scheduled to get her Rituixmab on Friday 6/26.  Will see if Dr. Marroquin is ok with that.    Pt needs refill on HCQ as last time it was refilled the pharmacy would only give her 30 day supply and they are now insisting that she get a new prescription. She also is due for her eye exam. She will be out tomorrow, so she is requesting a 30 day supply and then will work on getting her eye exam completed so she can have a year refill.      Prednisone, she is currently on 25 mg a day, and per her understanding, she will stay on that dose until after her next Rituximab infusion. Have cued up 25 mg daily for 1 month supply.     Will send to Dr Marroquin to sign.    Sophie Alvarado RN  Rheumatology Clinic

## 2020-06-25 RX ORDER — PREDNISONE 20 MG/1
20 TABLET ORAL DAILY
Qty: 30 TABLET | Refills: 0 | Status: SHIPPED | OUTPATIENT
Start: 2020-06-25 | End: 2020-10-29

## 2020-06-25 RX ORDER — PREDNISONE 5 MG/1
5 TABLET ORAL DAILY
Qty: 30 TABLET | Refills: 0 | Status: SHIPPED | OUTPATIENT
Start: 2020-06-25 | End: 2020-10-29

## 2020-06-25 RX ORDER — HYDROXYCHLOROQUINE SULFATE 200 MG/1
200 TABLET, FILM COATED ORAL 2 TIMES DAILY
Qty: 60 TABLET | Refills: 1 | Status: SHIPPED | OUTPATIENT
Start: 2020-06-25 | End: 2020-08-11

## 2020-06-25 NOTE — TELEPHONE ENCOUNTER
Killian Marroquin MD Beard, Sophie, RN    Caller: Unspecified (Yesterday,  2:48 PM)               Could proceed with rituximab on 6/26.      Called and updated pt with Dr. Marroquin's response.    Sophie Alvarado RN  Rheumatology Clinic

## 2020-06-26 ENCOUNTER — INFUSION THERAPY VISIT (OUTPATIENT)
Dept: INFUSION THERAPY | Facility: CLINIC | Age: 55
End: 2020-06-26
Payer: COMMERCIAL

## 2020-06-26 VITALS
OXYGEN SATURATION: 95 % | SYSTOLIC BLOOD PRESSURE: 128 MMHG | TEMPERATURE: 98.1 F | DIASTOLIC BLOOD PRESSURE: 73 MMHG | BODY MASS INDEX: 52.45 KG/M2 | WEIGHT: 293 LBS | RESPIRATION RATE: 20 BRPM | HEART RATE: 88 BPM

## 2020-06-26 DIAGNOSIS — M32.9 SYSTEMIC LUPUS ERYTHEMATOSUS, UNSPECIFIED SLE TYPE, UNSPECIFIED ORGAN INVOLVEMENT STATUS (H): ICD-10-CM

## 2020-06-26 DIAGNOSIS — I77.82 ANCA-NEGATIVE VASCULITIS (H): Primary | ICD-10-CM

## 2020-06-26 DIAGNOSIS — G47.00 INSOMNIA, UNSPECIFIED TYPE: ICD-10-CM

## 2020-06-26 DIAGNOSIS — M06.09 RHEUMATOID ARTHRITIS, SERONEGATIVE, MULTIPLE SITES (H): ICD-10-CM

## 2020-06-26 DIAGNOSIS — M32.14 LUPUS NEPHRITIS, ISN/RPS CLASS IV (H): ICD-10-CM

## 2020-06-26 PROCEDURE — 99207 ZZC NO CHARGE LOS: CPT

## 2020-06-26 PROCEDURE — 96375 TX/PRO/DX INJ NEW DRUG ADDON: CPT | Performed by: INTERNAL MEDICINE

## 2020-06-26 PROCEDURE — 96413 CHEMO IV INFUSION 1 HR: CPT | Performed by: INTERNAL MEDICINE

## 2020-06-26 PROCEDURE — 96415 CHEMO IV INFUSION ADDL HR: CPT | Performed by: INTERNAL MEDICINE

## 2020-06-26 PROCEDURE — 96367 TX/PROPH/DG ADDL SEQ IV INF: CPT | Performed by: INTERNAL MEDICINE

## 2020-06-26 RX ORDER — ACETAMINOPHEN 325 MG/1
650 TABLET ORAL ONCE
Status: CANCELLED
Start: 2020-07-10

## 2020-06-26 RX ORDER — ZOLPIDEM TARTRATE 5 MG/1
5 TABLET ORAL
Qty: 30 TABLET | Refills: 0 | Status: SHIPPED | OUTPATIENT
Start: 2020-06-26 | End: 2020-09-25

## 2020-06-26 RX ORDER — METHYLPREDNISOLONE SODIUM SUCCINATE 125 MG/2ML
125 INJECTION, POWDER, LYOPHILIZED, FOR SOLUTION INTRAMUSCULAR; INTRAVENOUS ONCE
Status: COMPLETED | OUTPATIENT
Start: 2020-06-26 | End: 2020-06-26

## 2020-06-26 RX ORDER — METHYLPREDNISOLONE SODIUM SUCCINATE 125 MG/2ML
125 INJECTION, POWDER, LYOPHILIZED, FOR SOLUTION INTRAMUSCULAR; INTRAVENOUS ONCE
Status: CANCELLED | OUTPATIENT
Start: 2020-07-10

## 2020-06-26 RX ORDER — ACETAMINOPHEN 325 MG/1
650 TABLET ORAL ONCE
Status: COMPLETED | OUTPATIENT
Start: 2020-06-26 | End: 2020-06-26

## 2020-06-26 RX ORDER — TRAMADOL HYDROCHLORIDE 50 MG/1
50 TABLET ORAL EVERY 12 HOURS PRN
Qty: 60 TABLET | Refills: 0 | Status: SHIPPED | OUTPATIENT
Start: 2020-06-26 | End: 2020-10-21

## 2020-06-26 RX ADMIN — ACETAMINOPHEN 650 MG: 325 TABLET ORAL at 09:18

## 2020-06-26 RX ADMIN — METHYLPREDNISOLONE SODIUM SUCCINATE 125 MG: 125 INJECTION INTRAMUSCULAR; INTRAVENOUS at 09:49

## 2020-06-26 RX ADMIN — Medication 250 ML: at 09:31

## 2020-06-26 ASSESSMENT — PAIN SCALES - GENERAL: PAINLEVEL: NO PAIN (1)

## 2020-06-26 NOTE — TELEPHONE ENCOUNTER
Medication: tramadol 50 mg  Last Office Visit Date: 6/4/2020  Future Office Visit Date: none scheduled  Last Prescription Fill Date: 5/13/2020  Last Fill Quantity: #60    Medication: zolpidem 5 mg  Last Prescription Fill Date: 4/11/2020  Last Fill Quantity: #30       reviewed as follows:      Request sent to provider for review and signature.    Jayleen Gan CMA   6/26/2020 12:19 PM

## 2020-06-26 NOTE — PROGRESS NOTES
Infusion Nursing Note:  Taina Singh presents today for Rituxan.    Patient seen by provider today: No   present during visit today: Not Applicable.    Note: patient recently recovered from URI/post levaquin Rx.  Still has some residual coughing, but great improvement.    Intravenous Access:  Peripheral IV placed.    Treatment Conditions:  Biological Infusion Checklist:  ~~~ NOTE: If the patient answers yes to any of the questions below, hold the infusion and contact ordering provider or on-call provider.    1. Have you recently had an elevated temperature, fever, chills, productive cough, coughing for 3 weeks or longer or hemoptysis, abnormal vital signs, night sweats,  chest pain or have you noticed a decrease in your appetite, unexplained weight loss or fatigue? No  2. Do you have any open wounds or new incisions? No  3. Do you have any recent or upcoming hospitalizations, surgeries or dental procedures? No  4. Do you currently have or recently have had any signs of illness or infection or are you on any antibiotics? No  5. Have you had any new, sudden or worsening abdominal pain? No  6. Have you or anyone in your household received a live vaccination in the past 4 weeks? Please note:  No live vaccines while on biologic/chemotherapy until 6 months after the last treatment.  Patient can receive the flu vaccine (shot only) and the pneumovax.  It is optimal for the patient to get these vaccines mid cycle, but they can be given at any time as long as it is not on the day of the infusion. No  7. Have you recently been diagnosed with any new nervous system diseases (ie. Multiple sclerosis, Guillain [place], seizures, neurological changes) or cancer diagnosis? No  8. Are you on any form of radiation or chemotherapy? No  9. Are you pregnant or breast feeding or do you have plans of pregnancy in the future? No  10. Have you been having any signs of worsening depression or suicidal ideations?  (benlysta only)  No  11. Have there been any other new onset medical symptoms? No      Post Infusion Assessment:  Patient tolerated infusion without incident.       Discharge Plan:   RTC as scheduled for d15.    ABEBE MAHER RN

## 2020-07-20 ENCOUNTER — INFUSION THERAPY VISIT (OUTPATIENT)
Dept: INFUSION THERAPY | Facility: CLINIC | Age: 55
End: 2020-07-20
Payer: COMMERCIAL

## 2020-07-20 VITALS
DIASTOLIC BLOOD PRESSURE: 78 MMHG | WEIGHT: 293 LBS | RESPIRATION RATE: 18 BRPM | OXYGEN SATURATION: 99 % | SYSTOLIC BLOOD PRESSURE: 176 MMHG | BODY MASS INDEX: 53.13 KG/M2 | TEMPERATURE: 99 F | HEART RATE: 60 BPM

## 2020-07-20 DIAGNOSIS — M32.14 LUPUS NEPHRITIS, ISN/RPS CLASS IV (H): ICD-10-CM

## 2020-07-20 DIAGNOSIS — M32.14 LUPUS NEPHRITIS (H): ICD-10-CM

## 2020-07-20 DIAGNOSIS — Z79.52 LONG TERM SYSTEMIC STEROID USER: ICD-10-CM

## 2020-07-20 DIAGNOSIS — I77.82 ANCA-NEGATIVE VASCULITIS (H): Primary | ICD-10-CM

## 2020-07-20 DIAGNOSIS — M32.9 SYSTEMIC LUPUS ERYTHEMATOSUS, UNSPECIFIED SLE TYPE, UNSPECIFIED ORGAN INVOLVEMENT STATUS (H): ICD-10-CM

## 2020-07-20 DIAGNOSIS — Z79.899 HIGH RISK MEDICATIONS (NOT ANTICOAGULANTS) LONG-TERM USE: ICD-10-CM

## 2020-07-20 DIAGNOSIS — M32.9 SYSTEMIC LUPUS ERYTHEMATOSUS, UNSPECIFIED SLE TYPE, UNSPECIFIED ORGAN INVOLVEMENT STATUS (H): Primary | ICD-10-CM

## 2020-07-20 DIAGNOSIS — M06.09 RHEUMATOID ARTHRITIS, SERONEGATIVE, MULTIPLE SITES (H): ICD-10-CM

## 2020-07-20 LAB
ALBUMIN SERPL-MCNC: 3.2 G/DL (ref 3.4–5)
ALBUMIN UR-MCNC: NEGATIVE MG/DL
ALT SERPL W P-5'-P-CCNC: 25 U/L (ref 0–50)
ANION GAP SERPL CALCULATED.3IONS-SCNC: 3 MMOL/L (ref 3–14)
APPEARANCE UR: CLEAR
AST SERPL W P-5'-P-CCNC: 32 U/L (ref 0–45)
BASOPHILS # BLD AUTO: 0 10E9/L (ref 0–0.2)
BASOPHILS NFR BLD AUTO: 0.4 %
BILIRUB UR QL STRIP: NEGATIVE
BUN SERPL-MCNC: 22 MG/DL (ref 7–30)
CALCIUM SERPL-MCNC: 8.1 MG/DL (ref 8.5–10.1)
CHLORIDE SERPL-SCNC: 112 MMOL/L (ref 94–109)
CO2 SERPL-SCNC: 24 MMOL/L (ref 20–32)
COLOR UR AUTO: NORMAL
CREAT SERPL-MCNC: 0.57 MG/DL (ref 0.52–1.04)
CREAT UR-MCNC: 25 MG/DL
CREAT UR-MCNC: 26 MG/DL
CRP SERPL-MCNC: <2.9 MG/L (ref 0–8)
DIFFERENTIAL METHOD BLD: ABNORMAL
EOSINOPHIL # BLD AUTO: 0.1 10E9/L (ref 0–0.7)
EOSINOPHIL NFR BLD AUTO: 0.6 %
ERYTHROCYTE [DISTWIDTH] IN BLOOD BY AUTOMATED COUNT: 14.9 % (ref 10–15)
ERYTHROCYTE [SEDIMENTATION RATE] IN BLOOD BY WESTERGREN METHOD: 13 MM/H (ref 0–30)
GFR SERPL CREATININE-BSD FRML MDRD: >90 ML/MIN/{1.73_M2}
GLUCOSE SERPL-MCNC: 83 MG/DL (ref 70–99)
GLUCOSE UR STRIP-MCNC: NEGATIVE MG/DL
HCT VFR BLD AUTO: 39.3 % (ref 35–47)
HGB BLD-MCNC: 12.1 G/DL (ref 11.7–15.7)
HGB UR QL STRIP: NEGATIVE
IMM GRANULOCYTES # BLD: 0.1 10E9/L (ref 0–0.4)
IMM GRANULOCYTES NFR BLD: 1.3 %
KETONES UR STRIP-MCNC: NEGATIVE MG/DL
LEUKOCYTE ESTERASE UR QL STRIP: NEGATIVE
LYMPHOCYTES # BLD AUTO: 0.4 10E9/L (ref 0.8–5.3)
LYMPHOCYTES NFR BLD AUTO: 4.3 %
MCH RBC QN AUTO: 26 PG (ref 26.5–33)
MCHC RBC AUTO-ENTMCNC: 30.8 G/DL (ref 31.5–36.5)
MCV RBC AUTO: 85 FL (ref 78–100)
MONOCYTES # BLD AUTO: 0.6 10E9/L (ref 0–1.3)
MONOCYTES NFR BLD AUTO: 5.5 %
NEUTROPHILS # BLD AUTO: 9 10E9/L (ref 1.6–8.3)
NEUTROPHILS NFR BLD AUTO: 87.9 %
NITRATE UR QL: NEGATIVE
NON-SQ EPI CELLS #/AREA URNS LPF: NORMAL /LPF
PH UR STRIP: 5.5 PH (ref 5–7)
PLATELET # BLD AUTO: 203 10E9/L (ref 150–450)
POTASSIUM SERPL-SCNC: 5 MMOL/L (ref 3.4–5.3)
PROT UR-MCNC: 0.16 G/L
PROT/CREAT 24H UR: 0.6 G/G CR (ref 0–0.2)
RBC # BLD AUTO: 4.65 10E12/L (ref 3.8–5.2)
RBC #/AREA URNS AUTO: NORMAL /HPF
SODIUM SERPL-SCNC: 139 MMOL/L (ref 133–144)
SOURCE: NORMAL
SP GR UR STRIP: 1.01 (ref 1–1.03)
UROBILINOGEN UR STRIP-MCNC: NORMAL MG/DL (ref 0–2)
WBC # BLD AUTO: 10.2 10E9/L (ref 4–11)
WBC #/AREA URNS AUTO: NORMAL /HPF

## 2020-07-20 PROCEDURE — 96413 CHEMO IV INFUSION 1 HR: CPT | Performed by: NURSE PRACTITIONER

## 2020-07-20 PROCEDURE — 99207 ZZC NO CHARGE LOS: CPT

## 2020-07-20 PROCEDURE — 85652 RBC SED RATE AUTOMATED: CPT | Performed by: INTERNAL MEDICINE

## 2020-07-20 PROCEDURE — 96415 CHEMO IV INFUSION ADDL HR: CPT | Performed by: NURSE PRACTITIONER

## 2020-07-20 PROCEDURE — 84156 ASSAY OF PROTEIN URINE: CPT | Performed by: INTERNAL MEDICINE

## 2020-07-20 PROCEDURE — 86140 C-REACTIVE PROTEIN: CPT | Performed by: INTERNAL MEDICINE

## 2020-07-20 PROCEDURE — 85025 COMPLETE CBC W/AUTO DIFF WBC: CPT | Performed by: INTERNAL MEDICINE

## 2020-07-20 PROCEDURE — 82040 ASSAY OF SERUM ALBUMIN: CPT | Performed by: INTERNAL MEDICINE

## 2020-07-20 PROCEDURE — 84450 TRANSFERASE (AST) (SGOT): CPT | Performed by: INTERNAL MEDICINE

## 2020-07-20 PROCEDURE — 96367 TX/PROPH/DG ADDL SEQ IV INF: CPT | Performed by: NURSE PRACTITIONER

## 2020-07-20 PROCEDURE — 86225 DNA ANTIBODY NATIVE: CPT | Performed by: INTERNAL MEDICINE

## 2020-07-20 PROCEDURE — 84460 ALANINE AMINO (ALT) (SGPT): CPT | Performed by: INTERNAL MEDICINE

## 2020-07-20 PROCEDURE — 80048 BASIC METABOLIC PNL TOTAL CA: CPT | Performed by: INTERNAL MEDICINE

## 2020-07-20 PROCEDURE — 96375 TX/PRO/DX INJ NEW DRUG ADDON: CPT | Performed by: NURSE PRACTITIONER

## 2020-07-20 PROCEDURE — 81001 URINALYSIS AUTO W/SCOPE: CPT | Performed by: INTERNAL MEDICINE

## 2020-07-20 PROCEDURE — 36415 COLL VENOUS BLD VENIPUNCTURE: CPT | Performed by: INTERNAL MEDICINE

## 2020-07-20 PROCEDURE — 86160 COMPLEMENT ANTIGEN: CPT | Performed by: INTERNAL MEDICINE

## 2020-07-20 RX ORDER — METHYLPREDNISOLONE SODIUM SUCCINATE 125 MG/2ML
125 INJECTION, POWDER, LYOPHILIZED, FOR SOLUTION INTRAMUSCULAR; INTRAVENOUS ONCE
Status: CANCELLED | OUTPATIENT
Start: 2020-07-24

## 2020-07-20 RX ORDER — ACETAMINOPHEN 325 MG/1
650 TABLET ORAL ONCE
Status: CANCELLED
Start: 2020-07-24

## 2020-07-20 RX ORDER — METHYLPREDNISOLONE SODIUM SUCCINATE 125 MG/2ML
125 INJECTION, POWDER, LYOPHILIZED, FOR SOLUTION INTRAMUSCULAR; INTRAVENOUS ONCE
Status: COMPLETED | OUTPATIENT
Start: 2020-07-20 | End: 2020-07-20

## 2020-07-20 RX ORDER — CHOLECALCIFEROL (VITAMIN D3) 50 MCG
1 TABLET ORAL DAILY
Qty: 90 TABLET | Refills: 3 | Status: SHIPPED | OUTPATIENT
Start: 2020-07-20 | End: 2021-08-30

## 2020-07-20 RX ORDER — ACETAMINOPHEN 325 MG/1
650 TABLET ORAL ONCE
Status: COMPLETED | OUTPATIENT
Start: 2020-07-20 | End: 2020-07-20

## 2020-07-20 RX ADMIN — METHYLPREDNISOLONE SODIUM SUCCINATE 125 MG: 125 INJECTION INTRAMUSCULAR; INTRAVENOUS at 11:58

## 2020-07-20 RX ADMIN — ACETAMINOPHEN 650 MG: 325 TABLET ORAL at 11:39

## 2020-07-20 RX ADMIN — Medication 250 ML: at 11:40

## 2020-07-20 NOTE — PROGRESS NOTES
Infusion Nursing Note:  Taina Singh presents today for D15 Rituxan.    Patient seen by provider today: No   present during visit today: Not Applicable.    Note: Rituxan rates:    100 ml/hr for 30 minutes, increased 100 ml/hr every 30 min to max rate of 400 ml/hr.    Intravenous Access:  Peripheral IV placed.    Treatment Conditions:  Biological Infusion Checklist:  ~~~ NOTE: If the patient answers yes to any of the questions below, hold the infusion and contact ordering provider or on-call provider.    1. Have you recently had an elevated temperature, fever, chills, productive cough, coughing for 3 weeks or longer or hemoptysis, abnormal vital signs, night sweats,  chest pain or have you noticed a decrease in your appetite, unexplained weight loss or fatigue? No  2. Do you have any open wounds or new incisions? No  3. Do you have any recent or upcoming hospitalizations, surgeries or dental procedures? No  4. Do you currently have or recently have had any signs of illness or infection or are you on any antibiotics? No  5. Have you had any new, sudden or worsening abdominal pain? No  6. Have you or anyone in your household received a live vaccination in the past 4 weeks? Please note:  No live vaccines while on biologic/chemotherapy until 6 months after the last treatment.  Patient can receive the flu vaccine (shot only) and the pneumovax.  It is optimal for the patient to get these vaccines mid cycle, but they can be given at any time as long as it is not on the day of the infusion. No  7. Have you recently been diagnosed with any new nervous system diseases (ie. Multiple sclerosis, Guillain [place], seizures, neurological changes) or cancer diagnosis? No  8. Are you on any form of radiation or chemotherapy? No  9. Are you pregnant or breast feeding or do you have plans of pregnancy in the future? No  10. Have you been having any signs of worsening depression or suicidal ideations?  (benlysta only)  No  11. Have there been any other new onset medical symptoms? No        Post Infusion Assessment:  Patient tolerated infusion without incident.       Discharge Plan:   Departure Mode: Ambulatory.    Kimberley Hebert RN

## 2020-07-20 NOTE — LETTER
July 25, 2020      Taina Singh  [address] [place] [address]        Dear ,    We are writing to inform you of your test results.    Improved anemia, urine protein/creatinine (from 2.5 to 0.6), ESR/CRP (inflammation markers). Lupus markers (Anti-DNA, C3) are slightly worse, recommend re-checking one month after rituximab infusion as expect improvement. Labs are ordered.    Resulted Orders   Erythrocyte sedimentation rate auto   Result Value Ref Range    Sed Rate 13 0 - 30 mm/h   DNA double stranded antibodies   Result Value Ref Range    DNA-ds 132 (H) <10 IU/mL      Comment:      Positive   CRP inflammation   Result Value Ref Range    CRP Inflammation <2.9 0.0 - 8.0 mg/L   Complement C3   Result Value Ref Range    Complement C3 70 (L) 81 - 157 mg/dL   Complement C4   Result Value Ref Range    Complement C4 13 13 - 39 mg/dL   CBC with platelets differential   Result Value Ref Range    WBC 10.2 4.0 - 11.0 10e9/L    RBC Count 4.65 3.8 - 5.2 10e12/L    Hemoglobin 12.1 11.7 - 15.7 g/dL    Hematocrit 39.3 35.0 - 47.0 %    MCV 85 78 - 100 fl    MCH 26.0 (L) 26.5 - 33.0 pg    MCHC 30.8 (L) 31.5 - 36.5 g/dL    RDW 14.9 10.0 - 15.0 %    Platelet Count 203 150 - 450 10e9/L    % Neutrophils 87.9 %    % Lymphocytes 4.3 %    % Monocytes 5.5 %    % Eosinophils 0.6 %    % Basophils 0.4 %    % Immature Granulocytes 1.3 %    Absolute Neutrophil 9.0 (H) 1.6 - 8.3 10e9/L    Absolute Lymphocytes 0.4 (L) 0.8 - 5.3 10e9/L    Absolute Monocytes 0.6 0.0 - 1.3 10e9/L    Absolute Eosinophils 0.1 0.0 - 0.7 10e9/L    Absolute Basophils 0.0 0.0 - 0.2 10e9/L    Abs Immature Granulocytes 0.1 0 - 0.4 10e9/L    Diff Method Automated Method    AST   Result Value Ref Range    AST 32 0 - 45 U/L      Comment:      Specimen is hemolyzed which can falsely elevate AST. Analysis of a   non-hemolyzed specimen may result in a lower value.     Albumin level   Result Value Ref Range    Albumin 3.2 (L) 3.4 - 5.0 g/dL   ALT   Result Value Ref Range     ALT 25 0 - 50 U/L   Creatinine random urine   Result Value Ref Range    Creatinine Urine Random 25 mg/dL   Protein  random urine with Creat Ratio   Result Value Ref Range    Protein Random Urine 0.16 g/L    Protein Total Urine g/gr Creatinine 0.60 (H) 0 - 0.2 g/g Cr   UA with Microscopic reflex to Culture   Result Value Ref Range    Color Urine Light Yellow     Appearance Urine Clear     Glucose Urine Negative NEG^Negative mg/dL    Bilirubin Urine Negative NEG^Negative    Ketones Urine Negative NEG^Negative mg/dL    Specific Gravity Urine 1.006 1.003 - 1.035    Blood Urine Negative NEG^Negative    pH Urine 5.5 5.0 - 7.0 pH    Protein Albumin Urine Negative NEG^Negative mg/dL    Urobilinogen mg/dL Normal 0.0 - 2.0 mg/dL    Nitrite Urine Negative NEG^Negative    Leukocyte Esterase Urine Negative NEG^Negative    Source Midstream Urine     WBC Urine 0 - 5 OTO5^0 - 5 /HPF    RBC Urine O - 2 OTO2^O - 2 /HPF    Squamous Epithelial /LPF Urine Few FEW^Few /LPF   Creatinine urine calculation only   Result Value Ref Range    Creatinine Urine 26 mg/dL       If you have any questions or concerns, please call the clinic at the number listed above.       Sincerely,        Killian Marroquin MD

## 2020-07-20 NOTE — RESULT ENCOUNTER NOTE
Improved anti-DNA, C3/C4 which is good news. High ESR/CRP was due to pneumonia. Vit D was refilled.    Please ask the infusion nurse to draw blood on 7/20

## 2020-07-21 LAB
C3 SERPL-MCNC: 70 MG/DL (ref 81–157)
C4 SERPL-MCNC: 13 MG/DL (ref 13–39)

## 2020-07-23 LAB — DSDNA AB SER-ACNC: 132 IU/ML

## 2020-07-25 DIAGNOSIS — M32.9 SYSTEMIC LUPUS ERYTHEMATOSUS, UNSPECIFIED SLE TYPE, UNSPECIFIED ORGAN INVOLVEMENT STATUS (H): Primary | ICD-10-CM

## 2020-07-25 DIAGNOSIS — I77.82 ANCA-NEGATIVE VASCULITIS (H): ICD-10-CM

## 2020-07-25 DIAGNOSIS — Z79.899 HIGH RISK MEDICATIONS (NOT ANTICOAGULANTS) LONG-TERM USE: ICD-10-CM

## 2020-07-26 NOTE — RESULT ENCOUNTER NOTE
Improved anemia, urine protein/creatinine (from 2.5 to 0.6), ESR/CRP (inflammation markers). Lupus markers (Anti-DNA, C3) are slightly worse, recommend re-checking one month after rituximab infusion as expect improvement. Labs are ordered.

## 2020-08-10 ENCOUNTER — MYC MEDICAL ADVICE (OUTPATIENT)
Dept: RHEUMATOLOGY | Facility: CLINIC | Age: 55
End: 2020-08-10

## 2020-08-10 DIAGNOSIS — L93.0 LUPUS ERYTHEMATOSUS, UNSPECIFIED FORM: ICD-10-CM

## 2020-08-11 RX ORDER — HYDROXYCHLOROQUINE SULFATE 200 MG/1
200 TABLET, FILM COATED ORAL 2 TIMES DAILY
Qty: 60 TABLET | Refills: 0 | Status: SHIPPED | OUTPATIENT
Start: 2020-08-11 | End: 2020-09-25

## 2020-08-11 NOTE — TELEPHONE ENCOUNTER
Patient requesting a 30 day supply until she gets in with her eye doctor. 30 day supply pended and sent to Dr Marroquin.     Jayleen Gan CMA   8/11/2020 7:47 AM

## 2020-09-03 DIAGNOSIS — I77.82 ANCA-NEGATIVE VASCULITIS (H): ICD-10-CM

## 2020-09-03 DIAGNOSIS — Z79.899 HIGH RISK MEDICATIONS (NOT ANTICOAGULANTS) LONG-TERM USE: ICD-10-CM

## 2020-09-03 DIAGNOSIS — M32.9 SYSTEMIC LUPUS ERYTHEMATOSUS, UNSPECIFIED SLE TYPE, UNSPECIFIED ORGAN INVOLVEMENT STATUS (H): ICD-10-CM

## 2020-09-03 DIAGNOSIS — M32.14 LUPUS NEPHRITIS (H): ICD-10-CM

## 2020-09-03 LAB
ALBUMIN SERPL-MCNC: 3.5 G/DL (ref 3.4–5)
ALBUMIN UR-MCNC: 30 MG/DL
ALT SERPL W P-5'-P-CCNC: 29 U/L (ref 0–50)
ANION GAP SERPL CALCULATED.3IONS-SCNC: 5 MMOL/L (ref 3–14)
APPEARANCE UR: CLEAR
AST SERPL W P-5'-P-CCNC: 13 U/L (ref 0–45)
BILIRUB UR QL STRIP: NEGATIVE
BUN SERPL-MCNC: 19 MG/DL (ref 7–30)
CALCIUM SERPL-MCNC: 9.3 MG/DL (ref 8.5–10.1)
CHLORIDE SERPL-SCNC: 105 MMOL/L (ref 94–109)
CO2 SERPL-SCNC: 29 MMOL/L (ref 20–32)
COLOR UR AUTO: YELLOW
CREAT SERPL-MCNC: 0.65 MG/DL (ref 0.52–1.04)
CREAT UR-MCNC: 33 MG/DL
CRP SERPL-MCNC: 13.6 MG/L (ref 0–8)
DIFFERENTIAL METHOD BLD: ABNORMAL
ERYTHROCYTE [DISTWIDTH] IN BLOOD BY AUTOMATED COUNT: 15.6 % (ref 10–15)
ERYTHROCYTE [SEDIMENTATION RATE] IN BLOOD BY WESTERGREN METHOD: 25 MM/H (ref 0–30)
GFR SERPL CREATININE-BSD FRML MDRD: >90 ML/MIN/{1.73_M2}
GLUCOSE SERPL-MCNC: 93 MG/DL (ref 70–99)
GLUCOSE UR STRIP-MCNC: NEGATIVE MG/DL
HCT VFR BLD AUTO: 39.4 % (ref 35–47)
HGB BLD-MCNC: 12.2 G/DL (ref 11.7–15.7)
HGB UR QL STRIP: ABNORMAL
KETONES UR STRIP-MCNC: NEGATIVE MG/DL
LEUKOCYTE ESTERASE UR QL STRIP: NEGATIVE
LYMPHOCYTES # BLD AUTO: 0.2 10E9/L (ref 0.8–5.3)
LYMPHOCYTES NFR BLD AUTO: 2 %
MCH RBC QN AUTO: 26.6 PG (ref 26.5–33)
MCHC RBC AUTO-ENTMCNC: 31 G/DL (ref 31.5–36.5)
MCV RBC AUTO: 86 FL (ref 78–100)
MONOCYTES # BLD AUTO: 0.3 10E9/L (ref 0–1.3)
MONOCYTES NFR BLD AUTO: 3 %
NEUTROPHILS # BLD AUTO: 8.3 10E9/L (ref 1.6–8.3)
NEUTROPHILS NFR BLD AUTO: 95 %
NITRATE UR QL: NEGATIVE
NON-SQ EPI CELLS #/AREA URNS LPF: NORMAL /LPF
OVALOCYTES BLD QL SMEAR: SLIGHT
PH UR STRIP: 6 PH (ref 5–7)
PLATELET # BLD AUTO: 244 10E9/L (ref 150–450)
PLATELET # BLD EST: ABNORMAL 10*3/UL
POTASSIUM SERPL-SCNC: 4.2 MMOL/L (ref 3.4–5.3)
PROT UR-MCNC: 0.27 G/L
PROT/CREAT 24H UR: 0.83 G/G CR (ref 0–0.2)
RBC # BLD AUTO: 4.58 10E12/L (ref 3.8–5.2)
RBC #/AREA URNS AUTO: NORMAL /HPF
SODIUM SERPL-SCNC: 139 MMOL/L (ref 133–144)
SOURCE: ABNORMAL
SP GR UR STRIP: 1.01 (ref 1–1.03)
UROBILINOGEN UR STRIP-ACNC: 0.2 EU/DL (ref 0.2–1)
WBC # BLD AUTO: 8.8 10E9/L (ref 4–11)
WBC #/AREA URNS AUTO: NORMAL /HPF

## 2020-09-03 PROCEDURE — 82040 ASSAY OF SERUM ALBUMIN: CPT | Performed by: INTERNAL MEDICINE

## 2020-09-03 PROCEDURE — 84460 ALANINE AMINO (ALT) (SGPT): CPT | Performed by: INTERNAL MEDICINE

## 2020-09-03 PROCEDURE — 84156 ASSAY OF PROTEIN URINE: CPT | Performed by: INTERNAL MEDICINE

## 2020-09-03 PROCEDURE — 86140 C-REACTIVE PROTEIN: CPT | Performed by: INTERNAL MEDICINE

## 2020-09-03 PROCEDURE — 83516 IMMUNOASSAY NONANTIBODY: CPT | Performed by: INTERNAL MEDICINE

## 2020-09-03 PROCEDURE — 85652 RBC SED RATE AUTOMATED: CPT | Performed by: INTERNAL MEDICINE

## 2020-09-03 PROCEDURE — 85025 COMPLETE CBC W/AUTO DIFF WBC: CPT | Performed by: INTERNAL MEDICINE

## 2020-09-03 PROCEDURE — 86355 B CELLS TOTAL COUNT: CPT | Performed by: INTERNAL MEDICINE

## 2020-09-03 PROCEDURE — 86160 COMPLEMENT ANTIGEN: CPT | Performed by: INTERNAL MEDICINE

## 2020-09-03 PROCEDURE — 80048 BASIC METABOLIC PNL TOTAL CA: CPT | Performed by: INTERNAL MEDICINE

## 2020-09-03 PROCEDURE — 83876 ASSAY MYELOPEROXIDASE: CPT | Performed by: INTERNAL MEDICINE

## 2020-09-03 PROCEDURE — 82784 ASSAY IGA/IGD/IGG/IGM EACH: CPT | Performed by: INTERNAL MEDICINE

## 2020-09-03 PROCEDURE — 86225 DNA ANTIBODY NATIVE: CPT | Performed by: INTERNAL MEDICINE

## 2020-09-03 PROCEDURE — 81001 URINALYSIS AUTO W/SCOPE: CPT | Performed by: INTERNAL MEDICINE

## 2020-09-03 PROCEDURE — 86255 FLUORESCENT ANTIBODY SCREEN: CPT | Performed by: INTERNAL MEDICINE

## 2020-09-03 PROCEDURE — 84450 TRANSFERASE (AST) (SGOT): CPT | Performed by: INTERNAL MEDICINE

## 2020-09-03 PROCEDURE — 36415 COLL VENOUS BLD VENIPUNCTURE: CPT | Performed by: INTERNAL MEDICINE

## 2020-09-03 NOTE — LETTER
September 9, 2020      Taina Singh  [address] [place] [address]        Dear ,    We are writing to inform you of your test results.    Slight increase in anti-DNA, CRP, ur protein. Please don't taper your prednisone till you walk to Dr. Sherman.    Resulted Orders   Proteinase 3 Antibody IgG   Result Value Ref Range    Proteinase 3 Antibody IgG <0.2 0.0 - 0.9 AI      Comment:      Negative  Antibody index (AI) values reflect qualitative changes in antibody   concentration that cannot be directly associated with clinical condition or   disease state.     Myeloperoxidase Antibody IgG   Result Value Ref Range    Myeloperoxidase Antibody IgG <0.2 0.0 - 0.9 AI      Comment:      Negative  Antibody index (AI) values reflect qualitative changes in antibody   concentration that cannot be directly associated with clinical condition or   disease state.     Immunoglobulins A G and M   Result Value Ref Range     (L) 610 - 1,616 mg/dL     84 - 499 mg/dL    IGM 20 (L) 35 - 242 mg/dL   ANCA IgG by IFA with Reflex to Titer   Result Value Ref Range    Neutrophil Cytoplasmic Antibody <1:10 <1:10 [titer]    Neutrophil Cytoplasmic Antibody Pattern       The ANCA IFA is <1:10.  No further testing will be performed.   CD19 B Cell Count   Result Value Ref Range    CD19 B Cells <1 (L) 6 - 27 %    Absolute CD19 <1 (L) 107 - 698 cells/uL   Protein  random urine with Creat Ratio   Result Value Ref Range    Protein Random Urine 0.27 g/L    Protein Total Urine g/gr Creatinine 0.83 (H) 0 - 0.2 g/g Cr   Erythrocyte sedimentation rate auto   Result Value Ref Range    Sed Rate 25 0 - 30 mm/h   DNA double stranded antibodies   Result Value Ref Range    DNA-ds 181 (H) <10 IU/mL      Comment:      Positive   CRP inflammation   Result Value Ref Range    CRP Inflammation 13.6 (H) 0.0 - 8.0 mg/L   Complement C3   Result Value Ref Range    Complement C3 96 81 - 157 mg/dL   Complement C4   Result Value Ref Range    Complement C4  17 13 - 39 mg/dL   CBC with platelets differential   Result Value Ref Range    WBC 8.8 4.0 - 11.0 10e9/L    RBC Count 4.58 3.8 - 5.2 10e12/L    Hemoglobin 12.2 11.7 - 15.7 g/dL    Hematocrit 39.4 35.0 - 47.0 %    MCV 86 78 - 100 fl    MCH 26.6 26.5 - 33.0 pg    MCHC 31.0 (L) 31.5 - 36.5 g/dL      Comment:      Results confirmed by repeat test    RDW 15.6 (H) 10.0 - 15.0 %    Platelet Count 244 150 - 450 10e9/L    % Neutrophils 95.0 %    % Lymphocytes 2.0 %    % Monocytes 3.0 %    Absolute Neutrophil 8.3 1.6 - 8.3 10e9/L    Absolute Lymphocytes 0.2 (L) 0.8 - 5.3 10e9/L    Absolute Monocytes 0.3 0.0 - 1.3 10e9/L    Ovalocytes Slight     Platelet Estimate       Automated count confirmed.  Platelet morphology is normal.    Diff Method Manual Differential    AST   Result Value Ref Range    AST 13 0 - 45 U/L   Albumin level   Result Value Ref Range    Albumin 3.5 3.4 - 5.0 g/dL   ALT   Result Value Ref Range    ALT 29 0 - 50 U/L   *UA reflex to Microscopic and Culture ([place] and [facility] (except [place] and [place])   Result Value Ref Range    Color Urine Yellow     Appearance Urine Clear     Glucose Urine Negative NEG^Negative mg/dL    Bilirubin Urine Negative NEG^Negative    Ketones Urine Negative NEG^Negative mg/dL    Specific Gravity Urine 1.015 1.003 - 1.035    Blood Urine Trace (A) NEG^Negative    pH Urine 6.0 5.0 - 7.0 pH    Protein Albumin Urine 30 (A) NEG^Negative mg/dL    Urobilinogen Urine 0.2 0.2 - 1.0 EU/dL    Nitrite Urine Negative NEG^Negative    Leukocyte Esterase Urine Negative NEG^Negative    Source Midstream Urine    Creatinine urine calculation only   Result Value Ref Range    Creatinine Urine 33 mg/dL   Urine Microscopic   Result Value Ref Range    WBC Urine 0 - 5 OTO5^0 - 5 /HPF    RBC Urine O - 2 OTO2^O - 2 /HPF    Squamous Epithelial /LPF Urine Few FEW^Few /LPF       If you have any questions or concerns, please call the clinic at the number listed above.        Sincerely,        Killian Marroquin MD

## 2020-09-04 LAB
ANCA AB PATTERN SER IF-IMP: NORMAL
C-ANCA TITR SER IF: NORMAL {TITER}
C3 SERPL-MCNC: 96 MG/DL (ref 81–157)
C4 SERPL-MCNC: 17 MG/DL (ref 13–39)
CD19 CELLS # BLD: <1 CELLS/UL (ref 107–698)
CD19 CELLS NFR BLD: <1 % (ref 6–27)
DSDNA AB SER-ACNC: 181 IU/ML
IGA SERPL-MCNC: 217 MG/DL (ref 84–499)
IGG SERPL-MCNC: 370 MG/DL (ref 610–1616)
IGM SERPL-MCNC: 20 MG/DL (ref 35–242)
MYELOPEROXIDASE AB SER-ACNC: <0.2 AI (ref 0–0.9)
PROTEINASE3 IGG SER-ACNC: <0.2 AI (ref 0–0.9)

## 2020-09-08 ENCOUNTER — TRANSFERRED RECORDS (OUTPATIENT)
Dept: HEALTH INFORMATION MANAGEMENT | Facility: CLINIC | Age: 55
End: 2020-09-08

## 2020-09-09 ENCOUNTER — TELEPHONE (OUTPATIENT)
Dept: RHEUMATOLOGY | Facility: CLINIC | Age: 55
End: 2020-09-09

## 2020-09-09 NOTE — TELEPHONE ENCOUNTER
Killian Marroquin MD Beard, Madeline, RN    Caller: Unspecified (Today,  9:26 AM)               Oh sorry, yes ok to do the dental work.    Previous Messages     ----- Message -----   From: Sophie Alvarado RN   Sent: 9/9/2020  10:54 AM CDT   To: Killian Marroquin MD     Is she ok to do dental work?     Sophie   ----- Message -----   From: Killian Marroquin MD   Sent: 9/9/2020  10:20 AM CDT   To: Sophie Alvarado RN     I wrote to her: Slight increase in anti-DNA, CRP, ur protein. Please don't taper your prednisone till you walk to Dr. Sherman.     No flu shot         Called and spoke with pt, relayed Dr. Marroquin's response. Pt understands, no further questions.    Sophie Alvarado RN  Rheumatology Clinic

## 2020-09-09 NOTE — RESULT ENCOUNTER NOTE
Slight increase in anti-DNA, CRP, ur protein. Please don't taper your prednisone till you walk to Dr. Sherman.

## 2020-09-09 NOTE — TELEPHONE ENCOUNTER
Pt is wanting to have some dental work done, she needs to have some fillings and a crown done. She is wondering if she is ok to proceed, as she had her last Rituximab in July?    She also would like to get her flu shot this year.  The last time she got it was in 2014, she did end up in the ER, after getting it.  She was diagnosed with pleurisy at that time. She is not sure if it is related to the flu shot or not. She has not gotten a flu shot since then.  She is wondering if she is ok to get it this year?      She remains on 20 mg a day of prednisone, she is wondering if she is safe to start tapering. She did feel as though she was flaring last week, but she just laid low and she recovered and she feels good this week.  She does see Dr. Sherman tomorrow.    Sophie Alvarado RN  Rheumatology Clinic

## 2020-09-10 ENCOUNTER — VIRTUAL VISIT (OUTPATIENT)
Dept: NEPHROLOGY | Facility: CLINIC | Age: 55
End: 2020-09-10
Attending: INTERNAL MEDICINE
Payer: COMMERCIAL

## 2020-09-10 DIAGNOSIS — M32.14 LUPUS NEPHRITIS, ISN/RPS CLASS IV (H): Primary | ICD-10-CM

## 2020-09-10 ASSESSMENT — PAIN SCALES - GENERAL: PAINLEVEL: NO PAIN (0)

## 2020-09-10 NOTE — LETTER
"9/10/2020       RE: Taina Singh  [address]  [place] [address]     Dear Colleague,    Thank you for referring your patient, Taina Singh, to the [facility]PH[facility] at [facility]. Please see a copy of my visit note below.    Taina Singh is a 55 year old female who is being evaluated via a billable video visit.      The patient has been notified of following:     \"This video visit will be conducted via a call between you and your physician/provider. We have found that certain health care needs can be provided without the need for an in-person physical exam.  This service lets us provide the care you need with a video conversation.  If a prescription is necessary we can send it directly to your pharmacy.  If lab work is needed we can place an order for that and you can then stop by our lab to have the test done at a later time.    Video visits are billed at different rates depending on your insurance coverage.  Please reach out to your insurance provider with any questions.    If during the course of the call the physician/provider feels a video visit is not appropriate, you will not be charged for this service.\"    Patient has given verbal consent for Video visit? Yes  How would you like to obtain your AVS? MyChart  If you are dropped from the video visit, the video invite should be resent to: Send to e-mail at: [email or web address]  Will anyone else be joining your video visit? No        Video-Visit Details    Type of service:  Video Visit    Video Start Time: 11:19 AM  Video End Time: 11:35    Originating Location (pt. Location): Home    Distant Location (provider location):  [facility] NE[facility]     Platform used for Video Visit: [facility]      Nephrology Follow Up  September 10, 2020      Taina Singh   MRN:[id number]   YOB: 1965    REASON FOR FOLLOW UP:       HISTORY OF PRESENT ILLNESS:  Patient is a 55-year-old female. She was originally diagnosed with " systemic lupus erythematosus at age 27 and was placed on hydroxychloroquine with good control of her disease.  This eventually was discontinued.  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       She was in her usual state of health until July 2017 which developed pleuritic chest pain joint pain.  She was placed back on hydroxychloroquine which short courses of steroids.  Eventually in February 2018 she was started on azathioprine with up titration of the dose 100 mg daily.  In May 2018 she was switched to CellCept as she continued to have active joint pain.  Her dose of CellCept was eventually uptitrated to a total of 1500 mg twice daily.  At that time she was also on prednisone at least 20 mg daily.  In September 2018 Benlysta was added.  She continued to have issues with joint pain and pleurisy.  Around April 2019 she discontinued her CellCept as she thought she was switching to rituximab.  She presented by June 2019 with active lupus nephritis which was biopsy-proven.  The biopsy did show features of necrosis in addition to possible TMA.  At that time she also had lupus enteritis.  Therefore in June 2019 she was initiated on IV Cytoxan 750 mg/m2 once monthly and completed her last dose end of November. She remains on prednisone 20 mg daily in addition to hydroxychloroquine.  She is on atovaquone for PCP prophylaxis.      She is currently on CellCept 1500 mg bid and received Rituximab first dose on 1/22 and second dose on 2/7. She received her second round of rituximab in June/July 2020. She was continued on prednisone 20 mg daily. In April her prednisone was briefly increased to 30 mg daily and was then tapered to 20 mg daily and she has been on 20 mg since.      Patient has not checked her BP regularly. She believes that her last BP at clinic visit was ok but her BP at the time  of rituximab infusion was high at 176/78 mmHg.     She notes that last week she was feeling tired but feeling better this week. No joint pain and no other symptoms. Her blood work from last week showed that creatinine was stable but UPCR was up to 0.83 and so was her ds-DNA. Complements were normal.     PAST MEDICAL HISTORY:  Reviewed with patient on 09/10/2020   As per HPI    MEDICATIONS:  Reviewed with the patient in detail    ALLERGIES:    Reviewed with the patient in detail    REVIEW OF SYSTEMS:  A comprehensive of systems was negative except as noted above.    SOCIAL HISTORY:   Reviewed with patient, no smoking and no alcohol use     FAMILY MEDICAL HISTORY:   Reviewed, no family history of need for dialysis, transplant or CKD    PHYSICAL EXAM:   Vital signs:There were no vitals taken for this visit.    Physical Exam  Constitutional:       Appearance: Normal appearance.   Neurological:      Mental Status: She is alert and oriented to person, place, and time.       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 Her complements are however normal and she has no hematuria. Her Hgb is unchanged. Her CRP is elevated but ESR within normal. At this point not clear cut that she has a wrosening of her lupus. The changes could certainly be see with infection as well. Her BP also was elevated last month which could contribute to her increased proteinuria. Plan is for her to start checking her BP and inform us if BP > 130 /80 mmHg. If that is the case she will need to be started on lisinopril 5 mg daily. She will remain on prednisone 20 mg daily and we will then recheck labs in 2 weeks and reassess.       Again, thank you for allowing me to participate in the care of your patient.      Sincerely,    Miriam  MD Whit

## 2020-09-10 NOTE — PROGRESS NOTES
"Taina Singh is a 55 year old female who is being evaluated via a billable video visit.      The patient has been notified of following:     \"This video visit will be conducted via a call between you and your physician/provider. We have found that certain health care needs can be provided without the need for an in-person physical exam.  This service lets us provide the care you need with a video conversation.  If a prescription is necessary we can send it directly to your pharmacy.  If lab work is needed we can place an order for that and you can then stop by our lab to have the test done at a later time.    Video visits are billed at different rates depending on your insurance coverage.  Please reach out to your insurance provider with any questions.    If during the course of the call the physician/provider feels a video visit is not appropriate, you will not be charged for this service.\"    Patient has given verbal consent for Video visit? Yes  How would you like to obtain your AVS? MyChart  If you are dropped from the video visit, the video invite should be resent to: Send to e-mail at: [email or web address]  Will anyone else be joining your video visit? No        Video-Visit Details    Type of service:  Video Visit    Video Start Time: 11:19 AM  Video End Time: 11:35    Originating Location (pt. Location): Home    Distant Location (provider location):  [facility]     Platform used for Video Visit: [facility]      Nephrology Follow Up  September 10, 2020      Taina Singh   MRN:[id number]   YOB: 1965    REASON FOR FOLLOW UP:       HISTORY OF PRESENT ILLNESS:  Patient is a 55-year-old female. She was originally diagnosed with systemic lupus erythematosus at age 27 and was placed on hydroxychloroquine with good control of her disease.  This eventually was discontinued.  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       She was in her usual state of health until July 2017 which developed pleuritic chest pain joint pain.  She was placed back on hydroxychloroquine which short courses of steroids.  Eventually in February 2018 she was started on azathioprine with up titration of the dose 100 mg daily.  In May 2018 she was switched to CellCept as she continued to have active joint pain.  Her dose of CellCept was eventually uptitrated to a total of 1500 mg twice daily.  At that time she was also on prednisone at least 20 mg daily.  In September 2018 Benlysta was added.  She continued to have issues with joint pain and pleurisy.  Around April 2019 she discontinued her CellCept as she thought she was switching to rituximab.  She presented by June 2019 with active lupus nephritis which was biopsy-proven.  The biopsy did show features of necrosis in addition to possible TMA.  At that time she also had lupus enteritis.  Therefore in June 2019 she was initiated on IV Cytoxan 750 mg/m2 once monthly and completed her last dose end of November. She remains on prednisone 20 mg daily in addition to hydroxychloroquine.  She is on atovaquone for PCP prophylaxis.      She is currently on CellCept 1500 mg bid and received Rituximab first dose on 1/22 and second dose on 2/7. She received her second round of rituximab in June/July 2020. She was continued on prednisone 20 mg daily. In April her prednisone was briefly increased to 30 mg daily and was then tapered to 20 mg daily and she has been on 20 mg since.      Patient has not checked her BP regularly. She believes that her last BP at clinic visit was ok but her BP at the time of rituximab infusion was high at 176/78 mmHg.     She notes that last week she was feeling tired but feeling better this week. No joint pain and no other symptoms. Her blood work from last week showed that creatinine was stable but UPCR was up to 0.83 and so was her  ds-DNA. Complements were normal.     PAST MEDICAL HISTORY:  Reviewed with patient on 09/10/2020   As per HPI    MEDICATIONS:  Reviewed with the patient in detail    ALLERGIES:    Reviewed with the patient in detail    REVIEW OF SYSTEMS:  A comprehensive of systems was negative except as noted above.    SOCIAL HISTORY:   Reviewed with patient, no smoking and no alcohol use     FAMILY MEDICAL HISTORY:   Reviewed, no family history of need for dialysis, transplant or CKD    PHYSICAL EXAM:   Vital signs:There were no vitals taken for this visit.    Physical Exam  Constitutional:       Appearance: Normal appearance.   Neurological:      Mental Status: She is alert and oriented to person, place, and time.       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 Her complements are however normal and she has no hematuria. Her Hgb is unchanged. Her CRP is elevated but ESR within normal. At this point not clear cut that she has a wrosening of her lupus. The changes could certainly be see with infection as well. Her BP also was elevated last month which could contribute to her increased proteinuria. Plan is for her to start checking her BP and inform us if BP > 130 /80 mmHg. If that is the case she will need to be started on lisinopril 5 mg daily. She will remain on prednisone 20 mg daily and we will then recheck labs in 2 weeks and reassess.

## 2020-09-11 ENCOUNTER — TRANSFERRED RECORDS (OUTPATIENT)
Dept: HEALTH INFORMATION MANAGEMENT | Facility: CLINIC | Age: 55
End: 2020-09-11

## 2020-09-15 ENCOUNTER — TELEPHONE (OUTPATIENT)
Dept: NEPHROLOGY | Facility: CLINIC | Age: 55
End: 2020-09-15

## 2020-09-15 NOTE — TELEPHONE ENCOUNTER
Called and spoke with pt, she had forgotten to take blood pressures. She will get them for the rest of this week and will send them to us on Friday to review.    Sophie Alvarado RN  Rheumatology Clinic

## 2020-09-15 NOTE — TELEPHONE ENCOUNTER
----- Message from Mriiam Sherman MD sent at 9/10/2020 12:44 PM CDT -----  I talked to her today. She said last week she was not feeling more tired but this week feeling great and no symptoms to complain of.     Her BP at the time of her infusion was quite high and she is unsure of her readings currently. Certainly with high BP she could have more proteinuria. She had no hematuria and complements were ok too. So the plan is that she will start checking her BP starting today and if by Monday/Tuesday her BP is > 130/80 mmHg she will call Sophie to let he know of her readings and will likely start lisinopril at that point.     I have kept her on pred 20 mg for now and plan to recheck labs in 2 weeks.   ----- Message -----  From: Killian Marroquin MD  Sent: 9/9/2020  10:19 AM CDT  To: Miriam Sherman MD    Slight increase in anti-DNA, CRP, ur protein. Please don't taper your prednisone till you walk to Dr. Sherman.

## 2020-09-22 ENCOUNTER — TELEPHONE (OUTPATIENT)
Dept: RHEUMATOLOGY | Facility: CLINIC | Age: 55
End: 2020-09-22

## 2020-09-22 DIAGNOSIS — M32.9 SYSTEMIC LUPUS ERYTHEMATOSUS, UNSPECIFIED SLE TYPE, UNSPECIFIED ORGAN INVOLVEMENT STATUS (H): ICD-10-CM

## 2020-09-22 DIAGNOSIS — M32.14 LUPUS NEPHRITIS, ISN/RPS CLASS IV (H): ICD-10-CM

## 2020-09-22 LAB
ALBUMIN SERPL-MCNC: 3.5 G/DL (ref 3.4–5)
ALBUMIN UR-MCNC: ABNORMAL MG/DL
ALT SERPL W P-5'-P-CCNC: 29 U/L (ref 0–50)
AMORPH CRY #/AREA URNS HPF: ABNORMAL /HPF
ANION GAP SERPL CALCULATED.3IONS-SCNC: 7 MMOL/L (ref 3–14)
APPEARANCE UR: CLEAR
AST SERPL W P-5'-P-CCNC: 14 U/L (ref 0–45)
BACTERIA #/AREA URNS HPF: ABNORMAL /HPF
BASOPHILS # BLD AUTO: 0 10E9/L (ref 0–0.2)
BASOPHILS NFR BLD AUTO: 0.2 %
BILIRUB UR QL STRIP: NEGATIVE
BUN SERPL-MCNC: 20 MG/DL (ref 7–30)
CALCIUM SERPL-MCNC: 9.3 MG/DL (ref 8.5–10.1)
CHLORIDE SERPL-SCNC: 103 MMOL/L (ref 94–109)
CO2 SERPL-SCNC: 27 MMOL/L (ref 20–32)
COLOR UR AUTO: YELLOW
CREAT SERPL-MCNC: 0.68 MG/DL (ref 0.52–1.04)
CREAT UR-MCNC: 99 MG/DL
CRP SERPL-MCNC: 7.9 MG/L (ref 0–8)
DIFFERENTIAL METHOD BLD: ABNORMAL
EOSINOPHIL # BLD AUTO: 0 10E9/L (ref 0–0.7)
EOSINOPHIL NFR BLD AUTO: 0.2 %
ERYTHROCYTE [DISTWIDTH] IN BLOOD BY AUTOMATED COUNT: 15.9 % (ref 10–15)
ERYTHROCYTE [SEDIMENTATION RATE] IN BLOOD BY WESTERGREN METHOD: 23 MM/H (ref 0–30)
GFR SERPL CREATININE-BSD FRML MDRD: >90 ML/MIN/{1.73_M2}
GLUCOSE SERPL-MCNC: 121 MG/DL (ref 70–99)
GLUCOSE UR STRIP-MCNC: NEGATIVE MG/DL
HCT VFR BLD AUTO: 37.9 % (ref 35–47)
HGB BLD-MCNC: 11.6 G/DL (ref 11.7–15.7)
HGB UR QL STRIP: ABNORMAL
KETONES UR STRIP-MCNC: NEGATIVE MG/DL
LEUKOCYTE ESTERASE UR QL STRIP: NEGATIVE
LYMPHOCYTES # BLD AUTO: 0.4 10E9/L (ref 0.8–5.3)
LYMPHOCYTES NFR BLD AUTO: 4.1 %
MCH RBC QN AUTO: 26 PG (ref 26.5–33)
MCHC RBC AUTO-ENTMCNC: 30.6 G/DL (ref 31.5–36.5)
MCV RBC AUTO: 85 FL (ref 78–100)
MONOCYTES # BLD AUTO: 0.5 10E9/L (ref 0–1.3)
MONOCYTES NFR BLD AUTO: 5.6 %
NEUTROPHILS # BLD AUTO: 8.7 10E9/L (ref 1.6–8.3)
NEUTROPHILS NFR BLD AUTO: 89.9 %
NITRATE UR QL: NEGATIVE
NON-SQ EPI CELLS #/AREA URNS LPF: ABNORMAL /LPF
PH UR STRIP: 6 PH (ref 5–7)
PLATELET # BLD AUTO: 267 10E9/L (ref 150–450)
POTASSIUM SERPL-SCNC: 4.3 MMOL/L (ref 3.4–5.3)
PROT UR-MCNC: 0.42 G/L
PROT/CREAT 24H UR: 0.43 G/G CR (ref 0–0.2)
RBC # BLD AUTO: 4.46 10E12/L (ref 3.8–5.2)
RBC #/AREA URNS AUTO: ABNORMAL /HPF
SODIUM SERPL-SCNC: 137 MMOL/L (ref 133–144)
SOURCE: ABNORMAL
SP GR UR STRIP: 1.02 (ref 1–1.03)
UROBILINOGEN UR STRIP-ACNC: 0.2 EU/DL (ref 0.2–1)
WBC # BLD AUTO: 9.7 10E9/L (ref 4–11)
WBC #/AREA URNS AUTO: ABNORMAL /HPF

## 2020-09-22 PROCEDURE — 81001 URINALYSIS AUTO W/SCOPE: CPT | Performed by: FAMILY MEDICINE

## 2020-09-22 PROCEDURE — 80048 BASIC METABOLIC PNL TOTAL CA: CPT | Performed by: FAMILY MEDICINE

## 2020-09-22 PROCEDURE — 85652 RBC SED RATE AUTOMATED: CPT | Performed by: FAMILY MEDICINE

## 2020-09-22 PROCEDURE — 36415 COLL VENOUS BLD VENIPUNCTURE: CPT | Performed by: FAMILY MEDICINE

## 2020-09-22 PROCEDURE — 84156 ASSAY OF PROTEIN URINE: CPT | Performed by: FAMILY MEDICINE

## 2020-09-22 PROCEDURE — 86160 COMPLEMENT ANTIGEN: CPT | Performed by: FAMILY MEDICINE

## 2020-09-22 PROCEDURE — 86140 C-REACTIVE PROTEIN: CPT | Performed by: FAMILY MEDICINE

## 2020-09-22 PROCEDURE — 86225 DNA ANTIBODY NATIVE: CPT | Performed by: FAMILY MEDICINE

## 2020-09-22 PROCEDURE — 84450 TRANSFERASE (AST) (SGOT): CPT | Performed by: FAMILY MEDICINE

## 2020-09-22 PROCEDURE — 82040 ASSAY OF SERUM ALBUMIN: CPT | Performed by: FAMILY MEDICINE

## 2020-09-22 PROCEDURE — 85025 COMPLETE CBC W/AUTO DIFF WBC: CPT | Performed by: FAMILY MEDICINE

## 2020-09-22 PROCEDURE — 84460 ALANINE AMINO (ALT) (SGPT): CPT | Performed by: FAMILY MEDICINE

## 2020-09-22 NOTE — LETTER
September 23, 2020      Taina Singh  [address]  [place] [address]        Dear ,    We are writing to inform you of your test results.    Improved labs including urine protein which is good news.    Component      Latest Ref Rng & Units 9/22/2020   WBC      4.0 - 11.0 10e9/L 9.7   RBC Count      3.8 - 5.2 10e12/L 4.46   Hemoglobin      11.7 - 15.7 g/dL 11.6 (L)   Hematocrit      35.0 - 47.0 % 37.9   MCV      78 - 100 fl 85   MCH      26.5 - 33.0 pg 26.0 (L)   MCHC      31.5 - 36.5 g/dL 30.6 (L)   RDW      10.0 - 15.0 % 15.9 (H)   Platelet Count      150 - 450 10e9/L 267   % Neutrophils      % 89.9   % Lymphocytes      % 4.1   % Monocytes      % 5.6   % Eosinophils      % 0.2   % Basophils      % 0.2   Absolute Neutrophil      1.6 - 8.3 10e9/L 8.7 (H)   Absolute Lymphocytes      0.8 - 5.3 10e9/L 0.4 (L)   Absolute Monocytes      0.0 - 1.3 10e9/L 0.5   Absolute Eosinophils      0.0 - 0.7 10e9/L 0.0   Absolute Basophils      0.0 - 0.2 10e9/L 0.0   Diff Method       Automated Method   Color Urine       Yellow   Appearance Urine       Clear   Glucose Urine      NEG:Negative mg/dL Negative   Bilirubin Urine      NEG:Negative Negative   Ketones Urine      NEG:Negative mg/dL Negative   Specific Gravity Urine      1.003 - 1.035 1.020   Blood Urine      NEG:Negative Trace (A)   pH Urine      5.0 - 7.0 pH 6.0   Protein Albumin Urine      NEG:Negative mg/dL Trace (A)   Urobilinogen Urine      0.2 - 1.0 EU/dL 0.2   Nitrite Urine      NEG:Negative Negative   Leukocyte Esterase Urine      NEG:Negative Negative   Source       Midstream Urine   Sodium      133 - 144 mmol/L 137   Potassium      3.4 - 5.3 mmol/L 4.3   Chloride      94 - 109 mmol/L 103   Carbon Dioxide      20 - 32 mmol/L 27   Anion Gap      3 - 14 mmol/L 7   Glucose      70 - 99 mg/dL 121 (H)   Urea Nitrogen      7 - 30 mg/dL 20   Creatinine      0.52 - 1.04 mg/dL 0.68   GFR Estimate      >60 mL/min/1.73:m2 >90   GFR Estimate If Black      >60  mL/min/1.73:m2 >90   Calcium      8.5 - 10.1 mg/dL 9.3   WBC Urine      OTO5:0 - 5 /HPF 0 - 5   RBC Urine      OTO2:O - 2 /HPF 2-5 (A)   Squamous Epithelial /LPF Urine      FEW:Few /LPF Few   Bacteria Urine      NEG:Negative /HPF Few (A)   Amorphous Crystals      NEG:Negative /HPF Few (A)   Protein Random Urine      g/L 0.42   Protein Total Urine g/gr Creatinine      0 - 0.2 g/g Cr 0.43 (H)   DNA-ds      <10 IU/mL 160 (H)   Complement C3      81 - 157 mg/dL 96   Complement C4      13 - 39 mg/dL 18   CRP Inflammation      0.0 - 8.0 mg/L 7.9   Sed Rate      0 - 30 mm/h 23   Albumin      3.4 - 5.0 g/dL 3.5   Creatinine Urine Random      mg/dL 94   AST      0 - 45 U/L 14   ALT      0 - 50 U/L 29   Creatinine Urine      mg/dL 99       Killian Marroquin MD

## 2020-09-22 NOTE — TELEPHONE ENCOUNTER
Killian Marroquin MD Beard, Madeline, RN    Caller: Unspecified (Today,  8:55 AM)               If infected ASAP, as I don't want to turn to an abscess; otherwise early Dec.      Called and spoke with patient. Discussed MMF and her Rituximab.  She is aware to hold MMF before and after surgery and if she is started on antibiotics.      Sophie Alvarado RN  Rheumatology Clinic

## 2020-09-22 NOTE — TELEPHONE ENCOUNTER
Pt was supposed to have a crown done, and now it turns out that the tooth needs to be pulled.  She had Rituximab in July.  She is aware that she will need to time when she has the tooth pulled.  Discussed that if it does get to be an emergency then of course, it will need to be pulled.  However, it would be better to wait for the best timing with her infusion and immune system.    Will send message to Dr. Marroquin to determine when that is and let pt know.    Sophie Alvarado RN  Rheumatology Clinic

## 2020-09-23 LAB
C3 SERPL-MCNC: 96 MG/DL (ref 81–157)
C4 SERPL-MCNC: 18 MG/DL (ref 13–39)
CREAT UR-MCNC: 94 MG/DL
DSDNA AB SER-ACNC: 160 IU/ML

## 2020-09-24 ENCOUNTER — TELEPHONE (OUTPATIENT)
Dept: RHEUMATOLOGY | Facility: CLINIC | Age: 55
End: 2020-09-24

## 2020-09-24 ENCOUNTER — VIRTUAL VISIT (OUTPATIENT)
Dept: NEPHROLOGY | Facility: CLINIC | Age: 55
End: 2020-09-24
Attending: INTERNAL MEDICINE
Payer: COMMERCIAL

## 2020-09-24 DIAGNOSIS — I77.82 ANCA-NEGATIVE VASCULITIS (H): ICD-10-CM

## 2020-09-24 DIAGNOSIS — M32.14 LUPUS NEPHRITIS, ISN/RPS CLASS IV (H): Primary | ICD-10-CM

## 2020-09-24 DIAGNOSIS — M32.9 SYSTEMIC LUPUS ERYTHEMATOSUS, UNSPECIFIED SLE TYPE, UNSPECIFIED ORGAN INVOLVEMENT STATUS (H): Primary | ICD-10-CM

## 2020-09-24 DIAGNOSIS — I15.1 HYPERTENSION SECONDARY TO OTHER RENAL DISORDERS: ICD-10-CM

## 2020-09-24 DIAGNOSIS — Z79.899 HIGH RISK MEDICATION USE: ICD-10-CM

## 2020-09-24 RX ORDER — PREDNISONE 5 MG/1
TABLET ORAL
Qty: 90 TABLET | Refills: 3 | Status: SHIPPED | OUTPATIENT
Start: 2020-09-24 | End: 2020-10-29

## 2020-09-24 RX ORDER — LOSARTAN POTASSIUM 25 MG/1
12.5 TABLET ORAL DAILY
Qty: 90 TABLET | Refills: 3 | Status: SHIPPED | OUTPATIENT
Start: 2020-09-24 | End: 2020-10-29

## 2020-09-24 ASSESSMENT — PAIN SCALES - GENERAL: PAINLEVEL: NO PAIN (0)

## 2020-09-24 NOTE — TELEPHONE ENCOUNTER
----- Message from Killian Marroquin MD sent at 9/24/2020  8:31 AM CDT -----  Good idea. Sophie, if she is ready, recommend to do 17.5-15-12.5 mg every day each for 7 days then 10 mg a day, to discuss further taper at 10/29 visit with re-check labs around 10/24.    ----- Message -----  From: Miriam Sherman MD  Sent: 9/24/2020   8:19 AM CDT  To: Killian Marroquin MD    Yes. I agree. What do you think about slowly tapering her prednisone. Could do 2.5 mg every 2 weeks. She has been on 20 mg for a while.   ----- Message -----  From: Killian Marroquin MD  Sent: 9/23/2020  11:27 PM CDT  To: Miriam Sherman MD    Improved labs including urine protein which is good news.      
Called and spoke with pt regarding prednisone taper.  She is ready to start.  We discussed taper to be done and labs that need to be done, she is aware the Dr. Rouse sent prednisone to pharmacy.    Discussed when labs are due next and that further taper past 10 mg a day will be discussed at appt with Dr. Marroquin.    Sophie Alvarado RN  Rheumatology Clinic    
Universal Safety Interventions

## 2020-09-24 NOTE — PROGRESS NOTES
Nephrology Follow Up  September 24, 2020      I personally visited with the patient via video as outline by Dr. Rouse. I agree with his note as outline below. Labs are stable and plan to taper the prednisone as outline below.     Taina Singh   MRN:[id number]   YOB: 1965    REASON FOR FOLLOW UP: Lupus Nephritis      HISTORY OF PRESENT ILLNESS:  Patient is a 55-year-old female. She was originally diagnosed with systemic lupus erythematosus at age 27 and was placed on hydroxychloroquine with good control of her disease.  For full review of her SLE course, please see HPI from Dr. Sherman's note from 9/10/2020.       At this time, she reports she is feeling well. She does have a little fatigue, but otherwise she states overall doing well and improved from previous visit. She states she is eating well, no problems with nausea and vomiting. No fevers or rashes, no joint swellings.   She has had no hematuria, dysuria, or foamy urine. To stay active, she is walking outside and been exercising at home. No chest pain or SOB with this activity. No Leg swelling at this time, she is wearing compression stockings.   She is currently on 20mg Prednisone and Cellcept 1500mg BID. She also has completed Rituximab - 2 doses in July 2020. She is checking her BP at home, reports average 130s/60s, currently on Lasix 20mg Daily.    PAST MEDICAL HISTORY:  Reviewed with patient on 09/24/2020   As per HPI    MEDICATIONS:  Reviewed with the patient in detail    ALLERGIES:    Reviewed with the patient in detail    REVIEW OF SYSTEMS:  A comprehensive of systems was negative except as noted above.    SOCIAL HISTORY:   Reviewed with patient, no smoking and no alcohol use     FAMILY MEDICAL HISTORY:   Reviewed, no family history of need for dialysis, transplant or CKD    PHYSICAL EXAM:   Vital signs:There were no vitals taken for this visit.    Physical Exam  Constitutional:       Appearance: Normal appearance.   Neurological:     "  Mental Status: She is alert and oriented to person, place, and time.       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 She has responded well to Rituximab and Cellcept.     At this time, will taper Prednisone by 2.5mg weekly (as discussed with Rheum - Dr. Marroquin) and continue to follow. She will see Dr. Marroquin in 1 month, at which time she will be on Prednisone 10mg Daily.     BP not at goal of < 130/80, and she does have proteinuria, thus will start with very low dose Losartan at 12.5mg Daily and trend BPs (previously reports severe hypotension with lisinopril)    We will see her again in 3 months.    Pt staffed with Dr. Whit Rouse MD  Nephrology  [phone]    --------    Taina Singh is a 55 year old female who is being evaluated via a billable video visit.      The patient has been notified of following:     \"This video visit will be conducted via a call between you and your physician/provider. We have found that certain health care needs can be provided without the need for an in-person physical exam.  This service lets us provide the care you need with a video conversation.  If a prescription is necessary we can send it directly to your pharmacy.  If lab work is needed we can place an order for that and you can then stop by our lab to have the test done at a later time.    Video visits are billed at different rates depending on your insurance coverage.  Please reach out to your insurance provider with any questions.    If during the course of the call the physician/provider feels a video visit is not appropriate, you will not be charged for this service.\"    Patient has " given verbal consent for Video visit? Yes  How would you like to obtain your AVS? Mail a copy  If you are dropped from the video visit, the video invite should be resent to: Send to e-mail at: [email or web address]  Will anyone else be joining your video visit? No        Video-Visit Details    Type of service:  Video Visit    Video Start Time: 11:30 AM  Video End Time: 11:57 AM    Originating Location (pt. Location): Home    Distant Location (provider location):  [facility] NEPHROLOGY     Platform used for Video Visit: Pradeep Rouse MD

## 2020-09-24 NOTE — LETTER
9/24/2020       RE: Taina Singh  [address] [place] [address]     Dear Colleague,    Thank you for referring your patient, Taina Singh, to the [facility] NEPHROLOGY at [facility]. Please see a copy of my visit note below.      Nephrology Follow Up  September 24, 2020      Taina Singh   MRN:[id number]   YOB: 1965    REASON FOR FOLLOW UP: Lupus Nephritis      HISTORY OF PRESENT ILLNESS:  Patient is a 55-year-old female. She was originally diagnosed with systemic lupus erythematosus at age 27 and was placed on hydroxychloroquine with good control of her disease.  For full review of her SLE course, please see HPI from Dr. Sherman's note from 9/10/2020.       At this time, she reports she is feeling well. She does have a little fatigue, but otherwise she states overall doing well and improved from previous visit. She states she is eating well, no problems with nausea and vomiting. No fevers or rashes, no joint swellings.   She has had no hematuria, dysuria, or foamy urine. To stay active, she is walking outside and been exercising at home. No chest pain or SOB with this activity. No Leg swelling at this time, she is wearing compression stockings.   She is currently on 20mg Prednisone and Cellcept 1500mg BID. She also has completed Rituximab - 2 doses in July 2020. She is checking her BP at home, reports average 130s/60s, currently on Lasix 20mg Daily.    PAST MEDICAL HISTORY:  Reviewed with patient on 09/24/2020   As per HPI    MEDICATIONS:  Reviewed with the patient in detail    ALLERGIES:    Reviewed with the patient in detail    REVIEW OF SYSTEMS:  A comprehensive of systems was negative except as noted above.    SOCIAL HISTORY:   Reviewed with patient, no smoking and no alcohol use     FAMILY MEDICAL HISTORY:   Reviewed, no family history of need for dialysis, transplant or CKD    PHYSICAL EXAM:   Vital signs:There were no vitals taken for this  "visit.    Physical Exam  Constitutional:       Appearance: Normal appearance.   Neurological:      Mental Status: She is alert and oriented to person, place, and time.       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 She has responded well to Rituximab and Cellcept.     At this time, will taper Prednisone by 2.5mg weekly (as discussed with Rheum - Dr. Marroquin) and continue to follow. She will see Dr. Marroquin in 1 month, at which time she will be on Prednisone 10mg Daily.     BP not at goal of < 130/80, and she does have proteinuria, thus will start with very low dose Losartan at 12.5mg Daily and trend BPs (previously reports severe hypotension with lisinopril)    We will see her again in 3 months.    Pt staffed with Dr. Whit Rouse MD  Nephrology  [phone]    --------    Taina Singh is a 55 year old female who is being evaluated via a billable video visit.      The patient has been notified of following:     \"This video visit will be conducted via a call between you and your physician/provider. We have found that certain health care needs can be provided without the need for an in-person physical exam.  This service lets us provide the care you need with a video conversation.  If a prescription is necessary we can send it directly to your pharmacy.  If lab work is needed we can place an order for that and you can then stop by our lab to have the test done at a later time.    Video visits are billed at different rates depending on your insurance coverage.  Please reach out to your insurance provider with any questions.    If during the course of the call the physician/provider " "feels a video visit is not appropriate, you will not be charged for this service.\"    Patient has given verbal consent for Video visit? Yes  How would you like to obtain your AVS? Mail a copy  If you are dropped from the video visit, the video invite should be resent to: Send to e-mail at: [email or web address]  Will anyone else be joining your video visit? No        Video-Visit Details    Type of service:  Video Visit    Video Start Time: 11:30 AM  Video End Time: 11:57 AM    Originating Location (pt. Location): Home    Distant Location (provider location):  [facility] NEPHROLOGY     Platform used for Video Visit: Pradeep Rouse MD      Again, thank you for allowing me to participate in the care of your patient.      Sincerely,    Miriam Sherman MD      "

## 2020-09-25 ENCOUNTER — MYC REFILL (OUTPATIENT)
Dept: RHEUMATOLOGY | Facility: CLINIC | Age: 55
End: 2020-09-25

## 2020-09-25 DIAGNOSIS — G47.00 INSOMNIA, UNSPECIFIED TYPE: ICD-10-CM

## 2020-09-25 DIAGNOSIS — L93.0 LUPUS ERYTHEMATOSUS, UNSPECIFIED FORM: ICD-10-CM

## 2020-09-25 RX ORDER — HYDROXYCHLOROQUINE SULFATE 200 MG/1
200 TABLET, FILM COATED ORAL 2 TIMES DAILY
Qty: 180 TABLET | Refills: 3 | Status: SHIPPED | OUTPATIENT
Start: 2020-09-25 | End: 2021-11-05

## 2020-09-25 RX ORDER — HYDROXYCHLOROQUINE SULFATE 200 MG/1
200 TABLET, FILM COATED ORAL 2 TIMES DAILY
Qty: 60 TABLET | Refills: 0 | Status: SHIPPED | OUTPATIENT
Start: 2020-09-25 | End: 2020-10-29

## 2020-09-25 RX ORDER — ZOLPIDEM TARTRATE 5 MG/1
5 TABLET ORAL
Qty: 30 TABLET | Refills: 0 | Status: SHIPPED | OUTPATIENT
Start: 2020-09-25 | End: 2020-11-23

## 2020-09-25 NOTE — TELEPHONE ENCOUNTER
Received call from pt, she is out of plaquenil starting today, is requesting 30 day supply be sent to [facility] and 1 year supply be sent to [facility].  She called her eye clinic and they should be faxing over her eye exam.    Sophie Alvarado RN  Rheumatology Clinic

## 2020-09-30 ENCOUNTER — TELEPHONE (OUTPATIENT)
Dept: RHEUMATOLOGY | Facility: CLINIC | Age: 55
End: 2020-09-30

## 2020-10-15 NOTE — TELEPHONE ENCOUNTER
Pt will be having tooth removed on 10/26 as it is suspected that it is causing her issues.  She will hold MMF a week before and a week after and clarify if dentist is fine with that.  She is aware dentist will determine antibiotics.    Sophie Alvarado RN  Rheumatology Clinic

## 2020-10-23 ENCOUNTER — MYC REFILL (OUTPATIENT)
Dept: RHEUMATOLOGY | Facility: CLINIC | Age: 55
End: 2020-10-23

## 2020-10-23 ENCOUNTER — DOCUMENTATION ONLY (OUTPATIENT)
Dept: CARE COORDINATION | Facility: CLINIC | Age: 55
End: 2020-10-23

## 2020-10-23 DIAGNOSIS — M32.9 SYSTEMIC LUPUS ERYTHEMATOSUS, UNSPECIFIED SLE TYPE, UNSPECIFIED ORGAN INVOLVEMENT STATUS (H): ICD-10-CM

## 2020-10-23 DIAGNOSIS — I77.82 ANCA-NEGATIVE VASCULITIS (H): ICD-10-CM

## 2020-10-23 LAB
ALBUMIN SERPL-MCNC: 3.7 G/DL (ref 3.4–5)
ALBUMIN UR-MCNC: 100 MG/DL
ALT SERPL W P-5'-P-CCNC: 26 U/L (ref 0–50)
AMORPH CRY #/AREA URNS HPF: ABNORMAL /HPF
APPEARANCE UR: CLEAR
AST SERPL W P-5'-P-CCNC: 12 U/L (ref 0–45)
BACTERIA #/AREA URNS HPF: ABNORMAL /HPF
BASOPHILS # BLD AUTO: 0 10E9/L (ref 0–0.2)
BASOPHILS NFR BLD AUTO: 0.4 %
BILIRUB UR QL STRIP: ABNORMAL
COLOR UR AUTO: YELLOW
CREAT SERPL-MCNC: 0.68 MG/DL (ref 0.52–1.04)
CREAT UR-MCNC: 297 MG/DL
CREAT UR-MCNC: 316 MG/DL
CRP SERPL-MCNC: 19.7 MG/L (ref 0–8)
DIFFERENTIAL METHOD BLD: ABNORMAL
EOSINOPHIL # BLD AUTO: 0 10E9/L (ref 0–0.7)
EOSINOPHIL NFR BLD AUTO: 0.1 %
ERYTHROCYTE [DISTWIDTH] IN BLOOD BY AUTOMATED COUNT: 14.7 % (ref 10–15)
ERYTHROCYTE [SEDIMENTATION RATE] IN BLOOD BY WESTERGREN METHOD: 26 MM/H (ref 0–30)
GFR SERPL CREATININE-BSD FRML MDRD: >90 ML/MIN/{1.73_M2}
GLUCOSE UR STRIP-MCNC: NEGATIVE MG/DL
HCT VFR BLD AUTO: 38.8 % (ref 35–47)
HGB BLD-MCNC: 12 G/DL (ref 11.7–15.7)
HGB UR QL STRIP: ABNORMAL
KETONES UR STRIP-MCNC: ABNORMAL MG/DL
LEUKOCYTE ESTERASE UR QL STRIP: NEGATIVE
LYMPHOCYTES # BLD AUTO: 0.6 10E9/L (ref 0.8–5.3)
LYMPHOCYTES NFR BLD AUTO: 7.6 %
MCH RBC QN AUTO: 26 PG (ref 26.5–33)
MCHC RBC AUTO-ENTMCNC: 30.9 G/DL (ref 31.5–36.5)
MCV RBC AUTO: 84 FL (ref 78–100)
MONOCYTES # BLD AUTO: 0.7 10E9/L (ref 0–1.3)
MONOCYTES NFR BLD AUTO: 8.5 %
MUCOUS THREADS #/AREA URNS LPF: PRESENT /LPF
NEUTROPHILS # BLD AUTO: 6.8 10E9/L (ref 1.6–8.3)
NEUTROPHILS NFR BLD AUTO: 83.4 %
NITRATE UR QL: NEGATIVE
NON-SQ EPI CELLS #/AREA URNS LPF: ABNORMAL /LPF
PH UR STRIP: 5.5 PH (ref 5–7)
PLATELET # BLD AUTO: 263 10E9/L (ref 150–450)
PROT UR-MCNC: 1.02 G/L
PROT/CREAT 24H UR: 0.34 G/G CR (ref 0–0.2)
RBC # BLD AUTO: 4.61 10E12/L (ref 3.8–5.2)
RBC #/AREA URNS AUTO: ABNORMAL /HPF
SOURCE: ABNORMAL
SP GR UR STRIP: >1.03 (ref 1–1.03)
UROBILINOGEN UR STRIP-ACNC: 0.2 EU/DL (ref 0.2–1)
WBC # BLD AUTO: 8.1 10E9/L (ref 4–11)
WBC #/AREA URNS AUTO: ABNORMAL /HPF

## 2020-10-23 PROCEDURE — 86140 C-REACTIVE PROTEIN: CPT | Performed by: INTERNAL MEDICINE

## 2020-10-23 PROCEDURE — 86225 DNA ANTIBODY NATIVE: CPT | Performed by: INTERNAL MEDICINE

## 2020-10-23 PROCEDURE — 86160 COMPLEMENT ANTIGEN: CPT | Performed by: INTERNAL MEDICINE

## 2020-10-23 PROCEDURE — 84460 ALANINE AMINO (ALT) (SGPT): CPT | Performed by: INTERNAL MEDICINE

## 2020-10-23 PROCEDURE — 36415 COLL VENOUS BLD VENIPUNCTURE: CPT | Performed by: INTERNAL MEDICINE

## 2020-10-23 PROCEDURE — 82565 ASSAY OF CREATININE: CPT | Performed by: INTERNAL MEDICINE

## 2020-10-23 PROCEDURE — 81001 URINALYSIS AUTO W/SCOPE: CPT | Performed by: INTERNAL MEDICINE

## 2020-10-23 PROCEDURE — 84450 TRANSFERASE (AST) (SGOT): CPT | Performed by: INTERNAL MEDICINE

## 2020-10-23 PROCEDURE — 85652 RBC SED RATE AUTOMATED: CPT | Performed by: INTERNAL MEDICINE

## 2020-10-23 PROCEDURE — 82040 ASSAY OF SERUM ALBUMIN: CPT | Performed by: INTERNAL MEDICINE

## 2020-10-23 PROCEDURE — 85025 COMPLETE CBC W/AUTO DIFF WBC: CPT | Performed by: INTERNAL MEDICINE

## 2020-10-23 PROCEDURE — 84156 ASSAY OF PROTEIN URINE: CPT | Performed by: INTERNAL MEDICINE

## 2020-10-23 RX ORDER — TRAMADOL HYDROCHLORIDE 50 MG/1
50 TABLET ORAL EVERY 12 HOURS PRN
Qty: 60 TABLET | Refills: 0 | Status: CANCELLED | OUTPATIENT
Start: 2020-10-23

## 2020-10-23 NOTE — LETTER
October 27, 2020      Taina Singh  [address] [place] [address]        Dear ,    We are writing to inform you of your test results.    Stable labs except high CRP, did you have infection at the time of blood draw?    Resulted Orders   Creatinine random urine   Result Value Ref Range    Creatinine Urine Random 316 mg/dL   DNA double stranded antibodies   Result Value Ref Range    DNA-ds 130 (H) <10 IU/mL      Comment:      Positive   Complement C4   Result Value Ref Range    Complement C4 22 13 - 39 mg/dL   Complement C3   Result Value Ref Range    Complement C3 103 81 - 157 mg/dL   UA with Microscopic reflex to Culture   Result Value Ref Range    Color Urine Yellow     Appearance Urine Clear     Glucose Urine Negative NEG^Negative mg/dL    Bilirubin Urine Small (A) NEG^Negative      Comment:      This is an unconfirmed screening test result. A positive result may be false.    Ketones Urine Trace (A) NEG^Negative mg/dL    Specific Gravity Urine >1.030 1.003 - 1.035    pH Urine 5.5 5.0 - 7.0 pH    Protein Albumin Urine 100 (A) NEG^Negative mg/dL    Urobilinogen Urine 0.2 0.2 - 1.0 EU/dL    Nitrite Urine Negative NEG^Negative    Blood Urine Trace (A) NEG^Negative    Leukocyte Esterase Urine Negative NEG^Negative    Source Midstream Urine     WBC Urine 0 - 5 OTO5^0 - 5 /HPF    RBC Urine O - 2 OTO2^O - 2 /HPF    Squamous Epithelial /LPF Urine Few FEW^Few /LPF    Bacteria Urine Few (A) NEG^Negative /HPF    Amorphous Crystals Few (A) NEG^Negative /HPF    Mucous Urine Present (A) NEG^Negative /LPF   Erythrocyte sedimentation rate auto   Result Value Ref Range    Sed Rate 26 0 - 30 mm/h   CRP inflammation   Result Value Ref Range    CRP Inflammation 19.7 (H) 0.0 - 8.0 mg/L   Creatinine   Result Value Ref Range    Creatinine 0.68 0.52 - 1.04 mg/dL    GFR Estimate >90 >60 mL/min/[1.73_m2]      Comment:      Non  GFR Calc  Starting 12/18/2018, serum creatinine based estimated GFR (eGFR) will be    calculated using the Chronic Kidney Disease Epidemiology Collaboration   (CKD-EPI) equation.      GFR Estimate If Black >90 >60 mL/min/[1.73_m2]      Comment:       GFR Calc  Starting 12/18/2018, serum creatinine based estimated GFR (eGFR) will be   calculated using the Chronic Kidney Disease Epidemiology Collaboration   (CKD-EPI) equation.     Albumin level   Result Value Ref Range    Albumin 3.7 3.4 - 5.0 g/dL   ALT   Result Value Ref Range    ALT 26 0 - 50 U/L   AST   Result Value Ref Range    AST 12 0 - 45 U/L   CBC with platelets differential   Result Value Ref Range    WBC 8.1 4.0 - 11.0 10e9/L    RBC Count 4.61 3.8 - 5.2 10e12/L    Hemoglobin 12.0 11.7 - 15.7 g/dL    Hematocrit 38.8 35.0 - 47.0 %    MCV 84 78 - 100 fl    MCH 26.0 (L) 26.5 - 33.0 pg    MCHC 30.9 (L) 31.5 - 36.5 g/dL      Comment:      Results confirmed by repeat test    RDW 14.7 10.0 - 15.0 %    Platelet Count 263 150 - 450 10e9/L    % Neutrophils 83.4 %    % Lymphocytes 7.6 %    % Monocytes 8.5 %    % Eosinophils 0.1 %    % Basophils 0.4 %    Absolute Neutrophil 6.8 1.6 - 8.3 10e9/L    Absolute Lymphocytes 0.6 (L) 0.8 - 5.3 10e9/L    Absolute Monocytes 0.7 0.0 - 1.3 10e9/L    Absolute Eosinophils 0.0 0.0 - 0.7 10e9/L    Absolute Basophils 0.0 0.0 - 0.2 10e9/L    Diff Method Automated Method    Protein  random urine with Creat Ratio   Result Value Ref Range    Protein Random Urine 1.02 g/L    Protein Total Urine g/gr Creatinine 0.34 (H) 0 - 0.2 g/g Cr   Creatinine urine calculation only   Result Value Ref Range    Creatinine Urine 297 mg/dL       If you have any questions or concerns, please call the clinic at the number listed above.       Sincerely,        Killian Marroquin MD

## 2020-10-26 LAB
C3 SERPL-MCNC: 103 MG/DL (ref 81–157)
C4 SERPL-MCNC: 22 MG/DL (ref 13–39)
DSDNA AB SER-ACNC: 130 IU/ML

## 2020-10-26 NOTE — TELEPHONE ENCOUNTER
Please see encounter from 10/21/2020 for this refill.    Sophie Alvarado RN  Rheumatology Clinic

## 2020-10-29 ENCOUNTER — VIRTUAL VISIT (OUTPATIENT)
Dept: RHEUMATOLOGY | Facility: CLINIC | Age: 55
End: 2020-10-29
Attending: INTERNAL MEDICINE
Payer: COMMERCIAL

## 2020-10-29 ENCOUNTER — TELEPHONE (OUTPATIENT)
Dept: RHEUMATOLOGY | Facility: CLINIC | Age: 55
End: 2020-10-29

## 2020-10-29 DIAGNOSIS — M32.14 LUPUS NEPHRITIS, ISN/RPS CLASS IV (H): ICD-10-CM

## 2020-10-29 DIAGNOSIS — M32.9 SYSTEMIC LUPUS ERYTHEMATOSUS, UNSPECIFIED SLE TYPE, UNSPECIFIED ORGAN INVOLVEMENT STATUS (H): ICD-10-CM

## 2020-10-29 PROCEDURE — 99214 OFFICE O/P EST MOD 30 MIN: CPT | Mod: 95 | Performed by: INTERNAL MEDICINE

## 2020-10-29 RX ORDER — PREDNISONE 5 MG/1
TABLET ORAL
Qty: 90 TABLET | Refills: 3 | Status: SHIPPED | OUTPATIENT
Start: 2020-10-29 | End: 2020-12-28

## 2020-10-29 RX ORDER — METHYLPREDNISOLONE SODIUM SUCCINATE 125 MG/2ML
125 INJECTION, POWDER, LYOPHILIZED, FOR SOLUTION INTRAMUSCULAR; INTRAVENOUS ONCE
Status: CANCELLED
Start: 2020-10-29

## 2020-10-29 RX ORDER — ACETAMINOPHEN 325 MG/1
650 TABLET ORAL ONCE
Status: CANCELLED
Start: 2020-10-29

## 2020-10-29 RX ORDER — PREDNISONE 5 MG/1
TABLET ORAL
Qty: 90 TABLET | Refills: 3 | Status: SHIPPED | OUTPATIENT
Start: 2020-10-29 | End: 2020-10-29

## 2020-10-29 RX ORDER — PREDNISONE 2.5 MG/1
TABLET ORAL
Qty: 100 TABLET | Refills: 3 | Status: SHIPPED | OUTPATIENT
Start: 2020-10-29 | End: 2020-12-28

## 2020-10-29 ASSESSMENT — PAIN SCALES - GENERAL: PAINLEVEL: NO PAIN (0)

## 2020-10-29 NOTE — PROGRESS NOTES
Rheumatology Follow-up Virtual Visit Note    Reason for visit: f/u for lupus (this is a video visit due to COVID-19 outbreak), patient agreed      Date of last visit: 6/4/2020     DOS: 10/29/2020      History of Present Illness:    Taina Singh is a 55 year old  female who was seen for follow up of SLE and lupus nephritis.    She was diagnosed with lupus at age 27. Her 1st sx were malar rash and fatigue. No skin biopsy was done (reportedly had +KARLIE). She was treated with prednisone taper and HCQ. HCQ helped and she tolerated it well. She was diagnosed with Sjogren's at the same time. Had dryness of eyes and mouth. No lip biopsy to confirm the Dx. Reportedly she had very mild stable lupus for many years and eventually at some point, stopped taking HCQ (can't remember when). Her lupus started to flare in 7/2017 initially with pleuritic CP, was put back on HCQ. 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 Since then failed AZA, cytoxan and benlysta. MMF was not enough to control her LN and eventually rituximab was added (which was initially denied by insurance even with h/o lymphoma) given necrosis on the renal biopsy (rituximab is FDA approved for ANCA vasculitis). On HCQ+MMF after adding rituximab, LN and SLE started too improve.    She was diagnosed with MALT lymphoma at 44, had enlarged LN over R parotid gland, on biopsy it was MALT lymphoma. Tx was resection only, no radiation or chemo.      Today 10/29/2020:     Taina is feeling better, thinks her lupus is getting better. Her hair is growing back. Her face is red but no rashes.    No oral ulcers, SOB, CP or cough.     Tomorrow, will have her tooth pulled. Recently, has been dealing with an infected tooth, broke crown.    Had rituxiamb 1/22/2020, 2/6/2020, then 6/26/2020, 7/20/2020.    On mepron for PJP prophylaxis. On  mg bid, MMF 1500 mg bid and prednisone 17.5 mg qd.        ROS:  A comprehensive ROS was done,  positives are per HPI.    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44    No chemo or radiation     Other forms of systemic lupus erythematosus (H) 2018     Pulmonary embolism (H)     2019 seen on abd [facility]     Sjogren's syndrome (H)     secondary Sjogrens, secondary to lupus     Vitamin D deficiency      Past Surgical History:   Procedure Laterality Date     BIOPSY/REMOVAL, LYMPH NODE(S)        SECTION  age 32     HC HYSTEROS W PERMANENT FALLOPAIN IMPLANT      Essure b/l     PAROTIDECTOMY Right 2006     TONSILLECTOMY       Family History   Problem Relation Age of Onset     Alopecia Brother      Rheumatoid Arthritis Brother      LUNG DISEASE No family hx of      Social History     Socioeconomic History     Marital status:      Spouse name: Not on file     Number of children: Not on file     Years of education: 1     Highest education level: Not on file   Occupational History     Comment: , 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   Social Needs     Financial resource strain: Not on file     Food insecurity     Worry: Not on file     Inability: Not on file     Transportation needs     Medical: Not on file     Non-medical: Not on file   Tobacco Use     Smoking status: Never Smoker     Smokeless tobacco: Never Used   Substance and Sexual Activity     Alcohol use: No     Drug use: No     Sexual activity: Not on file   Lifestyle     Physical activity     Days per week: Not on file     Minutes per session: Not on file     Stress: Not on file    Relationships     Social connections     Talks on phone: Not on file     Gets together: Not on file     Attends Latter-day service: Not on file     Active member of club or organization: Not on file     Attends meetings of clubs or organizations: Not on file     Relationship status: Not on file     Intimate partner violence     Fear of current or ex partner: Not on file     Emotionally abused: Not on file     Physically abused: Not on file     Forced sexual activity: Not on file   Other Topics Concern     Parent/sibling w/ CABG, MI or angioplasty before 65F 55M? 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      Going through divorce but it's not stress factor for her.    Allergies   Allergen Reactions     Pentamidine Shortness Of Breath     Penicillins Rash     Sulfa Drugs Rash       Outpatient Encounter Medications as of 10/29/2020   Medication Sig Dispense Refill     albuterol (PROAIR HFA/PROVENTIL HFA/VENTOLIN HFA) 108 (90 Base) MCG/ACT inhaler Inhale 2 puffs into the lungs every 6 hours as needed for shortness of breath / dyspnea or wheezing 3 Inhaler 3     atovaquone (MEPRON) 750 MG/5ML suspension Take 10 mLs (1,500 mg) by mouth daily 900 mL 3     Calcium-Magnesium 300-300 MG TABS daily        fluticasone (FLONASE) 50 MCG/ACT nasal spray Spray 1-2 sprays into both nostrils daily Use 1 spray per nostril per day for 2 weeks.  May increase to 2 sprays per nostril per day after. 16 g 0     furosemide (LASIX) 20 MG tablet Take 1 tablet (20 mg) by mouth daily 90 tablet 3     hydroxychloroquine (PLAQUENIL) 200 MG tablet Take 1 tablet (200 mg) by mouth 2 times daily 60 tablet 0     hydroxychloroquine (PLAQUENIL) 200 MG tablet Take 1  tablet (200 mg) by mouth 2 times daily Annual Plaquenil toxicity eye screening required. 180 tablet 3     losartan (COZAAR) 25 MG tablet Take 0.5 tablets (12.5 mg) by mouth daily 90 tablet 3     Multiple Vitamins-Minerals (WOMENS MULTI PO) Take by mouth daily        mycophenolate (GENERIC EQUIVALENT) 500 MG tablet Take 3 tablets (1,500 mg) by mouth 2 times daily 540 tablet 3     Omega-3 Fatty Acids (OMEGA-3 FISH OIL) 500 MG CAPS Take 500 mg by mouth daily        pantoprazole (PROTONIX) 20 MG EC tablet Take 2 tablets (40 mg) by mouth 2 times daily       pilocarpine (SALAGEN) 5 MG tablet Take 1 tablet (5 mg) by mouth 4 times daily as needed (Patient not taking: Reported on 6/26/2020) 360 tablet 3     predniSONE (DELTASONE) 10 MG tablet Take 2 tablets (20 mg) by mouth daily 180 tablet 3     predniSONE (DELTASONE) 2.5 MG tablet 25-20-17.5-15-12.5 mg a day each for one week then 10 mg a day, (take along with 10 and 5 mg size tabs) (Patient not taking: Reported on 6/26/2020) 100 tablet 3     predniSONE (DELTASONE) 20 MG tablet Take 1 tablet (20 mg) by mouth daily Use with 5 mg tablets to make 25 mg a day (Patient not taking: Reported on 9/10/2020) 30 tablet 0     predniSONE (DELTASONE) 5 MG tablet Taper Prednisone as discussed, reduce total dose by 2.5mg weekly until you reach 10mg Daily. 90 tablet 3     predniSONE (DELTASONE) 5 MG tablet Take 1 tablet (5 mg) by mouth daily Use with 20 mg tabs to make total 25 mg a day (Patient not taking: Reported on 9/10/2020) 30 tablet 0     traMADol (ULTRAM) 50 MG tablet Take 1 tablet (50 mg) by mouth every 12 hours as needed for pain Prn pain 60 tablet 3     vitamin D3 (CHOLECALCIFEROL) 50 mcg (2000 units) tablet Take 1 tablet (50 mcg) by mouth daily 90 tablet 3     warfarin ANTICOAGULANT (COUMADIN) 5 MG tablet   1     zolpidem (AMBIEN) 5 MG tablet Take 1 tablet (5 mg) by mouth nightly as needed for sleep 30 tablet 0     No facility-administered encounter medications on file as of  10/29/2020.        Results:    RHEUM RESULTS Latest Ref Rng & Units 9/3/2020 9/22/2020 10/23/2020   COMPLEMENT C3 81 - 157 mg/dL 96 96 103   COMPLEMENT C4 13 - 39 mg/dL 17 18 22   DNA-DS <10 IU/mL 181(H) 160(H) 130(H)   SED RATE 0 - 30 mm/h 25 23 26   CRP, INFLAMMATION 0.0 - 8.0 mg/L 13.6(H) 7.9 19.7(H)   AST 0 - 45 U/L 13 14 12   ALT 0 - 50 U/L 29 29 26   ALBUMIN 3.4 - 5.0 g/dL 3.5 3.5 3.7   WBC 4.0 - 11.0 10e9/L 8.8 9.7 8.1   RBC 3.8 - 5.2 10e12/L 4.58 4.46 4.61   HGB 11.7 - 15.7 g/dL 12.2 11.6(L) 12.0   HCT 35.0 - 47.0 % 39.4 37.9 38.8   MCV 78 - 100 fl 86 85 84   MCHC 31.5 - 36.5 g/dL 31.0(L) 30.6(L) 30.9(L)   RDW 10.0 - 15.0 % 15.6(H) 15.9(H) 14.7    - 450 10e9/L 244 267 263   CREATININE 0.52 - 1.04 mg/dL 0.65 0.68 0.68   GFR ESTIMATE, IF BLACK >60 mL/min/[1.73:m2] >90 >90 >90   GFR ESTIMATE >60 mL/min/[1.73:m2] >90 >90 >90    - 1,616 mg/dL 370(L) - -   IGA 84 - 499 mg/dL 217 - -   IGM 35 - 242 mg/dL 20(L) - -   HEPATITIS C ANTIBODY NR:Nonreactive - - -     Physical Exam:    Constitutional: Pleasant, NAD  Eyes: EOMI, sclera anicteric, conj not injected.  MS: No synovitis.   Skin: No skin rash  Neuro: CN grossly intact without focal deficit  Psych: nl affect    Assessment/Plan:    SLE. Taina is a 56 yo WF with +FH RA and personal hx of SLE presented in 12/2017 for 2nd opinion of m/o her SLE. She was diagnosed with SLE at age 27. Her lupus has been marked by +KARLIE (highest titer 1:640, speckled and nucleolar patterns), +anti-DNA, low C3/C4, arthritis, malar rash, serositis (pleuritic CP), lupus nephritis, hemolytic anemia, enteritis supported by +SSA/SSB Ab, +RF, high ESR/CRP. She had neg anti-RNP, anti-Sm, acL IgM/G/A, LAC, cryo and anti-CCP.     She was treated with prednisone and HCQ at the time of Dx. Can't remember how long she was on HCQ and why HCQ was stopped, but it probably was stopped because of stable disease, no report on HCQ toxicity. Reportedly her SLE was mild all these years.    Has  secondary Sjogren's with sicca, +SSA/SSB Ab and h/o MALT lymphoma at age 44 in remission. Uses blink eyedrops and salagen. No lip biopsy was done to confirm Dx of Sjogren's.     Her lupus flared in 7/2017 with no triggers when she presented with arthritis, HCQ was resumed, arthritis resolved. Mild pulm HTN on 2D-Echo 8/2017 but neg R cardiac cath and VQ scan in 3/2018, also neg 2D-Echo.    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 Patient received 1st dose cyclophosphamide on 6/15/19, had 6 doses. Initiated Rituxin 1/22/2020 as failed cytoxan. Previously failed AZA, benlysta.    S/p rituximab on 1/22/2020 and 2/6/2020, 6/26/2020, 7/20/2020 on prednisone 17.5 mg every day , MMF 3 gr every day and  mg a day. Recommend repeat rituximab as it is the only med that has helped with LN. Also tx options are limited (also necrotizing lesions on renal biopsy, can not rule out ANCA neg vasculitis overlap and rituximab is FDA approved for GPA/MPA). She is doing better, labs started to show improvement with ur pr/cr=0.3, improving anti-DNA, NL C3/C4 as of 10/2020.       Today's plan:    - Continue Cellcept 1500 mg BID and  mg BID  - Continue prednisone taper 2.5 mg down qwk till she reaches 10 mg every day and stay on that dose  - Annual eye exam for HCQ monitoring  -Rituximab 1 gram q2 weeks x 2 in Dec 2020  -Labs with next rituximab infusion in Dec then [id number]  - PCP prophylaxis on atovaquone 10 mL (1500 mg). Patient did not tolerate inhaled pentamidine. Avoiding TMP-SMX given sulfa reaction and potential increase risk of SLE flare. Hesitant to use dapsone given risk of hemolytic anemia.  -Tramadol prn for pain  -Ambien prn for insomnia    - Follow-up in 3 months        Orders Placed This Encounter   Procedures     AST     ALT     Albumin level     CBC with platelets differential     Creatinine     CRP inflammation     UA with Microscopic reflex to Culture     Protein  random urine with Creat Ratio     Creatinine  random urine     Erythrocyte sedimentation rate auto     CD19 B Cell Count     Immunoglobulins A G and M     Complement C4     Complement C3     DNA double stranded antibodies       Video Start Time: 11:37 am  Video End Time: 12:12 pm    Killian Marroquin MD

## 2020-10-29 NOTE — PROGRESS NOTES
"Taina Singh is a 55 year old female who is being evaluated via a billable video visit.      The patient has been notified of following:     \"This video visit will be conducted via a call between you and your physician/provider. We have found that certain health care needs can be provided without the need for an in-person physical exam.  This service lets us provide the care you need with a video conversation.  If a prescription is necessary we can send it directly to your pharmacy.  If lab work is needed we can place an order for that and you can then stop by our lab to have the test done at a later time.    Video visits are billed at different rates depending on your insurance coverage.  Please reach out to your insurance provider with any questions.    If during the course of the call the physician/provider feels a video visit is not appropriate, you will not be charged for this service.\"    Patient has given verbal consent for Video visit? Yes  How would you like to obtain your AVS? MyChart  If you are dropped from the video visit, the video invite should be resent to: Text to cell phone: [phone]  Will anyone else be joining your video visit? No        Video-Visit Details    Type of service:  Video Visit    Video Start Time: 11:37 am  Video End Time: 12:12 pm    Originating Location (pt. Location): Home    Distant Location (provider location):  [facility]UM[facility] [place]     Platform used for Video Visit: Pradeep Marroquin MD        "

## 2020-10-29 NOTE — LETTER
10/29/2020       RE: Taina Singh  [address] [place] [address]     Dear Colleague,    Thank you for referring your patient, Taina Singh, to the [facility] RHEUMATOLOG[facility] [place] at [facility]. Please see a copy of my visit note below.    Rheumatology Follow-up Virtual Visit Note    Reason for visit: f/u for lupus (this is a video visit due to COVID-19 outbreak), patient agreed      Date of last visit: 6/4/2020     DOS: 10/29/2020      History of Present Illness:    Taina Singh is a 55 year old  female who was seen for follow up of SLE and lupus nephritis.    She was diagnosed with lupus at age 27. Her 1st sx were malar rash and fatigue. No skin biopsy was done (reportedly had +KARLIE). She was treated with prednisone taper and HCQ. HCQ helped and she tolerated it well. She was diagnosed with Sjogren's at the same time. Had dryness of eyes and mouth. No lip biopsy to confirm the Dx. Reportedly she had very mild stable lupus for many years and eventually at some point, stopped taking HCQ (can't remember when). Her lupus started to flare in 7/2017 initially with pleuritic CP, was put back on HCQ. 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 Since then failed AZA, cytoxan and benlysta. MMF was not enough to control her LN and eventually rituximab was added (which was initially denied by insurance even with h/o lymphoma) given necrosis on the renal biopsy (rituximab is FDA approved for ANCA vasculitis). On HCQ+MMF after adding rituximab, LN and SLE started too improve.    She was diagnosed with MALT lymphoma at 44, had enlarged LN over R parotid gland, on biopsy it was MALT lymphoma. Tx was resection only, no radiation or chemo.      Today 10/29/2020:     Taina is feeling better, thinks her lupus is getting better. Her hair is growing back. Her face is red but no rashes.    No oral ulcers, SOB, CP or cough.     Tomorrow,  will have her tooth pulled. Recently, has been dealing with an infected tooth, broke crown.    Had rituxiamb 2020, 2020, then 2020, 2020.    On mepron for PJP prophylaxis. On  mg bid, MMF 1500 mg bid and prednisone 17.5 mg qd.        ROS:  A comprehensive ROS was done, positives are per HPI.    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44    No chemo or radiation     Other forms of systemic lupus erythematosus (H) 2018     Pulmonary embolism (H)     2019 seen on abd CT at [facility]     Sjogren's syndrome (H)     secondary Sjogrens, secondary to lupus     Vitamin D deficiency      Past Surgical History:   Procedure Laterality Date     BIOPSY/REMOVAL, LYMPH NODE(S)        SECTION  age 32     HC HYSTEROS W PERMANENT FALLOPAIN IMPLANT      Essure b/l     PAROTIDECTOMY Right 2006     TONSILLECTOMY       Family History   Problem Relation Age of Onset     Alopecia Brother      Rheumatoid Arthritis Brother      LUNG DISEASE No family hx of      Social History     Socioeconomic History     Marital status:      Spouse name: Not on file     Number of children: Not on file     Years of education: 1     Highest education level: Not on file   Occupational History     Comment: , 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   Social Needs     Financial resource strain: Not on file     Food insecurity     Worry: Not on file     Inability: Not on file     Transportation needs     Medical: Not on file     Non-medical: Not on file   Tobacco Use     Smoking  status: Never Smoker     Smokeless tobacco: Never Used   Substance and Sexual Activity     Alcohol use: No     Drug use: No     Sexual activity: Not on file   Lifestyle     Physical activity     Days per week: Not on file     Minutes per session: Not on file     Stress: Not on file   Relationships     Social connections     Talks on phone: Not on file     Gets together: Not on file     Attends Quaker service: Not on file     Active member of club or organization: Not on file     Attends meetings of clubs or organizations: Not on file     Relationship status: Not on file     Intimate partner violence     Fear of current or ex partner: Not on file     Emotionally abused: Not on file     Physically abused: Not on file     Forced sexual activity: Not on file   Other Topics Concern     Parent/sibling w/ CABG, MI or angioplasty before 65F 55M? 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      Going through divorce but it's not stress factor for her.    Allergies   Allergen Reactions     Pentamidine Shortness Of Breath     Penicillins Rash     Sulfa Drugs Rash       Outpatient Encounter Medications as of 10/29/2020   Medication Sig Dispense Refill     albuterol (PROAIR HFA/PROVENTIL HFA/VENTOLIN HFA) 108 (90 Base) MCG/ACT inhaler Inhale 2 puffs into the lungs every 6 hours as needed for shortness of breath / dyspnea or wheezing 3 Inhaler 3     atovaquone (MEPRON) 750 MG/5ML suspension Take 10 mLs (1,500 mg) by mouth daily 900 mL 3     Calcium-Magnesium 300-300 MG TABS daily        fluticasone (FLONASE) 50 MCG/ACT nasal spray Spray 1-2 sprays into both nostrils daily Use 1 spray per nostril per day for 2 weeks.  May increase to 2 sprays  per nostril per day after. 16 g 0     furosemide (LASIX) 20 MG tablet Take 1 tablet (20 mg) by mouth daily 90 tablet 3     hydroxychloroquine (PLAQUENIL) 200 MG tablet Take 1 tablet (200 mg) by mouth 2 times daily 60 tablet 0     hydroxychloroquine (PLAQUENIL) 200 MG tablet Take 1 tablet (200 mg) by mouth 2 times daily Annual Plaquenil toxicity eye screening required. 180 tablet 3     losartan (COZAAR) 25 MG tablet Take 0.5 tablets (12.5 mg) by mouth daily 90 tablet 3     Multiple Vitamins-Minerals (WOMENS MULTI PO) Take by mouth daily        mycophenolate (GENERIC EQUIVALENT) 500 MG tablet Take 3 tablets (1,500 mg) by mouth 2 times daily 540 tablet 3     Omega-3 Fatty Acids (OMEGA-3 FISH OIL) 500 MG CAPS Take 500 mg by mouth daily        pantoprazole (PROTONIX) 20 MG EC tablet Take 2 tablets (40 mg) by mouth 2 times daily       pilocarpine (SALAGEN) 5 MG tablet Take 1 tablet (5 mg) by mouth 4 times daily as needed (Patient not taking: Reported on 6/26/2020) 360 tablet 3     predniSONE (DELTASONE) 10 MG tablet Take 2 tablets (20 mg) by mouth daily 180 tablet 3     predniSONE (DELTASONE) 2.5 MG tablet 25-20-17.5-15-12.5 mg a day each for one week then 10 mg a day, (take along with 10 and 5 mg size tabs) (Patient not taking: Reported on 6/26/2020) 100 tablet 3     predniSONE (DELTASONE) 20 MG tablet Take 1 tablet (20 mg) by mouth daily Use with 5 mg tablets to make 25 mg a day (Patient not taking: Reported on 9/10/2020) 30 tablet 0     predniSONE (DELTASONE) 5 MG tablet Taper Prednisone as discussed, reduce total dose by 2.5mg weekly until you reach 10mg Daily. 90 tablet 3     predniSONE (DELTASONE) 5 MG tablet Take 1 tablet (5 mg) by mouth daily Use with 20 mg tabs to make total 25 mg a day (Patient not taking: Reported on 9/10/2020) 30 tablet 0     traMADol (ULTRAM) 50 MG tablet Take 1 tablet (50 mg) by mouth every 12 hours as needed for pain Prn pain 60 tablet 3     vitamin D3 (CHOLECALCIFEROL) 50 mcg (2000 units)  tablet Take 1 tablet (50 mcg) by mouth daily 90 tablet 3     warfarin ANTICOAGULANT (COUMADIN) 5 MG tablet   1     zolpidem (AMBIEN) 5 MG tablet Take 1 tablet (5 mg) by mouth nightly as needed for sleep 30 tablet 0     No facility-administered encounter medications on file as of 10/29/2020.        Results:    RHEUM RESULTS Latest Ref Rng & Units 9/3/2020 9/22/2020 10/23/2020   COMPLEMENT C3 81 - 157 mg/dL 96 96 103   COMPLEMENT C4 13 - 39 mg/dL 17 18 22   DNA-DS <10 IU/mL 181(H) 160(H) 130(H)   SED RATE 0 - 30 mm/h 25 23 26   CRP, INFLAMMATION 0.0 - 8.0 mg/L 13.6(H) 7.9 19.7(H)   AST 0 - 45 U/L 13 14 12   ALT 0 - 50 U/L 29 29 26   ALBUMIN 3.4 - 5.0 g/dL 3.5 3.5 3.7   WBC 4.0 - 11.0 10e9/L 8.8 9.7 8.1   RBC 3.8 - 5.2 10e12/L 4.58 4.46 4.61   HGB 11.7 - 15.7 g/dL 12.2 11.6(L) 12.0   HCT 35.0 - 47.0 % 39.4 37.9 38.8   MCV 78 - 100 fl 86 85 84   MCHC 31.5 - 36.5 g/dL 31.0(L) 30.6(L) 30.9(L)   RDW 10.0 - 15.0 % 15.6(H) 15.9(H) 14.7    - 450 10e9/L 244 267 263   CREATININE 0.52 - 1.04 mg/dL 0.65 0.68 0.68   GFR ESTIMATE, IF BLACK >60 mL/min/[1.73:m2] >90 >90 >90   GFR ESTIMATE >60 mL/min/[1.73:m2] >90 >90 >90    - 1,616 mg/dL 370(L) - -   IGA 84 - 499 mg/dL 217 - -   IGM 35 - 242 mg/dL 20(L) - -   HEPATITIS C ANTIBODY NR:Nonreactive - - -     Physical Exam:    Constitutional: Pleasant, NAD  Eyes: EOMI, sclera anicteric, conj not injected.  MS: No synovitis.   Skin: No skin rash  Neuro: CN grossly intact without focal deficit  Psych: nl affect    Assessment/Plan:    SLE. Taina is a 56 yo WF with +FH RA and personal hx of SLE presented in 12/2017 for 2nd opinion of m/o her SLE. She was diagnosed with SLE at age 27. Her lupus has been marked by +KARLIE (highest titer 1:640, speckled and nucleolar patterns), +anti-DNA, low C3/C4, arthritis, malar rash, serositis (pleuritic CP), lupus nephritis, hemolytic anemia, enteritis supported by +SSA/SSB Ab, +RF, high ESR/CRP. She had neg anti-RNP, anti-Sm, acL IgM/G/A, LAC,  cryo and anti-CCP.     She was treated with prednisone and HCQ at the time of Dx. Can't remember how long she was on HCQ and why HCQ was stopped, but it probably was stopped because of stable disease, no report on HCQ toxicity. Reportedly her SLE was mild all these years.    Has secondary Sjogren's with sicca, +SSA/SSB Ab and h/o MALT lymphoma at age 44 in remission. Uses blink eyedrops and salagen. No lip biopsy was done to confirm Dx of Sjogren's.     Her lupus flared in 7/2017 with no triggers when she presented with arthritis, HCQ was resumed, arthritis resolved. Mild pulm HTN on 2D-Echo 8/2017 but neg R cardiac cath and VQ scan in 3/2018, also neg 2D-Echo.    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 Patient received 1st dose cyclophosphamide on 6/15/19, had 6 doses. Initiated Rituxin 1/22/2020 as failed cytoxan. Previously failed AZA, benlysta.    S/p rituximab on 1/22/2020 and 2/6/2020, 6/26/2020, 7/20/2020 on prednisone 17.5 mg every day , MMF 3 gr every day and  mg a day. Recommend repeat rituximab as it is the only med that has helped with LN. Also tx options are limited (also necrotizing lesions on renal biopsy, can not rule out ANCA neg vasculitis overlap and rituximab is FDA approved for GPA/MPA). She is doing better, labs started to show improvement with ur pr/cr=0.3, improving anti-DNA, NL C3/C4 as of 10/2020.       Today's plan:    - Continue Cellcept 1500 mg BID and  mg BID  - Continue prednisone taper 2.5 mg down qwk till she reaches 10 mg every day and stay on that dose  - Annual eye exam for HCQ monitoring  -Rituximab 1 gram q2 weeks x 2 in Dec 2020  -Labs with next rituximab infusion in Dec then [id number]  - PCP prophylaxis on atovaquone 10 mL (1500 mg). Patient did not tolerate inhaled pentamidine. Avoiding TMP-SMX given sulfa reaction and potential increase risk of SLE flare. Hesitant to use dapsone given risk of hemolytic anemia.  -Tramadol prn for pain  -Ambien prn for  "insomnia    - Follow-up in 3 months        Orders Placed This Encounter   Procedures     AST     ALT     Albumin level     CBC with platelets differential     Creatinine     CRP inflammation     UA with Microscopic reflex to Culture     Protein  random urine with Creat Ratio     Creatinine random urine     Erythrocyte sedimentation rate auto     CD19 B Cell Count     Immunoglobulins A G and M     Complement C4     Complement C3     DNA double stranded antibodies       Video Start Time: 11:37 am  Video End Time: 12:12 pm    Killian Marroquin MD      Taina Signh is a 55 year old female who is being evaluated via a billable video visit.      The patient has been notified of following:     \"This video visit will be conducted via a call between you and your physician/provider. We have found that certain health care needs can be provided without the need for an in-person physical exam.  This service lets us provide the care you need with a video conversation.  If a prescription is necessary we can send it directly to your pharmacy.  If lab work is needed we can place an order for that and you can then stop by our lab to have the test done at a later time.    Video visits are billed at different rates depending on your insurance coverage.  Please reach out to your insurance provider with any questions.    If during the course of the call the physician/provider feels a video visit is not appropriate, you will not be charged for this service.\"    Patient has given verbal consent for Video visit? Yes  How would you like to obtain your AVS? MyChart  If you are dropped from the video visit, the video invite should be resent to: Text to cell phone: [phone]  Will anyone else be joining your video visit? No        Video-Visit Details    Type of service:  Video Visit    Video Start Time: 11:37 am  Video End Time: 12:12 pm    Originating Location (pt. Location): Home    Distant Location (provider location):  [facility] " RHEUMATOLOGY CLINIC [place]     Platform used for Video Visit: Pradeep Marroquin MD

## 2020-10-29 NOTE — TELEPHONE ENCOUNTER
----- Message from Killian Marroquin MD sent at 10/29/2020 12:09 PM CDT -----  Regarding: could u help me placing rituximab orders?Thanks  Rituximab 1 gram q2 weeks x 2 in Dec    Labs with next rituximab infusion in Dec    Return in 3 months

## 2020-10-29 NOTE — PATIENT INSTRUCTIONS
Rituximab 1 gram q2 weeks x 2 in Dec    Labs with next rituximab infusion in Dec    Return in 3 months

## 2020-10-30 NOTE — TELEPHONE ENCOUNTER
Called and spoke with infusion center, have scheduled her for infusions at [place], pt will get labs with first infusion.      Left message requesting return call.    Sophie Alvarado RN  Rheumatology Clinic

## 2020-11-02 NOTE — TELEPHONE ENCOUNTER
Called pt back, provided dates and times of infusion.      Pt has decreased her prednisone to 15 mg a day, feels much better after having that tooth extracted on Friday.      Set up for follow up with Dr. Marroquin in February.    Sophie Alvarado RN  Rheumatology Clinic

## 2020-11-23 DIAGNOSIS — G47.00 INSOMNIA, UNSPECIFIED TYPE: ICD-10-CM

## 2020-11-23 NOTE — TELEPHONE ENCOUNTER
Medication: zolpidem 5 mg    Last Office Visit Date: 10/29/2020  Future Office Visit Date: 2/4/2020  Last Prescription Fill Date: 9/25/2020  Last Fill Quantity: #30     reviewed as follows:       Request sent to provider for review and signature.    Jayleen Gan CMA   11/23/2020 3:47 PM

## 2020-11-24 RX ORDER — ZOLPIDEM TARTRATE 5 MG/1
5 TABLET ORAL
Qty: 30 TABLET | Refills: 0 | Status: SHIPPED | OUTPATIENT
Start: 2020-11-24 | End: 2021-01-21

## 2020-12-10 ENCOUNTER — VIRTUAL VISIT (OUTPATIENT)
Dept: NEPHROLOGY | Facility: CLINIC | Age: 55
End: 2020-12-10
Attending: INTERNAL MEDICINE
Payer: COMMERCIAL

## 2020-12-10 DIAGNOSIS — M32.14 SYSTEMIC LUPUS ERYTHEMATOSUS WITH GLOMERULAR DISEASE, UNSPECIFIED SLE TYPE (H): ICD-10-CM

## 2020-12-10 DIAGNOSIS — I10 ESSENTIAL HYPERTENSION: ICD-10-CM

## 2020-12-10 DIAGNOSIS — D84.9 IMMUNOSUPPRESSION (H): ICD-10-CM

## 2020-12-10 DIAGNOSIS — M32.14 LUPUS NEPHRITIS, ISN/RPS CLASS IV (H): Primary | ICD-10-CM

## 2020-12-10 PROCEDURE — 99213 OFFICE O/P EST LOW 20 MIN: CPT | Mod: 95 | Performed by: INTERNAL MEDICINE

## 2020-12-10 NOTE — LETTER
"12/10/2020       RE: Taina Singh  [address]  [place] [address]     Dear Colleague,    Thank you for referring your patient, Taina Singh, to the [facility] NEPHROLOG[facility] [place] at [facility]. Please see a copy of my visit note below.      Nephrology Follow Up                   12/10/2020      Taina Singh   MRN:[id number]   YOB: 1965    REASON FOR FOLLOW UP: Lupus Nephritis      HISTORY OF PRESENT ILLNESS:  Patient is a 55-year-old female. She was originally diagnosed with systemic lupus erythematosus at age 27 and was placed on hydroxychloroquine with good control of her disease, but had significant worsening of disease over the past 3 years with Lupus Nephritis 6/2019. For full review of her SLE course, please see HPI from Dr. Sherman's note from 9/10/2020.       At this time, she reports she is feeling well since her last visit on 9/21/2020. She reports feeling \"very good.\" She states she is eating well, no problems with nausea and vomiting. No fevers or rashes or joint swellings at this time.   She has had no hematuria, dysuria, or foamy urine. To stay active, she continues to walk daily outside, without chest pain or SOB. If the weather is bad, she will do weights and resistance bands at home. No chest pain or SOB with this activity. No Leg swelling at this time.    She is working on taper her Prednisone currently. She went to down to 10 mg of Pred daily (roughly ~1 week ago) - she noted a facial rash and arthralgias and fatigue - she noted that after increasing 12.5mg daily again, she was feeling much better with resolution of rash and pain.    She is currently on 12.5mg Prednisone and Cellcept 1500mg BID, HCQ 200mg BID and Atovaquone for PJP. She is due for Rituximab in a couple weeks. She is checking her BP at home 1x per week, reports average 130/70-80s.    PAST MEDICAL HISTORY:  Reviewed with patient on 12/10/2020   As per " HPI    MEDICATIONS:  Reviewed with the patient in detail    ALLERGIES:    Reviewed with the patient in detail    REVIEW OF SYSTEMS:  A comprehensive of systems was negative except as noted above.    SOCIAL HISTORY:   Reviewed with patient, no smoking and no alcohol use     FAMILY MEDICAL HISTORY:   Reviewed, no family history of need for dialysis, transplant or CKD    PHYSICAL EXAM:   Vital signs:There were no vitals taken for this visit.    Physical Exam  Constitutional:       Appearance: Normal appearance.   Neurological:      Mental Status: She is alert and oriented to person, place, and time.       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 We do not have new labs at this time. She is getting labs next week, which we will follow up. Pending these findings, will make further recommendations.     She follows closely with Dr. Marroquin, with plan for Pred Taper by 2.5mg as tolerated, but as per HPI, she did not feel well at 10mg. We did discuss that ultimately, getting to a lower dose of Pred would be of benefit for her long term.  No changes to Cellcept or HCQ from our standpoint. If her labs appear stable, could consider lower dose Rituxan as a maintenance type as per Jed with 500mg or 1gm q6 months as opposed to 2gm q 6months. Will discuss with Rheum.    Her home BPs are reasonable, but will follow up what her BPs look like at infusion to ensure no major discrepancy. If she does have elevated BP, will consider Spironolactone (if significant proteinuria remains).     We will see her again in 4 months.    Pt staffed with Dr. Paredes.    ANGEL Rouse MD  Nephrology  [phone]      -----  Taina Singh is a 55 year old female who is being evaluated via a billable  "video visit.      The patient has been notified of following:     \"This video visit will be conducted via a call between you and your physician/provider. We have found that certain health care needs can be provided without the need for an in-person physical exam.  This service lets us provide the care you need with a video conversation.  If a prescription is necessary we can send it directly to your pharmacy.  If lab work is needed we can place an order for that and you can then stop by our lab to have the test done at a later time.    Video visits are billed at different rates depending on your insurance coverage.  Please reach out to your insurance provider with any questions.    If during the course of the call the physician/provider feels a video visit is not appropriate, you will not be charged for this service.\"    Patient has given verbal consent for Video visit? Yes  How would you like to obtain your AVS? MyChart    Will anyone else be joining your video visit? No        Video-Visit Details    Type of service:  Video Visit    Video Start Time: 1:30 PM  Video End Time: 2:01 PM    Originating Location (pt. Location): Home    Distant Location (provider location):  [facility] NEPHROLOGY [facility]     Platform used for Video Visit: Pradeep Rouse MD          Attestation signed by Luis Eduardo Paredes MD at 12/12/2020  5:52 PM:  Attestation:  This patient was evaluated by me, Luis Eduardo Paredes MD on December 9, 2020 jointly with Dr Rouse.  Discussed with Dr Rouse and agree with the findings and plan in this note.  I have reviewed  Medications  There are - to my knowledge - no study of maintenance therapy with rituximab in SLE/lupus nephritis, and therefore no evidence based recommendations as to the optimal dose.  If one were to extrapolate from the ANCA vasculitis literature, a maintenance dose of 0.5 - 1 g q 6 months should suffice.       Luis Eduardo Paredes MD  [facility] [place]  Department of Medicine  Division of Renal Disease and Hypertension  Pager

## 2020-12-10 NOTE — PROGRESS NOTES
"  Nephrology Follow Up                   12/10/2020      Taina Singh   MRN:[id number]   YOB: 1965    REASON FOR FOLLOW UP: Lupus Nephritis      HISTORY OF PRESENT ILLNESS:  Patient is a 55-year-old female. She was originally diagnosed with systemic lupus erythematosus at age 27 and was placed on hydroxychloroquine with good control of her disease, but had significant worsening of disease over the past 3 years with Lupus Nephritis 6/2019. For full review of her SLE course, please see HPI from Dr. Sherman's note from 9/10/2020.       At this time, she reports she is feeling well since her last visit on 9/21/2020. She reports feeling \"very good.\" She states she is eating well, no problems with nausea and vomiting. No fevers or rashes or joint swellings at this time.   She has had no hematuria, dysuria, or foamy urine. To stay active, she continues to walk daily outside, without chest pain or SOB. If the weather is bad, she will do weights and resistance bands at home. No chest pain or SOB with this activity. No Leg swelling at this time.    She is working on taper her Prednisone currently. She went to down to 10 mg of Pred daily (roughly ~1 week ago) - she noted a facial rash and arthralgias and fatigue - she noted that after increasing 12.5mg daily again, she was feeling much better with resolution of rash and pain.    She is currently on 12.5mg Prednisone and Cellcept 1500mg BID, HCQ 200mg BID and Atovaquone for PJP. She is due for Rituximab in a couple weeks. She is checking her BP at home 1x per week, reports average 130/70-80s.    PAST MEDICAL HISTORY:  Reviewed with patient on 12/10/2020   As per HPI    MEDICATIONS:  Reviewed with the patient in detail    ALLERGIES:    Reviewed with the patient in detail    REVIEW OF SYSTEMS:  A comprehensive of systems was negative except as noted above.    SOCIAL HISTORY:   Reviewed with patient, no smoking and no alcohol use     FAMILY MEDICAL HISTORY: " "  Reviewed, no family history of need for dialysis, transplant or CKD    PHYSICAL EXAM:   Vital signs:There were no vitals taken for this visit.    Physical Exam  Constitutional:       Appearance: Normal appearance.   Neurological:      Mental Status: She is alert and oriented to person, place, and time.       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 We do not have new labs at this time. She is getting labs next week, which we will follow up. Pending these findings, will make further recommendations.     She follows closely with Dr. Marroquin, with plan for Pred Taper by 2.5mg as tolerated, but as per HPI, she did not feel well at 10mg. We did discuss that ultimately, getting to a lower dose of Pred would be of benefit for her long term.  No changes to Cellcept or HCQ from our standpoint. If her labs appear stable, could consider lower dose Rituxan as a maintenance type as per Jed with 500mg or 1gm q6 months as opposed to 2gm q 6months. Will discuss with Rheum.    Her home BPs are reasonable, but will follow up what her BPs look like at infusion to ensure no major discrepancy. If she does have elevated BP, will consider Spironolactone (if significant proteinuria remains).     We will see her again in 4 months.    Pt staffed with Dr. Paredes.    ANGEL Rouse MD  Nephrology  [phone]      -----  Taina Singh is a 55 year old female who is being evaluated via a billable video visit.      The patient has been notified of following:     \"This video visit will be conducted via a call between you and your physician/provider. We have found that certain health care needs can be provided without the need for an in-person physical exam.  This service lets us provide the " "care you need with a video conversation.  If a prescription is necessary we can send it directly to your pharmacy.  If lab work is needed we can place an order for that and you can then stop by our lab to have the test done at a later time.    Video visits are billed at different rates depending on your insurance coverage.  Please reach out to your insurance provider with any questions.    If during the course of the call the physician/provider feels a video visit is not appropriate, you will not be charged for this service.\"    Patient has given verbal consent for Video visit? Yes  How would you like to obtain your AVS? MyChart    Will anyone else be joining your video visit? No        Video-Visit Details    Type of service:  Video Visit    Video Start Time: 1:30 PM  Video End Time: 2:01 PM    Originating Location (pt. Location): Home    Distant Location (provider location):  [facility] [place]     Platform used for Video Visit: Pradeep Rouse MD        "

## 2020-12-13 ENCOUNTER — HEALTH MAINTENANCE LETTER (OUTPATIENT)
Age: 55
End: 2020-12-13

## 2020-12-15 ENCOUNTER — INFUSION THERAPY VISIT (OUTPATIENT)
Dept: INFUSION THERAPY | Facility: CLINIC | Age: 55
End: 2020-12-15
Payer: COMMERCIAL

## 2020-12-15 VITALS
DIASTOLIC BLOOD PRESSURE: 79 MMHG | SYSTOLIC BLOOD PRESSURE: 146 MMHG | HEART RATE: 88 BPM | BODY MASS INDEX: 54.9 KG/M2 | RESPIRATION RATE: 18 BRPM | TEMPERATURE: 98.5 F | OXYGEN SATURATION: 98 % | WEIGHT: 293 LBS

## 2020-12-15 DIAGNOSIS — I77.82 ANCA-NEGATIVE VASCULITIS (H): Primary | ICD-10-CM

## 2020-12-15 DIAGNOSIS — M32.9 SYSTEMIC LUPUS ERYTHEMATOSUS, UNSPECIFIED SLE TYPE, UNSPECIFIED ORGAN INVOLVEMENT STATUS (H): ICD-10-CM

## 2020-12-15 DIAGNOSIS — M06.09 RHEUMATOID ARTHRITIS, SERONEGATIVE, MULTIPLE SITES (H): ICD-10-CM

## 2020-12-15 DIAGNOSIS — M32.14 LUPUS NEPHRITIS, ISN/RPS CLASS IV (H): ICD-10-CM

## 2020-12-15 LAB
ALBUMIN SERPL-MCNC: 3.6 G/DL (ref 3.4–5)
ALBUMIN UR-MCNC: NEGATIVE MG/DL
ALT SERPL W P-5'-P-CCNC: 45 U/L (ref 0–50)
APPEARANCE UR: CLEAR
AST SERPL W P-5'-P-CCNC: 30 U/L (ref 0–45)
BACTERIA #/AREA URNS HPF: ABNORMAL /HPF
BASOPHILS # BLD AUTO: 0.1 10E9/L (ref 0–0.2)
BASOPHILS NFR BLD AUTO: 0.6 %
BILIRUB UR QL STRIP: NEGATIVE
CD19 CELLS # BLD: <1 CELLS/UL (ref 107–698)
CD19 CELLS NFR BLD: <1 % (ref 6–27)
COLOR UR AUTO: ABNORMAL
CREAT SERPL-MCNC: 0.65 MG/DL (ref 0.52–1.04)
CREAT UR-MCNC: 22 MG/DL
CREAT UR-MCNC: 23 MG/DL
CRP SERPL-MCNC: 8.4 MG/L (ref 0–8)
DIFFERENTIAL METHOD BLD: ABNORMAL
EOSINOPHIL # BLD AUTO: 0.1 10E9/L (ref 0–0.7)
EOSINOPHIL NFR BLD AUTO: 1.1 %
ERYTHROCYTE [DISTWIDTH] IN BLOOD BY AUTOMATED COUNT: 14.4 % (ref 10–15)
ERYTHROCYTE [SEDIMENTATION RATE] IN BLOOD BY WESTERGREN METHOD: 26 MM/H (ref 0–30)
GFR SERPL CREATININE-BSD FRML MDRD: >90 ML/MIN/{1.73_M2}
GLUCOSE UR STRIP-MCNC: NEGATIVE MG/DL
HCT VFR BLD AUTO: 38.6 % (ref 35–47)
HGB BLD-MCNC: 11.6 G/DL (ref 11.7–15.7)
HGB UR QL STRIP: NEGATIVE
IMM GRANULOCYTES # BLD: 0.1 10E9/L (ref 0–0.4)
IMM GRANULOCYTES NFR BLD: 0.9 %
KETONES UR STRIP-MCNC: NEGATIVE MG/DL
LEUKOCYTE ESTERASE UR QL STRIP: ABNORMAL
LYMPHOCYTES # BLD AUTO: 0.4 10E9/L (ref 0.8–5.3)
LYMPHOCYTES NFR BLD AUTO: 5.1 %
MCH RBC QN AUTO: 25.4 PG (ref 26.5–33)
MCHC RBC AUTO-ENTMCNC: 30.1 G/DL (ref 31.5–36.5)
MCV RBC AUTO: 85 FL (ref 78–100)
MONOCYTES # BLD AUTO: 0.6 10E9/L (ref 0–1.3)
MONOCYTES NFR BLD AUTO: 8 %
NEUTROPHILS # BLD AUTO: 6.7 10E9/L (ref 1.6–8.3)
NEUTROPHILS NFR BLD AUTO: 84.3 %
NITRATE UR QL: NEGATIVE
NON-SQ EPI CELLS #/AREA URNS LPF: ABNORMAL /LPF
PH UR STRIP: 5.5 PH (ref 5–7)
PLATELET # BLD AUTO: 207 10E9/L (ref 150–450)
PROT UR-MCNC: 0.06 G/L
PROT/CREAT 24H UR: 0.27 G/G CR (ref 0–0.2)
RBC # BLD AUTO: 4.56 10E12/L (ref 3.8–5.2)
RBC #/AREA URNS AUTO: ABNORMAL /HPF
SOURCE: ABNORMAL
SP GR UR STRIP: 1 (ref 1–1.03)
UROBILINOGEN UR STRIP-MCNC: NORMAL MG/DL (ref 0–2)
WBC # BLD AUTO: 7.9 10E9/L (ref 4–11)
WBC #/AREA URNS AUTO: ABNORMAL /HPF
WBC CLUMPS #/AREA URNS HPF: PRESENT /HPF

## 2020-12-15 PROCEDURE — 86160 COMPLEMENT ANTIGEN: CPT | Performed by: INTERNAL MEDICINE

## 2020-12-15 PROCEDURE — 96375 TX/PRO/DX INJ NEW DRUG ADDON: CPT | Performed by: NURSE PRACTITIONER

## 2020-12-15 PROCEDURE — 96367 TX/PROPH/DG ADDL SEQ IV INF: CPT | Performed by: NURSE PRACTITIONER

## 2020-12-15 PROCEDURE — 84450 TRANSFERASE (AST) (SGOT): CPT | Performed by: INTERNAL MEDICINE

## 2020-12-15 PROCEDURE — 99207 PR NO CHARGE LOS: CPT

## 2020-12-15 PROCEDURE — 86355 B CELLS TOTAL COUNT: CPT | Performed by: INTERNAL MEDICINE

## 2020-12-15 PROCEDURE — 96415 CHEMO IV INFUSION ADDL HR: CPT | Performed by: NURSE PRACTITIONER

## 2020-12-15 PROCEDURE — 82040 ASSAY OF SERUM ALBUMIN: CPT | Performed by: INTERNAL MEDICINE

## 2020-12-15 PROCEDURE — 82565 ASSAY OF CREATININE: CPT | Performed by: INTERNAL MEDICINE

## 2020-12-15 PROCEDURE — 84460 ALANINE AMINO (ALT) (SGPT): CPT | Performed by: INTERNAL MEDICINE

## 2020-12-15 PROCEDURE — 86225 DNA ANTIBODY NATIVE: CPT | Performed by: INTERNAL MEDICINE

## 2020-12-15 PROCEDURE — 86140 C-REACTIVE PROTEIN: CPT | Performed by: INTERNAL MEDICINE

## 2020-12-15 PROCEDURE — 85652 RBC SED RATE AUTOMATED: CPT | Performed by: INTERNAL MEDICINE

## 2020-12-15 PROCEDURE — 85025 COMPLETE CBC W/AUTO DIFF WBC: CPT | Performed by: INTERNAL MEDICINE

## 2020-12-15 PROCEDURE — 96413 CHEMO IV INFUSION 1 HR: CPT | Performed by: NURSE PRACTITIONER

## 2020-12-15 PROCEDURE — 81001 URINALYSIS AUTO W/SCOPE: CPT | Performed by: INTERNAL MEDICINE

## 2020-12-15 PROCEDURE — 82784 ASSAY IGA/IGD/IGG/IGM EACH: CPT | Performed by: INTERNAL MEDICINE

## 2020-12-15 PROCEDURE — 36415 COLL VENOUS BLD VENIPUNCTURE: CPT | Performed by: INTERNAL MEDICINE

## 2020-12-15 PROCEDURE — 84156 ASSAY OF PROTEIN URINE: CPT | Performed by: INTERNAL MEDICINE

## 2020-12-15 RX ORDER — ACETAMINOPHEN 325 MG/1
650 TABLET ORAL ONCE
Status: COMPLETED | OUTPATIENT
Start: 2020-12-15 | End: 2020-12-15

## 2020-12-15 RX ORDER — METHYLPREDNISOLONE SODIUM SUCCINATE 125 MG/2ML
125 INJECTION, POWDER, LYOPHILIZED, FOR SOLUTION INTRAMUSCULAR; INTRAVENOUS ONCE
Status: COMPLETED | OUTPATIENT
Start: 2020-12-15 | End: 2020-12-15

## 2020-12-15 RX ORDER — METHYLPREDNISOLONE SODIUM SUCCINATE 125 MG/2ML
125 INJECTION, POWDER, LYOPHILIZED, FOR SOLUTION INTRAMUSCULAR; INTRAVENOUS ONCE
Status: CANCELLED
Start: 2020-12-15

## 2020-12-15 RX ORDER — ACETAMINOPHEN 325 MG/1
650 TABLET ORAL ONCE
Status: CANCELLED
Start: 2020-12-15

## 2020-12-15 RX ADMIN — METHYLPREDNISOLONE SODIUM SUCCINATE 125 MG: 125 INJECTION INTRAMUSCULAR; INTRAVENOUS at 11:04

## 2020-12-15 RX ADMIN — Medication 250 ML: at 11:07

## 2020-12-15 RX ADMIN — ACETAMINOPHEN 650 MG: 325 TABLET ORAL at 11:04

## 2020-12-15 ASSESSMENT — PAIN SCALES - GENERAL: PAINLEVEL: NO PAIN (0)

## 2020-12-15 NOTE — PROGRESS NOTES
Infusion Nursing Note:  Taina Dardene presents today for Rituxan.    Patient seen by provider today: No   present during visit today: Not Applicable.    Note: Patient reports tolerating Rituxan well in the past with no concerns.    Intravenous Access:  Peripheral IV placed.    Treatment Conditions:  Biological Infusion Checklist:  ~~~ NOTE: If the patient answers yes to any of the questions below, hold the infusion and contact ordering provider or on-call provider.    1. Have you recently had an elevated temperature, fever, chills, productive cough, coughing for 3 weeks or longer or hemoptysis, abnormal vital signs, night sweats,  chest pain or have you noticed a decrease in your appetite, unexplained weight loss or fatigue? No  2. Do you have any open wounds or new incisions? No  3. Do you have any recent or upcoming hospitalizations, surgeries or dental procedures? No  4. Do you currently have or recently have had any signs of illness or infection or are you on any antibiotics? No  5. Have you had any new, sudden or worsening abdominal pain? No  6. Have you or anyone in your household received a live vaccination in the past 4 weeks? Please note:  No live vaccines while on biologic/chemotherapy until 6 months after the last treatment.  Patient can receive the flu vaccine (shot only) and the pneumovax.  It is optimal for the patient to get these vaccines mid cycle, but they can be given at any time as long as it is not on the day of the infusion. No  7. Have you recently been diagnosed with any new nervous system diseases (ie. Multiple sclerosis, Guillain Davenport, seizures, neurological changes) or cancer diagnosis? No  8. Are you on any form of radiation or chemotherapy? No  9. Are you pregnant or breast feeding or do you have plans of pregnancy in the future? No  10. Have you been having any signs of worsening depression or suicidal ideations?  (benlysta only) No  11. Have there been any other new  onset medical symptoms? No    Post Infusion Assessment:  Patient tolerated infusion without incident.  Site patent and intact, free from redness, edema or discomfort.  No evidence of extravasations.  Access discontinued per protocol.       Discharge Plan:   Patient will return 12/29 for next appointment.   Patient discharged in stable condition accompanied by: self.  Departure Mode: Ambulatory.    Nathalia Betts RN

## 2020-12-15 NOTE — LETTER
December 30, 2020      Taina Singh  [address] [place] [address]        Dear ,    We are writing to inform you of your test results.    As discussed, given low IgG/CD19 and improvement of the labs, agreed with renal to just give one dose of rituximab this time. Will see how it goes with prednisone taper 1mg down every 4 weeks from 10 mg a day till you reach 5 mg a day. If labs worsen or can't taper prednisone down, will give full rituximab dose or 2 doses next time. I think it's worth trying doing one dose instead of 2 doses this time as it would cause less immunosuppression.    Resulted Orders   DNA double stranded antibodies   Result Value Ref Range    DNA-ds 141 (H) <10 IU/mL      Comment:      Positive   Complement C3   Result Value Ref Range    Complement C3 112 81 - 157 mg/dL   Complement C4   Result Value Ref Range    Complement C4 21 13 - 39 mg/dL   CD19 B Cell Count   Result Value Ref Range    CD19 B Cells <1 (L) 6 - 27 %    Absolute CD19 <1 (L) 107 - 698 cells/uL   Immunoglobulins A G and M   Result Value Ref Range     (L) 610 - 1,616 mg/dL     84 - 499 mg/dL    IGM 18 (L) 35 - 242 mg/dL   Erythrocyte sedimentation rate auto   Result Value Ref Range    Sed Rate 26 0 - 30 mm/h   Creatinine random urine   Result Value Ref Range    Creatinine Urine Random 22 mg/dL   Protein  random urine with Creat Ratio   Result Value Ref Range    Protein Random Urine 0.06 g/L    Protein Total Urine g/gr Creatinine 0.27 (H) 0 - 0.2 g/g Cr   UA with Microscopic reflex to Culture   Result Value Ref Range    Color Urine Straw     Appearance Urine Clear     Glucose Urine Negative NEG^Negative mg/dL    Bilirubin Urine Negative NEG^Negative    Ketones Urine Negative NEG^Negative mg/dL    Specific Gravity Urine 1.004 1.003 - 1.035    Blood Urine Negative NEG^Negative    pH Urine 5.5 5.0 - 7.0 pH    Protein Albumin Urine Negative NEG^Negative mg/dL    Urobilinogen mg/dL Normal 0.0 - 2.0 mg/dL     Nitrite Urine Negative NEG^Negative    Leukocyte Esterase Urine Small (A) NEG^Negative    Source Midstream Urine     WBC Urine 0 - 5 OTO5^0 - 5 /HPF    RBC Urine O - 2 OTO2^O - 2 /HPF    WBC Clumps Present (A) NEG^Negative /HPF    Bacteria Urine Few (A) NEG^Negative /HPF    Squamous Epithelial /LPF Urine Few FEW^Few /LPF   CRP inflammation   Result Value Ref Range    CRP Inflammation 8.4 (H) 0.0 - 8.0 mg/L   Creatinine   Result Value Ref Range    Creatinine 0.65 0.52 - 1.04 mg/dL    GFR Estimate >90 >60 mL/min/[1.73_m2]      Comment:      Non  GFR Calc  Starting 12/18/2018, serum creatinine based estimated GFR (eGFR) will be   calculated using the Chronic Kidney Disease Epidemiology Collaboration   (CKD-EPI) equation.      GFR Estimate If Black >90 >60 mL/min/[1.73_m2]      Comment:       GFR Calc  Starting 12/18/2018, serum creatinine based estimated GFR (eGFR) will be   calculated using the Chronic Kidney Disease Epidemiology Collaboration   (CKD-EPI) equation.     CBC with platelets differential   Result Value Ref Range    WBC 7.9 4.0 - 11.0 10e9/L    RBC Count 4.56 3.8 - 5.2 10e12/L    Hemoglobin 11.6 (L) 11.7 - 15.7 g/dL    Hematocrit 38.6 35.0 - 47.0 %    MCV 85 78 - 100 fl    MCH 25.4 (L) 26.5 - 33.0 pg    MCHC 30.1 (L) 31.5 - 36.5 g/dL    RDW 14.4 10.0 - 15.0 %    Platelet Count 207 150 - 450 10e9/L    Diff Method Automated Method     % Neutrophils 84.3 %    % Lymphocytes 5.1 %    % Monocytes 8.0 %    % Eosinophils 1.1 %    % Basophils 0.6 %    % Immature Granulocytes 0.9 %    Absolute Neutrophil 6.7 1.6 - 8.3 10e9/L    Absolute Lymphocytes 0.4 (L) 0.8 - 5.3 10e9/L    Absolute Monocytes 0.6 0.0 - 1.3 10e9/L    Absolute Eosinophils 0.1 0.0 - 0.7 10e9/L    Absolute Basophils 0.1 0.0 - 0.2 10e9/L    Abs Immature Granulocytes 0.1 0 - 0.4 10e9/L   Albumin level   Result Value Ref Range    Albumin 3.6 3.4 - 5.0 g/dL   ALT   Result Value Ref Range    ALT 45 0 - 50 U/L   AST   Result  Value Ref Range    AST 30 0 - 45 U/L   Creatinine urine calculation only   Result Value Ref Range    Creatinine Urine 23 mg/dL       If you have any questions or concerns, please call the clinic at the number listed above.       Sincerely,      Killian Marroquin MD

## 2020-12-16 LAB
C3 SERPL-MCNC: 112 MG/DL (ref 81–157)
C4 SERPL-MCNC: 21 MG/DL (ref 13–39)
IGA SERPL-MCNC: 187 MG/DL (ref 84–499)
IGG SERPL-MCNC: 391 MG/DL (ref 610–1616)
IGM SERPL-MCNC: 18 MG/DL (ref 35–242)

## 2020-12-18 ENCOUNTER — VIRTUAL VISIT (OUTPATIENT)
Dept: PULMONOLOGY | Facility: CLINIC | Age: 55
End: 2020-12-18
Attending: INTERNAL MEDICINE
Payer: COMMERCIAL

## 2020-12-18 DIAGNOSIS — J21.9 BRONCHIOLITIS: Primary | ICD-10-CM

## 2020-12-18 DIAGNOSIS — R94.2 ABNORMAL PFT: ICD-10-CM

## 2020-12-18 LAB — DSDNA AB SER-ACNC: 141 IU/ML

## 2020-12-18 PROCEDURE — 99214 OFFICE O/P EST MOD 30 MIN: CPT | Mod: 25 | Performed by: INTERNAL MEDICINE

## 2020-12-18 NOTE — PROGRESS NOTES
"  This was a video visit.  Visit start time 9:06 AM.  Visit end time 9:14 AM.  Yolie, patient was at home.        [facility]    Reason for Visit  Taina Singh is a 55 year old year old female who is being seen for dyspnea and abnormal PFT.    HPI  Ms. Singh is a 55-year-old with lupus and history of air trapping on CT scan  (small airways disease/bronchiolitis) and restrictive PFT with whom I had a video visit today.   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  In the past, we did try ICS/LABA combination, but she could not afford it.  She started rituximab for her lupus in January, and this has helped her symptoms significantly.  Today, she reports that her breathing feels \"really good.\"  She denies wheezing, cough, fevers, or paroxysmal nocturnal dyspnea.  She is able to walk on a flat surface without any problem.  However, she does feel short of breath when she walks upstairs.  She does walk try to walk every day.  She just received her rituximab this week and is on an every 6-month schedule.  She continues to take mycophenolate 1500 mg twice a day, and prednisone has been tapered down to 12.5 mg daily.  She did not receive the flu vaccine because she had a bad reaction in the past.            Current Outpatient Medications   Medication     albuterol (PROAIR HFA/PROVENTIL HFA/VENTOLIN HFA) 108 (90 Base) MCG/ACT inhaler     atovaquone (MEPRON) 750 MG/5ML suspension     Calcium-Magnesium 300-300 MG TABS     hydroxychloroquine (PLAQUENIL) 200 MG tablet     Multiple Vitamins-Minerals (WOMENS MULTI PO)     mycophenolate (GENERIC EQUIVALENT) 500 MG tablet     Omega-3 Fatty Acids (OMEGA-3 FISH OIL) 500 MG CAPS     pantoprazole (PROTONIX) 20 MG EC tablet     predniSONE (DELTASONE) 2.5 MG tablet     predniSONE (DELTASONE) 5 MG tablet     traMADol (ULTRAM) 50 MG tablet     vitamin D3 (CHOLECALCIFEROL) 50 mcg " (2000 units) tablet     warfarin ANTICOAGULANT (COUMADIN) 5 MG tablet     zolpidem (AMBIEN) 5 MG tablet     No current facility-administered medications for this visit.      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44    No chemo or radiation     Other forms of systemic lupus erythematosus (H) 2018     Pulmonary embolism (H)     2019 seen on abd [facility] at [facility]     Sjogren's syndrome (H)     secondary Sjogrens, secondary to lupus     Vitamin D deficiency        Past Surgical History:   Procedure Laterality Date     BIOPSY/REMOVAL, LYMPH NODE(S)        SECTION  age 32     HC HYSTEROS W PERMANENT FALLOPAIN IMPLANT      Essure b/l     PAROTIDECTOMY Right 2006     TONSILLECTOMY         Social History     Socioeconomic History     Marital status:      Spouse name: Not on file     Number of children: Not on file     Years of education: 1     Highest education level: Not on file   Occupational History     Comment: , 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   Social Needs     Financial resource strain: Not on file     Food insecurity     Worry: Not on file     Inability: Not on file     Transportation needs     Medical: Not on file     Non-medical: Not on file   Tobacco Use     Smoking status: Never Smoker     Smokeless tobacco: Never Used   Substance and Sexual Activity     Alcohol use: No     Drug use: No     Sexual activity: Not on file   Lifestyle      "Physical activity     Days per week: Not on file     Minutes per session: Not on file     Stress: Not on file   Relationships     Social connections     Talks on phone: Not on file     Gets together: Not on file     Attends Buddhism service: Not on file     Active member of club or organization: Not on file     Attends meetings of clubs or organizations: Not on file     Relationship status: Not on file     Intimate partner violence     Fear of current or ex partner: Not on file     Emotionally abused: Not on file     Physically abused: Not on file     Forced sexual activity: Not on file   Other Topics Concern     Parent/sibling w/ CABG, MI or angioplasty before 65F 55M? 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        Family History   Problem Relation Age of Onset     Alopecia Brother      Rheumatoid Arthritis Brother      LUNG DISEASE No family hx of          Vitals: There were no vitals taken for this visit.    Exam:   GENERAL: Healthy, alert and no distress\",\"EYES: Eyes grossly normal to inspection.  No discharge or erythema, or obvious scleral/conjunctival abnormalities.\",\"RESP: No audible wheeze, cough, or visible cyanosis.  No visible retractions or increased work of breathing.  \",\"SKIN: Visible skin clear. No significant rash, abnormal pigmentation or lesions.\",\"NEURO: Cranial nerves grossly intact.  Mentation and speech appropriate for age.\",\"PSYCH: Mentation appears normal, affect normal/bright, judgement and insight intact, normal speech and appearance well-groomed.      Results:  Recent Results (from the past 168 hour(s))   DNA double stranded antibodies    Collection Time: 12/15/20 10:21 AM   Result Value Ref Range    " DNA-ds 141 (H) <10 IU/mL   Complement C3    Collection Time: 12/15/20 10:21 AM   Result Value Ref Range    Complement C3 112 81 - 157 mg/dL   Complement C4    Collection Time: 12/15/20 10:21 AM   Result Value Ref Range    Complement C4 21 13 - 39 mg/dL   CD19 B Cell Count    Collection Time: 12/15/20 10:21 AM   Result Value Ref Range    CD19 B Cells <1 (L) 6 - 27 %    Absolute CD19 <1 (L) 107 - 698 cells/uL   Immunoglobulins A G and M    Collection Time: 12/15/20 10:21 AM   Result Value Ref Range     (L) 610 - 1,616 mg/dL     84 - 499 mg/dL    IGM 18 (L) 35 - 242 mg/dL   Erythrocyte sedimentation rate auto    Collection Time: 12/15/20 10:21 AM   Result Value Ref Range    Sed Rate 26 0 - 30 mm/h   CRP inflammation    Collection Time: 12/15/20 10:21 AM   Result Value Ref Range    CRP Inflammation 8.4 (H) 0.0 - 8.0 mg/L   Creatinine    Collection Time: 12/15/20 10:21 AM   Result Value Ref Range    Creatinine 0.65 0.52 - 1.04 mg/dL    GFR Estimate >90 >60 mL/min/[1.73_m2]    GFR Estimate If Black >90 >60 mL/min/[1.73_m2]   CBC with platelets differential    Collection Time: 12/15/20 10:21 AM   Result Value Ref Range    WBC 7.9 4.0 - 11.0 10e9/L    RBC Count 4.56 3.8 - 5.2 10e12/L    Hemoglobin 11.6 (L) 11.7 - 15.7 g/dL    Hematocrit 38.6 35.0 - 47.0 %    MCV 85 78 - 100 fl    MCH 25.4 (L) 26.5 - 33.0 pg    MCHC 30.1 (L) 31.5 - 36.5 g/dL    RDW 14.4 10.0 - 15.0 %    Platelet Count 207 150 - 450 10e9/L    Diff Method Automated Method     % Neutrophils 84.3 %    % Lymphocytes 5.1 %    % Monocytes 8.0 %    % Eosinophils 1.1 %    % Basophils 0.6 %    % Immature Granulocytes 0.9 %    Absolute Neutrophil 6.7 1.6 - 8.3 10e9/L    Absolute Lymphocytes 0.4 (L) 0.8 - 5.3 10e9/L    Absolute Monocytes 0.6 0.0 - 1.3 10e9/L    Absolute Eosinophils 0.1 0.0 - 0.7 10e9/L    Absolute Basophils 0.1 0.0 - 0.2 10e9/L    Abs Immature Granulocytes 0.1 0 - 0.4 10e9/L   Albumin level    Collection Time: 12/15/20 10:21 AM   Result  Value Ref Range    Albumin 3.6 3.4 - 5.0 g/dL   ALT    Collection Time: 12/15/20 10:21 AM   Result Value Ref Range    ALT 45 0 - 50 U/L   AST    Collection Time: 12/15/20 10:21 AM   Result Value Ref Range    AST 30 0 - 45 U/L   Creatinine random urine    Collection Time: 12/15/20 10:24 AM   Result Value Ref Range    Creatinine Urine Random 22 mg/dL   Protein  random urine with Creat Ratio    Collection Time: 12/15/20 10:24 AM   Result Value Ref Range    Protein Random Urine 0.06 g/L    Protein Total Urine g/gr Creatinine 0.27 (H) 0 - 0.2 g/g Cr   UA with Microscopic reflex to Culture    Collection Time: 12/15/20 10:24 AM    Specimen: Midstream Urine   Result Value Ref Range    Color Urine Straw     Appearance Urine Clear     Glucose Urine Negative NEG^Negative mg/dL    Bilirubin Urine Negative NEG^Negative    Ketones Urine Negative NEG^Negative mg/dL    Specific Gravity Urine 1.004 1.003 - 1.035    Blood Urine Negative NEG^Negative    pH Urine 5.5 5.0 - 7.0 pH    Protein Albumin Urine Negative NEG^Negative mg/dL    Urobilinogen mg/dL Normal 0.0 - 2.0 mg/dL    Nitrite Urine Negative NEG^Negative    Leukocyte Esterase Urine Small (A) NEG^Negative    Source Midstream Urine     WBC Urine 0 - 5 OTO5^0 - 5 /HPF    RBC Urine O - 2 OTO2^O - 2 /HPF    WBC Clumps Present (A) NEG^Negative /HPF    Bacteria Urine Few (A) NEG^Negative /HPF    Squamous Epithelial /LPF Urine Few FEW^Few /LPF   Creatinine urine calculation only    Collection Time: 12/15/20 10:24 AM   Result Value Ref Range    Creatinine Urine 23 mg/dL     I reviewed labs from Dec 15, and they are within normal limits.    I reviewed results with the patient.        Assessment and plan:  Ms. Singh is a 55-year-old with lupus and history of air trapping on CT scan  (small airways disease/bronchiolitis) and restrictive PFT with whom I had a video visit today.   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    1.  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  Symptomatically, her dyspnea is stable.  Overall, she feels better since starting rituximab in January.  Dr. Marroquin is tapering her prednisone.  I have encouraged her to continue walking every day and to wear a face mask and practice social distancing.  She does not get the flu vaccine because she had a bad reaction in the past.  We did discuss the COVID-19 vaccines, and as far as we know the Pfizer and Moderna vaccines will be safe for her.  The DigiFit and Espressi vaccines are viral vectors, and I do not know if they are safe for immunosuppressed patients.  In terms of timing of the COVID-19 vaccine, she just received her rituximab so probably should not receive any vaccine for the next 3 to 6 months.  The best timing would be for her to get vaccinated in 6 months prior to her next rituximab infusion and wait 2-4 weeks after her last vaccine before getting rituximab.  If her small airways disease becomes more symptomatic, then we could consider ICS/LABA inhaler again or daily azithromycin as an anti-inflammatory.  However, she could not afford the inhaler in the past.  Dr. Marroquin will continue to taper her prednisone as tolerated.  I will see her back in 6 months with full PFT.    2.  High risk medication monitoring.  She should continue atovaquone for pneumocystis prophylaxis.  Labs are monitored by Dr. Rebolledo, and they are within normal limits.

## 2020-12-18 NOTE — LETTER
"    12/18/2020         RE: Taina Singh  [address] [place] [address]        Dear Colleague,    Thank you for referring your patient, Taina Singh, to the [facility] [place]. Please see a copy of my visit note below.      This was a video visit.  Visit start time 9:06 AM.  Visit end time 9:14 AM.  Amwell, patient was at home.        [facility]    Reason for Visit  Taina Singh is a 55 year old year old female who is being seen for dyspnea and abnormal PFT.    HPI  Ms. Singh is a 55-year-old with lupus and history of air trapping on CT scan  (small airways disease/bronchiolitis) and restrictive PFT with whom I had a video visit today.   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  In the past, we did try ICS/LABA combination, but she could not afford it.  She started rituximab for her lupus in January, and this has helped her symptoms significantly.  Today, she reports that her breathing feels \"really good.\"  She denies wheezing, cough, fevers, or paroxysmal nocturnal dyspnea.  She is able to walk on a flat surface without any problem.  However, she does feel short of breath when she walks upstairs.  She does walk try to walk every day.  She just received her rituximab this week and is on an every 6-month schedule.  She continues to take mycophenolate 1500 mg twice a day, and prednisone has been tapered down to 12.5 mg daily.  She did not receive the flu vaccine because she had a bad reaction in the past.            Current Outpatient Medications   Medication     albuterol (PROAIR HFA/PROVENTIL HFA/VENTOLIN HFA) 108 (90 Base) MCG/ACT inhaler     atovaquone (MEPRON) 750 MG/5ML suspension     Calcium-Magnesium 300-300 MG TABS     hydroxychloroquine (PLAQUENIL) 200 MG tablet     Multiple Vitamins-Minerals (WOMENS MULTI PO)     mycophenolate " (GENERIC EQUIVALENT) 500 MG tablet     Omega-3 Fatty Acids (OMEGA-3 FISH OIL) 500 MG CAPS     pantoprazole (PROTONIX) 20 MG EC tablet     predniSONE (DELTASONE) 2.5 MG tablet     predniSONE (DELTASONE) 5 MG tablet     traMADol (ULTRAM) 50 MG tablet     vitamin D3 (CHOLECALCIFEROL) 50 mcg (2000 units) tablet     warfarin ANTICOAGULANT (COUMADIN) 5 MG tablet     zolpidem (AMBIEN) 5 MG tablet     No current facility-administered medications for this visit.      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44    No chemo or radiation     Other forms of systemic lupus erythematosus (H) 2018     Pulmonary embolism (H)     2019 seen on abd [facility]     Sjogren's syndrome (H)     secondary Sjogrens, secondary to lupus     Vitamin D deficiency        Past Surgical History:   Procedure Laterality Date     BIOPSY/REMOVAL, LYMPH NODE(S)        SECTION  age 32     HC HYSTEROS W PERMANENT FALLOPAIN IMPLANT      Essure b/l     PAROTIDECTOMY Right 2006     TONSILLECTOMY         Social History     Socioeconomic History     Marital status:      Spouse name: Not on file     Number of children: Not on file     Years of education: 1     Highest education level: Not on file   Occupational History     Comment: , 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   Social Needs     Financial resource strain: Not on file     Food insecurity     Worry: Not on file     Inability:  "Not on file     Transportation needs     Medical: Not on file     Non-medical: Not on file   Tobacco Use     Smoking status: Never Smoker     Smokeless tobacco: Never Used   Substance and Sexual Activity     Alcohol use: No     Drug use: No     Sexual activity: Not on file   Lifestyle     Physical activity     Days per week: Not on file     Minutes per session: Not on file     Stress: Not on file   Relationships     Social connections     Talks on phone: Not on file     Gets together: Not on file     Attends [employer] service: Not on file     Active member of club or organization: Not on file     Attends meetings of clubs or organizations: Not on file     Relationship status: Not on file     Intimate partner violence     Fear of current or ex partner: Not on file     Emotionally abused: Not on file     Physically abused: Not on file     Forced sexual activity: Not on file   Other Topics Concern     Parent/sibling w/ CABG, MI or angioplasty before 65F 55M? 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        Family History   Problem Relation Age of Onset     Alopecia Brother      Rheumatoid Arthritis Brother      LUNG DISEASE No family hx of          Vitals: There were no vitals taken for this visit.    Exam:   GENERAL: Healthy, alert and no distress\",\"EYES: Eyes grossly normal to inspection.  No discharge or erythema, or obvious scleral/conjunctival abnormalities.\",\"RESP: No audible wheeze, cough, or visible cyanosis.  No visible retractions or increased work of breathing.  \",\"SKIN: Visible skin clear. No significant rash, abnormal pigmentation or lesions.\",\"NEURO: Cranial nerves grossly intact.  Mentation and speech appropriate for " "age.\",\"PSYCH: Mentation appears normal, affect normal/bright, judgement and insight intact, normal speech and appearance well-groomed.      Results:  Recent Results (from the past 168 hour(s))   DNA double stranded antibodies    Collection Time: 12/15/20 10:21 AM   Result Value Ref Range    DNA-ds 141 (H) <10 IU/mL   Complement C3    Collection Time: 12/15/20 10:21 AM   Result Value Ref Range    Complement C3 112 81 - 157 mg/dL   Complement C4    Collection Time: 12/15/20 10:21 AM   Result Value Ref Range    Complement C4 21 13 - 39 mg/dL   CD19 B Cell Count    Collection Time: 12/15/20 10:21 AM   Result Value Ref Range    CD19 B Cells <1 (L) 6 - 27 %    Absolute CD19 <1 (L) 107 - 698 cells/uL   Immunoglobulins A G and M    Collection Time: 12/15/20 10:21 AM   Result Value Ref Range     (L) 610 - 1,616 mg/dL     84 - 499 mg/dL    IGM 18 (L) 35 - 242 mg/dL   Erythrocyte sedimentation rate auto    Collection Time: 12/15/20 10:21 AM   Result Value Ref Range    Sed Rate 26 0 - 30 mm/h   CRP inflammation    Collection Time: 12/15/20 10:21 AM   Result Value Ref Range    CRP Inflammation 8.4 (H) 0.0 - 8.0 mg/L   Creatinine    Collection Time: 12/15/20 10:21 AM   Result Value Ref Range    Creatinine 0.65 0.52 - 1.04 mg/dL    GFR Estimate >90 >60 mL/min/[1.73_m2]    GFR Estimate If Black >90 >60 mL/min/[1.73_m2]   CBC with platelets differential    Collection Time: 12/15/20 10:21 AM   Result Value Ref Range    WBC 7.9 4.0 - 11.0 10e9/L    RBC Count 4.56 3.8 - 5.2 10e12/L    Hemoglobin 11.6 (L) 11.7 - 15.7 g/dL    Hematocrit 38.6 35.0 - 47.0 %    MCV 85 78 - 100 fl    MCH 25.4 (L) 26.5 - 33.0 pg    MCHC 30.1 (L) 31.5 - 36.5 g/dL    RDW 14.4 10.0 - 15.0 %    Platelet Count 207 150 - 450 10e9/L    Diff Method Automated Method     % Neutrophils 84.3 %    % Lymphocytes 5.1 %    % Monocytes 8.0 %    % Eosinophils 1.1 %    % Basophils 0.6 %    % Immature Granulocytes 0.9 %    Absolute Neutrophil 6.7 1.6 - 8.3 10e9/L "    Absolute Lymphocytes 0.4 (L) 0.8 - 5.3 10e9/L    Absolute Monocytes 0.6 0.0 - 1.3 10e9/L    Absolute Eosinophils 0.1 0.0 - 0.7 10e9/L    Absolute Basophils 0.1 0.0 - 0.2 10e9/L    Abs Immature Granulocytes 0.1 0 - 0.4 10e9/L   Albumin level    Collection Time: 12/15/20 10:21 AM   Result Value Ref Range    Albumin 3.6 3.4 - 5.0 g/dL   ALT    Collection Time: 12/15/20 10:21 AM   Result Value Ref Range    ALT 45 0 - 50 U/L   AST    Collection Time: 12/15/20 10:21 AM   Result Value Ref Range    AST 30 0 - 45 U/L   Creatinine random urine    Collection Time: 12/15/20 10:24 AM   Result Value Ref Range    Creatinine Urine Random 22 mg/dL   Protein  random urine with Creat Ratio    Collection Time: 12/15/20 10:24 AM   Result Value Ref Range    Protein Random Urine 0.06 g/L    Protein Total Urine g/gr Creatinine 0.27 (H) 0 - 0.2 g/g Cr   UA with Microscopic reflex to Culture    Collection Time: 12/15/20 10:24 AM    Specimen: Midstream Urine   Result Value Ref Range    Color Urine Straw     Appearance Urine Clear     Glucose Urine Negative NEG^Negative mg/dL    Bilirubin Urine Negative NEG^Negative    Ketones Urine Negative NEG^Negative mg/dL    Specific Gravity Urine 1.004 1.003 - 1.035    Blood Urine Negative NEG^Negative    pH Urine 5.5 5.0 - 7.0 pH    Protein Albumin Urine Negative NEG^Negative mg/dL    Urobilinogen mg/dL Normal 0.0 - 2.0 mg/dL    Nitrite Urine Negative NEG^Negative    Leukocyte Esterase Urine Small (A) NEG^Negative    Source Midstream Urine     WBC Urine 0 - 5 OTO5^0 - 5 /HPF    RBC Urine O - 2 OTO2^O - 2 /HPF    WBC Clumps Present (A) NEG^Negative /HPF    Bacteria Urine Few (A) NEG^Negative /HPF    Squamous Epithelial /LPF Urine Few FEW^Few /LPF   Creatinine urine calculation only    Collection Time: 12/15/20 10:24 AM   Result Value Ref Range    Creatinine Urine 23 mg/dL     I reviewed labs from Dec 15, and they are within normal limits.    I reviewed results with the patient.        Assessment and  plan:  Ms. Singh is a 55-year-old with lupus and history of air trapping on CT scan  (small airways disease/bronchiolitis) and restrictive PFT with whom I had a video visit today.   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    1.  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  Symptomatically, her dyspnea is stable.  Overall, she feels better since starting rituximab in January.  Dr. Marroquin is tapering her prednisone.  I have encouraged her to continue walking every day and to wear a face mask and practice social distancing.  She does not get the flu vaccine because she had a bad reaction in the past.  We did discuss the COVID-19 vaccines, and as far as we know the Pfizer and Moderna vaccines will be safe for her.  The CO2Stats and Lockbox vaccines are viral vectors, and I do not know if they are safe for immunosuppressed patients.  In terms of timing of the COVID-19 vaccine, she just received her rituximab so probably should not receive any vaccine for the next 3 to 6 months.  The best timing would be for her to get vaccinated in 6 months prior to her next rituximab infusion and wait 2-4 weeks after her last vaccine before getting rituximab.  If her small airways disease becomes more symptomatic, then we could consider ICS/LABA inhaler again or daily azithromycin as an anti-inflammatory.  However, she could not afford the inhaler in the past.  Dr. Marroquin will continue to taper her prednisone as tolerated.  I will see her back in 6 months with full PFT.    2.  High risk medication monitoring.  She should continue atovaquone for pneumocystis prophylaxis.  Labs are monitored by Dr. Rebolledo, and they are within normal limits.          Again, thank you for allowing me to participate in the care of your patient.        Sincerely,        Joanne Marvin MD

## 2020-12-28 ENCOUNTER — TELEPHONE (OUTPATIENT)
Dept: RHEUMATOLOGY | Facility: CLINIC | Age: 55
End: 2020-12-28

## 2020-12-28 DIAGNOSIS — M32.14 LUPUS NEPHRITIS, ISN/RPS CLASS IV (H): Primary | ICD-10-CM

## 2020-12-28 DIAGNOSIS — Z79.899 HIGH RISK MEDICATIONS (NOT ANTICOAGULANTS) LONG-TERM USE: ICD-10-CM

## 2020-12-28 DIAGNOSIS — I77.82 ANCA-NEGATIVE VASCULITIS (H): ICD-10-CM

## 2020-12-28 RX ORDER — PREDNISONE 1 MG/1
TABLET ORAL
Qty: 120 TABLET | Refills: 2 | Status: SHIPPED | OUTPATIENT
Start: 2020-12-28 | End: 2022-01-14

## 2020-12-28 RX ORDER — PREDNISONE 5 MG/1
TABLET ORAL
Qty: 90 TABLET | Refills: 3 | Status: SHIPPED | OUTPATIENT
Start: 2020-12-28 | End: 2021-12-14

## 2020-12-28 NOTE — TELEPHONE ENCOUNTER
Pt returned call to clinic, discussed not getting second dose of Rituximab tomorrow and reasons why.  Pt understands and is in agreement.  Discussed that she needs to have labs at the end of the month.  Pt will start tapering by one mg of prednisone every month.    Sophie Alvarado RN  R[facility]ology Clinic

## 2020-12-28 NOTE — TELEPHONE ENCOUNTER
----- Message from Killian Marroquin MD sent at 12/28/2020 12:25 PM CST -----  Regarding: RE: prednisone  I did not see any written comment in renal note about one dose of rituximab; however agree to give just one dose of rituximab 1000 mg this time and cancel the 2nd dose for tomorrow. Reason is that her 12/15 labs look improved, her CD19 is <1 and IgG is low. So one infusion could still work without suppressing her immune system so much.    I ordered labs for end of 1/2021, early Feb before her next visit with me.    I ordered more prednisone, here is the taper:    9-8-7-6 mg every day each course for one month until you reach 5mg daily.     So we are going down 1 mg every month.      ----- Message -----  From: Sophie Alvarado RN  Sent: 12/28/2020  11:51 AM CST  To: Killian Marroquin MD  Subject: prednisone                                       She is only on 10 mg a day of prednisone. She would like to start tapering further by 1 mg.  Could you send 1 mg tablets for her and a taper schedule.    Thanks Sophie

## 2020-12-30 NOTE — RESULT ENCOUNTER NOTE
As discussed, given low IgG/CD19 and improvement of the labs, agreed with renal to just give one dose of rituximab this time. Will see how it goes with prednisone taper 1mg down every 4 weeks from 10 mg a day till you reach 5 mg a day. If labs worsen or can't taper prednisone down, will give full rituximab dose or 2 doses next time. I think it's worth trying doing one dose instead of 2 doses this time as it would cause less immunosuppression.

## 2021-01-21 DIAGNOSIS — G47.00 INSOMNIA, UNSPECIFIED TYPE: ICD-10-CM

## 2021-01-21 NOTE — CONFIDENTIAL NOTE
Medication: zolpidem 5 mg    Last Office Visit Date: 10/29/2020  Future Office Visit Date: 2/4/2021  Last Prescription Fill Date: 11/24/2020  Last Fill Quantity: #60     reviewed as follows:    Request sent to provider for review and signature.    Jayleen Gan CMA   1/21/2021 2:13 PM

## 2021-01-22 RX ORDER — ZOLPIDEM TARTRATE 5 MG/1
5 TABLET ORAL
Qty: 30 TABLET | Refills: 3 | Status: SHIPPED | OUTPATIENT
Start: 2021-01-22 | End: 2021-08-30

## 2021-02-01 DIAGNOSIS — Z79.899 HIGH RISK MEDICATIONS (NOT ANTICOAGULANTS) LONG-TERM USE: ICD-10-CM

## 2021-02-01 DIAGNOSIS — M32.14 LUPUS NEPHRITIS, ISN/RPS CLASS IV (H): ICD-10-CM

## 2021-02-01 DIAGNOSIS — R82.90 NONSPECIFIC FINDING ON EXAMINATION OF URINE: Primary | ICD-10-CM

## 2021-02-01 DIAGNOSIS — I77.82 ANCA-NEGATIVE VASCULITIS (H): ICD-10-CM

## 2021-02-01 LAB
ALBUMIN SERPL-MCNC: 4.1 G/DL (ref 3.4–5)
ALBUMIN UR-MCNC: 100 MG/DL
ALT SERPL W P-5'-P-CCNC: 26 U/L (ref 0–50)
APPEARANCE UR: CLEAR
AST SERPL W P-5'-P-CCNC: 16 U/L (ref 0–45)
BACTERIA #/AREA URNS HPF: ABNORMAL /HPF
BASOPHILS # BLD AUTO: 0 10E9/L (ref 0–0.2)
BASOPHILS NFR BLD AUTO: 0.3 %
BILIRUB UR QL STRIP: NEGATIVE
COLOR UR AUTO: YELLOW
CREAT SERPL-MCNC: 0.6 MG/DL (ref 0.52–1.04)
CREAT UR-MCNC: 182 MG/DL
CREAT UR-MCNC: 186 MG/DL
CRP SERPL-MCNC: 8.9 MG/L (ref 0–8)
DIFFERENTIAL METHOD BLD: ABNORMAL
EOSINOPHIL # BLD AUTO: 0.1 10E9/L (ref 0–0.7)
EOSINOPHIL NFR BLD AUTO: 0.6 %
ERYTHROCYTE [DISTWIDTH] IN BLOOD BY AUTOMATED COUNT: 14.9 % (ref 10–15)
ERYTHROCYTE [SEDIMENTATION RATE] IN BLOOD BY WESTERGREN METHOD: 27 MM/H (ref 0–30)
GFR SERPL CREATININE-BSD FRML MDRD: >90 ML/MIN/{1.73_M2}
GLUCOSE UR STRIP-MCNC: NEGATIVE MG/DL
HCT VFR BLD AUTO: 37.9 % (ref 35–47)
HGB BLD-MCNC: 11.7 G/DL (ref 11.7–15.7)
HGB UR QL STRIP: ABNORMAL
KETONES UR STRIP-MCNC: ABNORMAL MG/DL
LEUKOCYTE ESTERASE UR QL STRIP: ABNORMAL
LYMPHOCYTES # BLD AUTO: 0.5 10E9/L (ref 0.8–5.3)
LYMPHOCYTES NFR BLD AUTO: 6.3 %
MCH RBC QN AUTO: 26 PG (ref 26.5–33)
MCHC RBC AUTO-ENTMCNC: 30.9 G/DL (ref 31.5–36.5)
MCV RBC AUTO: 84 FL (ref 78–100)
MONOCYTES # BLD AUTO: 0.6 10E9/L (ref 0–1.3)
MONOCYTES NFR BLD AUTO: 7.9 %
NEUTROPHILS # BLD AUTO: 6.6 10E9/L (ref 1.6–8.3)
NEUTROPHILS NFR BLD AUTO: 84.9 %
NITRATE UR QL: NEGATIVE
NON-SQ EPI CELLS #/AREA URNS LPF: ABNORMAL /LPF
PH UR STRIP: 6 PH (ref 5–7)
PLATELET # BLD AUTO: 236 10E9/L (ref 150–450)
PROT UR-MCNC: 0.75 G/L
PROT/CREAT 24H UR: 0.4 G/G CR (ref 0–0.2)
RBC # BLD AUTO: 4.5 10E12/L (ref 3.8–5.2)
RBC #/AREA URNS AUTO: ABNORMAL /HPF
SOURCE: ABNORMAL
SP GR UR STRIP: >1.03 (ref 1–1.03)
TRI-PHOS CRY #/AREA URNS HPF: ABNORMAL /HPF
UROBILINOGEN UR STRIP-ACNC: 0.2 EU/DL (ref 0.2–1)
WBC # BLD AUTO: 7.8 10E9/L (ref 4–11)
WBC #/AREA URNS AUTO: ABNORMAL /HPF

## 2021-02-01 PROCEDURE — 85652 RBC SED RATE AUTOMATED: CPT | Performed by: INTERNAL MEDICINE

## 2021-02-01 PROCEDURE — 36415 COLL VENOUS BLD VENIPUNCTURE: CPT | Performed by: INTERNAL MEDICINE

## 2021-02-01 PROCEDURE — 86160 COMPLEMENT ANTIGEN: CPT | Performed by: INTERNAL MEDICINE

## 2021-02-01 PROCEDURE — 87086 URINE CULTURE/COLONY COUNT: CPT | Performed by: INTERNAL MEDICINE

## 2021-02-01 PROCEDURE — 82784 ASSAY IGA/IGD/IGG/IGM EACH: CPT | Performed by: INTERNAL MEDICINE

## 2021-02-01 PROCEDURE — 84450 TRANSFERASE (AST) (SGOT): CPT | Performed by: INTERNAL MEDICINE

## 2021-02-01 PROCEDURE — 84460 ALANINE AMINO (ALT) (SGPT): CPT | Performed by: INTERNAL MEDICINE

## 2021-02-01 PROCEDURE — 82565 ASSAY OF CREATININE: CPT | Performed by: INTERNAL MEDICINE

## 2021-02-01 PROCEDURE — 81001 URINALYSIS AUTO W/SCOPE: CPT | Performed by: INTERNAL MEDICINE

## 2021-02-01 PROCEDURE — 84156 ASSAY OF PROTEIN URINE: CPT | Performed by: INTERNAL MEDICINE

## 2021-02-01 PROCEDURE — 82040 ASSAY OF SERUM ALBUMIN: CPT | Performed by: INTERNAL MEDICINE

## 2021-02-01 PROCEDURE — 86140 C-REACTIVE PROTEIN: CPT | Performed by: INTERNAL MEDICINE

## 2021-02-01 PROCEDURE — 85025 COMPLETE CBC W/AUTO DIFF WBC: CPT | Performed by: INTERNAL MEDICINE

## 2021-02-01 PROCEDURE — 86225 DNA ANTIBODY NATIVE: CPT | Performed by: INTERNAL MEDICINE

## 2021-02-01 PROCEDURE — 86355 B CELLS TOTAL COUNT: CPT | Performed by: INTERNAL MEDICINE

## 2021-02-01 NOTE — LETTER
February 2, 2021      Taina Singh  [address] [place] [address]        Dear ,    We are writing to inform you of your test results.    Stable labs/good news.    Resulted Orders   Immunoglobulins A G and M   Result Value Ref Range     (L) 610 - 1,616 mg/dL     84 - 499 mg/dL    IGM 16 (L) 35 - 242 mg/dL   CD19 B Cell Count (B-lymphocyte antigen)   Result Value Ref Range    CD19 B Cells <1 (L) 6 - 27 %    Absolute CD19 <1 (L) 107 - 698 cells/uL   Creatinine random urine   Result Value Ref Range    Creatinine Urine Random 182 mg/dL   Protein  random urine with Creat Ratio   Result Value Ref Range    Protein Random Urine 0.75 g/L    Protein Total Urine g/gr Creatinine 0.40 (H) 0 - 0.2 g/g Cr   Erythrocyte sedimentation rate auto   Result Value Ref Range    Sed Rate 27 0 - 30 mm/h   DNA double stranded antibodies   Result Value Ref Range    DNA-ds 102 (H) <10 IU/mL      Comment:      Positive   CRP inflammation   Result Value Ref Range    CRP Inflammation 8.9 (H) 0.0 - 8.0 mg/L   Complement C3   Result Value Ref Range    Complement C3 104 81 - 157 mg/dL   Complement C4   Result Value Ref Range    Complement C4 23 13 - 39 mg/dL   Creatinine   Result Value Ref Range    Creatinine 0.60 0.52 - 1.04 mg/dL    GFR Estimate >90 >60 mL/min/[1.73_m2]      Comment:      Non  GFR Calc  Starting 12/18/2018, serum creatinine based estimated GFR (eGFR) will be   calculated using the Chronic Kidney Disease Epidemiology Collaboration   (CKD-EPI) equation.      GFR Estimate If Black >90 >60 mL/min/[1.73_m2]      Comment:       GFR Calc  Starting 12/18/2018, serum creatinine based estimated GFR (eGFR) will be   calculated using the Chronic Kidney Disease Epidemiology Collaboration   (CKD-EPI) equation.     CBC with platelets differential   Result Value Ref Range    WBC 7.8 4.0 - 11.0 10e9/L    RBC Count 4.50 3.8 - 5.2 10e12/L    Hemoglobin 11.7 11.7 - 15.7 g/dL    Hematocrit  37.9 35.0 - 47.0 %    MCV 84 78 - 100 fl    MCH 26.0 (L) 26.5 - 33.0 pg    MCHC 30.9 (L) 31.5 - 36.5 g/dL      Comment:      Results confirmed by repeat test    RDW 14.9 10.0 - 15.0 %    Platelet Count 236 150 - 450 10e9/L    % Neutrophils 84.9 %    % Lymphocytes 6.3 %    % Monocytes 7.9 %    % Eosinophils 0.6 %    % Basophils 0.3 %    Absolute Neutrophil 6.6 1.6 - 8.3 10e9/L    Absolute Lymphocytes 0.5 (L) 0.8 - 5.3 10e9/L    Absolute Monocytes 0.6 0.0 - 1.3 10e9/L    Absolute Eosinophils 0.1 0.0 - 0.7 10e9/L    Absolute Basophils 0.0 0.0 - 0.2 10e9/L    Diff Method Automated Method    AST   Result Value Ref Range    AST 16 0 - 45 U/L   Albumin level   Result Value Ref Range    Albumin 4.1 3.4 - 5.0 g/dL   ALT   Result Value Ref Range    ALT 26 0 - 50 U/L   UA with Microscopic reflex to Culture   Result Value Ref Range    Color Urine Yellow     Appearance Urine Clear     Glucose Urine Negative NEG^Negative mg/dL    Bilirubin Urine Negative NEG^Negative    Ketones Urine Trace (A) NEG^Negative mg/dL    Specific Gravity Urine >1.030 1.003 - 1.035    pH Urine 6.0 5.0 - 7.0 pH    Protein Albumin Urine 100 (A) NEG^Negative mg/dL    Urobilinogen Urine 0.2 0.2 - 1.0 EU/dL    Nitrite Urine Negative NEG^Negative    Blood Urine Trace (A) NEG^Negative    Leukocyte Esterase Urine Trace (A) NEG^Negative    Source Midstream Urine     WBC Urine 10-25 (A) OTO5^0 - 5 /HPF      Comment:      Urine culture added due to reflex criteria    RBC Urine 2-5 (A) OTO2^O - 2 /HPF    Squamous Epithelial /LPF Urine Few FEW^Few /LPF    Bacteria Urine Moderate (A) NEG^Negative /HPF    Triple Phosphates Few (A) NEG^Negative /HPF   Creatinine urine calculation only   Result Value Ref Range    Creatinine Urine 186 mg/dL   Urine Culture Aerobic Bacterial   Result Value Ref Range    Specimen Description Midstream Urine     Special Requests Specimen received in preservative     Culture Micro <10,000 colonies/mL  urogenital pascual          If you have any  questions or concerns, please call the clinic at the number listed above.       Sincerely,      Killian Marroquin MD

## 2021-02-02 LAB
BACTERIA SPEC CULT: NORMAL
C3 SERPL-MCNC: 104 MG/DL (ref 81–157)
C4 SERPL-MCNC: 23 MG/DL (ref 13–39)
CD19 CELLS # BLD: <1 CELLS/UL (ref 107–698)
CD19 CELLS NFR BLD: <1 % (ref 6–27)
DSDNA AB SER-ACNC: 102 IU/ML
IGA SERPL-MCNC: 190 MG/DL (ref 84–499)
IGG SERPL-MCNC: 399 MG/DL (ref 610–1616)
IGM SERPL-MCNC: 16 MG/DL (ref 35–242)
Lab: NORMAL
SPECIMEN SOURCE: NORMAL

## 2021-02-04 ENCOUNTER — VIRTUAL VISIT (OUTPATIENT)
Dept: RHEUMATOLOGY | Facility: CLINIC | Age: 56
End: 2021-02-04
Attending: INTERNAL MEDICINE
Payer: COMMERCIAL

## 2021-02-04 DIAGNOSIS — I77.82 ANCA-NEGATIVE VASCULITIS (H): Primary | ICD-10-CM

## 2021-02-04 DIAGNOSIS — M32.14 LUPUS NEPHRITIS, ISN/RPS CLASS IV (H): ICD-10-CM

## 2021-02-04 DIAGNOSIS — Z79.899 HIGH RISK MEDICATIONS (NOT ANTICOAGULANTS) LONG-TERM USE: ICD-10-CM

## 2021-02-04 PROCEDURE — 99214 OFFICE O/P EST MOD 30 MIN: CPT | Mod: 95 | Performed by: INTERNAL MEDICINE

## 2021-02-04 ASSESSMENT — PAIN SCALES - GENERAL: PAINLEVEL: NO PAIN (0)

## 2021-02-04 NOTE — PROGRESS NOTES
Taina is a 55 year old who is being evaluated via a billable video visit.      How would you like to obtain your AVS? MyChart  If the video visit is dropped, the invitation should be resent by: Send to e-mail at: [email or web address]  Will anyone else be joining your video visit? No    Video Start Time: 10:10 am    Video End Time:10:32 am    Originating Location (pt. Location): Home    Distant Location (provider location):  [facility] [place]     Platform used for Video Visit: [employer]

## 2021-02-04 NOTE — PATIENT INSTRUCTIONS
Labs in early 5/2021    Plan for repeat rituximab one dose of 1000 mg in 6/2021    Return in 6 months

## 2021-02-04 NOTE — PROGRESS NOTES
Rheumatology Follow-up Virtual Visit Note    Reason for visit: f/u for lupus (this is a video visit due to COVID-19 outbreak), patient agreed      Date of last visit: 10/29/2020    DOS: 2/4/2021      History of Present Illness:    Taina Singh is a 55 year old  female who was seen for follow up of SLE and lupus nephritis.    She was diagnosed with lupus at age 27. Her 1st sx were malar rash and fatigue. No skin biopsy was done (reportedly had +KARLIE). She was treated with prednisone taper and HCQ. HCQ helped and she tolerated it well. She was diagnosed with Sjogren's at the same time. Had dryness of eyes and mouth. No lip biopsy to confirm the Dx. Reportedly she had very mild stable lupus for many years and eventually at some point, stopped taking HCQ (can't remember when). Her lupus started to flare in 7/2017 initially with pleuritic CP, was put back on HCQ. 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 Since then failed AZA, cytoxan and benlysta. MMF was not enough to control her LN and eventually rituximab was added (which was initially denied by insurance even with h/o lymphoma) given necrosis on the renal biopsy (rituximab is FDA approved for ANCA vasculitis). On HCQ+MMF after adding rituximab, LN and SLE started too improve.    She was diagnosed with MALT lymphoma at 44, had enlarged LN over R parotid gland, on biopsy it was MALT lymphoma. Tx was resection only, no radiation or chemo.      Today 2/4/2021:    Taina is on prednisone 8 mg every day, it was 17.5 mg every day at last visit. She feels achy and tired this week, but thinks the cold weather is responsible for her sx.    Had rituximab  375 mg/m2 on 1/22/2020, 2/6/2020, then 6/26/2020, 7/20/2020 and last dose 1 gram on 12/15/2020.    On mepron for PJP prophylaxis. On  mg bid, MMF 1500 mg bid.    She works from home.    Has intermittent joint pain, nothing consistent. one day shoulders hurt and the other day her  hips hurt. AM stiffness is 30-60 minutes. no pleurisy, sob or cough or fevers. No skin rash. Her hair grew back..    ROS:  A comprehensive ROS was done, positives are per HPI.    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44    No chemo or radiation     Other forms of systemic lupus erythematosus (H) 2018     Pulmonary embolism (H)     2019 seen on abd CT at [facility]     Sjogren's syndrome (H)     secondary Sjogrens, secondary to lupus     Vitamin D deficiency      Past Surgical History:   Procedure Laterality Date     BIOPSY/REMOVAL, LYMPH NODE(S)        SECTION  age 32     HC HYSTEROS W PERMANENT FALLOPAIN IMPLANT      Essure b/l     PAROTIDECTOMY Right 2006     TONSILLECTOMY       Family History   Problem Relation Age of Onset     Alopecia Brother      Rheumatoid Arthritis Brother      LUNG DISEASE No family hx of      Social History     Socioeconomic History     Marital status:      Spouse name: Not on file     Number of children: Not on file     Years of education: 1     Highest education level: Not on file   Occupational History     Comment: , 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   Social Needs     Financial resource strain: Not on file     Food insecurity     Worry: Not on file     Inability: Not on file     Transportation needs     Medical: Not on file     Non-medical: Not on file   Tobacco Use     Smoking status: Never Smoker     Smokeless tobacco: Never Used   Substance and Sexual Activity     Alcohol use: No     Drug use: No     Sexual activity:  Not on file   Lifestyle     Physical activity     Days per week: Not on file     Minutes per session: Not on file     Stress: Not on file   Relationships     Social connections     Talks on phone: Not on file     Gets together: Not on file     Attends Tenriism service: Not on file     Active member of club or organization: Not on file     Attends meetings of clubs or organizations: Not on file     Relationship status: Not on file     Intimate partner violence     Fear of current or ex partner: Not on file     Emotionally abused: Not on file     Physically abused: Not on file     Forced sexual activity: Not on file   Other Topics Concern     Parent/sibling w/ CABG, MI or angioplasty before 65F 55M? 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      Going through divorce but it's not stress factor for her.    Allergies   Allergen Reactions     Pentamidine Shortness Of Breath     Penicillins Rash     Sulfa Drugs Rash       Outpatient Encounter Medications as of 2/4/2021   Medication Sig Dispense Refill     albuterol (PROAIR HFA/PROVENTIL HFA/VENTOLIN HFA) 108 (90 Base) MCG/ACT inhaler Inhale 2 puffs into the lungs every 6 hours as needed for shortness of breath / dyspnea or wheezing (Patient not taking: Reported on 12/10/2020) 3 Inhaler 3     atovaquone (MEPRON) 750 MG/5ML suspension Take 10 mLs (1,500 mg) by mouth daily 900 mL 3     Calcium-Magnesium 300-300 MG TABS daily        hydroxychloroquine (PLAQUENIL) 200 MG tablet Take 1 tablet (200 mg) by mouth 2 times daily Annual Plaquenil toxicity eye screening required. 180 tablet 3     Multiple Vitamins-Minerals (WOMENS MULTI PO) Take by mouth daily        mycophenolate (GENERIC  EQUIVALENT) 500 MG tablet Take 3 tablets (1,500 mg) by mouth 2 times daily 540 tablet 3     Omega-3 Fatty Acids (OMEGA-3 FISH OIL) 500 MG CAPS Take 500 mg by mouth daily        pantoprazole (PROTONIX) 20 MG EC tablet Take 2 tablets (40 mg) by mouth 2 times daily       predniSONE (DELTASONE) 1 MG tablet 9-8-7-6 mg every day each course for one month until you reach 5mg daily. Use with 5 mg size tab 120 tablet 2     predniSONE (DELTASONE) 5 MG tablet 9-8-7-6 mg every day each course for one month until you reach 5mg daily. Use with 1 mg size tab 90 tablet 3     traMADol (ULTRAM) 50 MG tablet Take 1 tablet (50 mg) by mouth every 12 hours as needed for pain Prn pain 60 tablet 3     vitamin D3 (CHOLECALCIFEROL) 50 mcg (2000 units) tablet Take 1 tablet (50 mcg) by mouth daily 90 tablet 3     warfarin ANTICOAGULANT (COUMADIN) 5 MG tablet   1     zolpidem (AMBIEN) 5 MG tablet Take 1 tablet (5 mg) by mouth nightly as needed for sleep 30 tablet 3     No facility-administered encounter medications on file as of 2/4/2021.        Results:    RHEUM RESULTS Latest Ref Rng & Units 10/23/2020 12/15/2020 2/1/2021   COMPLEMENT C3 81 - 157 mg/dL 103 112 104   COMPLEMENT C4 13 - 39 mg/dL 22 21 23   DNA-DS <10 IU/mL 130(H) 141(H) 102(H)   SED RATE 0 - 30 mm/h 26 26 27   CRP, INFLAMMATION 0.0 - 8.0 mg/L 19.7(H) 8.4(H) 8.9(H)   AST 0 - 45 U/L 12 30 16   ALT 0 - 50 U/L 26 45 26   ALBUMIN 3.4 - 5.0 g/dL 3.7 3.6 4.1   WBC 4.0 - 11.0 10e9/L 8.1 7.9 7.8   RBC 3.8 - 5.2 10e12/L 4.61 4.56 4.50   HGB 11.7 - 15.7 g/dL 12.0 11.6(L) 11.7   HCT 35.0 - 47.0 % 38.8 38.6 37.9   MCV 78 - 100 fl 84 85 84   MCHC 31.5 - 36.5 g/dL 30.9(L) 30.1(L) 30.9(L)   RDW 10.0 - 15.0 % 14.7 14.4 14.9    - 450 10e9/L 263 207 236   CREATININE 0.52 - 1.04 mg/dL 0.68 0.65 0.60   GFR ESTIMATE, IF BLACK >60 mL/min/[1.73:m2] >90 >90 >90   GFR ESTIMATE >60 mL/min/[1.73:m2] >90 >90 >90    - 1,616 mg/dL - 391(L) 399(L)   IGA 84 - 499 mg/dL - 187 190   IGM 35 - 242  mg/dL - 18(L) 16(L)   HEPATITIS C ANTIBODY NR:Nonreactive - - -     Component      Latest Ref Rng & Units 2/1/2021   WBC      4.0 - 11.0 10e9/L 7.8   RBC Count      3.8 - 5.2 10e12/L 4.50   Hemoglobin      11.7 - 15.7 g/dL 11.7   Hematocrit      35.0 - 47.0 % 37.9   MCV      78 - 100 fl 84   MCH      26.5 - 33.0 pg 26.0 (L)   MCHC      31.5 - 36.5 g/dL 30.9 (L)   RDW      10.0 - 15.0 % 14.9   Platelet Count      150 - 450 10e9/L 236   % Neutrophils      % 84.9   % Lymphocytes      % 6.3   % Monocytes      % 7.9   % Eosinophils      % 0.6   % Basophils      % 0.3   Absolute Neutrophil      1.6 - 8.3 10e9/L 6.6   Absolute Lymphocytes      0.8 - 5.3 10e9/L 0.5 (L)   Absolute Monocytes      0.0 - 1.3 10e9/L 0.6   Absolute Eosinophils      0.0 - 0.7 10e9/L 0.1   Absolute Basophils      0.0 - 0.2 10e9/L 0.0   Diff Method       Automated Method   Color Urine       Yellow   Appearance Urine       Clear   Glucose Urine      NEG:Negative mg/dL Negative   Bilirubin Urine      NEG:Negative Negative   Ketones Urine      NEG:Negative mg/dL Trace (A)   Specific Gravity Urine      1.003 - 1.035 >1.030   pH Urine      5.0 - 7.0 pH 6.0   Protein Albumin Urine      NEG:Negative mg/dL 100 (A)   Urobilinogen Urine      0.2 - 1.0 EU/dL 0.2   Nitrite Urine      NEG:Negative Negative   Blood Urine      NEG:Negative Trace (A)   Leukocyte Esterase Urine      NEG:Negative Trace (A)   Source       Midstream Urine   WBC Urine      OTO5:0 - 5 /HPF 10-25 (A)   RBC Urine      OTO2:O - 2 /HPF 2-5 (A)   Squamous Epithelial /LPF Urine      FEW:Few /LPF Few   Bacteria Urine      NEG:Negative /HPF Moderate (A)   Triple Phosphates      NEG:Negative /HPF Few (A)   IGG      610 - 1,616 mg/dL 399 (L)   IGA      84 - 499 mg/dL 190   IGM      35 - 242 mg/dL 16 (L)   Creatinine      0.52 - 1.04 mg/dL 0.60   GFR Estimate      >60 mL/min/1.73:m2 >90   GFR Estimate If Black      >60 mL/min/1.73:m2 >90   Specimen Description       Midstream Urine   Special  Requests       Specimen received in preservative   Culture Micro       <10,000 colonies/mL . . .   CD19 B Cells      6 - 27 % <1 (L)   Absolute CD19      107 - 698 cells/uL <1 (L)   Protein Random Urine      g/L 0.75   Protein Total Urine g/gr Creatinine      0 - 0.2 g/g Cr 0.40 (H)   Creatinine Urine Random      mg/dL 182   Sed Rate      0 - 30 mm/h 27   DNA-ds      <10 IU/mL 102 (H)   CRP Inflammation      0.0 - 8.0 mg/L 8.9 (H)   Complement C3      81 - 157 mg/dL 104   Complement C4      13 - 39 mg/dL 23   AST      0 - 45 U/L 16   Albumin      3.4 - 5.0 g/dL 4.1   ALT      0 - 50 U/L 26   Creatinine Urine      mg/dL 186     Physical Exam:    Constitutional: Pleasant, NAD  Eyes: EOMI, sclera anicteric, conj not injected.  MS: No synovitis.   Skin: No skin rash  Neuro: CN grossly intact without focal deficit  Psych: nl affect    Assessment/Plan:    SLE. Taina is a 56 yo WF with +FH RA and personal hx of SLE presented in 12/2017 for 2nd opinion of m/o her SLE. She was diagnosed with SLE at age 27. Her lupus has been marked by +KARLIE (highest titer 1:640, speckled and nucleolar patterns), +anti-DNA, low C3/C4, arthritis, malar rash, serositis (pleuritic CP), lupus nephritis, hemolytic anemia, enteritis supported by +SSA/SSB Ab, +RF, high ESR/CRP. She had neg anti-RNP, anti-Sm, acL IgM/G/A, LAC, cryo and anti-CCP.     She was treated with prednisone and HCQ at the time of Dx. Can't remember how long she was on HCQ and why HCQ was stopped, but it probably was stopped because of stable disease, no report on HCQ toxicity. Reportedly her SLE was mild all these years.    Has secondary Sjogren's with sicca, +SSA/SSB Ab and h/o MALT lymphoma at age 44 in remission. Uses blink eyedrops and salagen. No lip biopsy was done to confirm Dx of Sjogren's.     Her lupus flared in 7/2017 with no triggers when she presented with arthritis, HCQ was resumed, arthritis resolved. Mild pulm HTN on 2D-Echo 8/2017 but neg R cardiac cath and VQ  scan in 3/2018, also neg 2D-Echo.    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 Patient received 1st dose cyclophosphamide on 6/15/19, had 6 doses. Initiated Rituxin 1/22/2020 as failed cytoxan. Previously failed AZA, benlysta.    S/p rituximab on 1/22/2020 and 2/6/2020, 6/26/2020, 7/20/2020 on prednisone 8 mg every day , MMF 3 gr every day and  mg a day. Recommend repeat rituximab as it is the only med that has helped with LN. Also tx options are limited (also necrotizing lesions on renal biopsy, can not rule out ANCA neg vasculitis overlap and rituximab is FDA approved for GPA/MPA). She is doing better, labs are stable with ur pr/cr=0.4, improving anti-DNA, NL C3/C4 as of 2/1/2021.       Today's plan:    - Continue Cellcept 1500 mg BID and  mg BID  - Continue prednisone taper 1 mg down q4wk till she reaches 5 mg every day and stay on that dose  - Annual eye exam for HCQ monitoring  -S/P Rituximab 1 gram on 12/15/2020  -Labs in early 5/2021  -Plan for repeat rituximab one dose of 1000 mg in 6/2021  - PCP prophylaxis on atovaquone 10 mL (1500 mg). Patient did not tolerate inhaled pentamidine. Avoiding TMP-SMX given sulfa reaction and potential increase risk of SLE flare. Hesitant to use dapsone given risk of hemolytic anemia.  -Tramadol prn for pain  -Ambien prn for insomnia    - Follow-up in 6 months        Orders Placed This Encounter   Procedures     AST     ALT     Myeloperoxidase and Proteinase 3 Panel     Albumin level     CBC with platelets differential     Creatinine     CRP inflammation     UA with Microscopic reflex to Culture     Protein  random urine with Creat Ratio     Creatinine random urine     Erythrocyte sedimentation rate auto     CD19 B Cell Count     ANCA IgG by IFA with Reflex to Titer     Immunoglobulins A G and M     DNA double stranded antibodies     Complement C3     Complement C4       Video Start Time: 10;10 am  Video End Time: 10:32 am    Killian Marroquin MD

## 2021-03-15 ENCOUNTER — TELEPHONE (OUTPATIENT)
Dept: RHEUMATOLOGY | Facility: CLINIC | Age: 56
End: 2021-03-15

## 2021-03-15 NOTE — TELEPHONE ENCOUNTER
Pt decreased to 7 mg a day 1 week ago. She said that for the last 2 weeks she has noticed sore hips and shoulders.  She states the pain has gradually come on.  It is in the joints, pain is 3/10.  Prevents her from taking a long walk, but not a short walk. Does not keep her from doing normal daily activities      Nothing makes it worse, nothing makes it better.      Discussed covid vaccine with pt due to medications.  Referenced ACR guidelines per Dr. Marroquin.    Sophie Alvarado RN  Rheumatology Clinic

## 2021-03-16 NOTE — TELEPHONE ENCOUNTER
Killian Marroquin MD Beard, Madeline, RN   Caller: Unspecified (Yesterday, 10:22 AM)             Please ask her to go back up to 8 mg a day and when pain gets better, try the taper again, this time 8/7 mg every other day.      Called and updated pt, she is willing to give the increase in prednisone and try tapering again.    Sophie Alvarado RN  Rheumatology Clinic

## 2021-03-17 ENCOUNTER — DOCUMENTATION ONLY (OUTPATIENT)
Dept: CARE COORDINATION | Facility: CLINIC | Age: 56
End: 2021-03-17

## 2021-03-30 ENCOUNTER — MYC MEDICAL ADVICE (OUTPATIENT)
Dept: RHEUMATOLOGY | Facility: CLINIC | Age: 56
End: 2021-03-30

## 2021-03-31 NOTE — TELEPHONE ENCOUNTER
Killian Marroquin MD Beard, Madeline, EYAL   Phone Number: [phone]             Fine with me. Could you help? We could set up her infusion 2 weeks after     Called and updated pt.    Sophie Alvarado RN  Rheumatology Clinic

## 2021-03-31 NOTE — TELEPHONE ENCOUNTER
Killian Marroquin MD Beard, Madeline, RN   Caller: Unspecified (Today, 11:57 AM)   Phone Number: [phone]             Per my last visit note/AVS:     Labs in early 5/2021       Plan for repeat rituximab one dose of 1000 mg in 6/2021       Return in 6 months       Now moving it from June to May.

## 2021-04-06 DIAGNOSIS — R82.90 NONSPECIFIC FINDING ON EXAMINATION OF URINE: Primary | ICD-10-CM

## 2021-04-06 DIAGNOSIS — M32.14 LUPUS NEPHRITIS, ISN/RPS CLASS IV (H): ICD-10-CM

## 2021-04-06 DIAGNOSIS — I77.82 ANCA-NEGATIVE VASCULITIS (H): ICD-10-CM

## 2021-04-06 DIAGNOSIS — Z79.899 HIGH RISK MEDICATIONS (NOT ANTICOAGULANTS) LONG-TERM USE: ICD-10-CM

## 2021-04-06 LAB
ALBUMIN SERPL-MCNC: 3.8 G/DL (ref 3.4–5)
ALBUMIN UR-MCNC: NEGATIVE MG/DL
ALT SERPL W P-5'-P-CCNC: 30 U/L (ref 0–50)
APPEARANCE UR: CLEAR
AST SERPL W P-5'-P-CCNC: 16 U/L (ref 0–45)
BACTERIA #/AREA URNS HPF: ABNORMAL /HPF
BASOPHILS # BLD AUTO: 0 10E9/L (ref 0–0.2)
BASOPHILS NFR BLD AUTO: 0.1 %
BILIRUB UR QL STRIP: NEGATIVE
COLOR UR AUTO: YELLOW
CREAT SERPL-MCNC: 0.74 MG/DL (ref 0.52–1.04)
DIFFERENTIAL METHOD BLD: ABNORMAL
EOSINOPHIL # BLD AUTO: 0.1 10E9/L (ref 0–0.7)
EOSINOPHIL NFR BLD AUTO: 0.7 %
ERYTHROCYTE [DISTWIDTH] IN BLOOD BY AUTOMATED COUNT: 14.4 % (ref 10–15)
ERYTHROCYTE [SEDIMENTATION RATE] IN BLOOD BY WESTERGREN METHOD: 20 MM/H (ref 0–30)
GFR SERPL CREATININE-BSD FRML MDRD: >90 ML/MIN/{1.73_M2}
GLUCOSE UR STRIP-MCNC: NEGATIVE MG/DL
HCT VFR BLD AUTO: 37.8 % (ref 35–47)
HGB BLD-MCNC: 11.8 G/DL (ref 11.7–15.7)
HGB UR QL STRIP: ABNORMAL
KETONES UR STRIP-MCNC: NEGATIVE MG/DL
LEUKOCYTE ESTERASE UR QL STRIP: ABNORMAL
LYMPHOCYTES # BLD AUTO: 0.4 10E9/L (ref 0.8–5.3)
LYMPHOCYTES NFR BLD AUTO: 6.2 %
MCH RBC QN AUTO: 26.3 PG (ref 26.5–33)
MCHC RBC AUTO-ENTMCNC: 31.2 G/DL (ref 31.5–36.5)
MCV RBC AUTO: 84 FL (ref 78–100)
MONOCYTES # BLD AUTO: 0.6 10E9/L (ref 0–1.3)
MONOCYTES NFR BLD AUTO: 8.8 %
NEUTROPHILS # BLD AUTO: 6 10E9/L (ref 1.6–8.3)
NEUTROPHILS NFR BLD AUTO: 84.2 %
NITRATE UR QL: NEGATIVE
NON-SQ EPI CELLS #/AREA URNS LPF: ABNORMAL /LPF
PH UR STRIP: 5.5 PH (ref 5–7)
PLATELET # BLD AUTO: 236 10E9/L (ref 150–450)
PROT UR-MCNC: 0.13 G/L
PROT/CREAT 24H UR: 0.18 G/G CR (ref 0–0.2)
RBC # BLD AUTO: 4.48 10E12/L (ref 3.8–5.2)
RBC #/AREA URNS AUTO: ABNORMAL /HPF
SOURCE: ABNORMAL
SP GR UR STRIP: 1.01 (ref 1–1.03)
UROBILINOGEN UR STRIP-ACNC: 0.2 EU/DL (ref 0.2–1)
WBC # BLD AUTO: 7.1 10E9/L (ref 4–11)
WBC #/AREA URNS AUTO: ABNORMAL /HPF
WBC CLUMPS #/AREA URNS HPF: PRESENT /HPF

## 2021-04-06 PROCEDURE — 83516 IMMUNOASSAY NONANTIBODY: CPT | Performed by: INTERNAL MEDICINE

## 2021-04-06 PROCEDURE — 84156 ASSAY OF PROTEIN URINE: CPT | Performed by: INTERNAL MEDICINE

## 2021-04-06 PROCEDURE — 85652 RBC SED RATE AUTOMATED: CPT | Performed by: INTERNAL MEDICINE

## 2021-04-06 PROCEDURE — 86225 DNA ANTIBODY NATIVE: CPT | Performed by: INTERNAL MEDICINE

## 2021-04-06 PROCEDURE — 87086 URINE CULTURE/COLONY COUNT: CPT | Performed by: INTERNAL MEDICINE

## 2021-04-06 PROCEDURE — 86355 B CELLS TOTAL COUNT: CPT | Performed by: INTERNAL MEDICINE

## 2021-04-06 PROCEDURE — 82565 ASSAY OF CREATININE: CPT | Performed by: INTERNAL MEDICINE

## 2021-04-06 PROCEDURE — 36415 COLL VENOUS BLD VENIPUNCTURE: CPT | Performed by: INTERNAL MEDICINE

## 2021-04-06 PROCEDURE — 85025 COMPLETE CBC W/AUTO DIFF WBC: CPT | Performed by: INTERNAL MEDICINE

## 2021-04-06 PROCEDURE — 87186 SC STD MICRODIL/AGAR DIL: CPT | Performed by: INTERNAL MEDICINE

## 2021-04-06 PROCEDURE — 83876 ASSAY MYELOPEROXIDASE: CPT | Performed by: INTERNAL MEDICINE

## 2021-04-06 PROCEDURE — 84450 TRANSFERASE (AST) (SGOT): CPT | Performed by: INTERNAL MEDICINE

## 2021-04-06 PROCEDURE — 82784 ASSAY IGA/IGD/IGG/IGM EACH: CPT | Performed by: INTERNAL MEDICINE

## 2021-04-06 PROCEDURE — 81001 URINALYSIS AUTO W/SCOPE: CPT | Performed by: INTERNAL MEDICINE

## 2021-04-06 PROCEDURE — 86140 C-REACTIVE PROTEIN: CPT | Performed by: INTERNAL MEDICINE

## 2021-04-06 PROCEDURE — 82040 ASSAY OF SERUM ALBUMIN: CPT | Performed by: INTERNAL MEDICINE

## 2021-04-06 PROCEDURE — 87088 URINE BACTERIA CULTURE: CPT | Performed by: INTERNAL MEDICINE

## 2021-04-06 PROCEDURE — 86160 COMPLEMENT ANTIGEN: CPT | Performed by: INTERNAL MEDICINE

## 2021-04-06 PROCEDURE — 86255 FLUORESCENT ANTIBODY SCREEN: CPT | Performed by: INTERNAL MEDICINE

## 2021-04-06 PROCEDURE — 84460 ALANINE AMINO (ALT) (SGPT): CPT | Performed by: INTERNAL MEDICINE

## 2021-04-06 NOTE — LETTER
April 12, 2021      Taina Singh  [address] [place] [address]        Dear ,    We are writing to inform you of your test results.    Labs andria good except abnormal urine test. Do you have any UTI symptoms? If so, we need to treat with antibiotics; otherwise to re-check urine test.    Resulted Orders   Complement C4   Result Value Ref Range    Complement C4 22 13 - 39 mg/dL   Complement C3   Result Value Ref Range    Complement C3 113 81 - 157 mg/dL   DNA double stranded antibodies   Result Value Ref Range    DNA-ds 94 (H) <10 IU/mL      Comment:      Positive   Immunoglobulins A G and M   Result Value Ref Range     (L) 610 - 1,616 mg/dL     84 - 499 mg/dL    IGM 16 (L) 35 - 242 mg/dL   ANCA IgG by IFA with Reflex to Titer   Result Value Ref Range    Neutrophil Cytoplasmic Antibody <1:10 <1:10 [titer]    Neutrophil Cytoplasmic Antibody Pattern       The ANCA IFA is <1:10.  No further testing will be performed.   CD19 B Cell Count   Result Value Ref Range    CD19 B Cells <1 6 - 27 %    Absolute CD19 <1 107 - 698 cells/uL   Erythrocyte sedimentation rate auto   Result Value Ref Range    Sed Rate 20 0 - 30 mm/h   Creatinine random urine   Result Value Ref Range    Creatinine Urine Random 73 mg/dL   Protein  random urine with Creat Ratio   Result Value Ref Range    Protein Random Urine 0.13 g/L    Protein Total Urine g/gr Creatinine 0.18 0 - 0.2 g/g Cr   CRP inflammation   Result Value Ref Range    CRP Inflammation 9.2 (H) 0.0 - 8.0 mg/L   Creatinine   Result Value Ref Range    Creatinine 0.74 0.52 - 1.04 mg/dL    GFR Estimate >90 >60 mL/min/[1.73_m2]      Comment:      Non  GFR Calc  Starting 12/18/2018, serum creatinine based estimated GFR (eGFR) will be   calculated using the Chronic Kidney Disease Epidemiology Collaboration   (CKD-EPI) equation.      GFR Estimate If Black >90 >60 mL/min/[1.73_m2]      Comment:       GFR Calc  Starting 12/18/2018, serum  creatinine based estimated GFR (eGFR) will be   calculated using the Chronic Kidney Disease Epidemiology Collaboration   (CKD-EPI) equation.     Albumin level   Result Value Ref Range    Albumin 3.8 3.4 - 5.0 g/dL   ALT   Result Value Ref Range    ALT 30 0 - 50 U/L   AST   Result Value Ref Range    AST 16 0 - 45 U/L   UA reflex to Microscopic   Result Value Ref Range    Color Urine Yellow     Appearance Urine Clear     Glucose Urine Negative NEG^Negative mg/dL    Bilirubin Urine Negative NEG^Negative    Ketones Urine Negative NEG^Negative mg/dL    Specific Gravity Urine 1.015 1.003 - 1.035    Blood Urine Trace (A) NEG^Negative    pH Urine 5.5 5.0 - 7.0 pH    Protein Albumin Urine Negative NEG^Negative mg/dL    Urobilinogen Urine 0.2 0.2 - 1.0 EU/dL    Nitrite Urine Negative NEG^Negative    Leukocyte Esterase Urine Moderate (A) NEG^Negative    Source Midstream Urine    **CBC with platelets differential FUTURE 2mo   Result Value Ref Range    WBC 7.1 4.0 - 11.0 10e9/L    RBC Count 4.48 3.8 - 5.2 10e12/L    Hemoglobin 11.8 11.7 - 15.7 g/dL    Hematocrit 37.8 35.0 - 47.0 %    MCV 84 78 - 100 fl    MCH 26.3 (L) 26.5 - 33.0 pg    MCHC 31.2 (L) 31.5 - 36.5 g/dL      Comment:      Results confirmed by repeat test    RDW 14.4 10.0 - 15.0 %    Platelet Count 236 150 - 450 10e9/L    % Neutrophils 84.2 %    % Lymphocytes 6.2 %    % Monocytes 8.8 %    % Eosinophils 0.7 %    % Basophils 0.1 %    Absolute Neutrophil 6.0 1.6 - 8.3 10e9/L    Absolute Lymphocytes 0.4 (L) 0.8 - 5.3 10e9/L    Absolute Monocytes 0.6 0.0 - 1.3 10e9/L    Absolute Eosinophils 0.1 0.0 - 0.7 10e9/L    Absolute Basophils 0.0 0.0 - 0.2 10e9/L    Diff Method Automated Method    Myeloperoxidase and Proteinase 3 Panel   Result Value Ref Range    Myeloperoxidase Antibody IgG <0.2 0.0 - 0.9 AI      Comment:      Negative  Antibody index (AI) values reflect qualitative changes in antibody   concentration that cannot be directly associated with clinical condition or    disease state.      Proteinase 3 Antibody IgG <0.2 0.0 - 0.9 AI      Comment:      Negative  Antibody index (AI) values reflect qualitative changes in antibody   concentration that cannot be directly associated with clinical condition or   disease state.     Urine Microscopic   Result Value Ref Range    WBC Urine 10-25 (A) OTO5^0 - 5 /HPF    RBC Urine 2-5 (A) OTO2^O - 2 /HPF    WBC Clumps Present (A) NEG^Negative /HPF    Squamous Epithelial /LPF Urine Few FEW^Few /LPF    Bacteria Urine Few (A) NEG^Negative /HPF   Urine Culture Aerobic Bacterial   Result Value Ref Range    Specimen Description Midstream Urine     Special Requests Specimen received in preservative     Culture Micro >100,000 colonies/mL  Klebsiella pneumoniae   (A)     Culture Micro       <10,000 colonies/mL  urogenital pascual  Susceptibility testing not routinely done         If you have any questions or concerns, please call the clinic at the number listed above.       Sincerely,      Killian Marroquin MD

## 2021-04-07 LAB
ANCA AB PATTERN SER IF-IMP: NORMAL
C-ANCA TITR SER IF: NORMAL {TITER}
C3 SERPL-MCNC: 113 MG/DL (ref 81–157)
C4 SERPL-MCNC: 22 MG/DL (ref 13–39)
CD19 CELLS # BLD: <1 CELLS/UL (ref 107–698)
CD19 CELLS NFR BLD: <1 % (ref 6–27)
CREAT UR-MCNC: 73 MG/DL
CRP SERPL-MCNC: 9.2 MG/L (ref 0–8)
DSDNA AB SER-ACNC: 94 IU/ML
IGA SERPL-MCNC: 177 MG/DL (ref 84–499)
IGG SERPL-MCNC: 432 MG/DL (ref 610–1616)
IGM SERPL-MCNC: 16 MG/DL (ref 35–242)
MYELOPEROXIDASE AB SER-ACNC: <0.2 AI (ref 0–0.9)
PROTEINASE3 IGG SER-ACNC: <0.2 AI (ref 0–0.9)

## 2021-04-08 ENCOUNTER — VIRTUAL VISIT (OUTPATIENT)
Dept: NEPHROLOGY | Facility: CLINIC | Age: 56
End: 2021-04-08
Attending: INTERNAL MEDICINE
Payer: COMMERCIAL

## 2021-04-08 DIAGNOSIS — M32.14 LUPUS NEPHRITIS, ISN/RPS CLASS IV (H): Primary | ICD-10-CM

## 2021-04-08 PROCEDURE — 99214 OFFICE O/P EST MOD 30 MIN: CPT | Mod: 95 | Performed by: INTERNAL MEDICINE

## 2021-04-08 ASSESSMENT — PAIN SCALES - GENERAL: PAINLEVEL: MILD PAIN (3)

## 2021-04-08 NOTE — PROGRESS NOTES
Nephrology Follow Up  4/8/2021    Taina Singh   MRN:[id number]   YOB: 1965    REASON FOR FOLLOW UP: Lupus Nephritis      HISTORY OF PRESENT ILLNESS:  Patient is a 55-year-old female. She was originally diagnosed with systemic lupus erythematosus at age 27 and was placed on hydroxychloroquine with good control of her disease.  For full review of her SLE course, please see HPI from Dr. Sherman's note from 9/10/2020.       She notes overall doing well. She had been tapering Pred, got down to 7.5mg, but then developed significant worsening of joint pains, moreso her hips. She started taking her Tramadol as well. Pred increased to 9 mg Daily, now slowly tapering again. Joints got much better with increased Pred. Hips were the worst, shoulders were achy too, fingers a little puffy, but Hips were the major issue.    Otherwise, appetite is good. No nausea. She is losing some weight with more activity, going outside and trying to exercise - she is going on walks. She has some hand weights, doing some home exercise. She is also going start some resistance training. No chest pain or SOB with this activity. No Leg swelling, much improved with decreased Pred and activity.     No fevers or rashes, no joint swellings. She has had no hematuria, dysuria, or foamy urine.     Home BP - 122/62.      PAST MEDICAL HISTORY:  Reviewed with patient on 04/08/2021   As per HPI    MEDICATIONS:  Reviewed with the patient in detail    ALLERGIES:    Reviewed with the patient in detail    REVIEW OF SYSTEMS:  A comprehensive of systems was negative except as noted above.    SOCIAL HISTORY:   Reviewed with patient, no smoking and no alcohol use     FAMILY MEDICAL HISTORY:   Reviewed, no family history of need for dialysis, transplant or CKD    PHYSICAL EXAM:   Vital signs:There were no vitals taken for this visit.    Physical Exam  Constitutional:       Appearance: Normal appearance.   Neurological:      Mental Status: She is alert  and oriented to person, place, and time.       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     At this time, her Nephritis is well controlled. Will reach out to Dr. Marroquin and let her know that we feel her Nephritis is in remission. We do feel that consolidation of some of her immune suppression would be reasonable - given best response appeared to be to Ritux, could consider coming down on Cellcept and maintaining with Ritux.     #COVID vaccination  Given she is very low risk for exposure, and her B cells are still deplete, recommended she consider getting vaccinated in May (will repeat her labs to see if B cells repopulate). She will think about it.    #Hip Pain  Been on Pred for sometime, but now <10mg daily, will plan DEXA and hip imaging after her vaccination as it is not an urgent issue.     We will see her again in 3 months.    Pt staffed with Dr. Reggie Rouse MD  Nephrology  [phone]    --------  Taina is a 56 year old who is being evaluated via a billable video visit.      How would you like to obtain your AVS? MyChart  If the video visit is dropped, the invitation should be resent by: Send to e-mail at: [email or web address]  Will anyone else be joining your video visit? No      Video Start Time: 12:58 PM  Video-Visit Details    Type of service:  Video Visit    Video End Time:01:27 PM    Originating Location (pt. Location): Home    Distant Location (provider location):  [facility] [place]     Platform used for Video Visit: [employer]

## 2021-04-08 NOTE — LETTER
4/8/2021        RE: Taina Singh  [address] [place] [address]     Dear Colleague,    Thank you for referring your patient, Taina Singh, to the [facility]PHROLOGY [facility] [place] [facility]. Please see a copy of my visit note below.      Nephrology Follow Up  4/8/2021    Taina Singh   MRN:[id number]   YOB: 1965    REASON FOR FOLLOW UP: Lupus Nephritis      HISTORY OF PRESENT ILLNESS:  Patient is a 55-year-old female. She was originally diagnosed with systemic lupus erythematosus at age 27 and was placed on hydroxychloroquine with good control of her disease.  For full review of her SLE course, please see HPI from Dr. Sherman's note from 9/10/2020.       She notes overall doing well. She had been tapering Pred, got down to 7.5mg, but then developed significant worsening of joint pains, moreso her hips. She started taking her Tramadol as well. Pred increased to 9 mg Daily, now slowly tapering again. Joints got much better with increased Pred. Hips were the worst, shoulders were achy too, fingers a little puffy, but Hips were the major issue.    Otherwise, appetite is good. No nausea. She is losing some weight with more activity, going outside and trying to exercise - she is going on walks. She has some hand weights, doing some home exercise. She is also going start some resistance training. No chest pain or SOB with this activity. No Leg swelling, much improved with decreased Pred and activity.     No fevers or rashes, no joint swellings. She has had no hematuria, dysuria, or foamy urine.     Home BP - 122/62.      PAST MEDICAL HISTORY:  Reviewed with patient on 04/08/2021   As per HPI    MEDICATIONS:  Reviewed with the patient in detail    ALLERGIES:    Reviewed with the patient in detail    REVIEW OF SYSTEMS:  A comprehensive of systems was negative except as noted above.    SOCIAL HISTORY:   Reviewed with patient, no smoking and  no alcohol use     FAMILY MEDICAL HISTORY:   Reviewed, no family history of need for dialysis, transplant or CKD    PHYSICAL EXAM:   Vital signs:There were no vitals taken for this visit.    Physical Exam  Constitutional:       Appearance: Normal appearance.   Neurological:      Mental Status: She is alert and oriented to person, place, and time.       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     At this time, her Nephritis is well controlled. Will reach out to Dr. Marroquin and let her know that we feel her Nephritis is in remission. We do feel that consolidation of some of her immune suppression would be reasonable - given best response appeared to be to Ritux, could consider coming down on Cellcept and maintaining with Ritux.     #COVID vaccination  Given she is very low risk for exposure, and her B cells are still deplete, recommended she consider getting vaccinated in May (will repeat her labs to see if B cells repopulate). She will think about it.    #Hip Pain  Been on Pred for sometime, but now <10mg daily, will plan DEXA and hip imaging after her vaccination as it is not an urgent issue.     We will see her again in 3 months.    Pt staffed with Dr. Reggie Rouse MD  Nephrology  [phone]    --------  Taina is a 56 year old who is being evaluated via a billable video visit.      How would you like to obtain your AVS? MyChart  If the video visit is dropped, the invitation should be resent by: Send to e-mail at: [email or web address]  Will anyone else be joining your video visit? No      Video Start Time: 12:58 PM  Video-Visit Details    Type of service:  Video  "Visit    Video End Time:01:27 PM    Originating Location (pt. Location): Home    Distant Location (provider location):  [facility] NEPHROLOGY [facility]     Platform used for Video Visit: [employer]      Attestation signed by Luis Eduardo Paredes MD at 4/8/2021  9:10 PM:  Attestation:  This patient was evaluated by me, Luis Eduardo Paredes MD on April 8, 2021 jointly with Dr Rouse.  Discussed with Dr Rouse and agree with the findings and plan in this note.  I have reviewed  Medications, Vital Signs, and Labs.  Mrs Singh is clinically stable from the point of view of SLE and lupus nephritis.  We discussed future maintenance therapy.  I do not think she necessarily needs to continue with \"maintenance\" doses of rituximab, as such has not been studied or demonstrated benefit is preventing recurrence of lupus nephritis.  Full dose MMF should suffice in that respect.   We discussed timing of COVID vaccination.  She would (at least theoretically) be more likely to mount an adequate immune response to the vaccine when her B cells are repopulated at least partially after the effect of rituximab wears off.    Luis Eduardo Paredes MD  [facility]  Department of Medicine  Division of Renal Disease and Hypertension  Pager     "

## 2021-04-09 LAB
BACTERIA SPEC CULT: ABNORMAL
BACTERIA SPEC CULT: ABNORMAL
Lab: ABNORMAL
SPECIMEN SOURCE: ABNORMAL

## 2021-04-12 DIAGNOSIS — N39.0 URINARY TRACT INFECTION WITHOUT HEMATURIA, SITE UNSPECIFIED: Primary | ICD-10-CM

## 2021-04-12 RX ORDER — LEVOFLOXACIN 250 MG/1
TABLET, FILM COATED ORAL
Qty: 5 TABLET | Refills: 0 | Status: SHIPPED | OUTPATIENT
Start: 2021-04-12 | End: 2021-06-28

## 2021-04-12 NOTE — RESULT ENCOUNTER NOTE
Labs andria good except abnormal urine test. Do you have any UTI symptoms? If so, we need to treat with antibiotics; otherwise to re-check urine test.

## 2021-04-17 ENCOUNTER — HEALTH MAINTENANCE LETTER (OUTPATIENT)
Age: 56
End: 2021-04-17

## 2021-05-07 DIAGNOSIS — M32.14 LUPUS NEPHRITIS, ISN/RPS CLASS IV (H): ICD-10-CM

## 2021-05-07 LAB
ALBUMIN SERPL-MCNC: 3.8 G/DL (ref 3.4–5)
ALBUMIN UR-MCNC: NEGATIVE MG/DL
ANION GAP SERPL CALCULATED.3IONS-SCNC: 6 MMOL/L (ref 3–14)
APPEARANCE UR: CLEAR
BILIRUB UR QL STRIP: NEGATIVE
BUN SERPL-MCNC: 19 MG/DL (ref 7–30)
CALCIUM SERPL-MCNC: 9.7 MG/DL (ref 8.5–10.1)
CHLORIDE SERPL-SCNC: 101 MMOL/L (ref 94–109)
CO2 SERPL-SCNC: 28 MMOL/L (ref 20–32)
COLOR UR AUTO: YELLOW
CREAT SERPL-MCNC: 0.69 MG/DL (ref 0.52–1.04)
CREAT UR-MCNC: 28 MG/DL
ERYTHROCYTE [DISTWIDTH] IN BLOOD BY AUTOMATED COUNT: 14.3 % (ref 10–15)
GFR SERPL CREATININE-BSD FRML MDRD: >90 ML/MIN/{1.73_M2}
GLUCOSE SERPL-MCNC: 94 MG/DL (ref 70–99)
GLUCOSE UR STRIP-MCNC: NEGATIVE MG/DL
HCT VFR BLD AUTO: 37.7 % (ref 35–47)
HGB BLD-MCNC: 11.6 G/DL (ref 11.7–15.7)
HGB UR QL STRIP: NEGATIVE
KETONES UR STRIP-MCNC: NEGATIVE MG/DL
LEUKOCYTE ESTERASE UR QL STRIP: ABNORMAL
MCH RBC QN AUTO: 25.9 PG (ref 26.5–33)
MCHC RBC AUTO-ENTMCNC: 30.8 G/DL (ref 31.5–36.5)
MCV RBC AUTO: 84 FL (ref 78–100)
NITRATE UR QL: NEGATIVE
PH UR STRIP: 6.5 PH (ref 5–7)
PHOSPHATE SERPL-MCNC: 4.7 MG/DL (ref 2.5–4.5)
PLATELET # BLD AUTO: 220 10E9/L (ref 150–450)
POTASSIUM SERPL-SCNC: 4.1 MMOL/L (ref 3.4–5.3)
PROT UR-MCNC: <0.05 G/L
PROT/CREAT 24H UR: NORMAL G/G CR (ref 0–0.2)
RBC # BLD AUTO: 4.48 10E12/L (ref 3.8–5.2)
RBC #/AREA URNS AUTO: ABNORMAL /HPF
SODIUM SERPL-SCNC: 135 MMOL/L (ref 133–144)
SOURCE: ABNORMAL
SP GR UR STRIP: 1.01 (ref 1–1.03)
UROBILINOGEN UR STRIP-ACNC: 0.2 EU/DL (ref 0.2–1)
WBC # BLD AUTO: 6.3 10E9/L (ref 4–11)
WBC #/AREA URNS AUTO: ABNORMAL /HPF

## 2021-05-07 PROCEDURE — 36415 COLL VENOUS BLD VENIPUNCTURE: CPT | Performed by: INTERNAL MEDICINE

## 2021-05-07 PROCEDURE — 85027 COMPLETE CBC AUTOMATED: CPT | Performed by: INTERNAL MEDICINE

## 2021-05-07 PROCEDURE — 81001 URINALYSIS AUTO W/SCOPE: CPT | Performed by: INTERNAL MEDICINE

## 2021-05-07 PROCEDURE — 80069 RENAL FUNCTION PANEL: CPT | Performed by: INTERNAL MEDICINE

## 2021-05-07 PROCEDURE — 84156 ASSAY OF PROTEIN URINE: CPT | Performed by: INTERNAL MEDICINE

## 2021-05-07 PROCEDURE — 86160 COMPLEMENT ANTIGEN: CPT | Performed by: INTERNAL MEDICINE

## 2021-05-07 PROCEDURE — 86355 B CELLS TOTAL COUNT: CPT | Performed by: INTERNAL MEDICINE

## 2021-05-08 LAB
CD19 CELLS # BLD: <1 CELLS/UL (ref 107–698)
CD19 CELLS NFR BLD: <1 % (ref 6–27)

## 2021-05-10 LAB
C3 SERPL-MCNC: 115 MG/DL (ref 81–157)
C4 SERPL-MCNC: 25 MG/DL (ref 13–39)

## 2021-05-11 ENCOUNTER — IMMUNIZATION (OUTPATIENT)
Dept: PEDIATRICS | Facility: CLINIC | Age: 56
End: 2021-05-11
Payer: COMMERCIAL

## 2021-05-11 PROCEDURE — 91300 PR COVID VAC PFIZER DIL RECON 30 MCG/0.3 ML IM: CPT

## 2021-05-11 PROCEDURE — 0001A PR COVID VAC PFIZER DIL RECON 30 MCG/0.3 ML IM: CPT

## 2021-05-11 NOTE — TELEPHONE ENCOUNTER
Discussed with Dr. Marroquin and Dr. Rouse(who discussed with Dr. Paredes), they all feel that she should get the vaccine, there is low risk to getting it and she could have a response to the vaccine.  Dr. Marroquin would like to check her antibodies 2-4 weeks after her 2nd dose.    Pt will let us know when she has everything scheduled.    Sophie Alvarado RN  Rheumatology Clinic

## 2021-05-24 DIAGNOSIS — M32.9 SYSTEMIC LUPUS ERYTHEMATOSUS, UNSPECIFIED SLE TYPE, UNSPECIFIED ORGAN INVOLVEMENT STATUS (H): ICD-10-CM

## 2021-05-24 DIAGNOSIS — M32.14 LUPUS NEPHRITIS, ISN/RPS CLASS IV (H): ICD-10-CM

## 2021-05-24 NOTE — TELEPHONE ENCOUNTER
MMF      Last Written Prescription Date:  4/20/20  Last Fill Quantity: 540,   # refills: 3  Last Office Visit: 12/10/2020  Future Office visit:  7/22/21    CBC RESULTS:   Recent Labs   Lab Test 05/07/21  1518   WBC 6.3   RBC 4.48   HGB 11.6*   HCT 37.7   MCV 84   MCH 25.9*   MCHC 30.8*   RDW 14.3          Creatinine   Date Value Ref Range Status   05/07/2021 0.69 0.52 - 1.04 mg/dL Final   ]    Liver Function Studies -   Recent Labs   Lab Test 05/07/21  1518 04/06/21  1514   ALBUMIN 3.8 3.8   AST  --  16   ALT  --  30       Routing refill request to provider for review/approval because:  Drug not on the [facility], [facility] or [facility] refill protocol or controlled substance

## 2021-05-26 RX ORDER — MYCOPHENOLATE MOFETIL 500 MG/1
1500 TABLET ORAL 2 TIMES DAILY
Qty: 540 TABLET | Refills: 3 | Status: SHIPPED | OUTPATIENT
Start: 2021-05-26 | End: 2021-12-06

## 2021-05-26 NOTE — TELEPHONE ENCOUNTER
Medication: traMADol (ULTRAM) 50 MG tablet    Last Office Visit:  2/4/2021  Next Office Visit:  7/23/2021  Last Fill Date:  3/13/2021  Last Fill Quantity: 60,   # refills: 0     reviewed as follows:        Request sent to provider for review and signature if appropriate.    Jayleen Gan CMA   5/26/2021 9:37 AM

## 2021-05-28 RX ORDER — TRAMADOL HYDROCHLORIDE 50 MG/1
50 TABLET ORAL EVERY 12 HOURS PRN
Qty: 60 TABLET | Refills: 3 | Status: SHIPPED | OUTPATIENT
Start: 2021-05-28 | End: 2021-12-14

## 2021-06-01 ENCOUNTER — IMMUNIZATION (OUTPATIENT)
Dept: PEDIATRICS | Facility: CLINIC | Age: 56
End: 2021-06-01
Attending: INTERNAL MEDICINE
Payer: COMMERCIAL

## 2021-06-01 PROCEDURE — 91300 PR COVID VAC PFIZER DIL RECON 30 MCG/0.3 ML IM: CPT

## 2021-06-01 PROCEDURE — 0002A PR COVID VAC PFIZER DIL RECON 30 MCG/0.3 ML IM: CPT

## 2021-06-17 ENCOUNTER — VIRTUAL VISIT (OUTPATIENT)
Dept: PULMONOLOGY | Facility: CLINIC | Age: 56
End: 2021-06-17
Attending: INTERNAL MEDICINE
Payer: COMMERCIAL

## 2021-06-17 DIAGNOSIS — M32.9 SYSTEMIC LUPUS ERYTHEMATOSUS, UNSPECIFIED SLE TYPE, UNSPECIFIED ORGAN INVOLVEMENT STATUS (H): ICD-10-CM

## 2021-06-17 DIAGNOSIS — R94.2 ABNORMAL PFT: ICD-10-CM

## 2021-06-17 DIAGNOSIS — Z29.89 NEED FOR PNEUMOCYSTIS PROPHYLAXIS: ICD-10-CM

## 2021-06-17 DIAGNOSIS — J21.9 BRONCHIOLITIS: Primary | ICD-10-CM

## 2021-06-17 PROCEDURE — 99214 OFFICE O/P EST MOD 30 MIN: CPT | Mod: 95 | Performed by: INTERNAL MEDICINE

## 2021-06-17 RX ORDER — ATOVAQUONE 750 MG/5ML
1500 SUSPENSION ORAL DAILY
Qty: 900 ML | Refills: 3 | Status: SHIPPED | OUTPATIENT
Start: 2021-06-17 | End: 2023-01-16

## 2021-06-17 NOTE — PROGRESS NOTES
"Taina is a 56 year old who is being evaluated via a billable video visit.      How would you like to obtain your AVS? [employer]  If the video visit is dropped, the invitation should be resent by: Other e-mail: ashley  Will anyone else be joining your video visit? No      Video Start Time: 9:37am  Video-Visit Details    Type of service:  Video Visit    Video End Time: 9:50am  Originating Location (pt. Location): Home    Distant Location (provider location):  [facility] LUNG SCIENCE AND [facility]     Platform used for Video Visit: well          [facility] Interstitial Lung Disease Clinic    Reason for Visit  Taina Singh is a 56 year old year old female who is being seen for Interstitial Lung Disease (ILD) (video visit)    HPI  Ms. Singh is a 56-year-old with lupus and history of air trapping on CT scan  (small airways disease/bronchiolitis) and abnormal PFT with whom I had a video visit today.   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  In the past, we did try ICS/LABA combination, but she could not afford it.  She started rituximab for her lupus in January, and this has helped her symptoms significantly.    Today, she reports that she is doing well.  The rituximab has really helped her to feel better.  She reports that her breathing is \"good.\"  She tries to walk every day.  She denies paroxysmal nocturnal dyspnea, cough, or fevers.  She endorses dyspnea on exertion walking up a flight of stairs, which is unchanged.  She is currently being treated with rituximab and is due for her next infusion on June 28, mycophenolate 1500 mg twice a day, and prednisone 6 mg daily.  Dr. Shannon is tapering the prednisone, and the goal is to try to get her down to 5 mg daily.  Taina stopped her atovaquone once her prednisone dose decreased.    She currently endorses a mild rash on her cheeks and nose and arthralgias. "  She thinks the arthralgias are because she is due for her next rituximab infusion.  She did receive the COVID-19 vaccine in May and .          Current Outpatient Medications   Medication     albuterol (PROAIR HFA/PROVENTIL HFA/VENTOLIN HFA) 108 (90 Base) MCG/ACT inhaler     atovaquone (MEPRON) 750 MG/5ML suspension     Calcium-Magnesium 300-300 MG TABS     hydroxychloroquine (PLAQUENIL) 200 MG tablet     levofloxacin (LEVAQUIN) 250 MG tablet     Multiple Vitamins-Minerals (WOMENS MULTI PO)     mycophenolate (GENERIC EQUIVALENT) 500 MG tablet     Omega-3 Fatty Acids (OMEGA-3 FISH OIL) 500 MG CAPS     predniSONE (DELTASONE) 1 MG tablet     predniSONE (DELTASONE) 5 MG tablet     traMADol (ULTRAM) 50 MG tablet     vitamin D3 (CHOLECALCIFEROL) 50 mcg (2000 units) tablet     warfarin ANTICOAGULANT (COUMADIN) 5 MG tablet     zolpidem (AMBIEN) 5 MG tablet     pantoprazole (PROTONIX) 20 MG EC tablet     No current facility-administered medications for this visit.      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44    No chemo or radiation     Other forms of systemic lupus erythematosus (H) 2018     Pulmonary embolism (H)     2019 seen on abd [facility]     Sjogren's syndrome (H)     secondary Sjogrens, secondary to lupus     Vitamin D deficiency        Past Surgical History:   Procedure Laterality Date     BIOPSY/REMOVAL, LYMPH NODE(S)         SECTION  age 32     HC HYSTEROS W PERMANENT FALLOPAIN IMPLANT      Essure b/l     PAROTIDECTOMY Right 2006     TONSILLECTOMY         Social History     Socioeconomic History     Marital status:      Spouse name: Not on file     Number of children: Not on file     Years of education: 1     Highest education level: Not on file   Occupational History     Comment: , 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   Social Needs     Financial resource strain: Not on file     Food insecurity     Worry: Not on file     Inability: Not on file     Transportation needs     Medical: Not on file     Non-medical: Not on file   Tobacco Use     Smoking status: Never Smoker     Smokeless tobacco: Never Used   Substance and Sexual Activity     Alcohol use: No     Drug use: No     Sexual activity: Not on file   Lifestyle     Physical activity     Days per week: Not on file     Minutes per session: Not on file     Stress: Not on file   Relationships     Social connections     Talks on phone: Not on file     Gets together: Not on file     Attends Scientology service: Not on file     Active member of club or organization: Not on file     Attends meetings of clubs or organizations: Not on file     Relationship status: Not on file     Intimate partner violence     Fear of current or ex partner: Not on file     Emotionally abused: Not on file     Physically abused: Not on file     Forced sexual activity: Not on file   Other Topics Concern     Parent/sibling w/ CABG, MI or angioplasty before 65F 55M? 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        Family History   Problem Relation Age of Onset     Alopecia Brother      Rheumatoid Arthritis Brother      LUNG DISEASE No  "family hx of          GENERAL: Healthy, alert and no distress\",\"EYES: Eyes grossly normal to inspection.  No discharge or erythema, or obvious scleral/conjunctival abnormalities.\",\"RESP: No audible wheeze, cough, or visible cyanosis.  No visible retractions or increased work of breathing.  \",\"SKIN: Visible skin clear. No abnormal pigmentation or lesions; mild erythema on cheeks and nose.\",\"NEURO: Cranial nerves grossly intact.  Mentation and speech appropriate for age.\",\"PSYCH: Mentation appears normal, affect normal/bright, judgement and insight intact, normal speech and appearance well-groomed.        Assessment and plan:  Ms. Singh is a 56-year-old with lupus and history of air trapping on CT scan  (small airways disease/bronchiolitis) and abnormal PFT with whom I had a video visit today.     1.  Abnormal PFT and bronchiolitis.  In the past, she has had restrictive PFT which could represent shrinking lung syndrome associated with her lupus plus/minus obesity.  She also has had evidence of bronchiolitis (small airways disease) on chest CT scan showing air trapping.  She was not able to afford ICS/LABA inhaler in the past.  Given her improvement with rituximab, I do not think we need to start a new medication for the bronchiolitis.  If she worsens in the future, then we could consider ICS/LABA if her insurance will pay for it versus daily azithromycin as an anti-inflammatory.  Dr. Chan is tapering her prednisone.  I encouraged Taina to continue walking daily as she is doing.  I will see her back in 6 months with full PFT in person.  2.  High risk medication monitoring.  She should restart atovaquone as she is immunosuppressed with prednisone, but also mycophenolate and rituximab.  I reordered the prescription, and Taina is in agreement.  Her lab monitoring for her high risk medications are monitored by Dr. Shannon.            "

## 2021-06-17 NOTE — LETTER
"    6/17/2021         RE: Taina Singh  [address]  [place] [address]        Dear Colleague,    Thank you for referring your patient, Taina Singh, to the [facility] LUNG SCIENCE AND [facility]. Please see a copy of my visit note below.    Taina is a 56 year old who is being evaluated via a billable video visit.      How would you like to obtain your AVS? [employer]  If the video visit is dropped, the invitation should be resent by: Other e-mail: [employer]  Will anyone else be joining your video visit? No      Video Start Time: 9:37am  Video-Visit Details    Type of service:  Video Visit    Video End Time: 9:50am  Originating Location (pt. Location): Home    Distant Location (provider location):  [facility] LUNG SCIENCE AND [facility]     Platform used for Video Visit: [facility] Interstitial Lung Disease Clinic    Reason for Visit  Taina Singh is a 56 year old year old female who is being seen for Interstitial Lung Disease (ILD) (video visit)    HPI  Ms. Singh is a 56-year-old with lupus and history of air trapping on CT scan  (small airways disease/bronchiolitis) and abnormal PFT with whom I had a video visit today.   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  In the past, we did try ICS/LABA combination, but she could not afford it.  She started rituximab for her lupus in January, and this has helped her symptoms significantly.    Today, she reports that she is doing well.  The rituximab has really helped her to feel better.  She reports that her breathing is \"good.\"  She tries to walk every day.  She denies paroxysmal nocturnal dyspnea, cough, or fevers.  She endorses dyspnea on exertion walking up a flight of stairs, which is unchanged.  She is currently being treated with rituximab and is due for her next infusion on June 28, mycophenolate 1500 " mg twice a day, and prednisone 6 mg daily.  Dr. Shannon is tapering the prednisone, and the goal is to try to get her down to 5 mg daily.  Taina stopped her atovaquone once her prednisone dose decreased.    She currently endorses a mild rash on her cheeks and nose and arthralgias.  She thinks the arthralgias are because she is due for her next rituximab infusion.  She did receive the COVID-19 vaccine in May and June.          Current Outpatient Medications   Medication     albuterol (PROAIR HFA/PROVENTIL HFA/VENTOLIN HFA) 108 (90 Base) MCG/ACT inhaler     atovaquone (MEPRON) 750 MG/5ML suspension     Calcium-Magnesium 300-300 MG TABS     hydroxychloroquine (PLAQUENIL) 200 MG tablet     levofloxacin (LEVAQUIN) 250 MG tablet     Multiple Vitamins-Minerals (WOMENS MULTI PO)     mycophenolate (GENERIC EQUIVALENT) 500 MG tablet     Omega-3 Fatty Acids (OMEGA-3 FISH OIL) 500 MG CAPS     predniSONE (DELTASONE) 1 MG tablet     predniSONE (DELTASONE) 5 MG tablet     traMADol (ULTRAM) 50 MG tablet     vitamin D3 (CHOLECALCIFEROL) 50 mcg (2000 units) tablet     warfarin ANTICOAGULANT (COUMADIN) 5 MG tablet     zolpidem (AMBIEN) 5 MG tablet     pantoprazole (PROTONIX) 20 MG EC tablet     No current facility-administered medications for this visit.      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44    No chemo or radiation     Other forms of systemic lupus erythematosus (H)  2018     Pulmonary embolism (H)     2019 seen on abd CT at [facility]     Sjogren's syndrome (H)     secondary Sjogrens, secondary to lupus     Vitamin D deficiency        Past Surgical History:   Procedure Laterality Date     BIOPSY/REMOVAL, LYMPH NODE(S)        SECTION  age 32     HC HYSTEROS W PERMANENT FALLOPAIN IMPLANT      Essure b/l     PAROTIDECTOMY Right 2006     TONSILLECTOMY         Social History     Socioeconomic History     Marital status:      Spouse name: Not on file     Number of children: Not on file     Years of education: 1     Highest education level: Not on file   Occupational History     Comment: , 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   Social Needs     Financial resource strain: Not on file     Food insecurity     Worry: Not on file     Inability: Not on file     Transportation needs     Medical: Not on file     Non-medical: Not on file   Tobacco Use     Smoking status: Never Smoker     Smokeless tobacco: Never Used   Substance and Sexual Activity     Alcohol use: No     Drug use: No     Sexual activity: Not on file   Lifestyle     Physical activity     Days per week: Not on file     Minutes per session: Not on file     Stress: Not on file   Relationships     Social connections     Talks on phone: Not on file     Gets together: Not on file     Attends Mandaen service: Not on file     Active member of club or organization: Not on file     Attends meetings of clubs or organizations: Not on file     Relationship status: Not on file     Intimate partner violence     Fear of current or ex partner: Not on file     Emotionally abused: Not on file     Physically abused: Not on file     Forced sexual activity: Not on file   Other Topics Concern     Parent/sibling w/ CABG, MI or angioplasty before 65F 55M? 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       2018    Adult son (ed delacruz  "belt)        Denies exposure to asbestos, silica, hot tubs, feather pillows (used to until age 49), pet birds, mold, does not play brass/wind instruments.        Family History   Problem Relation Age of Onset     Alopecia Brother      Rheumatoid Arthritis Brother      LUNG DISEASE No family hx of          GENERAL: Healthy, alert and no distress\",\"EYES: Eyes grossly normal to inspection.  No discharge or erythema, or obvious scleral/conjunctival abnormalities.\",\"RESP: No audible wheeze, cough, or visible cyanosis.  No visible retractions or increased work of breathing.  \",\"SKIN: Visible skin clear. No abnormal pigmentation or lesions; mild erythema on cheeks and nose.\",\"NEURO: Cranial nerves grossly intact.  Mentation and speech appropriate for age.\",\"PSYCH: Mentation appears normal, affect normal/bright, judgement and insight intact, normal speech and appearance well-groomed.        Assessment and plan:  Ms. Singh is a 56-year-old with lupus and history of air trapping on CT scan  (small airways disease/bronchiolitis) and abnormal PFT with whom I had a video visit today.     1.  Abnormal PFT and bronchiolitis.  In the past, she has had restrictive PFT which could represent shrinking lung syndrome associated with her lupus plus/minus obesity.  She also has had evidence of bronchiolitis (small airways disease) on chest CT scan showing air trapping.  She was not able to afford ICS/LABA inhaler in the past.  Given her improvement with rituximab, I do not think we need to start a new medication for the bronchiolitis.  If she worsens in the future, then we could consider ICS/LABA if her insurance will pay for it versus daily azithromycin as an anti-inflammatory.  Dr. Chan is tapering her prednisone.  I encouraged Taina to continue walking daily as she is doing.  I will see her back in 6 months with full PFT in person.  2.  High risk medication monitoring.  She should restart atovaquone as she is immunosuppressed " with prednisone, but also mycophenolate and rituximab.  I reordered the prescription, and Taina is in agreement.  Her lab monitoring for her high risk medications are monitored by Dr. Shannon.  Sincerely,        Joanne Marvin MD

## 2021-06-28 ENCOUNTER — INFUSION THERAPY VISIT (OUTPATIENT)
Dept: INFUSION THERAPY | Facility: CLINIC | Age: 56
End: 2021-06-28
Payer: COMMERCIAL

## 2021-06-28 VITALS
OXYGEN SATURATION: 99 % | WEIGHT: 293 LBS | HEART RATE: 71 BPM | DIASTOLIC BLOOD PRESSURE: 72 MMHG | BODY MASS INDEX: 52.22 KG/M2 | TEMPERATURE: 98.9 F | SYSTOLIC BLOOD PRESSURE: 137 MMHG

## 2021-06-28 DIAGNOSIS — M06.09 RHEUMATOID ARTHRITIS, SERONEGATIVE, MULTIPLE SITES (H): Primary | ICD-10-CM

## 2021-06-28 DIAGNOSIS — M32.14 LUPUS NEPHRITIS, ISN/RPS CLASS IV (H): ICD-10-CM

## 2021-06-28 DIAGNOSIS — M32.9 SYSTEMIC LUPUS ERYTHEMATOSUS, UNSPECIFIED SLE TYPE, UNSPECIFIED ORGAN INVOLVEMENT STATUS (H): ICD-10-CM

## 2021-06-28 DIAGNOSIS — R82.90 ABNORMAL URINE FINDINGS: Primary | ICD-10-CM

## 2021-06-28 DIAGNOSIS — I77.82 ANCA-NEGATIVE VASCULITIS (H): ICD-10-CM

## 2021-06-28 DIAGNOSIS — M06.09 RHEUMATOID ARTHRITIS, SERONEGATIVE, MULTIPLE SITES (H): ICD-10-CM

## 2021-06-28 LAB
ALBUMIN SERPL-MCNC: 3.6 G/DL (ref 3.4–5)
ALBUMIN UR-MCNC: NEGATIVE MG/DL
ALT SERPL W P-5'-P-CCNC: 28 U/L (ref 0–50)
APPEARANCE UR: CLEAR
AST SERPL W P-5'-P-CCNC: 18 U/L (ref 0–45)
BACTERIA #/AREA URNS HPF: ABNORMAL /HPF
BASOPHILS # BLD AUTO: 0 10E9/L (ref 0–0.2)
BASOPHILS NFR BLD AUTO: 0.5 %
BILIRUB UR QL STRIP: NEGATIVE
C3 SERPL-MCNC: 112 MG/DL (ref 81–157)
C4 SERPL-MCNC: 24 MG/DL (ref 13–39)
CD19 CELLS # BLD: <1 CELLS/UL (ref 107–698)
CD19 CELLS NFR BLD: <1 % (ref 6–27)
COLOR UR AUTO: YELLOW
CREAT SERPL-MCNC: 0.64 MG/DL (ref 0.52–1.04)
CREAT UR-MCNC: 78 MG/DL
CREAT UR-MCNC: 79 MG/DL
CRP SERPL-MCNC: 13.4 MG/L (ref 0–8)
DIFFERENTIAL METHOD BLD: ABNORMAL
EOSINOPHIL # BLD AUTO: 0.1 10E9/L (ref 0–0.7)
EOSINOPHIL NFR BLD AUTO: 1.8 %
ERYTHROCYTE [DISTWIDTH] IN BLOOD BY AUTOMATED COUNT: 14.1 % (ref 10–15)
ERYTHROCYTE [SEDIMENTATION RATE] IN BLOOD BY WESTERGREN METHOD: 30 MM/H (ref 0–30)
GFR SERPL CREATININE-BSD FRML MDRD: >90 ML/MIN/{1.73_M2}
GLUCOSE UR STRIP-MCNC: NEGATIVE MG/DL
HCT VFR BLD AUTO: 36.4 % (ref 35–47)
HGB BLD-MCNC: 11.6 G/DL (ref 11.7–15.7)
HGB UR QL STRIP: ABNORMAL
IGA SERPL-MCNC: 177 MG/DL (ref 84–499)
IGG SERPL-MCNC: 397 MG/DL (ref 610–1616)
IGM SERPL-MCNC: 22 MG/DL (ref 35–242)
IMM GRANULOCYTES # BLD: 0.1 10E9/L (ref 0–0.4)
IMM GRANULOCYTES NFR BLD: 0.8 %
KETONES UR STRIP-MCNC: NEGATIVE MG/DL
LEUKOCYTE ESTERASE UR QL STRIP: ABNORMAL
LYMPHOCYTES # BLD AUTO: 0.3 10E9/L (ref 0.8–5.3)
LYMPHOCYTES NFR BLD AUTO: 5.2 %
MCH RBC QN AUTO: 26.5 PG (ref 26.5–33)
MCHC RBC AUTO-ENTMCNC: 31.9 G/DL (ref 31.5–36.5)
MCV RBC AUTO: 83 FL (ref 78–100)
MONOCYTES # BLD AUTO: 0.6 10E9/L (ref 0–1.3)
MONOCYTES NFR BLD AUTO: 10.1 %
MUCOUS THREADS #/AREA URNS LPF: PRESENT /LPF
NEUTROPHILS # BLD AUTO: 4.9 10E9/L (ref 1.6–8.3)
NEUTROPHILS NFR BLD AUTO: 81.6 %
NITRATE UR QL: NEGATIVE
NON-SQ EPI CELLS #/AREA URNS LPF: ABNORMAL /LPF
PH UR STRIP: 5.5 PH (ref 5–7)
PLATELET # BLD AUTO: 207 10E9/L (ref 150–450)
PROT UR-MCNC: 0.2 G/L
PROT/CREAT 24H UR: 0.26 G/G CR (ref 0–0.2)
RBC # BLD AUTO: 4.38 10E12/L (ref 3.8–5.2)
RBC #/AREA URNS AUTO: ABNORMAL /HPF
SOURCE: ABNORMAL
SP GR UR STRIP: 1.02 (ref 1–1.03)
UROBILINOGEN UR STRIP-MCNC: NORMAL MG/DL (ref 0–2)
WBC # BLD AUTO: 6 10E9/L (ref 4–11)
WBC #/AREA URNS AUTO: ABNORMAL /HPF

## 2021-06-28 PROCEDURE — 86140 C-REACTIVE PROTEIN: CPT | Performed by: INTERNAL MEDICINE

## 2021-06-28 PROCEDURE — 84450 TRANSFERASE (AST) (SGOT): CPT | Performed by: INTERNAL MEDICINE

## 2021-06-28 PROCEDURE — 82565 ASSAY OF CREATININE: CPT | Performed by: INTERNAL MEDICINE

## 2021-06-28 PROCEDURE — 84460 ALANINE AMINO (ALT) (SGPT): CPT | Performed by: INTERNAL MEDICINE

## 2021-06-28 PROCEDURE — 82040 ASSAY OF SERUM ALBUMIN: CPT | Performed by: INTERNAL MEDICINE

## 2021-06-28 PROCEDURE — 96413 CHEMO IV INFUSION 1 HR: CPT | Performed by: NURSE PRACTITIONER

## 2021-06-28 PROCEDURE — 84156 ASSAY OF PROTEIN URINE: CPT | Performed by: INTERNAL MEDICINE

## 2021-06-28 PROCEDURE — 96415 CHEMO IV INFUSION ADDL HR: CPT | Performed by: NURSE PRACTITIONER

## 2021-06-28 PROCEDURE — 87088 URINE BACTERIA CULTURE: CPT | Performed by: INTERNAL MEDICINE

## 2021-06-28 PROCEDURE — 87086 URINE CULTURE/COLONY COUNT: CPT | Performed by: INTERNAL MEDICINE

## 2021-06-28 PROCEDURE — 87186 SC STD MICRODIL/AGAR DIL: CPT | Performed by: INTERNAL MEDICINE

## 2021-06-28 PROCEDURE — 85652 RBC SED RATE AUTOMATED: CPT | Performed by: INTERNAL MEDICINE

## 2021-06-28 PROCEDURE — 99207 PR NO CHARGE LOS: CPT

## 2021-06-28 PROCEDURE — 96375 TX/PRO/DX INJ NEW DRUG ADDON: CPT | Performed by: NURSE PRACTITIONER

## 2021-06-28 PROCEDURE — 86160 COMPLEMENT ANTIGEN: CPT | Performed by: INTERNAL MEDICINE

## 2021-06-28 PROCEDURE — 36415 COLL VENOUS BLD VENIPUNCTURE: CPT | Performed by: INTERNAL MEDICINE

## 2021-06-28 PROCEDURE — 82784 ASSAY IGA/IGD/IGG/IGM EACH: CPT | Mod: 59 | Performed by: INTERNAL MEDICINE

## 2021-06-28 PROCEDURE — 81001 URINALYSIS AUTO W/SCOPE: CPT | Performed by: INTERNAL MEDICINE

## 2021-06-28 PROCEDURE — 96367 TX/PROPH/DG ADDL SEQ IV INF: CPT | Performed by: NURSE PRACTITIONER

## 2021-06-28 PROCEDURE — 85025 COMPLETE CBC W/AUTO DIFF WBC: CPT | Performed by: INTERNAL MEDICINE

## 2021-06-28 PROCEDURE — 86225 DNA ANTIBODY NATIVE: CPT | Performed by: INTERNAL MEDICINE

## 2021-06-28 PROCEDURE — 86160 COMPLEMENT ANTIGEN: CPT | Mod: 59 | Performed by: INTERNAL MEDICINE

## 2021-06-28 PROCEDURE — 86769 SARS-COV-2 COVID-19 ANTIBODY: CPT | Performed by: NURSE PRACTITIONER

## 2021-06-28 PROCEDURE — 82784 ASSAY IGA/IGD/IGG/IGM EACH: CPT | Performed by: INTERNAL MEDICINE

## 2021-06-28 PROCEDURE — 86355 B CELLS TOTAL COUNT: CPT | Performed by: INTERNAL MEDICINE

## 2021-06-28 RX ORDER — NALOXONE HYDROCHLORIDE 0.4 MG/ML
0.2 INJECTION, SOLUTION INTRAMUSCULAR; INTRAVENOUS; SUBCUTANEOUS
Status: CANCELLED | OUTPATIENT
Start: 2021-06-28

## 2021-06-28 RX ORDER — HEPARIN SODIUM,PORCINE 10 UNIT/ML
5 VIAL (ML) INTRAVENOUS
Status: CANCELLED | OUTPATIENT
Start: 2021-06-28

## 2021-06-28 RX ORDER — METHYLPREDNISOLONE SODIUM SUCCINATE 125 MG/2ML
125 INJECTION, POWDER, LYOPHILIZED, FOR SOLUTION INTRAMUSCULAR; INTRAVENOUS
Status: CANCELLED
Start: 2021-06-28

## 2021-06-28 RX ORDER — ALBUTEROL SULFATE 90 UG/1
1-2 AEROSOL, METERED RESPIRATORY (INHALATION)
Status: CANCELLED
Start: 2021-06-28

## 2021-06-28 RX ORDER — DIPHENHYDRAMINE HYDROCHLORIDE 50 MG/ML
50 INJECTION INTRAMUSCULAR; INTRAVENOUS
Status: CANCELLED
Start: 2021-06-28

## 2021-06-28 RX ORDER — ACETAMINOPHEN 325 MG/1
650 TABLET ORAL ONCE
Status: CANCELLED
Start: 2021-06-28

## 2021-06-28 RX ORDER — EPINEPHRINE 1 MG/ML
0.3 INJECTION, SOLUTION INTRAMUSCULAR; SUBCUTANEOUS EVERY 5 MIN PRN
Status: CANCELLED | OUTPATIENT
Start: 2021-06-28

## 2021-06-28 RX ORDER — ACETAMINOPHEN 325 MG/1
650 TABLET ORAL ONCE
Status: COMPLETED | OUTPATIENT
Start: 2021-06-28 | End: 2021-06-28

## 2021-06-28 RX ORDER — METHYLPREDNISOLONE SODIUM SUCCINATE 125 MG/2ML
125 INJECTION, POWDER, LYOPHILIZED, FOR SOLUTION INTRAMUSCULAR; INTRAVENOUS ONCE
Status: COMPLETED | OUTPATIENT
Start: 2021-06-28 | End: 2021-06-28

## 2021-06-28 RX ORDER — METHYLPREDNISOLONE SODIUM SUCCINATE 125 MG/2ML
125 INJECTION, POWDER, LYOPHILIZED, FOR SOLUTION INTRAMUSCULAR; INTRAVENOUS ONCE
Status: CANCELLED
Start: 2021-06-28

## 2021-06-28 RX ORDER — HEPARIN SODIUM (PORCINE) LOCK FLUSH IV SOLN 100 UNIT/ML 100 UNIT/ML
5 SOLUTION INTRAVENOUS
Status: CANCELLED | OUTPATIENT
Start: 2021-06-28

## 2021-06-28 RX ORDER — ALBUTEROL SULFATE 0.83 MG/ML
2.5 SOLUTION RESPIRATORY (INHALATION)
Status: CANCELLED | OUTPATIENT
Start: 2021-06-28

## 2021-06-28 RX ORDER — MEPERIDINE HYDROCHLORIDE 25 MG/ML
25 INJECTION INTRAMUSCULAR; INTRAVENOUS; SUBCUTANEOUS EVERY 30 MIN PRN
Status: CANCELLED | OUTPATIENT
Start: 2021-06-28

## 2021-06-28 RX ADMIN — ACETAMINOPHEN 650 MG: 325 TABLET ORAL at 09:25

## 2021-06-28 RX ADMIN — Medication 250 ML: at 09:48

## 2021-06-28 RX ADMIN — METHYLPREDNISOLONE SODIUM SUCCINATE 125 MG: 125 INJECTION INTRAMUSCULAR; INTRAVENOUS at 10:10

## 2021-06-28 ASSESSMENT — PAIN SCALES - GENERAL: PAINLEVEL: NO PAIN (0)

## 2021-06-28 NOTE — PROGRESS NOTES
Infusion Nursing Note:  Taina Dardene presents today for Rituxan D1.    Patient seen by provider today: No   present during visit today: Not Applicable.    Note: Pt denies any new medical complaints, see flow sheet for assessment.    Rituxan Rates:  100mls/hr x 30mins      200mls/hr x 30 mins      300mls/hr x 30mins      400mls/hr x rest of infusion        Intravenous Access:  Peripheral IV placed.    Treatment Conditions:  Biological Infusion Checklist:  ~~~ NOTE: If the patient answers yes to any of the questions below, hold the infusion and contact ordering provider or on-call provider.    1. Have you recently had an elevated temperature, fever, chills, productive cough, coughing for 3 weeks or longer or hemoptysis, abnormal vital signs, night sweats,  chest pain or have you noticed a decrease in your appetite, unexplained weight loss or fatigue? No  2. Do you have any open wounds or new incisions? No  3. Do you have any recent or upcoming hospitalizations, surgeries or dental procedures? No  4. Do you currently have or recently have had any signs of illness or infection or are you on any antibiotics? No  5. Have you had any new, sudden or worsening abdominal pain? No  6. Have you or anyone in your household received a live vaccination in the past 4 weeks? Please note:  No live vaccines while on biologic/chemotherapy until 6 months after the last treatment.  Patient can receive the flu vaccine (shot only) and the pneumovax.  It is optimal for the patient to get these vaccines mid cycle, but they can be given at any time as long as it is not on the day of the infusion. No  7. Have you recently been diagnosed with any new nervous system diseases (ie. Multiple sclerosis, Guillain [place], seizures, neurological changes) or cancer diagnosis? No  8. Are you on any form of radiation or chemotherapy? No  9. Are you pregnant or breast feeding or do you have plans of pregnancy in the future? No  10. Have you  been having any signs of worsening depression or suicidal ideations?  (benlysta only) No  11. Have there been any other new onset medical symptoms? No        Post Infusion Assessment:  Patient tolerated infusion without incident.  Blood return noted pre and post infusion.  Site patent and intact, free from redness, edema or discomfort.  No evidence of extravasations.  Access discontinued per protocol.       Discharge Plan:   Patient discharged in stable condition accompanied by: self.  Departure Mode: Ambulatory.  Pt will RTC in 6 months for Rituxan.      Dann Mccord RN

## 2021-06-29 LAB
BACTERIA SPEC CULT: ABNORMAL
BACTERIA SPEC CULT: ABNORMAL
DSDNA AB SER-ACNC: 104 IU/ML
Lab: ABNORMAL
SARS-COV-2 AB PNL SERPL IA: NEGATIVE
SARS-COV-2 IGG SERPL IA-ACNC: NORMAL
SPECIMEN SOURCE: ABNORMAL

## 2021-07-22 ENCOUNTER — VIRTUAL VISIT (OUTPATIENT)
Dept: NEPHROLOGY | Facility: CLINIC | Age: 56
End: 2021-07-22
Attending: INTERNAL MEDICINE
Payer: COMMERCIAL

## 2021-07-22 DIAGNOSIS — M32.14 LUPUS NEPHRITIS, ISN/RPS CLASS IV (H): Primary | ICD-10-CM

## 2021-07-22 PROCEDURE — 99214 OFFICE O/P EST MOD 30 MIN: CPT | Mod: 95 | Performed by: INTERNAL MEDICINE

## 2021-07-22 RX ORDER — VALACYCLOVIR HYDROCHLORIDE 1 G/1
TABLET, FILM COATED ORAL
COMMUNITY
Start: 2021-07-21 | End: 2022-01-17

## 2021-07-22 ASSESSMENT — PAIN SCALES - GENERAL: PAINLEVEL: MODERATE PAIN (4)

## 2021-07-22 NOTE — PROGRESS NOTES
Taina is a 56 year old who is being evaluated via a billable video visit.      How would you like to obtain your AVS? [email or web address]  If the video visit is dropped, the invitation should be resent by: Send to e-mail at: [email or web address]  Will anyone else be joining your video visit? No      Video Start Time: 1:40 PM  Video-Visit Details    Type of service:  Video Visit    Video End Time:2:23 PM    Originating Location (pt. Location): Home    Distant Location (provider location):  [facility] [place]     Platform used for Video Visit: [employer]

## 2021-07-22 NOTE — LETTER
"7/22/2021       RE: Taina Singh  [address]  [place] [address]     Dear Colleague,    Thank you for referring your patient, Taina Singh, to the [facility] [place] at [facility]. Please see a copy of my visit note below.    Taina is a 56 year old who is being evaluated via a billable video visit.      How would you like to obtain your AVS? MyChart  If the video visit is dropped, the invitation should be resent by: Send to e-mail at: [email or web address]  Will anyone else be joining your video visit? No      Video Start Time: 1:40 PM  Video-Visit Details    Type of service:  Video Visit    Video End Time:2:23 PM    Originating Location (pt. Location): Home    Distant Location (provider location):  [facility] [place]     Platform used for Video Visit: [employer]      Nephrology Follow Up  4/8/2021    Taina Singh   MRN:[id number]   YOB: 1965    REASON FOR FOLLOW UP: Lupus Nephritis      HISTORY OF PRESENT ILLNESS:  Patient is a 55-year-old female. She was originally diagnosed with systemic lupus erythematosus at age 27 and was placed on hydroxychloroquine with good control of her disease.  For full review of her SLE course, please see HPI from Dr. Sherman's note from 9/10/2020.         July 22, 2021  She was last seen by Dr Rouse and me in April 2021.  She was diagnosed with shingles.  L thigh, does not extend below the knee, and is described as the \"front part of the inner thigh\".  Joints are achy and feels tired, but denies severe pain  (mild only).  It started on Monday.  The redness had increased in size the night before last -> sought care.  -> was started on valacyclovir 1 g 3 x a day.    No fever.  No cough/SOB.    She notes overall doing well from SLE.   Had rituximab in July, had been feeling a bit under the weather and achy.    She had been tapering Pred, she is  down to 5mg daily   NO " oral ulcers, facial rash. No hair loss.  Otherwise, appetite is good. No nausea.   She has lost 20 lbs since December 2020 through diet and some home exercise. No Leg swelling, No gross hematuria, dysuria.   Has been in menopause for several years    Home BP - 124/70 yesterday at PCP.         MEDICATIONS:  Current Outpatient Medications   Medication Sig Dispense Refill     albuterol (PROAIR HFA/PROVENTIL HFA/VENTOLIN HFA) 108 (90 Base) MCG/ACT inhaler Inhale 2 puffs into the lungs every 6 hours as needed for shortness of breath / dyspnea or wheezing...Has not needed in a long time 3 Inhaler 3     atovaquone (MEPRON) 750 MG/5ML suspension Take 10 mLs (1,500 mg) by mouth daily 900 mL 3     Calcium-Magnesium 300-300 MG TABS daily        hydroxychloroquine (PLAQUENIL) 200 MG tablet Take 1 tablet (200 mg) by mouth 2 times daily Annual Plaquenil toxicity eye screening required.  Started about 5 years ago.   180 tablet 3     Multiple Vitamins-Minerals (WOMENS MULTI PO) Take by mouth daily        mycophenolate (GENERIC EQUIVALENT) 500 MG tablet Take 3 tablets (1,500 mg) by mouth 2 times daily 540 tablet 3     Omega-3 Fatty Acids (OMEGA-3 FISH OIL) 500 MG CAPS Take 500 mg by mouth daily        predniSONE (DELTASONE) 5 MG tablet  5mg daily.  90 tablet 3     traMADol (ULTRAM) 50 MG tablet Take 1 tablet (50 mg) by mouth every 12 hours as needed for pain Prn pain 60 tablet 3     valACYclovir (VALTREX) 1000 mg tablet        vitamin D3 (CHOLECALCIFEROL) 50 mcg (2000 units) tablet Take 1 tablet (50 mcg) by mouth daily 90 tablet 3     warfarin ANTICOAGULANT (COUMADIN) 5 MG tablet Monitored by PCP  1     zolpidem (AMBIEN) 5 MG tablet Take 1 tablet (5 mg) by mouth nightly as needed for sleep 30 tablet 3         ALLERGIES:    Reviewed with the patient in detail    REVIEW OF SYSTEMS:  A comprehensive of systems was negative except as noted above.    SOCIAL HISTORY:   Reviewed with patient, no smoking and no alcohol use     FAMILY  MEDICAL HISTORY:   Reviewed, no family history of need for dialysis, transplant or CKD    PHYSICAL EXAM:   Vital signs:There were no vitals taken for this visit.    Physical Exam  Constitutional:     Appearance: Normal appearance.   No facial rash  Breathing not labored  Neuro:  Alert, speech flow and content normal  Affect: bright      LABS    Infusion Therapy Visit on 06/28/2021   Component Date Value Ref Range Status     COVID-19 Jam RBD Cari 06/28/2021 Negative   Final    Comment: No COVID-19 antibodies detected. Patients with recent COVID-19 vaccination or   recent symptom onset from COVID-19 infection may not produce sufficient levels   of detectable antibodies. Immunocompromised COVID-19 patients may take longer   to develop antibodies.       COVID-19 Jam RBD Cari Titer 06/28/2021 Not Applicable   Final    Comment: Qualitative screen for IgG, IgM and IgA antibodies to COVID-19 (SARS-CoV-2)   spike receptor binding domain (RBD) protein. COVID-19 spike RBD antibodies may   be elevated due to vaccination, or a past or current COVID-19 infection.  The qualitative screen for IgG, IgM and IgA COVID-19 spike RBD antibodies is   performed on the [employer], and this test is approved by the FDA under the   Emergency Use Authorization (EUA).  Negative results do not rule out COVID-19 infection. Results from antibody   testing should not be used as the sole basis to diagnose or exclude SARS-CoV-2   infection or to inform infection status. COVID-19 PCR test should be ordered   if current infection is suspected. False positive results may occur in rare   cases due to cross-reacting antibodies.  Testing performed by [facility], [facility], [address], [address], [place] [address]     Orders Only on 06/28/2021   Component Date Value Ref Range Status     CD19 B Cells 06/28/2021 <1* 6 - 27 % Final     Absolute CD19 06/28/2021 <1* 107 - 698 cells/uL Final      IGG 06/28/2021 397* 610 - 1,616 mg/dL Final     IGA 06/28/2021 177  84 - 499 mg/dL Final     IGM 06/28/2021 22* 35 - 242 mg/dL Final     Creatinine Urine Random 06/28/2021 79  mg/dL Final     Protein Random Urine 06/28/2021 0.20  g/L Final     Protein Total Urine g/gr Creatinine 06/28/2021 0.26* 0 - 0.2 g/g Cr Final     Color Urine 06/28/2021 Yellow   Final     Appearance Urine 06/28/2021 Clear   Final     Glucose Urine 06/28/2021 Negative  NEG^Negative mg/dL Final     Bilirubin Urine 06/28/2021 Negative  NEG^Negative Final     Ketones Urine 06/28/2021 Negative  NEG^Negative mg/dL Final     Specific Gravity Urine 06/28/2021 1.016  1.003 - 1.035 Final     Blood Urine 06/28/2021 Trace* NEG^Negative Final     pH Urine 06/28/2021 5.5  5.0 - 7.0 pH Final     Protein Albumin Urine 06/28/2021 Negative  NEG^Negative mg/dL Final     Urobilinogen mg/dL 06/28/2021 Normal  0.0 - 2.0 mg/dL Final     Nitrite Urine 06/28/2021 Negative  NEG^Negative Final     Leukocyte Esterase Urine 06/28/2021 Large* NEG^Negative Final     Source 06/28/2021 Midstream Urine   Final     WBC Urine 06/28/2021 25-50* OTO5^0 - 5 /HPF Final     RBC Urine 06/28/2021 2-5* OTO2^O - 2 /HPF Final     Bacteria Urine 06/28/2021 Moderate* NEG^Negative /HPF Final     Squamous Epithelial /LPF Urine 06/28/2021 Moderate* FEW^Few /LPF Final     Mucous Urine 06/28/2021 Present* NEG^Negative /LPF Final     Sed Rate 06/28/2021 30  0 - 30 mm/h Final     DNA-ds 06/28/2021 104* <10 IU/mL Final    Positive     CRP Inflammation 06/28/2021 13.4* 0.0 - 8.0 mg/L Final     Complement C3 06/28/2021 112  81 - 157 mg/dL Final     Complement C4 06/28/2021 24  13 - 39 mg/dL Final     Creatinine 06/28/2021 0.64  0.52 - 1.04 mg/dL Final     GFR Estimate 06/28/2021 >90  >60 mL/min/[1.73_m2] Final    Comment: Non  GFR Calc  Starting 12/18/2018, serum creatinine based estimated GFR (eGFR) will be   calculated using the Chronic Kidney Disease Epidemiology Collaboration    (CKD-EPI) equation.       GFR Estimate If Black 06/28/2021 >90  >60 mL/min/[1.73_m2] Final    Comment:  GFR Calc  Starting 12/18/2018, serum creatinine based estimated GFR (eGFR) will be   calculated using the Chronic Kidney Disease Epidemiology Collaboration   (CKD-EPI) equation.       WBC 06/28/2021 6.0  4.0 - 11.0 10e9/L Final     RBC Count 06/28/2021 4.38  3.8 - 5.2 10e12/L Final     Hemoglobin 06/28/2021 11.6* 11.7 - 15.7 g/dL Final     Hematocrit 06/28/2021 36.4  35.0 - 47.0 % Final     MCV 06/28/2021 83  78 - 100 fl Final     MCH 06/28/2021 26.5  26.5 - 33.0 pg Final     MCHC 06/28/2021 31.9  31.5 - 36.5 g/dL Final     RDW 06/28/2021 14.1  10.0 - 15.0 % Final     Platelet Count 06/28/2021 207  150 - 450 10e9/L Final     Diff Method 06/28/2021 Automated Method   Final     % Neutrophils 06/28/2021 81.6  % Final     % Lymphocytes 06/28/2021 5.2  % Final     % Monocytes 06/28/2021 10.1  % Final     % Eosinophils 06/28/2021 1.8  % Final     % Basophils 06/28/2021 0.5  % Final     % Immature Granulocytes 06/28/2021 0.8  % Final     Absolute Neutrophil 06/28/2021 4.9  1.6 - 8.3 10e9/L Final     Absolute Lymphocytes 06/28/2021 0.3* 0.8 - 5.3 10e9/L Final     Absolute Monocytes 06/28/2021 0.6  0.0 - 1.3 10e9/L Final     Absolute Eosinophils 06/28/2021 0.1  0.0 - 0.7 10e9/L Final     Absolute Basophils 06/28/2021 0.0  0.0 - 0.2 10e9/L Final     Abs Immature Granulocytes 06/28/2021 0.1  0 - 0.4 10e9/L Final     AST 06/28/2021 18  0 - 45 U/L Final     Albumin 06/28/2021 3.6  3.4 - 5.0 g/dL Final     ALT 06/28/2021 28  0 - 50 U/L Final     Creatinine Urine 06/28/2021 78  mg/dL Final     Specimen Description 06/28/2021 Midstream Urine   Final     Special Requests 06/28/2021 Specimen received in preservative   Final     Culture Micro 06/28/2021 *  Final                    Value:10,000 to 50,000 colonies/mL  Escherichia coli       Culture Micro 06/28/2021    Final                    Value:<10,000  colonies/mL  mixed urogenital pascual  Susceptibility testing not routinely done             ASSESSMENT AND RECOMMENDATIONS:   #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    He c3 and C4 are normal, although anti-dsDNA remains positive.  SHe had rituximab on 6/28, with undetectable CD19 B cells.  At this point, she is on full dose MMF + rituximab + prednisone.  Expecially given the recent shingles, I feel comfortable decreasing the MMF to 1000 mg twice daily.  Continue pred at current dose.  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  #COVID vaccination  She is s/p Pfizer vaccine, but mounted NO antibody response when tested at the end of June. (Not surprising given ritux + MMF).  We discussed this result and I fully support her continued work from home given her inability to respond to vaccination and her risk of severe COVID19 were she to get exposed.     Labs ordered    We will see her again in 4 months.  Total time spent: 43 min    Luis Eduardo Paredes MD  Division of Nephrology and Hypertension  Pager: [phone]          Again, thank you for allowing me to participate in the care of your patient.      Sincerely,    Luis Eduardo Paredes MD

## 2021-07-22 NOTE — PROGRESS NOTES
"  Nephrology Follow Up  4/8/2021    Taina Singh   MRN:[id number]   YOB: 1965    REASON FOR FOLLOW UP: Lupus Nephritis      HISTORY OF PRESENT ILLNESS:  Patient is a 55-year-old female. She was originally diagnosed with systemic lupus erythematosus at age 27 and was placed on hydroxychloroquine with good control of her disease.  For full review of her SLE course, please see HPI from Dr. Sherman's note from 9/10/2020.         July 22, 2021  She was last seen by Dr Rouse and me in April 2021.  She was diagnosed with shingles.  L thigh, does not extend below the knee, and is described as the \"front part of the inner thigh\".  Joints are achy and feels tired, but denies severe pain  (mild only).  It started on Monday.  The redness had increased in size the night before last -> sought care.  -> was started on valacyclovir 1 g 3 x a day.    No fever.  No cough/SOB.    She notes overall doing well from SLE.   Had rituximab in July, had been feeling a bit under the weather and achy.    She had been tapering Pred, she is  down to 5mg daily   NO oral ulcers, facial rash. No hair loss.  Otherwise, appetite is good. No nausea.   She has lost 20 lbs since December 2020 through diet and some home exercise. No Leg swelling, No gross hematuria, dysuria.   Has been in menopause for several years    Home BP - 124/70 yesterday at PCP.         MEDICATIONS:  Current Outpatient Medications   Medication Sig Dispense Refill     albuterol (PROAIR HFA/PROVENTIL HFA/VENTOLIN HFA) 108 (90 Base) MCG/ACT inhaler Inhale 2 puffs into the lungs every 6 hours as needed for shortness of breath / dyspnea or wheezing...Has not needed in a long time 3 Inhaler 3     atovaquone (MEPRON) 750 MG/5ML suspension Take 10 mLs (1,500 mg) by mouth daily 900 mL 3     Calcium-Magnesium 300-300 MG TABS daily        hydroxychloroquine (PLAQUENIL) 200 MG tablet Take 1 tablet (200 mg) by mouth 2 times daily Annual Plaquenil toxicity eye screening " required.  Started about 5 years ago.   180 tablet 3     Multiple Vitamins-Minerals (WOMENS MULTI PO) Take by mouth daily        mycophenolate (GENERIC EQUIVALENT) 500 MG tablet Take 3 tablets (1,500 mg) by mouth 2 times daily 540 tablet 3     Omega-3 Fatty Acids (OMEGA-3 FISH OIL) 500 MG CAPS Take 500 mg by mouth daily        predniSONE (DELTASONE) 5 MG tablet  5mg daily.  90 tablet 3     traMADol (ULTRAM) 50 MG tablet Take 1 tablet (50 mg) by mouth every 12 hours as needed for pain Prn pain 60 tablet 3     valACYclovir (VALTREX) 1000 mg tablet        vitamin D3 (CHOLECALCIFEROL) 50 mcg (2000 units) tablet Take 1 tablet (50 mcg) by mouth daily 90 tablet 3     warfarin ANTICOAGULANT (COUMADIN) 5 MG tablet Monitored by PCP  1     zolpidem (AMBIEN) 5 MG tablet Take 1 tablet (5 mg) by mouth nightly as needed for sleep 30 tablet 3         ALLERGIES:    Reviewed with the patient in detail    REVIEW OF SYSTEMS:  A comprehensive of systems was negative except as noted above.    SOCIAL HISTORY:   Reviewed with patient, no smoking and no alcohol use     FAMILY MEDICAL HISTORY:   Reviewed, no family history of need for dialysis, transplant or CKD    PHYSICAL EXAM:   Vital signs:There were no vitals taken for this visit.    Physical Exam  Constitutional:     Appearance: Normal appearance.   No facial rash  Breathing not labored  Neuro:  Alert, speech flow and content normal  Affect: bright      LABS    Infusion Therapy Visit on 06/28/2021   Component Date Value Ref Range Status     COVID-19 Jam RBD Cari 06/28/2021 Negative   Final    Comment: No COVID-19 antibodies detected. Patients with recent COVID-19 vaccination or   recent symptom onset from COVID-19 infection may not produce sufficient levels   of detectable antibodies. Immunocompromised COVID-19 patients may take longer   to develop antibodies.       COVID-19 Jam RBD Cari Titer 06/28/2021 Not Applicable   Final    Comment: Qualitative screen for IgG, IgM and IgA  antibodies to COVID-19 (SARS-CoV-2)   spike receptor binding domain (RBD) protein. COVID-19 spike RBD antibodies may   be elevated due to vaccination, or a past or current COVID-19 infection.  The qualitative screen for IgG, IgM and IgA COVID-19 spike RBD antibodies is   performed on the [employer] [facility], and this test is approved by the FDA under the   Emergency Use Authorization (EUA).  Negative results do not rule out COVID-19 infection. Results from antibody   testing should not be used as the sole basis to diagnose or exclude SARS-CoV-2   infection or to inform infection status. COVID-19 PCR test should be ordered   if current infection is suspected. False positive results may occur in rare   cases due to cross-reacting antibodies.  Testing performed by [facility], [facility], [address] [place] [address], [address], [place] [address]     Orders Only on 06/28/2021   Component Date Value Ref Range Status     CD19 B Cells 06/28/2021 <1* 6 - 27 % Final     Absolute CD19 06/28/2021 <1* 107 - 698 cells/uL Final     IGG 06/28/2021 397* 610 - 1,616 mg/dL Final     IGA 06/28/2021 177  84 - 499 mg/dL Final     IGM 06/28/2021 22* 35 - 242 mg/dL Final     Creatinine Urine Random 06/28/2021 79  mg/dL Final     Protein Random Urine 06/28/2021 0.20  g/L Final     Protein Total Urine g/gr Creatinine 06/28/2021 0.26* 0 - 0.2 g/g Cr Final     Color Urine 06/28/2021 Yellow   Final     Appearance Urine 06/28/2021 Clear   Final     Glucose Urine 06/28/2021 Negative  NEG^Negative mg/dL Final     Bilirubin Urine 06/28/2021 Negative  NEG^Negative Final     Ketones Urine 06/28/2021 Negative  NEG^Negative mg/dL Final     Specific Gravity Urine 06/28/2021 1.016  1.003 - 1.035 Final     Blood Urine 06/28/2021 Trace* NEG^Negative Final     pH Urine 06/28/2021 5.5  5.0 - 7.0 pH Final     Protein Albumin Urine 06/28/2021 Negative  NEG^Negative mg/dL Final     Urobilinogen mg/dL 06/28/2021 Normal  0.0  - 2.0 mg/dL Final     Nitrite Urine 06/28/2021 Negative  NEG^Negative Final     Leukocyte Esterase Urine 06/28/2021 Large* NEG^Negative Final     Source 06/28/2021 Midstream Urine   Final     WBC Urine 06/28/2021 25-50* OTO5^0 - 5 /HPF Final     RBC Urine 06/28/2021 2-5* OTO2^O - 2 /HPF Final     Bacteria Urine 06/28/2021 Moderate* NEG^Negative /HPF Final     Squamous Epithelial /LPF Urine 06/28/2021 Moderate* FEW^Few /LPF Final     Mucous Urine 06/28/2021 Present* NEG^Negative /LPF Final     Sed Rate 06/28/2021 30  0 - 30 mm/h Final     DNA-ds 06/28/2021 104* <10 IU/mL Final    Positive     CRP Inflammation 06/28/2021 13.4* 0.0 - 8.0 mg/L Final     Complement C3 06/28/2021 112  81 - 157 mg/dL Final     Complement C4 06/28/2021 24  13 - 39 mg/dL Final     Creatinine 06/28/2021 0.64  0.52 - 1.04 mg/dL Final     GFR Estimate 06/28/2021 >90  >60 mL/min/[1.73_m2] Final    Comment: Non  GFR Calc  Starting 12/18/2018, serum creatinine based estimated GFR (eGFR) will be   calculated using the Chronic Kidney Disease Epidemiology Collaboration   (CKD-EPI) equation.       GFR Estimate If Black 06/28/2021 >90  >60 mL/min/[1.73_m2] Final    Comment:  GFR Calc  Starting 12/18/2018, serum creatinine based estimated GFR (eGFR) will be   calculated using the Chronic Kidney Disease Epidemiology Collaboration   (CKD-EPI) equation.       WBC 06/28/2021 6.0  4.0 - 11.0 10e9/L Final     RBC Count 06/28/2021 4.38  3.8 - 5.2 10e12/L Final     Hemoglobin 06/28/2021 11.6* 11.7 - 15.7 g/dL Final     Hematocrit 06/28/2021 36.4  35.0 - 47.0 % Final     MCV 06/28/2021 83  78 - 100 fl Final     MCH 06/28/2021 26.5  26.5 - 33.0 pg Final     MCHC 06/28/2021 31.9  31.5 - 36.5 g/dL Final     RDW 06/28/2021 14.1  10.0 - 15.0 % Final     Platelet Count 06/28/2021 207  150 - 450 10e9/L Final     Diff Method 06/28/2021 Automated Method   Final     % Neutrophils 06/28/2021 81.6  % Final     % Lymphocytes 06/28/2021 5.2  %  Final     % Monocytes 06/28/2021 10.1  % Final     % Eosinophils 06/28/2021 1.8  % Final     % Basophils 06/28/2021 0.5  % Final     % Immature Granulocytes 06/28/2021 0.8  % Final     Absolute Neutrophil 06/28/2021 4.9  1.6 - 8.3 10e9/L Final     Absolute Lymphocytes 06/28/2021 0.3* 0.8 - 5.3 10e9/L Final     Absolute Monocytes 06/28/2021 0.6  0.0 - 1.3 10e9/L Final     Absolute Eosinophils 06/28/2021 0.1  0.0 - 0.7 10e9/L Final     Absolute Basophils 06/28/2021 0.0  0.0 - 0.2 10e9/L Final     Abs Immature Granulocytes 06/28/2021 0.1  0 - 0.4 10e9/L Final     AST 06/28/2021 18  0 - 45 U/L Final     Albumin 06/28/2021 3.6  3.4 - 5.0 g/dL Final     ALT 06/28/2021 28  0 - 50 U/L Final     Creatinine Urine 06/28/2021 78  mg/dL Final     Specimen Description 06/28/2021 Midstream Urine   Final     Special Requests 06/28/2021 Specimen received in preservative   Final     Culture Micro 06/28/2021 *  Final                    Value:10,000 to 50,000 colonies/mL  Escherichia coli       Culture Micro 06/28/2021    Final                    Value:<10,000 colonies/mL  mixed urogenital pascual  Susceptibility testing not routinely done             ASSESSMENT AND RECOMMENDATIONS:   #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    He c3 and C4 are normal, although anti-dsDNA remains positive.  SHe had rituximab on 6/28, with undetectable CD19 B cells.  At this point, she is on full dose MMF + rituximab + prednisone.  Expecially given the recent shingles, I feel comfortable decreasing the MMF to 1000 mg twice daily.  Continue pred at current dose.  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  #COVID vaccination  She is s/p Pfizer vaccine, but mounted NO antibody response when tested at the end of June. (Not surprising given ritux + MMF).  We discussed this result and I fully support  her continued work from home given her inability to respond to vaccination and her risk of severe COVID19 were she to get exposed.     Labs ordered    We will see her again in 4 months.  Total time spent: 43 min    Luis Eduardo Paredes MD  Division of Nephrology and Hypertension  Pager: [phone]

## 2021-08-17 ENCOUNTER — TELEPHONE (OUTPATIENT)
Dept: RHEUMATOLOGY | Facility: CLINIC | Age: 56
End: 2021-08-17

## 2021-08-17 NOTE — TELEPHONE ENCOUNTER
Pt has called in, form was sent on 7/29 needs to be completed by 8/20/21.      Sophie Alvarado RN  [facility]

## 2021-08-27 NOTE — TELEPHONE ENCOUNTER
Form was completed by Jayleen SHANNON and sent to pt and her employer.    Sophie Alvarado RN  [facility]

## 2021-08-28 DIAGNOSIS — Z79.52 LONG TERM SYSTEMIC STEROID USER: ICD-10-CM

## 2021-08-28 DIAGNOSIS — G47.00 INSOMNIA, UNSPECIFIED TYPE: ICD-10-CM

## 2021-08-30 RX ORDER — ZOLPIDEM TARTRATE 5 MG/1
5 TABLET ORAL
Qty: 30 TABLET | Refills: 3 | Status: SHIPPED | OUTPATIENT
Start: 2021-08-30 | End: 2022-04-08

## 2021-08-30 RX ORDER — CHOLECALCIFEROL (VITAMIN D3) 50 MCG
1 TABLET ORAL DAILY
Qty: 90 TABLET | Refills: 3 | Status: SHIPPED | OUTPATIENT
Start: 2021-08-30 | End: 2023-06-26

## 2021-08-30 NOTE — TELEPHONE ENCOUNTER
ZOLPIDEM 5MG TABLETS  Last Written Prescription Date:  1/22/2021  Last Fill Quantity: 30,   # refills: 3  Last Office Visit : 2/4/2021  Future Office visit:  11/12/2021    Routing refill request to provider for review/approval because:  Drug not on the [facility], [facility] or [facility] refill protocol or controlled substance    Gemma Hebert RN  Central Triage Red Flags/Med Refills      VITAMIN D 2,000UNIT TABLETS  Last Written Prescription Date:  7/20/2020  Last Fill Quantity: 90,   # refills: 3  Last Office Visit : 2/4/2021  Future Office visit:  11/12/2021  90 Tabs, 3 Refills sent to pharm 8/30/2021

## 2021-09-26 ENCOUNTER — HEALTH MAINTENANCE LETTER (OUTPATIENT)
Age: 56
End: 2021-09-26

## 2021-10-14 ENCOUNTER — TRANSFERRED RECORDS (OUTPATIENT)
Dept: HEALTH INFORMATION MANAGEMENT | Facility: CLINIC | Age: 56
End: 2021-10-14
Payer: COMMERCIAL

## 2021-11-03 DIAGNOSIS — L93.0 LUPUS ERYTHEMATOSUS, UNSPECIFIED FORM: ICD-10-CM

## 2021-11-03 NOTE — LETTER
Taina Singh  [address] [place] [address]    Dear Taina Singh,     Regular eye exams are required while taking hydroxychloroquine (Plaquenil). Eye exams should be completed by an eye specialist who is experienced in monitoring for hydroxychloroquine toxicity (a rare effect of the drug that can damage your eyes and vision).  These may be yearly or as determined by your eye specialist.     Although vision problems and loss of sight while taking hydroxychloroquine are very rare, notify your doctor immediately if you notice changes in your vision. The goal of screening is to detect toxicity before your vision is significantly or noticeably impacted. Failing to get proper screening exams puts you at risk of vision changes which may or may not be reversible.    Per the American Academy of Ophthalmology recommendations (2016), screening tests performed may include a 10-2 visual field test, spectral-domain optical coherence tomography (SD OCT), or other screening tests as determined by the eye specialist, including a multifocal electroretinogram (mfERG) or fundus auto-fluorescence (FAF).    We received a refill request from your pharmacy. A copy of your current eye exam was not found in your medical record. Your hydroxychloroquine prescription has been refilled with a limited supply pending confirmation you have had an eye exam to test for hydroxychloroquine toxicity.      We encourage you to bring this letter to your eye exam to discuss the exams that will be performed during your visit. Please request your eye clinic fax or mail a copy of your eye exam report to our clinic indicating that testing was completed for hydroxychloroquine toxicity screening. The exam notes must specifically comment on the potential for hydroxychloroquine toxicity and outline recommended follow-up.    If you have questions about hydroxychloroquine or the information in this letter, please call the clinic at [phone] or talk to your  provider at your next office visit.                        2    Eye Specialist Letter  Dear Eye Specialist,  To ensure safe use of Plaquenil (hydroxychloroquine), we are requesting your assistance in providing the following information. Please return this form to our clinic via mail or fax, or incorporate this information into your visit summary. Your note must indicate whether or not there is evidence of toxicity from Plaquenil use. For questions regarding this form, please call [phone].  Patient Name:   :             Date of Exam:    The following exams were completed during this visit in accordance with the American Academy of Ophthalmology recommendations (2016):  ? 10-2 automated visual field  ? Spectral-domain optical coherence tomography (SD OCT)  ? Multifocal electroretinogram (mfERG)  ? Fundus autofluorescence (FAF)  ? Other (please specify)  Please select from the following:  ? The findings from the above exams are not suggestive of toxicity from Plaquenil (hydroxychloroquine).  ? The findings from the above exams may suggest toxicity from Plaquenil (hydroxychloroquine).  Directly contact the clinic and fax this form.  Please provide additional guidance on whether or not the medication may be continued from your perspective:  Date of next recommended Plaquenil (hydroxychloroquine) screening eye exam:   ? 5 years  ? 1 year  ? 6 months  Other (please specify):   Eye Specialist Name (print):                                                                Date:  Eye Specialist Signature:

## 2021-11-05 RX ORDER — HYDROXYCHLOROQUINE SULFATE 200 MG/1
200 TABLET, FILM COATED ORAL 2 TIMES DAILY
Qty: 180 TABLET | Refills: 0 | Status: SHIPPED | OUTPATIENT
Start: 2021-11-05 | End: 2022-02-08

## 2021-11-05 NOTE — TELEPHONE ENCOUNTER
Plaquenil      Last Written Prescription Date:  9/25/20  Last Fill Quantity: 180,   # refills: 3  Last Office Visit: 2/4/21 recommended 6 month follow up  Future Office visit: 11/12/21  Last Eye Exam:9/8/20 in media  Nothing more recent in care everywhere nor EMR  Most recent creatinine 6/28/21 0.64      Routing refill request to provider for review/approval because:  No record of recent eye exam in EPIC - Letter sent today.

## 2021-11-10 ENCOUNTER — LAB (OUTPATIENT)
Dept: LAB | Facility: CLINIC | Age: 56
End: 2021-11-10
Payer: COMMERCIAL

## 2021-11-10 DIAGNOSIS — I77.82 ANCA-NEGATIVE VASCULITIS (H): ICD-10-CM

## 2021-11-10 DIAGNOSIS — M06.09 RHEUMATOID ARTHRITIS, SERONEGATIVE, MULTIPLE SITES (H): ICD-10-CM

## 2021-11-10 DIAGNOSIS — M32.9 SYSTEMIC LUPUS ERYTHEMATOSUS, UNSPECIFIED SLE TYPE, UNSPECIFIED ORGAN INVOLVEMENT STATUS (H): ICD-10-CM

## 2021-11-10 DIAGNOSIS — M32.14 LUPUS NEPHRITIS, ISN/RPS CLASS IV (H): ICD-10-CM

## 2021-11-10 LAB
ALBUMIN SERPL-MCNC: 4 G/DL (ref 3.4–5)
ALBUMIN UR-MCNC: NEGATIVE MG/DL
ALT SERPL W P-5'-P-CCNC: 27 U/L (ref 0–50)
ANION GAP SERPL CALCULATED.3IONS-SCNC: 6 MMOL/L (ref 3–14)
APPEARANCE UR: CLEAR
AST SERPL W P-5'-P-CCNC: 19 U/L (ref 0–45)
BACTERIA #/AREA URNS HPF: ABNORMAL /HPF
BASOPHILS # BLD AUTO: 0 10E3/UL (ref 0–0.2)
BASOPHILS NFR BLD AUTO: 0 %
BILIRUB UR QL STRIP: NEGATIVE
BUN SERPL-MCNC: 22 MG/DL (ref 7–30)
CALCIUM SERPL-MCNC: 9.6 MG/DL (ref 8.5–10.1)
CHLORIDE BLD-SCNC: 103 MMOL/L (ref 94–109)
CO2 SERPL-SCNC: 29 MMOL/L (ref 20–32)
COLOR UR AUTO: YELLOW
CREAT SERPL-MCNC: 0.7 MG/DL (ref 0.52–1.04)
CREAT UR-MCNC: 212 MG/DL
CRP SERPL-MCNC: 8.9 MG/L (ref 0–8)
EOSINOPHIL # BLD AUTO: 0.1 10E3/UL (ref 0–0.7)
EOSINOPHIL NFR BLD AUTO: 2 %
ERYTHROCYTE [DISTWIDTH] IN BLOOD BY AUTOMATED COUNT: 14.2 % (ref 10–15)
ERYTHROCYTE [SEDIMENTATION RATE] IN BLOOD BY WESTERGREN METHOD: 25 MM/HR (ref 0–30)
GFR SERPL CREATININE-BSD FRML MDRD: >90 ML/MIN/1.73M2
GLUCOSE BLD-MCNC: 83 MG/DL (ref 70–99)
GLUCOSE UR STRIP-MCNC: NEGATIVE MG/DL
HCT VFR BLD AUTO: 39.7 % (ref 35–47)
HGB BLD-MCNC: 12.3 G/DL (ref 11.7–15.7)
HGB UR QL STRIP: ABNORMAL
KETONES UR STRIP-MCNC: ABNORMAL MG/DL
LEUKOCYTE ESTERASE UR QL STRIP: ABNORMAL
LYMPHOCYTES # BLD AUTO: 0.5 10E3/UL (ref 0.8–5.3)
LYMPHOCYTES NFR BLD AUTO: 6 %
MCH RBC QN AUTO: 26.9 PG (ref 26.5–33)
MCHC RBC AUTO-ENTMCNC: 31 G/DL (ref 31.5–36.5)
MCV RBC AUTO: 87 FL (ref 78–100)
MONOCYTES # BLD AUTO: 0.7 10E3/UL (ref 0–1.3)
MONOCYTES NFR BLD AUTO: 9 %
MUCOUS THREADS #/AREA URNS LPF: PRESENT /LPF
NEUTROPHILS # BLD AUTO: 6.9 10E3/UL (ref 1.6–8.3)
NEUTROPHILS NFR BLD AUTO: 83 %
NITRATE UR QL: NEGATIVE
PH UR STRIP: 6 [PH] (ref 5–7)
PHOSPHATE SERPL-MCNC: 3.9 MG/DL (ref 2.5–4.5)
PLATELET # BLD AUTO: 229 10E3/UL (ref 150–450)
POTASSIUM BLD-SCNC: 3.9 MMOL/L (ref 3.4–5.3)
PROT UR-MCNC: 0.23 G/L
PROT/CREAT 24H UR: 0.11 G/G CR (ref 0–0.2)
RBC # BLD AUTO: 4.58 10E6/UL (ref 3.8–5.2)
RBC #/AREA URNS AUTO: ABNORMAL /HPF
SODIUM SERPL-SCNC: 138 MMOL/L (ref 133–144)
SP GR UR STRIP: >=1.03 (ref 1–1.03)
SQUAMOUS #/AREA URNS AUTO: ABNORMAL /LPF
UROBILINOGEN UR STRIP-ACNC: 0.2 E.U./DL
WBC # BLD AUTO: 8.2 10E3/UL (ref 4–11)
WBC #/AREA URNS AUTO: ABNORMAL /HPF

## 2021-11-10 PROCEDURE — 80069 RENAL FUNCTION PANEL: CPT

## 2021-11-10 PROCEDURE — 84156 ASSAY OF PROTEIN URINE: CPT

## 2021-11-10 PROCEDURE — 83516 IMMUNOASSAY NONANTIBODY: CPT

## 2021-11-10 PROCEDURE — 86225 DNA ANTIBODY NATIVE: CPT

## 2021-11-10 PROCEDURE — 86355 B CELLS TOTAL COUNT: CPT

## 2021-11-10 PROCEDURE — 85025 COMPLETE CBC W/AUTO DIFF WBC: CPT

## 2021-11-10 PROCEDURE — 86160 COMPLEMENT ANTIGEN: CPT | Mod: 59

## 2021-11-10 PROCEDURE — 81001 URINALYSIS AUTO W/SCOPE: CPT

## 2021-11-10 PROCEDURE — 84460 ALANINE AMINO (ALT) (SGPT): CPT

## 2021-11-10 PROCEDURE — 86256 FLUORESCENT ANTIBODY TITER: CPT

## 2021-11-10 PROCEDURE — 85652 RBC SED RATE AUTOMATED: CPT

## 2021-11-10 PROCEDURE — 36415 COLL VENOUS BLD VENIPUNCTURE: CPT

## 2021-11-10 PROCEDURE — 82784 ASSAY IGA/IGD/IGG/IGM EACH: CPT

## 2021-11-10 PROCEDURE — 86140 C-REACTIVE PROTEIN: CPT

## 2021-11-10 PROCEDURE — 86160 COMPLEMENT ANTIGEN: CPT

## 2021-11-10 PROCEDURE — 86255 FLUORESCENT ANTIBODY SCREEN: CPT

## 2021-11-10 PROCEDURE — 83876 ASSAY MYELOPEROXIDASE: CPT

## 2021-11-10 PROCEDURE — 84450 TRANSFERASE (AST) (SGOT): CPT

## 2021-11-11 LAB
ANCA AB PATTERN SER IF-IMP: NORMAL
C-ANCA TITR SER IF: NORMAL {TITER}
C3 SERPL-MCNC: 138 MG/DL (ref 81–157)
C4 SERPL-MCNC: 28 MG/DL (ref 13–39)
CD19 CELLS # BLD: <1 CELLS/UL (ref 107–698)
CD19 CELLS NFR BLD: <1 % (ref 6–27)
DSDNA AB SER-ACNC: 90 IU/ML
IGA SERPL-MCNC: 184 MG/DL (ref 84–499)
IGG SERPL-MCNC: 398 MG/DL (ref 610–1616)
IGM SERPL-MCNC: 16 MG/DL (ref 35–242)

## 2021-11-12 ENCOUNTER — VIRTUAL VISIT (OUTPATIENT)
Dept: RHEUMATOLOGY | Facility: CLINIC | Age: 56
End: 2021-11-12
Attending: INTERNAL MEDICINE
Payer: COMMERCIAL

## 2021-11-12 DIAGNOSIS — I77.82 ANCA-NEGATIVE VASCULITIS (H): ICD-10-CM

## 2021-11-12 DIAGNOSIS — L93.0 LUPUS ERYTHEMATOSUS, UNSPECIFIED FORM: Primary | ICD-10-CM

## 2021-11-12 LAB
MYELOPEROXIDASE AB SER IA-ACNC: <0.3 U/ML
MYELOPEROXIDASE AB SER IA-ACNC: NEGATIVE
PROTEINASE3 AB SER IA-ACNC: <1 U/ML
PROTEINASE3 AB SER IA-ACNC: NEGATIVE

## 2021-11-12 PROCEDURE — 99214 OFFICE O/P EST MOD 30 MIN: CPT | Mod: 95 | Performed by: INTERNAL MEDICINE

## 2021-11-12 ASSESSMENT — PAIN SCALES - GENERAL: PAINLEVEL: MILD PAIN (3)

## 2021-11-12 NOTE — LETTER
11/12/2021      RE: Taina Singh  [address] [place] [address]       Rheumatology Follow-up Virtual Visit Note    Reason for visit: f/u for lupus (this is a video visit due to COVID-19 outbreak), patient agreed      Date of last visit: 2/4/2021  DOS: 11/12/2021      History of Present Illness:    Taina Singh is a 56 year old female who was seen for follow up of SLE and lupus nephritis.    She was diagnosed with lupus at age 27. Her 1st sx were malar rash and fatigue. No skin biopsy was done (reportedly had +KARLIE). She was treated with prednisone taper and HCQ. HCQ helped and she tolerated it well. She was diagnosed with Sjogren's at the same time. Had dryness of eyes and mouth. No lip biopsy to confirm the Dx. Reportedly she had very mild stable lupus for many years and eventually at some point, stopped taking HCQ (can't remember when). Her lupus started to flare in 7/2017 initially with pleuritic CP, was put back on HCQ. 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 Since then failed AZA, cytoxan and benlysta. MMF was not enough to control her LN and eventually rituximab was added (which was initially denied by insurance even with h/o lymphoma) given necrosis on the renal biopsy (rituximab is FDA approved for ANCA vasculitis). On HCQ+MMF after adding rituximab, LN and SLE started too improve.    She was diagnosed with MALT lymphoma at 44, had enlarged LN over R parotid gland, on biopsy it was MALT lymphoma. Tx was resection only, no radiation or chemo.      2/4/2021:    Taina is on prednisone 8 mg every day, it was 17.5 mg every day at last visit. She feels achy and tired this week, but thinks the cold weather is responsible for her sx.    Had rituximab  375 mg/m2 on 1/22/2020, 2/6/2020, then 6/26/2020, 7/20/2020 and last dose 1 gram on 12/15/2020.    On mepron for PJP prophylaxis. On  mg bid, MMF 1500 mg bid.    She works from home.    Has intermittent joint pain, nothing  consistent. one day shoulders hurt and the other day her hips hurt. AM stiffness is 30-60 minutes. no pleurisy, sob or cough or fevers. No skin rash. Her hair grew back.    Today 2021: Taina is feeling well, she thinks her lupus is under fair control, no major complaints today.    ROS:  A comprehensive ROS was done, positives are per HPI.    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44    No chemo or radiation     Other forms of systemic lupus erythematosus (H) 2018     Pulmonary embolism (H)     2019 seen on abd CT at [facility]     Sjogren's syndrome (H)     secondary Sjogrens, secondary to lupus     Vitamin D deficiency      Past Surgical History:   Procedure Laterality Date     BIOPSY/REMOVAL, LYMPH NODE(S)        SECTION  age 32     HC HYSTEROS W PERMANENT FALLOPAIN IMPLANT      Essure b/l     PAROTIDECTOMY Right 2006     TONSILLECTOMY       Family History   Problem Relation Age of Onset     Alopecia Brother      Rheumatoid Arthritis Brother      LUNG DISEASE No family hx of      Social History     Socioeconomic History     Marital status:      Spouse name: Not on file     Number of children: Not on file     Years of education: 1     Highest education level: Not on file   Occupational History     Comment: , 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      Social Determinants of Health     Financial Resource Strain: Not on file   Food Insecurity: Not on file   Transportation Needs: Not on file   Physical Activity: Not on file   Stress: Not on file   Social Connections: Not on file   Intimate Partner Violence: Not on file   Housing Stability: Not on file     Going through divorce but it's not stress factor for her.    Allergies   Allergen Reactions     Pentamidine Shortness Of Breath     Penicillins Rash     Sulfa Drugs Rash       Outpatient Encounter Medications as of 11/12/2021   Medication Sig Dispense Refill     albuterol (PROAIR HFA/PROVENTIL HFA/VENTOLIN HFA) 108 (90 Base) MCG/ACT inhaler Inhale 2 puffs into the lungs every 6 hours as needed for shortness of breath / dyspnea or wheezing 3 Inhaler 3     atovaquone (MEPRON) 750 MG/5ML suspension Take 10 mLs (1,500 mg) by mouth daily 900 mL 3     Calcium-Magnesium 300-300 MG TABS daily        hydroxychloroquine (PLAQUENIL) 200 MG tablet Take 1 tablet (200 mg) by mouth 2 times daily Overdue for annual eye exam. Get annual eye exams for hydroxychloroquine (Plaquenil) monitoring and fax to [phone] 180 tablet 0     levofloxacin (LEVAQUIN) 250 MG tablet Take 1 tablet (250 mg) by mouth daily 5 tablet 0     Multiple Vitamins-Minerals (WOMENS MULTI PO) Take by mouth daily        mycophenolate (GENERIC EQUIVALENT) 500 MG tablet Take 3 tablets (1,500 mg) by mouth 2 times daily 540 tablet 3     Omega-3 Fatty Acids (OMEGA-3 FISH OIL) 500 MG CAPS Take 500 mg by mouth daily        predniSONE (DELTASONE) 1 MG tablet 9-8-7-6 mg every  day each course for one month until you reach 5mg daily. Use with 5 mg size tab 120 tablet 2     predniSONE (DELTASONE) 5 MG tablet 9-8-7-6 mg every day each course for one month until you reach 5mg daily. Use with 1 mg size tab 90 tablet 3     traMADol (ULTRAM) 50 MG tablet Take 1 tablet (50 mg) by mouth every 12 hours as needed for pain Prn pain 60 tablet 3     valACYclovir (VALTREX) 1000 mg tablet        vitamin D3 (CHOLECALCIFEROL) 50 mcg (2000 units) tablet Take 1 tablet (50 mcg) by mouth daily 90 tablet 3     warfarin ANTICOAGULANT (COUMADIN) 5 MG tablet   1     zolpidem (AMBIEN) 5 MG tablet Take 1 tablet (5 mg) by mouth nightly as needed for sleep 30 tablet 3     pantoprazole (PROTONIX) 20 MG EC tablet Take 2 tablets (40 mg) by mouth 2 times daily (Patient not taking: Reported on 7/22/2021)       No facility-administered encounter medications on file as of 11/12/2021.       Results:    RHEUM RESULTS Latest Ref Rng & Units 11/20/2017 12/29/2017 12/29/2017   ALBUMIN 3.4 - 5.0 g/dL - - -   ALT 0 - 50 U/L - - 35   AST 0 - 45 U/L - - 26   COMPLEMENT C3 81 - 157 mg/dL - - 72(L)   COMPLEMENT C4 13 - 39 mg/dL - - 6(L)   CREATININE 0.52 - 1.04 mg/dL 0.55 - -   CRP 0.0 - 8.0 mg/L - 3.2 2.9   DNA <10.0 IU/mL - - >379(H)   GFR ESTIMATE, IF BLACK >60 mL/min/[1.73:m2] >90 - -   GFR ESTIMATE >60 mL/min/1.73m2 >90 - -   HEMATOCRIT 35.0 - 47.0 % 35.5 - -   HEMOGLOBIN 11.7 - 15.7 g/dL 11.3(L) - -   HEPBANG NR:Nonreactive - - -   HCVAB NR:Nonreactive - - -   IGA 84 - 499 mg/dL - - 473(H)   IGM 35 - 242 mg/dL - - 92   -1,616 mg/dL - - 1,560   WBC 4.0 - 11.0 10e3/uL 4.9 - -   RBC 3.80 - 5.20 10e6/uL 4.28 - -   RDW 10.0 - 15.0 % 14.6 - -   MCHC 31.5 - 36.5 g/dL 31.8 - -   MCV 78 - 100 fL 83 - -   PLATELET COUNT 150 - 450 10e3/uL 215 - -   ESR 0 - 30 mm/hr 71(H) - 49(H)   QUANTIFERON-TB GOLD PLUS RESULT NEG:Negative - - -     Component      Latest Ref Rng & Units 2/1/2021   WBC      4.0 - 11.0 10e9/L 7.8   RBC Count      3.8  - 5.2 10e12/L 4.50   Hemoglobin      11.7 - 15.7 g/dL 11.7   Hematocrit      35.0 - 47.0 % 37.9   MCV      78 - 100 fl 84   MCH      26.5 - 33.0 pg 26.0 (L)   MCHC      31.5 - 36.5 g/dL 30.9 (L)   RDW      10.0 - 15.0 % 14.9   Platelet Count      150 - 450 10e9/L 236   % Neutrophils      % 84.9   % Lymphocytes      % 6.3   % Monocytes      % 7.9   % Eosinophils      % 0.6   % Basophils      % 0.3   Absolute Neutrophil      1.6 - 8.3 10e9/L 6.6   Absolute Lymphocytes      0.8 - 5.3 10e9/L 0.5 (L)   Absolute Monocytes      0.0 - 1.3 10e9/L 0.6   Absolute Eosinophils      0.0 - 0.7 10e9/L 0.1   Absolute Basophils      0.0 - 0.2 10e9/L 0.0   Diff Method       Automated Method   Color Urine       Yellow   Appearance Urine       Clear   Glucose Urine      NEG:Negative mg/dL Negative   Bilirubin Urine      NEG:Negative Negative   Ketones Urine      NEG:Negative mg/dL Trace (A)   Specific Gravity Urine      1.003 - 1.035 >1.030   pH Urine      5.0 - 7.0 pH 6.0   Protein Albumin Urine      NEG:Negative mg/dL 100 (A)   Urobilinogen Urine      0.2 - 1.0 EU/dL 0.2   Nitrite Urine      NEG:Negative Negative   Blood Urine      NEG:Negative Trace (A)   Leukocyte Esterase Urine      NEG:Negative Trace (A)   Source       Midstream Urine   WBC Urine      OTO5:0 - 5 /HPF 10-25 (A)   RBC Urine      OTO2:O - 2 /HPF 2-5 (A)   Squamous Epithelial /LPF Urine      FEW:Few /LPF Few   Bacteria Urine      NEG:Negative /HPF Moderate (A)   Triple Phosphates      NEG:Negative /HPF Few (A)   IGG      610 - 1,616 mg/dL 399 (L)   IGA      84 - 499 mg/dL 190   IGM      35 - 242 mg/dL 16 (L)   Creatinine      0.52 - 1.04 mg/dL 0.60   GFR Estimate      >60 mL/min/1.73:m2 >90   GFR Estimate If Black      >60 mL/min/1.73:m2 >90   Specimen Description       Midstream Urine   Special Requests       Specimen received in preservative   Culture Micro       <10,000 colonies/mL . . .   CD19 B Cells      6 - 27 % <1 (L)   Absolute CD19      107 - 698 cells/uL <1  (L)   Protein Random Urine      g/L 0.75   Protein Total Urine g/gr Creatinine      0 - 0.2 g/g Cr 0.40 (H)   Creatinine Urine Random      mg/dL 182   Sed Rate      0 - 30 mm/h 27   DNA-ds      <10 IU/mL 102 (H)   CRP Inflammation      0.0 - 8.0 mg/L 8.9 (H)   Complement C3      81 - 157 mg/dL 104   Complement C4      13 - 39 mg/dL 23   AST      0 - 45 U/L 16   Albumin      3.4 - 5.0 g/dL 4.1   ALT      0 - 50 U/L 26   Creatinine Urine      mg/dL 186     Component      Latest Ref Rng & Units 11/10/2021   WBC      4.0 - 11.0 10e3/uL 8.2   RBC Count      3.80 - 5.20 10e6/uL 4.58   Hemoglobin      11.7 - 15.7 g/dL 12.3   Hematocrit      35.0 - 47.0 % 39.7   MCV      78 - 100 fL 87   MCH      26.5 - 33.0 pg 26.9   MCHC      31.5 - 36.5 g/dL 31.0 (L)   RDW      10.0 - 15.0 % 14.2   Platelet Count      150 - 450 10e3/uL 229   % Neutrophils      % 83   % Lymphocytes      % 6   % Monocytes      % 9   % Eosinophils      % 2   % Basophils      % 0   Absolute Neutrophils      1.6 - 8.3 10e3/uL 6.9   Absolute Lymphocytes      0.8 - 5.3 10e3/uL 0.5 (L)   Absolute Monocytes      0.0 - 1.3 10e3/uL 0.7   Absolute Eosinophils      0.0 - 0.7 10e3/uL 0.1   Absolute Basophils      0.0 - 0.2 10e3/uL 0.0   Color Urine      Colorless, Straw, Light Yellow, Yellow Yellow   Appearance Urine      Clear Clear   Glucose Urine      Negative mg/dL Negative   Bilirubin Urine      Negative Negative   Ketones Urine      Negative mg/dL Trace (A)   Specific Gravity Urine      1.003 - 1.035 >=1.030   Blood Urine      Negative Small (A)   pH Urine      5.0 - 7.0 6.0   Protein Albumin Urine      Negative mg/dL Negative   Urobilinogen Urine      0.2, 1.0 E.U./dL 0.2   Nitrite Urine      Negative Negative   Leukocyte Esterase Urine      Negative Trace (A)   Sodium      133 - 144 mmol/L 138   Potassium      3.4 - 5.3 mmol/L 3.9   Chloride      94 - 109 mmol/L 103   Carbon Dioxide      20 - 32 mmol/L 29   Anion Gap      3 - 14 mmol/L 6   Urea Nitrogen      7  - 30 mg/dL 22   Creatinine      0.52 - 1.04 mg/dL 0.70   Calcium      8.5 - 10.1 mg/dL 9.6   Glucose      70 - 99 mg/dL 83   Albumin      3.4 - 5.0 g/dL 4.0   Phosphorus      2.5 - 4.5 mg/dL 3.9   GFR Estimate      >60 mL/min/1.73m2 >90   Bacteria Urine      None Seen /HPF Few (A)   RBC Urine      0-2 /HPF /HPF 0-2   WBC Urine      0-5 /HPF /HPF 0-5   Squamous Epithelial /LPF Urine      None Seen /LPF Few (A)   Mucus Urine      None Seen /LPF Present (A)   MPO Cari IgG Instrument Value      <3.5 U/mL <0.3   Myeloperoxidase Antibody IgG      Negative Negative   Proteinase 3 Cari IgG Instrument Value      <2.0 U/mL <1.0   Proteinase 3 Antibody IgG      Negative Negative   IGG      610-1,616 mg/dL 398 (L)   IGA      84 - 499 mg/dL 184   IGM      35 - 242 mg/dL 16 (L)   Protein Random Urine      g/L 0.23   Protein Total Urine g/gr Creatinine      0.00 - 0.20 g/g Cr 0.11   Creatinine Urine      mg/dL 212   Neutrophil Cytoplasmic Antibody      <1:10 <1:10   Neutrophil Cytoplasmic Antibody Pattern       The ANCA IFA is <1:10.  No further testing will be performed.   CD19 B Cells      6 - 27 % <1 (L)   Absolute CD19      107 - 698 cells/uL <1 (L)   ALT      0 - 50 U/L 27   AST      0 - 45 U/L 19   Complement C4      13 - 39 mg/dL 28   Complement C3      81 - 157 mg/dL 138   CRP Inflammation      0.0 - 8.0 mg/L 8.9 (H)   DNA-ds      <10.0 IU/mL 90.0 (H)   Sed Rate      0 - 30 mm/hr 25     Physical Exam:    Constitutional: Pleasant, NAD  Eyes: EOMI, sclera anicteric, conj not injected.  MS: No synovitis.   Skin: No skin rash  Neuro: CN grossly intact without focal deficit  Psych: nl affect    Assessment/Plan:    SLE. Taina is a 55 yo WF with +FH RA and personal hx of SLE presented in 12/2017 for 2nd opinion of m/o her SLE. She was diagnosed with SLE at age 27. Her lupus has been marked by +KARLIE (highest titer 1:640, speckled and nucleolar patterns), +anti-DNA, low C3/C4, arthritis, malar rash, serositis (pleuritic CP), lupus  nephritis, hemolytic anemia, enteritis supported by +SSA/SSB Ab, +RF, high ESR/CRP. She had neg anti-RNP, anti-Sm, acL IgM/G/A, LAC, cryo and anti-CCP.     She was treated with prednisone and HCQ at the time of Dx. Can't remember how long she was on HCQ and why HCQ was stopped, but it probably was stopped because of stable disease, no report on HCQ toxicity. Reportedly her SLE was mild all these years.    Has secondary Sjogren's with sicca, +SSA/SSB Ab and h/o MALT lymphoma at age 44 in remission. Uses blink eyedrops and salagen. No lip biopsy was done to confirm Dx of Sjogren's.     Her lupus flared in 7/2017 with no triggers when she presented with arthritis, HCQ was resumed, arthritis resolved. Mild pulm HTN on 2D-Echo 8/2017 but neg R cardiac cath and VQ scan in 3/2018, also neg 2D-Echo.    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 Patient received 1st dose cyclophosphamide on 6/15/19, had 6 doses. Initiated Rituxin 1/22/2020 as failed cytoxan. Previously failed AZA, benlysta.    S/p rituximab on 1/22/2020 and 2/6/2020, 6/26/2020, 7/20/2020 on prednisone 5 mg every day , MMF 2 gr (tapered from 3 gr every day) every day and  mg a day.    Last rituximab was 1 gr on 6/28/2021.    Recommend repeat rituximab as it is the only med that has helped with LN. Also tx options are limited (also necrotizing lesions on renal biopsy, can not rule out ANCA neg vasculitis overlap and rituximab is FDA approved for GPA/MPA).     She is doing better, her SLE and vasculitis are under fair control despite tapering cellcept from 3 to 2 gram a day in 7/2021 (by renal).    Labs from 11/10/2021 are stable.      Today's plan:    - Continue Cellcept 1000 mg BID and  mg BID  - Continue prednisone 5 mg every day  -Labs [id number]  - Annual eye exam for HCQ monitoring  - PCP prophylaxis on atovaquone 10 mL (1500 mg). Patient did not tolerate inhaled pentamidine. Avoiding TMP-SMX given sulfa reaction and potential increase risk of  SLE flare. Hesitant to use dapsone given risk of hemolytic anemia.  -Tramadol prn for pain  -Ambien prn for insomnia  -Booster covid shot early Dec  -Rituximab 1 gr before Tim    Return in 3-4 months (in person)      Killian Marroquin MD      9:18-9:35 am via [employer]      Killian Marroquin MD

## 2021-11-12 NOTE — LETTER
11/12/2021       RE: Taina Singh  [address] [place] [address]     Dear Colleague,    Thank you for referring your patient, Taina Singh, to the [facility] [place] at [facility]. Please see a copy of my visit note below.    Rheumatology Follow-up Virtual Visit Note    Reason for visit: f/u for lupus (this is a video visit due to COVID-19 outbreak), patient agreed      Date of last visit: 2/4/2021  DOS: 11/12/2021      History of Present Illness:    Taina Singh is a 56 year old female who was seen for follow up of SLE and lupus nephritis.    She was diagnosed with lupus at age 27. Her 1st sx were malar rash and fatigue. No skin biopsy was done (reportedly had +KARLIE). She was treated with prednisone taper and HCQ. HCQ helped and she tolerated it well. She was diagnosed with Sjogren's at the same time. Had dryness of eyes and mouth. No lip biopsy to confirm the Dx. Reportedly she had very mild stable lupus for many years and eventually at some point, stopped taking HCQ (can't remember when). Her lupus started to flare in 7/2017 initially with pleuritic CP, was put back on HCQ. 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 Since then failed AZA, cytoxan and benlysta. MMF was not enough to control her LN and eventually rituximab was added (which was initially denied by insurance even with h/o lymphoma) given necrosis on the renal biopsy (rituximab is FDA approved for ANCA vasculitis). On HCQ+MMF after adding rituximab, LN and SLE started too improve.    She was diagnosed with MALT lymphoma at 44, had enlarged LN over R parotid gland, on biopsy it was MALT lymphoma. Tx was resection only, no radiation or chemo.      2/4/2021:    Taina is on prednisone 8 mg every day, it was 17.5 mg every day at last visit. She feels achy and tired this week, but thinks the cold weather is responsible for her sx.    Had  rituximab  375 mg/m2 on 2020, 2020, then 2020, 2020 and last dose 1 gram on 12/15/2020.    On mepron for PJP prophylaxis. On  mg bid, MMF 1500 mg bid.    She works from home.    Has intermittent joint pain, nothing consistent. one day shoulders hurt and the other day her hips hurt. AM stiffness is 30-60 minutes. no pleurisy, sob or cough or fevers. No skin rash. Her hair grew back.    Today 2021: Taina is feeling well, she thinks her lupus is under fair control, no major complaints today.    ROS:  A comprehensive ROS was done, positives are per HPI.    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44    No chemo or radiation     Other forms of systemic lupus erythematosus (H) 2018     Pulmonary embolism (H)     2019 seen on abd CT at [facility]     Sjogren's syndrome (H)     secondary Sjogrens, secondary to lupus     Vitamin D deficiency      Past Surgical History:   Procedure Laterality Date     BIOPSY/REMOVAL, LYMPH NODE(S)        SECTION  age 32     HC HYSTEROS W PERMANENT FALLOPAIN IMPLANT      Essure b/l     PAROTIDECTOMY Right 2006     TONSILLECTOMY       Family History   Problem Relation Age of Onset     Alopecia Brother      Rheumatoid Arthritis Brother      LUNG DISEASE No family hx of      Social History     Socioeconomic History     Marital status:      Spouse name: Not on file     Number of children: Not on file     Years of education: 1     Highest education level: Not on file   Occupational History      Comment: , 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      Social Determinants of Health     Financial Resource Strain: Not on file   Food Insecurity: Not on file   Transportation Needs: Not on file   Physical Activity: Not on file   Stress: Not on file   Social Connections: Not on file   Intimate Partner Violence: Not on file   Housing Stability: Not on file     Going through divorce but it's not stress factor for her.    Allergies   Allergen Reactions     Pentamidine Shortness Of Breath     Penicillins Rash     Sulfa Drugs Rash       Outpatient Encounter Medications as of 2021   Medication Sig Dispense Refill     albuterol (PROAIR HFA/PROVENTIL HFA/VENTOLIN HFA) 108 (90 Base) MCG/ACT inhaler Inhale 2 puffs into the lungs every 6 hours as needed for shortness of breath / dyspnea or wheezing 3 Inhaler 3     atovaquone (MEPRON) 750 MG/5ML suspension Take 10 mLs (1,500 mg) by mouth daily 900 mL 3     Calcium-Magnesium 300-300 MG TABS daily        hydroxychloroquine (PLAQUENIL) 200 MG tablet Take 1 tablet (200 mg) by mouth 2 times daily Overdue for annual eye exam. Get annual eye exams for hydroxychloroquine (Plaquenil) monitoring and fax to [phone] 180 tablet 0     levofloxacin (LEVAQUIN) 250 MG tablet Take 1 tablet (250 mg) by mouth daily 5 tablet 0     Multiple Vitamins-Minerals (WOMENS MULTI PO) Take by mouth daily         mycophenolate (GENERIC EQUIVALENT) 500 MG tablet Take 3 tablets (1,500 mg) by mouth 2 times daily 540 tablet 3     Omega-3 Fatty Acids (OMEGA-3 FISH OIL) 500 MG CAPS Take 500 mg by mouth daily        predniSONE (DELTASONE) 1 MG tablet 9-8-7-6 mg every day each course for one month until you reach 5mg daily. Use with 5 mg size tab 120 tablet 2     predniSONE (DELTASONE) 5 MG tablet 9-8-7-6 mg every day each course for one month until you reach 5mg daily. Use with 1 mg size tab 90 tablet 3     traMADol (ULTRAM) 50 MG tablet Take 1 tablet (50 mg) by mouth every 12 hours as needed for pain Prn pain 60 tablet 3     valACYclovir (VALTREX) 1000 mg tablet        vitamin D3 (CHOLECALCIFEROL) 50 mcg (2000 units) tablet Take 1 tablet (50 mcg) by mouth daily 90 tablet 3     warfarin ANTICOAGULANT (COUMADIN) 5 MG tablet   1     zolpidem (AMBIEN) 5 MG tablet Take 1 tablet (5 mg) by mouth nightly as needed for sleep 30 tablet 3     pantoprazole (PROTONIX) 20 MG EC tablet Take 2 tablets (40 mg) by mouth 2 times daily (Patient not taking: Reported on 7/22/2021)       No facility-administered encounter medications on file as of 11/12/2021.       Results:    RHEUM RESULTS Latest Ref Rng & Units 11/20/2017 12/29/2017 12/29/2017   ALBUMIN 3.4 - 5.0 g/dL - - -   ALT 0 - 50 U/L - - 35   AST 0 - 45 U/L - - 26   COMPLEMENT C3 81 - 157 mg/dL - - 72(L)   COMPLEMENT C4 13 - 39 mg/dL - - 6(L)   CREATININE 0.52 - 1.04 mg/dL 0.55 - -   CRP 0.0 - 8.0 mg/L - 3.2 2.9   DNA <10.0 IU/mL - - >379(H)   GFR ESTIMATE, IF BLACK >60 mL/min/[1.73:m2] >90 - -   GFR ESTIMATE >60 mL/min/1.73m2 >90 - -   HEMATOCRIT 35.0 - 47.0 % 35.5 - -   HEMOGLOBIN 11.7 - 15.7 g/dL 11.3(L) - -   HEPBANG NR:Nonreactive - - -   HCVAB NR:Nonreactive - - -   IGA 84 - 499 mg/dL - - 473(H)   IGM 35 - 242 mg/dL - - 92   -1,616 mg/dL - - 1,560   WBC 4.0 - 11.0 10e3/uL 4.9 - -   RBC 3.80 - 5.20 10e6/uL 4.28 - -   RDW 10.0 - 15.0 % 14.6 - -   MCHC 31.5 - 36.5 g/dL 31.8 - -   MCV  78 - 100 fL 83 - -   PLATELET COUNT 150 - 450 10e3/uL 215 - -   ESR 0 - 30 mm/hr 71(H) - 49(H)   QUANTIFERON-TB GOLD PLUS RESULT NEG:Negative - - -     Component      Latest Ref Rng & Units 2/1/2021   WBC      4.0 - 11.0 10e9/L 7.8   RBC Count      3.8 - 5.2 10e12/L 4.50   Hemoglobin      11.7 - 15.7 g/dL 11.7   Hematocrit      35.0 - 47.0 % 37.9   MCV      78 - 100 fl 84   MCH      26.5 - 33.0 pg 26.0 (L)   MCHC      31.5 - 36.5 g/dL 30.9 (L)   RDW      10.0 - 15.0 % 14.9   Platelet Count      150 - 450 10e9/L 236   % Neutrophils      % 84.9   % Lymphocytes      % 6.3   % Monocytes      % 7.9   % Eosinophils      % 0.6   % Basophils      % 0.3   Absolute Neutrophil      1.6 - 8.3 10e9/L 6.6   Absolute Lymphocytes      0.8 - 5.3 10e9/L 0.5 (L)   Absolute Monocytes      0.0 - 1.3 10e9/L 0.6   Absolute Eosinophils      0.0 - 0.7 10e9/L 0.1   Absolute Basophils      0.0 - 0.2 10e9/L 0.0   Diff Method       Automated Method   Color Urine       Yellow   Appearance Urine       Clear   Glucose Urine      NEG:Negative mg/dL Negative   Bilirubin Urine      NEG:Negative Negative   Ketones Urine      NEG:Negative mg/dL Trace (A)   Specific Gravity Urine      1.003 - 1.035 >1.030   pH Urine      5.0 - 7.0 pH 6.0   Protein Albumin Urine      NEG:Negative mg/dL 100 (A)   Urobilinogen Urine      0.2 - 1.0 EU/dL 0.2   Nitrite Urine      NEG:Negative Negative   Blood Urine      NEG:Negative Trace (A)   Leukocyte Esterase Urine      NEG:Negative Trace (A)   Source       Midstream Urine   WBC Urine      OTO5:0 - 5 /HPF 10-25 (A)   RBC Urine      OTO2:O - 2 /HPF 2-5 (A)   Squamous Epithelial /LPF Urine      FEW:Few /LPF Few   Bacteria Urine      NEG:Negative /HPF Moderate (A)   Triple Phosphates      NEG:Negative /HPF Few (A)   IGG      610 - 1,616 mg/dL 399 (L)   IGA      84 - 499 mg/dL 190   IGM      35 - 242 mg/dL 16 (L)   Creatinine      0.52 - 1.04 mg/dL 0.60   GFR Estimate      >60 mL/min/1.73:m2 >90   GFR Estimate If Black       >60 mL/min/1.73:m2 >90   Specimen Description       Midstream Urine   Special Requests       Specimen received in preservative   Culture Micro       <10,000 colonies/mL . . .   CD19 B Cells      6 - 27 % <1 (L)   Absolute CD19      107 - 698 cells/uL <1 (L)   Protein Random Urine      g/L 0.75   Protein Total Urine g/gr Creatinine      0 - 0.2 g/g Cr 0.40 (H)   Creatinine Urine Random      mg/dL 182   Sed Rate      0 - 30 mm/h 27   DNA-ds      <10 IU/mL 102 (H)   CRP Inflammation      0.0 - 8.0 mg/L 8.9 (H)   Complement C3      81 - 157 mg/dL 104   Complement C4      13 - 39 mg/dL 23   AST      0 - 45 U/L 16   Albumin      3.4 - 5.0 g/dL 4.1   ALT      0 - 50 U/L 26   Creatinine Urine      mg/dL 186     Component      Latest Ref Rng & Units 11/10/2021   WBC      4.0 - 11.0 10e3/uL 8.2   RBC Count      3.80 - 5.20 10e6/uL 4.58   Hemoglobin      11.7 - 15.7 g/dL 12.3   Hematocrit      35.0 - 47.0 % 39.7   MCV      78 - 100 fL 87   MCH      26.5 - 33.0 pg 26.9   MCHC      31.5 - 36.5 g/dL 31.0 (L)   RDW      10.0 - 15.0 % 14.2   Platelet Count      150 - 450 10e3/uL 229   % Neutrophils      % 83   % Lymphocytes      % 6   % Monocytes      % 9   % Eosinophils      % 2   % Basophils      % 0   Absolute Neutrophils      1.6 - 8.3 10e3/uL 6.9   Absolute Lymphocytes      0.8 - 5.3 10e3/uL 0.5 (L)   Absolute Monocytes      0.0 - 1.3 10e3/uL 0.7   Absolute Eosinophils      0.0 - 0.7 10e3/uL 0.1   Absolute Basophils      0.0 - 0.2 10e3/uL 0.0   Color Urine      Colorless, Straw, Light Yellow, Yellow Yellow   Appearance Urine      Clear Clear   Glucose Urine      Negative mg/dL Negative   Bilirubin Urine      Negative Negative   Ketones Urine      Negative mg/dL Trace (A)   Specific Gravity Urine      1.003 - 1.035 >=1.030   Blood Urine      Negative Small (A)   pH Urine      5.0 - 7.0 6.0   Protein Albumin Urine      Negative mg/dL Negative   Urobilinogen Urine      0.2, 1.0 E.U./dL 0.2   Nitrite Urine      Negative Negative    Leukocyte Esterase Urine      Negative Trace (A)   Sodium      133 - 144 mmol/L 138   Potassium      3.4 - 5.3 mmol/L 3.9   Chloride      94 - 109 mmol/L 103   Carbon Dioxide      20 - 32 mmol/L 29   Anion Gap      3 - 14 mmol/L 6   Urea Nitrogen      7 - 30 mg/dL 22   Creatinine      0.52 - 1.04 mg/dL 0.70   Calcium      8.5 - 10.1 mg/dL 9.6   Glucose      70 - 99 mg/dL 83   Albumin      3.4 - 5.0 g/dL 4.0   Phosphorus      2.5 - 4.5 mg/dL 3.9   GFR Estimate      >60 mL/min/1.73m2 >90   Bacteria Urine      None Seen /HPF Few (A)   RBC Urine      0-2 /HPF /HPF 0-2   WBC Urine      0-5 /HPF /HPF 0-5   Squamous Epithelial /LPF Urine      None Seen /LPF Few (A)   Mucus Urine      None Seen /LPF Present (A)   MPO Cari IgG Instrument Value      <3.5 U/mL <0.3   Myeloperoxidase Antibody IgG      Negative Negative   Proteinase 3 Cari IgG Instrument Value      <2.0 U/mL <1.0   Proteinase 3 Antibody IgG      Negative Negative   IGG      610-1,616 mg/dL 398 (L)   IGA      84 - 499 mg/dL 184   IGM      35 - 242 mg/dL 16 (L)   Protein Random Urine      g/L 0.23   Protein Total Urine g/gr Creatinine      0.00 - 0.20 g/g Cr 0.11   Creatinine Urine      mg/dL 212   Neutrophil Cytoplasmic Antibody      <1:10 <1:10   Neutrophil Cytoplasmic Antibody Pattern       The ANCA IFA is <1:10.  No further testing will be performed.   CD19 B Cells      6 - 27 % <1 (L)   Absolute CD19      107 - 698 cells/uL <1 (L)   ALT      0 - 50 U/L 27   AST      0 - 45 U/L 19   Complement C4      13 - 39 mg/dL 28   Complement C3      81 - 157 mg/dL 138   CRP Inflammation      0.0 - 8.0 mg/L 8.9 (H)   DNA-ds      <10.0 IU/mL 90.0 (H)   Sed Rate      0 - 30 mm/hr 25     Physical Exam:    Constitutional: Pleasant, NAD  Eyes: EOMI, sclera anicteric, conj not injected.  MS: No synovitis.   Skin: No skin rash  Neuro: CN grossly intact without focal deficit  Psych: nl affect    Assessment/Plan:    SLESusan Her is a 55 yo WF with +FH RA and personal hx of SLE  presented in 12/2017 for 2nd opinion of m/o her SLE. She was diagnosed with SLE at age 27. Her lupus has been marked by +KARLIE (highest titer 1:640, speckled and nucleolar patterns), +anti-DNA, low C3/C4, arthritis, malar rash, serositis (pleuritic CP), lupus nephritis, hemolytic anemia, enteritis supported by +SSA/SSB Ab, +RF, high ESR/CRP. She had neg anti-RNP, anti-Sm, acL IgM/G/A, LAC, cryo and anti-CCP.     She was treated with prednisone and HCQ at the time of Dx. Can't remember how long she was on HCQ and why HCQ was stopped, but it probably was stopped because of stable disease, no report on HCQ toxicity. Reportedly her SLE was mild all these years.    Has secondary Sjogren's with sicca, +SSA/SSB Ab and h/o MALT lymphoma at age 44 in remission. Uses blink eyedrops and salagen. No lip biopsy was done to confirm Dx of Sjogren's.     Her lupus flared in 7/2017 with no triggers when she presented with arthritis, HCQ was resumed, arthritis resolved. Mild pulm HTN on 2D-Echo 8/2017 but neg R cardiac cath and VQ scan in 3/2018, also neg 2D-Echo.    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 Patient received 1st dose cyclophosphamide on 6/15/19, had 6 doses. Initiated Rituxin 1/22/2020 as failed cytoxan. Previously failed AZA, benlysta.    S/p rituximab on 1/22/2020 and 2/6/2020, 6/26/2020, 7/20/2020 on prednisone 5 mg every day , MMF 2 gr (tapered from 3 gr every day) every day and  mg a day.    Last rituximab was 1 gr on 6/28/2021.    Recommend repeat rituximab as it is the only med that has helped with LN. Also tx options are limited (also necrotizing lesions on renal biopsy, can not rule out ANCA neg vasculitis overlap and rituximab is FDA approved for GPA/MPA).     She is doing better, her SLE and vasculitis are under fair control despite tapering cellcept from 3 to 2 gram a day in 7/2021 (by renal).    Labs from 11/10/2021 are stable.      Today's plan:    - Continue Cellcept 1000 mg BID and  mg  BID  - Continue prednisone 5 mg every day  -Labs [id number]  - Annual eye exam for HCQ monitoring  - PCP prophylaxis on atovaquone 10 mL (1500 mg). Patient did not tolerate inhaled pentamidine. Avoiding TMP-SMX given sulfa reaction and potential increase risk of SLE flare. Hesitant to use dapsone given risk of hemolytic anemia.  -Tramadol prn for pain  -Ambien prn for insomnia  -Booster covid shot early Dec  -Rituximab 1 gr before [place]    Return in 3-4 months (in person)      Killian Marroquin MD      9:18-9:35 am via [employer]      Again, thank you for allowing me to participate in the care of your patient.      Sincerely,    Killian Marroquin MD

## 2021-11-12 NOTE — PROGRESS NOTES
Rheumatology Follow-up Virtual Visit Note    Reason for visit: f/u for lupus (this is a video visit due to COVID-19 outbreak), patient agreed      Date of last visit: 2/4/2021  DOS: 11/12/2021      History of Present Illness:    Taina Singh is a 56 year old female who was seen for follow up of SLE and lupus nephritis.    She was diagnosed with lupus at age 27. Her 1st sx were malar rash and fatigue. No skin biopsy was done (reportedly had +KARLIE). She was treated with prednisone taper and HCQ. HCQ helped and she tolerated it well. She was diagnosed with Sjogren's at the same time. Had dryness of eyes and mouth. No lip biopsy to confirm the Dx. Reportedly she had very mild stable lupus for many years and eventually at some point, stopped taking HCQ (can't remember when). Her lupus started to flare in 7/2017 initially with pleuritic CP, was put back on HCQ. 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 Since then failed AZA, cytoxan and benlysta. MMF was not enough to control her LN and eventually rituximab was added (which was initially denied by insurance even with h/o lymphoma) given necrosis on the renal biopsy (rituximab is FDA approved for ANCA vasculitis). On HCQ+MMF after adding rituximab, LN and SLE started too improve.    She was diagnosed with MALT lymphoma at 44, had enlarged LN over R parotid gland, on biopsy it was MALT lymphoma. Tx was resection only, no radiation or chemo.      2/4/2021:    Taina is on prednisone 8 mg every day, it was 17.5 mg every day at last visit. She feels achy and tired this week, but thinks the cold weather is responsible for her sx.    Had rituximab  375 mg/m2 on 1/22/2020, 2/6/2020, then 6/26/2020, 7/20/2020 and last dose 1 gram on 12/15/2020.    On mepron for PJP prophylaxis. On  mg bid, MMF 1500 mg bid.    She works from home.    Has intermittent joint pain, nothing consistent. one day shoulders hurt and the other day her hips hurt. AM  stiffness is 30-60 minutes. no pleurisy, sob or cough or fevers. No skin rash. Her hair grew back.    Today 2021: Taina is feeling well, she thinks her lupus is under fair control, no major complaints today.    ROS:  A comprehensive ROS was done, positives are per HPI.    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44    No chemo or radiation     Other forms of systemic lupus erythematosus (H) 2018     Pulmonary embolism (H)     2019 seen on abd CT at [facility]     Sjogren's syndrome (H)     secondary Sjogrens, secondary to lupus     Vitamin D deficiency      Past Surgical History:   Procedure Laterality Date     BIOPSY/REMOVAL, LYMPH NODE(S)        SECTION  age 32     HC HYSTEROS W PERMANENT FALLOPAIN IMPLANT      Essure b/l     PAROTIDECTOMY Right 2006     TONSILLECTOMY       Family History   Problem Relation Age of Onset     Alopecia Brother      Rheumatoid Arthritis Brother      LUNG DISEASE No family hx of      Social History     Socioeconomic History     Marital status:      Spouse name: Not on file     Number of children: Not on file     Years of education: 1     Highest education level: Not on file   Occupational History     Comment: , 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      Social Determinants of Health     Financial Resource Strain: Not on file   Food Insecurity: Not on file   Transportation Needs: Not on file   Physical Activity: Not on file   Stress: Not on file   Social Connections: Not on file   Intimate Partner Violence: Not on file   Housing Stability: Not on file     Going through divorce but it's not stress factor for her.    Allergies   Allergen Reactions     Pentamidine Shortness Of Breath     Penicillins Rash     Sulfa Drugs Rash       Outpatient Encounter Medications as of 11/12/2021   Medication Sig Dispense Refill     albuterol (PROAIR HFA/PROVENTIL HFA/VENTOLIN HFA) 108 (90 Base) MCG/ACT inhaler Inhale 2 puffs into the lungs every 6 hours as needed for shortness of breath / dyspnea or wheezing 3 Inhaler 3     atovaquone (MEPRON) 750 MG/5ML suspension Take 10 mLs (1,500 mg) by mouth daily 900 mL 3     Calcium-Magnesium 300-300 MG TABS daily        hydroxychloroquine (PLAQUENIL) 200 MG tablet Take 1 tablet (200 mg) by mouth 2 times daily Overdue for annual eye exam. Get annual eye exams for hydroxychloroquine (Plaquenil) monitoring and fax to [phone] 180 tablet 0     levofloxacin (LEVAQUIN) 250 MG tablet Take 1 tablet (250 mg) by mouth daily 5 tablet 0     Multiple Vitamins-Minerals (WOMENS MULTI PO) Take by mouth daily        mycophenolate (GENERIC EQUIVALENT) 500 MG tablet Take 3 tablets (1,500 mg) by mouth 2 times daily 540 tablet 3     Omega-3 Fatty Acids (OMEGA-3 FISH OIL) 500 MG CAPS Take 500 mg by mouth daily        predniSONE (DELTASONE) 1 MG tablet 9-8-7-6 mg every day each course for one month until you reach 5mg daily. Use with 5 mg  size tab 120 tablet 2     predniSONE (DELTASONE) 5 MG tablet 9-8-7-6 mg every day each course for one month until you reach 5mg daily. Use with 1 mg size tab 90 tablet 3     traMADol (ULTRAM) 50 MG tablet Take 1 tablet (50 mg) by mouth every 12 hours as needed for pain Prn pain 60 tablet 3     valACYclovir (VALTREX) 1000 mg tablet        vitamin D3 (CHOLECALCIFEROL) 50 mcg (2000 units) tablet Take 1 tablet (50 mcg) by mouth daily 90 tablet 3     warfarin ANTICOAGULANT (COUMADIN) 5 MG tablet   1     zolpidem (AMBIEN) 5 MG tablet Take 1 tablet (5 mg) by mouth nightly as needed for sleep 30 tablet 3     pantoprazole (PROTONIX) 20 MG EC tablet Take 2 tablets (40 mg) by mouth 2 times daily (Patient not taking: Reported on 7/22/2021)       No facility-administered encounter medications on file as of 11/12/2021.       Results:    RHEUM RESULTS Latest Ref Rng & Units 11/20/2017 12/29/2017 12/29/2017   ALBUMIN 3.4 - 5.0 g/dL - - -   ALT 0 - 50 U/L - - 35   AST 0 - 45 U/L - - 26   COMPLEMENT C3 81 - 157 mg/dL - - 72(L)   COMPLEMENT C4 13 - 39 mg/dL - - 6(L)   CREATININE 0.52 - 1.04 mg/dL 0.55 - -   CRP 0.0 - 8.0 mg/L - 3.2 2.9   DNA <10.0 IU/mL - - >379(H)   GFR ESTIMATE, IF BLACK >60 mL/min/[1.73:m2] >90 - -   GFR ESTIMATE >60 mL/min/1.73m2 >90 - -   HEMATOCRIT 35.0 - 47.0 % 35.5 - -   HEMOGLOBIN 11.7 - 15.7 g/dL 11.3(L) - -   HEPBANG NR:Nonreactive - - -   HCVAB NR:Nonreactive - - -   IGA 84 - 499 mg/dL - - 473(H)   IGM 35 - 242 mg/dL - - 92   -1,616 mg/dL - - 1,560   WBC 4.0 - 11.0 10e3/uL 4.9 - -   RBC 3.80 - 5.20 10e6/uL 4.28 - -   RDW 10.0 - 15.0 % 14.6 - -   MCHC 31.5 - 36.5 g/dL 31.8 - -   MCV 78 - 100 fL 83 - -   PLATELET COUNT 150 - 450 10e3/uL 215 - -   ESR 0 - 30 mm/hr 71(H) - 49(H)   QUANTIFERON-TB GOLD PLUS RESULT NEG:Negative - - -     Component      Latest Ref Rng & Units 2/1/2021   WBC      4.0 - 11.0 10e9/L 7.8   RBC Count      3.8 - 5.2 10e12/L 4.50   Hemoglobin      11.7 - 15.7 g/dL 11.7    Hematocrit      35.0 - 47.0 % 37.9   MCV      78 - 100 fl 84   MCH      26.5 - 33.0 pg 26.0 (L)   MCHC      31.5 - 36.5 g/dL 30.9 (L)   RDW      10.0 - 15.0 % 14.9   Platelet Count      150 - 450 10e9/L 236   % Neutrophils      % 84.9   % Lymphocytes      % 6.3   % Monocytes      % 7.9   % Eosinophils      % 0.6   % Basophils      % 0.3   Absolute Neutrophil      1.6 - 8.3 10e9/L 6.6   Absolute Lymphocytes      0.8 - 5.3 10e9/L 0.5 (L)   Absolute Monocytes      0.0 - 1.3 10e9/L 0.6   Absolute Eosinophils      0.0 - 0.7 10e9/L 0.1   Absolute Basophils      0.0 - 0.2 10e9/L 0.0   Diff Method       Automated Method   Color Urine       Yellow   Appearance Urine       Clear   Glucose Urine      NEG:Negative mg/dL Negative   Bilirubin Urine      NEG:Negative Negative   Ketones Urine      NEG:Negative mg/dL Trace (A)   Specific Gravity Urine      1.003 - 1.035 >1.030   pH Urine      5.0 - 7.0 pH 6.0   Protein Albumin Urine      NEG:Negative mg/dL 100 (A)   Urobilinogen Urine      0.2 - 1.0 EU/dL 0.2   Nitrite Urine      NEG:Negative Negative   Blood Urine      NEG:Negative Trace (A)   Leukocyte Esterase Urine      NEG:Negative Trace (A)   Source       Midstream Urine   WBC Urine      OTO5:0 - 5 /HPF 10-25 (A)   RBC Urine      OTO2:O - 2 /HPF 2-5 (A)   Squamous Epithelial /LPF Urine      FEW:Few /LPF Few   Bacteria Urine      NEG:Negative /HPF Moderate (A)   Triple Phosphates      NEG:Negative /HPF Few (A)   IGG      610 - 1,616 mg/dL 399 (L)   IGA      84 - 499 mg/dL 190   IGM      35 - 242 mg/dL 16 (L)   Creatinine      0.52 - 1.04 mg/dL 0.60   GFR Estimate      >60 mL/min/1.73:m2 >90   GFR Estimate If Black      >60 mL/min/1.73:m2 >90   Specimen Description       Midstream Urine   Special Requests       Specimen received in preservative   Culture Micro       <10,000 colonies/mL . . .   CD19 B Cells      6 - 27 % <1 (L)   Absolute CD19      107 - 698 cells/uL <1 (L)   Protein Random Urine      g/L 0.75   Protein Total  Urine g/gr Creatinine      0 - 0.2 g/g Cr 0.40 (H)   Creatinine Urine Random      mg/dL 182   Sed Rate      0 - 30 mm/h 27   DNA-ds      <10 IU/mL 102 (H)   CRP Inflammation      0.0 - 8.0 mg/L 8.9 (H)   Complement C3      81 - 157 mg/dL 104   Complement C4      13 - 39 mg/dL 23   AST      0 - 45 U/L 16   Albumin      3.4 - 5.0 g/dL 4.1   ALT      0 - 50 U/L 26   Creatinine Urine      mg/dL 186     Component      Latest Ref Rng & Units 11/10/2021   WBC      4.0 - 11.0 10e3/uL 8.2   RBC Count      3.80 - 5.20 10e6/uL 4.58   Hemoglobin      11.7 - 15.7 g/dL 12.3   Hematocrit      35.0 - 47.0 % 39.7   MCV      78 - 100 fL 87   MCH      26.5 - 33.0 pg 26.9   MCHC      31.5 - 36.5 g/dL 31.0 (L)   RDW      10.0 - 15.0 % 14.2   Platelet Count      150 - 450 10e3/uL 229   % Neutrophils      % 83   % Lymphocytes      % 6   % Monocytes      % 9   % Eosinophils      % 2   % Basophils      % 0   Absolute Neutrophils      1.6 - 8.3 10e3/uL 6.9   Absolute Lymphocytes      0.8 - 5.3 10e3/uL 0.5 (L)   Absolute Monocytes      0.0 - 1.3 10e3/uL 0.7   Absolute Eosinophils      0.0 - 0.7 10e3/uL 0.1   Absolute Basophils      0.0 - 0.2 10e3/uL 0.0   Color Urine      Colorless, Straw, Light Yellow, Yellow Yellow   Appearance Urine      Clear Clear   Glucose Urine      Negative mg/dL Negative   Bilirubin Urine      Negative Negative   Ketones Urine      Negative mg/dL Trace (A)   Specific Gravity Urine      1.003 - 1.035 >=1.030   Blood Urine      Negative Small (A)   pH Urine      5.0 - 7.0 6.0   Protein Albumin Urine      Negative mg/dL Negative   Urobilinogen Urine      0.2, 1.0 E.U./dL 0.2   Nitrite Urine      Negative Negative   Leukocyte Esterase Urine      Negative Trace (A)   Sodium      133 - 144 mmol/L 138   Potassium      3.4 - 5.3 mmol/L 3.9   Chloride      94 - 109 mmol/L 103   Carbon Dioxide      20 - 32 mmol/L 29   Anion Gap      3 - 14 mmol/L 6   Urea Nitrogen      7 - 30 mg/dL 22   Creatinine      0.52 - 1.04 mg/dL 0.70    Calcium      8.5 - 10.1 mg/dL 9.6   Glucose      70 - 99 mg/dL 83   Albumin      3.4 - 5.0 g/dL 4.0   Phosphorus      2.5 - 4.5 mg/dL 3.9   GFR Estimate      >60 mL/min/1.73m2 >90   Bacteria Urine      None Seen /HPF Few (A)   RBC Urine      0-2 /HPF /HPF 0-2   WBC Urine      0-5 /HPF /HPF 0-5   Squamous Epithelial /LPF Urine      None Seen /LPF Few (A)   Mucus Urine      None Seen /LPF Present (A)   MPO Cari IgG Instrument Value      <3.5 U/mL <0.3   Myeloperoxidase Antibody IgG      Negative Negative   Proteinase 3 Cari IgG Instrument Value      <2.0 U/mL <1.0   Proteinase 3 Antibody IgG      Negative Negative   IGG      610-1,616 mg/dL 398 (L)   IGA      84 - 499 mg/dL 184   IGM      35 - 242 mg/dL 16 (L)   Protein Random Urine      g/L 0.23   Protein Total Urine g/gr Creatinine      0.00 - 0.20 g/g Cr 0.11   Creatinine Urine      mg/dL 212   Neutrophil Cytoplasmic Antibody      <1:10 <1:10   Neutrophil Cytoplasmic Antibody Pattern       The ANCA IFA is <1:10.  No further testing will be performed.   CD19 B Cells      6 - 27 % <1 (L)   Absolute CD19      107 - 698 cells/uL <1 (L)   ALT      0 - 50 U/L 27   AST      0 - 45 U/L 19   Complement C4      13 - 39 mg/dL 28   Complement C3      81 - 157 mg/dL 138   CRP Inflammation      0.0 - 8.0 mg/L 8.9 (H)   DNA-ds      <10.0 IU/mL 90.0 (H)   Sed Rate      0 - 30 mm/hr 25     Physical Exam:    Constitutional: Pleasant, NAD  Eyes: EOMI, sclera anicteric, conj not injected.  MS: No synovitis.   Skin: No skin rash  Neuro: CN grossly intact without focal deficit  Psych: nl affect    Assessment/Plan:    SLESusan Her is a 57 yo WF with +FH RA and personal hx of SLE presented in 12/2017 for 2nd opinion of m/o her SLE. She was diagnosed with SLE at age 27. Her lupus has been marked by +KARLIE (highest titer 1:640, speckled and nucleolar patterns), +anti-DNA, low C3/C4, arthritis, malar rash, serositis (pleuritic CP), lupus nephritis, hemolytic anemia, enteritis supported by  +SSA/SSB Ab, +RF, high ESR/CRP. She had neg anti-RNP, anti-Sm, acL IgM/G/A, LAC, cryo and anti-CCP.     She was treated with prednisone and HCQ at the time of Dx. Can't remember how long she was on HCQ and why HCQ was stopped, but it probably was stopped because of stable disease, no report on HCQ toxicity. Reportedly her SLE was mild all these years.    Has secondary Sjogren's with sicca, +SSA/SSB Ab and h/o MALT lymphoma at age 44 in remission. Uses blink eyedrops and salagen. No lip biopsy was done to confirm Dx of Sjogren's.     Her lupus flared in 7/2017 with no triggers when she presented with arthritis, HCQ was resumed, arthritis resolved. Mild pulm HTN on 2D-Echo 8/2017 but neg R cardiac cath and VQ scan in 3/2018, also neg 2D-Echo.    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 Patient received 1st dose cyclophosphamide on 6/15/19, had 6 doses. Initiated Rituxin 1/22/2020 as failed cytoxan. Previously failed AZA, benlysta.    S/p rituximab on 1/22/2020 and 2/6/2020, 6/26/2020, 7/20/2020 on prednisone 5 mg every day , MMF 2 gr (tapered from 3 gr every day) every day and  mg a day.    Last rituximab was 1 gr on 6/28/2021.    Recommend repeat rituximab as it is the only med that has helped with LN. Also tx options are limited (also necrotizing lesions on renal biopsy, can not rule out ANCA neg vasculitis overlap and rituximab is FDA approved for GPA/MPA).     She is doing better, her SLE and vasculitis are under fair control despite tapering cellcept from 3 to 2 gram a day in 7/2021 (by renal).    Labs from 11/10/2021 are stable.      Today's plan:    - Continue Cellcept 1000 mg BID and  mg BID  - Continue prednisone 5 mg every day  -Labs [id number]  - Annual eye exam for HCQ monitoring  - PCP prophylaxis on atovaquone 10 mL (1500 mg). Patient did not tolerate inhaled pentamidine. Avoiding TMP-SMX given sulfa reaction and potential increase risk of SLE flare. Hesitant to use dapsone given risk of  hemolytic anemia.  -Tramadol prn for pain  -Ambien prn for insomnia  -Booster covid shot early Dec  -Rituximab 1 gr before Tim    Return in 3-4 months (in person)      Killian Marroquin MD      9:18-9:35 am via [employer]

## 2021-11-29 ENCOUNTER — TELEPHONE (OUTPATIENT)
Dept: RHEUMATOLOGY | Facility: CLINIC | Age: 56
End: 2021-11-29
Payer: COMMERCIAL

## 2021-11-29 DIAGNOSIS — M32.14 LUPUS NEPHRITIS, ISN/RPS CLASS IV (H): ICD-10-CM

## 2021-12-06 RX ORDER — MEPERIDINE HYDROCHLORIDE 25 MG/ML
25 INJECTION INTRAMUSCULAR; INTRAVENOUS; SUBCUTANEOUS EVERY 30 MIN PRN
Status: CANCELLED | OUTPATIENT
Start: 2021-12-06

## 2021-12-06 RX ORDER — ACETAMINOPHEN 325 MG/1
650 TABLET ORAL ONCE
Status: CANCELLED
Start: 2021-12-06

## 2021-12-06 RX ORDER — ALBUTEROL SULFATE 0.83 MG/ML
2.5 SOLUTION RESPIRATORY (INHALATION)
Status: CANCELLED | OUTPATIENT
Start: 2021-12-06

## 2021-12-06 RX ORDER — NALOXONE HYDROCHLORIDE 0.4 MG/ML
0.2 INJECTION, SOLUTION INTRAMUSCULAR; INTRAVENOUS; SUBCUTANEOUS
Status: CANCELLED | OUTPATIENT
Start: 2021-12-06

## 2021-12-06 RX ORDER — MYCOPHENOLATE MOFETIL 500 MG/1
1000 TABLET ORAL 2 TIMES DAILY
Qty: 120 TABLET | Refills: 0
Start: 2021-12-06 | End: 2022-03-05

## 2021-12-06 RX ORDER — EPINEPHRINE 1 MG/ML
0.3 INJECTION, SOLUTION, CONCENTRATE INTRAVENOUS EVERY 5 MIN PRN
Status: CANCELLED | OUTPATIENT
Start: 2021-12-06

## 2021-12-06 RX ORDER — METHYLPREDNISOLONE SODIUM SUCCINATE 125 MG/2ML
125 INJECTION, POWDER, LYOPHILIZED, FOR SOLUTION INTRAMUSCULAR; INTRAVENOUS
Status: CANCELLED
Start: 2021-12-06

## 2021-12-06 RX ORDER — METHYLPREDNISOLONE SODIUM SUCCINATE 125 MG/2ML
125 INJECTION, POWDER, LYOPHILIZED, FOR SOLUTION INTRAMUSCULAR; INTRAVENOUS ONCE
Status: CANCELLED | OUTPATIENT
Start: 2021-12-06

## 2021-12-06 RX ORDER — DIPHENHYDRAMINE HYDROCHLORIDE 50 MG/ML
50 INJECTION INTRAMUSCULAR; INTRAVENOUS
Status: CANCELLED
Start: 2021-12-06

## 2021-12-06 RX ORDER — HEPARIN SODIUM,PORCINE 10 UNIT/ML
5 VIAL (ML) INTRAVENOUS
Status: CANCELLED | OUTPATIENT
Start: 2021-12-06

## 2021-12-06 RX ORDER — ALBUTEROL SULFATE 90 UG/1
1-2 AEROSOL, METERED RESPIRATORY (INHALATION)
Status: CANCELLED
Start: 2021-12-06

## 2021-12-06 RX ORDER — HEPARIN SODIUM (PORCINE) LOCK FLUSH IV SOLN 100 UNIT/ML 100 UNIT/ML
5 SOLUTION INTRAVENOUS
Status: CANCELLED | OUTPATIENT
Start: 2021-12-06

## 2021-12-07 ENCOUNTER — INFUSION THERAPY VISIT (OUTPATIENT)
Dept: INFUSION THERAPY | Facility: CLINIC | Age: 56
End: 2021-12-07
Payer: COMMERCIAL

## 2021-12-07 VITALS
HEART RATE: 88 BPM | SYSTOLIC BLOOD PRESSURE: 147 MMHG | TEMPERATURE: 98.4 F | OXYGEN SATURATION: 98 % | WEIGHT: 293 LBS | BODY MASS INDEX: 47.09 KG/M2 | HEIGHT: 66 IN | RESPIRATION RATE: 18 BRPM | DIASTOLIC BLOOD PRESSURE: 86 MMHG

## 2021-12-07 DIAGNOSIS — M06.09 RHEUMATOID ARTHRITIS, SERONEGATIVE, MULTIPLE SITES (H): ICD-10-CM

## 2021-12-07 DIAGNOSIS — M32.14 LUPUS NEPHRITIS, ISN/RPS CLASS IV (H): ICD-10-CM

## 2021-12-07 DIAGNOSIS — I77.82 ANCA-NEGATIVE VASCULITIS (H): Primary | ICD-10-CM

## 2021-12-07 DIAGNOSIS — M32.9 SYSTEMIC LUPUS ERYTHEMATOSUS, UNSPECIFIED SLE TYPE, UNSPECIFIED ORGAN INVOLVEMENT STATUS (H): ICD-10-CM

## 2021-12-07 PROCEDURE — 99207 PR NO CHARGE LOS: CPT

## 2021-12-07 PROCEDURE — 96367 TX/PROPH/DG ADDL SEQ IV INF: CPT | Performed by: INTERNAL MEDICINE

## 2021-12-07 PROCEDURE — 96413 CHEMO IV INFUSION 1 HR: CPT | Performed by: INTERNAL MEDICINE

## 2021-12-07 PROCEDURE — 96375 TX/PRO/DX INJ NEW DRUG ADDON: CPT | Performed by: INTERNAL MEDICINE

## 2021-12-07 PROCEDURE — 96415 CHEMO IV INFUSION ADDL HR: CPT | Performed by: INTERNAL MEDICINE

## 2021-12-07 RX ORDER — METHYLPREDNISOLONE SODIUM SUCCINATE 125 MG/2ML
125 INJECTION, POWDER, LYOPHILIZED, FOR SOLUTION INTRAMUSCULAR; INTRAVENOUS ONCE
Status: CANCELLED | OUTPATIENT
Start: 2021-12-07

## 2021-12-07 RX ORDER — ACETAMINOPHEN 325 MG/1
650 TABLET ORAL ONCE
Status: COMPLETED | OUTPATIENT
Start: 2021-12-07 | End: 2021-12-07

## 2021-12-07 RX ORDER — NALOXONE HYDROCHLORIDE 0.4 MG/ML
0.2 INJECTION, SOLUTION INTRAMUSCULAR; INTRAVENOUS; SUBCUTANEOUS
Status: CANCELLED | OUTPATIENT
Start: 2021-12-07

## 2021-12-07 RX ORDER — DIPHENHYDRAMINE HYDROCHLORIDE 50 MG/ML
50 INJECTION INTRAMUSCULAR; INTRAVENOUS
Status: CANCELLED
Start: 2021-12-07

## 2021-12-07 RX ORDER — METHYLPREDNISOLONE SODIUM SUCCINATE 125 MG/2ML
125 INJECTION, POWDER, LYOPHILIZED, FOR SOLUTION INTRAMUSCULAR; INTRAVENOUS ONCE
Status: COMPLETED | OUTPATIENT
Start: 2021-12-07 | End: 2021-12-07

## 2021-12-07 RX ORDER — HEPARIN SODIUM,PORCINE 10 UNIT/ML
5 VIAL (ML) INTRAVENOUS
Status: CANCELLED | OUTPATIENT
Start: 2021-12-07

## 2021-12-07 RX ORDER — ACETAMINOPHEN 325 MG/1
650 TABLET ORAL ONCE
Status: CANCELLED
Start: 2021-12-07

## 2021-12-07 RX ORDER — ALBUTEROL SULFATE 90 UG/1
1-2 AEROSOL, METERED RESPIRATORY (INHALATION)
Status: CANCELLED
Start: 2021-12-07

## 2021-12-07 RX ORDER — HEPARIN SODIUM (PORCINE) LOCK FLUSH IV SOLN 100 UNIT/ML 100 UNIT/ML
5 SOLUTION INTRAVENOUS
Status: CANCELLED | OUTPATIENT
Start: 2021-12-07

## 2021-12-07 RX ORDER — ALBUTEROL SULFATE 0.83 MG/ML
2.5 SOLUTION RESPIRATORY (INHALATION)
Status: CANCELLED | OUTPATIENT
Start: 2021-12-07

## 2021-12-07 RX ORDER — METHYLPREDNISOLONE SODIUM SUCCINATE 125 MG/2ML
125 INJECTION, POWDER, LYOPHILIZED, FOR SOLUTION INTRAMUSCULAR; INTRAVENOUS
Status: CANCELLED
Start: 2021-12-07

## 2021-12-07 RX ORDER — MEPERIDINE HYDROCHLORIDE 25 MG/ML
25 INJECTION INTRAMUSCULAR; INTRAVENOUS; SUBCUTANEOUS EVERY 30 MIN PRN
Status: CANCELLED | OUTPATIENT
Start: 2021-12-07

## 2021-12-07 RX ORDER — EPINEPHRINE 1 MG/ML
0.3 INJECTION, SOLUTION INTRAMUSCULAR; SUBCUTANEOUS EVERY 5 MIN PRN
Status: CANCELLED | OUTPATIENT
Start: 2021-12-07

## 2021-12-07 RX ADMIN — ACETAMINOPHEN 650 MG: 325 TABLET ORAL at 12:54

## 2021-12-07 RX ADMIN — METHYLPREDNISOLONE SODIUM SUCCINATE 125 MG: 125 INJECTION INTRAMUSCULAR; INTRAVENOUS at 13:16

## 2021-12-07 RX ADMIN — Medication 250 ML: at 12:50

## 2021-12-07 ASSESSMENT — MIFFLIN-ST. JEOR: SCORE: 2103.19

## 2021-12-07 NOTE — PROGRESS NOTES
Infusion Nursing Note:  Taina Singh presents today for Rapid Rituxan.    Patient seen by provider today: No   present during visit today: Not Applicable.    Note: N/A.      Intravenous Access:  Peripheral IV placed.    Treatment Conditions:  Biological Infusion Checklist:  ~~~ NOTE: If the patient answers yes to any of the questions below, hold the infusion and contact ordering provider or on-call provider.    1. Have you recently had an elevated temperature, fever, chills, productive cough, coughing for 3 weeks or longer or hemoptysis, abnormal vital signs, night sweats,  chest pain or have you noticed a decrease in your appetite, unexplained weight loss or fatigue? No  2. Do you have any open wounds or new incisions? No  3. Do you have any recent or upcoming hospitalizations, surgeries or dental procedures? No  4. Do you currently have or recently have had any signs of illness or infection or are you on any antibiotics? No  5. Have you had any new, sudden or worsening abdominal pain? No  6. Have you or anyone in your household received a live vaccination in the past 4 weeks? Please note:  No live vaccines while on biologic/chemotherapy until 6 months after the last treatment.  Patient can receive the flu vaccine (shot only) and the pneumovax.  It is optimal for the patient to get these vaccines mid cycle, but they can be given at any time as long as it is not on the day of the infusion. No  7. Have you recently been diagnosed with any new nervous system diseases (ie. Multiple sclerosis, Guillain [place], seizures, neurological changes) or cancer diagnosis? No  8. Are you on any form of radiation or chemotherapy? No  9. Are you pregnant or breast feeding or do you have plans of pregnancy in the future? No  10. Have you been having any signs of worsening depression or suicidal ideations?  (benlysta only) No  11. Have there been any other new onset medical symptoms? No        Post Infusion  Assessment:  Patient tolerated infusion without incident.       Discharge Plan:   AVS to patient via MYCHART.  Patient will follow up with ordering provider  Patient discharged in stable condition accompanied by: mami.      Kimberley Hebert RN

## 2021-12-10 ENCOUNTER — VIRTUAL VISIT (OUTPATIENT)
Dept: PULMONOLOGY | Facility: CLINIC | Age: 56
End: 2021-12-10
Attending: INTERNAL MEDICINE
Payer: COMMERCIAL

## 2021-12-10 DIAGNOSIS — J21.9 BRONCHIOLITIS: Primary | ICD-10-CM

## 2021-12-10 PROCEDURE — 99214 OFFICE O/P EST MOD 30 MIN: CPT | Mod: 25 | Performed by: INTERNAL MEDICINE

## 2021-12-10 NOTE — PROGRESS NOTES
"Taina is a 56 year old who is being evaluated via a billable video visit.      How would you like to obtain your AVS? [employer]  If the video visit is dropped, the invitation should be resent by: Other e-mail: [email or web address]  Will anyone else be joining your video visit? No      Video Start Time: 10:58am  Video-Visit Details    Type of service:  Video Visit    Video End Time: 11:08am    Originating Location (pt. Location): Home  Distant Location (provider location):  [facility]     Platform used for Video Visit: Well            [facility]    Reason for Visit  Taina Singh is a 56 year old year old female who is being seen for Interstitial Lung Disease (ILD) (video visit)    HPI   Ms. Singh is a 56-year-old with lupus and history of air trapping on CT scan  (small airways disease/bronchiolitis) and abnormal PFT with whom I had a video visit today.   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  In the past, we did try ICS/LABA combination, but she could not afford it.  She started rituximab for her lupus almost 1/2020, and this has helped her symptoms significantly.  She also continues to take prednisone and mycophenolate.    Today, she reports that she just had her most recent rituximab infusion this week.  She gets her infusions about every 6 months.  She was starting to feel achy right before the infusion, but already her symptoms have improved.  She denies dyspnea on exertion, cough, or fevers.  She does feel \"a little bit\" short of breath when she walks up 1 flight of stairs.  She was able to walk 1 mile per day at her own pace when the weather was nice.  She reports that her joints are under good control with the rituximab.  She did not get the flu vaccine because she had a lupus flare the last time she got the flu vaccine.  She has received 2 " shots of the Pfizer COVID-19 vaccine, and she is planning on getting the booster when she is able after her rituximab infusion.    She continues to wear a mask in public.        Current Outpatient Medications   Medication     albuterol (PROAIR HFA/PROVENTIL HFA/VENTOLIN HFA) 108 (90 Base) MCG/ACT inhaler     atovaquone (MEPRON) 750 MG/5ML suspension     Calcium-Magnesium 300-300 MG TABS     hydroxychloroquine (PLAQUENIL) 200 MG tablet     levofloxacin (LEVAQUIN) 250 MG tablet     Multiple Vitamins-Minerals (WOMENS MULTI PO)     mycophenolate (GENERIC EQUIVALENT) 500 MG tablet     Omega-3 Fatty Acids (OMEGA-3 FISH OIL) 500 MG CAPS     predniSONE (DELTASONE) 1 MG tablet     predniSONE (DELTASONE) 5 MG tablet     traMADol (ULTRAM) 50 MG tablet     valACYclovir (VALTREX) 1000 mg tablet     vitamin D3 (CHOLECALCIFEROL) 50 mcg (2000 units) tablet     warfarin ANTICOAGULANT (COUMADIN) 5 MG tablet     zolpidem (AMBIEN) 5 MG tablet     pantoprazole (PROTONIX) 20 MG EC tablet     No current facility-administered medications for this visit.     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44    No chemo or radiation     Other forms of systemic lupus erythematosus (H) 2/13/2018     Pulmonary embolism (H)     6/11/2019 seen on abd [facility]     Sjogren's syndrome (H)     secondary Sjogrens, secondary to lupus     Vitamin D deficiency        Past Surgical History:  "  Procedure Laterality Date     BIOPSY/REMOVAL, LYMPH NODE(S)        SECTION  age 32     HC HYSTEROS W PERMANENT FALLOPAIN IMPLANT      Essure b/l     PAROTIDECTOMY Right 2006     TONSILLECTOMY         Social History     Socioeconomic History     Marital status:      Spouse name: Not on file     Number of children: Not on file     Years of education: 1     Highest education level: Not on file   Occupational History     Comment: , 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      Social Determinants of Health     Financial Resource Strain: Not on file   Food Insecurity: Not on file   Transportation Needs: Not on file   Physical Activity: Not on file   Stress: Not on file   Social Connections: Not on file   Intimate Partner Violence: Not on file   Housing Stability: Not on file       Family History   Problem Relation Age of Onset     Alopecia Brother      Rheumatoid Arthritis Brother      LUNG DISEASE No family hx of            EXAM:  GENERAL: Healthy, alert and no distress\",\"EYES: Eyes grossly normal to inspection.  No discharge or erythema, or obvious scleral/conjunctival abnormalities.\",\"RESP: No audible wheeze, cough, or visible cyanosis.  No visible retractions or increased work of breathing.  \",\"SKIN: Visible skin clear. No significant rash, abnormal pigmentation or lesions.\",\"NEURO: Cranial nerves grossly intact.  Mentation " "and speech appropriate for age.\",\"PSYCH: Mentation appears normal, affect normal/bright, judgement and insight intact, normal speech and appearance well-groomed.      Assessment and plan:  Ms. Singh is a 56-year-old with lupus and history of air trapping on CT scan  (small airways disease/bronchiolitis) and abnormal PFT with whom I had a video visit today.   1.  Bronchiolitis and history of abnormal PFT with lupus.  Her symptoms have improved since starting rituximab in January 2020.  Last PFT was in August 2019 because of the pandemic.  However, symptoms have improved significantly since the rituximab started.  I have encouraged her to continue physical activity even during the winter.  I agree that she should get the COVID-19 booster shot when she is able, preferably 4 to 6 months after her rituximab infusion.  She will continue mycophenolate and prednisone.  The prednisone taper is being managed by Dr. Shannon in rheumatology.  She will return to see me in 6 months approximately with full PFT.  I would not change her medications at this time.    2.  High risk medication monitoring.  She should continue atovaquone for pneumocystis prophylaxis.  Labs for monitoring of her medications are being followed by Dr. Shannon.    32 minutes spent reviewing chart, reviewing test results, talking with and examining patient, formulating plan, and documentation on the day of the encounter.        "

## 2021-12-10 NOTE — LETTER
"  12/10/2021     RE: Taina Singh  [address]  [place] [address]    Dear Colleague,    Thank you for referring your patient, Taina Singh, to the [facility]. Please see a copy of my visit note below.    Taina is a 56 year old who is being evaluated via a billable video visit.      How would you like to obtain your AVS? [employer]  If the video visit is dropped, the invitation should be resent by: Other e-mail: [email or web address]  Will anyone else be joining your video visit? No      Video Start Time: 10:58am  Video-Visit Details    Type of service:  Video Visit    Video End Time: 11:08am    Originating Location (pt. Location): Home  Distant Location (provider location):  [facility]     Platform used for Video Visit: [facility]    Reason for Visit  Taina Singh is a 56 year old year old female who is being seen for Interstitial Lung Disease (ILD) (video visit)    HPI   Ms. Singh is a 56-year-old with lupus and history of air trapping on CT scan  (small airways disease/bronchiolitis) and abnormal PFT with whom I had a video visit today.   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  In the past, we did try ICS/LABA combination, but she could not afford it.  She started rituximab for her lupus almost 1/2020, and this has helped her symptoms significantly.  She also continues to take prednisone and mycophenolate.    Today, she reports that she just had her most recent rituximab infusion this week.  She gets her infusions about every 6 months.  She was starting to feel achy right before the infusion, but already her symptoms have improved.  She denies dyspnea on exertion, cough, or fevers.  She does feel \"a little bit\" short of breath when she walks up 1 flight of stairs.  " She was able to walk 1 mile per day at her own pace when the weather was nice.  She reports that her joints are under good control with the rituximab.  She did not get the flu vaccine because she had a lupus flare the last time she got the flu vaccine.  She has received 2 shots of the Pfizer COVID-19 vaccine, and she is planning on getting the booster when she is able after her rituximab infusion.    She continues to wear a mask in public.        Current Outpatient Medications   Medication     albuterol (PROAIR HFA/PROVENTIL HFA/VENTOLIN HFA) 108 (90 Base) MCG/ACT inhaler     atovaquone (MEPRON) 750 MG/5ML suspension     Calcium-Magnesium 300-300 MG TABS     hydroxychloroquine (PLAQUENIL) 200 MG tablet     levofloxacin (LEVAQUIN) 250 MG tablet     Multiple Vitamins-Minerals (WOMENS MULTI PO)     mycophenolate (GENERIC EQUIVALENT) 500 MG tablet     Omega-3 Fatty Acids (OMEGA-3 FISH OIL) 500 MG CAPS     predniSONE (DELTASONE) 1 MG tablet     predniSONE (DELTASONE) 5 MG tablet     traMADol (ULTRAM) 50 MG tablet     valACYclovir (VALTREX) 1000 mg tablet     vitamin D3 (CHOLECALCIFEROL) 50 mcg (2000 units) tablet     warfarin ANTICOAGULANT (COUMADIN) 5 MG tablet     zolpidem (AMBIEN) 5 MG tablet     pantoprazole (PROTONIX) 20 MG EC tablet     No current facility-administered medications for this visit.     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      Social Determinants of Health     Financial Resource Strain: Not on file   Food Insecurity: Not on file   Transportation Needs: Not on file   Physical Activity: Not on file   Stress: Not on file   Social Connections: Not on file   Intimate Partner Violence: Not on file   Housing Stability: Not on file       Family History   Problem Relation Age of Onset     Alopecia Brother      Rheumatoid Arthritis Brother      LUNG DISEASE No family hx of      EXAM:  GENERAL: Healthy, alert and no distress\",\"EYES: Eyes grossly normal to inspection.  No discharge or erythema, or obvious " "scleral/conjunctival abnormalities.\",\"RESP: No audible wheeze, cough, or visible cyanosis.  No visible retractions or increased work of breathing.  \",\"SKIN: Visible skin clear. No significant rash, abnormal pigmentation or lesions.\",\"NEURO: Cranial nerves grossly intact.  Mentation and speech appropriate for age.\",\"PSYCH: Mentation appears normal, affect normal/bright, judgement and insight intact, normal speech and appearance well-groomed.      Assessment and plan:  Ms. Singh is a 56-year-old with lupus and history of air trapping on CT scan  (small airways disease/bronchiolitis) and abnormal PFT with whom I had a video visit today.   1.  Bronchiolitis and history of abnormal PFT with lupus.  Her symptoms have improved since starting rituximab in January 2020.  Last PFT was in August 2019 because of the pandemic.  However, symptoms have improved significantly since the rituximab started.  I have encouraged her to continue physical activity even during the winter.  I agree that she should get the COVID-19 booster shot when she is able, preferably 4 to 6 months after her rituximab infusion.  She will continue mycophenolate and prednisone.  The prednisone taper is being managed by Dr. Shannon in rheumatology.  She will return to see me in 6 months approximately with full PFT.  I would not change her medications at this time.    2.  High risk medication monitoring.  She should continue atovaquone for pneumocystis prophylaxis.  Labs for monitoring of her medications are being followed by Dr. Shannon.    32 minutes spent reviewing chart, reviewing test results, talking with and examining patient, formulating plan, and documentation on the day of the encounter.    Again, thank you for allowing me to participate in the care of your patient.      Sincerely,      Joanne Marvin MD    "

## 2021-12-12 DIAGNOSIS — M32.9 SYSTEMIC LUPUS ERYTHEMATOSUS, UNSPECIFIED SLE TYPE, UNSPECIFIED ORGAN INVOLVEMENT STATUS (H): ICD-10-CM

## 2021-12-12 DIAGNOSIS — M32.14 LUPUS NEPHRITIS, ISN/RPS CLASS IV (H): ICD-10-CM

## 2021-12-13 NOTE — TELEPHONE ENCOUNTER
traMADol (ULTRAM) 50 MG tablet  Last Written Prescription Date:  5/28/21  Last Fill Quannonetity: 60,   # refills: 3  Last Office Visit : 11/12/21  [place]: none      predniSONE (DELTASONE) 1 MG tablet  Last Written Prescription Date: 12/28/20  Last Fill Quantity:120   # refills: 2      predniSONE (DELTASONE) 2.5 MG tablet   Last Written Prescription Date: 10/29/20  Last Fill Quantity: 100,   # refills: 3    Routing refill request to provider for review/approval because:  controlled.1 mg prednisone. Taper for triage.  2.5mg end date  12/28/20. inactive med.

## 2021-12-14 RX ORDER — PREDNISONE 5 MG/1
5 TABLET ORAL DAILY
Qty: 90 TABLET | Refills: 3 | Status: SHIPPED | OUTPATIENT
Start: 2021-12-14 | End: 2022-01-17

## 2021-12-14 RX ORDER — PREDNISONE 2.5 MG/1
TABLET ORAL
Qty: 100 TABLET | Refills: 3 | OUTPATIENT
Start: 2021-12-14

## 2021-12-14 RX ORDER — PREDNISONE 1 MG/1
TABLET ORAL
Qty: 120 TABLET | Refills: 2 | OUTPATIENT
Start: 2021-12-14

## 2021-12-14 NOTE — TELEPHONE ENCOUNTER
Per clinic visit 11/12/21, patient is currently taking 5 mg prednisone daily.  Updated refill request sent to ordering provider for review.   Per Magdaleno, Tramadol was last dispensed on 11/19/21.      Myra Mccray RN  [facility]

## 2021-12-15 RX ORDER — TRAMADOL HYDROCHLORIDE 50 MG/1
TABLET ORAL
Qty: 60 TABLET | Refills: 3 | Status: SHIPPED | OUTPATIENT
Start: 2021-12-15 | End: 2022-08-24

## 2021-12-17 ENCOUNTER — TELEPHONE (OUTPATIENT)
Dept: RHEUMATOLOGY | Facility: CLINIC | Age: 56
End: 2021-12-17
Payer: COMMERCIAL

## 2021-12-17 NOTE — TELEPHONE ENCOUNTER
Pt called into clinic this morning, stating that she has been exposed to Covid from her son.  Called pt back, she lives with her son, whom is an EMT and he has done several transports this week, that have included pt's that are covid + and he is now symptomatic.  Pt is on MMF and Rituximab, and is not currently symptomatic.  Have advised her to go the the [place] department of Health website and sign up for monoclonoal antibodies and if she becomes symptomatic that she should hold her MMF.  Pt understands.    Dr. Marroquin has been updated.    Sophie Alvarado RN  Rheumatology Clinic

## 2021-12-22 ENCOUNTER — HOME INFUSION (PRE-WILLOW HOME INFUSION) (OUTPATIENT)
Dept: PHARMACY | Facility: CLINIC | Age: 56
End: 2021-12-22
Payer: COMMERCIAL

## 2022-01-01 NOTE — LETTER
2/4/2021       RE: Taina Singh  [address] [place] [address]     Dear Colleague,    Thank you for referring your patient, Taina Singh, to the [facility] RHEUMATOLOGY [facility] [place] at [facility]. Please see a copy of my visit note below.    Rheumatology Follow-up Virtual Visit Note    Reason for visit: f/u for lupus (this is a video visit due to COVID-19 outbreak), patient agreed      Date of last visit: 10/29/2020    DOS: 2/4/2021      History of Present Illness:    Taina Singh is a 55 year old  female who was seen for follow up of SLE and lupus nephritis.    She was diagnosed with lupus at age 27. Her 1st sx were malar rash and fatigue. No skin biopsy was done (reportedly had +KARLIE). She was treated with prednisone taper and HCQ. HCQ helped and she tolerated it well. She was diagnosed with Sjogren's at the same time. Had dryness of eyes and mouth. No lip biopsy to confirm the Dx. Reportedly she had very mild stable lupus for many years and eventually at some point, stopped taking HCQ (can't remember when). Her lupus started to flare in 7/2017 initially with pleuritic CP, was put back on HCQ. 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 Since then failed AZA, cytoxan and benlysta. MMF was not enough to control her LN and eventually rituximab was added (which was initially denied by insurance even with h/o lymphoma) given necrosis on the renal biopsy (rituximab is FDA approved for ANCA vasculitis). On HCQ+MMF after adding rituximab, LN and SLE started too improve.    She was diagnosed with MALT lymphoma at 44, had enlarged LN over R parotid gland, on biopsy it was MALT lymphoma. Tx was resection only, no radiation or chemo.      Today 2/4/2021:    Taina is on prednisone 8 mg every day, it was 17.5 mg every day at last visit. She feels achy and tired this week, but thinks the cold weather is responsible for her  NB-Subjective/ROS


Subjective/ROS


Subjective/Events-last exam


Infant has breast-fed and so far doing well.  He has had both urine output and a

stool





NB-Exam


Condition/Feeding


 Feeding Method:  Breast





Examination


Vitals





Vital Signs








  Date Time  Temp Pulse Resp B/P (MAP) Pulse Ox O2 Delivery O2 Flow Rate FiO2


 


22 22:51 36.6       


 


22 22:31  144 48  98   


 


22 21:50 36.6 158 52  97   








Level of Alertness:  Alert


Activity/State:  Quiet Alert


Head Circumference:  12.50


Fontanelles:  Soft


Anterior [place] Descriptio:  WNL


Cephalohematoma:  No


Sclera Description:  Clear


Mouth, Nose, Eyes:  Hard & Soft Palate Intact


Neck:  Head Mobile, Clavicles Intact


Chest Circumference:  12.50


Cardiovascular:  Regular Rhythm


Respiratory:  Regular


Breath Sounds:  Clear


Caput Succedaneum:  Yes


Abdomen:  Soft


Abdomen Circumference:  11.25


Genitalia:  Appear Normal


Back:  Spine Closed


Hips:  WNL


Movement:  Symmetric-Body





Weight/Height(Last Documented)


Height (Inches):  20.00


Height (Calculated Centimeters:  50.418898


Weight (Pounds):  7


Weight (Ounces):  6.0


Weight (Calculated Kilograms):  3.601841


Weight (Calculated Grams):  3300.000





NB-Plan/Progress


Plan/Progress


1.  Term  male delivered via  section due to arrest of labor and 

nonreassuring fetal heart rate pattern


-Routine  care orders


-Infant is breast-feeding


-No circumcision desired











IRVIN COFFMAN MD              May 20, 2022 06:48 sx.    Had rituximab  375 mg/m2 on 2020, 2020, then 2020, 2020 and last dose 1 gram on 12/15/2020.    On mepron for PJP prophylaxis. On  mg bid, MMF 1500 mg bid.    She works from home.    Has intermittent joint pain, nothing consistent. one day shoulders hurt and the other day her hips hurt. AM stiffness is 30-60 minutes. no pleurisy, sob or cough or fevers. No skin rash. Her hair grew back..    ROS:  A comprehensive ROS was done, positives are per HPI.    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     Sjogren's syndrome (H)     secondary Sjogrens, secondary to lupus     Vitamin D deficiency      Past Surgical History:   Procedure Laterality Date     BIOPSY/REMOVAL, LYMPH NODE(S)        SECTION  age 32     HC HYSTEROS W PERMANENT FALLOPAIN IMPLANT      Essure b/l     PAROTIDECTOMY Right 2006     TONSILLECTOMY       Family History   Problem Relation Age of Onset     Alopecia Brother      Rheumatoid Arthritis Brother      LUNG DISEASE No family hx of      Social History     Socioeconomic History     Marital status:      Spouse name: Not on file     Number of children: Not on file     Years of education: 1     Highest education level: Not on file   Occupational History     Comment: , 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   Social Needs     Financial resource strain: Not on file     Food  insecurity     Worry: Not on file     Inability: Not on file     Transportation needs     Medical: Not on file     Non-medical: Not on file   Tobacco Use     Smoking status: Never Smoker     Smokeless tobacco: Never Used   Substance and Sexual Activity     Alcohol use: No     Drug use: No     Sexual activity: Not on file   Lifestyle     Physical activity     Days per week: Not on file     Minutes per session: Not on file     Stress: Not on file   Relationships     Social connections     Talks on phone: Not on file     Gets together: Not on file     Attends Mu-ism service: Not on file     Active member of club or organization: Not on file     Attends meetings of clubs or organizations: Not on file     Relationship status: Not on file     Intimate partner violence     Fear of current or ex partner: Not on file     Emotionally abused: Not on file     Physically abused: Not on file     Forced sexual activity: Not on file   Other Topics Concern     Parent/sibling w/ CABG, MI or angioplasty before 65F 55M? 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      Going through divorce but it's not stress factor for her.    Allergies   Allergen Reactions     Pentamidine Shortness Of Breath     Penicillins Rash     Sulfa Drugs Rash       Outpatient Encounter Medications as of 2/4/2021   Medication Sig Dispense Refill     albuterol (PROAIR HFA/PROVENTIL HFA/VENTOLIN HFA) 108 (90 Base) MCG/ACT inhaler Inhale 2 puffs into the lungs every 6 hours as needed for shortness of breath / dyspnea or wheezing (Patient not taking: Reported on 12/10/2020) 3 Inhaler 3     atovaquone (MEPRON) 750 MG/5ML suspension Take 10 mLs (1,500 mg) by mouth daily 900 mL 3      Calcium-Magnesium 300-300 MG TABS daily        hydroxychloroquine (PLAQUENIL) 200 MG tablet Take 1 tablet (200 mg) by mouth 2 times daily Annual Plaquenil toxicity eye screening required. 180 tablet 3     Multiple Vitamins-Minerals (WOMENS MULTI PO) Take by mouth daily        mycophenolate (GENERIC EQUIVALENT) 500 MG tablet Take 3 tablets (1,500 mg) by mouth 2 times daily 540 tablet 3     Omega-3 Fatty Acids (OMEGA-3 FISH OIL) 500 MG CAPS Take 500 mg by mouth daily        pantoprazole (PROTONIX) 20 MG EC tablet Take 2 tablets (40 mg) by mouth 2 times daily       predniSONE (DELTASONE) 1 MG tablet 9-8-7-6 mg every day each course for one month until you reach 5mg daily. Use with 5 mg size tab 120 tablet 2     predniSONE (DELTASONE) 5 MG tablet 9-8-7-6 mg every day each course for one month until you reach 5mg daily. Use with 1 mg size tab 90 tablet 3     traMADol (ULTRAM) 50 MG tablet Take 1 tablet (50 mg) by mouth every 12 hours as needed for pain Prn pain 60 tablet 3     vitamin D3 (CHOLECALCIFEROL) 50 mcg (2000 units) tablet Take 1 tablet (50 mcg) by mouth daily 90 tablet 3     warfarin ANTICOAGULANT (COUMADIN) 5 MG tablet   1     zolpidem (AMBIEN) 5 MG tablet Take 1 tablet (5 mg) by mouth nightly as needed for sleep 30 tablet 3     No facility-administered encounter medications on file as of 2/4/2021.        Results:    RHEUM RESULTS Latest Ref Rng & Units 10/23/2020 12/15/2020 2/1/2021   COMPLEMENT C3 81 - 157 mg/dL 103 112 104   COMPLEMENT C4 13 - 39 mg/dL 22 21 23   DNA-DS <10 IU/mL 130(H) 141(H) 102(H)   SED RATE 0 - 30 mm/h 26 26 27   CRP, INFLAMMATION 0.0 - 8.0 mg/L 19.7(H) 8.4(H) 8.9(H)   AST 0 - 45 U/L 12 30 16   ALT 0 - 50 U/L 26 45 26   ALBUMIN 3.4 - 5.0 g/dL 3.7 3.6 4.1   WBC 4.0 - 11.0 10e9/L 8.1 7.9 7.8   RBC 3.8 - 5.2 10e12/L 4.61 4.56 4.50   HGB 11.7 - 15.7 g/dL 12.0 11.6(L) 11.7   HCT 35.0 - 47.0 % 38.8 38.6 37.9   MCV 78 - 100 fl 84 85 84   MCHC 31.5 - 36.5 g/dL 30.9(L) 30.1(L) 30.9(L)   RDW  10.0 - 15.0 % 14.7 14.4 14.9    - 450 10e9/L 263 207 236   CREATININE 0.52 - 1.04 mg/dL 0.68 0.65 0.60   GFR ESTIMATE, IF BLACK >60 mL/min/[1.73:m2] >90 >90 >90   GFR ESTIMATE >60 mL/min/[1.73:m2] >90 >90 >90    - 1,616 mg/dL - 391(L) 399(L)   IGA 84 - 499 mg/dL - 187 190   IGM 35 - 242 mg/dL - 18(L) 16(L)   HEPATITIS C ANTIBODY NR:Nonreactive - - -     Component      Latest Ref Rng & Units 2/1/2021   WBC      4.0 - 11.0 10e9/L 7.8   RBC Count      3.8 - 5.2 10e12/L 4.50   Hemoglobin      11.7 - 15.7 g/dL 11.7   Hematocrit      35.0 - 47.0 % 37.9   MCV      78 - 100 fl 84   MCH      26.5 - 33.0 pg 26.0 (L)   MCHC      31.5 - 36.5 g/dL 30.9 (L)   RDW      10.0 - 15.0 % 14.9   Platelet Count      150 - 450 10e9/L 236   % Neutrophils      % 84.9   % Lymphocytes      % 6.3   % Monocytes      % 7.9   % Eosinophils      % 0.6   % Basophils      % 0.3   Absolute Neutrophil      1.6 - 8.3 10e9/L 6.6   Absolute Lymphocytes      0.8 - 5.3 10e9/L 0.5 (L)   Absolute Monocytes      0.0 - 1.3 10e9/L 0.6   Absolute Eosinophils      0.0 - 0.7 10e9/L 0.1   Absolute Basophils      0.0 - 0.2 10e9/L 0.0   Diff Method       Automated Method   Color Urine       Yellow   Appearance Urine       Clear   Glucose Urine      NEG:Negative mg/dL Negative   Bilirubin Urine      NEG:Negative Negative   Ketones Urine      NEG:Negative mg/dL Trace (A)   Specific Gravity Urine      1.003 - 1.035 >1.030   pH Urine      5.0 - 7.0 pH 6.0   Protein Albumin Urine      NEG:Negative mg/dL 100 (A)   Urobilinogen Urine      0.2 - 1.0 EU/dL 0.2   Nitrite Urine      NEG:Negative Negative   Blood Urine      NEG:Negative Trace (A)   Leukocyte Esterase Urine      NEG:Negative Trace (A)   Source       Midstream Urine   WBC Urine      OTO5:0 - 5 /HPF 10-25 (A)   RBC Urine      OTO2:O - 2 /HPF 2-5 (A)   Squamous Epithelial /LPF Urine      FEW:Few /LPF Few   Bacteria Urine      NEG:Negative /HPF Moderate (A)   Triple Phosphates      NEG:Negative /HPF Few  (A)   IGG      610 - 1,616 mg/dL 399 (L)   IGA      84 - 499 mg/dL 190   IGM      35 - 242 mg/dL 16 (L)   Creatinine      0.52 - 1.04 mg/dL 0.60   GFR Estimate      >60 mL/min/1.73:m2 >90   GFR Estimate If Black      >60 mL/min/1.73:m2 >90   Specimen Description       Midstream Urine   Special Requests       Specimen received in preservative   Culture Micro       <10,000 colonies/mL . . .   CD19 B Cells      6 - 27 % <1 (L)   Absolute CD19      107 - 698 cells/uL <1 (L)   Protein Random Urine      g/L 0.75   Protein Total Urine g/gr Creatinine      0 - 0.2 g/g Cr 0.40 (H)   Creatinine Urine Random      mg/dL 182   Sed Rate      0 - 30 mm/h 27   DNA-ds      <10 IU/mL 102 (H)   CRP Inflammation      0.0 - 8.0 mg/L 8.9 (H)   Complement C3      81 - 157 mg/dL 104   Complement C4      13 - 39 mg/dL 23   AST      0 - 45 U/L 16   Albumin      3.4 - 5.0 g/dL 4.1   ALT      0 - 50 U/L 26   Creatinine Urine      mg/dL 186     Physical Exam:    Constitutional: Pleasant, NAD  Eyes: EOMI, sclera anicteric, conj not injected.  MS: No synovitis.   Skin: No skin rash  Neuro: CN grossly intact without focal deficit  Psych: nl affect    Assessment/Plan:    SLESusan Her is a 56 yo WF with +FH RA and personal hx of SLE presented in 12/2017 for 2nd opinion of m/o her SLE. She was diagnosed with SLE at age 27. Her lupus has been marked by +KARLIE (highest titer 1:640, speckled and nucleolar patterns), +anti-DNA, low C3/C4, arthritis, malar rash, serositis (pleuritic CP), lupus nephritis, hemolytic anemia, enteritis supported by +SSA/SSB Ab, +RF, high ESR/CRP. She had neg anti-RNP, anti-Sm, acL IgM/G/A, LAC, cryo and anti-CCP.     She was treated with prednisone and HCQ at the time of Dx. Can't remember how long she was on HCQ and why HCQ was stopped, but it probably was stopped because of stable disease, no report on HCQ toxicity. Reportedly her SLE was mild all these years.    Has secondary Sjogren's with sicca, +SSA/SSB Ab and h/o MALT  lymphoma at age 44 in remission. Uses blink eyedrops and salagen. No lip biopsy was done to confirm Dx of Sjogren's.     Her lupus flared in 7/2017 with no triggers when she presented with arthritis, HCQ was resumed, arthritis resolved. Mild pulm HTN on 2D-Echo 8/2017 but neg R cardiac cath and VQ scan in 3/2018, also neg 2D-Echo.    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 Patient received 1st dose cyclophosphamide on 6/15/19, had 6 doses. Initiated Rituxin 1/22/2020 as failed cytoxan. Previously failed AZA, benlysta.    S/p rituximab on 1/22/2020 and 2/6/2020, 6/26/2020, 7/20/2020 on prednisone 8 mg every day , MMF 3 gr every day and  mg a day. Recommend repeat rituximab as it is the only med that has helped with LN. Also tx options are limited (also necrotizing lesions on renal biopsy, can not rule out ANCA neg vasculitis overlap and rituximab is FDA approved for GPA/MPA). She is doing better, labs are stable with ur pr/cr=0.4, improving anti-DNA, NL C3/C4 as of 2/1/2021.       Today's plan:    - Continue Cellcept 1500 mg BID and  mg BID  - Continue prednisone taper 1 mg down q4wk till she reaches 5 mg every day and stay on that dose  - Annual eye exam for HCQ monitoring  -S/P Rituximab 1 gram on 12/15/2020  -Labs in early 5/2021  -Plan for repeat rituximab one dose of 1000 mg in 6/2021  - PCP prophylaxis on atovaquone 10 mL (1500 mg). Patient did not tolerate inhaled pentamidine. Avoiding TMP-SMX given sulfa reaction and potential increase risk of SLE flare. Hesitant to use dapsone given risk of hemolytic anemia.  -Tramadol prn for pain  -Ambien prn for insomnia    - Follow-up in 6 months        Orders Placed This Encounter   Procedures     AST     ALT     Myeloperoxidase and Proteinase 3 Panel     Albumin level     CBC with platelets differential     Creatinine     CRP inflammation     UA with Microscopic reflex to Culture     Protein  random urine with Creat Ratio     Creatinine random urine      Erythrocyte sedimentation rate auto     CD19 B Cell Count     ANCA IgG by IFA with Reflex to Titer     Immunoglobulins A G and M     DNA double stranded antibodies     Complement C3     Complement C4       Video Start Time: 10;10 am  Video End Time: 10:32 am    Killian Marroquin MD      Taina is a 55 year old who is being evaluated via a billable video visit.      How would you like to obtain your AVS? MyChart  If the video visit is dropped, the invitation should be resent by: Send to e-mail at: [email or web address]  Will anyone else be joining your video visit? No    Video Start Time: 10:10 am    Video End Time:10:32 am    Originating Location (pt. Location): Home    Distant Location (provider location):  [facility] RHEUMATOLOGY [facility]IN[facility] [place]     Platform used for Video Visit: [employer]

## 2022-01-14 ENCOUNTER — APPOINTMENT (OUTPATIENT)
Dept: LAB | Facility: CLINIC | Age: 57
End: 2022-01-14
Payer: COMMERCIAL

## 2022-01-14 ENCOUNTER — TELEPHONE (OUTPATIENT)
Dept: RHEUMATOLOGY | Facility: CLINIC | Age: 57
End: 2022-01-14

## 2022-01-14 DIAGNOSIS — I77.82 ANCA-NEGATIVE VASCULITIS (H): ICD-10-CM

## 2022-01-14 DIAGNOSIS — M32.9 SYSTEMIC LUPUS ERYTHEMATOSUS, UNSPECIFIED SLE TYPE, UNSPECIFIED ORGAN INVOLVEMENT STATUS (H): Primary | ICD-10-CM

## 2022-01-14 DIAGNOSIS — Z79.899 HIGH RISK MEDICATIONS (NOT ANTICOAGULANTS) LONG-TERM USE: ICD-10-CM

## 2022-01-14 NOTE — TELEPHONE ENCOUNTER
Called and spoke with pt, she states that for the last week, all of her joints are achy, like a normal flare.  She feels that she flared after having covid and then getting a cold after that.  She said she has been trying to lay low, but is has not been going away. She did test negative for covid yesterday.    She is wondering if there is anything to do about the joint achiness, shoulders, hips, fingers.      She is currently on 5 mg a day of prednisone.    Will send a message to Dr. Marroquin.    Sophie Alvarado RN  Rheumatology Clinic

## 2022-01-20 ENCOUNTER — VIRTUAL VISIT (OUTPATIENT)
Dept: NEPHROLOGY | Facility: CLINIC | Age: 57
End: 2022-01-20
Attending: INTERNAL MEDICINE
Payer: COMMERCIAL

## 2022-01-20 DIAGNOSIS — M32.14 LUPUS NEPHRITIS, ISN/RPS CLASS IV (H): Primary | ICD-10-CM

## 2022-01-20 DIAGNOSIS — R76.0 LUPUS ANTICOAGULANT POSITIVE: ICD-10-CM

## 2022-01-20 DIAGNOSIS — M32.14 LUPUS NEPHRITIS (H): ICD-10-CM

## 2022-01-20 DIAGNOSIS — Z79.01 ANTICOAGULATED ON COUMADIN: ICD-10-CM

## 2022-01-20 DIAGNOSIS — N18.2 CHRONIC KIDNEY DISEASE, STAGE 2 (MILD): ICD-10-CM

## 2022-01-20 PROCEDURE — 99214 OFFICE O/P EST MOD 30 MIN: CPT | Mod: 95 | Performed by: INTERNAL MEDICINE

## 2022-01-20 ASSESSMENT — PAIN SCALES - GENERAL: PAINLEVEL: NO PAIN (0)

## 2022-01-20 NOTE — PROGRESS NOTES
"  Nephrology Follow Up  4/8/2021    Taina Singh   MRN:[id number]   YOB: 1965    REASON FOR FOLLOW UP: Lupus Nephritis      HISTORY OF PRESENT ILLNESS:  Patient is a 55-year-old female. She was originally diagnosed with systemic lupus erythematosus at age 27 and was placed on hydroxychloroquine with good control of her disease.  For full review of her SLE course, please see HPI from Dr. Sherman's note from 9/10/2020.         July 22, 2021  She was last seen by Dr Rouse and me in April 2021.  She was diagnosed with shingles.  L thigh, does not extend below the knee, and is described as the \"front part of the inner thigh\".  Joints are achy and feels tired, but denies severe pain  (mild only).  It started on Monday.  The redness had increased in size the night before last -> sought care.  -> was started on valacyclovir 1 g 3 x a day.    No fever.  No cough/SOB.    She notes overall doing well from SLE.   Had rituximab in July, had been feeling a bit under the weather and achy.    She had been tapering Pred, she is  down to 5mg daily   NO oral ulcers, facial rash. No hair loss.  Otherwise, appetite is good. No nausea.   She has lost 20 lbs since December 2020 through diet and some home exercise. No Leg swelling, No gross hematuria, dysuria.   Has been in menopause for several years    Home BP - 124/70 yesterday at PCP.         January 20, 2022  Tim had covid, mAb.  Did very well c it.  Did not get severe symptoms.   Had cough w covid.  Now resolved.     No SOB.    Had been vaccinated but without booster.  Got lupus flare after that, Lopez uppped the prednisone, with achy joint and swelling .  Loss o fenergy.  Prednisone upped 10 mg /d x 3 then decreased again to 5 mg day.  Feeling much better now.   Last RTX in early Dec 2021, gets q 6 m.    Has history of PE about 3 years ago in June.   Admitted to [facility] .   Incidentally identified pulmonary embolism involving the right lower lobe segmental " and subsegmental bronchi, for which rivaroxaban was started in June 6/2019.  She subsequently switched to coumadin bec of cost.   Had lupus anticoag, but not documented anti-cardioLipin, or anti-B2GP.(see below)  Is heterozygous for FV Leiden.  However, Mrs Singh may already have been on warfarin at the time of the lupus anticoagulant testing.       Felt dizzy with previous BP meds.         MEDICATIONS:  Current Outpatient Medications.date  January 20, 2022     Medication Sig Dispense Refill     atovaquone (MEPRON) 750 MG/5ML suspension Take 10 mLs (1,500 mg) by mouth daily 900 mL 3     Calcium-Magnesium 300-300 MG TABS daily        hydroxychloroquine (PLAQUENIL) 200 MG tablet Take 1 tablet (200 mg) by mouth 2 times daily Annual Plaquenil toxicity eye screening required.  Started about 5 years ago.   180 tablet 3     Multiple Vitamins-Minerals (WOMENS MULTI PO) Take by mouth daily        mycophenolate (GENERIC EQUIVALENT) 500 MG tablet Takes 2 tablets (1,000 mg) by mouth 2 times daily 540 tablet 3     Omega-3 Fatty Acids (OMEGA-3 FISH OIL) 500 MG CAPS Take 500 mg by mouth daily        predniSONE (DELTASONE) 5 MG tablet  5mg daily.  90 tablet 3     traMADol (ULTRAM) 50 MG tablet Take 1 tablet (50 mg) by mouth every 12 hours as needed for pain Prn pain 60 tablet 3     vitamin D3 (CHOLECALCIFEROL) 50 mcg (2000 units) tablet Take 1 tablet (50 mcg) by mouth daily 90 tablet 3     warfarin ANTICOAGULANT (COUMADIN) 5 MG tablet Monitored by PCP  1     zolpidem (AMBIEN) 5 MG tablet Take 1 tablet (5 mg) by mouth nightly as needed for sleep 30 tablet 3         ALLERGIES:    Reviewed with the patient in detail    REVIEW OF SYSTEMS:  A comprehensive of systems was negative except as noted above.    SOCIAL HISTORY:   Reviewed with patient, no smoking and no alcohol use     FAMILY MEDICAL HISTORY:   Reviewed, no family history of need for dialysis, transplant or CKD    PHYSICAL EXAM:   Vital signs:There were no vitals taken for  this visit.    Physical Exam  Constitutional:     Appearance: Normal appearance.   No facial rash  Breathing not labored  Neuro:  Alert, speech flow and content normal  Affect: bright      LABS    No visits with results within 3 Week(s) from this visit.   Latest known visit with results is:   Lab on 11/10/2021   Component Date Value Ref Range Status     MPO Cari IgG Instrument Value 11/10/2021 <0.3  <3.5 U/mL Final     Myeloperoxidase Antibody IgG 11/10/2021 Negative  Negative Final     Proteinase 3 Cari IgG Instrument Va* 11/10/2021 <1.0  <2.0 U/mL Final     Proteinase 3 Antibody IgG 11/10/2021 Negative  Negative Final     Neutrophil Cytoplasmic Antibody 11/10/2021 <1:10  <1:10 Final     Neutrophil Cytoplasmic Antibody Pa* 11/10/2021 The ANCA IFA is <1:10.  No further testing will be performed.   Final     Immunoglobulin G 11/10/2021 398* 610-1,616 mg/dL Final     Immunoglobulin A 11/10/2021 184  84 - 499 mg/dL Final     Immunoglobulin M 11/10/2021 16* 35 - 242 mg/dL Final     CD19% B Cells 11/10/2021 <1* 6 - 27 % Final     Absolute CD19, B Cells 11/10/2021 <1* 107 - 698 cells/uL Final     ALT 11/10/2021 27  0 - 50 U/L Final     AST 11/10/2021 19  0 - 45 U/L Final     C4 Complement 11/10/2021 28  13 - 39 mg/dL Final     C3 Complement 11/10/2021 138  81 - 157 mg/dL Final     CRP Inflammation 11/10/2021 8.9* 0.0 - 8.0 mg/L Final     DNA (ds) Antibody 11/10/2021 90.0* <10.0 IU/mL Final    Positive     Erythrocyte Sedimentation Rate 11/10/2021 25  0 - 30 mm/hr Final     Color Urine 11/10/2021 Yellow  Colorless, Straw, Light Yellow, Yellow Final     Appearance Urine 11/10/2021 Clear  Clear Final     Glucose Urine 11/10/2021 Negative  Negative mg/dL Final     Bilirubin Urine 11/10/2021 Negative  Negative Final     Ketones Urine 11/10/2021 Trace* Negative mg/dL Final     Specific Gravity Urine 11/10/2021 >=1.030  1.003 - 1.035 Final     Blood Urine 11/10/2021 Small* Negative Final     pH Urine 11/10/2021 6.0  5.0 - 7.0  Final     Protein Albumin Urine 11/10/2021 Negative  Negative mg/dL Final     Urobilinogen Urine 11/10/2021 0.2  0.2, 1.0 E.U./dL Final     Nitrite Urine 11/10/2021 Negative  Negative Final     Leukocyte Esterase Urine 11/10/2021 Trace* Negative Final     Total Protein Random Urine g/L 11/10/2021 0.23  g/L Final    The reference range has not been established for total protein in random urine samples.  The result should be integrated into the clinical context for interpretation.     Total Protein Urine g/gr Creatinine 11/10/2021 0.11  0.00 - 0.20 g/g Cr Final     Creatinine Urine mg/dL 11/10/2021 212  mg/dL Final     Sodium 11/10/2021 138  133 - 144 mmol/L Final     Potassium 11/10/2021 3.9  3.4 - 5.3 mmol/L Final     Chloride 11/10/2021 103  94 - 109 mmol/L Final     Carbon Dioxide (CO2) 11/10/2021 29  20 - 32 mmol/L Final     Anion Gap 11/10/2021 6  3 - 14 mmol/L Final     Urea Nitrogen 11/10/2021 22  7 - 30 mg/dL Final     Creatinine 11/10/2021 0.70  0.52 - 1.04 mg/dL Final     Calcium 11/10/2021 9.6  8.5 - 10.1 mg/dL Final     Glucose 11/10/2021 83  70 - 99 mg/dL Final     Albumin 11/10/2021 4.0  3.4 - 5.0 g/dL Final     Phosphorus 11/10/2021 3.9  2.5 - 4.5 mg/dL Final     GFR Estimate 11/10/2021 >90  >60 mL/min/1.73m2 Final    As of July 11, 2021, eGFR is calculated by the CKD-EPI creatinine equation, without race adjustment. eGFR can be influenced by muscle mass, exercise, and diet. The reported eGFR is an estimation only and is only applicable if the renal function is stable.     WBC Count 11/10/2021 8.2  4.0 - 11.0 10e3/uL Final     RBC Count 11/10/2021 4.58  3.80 - 5.20 10e6/uL Final     Hemoglobin 11/10/2021 12.3  11.7 - 15.7 g/dL Final     Hematocrit 11/10/2021 39.7  35.0 - 47.0 % Final     MCV 11/10/2021 87  78 - 100 fL Final     MCH 11/10/2021 26.9  26.5 - 33.0 pg Final     MCHC 11/10/2021 31.0* 31.5 - 36.5 g/dL Final     RDW 11/10/2021 14.2  10.0 - 15.0 % Final     Platelet Count 11/10/2021 229  150 -  450 10e3/uL Final     % Neutrophils 11/10/2021 83  % Final     % Lymphocytes 11/10/2021 6  % Final     % Monocytes 11/10/2021 9  % Final     % Eosinophils 11/10/2021 2  % Final     % Basophils 11/10/2021 0  % Final     Absolute Neutrophils 11/10/2021 6.9  1.6 - 8.3 10e3/uL Final     Absolute Lymphocytes 11/10/2021 0.5* 0.8 - 5.3 10e3/uL Final     Absolute Monocytes 11/10/2021 0.7  0.0 - 1.3 10e3/uL Final     Absolute Eosinophils 11/10/2021 0.1  0.0 - 0.7 10e3/uL Final     Absolute Basophils 11/10/2021 0.0  0.0 - 0.2 10e3/uL Final     Bacteria Urine 11/10/2021 Few* None Seen /HPF Final     RBC Urine 11/10/2021 0-2  0-2 /HPF /HPF Final     WBC Urine 11/10/2021 0-5  0-5 /HPF /HPF Final     Squamous Epithelials Urine 11/10/2021 Few* None Seen /LPF Final     Mucus Urine 11/10/2021 Present* None Seen /LPF Final       Date 1/20/2020  Lupus Result NEG^Negative Positive Abnormal   Canceled, Test credited Abnormal  CM     Comment: (Note)   COMMENTS:   INR is elevated.   APTT ratio is elevated.   Platelet Neutralization is negative.   APTT 1:2 Mix ratio is normal.   DRVVT Screen ratio is elevated.   DRVVT Confirm Normalized ratio is elevated.   Thrombin time is normal.   POSITIVE TEST; THIS PATIENT HAS A  LUPUS ANTICOAGULANT.   Recommend repeat testing in 12 weeks to determine if the Lupus   Anticoagulant is persistent or transient, since a transient one does   not confer an increased thrombotic risk.   If the patient is on an anticoagulant, recommend repeat testing   without anticoagulation interference to rule out a false positive   lupus anticoagulant.   Patients with a lupus anticoagulant on warfarin therapy should be   monitored with a factor 2 (factor II) level or chromogenic factor 10   (factor X) assay.   Latrice Long M.D.  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    He c3 and C4 are normal, although anti-dsDNA remains positive.  SHe had rituximab for maintenance at the beginning to Dec.   .  At this point, she is to continue on  MMF (\"reduced to 1000 mg bid) + rituximab + prednisone.    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    She also is heterozygous for FV Leiden.  It is therefore not clear as to be whether she should remain indefinitely on coumadin, or could be considered for DOAC (which she would NOT if she truly has APLA).  At this point, the better part of valor is to continue with coumadin.  I will refer her to Dr Bassett in Hematology for a full re-evaluation.   In the mean time, I asked Ms Singh to check with her pharmacist whether apixaban would be covered by her insurance and at what cost.      #3 Hypertension  She is to check her BP at home  #COVID vaccination  She is s/p Pfizer vaccine, but mounted NO antibody response when tested at the end of June. (Not surprising given ritux + MMF).       Labs ordered this visit      We will see her again in 4 months.      Luis Eduardo Paredes MD  Division of Nephrology and Hypertension  Pager: [phone]      "

## 2022-01-20 NOTE — PROGRESS NOTES
Taina is a 56 year old who is being evaluated via a billable video visit.      How would you like to obtain your AVS? [email or web address]  If the video visit is dropped, the invitation should be resent by: Text to cell phone: [phone]  Will anyone else be joining your video visit? No    Video Start Time: 2:20  Video-Visit Details    Type of service:  Video Visit    Video End Time:2:44 PM    Originating Location (pt. Location): Home    Distant Location (provider location):  [facility] [place]     Platform used for Video Visit: [employer]

## 2022-01-20 NOTE — PATIENT INSTRUCTIONS
Please check you blood pressure at home.  We want the top number to be consistently below 140 (ideally below 130) and the bottom number consistently below 90 (ideally below 85)    Please ask your pharmacist about your insurance coverage and copay for Apixaban (Eliquis)    Please report to the lab near you for blood and urine tests.

## 2022-01-20 NOTE — LETTER
"1/20/2022     RE: Taina Singh  [address]  [place] [address]     Dear Colleague,    Thank you for referring your patient, Taina Singh, to the [facility]. Please see a copy of my visit note below.      Nephrology Follow Up  4/8/2021    Taina Singh   MRN:[id number]   YOB: 1965    REASON FOR FOLLOW UP: Lupus Nephritis      HISTORY OF PRESENT ILLNESS:  Patient is a 55-year-old female. She was originally diagnosed with systemic lupus erythematosus at age 27 and was placed on hydroxychloroquine with good control of her disease.  For full review of her SLE course, please see HPI from Dr. Sherman's note from 9/10/2020.         July 22, 2021  She was last seen by Dr Rouse and me in April 2021.  She was diagnosed with shingles.  L thigh, does not extend below the knee, and is described as the \"front part of the inner thigh\".  Joints are achy and feels tired, but denies severe pain  (mild only).  It started on Monday.  The redness had increased in size the night before last -> sought care.  -> was started on valacyclovir 1 g 3 x a day.    No fever.  No cough/SOB.    She notes overall doing well from SLE.   Had rituximab in July, had been feeling a bit under the weather and achy.    She had been tapering Pred, she is  down to 5mg daily   NO oral ulcers, facial rash. No hair loss.  Otherwise, appetite is good. No nausea.   She has lost 20 lbs since December 2020 through diet and some home exercise. No Leg swelling, No gross hematuria, dysuria.   Has been in menopause for several years    Home BP - 124/70 yesterday at PCP.         January 20, 2022  Tim had covid, mAb.  Did very well c it.  Did not get severe symptoms.   Had cough w covid.  Now resolved.     No SOB.    Had been vaccinated but without booster.  Got lupus flare after that, Lopez uppped the prednisone, with achy joint and swelling .  " Loss o fenergy.  Prednisone upped 10 mg /d x 3 then decreased again to 5 mg day.  Feeling much better now.   Last RTX in early Dec 2021, gets q 6 m.    Has history of PE about 3 years ago in June.   Admitted to [facility] .   Incidentally identified pulmonary embolism involving the right lower lobe segmental and subsegmental bronchi, for which rivaroxaban was started in June 6/2019.  She subsequently switched to coumadin bec of cost.   Had lupus anticoag, but not documented anti-cardioLipin, or anti-B2GP.(see below)  Is heterozygous for FV Leiden.  However, Mrs Singh may already have been on warfarin at the time of the lupus anticoagulant testing.       Felt dizzy with previous BP meds.         MEDICATIONS:  Current Outpatient Medications.date  January 20, 2022     Medication Sig Dispense Refill     atovaquone (MEPRON) 750 MG/5ML suspension Take 10 mLs (1,500 mg) by mouth daily 900 mL 3     Calcium-Magnesium 300-300 MG TABS daily        hydroxychloroquine (PLAQUENIL) 200 MG tablet Take 1 tablet (200 mg) by mouth 2 times daily Annual Plaquenil toxicity eye screening required.  Started about 5 years ago.   180 tablet 3     Multiple Vitamins-Minerals (WOMENS MULTI PO) Take by mouth daily        mycophenolate (GENERIC EQUIVALENT) 500 MG tablet Takes 2 tablets (1,000 mg) by mouth 2 times daily 540 tablet 3     Omega-3 Fatty Acids (OMEGA-3 FISH OIL) 500 MG CAPS Take 500 mg by mouth daily        predniSONE (DELTASONE) 5 MG tablet  5mg daily.  90 tablet 3     traMADol (ULTRAM) 50 MG tablet Take 1 tablet (50 mg) by mouth every 12 hours as needed for pain Prn pain 60 tablet 3     vitamin D3 (CHOLECALCIFEROL) 50 mcg (2000 units) tablet Take 1 tablet (50 mcg) by mouth daily 90 tablet 3     warfarin ANTICOAGULANT (COUMADIN) 5 MG tablet Monitored by PCP  1     zolpidem (AMBIEN) 5 MG tablet Take 1 tablet (5 mg) by mouth nightly as needed for sleep 30 tablet 3         ALLERGIES:    Reviewed with the patient in detail    REVIEW  OF SYSTEMS:  A comprehensive of systems was negative except as noted above.    SOCIAL HISTORY:   Reviewed with patient, no smoking and no alcohol use     FAMILY MEDICAL HISTORY:   Reviewed, no family history of need for dialysis, transplant or CKD    PHYSICAL EXAM:   Vital signs:There were no vitals taken for this visit.    Physical Exam  Constitutional:     Appearance: Normal appearance.   No facial rash  Breathing not labored  Neuro:  Alert, speech flow and content normal  Affect: bright      LABS    No visits with results within 3 Week(s) from this visit.   Latest known visit with results is:   Lab on 11/10/2021   Component Date Value Ref Range Status     MPO Cari IgG Instrument Value 11/10/2021 <0.3  <3.5 U/mL Final     Myeloperoxidase Antibody IgG 11/10/2021 Negative  Negative Final     Proteinase 3 Cari IgG Instrument Va* 11/10/2021 <1.0  <2.0 U/mL Final     Proteinase 3 Antibody IgG 11/10/2021 Negative  Negative Final     Neutrophil Cytoplasmic Antibody 11/10/2021 <1:10  <1:10 Final     Neutrophil Cytoplasmic Antibody Pa* 11/10/2021 The ANCA IFA is <1:10.  No further testing will be performed.   Final     Immunoglobulin G 11/10/2021 398* 610-1,616 mg/dL Final     Immunoglobulin A 11/10/2021 184  84 - 499 mg/dL Final     Immunoglobulin M 11/10/2021 16* 35 - 242 mg/dL Final     CD19% B Cells 11/10/2021 <1* 6 - 27 % Final     Absolute CD19, B Cells 11/10/2021 <1* 107 - 698 cells/uL Final     ALT 11/10/2021 27  0 - 50 U/L Final     AST 11/10/2021 19  0 - 45 U/L Final     C4 Complement 11/10/2021 28  13 - 39 mg/dL Final     C3 Complement 11/10/2021 138  81 - 157 mg/dL Final     CRP Inflammation 11/10/2021 8.9* 0.0 - 8.0 mg/L Final     DNA (ds) Antibody 11/10/2021 90.0* <10.0 IU/mL Final    Positive     Erythrocyte Sedimentation Rate 11/10/2021 25  0 - 30 mm/hr Final     Color Urine 11/10/2021 Yellow  Colorless, Straw, Light Yellow, Yellow Final     Appearance Urine 11/10/2021 Clear  Clear Final     Glucose Urine  11/10/2021 Negative  Negative mg/dL Final     Bilirubin Urine 11/10/2021 Negative  Negative Final     Ketones Urine 11/10/2021 Trace* Negative mg/dL Final     Specific Gravity Urine 11/10/2021 >=1.030  1.003 - 1.035 Final     Blood Urine 11/10/2021 Small* Negative Final     pH Urine 11/10/2021 6.0  5.0 - 7.0 Final     Protein Albumin Urine 11/10/2021 Negative  Negative mg/dL Final     Urobilinogen Urine 11/10/2021 0.2  0.2, 1.0 E.U./dL Final     Nitrite Urine 11/10/2021 Negative  Negative Final     Leukocyte Esterase Urine 11/10/2021 Trace* Negative Final     Total Protein Random Urine g/L 11/10/2021 0.23  g/L Final    The reference range has not been established for total protein in random urine samples.  The result should be integrated into the clinical context for interpretation.     Total Protein Urine g/gr Creatinine 11/10/2021 0.11  0.00 - 0.20 g/g Cr Final     Creatinine Urine mg/dL 11/10/2021 212  mg/dL Final     Sodium 11/10/2021 138  133 - 144 mmol/L Final     Potassium 11/10/2021 3.9  3.4 - 5.3 mmol/L Final     Chloride 11/10/2021 103  94 - 109 mmol/L Final     Carbon Dioxide (CO2) 11/10/2021 29  20 - 32 mmol/L Final     Anion Gap 11/10/2021 6  3 - 14 mmol/L Final     Urea Nitrogen 11/10/2021 22  7 - 30 mg/dL Final     Creatinine 11/10/2021 0.70  0.52 - 1.04 mg/dL Final     Calcium 11/10/2021 9.6  8.5 - 10.1 mg/dL Final     Glucose 11/10/2021 83  70 - 99 mg/dL Final     Albumin 11/10/2021 4.0  3.4 - 5.0 g/dL Final     Phosphorus 11/10/2021 3.9  2.5 - 4.5 mg/dL Final     GFR Estimate 11/10/2021 >90  >60 mL/min/1.73m2 Final    As of July 11, 2021, eGFR is calculated by the CKD-EPI creatinine equation, without race adjustment. eGFR can be influenced by muscle mass, exercise, and diet. The reported eGFR is an estimation only and is only applicable if the renal function is stable.     WBC Count 11/10/2021 8.2  4.0 - 11.0 10e3/uL Final     RBC Count 11/10/2021 4.58  3.80 - 5.20 10e6/uL Final     Hemoglobin  11/10/2021 12.3  11.7 - 15.7 g/dL Final     Hematocrit 11/10/2021 39.7  35.0 - 47.0 % Final     MCV 11/10/2021 87  78 - 100 fL Final     MCH 11/10/2021 26.9  26.5 - 33.0 pg Final     MCHC 11/10/2021 31.0* 31.5 - 36.5 g/dL Final     RDW 11/10/2021 14.2  10.0 - 15.0 % Final     Platelet Count 11/10/2021 229  150 - 450 10e3/uL Final     % Neutrophils 11/10/2021 83  % Final     % Lymphocytes 11/10/2021 6  % Final     % Monocytes 11/10/2021 9  % Final     % Eosinophils 11/10/2021 2  % Final     % Basophils 11/10/2021 0  % Final     Absolute Neutrophils 11/10/2021 6.9  1.6 - 8.3 10e3/uL Final     Absolute Lymphocytes 11/10/2021 0.5* 0.8 - 5.3 10e3/uL Final     Absolute Monocytes 11/10/2021 0.7  0.0 - 1.3 10e3/uL Final     Absolute Eosinophils 11/10/2021 0.1  0.0 - 0.7 10e3/uL Final     Absolute Basophils 11/10/2021 0.0  0.0 - 0.2 10e3/uL Final     Bacteria Urine 11/10/2021 Few* None Seen /HPF Final     RBC Urine 11/10/2021 0-2  0-2 /HPF /HPF Final     WBC Urine 11/10/2021 0-5  0-5 /HPF /HPF Final     Squamous Epithelials Urine 11/10/2021 Few* None Seen /LPF Final     Mucus Urine 11/10/2021 Present* None Seen /LPF Final       Date 1/20/2020  Lupus Result NEG^Negative Positive Abnormal   Canceled, Test credited Abnormal  CM     Comment: (Note)   COMMENTS:   INR is elevated.   APTT ratio is elevated.   Platelet Neutralization is negative.   APTT 1:2 Mix ratio is normal.   DRVVT Screen ratio is elevated.   DRVVT Confirm Normalized ratio is elevated.   Thrombin time is normal.   POSITIVE TEST; THIS PATIENT HAS A  LUPUS ANTICOAGULANT.   Recommend repeat testing in 12 weeks to determine if the Lupus   Anticoagulant is persistent or transient, since a transient one does   not confer an increased thrombotic risk.   If the patient is on an anticoagulant, recommend repeat testing   without anticoagulation interference to rule out a false positive   lupus anticoagulant.   Patients with a lupus anticoagulant on warfarin therapy should  "be   monitored with a factor 2 (factor II) level or chromogenic factor 10   (factor X) assay.   Latrice Long M.D.  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    He c3 and C4 are normal, although anti-dsDNA remains positive.  SHe had rituximab for maintenance at the beginning to Dec.   .  At this point, she is to continue on  MMF (\"reduced to 1000 mg bid) + rituximab + prednisone.    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    She also is heterozygous for FV Leiden.  It is therefore not clear as to be whether she should remain indefinitely on coumadin, or could be considered for DOAC (which she would NOT if she truly has APLA).  At this point, the better part of jonathanor is to continue with coumadin.  I will refer her to Dr Bassett in Hematology for a full re-evaluation.   In the mean time, I asked Ms Singh to check with her pharmacist whether apixaban would be covered by her insurance and at what cost.      #3 Hypertension  She is to check her BP at home  #COVID vaccination  She is s/p Pfizer vaccine, but mounted NO antibody response when tested at the end of June. (Not surprising given ritux + MMF).       Labs ordered this visit      We will see her again in 4 months.      Luis Eduardo Paredes MD  Division of Nephrology and Hypertension  Pager: [phone]    Taina is a 56 year old who is being evaluated via a billable video visit.      How would you like to obtain your AVS? MyChart  If the video visit is dropped, the invitation should be resent by: Text to cell phone: [phone]  Will anyone else be joining your video visit? No    Video " Start Time: 2:20  Video-Visit Details  Type of service:  Video Visit  Video End Time:2:44 PM  Originating Location (pt. Location): Home  Distant Location (provider location):  [facility] [place]   Platform used for Video Visit: [employer]

## 2022-01-21 PROBLEM — Z79.01 ANTICOAGULATED ON COUMADIN: Status: ACTIVE | Noted: 2022-01-21

## 2022-02-04 ENCOUNTER — LAB (OUTPATIENT)
Dept: LAB | Facility: CLINIC | Age: 57
End: 2022-02-04
Payer: COMMERCIAL

## 2022-02-04 DIAGNOSIS — I77.82 ANCA-NEGATIVE VASCULITIS (H): ICD-10-CM

## 2022-02-04 DIAGNOSIS — Z79.899 HIGH RISK MEDICATIONS (NOT ANTICOAGULANTS) LONG-TERM USE: ICD-10-CM

## 2022-02-04 DIAGNOSIS — M32.9 SYSTEMIC LUPUS ERYTHEMATOSUS, UNSPECIFIED SLE TYPE, UNSPECIFIED ORGAN INVOLVEMENT STATUS (H): ICD-10-CM

## 2022-02-04 DIAGNOSIS — M32.14 LUPUS NEPHRITIS, ISN/RPS CLASS IV (H): ICD-10-CM

## 2022-02-04 LAB
ALBUMIN SERPL-MCNC: 4 G/DL (ref 3.4–5)
ALBUMIN UR-MCNC: NEGATIVE MG/DL
ALT SERPL W P-5'-P-CCNC: 29 U/L (ref 0–50)
ANION GAP SERPL CALCULATED.3IONS-SCNC: 6 MMOL/L (ref 3–14)
APPEARANCE UR: CLEAR
AST SERPL W P-5'-P-CCNC: 15 U/L (ref 0–45)
BASOPHILS # BLD AUTO: 0 10E3/UL (ref 0–0.2)
BASOPHILS NFR BLD AUTO: 0 %
BILIRUB UR QL STRIP: NEGATIVE
BUN SERPL-MCNC: 17 MG/DL (ref 7–30)
CALCIUM SERPL-MCNC: 9.1 MG/DL (ref 8.5–10.1)
CHLORIDE BLD-SCNC: 105 MMOL/L (ref 94–109)
CO2 SERPL-SCNC: 27 MMOL/L (ref 20–32)
COLOR UR AUTO: YELLOW
CREAT SERPL-MCNC: 0.63 MG/DL (ref 0.52–1.04)
CREAT UR-MCNC: 44 MG/DL
CRP SERPL-MCNC: 7 MG/L (ref 0–8)
DEPRECATED CALCIDIOL+CALCIFEROL SERPL-MC: 51 UG/L (ref 20–75)
EOSINOPHIL # BLD AUTO: 0.1 10E3/UL (ref 0–0.7)
EOSINOPHIL NFR BLD AUTO: 2 %
ERYTHROCYTE [DISTWIDTH] IN BLOOD BY AUTOMATED COUNT: 14.8 % (ref 10–15)
ERYTHROCYTE [SEDIMENTATION RATE] IN BLOOD BY WESTERGREN METHOD: 15 MM/HR (ref 0–30)
GFR SERPL CREATININE-BSD FRML MDRD: >90 ML/MIN/1.73M2
GLUCOSE BLD-MCNC: 94 MG/DL (ref 70–99)
GLUCOSE UR STRIP-MCNC: NEGATIVE MG/DL
HCT VFR BLD AUTO: 39.3 % (ref 35–47)
HGB BLD-MCNC: 12.3 G/DL (ref 11.7–15.7)
HGB UR QL STRIP: ABNORMAL
KETONES UR STRIP-MCNC: NEGATIVE MG/DL
LEUKOCYTE ESTERASE UR QL STRIP: NEGATIVE
LYMPHOCYTES # BLD AUTO: 0.5 10E3/UL (ref 0.8–5.3)
LYMPHOCYTES NFR BLD AUTO: 10 %
MCH RBC QN AUTO: 26 PG (ref 26.5–33)
MCHC RBC AUTO-ENTMCNC: 31.3 G/DL (ref 31.5–36.5)
MCV RBC AUTO: 83 FL (ref 78–100)
MONOCYTES # BLD AUTO: 0.6 10E3/UL (ref 0–1.3)
MONOCYTES NFR BLD AUTO: 12 %
NEUTROPHILS # BLD AUTO: 4 10E3/UL (ref 1.6–8.3)
NEUTROPHILS NFR BLD AUTO: 77 %
NITRATE UR QL: NEGATIVE
PH UR STRIP: 5.5 [PH] (ref 5–7)
PHOSPHATE SERPL-MCNC: 4.5 MG/DL (ref 2.5–4.5)
PLATELET # BLD AUTO: 208 10E3/UL (ref 150–450)
POTASSIUM BLD-SCNC: 4.2 MMOL/L (ref 3.4–5.3)
PROT UR-MCNC: 0.08 G/L
PROT/CREAT 24H UR: 0.18 G/G CR (ref 0–0.2)
RBC # BLD AUTO: 4.73 10E6/UL (ref 3.8–5.2)
RBC #/AREA URNS AUTO: ABNORMAL /HPF
SODIUM SERPL-SCNC: 138 MMOL/L (ref 133–144)
SP GR UR STRIP: 1.01 (ref 1–1.03)
SQUAMOUS #/AREA URNS AUTO: ABNORMAL /LPF
UROBILINOGEN UR STRIP-ACNC: 0.2 E.U./DL
WBC # BLD AUTO: 5.2 10E3/UL (ref 4–11)
WBC #/AREA URNS AUTO: ABNORMAL /HPF

## 2022-02-04 PROCEDURE — 80069 RENAL FUNCTION PANEL: CPT

## 2022-02-04 PROCEDURE — 85025 COMPLETE CBC W/AUTO DIFF WBC: CPT

## 2022-02-04 PROCEDURE — 84156 ASSAY OF PROTEIN URINE: CPT

## 2022-02-04 PROCEDURE — 82306 VITAMIN D 25 HYDROXY: CPT

## 2022-02-04 PROCEDURE — 86225 DNA ANTIBODY NATIVE: CPT

## 2022-02-04 PROCEDURE — 86160 COMPLEMENT ANTIGEN: CPT

## 2022-02-04 PROCEDURE — 86140 C-REACTIVE PROTEIN: CPT

## 2022-02-04 PROCEDURE — 84450 TRANSFERASE (AST) (SGOT): CPT

## 2022-02-04 PROCEDURE — 36415 COLL VENOUS BLD VENIPUNCTURE: CPT

## 2022-02-04 PROCEDURE — 84460 ALANINE AMINO (ALT) (SGPT): CPT

## 2022-02-04 PROCEDURE — 85652 RBC SED RATE AUTOMATED: CPT

## 2022-02-04 PROCEDURE — 81001 URINALYSIS AUTO W/SCOPE: CPT

## 2022-02-07 LAB
C3 SERPL-MCNC: 131 MG/DL (ref 81–157)
C4 SERPL-MCNC: 27 MG/DL (ref 13–39)
DSDNA AB SER-ACNC: 52 IU/ML

## 2022-02-18 ENCOUNTER — TELEPHONE (OUTPATIENT)
Dept: RHEUMATOLOGY | Facility: CLINIC | Age: 57
End: 2022-02-18
Payer: COMMERCIAL

## 2022-02-18 DIAGNOSIS — D84.9 IMMUNOSUPPRESSION (H): Primary | ICD-10-CM

## 2022-02-18 DIAGNOSIS — M32.9 SYSTEMIC LUPUS ERYTHEMATOSUS, UNSPECIFIED SLE TYPE, UNSPECIFIED ORGAN INVOLVEMENT STATUS (H): ICD-10-CM

## 2022-02-18 DIAGNOSIS — I77.82 ANCA-NEGATIVE VASCULITIS (H): ICD-10-CM

## 2022-02-18 RX ORDER — NALOXONE HYDROCHLORIDE 0.4 MG/ML
0.2 INJECTION, SOLUTION INTRAMUSCULAR; INTRAVENOUS; SUBCUTANEOUS
Status: CANCELLED | OUTPATIENT
Start: 2022-02-18

## 2022-02-18 RX ORDER — MEPERIDINE HYDROCHLORIDE 25 MG/ML
25 INJECTION INTRAMUSCULAR; INTRAVENOUS; SUBCUTANEOUS EVERY 30 MIN PRN
Status: CANCELLED | OUTPATIENT
Start: 2022-02-18

## 2022-02-18 RX ORDER — EPINEPHRINE 1 MG/ML
0.3 INJECTION, SOLUTION, CONCENTRATE INTRAVENOUS EVERY 5 MIN PRN
Status: CANCELLED | OUTPATIENT
Start: 2022-02-18

## 2022-02-18 RX ORDER — METHYLPREDNISOLONE SODIUM SUCCINATE 125 MG/2ML
125 INJECTION, POWDER, LYOPHILIZED, FOR SOLUTION INTRAMUSCULAR; INTRAVENOUS
Status: CANCELLED
Start: 2022-02-18

## 2022-02-18 RX ORDER — ALBUTEROL SULFATE 0.83 MG/ML
2.5 SOLUTION RESPIRATORY (INHALATION)
Status: CANCELLED | OUTPATIENT
Start: 2022-02-18

## 2022-02-18 RX ORDER — ALBUTEROL SULFATE 90 UG/1
1-2 AEROSOL, METERED RESPIRATORY (INHALATION)
Status: CANCELLED
Start: 2022-02-18

## 2022-02-18 RX ORDER — DIPHENHYDRAMINE HYDROCHLORIDE 50 MG/ML
50 INJECTION INTRAMUSCULAR; INTRAVENOUS
Status: CANCELLED
Start: 2022-02-18

## 2022-02-18 NOTE — TELEPHONE ENCOUNTER
Patient scheduled for Evusheld injection on Friday, February 25th at 10AM.     Order and consent needed for patient.

## 2022-02-18 NOTE — TELEPHONE ENCOUNTER
Talked to patient and evaluated by me. We discussed the risks and benefits of receiving EVUSHELD, as well as alternative treatments. The Emergency Use Administration (EUA) was provided to the patient for review and an opportunity for questions was provided. Patient wishes to proceed with EVUSHELD.    Killian Marroquin MD  11:28 am

## 2022-02-25 ENCOUNTER — ALLIED HEALTH/NURSE VISIT (OUTPATIENT)
Dept: TRANSPLANT | Facility: CLINIC | Age: 57
End: 2022-02-25
Attending: INTERNAL MEDICINE
Payer: COMMERCIAL

## 2022-02-25 DIAGNOSIS — M32.9 SYSTEMIC LUPUS ERYTHEMATOSUS, UNSPECIFIED SLE TYPE, UNSPECIFIED ORGAN INVOLVEMENT STATUS (H): ICD-10-CM

## 2022-02-25 DIAGNOSIS — I77.82 ANCA-NEGATIVE VASCULITIS (H): ICD-10-CM

## 2022-02-25 DIAGNOSIS — D84.9 IMMUNOSUPPRESSION (H): Primary | ICD-10-CM

## 2022-02-25 PROCEDURE — M0220 HC INJECTION TIXAGEVIMAB & CILGAVIMAB (EVUSHELD): HCPCS | Performed by: INTERNAL MEDICINE

## 2022-02-25 PROCEDURE — 250N000011 HC RX IP 250 OP 636: Performed by: INTERNAL MEDICINE

## 2022-02-25 PROCEDURE — 96372 THER/PROPH/DIAG INJ SC/IM: CPT | Performed by: INTERNAL MEDICINE

## 2022-02-25 RX ORDER — MEPERIDINE HYDROCHLORIDE 25 MG/ML
25 INJECTION INTRAMUSCULAR; INTRAVENOUS; SUBCUTANEOUS EVERY 30 MIN PRN
Status: CANCELLED | OUTPATIENT
Start: 2022-02-25

## 2022-02-25 RX ORDER — DIPHENHYDRAMINE HYDROCHLORIDE 50 MG/ML
50 INJECTION INTRAMUSCULAR; INTRAVENOUS
Status: CANCELLED
Start: 2022-02-25

## 2022-02-25 RX ORDER — ALBUTEROL SULFATE 90 UG/1
1-2 AEROSOL, METERED RESPIRATORY (INHALATION)
Status: DISCONTINUED | OUTPATIENT
Start: 2022-02-25 | End: 2022-02-25 | Stop reason: HOSPADM

## 2022-02-25 RX ORDER — ALBUTEROL SULFATE 0.83 MG/ML
2.5 SOLUTION RESPIRATORY (INHALATION)
Status: DISCONTINUED | OUTPATIENT
Start: 2022-02-25 | End: 2022-02-25 | Stop reason: HOSPADM

## 2022-02-25 RX ORDER — METHYLPREDNISOLONE SODIUM SUCCINATE 125 MG/2ML
125 INJECTION, POWDER, LYOPHILIZED, FOR SOLUTION INTRAMUSCULAR; INTRAVENOUS
Status: DISCONTINUED | OUTPATIENT
Start: 2022-02-25 | End: 2022-02-25 | Stop reason: HOSPADM

## 2022-02-25 RX ORDER — NALOXONE HYDROCHLORIDE 0.4 MG/ML
0.2 INJECTION, SOLUTION INTRAMUSCULAR; INTRAVENOUS; SUBCUTANEOUS
Status: DISCONTINUED | OUTPATIENT
Start: 2022-02-25 | End: 2022-02-25 | Stop reason: HOSPADM

## 2022-02-25 RX ORDER — ALBUTEROL SULFATE 0.83 MG/ML
2.5 SOLUTION RESPIRATORY (INHALATION)
Status: CANCELLED | OUTPATIENT
Start: 2022-02-25

## 2022-02-25 RX ORDER — EPINEPHRINE 1 MG/ML
0.3 INJECTION, SOLUTION, CONCENTRATE INTRAVENOUS EVERY 5 MIN PRN
Status: CANCELLED | OUTPATIENT
Start: 2022-02-25

## 2022-02-25 RX ORDER — NALOXONE HYDROCHLORIDE 0.4 MG/ML
0.2 INJECTION, SOLUTION INTRAMUSCULAR; INTRAVENOUS; SUBCUTANEOUS
Status: CANCELLED | OUTPATIENT
Start: 2022-02-25

## 2022-02-25 RX ORDER — MEPERIDINE HYDROCHLORIDE 25 MG/ML
25 INJECTION INTRAMUSCULAR; INTRAVENOUS; SUBCUTANEOUS EVERY 30 MIN PRN
Status: DISCONTINUED | OUTPATIENT
Start: 2022-02-25 | End: 2022-02-25 | Stop reason: HOSPADM

## 2022-02-25 RX ORDER — DIPHENHYDRAMINE HYDROCHLORIDE 50 MG/ML
50 INJECTION INTRAMUSCULAR; INTRAVENOUS
Status: DISCONTINUED | OUTPATIENT
Start: 2022-02-25 | End: 2022-02-25 | Stop reason: HOSPADM

## 2022-02-25 RX ORDER — ALBUTEROL SULFATE 90 UG/1
1-2 AEROSOL, METERED RESPIRATORY (INHALATION)
Status: CANCELLED
Start: 2022-02-25

## 2022-02-25 RX ORDER — EPINEPHRINE 1 MG/ML
0.3 INJECTION, SOLUTION, CONCENTRATE INTRAVENOUS EVERY 5 MIN PRN
Status: DISCONTINUED | OUTPATIENT
Start: 2022-02-25 | End: 2022-02-25 | Stop reason: HOSPADM

## 2022-02-25 RX ORDER — METHYLPREDNISOLONE SODIUM SUCCINATE 125 MG/2ML
125 INJECTION, POWDER, LYOPHILIZED, FOR SOLUTION INTRAMUSCULAR; INTRAVENOUS
Status: CANCELLED
Start: 2022-02-25

## 2022-02-25 RX ADMIN — Medication: at 10:31

## 2022-02-25 NOTE — PROGRESS NOTES
The following medication was given:  EVUSHINDIA    MEDICATION: Shanel  ROUTE: IM  SITE: Tixagevimab given right gluteal- Cilgavimab given left gluteal   DOSE: 1.5ml   LOT #: [id number](Tixagevimab) [id number] ( Cilgavimab  :  [employer]    EXPIRATION DATE: 06/2022  NDC#: [id number]/ [id number]    Denise Mccray CMA

## 2022-03-18 ENCOUNTER — ALLIED HEALTH/NURSE VISIT (OUTPATIENT)
Dept: TRANSPLANT | Facility: CLINIC | Age: 57
End: 2022-03-18
Payer: COMMERCIAL

## 2022-03-18 DIAGNOSIS — I77.82 ANCA-NEGATIVE VASCULITIS (H): ICD-10-CM

## 2022-03-18 DIAGNOSIS — M32.9 SYSTEMIC LUPUS ERYTHEMATOSUS, UNSPECIFIED SLE TYPE, UNSPECIFIED ORGAN INVOLVEMENT STATUS (H): ICD-10-CM

## 2022-03-18 DIAGNOSIS — D84.9 IMMUNOSUPPRESSION (H): Primary | ICD-10-CM

## 2022-03-18 PROCEDURE — 250N000011 HC RX IP 250 OP 636: Performed by: INTERNAL MEDICINE

## 2022-03-18 PROCEDURE — M0220 HC INJECTION TIXAGEVIMAB & CILGAVIMAB (EVUSHELD): HCPCS | Performed by: INTERNAL MEDICINE

## 2022-03-18 PROCEDURE — 96372 THER/PROPH/DIAG INJ SC/IM: CPT | Performed by: INTERNAL MEDICINE

## 2022-03-18 RX ADMIN — Medication: at 16:00

## 2022-03-21 NOTE — NURSING NOTE
EVUSHELD Administration Note:  Taina Singh presents today for EVUSHELD.   present during visit today: Not Applicable.    Consent:   Informed Consent confirmed in chart, obtained on this date: 2/18/22    Post Injection Assessment:   Patient tolerated injection without incident.      Patient was observed in the room for a minimum of 10 minutes after injection per standard, then remained in the buidling for a total 60 minute observation period.         Discharge Plan:    Patient and/or family verbalized understanding of discharge instructions and all questions answered.     Myra Mccray RN  Adult Rheumatology Clinic

## 2022-04-07 DIAGNOSIS — G47.00 INSOMNIA, UNSPECIFIED TYPE: ICD-10-CM

## 2022-04-08 NOTE — TELEPHONE ENCOUNTER
ZOLPIDEM 5MG TABLETS  Last Written Prescription Date:   8/30/2021  Last Fill Quantity: 30,   # refills: 3  Last Office Visit : 11/12/2021  Future Office visit:   7/15/2022    Routing refill request to provider for review/approval because:  Drug not on the [facility], [facility] or [facility] refill protocol or controlled substance      Gemma Hebert RN  Central Triage Red Flags/Med Refills

## 2022-04-11 RX ORDER — ZOLPIDEM TARTRATE 5 MG/1
5 TABLET ORAL
Qty: 30 TABLET | Refills: 3 | Status: SHIPPED | OUTPATIENT
Start: 2022-04-11 | End: 2022-12-22

## 2022-04-26 NOTE — PROGRESS NOTES
This is a recent snapshot of the patient's [facility] medical record.  For current drug dose and complete information and questions, call [phone] or In Basket pool, [facility] home infusion ([id number])  CSN Number:  [id number]

## 2022-04-28 ENCOUNTER — TELEPHONE (OUTPATIENT)
Dept: RHEUMATOLOGY | Facility: CLINIC | Age: 57
End: 2022-04-28
Payer: COMMERCIAL

## 2022-04-28 DIAGNOSIS — M32.14 LUPUS NEPHRITIS, ISN/RPS CLASS IV (H): Primary | ICD-10-CM

## 2022-04-28 DIAGNOSIS — I77.82 ANCA-NEGATIVE VASCULITIS (H): ICD-10-CM

## 2022-04-28 NOTE — TELEPHONE ENCOUNTER
[facility] Call Center    Phone Message    May a detailed message be left on voicemail: yes     Reason for Call: Order(s): Other:   Reason for requested: lab orders - patient also mentioned that she needs to have an antibody test  Date needed: would like to have completed before appointment 4/29  Provider name: Dr. Marroquin      Action Taken: Message routed to:  [facility] ([facility]): Rheum    Travel Screening: Not Applicable

## 2022-04-28 NOTE — TELEPHONE ENCOUNTER
Called and spoke with pt regarding labs.  She was under the impression that we would check for antibodies from the evusheld administration.  Explained that evusheld is man made antibodies so we do not have to look for them, she has them.  She is fine with not checking.  Other lab orders placed and she is getting labs done before appt tomorrow.    Sophie Alvarado RN  Rheumatology Clinic

## 2022-04-29 ENCOUNTER — LAB (OUTPATIENT)
Dept: LAB | Facility: CLINIC | Age: 57
End: 2022-04-29
Payer: COMMERCIAL

## 2022-04-29 ENCOUNTER — OFFICE VISIT (OUTPATIENT)
Dept: RHEUMATOLOGY | Facility: CLINIC | Age: 57
End: 2022-04-29
Attending: INTERNAL MEDICINE
Payer: COMMERCIAL

## 2022-04-29 VITALS
WEIGHT: 293 LBS | BODY MASS INDEX: 52.62 KG/M2 | DIASTOLIC BLOOD PRESSURE: 84 MMHG | SYSTOLIC BLOOD PRESSURE: 149 MMHG | HEART RATE: 78 BPM | OXYGEN SATURATION: 96 %

## 2022-04-29 DIAGNOSIS — M32.14 LUPUS NEPHRITIS, ISN/RPS CLASS IV (H): ICD-10-CM

## 2022-04-29 DIAGNOSIS — I77.82 ANCA-NEGATIVE VASCULITIS (H): ICD-10-CM

## 2022-04-29 DIAGNOSIS — I77.82 ANCA-NEGATIVE VASCULITIS (H): Primary | ICD-10-CM

## 2022-04-29 DIAGNOSIS — M06.09 RHEUMATOID ARTHRITIS, SERONEGATIVE, MULTIPLE SITES (H): ICD-10-CM

## 2022-04-29 DIAGNOSIS — M32.9 SYSTEMIC LUPUS ERYTHEMATOSUS, UNSPECIFIED SLE TYPE, UNSPECIFIED ORGAN INVOLVEMENT STATUS (H): ICD-10-CM

## 2022-04-29 LAB
ALBUMIN SERPL-MCNC: 3.7 G/DL (ref 3.4–5)
ALBUMIN UR-MCNC: NEGATIVE MG/DL
ALT SERPL W P-5'-P-CCNC: 32 U/L (ref 0–50)
APPEARANCE UR: CLEAR
AST SERPL W P-5'-P-CCNC: 16 U/L (ref 0–45)
BACTERIA #/AREA URNS HPF: ABNORMAL /HPF
BASOPHILS # BLD AUTO: 0.1 10E3/UL (ref 0–0.2)
BASOPHILS NFR BLD AUTO: 1 %
BILIRUB UR QL STRIP: NEGATIVE
COLOR UR AUTO: YELLOW
CREAT SERPL-MCNC: 0.68 MG/DL (ref 0.52–1.04)
CREAT UR-MCNC: 66 MG/DL
CRP SERPL-MCNC: 7 MG/L (ref 0–8)
EOSINOPHIL # BLD AUTO: 0 10E3/UL (ref 0–0.7)
EOSINOPHIL NFR BLD AUTO: 1 %
ERYTHROCYTE [DISTWIDTH] IN BLOOD BY AUTOMATED COUNT: 14.3 % (ref 10–15)
ERYTHROCYTE [SEDIMENTATION RATE] IN BLOOD BY WESTERGREN METHOD: 19 MM/HR (ref 0–30)
GFR SERPL CREATININE-BSD FRML MDRD: >90 ML/MIN/1.73M2
GLUCOSE UR STRIP-MCNC: NEGATIVE MG/DL
HCT VFR BLD AUTO: 38.3 % (ref 35–47)
HGB BLD-MCNC: 12 G/DL (ref 11.7–15.7)
HGB UR QL STRIP: NEGATIVE
IMM GRANULOCYTES # BLD: 0 10E3/UL
IMM GRANULOCYTES NFR BLD: 0 %
KETONES UR STRIP-MCNC: NEGATIVE MG/DL
LEUKOCYTE ESTERASE UR QL STRIP: NEGATIVE
LYMPHOCYTES # BLD AUTO: 0.4 10E3/UL (ref 0.8–5.3)
LYMPHOCYTES NFR BLD AUTO: 6 %
MCH RBC QN AUTO: 26 PG (ref 26.5–33)
MCHC RBC AUTO-ENTMCNC: 31.3 G/DL (ref 31.5–36.5)
MCV RBC AUTO: 83 FL (ref 78–100)
MONOCYTES # BLD AUTO: 0.5 10E3/UL (ref 0–1.3)
MONOCYTES NFR BLD AUTO: 7 %
MUCOUS THREADS #/AREA URNS LPF: PRESENT /LPF
NEUTROPHILS # BLD AUTO: 6.1 10E3/UL (ref 1.6–8.3)
NEUTROPHILS NFR BLD AUTO: 85 %
NITRATE UR QL: NEGATIVE
NRBC # BLD AUTO: 0 10E3/UL
NRBC BLD AUTO-RTO: 0 /100
PH UR STRIP: 5 [PH] (ref 5–7)
PLATELET # BLD AUTO: 213 10E3/UL (ref 150–450)
PROT UR-MCNC: 0.12 G/L
PROT/CREAT 24H UR: 0.18 G/G CR (ref 0–0.2)
RBC # BLD AUTO: 4.62 10E6/UL (ref 3.8–5.2)
RBC URINE: 1 /HPF
SP GR UR STRIP: 1.01 (ref 1–1.03)
SQUAMOUS EPITHELIAL: <1 /HPF
UROBILINOGEN UR STRIP-MCNC: NORMAL MG/DL
WBC # BLD AUTO: 7.1 10E3/UL (ref 4–11)
WBC URINE: 1 /HPF

## 2022-04-29 PROCEDURE — 86256 FLUORESCENT ANTIBODY TITER: CPT | Mod: 90 | Performed by: PATHOLOGY

## 2022-04-29 PROCEDURE — 86225 DNA ANTIBODY NATIVE: CPT | Mod: 90 | Performed by: PATHOLOGY

## 2022-04-29 PROCEDURE — 82040 ASSAY OF SERUM ALBUMIN: CPT | Performed by: PATHOLOGY

## 2022-04-29 PROCEDURE — 81001 URINALYSIS AUTO W/SCOPE: CPT | Performed by: PATHOLOGY

## 2022-04-29 PROCEDURE — 86140 C-REACTIVE PROTEIN: CPT | Performed by: PATHOLOGY

## 2022-04-29 PROCEDURE — 36415 COLL VENOUS BLD VENIPUNCTURE: CPT | Performed by: PATHOLOGY

## 2022-04-29 PROCEDURE — 84460 ALANINE AMINO (ALT) (SGPT): CPT | Performed by: PATHOLOGY

## 2022-04-29 PROCEDURE — 99000 SPECIMEN HANDLING OFFICE-LAB: CPT | Performed by: PATHOLOGY

## 2022-04-29 PROCEDURE — 82565 ASSAY OF CREATININE: CPT | Performed by: PATHOLOGY

## 2022-04-29 PROCEDURE — 84156 ASSAY OF PROTEIN URINE: CPT | Performed by: PATHOLOGY

## 2022-04-29 PROCEDURE — 85652 RBC SED RATE AUTOMATED: CPT | Performed by: PATHOLOGY

## 2022-04-29 PROCEDURE — 84450 TRANSFERASE (AST) (SGOT): CPT | Performed by: PATHOLOGY

## 2022-04-29 PROCEDURE — 85025 COMPLETE CBC W/AUTO DIFF WBC: CPT | Performed by: PATHOLOGY

## 2022-04-29 PROCEDURE — 86160 COMPLEMENT ANTIGEN: CPT | Mod: 90 | Performed by: PATHOLOGY

## 2022-04-29 PROCEDURE — 99214 OFFICE O/P EST MOD 30 MIN: CPT | Mod: GC | Performed by: INTERNAL MEDICINE

## 2022-04-29 PROCEDURE — 86036 ANCA SCREEN EACH ANTIBODY: CPT | Mod: 90 | Performed by: PATHOLOGY

## 2022-04-29 PROCEDURE — 83876 ASSAY MYELOPEROXIDASE: CPT | Mod: 90 | Performed by: PATHOLOGY

## 2022-04-29 PROCEDURE — 83516 IMMUNOASSAY NONANTIBODY: CPT | Mod: 90 | Performed by: PATHOLOGY

## 2022-04-29 NOTE — LETTER
4/29/2022       RE: Taina Singh  [address]  [place] [address]     Dear Colleague,    Thank you for referring your patient, Taina Singh, to the [facility] RHEUMATOLOGY CLINIC [place] at [facility]. Please see a copy of my visit note below.    [facility] Rheumatology Follow-up In Person Visit Note    Reason for visit: f/u for lupus  Date of last visit: 11/12/2021  DOS: 4/29/2022       History of Present Illness:  Taina Singh is a 57 year old female who is here for follow up of SLE and lupus nephritis.    History:  - She was diagnosed with lupus at age 27. Her 1st sx were malar rash and fatigue. No skin biopsy was done (reportedly had +KARLIE). She was treated with prednisone taper and HCQ. HCQ helped and she tolerated it well. She was diagnosed with Sjogren's at the same time. Had dryness of eyes and mouth. No lip biopsy to confirm the Dx. Reportedly she had very mild stable lupus for many years and eventually at some point, stopped taking HCQ (can't remember when). Her lupus started to flare in 7/2017 initially with pleuritic CP, was put back on HCQ. 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 Since then failed AZA, cytoxan and benlysta. MMF was not enough to control her LN and eventually rituximab was added (which was initially denied by insurance even with h/o lymphoma) given necrosis on the renal biopsy (rituximab is FDA approved for ANCA vasculitis). On HCQ+MMF after adding rituximab, LN and SLE started too improve.  - She was diagnosed with MALT lymphoma at 44, had enlarged LN over R parotid gland, on biopsy it was MALT lymphoma. Tx was resection only, no radiation or chemo.    2/4/2021:  - Taina is on prednisone 8 mg every day, it was 17.5 mg every day at last visit. She feels achy and tired this week, but thinks the cold weather is responsible for her sx. Had rituximab  375 mg/m2 on 1/22/2020, 2/6/2020, then 6/26/2020,  "7/20/2020 and last dose 1 gram on 12/15/2020. On mepron for PJP prophylaxis. On  mg bid, MMF 1500 mg bid. She works from home. Has intermittent joint pain, nothing consistent. one day shoulders hurt and the other day her hips hurt. AM stiffness is 30-60 minutes. no pleurisy, sob or cough or fevers. No skin rash. Her hair grew back.    11/12/2021:  - Taina is feeling well, she thinks her lupus is under fair control, no major complaints today.    4/29/2022:  - Today, Taina reports that she is feeling well.  Has noted some general diffuse aching and fatigue, difficulty making fist, and middle finger with trigger symptoms, which she typically has as she nears her rituximab infusion.  She has noted some morning stiffness, but this is very responsive to activity and stretching. Continues to have some dry mouth managed via increased hydration. She has had an intermittent mild rash of her nose and cheeks, but this is not consistent and not dependent on sun exposure. She has had a good appetite and an overall positive mood. Feels as though rituximab has been a \"huge game changer\" for her.    ROS:    A comprehensive ROS was done, positives are per HPI.    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44    No chemo or radiation     Other forms of systemic lupus erythematosus (H) 2/13/2018     Pulmonary embolism (H)     6/11/2019 seen on abd CT at [facility]     Sjogren's syndrome (H)     secondary Sjogrens, secondary to lupus     Vitamin D deficiency  "     Past Surgical History:  Past Surgical History:   Procedure Laterality Date     BIOPSY/REMOVAL, LYMPH NODE(S)        SECTION  age 32     HC HYSTEROS W PERMANENT FALLOPAIN IMPLANT      Essure b/l     PAROTIDECTOMY Right 2006     TONSILLECTOMY       Family history:  Family History   Problem Relation Age of Onset     Alopecia Brother      Rheumatoid Arthritis Brother      LUNG DISEASE No family hx of      Social history:  Social History     Socioeconomic History     Marital status:      Spouse name: Not on file     Number of children: Not on file     Years of education: 1     Highest education level: Not on file   Occupational History     Comment: , 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      Social Determinants of Health     Financial Resource Strain: Not on file   Food Insecurity: Not on file   Transportation Needs: Not on file   Physical Activity: Not on file   Stress: Not on file   Social Connections: Not on file   Intimate Partner Violence: Not on file   Housing Stability: Not on file     Allergies:  Allergies   Allergen Reactions     Pentamidine Shortness Of Breath     Penicillins Rash     Sulfa Drugs Rash     Medications:  Outpatient Encounter Medications as of 2022   Medication Sig Dispense Refill     albuterol (PROAIR HFA/PROVENTIL HFA/VENTOLIN HFA) 108 (90 Base) MCG/ACT inhaler Inhale 2 puffs into the lungs  every 6 hours as needed for shortness of breath / dyspnea or wheezing 3 Inhaler 3     atovaquone (MEPRON) 750 MG/5ML suspension Take 10 mLs (1,500 mg) by mouth daily 900 mL 3     Calcium-Magnesium 300-300 MG TABS daily        hydroxychloroquine (PLAQUENIL) 200 MG tablet Take 1 tablet (200 mg) by mouth 2 times daily Get annual eye exams for hydroxychloroquine (Plaquenil) monitoring and fax to [phone] 180 tablet 0     Multiple Vitamins-Minerals (WOMENS MULTI PO) Take by mouth daily        mycophenolate (GENERIC EQUIVALENT) 500 MG tablet Take 2 tablets (1,000 mg) by mouth 2 times daily 360 tablet 0     Omega-3 Fatty Acids (OMEGA-3 FISH OIL) 500 MG CAPS Take 500 mg by mouth daily        predniSONE (DELTASONE) 5 MG tablet 10 mg a day x 3 days prn flare ups then back down to 5 mg a day 180 tablet 3     traMADol (ULTRAM) 50 MG tablet TAKE 1 TABLET(50 MG) BY MOUTH EVERY 12 HOURS AS NEEDED FOR PAIN 60 tablet 3     vitamin D3 (CHOLECALCIFEROL) 50 mcg (2000 units) tablet Take 1 tablet (50 mcg) by mouth daily 90 tablet 3     warfarin ANTICOAGULANT (COUMADIN) 5 MG tablet   1     zolpidem (AMBIEN) 5 MG tablet Take 1 tablet (5 mg) by mouth nightly as needed for sleep 30 tablet 3     No facility-administered encounter medications on file as of 4/29/2022.       Labs/Imaging:  RHEUM RESULTS Latest Ref Rng & Units 11/20/2017 12/29/2017 12/29/2017   ALBUMIN 3.4 - 5.0 g/dL - - -   ALT 0 - 50 U/L - - 35   AST 0 - 45 U/L - - 26   COMPLEMENT C3 81 - 157 mg/dL - - 72(L)   COMPLEMENT C4 13 - 39 mg/dL - - 6(L)   CREATININE 0.52 - 1.04 mg/dL 0.55 - -   CRP 0.0 - 8.0 mg/L - 3.2 2.9   DNA <10.0 IU/mL - - >379(H)   GFR ESTIMATE, IF BLACK >60 mL/min/[1.73:m2] >90 - -   GFR ESTIMATE >60 mL/min/1.73m2 >90 - -   HEMATOCRIT 35.0 - 47.0 % 35.5 - -   HEMOGLOBIN 11.7 - 15.7 g/dL 11.3(L) - -   HEPBANG NR:Nonreactive - - -   HCVAB NR:Nonreactive - - -   IGA 84 - 499 mg/dL - - 473(H)   IGM 35 - 242 mg/dL - - 92   -1,616 mg/dL - - 1,560   WBC 4.0  - 11.0 10e3/uL 4.9 - -   RBC 3.80 - 5.20 10e6/uL 4.28 - -   RDW 10.0 - 15.0 % 14.6 - -   MCHC 31.5 - 36.5 g/dL 31.8 - -   MCV 78 - 100 fL 83 - -   PLATELET COUNT 150 - 450 10e3/uL 215 - -   ESR 0 - 30 mm/hr 71(H) - 49(H)   QUANTIFERON-TB GOLD PLUS RESULT NEG:Negative - - -     Component      Latest Ref Rng & Units 2/1/2021   WBC      4.0 - 11.0 10e9/L 7.8   RBC Count      3.8 - 5.2 10e12/L 4.50   Hemoglobin      11.7 - 15.7 g/dL 11.7   Hematocrit      35.0 - 47.0 % 37.9   MCV      78 - 100 fl 84   MCH      26.5 - 33.0 pg 26.0 (L)   MCHC      31.5 - 36.5 g/dL 30.9 (L)   RDW      10.0 - 15.0 % 14.9   Platelet Count      150 - 450 10e9/L 236   % Neutrophils      % 84.9   % Lymphocytes      % 6.3   % Monocytes      % 7.9   % Eosinophils      % 0.6   % Basophils      % 0.3   Absolute Neutrophil      1.6 - 8.3 10e9/L 6.6   Absolute Lymphocytes      0.8 - 5.3 10e9/L 0.5 (L)   Absolute Monocytes      0.0 - 1.3 10e9/L 0.6   Absolute Eosinophils      0.0 - 0.7 10e9/L 0.1   Absolute Basophils      0.0 - 0.2 10e9/L 0.0   Diff Method       Automated Method   Color Urine       Yellow   Appearance Urine       Clear   Glucose Urine      NEG:Negative mg/dL Negative   Bilirubin Urine      NEG:Negative Negative   Ketones Urine      NEG:Negative mg/dL Trace (A)   Specific Gravity Urine      1.003 - 1.035 >1.030   pH Urine      5.0 - 7.0 pH 6.0   Protein Albumin Urine      NEG:Negative mg/dL 100 (A)   Urobilinogen Urine      0.2 - 1.0 EU/dL 0.2   Nitrite Urine      NEG:Negative Negative   Blood Urine      NEG:Negative Trace (A)   Leukocyte Esterase Urine      NEG:Negative Trace (A)   Source       Midstream Urine   WBC Urine      OTO5:0 - 5 /HPF 10-25 (A)   RBC Urine      OTO2:O - 2 /HPF 2-5 (A)   Squamous Epithelial /LPF Urine      FEW:Few /LPF Few   Bacteria Urine      NEG:Negative /HPF Moderate (A)   Triple Phosphates      NEG:Negative /HPF Few (A)   IGG      610 - 1,616 mg/dL 399 (L)   IGA      84 - 499 mg/dL 190   IGM      35 - 242  mg/dL 16 (L)   Creatinine      0.52 - 1.04 mg/dL 0.60   GFR Estimate      >60 mL/min/1.73:m2 >90   GFR Estimate If Black      >60 mL/min/1.73:m2 >90   Specimen Description       Midstream Urine   Special Requests       Specimen received in preservative   Culture Micro       <10,000 colonies/mL . . .   CD19 B Cells      6 - 27 % <1 (L)   Absolute CD19      107 - 698 cells/uL <1 (L)   Protein Random Urine      g/L 0.75   Protein Total Urine g/gr Creatinine      0 - 0.2 g/g Cr 0.40 (H)   Creatinine Urine Random      mg/dL 182   Sed Rate      0 - 30 mm/h 27   DNA-ds      <10 IU/mL 102 (H)   CRP Inflammation      0.0 - 8.0 mg/L 8.9 (H)   Complement C3      81 - 157 mg/dL 104   Complement C4      13 - 39 mg/dL 23   AST      0 - 45 U/L 16   Albumin      3.4 - 5.0 g/dL 4.1   ALT      0 - 50 U/L 26   Creatinine Urine      mg/dL 186     Component      Latest Ref Rng & Units 11/10/2021   WBC      4.0 - 11.0 10e3/uL 8.2   RBC Count      3.80 - 5.20 10e6/uL 4.58   Hemoglobin      11.7 - 15.7 g/dL 12.3   Hematocrit      35.0 - 47.0 % 39.7   MCV      78 - 100 fL 87   MCH      26.5 - 33.0 pg 26.9   MCHC      31.5 - 36.5 g/dL 31.0 (L)   RDW      10.0 - 15.0 % 14.2   Platelet Count      150 - 450 10e3/uL 229   % Neutrophils      % 83   % Lymphocytes      % 6   % Monocytes      % 9   % Eosinophils      % 2   % Basophils      % 0   Absolute Neutrophils      1.6 - 8.3 10e3/uL 6.9   Absolute Lymphocytes      0.8 - 5.3 10e3/uL 0.5 (L)   Absolute Monocytes      0.0 - 1.3 10e3/uL 0.7   Absolute Eosinophils      0.0 - 0.7 10e3/uL 0.1   Absolute Basophils      0.0 - 0.2 10e3/uL 0.0   Color Urine      Colorless, Straw, Light Yellow, Yellow Yellow   Appearance Urine      Clear Clear   Glucose Urine      Negative mg/dL Negative   Bilirubin Urine      Negative Negative   Ketones Urine      Negative mg/dL Trace (A)   Specific Gravity Urine      1.003 - 1.035 >=1.030   Blood Urine      Negative Small (A)   pH Urine      5.0 - 7.0 6.0   Protein  Albumin Urine      Negative mg/dL Negative   Urobilinogen Urine      0.2, 1.0 E.U./dL 0.2   Nitrite Urine      Negative Negative   Leukocyte Esterase Urine      Negative Trace (A)   Sodium      133 - 144 mmol/L 138   Potassium      3.4 - 5.3 mmol/L 3.9   Chloride      94 - 109 mmol/L 103   Carbon Dioxide      20 - 32 mmol/L 29   Anion Gap      3 - 14 mmol/L 6   Urea Nitrogen      7 - 30 mg/dL 22   Creatinine      0.52 - 1.04 mg/dL 0.70   Calcium      8.5 - 10.1 mg/dL 9.6   Glucose      70 - 99 mg/dL 83   Albumin      3.4 - 5.0 g/dL 4.0   Phosphorus      2.5 - 4.5 mg/dL 3.9   GFR Estimate      >60 mL/min/1.73m2 >90   Bacteria Urine      None Seen /HPF Few (A)   RBC Urine      0-2 /HPF /HPF 0-2   WBC Urine      0-5 /HPF /HPF 0-5   Squamous Epithelial /LPF Urine      None Seen /LPF Few (A)   Mucus Urine      None Seen /LPF Present (A)   MPO Cari IgG Instrument Value      <3.5 U/mL <0.3   Myeloperoxidase Antibody IgG      Negative Negative   Proteinase 3 Cari IgG Instrument Value      <2.0 U/mL <1.0   Proteinase 3 Antibody IgG      Negative Negative   IGG      610-1,616 mg/dL 398 (L)   IGA      84 - 499 mg/dL 184   IGM      35 - 242 mg/dL 16 (L)   Protein Random Urine      g/L 0.23   Protein Total Urine g/gr Creatinine      0.00 - 0.20 g/g Cr 0.11   Creatinine Urine      mg/dL 212   Neutrophil Cytoplasmic Antibody      <1:10 <1:10   Neutrophil Cytoplasmic Antibody Pattern       The ANCA IFA is <1:10.  No further testing will be performed.   CD19 B Cells      6 - 27 % <1 (L)   Absolute CD19      107 - 698 cells/uL <1 (L)   ALT      0 - 50 U/L 27   AST      0 - 45 U/L 19   Complement C4      13 - 39 mg/dL 28   Complement C3      81 - 157 mg/dL 138   CRP Inflammation      0.0 - 8.0 mg/L 8.9 (H)   DNA-ds      <10.0 IU/mL 90.0 (H)   Sed Rate      0 - 30 mm/hr 25     Component      Latest Ref Rng & Units 4/29/2022   WBC      4.0 - 11.0 10e3/uL 7.1   RBC Count      3.80 - 5.20 10e6/uL 4.62   Hemoglobin      11.7 - 15.7 g/dL 12.0    Hematocrit      35.0 - 47.0 % 38.3   MCV      78 - 100 fL 83   MCH      26.5 - 33.0 pg 26.0 (L)   MCHC      31.5 - 36.5 g/dL 31.3 (L)   RDW      10.0 - 15.0 % 14.3   Platelet Count      150 - 450 10e3/uL 213   % Neutrophils      % 85   % Lymphocytes      % 6   % Monocytes      % 7   % Eosinophils      % 1   % Basophils      % 1   % Immature Granulocytes      % 0   NRBCs per 100 WBC      <1 /100 0   Absolute Neutrophils      1.6 - 8.3 10e3/uL 6.1   Absolute Lymphocytes      0.8 - 5.3 10e3/uL 0.4 (L)   Absolute Monocytes      0.0 - 1.3 10e3/uL 0.5   Absolute Eosinophils      0.0 - 0.7 10e3/uL 0.0   Absolute Basophils      0.0 - 0.2 10e3/uL 0.1   Absolute Immature Granulocytes      <=0.4 10e3/uL 0.0   Absolute NRBCs      10e3/uL 0.0   Color Urine      Colorless, Straw, Light Yellow, Yellow Yellow   Appearance Urine      Clear Clear   Glucose Urine      Negative mg/dL Negative   Bilirubin Urine      Negative Negative   Ketones Urine      Negative mg/dL Negative   Specific Gravity Urine      1.003 - 1.035 1.015   Blood Urine      Negative Negative   pH Urine      5.0 - 7.0 5.0   Protein Albumin Urine      Negative mg/dL Negative   Urobilinogen mg/dL      Normal, 2.0 mg/dL Normal   Nitrite Urine      Negative Negative   Leukocyte Esterase Urine      Negative Negative   Bacteria Urine      None Seen /HPF Few (A)   Mucus Urine      None Seen /LPF Present (A)   RBC Urine      <=2 /HPF 1   WBC Urine      <=5 /HPF 1   Squamous Epithelial /HPF Urine      <=1 /HPF <1   Protein Random Urine      g/L 0.12   Protein Total Urine g/gr Creatinine      0.00 - 0.20 g/g Cr 0.18   Creatinine Urine      mg/dL 66   Creatinine      0.52 - 1.04 mg/dL 0.68   GFR Estimate      >60 mL/min/1.73m2 >90   AST      0 - 45 U/L 16   ALT      0 - 50 U/L 32   Albumin      3.4 - 5.0 g/dL 3.7   CRP Inflammation      0.0 - 8.0 mg/L 7.0   Sed Rate      0 - 30 mm/hr 19       Physical Exam:  BP (!) 149/84   Pulse 78   Wt 147.9 kg (326 lb)   SpO2 96%    BMI 52.62 kg/m       Constitutional: Pleasant, NAD  Eyes: EOMI, sclera anicteric, conj not injected.  MS: No synovitis or joint tenderness  Skin: No skin rash  CV: Regular rate  Pulm: Normal respiratory effort  Neuro: CN grossly intact without focal deficit  Psych: nl affect    Assessment/Plan:  Taina Singh is a 57 year old female who is here for follow up of SLE and lupus nephritis.    Systemic lupus erythematous  History of lupus nephritis  - Presented in 12/2017 for 2nd opinion of m/o her SLE. She was diagnosed with SLE at age 27. Her lupus has been marked by +KARLIE (highest titer 1:640, speckled and nucleolar patterns), +anti-DNA, low C3/C4, arthritis, malar rash, serositis (pleuritic CP), lupus nephritis, hemolytic anemia, enteritis supported by +SSA/SSB Ab, +RF, high ESR/CRP. She had neg anti-RNP, anti-Sm, acL IgM/G/A, LAC, cryo and anti-CCP. She was treated with prednisone and HCQ at the time of Dx. Can't remember how long she was on HCQ and why HCQ was stopped, but it probably was stopped because of stable disease, no report on HCQ toxicity. Reportedly her SLE was mild all these years.  - Her lupus flared in 7/2017 with no triggers when she presented with arthritis, HCQ was resumed, arthritis resolved. Mild pulm HTN on 2D-Echo 8/2017 but neg R cardiac cath and VQ scan in 3/2018, also neg 2D-Echo.  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 Previously failed AZA, benlysta. Patient received 1st dose cyclophosphamide on 6/15/19, had 6 doses.  Initiated Rituxin 1/22/2020 as failed cytoxan.  Recommend rituxin infusion every 4-6 months, closer to 4 due to worsening of symptoms as infusions near at 6 month interval. Have decreased dose to 600 mg and it is well tolerated. Recommend continuation of rituximab as it is the only med that has helped with LN. Also tx options are limited (also necrotizing lesions on renal biopsy, can not rule out ANCA neg vasculitis overlap and rituximab is FDA approved for GPA/MPA).   -  Current regimen: Prednisone 5 mg every day, MMF 1000 mg BID (tapered from 3 gr every day) daily, and  mg a day, and Rituximab (last infusion 12/7/21).    Sjogren's with sicca  - Secondary, +SSA/SSB Ab and h/o MALT lymphoma at age 44 in remission. Uses blink eyedrops and salagen. No lip biopsy was done to confirm Dx of Sjogren's.     Recommendations:  - Rituximab in May 2022. Continue at interval q4-6 months depending on severity symptoms.  - Continue Cellcept 1000 mg BID and  mg BID  - Continue prednisone 5 mg every day  - Annual eye exam for HCQ monitoring  - Labs [id number]  - PCP prophylaxis on atovaquone 10 mL (1500 mg). Patient did not tolerate inhaled pentamidine. Avoiding TMP-SMX given sulfa reaction and potential increase risk of SLE flare. Hesitant to use dapsone given risk of hemolytic anemia.  - Tramadol prn for pain  - Ambien prn for insomnia    Return in 6 months (in person)    Seen and staffed with Dr. Lopez Edwards MD  Maternal Fetal Medicine Fellow  4/29/2022     Attending Note: I saw and evaluated the patient with Dr. Edwards. I agree with the assessment and plan. Will move up rituximab 500 mg x one dose from June to May 2022 as benefit does not last 6 months. Taina Ugarte already.    Killian Marroquin MD

## 2022-04-29 NOTE — PATIENT INSTRUCTIONS
Rituximab 500 mg one dose in May 2022    Labs in July 2022 then every 3 months    Return in 6 months (in person)

## 2022-04-29 NOTE — PROGRESS NOTES
[facility] Rheumatology Follow-up In Person Visit Note    Reason for visit: f/u for lupus  Date of last visit: 11/12/2021  DOS: 4/29/2022       History of Present Illness:  Taina Singh is a 57 year old female who is here for follow up of SLE and lupus nephritis.    History:  - She was diagnosed with lupus at age 27. Her 1st sx were malar rash and fatigue. No skin biopsy was done (reportedly had +KARLIE). She was treated with prednisone taper and HCQ. HCQ helped and she tolerated it well. She was diagnosed with Sjogren's at the same time. Had dryness of eyes and mouth. No lip biopsy to confirm the Dx. Reportedly she had very mild stable lupus for many years and eventually at some point, stopped taking HCQ (can't remember when). Her lupus started to flare in 7/2017 initially with pleuritic CP, was put back on HCQ. 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 Since then failed AZA, cytoxan and benlysta. MMF was not enough to control her LN and eventually rituximab was added (which was initially denied by insurance even with h/o lymphoma) given necrosis on the renal biopsy (rituximab is FDA approved for ANCA vasculitis). On HCQ+MMF after adding rituximab, LN and SLE started too improve.  - She was diagnosed with MALT lymphoma at 44, had enlarged LN over R parotid gland, on biopsy it was MALT lymphoma. Tx was resection only, no radiation or chemo.    2/4/2021:  - Taina is on prednisone 8 mg every day, it was 17.5 mg every day at last visit. She feels achy and tired this week, but thinks the cold weather is responsible for her sx. Had rituximab  375 mg/m2 on 1/22/2020, 2/6/2020, then 6/26/2020, 7/20/2020 and last dose 1 gram on 12/15/2020. On mepron for PJP prophylaxis. On  mg bid, MMF 1500 mg bid. She works from home. Has intermittent joint pain, nothing consistent. one day shoulders hurt and the other day her hips hurt. AM stiffness is 30-60 minutes. no pleurisy, sob or cough or fevers. No  "skin rash. Her hair grew back.    2021:  - Taina is feeling well, she thinks her lupus is under fair control, no major complaints today.    2022:  - Today, Taina reports that she is feeling well.  Has noted some general diffuse aching and fatigue, difficulty making fist, and middle finger with trigger symptoms, which she typically has as she nears her rituximab infusion.  She has noted some morning stiffness, but this is very responsive to activity and stretching. Continues to have some dry mouth managed via increased hydration. She has had an intermittent mild rash of her nose and cheeks, but this is not consistent and not dependent on sun exposure. She has had a good appetite and an overall positive mood. Feels as though rituximab has been a \"huge game changer\" for her.    ROS:    A comprehensive ROS was done, positives are per HPI.    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44    No chemo or radiation     Other forms of systemic lupus erythematosus (H) 2018     Pulmonary embolism (H)     2019 seen on abd CT at [facility]     Sjogren's syndrome (H)     secondary Sjogrens, secondary to lupus     Vitamin D deficiency      Past Surgical History:  Past Surgical History:   Procedure Laterality Date     BIOPSY/REMOVAL, LYMPH NODE(S)        SECTION  age 32     HC HYSTEROS W PERMANENT FALLOPAIN IMPLANT      Essure b/l     PAROTIDECTOMY Right 2006     TONSILLECTOMY       Family history:  Family History   Problem Relation Age " of Onset     Alopecia Brother      Rheumatoid Arthritis Brother      LUNG DISEASE No family hx of      Social history:  Social History     Socioeconomic History     Marital status:      Spouse name: Not on file     Number of children: Not on file     Years of education: 1     Highest education level: Not on file   Occupational History     Comment: , 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      Social Determinants of Health     Financial Resource Strain: Not on file   Food Insecurity: Not on file   Transportation Needs: Not on file   Physical Activity: Not on file   Stress: Not on file   Social Connections: Not on file   Intimate Partner Violence: Not on file   Housing Stability: Not on file     Allergies:  Allergies   Allergen Reactions     Pentamidine Shortness Of Breath     Penicillins Rash     Sulfa Drugs Rash     Medications:  Outpatient Encounter Medications as of 2022   Medication Sig Dispense Refill     albuterol (PROAIR HFA/PROVENTIL HFA/VENTOLIN HFA) 108 (90 Base) MCG/ACT inhaler Inhale 2 puffs into the lungs every 6 hours as needed for shortness of breath / dyspnea or wheezing 3 Inhaler 3     atovaquone (MEPRON) 750 MG/5ML suspension Take 10 mLs (1,500 mg) by mouth daily 900 mL 3     Calcium-Magnesium 300-300 MG TABS daily        hydroxychloroquine (PLAQUENIL) 200 MG tablet Take 1 tablet (200 mg) by mouth 2 times  daily Get annual eye exams for hydroxychloroquine (Plaquenil) monitoring and fax to [phone] 180 tablet 0     Multiple Vitamins-Minerals (WOMENS MULTI PO) Take by mouth daily        mycophenolate (GENERIC EQUIVALENT) 500 MG tablet Take 2 tablets (1,000 mg) by mouth 2 times daily 360 tablet 0     Omega-3 Fatty Acids (OMEGA-3 FISH OIL) 500 MG CAPS Take 500 mg by mouth daily        predniSONE (DELTASONE) 5 MG tablet 10 mg a day x 3 days prn flare ups then back down to 5 mg a day 180 tablet 3     traMADol (ULTRAM) 50 MG tablet TAKE 1 TABLET(50 MG) BY MOUTH EVERY 12 HOURS AS NEEDED FOR PAIN 60 tablet 3     vitamin D3 (CHOLECALCIFEROL) 50 mcg (2000 units) tablet Take 1 tablet (50 mcg) by mouth daily 90 tablet 3     warfarin ANTICOAGULANT (COUMADIN) 5 MG tablet   1     zolpidem (AMBIEN) 5 MG tablet Take 1 tablet (5 mg) by mouth nightly as needed for sleep 30 tablet 3     No facility-administered encounter medications on file as of 4/29/2022.       Labs/Imaging:  RHEUM RESULTS Latest Ref Rng & Units 11/20/2017 12/29/2017 12/29/2017   ALBUMIN 3.4 - 5.0 g/dL - - -   ALT 0 - 50 U/L - - 35   AST 0 - 45 U/L - - 26   COMPLEMENT C3 81 - 157 mg/dL - - 72(L)   COMPLEMENT C4 13 - 39 mg/dL - - 6(L)   CREATININE 0.52 - 1.04 mg/dL 0.55 - -   CRP 0.0 - 8.0 mg/L - 3.2 2.9   DNA <10.0 IU/mL - - >379(H)   GFR ESTIMATE, IF BLACK >60 mL/min/[1.73:m2] >90 - -   GFR ESTIMATE >60 mL/min/1.73m2 >90 - -   HEMATOCRIT 35.0 - 47.0 % 35.5 - -   HEMOGLOBIN 11.7 - 15.7 g/dL 11.3(L) - -   HEPBANG NR:Nonreactive - - -   HCVAB NR:Nonreactive - - -   IGA 84 - 499 mg/dL - - 473(H)   IGM 35 - 242 mg/dL - - 92   -1,616 mg/dL - - 1,560   WBC 4.0 - 11.0 10e3/uL 4.9 - -   RBC 3.80 - 5.20 10e6/uL 4.28 - -   RDW 10.0 - 15.0 % 14.6 - -   MCHC 31.5 - 36.5 g/dL 31.8 - -   MCV 78 - 100 fL 83 - -   PLATELET COUNT 150 - 450 10e3/uL 215 - -   ESR 0 - 30 mm/hr 71(H) - 49(H)   QUANTIFERON-TB GOLD PLUS RESULT NEG:Negative - - -     Component      Latest Ref Rng &  Units 2/1/2021   WBC      4.0 - 11.0 10e9/L 7.8   RBC Count      3.8 - 5.2 10e12/L 4.50   Hemoglobin      11.7 - 15.7 g/dL 11.7   Hematocrit      35.0 - 47.0 % 37.9   MCV      78 - 100 fl 84   MCH      26.5 - 33.0 pg 26.0 (L)   MCHC      31.5 - 36.5 g/dL 30.9 (L)   RDW      10.0 - 15.0 % 14.9   Platelet Count      150 - 450 10e9/L 236   % Neutrophils      % 84.9   % Lymphocytes      % 6.3   % Monocytes      % 7.9   % Eosinophils      % 0.6   % Basophils      % 0.3   Absolute Neutrophil      1.6 - 8.3 10e9/L 6.6   Absolute Lymphocytes      0.8 - 5.3 10e9/L 0.5 (L)   Absolute Monocytes      0.0 - 1.3 10e9/L 0.6   Absolute Eosinophils      0.0 - 0.7 10e9/L 0.1   Absolute Basophils      0.0 - 0.2 10e9/L 0.0   Diff Method       Automated Method   Color Urine       Yellow   Appearance Urine       Clear   Glucose Urine      NEG:Negative mg/dL Negative   Bilirubin Urine      NEG:Negative Negative   Ketones Urine      NEG:Negative mg/dL Trace (A)   Specific Gravity Urine      1.003 - 1.035 >1.030   pH Urine      5.0 - 7.0 pH 6.0   Protein Albumin Urine      NEG:Negative mg/dL 100 (A)   Urobilinogen Urine      0.2 - 1.0 EU/dL 0.2   Nitrite Urine      NEG:Negative Negative   Blood Urine      NEG:Negative Trace (A)   Leukocyte Esterase Urine      NEG:Negative Trace (A)   Source       Midstream Urine   WBC Urine      OTO5:0 - 5 /HPF 10-25 (A)   RBC Urine      OTO2:O - 2 /HPF 2-5 (A)   Squamous Epithelial /LPF Urine      FEW:Few /LPF Few   Bacteria Urine      NEG:Negative /HPF Moderate (A)   Triple Phosphates      NEG:Negative /HPF Few (A)   IGG      610 - 1,616 mg/dL 399 (L)   IGA      84 - 499 mg/dL 190   IGM      35 - 242 mg/dL 16 (L)   Creatinine      0.52 - 1.04 mg/dL 0.60   GFR Estimate      >60 mL/min/1.73:m2 >90   GFR Estimate If Black      >60 mL/min/1.73:m2 >90   Specimen Description       Midstream Urine   Special Requests       Specimen received in preservative   Culture Micro       <10,000 colonies/mL . . .   CD19 B  Cells      6 - 27 % <1 (L)   Absolute CD19      107 - 698 cells/uL <1 (L)   Protein Random Urine      g/L 0.75   Protein Total Urine g/gr Creatinine      0 - 0.2 g/g Cr 0.40 (H)   Creatinine Urine Random      mg/dL 182   Sed Rate      0 - 30 mm/h 27   DNA-ds      <10 IU/mL 102 (H)   CRP Inflammation      0.0 - 8.0 mg/L 8.9 (H)   Complement C3      81 - 157 mg/dL 104   Complement C4      13 - 39 mg/dL 23   AST      0 - 45 U/L 16   Albumin      3.4 - 5.0 g/dL 4.1   ALT      0 - 50 U/L 26   Creatinine Urine      mg/dL 186     Component      Latest Ref Rng & Units 11/10/2021   WBC      4.0 - 11.0 10e3/uL 8.2   RBC Count      3.80 - 5.20 10e6/uL 4.58   Hemoglobin      11.7 - 15.7 g/dL 12.3   Hematocrit      35.0 - 47.0 % 39.7   MCV      78 - 100 fL 87   MCH      26.5 - 33.0 pg 26.9   MCHC      31.5 - 36.5 g/dL 31.0 (L)   RDW      10.0 - 15.0 % 14.2   Platelet Count      150 - 450 10e3/uL 229   % Neutrophils      % 83   % Lymphocytes      % 6   % Monocytes      % 9   % Eosinophils      % 2   % Basophils      % 0   Absolute Neutrophils      1.6 - 8.3 10e3/uL 6.9   Absolute Lymphocytes      0.8 - 5.3 10e3/uL 0.5 (L)   Absolute Monocytes      0.0 - 1.3 10e3/uL 0.7   Absolute Eosinophils      0.0 - 0.7 10e3/uL 0.1   Absolute Basophils      0.0 - 0.2 10e3/uL 0.0   Color Urine      Colorless, Straw, Light Yellow, Yellow Yellow   Appearance Urine      Clear Clear   Glucose Urine      Negative mg/dL Negative   Bilirubin Urine      Negative Negative   Ketones Urine      Negative mg/dL Trace (A)   Specific Gravity Urine      1.003 - 1.035 >=1.030   Blood Urine      Negative Small (A)   pH Urine      5.0 - 7.0 6.0   Protein Albumin Urine      Negative mg/dL Negative   Urobilinogen Urine      0.2, 1.0 E.U./dL 0.2   Nitrite Urine      Negative Negative   Leukocyte Esterase Urine      Negative Trace (A)   Sodium      133 - 144 mmol/L 138   Potassium      3.4 - 5.3 mmol/L 3.9   Chloride      94 - 109 mmol/L 103   Carbon Dioxide      20  - 32 mmol/L 29   Anion Gap      3 - 14 mmol/L 6   Urea Nitrogen      7 - 30 mg/dL 22   Creatinine      0.52 - 1.04 mg/dL 0.70   Calcium      8.5 - 10.1 mg/dL 9.6   Glucose      70 - 99 mg/dL 83   Albumin      3.4 - 5.0 g/dL 4.0   Phosphorus      2.5 - 4.5 mg/dL 3.9   GFR Estimate      >60 mL/min/1.73m2 >90   Bacteria Urine      None Seen /HPF Few (A)   RBC Urine      0-2 /HPF /HPF 0-2   WBC Urine      0-5 /HPF /HPF 0-5   Squamous Epithelial /LPF Urine      None Seen /LPF Few (A)   Mucus Urine      None Seen /LPF Present (A)   MPO Cari IgG Instrument Value      <3.5 U/mL <0.3   Myeloperoxidase Antibody IgG      Negative Negative   Proteinase 3 Cari IgG Instrument Value      <2.0 U/mL <1.0   Proteinase 3 Antibody IgG      Negative Negative   IGG      610-1,616 mg/dL 398 (L)   IGA      84 - 499 mg/dL 184   IGM      35 - 242 mg/dL 16 (L)   Protein Random Urine      g/L 0.23   Protein Total Urine g/gr Creatinine      0.00 - 0.20 g/g Cr 0.11   Creatinine Urine      mg/dL 212   Neutrophil Cytoplasmic Antibody      <1:10 <1:10   Neutrophil Cytoplasmic Antibody Pattern       The ANCA IFA is <1:10.  No further testing will be performed.   CD19 B Cells      6 - 27 % <1 (L)   Absolute CD19      107 - 698 cells/uL <1 (L)   ALT      0 - 50 U/L 27   AST      0 - 45 U/L 19   Complement C4      13 - 39 mg/dL 28   Complement C3      81 - 157 mg/dL 138   CRP Inflammation      0.0 - 8.0 mg/L 8.9 (H)   DNA-ds      <10.0 IU/mL 90.0 (H)   Sed Rate      0 - 30 mm/hr 25     Component      Latest Ref Rng & Units 4/29/2022   WBC      4.0 - 11.0 10e3/uL 7.1   RBC Count      3.80 - 5.20 10e6/uL 4.62   Hemoglobin      11.7 - 15.7 g/dL 12.0   Hematocrit      35.0 - 47.0 % 38.3   MCV      78 - 100 fL 83   MCH      26.5 - 33.0 pg 26.0 (L)   MCHC      31.5 - 36.5 g/dL 31.3 (L)   RDW      10.0 - 15.0 % 14.3   Platelet Count      150 - 450 10e3/uL 213   % Neutrophils      % 85   % Lymphocytes      % 6   % Monocytes      % 7   % Eosinophils      % 1   %  Basophils      % 1   % Immature Granulocytes      % 0   NRBCs per 100 WBC      <1 /100 0   Absolute Neutrophils      1.6 - 8.3 10e3/uL 6.1   Absolute Lymphocytes      0.8 - 5.3 10e3/uL 0.4 (L)   Absolute Monocytes      0.0 - 1.3 10e3/uL 0.5   Absolute Eosinophils      0.0 - 0.7 10e3/uL 0.0   Absolute Basophils      0.0 - 0.2 10e3/uL 0.1   Absolute Immature Granulocytes      <=0.4 10e3/uL 0.0   Absolute NRBCs      10e3/uL 0.0   Color Urine      Colorless, Straw, Light Yellow, Yellow Yellow   Appearance Urine      Clear Clear   Glucose Urine      Negative mg/dL Negative   Bilirubin Urine      Negative Negative   Ketones Urine      Negative mg/dL Negative   Specific Gravity Urine      1.003 - 1.035 1.015   Blood Urine      Negative Negative   pH Urine      5.0 - 7.0 5.0   Protein Albumin Urine      Negative mg/dL Negative   Urobilinogen mg/dL      Normal, 2.0 mg/dL Normal   Nitrite Urine      Negative Negative   Leukocyte Esterase Urine      Negative Negative   Bacteria Urine      None Seen /HPF Few (A)   Mucus Urine      None Seen /LPF Present (A)   RBC Urine      <=2 /HPF 1   WBC Urine      <=5 /HPF 1   Squamous Epithelial /HPF Urine      <=1 /HPF <1   Protein Random Urine      g/L 0.12   Protein Total Urine g/gr Creatinine      0.00 - 0.20 g/g Cr 0.18   Creatinine Urine      mg/dL 66   Creatinine      0.52 - 1.04 mg/dL 0.68   GFR Estimate      >60 mL/min/1.73m2 >90   AST      0 - 45 U/L 16   ALT      0 - 50 U/L 32   Albumin      3.4 - 5.0 g/dL 3.7   CRP Inflammation      0.0 - 8.0 mg/L 7.0   Sed Rate      0 - 30 mm/hr 19       Physical Exam:  BP (!) 149/84   Pulse 78   Wt 147.9 kg (326 lb)   SpO2 96%   BMI 52.62 kg/m       Constitutional: Pleasant, NAD  Eyes: EOMI, sclera anicteric, conj not injected.  MS: No synovitis or joint tenderness  Skin: No skin rash  CV: Regular rate  Pulm: Normal respiratory effort  Neuro: CN grossly intact without focal deficit  Psych: nl affect    Assessment/Plan:  Taina Singh is  a 57 year old female who is here for follow up of SLE and lupus nephritis.    Systemic lupus erythematous  History of lupus nephritis  - Presented in 12/2017 for 2nd opinion of m/o her SLE. She was diagnosed with SLE at age 27. Her lupus has been marked by +KARLIE (highest titer 1:640, speckled and nucleolar patterns), +anti-DNA, low C3/C4, arthritis, malar rash, serositis (pleuritic CP), lupus nephritis, hemolytic anemia, enteritis supported by +SSA/SSB Ab, +RF, high ESR/CRP. She had neg anti-RNP, anti-Sm, acL IgM/G/A, LAC, cryo and anti-CCP. She was treated with prednisone and HCQ at the time of Dx. Can't remember how long she was on HCQ and why HCQ was stopped, but it probably was stopped because of stable disease, no report on HCQ toxicity. Reportedly her SLE was mild all these years.  - Her lupus flared in 7/2017 with no triggers when she presented with arthritis, HCQ was resumed, arthritis resolved. Mild pulm HTN on 2D-Echo 8/2017 but neg R cardiac cath and VQ scan in 3/2018, also neg 2D-Echo.  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 Previously failed AZA, benlysta. Patient received 1st dose cyclophosphamide on 6/15/19, had 6 doses.  Initiated Rituxin 1/22/2020 as failed cytoxan.  Recommend rituxin infusion every 4-6 months, closer to 4 due to worsening of symptoms as infusions near at 6 month interval. Have decreased dose to 600 mg and it is well tolerated. Recommend continuation of rituximab as it is the only med that has helped with LN. Also tx options are limited (also necrotizing lesions on renal biopsy, can not rule out ANCA neg vasculitis overlap and rituximab is FDA approved for GPA/MPA).   - Current regimen: Prednisone 5 mg every day, MMF 1000 mg BID (tapered from 3 gr every day) daily, and  mg a day, and Rituximab (last infusion 12/7/21).    Sjogren's with sicca  - Secondary, +SSA/SSB Ab and h/o MALT lymphoma at age 44 in remission. Uses blink eyedrops and salagen. No lip biopsy was done to  confirm Dx of Sjogren's.       Recommendations:  - Rituximab in May 2022. Continue at interval q4-6 months depending on severity symptoms.  - Continue Cellcept 1000 mg BID and  mg BID  - Continue prednisone 5 mg every day  - Annual eye exam for HCQ monitoring  - Labs [id number]  - PCP prophylaxis on atovaquone 10 mL (1500 mg). Patient did not tolerate inhaled pentamidine. Avoiding TMP-SMX given sulfa reaction and potential increase risk of SLE flare. Hesitant to use dapsone given risk of hemolytic anemia.  - Tramadol prn for pain  - Ambien prn for insomnia    Return in 6 months (in person)    Seen and staffed with Dr. Lopez Edwards MD  Maternal Fetal Medicine Fellow  4/29/2022     Attending Note: I saw and evaluated the patient with Dr. Edwards. I agree with the assessment and plan. Will move up rituximab 500 mg x one dose from June to May 2022 as benefit does not last 6 months. Taina got Evusheld already.    Killian Marroquin MD

## 2022-05-01 RX ORDER — METHYLPREDNISOLONE SODIUM SUCCINATE 125 MG/2ML
125 INJECTION, POWDER, LYOPHILIZED, FOR SOLUTION INTRAMUSCULAR; INTRAVENOUS ONCE
Status: CANCELLED | OUTPATIENT
Start: 2022-05-02

## 2022-05-01 RX ORDER — DIPHENHYDRAMINE HYDROCHLORIDE 50 MG/ML
50 INJECTION INTRAMUSCULAR; INTRAVENOUS
Status: CANCELLED
Start: 2022-05-02

## 2022-05-01 RX ORDER — DIPHENHYDRAMINE HCL 25 MG
50 CAPSULE ORAL ONCE
Status: CANCELLED
Start: 2022-05-02

## 2022-05-01 RX ORDER — NALOXONE HYDROCHLORIDE 0.4 MG/ML
0.2 INJECTION, SOLUTION INTRAMUSCULAR; INTRAVENOUS; SUBCUTANEOUS
Status: CANCELLED | OUTPATIENT
Start: 2022-05-02

## 2022-05-01 RX ORDER — ALBUTEROL SULFATE 0.83 MG/ML
2.5 SOLUTION RESPIRATORY (INHALATION)
Status: CANCELLED | OUTPATIENT
Start: 2022-05-02

## 2022-05-01 RX ORDER — ACETAMINOPHEN 325 MG/1
650 TABLET ORAL ONCE
Status: CANCELLED
Start: 2022-05-02

## 2022-05-01 RX ORDER — MEPERIDINE HYDROCHLORIDE 25 MG/ML
25 INJECTION INTRAMUSCULAR; INTRAVENOUS; SUBCUTANEOUS EVERY 30 MIN PRN
Status: CANCELLED | OUTPATIENT
Start: 2022-05-02

## 2022-05-01 RX ORDER — METHYLPREDNISOLONE SODIUM SUCCINATE 125 MG/2ML
125 INJECTION, POWDER, LYOPHILIZED, FOR SOLUTION INTRAMUSCULAR; INTRAVENOUS
Status: CANCELLED
Start: 2022-05-02

## 2022-05-01 RX ORDER — ALBUTEROL SULFATE 90 UG/1
1-2 AEROSOL, METERED RESPIRATORY (INHALATION)
Status: CANCELLED
Start: 2022-05-02

## 2022-05-01 RX ORDER — EPINEPHRINE 1 MG/ML
0.3 INJECTION, SOLUTION, CONCENTRATE INTRAVENOUS EVERY 5 MIN PRN
Status: CANCELLED | OUTPATIENT
Start: 2022-05-02

## 2022-05-01 RX ORDER — HEPARIN SODIUM,PORCINE 10 UNIT/ML
5 VIAL (ML) INTRAVENOUS
Status: CANCELLED | OUTPATIENT
Start: 2022-05-02

## 2022-05-01 RX ORDER — HEPARIN SODIUM (PORCINE) LOCK FLUSH IV SOLN 100 UNIT/ML 100 UNIT/ML
5 SOLUTION INTRAVENOUS
Status: CANCELLED | OUTPATIENT
Start: 2022-05-02

## 2022-05-02 ENCOUNTER — TELEPHONE (OUTPATIENT)
Dept: ONCOLOGY | Facility: CLINIC | Age: 57
End: 2022-05-02
Payer: COMMERCIAL

## 2022-05-02 LAB
C3 SERPL-MCNC: 129 MG/DL (ref 81–157)
C4 SERPL-MCNC: 27 MG/DL (ref 13–39)

## 2022-05-02 NOTE — TELEPHONE ENCOUNTER
5/2 Spoke to patient and she is waiting for insurance to get back to her about the copay then she will call us back to schedule her Rituximab infusion  (dn)

## 2022-05-03 LAB
ANCA AB PATTERN SER IF-IMP: NORMAL
C-ANCA TITR SER IF: NORMAL {TITER}
DSDNA AB SER-ACNC: 54 IU/ML
MYELOPEROXIDASE AB SER IA-ACNC: <0.3 U/ML
MYELOPEROXIDASE AB SER IA-ACNC: NEGATIVE
PROTEINASE3 AB SER IA-ACNC: <1 U/ML
PROTEINASE3 AB SER IA-ACNC: NEGATIVE

## 2022-05-08 ENCOUNTER — HEALTH MAINTENANCE LETTER (OUTPATIENT)
Age: 57
End: 2022-05-08

## 2022-05-09 ENCOUNTER — TELEPHONE (OUTPATIENT)
Dept: RHEUMATOLOGY | Facility: CLINIC | Age: 57
End: 2022-05-09
Payer: COMMERCIAL

## 2022-05-09 NOTE — TELEPHONE ENCOUNTER
Return call placed to patient to discuss Covid immunization. Explained that since she received Evusheld in March 2022 and that protections against Covid is expected to last 6 months, that she will not be due for another Covid immunization until 9/2/22. Patient verbalized understanding and agrees to plan.   Myra Mccray RN  [facility]

## 2022-05-09 NOTE — TELEPHONE ENCOUNTER
Patient is wondering how long after booster injection to wait until receiving Rituxan infusion. Please c/b

## 2022-05-17 ENCOUNTER — MYC MEDICAL ADVICE (OUTPATIENT)
Dept: RHEUMATOLOGY | Facility: CLINIC | Age: 57
End: 2022-05-17
Payer: COMMERCIAL

## 2022-05-19 DIAGNOSIS — M32.14 LUPUS NEPHRITIS, ISN/RPS CLASS IV (H): ICD-10-CM

## 2022-05-23 DIAGNOSIS — N18.2 CHRONIC KIDNEY DISEASE, STAGE 2 (MILD): ICD-10-CM

## 2022-05-23 DIAGNOSIS — M32.14 LUPUS NEPHRITIS, ISN/RPS CLASS IV (H): Primary | ICD-10-CM

## 2022-05-23 RX ORDER — MYCOPHENOLATE MOFETIL 500 MG/1
1000 TABLET ORAL 2 TIMES DAILY
Qty: 360 TABLET | Refills: 0 | Status: SHIPPED | OUTPATIENT
Start: 2022-05-23 | End: 2022-08-11

## 2022-05-23 NOTE — TELEPHONE ENCOUNTER
mycophenolate (GENERIC EQUIVALENT) 500 MG tablet      Last Written Prescription Date:  3/7/2022  Last Fill Quantity: 360,   # refills: 0  Last Office Visit: 4/29/2022  Future Office visit:  10/28/2022    CBC RESULTS: Recent Labs   Lab Test 04/29/22  1325   WBC 7.1   RBC 4.62   HGB 12.0   HCT 38.3   MCV 83   MCH 26.0*   MCHC 31.3*   RDW 14.3          Creatinine   Date Value Ref Range Status   04/29/2022 0.68 0.52 - 1.04 mg/dL Final   06/28/2021 0.64 0.52 - 1.04 mg/dL Final   ]    Liver Function Studies -   Recent Labs   Lab Test 04/29/22  1324   ALBUMIN 3.7   AST 16   ALT 32       Routing refill request to provider for review/approval because:  DMARD medication.  - labs current    - plan last visit 4/29/2022 RTC 6 months labs q3 month  - future visit 10/28/2022

## 2022-06-03 ENCOUNTER — INFUSION THERAPY VISIT (OUTPATIENT)
Dept: INFUSION THERAPY | Facility: CLINIC | Age: 57
End: 2022-06-03
Payer: COMMERCIAL

## 2022-06-03 VITALS
OXYGEN SATURATION: 94 % | WEIGHT: 293 LBS | SYSTOLIC BLOOD PRESSURE: 126 MMHG | TEMPERATURE: 98.3 F | HEART RATE: 71 BPM | DIASTOLIC BLOOD PRESSURE: 77 MMHG | BODY MASS INDEX: 52.38 KG/M2

## 2022-06-03 DIAGNOSIS — M32.9 SYSTEMIC LUPUS ERYTHEMATOSUS, UNSPECIFIED SLE TYPE, UNSPECIFIED ORGAN INVOLVEMENT STATUS (H): ICD-10-CM

## 2022-06-03 DIAGNOSIS — M32.14 LUPUS NEPHRITIS, ISN/RPS CLASS IV (H): ICD-10-CM

## 2022-06-03 DIAGNOSIS — M06.09 RHEUMATOID ARTHRITIS, SERONEGATIVE, MULTIPLE SITES (H): Primary | ICD-10-CM

## 2022-06-03 DIAGNOSIS — I77.82 ANCA-NEGATIVE VASCULITIS (H): ICD-10-CM

## 2022-06-03 PROCEDURE — 96415 CHEMO IV INFUSION ADDL HR: CPT | Performed by: NURSE PRACTITIONER

## 2022-06-03 PROCEDURE — 96375 TX/PRO/DX INJ NEW DRUG ADDON: CPT | Performed by: NURSE PRACTITIONER

## 2022-06-03 PROCEDURE — 96413 CHEMO IV INFUSION 1 HR: CPT | Performed by: NURSE PRACTITIONER

## 2022-06-03 PROCEDURE — 99207 PR NO CHARGE LOS: CPT

## 2022-06-03 RX ORDER — ALBUTEROL SULFATE 90 UG/1
1-2 AEROSOL, METERED RESPIRATORY (INHALATION)
Status: CANCELLED
Start: 2022-06-03

## 2022-06-03 RX ORDER — HEPARIN SODIUM (PORCINE) LOCK FLUSH IV SOLN 100 UNIT/ML 100 UNIT/ML
5 SOLUTION INTRAVENOUS
Status: CANCELLED | OUTPATIENT
Start: 2022-06-03

## 2022-06-03 RX ORDER — METHYLPREDNISOLONE SODIUM SUCCINATE 125 MG/2ML
125 INJECTION, POWDER, LYOPHILIZED, FOR SOLUTION INTRAMUSCULAR; INTRAVENOUS
Status: CANCELLED
Start: 2022-06-03

## 2022-06-03 RX ORDER — ACETAMINOPHEN 325 MG/1
650 TABLET ORAL ONCE
Status: CANCELLED
Start: 2022-06-03

## 2022-06-03 RX ORDER — EPINEPHRINE 1 MG/ML
0.3 INJECTION, SOLUTION INTRAMUSCULAR; SUBCUTANEOUS EVERY 5 MIN PRN
Status: CANCELLED | OUTPATIENT
Start: 2022-06-03

## 2022-06-03 RX ORDER — DIPHENHYDRAMINE HCL 25 MG
50 CAPSULE ORAL ONCE
Status: COMPLETED | OUTPATIENT
Start: 2022-06-03 | End: 2022-06-03

## 2022-06-03 RX ORDER — DIPHENHYDRAMINE HYDROCHLORIDE 50 MG/ML
50 INJECTION INTRAMUSCULAR; INTRAVENOUS
Status: CANCELLED
Start: 2022-06-03

## 2022-06-03 RX ORDER — METHYLPREDNISOLONE SODIUM SUCCINATE 125 MG/2ML
125 INJECTION, POWDER, LYOPHILIZED, FOR SOLUTION INTRAMUSCULAR; INTRAVENOUS ONCE
Status: CANCELLED | OUTPATIENT
Start: 2022-06-03

## 2022-06-03 RX ORDER — NALOXONE HYDROCHLORIDE 0.4 MG/ML
0.2 INJECTION, SOLUTION INTRAMUSCULAR; INTRAVENOUS; SUBCUTANEOUS
Status: CANCELLED | OUTPATIENT
Start: 2022-06-03

## 2022-06-03 RX ORDER — ALBUTEROL SULFATE 0.83 MG/ML
2.5 SOLUTION RESPIRATORY (INHALATION)
Status: CANCELLED | OUTPATIENT
Start: 2022-06-03

## 2022-06-03 RX ORDER — MEPERIDINE HYDROCHLORIDE 25 MG/ML
25 INJECTION INTRAMUSCULAR; INTRAVENOUS; SUBCUTANEOUS EVERY 30 MIN PRN
Status: CANCELLED | OUTPATIENT
Start: 2022-06-03

## 2022-06-03 RX ORDER — HEPARIN SODIUM,PORCINE 10 UNIT/ML
5 VIAL (ML) INTRAVENOUS
Status: CANCELLED | OUTPATIENT
Start: 2022-06-03

## 2022-06-03 RX ORDER — ACETAMINOPHEN 325 MG/1
650 TABLET ORAL ONCE
Status: COMPLETED | OUTPATIENT
Start: 2022-06-03 | End: 2022-06-03

## 2022-06-03 RX ORDER — DIPHENHYDRAMINE HCL 25 MG
50 CAPSULE ORAL ONCE
Status: CANCELLED
Start: 2022-06-03

## 2022-06-03 RX ORDER — METHYLPREDNISOLONE SODIUM SUCCINATE 125 MG/2ML
125 INJECTION, POWDER, LYOPHILIZED, FOR SOLUTION INTRAMUSCULAR; INTRAVENOUS ONCE
Status: COMPLETED | OUTPATIENT
Start: 2022-06-03 | End: 2022-06-03

## 2022-06-03 RX ADMIN — ACETAMINOPHEN 650 MG: 325 TABLET ORAL at 08:14

## 2022-06-03 RX ADMIN — Medication 250 ML: at 08:27

## 2022-06-03 RX ADMIN — METHYLPREDNISOLONE SODIUM SUCCINATE 125 MG: 125 INJECTION INTRAMUSCULAR; INTRAVENOUS at 08:28

## 2022-06-03 RX ADMIN — Medication 50 MG: at 08:13

## 2022-06-03 ASSESSMENT — PAIN SCALES - GENERAL: PAINLEVEL: MILD PAIN (2)

## 2022-06-03 NOTE — PROGRESS NOTES
Infusion Nursing Note:  Taina Singh presents today for Rapid Rituxan.    Patient seen by provider today: No   present during visit today: Not Applicable.    Note: Pt c/o generalized body aches, denies other concerns, see flow sheet for assessment.  Rituxan rate:  200mls/hr x 30mins             400mls/hr x 1hour      Intravenous Access:  Peripheral IV placed.    Treatment Conditions:  Biological Infusion Checklist:  ~~~ NOTE: If the patient answers yes to any of the questions below, hold the infusion and contact ordering provider or on-call provider.    1. Have you recently had an elevated temperature, fever, chills, productive cough, coughing for 3 weeks or longer or hemoptysis, abnormal vital signs, night sweats,  chest pain or have you noticed a decrease in your appetite, unexplained weight loss or fatigue? No  2. Do you have any open wounds or new incisions? No  3. Do you have any recent or upcoming hospitalizations, surgeries or dental procedures? No  4. Do you currently have or recently have had any signs of illness or infection or are you on any antibiotics? No  5. Have you had any new, sudden or worsening abdominal pain? No  6. Have you or anyone in your household received a live vaccination in the past 4 weeks? Please note:  No live vaccines while on biologic/chemotherapy until 6 months after the last treatment.  Patient can receive the flu vaccine (shot only) and the pneumovax.  It is optimal for the patient to get these vaccines mid cycle, but they can be given at any time as long as it is not on the day of the infusion. No  7. Have you recently been diagnosed with any new nervous system diseases (ie. Multiple sclerosis, Guillain [place], seizures, neurological changes) or cancer diagnosis? No  8. Are you on any form of radiation or chemotherapy? No  9. Are you pregnant or breast feeding or do you have plans of pregnancy in the future? No  10. Have you been having any signs of worsening  depression or suicidal ideations?  (benlysta only) No  11. Have there been any other new onset medical symptoms? No        Post Infusion Assessment:  Patient tolerated infusion without incident.  Blood return noted pre and post infusion.  Site patent and intact, free from redness, edema or discomfort.  No evidence of extravasations.  Access discontinued per protocol.       Discharge Plan:   Patient discharged in stable condition accompanied by: self.  Departure Mode: Ambulatory.  Pt will RTC in 4-6 months for Rituxan, per  Burn note.      Dann Mccord RN

## 2022-06-14 DIAGNOSIS — L93.0 LUPUS ERYTHEMATOSUS, UNSPECIFIED FORM: ICD-10-CM

## 2022-06-16 ENCOUNTER — TELEPHONE (OUTPATIENT)
Dept: RHEUMATOLOGY | Facility: CLINIC | Age: 57
End: 2022-06-16
Payer: COMMERCIAL

## 2022-06-16 VITALS — BODY MASS INDEX: 52.38 KG/M2 | WEIGHT: 293 LBS

## 2022-06-16 DIAGNOSIS — L93.0 LUPUS ERYTHEMATOSUS, UNSPECIFIED FORM: ICD-10-CM

## 2022-06-16 RX ORDER — HYDROXYCHLOROQUINE SULFATE 200 MG/1
TABLET, FILM COATED ORAL
Qty: 180 TABLET | Refills: 3 | OUTPATIENT
Start: 2022-06-16

## 2022-06-16 RX ORDER — HYDROXYCHLOROQUINE SULFATE 200 MG/1
200 TABLET, FILM COATED ORAL 2 TIMES DAILY
Qty: 180 TABLET | Refills: 3 | Status: SHIPPED | OUTPATIENT
Start: 2022-06-16 | End: 2022-06-22

## 2022-06-16 NOTE — TELEPHONE ENCOUNTER
Plaquenil Eye Exam    Date of last eye exam specifically commenting on HCQ toxicity: 10/14/2021  Clinic: [facility] Phone Number: [phone]  Ophthalmologist: Lm Jimenez  Toxicity: NO  Records scanned to chart: Yes    Medication: Plaquenil  Last Office Visit:  4/29/2022  Next Office Visit:  10/28/2022  Last Written Prescription Date:  3/7/2022  Last Fill Quantity: 180,   # refills: 0    Request sent to provider for review and signature if appropriate.    Jayleen Gan CMA   6/16/2022 10:17 AM

## 2022-06-16 NOTE — TELEPHONE ENCOUNTER
hydroxychloroquine (PLAQUENIL) 200 MG tablet 180 tablet 3 6/16/2022     Sent today 6/16/22 to pharmacy.

## 2022-06-22 RX ORDER — HYDROXYCHLOROQUINE SULFATE 200 MG/1
200 TABLET, FILM COATED ORAL 2 TIMES DAILY
Qty: 180 TABLET | Refills: 3 | Status: SHIPPED | OUTPATIENT
Start: 2022-06-22 | End: 2023-06-21

## 2022-06-22 NOTE — TELEPHONE ENCOUNTER
Prescription needs to go to [facility] or it will not be filled by insurance.    Prescription has been sent to [facility].    Sophie Alvarado RN  Rheumatology Clinic

## 2022-08-01 NOTE — TELEPHONE ENCOUNTER
[facility] Call Center    Phone Message    May a detailed message be left on voicemail: yes    Reason for Call: Other: Pt called in today and stated that thre are some forms being sent over from her benefit people and she just wanted to discuss that a little further with provider or Sophie. Please follow up when available. Thank you     Action Taken: Message routed to:  [facility] ([facility] Rheum     No

## 2022-08-11 DIAGNOSIS — M32.14 LUPUS NEPHRITIS, ISN/RPS CLASS IV (H): ICD-10-CM

## 2022-08-11 RX ORDER — MYCOPHENOLATE MOFETIL 500 MG/1
1000 TABLET ORAL 2 TIMES DAILY
Qty: 360 TABLET | Refills: 1 | Status: SHIPPED | OUTPATIENT
Start: 2022-08-11 | End: 2023-01-26

## 2022-08-11 NOTE — TELEPHONE ENCOUNTER
Medication/Dose: mycophenolate (GENERIC EQUIVALENT) 500 MG tablet    Last Written : 5/23/22  Last Quantity: 360, # refills: 0  Last Office Visit :  4/29/22  Pending appointment:     Oct 28, 2022  2:30 PM  (Arrive by 2:15 PM)  Return Visit with Killian Marroquin MD  [facility] [place] ([facility] ) [address] [place] [id number] [phone]        WBC   Date Value Ref Range Status   06/28/2021 6.0 4.0 - 11.0 10e9/L Final     WBC Count   Date Value Ref Range Status   04/29/2022 7.1 4.0 - 11.0 10e3/uL Final     RBC Count   Date Value Ref Range Status   04/29/2022 4.62 3.80 - 5.20 10e6/uL Final   06/28/2021 4.38 3.8 - 5.2 10e12/L Final     Hemoglobin   Date Value Ref Range Status   04/29/2022 12.0 11.7 - 15.7 g/dL Final   06/28/2021 11.6 (L) 11.7 - 15.7 g/dL Final     Hematocrit   Date Value Ref Range Status   04/29/2022 38.3 35.0 - 47.0 % Final   06/28/2021 36.4 35.0 - 47.0 % Final     MCV   Date Value Ref Range Status   04/29/2022 83 78 - 100 fL Final   06/28/2021 83 78 - 100 fl Final     MCH   Date Value Ref Range Status   04/29/2022 26.0 (L) 26.5 - 33.0 pg Final   06/28/2021 26.5 26.5 - 33.0 pg Final     MCHC   Date Value Ref Range Status   04/29/2022 31.3 (L) 31.5 - 36.5 g/dL Final   06/28/2021 31.9 31.5 - 36.5 g/dL Final     RDW   Date Value Ref Range Status   04/29/2022 14.3 10.0 - 15.0 % Final   06/28/2021 14.1 10.0 - 15.0 % Final     Platelet Count   Date Value Ref Range Status   04/29/2022 213 150 - 450 10e3/uL Final   06/28/2021 207 150 - 450 10e9/L Final     AST   Date Value Ref Range Status   04/29/2022 16 0 - 45 U/L Final   06/28/2021 18 0 - 45 U/L Final     ALT   Date Value Ref Range Status   04/29/2022 32 0 - 50 U/L Final   06/28/2021 28 0 - 50 U/L Final     Creatinine   Date Value Ref Range Status   04/29/2022 0.68 0.52 - 1.04 mg/dL Final   06/28/2021 0.64 0.52 - 1.04 mg/dL Final     Albumin   Date Value Ref Range Status    04/29/2022 3.7 3.4 - 5.0 g/dL Final   06/28/2021 3.6 3.4 - 5.0 g/dL Final     CRP Cardiac Risk   Date Value Ref Range Status   12/29/2017 2.9 mg/L Final     Comment:     Reference Values:  Low Risk:           <1.0 mg/L  Average Risk:       1.0-3.0 mg/L  High Risk:          >3.0 mg/L  Acute Inflammation: >8.0 mg/L       CRP Inflammation   Date Value Ref Range Status   04/29/2022 7.0 0.0 - 8.0 mg/L Final   06/28/2021 13.4 (H) 0.0 - 8.0 mg/L Final     No components found for: ESR      Prescription set up and routed to provider per Refill Protocol.    TANGELA TomasN, RN

## 2022-08-15 DIAGNOSIS — M32.14 LUPUS NEPHRITIS, ISN/RPS CLASS IV (H): ICD-10-CM

## 2022-08-16 RX ORDER — MYCOPHENOLATE MOFETIL 500 MG/1
TABLET ORAL
Qty: 360 TABLET | Refills: 3 | OUTPATIENT
Start: 2022-08-16

## 2022-08-16 NOTE — TELEPHONE ENCOUNTER
Medication/Dose: mycophenolate (GENERIC EQUIVALENT) 500 MG tablet    Last Written : 8/11/22  Last Quantity: 360, # refills: 1  Last Office Visit :  4/29/22  Pending appointment:    Oct 28, 2022  2:30 PM  (Arrive by 2:15 PM)  Return Visit with Killian Marroquin MD  [facility] [place] ([facility] ) [address] [place] [id number] [phone]        WBC   Date Value Ref Range Status   06/28/2021 6.0 4.0 - 11.0 10e9/L Final     WBC Count   Date Value Ref Range Status   04/29/2022 7.1 4.0 - 11.0 10e3/uL Final     RBC Count   Date Value Ref Range Status   04/29/2022 4.62 3.80 - 5.20 10e6/uL Final   06/28/2021 4.38 3.8 - 5.2 10e12/L Final     Hemoglobin   Date Value Ref Range Status   04/29/2022 12.0 11.7 - 15.7 g/dL Final   06/28/2021 11.6 (L) 11.7 - 15.7 g/dL Final     Hematocrit   Date Value Ref Range Status   04/29/2022 38.3 35.0 - 47.0 % Final   06/28/2021 36.4 35.0 - 47.0 % Final     MCV   Date Value Ref Range Status   04/29/2022 83 78 - 100 fL Final   06/28/2021 83 78 - 100 fl Final     MCH   Date Value Ref Range Status   04/29/2022 26.0 (L) 26.5 - 33.0 pg Final   06/28/2021 26.5 26.5 - 33.0 pg Final     MCHC   Date Value Ref Range Status   04/29/2022 31.3 (L) 31.5 - 36.5 g/dL Final   06/28/2021 31.9 31.5 - 36.5 g/dL Final     RDW   Date Value Ref Range Status   04/29/2022 14.3 10.0 - 15.0 % Final   06/28/2021 14.1 10.0 - 15.0 % Final     Platelet Count   Date Value Ref Range Status   04/29/2022 213 150 - 450 10e3/uL Final   06/28/2021 207 150 - 450 10e9/L Final     AST   Date Value Ref Range Status   04/29/2022 16 0 - 45 U/L Final   06/28/2021 18 0 - 45 U/L Final     ALT   Date Value Ref Range Status   04/29/2022 32 0 - 50 U/L Final   06/28/2021 28 0 - 50 U/L Final     Creatinine   Date Value Ref Range Status   04/29/2022 0.68 0.52 - 1.04 mg/dL Final   06/28/2021 0.64 0.52 - 1.04 mg/dL Final     Albumin   Date Value Ref Range Status    04/29/2022 3.7 3.4 - 5.0 g/dL Final   06/28/2021 3.6 3.4 - 5.0 g/dL Final     CRP Cardiac Risk   Date Value Ref Range Status   12/29/2017 2.9 mg/L Final     Comment:     Reference Values:  Low Risk:           <1.0 mg/L  Average Risk:       1.0-3.0 mg/L  High Risk:          >3.0 mg/L  Acute Inflammation: >8.0 mg/L       CRP Inflammation   Date Value Ref Range Status   04/29/2022 7.0 0.0 - 8.0 mg/L Final   06/28/2021 13.4 (H) 0.0 - 8.0 mg/L Final     No components found for: ESR      Rx denied as it was filled on 8/11/22, sent to [facility].    Ankush Encinas, BSN, RN      normal (ped)...

## 2022-08-23 DIAGNOSIS — M32.9 SYSTEMIC LUPUS ERYTHEMATOSUS, UNSPECIFIED SLE TYPE, UNSPECIFIED ORGAN INVOLVEMENT STATUS (H): ICD-10-CM

## 2022-08-24 NOTE — TELEPHONE ENCOUNTER
Medication/Dose: traMADol (ULTRAM) 50 MG tablet    Last Written : 12/15/21  Last Quantity: 60, # refills: 3  Last Office Visit :  4/29/22  Pending appointment:     Oct 28, 2022  2:30 PM  (Arrive by 2:15 PM)  Return Visit with Killian Marroquin MD  [facility] [place] ([facility] ) [address] [place] [id number] [phone]       Routed to Rheumatology team to complete the  check, and then they should route to Dr Marroquin for completion, per protocol.    Ankush Encinas, BSN, RN

## 2022-08-28 RX ORDER — TRAMADOL HYDROCHLORIDE 50 MG/1
50 TABLET ORAL EVERY 12 HOURS PRN
Qty: 60 TABLET | Refills: 3 | Status: SHIPPED | OUTPATIENT
Start: 2022-08-28 | End: 2023-03-20

## 2022-09-25 NOTE — LETTER
Patient:  Taina Singh  :   1965  MRN:     [id number]        Ms.Kelly Singh  [address] [facility] [address]        2019    Dear ,    We are writing to inform you of your test results. No significant change in labs.      Results for orders placed or performed in visit on 19   Protein  random urine with Creat Ratio   Result Value Ref Range    Protein Random Urine 1.40 g/L    Protein Total Urine g/gr Creatinine 2.92 (H) 0 - 0.2 g/g Cr   Erythrocyte sedimentation rate auto   Result Value Ref Range    Sed Rate 32 (H) 0 - 30 mm/h   DNA double stranded antibodies   Result Value Ref Range    DNA-ds >379 (H) <10 IU/mL   CRP inflammation   Result Value Ref Range    CRP Inflammation 45.0 (H) 0.0 - 8.0 mg/L   Complement C3   Result Value Ref Range    Complement C3 53 (L) 76 - 169 mg/dL   Complement C4   Result Value Ref Range    Complement C4 5 (L) 15 - 50 mg/dL   CBC with platelets differential   Result Value Ref Range    WBC 9.5 4.0 - 11.0 10e9/L    RBC Count 4.07 3.8 - 5.2 10e12/L    Hemoglobin 11.2 (L) 11.7 - 15.7 g/dL    Hematocrit 36.1 35.0 - 47.0 %    MCV 89 78 - 100 fl    MCH 27.5 26.5 - 33.0 pg    MCHC 31.0 (L) 31.5 - 36.5 g/dL    RDW 18.4 (H) 10.0 - 15.0 %    Platelet Count 198 150 - 450 10e9/L    % Neutrophils 90.8 %    % Lymphocytes 3.2 %    % Monocytes 5.3 %    % Eosinophils 0.4 %    % Basophils 0.3 %    Absolute Neutrophil 8.6 (H) 1.6 - 8.3 10e9/L    Absolute Lymphocytes 0.3 (L) 0.8 - 5.3 10e9/L    Absolute Monocytes 0.5 0.0 - 1.3 10e9/L    Absolute Eosinophils 0.0 0.0 - 0.7 10e9/L    Absolute Basophils 0.0 0.0 - 0.2 10e9/L    Diff Method Automated Method     Anisocytosis Slight     Teardrop Cells Slight     Elliptocytes Moderate     Platelet Estimate       Automated count confirmed.  Platelet morphology is normal.   AST   Result Value Ref Range    AST 21 0 - 45 U/L   ALT   Result Value Ref Range    ALT 36 0 - 50 U/L   Renal panel   Result Value Ref Range    Sodium 140  133 - 144 mmol/L    Potassium 4.1 3.4 - 5.3 mmol/L    Chloride 107 94 - 109 mmol/L    Carbon Dioxide 28 20 - 32 mmol/L    Anion Gap 5 3 - 14 mmol/L    Glucose 98 70 - 99 mg/dL    Urea Nitrogen 23 7 - 30 mg/dL    Creatinine 0.60 0.52 - 1.04 mg/dL    GFR Estimate >90 >60 mL/min/[1.73_m2]    GFR Estimate If Black >90 >60 mL/min/[1.73_m2]    Calcium 8.8 8.5 - 10.1 mg/dL    Phosphorus 3.9 2.5 - 4.5 mg/dL    Albumin 3.0 (L) 3.4 - 5.0 g/dL   *UA reflex to Microscopic and Culture ([facility] ([facility] [place])   Result Value Ref Range    Color Urine Yellow     Appearance Urine Clear     Glucose Urine Negative NEG^Negative mg/dL    Bilirubin Urine Negative NEG^Negative    Ketones Urine Negative NEG^Negative mg/dL    Specific Gravity Urine 1.015 1.003 - 1.035    Blood Urine Moderate (A) NEG^Negative    pH Urine 7.0 5.0 - 7.0 pH    Protein Albumin Urine >=300 (A) NEG^Negative mg/dL    Urobilinogen Urine 1.0 0.2 - 1.0 EU/dL    Nitrite Urine Negative NEG^Negative    Leukocyte Esterase Urine Moderate (A) NEG^Negative    Source Midstream Urine    Creatinine urine calculation only   Result Value Ref Range    Creatinine Urine 48 mg/dL   Urine Microscopic   Result Value Ref Range    WBC Urine 10-25 (A) OTO5^0 - 5 /HPF    RBC Urine 2-5 (A) OTO2^O - 2 /HPF    Squamous Epithelial /LPF Urine Few FEW^Few /LPF    Bacteria Urine Few (A) NEG^Negative /HPF   Urine Culture Aerobic Bacterial   Result Value Ref Range    Specimen Description Midstream Urine     Culture Micro No growth        Killian Marroquin MD         Yes Yes Yes Yes Yes Yes Yes Yes Yes Yes

## 2022-09-30 ENCOUNTER — TELEPHONE (OUTPATIENT)
Dept: MULTI SPECIALTY CLINIC | Facility: CLINIC | Age: 57
End: 2022-09-30

## 2022-09-30 NOTE — TELEPHONE ENCOUNTER
Spoke with pt and updated registration information for up coming appt on 10/6. Pt was also reminded of date and time of appt and that she also had a lab appt that day before her appt.

## 2022-10-03 ENCOUNTER — TELEPHONE (OUTPATIENT)
Dept: RHEUMATOLOGY | Facility: CLINIC | Age: 57
End: 2022-10-03

## 2022-10-03 NOTE — TELEPHONE ENCOUNTER
Follow-up call to patient to confirm Shanel appointment on 10/28. Confirmed she received the initial dose approx 6 months ago.  Discussed once she arrives, a nurse will enter the order, the staff will get the injections from pharmacy, administer in both hips and then she must remain in clinic for 60 minutes, 10 minutes in the room.    Patient verbalized understanding, she is aware that she has an appointment with Dr. Marroquin following her injection.    TANGELA HernadezN, RN  RN Care Coordinator Rheumatology

## 2022-10-03 NOTE — TELEPHONE ENCOUNTER
[facility] Call Center    Phone Message    May a detailed message be left on voicemail: yes     Reason for Call: Other: Shanel appointment for Friday, 10/28/22 @1pm.  Order and consent needed for Pt.    Pt also has an appt with Dr. Marroqiun same day at 2:30pm    Action Taken: Message routed to:  [facility] Adult Rheumatology

## 2022-10-06 ENCOUNTER — OFFICE VISIT (OUTPATIENT)
Dept: NEPHROLOGY | Facility: CLINIC | Age: 57
End: 2022-10-06
Attending: INTERNAL MEDICINE
Payer: COMMERCIAL

## 2022-10-06 ENCOUNTER — LAB (OUTPATIENT)
Dept: LAB | Facility: CLINIC | Age: 57
End: 2022-10-06
Payer: COMMERCIAL

## 2022-10-06 VITALS
SYSTOLIC BLOOD PRESSURE: 118 MMHG | TEMPERATURE: 98.3 F | OXYGEN SATURATION: 98 % | DIASTOLIC BLOOD PRESSURE: 77 MMHG | HEART RATE: 71 BPM | BODY MASS INDEX: 51.84 KG/M2 | WEIGHT: 293 LBS

## 2022-10-06 DIAGNOSIS — N18.2 CHRONIC KIDNEY DISEASE, STAGE 2 (MILD): ICD-10-CM

## 2022-10-06 DIAGNOSIS — M32.14 LUPUS NEPHRITIS, ISN/RPS CLASS IV (H): ICD-10-CM

## 2022-10-06 DIAGNOSIS — M32.14 LUPUS NEPHRITIS, ISN/RPS CLASS IV (H): Primary | ICD-10-CM

## 2022-10-06 LAB
ALBUMIN MFR UR ELPH: 9.2 MG/DL
ALBUMIN SERPL BCG-MCNC: 4 G/DL (ref 3.5–5.2)
ALBUMIN UR-MCNC: NEGATIVE MG/DL
ANION GAP SERPL CALCULATED.3IONS-SCNC: 11 MMOL/L (ref 7–15)
APPEARANCE UR: CLEAR
BACTERIA #/AREA URNS HPF: ABNORMAL /HPF
BILIRUB UR QL STRIP: NEGATIVE
BUN SERPL-MCNC: 23.6 MG/DL (ref 6–20)
CALCIUM SERPL-MCNC: 10 MG/DL (ref 8.6–10)
CHLORIDE SERPL-SCNC: 103 MMOL/L (ref 98–107)
COLOR UR AUTO: YELLOW
CREAT SERPL-MCNC: 0.63 MG/DL (ref 0.51–0.95)
CREAT UR-MCNC: 135 MG/DL
DEPRECATED HCO3 PLAS-SCNC: 26 MMOL/L (ref 22–29)
ERYTHROCYTE [DISTWIDTH] IN BLOOD BY AUTOMATED COUNT: 14.6 % (ref 10–15)
GFR SERPL CREATININE-BSD FRML MDRD: >90 ML/MIN/1.73M2
GLUCOSE SERPL-MCNC: 92 MG/DL (ref 70–99)
GLUCOSE UR STRIP-MCNC: NEGATIVE MG/DL
HCT VFR BLD AUTO: 39.7 % (ref 35–47)
HGB BLD-MCNC: 12.4 G/DL (ref 11.7–15.7)
HGB UR QL STRIP: NEGATIVE
HYALINE CASTS: 1 /LPF
KETONES UR STRIP-MCNC: NEGATIVE MG/DL
LEUKOCYTE ESTERASE UR QL STRIP: NEGATIVE
MCH RBC QN AUTO: 26.2 PG (ref 26.5–33)
MCHC RBC AUTO-ENTMCNC: 31.2 G/DL (ref 31.5–36.5)
MCV RBC AUTO: 84 FL (ref 78–100)
MUCOUS THREADS #/AREA URNS LPF: PRESENT /LPF
NITRATE UR QL: NEGATIVE
PH UR STRIP: 6 [PH] (ref 5–7)
PHOSPHATE SERPL-MCNC: 3.8 MG/DL (ref 2.5–4.5)
PLATELET # BLD AUTO: 223 10E3/UL (ref 150–450)
POTASSIUM SERPL-SCNC: 4.2 MMOL/L (ref 3.4–5.3)
PROT/CREAT 24H UR: 0.07 MG/MG CR (ref 0–0.2)
RBC # BLD AUTO: 4.73 10E6/UL (ref 3.8–5.2)
RBC URINE: 1 /HPF
SODIUM SERPL-SCNC: 140 MMOL/L (ref 136–145)
SP GR UR STRIP: 1.03 (ref 1–1.03)
SQUAMOUS EPITHELIAL: 1 /HPF
UROBILINOGEN UR STRIP-MCNC: NORMAL MG/DL
WBC # BLD AUTO: 6.5 10E3/UL (ref 4–11)
WBC URINE: 2 /HPF

## 2022-10-06 PROCEDURE — 86225 DNA ANTIBODY NATIVE: CPT | Performed by: PATHOLOGY

## 2022-10-06 PROCEDURE — 81001 URINALYSIS AUTO W/SCOPE: CPT | Performed by: PATHOLOGY

## 2022-10-06 PROCEDURE — 99213 OFFICE O/P EST LOW 20 MIN: CPT | Performed by: INTERNAL MEDICINE

## 2022-10-06 PROCEDURE — G0463 HOSPITAL OUTPT CLINIC VISIT: HCPCS

## 2022-10-06 PROCEDURE — 84156 ASSAY OF PROTEIN URINE: CPT | Performed by: PATHOLOGY

## 2022-10-06 PROCEDURE — 80069 RENAL FUNCTION PANEL: CPT | Performed by: PATHOLOGY

## 2022-10-06 PROCEDURE — 36415 COLL VENOUS BLD VENIPUNCTURE: CPT | Performed by: PATHOLOGY

## 2022-10-06 PROCEDURE — 99000 SPECIMEN HANDLING OFFICE-LAB: CPT | Performed by: PATHOLOGY

## 2022-10-06 PROCEDURE — 85027 COMPLETE CBC AUTOMATED: CPT | Performed by: PATHOLOGY

## 2022-10-06 ASSESSMENT — PAIN SCALES - GENERAL: PAINLEVEL: NO PAIN (0)

## 2022-10-06 NOTE — NURSING NOTE
Chief Complaint   Patient presents with     RECHECK     Return visit.     Blood pressure 118/77, pulse 71, temperature 98.3  F (36.8  C), weight 145.7 kg (321 lb 3.2 oz), SpO2 98 %, not currently breastfeeding.    YOVANI AGUILAR

## 2022-10-06 NOTE — LETTER
"10/6/2022       RE: Taina Singh  [address]  [place] [address]     Dear Colleague,    Thank you for referring your patient, Taina Singh, to the [facility] [place] at [facility]. Please see a copy of my visit note below.      Nephrology Follow Up  4/8/2021    Taina Singh   MRN:[id number]   YOB: 1965    REASON FOR FOLLOW UP: Lupus Nephritis      HISTORY OF PRESENT ILLNESS:  Patient is a 55-year-old female. She was originally diagnosed with systemic lupus erythematosus at age 27 and was placed on hydroxychloroquine with good control of her disease.  For full review of her SLE course, please see HPI from Dr. Sherman's note from 9/10/2020.         July 22, 2021  She was last seen by Dr Rouse and me in April 2021.  She was diagnosed with shingles.  L thigh, does not extend below the knee, and is described as the \"front part of the inner thigh\".  Joints are achy and feels tired, but denies severe pain  (mild only).  It started on Monday.  The redness had increased in size the night before last -> sought care.  -> was started on valacyclovir 1 g 3 x a day.    No fever.  No cough/SOB.    She notes overall doing well from SLE.   Had rituximab in July, had been feeling a bit under the weather and achy.    She had been tapering Pred, she is  down to 5mg daily   NO oral ulcers, facial rash. No hair loss.  Otherwise, appetite is good. No nausea.   She has lost 20 lbs since December 2020 through diet and some home exercise. No Leg swelling, No gross hematuria, dysuria.   Has been in menopause for several years    Home BP - 124/70 yesterday at PCP.         January 20, 2022  Tim had covid, mAb.  Did very well c it.  Did not get severe symptoms.   Had cough w covid.  Now resolved.     No SOB.    Had been vaccinated but without booster.  Got lupus flare after that, Lopez uppped the prednisone, with achy joint and swelling .  " "Loss o fenergy.  Prednisone upped 10 mg /d x 3 then decreased again to 5 mg day.  Feeling much better now.   Last RTX in early Dec 2021, gets q 6 m.    Has history of PE about 3 years ago in June.   Admitted to [facility] .   Incidentally identified pulmonary embolism involving the right lower lobe segmental and subsegmental bronchi, for which rivaroxaban was started in June 6/2019.  She subsequently switched to coumadin bec of cost.   Had lupus anticoag, but not documented anti-cardioLipin, or anti-B2GP.(see below)  Is heterozygous for FV Leiden.  However, Mrs Singh may already have been on warfarin at the time of the lupus anticoagulant testing.       Felt dizzy with previous BP meds.       October 6, 2022  Ms Singh states she feels GOOD. \"noticeably\"  She is completely off the prednisone for ~ 3 weeks.  She is getting rituximab q ~ 6 months per Dr Marroquin.  Last dose in June 2022. (500 mg)  No CP, SOB, Cough.  No GI or  spms.  Intermittent L Ext swelling, tolerable.        MEDICATIONS:  Current Outpatient Medications   Medication Sig Dispense Refill     albuterol (PROAIR HFA/PROVENTIL HFA/VENTOLIN HFA) 108 (90 Base) MCG/ACT inhaler Inhale 2 puffs into the lungs every 6 hours as needed for shortness of breath / dyspnea or wheezing 3 Inhaler 3     Calcium-Magnesium 300-300 MG TABS daily        hydroxychloroquine (PLAQUENIL) 200 MG tablet Take 1 tablet (200 mg) by mouth 2 times daily Get annual eye exams for hydroxychloroquine (Plaquenil) monitoring and fax to [phone] 180 tablet 3     Multiple Vitamins-Minerals (WOMENS MULTI PO) Take by mouth daily        mycophenolate (GENERIC EQUIVALENT) 500 MG tablet Take 2 tablets (1,000 mg) by mouth 2 times daily LABS REQUIRED EVERY 8-12 WEEKS WHILE TAKING THIS MEDICATION. 360 tablet 1     Omega-3 Fatty Acids (OMEGA-3 FISH OIL) 500 MG CAPS Take 500 mg by mouth daily        traMADol (ULTRAM) 50 MG tablet Take 1 tablet (50 mg) by mouth every 12 hours as needed for " severe pain 60 tablet 3     vitamin D3 (CHOLECALCIFEROL) 50 mcg (2000 units) tablet Take 1 tablet (50 mcg) by mouth daily 90 tablet 3     warfarin ANTICOAGULANT (COUMADIN) 5 MG tablet Currently on 5 mg daily except Monday 7.5 mg  1     zolpidem (AMBIEN) 5 MG tablet Take 1 tablet (5 mg) by mouth nightly as needed for sleep 30 tablet 3     ALLERGIES:    Reviewed with the patient in detail    REVIEW OF SYSTEMS:  A comprehensive of systems was negative except as noted above.    SOCIAL HISTORY:   Reviewed with patient, no smoking and no alcohol use     FAMILY MEDICAL HISTORY:   Reviewed, no family history of need for dialysis, transplant or CKD    PHYSICAL EXAM:   Vital signs:There were no vitals taken for this visit.    Physical Exam  /77   Pulse 71   Temp 98.3  F (36.8  C)   Wt 145.7 kg (321 lb 3.2 oz)   SpO2 98%   BMI 51.84 kg/m    Constitutional:     Appearance: Normal appearance.   No facial rash  Breathing not labored  Neuro:  Alert, speech flow and content normal  Affect: bright      LABS    Lab on 10/06/2022   Component Date Value Ref Range Status     Sodium 10/06/2022 140  136 - 145 mmol/L Final     Potassium 10/06/2022 4.2  3.4 - 5.3 mmol/L Final     Chloride 10/06/2022 103  98 - 107 mmol/L Final     Carbon Dioxide (CO2) 10/06/2022 26  22 - 29 mmol/L Final     Anion Gap 10/06/2022 11  7 - 15 mmol/L Final     Glucose 10/06/2022 92  70 - 99 mg/dL Final     Urea Nitrogen 10/06/2022 23.6 (A) 6.0 - 20.0 mg/dL Final     Creatinine 10/06/2022 0.63  0.51 - 0.95 mg/dL Final     GFR Estimate 10/06/2022 >90  >60 mL/min/1.73m2 Final    Effective December 21, 2021 eGFRcr in adults is calculated using the 2021 CKD-EPI creatinine equation which includes age and gender (Cole pearce al., NEJM, DOI: 10.1056/CVLRaf6420773)     Calcium 10/06/2022 10.0  8.6 - 10.0 mg/dL Final     Albumin 10/06/2022 4.0  3.5 - 5.2 g/dL Final     Phosphorus 10/06/2022 3.8  2.5 - 4.5 mg/dL Final     WBC Count 10/06/2022 6.5  4.0 - 11.0 10e3/uL  Final     RBC Count 10/06/2022 4.73  3.80 - 5.20 10e6/uL Final     Hemoglobin 10/06/2022 12.4  11.7 - 15.7 g/dL Final     Hematocrit 10/06/2022 39.7  35.0 - 47.0 % Final     MCV 10/06/2022 84  78 - 100 fL Final     MCH 10/06/2022 26.2 (A) 26.5 - 33.0 pg Final     MCHC 10/06/2022 31.2 (A) 31.5 - 36.5 g/dL Final     RDW 10/06/2022 14.6  10.0 - 15.0 % Final     Platelet Count 10/06/2022 223  150 - 450 10e3/uL Final     Color Urine 10/06/2022 Yellow  Colorless, Straw, Light Yellow, Yellow Final     Appearance Urine 10/06/2022 Clear  Clear Final     Glucose Urine 10/06/2022 Negative  Negative mg/dL Final     Bilirubin Urine 10/06/2022 Negative  Negative Final     Ketones Urine 10/06/2022 Negative  Negative mg/dL Final     Specific Gravity Urine 10/06/2022 1.030  1.003 - 1.035 Final     Blood Urine 10/06/2022 Negative  Negative Final     pH Urine 10/06/2022 6.0  5.0 - 7.0 Final     Protein Albumin Urine 10/06/2022 Negative  Negative mg/dL Final     Urobilinogen Urine 10/06/2022 Normal  Normal, 2.0 mg/dL Final     Nitrite Urine 10/06/2022 Negative  Negative Final     Leukocyte Esterase Urine 10/06/2022 Negative  Negative Final     Bacteria Urine 10/06/2022 Few (A) None Seen /HPF Final     Mucus Urine 10/06/2022 Present (A) None Seen /LPF Final     RBC Urine 10/06/2022 1  <=2 /HPF Final     WBC Urine 10/06/2022 2  <=5 /HPF Final     Squamous Epithelials Urine 10/06/2022 1  <=1 /HPF Final     Hyaline Casts Urine 10/06/2022 1  <=2 /LPF Final           Date 1/20/2020  Lupus Result NEG^Negative Positive Abnormal   Canceled, Test credited Abnormal  CM     Comment: (Note)   COMMENTS:   INR is elevated.   APTT ratio is elevated.   Platelet Neutralization is negative.   APTT 1:2 Mix ratio is normal.   DRVVT Screen ratio is elevated.   DRVVT Confirm Normalized ratio is elevated.   Thrombin time is normal.   POSITIVE TEST; THIS PATIENT HAS A  LUPUS ANTICOAGULANT.   Recommend repeat testing in 12 weeks to determine if the Lupus  "  Anticoagulant is persistent or transient, since a transient one does   not confer an increased thrombotic risk.   If the patient is on an anticoagulant, recommend repeat testing   without anticoagulation interference to rule out a false positive   lupus anticoagulant.   Patients with a lupus anticoagulant on warfarin therapy should be   monitored with a factor 2 (factor II) level or chromogenic factor 10   (factor X) assay.   Latrice Long M.D.  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  Her BP is excellent.  Her kidney function is normal and stable.  She has no significant proteinuria.   At this point, she is to continue on  MMF (\"reduced to 1000 mg bid) + rituximab . 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  She is reportedly  to remain on coumadin indefinitely   #COVID vaccination  She is s/p Pfizer vaccine, but mounted NO antibody response when tested at the end of June. (Not surprising given ritux + MMF).  Last evusheld in March.  Is scheduled for next dose at the end of Oct.       Labs ordered this visit  Cbc, renal panel, UA, UPCR, antiDsDNA    We will see her again in 6 months.  She has an appoint with Dr Marroquin at the end of Oct.     Luis Eduardo Paredes MD  Division of Nephrology and Hypertension        "

## 2022-10-06 NOTE — PROGRESS NOTES
"  Nephrology Follow Up  4/8/2021    Taina Singh   MRN:[id number]   YOB: 1965    REASON FOR FOLLOW UP: Lupus Nephritis      HISTORY OF PRESENT ILLNESS:  Patient is a 55-year-old female. She was originally diagnosed with systemic lupus erythematosus at age 27 and was placed on hydroxychloroquine with good control of her disease.  For full review of her SLE course, please see HPI from Dr. Sherman's note from 9/10/2020.         July 22, 2021  She was last seen by Dr Rouse and me in April 2021.  She was diagnosed with shingles.  L thigh, does not extend below the knee, and is described as the \"front part of the inner thigh\".  Joints are achy and feels tired, but denies severe pain  (mild only).  It started on Monday.  The redness had increased in size the night before last -> sought care.  -> was started on valacyclovir 1 g 3 x a day.    No fever.  No cough/SOB.    She notes overall doing well from SLE.   Had rituximab in July, had been feeling a bit under the weather and achy.    She had been tapering Pred, she is  down to 5mg daily   NO oral ulcers, facial rash. No hair loss.  Otherwise, appetite is good. No nausea.   She has lost 20 lbs since December 2020 through diet and some home exercise. No Leg swelling, No gross hematuria, dysuria.   Has been in menopause for several years    Home BP - 124/70 yesterday at PCP.         January 20, 2022  Tim had covid, mAb.  Did very well c it.  Did not get severe symptoms.   Had cough w covid.  Now resolved.     No SOB.    Had been vaccinated but without booster.  Got lupus flare after that, Lopez uppped the prednisone, with achy joint and swelling .  Loss o fenergy.  Prednisone upped 10 mg /d x 3 then decreased again to 5 mg day.  Feeling much better now.   Last RTX in early Dec 2021, gets q 6 m.    Has history of PE about 3 years ago in June.   Admitted to [facility] .   Incidentally identified pulmonary embolism involving the right lower lobe segmental " "and subsegmental bronchi, for which rivaroxaban was started in June 6/2019.  She subsequently switched to coumadin bec of cost.   Had lupus anticoag, but not documented anti-cardioLipin, or anti-B2GP.(see below)  Is heterozygous for FV Leiden.  However, Mrs Singh may already have been on warfarin at the time of the lupus anticoagulant testing.       Felt dizzy with previous BP meds.       October 6, 2022  Ms Singh states she feels GOOD. \"noticeably\"  She is completely off the prednisone for ~ 3 weeks.  She is getting rituximab q ~ 6 months per Dr Marroquin.  Last dose in June 2022. (500 mg)  No CP, SOB, Cough.  No GI or  spms.  Intermittent L Ext swelling, tolerable.        MEDICATIONS:  Current Outpatient Medications   Medication Sig Dispense Refill     albuterol (PROAIR HFA/PROVENTIL HFA/VENTOLIN HFA) 108 (90 Base) MCG/ACT inhaler Inhale 2 puffs into the lungs every 6 hours as needed for shortness of breath / dyspnea or wheezing 3 Inhaler 3     Calcium-Magnesium 300-300 MG TABS daily        hydroxychloroquine (PLAQUENIL) 200 MG tablet Take 1 tablet (200 mg) by mouth 2 times daily Get annual eye exams for hydroxychloroquine (Plaquenil) monitoring and fax to [phone] 180 tablet 3     Multiple Vitamins-Minerals (WOMENS MULTI PO) Take by mouth daily        mycophenolate (GENERIC EQUIVALENT) 500 MG tablet Take 2 tablets (1,000 mg) by mouth 2 times daily LABS REQUIRED EVERY 8-12 WEEKS WHILE TAKING THIS MEDICATION. 360 tablet 1     Omega-3 Fatty Acids (OMEGA-3 FISH OIL) 500 MG CAPS Take 500 mg by mouth daily        traMADol (ULTRAM) 50 MG tablet Take 1 tablet (50 mg) by mouth every 12 hours as needed for severe pain 60 tablet 3     vitamin D3 (CHOLECALCIFEROL) 50 mcg (2000 units) tablet Take 1 tablet (50 mcg) by mouth daily 90 tablet 3     warfarin ANTICOAGULANT (COUMADIN) 5 MG tablet Currently on 5 mg daily except Monday 7.5 mg  1     zolpidem (AMBIEN) 5 MG tablet Take 1 tablet (5 mg) by mouth nightly as needed " for sleep 30 tablet 3     ALLERGIES:    Reviewed with the patient in detail    REVIEW OF SYSTEMS:  A comprehensive of systems was negative except as noted above.    SOCIAL HISTORY:   Reviewed with patient, no smoking and no alcohol use     FAMILY MEDICAL HISTORY:   Reviewed, no family history of need for dialysis, transplant or CKD    PHYSICAL EXAM:   Vital signs:There were no vitals taken for this visit.    Physical Exam  /77   Pulse 71   Temp 98.3  F (36.8  C)   Wt 145.7 kg (321 lb 3.2 oz)   SpO2 98%   BMI 51.84 kg/m    Constitutional:     Appearance: Normal appearance.   No facial rash  Breathing not labored  Neuro:  Alert, speech flow and content normal  Affect: bright      LABS    Lab on 10/06/2022   Component Date Value Ref Range Status     Sodium 10/06/2022 140  136 - 145 mmol/L Final     Potassium 10/06/2022 4.2  3.4 - 5.3 mmol/L Final     Chloride 10/06/2022 103  98 - 107 mmol/L Final     Carbon Dioxide (CO2) 10/06/2022 26  22 - 29 mmol/L Final     Anion Gap 10/06/2022 11  7 - 15 mmol/L Final     Glucose 10/06/2022 92  70 - 99 mg/dL Final     Urea Nitrogen 10/06/2022 23.6 (A) 6.0 - 20.0 mg/dL Final     Creatinine 10/06/2022 0.63  0.51 - 0.95 mg/dL Final     GFR Estimate 10/06/2022 >90  >60 mL/min/1.73m2 Final    Effective December 21, 2021 eGFRcr in adults is calculated using the 2021 CKD-EPI creatinine equation which includes age and gender (Cole pearce al., NEJ, DOI: 10.1056/HESBet2696396)     Calcium 10/06/2022 10.0  8.6 - 10.0 mg/dL Final     Albumin 10/06/2022 4.0  3.5 - 5.2 g/dL Final     Phosphorus 10/06/2022 3.8  2.5 - 4.5 mg/dL Final     WBC Count 10/06/2022 6.5  4.0 - 11.0 10e3/uL Final     RBC Count 10/06/2022 4.73  3.80 - 5.20 10e6/uL Final     Hemoglobin 10/06/2022 12.4  11.7 - 15.7 g/dL Final     Hematocrit 10/06/2022 39.7  35.0 - 47.0 % Final     MCV 10/06/2022 84  78 - 100 fL Final     MCH 10/06/2022 26.2 (A) 26.5 - 33.0 pg Final     MCHC 10/06/2022 31.2 (A) 31.5 - 36.5 g/dL Final      RDW 10/06/2022 14.6  10.0 - 15.0 % Final     Platelet Count 10/06/2022 223  150 - 450 10e3/uL Final     Color Urine 10/06/2022 Yellow  Colorless, Straw, Light Yellow, Yellow Final     Appearance Urine 10/06/2022 Clear  Clear Final     Glucose Urine 10/06/2022 Negative  Negative mg/dL Final     Bilirubin Urine 10/06/2022 Negative  Negative Final     Ketones Urine 10/06/2022 Negative  Negative mg/dL Final     Specific Gravity Urine 10/06/2022 1.030  1.003 - 1.035 Final     Blood Urine 10/06/2022 Negative  Negative Final     pH Urine 10/06/2022 6.0  5.0 - 7.0 Final     Protein Albumin Urine 10/06/2022 Negative  Negative mg/dL Final     Urobilinogen Urine 10/06/2022 Normal  Normal, 2.0 mg/dL Final     Nitrite Urine 10/06/2022 Negative  Negative Final     Leukocyte Esterase Urine 10/06/2022 Negative  Negative Final     Bacteria Urine 10/06/2022 Few (A) None Seen /HPF Final     Mucus Urine 10/06/2022 Present (A) None Seen /LPF Final     RBC Urine 10/06/2022 1  <=2 /HPF Final     WBC Urine 10/06/2022 2  <=5 /HPF Final     Squamous Epithelials Urine 10/06/2022 1  <=1 /HPF Final     Hyaline Casts Urine 10/06/2022 1  <=2 /LPF Final           Date 1/20/2020  Lupus Result NEG^Negative Positive Abnormal   Canceled, Test credited Abnormal  CM     Comment: (Note)   COMMENTS:   INR is elevated.   APTT ratio is elevated.   Platelet Neutralization is negative.   APTT 1:2 Mix ratio is normal.   DRVVT Screen ratio is elevated.   DRVVT Confirm Normalized ratio is elevated.   Thrombin time is normal.   POSITIVE TEST; THIS PATIENT HAS A  LUPUS ANTICOAGULANT.   Recommend repeat testing in 12 weeks to determine if the Lupus   Anticoagulant is persistent or transient, since a transient one does   not confer an increased thrombotic risk.   If the patient is on an anticoagulant, recommend repeat testing   without anticoagulation interference to rule out a false positive   lupus anticoagulant.   Patients with a lupus anticoagulant on  "warfarin therapy should be   monitored with a factor 2 (factor II) level or chromogenic factor 10   (factor X) assay.   Latrice Long M.D.  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  Her BP is excellent.  Her kidney function is normal and stable.  She has no significant proteinuria.   At this point, she is to continue on  MMF (\"reduced to 1000 mg bid) + rituximab . 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  She is reportedly  to remain on coumadin indefinitely   #COVID vaccination  She is s/p Pfizer vaccine, but mounted NO antibody response when tested at the end of June. (Not surprising given ritux + MMF).  Last evusheld in March.  Is scheduled for next dose at the end of Oct.       Labs ordered this visit  Cbc, renal panel, UA, UPCR, antiDsDNA    We will see her again in 6 months.  She has an appoint with Dr Marroquin at the end of Oct.     Luis Eduardo Paredes MD  Division of Nephrology and Hypertension      "

## 2022-10-10 LAB — DSDNA AB SER-ACNC: 67 IU/ML

## 2022-10-28 ENCOUNTER — OFFICE VISIT (OUTPATIENT)
Dept: RHEUMATOLOGY | Facility: CLINIC | Age: 57
End: 2022-10-28
Attending: INTERNAL MEDICINE
Payer: COMMERCIAL

## 2022-10-28 ENCOUNTER — ALLIED HEALTH/NURSE VISIT (OUTPATIENT)
Dept: TRANSPLANT | Facility: CLINIC | Age: 57
End: 2022-10-28
Payer: COMMERCIAL

## 2022-10-28 VITALS
DIASTOLIC BLOOD PRESSURE: 80 MMHG | HEART RATE: 74 BPM | BODY MASS INDEX: 47.09 KG/M2 | HEIGHT: 66 IN | WEIGHT: 293 LBS | OXYGEN SATURATION: 97 % | SYSTOLIC BLOOD PRESSURE: 124 MMHG

## 2022-10-28 DIAGNOSIS — M32.14 LUPUS NEPHRITIS, ISN/RPS CLASS IV (H): ICD-10-CM

## 2022-10-28 DIAGNOSIS — Z79.899 HIGH RISK MEDICATIONS (NOT ANTICOAGULANTS) LONG-TERM USE: ICD-10-CM

## 2022-10-28 DIAGNOSIS — M32.9 SYSTEMIC LUPUS ERYTHEMATOSUS, UNSPECIFIED SLE TYPE, UNSPECIFIED ORGAN INVOLVEMENT STATUS (H): Primary | ICD-10-CM

## 2022-10-28 DIAGNOSIS — D84.9 IMMUNOSUPPRESSION (H): Primary | ICD-10-CM

## 2022-10-28 DIAGNOSIS — Z79.52 CURRENT CHRONIC USE OF SYSTEMIC STEROIDS: ICD-10-CM

## 2022-10-28 PROCEDURE — G0463 HOSPITAL OUTPT CLINIC VISIT: HCPCS

## 2022-10-28 PROCEDURE — 99214 OFFICE O/P EST MOD 30 MIN: CPT | Performed by: INTERNAL MEDICINE

## 2022-10-28 PROCEDURE — 250N000011 HC RX IP 250 OP 636: Performed by: INTERNAL MEDICINE

## 2022-10-28 PROCEDURE — M0220 HC INJECTION TIXAGEVIMAB & CILGAVIMAB (EVUSHELD): HCPCS | Performed by: INTERNAL MEDICINE

## 2022-10-28 RX ADMIN — AZD7442 6 ML: KIT at 13:05

## 2022-10-28 ASSESSMENT — PAIN SCALES - GENERAL: PAINLEVEL: NO PAIN (0)

## 2022-10-28 NOTE — PROGRESS NOTES
[facility] Administration Note:  Taina Singh presents today for [facility].   present during visit today: Not Applicable.    Consent:   Informed Consent confirmed in chart, obtained on this date: [id number]    Post Injection Assessment:   Patient tolerated injection without incident.      Patient was observed in the room for a minimum of 10 minutes after injection per standard, then remained in the buidling for a total 60 minute observation period.         Discharge Plan:    Patient and/or family verbalized understanding of discharge instructions and all questions answered.   Denise Mccray, CMA

## 2022-10-28 NOTE — PATIENT INSTRUCTIONS
Labs with next rituximab infusion then every 3 months    DEXA bone density    When you come back on 11/11, ask Dr. Marvin about pneumovax    Return 3-4 months (video)

## 2022-10-28 NOTE — LETTER
10/28/2022       RE: Taina Singh  [address]  [place] [address]     Dear Colleague,    Thank you for referring your patient, Taina Singh, to the [facility]HE[facility] [place] [facility]. Please see a copy of my visit note below.    [facility] Rheumatology Follow-up In Person Visit Note    Reason for visit: f/u for lupus  Date of last visit: 4/29/2022  DOS: 10/28/2022      History of Present Illness:  Taina Singh is a 57 year old female who is here for follow up of SLE and lupus nephritis.    History:  - She was diagnosed with lupus at age 27. Her 1st sx were malar rash and fatigue. No skin biopsy was done (reportedly had +KARLIE). She was treated with prednisone taper and HCQ. HCQ helped and she tolerated it well. She was diagnosed with Sjogren's at the same time. Had dryness of eyes and mouth. No lip biopsy to confirm the Dx. Reportedly she had very mild stable lupus for many years and eventually at some point, stopped taking HCQ (can't remember when). Her lupus started to flare in 7/2017 initially with pleuritic CP, was put back on HCQ. 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 Since then failed AZA, cytoxan and benlysta. MMF was not enough to control her LN and eventually rituximab was added (which was initially denied by insurance even with h/o lymphoma) given necrosis on the renal biopsy (rituximab is FDA approved for ANCA vasculitis). On HCQ+MMF after adding rituximab, LN and SLE started too improve.  - She was diagnosed with MALT lymphoma at 44, had enlarged LN over R parotid gland, on biopsy it was MALT lymphoma. Tx was resection only, no radiation or chemo.    2/4/2021:  - Taina is on prednisone 8 mg every day, it was 17.5 mg every day at last visit. She feels achy and tired this week, but thinks the cold weather is responsible for her sx. Had rituximab  375 mg/m2 on 1/22/2020, 2/6/2020, then 6/26/2020,  "7/20/2020 and last dose 1 gram on 12/15/2020. On mepron for PJP prophylaxis. On  mg bid, MMF 1500 mg bid. She works from home. Has intermittent joint pain, nothing consistent. one day shoulders hurt and the other day her hips hurt. AM stiffness is 30-60 minutes. no pleurisy, sob or cough or fevers. No skin rash. Her hair grew back.    11/12/2021:  - Taina is feeling well, she thinks her lupus is under fair control, no major complaints today.    4/29/2022:  - Today, Taina reports that she is feeling well.  Has noted some general diffuse aching and fatigue, difficulty making fist, and middle finger with trigger symptoms, which she typically has as she nears her rituximab infusion.  She has noted some morning stiffness, but this is very responsive to activity and stretching. Continues to have some dry mouth managed via increased hydration. She has had an intermittent mild rash of her nose and cheeks, but this is not consistent and not dependent on sun exposure. She has had a good appetite and an overall positive mood. Feels as though rituximab has been a \"huge game changer\" for her.    10/28/2022:  Ms. Singh presents for follow up.  -Patient reports that she is doing well.  She was able to completely wean and discontinue the prednisone.  She is doing well on rituximab infusions.  Her last rituximab treatment was in June and is scheduled to get the next Rituxan in November.  She is compliant with CellCept and Plaquenil.  She recently received the Evusheld.  Denies any oral ulcers, rashes, joint pains, body aches, vision changes, shortness of breath and  Exertion.  Reports morning stiffness is minimal.  Good appetite and normal mood.    ROS:    A comprehensive ROS was done, positives are per HPI.    Past Medical History:  Past Medical History:   Diagnosis Date     Bronchiolitis     Chest CT 6/2018 shows air trapping; bronchiolitis possibly related to lupus     Diastolic dysfunction 2/13/2018     Gluten " intolerance     Patient is not gluten intolerant     Iron deficiency      Iron deficiency 2018     Lupus (H)     dx age 27; + DNA-ds, KARLIE, SSA, SSB and RF; malar rash, serositis (pleuritic CP)     Lupus nephritis (H)     cyclophosphamide started 2019     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 age 44    No chemo or radiation     Other forms of systemic lupus erythematosus (H) 2018     Pulmonary embolism (H)     2019 seen on abd CT at [facility]     Sjogren's syndrome (H)     secondary Sjogrens, secondary to lupus     Vitamin D deficiency      Past Surgical History:  Past Surgical History:   Procedure Laterality Date     BIOPSY/REMOVAL, LYMPH NODE(S)        SECTION  age 32     HC HYSTEROS W PERMANENT FALLOPAIN IMPLANT      Essure b/l     PAROTIDECTOMY Right 2006     TONSILLECTOMY       Family history:  Family History   Problem Relation Age of Onset     Alopecia Brother      Rheumatoid Arthritis Brother      LUNG DISEASE No family hx of      Social history:  Social History     Socioeconomic History     Marital status:      Spouse name: Not on file     Number of children: Not on file     Years of education: 1     Highest education level: Not on file   Occupational History     Comment: , 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      Social Determinants of Health     Financial Resource Strain: Not on file   Food  Insecurity: Not on file   Transportation Needs: Not on file   Physical Activity: Not on file   Stress: Not on file   Social Connections: Not on file   Intimate Partner Violence: Not on file   Housing Stability: Not on file     Allergies:  Allergies   Allergen Reactions     Pentamidine Shortness Of Breath     Penicillins Rash     Sulfa Drugs Rash     Medications:  Outpatient Encounter Medications as of 10/28/2022   Medication Sig Dispense Refill     albuterol (PROAIR HFA/PROVENTIL HFA/VENTOLIN HFA) 108 (90 Base) MCG/ACT inhaler Inhale 2 puffs into the lungs every 6 hours as needed for shortness of breath / dyspnea or wheezing 3 Inhaler 3     atovaquone (MEPRON) 750 MG/5ML suspension Take 10 mLs (1,500 mg) by mouth daily (Patient not taking: No sig reported) 900 mL 3     Calcium-Magnesium 300-300 MG TABS daily        hydroxychloroquine (PLAQUENIL) 200 MG tablet Take 1 tablet (200 mg) by mouth 2 times daily Get annual eye exams for hydroxychloroquine (Plaquenil) monitoring and fax to [phone] 180 tablet 3     Multiple Vitamins-Minerals (WOMENS MULTI PO) Take by mouth daily        mycophenolate (GENERIC EQUIVALENT) 500 MG tablet Take 2 tablets (1,000 mg) by mouth 2 times daily LABS REQUIRED EVERY 8-12 WEEKS WHILE TAKING THIS MEDICATION. 360 tablet 1     Omega-3 Fatty Acids (OMEGA-3 FISH OIL) 500 MG CAPS Take 500 mg by mouth daily        predniSONE (DELTASONE) 5 MG tablet 10 mg a day x 3 days prn flare ups then back down to 5 mg a day (Patient not taking: Reported on 10/6/2022) 180 tablet 3     traMADol (ULTRAM) 50 MG tablet Take 1 tablet (50 mg) by mouth every 12 hours as needed for severe pain 60 tablet 3     vitamin D3 (CHOLECALCIFEROL) 50 mcg (2000 units) tablet Take 1 tablet (50 mcg) by mouth daily 90 tablet 3     warfarin ANTICOAGULANT (COUMADIN) 5 MG tablet   1     zolpidem (AMBIEN) 5 MG tablet Take 1 tablet (5 mg) by mouth nightly as needed for sleep 30 tablet 3     Facility-Administered Encounter Medications  as of 10/28/2022   Medication Dose Route Frequency Provider Last Rate Last Admin     [COMPLETED] cilgavimab 300 mg/tixagevimab 300 mg (EVUSHELD) - FULL DOSE  6 mL Intramuscular Once Killian Marroquin MD   6 mL at 10/28/22 1305       Labs/Imaging:  RHEUM RESULTS Latest Ref Rng & Units 11/20/2017 12/29/2017 12/29/2017   ALBUMIN 3.4 - 5.0 g/dL - - -   ALT 0 - 50 U/L - - 35   AST 0 - 45 U/L - - 26   COMPLEMENT C3 81 - 157 mg/dL - - 72(L)   COMPLEMENT C4 13 - 39 mg/dL - - 6(L)   CREATININE 0.51 - 0.95 mg/dL 0.55 - -   CRP 0.0 - 8.0 mg/L - 3.2 2.9   DNA <10.0 IU/mL - - >379(H)   GFR ESTIMATE, IF BLACK >60 mL/min/[1.73:m2] >90 - -   GFR ESTIMATE >60 mL/min/1.73m2 >90 - -   HEMATOCRIT 35.0 - 47.0 % 35.5 - -   HEMOGLOBIN 11.7 - 15.7 g/dL 11.3(L) - -   HEPBANG NR:Nonreactive - - -   HCVAB NR:Nonreactive - - -   IGA 84 - 499 mg/dL - - 473(H)   IGM 35 - 242 mg/dL - - 92   -1,616 mg/dL - - 1,560   WBC 4.0 - 11.0 10e3/uL 4.9 - -   RBC 3.80 - 5.20 10e6/uL 4.28 - -   RDW 10.0 - 15.0 % 14.6 - -   MCHC 31.5 - 36.5 g/dL 31.8 - -   MCV 78 - 100 fL 83 - -   PLATELET COUNT 150 - 450 10e3/uL 215 - -   ESR 0 - 30 mm/hr 71(H) - 49(H)   QUANTIFERON-TB GOLD PLUS RESULT NEG:Negative - - -     Component      Latest Ref Rng & Units 2/1/2021   WBC      4.0 - 11.0 10e9/L 7.8   RBC Count      3.8 - 5.2 10e12/L 4.50   Hemoglobin      11.7 - 15.7 g/dL 11.7   Hematocrit      35.0 - 47.0 % 37.9   MCV      78 - 100 fl 84   MCH      26.5 - 33.0 pg 26.0 (L)   MCHC      31.5 - 36.5 g/dL 30.9 (L)   RDW      10.0 - 15.0 % 14.9   Platelet Count      150 - 450 10e9/L 236   % Neutrophils      % 84.9   % Lymphocytes      % 6.3   % Monocytes      % 7.9   % Eosinophils      % 0.6   % Basophils      % 0.3   Absolute Neutrophil      1.6 - 8.3 10e9/L 6.6   Absolute Lymphocytes      0.8 - 5.3 10e9/L 0.5 (L)   Absolute Monocytes      0.0 - 1.3 10e9/L 0.6   Absolute Eosinophils      0.0 - 0.7 10e9/L 0.1   Absolute Basophils      0.0 - 0.2 10e9/L 0.0   Diff  Method       Automated Method   Color Urine       Yellow   Appearance Urine       Clear   Glucose Urine      NEG:Negative mg/dL Negative   Bilirubin Urine      NEG:Negative Negative   Ketones Urine      NEG:Negative mg/dL Trace (A)   Specific Gravity Urine      1.003 - 1.035 >1.030   pH Urine      5.0 - 7.0 pH 6.0   Protein Albumin Urine      NEG:Negative mg/dL 100 (A)   Urobilinogen Urine      0.2 - 1.0 EU/dL 0.2   Nitrite Urine      NEG:Negative Negative   Blood Urine      NEG:Negative Trace (A)   Leukocyte Esterase Urine      NEG:Negative Trace (A)   Source       Midstream Urine   WBC Urine      OTO5:0 - 5 /HPF 10-25 (A)   RBC Urine      OTO2:O - 2 /HPF 2-5 (A)   Squamous Epithelial /LPF Urine      FEW:Few /LPF Few   Bacteria Urine      NEG:Negative /HPF Moderate (A)   Triple Phosphates      NEG:Negative /HPF Few (A)   IGG      610 - 1,616 mg/dL 399 (L)   IGA      84 - 499 mg/dL 190   IGM      35 - 242 mg/dL 16 (L)   Creatinine      0.52 - 1.04 mg/dL 0.60   GFR Estimate      >60 mL/min/1.73:m2 >90   GFR Estimate If Black      >60 mL/min/1.73:m2 >90   Specimen Description       Midstream Urine   Special Requests       Specimen received in preservative   Culture Micro       <10,000 colonies/mL . . .   CD19 B Cells      6 - 27 % <1 (L)   Absolute CD19      107 - 698 cells/uL <1 (L)   Protein Random Urine      g/L 0.75   Protein Total Urine g/gr Creatinine      0 - 0.2 g/g Cr 0.40 (H)   Creatinine Urine Random      mg/dL 182   Sed Rate      0 - 30 mm/h 27   DNA-ds      <10 IU/mL 102 (H)   CRP Inflammation      0.0 - 8.0 mg/L 8.9 (H)   Complement C3      81 - 157 mg/dL 104   Complement C4      13 - 39 mg/dL 23   AST      0 - 45 U/L 16   Albumin      3.4 - 5.0 g/dL 4.1   ALT      0 - 50 U/L 26   Creatinine Urine      mg/dL 186     Component      Latest Ref Rng & Units 11/10/2021   WBC      4.0 - 11.0 10e3/uL 8.2   RBC Count      3.80 - 5.20 10e6/uL 4.58   Hemoglobin      11.7 - 15.7 g/dL 12.3   Hematocrit      35.0 -  47.0 % 39.7   MCV      78 - 100 fL 87   MCH      26.5 - 33.0 pg 26.9   MCHC      31.5 - 36.5 g/dL 31.0 (L)   RDW      10.0 - 15.0 % 14.2   Platelet Count      150 - 450 10e3/uL 229   % Neutrophils      % 83   % Lymphocytes      % 6   % Monocytes      % 9   % Eosinophils      % 2   % Basophils      % 0   Absolute Neutrophils      1.6 - 8.3 10e3/uL 6.9   Absolute Lymphocytes      0.8 - 5.3 10e3/uL 0.5 (L)   Absolute Monocytes      0.0 - 1.3 10e3/uL 0.7   Absolute Eosinophils      0.0 - 0.7 10e3/uL 0.1   Absolute Basophils      0.0 - 0.2 10e3/uL 0.0   Color Urine      Colorless, Straw, Light Yellow, Yellow Yellow   Appearance Urine      Clear Clear   Glucose Urine      Negative mg/dL Negative   Bilirubin Urine      Negative Negative   Ketones Urine      Negative mg/dL Trace (A)   Specific Gravity Urine      1.003 - 1.035 >=1.030   Blood Urine      Negative Small (A)   pH Urine      5.0 - 7.0 6.0   Protein Albumin Urine      Negative mg/dL Negative   Urobilinogen Urine      0.2, 1.0 E.U./dL 0.2   Nitrite Urine      Negative Negative   Leukocyte Esterase Urine      Negative Trace (A)   Sodium      133 - 144 mmol/L 138   Potassium      3.4 - 5.3 mmol/L 3.9   Chloride      94 - 109 mmol/L 103   Carbon Dioxide      20 - 32 mmol/L 29   Anion Gap      3 - 14 mmol/L 6   Urea Nitrogen      7 - 30 mg/dL 22   Creatinine      0.52 - 1.04 mg/dL 0.70   Calcium      8.5 - 10.1 mg/dL 9.6   Glucose      70 - 99 mg/dL 83   Albumin      3.4 - 5.0 g/dL 4.0   Phosphorus      2.5 - 4.5 mg/dL 3.9   GFR Estimate      >60 mL/min/1.73m2 >90   Bacteria Urine      None Seen /HPF Few (A)   RBC Urine      0-2 /HPF /HPF 0-2   WBC Urine      0-5 /HPF /HPF 0-5   Squamous Epithelial /LPF Urine      None Seen /LPF Few (A)   Mucus Urine      None Seen /LPF Present (A)   MPO Cari IgG Instrument Value      <3.5 U/mL <0.3   Myeloperoxidase Antibody IgG      Negative Negative   Proteinase 3 Cari IgG Instrument Value      <2.0 U/mL <1.0   Proteinase 3 Antibody  IgG      Negative Negative   IGG      610-1,616 mg/dL 398 (L)   IGA      84 - 499 mg/dL 184   IGM      35 - 242 mg/dL 16 (L)   Protein Random Urine      g/L 0.23   Protein Total Urine g/gr Creatinine      0.00 - 0.20 g/g Cr 0.11   Creatinine Urine      mg/dL 212   Neutrophil Cytoplasmic Antibody      <1:10 <1:10   Neutrophil Cytoplasmic Antibody Pattern       The ANCA IFA is <1:10.  No further testing will be performed.   CD19 B Cells      6 - 27 % <1 (L)   Absolute CD19      107 - 698 cells/uL <1 (L)   ALT      0 - 50 U/L 27   AST      0 - 45 U/L 19   Complement C4      13 - 39 mg/dL 28   Complement C3      81 - 157 mg/dL 138   CRP Inflammation      0.0 - 8.0 mg/L 8.9 (H)   DNA-ds      <10.0 IU/mL 90.0 (H)   Sed Rate      0 - 30 mm/hr 25     Component      Latest Ref Rng & Units 4/29/2022   WBC      4.0 - 11.0 10e3/uL 7.1   RBC Count      3.80 - 5.20 10e6/uL 4.62   Hemoglobin      11.7 - 15.7 g/dL 12.0   Hematocrit      35.0 - 47.0 % 38.3   MCV      78 - 100 fL 83   MCH      26.5 - 33.0 pg 26.0 (L)   MCHC      31.5 - 36.5 g/dL 31.3 (L)   RDW      10.0 - 15.0 % 14.3   Platelet Count      150 - 450 10e3/uL 213   % Neutrophils      % 85   % Lymphocytes      % 6   % Monocytes      % 7   % Eosinophils      % 1   % Basophils      % 1   % Immature Granulocytes      % 0   NRBCs per 100 WBC      <1 /100 0   Absolute Neutrophils      1.6 - 8.3 10e3/uL 6.1   Absolute Lymphocytes      0.8 - 5.3 10e3/uL 0.4 (L)   Absolute Monocytes      0.0 - 1.3 10e3/uL 0.5   Absolute Eosinophils      0.0 - 0.7 10e3/uL 0.0   Absolute Basophils      0.0 - 0.2 10e3/uL 0.1   Absolute Immature Granulocytes      <=0.4 10e3/uL 0.0   Absolute NRBCs      10e3/uL 0.0   Color Urine      Colorless, Straw, Light Yellow, Yellow Yellow   Appearance Urine      Clear Clear   Glucose Urine      Negative mg/dL Negative   Bilirubin Urine      Negative Negative   Ketones Urine      Negative mg/dL Negative   Specific Gravity Urine      1.003 - 1.035 1.015   Blood  "Urine      Negative Negative   pH Urine      5.0 - 7.0 5.0   Protein Albumin Urine      Negative mg/dL Negative   Urobilinogen mg/dL      Normal, 2.0 mg/dL Normal   Nitrite Urine      Negative Negative   Leukocyte Esterase Urine      Negative Negative   Bacteria Urine      None Seen /HPF Few (A)   Mucus Urine      None Seen /LPF Present (A)   RBC Urine      <=2 /HPF 1   WBC Urine      <=5 /HPF 1   Squamous Epithelial /HPF Urine      <=1 /HPF <1   Protein Random Urine      g/L 0.12   Protein Total Urine g/gr Creatinine      0.00 - 0.20 g/g Cr 0.18   Creatinine Urine      mg/dL 66   Creatinine      0.52 - 1.04 mg/dL 0.68   GFR Estimate      >60 mL/min/1.73m2 >90   AST      0 - 45 U/L 16   ALT      0 - 50 U/L 32   Albumin      3.4 - 5.0 g/dL 3.7   CRP Inflammation      0.0 - 8.0 mg/L 7.0   Sed Rate      0 - 30 mm/hr 19       Physical Exam:  /80 (BP Location: Right arm, Patient Position: Sitting, Cuff Size: Adult Large)   Pulse 74   Ht 1.676 m (5' 5.98\")   Wt 147.8 kg (325 lb 14.4 oz)   SpO2 97%   BMI 52.63 kg/m       Constitutional: Pleasant, NAD  Eyes: EOMI, sclera anicteric, conj not injected.  MS: No active synovitis or joint tenderness  Skin: No skin rash  CV: no M/R/G, RRR  Pulm: CTAB  Abdomen: soft, NT  Neuro: CN grossly intact without focal deficit  Psych: nl affect    Assessment/Plan:  Taina Singh is a 57 year old female who is here for follow up of SLE and lupus nephritis.    Systemic lupus erythematous  History of lupus nephritis  - Presented in 12/2017 for 2nd opinion of m/o her SLE. She was diagnosed with SLE at age 27. Her lupus has been marked by +KARLIE (highest titer 1:640, speckled and nucleolar patterns), +anti-DNA, low C3/C4, arthritis, malar rash, serositis (pleuritic CP), lupus nephritis, hemolytic anemia, enteritis supported by +SSA/SSB Ab, +RF, high ESR/CRP. She had neg anti-RNP, anti-Sm, acL IgM/G/A, LAC, cryo and anti-CCP. She was treated with prednisone and HCQ at the time of Dx. " Can't remember how long she was on HCQ and why HCQ was stopped, but it probably was stopped because of stable disease, no report on HCQ toxicity. Reportedly her SLE was mild all these years.  - Her lupus flared in 7/2017 with no triggers when she presented with arthritis, HCQ was resumed, arthritis resolved. Mild pulm HTN on 2D-Echo 8/2017 but neg R cardiac cath and VQ scan in 3/2018, also neg 2D-Echo.  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 Previously failed AZA, benlysta. Patient received 1st dose cyclophosphamide on 6/15/19, had 6 doses.  Initiated Rituxin 1/22/2020 as failed cytoxan.  Recommend rituxin infusion every 4-6 months, closer to 4 due to worsening of symptoms as infusions near at 6 month interval. Have decreased dose to 600 mg and it is well tolerated. Recommend continuation of rituximab as it is the only med that has helped with LN. Also tx options are limited (also necrotizing lesions on renal biopsy, can not rule out ANCA neg vasculitis overlap and rituximab is FDA approved for GPA/MPA).   - Current regimen: Prednisone 5 mg every day, MMF 1000 mg BID (tapered from 3 gr every day) daily, and  mg a day, and Rituximab (last infusion 6/3/2022).    Sjogren's with sicca  - Secondary, +SSA/SSB Ab and h/o MALT lymphoma at age 44 in remission. Uses blink eyedrops and salagen. No lip biopsy was done to confirm Dx of Sjogren's.       Recommendations:  - Last rituximab 500 mg 6/3/2022, next scheduled for 11/18/2022. Continue at interval q4-6 months depending on severity symptoms.  -Shanel today  - Continue Cellcept 1000 mg BID and  mg BID  -Off prednisone and doing well.  - Annual eye exam for HCQ monitoring  - Labs with next rituximab infusion then s4cfptdi.  Recent labs reviewed and stable  - PCP prophylaxis on atovaquone 10 mL (1500 mg). Patient did not tolerate inhaled pentamidine. Avoiding TMP-SMX given sulfa reaction and potential increase risk of SLE flare. Hesitant to use dapsone  given risk of hemolytic anemia.  - Ambien prn for insomnia  -We will order a DEXA scan for baseline screening.  -Patient asked about Pneumovax.  She is due for the Prevnar vaccine but not the Pneumovax.  Advised the patient to discuss with Dr. Marvin in the next clinic visit.    Return in 3 to 4 months and video visit.    Andrew Olmedo,  Rheumatology Fellow,  Pager: [phone].      Attending Note: I saw and evaluated the patient with Dr. Olmedo. I agree with the assessment and plan. SLE is under excellent control.    Killian Marroquin MD    Orders Placed This Encounter   Procedures     Dexa hip/pelvis/spine     ALT     Albumin level     AST     Creatinine     Complement C4     Complement C3     CRP inflammation     DNA double stranded antibodies     Erythrocyte sedimentation rate auto     UA with Microscopic reflex to Culture     Protein  random urine     Creatinine random urine     CD19 B Cell Count (B-lymphocyte antigen)     Immunoglobulins A G and M     CBC with Platelets & Differential

## 2022-10-28 NOTE — NURSING NOTE
"Chief Complaint   Patient presents with     RECHECK     Follow-up     /80 (BP Location: Right arm, Patient Position: Sitting, Cuff Size: Adult Large)   Pulse 74   Ht 1.676 m (5' 5.98\")   Wt 147.8 kg (325 lb 14.4 oz)   SpO2 97%   BMI 52.63 kg/m      Alejandrina Atkins on 10/28/2022 at 2:38 PM    "

## 2022-10-28 NOTE — PROGRESS NOTES
[facility] Rheumatology Follow-up In Person Visit Note    Reason for visit: f/u for lupus  Date of last visit: 4/29/2022  DOS: 10/28/2022      History of Present Illness:  Taina Singh is a 57 year old female who is here for follow up of SLE and lupus nephritis.    History:  - She was diagnosed with lupus at age 27. Her 1st sx were malar rash and fatigue. No skin biopsy was done (reportedly had +KARLIE). She was treated with prednisone taper and HCQ. HCQ helped and she tolerated it well. She was diagnosed with Sjogren's at the same time. Had dryness of eyes and mouth. No lip biopsy to confirm the Dx. Reportedly she had very mild stable lupus for many years and eventually at some point, stopped taking HCQ (can't remember when). Her lupus started to flare in 7/2017 initially with pleuritic CP, was put back on HCQ. 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 Since then failed AZA, cytoxan and benlysta. MMF was not enough to control her LN and eventually rituximab was added (which was initially denied by insurance even with h/o lymphoma) given necrosis on the renal biopsy (rituximab is FDA approved for ANCA vasculitis). On HCQ+MMF after adding rituximab, LN and SLE started too improve.  - She was diagnosed with MALT lymphoma at 44, had enlarged LN over R parotid gland, on biopsy it was MALT lymphoma. Tx was resection only, no radiation or chemo.    2/4/2021:  - Taina is on prednisone 8 mg every day, it was 17.5 mg every day at last visit. She feels achy and tired this week, but thinks the cold weather is responsible for her sx. Had rituximab  375 mg/m2 on 1/22/2020, 2/6/2020, then 6/26/2020, 7/20/2020 and last dose 1 gram on 12/15/2020. On mepron for PJP prophylaxis. On  mg bid, MMF 1500 mg bid. She works from home. Has intermittent joint pain, nothing consistent. one day shoulders hurt and the other day her hips hurt. AM stiffness is 30-60 minutes. no pleurisy, sob or cough or fevers. No  "skin rash. Her hair grew back.    11/12/2021:  - Taina is feeling well, she thinks her lupus is under fair control, no major complaints today.    4/29/2022:  - Today, Taina reports that she is feeling well.  Has noted some general diffuse aching and fatigue, difficulty making fist, and middle finger with trigger symptoms, which she typically has as she nears her rituximab infusion.  She has noted some morning stiffness, but this is very responsive to activity and stretching. Continues to have some dry mouth managed via increased hydration. She has had an intermittent mild rash of her nose and cheeks, but this is not consistent and not dependent on sun exposure. She has had a good appetite and an overall positive mood. Feels as though rituximab has been a \"huge game changer\" for her.    10/28/2022:  Ms. Singh presents for follow up.  -Patient reports that she is doing well.  She was able to completely wean and discontinue the prednisone.  She is doing well on rituximab infusions.  Her last rituximab treatment was in June and is scheduled to get the next Rituxan in November.  She is compliant with CellCept and Plaquenil.  She recently received the Evusheld.  Denies any oral ulcers, rashes, joint pains, body aches, vision changes, shortness of breath and  Exertion.  Reports morning stiffness is minimal.  Good appetite and normal mood.    ROS:    A comprehensive ROS was done, positives are per HPI.    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44    No " chemo or radiation     Other forms of systemic lupus erythematosus (H) 2018     Pulmonary embolism (H)     2019 seen on abd CT at [facility]     Sjogren's syndrome (H)     secondary Sjogrens, secondary to lupus     Vitamin D deficiency      Past Surgical History:  Past Surgical History:   Procedure Laterality Date     BIOPSY/REMOVAL, LYMPH NODE(S)        SECTION  age 32     HC HYSTEROS W PERMANENT FALLOPAIN IMPLANT      Essure b/l     PAROTIDECTOMY Right 2006     TONSILLECTOMY       Family history:  Family History   Problem Relation Age of Onset     Alopecia Brother      Rheumatoid Arthritis Brother      LUNG DISEASE No family hx of      Social history:  Social History     Socioeconomic History     Marital status:      Spouse name: Not on file     Number of children: Not on file     Years of education: 1     Highest education level: Not on file   Occupational History     Comment: , 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      Social Determinants of Health     Financial Resource Strain: Not on file   Food Insecurity: Not on file   Transportation Needs: Not on file   Physical Activity: Not on file   Stress: Not on file   Social Connections: Not on file   Intimate Partner Violence: Not on file   Housing Stability: Not on file     Allergies:  Allergies   Allergen Reactions     Pentamidine Shortness Of Breath     Penicillins  Rash     Sulfa Drugs Rash     Medications:  Outpatient Encounter Medications as of 10/28/2022   Medication Sig Dispense Refill     albuterol (PROAIR HFA/PROVENTIL HFA/VENTOLIN HFA) 108 (90 Base) MCG/ACT inhaler Inhale 2 puffs into the lungs every 6 hours as needed for shortness of breath / dyspnea or wheezing 3 Inhaler 3     atovaquone (MEPRON) 750 MG/5ML suspension Take 10 mLs (1,500 mg) by mouth daily (Patient not taking: No sig reported) 900 mL 3     Calcium-Magnesium 300-300 MG TABS daily        hydroxychloroquine (PLAQUENIL) 200 MG tablet Take 1 tablet (200 mg) by mouth 2 times daily Get annual eye exams for hydroxychloroquine (Plaquenil) monitoring and fax to [phone] 180 tablet 3     Multiple Vitamins-Minerals (WOMENS MULTI PO) Take by mouth daily        mycophenolate (GENERIC EQUIVALENT) 500 MG tablet Take 2 tablets (1,000 mg) by mouth 2 times daily LABS REQUIRED EVERY 8-12 WEEKS WHILE TAKING THIS MEDICATION. 360 tablet 1     Omega-3 Fatty Acids (OMEGA-3 FISH OIL) 500 MG CAPS Take 500 mg by mouth daily        predniSONE (DELTASONE) 5 MG tablet 10 mg a day x 3 days prn flare ups then back down to 5 mg a day (Patient not taking: Reported on 10/6/2022) 180 tablet 3     traMADol (ULTRAM) 50 MG tablet Take 1 tablet (50 mg) by mouth every 12 hours as needed for severe pain 60 tablet 3     vitamin D3 (CHOLECALCIFEROL) 50 mcg (2000 units) tablet Take 1 tablet (50 mcg) by mouth daily 90 tablet 3     warfarin ANTICOAGULANT (COUMADIN) 5 MG tablet   1     zolpidem (AMBIEN) 5 MG tablet Take 1 tablet (5 mg) by mouth nightly as needed for sleep 30 tablet 3     Facility-Administered Encounter Medications as of 10/28/2022   Medication Dose Route Frequency Provider Last Rate Last Admin     [COMPLETED] cilgavimab 300 mg/tixagevimab 300 mg (EVUSHELD) - FULL DOSE  6 mL Intramuscular Once Killian Marroquin MD   6 mL at 10/28/22 1305       Labs/Imaging:  RHEUM RESULTS Latest Ref Rng & Units 11/20/2017 12/29/2017 12/29/2017    ALBUMIN 3.4 - 5.0 g/dL - - -   ALT 0 - 50 U/L - - 35   AST 0 - 45 U/L - - 26   COMPLEMENT C3 81 - 157 mg/dL - - 72(L)   COMPLEMENT C4 13 - 39 mg/dL - - 6(L)   CREATININE 0.51 - 0.95 mg/dL 0.55 - -   CRP 0.0 - 8.0 mg/L - 3.2 2.9   DNA <10.0 IU/mL - - >379(H)   GFR ESTIMATE, IF BLACK >60 mL/min/[1.73:m2] >90 - -   GFR ESTIMATE >60 mL/min/1.73m2 >90 - -   HEMATOCRIT 35.0 - 47.0 % 35.5 - -   HEMOGLOBIN 11.7 - 15.7 g/dL 11.3(L) - -   HEPBANG NR:Nonreactive - - -   HCVAB NR:Nonreactive - - -   IGA 84 - 499 mg/dL - - 473(H)   IGM 35 - 242 mg/dL - - 92   -1,616 mg/dL - - 1,560   WBC 4.0 - 11.0 10e3/uL 4.9 - -   RBC 3.80 - 5.20 10e6/uL 4.28 - -   RDW 10.0 - 15.0 % 14.6 - -   MCHC 31.5 - 36.5 g/dL 31.8 - -   MCV 78 - 100 fL 83 - -   PLATELET COUNT 150 - 450 10e3/uL 215 - -   ESR 0 - 30 mm/hr 71(H) - 49(H)   QUANTIFERON-TB GOLD PLUS RESULT NEG:Negative - - -     Component      Latest Ref Rng & Units 2/1/2021   WBC      4.0 - 11.0 10e9/L 7.8   RBC Count      3.8 - 5.2 10e12/L 4.50   Hemoglobin      11.7 - 15.7 g/dL 11.7   Hematocrit      35.0 - 47.0 % 37.9   MCV      78 - 100 fl 84   MCH      26.5 - 33.0 pg 26.0 (L)   MCHC      31.5 - 36.5 g/dL 30.9 (L)   RDW      10.0 - 15.0 % 14.9   Platelet Count      150 - 450 10e9/L 236   % Neutrophils      % 84.9   % Lymphocytes      % 6.3   % Monocytes      % 7.9   % Eosinophils      % 0.6   % Basophils      % 0.3   Absolute Neutrophil      1.6 - 8.3 10e9/L 6.6   Absolute Lymphocytes      0.8 - 5.3 10e9/L 0.5 (L)   Absolute Monocytes      0.0 - 1.3 10e9/L 0.6   Absolute Eosinophils      0.0 - 0.7 10e9/L 0.1   Absolute Basophils      0.0 - 0.2 10e9/L 0.0   Diff Method       Automated Method   Color Urine       Yellow   Appearance Urine       Clear   Glucose Urine      NEG:Negative mg/dL Negative   Bilirubin Urine      NEG:Negative Negative   Ketones Urine      NEG:Negative mg/dL Trace (A)   Specific Gravity Urine      1.003 - 1.035 >1.030   pH Urine      5.0 - 7.0 pH 6.0    Protein Albumin Urine      NEG:Negative mg/dL 100 (A)   Urobilinogen Urine      0.2 - 1.0 EU/dL 0.2   Nitrite Urine      NEG:Negative Negative   Blood Urine      NEG:Negative Trace (A)   Leukocyte Esterase Urine      NEG:Negative Trace (A)   Source       Midstream Urine   WBC Urine      OTO5:0 - 5 /HPF 10-25 (A)   RBC Urine      OTO2:O - 2 /HPF 2-5 (A)   Squamous Epithelial /LPF Urine      FEW:Few /LPF Few   Bacteria Urine      NEG:Negative /HPF Moderate (A)   Triple Phosphates      NEG:Negative /HPF Few (A)   IGG      610 - 1,616 mg/dL 399 (L)   IGA      84 - 499 mg/dL 190   IGM      35 - 242 mg/dL 16 (L)   Creatinine      0.52 - 1.04 mg/dL 0.60   GFR Estimate      >60 mL/min/1.73:m2 >90   GFR Estimate If Black      >60 mL/min/1.73:m2 >90   Specimen Description       Midstream Urine   Special Requests       Specimen received in preservative   Culture Micro       <10,000 colonies/mL . . .   CD19 B Cells      6 - 27 % <1 (L)   Absolute CD19      107 - 698 cells/uL <1 (L)   Protein Random Urine      g/L 0.75   Protein Total Urine g/gr Creatinine      0 - 0.2 g/g Cr 0.40 (H)   Creatinine Urine Random      mg/dL 182   Sed Rate      0 - 30 mm/h 27   DNA-ds      <10 IU/mL 102 (H)   CRP Inflammation      0.0 - 8.0 mg/L 8.9 (H)   Complement C3      81 - 157 mg/dL 104   Complement C4      13 - 39 mg/dL 23   AST      0 - 45 U/L 16   Albumin      3.4 - 5.0 g/dL 4.1   ALT      0 - 50 U/L 26   Creatinine Urine      mg/dL 186     Component      Latest Ref Rng & Units 11/10/2021   WBC      4.0 - 11.0 10e3/uL 8.2   RBC Count      3.80 - 5.20 10e6/uL 4.58   Hemoglobin      11.7 - 15.7 g/dL 12.3   Hematocrit      35.0 - 47.0 % 39.7   MCV      78 - 100 fL 87   MCH      26.5 - 33.0 pg 26.9   MCHC      31.5 - 36.5 g/dL 31.0 (L)   RDW      10.0 - 15.0 % 14.2   Platelet Count      150 - 450 10e3/uL 229   % Neutrophils      % 83   % Lymphocytes      % 6   % Monocytes      % 9   % Eosinophils      % 2   % Basophils      % 0   Absolute  Neutrophils      1.6 - 8.3 10e3/uL 6.9   Absolute Lymphocytes      0.8 - 5.3 10e3/uL 0.5 (L)   Absolute Monocytes      0.0 - 1.3 10e3/uL 0.7   Absolute Eosinophils      0.0 - 0.7 10e3/uL 0.1   Absolute Basophils      0.0 - 0.2 10e3/uL 0.0   Color Urine      Colorless, Straw, Light Yellow, Yellow Yellow   Appearance Urine      Clear Clear   Glucose Urine      Negative mg/dL Negative   Bilirubin Urine      Negative Negative   Ketones Urine      Negative mg/dL Trace (A)   Specific Gravity Urine      1.003 - 1.035 >=1.030   Blood Urine      Negative Small (A)   pH Urine      5.0 - 7.0 6.0   Protein Albumin Urine      Negative mg/dL Negative   Urobilinogen Urine      0.2, 1.0 E.U./dL 0.2   Nitrite Urine      Negative Negative   Leukocyte Esterase Urine      Negative Trace (A)   Sodium      133 - 144 mmol/L 138   Potassium      3.4 - 5.3 mmol/L 3.9   Chloride      94 - 109 mmol/L 103   Carbon Dioxide      20 - 32 mmol/L 29   Anion Gap      3 - 14 mmol/L 6   Urea Nitrogen      7 - 30 mg/dL 22   Creatinine      0.52 - 1.04 mg/dL 0.70   Calcium      8.5 - 10.1 mg/dL 9.6   Glucose      70 - 99 mg/dL 83   Albumin      3.4 - 5.0 g/dL 4.0   Phosphorus      2.5 - 4.5 mg/dL 3.9   GFR Estimate      >60 mL/min/1.73m2 >90   Bacteria Urine      None Seen /HPF Few (A)   RBC Urine      0-2 /HPF /HPF 0-2   WBC Urine      0-5 /HPF /HPF 0-5   Squamous Epithelial /LPF Urine      None Seen /LPF Few (A)   Mucus Urine      None Seen /LPF Present (A)   MPO Cari IgG Instrument Value      <3.5 U/mL <0.3   Myeloperoxidase Antibody IgG      Negative Negative   Proteinase 3 Cari IgG Instrument Value      <2.0 U/mL <1.0   Proteinase 3 Antibody IgG      Negative Negative   IGG      610-1,616 mg/dL 398 (L)   IGA      84 - 499 mg/dL 184   IGM      35 - 242 mg/dL 16 (L)   Protein Random Urine      g/L 0.23   Protein Total Urine g/gr Creatinine      0.00 - 0.20 g/g Cr 0.11   Creatinine Urine      mg/dL 212   Neutrophil Cytoplasmic Antibody      <1:10 <1:10    Neutrophil Cytoplasmic Antibody Pattern       The ANCA IFA is <1:10.  No further testing will be performed.   CD19 B Cells      6 - 27 % <1 (L)   Absolute CD19      107 - 698 cells/uL <1 (L)   ALT      0 - 50 U/L 27   AST      0 - 45 U/L 19   Complement C4      13 - 39 mg/dL 28   Complement C3      81 - 157 mg/dL 138   CRP Inflammation      0.0 - 8.0 mg/L 8.9 (H)   DNA-ds      <10.0 IU/mL 90.0 (H)   Sed Rate      0 - 30 mm/hr 25     Component      Latest Ref Rng & Units 4/29/2022   WBC      4.0 - 11.0 10e3/uL 7.1   RBC Count      3.80 - 5.20 10e6/uL 4.62   Hemoglobin      11.7 - 15.7 g/dL 12.0   Hematocrit      35.0 - 47.0 % 38.3   MCV      78 - 100 fL 83   MCH      26.5 - 33.0 pg 26.0 (L)   MCHC      31.5 - 36.5 g/dL 31.3 (L)   RDW      10.0 - 15.0 % 14.3   Platelet Count      150 - 450 10e3/uL 213   % Neutrophils      % 85   % Lymphocytes      % 6   % Monocytes      % 7   % Eosinophils      % 1   % Basophils      % 1   % Immature Granulocytes      % 0   NRBCs per 100 WBC      <1 /100 0   Absolute Neutrophils      1.6 - 8.3 10e3/uL 6.1   Absolute Lymphocytes      0.8 - 5.3 10e3/uL 0.4 (L)   Absolute Monocytes      0.0 - 1.3 10e3/uL 0.5   Absolute Eosinophils      0.0 - 0.7 10e3/uL 0.0   Absolute Basophils      0.0 - 0.2 10e3/uL 0.1   Absolute Immature Granulocytes      <=0.4 10e3/uL 0.0   Absolute NRBCs      10e3/uL 0.0   Color Urine      Colorless, Straw, Light Yellow, Yellow Yellow   Appearance Urine      Clear Clear   Glucose Urine      Negative mg/dL Negative   Bilirubin Urine      Negative Negative   Ketones Urine      Negative mg/dL Negative   Specific Gravity Urine      1.003 - 1.035 1.015   Blood Urine      Negative Negative   pH Urine      5.0 - 7.0 5.0   Protein Albumin Urine      Negative mg/dL Negative   Urobilinogen mg/dL      Normal, 2.0 mg/dL Normal   Nitrite Urine      Negative Negative   Leukocyte Esterase Urine      Negative Negative   Bacteria Urine      None Seen /HPF Few (A)   Mucus Urine      " None Seen /LPF Present (A)   RBC Urine      <=2 /HPF 1   WBC Urine      <=5 /HPF 1   Squamous Epithelial /HPF Urine      <=1 /HPF <1   Protein Random Urine      g/L 0.12   Protein Total Urine g/gr Creatinine      0.00 - 0.20 g/g Cr 0.18   Creatinine Urine      mg/dL 66   Creatinine      0.52 - 1.04 mg/dL 0.68   GFR Estimate      >60 mL/min/1.73m2 >90   AST      0 - 45 U/L 16   ALT      0 - 50 U/L 32   Albumin      3.4 - 5.0 g/dL 3.7   CRP Inflammation      0.0 - 8.0 mg/L 7.0   Sed Rate      0 - 30 mm/hr 19       Physical Exam:  /80 (BP Location: Right arm, Patient Position: Sitting, Cuff Size: Adult Large)   Pulse 74   Ht 1.676 m (5' 5.98\")   Wt 147.8 kg (325 lb 14.4 oz)   SpO2 97%   BMI 52.63 kg/m       Constitutional: Pleasant, NAD  Eyes: EOMI, sclera anicteric, conj not injected.  MS: No active synovitis or joint tenderness  Skin: No skin rash  CV: no M/R/G, RRR  Pulm: CTAB  Abdomen: soft, NT  Neuro: CN grossly intact without focal deficit  Psych: nl affect    Assessment/Plan:  Taina Singh is a 57 year old female who is here for follow up of SLE and lupus nephritis.    Systemic lupus erythematous  History of lupus nephritis  - Presented in 12/2017 for 2nd opinion of m/o her SLE. She was diagnosed with SLE at age 27. Her lupus has been marked by +KARLIE (highest titer 1:640, speckled and nucleolar patterns), +anti-DNA, low C3/C4, arthritis, malar rash, serositis (pleuritic CP), lupus nephritis, hemolytic anemia, enteritis supported by +SSA/SSB Ab, +RF, high ESR/CRP. She had neg anti-RNP, anti-Sm, acL IgM/G/A, LAC, cryo and anti-CCP. She was treated with prednisone and HCQ at the time of Dx. Can't remember how long she was on HCQ and why HCQ was stopped, but it probably was stopped because of stable disease, no report on HCQ toxicity. Reportedly her SLE was mild all these years.  - Her lupus flared in 7/2017 with no triggers when she presented with arthritis, HCQ was resumed, arthritis resolved. Mild " pulm HTN on 2D-Echo 8/2017 but neg R cardiac cath and VQ scan in 3/2018, also neg 2D-Echo.  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 Previously failed AZA, benlysta. Patient received 1st dose cyclophosphamide on 6/15/19, had 6 doses.  Initiated Rituxin 1/22/2020 as failed cytoxan.  Recommend rituxin infusion every 4-6 months, closer to 4 due to worsening of symptoms as infusions near at 6 month interval. Have decreased dose to 600 mg and it is well tolerated. Recommend continuation of rituximab as it is the only med that has helped with LN. Also tx options are limited (also necrotizing lesions on renal biopsy, can not rule out ANCA neg vasculitis overlap and rituximab is FDA approved for GPA/MPA).   - Current regimen: Prednisone 5 mg every day, MMF 1000 mg BID (tapered from 3 gr every day) daily, and  mg a day, and Rituximab (last infusion 6/3/2022).    Sjogren's with sicca  - Secondary, +SSA/SSB Ab and h/o MALT lymphoma at age 44 in remission. Uses blink eyedrops and salagen. No lip biopsy was done to confirm Dx of Sjogren's.       Recommendations:  - Last rituximab 500 mg 6/3/2022, next scheduled for 11/18/2022. Continue at interval q4-6 months depending on severity symptoms.  -Shanel today  - Continue Cellcept 1000 mg BID and  mg BID  -Off prednisone and doing well.  - Annual eye exam for HCQ monitoring  - Labs with next rituximab infusion then [id number].  Recent labs reviewed and stable  - PCP prophylaxis on atovaquone 10 mL (1500 mg). Patient did not tolerate inhaled pentamidine. Avoiding TMP-SMX given sulfa reaction and potential increase risk of SLE flare. Hesitant to use dapsone given risk of hemolytic anemia.  - Ambien prn for insomnia  -We will order a DEXA scan for baseline screening.  -Patient asked about Pneumovax.  She is due for the Prevnar vaccine but not the Pneumovax.  Advised the patient to discuss with Dr. Marvin in the next clinic visit.    Return in 3 to 4 months and video  visit.    Andrew Olmedo,  Rheumatology Fellow,  Pager: [phone].      Attending Note: I saw and evaluated the patient with Dr. Olmedo. I agree with the assessment and plan. SLE is under excellent control.    Killian Marroquin MD    Orders Placed This Encounter   Procedures     Dexa hip/pelvis/spine     ALT     Albumin level     AST     Creatinine     Complement C4     Complement C3     CRP inflammation     DNA double stranded antibodies     Erythrocyte sedimentation rate auto     UA with Microscopic reflex to Culture     Protein  random urine     Creatinine random urine     CD19 B Cell Count (B-lymphocyte antigen)     Immunoglobulins A G and M     CBC with Platelets & Differential

## 2022-11-15 ENCOUNTER — TELEPHONE (OUTPATIENT)
Dept: RHEUMATOLOGY | Facility: CLINIC | Age: 57
End: 2022-11-15

## 2022-11-15 RX ORDER — HEPARIN SODIUM (PORCINE) LOCK FLUSH IV SOLN 100 UNIT/ML 100 UNIT/ML
5 SOLUTION INTRAVENOUS
Status: CANCELLED | OUTPATIENT
Start: 2022-11-15

## 2022-11-15 RX ORDER — METHYLPREDNISOLONE SODIUM SUCCINATE 125 MG/2ML
125 INJECTION, POWDER, LYOPHILIZED, FOR SOLUTION INTRAMUSCULAR; INTRAVENOUS
Status: CANCELLED
Start: 2022-11-15

## 2022-11-15 RX ORDER — ALBUTEROL SULFATE 90 UG/1
1-2 AEROSOL, METERED RESPIRATORY (INHALATION)
Status: CANCELLED
Start: 2022-11-15

## 2022-11-15 RX ORDER — MEPERIDINE HYDROCHLORIDE 25 MG/ML
25 INJECTION INTRAMUSCULAR; INTRAVENOUS; SUBCUTANEOUS EVERY 30 MIN PRN
Status: CANCELLED | OUTPATIENT
Start: 2022-11-15

## 2022-11-15 RX ORDER — ALBUTEROL SULFATE 0.83 MG/ML
2.5 SOLUTION RESPIRATORY (INHALATION)
Status: CANCELLED | OUTPATIENT
Start: 2022-11-15

## 2022-11-15 RX ORDER — ACETAMINOPHEN 325 MG/1
650 TABLET ORAL ONCE
Status: CANCELLED
Start: 2022-11-15

## 2022-11-15 RX ORDER — EPINEPHRINE 1 MG/ML
0.3 INJECTION, SOLUTION, CONCENTRATE INTRAVENOUS EVERY 5 MIN PRN
Status: CANCELLED | OUTPATIENT
Start: 2022-11-15

## 2022-11-15 RX ORDER — DIPHENHYDRAMINE HYDROCHLORIDE 50 MG/ML
50 INJECTION INTRAMUSCULAR; INTRAVENOUS
Status: CANCELLED
Start: 2022-11-15

## 2022-11-15 RX ORDER — METHYLPREDNISOLONE SODIUM SUCCINATE 125 MG/2ML
125 INJECTION, POWDER, LYOPHILIZED, FOR SOLUTION INTRAMUSCULAR; INTRAVENOUS ONCE
Status: CANCELLED | OUTPATIENT
Start: 2022-11-15

## 2022-11-15 RX ORDER — HEPARIN SODIUM,PORCINE 10 UNIT/ML
5 VIAL (ML) INTRAVENOUS
Status: CANCELLED | OUTPATIENT
Start: 2022-11-15

## 2022-11-15 NOTE — TELEPHONE ENCOUNTER
Pt needs updated Rituximab orders, 500 mg IV x 1    Sent to Dr. Marroquin for review and signature.    Sophie Alvarado RN  Rheumatology Clinic

## 2022-11-15 NOTE — TELEPHONE ENCOUNTER
Infusion orders have been reviewed and signed by provider.    Sophie Alvarado RN  Rheumatology Clinic

## 2022-11-18 ENCOUNTER — INFUSION THERAPY VISIT (OUTPATIENT)
Dept: INFUSION THERAPY | Facility: CLINIC | Age: 57
End: 2022-11-18
Payer: COMMERCIAL

## 2022-11-18 ENCOUNTER — LAB (OUTPATIENT)
Dept: LAB | Facility: CLINIC | Age: 57
End: 2022-11-18
Payer: COMMERCIAL

## 2022-11-18 VITALS
BODY MASS INDEX: 52.82 KG/M2 | OXYGEN SATURATION: 97 % | SYSTOLIC BLOOD PRESSURE: 138 MMHG | WEIGHT: 293 LBS | RESPIRATION RATE: 16 BRPM | DIASTOLIC BLOOD PRESSURE: 83 MMHG | HEART RATE: 88 BPM | TEMPERATURE: 98 F

## 2022-11-18 DIAGNOSIS — Z79.899 HIGH RISK MEDICATIONS (NOT ANTICOAGULANTS) LONG-TERM USE: ICD-10-CM

## 2022-11-18 DIAGNOSIS — M32.9 SYSTEMIC LUPUS ERYTHEMATOSUS, UNSPECIFIED SLE TYPE, UNSPECIFIED ORGAN INVOLVEMENT STATUS (H): ICD-10-CM

## 2022-11-18 DIAGNOSIS — M32.14 LUPUS NEPHRITIS, ISN/RPS CLASS IV (H): ICD-10-CM

## 2022-11-18 DIAGNOSIS — I77.82 ANCA-NEGATIVE VASCULITIS (H): Primary | ICD-10-CM

## 2022-11-18 DIAGNOSIS — M06.09 RHEUMATOID ARTHRITIS, SERONEGATIVE, MULTIPLE SITES (H): ICD-10-CM

## 2022-11-18 LAB
ALBUMIN SERPL-MCNC: 3.9 G/DL (ref 3.4–5)
ALBUMIN UR-MCNC: 50 MG/DL
ALT SERPL W P-5'-P-CCNC: 44 U/L (ref 0–50)
AMORPH CRY #/AREA URNS HPF: ABNORMAL /HPF
APPEARANCE UR: ABNORMAL
AST SERPL W P-5'-P-CCNC: 31 U/L (ref 0–45)
BACTERIA #/AREA URNS HPF: ABNORMAL /HPF
BASOPHILS # BLD AUTO: 0.1 10E3/UL (ref 0–0.2)
BASOPHILS NFR BLD AUTO: 1 %
BILIRUB UR QL STRIP: NEGATIVE
CAOX CRY #/AREA URNS HPF: ABNORMAL /HPF
COLOR UR AUTO: YELLOW
CREAT SERPL-MCNC: 0.58 MG/DL (ref 0.52–1.04)
CRP SERPL-MCNC: 19.2 MG/L (ref 0–8)
EOSINOPHIL # BLD AUTO: 0.1 10E3/UL (ref 0–0.7)
EOSINOPHIL NFR BLD AUTO: 2 %
ERYTHROCYTE [DISTWIDTH] IN BLOOD BY AUTOMATED COUNT: 13.4 % (ref 10–15)
ERYTHROCYTE [SEDIMENTATION RATE] IN BLOOD BY WESTERGREN METHOD: 24 MM/HR (ref 0–30)
GFR SERPL CREATININE-BSD FRML MDRD: >90 ML/MIN/1.73M2
GLUCOSE UR STRIP-MCNC: NEGATIVE MG/DL
HCT VFR BLD AUTO: 37.3 % (ref 35–47)
HGB BLD-MCNC: 12 G/DL (ref 11.7–15.7)
HGB UR QL STRIP: NEGATIVE
IMM GRANULOCYTES # BLD: 0 10E3/UL
IMM GRANULOCYTES NFR BLD: 0 %
KETONES UR STRIP-MCNC: NEGATIVE MG/DL
LEUKOCYTE ESTERASE UR QL STRIP: ABNORMAL
LYMPHOCYTES # BLD AUTO: 0.4 10E3/UL (ref 0.8–5.3)
LYMPHOCYTES NFR BLD AUTO: 7 %
MCH RBC QN AUTO: 26.4 PG (ref 26.5–33)
MCHC RBC AUTO-ENTMCNC: 32.2 G/DL (ref 31.5–36.5)
MCV RBC AUTO: 82 FL (ref 78–100)
MONOCYTES # BLD AUTO: 0.7 10E3/UL (ref 0–1.3)
MONOCYTES NFR BLD AUTO: 12 %
MUCOUS THREADS #/AREA URNS LPF: PRESENT /LPF
NEUTROPHILS # BLD AUTO: 4.4 10E3/UL (ref 1.6–8.3)
NEUTROPHILS NFR BLD AUTO: 78 %
NITRATE UR QL: NEGATIVE
NRBC # BLD AUTO: 0 10E3/UL
NRBC BLD AUTO-RTO: 0 /100
PH UR STRIP: 6 [PH] (ref 5–7)
PLATELET # BLD AUTO: 205 10E3/UL (ref 150–450)
RBC # BLD AUTO: 4.55 10E6/UL (ref 3.8–5.2)
RBC #/AREA URNS AUTO: ABNORMAL /HPF
SP GR UR STRIP: 1.03 (ref 1–1.03)
SQUAMOUS #/AREA URNS AUTO: ABNORMAL /LPF
UROBILINOGEN UR STRIP-MCNC: NORMAL MG/DL
WBC # BLD AUTO: 5.7 10E3/UL (ref 4–11)
WBC #/AREA URNS AUTO: ABNORMAL /HPF

## 2022-11-18 PROCEDURE — 96415 CHEMO IV INFUSION ADDL HR: CPT | Performed by: INTERNAL MEDICINE

## 2022-11-18 PROCEDURE — 86160 COMPLEMENT ANTIGEN: CPT

## 2022-11-18 PROCEDURE — 99207 PR NO CHARGE LOS: CPT

## 2022-11-18 PROCEDURE — 96375 TX/PRO/DX INJ NEW DRUG ADDON: CPT | Performed by: INTERNAL MEDICINE

## 2022-11-18 PROCEDURE — 86225 DNA ANTIBODY NATIVE: CPT

## 2022-11-18 PROCEDURE — 85025 COMPLETE CBC W/AUTO DIFF WBC: CPT

## 2022-11-18 PROCEDURE — 84450 TRANSFERASE (AST) (SGOT): CPT

## 2022-11-18 PROCEDURE — 82565 ASSAY OF CREATININE: CPT

## 2022-11-18 PROCEDURE — 84156 ASSAY OF PROTEIN URINE: CPT

## 2022-11-18 PROCEDURE — 81001 URINALYSIS AUTO W/SCOPE: CPT

## 2022-11-18 PROCEDURE — 82040 ASSAY OF SERUM ALBUMIN: CPT

## 2022-11-18 PROCEDURE — 82784 ASSAY IGA/IGD/IGG/IGM EACH: CPT

## 2022-11-18 PROCEDURE — 85652 RBC SED RATE AUTOMATED: CPT

## 2022-11-18 PROCEDURE — 87086 URINE CULTURE/COLONY COUNT: CPT

## 2022-11-18 PROCEDURE — 86355 B CELLS TOTAL COUNT: CPT

## 2022-11-18 PROCEDURE — 96367 TX/PROPH/DG ADDL SEQ IV INF: CPT | Performed by: INTERNAL MEDICINE

## 2022-11-18 PROCEDURE — 86140 C-REACTIVE PROTEIN: CPT

## 2022-11-18 PROCEDURE — 84460 ALANINE AMINO (ALT) (SGPT): CPT

## 2022-11-18 PROCEDURE — 36415 COLL VENOUS BLD VENIPUNCTURE: CPT

## 2022-11-18 PROCEDURE — 96413 CHEMO IV INFUSION 1 HR: CPT | Performed by: INTERNAL MEDICINE

## 2022-11-18 RX ORDER — ALBUTEROL SULFATE 90 UG/1
1-2 AEROSOL, METERED RESPIRATORY (INHALATION)
Status: CANCELLED
Start: 2022-11-18

## 2022-11-18 RX ORDER — ACETAMINOPHEN 325 MG/1
650 TABLET ORAL ONCE
Status: CANCELLED
Start: 2022-11-18

## 2022-11-18 RX ORDER — DIPHENHYDRAMINE HYDROCHLORIDE 50 MG/ML
50 INJECTION INTRAMUSCULAR; INTRAVENOUS
Status: CANCELLED
Start: 2022-11-18

## 2022-11-18 RX ORDER — MEPERIDINE HYDROCHLORIDE 25 MG/ML
25 INJECTION INTRAMUSCULAR; INTRAVENOUS; SUBCUTANEOUS EVERY 30 MIN PRN
Status: CANCELLED | OUTPATIENT
Start: 2022-11-18

## 2022-11-18 RX ORDER — METHYLPREDNISOLONE SODIUM SUCCINATE 125 MG/2ML
125 INJECTION, POWDER, LYOPHILIZED, FOR SOLUTION INTRAMUSCULAR; INTRAVENOUS ONCE
Status: COMPLETED | OUTPATIENT
Start: 2022-11-18 | End: 2022-11-18

## 2022-11-18 RX ORDER — EPINEPHRINE 1 MG/ML
0.3 INJECTION, SOLUTION INTRAMUSCULAR; SUBCUTANEOUS EVERY 5 MIN PRN
Status: CANCELLED | OUTPATIENT
Start: 2022-11-18

## 2022-11-18 RX ORDER — ACETAMINOPHEN 325 MG/1
650 TABLET ORAL ONCE
Status: COMPLETED | OUTPATIENT
Start: 2022-11-18 | End: 2022-11-18

## 2022-11-18 RX ORDER — HEPARIN SODIUM,PORCINE 10 UNIT/ML
5 VIAL (ML) INTRAVENOUS
Status: CANCELLED | OUTPATIENT
Start: 2022-11-18

## 2022-11-18 RX ORDER — HEPARIN SODIUM (PORCINE) LOCK FLUSH IV SOLN 100 UNIT/ML 100 UNIT/ML
5 SOLUTION INTRAVENOUS
Status: CANCELLED | OUTPATIENT
Start: 2022-11-18

## 2022-11-18 RX ORDER — METHYLPREDNISOLONE SODIUM SUCCINATE 125 MG/2ML
125 INJECTION, POWDER, LYOPHILIZED, FOR SOLUTION INTRAMUSCULAR; INTRAVENOUS ONCE
Status: CANCELLED | OUTPATIENT
Start: 2022-11-18

## 2022-11-18 RX ORDER — ALBUTEROL SULFATE 0.83 MG/ML
2.5 SOLUTION RESPIRATORY (INHALATION)
Status: CANCELLED | OUTPATIENT
Start: 2022-11-18

## 2022-11-18 RX ORDER — METHYLPREDNISOLONE SODIUM SUCCINATE 125 MG/2ML
125 INJECTION, POWDER, LYOPHILIZED, FOR SOLUTION INTRAMUSCULAR; INTRAVENOUS
Status: CANCELLED
Start: 2022-11-18

## 2022-11-18 RX ORDER — HEPARIN SODIUM (PORCINE) LOCK FLUSH IV SOLN 100 UNIT/ML 100 UNIT/ML
5 SOLUTION INTRAVENOUS
Status: DISCONTINUED | OUTPATIENT
Start: 2022-11-18 | End: 2022-11-18 | Stop reason: HOSPADM

## 2022-11-18 RX ADMIN — METHYLPREDNISOLONE SODIUM SUCCINATE 125 MG: 125 INJECTION INTRAMUSCULAR; INTRAVENOUS at 13:26

## 2022-11-18 RX ADMIN — ACETAMINOPHEN 650 MG: 325 TABLET ORAL at 13:26

## 2022-11-18 RX ADMIN — Medication 250 ML: at 13:21

## 2022-11-18 ASSESSMENT — PAIN SCALES - GENERAL: PAINLEVEL: NO PAIN (0)

## 2022-11-18 NOTE — PROGRESS NOTES
"Infusion Nursing Note:  Taina Singh presents today for Rapid Rituxan.    Patient seen by provider today: No   present during visit today: Not Applicable.    Note: The patient reports feeling well today and denies any concerns at this time.    IV placed in right hand without difficulty. Good blood return was noted. During Benadryl infusion the patient reported her IV was \"stingy.\" Good blood return still noted but skin noted to vida with NS flush, a couple minutes later skin noted to be red. IV removed and replaced in left hand. IV still \"stingy\" during the completion of the Benadryl infusion but no redness noted.     Patient reports she has received both IV and PO Benadryl prior to her Rituxan infusions and would prefer PO. Only IV Benadryl is ordered in this treatment plan. [employer] message sent to Dr. Marroquin to see if PO Benadryl could be ordered.     Intravenous Access:  Peripheral IV placed.    Treatment Conditions:  Biological Infusion Checklist:  ~~~ NOTE: If the patient answers yes to any of the questions below, hold the infusion and contact ordering provider or on-call provider.    1. Have you recently had an elevated temperature, fever, chills, productive cough, coughing for 3 weeks or longer or hemoptysis, abnormal vital signs, night sweats,  chest pain or have you noticed a decrease in your appetite, unexplained weight loss or fatigue? No  2. Do you have any open wounds or new incisions? No  3. Do you have any recent or upcoming hospitalizations, surgeries or dental procedures? No  4. Do you currently have or recently have had any signs of illness or infection or are you on any antibiotics? No  5. Have you had any new, sudden or worsening abdominal pain? No  6. Have you or anyone in your household received a live vaccination in the past 4 weeks? Please note:  No live vaccines while on biologic/chemotherapy until 6 months after the last treatment.  Patient can receive the flu vaccine " (shot only) and the pneumovax.  It is optimal for the patient to get these vaccines mid cycle, but they can be given at any time as long as it is not on the day of the infusion. No  7. Have you recently been diagnosed with any new nervous system diseases (ie. Multiple sclerosis, Guillain Ventura, seizures, neurological changes) or cancer diagnosis? No  8. Are you on any form of radiation or chemotherapy? No  9. Are you pregnant or breast feeding or do you have plans of pregnancy in the future? No  10. Have you been having any signs of worsening depression or suicidal ideations?  (benlysta only) No  11. Have there been any other new onset medical symptoms? No    Post Infusion Assessment:  Patient tolerated infusion without incident.  Blood return noted pre and post infusion.  Site patent and intact, free from redness, edema or discomfort.  No evidence of extravasations.  Access discontinued per protocol.  Biologic Infusion Post Education: Call the triage nurse at your clinic or seek medical attention if you have chills and/or temperature greater than or equal to 100.5, uncontrolled nausea/vomiting, diarrhea, constipation, dizziness, shortness of breath, chest pain, heart palpitations, weakness or any other new or concerning symptoms, questions or concerns.  You cannot have any live virus vaccines prior to or during treatment or up to 6 months post infusion.  If you have an upcoming surgery, medical procedure or dental procedure during treatment, this should be discussed with your ordering physician and your surgeon/dentist.  If you are having any concerning symptom, if you are unsure if you should get your next infusion or wish to speak to a provider before your next infusion, please call your care coordinator or triage nurse at your clinic to notify them so we can adequately serve you.     Discharge Plan:   AVS to patient via [employer].  No further appointment scheduled at this time. Patient directed to scheduling to  make future appointments.  Patient discharged in stable condition accompanied by: self.  Departure Mode: Ambulatory.    Vannesa Shaw RN

## 2022-11-19 LAB
ALBUMIN MFR UR ELPH: 20.5 MG/DL
CD19 CELLS # BLD: 1 CELLS/UL (ref 107–698)
CD19 CELLS NFR BLD: <1 % (ref 6–27)
CREAT UR-MCNC: 285 MG/DL
PROT/CREAT 24H UR: 0.07 MG/MG CR (ref 0–0.2)

## 2022-11-20 LAB — BACTERIA UR CULT: NORMAL

## 2022-11-21 LAB
C3 SERPL-MCNC: 138 MG/DL (ref 81–157)
C4 SERPL-MCNC: 30 MG/DL (ref 13–39)
DSDNA AB SER-ACNC: 49 IU/ML
IGA SERPL-MCNC: 152 MG/DL (ref 84–499)
IGG SERPL-MCNC: 370 MG/DL (ref 610–1616)
IGM SERPL-MCNC: 12 MG/DL (ref 35–242)

## 2022-12-18 DIAGNOSIS — G47.00 INSOMNIA, UNSPECIFIED TYPE: ICD-10-CM

## 2022-12-20 NOTE — TELEPHONE ENCOUNTER
zolpidem (AMBIEN) 5 MG tablet 30 tablet 3 4/11/2022         Last Written Prescription Date:  4-  Last Fill Quantity: 90,   # refills: 3  Last Office Visit : 10-  Future Office visit:  3-8-2023    Routing refill request to provider for review/approval because:  CONTROLLED MEDICATION      Kathleen M Doege RN

## 2022-12-25 RX ORDER — ZOLPIDEM TARTRATE 5 MG/1
5 TABLET ORAL
Qty: 30 TABLET | Refills: 3 | Status: SHIPPED | OUTPATIENT
Start: 2022-12-25 | End: 2023-08-08

## 2022-12-27 ENCOUNTER — ANCILLARY PROCEDURE (OUTPATIENT)
Dept: BONE DENSITY | Facility: CLINIC | Age: 57
End: 2022-12-27
Attending: INTERNAL MEDICINE
Payer: COMMERCIAL

## 2022-12-27 DIAGNOSIS — Z79.52 CURRENT CHRONIC USE OF SYSTEMIC STEROIDS: ICD-10-CM

## 2022-12-27 PROCEDURE — 77080 DXA BONE DENSITY AXIAL: CPT | Performed by: INTERNAL MEDICINE

## 2023-01-08 ENCOUNTER — HEALTH MAINTENANCE LETTER (OUTPATIENT)
Age: 58
End: 2023-01-08

## 2023-01-25 DIAGNOSIS — M32.14 LUPUS NEPHRITIS, ISN/RPS CLASS IV (H): ICD-10-CM

## 2023-01-26 NOTE — TELEPHONE ENCOUNTER
mycophenolate (GENERIC EQUIVALENT) 500 MG tablet        Last Written Prescription Date:  08-  Last Fill Quantity: 360,   # refills: 1  Last Office Visit : 10-  Future Office visit:  03-    Labs completed on :  11-

## 2023-01-27 RX ORDER — MYCOPHENOLATE MOFETIL 500 MG/1
TABLET ORAL
Qty: 360 TABLET | Refills: 1 | Status: SHIPPED | OUTPATIENT
Start: 2023-01-27 | End: 2023-03-08

## 2023-03-08 ENCOUNTER — TELEPHONE (OUTPATIENT)
Dept: RHEUMATOLOGY | Facility: CLINIC | Age: 58
End: 2023-03-08
Payer: COMMERCIAL

## 2023-03-08 ENCOUNTER — VIRTUAL VISIT (OUTPATIENT)
Dept: RHEUMATOLOGY | Facility: CLINIC | Age: 58
End: 2023-03-08
Attending: INTERNAL MEDICINE
Payer: COMMERCIAL

## 2023-03-08 DIAGNOSIS — M32.9 SYSTEMIC LUPUS ERYTHEMATOSUS, UNSPECIFIED SLE TYPE, UNSPECIFIED ORGAN INVOLVEMENT STATUS (H): Primary | ICD-10-CM

## 2023-03-08 DIAGNOSIS — M32.14 LUPUS NEPHRITIS, ISN/RPS CLASS IV (H): ICD-10-CM

## 2023-03-08 DIAGNOSIS — Z79.899 HIGH RISK MEDICATIONS (NOT ANTICOAGULANTS) LONG-TERM USE: ICD-10-CM

## 2023-03-08 PROCEDURE — 99214 OFFICE O/P EST MOD 30 MIN: CPT | Mod: VID | Performed by: INTERNAL MEDICINE

## 2023-03-08 PROCEDURE — G0463 HOSPITAL OUTPT CLINIC VISIT: HCPCS | Mod: PN,GT | Performed by: INTERNAL MEDICINE

## 2023-03-08 RX ORDER — MYCOPHENOLATE MOFETIL 500 MG/1
TABLET ORAL
Qty: 270 TABLET | Refills: 1
Start: 2023-03-08 | End: 2023-10-06

## 2023-03-08 NOTE — PROGRESS NOTES
Video-Visit Details    Type of service:  Video Visit    Video Start Time (time video started): 3:42 pm    Video End Time (time video stopped): 4:11 pm    Originating Location (pt. Location): Home        Distant Location (provider location):  On-site    Mode of Communication:  Video Conference via [facility] Follow-up Visit Note    Reason for visit: f/u for lupus  Date of last visit:  10/28/2022  DOS: 3/8/2023      History of Present Illness:  Taina Singh is a 57 year old female who is here for follow up of SLE and lupus nephritis.    History:  - She was diagnosed with lupus at age 27. Her 1st sx were malar rash and fatigue. No skin biopsy was done (reportedly had +KARLIE). She was treated with prednisone taper and HCQ. HCQ helped and she tolerated it well. She was diagnosed with Sjogren's at the same time. Had dryness of eyes and mouth. No lip biopsy to confirm the Dx. Reportedly she had very mild stable lupus for many years and eventually at some point, stopped taking HCQ (can't remember when). Her lupus started to flare in 7/2017 initially with pleuritic CP, was put back on HCQ. 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 Since then failed AZA, cytoxan and benlysta. MMF was not enough to control her LN and eventually rituximab was added (which was initially denied by insurance even with h/o lymphoma) given necrosis on the renal biopsy (rituximab is FDA approved for ANCA vasculitis). On HCQ+MMF after adding rituximab, LN and SLE started too improve.  - She was diagnosed with MALT lymphoma at 44, had enlarged LN over R parotid gland, on biopsy it was MALT lymphoma. Tx was resection only, no radiation or chemo.    2/4/2021:  - Taina is on prednisone 8 mg every day, it was 17.5 mg every day at last visit. She feels achy and tired this week, but thinks the cold weather is responsible for her sx. Had rituximab  375 mg/m2 on 1/22/2020, 2/6/2020, then 6/26/2020,  "7/20/2020 and last dose 1 gram on 12/15/2020. On mepron for PJP prophylaxis. On  mg bid, MMF 1500 mg bid. She works from home. Has intermittent joint pain, nothing consistent. one day shoulders hurt and the other day her hips hurt. AM stiffness is 30-60 minutes. no pleurisy, sob or cough or fevers. No skin rash. Her hair grew back.    11/12/2021:  - Taina is feeling well, she thinks her lupus is under fair control, no major complaints today.    4/29/2022:  - Today, Taina reports that she is feeling well.  Has noted some general diffuse aching and fatigue, difficulty making fist, and middle finger with trigger symptoms, which she typically has as she nears her rituximab infusion.  She has noted some morning stiffness, but this is very responsive to activity and stretching. Continues to have some dry mouth managed via increased hydration. She has had an intermittent mild rash of her nose and cheeks, but this is not consistent and not dependent on sun exposure. She has had a good appetite and an overall positive mood. Feels as though rituximab has been a \"huge game changer\" for her.    10/28/2022:  Ms. Singh presents for follow up.  -Patient reports that she is doing well.  She was able to completely wean and discontinue the prednisone.  She is doing well on rituximab infusions.  Her last rituximab treatment was in June and is scheduled to get the next Rituxan in November.  She is compliant with CellCept and Plaquenil.  She recently received the Evusheld.  Denies any oral ulcers, rashes, joint pains, body aches, vision changes, shortness of breath and  Exertion.  Reports morning stiffness is minimal.  Good appetite and normal mood.    3/8/2023:    Doing well  Stable  No flares  Last prednisone use, was about 2 wk ago, 2.5 mg every day x 2 days after a cold.    ROS:    A comprehensive ROS was done, positives are per HPI.    Past Medical History:  Past Medical History:   Diagnosis Date     Bronchiolitis     " Chest CT 2018 shows air trapping; bronchiolitis possibly related to lupus     Diastolic dysfunction 2018     Gluten intolerance     Patient is not gluten intolerant     Iron deficiency      Iron deficiency 2018     Lupus (H)     dx age 27; + DNA-ds, KARLIE, SSA, SSB and RF; malar rash, serositis (pleuritic CP)     Lupus nephritis (H)     cyclophosphamide started 2019     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 age 44    No chemo or radiation     Other forms of systemic lupus erythematosus (H) 2018     Pulmonary embolism (H)     2019 seen on abd CT at [facility]     Sjogren's syndrome (H)     secondary Sjogrens, secondary to lupus     Vitamin D deficiency      Past Surgical History:  Past Surgical History:   Procedure Laterality Date     BIOPSY/REMOVAL, LYMPH NODE(S)        SECTION  age 32     HC HYSTEROS W PERMANENT FALLOPAIN IMPLANT      Essure b/l     PAROTIDECTOMY Right 2006     TONSILLECTOMY       Family history:  Family History   Problem Relation Age of Onset     Alopecia Brother      Rheumatoid Arthritis Brother      LUNG DISEASE No family hx of      Social history:  Social History     Socioeconomic History     Marital status:      Spouse name: Not on file     Number of children: Not on file     Years of education: 1     Highest education level: Not on file   Occupational History     Comment: , 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      Social Determinants of Health     Financial Resource Strain: Not on file   Food Insecurity: Not on file   Transportation Needs: Not on file   Physical Activity: Not on file   Stress: Not on file   Social Connections: Not on file   Intimate Partner Violence: Not on file   Housing Stability: Not on file     Allergies:  Allergies   Allergen Reactions     Pentamidine Shortness Of Breath     Penicillins Rash     Sulfa Drugs Rash     Medications:  Outpatient Encounter Medications as of 3/8/2023   Medication Sig Dispense Refill     Calcium-Magnesium 300-300 MG TABS daily        hydroxychloroquine (PLAQUENIL) 200 MG tablet Take 1 tablet (200 mg) by mouth 2 times daily Get annual eye exams for hydroxychloroquine (Plaquenil) monitoring and fax to [phone] 180 tablet 3     Multiple Vitamins-Minerals (WOMENS MULTI PO) Take by mouth daily        mycophenolate (GENERIC EQUIVALENT) 500 MG tablet Take 2 tablets (1,000 mg) by mouth 2 times daily LABS REQUIRED EVERY 8-12 WEEKS WHILE TAKING THIS MEDICATION. - Oral 360 tablet 1     Omega-3 Fatty Acids (OMEGA-3 FISH OIL) 500 MG CAPS Take 500 mg by mouth daily        traMADol (ULTRAM) 50 MG tablet Take 1 tablet (50 mg) by mouth every 12 hours as needed for severe pain 60 tablet 3     vitamin D3 (CHOLECALCIFEROL) 50 mcg (2000 units) tablet Take 1 tablet (50 mcg) by mouth daily 90 tablet 3     warfarin ANTICOAGULANT (COUMADIN) 5 MG tablet   1     zolpidem (AMBIEN) 5 MG tablet Take 1 tablet (5 mg) by mouth every evening as needed for sleep 30 tablet 3     albuterol (PROAIR HFA/PROVENTIL HFA/VENTOLIN HFA) 108 (90 Base) MCG/ACT inhaler Inhale 2 puffs into the lungs every 6 hours as needed for shortness of breath / dyspnea or wheezing (Patient not taking: Reported on 11/18/2022) 3 Inhaler 3     predniSONE (DELTASONE) 5 MG tablet 1-2 tabs a day x 3 days prn flares (Patient not taking: Reported on 3/8/2023) 60 tablet 3     No facility-administered encounter  medications on file as of 3/8/2023.       Labs/Imaging:  RHEUM RESULTS Latest Ref Rng & Units 4/29/2022 10/6/2022 11/18/2022   ALBUMIN 3.4 - 5.0 g/dL 3.7 - 3.9   ALBUMIN (R) 3.5 - 5.2 g/dL - 4.0 -   ALT 0 - 50 U/L 32 - 44   AST 0 - 45 U/L 16 - 31   COMPLEMENT C3 81 - 157 mg/dL 129 - 138   COMPLEMENT C4 13 - 39 mg/dL 27 - 30   CREATININE 0.52 - 1.04 mg/dL 0.68 0.63 0.58   CRP 0.0 - 8.0 mg/L 7.0 - 19.2(H)   DNA <10.0 IU/mL 54.0(H) 67.0(H) 49.0(H)   GFR ESTIMATE, IF BLACK >60 mL/min/[1.73:m2] - - -   GFR ESTIMATE >60 mL/min/1.73m2 >90 >90 >90   HEMATOCRIT 35.0 - 47.0 % 38.3 39.7 37.3   HEMOGLOBIN 11.7 - 15.7 g/dL 12.0 12.4 12.0   HEPBANG NR:Nonreactive - - -   HCVAB NR:Nonreactive - - -   IGA 84 - 499 mg/dL - - 152   IGM 35 - 242 mg/dL - - 12(L)    - 1,616 mg/dL - - 370(L)   WBC 4.0 - 11.0 10e3/uL 7.1 6.5 5.7   RBC 3.80 - 5.20 10e6/uL 4.62 4.73 4.55   RDW 10.0 - 15.0 % 14.3 14.6 13.4   MCHC 31.5 - 36.5 g/dL 31.3(L) 31.2(L) 32.2   MCV 78 - 100 fL 83 84 82   PLATELET COUNT 150 - 450 10e3/uL 213 223 205   ESR 0 - 30 mm/hr 19 - 24   QUANTIFERON-TB GOLD PLUS RESULT NEG:Negative - - -     Component      Latest Ref Rng & Units 2/1/2021   WBC      4.0 - 11.0 10e9/L 7.8   RBC Count      3.8 - 5.2 10e12/L 4.50   Hemoglobin      11.7 - 15.7 g/dL 11.7   Hematocrit      35.0 - 47.0 % 37.9   MCV      78 - 100 fl 84   MCH      26.5 - 33.0 pg 26.0 (L)   MCHC      31.5 - 36.5 g/dL 30.9 (L)   RDW      10.0 - 15.0 % 14.9   Platelet Count      150 - 450 10e9/L 236   % Neutrophils      % 84.9   % Lymphocytes      % 6.3   % Monocytes      % 7.9   % Eosinophils      % 0.6   % Basophils      % 0.3   Absolute Neutrophil      1.6 - 8.3 10e9/L 6.6   Absolute Lymphocytes      0.8 - 5.3 10e9/L 0.5 (L)   Absolute Monocytes      0.0 - 1.3 10e9/L 0.6   Absolute Eosinophils      0.0 - 0.7 10e9/L 0.1   Absolute Basophils      0.0 - 0.2 10e9/L 0.0   Diff Method       Automated Method   Color Urine       Yellow   Appearance Urine       Clear    Glucose Urine      NEG:Negative mg/dL Negative   Bilirubin Urine      NEG:Negative Negative   Ketones Urine      NEG:Negative mg/dL Trace (A)   Specific Gravity Urine      1.003 - 1.035 >1.030   pH Urine      5.0 - 7.0 pH 6.0   Protein Albumin Urine      NEG:Negative mg/dL 100 (A)   Urobilinogen Urine      0.2 - 1.0 EU/dL 0.2   Nitrite Urine      NEG:Negative Negative   Blood Urine      NEG:Negative Trace (A)   Leukocyte Esterase Urine      NEG:Negative Trace (A)   Source       Midstream Urine   WBC Urine      OTO5:0 - 5 /HPF 10-25 (A)   RBC Urine      OTO2:O - 2 /HPF 2-5 (A)   Squamous Epithelial /LPF Urine      FEW:Few /LPF Few   Bacteria Urine      NEG:Negative /HPF Moderate (A)   Triple Phosphates      NEG:Negative /HPF Few (A)   IGG      610 - 1,616 mg/dL 399 (L)   IGA      84 - 499 mg/dL 190   IGM      35 - 242 mg/dL 16 (L)   Creatinine      0.52 - 1.04 mg/dL 0.60   GFR Estimate      >60 mL/min/1.73:m2 >90   GFR Estimate If Black      >60 mL/min/1.73:m2 >90   Specimen Description       Midstream Urine   Special Requests       Specimen received in preservative   Culture Micro       <10,000 colonies/mL . . .   CD19 B Cells      6 - 27 % <1 (L)   Absolute CD19      107 - 698 cells/uL <1 (L)   Protein Random Urine      g/L 0.75   Protein Total Urine g/gr Creatinine      0 - 0.2 g/g Cr 0.40 (H)   Creatinine Urine Random      mg/dL 182   Sed Rate      0 - 30 mm/h 27   DNA-ds      <10 IU/mL 102 (H)   CRP Inflammation      0.0 - 8.0 mg/L 8.9 (H)   Complement C3      81 - 157 mg/dL 104   Complement C4      13 - 39 mg/dL 23   AST      0 - 45 U/L 16   Albumin      3.4 - 5.0 g/dL 4.1   ALT      0 - 50 U/L 26   Creatinine Urine      mg/dL 186     Component      Latest Ref Rng & Units 11/10/2021   WBC      4.0 - 11.0 10e3/uL 8.2   RBC Count      3.80 - 5.20 10e6/uL 4.58   Hemoglobin      11.7 - 15.7 g/dL 12.3   Hematocrit      35.0 - 47.0 % 39.7   MCV      78 - 100 fL 87   MCH      26.5 - 33.0 pg 26.9   MCHC      31.5 -  36.5 g/dL 31.0 (L)   RDW      10.0 - 15.0 % 14.2   Platelet Count      150 - 450 10e3/uL 229   % Neutrophils      % 83   % Lymphocytes      % 6   % Monocytes      % 9   % Eosinophils      % 2   % Basophils      % 0   Absolute Neutrophils      1.6 - 8.3 10e3/uL 6.9   Absolute Lymphocytes      0.8 - 5.3 10e3/uL 0.5 (L)   Absolute Monocytes      0.0 - 1.3 10e3/uL 0.7   Absolute Eosinophils      0.0 - 0.7 10e3/uL 0.1   Absolute Basophils      0.0 - 0.2 10e3/uL 0.0   Color Urine      Colorless, Straw, Light Yellow, Yellow Yellow   Appearance Urine      Clear Clear   Glucose Urine      Negative mg/dL Negative   Bilirubin Urine      Negative Negative   Ketones Urine      Negative mg/dL Trace (A)   Specific Gravity Urine      1.003 - 1.035 >=1.030   Blood Urine      Negative Small (A)   pH Urine      5.0 - 7.0 6.0   Protein Albumin Urine      Negative mg/dL Negative   Urobilinogen Urine      0.2, 1.0 E.U./dL 0.2   Nitrite Urine      Negative Negative   Leukocyte Esterase Urine      Negative Trace (A)   Sodium      133 - 144 mmol/L 138   Potassium      3.4 - 5.3 mmol/L 3.9   Chloride      94 - 109 mmol/L 103   Carbon Dioxide      20 - 32 mmol/L 29   Anion Gap      3 - 14 mmol/L 6   Urea Nitrogen      7 - 30 mg/dL 22   Creatinine      0.52 - 1.04 mg/dL 0.70   Calcium      8.5 - 10.1 mg/dL 9.6   Glucose      70 - 99 mg/dL 83   Albumin      3.4 - 5.0 g/dL 4.0   Phosphorus      2.5 - 4.5 mg/dL 3.9   GFR Estimate      >60 mL/min/1.73m2 >90   Bacteria Urine      None Seen /HPF Few (A)   RBC Urine      0-2 /HPF /HPF 0-2   WBC Urine      0-5 /HPF /HPF 0-5   Squamous Epithelial /LPF Urine      None Seen /LPF Few (A)   Mucus Urine      None Seen /LPF Present (A)   MPO Cari IgG Instrument Value      <3.5 U/mL <0.3   Myeloperoxidase Antibody IgG      Negative Negative   Proteinase 3 Cari IgG Instrument Value      <2.0 U/mL <1.0   Proteinase 3 Antibody IgG      Negative Negative   IGG      610-1,616 mg/dL 398 (L)   IGA      84 - 499 mg/dL  184   IGM      35 - 242 mg/dL 16 (L)   Protein Random Urine      g/L 0.23   Protein Total Urine g/gr Creatinine      0.00 - 0.20 g/g Cr 0.11   Creatinine Urine      mg/dL 212   Neutrophil Cytoplasmic Antibody      <1:10 <1:10   Neutrophil Cytoplasmic Antibody Pattern       The ANCA IFA is <1:10.  No further testing will be performed.   CD19 B Cells      6 - 27 % <1 (L)   Absolute CD19      107 - 698 cells/uL <1 (L)   ALT      0 - 50 U/L 27   AST      0 - 45 U/L 19   Complement C4      13 - 39 mg/dL 28   Complement C3      81 - 157 mg/dL 138   CRP Inflammation      0.0 - 8.0 mg/L 8.9 (H)   DNA-ds      <10.0 IU/mL 90.0 (H)   Sed Rate      0 - 30 mm/hr 25     Component      Latest Ref Rng & Units 4/29/2022   WBC      4.0 - 11.0 10e3/uL 7.1   RBC Count      3.80 - 5.20 10e6/uL 4.62   Hemoglobin      11.7 - 15.7 g/dL 12.0   Hematocrit      35.0 - 47.0 % 38.3   MCV      78 - 100 fL 83   MCH      26.5 - 33.0 pg 26.0 (L)   MCHC      31.5 - 36.5 g/dL 31.3 (L)   RDW      10.0 - 15.0 % 14.3   Platelet Count      150 - 450 10e3/uL 213   % Neutrophils      % 85   % Lymphocytes      % 6   % Monocytes      % 7   % Eosinophils      % 1   % Basophils      % 1   % Immature Granulocytes      % 0   NRBCs per 100 WBC      <1 /100 0   Absolute Neutrophils      1.6 - 8.3 10e3/uL 6.1   Absolute Lymphocytes      0.8 - 5.3 10e3/uL 0.4 (L)   Absolute Monocytes      0.0 - 1.3 10e3/uL 0.5   Absolute Eosinophils      0.0 - 0.7 10e3/uL 0.0   Absolute Basophils      0.0 - 0.2 10e3/uL 0.1   Absolute Immature Granulocytes      <=0.4 10e3/uL 0.0   Absolute NRBCs      10e3/uL 0.0   Color Urine      Colorless, Straw, Light Yellow, Yellow Yellow   Appearance Urine      Clear Clear   Glucose Urine      Negative mg/dL Negative   Bilirubin Urine      Negative Negative   Ketones Urine      Negative mg/dL Negative   Specific Gravity Urine      1.003 - 1.035 1.015   Blood Urine      Negative Negative   pH Urine      5.0 - 7.0 5.0   Protein Albumin Urine       Negative mg/dL Negative   Urobilinogen mg/dL      Normal, 2.0 mg/dL Normal   Nitrite Urine      Negative Negative   Leukocyte Esterase Urine      Negative Negative   Bacteria Urine      None Seen /HPF Few (A)   Mucus Urine      None Seen /LPF Present (A)   RBC Urine      <=2 /HPF 1   WBC Urine      <=5 /HPF 1   Squamous Epithelial /HPF Urine      <=1 /HPF <1   Protein Random Urine      g/L 0.12   Protein Total Urine g/gr Creatinine      0.00 - 0.20 g/g Cr 0.18   Creatinine Urine      mg/dL 66   Creatinine      0.52 - 1.04 mg/dL 0.68   GFR Estimate      >60 mL/min/1.73m2 >90   AST      0 - 45 U/L 16   ALT      0 - 50 U/L 32   Albumin      3.4 - 5.0 g/dL 3.7   CRP Inflammation      0.0 - 8.0 mg/L 7.0   Sed Rate      0 - 30 mm/hr 19       Physical Exam:    Constitutional: Pleasant, NAD  Eyes: EOMI, sclera anicteric, conj not injected.  MS: No active synovitis   Skin: No skin rash  Neuro: CN grossly intact without focal deficit  Psych: nl affect    Assessment/Plan:  Taina Singh is a 57 year old female who is here for follow up of SLE and lupus nephritis.    Systemic lupus erythematous  History of lupus nephritis  - Presented in 12/2017 for 2nd opinion of m/o her SLE. She was diagnosed with SLE at age 27. Her lupus has been marked by +KARLIE (highest titer 1:640, speckled and nucleolar patterns), +anti-DNA, low C3/C4, arthritis, malar rash, serositis (pleuritic CP), lupus nephritis, hemolytic anemia, enteritis supported by +SSA/SSB Ab, +RF, high ESR/CRP. She had neg anti-RNP, anti-Sm, acL IgM/G/A, LAC, cryo and anti-CCP. She was treated with prednisone and HCQ at the time of Dx. Can't remember how long she was on HCQ and why HCQ was stopped, but it probably was stopped because of stable disease, no report on HCQ toxicity. Reportedly her SLE was mild all these years.  - Her lupus flared in 7/2017 with no triggers when she presented with arthritis, HCQ was resumed, arthritis resolved. Mild pulm HTN on 2D-Echo 8/2017 but  neg R cardiac cath and VQ scan in 3/2018, also neg 2D-Echo.  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 Previously failed AZA, benlysta. Patient received 1st dose cyclophosphamide on 6/15/19, had 6 doses.  Initiated Rituxin 1/22/2020 as failed cytoxan.  Recommend rituxin infusion every 4-6 months, closer to 4 due to worsening of symptoms as infusions near at 6 month interval. Have decreased dose to 600 mg and it is well tolerated. Recommend continuation of rituximab as it is the only med that has helped with LN. Also tx options are limited (also necrotizing lesions on renal biopsy, can not rule out ANCA neg vasculitis overlap and rituximab is FDA approved for GPA/MPA).     10/2022:  - Current regimen: Prednisone 5 mg every day, MMF 1000 mg BID (tapered from 3 gr every day) daily, and  mg a day, and Rituximab (last infusion 6/3/2022).    3/8/2023: SLE is under excellent control, labs were reviewed, stable. Will taper cellcept.    Sjogren's with sicca  - Secondary, +SSA/SSB Ab and h/o MALT lymphoma at age 44 in remission. Uses blink eyedrops and salagen. No lip biopsy was done to confirm Dx of Sjogren's.       Recommendations:  - Last rituximab 500 mg on 11/18/2022. Continue at interval q4-6 months depending on severity symptoms.  - Continue  mg BID  -Off prednisone and doing well.  - Annual eye exam for HCQ monitoring  - PCP prophylaxis on atovaquone 10 mL (1500 mg). Patient did not tolerate inhaled pentamidine. Avoiding TMP-SMX given sulfa reaction and potential increase risk of SLE flare. Hesitant to use dapsone given risk of hemolytic anemia.  - Ambien prn for insomnia  -Rituximab 500 mg one dose in early May 2023  -Go down on cellcept to 3 tabs a day  -Labs this month then every 3 months      Return in person in 6 months        Killian Marroquin MD      Orders Placed This Encounter   Procedures     ALT     Albumin level     AST     Creatinine     Complement C4     Complement C3     CRP inflammation      DNA double stranded antibodies     Erythrocyte sedimentation rate auto     UA with Microscopic reflex to Culture     Protein  random urine     Creatinine random urine     Immunoglobulins A G and M     CD19 B Cell Count (B-lymphocyte antigen)     CBC with Platelets & Differential

## 2023-03-08 NOTE — TELEPHONE ENCOUNTER
Rituximab therapy plan orders entered per direction of Dr. Marroquin. Will route to provider for review and signature.    TANGELA HernadezN, RN  RN Care Coordinator Rheumatology

## 2023-03-08 NOTE — NURSING NOTE
Is the patient currently in the state of [place]? YES    Visit mode:VIDEO    If the visit is dropped, the patient can be reconnected by: VIDEO VISIT: Text to cell phone: [phone]    Will anyone else be joining the visit? NO      How would you like to obtain your AVS? MyChart    Are changes needed to the allergy or medication list? NO    Reason for visit: follow up    Pt. Has no comments or concerns for VF to relay to provider.     Mahi Ledezma on 3/8/2023 at 3:18 PM

## 2023-03-08 NOTE — LETTER
3/8/2023       RE: Taina Singh  [address] [place] [address]     Dear Colleague,    Thank you for referring your patient, Taina Singh, to the [facility] RHEUMATOLOGY [facility]INIC [place] at [facility]. Please see a copy of my visit note below.    Video-Visit Details    Type of service:  Video Visit    Video Start Time (time video started): 3:42 pm    Video End Time (time video stopped): 4:11 pm    Originating Location (pt. Location): Home        Distant Location (provider location):  On-site    Mode of Communication:  Video Conference via [facility] Rheumatology Follow-up Visit Note    Reason for visit: f/u for lupus  Date of last visit:  10/28/2022  DOS: 3/8/2023      History of Present Illness:  Taina Singh is a 57 year old female who is here for follow up of SLE and lupus nephritis.    History:  - She was diagnosed with lupus at age 27. Her 1st sx were malar rash and fatigue. No skin biopsy was done (reportedly had +KARLIE). She was treated with prednisone taper and HCQ. HCQ helped and she tolerated it well. She was diagnosed with Sjogren's at the same time. Had dryness of eyes and mouth. No lip biopsy to confirm the Dx. Reportedly she had very mild stable lupus for many years and eventually at some point, stopped taking HCQ (can't remember when). Her lupus started to flare in 7/2017 initially with pleuritic CP, was put back on HCQ. 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 Since then failed AZA, cytoxan and benlysta. MMF was not enough to control her LN and eventually rituximab was added (which was initially denied by insurance even with h/o lymphoma) given necrosis on the renal biopsy (rituximab is FDA approved for ANCA vasculitis). On HCQ+MMF after adding rituximab, LN and SLE started too improve.  - She was diagnosed with MALT lymphoma at 44, had enlarged LN over R parotid gland, on biopsy it was MALT  "lymphoma. Tx was resection only, no radiation or chemo.    2/4/2021:  - Taina is on prednisone 8 mg every day, it was 17.5 mg every day at last visit. She feels achy and tired this week, but thinks the cold weather is responsible for her sx. Had rituximab  375 mg/m2 on 1/22/2020, 2/6/2020, then 6/26/2020, 7/20/2020 and last dose 1 gram on 12/15/2020. On mepron for PJP prophylaxis. On  mg bid, MMF 1500 mg bid. She works from home. Has intermittent joint pain, nothing consistent. one day shoulders hurt and the other day her hips hurt. AM stiffness is 30-60 minutes. no pleurisy, sob or cough or fevers. No skin rash. Her hair grew back.    11/12/2021:  - Taina is feeling well, she thinks her lupus is under fair control, no major complaints today.    4/29/2022:  - Today, Taina reports that she is feeling well.  Has noted some general diffuse aching and fatigue, difficulty making fist, and middle finger with trigger symptoms, which she typically has as she nears her rituximab infusion.  She has noted some morning stiffness, but this is very responsive to activity and stretching. Continues to have some dry mouth managed via increased hydration. She has had an intermittent mild rash of her nose and cheeks, but this is not consistent and not dependent on sun exposure. She has had a good appetite and an overall positive mood. Feels as though rituximab has been a \"huge game changer\" for her.    10/28/2022:  Ms. Singh presents for follow up.  -Patient reports that she is doing well.  She was able to completely wean and discontinue the prednisone.  She is doing well on rituximab infusions.  Her last rituximab treatment was in June and is scheduled to get the next Rituxan in November.  She is compliant with CellCept and Plaquenil.  She recently received the Evusheld.  Denies any oral ulcers, rashes, joint pains, body aches, vision changes, shortness of breath and  Exertion.  Reports morning stiffness is minimal.  Good " appetite and normal mood.    3/8/2023:    Doing well  Stable  No flares  Last prednisone use, was about 2 wk ago, 2.5 mg every day x 2 days after a cold.    ROS:    A comprehensive ROS was done, positives are per HPI.    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44    No chemo or radiation     Other forms of systemic lupus erythematosus (H) 2018     Pulmonary embolism (H)     2019 seen on abd CT at [facility]     Sjogren's syndrome (H)     secondary Sjogrens, secondary to lupus     Vitamin D deficiency      Past Surgical History:  Past Surgical History:   Procedure Laterality Date     BIOPSY/REMOVAL, LYMPH NODE(S)        SECTION  age 32     HC HYSTEROS W PERMANENT FALLOPAIN IMPLANT      Essure b/l     PAROTIDECTOMY Right 2006     TONSILLECTOMY       Family history:  Family History   Problem Relation Age of Onset     Alopecia Brother      Rheumatoid Arthritis Brother      LUNG DISEASE No family hx of      Social history:  Social History     Socioeconomic History     Marital status:      Spouse name: Not on file     Number of children: Not on file     Years of education: 1     Highest education level: Not on file   Occupational History     Comment: , 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      Social Determinants of Health     Financial Resource Strain: Not on file   Food Insecurity: Not on file   Transportation Needs: Not on file   Physical Activity: Not on file   Stress: Not on file   Social Connections: Not on file   Intimate Partner Violence: Not on file   Housing Stability: Not on file     Allergies:  Allergies   Allergen Reactions     Pentamidine Shortness Of Breath     Penicillins Rash     Sulfa Drugs Rash     Medications:  Outpatient Encounter Medications as of 3/8/2023   Medication Sig Dispense Refill     Calcium-Magnesium 300-300 MG TABS daily        hydroxychloroquine (PLAQUENIL) 200 MG tablet Take 1 tablet (200 mg) by mouth 2 times daily Get annual eye exams for hydroxychloroquine (Plaquenil) monitoring and fax to [phone] 180 tablet 3     Multiple Vitamins-Minerals (WOMENS MULTI PO) Take by mouth daily        mycophenolate (GENERIC EQUIVALENT) 500 MG tablet Take 2 tablets (1,000 mg) by mouth 2 times daily LABS REQUIRED EVERY 8-12 WEEKS WHILE TAKING THIS MEDICATION. - Oral 360 tablet 1     Omega-3 Fatty Acids (OMEGA-3 FISH OIL) 500 MG CAPS Take 500 mg by mouth daily        traMADol (ULTRAM) 50 MG tablet Take 1 tablet (50 mg) by mouth every 12 hours as needed for severe pain 60 tablet 3     vitamin D3 (CHOLECALCIFEROL) 50 mcg (2000 units) tablet Take 1 tablet (50 mcg) by mouth daily 90 tablet 3     warfarin ANTICOAGULANT (COUMADIN) 5 MG tablet   1     zolpidem (AMBIEN) 5 MG tablet Take 1 tablet (5 mg) by mouth every evening as needed for sleep 30 tablet 3     albuterol (PROAIR HFA/PROVENTIL HFA/VENTOLIN HFA) 108 (90 Base) MCG/ACT inhaler Inhale 2 puffs  into the lungs every 6 hours as needed for shortness of breath / dyspnea or wheezing (Patient not taking: Reported on 11/18/2022) 3 Inhaler 3     predniSONE (DELTASONE) 5 MG tablet 1-2 tabs a day x 3 days prn flares (Patient not taking: Reported on 3/8/2023) 60 tablet 3     No facility-administered encounter medications on file as of 3/8/2023.       Labs/Imaging:  RHEUM RESULTS Latest Ref Rng & Units 4/29/2022 10/6/2022 11/18/2022   ALBUMIN 3.4 - 5.0 g/dL 3.7 - 3.9   ALBUMIN (R) 3.5 - 5.2 g/dL - 4.0 -   ALT 0 - 50 U/L 32 - 44   AST 0 - 45 U/L 16 - 31   COMPLEMENT C3 81 - 157 mg/dL 129 - 138   COMPLEMENT C4 13 - 39 mg/dL 27 - 30   CREATININE 0.52 - 1.04 mg/dL 0.68 0.63 0.58   CRP 0.0 - 8.0 mg/L 7.0 - 19.2(H)   DNA <10.0 IU/mL 54.0(H) 67.0(H) 49.0(H)   GFR ESTIMATE, IF BLACK >60 mL/min/[1.73:m2] - - -   GFR ESTIMATE >60 mL/min/1.73m2 >90 >90 >90   HEMATOCRIT 35.0 - 47.0 % 38.3 39.7 37.3   HEMOGLOBIN 11.7 - 15.7 g/dL 12.0 12.4 12.0   HEPBANG NR:Nonreactive - - -   HCVAB NR:Nonreactive - - -   IGA 84 - 499 mg/dL - - 152   IGM 35 - 242 mg/dL - - 12(L)    - 1,616 mg/dL - - 370(L)   WBC 4.0 - 11.0 10e3/uL 7.1 6.5 5.7   RBC 3.80 - 5.20 10e6/uL 4.62 4.73 4.55   RDW 10.0 - 15.0 % 14.3 14.6 13.4   MCHC 31.5 - 36.5 g/dL 31.3(L) 31.2(L) 32.2   MCV 78 - 100 fL 83 84 82   PLATELET COUNT 150 - 450 10e3/uL 213 223 205   ESR 0 - 30 mm/hr 19 - 24   QUANTIFERON-TB GOLD PLUS RESULT NEG:Negative - - -     Component      Latest Ref Rng & Units 2/1/2021   WBC      4.0 - 11.0 10e9/L 7.8   RBC Count      3.8 - 5.2 10e12/L 4.50   Hemoglobin      11.7 - 15.7 g/dL 11.7   Hematocrit      35.0 - 47.0 % 37.9   MCV      78 - 100 fl 84   MCH      26.5 - 33.0 pg 26.0 (L)   MCHC      31.5 - 36.5 g/dL 30.9 (L)   RDW      10.0 - 15.0 % 14.9   Platelet Count      150 - 450 10e9/L 236   % Neutrophils      % 84.9   % Lymphocytes      % 6.3   % Monocytes      % 7.9   % Eosinophils      % 0.6   % Basophils      % 0.3   Absolute Neutrophil      1.6 -  8.3 10e9/L 6.6   Absolute Lymphocytes      0.8 - 5.3 10e9/L 0.5 (L)   Absolute Monocytes      0.0 - 1.3 10e9/L 0.6   Absolute Eosinophils      0.0 - 0.7 10e9/L 0.1   Absolute Basophils      0.0 - 0.2 10e9/L 0.0   Diff Method       Automated Method   Color Urine       Yellow   Appearance Urine       Clear   Glucose Urine      NEG:Negative mg/dL Negative   Bilirubin Urine      NEG:Negative Negative   Ketones Urine      NEG:Negative mg/dL Trace (A)   Specific Gravity Urine      1.003 - 1.035 >1.030   pH Urine      5.0 - 7.0 pH 6.0   Protein Albumin Urine      NEG:Negative mg/dL 100 (A)   Urobilinogen Urine      0.2 - 1.0 EU/dL 0.2   Nitrite Urine      NEG:Negative Negative   Blood Urine      NEG:Negative Trace (A)   Leukocyte Esterase Urine      NEG:Negative Trace (A)   Source       Midstream Urine   WBC Urine      OTO5:0 - 5 /HPF 10-25 (A)   RBC Urine      OTO2:O - 2 /HPF 2-5 (A)   Squamous Epithelial /LPF Urine      FEW:Few /LPF Few   Bacteria Urine      NEG:Negative /HPF Moderate (A)   Triple Phosphates      NEG:Negative /HPF Few (A)   IGG      610 - 1,616 mg/dL 399 (L)   IGA      84 - 499 mg/dL 190   IGM      35 - 242 mg/dL 16 (L)   Creatinine      0.52 - 1.04 mg/dL 0.60   GFR Estimate      >60 mL/min/1.73:m2 >90   GFR Estimate If Black      >60 mL/min/1.73:m2 >90   Specimen Description       Midstream Urine   Special Requests       Specimen received in preservative   Culture Micro       <10,000 colonies/mL . . .   CD19 B Cells      6 - 27 % <1 (L)   Absolute CD19      107 - 698 cells/uL <1 (L)   Protein Random Urine      g/L 0.75   Protein Total Urine g/gr Creatinine      0 - 0.2 g/g Cr 0.40 (H)   Creatinine Urine Random      mg/dL 182   Sed Rate      0 - 30 mm/h 27   DNA-ds      <10 IU/mL 102 (H)   CRP Inflammation      0.0 - 8.0 mg/L 8.9 (H)   Complement C3      81 - 157 mg/dL 104   Complement C4      13 - 39 mg/dL 23   AST      0 - 45 U/L 16   Albumin      3.4 - 5.0 g/dL 4.1   ALT      0 - 50 U/L 26    Creatinine Urine      mg/dL 186     Component      Latest Ref Rng & Units 11/10/2021   WBC      4.0 - 11.0 10e3/uL 8.2   RBC Count      3.80 - 5.20 10e6/uL 4.58   Hemoglobin      11.7 - 15.7 g/dL 12.3   Hematocrit      35.0 - 47.0 % 39.7   MCV      78 - 100 fL 87   MCH      26.5 - 33.0 pg 26.9   MCHC      31.5 - 36.5 g/dL 31.0 (L)   RDW      10.0 - 15.0 % 14.2   Platelet Count      150 - 450 10e3/uL 229   % Neutrophils      % 83   % Lymphocytes      % 6   % Monocytes      % 9   % Eosinophils      % 2   % Basophils      % 0   Absolute Neutrophils      1.6 - 8.3 10e3/uL 6.9   Absolute Lymphocytes      0.8 - 5.3 10e3/uL 0.5 (L)   Absolute Monocytes      0.0 - 1.3 10e3/uL 0.7   Absolute Eosinophils      0.0 - 0.7 10e3/uL 0.1   Absolute Basophils      0.0 - 0.2 10e3/uL 0.0   Color Urine      Colorless, Straw, Light Yellow, Yellow Yellow   Appearance Urine      Clear Clear   Glucose Urine      Negative mg/dL Negative   Bilirubin Urine      Negative Negative   Ketones Urine      Negative mg/dL Trace (A)   Specific Gravity Urine      1.003 - 1.035 >=1.030   Blood Urine      Negative Small (A)   pH Urine      5.0 - 7.0 6.0   Protein Albumin Urine      Negative mg/dL Negative   Urobilinogen Urine      0.2, 1.0 E.U./dL 0.2   Nitrite Urine      Negative Negative   Leukocyte Esterase Urine      Negative Trace (A)   Sodium      133 - 144 mmol/L 138   Potassium      3.4 - 5.3 mmol/L 3.9   Chloride      94 - 109 mmol/L 103   Carbon Dioxide      20 - 32 mmol/L 29   Anion Gap      3 - 14 mmol/L 6   Urea Nitrogen      7 - 30 mg/dL 22   Creatinine      0.52 - 1.04 mg/dL 0.70   Calcium      8.5 - 10.1 mg/dL 9.6   Glucose      70 - 99 mg/dL 83   Albumin      3.4 - 5.0 g/dL 4.0   Phosphorus      2.5 - 4.5 mg/dL 3.9   GFR Estimate      >60 mL/min/1.73m2 >90   Bacteria Urine      None Seen /HPF Few (A)   RBC Urine      0-2 /HPF /HPF 0-2   WBC Urine      0-5 /HPF /HPF 0-5   Squamous Epithelial /LPF Urine      None Seen /LPF Few (A)   Mucus  Urine      None Seen /LPF Present (A)   MPO Cari IgG Instrument Value      <3.5 U/mL <0.3   Myeloperoxidase Antibody IgG      Negative Negative   Proteinase 3 Cari IgG Instrument Value      <2.0 U/mL <1.0   Proteinase 3 Antibody IgG      Negative Negative   IGG      610-1,616 mg/dL 398 (L)   IGA      84 - 499 mg/dL 184   IGM      35 - 242 mg/dL 16 (L)   Protein Random Urine      g/L 0.23   Protein Total Urine g/gr Creatinine      0.00 - 0.20 g/g Cr 0.11   Creatinine Urine      mg/dL 212   Neutrophil Cytoplasmic Antibody      <1:10 <1:10   Neutrophil Cytoplasmic Antibody Pattern       The ANCA IFA is <1:10.  No further testing will be performed.   CD19 B Cells      6 - 27 % <1 (L)   Absolute CD19      107 - 698 cells/uL <1 (L)   ALT      0 - 50 U/L 27   AST      0 - 45 U/L 19   Complement C4      13 - 39 mg/dL 28   Complement C3      81 - 157 mg/dL 138   CRP Inflammation      0.0 - 8.0 mg/L 8.9 (H)   DNA-ds      <10.0 IU/mL 90.0 (H)   Sed Rate      0 - 30 mm/hr 25     Component      Latest Ref Rng & Units 4/29/2022   WBC      4.0 - 11.0 10e3/uL 7.1   RBC Count      3.80 - 5.20 10e6/uL 4.62   Hemoglobin      11.7 - 15.7 g/dL 12.0   Hematocrit      35.0 - 47.0 % 38.3   MCV      78 - 100 fL 83   MCH      26.5 - 33.0 pg 26.0 (L)   MCHC      31.5 - 36.5 g/dL 31.3 (L)   RDW      10.0 - 15.0 % 14.3   Platelet Count      150 - 450 10e3/uL 213   % Neutrophils      % 85   % Lymphocytes      % 6   % Monocytes      % 7   % Eosinophils      % 1   % Basophils      % 1   % Immature Granulocytes      % 0   NRBCs per 100 WBC      <1 /100 0   Absolute Neutrophils      1.6 - 8.3 10e3/uL 6.1   Absolute Lymphocytes      0.8 - 5.3 10e3/uL 0.4 (L)   Absolute Monocytes      0.0 - 1.3 10e3/uL 0.5   Absolute Eosinophils      0.0 - 0.7 10e3/uL 0.0   Absolute Basophils      0.0 - 0.2 10e3/uL 0.1   Absolute Immature Granulocytes      <=0.4 10e3/uL 0.0   Absolute NRBCs      10e3/uL 0.0   Color Urine      Colorless, Straw, Light Yellow, Yellow  Yellow   Appearance Urine      Clear Clear   Glucose Urine      Negative mg/dL Negative   Bilirubin Urine      Negative Negative   Ketones Urine      Negative mg/dL Negative   Specific Gravity Urine      1.003 - 1.035 1.015   Blood Urine      Negative Negative   pH Urine      5.0 - 7.0 5.0   Protein Albumin Urine      Negative mg/dL Negative   Urobilinogen mg/dL      Normal, 2.0 mg/dL Normal   Nitrite Urine      Negative Negative   Leukocyte Esterase Urine      Negative Negative   Bacteria Urine      None Seen /HPF Few (A)   Mucus Urine      None Seen /LPF Present (A)   RBC Urine      <=2 /HPF 1   WBC Urine      <=5 /HPF 1   Squamous Epithelial /HPF Urine      <=1 /HPF <1   Protein Random Urine      g/L 0.12   Protein Total Urine g/gr Creatinine      0.00 - 0.20 g/g Cr 0.18   Creatinine Urine      mg/dL 66   Creatinine      0.52 - 1.04 mg/dL 0.68   GFR Estimate      >60 mL/min/1.73m2 >90   AST      0 - 45 U/L 16   ALT      0 - 50 U/L 32   Albumin      3.4 - 5.0 g/dL 3.7   CRP Inflammation      0.0 - 8.0 mg/L 7.0   Sed Rate      0 - 30 mm/hr 19       Physical Exam:    Constitutional: Pleasant, NAD  Eyes: EOMI, sclera anicteric, conj not injected.  MS: No active synovitis   Skin: No skin rash  Neuro: CN grossly intact without focal deficit  Psych: nl affect    Assessment/Plan:  Taina Singh is a 57 year old female who is here for follow up of SLE and lupus nephritis.    Systemic lupus erythematous  History of lupus nephritis  - Presented in 12/2017 for 2nd opinion of m/o her SLE. She was diagnosed with SLE at age 27. Her lupus has been marked by +KARLIE (highest titer 1:640, speckled and nucleolar patterns), +anti-DNA, low C3/C4, arthritis, malar rash, serositis (pleuritic CP), lupus nephritis, hemolytic anemia, enteritis supported by +SSA/SSB Ab, +RF, high ESR/CRP. She had neg anti-RNP, anti-Sm, acL IgM/G/A, LAC, cryo and anti-CCP. She was treated with prednisone and HCQ at the time of Dx. Can't remember how long she  was on HCQ and why HCQ was stopped, but it probably was stopped because of stable disease, no report on HCQ toxicity. Reportedly her SLE was mild all these years.  - Her lupus flared in 7/2017 with no triggers when she presented with arthritis, HCQ was resumed, arthritis resolved. Mild pulm HTN on 2D-Echo 8/2017 but neg R cardiac cath and VQ scan in 3/2018, also neg 2D-Echo.  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 Previously failed AZA, benlysta. Patient received 1st dose cyclophosphamide on 6/15/19, had 6 doses.  Initiated Rituxin 1/22/2020 as failed cytoxan.  Recommend rituxin infusion every 4-6 months, closer to 4 due to worsening of symptoms as infusions near at 6 month interval. Have decreased dose to 600 mg and it is well tolerated. Recommend continuation of rituximab as it is the only med that has helped with LN. Also tx options are limited (also necrotizing lesions on renal biopsy, can not rule out ANCA neg vasculitis overlap and rituximab is FDA approved for GPA/MPA).     10/2022:  - Current regimen: Prednisone 5 mg every day, MMF 1000 mg BID (tapered from 3 gr every day) daily, and  mg a day, and Rituximab (last infusion 6/3/2022).    3/8/2023: SLE is under excellent control, labs were reviewed, stable. Will taper cellcept.    Sjogren's with sicca  - Secondary, +SSA/SSB Ab and h/o MALT lymphoma at age 44 in remission. Uses blink eyedrops and salagen. No lip biopsy was done to confirm Dx of Sjogren's.       Recommendations:  - Last rituximab 500 mg on 11/18/2022. Continue at interval q4-6 months depending on severity symptoms.  - Continue  mg BID  -Off prednisone and doing well.  - Annual eye exam for HCQ monitoring  - PCP prophylaxis on atovaquone 10 mL (1500 mg). Patient did not tolerate inhaled pentamidine. Avoiding TMP-SMX given sulfa reaction and potential increase risk of SLE flare. Hesitant to use dapsone given risk of hemolytic anemia.  - Ambien prn for insomnia  -Rituximab 500 mg  one dose in early May 2023  -Go down on cellcept to 3 tabs a day  -Labs this month then every 3 months      Return in person in 6 months        Killian Marroquin MD      Orders Placed This Encounter   Procedures     ALT     Albumin level     AST     Creatinine     Complement C4     Complement C3     CRP inflammation     DNA double stranded antibodies     Erythrocyte sedimentation rate auto     UA with Microscopic reflex to Culture     Protein  random urine     Creatinine random urine     Immunoglobulins A G and M     CD19 B Cell Count (B-lymphocyte antigen)     CBC with Platelets & Differential         Killian Marroquin MD

## 2023-03-08 NOTE — PATIENT INSTRUCTIONS
Rituximab 500 mg one dose in early May 2023      Go down on cellcept to 3 tabs a day      Labs this month then every 3 months      Return in person in 6 months

## 2023-03-10 ENCOUNTER — TELEPHONE (OUTPATIENT)
Dept: RHEUMATOLOGY | Facility: CLINIC | Age: 58
End: 2023-03-10
Payer: COMMERCIAL

## 2023-03-10 RX ORDER — ALBUTEROL SULFATE 0.83 MG/ML
2.5 SOLUTION RESPIRATORY (INHALATION)
Status: CANCELLED | OUTPATIENT
Start: 2023-03-10

## 2023-03-10 RX ORDER — DIPHENHYDRAMINE HYDROCHLORIDE 50 MG/ML
50 INJECTION INTRAMUSCULAR; INTRAVENOUS
Status: CANCELLED
Start: 2023-03-10

## 2023-03-10 RX ORDER — HEPARIN SODIUM,PORCINE 10 UNIT/ML
5 VIAL (ML) INTRAVENOUS
Status: CANCELLED | OUTPATIENT
Start: 2023-03-10

## 2023-03-10 RX ORDER — HEPARIN SODIUM (PORCINE) LOCK FLUSH IV SOLN 100 UNIT/ML 100 UNIT/ML
5 SOLUTION INTRAVENOUS
Status: CANCELLED | OUTPATIENT
Start: 2023-03-10

## 2023-03-10 RX ORDER — EPINEPHRINE 1 MG/ML
0.3 INJECTION, SOLUTION, CONCENTRATE INTRAVENOUS EVERY 5 MIN PRN
Status: CANCELLED | OUTPATIENT
Start: 2023-03-10

## 2023-03-10 RX ORDER — MEPERIDINE HYDROCHLORIDE 25 MG/ML
25 INJECTION INTRAMUSCULAR; INTRAVENOUS; SUBCUTANEOUS EVERY 30 MIN PRN
Status: CANCELLED | OUTPATIENT
Start: 2023-03-10

## 2023-03-10 RX ORDER — METHYLPREDNISOLONE SODIUM SUCCINATE 125 MG/2ML
125 INJECTION, POWDER, LYOPHILIZED, FOR SOLUTION INTRAMUSCULAR; INTRAVENOUS ONCE
Status: CANCELLED | OUTPATIENT
Start: 2023-03-10

## 2023-03-10 RX ORDER — ALBUTEROL SULFATE 90 UG/1
1-2 AEROSOL, METERED RESPIRATORY (INHALATION)
Status: CANCELLED
Start: 2023-03-10

## 2023-03-10 RX ORDER — METHYLPREDNISOLONE SODIUM SUCCINATE 125 MG/2ML
125 INJECTION, POWDER, LYOPHILIZED, FOR SOLUTION INTRAMUSCULAR; INTRAVENOUS
Status: CANCELLED
Start: 2023-03-10

## 2023-03-10 RX ORDER — ACETAMINOPHEN 325 MG/1
650 TABLET ORAL ONCE
Status: CANCELLED
Start: 2023-03-10

## 2023-03-10 NOTE — TELEPHONE ENCOUNTER
Message left for infusion center that new Rituximab orders have been faxed. Patient is to receive infusion in May.    Call back number left for questions.    Myra Alvarez, TANGELAN, RN  RN Care Coordinator Rheumatology

## 2023-03-18 DIAGNOSIS — M32.9 SYSTEMIC LUPUS ERYTHEMATOSUS, UNSPECIFIED SLE TYPE, UNSPECIFIED ORGAN INVOLVEMENT STATUS (H): ICD-10-CM

## 2023-03-21 RX ORDER — TRAMADOL HYDROCHLORIDE 50 MG/1
TABLET ORAL
Qty: 60 TABLET | Refills: 3 | Status: SHIPPED | OUTPATIENT
Start: 2023-03-21 | End: 2023-10-21

## 2023-04-04 DIAGNOSIS — M32.14 LUPUS NEPHRITIS, ISN/RPS CLASS IV (H): Primary | ICD-10-CM

## 2023-04-10 ENCOUNTER — TELEPHONE (OUTPATIENT)
Dept: NEPHROLOGY | Facility: CLINIC | Age: 58
End: 2023-04-10
Payer: COMMERCIAL

## 2023-04-10 NOTE — TELEPHONE ENCOUNTER
[facility] Call Center    Phone Message    May a detailed message be left on voicemail: yes     Reason for Call: Patient calling in regards to having an appointment on 04/13 with provider. She wants to know if she can change this to a video visit instead. Writer unable to change. Writer marking high priority due to appointment being this week. Please contact patient if this is something that can be done. Thank you     Action Taken: Message routed to:  [facility] ([facility] Nephrology    Travel Screening: Not Applicable

## 2023-04-10 NOTE — TELEPHONE ENCOUNTER
Writer responded to my chart message about converting to Video.  Jonna Harry LPN  Nephrology  [phone]

## 2023-04-11 ENCOUNTER — LAB (OUTPATIENT)
Dept: LAB | Facility: CLINIC | Age: 58
End: 2023-04-11
Payer: COMMERCIAL

## 2023-04-11 DIAGNOSIS — Z79.899 HIGH RISK MEDICATIONS (NOT ANTICOAGULANTS) LONG-TERM USE: ICD-10-CM

## 2023-04-11 DIAGNOSIS — M32.9 SYSTEMIC LUPUS ERYTHEMATOSUS, UNSPECIFIED SLE TYPE, UNSPECIFIED ORGAN INVOLVEMENT STATUS (H): ICD-10-CM

## 2023-04-11 DIAGNOSIS — M32.14 LUPUS NEPHRITIS, ISN/RPS CLASS IV (H): ICD-10-CM

## 2023-04-11 LAB
ALBUMIN MFR UR ELPH: <6 MG/DL (ref 1–14)
ALBUMIN SERPL-MCNC: 3.8 G/DL (ref 3.4–5)
ALBUMIN UR-MCNC: NEGATIVE MG/DL
ALT SERPL W P-5'-P-CCNC: 30 U/L (ref 0–50)
ANION GAP SERPL CALCULATED.3IONS-SCNC: 3 MMOL/L (ref 3–14)
APPEARANCE UR: CLEAR
AST SERPL W P-5'-P-CCNC: 16 U/L (ref 0–45)
BASOPHILS # BLD AUTO: 0 10E3/UL (ref 0–0.2)
BASOPHILS NFR BLD AUTO: 0 %
BILIRUB UR QL STRIP: NEGATIVE
BUN SERPL-MCNC: 17 MG/DL (ref 7–30)
CALCIUM SERPL-MCNC: 9 MG/DL (ref 8.5–10.1)
CHLORIDE BLD-SCNC: 106 MMOL/L (ref 94–109)
CO2 SERPL-SCNC: 28 MMOL/L (ref 20–32)
COLOR UR AUTO: YELLOW
CREAT SERPL-MCNC: 0.52 MG/DL (ref 0.52–1.04)
CREAT UR-MCNC: 40.7 MG/DL
CRP SERPL-MCNC: 7.7 MG/L (ref 0–8)
EOSINOPHIL # BLD AUTO: 0.3 10E3/UL (ref 0–0.7)
EOSINOPHIL NFR BLD AUTO: 4 %
ERYTHROCYTE [DISTWIDTH] IN BLOOD BY AUTOMATED COUNT: 14.9 % (ref 10–15)
ERYTHROCYTE [SEDIMENTATION RATE] IN BLOOD BY WESTERGREN METHOD: 15 MM/HR (ref 0–30)
GFR SERPL CREATININE-BSD FRML MDRD: >90 ML/MIN/1.73M2
GLUCOSE BLD-MCNC: 90 MG/DL (ref 70–99)
GLUCOSE UR STRIP-MCNC: NEGATIVE MG/DL
HCT VFR BLD AUTO: 40.1 % (ref 35–47)
HGB BLD-MCNC: 12.7 G/DL (ref 11.7–15.7)
HGB UR QL STRIP: NEGATIVE
KETONES UR STRIP-MCNC: NEGATIVE MG/DL
LEUKOCYTE ESTERASE UR QL STRIP: ABNORMAL
LYMPHOCYTES # BLD AUTO: 0.8 10E3/UL (ref 0.8–5.3)
LYMPHOCYTES NFR BLD AUTO: 12 %
MCH RBC QN AUTO: 26.8 PG (ref 26.5–33)
MCHC RBC AUTO-ENTMCNC: 31.7 G/DL (ref 31.5–36.5)
MCV RBC AUTO: 85 FL (ref 78–100)
MONOCYTES # BLD AUTO: 0.7 10E3/UL (ref 0–1.3)
MONOCYTES NFR BLD AUTO: 11 %
NEUTROPHILS # BLD AUTO: 4.7 10E3/UL (ref 1.6–8.3)
NEUTROPHILS NFR BLD AUTO: 73 %
NITRATE UR QL: NEGATIVE
PH UR STRIP: 6 [PH] (ref 5–7)
PHOSPHATE SERPL-MCNC: 3.5 MG/DL (ref 2.5–4.5)
PLATELET # BLD AUTO: 211 10E3/UL (ref 150–450)
POTASSIUM BLD-SCNC: 4.1 MMOL/L (ref 3.4–5.3)
PROT/CREAT 24H UR: NORMAL MG/G{CREAT}
RBC # BLD AUTO: 4.74 10E6/UL (ref 3.8–5.2)
RBC #/AREA URNS AUTO: NORMAL /HPF
SODIUM SERPL-SCNC: 137 MMOL/L (ref 133–144)
SP GR UR STRIP: 1.01 (ref 1–1.03)
UROBILINOGEN UR STRIP-ACNC: 0.2 E.U./DL
WBC # BLD AUTO: 6.4 10E3/UL (ref 4–11)
WBC #/AREA URNS AUTO: NORMAL /HPF

## 2023-04-11 PROCEDURE — 85652 RBC SED RATE AUTOMATED: CPT

## 2023-04-11 PROCEDURE — 84156 ASSAY OF PROTEIN URINE: CPT

## 2023-04-11 PROCEDURE — 82784 ASSAY IGA/IGD/IGG/IGM EACH: CPT

## 2023-04-11 PROCEDURE — 84460 ALANINE AMINO (ALT) (SGPT): CPT

## 2023-04-11 PROCEDURE — 36415 COLL VENOUS BLD VENIPUNCTURE: CPT

## 2023-04-11 PROCEDURE — 85025 COMPLETE CBC W/AUTO DIFF WBC: CPT

## 2023-04-11 PROCEDURE — 86355 B CELLS TOTAL COUNT: CPT

## 2023-04-11 PROCEDURE — 80069 RENAL FUNCTION PANEL: CPT

## 2023-04-11 PROCEDURE — 84450 TRANSFERASE (AST) (SGOT): CPT

## 2023-04-11 PROCEDURE — 86225 DNA ANTIBODY NATIVE: CPT

## 2023-04-11 PROCEDURE — 86140 C-REACTIVE PROTEIN: CPT

## 2023-04-11 PROCEDURE — 86160 COMPLEMENT ANTIGEN: CPT

## 2023-04-11 PROCEDURE — 81001 URINALYSIS AUTO W/SCOPE: CPT

## 2023-04-12 ENCOUNTER — MYC MEDICAL ADVICE (OUTPATIENT)
Dept: NEPHROLOGY | Facility: CLINIC | Age: 58
End: 2023-04-12
Payer: COMMERCIAL

## 2023-04-12 LAB
C3 SERPL-MCNC: 137 MG/DL (ref 81–157)
C4 SERPL-MCNC: 29 MG/DL (ref 13–39)
CD19 B CELL COMMENT: ABNORMAL
CD19 CELLS # BLD: <1 CELLS/UL (ref 107–698)
CD19 CELLS NFR BLD: <1 % (ref 6–27)
DSDNA AB SER-ACNC: 66 IU/ML
IGA SERPL-MCNC: 162 MG/DL (ref 84–499)
IGG SERPL-MCNC: 374 MG/DL (ref 610–1616)
IGM SERPL-MCNC: 18 MG/DL (ref 35–242)

## 2023-04-20 NOTE — TELEPHONE ENCOUNTER
Update from Dr. Paredes that he is agreeable with patient request to have Dr. Marroquin follow patient and reach back out to Nephrology in future if needed.  Updated patient via [employer].    Devora Wills RN, BSN, PHN  [facility] Nurse Navigator  [phone]

## 2023-05-04 ENCOUNTER — TELEPHONE (OUTPATIENT)
Dept: NEPHROLOGY | Facility: CLINIC | Age: 58
End: 2023-05-04
Payer: COMMERCIAL

## 2023-05-04 NOTE — TELEPHONE ENCOUNTER
Attempted to schedule follow up appt with Dr Paredes. Per pt, provider stated that if labs were ok to cancel and to follow if Rheumatology think it's necessary. Pt stated that based on that, no follow up is needed at this time as she is doing well.    Shyann Giles    Nephrology Clinic Navigator

## 2023-05-05 ENCOUNTER — INFUSION THERAPY VISIT (OUTPATIENT)
Dept: INFUSION THERAPY | Facility: CLINIC | Age: 58
End: 2023-05-05
Payer: COMMERCIAL

## 2023-05-05 VITALS
SYSTOLIC BLOOD PRESSURE: 146 MMHG | WEIGHT: 293 LBS | RESPIRATION RATE: 18 BRPM | BODY MASS INDEX: 54.15 KG/M2 | HEART RATE: 81 BPM | DIASTOLIC BLOOD PRESSURE: 80 MMHG | TEMPERATURE: 98.3 F | OXYGEN SATURATION: 97 %

## 2023-05-05 DIAGNOSIS — M32.14 LUPUS NEPHRITIS, ISN/RPS CLASS IV (H): ICD-10-CM

## 2023-05-05 DIAGNOSIS — M32.9 SYSTEMIC LUPUS ERYTHEMATOSUS, UNSPECIFIED SLE TYPE, UNSPECIFIED ORGAN INVOLVEMENT STATUS (H): ICD-10-CM

## 2023-05-05 DIAGNOSIS — M06.09 RHEUMATOID ARTHRITIS, SERONEGATIVE, MULTIPLE SITES (H): ICD-10-CM

## 2023-05-05 DIAGNOSIS — I77.82 ANCA-NEGATIVE VASCULITIS (H): Primary | ICD-10-CM

## 2023-05-05 PROCEDURE — 99207 PR NO CHARGE LOS: CPT

## 2023-05-05 PROCEDURE — 96413 CHEMO IV INFUSION 1 HR: CPT | Performed by: INTERNAL MEDICINE

## 2023-05-05 PROCEDURE — 96375 TX/PRO/DX INJ NEW DRUG ADDON: CPT | Performed by: INTERNAL MEDICINE

## 2023-05-05 PROCEDURE — 96367 TX/PROPH/DG ADDL SEQ IV INF: CPT | Performed by: INTERNAL MEDICINE

## 2023-05-05 RX ORDER — METHYLPREDNISOLONE SODIUM SUCCINATE 125 MG/2ML
125 INJECTION, POWDER, LYOPHILIZED, FOR SOLUTION INTRAMUSCULAR; INTRAVENOUS ONCE
Status: COMPLETED | OUTPATIENT
Start: 2023-05-05 | End: 2023-05-05

## 2023-05-05 RX ORDER — ALBUTEROL SULFATE 0.83 MG/ML
2.5 SOLUTION RESPIRATORY (INHALATION)
Status: CANCELLED | OUTPATIENT
Start: 2023-06-28

## 2023-05-05 RX ORDER — METHYLPREDNISOLONE SODIUM SUCCINATE 125 MG/2ML
125 INJECTION, POWDER, LYOPHILIZED, FOR SOLUTION INTRAMUSCULAR; INTRAVENOUS ONCE
Status: CANCELLED | OUTPATIENT
Start: 2023-06-28

## 2023-05-05 RX ORDER — MEPERIDINE HYDROCHLORIDE 25 MG/ML
25 INJECTION INTRAMUSCULAR; INTRAVENOUS; SUBCUTANEOUS EVERY 30 MIN PRN
Status: CANCELLED | OUTPATIENT
Start: 2023-06-28

## 2023-05-05 RX ORDER — DIPHENHYDRAMINE HYDROCHLORIDE 50 MG/ML
50 INJECTION INTRAMUSCULAR; INTRAVENOUS
Status: CANCELLED
Start: 2023-06-28

## 2023-05-05 RX ORDER — HEPARIN SODIUM,PORCINE 10 UNIT/ML
5 VIAL (ML) INTRAVENOUS
Status: CANCELLED | OUTPATIENT
Start: 2023-06-28

## 2023-05-05 RX ORDER — ACETAMINOPHEN 325 MG/1
650 TABLET ORAL ONCE
Status: CANCELLED
Start: 2023-06-28

## 2023-05-05 RX ORDER — METHYLPREDNISOLONE SODIUM SUCCINATE 125 MG/2ML
125 INJECTION, POWDER, LYOPHILIZED, FOR SOLUTION INTRAMUSCULAR; INTRAVENOUS
Status: CANCELLED
Start: 2023-06-28

## 2023-05-05 RX ORDER — ALBUTEROL SULFATE 90 UG/1
1-2 AEROSOL, METERED RESPIRATORY (INHALATION)
Status: CANCELLED
Start: 2023-06-28

## 2023-05-05 RX ORDER — HEPARIN SODIUM (PORCINE) LOCK FLUSH IV SOLN 100 UNIT/ML 100 UNIT/ML
5 SOLUTION INTRAVENOUS
Status: CANCELLED | OUTPATIENT
Start: 2023-06-28

## 2023-05-05 RX ORDER — ACETAMINOPHEN 325 MG/1
650 TABLET ORAL ONCE
Status: COMPLETED | OUTPATIENT
Start: 2023-05-05 | End: 2023-05-05

## 2023-05-05 RX ORDER — EPINEPHRINE 1 MG/ML
0.3 INJECTION, SOLUTION INTRAMUSCULAR; SUBCUTANEOUS EVERY 5 MIN PRN
Status: CANCELLED | OUTPATIENT
Start: 2023-06-28

## 2023-05-05 RX ADMIN — ACETAMINOPHEN 650 MG: 325 TABLET ORAL at 11:59

## 2023-05-05 RX ADMIN — METHYLPREDNISOLONE SODIUM SUCCINATE 125 MG: 125 INJECTION INTRAMUSCULAR; INTRAVENOUS at 12:22

## 2023-05-05 RX ADMIN — Medication 250 ML: at 11:55

## 2023-05-05 NOTE — PROGRESS NOTES
Infusion Nursing Note:  Taina iSngh presents today for:rapid Rituxan.    Patient seen by provider today: No   present during visit today: Not Applicable.    Note: patient reports only fatigue post Rituxan, otherwise tolerates well.   Typically, lasts 1 day.       Intravenous Access:  Peripheral IV placed.    Treatment Conditions:  ~~~ NOTE: If the patient answers yes to any of the questions below, hold the infusion and contact ordering provider or on-call provider.    1. Have you recently had an elevated temperature, fever, chills, productive cough, coughing for 3 weeks or longer or hemoptysis,  abnormal vital signs, night sweats,  chest pain or have you noticed a decrease in your appetite, unexplained weight loss or fatigue? No  2. Do you have any open wounds or new incisions? No  3. Do you have any upcoming hospitalizations or surgeries? Does not include esophagogastroduodenoscopy, colonoscopy, endoscopic retrograde cholangiopancreatography (ERCP), endoscopic ultrasound (EUS), dental procedures or joint aspiration/steroid injections No  4. Do you currently have any signs of illness or infection or are you on any antibiotics? No  5. Have you had any new, sudden or worsening abdominal pain? No  6. Have you or anyone in your household received a live vaccination in the past 4 weeks? Please note: No live vaccines while on biologic/chemotherapy until 6 months after the last treatment. Patient can receive the flu vaccine (shot only), pneumovax and the Covid vaccine. It is optimal for the patient to get these vaccines mid cycle, but they can be given at any time as long as it is not on the day of the infusion. No  7. Have you recently been diagnosed with any new nervous system diseases (ie. Multiple sclerosis, Guillain [place], seizures, neurological changes) or cancer diagnosis? Are you on any form of radiation or chemotherapy? No  8. Are you pregnant or breast feeding or do you have plans of pregnancy in  the future? No  9. Have you been having any signs of worsening depression or suicidal ideations?  (benlysta only) No  10. Have there been any other new onset medical symptoms? No  11. Have you had any new blood clots? (IVIG only) No    Post Infusion Assessment:  Biologic Infusion Post Education: Call the triage nurse at your clinic or seek medical attention if you have chills and/or temperature greater than or equal to 100.5, uncontrolled nausea/vomiting, diarrhea, constipation, dizziness, shortness of breath, chest pain, heart palpitations, weakness or any other new or concerning symptoms, questions or concerns.  You cannot have any live virus vaccines prior to or during treatment or up to 6 months post infusion.  If you have an upcoming surgery, medical procedure or dental procedure during treatment, this should be discussed with your ordering physician and your surgeon/dentist.  If you are having any concerning symptom, if you are unsure if you should get your next infusion or wish to speak to a provider before your next infusion, please call your care coordinator or triage nurse at your clinic to notify them so we can adequately serve you.       Discharge Plan:   RTC for Rituxan in 5 months.  Patient will call and schedule at a later date.   Instructed patient to call at least 1 month prior to being due for infusion.      ABEBE MAHER RN

## 2023-06-08 NOTE — TELEPHONE ENCOUNTER
----- Message from Killian Marroquin MD sent at 5/10/2019  9:43 AM CDT -----  Regarding: cytoxan  Would you plz send me the tx plan for cytoxan 750 mg/m2 qmo x 6? Thank you   Statement Selected No

## 2023-06-19 DIAGNOSIS — L93.0 LUPUS ERYTHEMATOSUS, UNSPECIFIED FORM: ICD-10-CM

## 2023-06-21 NOTE — TELEPHONE ENCOUNTER
Medication/Dose: hydroxychloroquine (PLAQUENIL) 200 MG tablet    Last Written : 6/22/22  Last Quantity: 180, # refills: 3  Last Office Visit :  3/8/23  Pending appointment: 10/6/23     [facility], scanned under media tab  (Reviewed Plaquenil Flow sheet, Care Everywhere, EMR, and Media.)  Pending Eye exam: unknown    Prescription not on Protocol, and routed to Rheumatology team to follow-up with pt.    TANGELA TomasN, RN  [facility] Refill Team

## 2023-06-22 RX ORDER — HYDROXYCHLOROQUINE SULFATE 200 MG/1
200 TABLET, FILM COATED ORAL 2 TIMES DAILY
Qty: 180 TABLET | Refills: 0 | Status: SHIPPED | OUTPATIENT
Start: 2023-06-22 | End: 2023-09-22

## 2023-06-23 DIAGNOSIS — Z79.52 LONG TERM SYSTEMIC STEROID USER: ICD-10-CM

## 2023-06-26 RX ORDER — CHOLECALCIFEROL (VITAMIN D3) 50 MCG
1 TABLET ORAL DAILY
Qty: 90 TABLET | Refills: 2 | Status: SHIPPED | OUTPATIENT
Start: 2023-06-26 | End: 2023-10-09

## 2023-06-26 NOTE — TELEPHONE ENCOUNTER
vitamin D3 (CHOLECALCIFEROL) 50 mcg (2000 units) tablet      Last Written Prescription Date:  8-30-21  Last Fill Quantity: 90,   # refills: 3  Last Office Visit : 3-8-23  Future Office visit:  10-6-23    Routing refill request to provider for review/approval because:  Gap in RF

## 2023-06-26 NOTE — PROGRESS NOTES
"Anesthesia Transfer of Care Note    Patient: Claire Bowser    Procedure(s) Performed: Procedure(s) (LRB):  EGD (ESOPHAGOGASTRODUODENOSCOPY) (N/A)    Patient location: PACU    Anesthesia Type: general    Transport from OR: Transported from OR on 2-3 L/min O2 by NC with adequate spontaneous ventilation    Post pain: adequate analgesia    Post assessment: no apparent anesthetic complications    Post vital signs: stable    Level of consciousness: awake    Nausea/Vomiting: no nausea/vomiting    Complications: none    Transfer of care protocol was followed      Last vitals:   Visit Vitals  BP (!) 144/68 (BP Location: Left arm, Patient Position: Lying)   Pulse 89   Temp 36.4 °C (97.5 °F) (Tympanic)   Resp 20   Ht 5' 2" (1.575 m)   Wt 47.2 kg (104 lb)   SpO2 100%   Breastfeeding No   BMI 19.02 kg/m²     " Rheumatology Consult Note    Reason for referral: SLE    Referring physician: Delio Felton MD          HPI:    Taina Singh is a 52 year old  female who was referred to our clinic for evaluation and management of her SLE.      She was diagnosed with lupus at age 27. Her 1st sx were malar rash and fatigue. No skin biopsy was done (reportedly had +KARLIE). She was treated with prednisone taper and HCQ. HCQ helped and she tolerated it well. She was diagnosed with Sjogren's at the same time. Had dryness of eyes and mouth. No lip biopsy to confirm the Dx.      Reportedly she had very mild stable lupus for many years and eventually at some point, stopped taking HCQ (can't remember when)    She was diagnosed with MALT lymphoma at 44, had enlarged LN over R parotid gland, on biopsy it was MALT lymphoma. Tx was resection only, no radiation or chemo.    About 3 yr ago, had flu shot and 2 days later, developed pleuritic CP and was seen at urgent care. That's why she does not want to get flu shot. She was seen in ER 2 days after UC visit. She was treated with prednisone.      She lost 100 lbs by exercise over past 2 yr, felt amazing. In 7/2017, started not feeling well. She had extreme fatigue. Had AM stiffness and pain over hands. Touched base with her previous rheumatologist (Dr. Rangel) and was told to have lupus flare and was put on  mg qd. Afterwards, developed pleuritic CP which has not been resolved.    She was given prednisone 40 mg qd x 5 days (on 12/16/2017) by pulmonologist in [facility]. It helped a lot but pleuritic CP recurred after stopping it.    She is seeking 2nd opinion for m/o her lupus.    She was told to have pulm HTN and was evaluated for it.    No triggers for current flare.    No flare of malar rash. Has fatigue. No current hair loss. Uses blink eyedrops, restasis burned her eyes. She has been prescribed salagen for dry mouth, has not used it. Arthritis of hands have resolved on  HCQ.    Last HCQ eye exam was 2 mo ago.    ROS:  A comprehensive ROS was done, positives are per HPI.    It is negative for fever, discoid rash, photosensitivity, Raynaud's, oral ulcers, nasal ulcers, dysphagia, cough, h/o serositis, N/V, heartburn, diarrhea, abdominal pain, blood in stool or urine, h/o proteinuria, myalgia, headaches, seizures, psychosis, numbness, tingling, myelitis, easy bruising, depression, anxiety, memory problem, h/o thrombosis, or blood transfusion.    Past Medical History:   Diagnosis Date     Gluten intolerance      Iron deficiency      Lupus      MALT lymphoma (H) age 44     Sjogren's syndrome (H)      Vitamin D deficiency      Past Surgical History:   Procedure Laterality Date     BIOPSY/REMOVAL, LYMPH NODE(S)        SECTION  age 32     PAROTIDECTOMY       TONSILLECTOMY       Family History   Problem Relation Age of Onset     Alopecia Brother      Rheumatoid Arthritis Brother      Social History     Social History     Marital status:      Spouse name: N/A     Number of children: N/A     Years of education: 1     Occupational History           , 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    Allergies   Allergen Reactions     Penicillins Rash     Sulfa Drugs Rash       Outpatient Encounter Prescriptions as of 2017   Medication Sig Dispense Refill     Calcium-Magnesium 300-300 MG TABS daily        hydroxychloroquine (PLAQUENIL) 200 MG tablet Take 200 mg by mouth 2 times daily        Omega-3 Fatty Acids (OMEGA-3 FISH OIL) 500 MG CAPS Take 500 mg by mouth daily        Multiple Vitamins-Minerals (WOMENS MULTI PO) Take by mouth daily        CALCIUM PO        Cholecalciferol (D 1000) 1000  "UNITS CAPS Take 1,000 Units by mouth daily        naproxen (NAPROSYN) 500 MG tablet        pilocarpine (SALAGEN) 5 MG tablet Take 5 mg by mouth       No facility-administered encounter medications on file as of 2017.          Her records were reviewed.    2D-Echo 2017:    ECHO COMPLETE WO CONTRAST CHILANGO GIBBONS 2017 14:43     [facility]   [address], [address], [place] [address]   Main: [phone]  [email or web address]                                                 Transthoracic Echo Report   CHILANGO GIBBONS   Excellian ID: [id number] Age: 52 : 1965 Ordering Provider: NGUYEN PETERS   Exam Date: 2017 14:43 Gender: F Sonographer: HAAJ   Accession #: [id number] Height: 66 in BSA: 2.24 m  BP: 108 / 62   Weight: 261 lbs BMI: 42.1 kg/m          Site: [facility]   Location: Outpatient Rhythm: Normal Sinus Rhythm   Procedure Components: 2D imaging, Color Doppler, Spectral Doppler   Indications: Murmur; Systemic lupus erythematosus, unspecified SLE type, unspecified organ   involvement status   Technical Quality: Contrast: None     Final Conclusion   Visually estimated ejection fraction is 55-60%.   Mild left ventricular hypertrophy.   Estimated pulmonary artery pressure of 31 mmHg + RA pressure.   Dilated IVC size, <50% collapse with respiration.   Estimated EF: 55-60%   FINDINGS   Left Ventricle Normal left ventricular size. Visually estimated ejection fraction is 55-60%.   Mild left ventricular hypertrophy.   Diastolic Function \"Pseudonormal\" left ventricular filling pattern. E/e' ratio 8-15 is   indeterminate for filling pressure.   Right Ventricle Normal right ventricular size and function.   Left Atrium Mild left atrial enlargement.   Right Atrium Mild right atrial enlargement.   Aortic Valve Normal aortic valve. No aortic stenosis. No significant aortic regurgitation.   Mitral Valve Normal mitral valve. No mitral " "stenosis. Mild mitral regurgitation.   Tricuspid Valve Normal tricuspid valve. No tricuspid stenosis. Mild tricuspid regurgitation.   Estimated pulmonary artery pressure   of 31 mmHg + RA pressure.   Pulmonic Valve Normal pulmonic valve without significant stenosis or regurgitation.   Pericardium Normal pericardium.   Aorta Measured aortic root diameter is normal in size.   Inferior Vena Cava Dilated IVC size, <50% collapse with respiration.    Component      Latest Ref Rng & Units 11/20/2017   Sodium      133 - 144 mmol/L 137   Potassium      3.4 - 5.3 mmol/L 4.2   Chloride      94 - 109 mmol/L 102   Carbon Dioxide      20 - 32 mmol/L 30   Anion Gap      3 - 14 mmol/L 6   Glucose      70 - 99 mg/dL 101 (H)   Urea Nitrogen      7 - 30 mg/dL 13   Creatinine      0.52 - 1.04 mg/dL 0.55   GFR Estimate      >60 mL/min/1.7m2 >90   GFR Estimate If Black      >60 mL/min/1.7m2 >90   Calcium      8.5 - 10.1 mg/dL 9.1   WBC      4.0 - 11.0 10e9/L 4.9   RBC Count      3.8 - 5.2 10e12/L 4.28   Hemoglobin      11.7 - 15.7 g/dL 11.3 (L)   Hematocrit      35.0 - 47.0 % 35.5   MCV      78 - 100 fl 83   MCH      26.5 - 33.0 pg 26.4 (L)   MCHC      31.5 - 36.5 g/dL 31.8   RDW      10.0 - 15.0 % 14.6   Platelet Count      150 - 450 10e9/L 215   N-Terminal Pro Bnp      0 - 125 pg/mL 546 (H)   Sed Rate      0 - 30 mm/h 71 (H)             Ph.E:    /76  Pulse 63  Ht 1.676 m (5' 6\")  Wt 117.6 kg (259 lb 4.8 oz)  SpO2 99%  BMI 41.85 kg/m2      Constitutional: WD/WN. Pleasant. In no acute distress.  Eyes: EOM intact, PERRLA, sclera anicteric, conj not injected  HEENT: No oral ulcers or thrush. Normal salivary pool.  Neck: No cervical LAP or thyromegaly  Chest: Clear to auscultation bilaterally  CV: RRR, no murmurs/ rubs or gallops. No edema, clubbing or cyanosis.   GI: Abdomen is soft and non tender.   MS: No synovitis. Cool joints. No tenderness of the joints. No  joint deformities. Full ROM of the joints. No nodules. No " Jaccoud's deformity.  Skin: No skin rash, malar rash, livedo, periungual erythema, alopecia, digital ulcers or nail changes.  Neuro: A&O x 3. Grossly non focal, muscular power 5/5 in all ext  Psych: NL affect and mood    Assessment/ plan:    1-SLE. 53 yo WF with +fh/o RA and personal hx of SLE presents for 2nd opinion of m/o her SLE. She was diagnosed with SLE at age 27. Her lupus has been marked by +KARLIE (highest titer 1:640, speckled and nucleolar patterns), arthritis, malar rash, serositis (pleuritic CP) supported by +SSA/SSB Ab, low C3, +RF, high ESR/CRP. She had neg anti-RNP, anti-Sm, anti-DNA, acL IgM/G/A, LAC, cryo and anti-CCP. Had NL C3. She was treated with prednisone and HCQ at the time of Dx. Can't remember how long she was on HCQ and why HCQ was stopped, but it probably was stopped because of stable disease, no report on HCQ toxicity.    Reportedly her SLE was mild all these years.    Has secondary Sjogren's with sicca, +SSA/SSB Ab and h/o MALT lymphoma at age 44 in remission. Uses blink eyedrops and salagen. No lip biopsy was done to confirm Dx of Sjogren's.    Her lupus flared in 7/2017 with no triggers when she presented with arthritis, HCQ was resumed, arthritis resolved. Mild pulm HTN on 2D-Echo 8/2017, will be monitored, R cardiac cath has been considered to be done in future.    In 12/2017, was prescribed prednisone 40 mg for only 5 days for pleuritic CP, CP recurred after stopping prednisone.     Her major complaint is ongoing CP. Her lupus is active with pleuritic CP. Lupus Dx and tx plan was discussed with her.    Recommend:    Labs today    Chest CT without contrast     Continue  mg bid    Prednisone taper 35-71-72-15-10-5 mg a day each for 5 days then stop    Consider adding AZA, if pleuritic CP recurs off prednisone.    Continue salagen    2-HCQ monitoring. Was reminded to have eye exam.      PLAN: Follow up in 3-4 months.      MEDICATION CHANGES: as above    Orders Placed This  Encounter   Procedures     CT Chest w/o contrast (High Resolution)     Complement C3     Complement C4     CRP inflammation     DNA double stranded antibodies     Erythrocyte sedimentation rate auto     Vitamin D Deficiency     Routine UA with micro reflex to culture     Protein  random urine with Creat Ratio     Creatinine random urine     AST     ALT     Beta 2 Glycoprotein 1 Antibody IgG     Beta 2 Glycoprotein 1 Antibody IgM     Thyroid peroxidase antibody     Anti thyroglobulin antibody     TSH with free T4 reflex     Cryoglobulin quantitative     Protein electrophoresis     Immunoglobulins A G and M     Tissue transglutaminase antibody IgA     Deamidated Giladin Peptide Cari IgA IgG     Ferritin     Iron and iron binding capacity     Endomysial Antibody IgA by IFA     Thiopurine Methyltransferase Genotyping     Direct antiglobulin test (DATG)

## 2023-07-16 ENCOUNTER — HEALTH MAINTENANCE LETTER (OUTPATIENT)
Age: 58
End: 2023-07-16

## 2023-07-27 ENCOUNTER — LAB (OUTPATIENT)
Dept: LAB | Facility: CLINIC | Age: 58
End: 2023-07-27
Payer: COMMERCIAL

## 2023-07-27 DIAGNOSIS — M32.9 SYSTEMIC LUPUS ERYTHEMATOSUS, UNSPECIFIED SLE TYPE, UNSPECIFIED ORGAN INVOLVEMENT STATUS (H): ICD-10-CM

## 2023-07-27 DIAGNOSIS — Z79.899 HIGH RISK MEDICATIONS (NOT ANTICOAGULANTS) LONG-TERM USE: ICD-10-CM

## 2023-07-31 DIAGNOSIS — M32.0 DRUG-INDUCED SYSTEMIC LUPUS ERYTHEMATOSUS, UNSPECIFIED ORGAN INVOLVEMENT STATUS (H): Primary | ICD-10-CM

## 2023-07-31 DIAGNOSIS — M32.9 SYSTEMIC LUPUS ERYTHEMATOSUS (H): ICD-10-CM

## 2023-07-31 DIAGNOSIS — Z79.899 ENCOUNTER FOR LONG-TERM (CURRENT) USE OF HIGH-RISK MEDICATION: ICD-10-CM

## 2023-08-04 ENCOUNTER — LAB (OUTPATIENT)
Dept: LAB | Facility: CLINIC | Age: 58
End: 2023-08-04
Payer: COMMERCIAL

## 2023-08-04 DIAGNOSIS — M32.9 SYSTEMIC LUPUS ERYTHEMATOSUS (H): ICD-10-CM

## 2023-08-04 DIAGNOSIS — Z79.899 ENCOUNTER FOR LONG-TERM (CURRENT) USE OF HIGH-RISK MEDICATION: ICD-10-CM

## 2023-08-04 LAB
ALBUMIN MFR UR ELPH: <6 MG/DL
ALBUMIN SERPL BCG-MCNC: 4.2 G/DL (ref 3.5–5.2)
ALBUMIN UR-MCNC: NEGATIVE MG/DL
ALT SERPL W P-5'-P-CCNC: 18 U/L (ref 0–50)
APPEARANCE UR: CLEAR
AST SERPL W P-5'-P-CCNC: 24 U/L (ref 0–45)
BACTERIA #/AREA URNS HPF: ABNORMAL /HPF
BASOPHILS # BLD AUTO: 0.1 10E3/UL (ref 0–0.2)
BASOPHILS NFR BLD AUTO: 1 %
BILIRUB UR QL STRIP: NEGATIVE
CD19 B CELL COMMENT: ABNORMAL
CD19 CELLS # BLD: <1 CELLS/UL (ref 107–698)
CD19 CELLS NFR BLD: <1 % (ref 6–27)
COLOR UR AUTO: YELLOW
CREAT SERPL-MCNC: 0.62 MG/DL (ref 0.51–0.95)
CREAT UR-MCNC: 39.8 MG/DL
CRP SERPL-MCNC: 7.14 MG/L
EOSINOPHIL # BLD AUTO: 0.1 10E3/UL (ref 0–0.7)
EOSINOPHIL NFR BLD AUTO: 1 %
ERYTHROCYTE [DISTWIDTH] IN BLOOD BY AUTOMATED COUNT: 14.6 % (ref 10–15)
ERYTHROCYTE [SEDIMENTATION RATE] IN BLOOD BY WESTERGREN METHOD: 13 MM/HR (ref 0–30)
GFR SERPL CREATININE-BSD FRML MDRD: >90 ML/MIN/1.73M2
GLUCOSE UR STRIP-MCNC: NEGATIVE MG/DL
HCT VFR BLD AUTO: 39.3 % (ref 35–47)
HGB BLD-MCNC: 12.3 G/DL (ref 11.7–15.7)
HGB UR QL STRIP: NEGATIVE
IMM GRANULOCYTES # BLD: 0 10E3/UL
IMM GRANULOCYTES NFR BLD: 1 %
KETONES UR STRIP-MCNC: NEGATIVE MG/DL
LEUKOCYTE ESTERASE UR QL STRIP: NEGATIVE
LYMPHOCYTES # BLD AUTO: 0.3 10E3/UL (ref 0.8–5.3)
LYMPHOCYTES NFR BLD AUTO: 4 %
MCH RBC QN AUTO: 26.1 PG (ref 26.5–33)
MCHC RBC AUTO-ENTMCNC: 31.3 G/DL (ref 31.5–36.5)
MCV RBC AUTO: 83 FL (ref 78–100)
MONOCYTES # BLD AUTO: 0.7 10E3/UL (ref 0–1.3)
MONOCYTES NFR BLD AUTO: 9 %
NEUTROPHILS # BLD AUTO: 6.7 10E3/UL (ref 1.6–8.3)
NEUTROPHILS NFR BLD AUTO: 84 %
NITRATE UR QL: NEGATIVE
NRBC # BLD AUTO: 0 10E3/UL
NRBC BLD AUTO-RTO: 0 /100
PH UR STRIP: 5.5 [PH] (ref 5–7)
PLATELET # BLD AUTO: 203 10E3/UL (ref 150–450)
PROT/CREAT 24H UR: NORMAL MG/G{CREAT}
RBC # BLD AUTO: 4.71 10E6/UL (ref 3.8–5.2)
RBC #/AREA URNS AUTO: ABNORMAL /HPF
SP GR UR STRIP: <=1.005 (ref 1–1.03)
SQUAMOUS #/AREA URNS AUTO: ABNORMAL /LPF
UROBILINOGEN UR STRIP-ACNC: 0.2 E.U./DL
WBC # BLD AUTO: 7.9 10E3/UL (ref 4–11)
WBC #/AREA URNS AUTO: ABNORMAL /HPF

## 2023-08-04 PROCEDURE — 36415 COLL VENOUS BLD VENIPUNCTURE: CPT

## 2023-08-04 PROCEDURE — 85652 RBC SED RATE AUTOMATED: CPT

## 2023-08-04 PROCEDURE — 84450 TRANSFERASE (AST) (SGOT): CPT

## 2023-08-04 PROCEDURE — 82565 ASSAY OF CREATININE: CPT

## 2023-08-04 PROCEDURE — 85025 COMPLETE CBC W/AUTO DIFF WBC: CPT

## 2023-08-04 PROCEDURE — 86225 DNA ANTIBODY NATIVE: CPT

## 2023-08-04 PROCEDURE — 82784 ASSAY IGA/IGD/IGG/IGM EACH: CPT

## 2023-08-04 PROCEDURE — 86355 B CELLS TOTAL COUNT: CPT

## 2023-08-04 PROCEDURE — 82040 ASSAY OF SERUM ALBUMIN: CPT

## 2023-08-04 PROCEDURE — 84460 ALANINE AMINO (ALT) (SGPT): CPT

## 2023-08-04 PROCEDURE — 84156 ASSAY OF PROTEIN URINE: CPT

## 2023-08-04 PROCEDURE — 86160 COMPLEMENT ANTIGEN: CPT

## 2023-08-04 PROCEDURE — 81001 URINALYSIS AUTO W/SCOPE: CPT

## 2023-08-04 PROCEDURE — 86140 C-REACTIVE PROTEIN: CPT

## 2023-08-05 DIAGNOSIS — G47.00 INSOMNIA, UNSPECIFIED TYPE: ICD-10-CM

## 2023-08-07 LAB
C3 SERPL-MCNC: 137 MG/DL (ref 81–157)
C4 SERPL-MCNC: 26 MG/DL (ref 13–39)
DSDNA AB SER-ACNC: 54 IU/ML
IGA SERPL-MCNC: 142 MG/DL (ref 84–499)
IGG SERPL-MCNC: 358 MG/DL (ref 610–1616)
IGM SERPL-MCNC: 17 MG/DL (ref 35–242)

## 2023-08-07 NOTE — TELEPHONE ENCOUNTER
zolpidem (AMBIEN) 5 MG tablet 30 tablet 3 12/25/2022     Last Office Visit : 3-8-2023  Future Office visit:  10-6-2023    Routing refill request to provider for review/approval because:  CONTROLLED MEDICATION      Kathleen M Doege RN

## 2023-08-07 NOTE — TELEPHONE ENCOUNTER
[place]  noted below. Will route to provider for review.  Myra Alvarez, TANGELAN, RN  RN Care Coordinator Rheumatology

## 2023-08-08 RX ORDER — ZOLPIDEM TARTRATE 5 MG/1
TABLET ORAL
Qty: 30 TABLET | Refills: 5 | Status: SHIPPED | OUTPATIENT
Start: 2023-08-08 | End: 2024-04-06

## 2023-09-18 DIAGNOSIS — L93.0 LUPUS ERYTHEMATOSUS, UNSPECIFIED FORM: ICD-10-CM

## 2023-09-21 NOTE — TELEPHONE ENCOUNTER
HYDROXYCHLOROQUINE TABS 200MG      Plaquenil      Last Written Prescription Date:  6/22/23  Last Fill Quantity: 180,   # refills: 0  Last Office Visit: 3/8/23  Future Office visit: 10/6/23    Last Eye Exam:    Refill Team has reviewed Health Maint., CareEveryWhere and Media.    Found - date: 5/16/22  Refill for 90+ 3 (from date of eye exam)  Not found - Route to Clinic Team for further review     Clinic team does complete review    Found - Date:   Pend for 90+3 and route to provider (from date of eye exam)       Not found - send letter or contact the patient in some way.             - Document not found and pend for 90+0 and route to provider    Routing refill request to provider/clinic team for review/approval because:  Drug not on the [facility], [facility] or [facility] refill protocol or controlled substance    Routing refill request to provider for review/approval because:  Drug not on the [facility], [facility] or [facility] refill protocol   Overdue eye exam

## 2023-09-22 RX ORDER — HYDROXYCHLOROQUINE SULFATE 200 MG/1
200 TABLET, FILM COATED ORAL 2 TIMES DAILY
Qty: 180 TABLET | Refills: 0 | Status: SHIPPED | OUTPATIENT
Start: 2023-09-22 | End: 2023-10-06

## 2023-09-30 ENCOUNTER — APPOINTMENT (OUTPATIENT)
Dept: LAB | Facility: CLINIC | Age: 58
End: 2023-09-30
Payer: COMMERCIAL

## 2023-10-02 ENCOUNTER — MYC MEDICAL ADVICE (OUTPATIENT)
Dept: RHEUMATOLOGY | Facility: CLINIC | Age: 58
End: 2023-10-02
Payer: COMMERCIAL

## 2023-10-06 ENCOUNTER — TELEPHONE (OUTPATIENT)
Dept: RHEUMATOLOGY | Facility: CLINIC | Age: 58
End: 2023-10-06
Payer: COMMERCIAL

## 2023-10-06 ENCOUNTER — OFFICE VISIT (OUTPATIENT)
Dept: RHEUMATOLOGY | Facility: CLINIC | Age: 58
End: 2023-10-06
Attending: INTERNAL MEDICINE
Payer: COMMERCIAL

## 2023-10-06 VITALS
DIASTOLIC BLOOD PRESSURE: 82 MMHG | SYSTOLIC BLOOD PRESSURE: 158 MMHG | BODY MASS INDEX: 47.09 KG/M2 | HEIGHT: 66 IN | WEIGHT: 293 LBS | HEART RATE: 80 BPM

## 2023-10-06 DIAGNOSIS — L93.0 LUPUS ERYTHEMATOSUS, UNSPECIFIED FORM: ICD-10-CM

## 2023-10-06 DIAGNOSIS — M32.14 LUPUS NEPHRITIS, ISN/RPS CLASS IV (H): ICD-10-CM

## 2023-10-06 DIAGNOSIS — M32.9 SYSTEMIC LUPUS ERYTHEMATOSUS, UNSPECIFIED SLE TYPE, UNSPECIFIED ORGAN INVOLVEMENT STATUS (H): ICD-10-CM

## 2023-10-06 PROCEDURE — 99214 OFFICE O/P EST MOD 30 MIN: CPT | Performed by: INTERNAL MEDICINE

## 2023-10-06 PROCEDURE — 99213 OFFICE O/P EST LOW 20 MIN: CPT | Performed by: INTERNAL MEDICINE

## 2023-10-06 RX ORDER — HYDROXYCHLOROQUINE SULFATE 200 MG/1
200 TABLET, FILM COATED ORAL 2 TIMES DAILY
Qty: 180 TABLET | Refills: 3 | Status: SHIPPED | OUTPATIENT
Start: 2023-10-06 | End: 2023-10-09

## 2023-10-06 RX ORDER — MYCOPHENOLATE MOFETIL 500 MG/1
1000 TABLET ORAL 2 TIMES DAILY
Qty: 360 TABLET | Refills: 1 | Status: SHIPPED | OUTPATIENT
Start: 2023-10-06 | End: 2023-10-09

## 2023-10-06 RX ORDER — PREDNISONE 5 MG/1
5 TABLET ORAL DAILY
Qty: 90 TABLET | Refills: 3 | Status: SHIPPED | OUTPATIENT
Start: 2023-10-06 | End: 2023-10-09

## 2023-10-06 ASSESSMENT — PAIN SCALES - GENERAL: PAINLEVEL: NO PAIN (0)

## 2023-10-06 NOTE — PATIENT INSTRUCTIONS
Rituximab 500 mg IV one dose 1st week of Nov     Labs early November with rituximab infusion    Stay on prednisone 5 mg a day, cellcept 2 tabs twice a day, plaquenil 1 tab twice a day    Covid booster shot next week    Return video visit in about 6 months

## 2023-10-06 NOTE — TELEPHONE ENCOUNTER
Rituximab therapy plane entered per direction of provider.    Myra Alvarez, BSN, RN  RN Care Coordinator Rheumatology

## 2023-10-06 NOTE — PROGRESS NOTES
[facility] Rheumatology Follow-up Visit Note    Reason for visit: f/u for lupus  Date of last visit: 3/8/2023  DOS: 10/6/2023      History of Present Illness:  Taina Singh is a 58 year old female who is here for follow up of SLE and lupus nephritis.    History:  - She was diagnosed with lupus at age 27. Her 1st sx were malar rash and fatigue. No skin biopsy was done (reportedly had +KARLIE). She was treated with prednisone taper and HCQ. HCQ helped and she tolerated it well. She was diagnosed with Sjogren's at the same time. Had dryness of eyes and mouth. No lip biopsy to confirm the Dx. Reportedly she had very mild stable lupus for many years and eventually at some point, stopped taking HCQ (can't remember when). Her lupus started to flare in 7/2017 initially with pleuritic CP, was put back on HCQ. 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 Since then failed AZA, cytoxan and benlysta. MMF was not enough to control her LN and eventually rituximab was added (which was initially denied by insurance even with h/o lymphoma) given necrosis on the renal biopsy (rituximab is FDA approved for ANCA vasculitis). On HCQ+MMF after adding rituximab, LN and SLE started too improve.  - She was diagnosed with MALT lymphoma at 44, had enlarged LN over R parotid gland, on biopsy it was MALT lymphoma. Tx was resection only, no radiation or chemo.    2/4/2021:  - Taina is on prednisone 8 mg every day, it was 17.5 mg every day at last visit. She feels achy and tired this week, but thinks the cold weather is responsible for her sx. Had rituximab  375 mg/m2 on 1/22/2020, 2/6/2020, then 6/26/2020, 7/20/2020 and last dose 1 gram on 12/15/2020. On mepron for PJP prophylaxis. On  mg bid, MMF 1500 mg bid. She works from home. Has intermittent joint pain, nothing consistent. one day shoulders hurt and the other day her hips hurt. AM stiffness is 30-60 minutes. no pleurisy, sob or cough or fevers. No skin rash. Her  "hair grew back.    11/12/2021:  - Taina is feeling well, she thinks her lupus is under fair control, no major complaints today.    4/29/2022:  - Today, Taina reports that she is feeling well.  Has noted some general diffuse aching and fatigue, difficulty making fist, and middle finger with trigger symptoms, which she typically has as she nears her rituximab infusion.  She has noted some morning stiffness, but this is very responsive to activity and stretching. Continues to have some dry mouth managed via increased hydration. She has had an intermittent mild rash of her nose and cheeks, but this is not consistent and not dependent on sun exposure. She has had a good appetite and an overall positive mood. Feels as though rituximab has been a \"huge game changer\" for her.    10/28/2022:  Ms. Singh presents for follow up.  -Patient reports that she is doing well.  She was able to completely wean and discontinue the prednisone.  She is doing well on rituximab infusions.  Her last rituximab treatment was in June and is scheduled to get the next Rituxan in November.  She is compliant with CellCept and Plaquenil.  She recently received the Evusheld.  Denies any oral ulcers, rashes, joint pains, body aches, vision changes, shortness of breath and  Exertion.  Reports morning stiffness is minimal.  Good appetite and normal mood.    3/8/2023:    Doing well  Stable  No flares  Last prednisone use, was about 2 wk ago, 2.5 mg every day x 2 days after a cold.      Today 10/6/2023:    Doing very well, no symptoms, no lupus flare, back on prednisone 5 mg every day, did not tolerate coming off prednisone as developed diffuse arthralgia.    ROS:    A comprehensive ROS was done, positives are per HPI.    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 44    No chemo or radiation     Other forms of systemic lupus erythematosus (H) 2018     Pulmonary embolism (H)     2019 seen on abd CT at [facility]     Sjogren's syndrome (H24)     secondary Sjogrens, secondary to lupus     Vitamin D deficiency      Past Surgical History:  Past Surgical History:   Procedure Laterality Date     BIOPSY/REMOVAL, LYMPH NODE(S)        SECTION  age 32     HC HYSTEROS W PERMANENT FALLOPAIN IMPLANT      Essure b/l     PAROTIDECTOMY Right 2006     TONSILLECTOMY       Family history:  Family History   Problem Relation Age of Onset     Alopecia Brother      Rheumatoid Arthritis Brother      LUNG DISEASE No family hx of      Social history:  Social History     Socioeconomic History     Marital status:      Spouse name: Not on file     Number of children: Not on file     Years of education: 1     Highest education level: Not on file   Occupational History     Comment: , 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      Social Determinants of Health     Financial Resource Strain: Not on file   Food Insecurity: Not on file    Transportation Needs: Not on file   Physical Activity: Not on file   Stress: Not on file   Social Connections: Not on file   Interpersonal Safety: Not on file   Housing Stability: Not on file     Allergies:  Allergies   Allergen Reactions     Pentamidine Shortness Of Breath     Penicillins Rash     Sulfa Antibiotics Rash     Medications:  Outpatient Encounter Medications as of 10/6/2023   Medication Sig Dispense Refill     albuterol (PROAIR HFA/PROVENTIL HFA/VENTOLIN HFA) 108 (90 Base) MCG/ACT inhaler Inhale 2 puffs into the lungs every 6 hours as needed for shortness of breath / dyspnea or wheezing 3 Inhaler 3     Calcium-Magnesium 300-300 MG TABS daily        hydroxychloroquine (PLAQUENIL) 200 MG tablet Take 1 tablet (200 mg) by mouth 2 times daily 180 tablet 0     Multiple Vitamins-Minerals (WOMENS MULTI PO) Take by mouth daily        mycophenolate (GENERIC EQUIVALENT) 500 MG tablet 2 tabs in am, 1 tab in pm 270 tablet 1     Omega-3 Fatty Acids (OMEGA-3 FISH OIL) 500 MG CAPS Take 500 mg by mouth daily        predniSONE (DELTASONE) 5 MG tablet 1-2 tabs a day x 3 days prn flares 60 tablet 3     traMADol (ULTRAM) 50 MG tablet Take 1 tablet (50 mg) by mouth every 12 hours as needed for severe pain - Oral 60 tablet 3     vitamin D3 (CHOLECALCIFEROL) 50 mcg (2000 units) tablet Take 1 tablet (50 mcg) by mouth daily 90 tablet 2     warfarin ANTICOAGULANT (COUMADIN) 5 MG tablet   1     zolpidem (AMBIEN) 5 MG tablet TAKE 1 TABLET(5 MG) BY MOUTH EVERY EVENING AS NEEDED FOR SLEEP 30 tablet 5     No facility-administered encounter medications on file as of 10/6/2023.       Labs/Imaging:      Latest Ref Rng & Units 11/18/2022    12:24 PM 4/11/2023     1:56 PM 8/4/2023    11:13 AM   RHEUM RESULTS   Albumin 3.4 - 5.0 g/dL 3.9  3.8     Albumin 3.5 - 5.2 g/dL   4.2    ALT 0 - 50 U/L 44  30  18    AST 0 - 45 U/L 31  16  24    Complement C3 81 - 157 mg/dL 138  137  137    Complement C4 13 - 39 mg/dL 30  29  26    Creatinine 0.51 -  0.95 mg/dL 0.58  0.52  0.62    CRP Inflammation 0.0 - 8.0 mg/L 19.2  7.7     CRP Inflammation <5.00 mg/L   7.14    DNA-ds <10.0 IU/mL 49.0  66.0  54.0    GFR Estimate >60 mL/min/1.73m2 >90  >90  >90    Hematocrit 35.0 - 47.0 % 37.3  40.1  39.3    Hemoglobin 11.7 - 15.7 g/dL 12.0  12.7  12.3    IGA 84 - 499 mg/dL 152  162  142    IGM 35 - 242 mg/dL 12  18  17     - 1,616 mg/dL 370  374  358    WBC 4.0 - 11.0 10e3/uL 5.7  6.4  7.9    RBC Count 3.80 - 5.20 10e6/uL 4.55  4.74  4.71    RDW 10.0 - 15.0 % 13.4  14.9  14.6    MCHC 31.5 - 36.5 g/dL 32.2  31.7  31.3    MCV 78 - 100 fL 82  85  83    Platelet Count 150 - 450 10e3/uL 205  211  203    Sed Rate 0 - 30 mm/hr 24  15  13      Component      Latest Ref Rng & Units 2/1/2021   WBC      4.0 - 11.0 10e9/L 7.8   RBC Count      3.8 - 5.2 10e12/L 4.50   Hemoglobin      11.7 - 15.7 g/dL 11.7   Hematocrit      35.0 - 47.0 % 37.9   MCV      78 - 100 fl 84   MCH      26.5 - 33.0 pg 26.0 (L)   MCHC      31.5 - 36.5 g/dL 30.9 (L)   RDW      10.0 - 15.0 % 14.9   Platelet Count      150 - 450 10e9/L 236   % Neutrophils      % 84.9   % Lymphocytes      % 6.3   % Monocytes      % 7.9   % Eosinophils      % 0.6   % Basophils      % 0.3   Absolute Neutrophil      1.6 - 8.3 10e9/L 6.6   Absolute Lymphocytes      0.8 - 5.3 10e9/L 0.5 (L)   Absolute Monocytes      0.0 - 1.3 10e9/L 0.6   Absolute Eosinophils      0.0 - 0.7 10e9/L 0.1   Absolute Basophils      0.0 - 0.2 10e9/L 0.0   Diff Method       Automated Method   Color Urine       Yellow   Appearance Urine       Clear   Glucose Urine      NEG:Negative mg/dL Negative   Bilirubin Urine      NEG:Negative Negative   Ketones Urine      NEG:Negative mg/dL Trace (A)   Specific Gravity Urine      1.003 - 1.035 >1.030   pH Urine      5.0 - 7.0 pH 6.0   Protein Albumin Urine      NEG:Negative mg/dL 100 (A)   Urobilinogen Urine      0.2 - 1.0 EU/dL 0.2   Nitrite Urine      NEG:Negative Negative   Blood Urine      NEG:Negative Trace (A)    Leukocyte Esterase Urine      NEG:Negative Trace (A)   Source       Midstream Urine   WBC Urine      OTO5:0 - 5 /HPF 10-25 (A)   RBC Urine      OTO2:O - 2 /HPF 2-5 (A)   Squamous Epithelial /LPF Urine      FEW:Few /LPF Few   Bacteria Urine      NEG:Negative /HPF Moderate (A)   Triple Phosphates      NEG:Negative /HPF Few (A)   IGG      610 - 1,616 mg/dL 399 (L)   IGA      84 - 499 mg/dL 190   IGM      35 - 242 mg/dL 16 (L)   Creatinine      0.52 - 1.04 mg/dL 0.60   GFR Estimate      >60 mL/min/1.73:m2 >90   GFR Estimate If Black      >60 mL/min/1.73:m2 >90   Specimen Description       Midstream Urine   Special Requests       Specimen received in preservative   Culture Micro       <10,000 colonies/mL . . .   CD19 B Cells      6 - 27 % <1 (L)   Absolute CD19      107 - 698 cells/uL <1 (L)   Protein Random Urine      g/L 0.75   Protein Total Urine g/gr Creatinine      0 - 0.2 g/g Cr 0.40 (H)   Creatinine Urine Random      mg/dL 182   Sed Rate      0 - 30 mm/h 27   DNA-ds      <10 IU/mL 102 (H)   CRP Inflammation      0.0 - 8.0 mg/L 8.9 (H)   Complement C3      81 - 157 mg/dL 104   Complement C4      13 - 39 mg/dL 23   AST      0 - 45 U/L 16   Albumin      3.4 - 5.0 g/dL 4.1   ALT      0 - 50 U/L 26   Creatinine Urine      mg/dL 186     Component      Latest Ref Rng & Units 11/10/2021   WBC      4.0 - 11.0 10e3/uL 8.2   RBC Count      3.80 - 5.20 10e6/uL 4.58   Hemoglobin      11.7 - 15.7 g/dL 12.3   Hematocrit      35.0 - 47.0 % 39.7   MCV      78 - 100 fL 87   MCH      26.5 - 33.0 pg 26.9   MCHC      31.5 - 36.5 g/dL 31.0 (L)   RDW      10.0 - 15.0 % 14.2   Platelet Count      150 - 450 10e3/uL 229   % Neutrophils      % 83   % Lymphocytes      % 6   % Monocytes      % 9   % Eosinophils      % 2   % Basophils      % 0   Absolute Neutrophils      1.6 - 8.3 10e3/uL 6.9   Absolute Lymphocytes      0.8 - 5.3 10e3/uL 0.5 (L)   Absolute Monocytes      0.0 - 1.3 10e3/uL 0.7   Absolute Eosinophils      0.0 - 0.7 10e3/uL 0.1    Absolute Basophils      0.0 - 0.2 10e3/uL 0.0   Color Urine      Colorless, Straw, Light Yellow, Yellow Yellow   Appearance Urine      Clear Clear   Glucose Urine      Negative mg/dL Negative   Bilirubin Urine      Negative Negative   Ketones Urine      Negative mg/dL Trace (A)   Specific Gravity Urine      1.003 - 1.035 >=1.030   Blood Urine      Negative Small (A)   pH Urine      5.0 - 7.0 6.0   Protein Albumin Urine      Negative mg/dL Negative   Urobilinogen Urine      0.2, 1.0 E.U./dL 0.2   Nitrite Urine      Negative Negative   Leukocyte Esterase Urine      Negative Trace (A)   Sodium      133 - 144 mmol/L 138   Potassium      3.4 - 5.3 mmol/L 3.9   Chloride      94 - 109 mmol/L 103   Carbon Dioxide      20 - 32 mmol/L 29   Anion Gap      3 - 14 mmol/L 6   Urea Nitrogen      7 - 30 mg/dL 22   Creatinine      0.52 - 1.04 mg/dL 0.70   Calcium      8.5 - 10.1 mg/dL 9.6   Glucose      70 - 99 mg/dL 83   Albumin      3.4 - 5.0 g/dL 4.0   Phosphorus      2.5 - 4.5 mg/dL 3.9   GFR Estimate      >60 mL/min/1.73m2 >90   Bacteria Urine      None Seen /HPF Few (A)   RBC Urine      0-2 /HPF /HPF 0-2   WBC Urine      0-5 /HPF /HPF 0-5   Squamous Epithelial /LPF Urine      None Seen /LPF Few (A)   Mucus Urine      None Seen /LPF Present (A)   MPO Cari IgG Instrument Value      <3.5 U/mL <0.3   Myeloperoxidase Antibody IgG      Negative Negative   Proteinase 3 Cari IgG Instrument Value      <2.0 U/mL <1.0   Proteinase 3 Antibody IgG      Negative Negative   IGG      610-1,616 mg/dL 398 (L)   IGA      84 - 499 mg/dL 184   IGM      35 - 242 mg/dL 16 (L)   Protein Random Urine      g/L 0.23   Protein Total Urine g/gr Creatinine      0.00 - 0.20 g/g Cr 0.11   Creatinine Urine      mg/dL 212   Neutrophil Cytoplasmic Antibody      <1:10 <1:10   Neutrophil Cytoplasmic Antibody Pattern       The ANCA IFA is <1:10.  No further testing will be performed.   CD19 B Cells      6 - 27 % <1 (L)   Absolute CD19      107 - 698 cells/uL <1  (L)   ALT      0 - 50 U/L 27   AST      0 - 45 U/L 19   Complement C4      13 - 39 mg/dL 28   Complement C3      81 - 157 mg/dL 138   CRP Inflammation      0.0 - 8.0 mg/L 8.9 (H)   DNA-ds      <10.0 IU/mL 90.0 (H)   Sed Rate      0 - 30 mm/hr 25     Component      Latest Ref Rng & Units 4/29/2022   WBC      4.0 - 11.0 10e3/uL 7.1   RBC Count      3.80 - 5.20 10e6/uL 4.62   Hemoglobin      11.7 - 15.7 g/dL 12.0   Hematocrit      35.0 - 47.0 % 38.3   MCV      78 - 100 fL 83   MCH      26.5 - 33.0 pg 26.0 (L)   MCHC      31.5 - 36.5 g/dL 31.3 (L)   RDW      10.0 - 15.0 % 14.3   Platelet Count      150 - 450 10e3/uL 213   % Neutrophils      % 85   % Lymphocytes      % 6   % Monocytes      % 7   % Eosinophils      % 1   % Basophils      % 1   % Immature Granulocytes      % 0   NRBCs per 100 WBC      <1 /100 0   Absolute Neutrophils      1.6 - 8.3 10e3/uL 6.1   Absolute Lymphocytes      0.8 - 5.3 10e3/uL 0.4 (L)   Absolute Monocytes      0.0 - 1.3 10e3/uL 0.5   Absolute Eosinophils      0.0 - 0.7 10e3/uL 0.0   Absolute Basophils      0.0 - 0.2 10e3/uL 0.1   Absolute Immature Granulocytes      <=0.4 10e3/uL 0.0   Absolute NRBCs      10e3/uL 0.0   Color Urine      Colorless, Straw, Light Yellow, Yellow Yellow   Appearance Urine      Clear Clear   Glucose Urine      Negative mg/dL Negative   Bilirubin Urine      Negative Negative   Ketones Urine      Negative mg/dL Negative   Specific Gravity Urine      1.003 - 1.035 1.015   Blood Urine      Negative Negative   pH Urine      5.0 - 7.0 5.0   Protein Albumin Urine      Negative mg/dL Negative   Urobilinogen mg/dL      Normal, 2.0 mg/dL Normal   Nitrite Urine      Negative Negative   Leukocyte Esterase Urine      Negative Negative   Bacteria Urine      None Seen /HPF Few (A)   Mucus Urine      None Seen /LPF Present (A)   RBC Urine      <=2 /HPF 1   WBC Urine      <=5 /HPF 1   Squamous Epithelial /HPF Urine      <=1 /HPF <1   Protein Random Urine      g/L 0.12   Protein Total  "Urine g/gr Creatinine      0.00 - 0.20 g/g Cr 0.18   Creatinine Urine      mg/dL 66   Creatinine      0.52 - 1.04 mg/dL 0.68   GFR Estimate      >60 mL/min/1.73m2 >90   AST      0 - 45 U/L 16   ALT      0 - 50 U/L 32   Albumin      3.4 - 5.0 g/dL 3.7   CRP Inflammation      0.0 - 8.0 mg/L 7.0   Sed Rate      0 - 30 mm/hr 19       Physical Exam:    BP (!) 158/82   Pulse 80   Ht 1.676 m (5' 6\")   Wt 145.6 kg (321 lb)   BMI 51.81 kg/m    Constitutional: Pleasant, NAD  HEENT: EOMI, sclera anicteric, conj not injected. No oral ulcers or thrush  Chest: CTAB  CV: no M/R/G, RRR  Abdomen: soft, NT  MSK: No active synovitis or joint tenderness  Skin: No skin rash  Neuro: CN grossly intact without focal deficit  Psych: nl affect    Assessment/Plan:  Taina Singh is a 58 year old female who is here for follow up of SLE and lupus nephritis.    Systemic lupus erythematous  History of lupus nephritis  - Presented in 12/2017 for 2nd opinion of m/o her SLE. She was diagnosed with SLE at age 27. Her lupus has been marked by +KARLIE (highest titer 1:640, speckled and nucleolar patterns), +anti-DNA, low C3/C4, arthritis, malar rash, serositis (pleuritic CP), lupus nephritis, hemolytic anemia, enteritis supported by +SSA/SSB Ab, +RF, high ESR/CRP. She had neg anti-RNP, anti-Sm, acL IgM/G/A, LAC, cryo and anti-CCP. She was treated with prednisone and HCQ at the time of Dx. Can't remember how long she was on HCQ and why HCQ was stopped, but it probably was stopped because of stable disease, no report on HCQ toxicity. Reportedly her SLE was mild all these years.  - Her lupus flared in 7/2017 with no triggers when she presented with arthritis, HCQ was resumed, arthritis resolved. Mild pulm HTN on 2D-Echo 8/2017 but neg R cardiac cath and VQ scan in 3/2018, also neg 2D-Echo.  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 Previously failed AZA, benlysta. Patient received 1st dose cyclophosphamide on 6/15/19, had 6 doses.  Initiated Rituxin " 1/22/2020 as failed cytoxan.  Recommend rituxin infusion every 4-6 months, closer to 4 due to worsening of symptoms as infusions near at 6 month interval. Have decreased dose to 600 mg and it is well tolerated. Recommend continuation of rituximab as it is the only med that has helped with LN. Also tx options are limited (also necrotizing lesions on renal biopsy, can not rule out ANCA neg vasculitis overlap and rituximab is FDA approved for GPA/MPA).     10/2022:  - Current regimen: Prednisone 5 mg every day, MMF 1000 mg BID (tapered from 3 gr every day) daily, and  mg a day, and Rituximab (last infusion 6/3/2022).      10/6/2023: SLE is under excellent control but back on prednisone 5 mg qd, labs were reviewed, stable. No change in meds. Last rituximab infusion was 500 mg on 5/5/2023.    Sjogren's with sicca  - Secondary, +SSA/SSB Ab and h/o MALT lymphoma at age 44 in remission. Uses blink eyedrops and salagen. No lip biopsy was done to confirm Dx of Sjogren's.       Recommendations 3/2023:  - Last rituximab 500 mg on 11/18/2022. Continue at interval q4-6 months depending on severity symptoms.  - Continue  mg BID  -Off prednisone and doing well.  - Annual eye exam for HCQ monitoring  - PCP prophylaxis on atovaquone 10 mL (1500 mg). Patient did not tolerate inhaled pentamidine. Avoiding TMP-SMX given sulfa reaction and potential increase risk of SLE flare. Hesitant to use dapsone given risk of hemolytic anemia.  - Ambien prn for insomnia  -Rituximab 500 mg one dose in early May 2023  -Go down on cellcept to 3 tabs a day  -Labs this month then every 3 months      Plan 10/6/2023:    Rituximab 500 mg IV one dose 1st week of Nov     Labs early November with rituximab infusion    Stay on prednisone 5 mg a day, cellcept 2 tabs twice a day, plaquenil 1 tab twice a day    Yearly eye exam    Labs q6months    Covid booster shot next week    Return video visit in about 6 months        Killian Marroquin  MD      Orders Placed This Encounter   Procedures     ALT     Albumin level     AST     Creatinine     Complement C4     Complement C3     CRP inflammation     DNA double stranded antibodies     Erythrocyte sedimentation rate auto     UA with Microscopic reflex to Culture     Protein  random urine     Creatinine random urine     CD19 B Cell Count (B-lymphocyte antigen)     Immunoglobulins A G and M     CBC with Platelets & Differential

## 2023-10-06 NOTE — NURSING NOTE
"Chief Complaint   Patient presents with    RECHECK     Follow up with RA     BP (!) 158/82   Pulse 80   Ht 1.676 m (5' 6\")   Wt 145.6 kg (321 lb)   BMI 51.81 kg/m    Madison Melendrez CMA on 10/6/2023 at 2:56 PM\  "

## 2023-10-06 NOTE — LETTER
10/6/2023       RE: Taina Singh  [address] [place] [address]     Dear Colleague,    Thank you for referring your patient, Taina Singh, to the [facility] RHEUMATOLOGY CLINIC [place] at [facility]. Please see a copy of my visit note below.    [facility] Rheumatology Follow-up Visit Note    Reason for visit: f/u for lupus  Date of last visit: 3/8/2023  DOS: 10/6/2023      History of Present Illness:  Taina Singh is a 58 year old female who is here for follow up of SLE and lupus nephritis.    History:  - She was diagnosed with lupus at age 27. Her 1st sx were malar rash and fatigue. No skin biopsy was done (reportedly had +KARLIE). She was treated with prednisone taper and HCQ. HCQ helped and she tolerated it well. She was diagnosed with Sjogren's at the same time. Had dryness of eyes and mouth. No lip biopsy to confirm the Dx. Reportedly she had very mild stable lupus for many years and eventually at some point, stopped taking HCQ (can't remember when). Her lupus started to flare in 7/2017 initially with pleuritic CP, was put back on HCQ. 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 Since then failed AZA, cytoxan and benlysta. MMF was not enough to control her LN and eventually rituximab was added (which was initially denied by insurance even with h/o lymphoma) given necrosis on the renal biopsy (rituximab is FDA approved for ANCA vasculitis). On HCQ+MMF after adding rituximab, LN and SLE started too improve.  - She was diagnosed with MALT lymphoma at 44, had enlarged LN over R parotid gland, on biopsy it was MALT lymphoma. Tx was resection only, no radiation or chemo.    2/4/2021:  - Taina is on prednisone 8 mg every day, it was 17.5 mg every day at last visit. She feels achy and tired this week, but thinks the cold weather is responsible for her sx. Had rituximab  375 mg/m2 on 1/22/2020, 2/6/2020, then 6/26/2020, 7/20/2020 and  "last dose 1 gram on 12/15/2020. On mepron for PJP prophylaxis. On  mg bid, MMF 1500 mg bid. She works from home. Has intermittent joint pain, nothing consistent. one day shoulders hurt and the other day her hips hurt. AM stiffness is 30-60 minutes. no pleurisy, sob or cough or fevers. No skin rash. Her hair grew back.    11/12/2021:  - Taina is feeling well, she thinks her lupus is under fair control, no major complaints today.    4/29/2022:  - Today, Taina reports that she is feeling well.  Has noted some general diffuse aching and fatigue, difficulty making fist, and middle finger with trigger symptoms, which she typically has as she nears her rituximab infusion.  She has noted some morning stiffness, but this is very responsive to activity and stretching. Continues to have some dry mouth managed via increased hydration. She has had an intermittent mild rash of her nose and cheeks, but this is not consistent and not dependent on sun exposure. She has had a good appetite and an overall positive mood. Feels as though rituximab has been a \"huge game changer\" for her.    10/28/2022:  Ms. Singh presents for follow up.  -Patient reports that she is doing well.  She was able to completely wean and discontinue the prednisone.  She is doing well on rituximab infusions.  Her last rituximab treatment was in June and is scheduled to get the next Rituxan in November.  She is compliant with CellCept and Plaquenil.  She recently received the Evusheld.  Denies any oral ulcers, rashes, joint pains, body aches, vision changes, shortness of breath and  Exertion.  Reports morning stiffness is minimal.  Good appetite and normal mood.    3/8/2023:    Doing well  Stable  No flares  Last prednisone use, was about 2 wk ago, 2.5 mg every day x 2 days after a cold.      Today 10/6/2023:    Doing very well, no symptoms, no lupus flare, back on prednisone 5 mg every day, did not tolerate coming off prednisone as developed diffuse " arthralgia.    ROS:    A comprehensive ROS was done, positives are per HPI.    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 44    No chemo or radiation    Other forms of systemic lupus erythematosus (H) 2018    Pulmonary embolism (H)     2019 seen on abd CT [facility]    Sjogren's syndrome (H24)     secondary Sjogrens, secondary to lupus    Vitamin D deficiency      Past Surgical History:  Past Surgical History:   Procedure Laterality Date    BIOPSY/REMOVAL, LYMPH NODE(S)       SECTION  age 32    HC HYSTEROS W PERMANENT FALLOPAIN IMPLANT      Essure b/l    PAROTIDECTOMY Right 2006    TONSILLECTOMY       Family history:  Family History   Problem Relation Age of Onset    Alopecia Brother     Rheumatoid Arthritis Brother     LUNG DISEASE No family hx of      Social history:  Social History     Socioeconomic History    Marital status:      Spouse name: Not on file    Number of children: Not on file    Years of education: 1    Highest education level: Not on file   Occupational History     Comment: , 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       2018    Adult son  (karate black belt)        Denies exposure to asbestos, silica, hot tubs, feather pillows (used to until age 49), pet birds, mold, does not play brass/wind instruments.      Social Determinants of Health     Financial Resource Strain: Not on file   Food Insecurity: Not on file   Transportation Needs: Not on file   Physical Activity: Not on file   Stress: Not on file   Social Connections: Not on file   Interpersonal Safety: Not on file   Housing Stability: Not on file     Allergies:  Allergies   Allergen Reactions    Pentamidine Shortness Of Breath    Penicillins Rash    Sulfa Antibiotics Rash     Medications:  Outpatient Encounter Medications as of 10/6/2023   Medication Sig Dispense Refill    albuterol (PROAIR HFA/PROVENTIL HFA/VENTOLIN HFA) 108 (90 Base) MCG/ACT inhaler Inhale 2 puffs into the lungs every 6 hours as needed for shortness of breath / dyspnea or wheezing 3 Inhaler 3    Calcium-Magnesium 300-300 MG TABS daily       hydroxychloroquine (PLAQUENIL) 200 MG tablet Take 1 tablet (200 mg) by mouth 2 times daily 180 tablet 0    Multiple Vitamins-Minerals (WOMENS MULTI PO) Take by mouth daily       mycophenolate (GENERIC EQUIVALENT) 500 MG tablet 2 tabs in am, 1 tab in pm 270 tablet 1    Omega-3 Fatty Acids (OMEGA-3 FISH OIL) 500 MG CAPS Take 500 mg by mouth daily       predniSONE (DELTASONE) 5 MG tablet 1-2 tabs a day x 3 days prn flares 60 tablet 3    traMADol (ULTRAM) 50 MG tablet Take 1 tablet (50 mg) by mouth every 12 hours as needed for severe pain - Oral 60 tablet 3    vitamin D3 (CHOLECALCIFEROL) 50 mcg (2000 units) tablet Take 1 tablet (50 mcg) by mouth daily 90 tablet 2    warfarin ANTICOAGULANT (COUMADIN) 5 MG tablet   1    zolpidem (AMBIEN) 5 MG tablet TAKE 1 TABLET(5 MG) BY MOUTH EVERY EVENING AS NEEDED FOR SLEEP 30 tablet 5     No facility-administered encounter medications on file as of 10/6/2023.       Labs/Imaging:      Latest Ref Rng & Units 11/18/2022    12:24 PM 4/11/2023     1:56 PM  8/4/2023    11:13 AM   RHEUM RESULTS   Albumin 3.4 - 5.0 g/dL 3.9  3.8     Albumin 3.5 - 5.2 g/dL   4.2    ALT 0 - 50 U/L 44  30  18    AST 0 - 45 U/L 31  16  24    Complement C3 81 - 157 mg/dL 138  137  137    Complement C4 13 - 39 mg/dL 30  29  26    Creatinine 0.51 - 0.95 mg/dL 0.58  0.52  0.62    CRP Inflammation 0.0 - 8.0 mg/L 19.2  7.7     CRP Inflammation <5.00 mg/L   7.14    DNA-ds <10.0 IU/mL 49.0  66.0  54.0    GFR Estimate >60 mL/min/1.73m2 >90  >90  >90    Hematocrit 35.0 - 47.0 % 37.3  40.1  39.3    Hemoglobin 11.7 - 15.7 g/dL 12.0  12.7  12.3    IGA 84 - 499 mg/dL 152  162  142    IGM 35 - 242 mg/dL 12  18  17     - 1,616 mg/dL 370  374  358    WBC 4.0 - 11.0 10e3/uL 5.7  6.4  7.9    RBC Count 3.80 - 5.20 10e6/uL 4.55  4.74  4.71    RDW 10.0 - 15.0 % 13.4  14.9  14.6    MCHC 31.5 - 36.5 g/dL 32.2  31.7  31.3    MCV 78 - 100 fL 82  85  83    Platelet Count 150 - 450 10e3/uL 205  211  203    Sed Rate 0 - 30 mm/hr 24  15  13      Component      Latest Ref Rng & Units 2/1/2021   WBC      4.0 - 11.0 10e9/L 7.8   RBC Count      3.8 - 5.2 10e12/L 4.50   Hemoglobin      11.7 - 15.7 g/dL 11.7   Hematocrit      35.0 - 47.0 % 37.9   MCV      78 - 100 fl 84   MCH      26.5 - 33.0 pg 26.0 (L)   MCHC      31.5 - 36.5 g/dL 30.9 (L)   RDW      10.0 - 15.0 % 14.9   Platelet Count      150 - 450 10e9/L 236   % Neutrophils      % 84.9   % Lymphocytes      % 6.3   % Monocytes      % 7.9   % Eosinophils      % 0.6   % Basophils      % 0.3   Absolute Neutrophil      1.6 - 8.3 10e9/L 6.6   Absolute Lymphocytes      0.8 - 5.3 10e9/L 0.5 (L)   Absolute Monocytes      0.0 - 1.3 10e9/L 0.6   Absolute Eosinophils      0.0 - 0.7 10e9/L 0.1   Absolute Basophils      0.0 - 0.2 10e9/L 0.0   Diff Method       Automated Method   Color Urine       Yellow   Appearance Urine       Clear   Glucose Urine      NEG:Negative mg/dL Negative   Bilirubin Urine      NEG:Negative Negative   Ketones Urine      NEG:Negative mg/dL Trace (A)    Specific Gravity Urine      1.003 - 1.035 >1.030   pH Urine      5.0 - 7.0 pH 6.0   Protein Albumin Urine      NEG:Negative mg/dL 100 (A)   Urobilinogen Urine      0.2 - 1.0 EU/dL 0.2   Nitrite Urine      NEG:Negative Negative   Blood Urine      NEG:Negative Trace (A)   Leukocyte Esterase Urine      NEG:Negative Trace (A)   Source       Midstream Urine   WBC Urine      OTO5:0 - 5 /HPF 10-25 (A)   RBC Urine      OTO2:O - 2 /HPF 2-5 (A)   Squamous Epithelial /LPF Urine      FEW:Few /LPF Few   Bacteria Urine      NEG:Negative /HPF Moderate (A)   Triple Phosphates      NEG:Negative /HPF Few (A)   IGG      610 - 1,616 mg/dL 399 (L)   IGA      84 - 499 mg/dL 190   IGM      35 - 242 mg/dL 16 (L)   Creatinine      0.52 - 1.04 mg/dL 0.60   GFR Estimate      >60 mL/min/1.73:m2 >90   GFR Estimate If Black      >60 mL/min/1.73:m2 >90   Specimen Description       Midstream Urine   Special Requests       Specimen received in preservative   Culture Micro       <10,000 colonies/mL . . .   CD19 B Cells      6 - 27 % <1 (L)   Absolute CD19      107 - 698 cells/uL <1 (L)   Protein Random Urine      g/L 0.75   Protein Total Urine g/gr Creatinine      0 - 0.2 g/g Cr 0.40 (H)   Creatinine Urine Random      mg/dL 182   Sed Rate      0 - 30 mm/h 27   DNA-ds      <10 IU/mL 102 (H)   CRP Inflammation      0.0 - 8.0 mg/L 8.9 (H)   Complement C3      81 - 157 mg/dL 104   Complement C4      13 - 39 mg/dL 23   AST      0 - 45 U/L 16   Albumin      3.4 - 5.0 g/dL 4.1   ALT      0 - 50 U/L 26   Creatinine Urine      mg/dL 186     Component      Latest Ref Rng & Units 11/10/2021   WBC      4.0 - 11.0 10e3/uL 8.2   RBC Count      3.80 - 5.20 10e6/uL 4.58   Hemoglobin      11.7 - 15.7 g/dL 12.3   Hematocrit      35.0 - 47.0 % 39.7   MCV      78 - 100 fL 87   MCH      26.5 - 33.0 pg 26.9   MCHC      31.5 - 36.5 g/dL 31.0 (L)   RDW      10.0 - 15.0 % 14.2   Platelet Count      150 - 450 10e3/uL 229   % Neutrophils      % 83   % Lymphocytes      % 6    % Monocytes      % 9   % Eosinophils      % 2   % Basophils      % 0   Absolute Neutrophils      1.6 - 8.3 10e3/uL 6.9   Absolute Lymphocytes      0.8 - 5.3 10e3/uL 0.5 (L)   Absolute Monocytes      0.0 - 1.3 10e3/uL 0.7   Absolute Eosinophils      0.0 - 0.7 10e3/uL 0.1   Absolute Basophils      0.0 - 0.2 10e3/uL 0.0   Color Urine      Colorless, Straw, Light Yellow, Yellow Yellow   Appearance Urine      Clear Clear   Glucose Urine      Negative mg/dL Negative   Bilirubin Urine      Negative Negative   Ketones Urine      Negative mg/dL Trace (A)   Specific Gravity Urine      1.003 - 1.035 >=1.030   Blood Urine      Negative Small (A)   pH Urine      5.0 - 7.0 6.0   Protein Albumin Urine      Negative mg/dL Negative   Urobilinogen Urine      0.2, 1.0 E.U./dL 0.2   Nitrite Urine      Negative Negative   Leukocyte Esterase Urine      Negative Trace (A)   Sodium      133 - 144 mmol/L 138   Potassium      3.4 - 5.3 mmol/L 3.9   Chloride      94 - 109 mmol/L 103   Carbon Dioxide      20 - 32 mmol/L 29   Anion Gap      3 - 14 mmol/L 6   Urea Nitrogen      7 - 30 mg/dL 22   Creatinine      0.52 - 1.04 mg/dL 0.70   Calcium      8.5 - 10.1 mg/dL 9.6   Glucose      70 - 99 mg/dL 83   Albumin      3.4 - 5.0 g/dL 4.0   Phosphorus      2.5 - 4.5 mg/dL 3.9   GFR Estimate      >60 mL/min/1.73m2 >90   Bacteria Urine      None Seen /HPF Few (A)   RBC Urine      0-2 /HPF /HPF 0-2   WBC Urine      0-5 /HPF /HPF 0-5   Squamous Epithelial /LPF Urine      None Seen /LPF Few (A)   Mucus Urine      None Seen /LPF Present (A)   MPO Cari IgG Instrument Value      <3.5 U/mL <0.3   Myeloperoxidase Antibody IgG      Negative Negative   Proteinase 3 Cari IgG Instrument Value      <2.0 U/mL <1.0   Proteinase 3 Antibody IgG      Negative Negative   IGG      610-1,616 mg/dL 398 (L)   IGA      84 - 499 mg/dL 184   IGM      35 - 242 mg/dL 16 (L)   Protein Random Urine      g/L 0.23   Protein Total Urine g/gr Creatinine      0.00 - 0.20 g/g Cr 0.11    Creatinine Urine      mg/dL 212   Neutrophil Cytoplasmic Antibody      <1:10 <1:10   Neutrophil Cytoplasmic Antibody Pattern       The ANCA IFA is <1:10.  No further testing will be performed.   CD19 B Cells      6 - 27 % <1 (L)   Absolute CD19      107 - 698 cells/uL <1 (L)   ALT      0 - 50 U/L 27   AST      0 - 45 U/L 19   Complement C4      13 - 39 mg/dL 28   Complement C3      81 - 157 mg/dL 138   CRP Inflammation      0.0 - 8.0 mg/L 8.9 (H)   DNA-ds      <10.0 IU/mL 90.0 (H)   Sed Rate      0 - 30 mm/hr 25     Component      Latest Ref Rng & Units 4/29/2022   WBC      4.0 - 11.0 10e3/uL 7.1   RBC Count      3.80 - 5.20 10e6/uL 4.62   Hemoglobin      11.7 - 15.7 g/dL 12.0   Hematocrit      35.0 - 47.0 % 38.3   MCV      78 - 100 fL 83   MCH      26.5 - 33.0 pg 26.0 (L)   MCHC      31.5 - 36.5 g/dL 31.3 (L)   RDW      10.0 - 15.0 % 14.3   Platelet Count      150 - 450 10e3/uL 213   % Neutrophils      % 85   % Lymphocytes      % 6   % Monocytes      % 7   % Eosinophils      % 1   % Basophils      % 1   % Immature Granulocytes      % 0   NRBCs per 100 WBC      <1 /100 0   Absolute Neutrophils      1.6 - 8.3 10e3/uL 6.1   Absolute Lymphocytes      0.8 - 5.3 10e3/uL 0.4 (L)   Absolute Monocytes      0.0 - 1.3 10e3/uL 0.5   Absolute Eosinophils      0.0 - 0.7 10e3/uL 0.0   Absolute Basophils      0.0 - 0.2 10e3/uL 0.1   Absolute Immature Granulocytes      <=0.4 10e3/uL 0.0   Absolute NRBCs      10e3/uL 0.0   Color Urine      Colorless, Straw, Light Yellow, Yellow Yellow   Appearance Urine      Clear Clear   Glucose Urine      Negative mg/dL Negative   Bilirubin Urine      Negative Negative   Ketones Urine      Negative mg/dL Negative   Specific Gravity Urine      1.003 - 1.035 1.015   Blood Urine      Negative Negative   pH Urine      5.0 - 7.0 5.0   Protein Albumin Urine      Negative mg/dL Negative   Urobilinogen mg/dL      Normal, 2.0 mg/dL Normal   Nitrite Urine      Negative Negative   Leukocyte Esterase  "Urine      Negative Negative   Bacteria Urine      None Seen /HPF Few (A)   Mucus Urine      None Seen /LPF Present (A)   RBC Urine      <=2 /HPF 1   WBC Urine      <=5 /HPF 1   Squamous Epithelial /HPF Urine      <=1 /HPF <1   Protein Random Urine      g/L 0.12   Protein Total Urine g/gr Creatinine      0.00 - 0.20 g/g Cr 0.18   Creatinine Urine      mg/dL 66   Creatinine      0.52 - 1.04 mg/dL 0.68   GFR Estimate      >60 mL/min/1.73m2 >90   AST      0 - 45 U/L 16   ALT      0 - 50 U/L 32   Albumin      3.4 - 5.0 g/dL 3.7   CRP Inflammation      0.0 - 8.0 mg/L 7.0   Sed Rate      0 - 30 mm/hr 19       Physical Exam:    BP (!) 158/82   Pulse 80   Ht 1.676 m (5' 6\")   Wt 145.6 kg (321 lb)   BMI 51.81 kg/m    Constitutional: Pleasant, NAD  HEENT: EOMI, sclera anicteric, conj not injected. No oral ulcers or thrush  Chest: CTAB  CV: no M/R/G, RRR  Abdomen: soft, NT  MSK: No active synovitis or joint tenderness  Skin: No skin rash  Neuro: CN grossly intact without focal deficit  Psych: nl affect    Assessment/Plan:  Taina Singh is a 58 year old female who is here for follow up of SLE and lupus nephritis.    Systemic lupus erythematous  History of lupus nephritis  - Presented in 12/2017 for 2nd opinion of m/o her SLE. She was diagnosed with SLE at age 27. Her lupus has been marked by +KARLIE (highest titer 1:640, speckled and nucleolar patterns), +anti-DNA, low C3/C4, arthritis, malar rash, serositis (pleuritic CP), lupus nephritis, hemolytic anemia, enteritis supported by +SSA/SSB Ab, +RF, high ESR/CRP. She had neg anti-RNP, anti-Sm, acL IgM/G/A, LAC, cryo and anti-CCP. She was treated with prednisone and HCQ at the time of Dx. Can't remember how long she was on HCQ and why HCQ was stopped, but it probably was stopped because of stable disease, no report on HCQ toxicity. Reportedly her SLE was mild all these years.  - Her lupus flared in 7/2017 with no triggers when she presented with arthritis, HCQ was resumed, " arthritis resolved. Mild pulm HTN on 2D-Echo 8/2017 but neg R cardiac cath and VQ scan in 3/2018, also neg 2D-Echo.  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 Previously failed AZA, benlysta. Patient received 1st dose cyclophosphamide on 6/15/19, had 6 doses.  Initiated Rituxin 1/22/2020 as failed cytoxan.  Recommend rituxin infusion every 4-6 months, closer to 4 due to worsening of symptoms as infusions near at 6 month interval. Have decreased dose to 600 mg and it is well tolerated. Recommend continuation of rituximab as it is the only med that has helped with LN. Also tx options are limited (also necrotizing lesions on renal biopsy, can not rule out ANCA neg vasculitis overlap and rituximab is FDA approved for GPA/MPA).     10/2022:  - Current regimen: Prednisone 5 mg every day, MMF 1000 mg BID (tapered from 3 gr every day) daily, and  mg a day, and Rituximab (last infusion 6/3/2022).      10/6/2023: SLE is under excellent control but back on prednisone 5 mg qd, labs were reviewed, stable. No change in meds. Last rituximab infusion was 500 mg on 5/5/2023.    Sjogren's with sicca  - Secondary, +SSA/SSB Ab and h/o MALT lymphoma at age 44 in remission. Uses blink eyedrops and salagen. No lip biopsy was done to confirm Dx of Sjogren's.       Recommendations 3/2023:  - Last rituximab 500 mg on 11/18/2022. Continue at interval q4-6 months depending on severity symptoms.  - Continue  mg BID  -Off prednisone and doing well.  - Annual eye exam for HCQ monitoring  - PCP prophylaxis on atovaquone 10 mL (1500 mg). Patient did not tolerate inhaled pentamidine. Avoiding TMP-SMX given sulfa reaction and potential increase risk of SLE flare. Hesitant to use dapsone given risk of hemolytic anemia.  - Ambien prn for insomnia  -Rituximab 500 mg one dose in early May 2023  -Go down on cellcept to 3 tabs a day  -Labs this month then every 3 months      Plan 10/6/2023:    Rituximab 500 mg IV one dose 1st week of Nov      Labs early November with rituximab infusion    Stay on prednisone 5 mg a day, cellcept 2 tabs twice a day, plaquenil 1 tab twice a day    Yearly eye exam    Labs q6months    Covid booster shot next week    Return video visit in about 6 months        Killian Marroquin MD      Orders Placed This Encounter   Procedures    ALT    Albumin level    AST    Creatinine    Complement C4    Complement C3    CRP inflammation    DNA double stranded antibodies    Erythrocyte sedimentation rate auto    UA with Microscopic reflex to Culture    Protein  random urine    Creatinine random urine    CD19 B Cell Count (B-lymphocyte antigen)    Immunoglobulins A G and M    CBC with Platelets & Differential

## 2023-10-09 ENCOUNTER — MYC MEDICAL ADVICE (OUTPATIENT)
Dept: RHEUMATOLOGY | Facility: CLINIC | Age: 58
End: 2023-10-09
Payer: COMMERCIAL

## 2023-10-09 DIAGNOSIS — M32.14 LUPUS NEPHRITIS, ISN/RPS CLASS IV (H): ICD-10-CM

## 2023-10-09 DIAGNOSIS — M32.9 SYSTEMIC LUPUS ERYTHEMATOSUS, UNSPECIFIED SLE TYPE, UNSPECIFIED ORGAN INVOLVEMENT STATUS (H): ICD-10-CM

## 2023-10-09 DIAGNOSIS — L93.0 LUPUS ERYTHEMATOSUS, UNSPECIFIED FORM: ICD-10-CM

## 2023-10-09 DIAGNOSIS — Z79.52 LONG TERM SYSTEMIC STEROID USER: ICD-10-CM

## 2023-10-09 RX ORDER — MEPERIDINE HYDROCHLORIDE 25 MG/ML
25 INJECTION INTRAMUSCULAR; INTRAVENOUS; SUBCUTANEOUS EVERY 30 MIN PRN
Status: CANCELLED | OUTPATIENT
Start: 2023-10-09

## 2023-10-09 RX ORDER — ALBUTEROL SULFATE 90 UG/1
1-2 AEROSOL, METERED RESPIRATORY (INHALATION)
Status: CANCELLED
Start: 2023-10-09

## 2023-10-09 RX ORDER — METHYLPREDNISOLONE SODIUM SUCCINATE 125 MG/2ML
125 INJECTION, POWDER, LYOPHILIZED, FOR SOLUTION INTRAMUSCULAR; INTRAVENOUS ONCE
Status: CANCELLED | OUTPATIENT
Start: 2023-10-09

## 2023-10-09 RX ORDER — HYDROXYCHLOROQUINE SULFATE 200 MG/1
200 TABLET, FILM COATED ORAL 2 TIMES DAILY
Qty: 180 TABLET | Refills: 3 | Status: SHIPPED | OUTPATIENT
Start: 2023-10-09

## 2023-10-09 RX ORDER — HEPARIN SODIUM (PORCINE) LOCK FLUSH IV SOLN 100 UNIT/ML 100 UNIT/ML
5 SOLUTION INTRAVENOUS
Status: CANCELLED | OUTPATIENT
Start: 2023-10-09

## 2023-10-09 RX ORDER — ALBUTEROL SULFATE 0.83 MG/ML
2.5 SOLUTION RESPIRATORY (INHALATION)
Status: CANCELLED | OUTPATIENT
Start: 2023-10-09

## 2023-10-09 RX ORDER — HEPARIN SODIUM,PORCINE 10 UNIT/ML
5-20 VIAL (ML) INTRAVENOUS DAILY PRN
Status: CANCELLED | OUTPATIENT
Start: 2023-10-09

## 2023-10-09 RX ORDER — CHOLECALCIFEROL (VITAMIN D3) 50 MCG
1 TABLET ORAL DAILY
Qty: 90 TABLET | Refills: 3 | Status: SHIPPED | OUTPATIENT
Start: 2023-10-09

## 2023-10-09 RX ORDER — EPINEPHRINE 1 MG/ML
0.3 INJECTION, SOLUTION, CONCENTRATE INTRAVENOUS EVERY 5 MIN PRN
Status: CANCELLED | OUTPATIENT
Start: 2023-10-09

## 2023-10-09 RX ORDER — ACETAMINOPHEN 325 MG/1
650 TABLET ORAL ONCE
Status: CANCELLED
Start: 2023-10-09

## 2023-10-09 RX ORDER — MYCOPHENOLATE MOFETIL 500 MG/1
1000 TABLET ORAL 2 TIMES DAILY
Qty: 360 TABLET | Refills: 3 | Status: SHIPPED | OUTPATIENT
Start: 2023-10-09 | End: 2024-02-05

## 2023-10-09 RX ORDER — METHYLPREDNISOLONE SODIUM SUCCINATE 125 MG/2ML
125 INJECTION, POWDER, LYOPHILIZED, FOR SOLUTION INTRAMUSCULAR; INTRAVENOUS
Status: CANCELLED
Start: 2023-10-09

## 2023-10-09 RX ORDER — DIPHENHYDRAMINE HYDROCHLORIDE 50 MG/ML
50 INJECTION INTRAMUSCULAR; INTRAVENOUS
Status: CANCELLED
Start: 2023-10-09

## 2023-10-09 RX ORDER — PREDNISONE 5 MG/1
5 TABLET ORAL DAILY
Qty: 90 TABLET | Refills: 3 | Status: SHIPPED | OUTPATIENT
Start: 2023-10-09

## 2023-10-18 DIAGNOSIS — M32.9 SYSTEMIC LUPUS ERYTHEMATOSUS, UNSPECIFIED SLE TYPE, UNSPECIFIED ORGAN INVOLVEMENT STATUS (H): ICD-10-CM

## 2023-10-18 NOTE — TELEPHONE ENCOUNTER
Tramadol 50mg    Last Written Prescription Date Sold:  05/26/2023  Last Fill Quantity: 60,   # refills: 0  Future Office visit:  04/03/2024    Routing refill request to provider for review/approval because:  [place]  noted below    TANGELA HernadezN, RN  RN Care Coordinator Rheumatology

## 2023-10-21 RX ORDER — TRAMADOL HYDROCHLORIDE 50 MG/1
TABLET ORAL
Qty: 60 TABLET | Refills: 5 | Status: SHIPPED | OUTPATIENT
Start: 2023-10-21 | End: 2024-08-04

## 2023-10-23 ENCOUNTER — IMMUNIZATION (OUTPATIENT)
Dept: FAMILY MEDICINE | Facility: CLINIC | Age: 58
End: 2023-10-23
Payer: COMMERCIAL

## 2023-10-23 DIAGNOSIS — Z23 ENCOUNTER FOR IMMUNIZATION: Primary | ICD-10-CM

## 2023-10-23 PROCEDURE — 90480 ADMN SARSCOV2 VAC 1/ONLY CMP: CPT

## 2023-10-23 PROCEDURE — 99207 PR NO CHARGE NURSE ONLY: CPT

## 2023-10-23 PROCEDURE — 91320 SARSCV2 VAC 30MCG TRS-SUC IM: CPT

## 2023-10-23 NOTE — PROGRESS NOTES
Prior to immunization administration, verified patients identity using patient s name and date of birth. Please see Immunization Activity for additional information.     Screening Questionnaire for Adult Immunization    Are you sick today?   No   Do you have allergies to medications, food, a vaccine component or latex?   No   Have you ever had a serious reaction after receiving a vaccination?   No   Do you have a long-term health problem with heart, lung, kidney, or metabolic disease (e.g., diabetes), asthma, a blood disorder, no spleen, complement component deficiency, a cochlear implant, or a spinal fluid leak?  Are you on long-term aspirin therapy?   No   Do you have cancer, leukemia, HIV/AIDS, or any other immune system problem?   No   Do you have a parent, brother, or sister with an immune system problem?   No   In the past 3 months, have you taken medications that affect  your immune system, such as prednisone, other steroids, or anticancer drugs; drugs for the treatment of rheumatoid arthritis, Crohn s disease, or psoriasis; or have you had radiation treatments?   No   Have you had a seizure, or a brain or other nervous system problem?   No   During the past year, have you received a transfusion of blood or blood    products, or been given immune (gamma) globulin or antiviral drug?   No   For women: Are you pregnant or is there a chance you could become       pregnant during the next month?   No   Have you received any vaccinations in the past 4 weeks?   No     Immunization questionnaire answers were all negative.    I have reviewed the following standing orders:   This patient is due and qualifies for the Covid-19 vaccine.     Click here for COVID-19 Standing Order    I have reviewed the vaccines inclusion and exclusion criteria; No concerns regarding eligibility.     Patient instructed to remain in clinic for 15 minutes afterwards, and to report any adverse reactions.     Screening performed by Ketty MOORE  ASUNCION Acosta on 10/23/2023 at 3:16 PM.

## 2023-11-07 ENCOUNTER — MYC MEDICAL ADVICE (OUTPATIENT)
Dept: RHEUMATOLOGY | Facility: CLINIC | Age: 58
End: 2023-11-07
Payer: COMMERCIAL

## 2023-11-10 ENCOUNTER — LAB (OUTPATIENT)
Dept: LAB | Facility: CLINIC | Age: 58
End: 2023-11-10
Payer: COMMERCIAL

## 2023-11-10 ENCOUNTER — INFUSION THERAPY VISIT (OUTPATIENT)
Dept: INFUSION THERAPY | Facility: CLINIC | Age: 58
End: 2023-11-10
Payer: COMMERCIAL

## 2023-11-10 VITALS
HEART RATE: 70 BPM | BODY MASS INDEX: 51.68 KG/M2 | RESPIRATION RATE: 16 BRPM | WEIGHT: 293 LBS | SYSTOLIC BLOOD PRESSURE: 119 MMHG | TEMPERATURE: 98.3 F | DIASTOLIC BLOOD PRESSURE: 78 MMHG | OXYGEN SATURATION: 97 %

## 2023-11-10 DIAGNOSIS — M32.14 LUPUS NEPHRITIS, ISN/RPS CLASS IV (H): ICD-10-CM

## 2023-11-10 DIAGNOSIS — M32.9 SYSTEMIC LUPUS ERYTHEMATOSUS, UNSPECIFIED SLE TYPE, UNSPECIFIED ORGAN INVOLVEMENT STATUS (H): ICD-10-CM

## 2023-11-10 DIAGNOSIS — L93.0 LUPUS ERYTHEMATOSUS, UNSPECIFIED FORM: ICD-10-CM

## 2023-11-10 DIAGNOSIS — M06.09 RHEUMATOID ARTHRITIS, SERONEGATIVE, MULTIPLE SITES (H): ICD-10-CM

## 2023-11-10 DIAGNOSIS — I77.82 ANCA-NEGATIVE VASCULITIS (H): Primary | ICD-10-CM

## 2023-11-10 LAB
ALBUMIN MFR UR ELPH: <6 MG/DL
ALBUMIN SERPL BCG-MCNC: 4.3 G/DL (ref 3.5–5.2)
ALBUMIN UR-MCNC: NEGATIVE MG/DL
ALT SERPL W P-5'-P-CCNC: 18 U/L (ref 0–50)
APPEARANCE UR: CLEAR
AST SERPL W P-5'-P-CCNC: 23 U/L (ref 0–45)
BACTERIA #/AREA URNS HPF: ABNORMAL /HPF
BASOPHILS # BLD AUTO: 0.1 10E3/UL (ref 0–0.2)
BASOPHILS NFR BLD AUTO: 1 %
BILIRUB UR QL STRIP: NEGATIVE
CD19 B CELL COMMENT: ABNORMAL
CD19 CELLS # BLD: 2 CELLS/UL (ref 107–698)
CD19 CELLS NFR BLD: <1 % (ref 6–27)
COLOR UR AUTO: COLORLESS
CREAT SERPL-MCNC: 0.61 MG/DL (ref 0.51–0.95)
CREAT UR-MCNC: 23 MG/DL
CRP SERPL-MCNC: 8.23 MG/L
EGFRCR SERPLBLD CKD-EPI 2021: >90 ML/MIN/1.73M2
EOSINOPHIL # BLD AUTO: 0.2 10E3/UL (ref 0–0.7)
EOSINOPHIL NFR BLD AUTO: 3 %
ERYTHROCYTE [DISTWIDTH] IN BLOOD BY AUTOMATED COUNT: 13.7 % (ref 10–15)
ERYTHROCYTE [SEDIMENTATION RATE] IN BLOOD BY WESTERGREN METHOD: 11 MM/HR (ref 0–30)
GLUCOSE UR STRIP-MCNC: NEGATIVE MG/DL
HCT VFR BLD AUTO: 40 % (ref 35–47)
HGB BLD-MCNC: 12.7 G/DL (ref 11.7–15.7)
HGB UR QL STRIP: NEGATIVE
IMM GRANULOCYTES # BLD: 0.1 10E3/UL
IMM GRANULOCYTES NFR BLD: 1 %
KETONES UR STRIP-MCNC: NEGATIVE MG/DL
LEUKOCYTE ESTERASE UR QL STRIP: NEGATIVE
LYMPHOCYTES # BLD AUTO: 0.4 10E3/UL (ref 0.8–5.3)
LYMPHOCYTES NFR BLD AUTO: 6 %
MCH RBC QN AUTO: 26.8 PG (ref 26.5–33)
MCHC RBC AUTO-ENTMCNC: 31.8 G/DL (ref 31.5–36.5)
MCV RBC AUTO: 85 FL (ref 78–100)
MONOCYTES # BLD AUTO: 0.7 10E3/UL (ref 0–1.3)
MONOCYTES NFR BLD AUTO: 10 %
NEUTROPHILS # BLD AUTO: 5.7 10E3/UL (ref 1.6–8.3)
NEUTROPHILS NFR BLD AUTO: 79 %
NITRATE UR QL: NEGATIVE
NRBC # BLD AUTO: 0 10E3/UL
NRBC BLD AUTO-RTO: 0 /100
PH UR STRIP: 6 [PH] (ref 5–7)
PLATELET # BLD AUTO: 227 10E3/UL (ref 150–450)
PROT/CREAT 24H UR: NORMAL MG/G{CREAT}
RBC # BLD AUTO: 4.73 10E6/UL (ref 3.8–5.2)
RBC #/AREA URNS AUTO: ABNORMAL /HPF
SP GR UR STRIP: 1 (ref 1–1.03)
UROBILINOGEN UR STRIP-MCNC: NORMAL MG/DL
WBC # BLD AUTO: 7.2 10E3/UL (ref 4–11)
WBC #/AREA URNS AUTO: ABNORMAL /HPF

## 2023-11-10 PROCEDURE — 84450 TRANSFERASE (AST) (SGOT): CPT

## 2023-11-10 PROCEDURE — 84460 ALANINE AMINO (ALT) (SGPT): CPT

## 2023-11-10 PROCEDURE — 96367 TX/PROPH/DG ADDL SEQ IV INF: CPT | Performed by: INTERNAL MEDICINE

## 2023-11-10 PROCEDURE — 86140 C-REACTIVE PROTEIN: CPT

## 2023-11-10 PROCEDURE — 86225 DNA ANTIBODY NATIVE: CPT

## 2023-11-10 PROCEDURE — 96375 TX/PRO/DX INJ NEW DRUG ADDON: CPT | Performed by: INTERNAL MEDICINE

## 2023-11-10 PROCEDURE — 85652 RBC SED RATE AUTOMATED: CPT

## 2023-11-10 PROCEDURE — 85025 COMPLETE CBC W/AUTO DIFF WBC: CPT

## 2023-11-10 PROCEDURE — 82040 ASSAY OF SERUM ALBUMIN: CPT

## 2023-11-10 PROCEDURE — 86160 COMPLEMENT ANTIGEN: CPT

## 2023-11-10 PROCEDURE — 81001 URINALYSIS AUTO W/SCOPE: CPT

## 2023-11-10 PROCEDURE — 82565 ASSAY OF CREATININE: CPT

## 2023-11-10 PROCEDURE — 96413 CHEMO IV INFUSION 1 HR: CPT | Performed by: INTERNAL MEDICINE

## 2023-11-10 PROCEDURE — 36415 COLL VENOUS BLD VENIPUNCTURE: CPT

## 2023-11-10 PROCEDURE — 82784 ASSAY IGA/IGD/IGG/IGM EACH: CPT

## 2023-11-10 PROCEDURE — 86355 B CELLS TOTAL COUNT: CPT

## 2023-11-10 PROCEDURE — 84156 ASSAY OF PROTEIN URINE: CPT

## 2023-11-10 PROCEDURE — 96415 CHEMO IV INFUSION ADDL HR: CPT | Performed by: INTERNAL MEDICINE

## 2023-11-10 RX ORDER — ALBUTEROL SULFATE 90 UG/1
1-2 AEROSOL, METERED RESPIRATORY (INHALATION)
Status: CANCELLED
Start: 2023-12-10

## 2023-11-10 RX ORDER — HEPARIN SODIUM,PORCINE 10 UNIT/ML
5-20 VIAL (ML) INTRAVENOUS DAILY PRN
Status: CANCELLED | OUTPATIENT
Start: 2023-12-10

## 2023-11-10 RX ORDER — MEPERIDINE HYDROCHLORIDE 25 MG/ML
25 INJECTION INTRAMUSCULAR; INTRAVENOUS; SUBCUTANEOUS EVERY 30 MIN PRN
Status: CANCELLED | OUTPATIENT
Start: 2023-12-10

## 2023-11-10 RX ORDER — METHYLPREDNISOLONE SODIUM SUCCINATE 125 MG/2ML
125 INJECTION, POWDER, LYOPHILIZED, FOR SOLUTION INTRAMUSCULAR; INTRAVENOUS
Status: CANCELLED
Start: 2023-12-10

## 2023-11-10 RX ORDER — HEPARIN SODIUM (PORCINE) LOCK FLUSH IV SOLN 100 UNIT/ML 100 UNIT/ML
5 SOLUTION INTRAVENOUS
Status: CANCELLED | OUTPATIENT
Start: 2023-12-10

## 2023-11-10 RX ORDER — ACETAMINOPHEN 325 MG/1
650 TABLET ORAL ONCE
Status: COMPLETED | OUTPATIENT
Start: 2023-11-10 | End: 2023-11-10

## 2023-11-10 RX ORDER — METHYLPREDNISOLONE SODIUM SUCCINATE 125 MG/2ML
125 INJECTION, POWDER, LYOPHILIZED, FOR SOLUTION INTRAMUSCULAR; INTRAVENOUS ONCE
Status: COMPLETED | OUTPATIENT
Start: 2023-11-10 | End: 2023-11-10

## 2023-11-10 RX ORDER — EPINEPHRINE 1 MG/ML
0.3 INJECTION, SOLUTION INTRAMUSCULAR; SUBCUTANEOUS EVERY 5 MIN PRN
Status: CANCELLED | OUTPATIENT
Start: 2023-12-10

## 2023-11-10 RX ORDER — ACETAMINOPHEN 325 MG/1
650 TABLET ORAL ONCE
Status: CANCELLED
Start: 2023-12-10

## 2023-11-10 RX ORDER — DIPHENHYDRAMINE HYDROCHLORIDE 50 MG/ML
50 INJECTION INTRAMUSCULAR; INTRAVENOUS
Status: CANCELLED
Start: 2023-12-10

## 2023-11-10 RX ORDER — METHYLPREDNISOLONE SODIUM SUCCINATE 125 MG/2ML
125 INJECTION, POWDER, LYOPHILIZED, FOR SOLUTION INTRAMUSCULAR; INTRAVENOUS ONCE
Status: CANCELLED | OUTPATIENT
Start: 2023-12-10

## 2023-11-10 RX ORDER — ALBUTEROL SULFATE 0.83 MG/ML
2.5 SOLUTION RESPIRATORY (INHALATION)
Status: CANCELLED | OUTPATIENT
Start: 2023-12-10

## 2023-11-10 RX ADMIN — ACETAMINOPHEN 650 MG: 325 TABLET ORAL at 09:49

## 2023-11-10 RX ADMIN — METHYLPREDNISOLONE SODIUM SUCCINATE 125 MG: 125 INJECTION INTRAMUSCULAR; INTRAVENOUS at 09:50

## 2023-11-10 RX ADMIN — Medication 250 ML: at 09:50

## 2023-11-10 NOTE — PROGRESS NOTES
Infusion Nursing Note:  Taina Singh presents today for Rapid Rituxan.    Patient seen by provider today: No   present during visit today: Not Applicable.    Note: Premedications of tylenol, bendadryl and solumedrol given prior to infusion.    Rapid Infusion Rituximab: Infuse 20 % (200 mL/hr) over the first 30 minutes and the remainder (400 mL/hr) over 60 minutes.    Intravenous Access:  Peripheral IV placed.    Treatment Conditions:  Biological Infusion Checklist:  ~~~ NOTE: If the patient answers yes to any of the questions below, hold the infusion and contact ordering provider or on-call provider.    Have you recently had an elevated temperature, fever, chills, productive cough, coughing for 3 weeks or longer or hemoptysis,  abnormal vital signs, night sweats,  chest pain or have you noticed a decrease in your appetite, unexplained weight loss or fatigue? No  Do you have any open wounds or new incisions? No  Do you have any upcoming hospitalizations or surgeries? Does not include esophagogastroduodenoscopy, colonoscopy, endoscopic retrograde cholangiopancreatography (ERCP), endoscopic ultrasound (EUS), dental procedures or joint aspiration/steroid injections No  Do you currently have any signs of illness or infection or are you on any antibiotics? No  Have you had any new, sudden or worsening abdominal pain? No  Have you or anyone in your household received a live vaccination in the past 4 weeks? Please note: No live vaccines while on biologic/chemotherapy until 6 months after the last treatment. Patient can receive the flu vaccine (shot only), pneumovax and the Covid vaccine. It is optimal for the patient to get these vaccines mid cycle, but they can be given at any time as long as it is not on the day of the infusion. No  Have you recently been diagnosed with any new nervous system diseases (ie. Multiple sclerosis, Guillain [place], seizures, neurological changes) or cancer diagnosis? Are you on  any form of radiation or chemotherapy? No  Have there been any other new onset medical symptoms? No    Post Infusion Assessment:  Patient tolerated infusion without incident.  Site patent and intact, free from redness, edema or discomfort.  No evidence of extravasations.  Access discontinued per protocol.  Biologic Infusion Post Education: Call the triage nurse at your clinic or seek medical attention if you have chills and/or temperature greater than or equal to 100.5, uncontrolled nausea/vomiting, diarrhea, constipation, dizziness, shortness of breath, chest pain, heart palpitations, weakness or any other new or concerning symptoms, questions or concerns.  You cannot have any live virus vaccines prior to or during treatment or up to 6 months post infusion.  If you have an upcoming surgery, medical procedure or dental procedure during treatment, this should be discussed with your ordering physician and your surgeon/dentist.  If you are having any concerning symptom, if you are unsure if you should get your next infusion or wish to speak to a provider before your next infusion, please call your care coordinator or triage nurse at your clinic to notify them so we can adequately serve you.     Discharge Plan:   Discharge instructions reviewed with: Patient.  Patient and/or family verbalized understanding of discharge instructions and all questions answered.  Patient discharged in stable condition accompanied by: self.  Departure Mode: Ambulatory.    Jennifer Ferris RN

## 2023-11-13 LAB
C3 SERPL-MCNC: 139 MG/DL (ref 81–157)
C4 SERPL-MCNC: 28 MG/DL (ref 13–39)
DSDNA AB SER-ACNC: 46 IU/ML
IGA SERPL-MCNC: 142 MG/DL (ref 84–499)
IGG SERPL-MCNC: 345 MG/DL (ref 610–1616)
IGM SERPL-MCNC: 12 MG/DL (ref 35–242)

## 2024-01-18 ENCOUNTER — MYC REFILL (OUTPATIENT)
Dept: RHEUMATOLOGY | Facility: CLINIC | Age: 59
End: 2024-01-18
Payer: COMMERCIAL

## 2024-01-18 DIAGNOSIS — M32.14 LUPUS NEPHRITIS, ISN/RPS CLASS IV (H): ICD-10-CM

## 2024-01-19 RX ORDER — MYCOPHENOLATE MOFETIL 500 MG/1
1000 TABLET ORAL 2 TIMES DAILY
Qty: 360 TABLET | Refills: 3 | OUTPATIENT
Start: 2024-01-19

## 2024-02-11 ENCOUNTER — HEALTH MAINTENANCE LETTER (OUTPATIENT)
Age: 59
End: 2024-02-11

## 2024-03-21 ENCOUNTER — DOCUMENTATION ONLY (OUTPATIENT)
Dept: RHEUMATOLOGY | Facility: CLINIC | Age: 59
End: 2024-03-21

## 2024-03-21 DIAGNOSIS — I77.82 ANCA-NEGATIVE VASCULITIS (H): ICD-10-CM

## 2024-03-21 DIAGNOSIS — M32.9 SYSTEMIC LUPUS ERYTHEMATOSUS, UNSPECIFIED SLE TYPE, UNSPECIFIED ORGAN INVOLVEMENT STATUS (H): ICD-10-CM

## 2024-03-21 DIAGNOSIS — M32.14 LUPUS NEPHRITIS, ISN/RPS CLASS IV (H): Primary | ICD-10-CM

## 2024-03-29 ENCOUNTER — LAB (OUTPATIENT)
Dept: LAB | Facility: CLINIC | Age: 59
End: 2024-03-29
Payer: COMMERCIAL

## 2024-03-29 DIAGNOSIS — M32.14 LUPUS NEPHRITIS, ISN/RPS CLASS IV (H): ICD-10-CM

## 2024-03-29 DIAGNOSIS — M32.9 SYSTEMIC LUPUS ERYTHEMATOSUS, UNSPECIFIED SLE TYPE, UNSPECIFIED ORGAN INVOLVEMENT STATUS (H): ICD-10-CM

## 2024-03-29 DIAGNOSIS — I77.82 ANCA-NEGATIVE VASCULITIS (H): ICD-10-CM

## 2024-03-29 LAB
ALBUMIN UR-MCNC: NEGATIVE MG/DL
APPEARANCE UR: CLEAR
BACTERIA #/AREA URNS HPF: ABNORMAL /HPF
BASOPHILS # BLD AUTO: 0.1 10E3/UL (ref 0–0.2)
BASOPHILS NFR BLD AUTO: 1 %
BILIRUB UR QL STRIP: NEGATIVE
COLOR UR AUTO: YELLOW
EOSINOPHIL # BLD AUTO: 0.1 10E3/UL (ref 0–0.7)
EOSINOPHIL NFR BLD AUTO: 1 %
ERYTHROCYTE [DISTWIDTH] IN BLOOD BY AUTOMATED COUNT: 14.4 % (ref 10–15)
ERYTHROCYTE [SEDIMENTATION RATE] IN BLOOD BY WESTERGREN METHOD: 10 MM/HR (ref 0–30)
GLUCOSE UR STRIP-MCNC: NEGATIVE MG/DL
HCT VFR BLD AUTO: 41.4 % (ref 35–47)
HGB BLD-MCNC: 13 G/DL (ref 11.7–15.7)
HGB UR QL STRIP: NEGATIVE
IMM GRANULOCYTES # BLD: 0.1 10E3/UL
IMM GRANULOCYTES NFR BLD: 1 %
KETONES UR STRIP-MCNC: NEGATIVE MG/DL
LEUKOCYTE ESTERASE UR QL STRIP: NEGATIVE
LYMPHOCYTES # BLD AUTO: 0.6 10E3/UL (ref 0.8–5.3)
LYMPHOCYTES NFR BLD AUTO: 6 %
MCH RBC QN AUTO: 26.3 PG (ref 26.5–33)
MCHC RBC AUTO-ENTMCNC: 31.4 G/DL (ref 31.5–36.5)
MCV RBC AUTO: 84 FL (ref 78–100)
MONOCYTES # BLD AUTO: 1 10E3/UL (ref 0–1.3)
MONOCYTES NFR BLD AUTO: 11 %
MUCOUS THREADS #/AREA URNS LPF: PRESENT /LPF
NEUTROPHILS # BLD AUTO: 7.1 10E3/UL (ref 1.6–8.3)
NEUTROPHILS NFR BLD AUTO: 81 %
NITRATE UR QL: NEGATIVE
NRBC # BLD AUTO: 0 10E3/UL
NRBC BLD AUTO-RTO: 0 /100
PH UR STRIP: 5.5 [PH] (ref 5–7)
PLATELET # BLD AUTO: 229 10E3/UL (ref 150–450)
RBC # BLD AUTO: 4.95 10E6/UL (ref 3.8–5.2)
RBC #/AREA URNS AUTO: ABNORMAL /HPF
SP GR UR STRIP: >=1.03 (ref 1–1.03)
SQUAMOUS #/AREA URNS AUTO: ABNORMAL /LPF
UROBILINOGEN UR STRIP-ACNC: 0.2 E.U./DL
WBC # BLD AUTO: 8.9 10E3/UL (ref 4–11)
WBC #/AREA URNS AUTO: ABNORMAL /HPF

## 2024-03-29 PROCEDURE — 86225 DNA ANTIBODY NATIVE: CPT

## 2024-03-29 PROCEDURE — 81001 URINALYSIS AUTO W/SCOPE: CPT

## 2024-03-29 PROCEDURE — 84156 ASSAY OF PROTEIN URINE: CPT

## 2024-03-29 PROCEDURE — 84450 TRANSFERASE (AST) (SGOT): CPT

## 2024-03-29 PROCEDURE — 86140 C-REACTIVE PROTEIN: CPT

## 2024-03-29 PROCEDURE — 84460 ALANINE AMINO (ALT) (SGPT): CPT

## 2024-03-29 PROCEDURE — 82784 ASSAY IGA/IGD/IGG/IGM EACH: CPT

## 2024-03-29 PROCEDURE — 82306 VITAMIN D 25 HYDROXY: CPT

## 2024-03-29 PROCEDURE — 85652 RBC SED RATE AUTOMATED: CPT

## 2024-03-29 PROCEDURE — 85025 COMPLETE CBC W/AUTO DIFF WBC: CPT

## 2024-03-29 PROCEDURE — 86160 COMPLEMENT ANTIGEN: CPT

## 2024-03-29 PROCEDURE — 82040 ASSAY OF SERUM ALBUMIN: CPT

## 2024-03-29 PROCEDURE — 82565 ASSAY OF CREATININE: CPT

## 2024-03-29 PROCEDURE — 86160 COMPLEMENT ANTIGEN: CPT | Mod: 59

## 2024-03-29 PROCEDURE — 86355 B CELLS TOTAL COUNT: CPT

## 2024-03-29 PROCEDURE — 36415 COLL VENOUS BLD VENIPUNCTURE: CPT

## 2024-03-30 LAB
ALBUMIN MFR UR ELPH: 10.7 MG/DL
ALBUMIN SERPL BCG-MCNC: 4.4 G/DL (ref 3.5–5.2)
ALT SERPL W P-5'-P-CCNC: 24 U/L (ref 0–50)
AST SERPL W P-5'-P-CCNC: 23 U/L (ref 0–45)
CD19 B CELL COMMENT: ABNORMAL
CD19 CELLS # BLD: <1 CELLS/UL (ref 107–698)
CD19 CELLS NFR BLD: <1 % (ref 6–27)
CREAT SERPL-MCNC: 0.7 MG/DL (ref 0.51–0.95)
CREAT UR-MCNC: 151 MG/DL
CRP SERPL-MCNC: 8.58 MG/L
EGFRCR SERPLBLD CKD-EPI 2021: >90 ML/MIN/1.73M2
PROT/CREAT 24H UR: 0.07 MG/MG CR (ref 0–0.2)
VIT D+METAB SERPL-MCNC: 43 NG/ML (ref 20–50)

## 2024-04-01 DIAGNOSIS — G47.00 INSOMNIA, UNSPECIFIED TYPE: ICD-10-CM

## 2024-04-01 LAB
C3 SERPL-MCNC: 151 MG/DL (ref 81–157)
C4 SERPL-MCNC: 28 MG/DL (ref 13–39)
IGA SERPL-MCNC: 160 MG/DL (ref 84–499)
IGG SERPL-MCNC: 359 MG/DL (ref 610–1616)
IGM SERPL-MCNC: 11 MG/DL (ref 35–242)

## 2024-04-03 ENCOUNTER — VIRTUAL VISIT (OUTPATIENT)
Dept: RHEUMATOLOGY | Facility: CLINIC | Age: 59
End: 2024-04-03
Attending: INTERNAL MEDICINE
Payer: COMMERCIAL

## 2024-04-03 ENCOUNTER — TELEPHONE (OUTPATIENT)
Dept: RHEUMATOLOGY | Facility: CLINIC | Age: 59
End: 2024-04-03
Payer: COMMERCIAL

## 2024-04-03 VITALS — HEIGHT: 66 IN | WEIGHT: 293 LBS | BODY MASS INDEX: 47.09 KG/M2

## 2024-04-03 DIAGNOSIS — Z79.52 CURRENT CHRONIC USE OF SYSTEMIC STEROIDS: ICD-10-CM

## 2024-04-03 DIAGNOSIS — Z79.899 HIGH RISK MEDICATIONS (NOT ANTICOAGULANTS) LONG-TERM USE: ICD-10-CM

## 2024-04-03 DIAGNOSIS — I77.82 ANCA-NEGATIVE VASCULITIS (H): ICD-10-CM

## 2024-04-03 DIAGNOSIS — L93.0 LUPUS ERYTHEMATOSUS, UNSPECIFIED FORM: Primary | ICD-10-CM

## 2024-04-03 DIAGNOSIS — R68.2 DRY MOUTH, UNSPECIFIED: ICD-10-CM

## 2024-04-03 DIAGNOSIS — M85.80 OSTEOPENIA, UNSPECIFIED LOCATION: ICD-10-CM

## 2024-04-03 DIAGNOSIS — M32.9 SYSTEMIC LUPUS ERYTHEMATOSUS, UNSPECIFIED SLE TYPE, UNSPECIFIED ORGAN INVOLVEMENT STATUS (H): ICD-10-CM

## 2024-04-03 PROCEDURE — 99214 OFFICE O/P EST MOD 30 MIN: CPT | Mod: 95 | Performed by: INTERNAL MEDICINE

## 2024-04-03 RX ORDER — PILOCARPINE HYDROCHLORIDE 5 MG/1
5 TABLET, FILM COATED ORAL 4 TIMES DAILY
Qty: 360 TABLET | Refills: 1 | Status: SHIPPED | OUTPATIENT
Start: 2024-04-03

## 2024-04-03 ASSESSMENT — PAIN SCALES - GENERAL: PAINLEVEL: NO PAIN (0)

## 2024-04-03 NOTE — LETTER
4/3/2024       RE: Taina Singh  [address] [place] [address]     Dear Colleague,    Thank you for referring your patient, Taina Singh, to the [facility] RHEUMATOLOGY CLINIC [place] at [facility]. Please see a copy of my visit note below.    Virtual Visit Details    Type of service:  Video Visit     Joined the call at 4/3/2024, 3:48:28 pm.  Left the call at 4/3/2024, 4:01:52 pm.    Originating Location (pt. Location): Home    Distant Location (provider location):  On-site  Platform used for Video Visit: [facility] Rheumatology Follow-up Visit Note    Reason for visit: f/u for lupus      4/3/2024      History of Present Illness:  Taina Singh is a 58 year old female who is here for follow up of SLE and lupus nephritis.    History:  - She was diagnosed with lupus at age 27. Her 1st sx were malar rash and fatigue. No skin biopsy was done (reportedly had +KARLIE). She was treated with prednisone taper and HCQ. HCQ helped and she tolerated it well. She was diagnosed with Sjogren's at the same time. Had dryness of eyes and mouth. No lip biopsy to confirm the Dx. Reportedly she had very mild stable lupus for many years and eventually at some point, stopped taking HCQ (can't remember when). Her lupus started to flare in 7/2017 initially with pleuritic CP, was put back on HCQ. 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 Since then failed AZA, cytoxan and benlysta. MMF was not enough to control her LN and eventually rituximab was added (which was initially denied by insurance even with h/o lymphoma) given necrosis on the renal biopsy (rituximab is FDA approved for ANCA vasculitis). On HCQ+MMF after adding rituximab, LN and SLE started too improve.  - She was diagnosed with MALT lymphoma at 44, had enlarged LN over R parotid gland, on biopsy it was MALT lymphoma. Tx was resection only, no radiation or chemo.    2/4/2021:  -  "Taina is on prednisone 8 mg every day, it was 17.5 mg every day at last visit. She feels achy and tired this week, but thinks the cold weather is responsible for her sx. Had rituximab  375 mg/m2 on 1/22/2020, 2/6/2020, then 6/26/2020, 7/20/2020 and last dose 1 gram on 12/15/2020. On mepron for PJP prophylaxis. On  mg bid, MMF 1500 mg bid. She works from home. Has intermittent joint pain, nothing consistent. one day shoulders hurt and the other day her hips hurt. AM stiffness is 30-60 minutes. no pleurisy, sob or cough or fevers. No skin rash. Her hair grew back.    11/12/2021:  - Taina is feeling well, she thinks her lupus is under fair control, no major complaints today.    4/29/2022:  - Today, Taina reports that she is feeling well.  Has noted some general diffuse aching and fatigue, difficulty making fist, and middle finger with trigger symptoms, which she typically has as she nears her rituximab infusion.  She has noted some morning stiffness, but this is very responsive to activity and stretching. Continues to have some dry mouth managed via increased hydration. She has had an intermittent mild rash of her nose and cheeks, but this is not consistent and not dependent on sun exposure. She has had a good appetite and an overall positive mood. Feels as though rituximab has been a \"huge game changer\" for her.    10/28/2022:  Ms. Singh presents for follow up.  -Patient reports that she is doing well.  She was able to completely wean and discontinue the prednisone.  She is doing well on rituximab infusions.  Her last rituximab treatment was in June and is scheduled to get the next Rituxan in November.  She is compliant with CellCept and Plaquenil.  She recently received the Evusheld.  Denies any oral ulcers, rashes, joint pains, body aches, vision changes, shortness of breath and  Exertion.  Reports morning stiffness is minimal.  Good appetite and normal mood.    3/8/2023:    Doing well  Stable  No " flares  Last prednisone use, was about 2 wk ago, 2.5 mg every day x 2 days after a cold.      10/6/2023:    Doing very well, no symptoms, no lupus flare, back on prednisone 5 mg every day, did not tolerate coming off prednisone as developed diffuse arthralgia.      Today 4/3/2024:    Doing well since she resumed prednisone 5 mg every day, can't taper below 5 mg every day.    Stable SLE, no flares.    Down for a day or two here and there, feels achy and tired.    S/p Rituximab 500 mg once dose on 11/10/2023    Dry eyes and mouth is an issue.    Trouble sleeping wakes up a lot due to dryness.    ROS:    A comprehensive ROS was done, positives are per HPI.    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 44    No chemo or radiation    Other forms of systemic lupus erythematosus (H) 2018    Pulmonary embolism (H)     2019 seen on abd CT at [facility]    Sjogren's syndrome (H24)     secondary Sjogrens, secondary to lupus    Vitamin D deficiency      Past Surgical History:  Past Surgical History:   Procedure Laterality Date    BIOPSY/REMOVAL, LYMPH NODE(S)       SECTION  age 32    HC HYSTEROS W PERMANENT FALLOPAIN IMPLANT      Essure b/l    PAROTIDECTOMY Right 2006    TONSILLECTOMY       Family history:  Family History   Problem Relation Age of Onset    Alopecia Brother     Rheumatoid Arthritis Brother     LUNG DISEASE No family hx of      Social history:  Social History     Socioeconomic History    Marital status:      Spouse name: Not on  file    Number of children: Not on file    Years of education: 1    Highest education level: Not on file   Occupational History     Comment: , 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      Social Determinants of Health     Financial Resource Strain: Not on file   Food Insecurity: Not on file   Transportation Needs: Not on file   Physical Activity: Not on file   Stress: Not on file   Social Connections: Not on file   Interpersonal Safety: Not on file   Housing Stability: Not on file     Allergies:  Allergies   Allergen Reactions    Pentamidine Shortness Of Breath    Penicillins Rash    Sulfa Antibiotics Rash     Medications:  Outpatient Encounter Medications as of 4/3/2024   Medication Sig Dispense Refill    albuterol (PROAIR HFA/PROVENTIL HFA/VENTOLIN HFA) 108 (90 Base) MCG/ACT inhaler Inhale 2 puffs into the lungs every 6 hours as needed for shortness of breath / dyspnea or wheezing 3 Inhaler 3    Calcium-Magnesium 300-300 MG TABS daily       hydroxychloroquine (PLAQUENIL) 200 MG tablet Take 1 tablet (200 mg) by mouth 2 times daily 180 tablet 3    Multiple Vitamins-Minerals (WOMENS MULTI PO) Take by mouth daily       mycophenolate (GENERIC EQUIVALENT) 500 MG tablet Take 2 tablets (1,000 mg) by mouth 2 times daily 360 tablet 3    Omega-3 Fatty Acids (OMEGA-3 FISH OIL) 500 MG CAPS Take 500 mg by mouth daily       predniSONE  (DELTASONE) 5 MG tablet Take 1 tablet (5 mg) by mouth daily 90 tablet 3    traMADol (ULTRAM) 50 MG tablet TAKE 1 TABLET(50 MG) BY MOUTH EVERY 12 HOURS AS NEEDED FOR SEVERE PAIN 60 tablet 5    vitamin D3 (CHOLECALCIFEROL) 50 mcg (2000 units) tablet Take 1 tablet (50 mcg) by mouth daily 90 tablet 3    warfarin ANTICOAGULANT (COUMADIN) 5 MG tablet   1    zolpidem (AMBIEN) 5 MG tablet TAKE 1 TABLET(5 MG) BY MOUTH EVERY EVENING AS NEEDED FOR SLEEP 30 tablet 5     No facility-administered encounter medications on file as of 4/3/2024.       Labs/Imaging:      Latest Ref Rng & Units 8/4/2023    11:13 AM 11/10/2023     9:27 AM 3/29/2024     1:26 PM   RHEUM RESULTS   Albumin 3.5 - 5.2 g/dL 4.2  4.3  4.4    ALT 0 - 50 U/L 18  18  24    AST 0 - 45 U/L 24  23  23    Complement C3 81 - 157 mg/dL 137  139  151    Complement C4 13 - 39 mg/dL 26  28  28    Creatinine 0.51 - 0.95 mg/dL 0.62  0.61  0.70    CRP Inflammation <5.00 mg/L 7.14  8.23  8.58    DNA-ds <10.0 IU/mL 54.0  46.0     GFR Estimate >60 mL/min/1.73m2 >90  >90  >90    Hematocrit 35.0 - 47.0 % 39.3  40.0  41.4    Hemoglobin 11.7 - 15.7 g/dL 12.3  12.7  13.0    IGA 84 - 499 mg/dL 142  142  160    IGM 35 - 242 mg/dL 17  12  11     - 1,616 mg/dL 358  345  359    WBC 4.0 - 11.0 10e3/uL 7.9  7.2  8.9    RBC Count 3.80 - 5.20 10e6/uL 4.71  4.73  4.95    RDW 10.0 - 15.0 % 14.6  13.7  14.4    MCHC 31.5 - 36.5 g/dL 31.3  31.8  31.4    MCV 78 - 100 fL 83  85  84    Platelet Count 150 - 450 10e3/uL 203  227  229    Sed Rate 0 - 30 mm/hr 13  11  10      Component      Latest Ref Rng & Units 2/1/2021   WBC      4.0 - 11.0 10e9/L 7.8   RBC Count      3.8 - 5.2 10e12/L 4.50   Hemoglobin      11.7 - 15.7 g/dL 11.7   Hematocrit      35.0 - 47.0 % 37.9   MCV      78 - 100 fl 84   MCH      26.5 - 33.0 pg 26.0 (L)   MCHC      31.5 - 36.5 g/dL 30.9 (L)   RDW      10.0 - 15.0 % 14.9   Platelet Count      150 - 450 10e9/L 236   % Neutrophils      % 84.9   % Lymphocytes      % 6.3   %  Monocytes      % 7.9   % Eosinophils      % 0.6   % Basophils      % 0.3   Absolute Neutrophil      1.6 - 8.3 10e9/L 6.6   Absolute Lymphocytes      0.8 - 5.3 10e9/L 0.5 (L)   Absolute Monocytes      0.0 - 1.3 10e9/L 0.6   Absolute Eosinophils      0.0 - 0.7 10e9/L 0.1   Absolute Basophils      0.0 - 0.2 10e9/L 0.0   Diff Method       Automated Method   Color Urine       Yellow   Appearance Urine       Clear   Glucose Urine      NEG:Negative mg/dL Negative   Bilirubin Urine      NEG:Negative Negative   Ketones Urine      NEG:Negative mg/dL Trace (A)   Specific Gravity Urine      1.003 - 1.035 >1.030   pH Urine      5.0 - 7.0 pH 6.0   Protein Albumin Urine      NEG:Negative mg/dL 100 (A)   Urobilinogen Urine      0.2 - 1.0 EU/dL 0.2   Nitrite Urine      NEG:Negative Negative   Blood Urine      NEG:Negative Trace (A)   Leukocyte Esterase Urine      NEG:Negative Trace (A)   Source       Midstream Urine   WBC Urine      OTO5:0 - 5 /HPF 10-25 (A)   RBC Urine      OTO2:O - 2 /HPF 2-5 (A)   Squamous Epithelial /LPF Urine      FEW:Few /LPF Few   Bacteria Urine      NEG:Negative /HPF Moderate (A)   Triple Phosphates      NEG:Negative /HPF Few (A)   IGG      610 - 1,616 mg/dL 399 (L)   IGA      84 - 499 mg/dL 190   IGM      35 - 242 mg/dL 16 (L)   Creatinine      0.52 - 1.04 mg/dL 0.60   GFR Estimate      >60 mL/min/1.73:m2 >90   GFR Estimate If Black      >60 mL/min/1.73:m2 >90   Specimen Description       Midstream Urine   Special Requests       Specimen received in preservative   Culture Micro       <10,000 colonies/mL . . .   CD19 B Cells      6 - 27 % <1 (L)   Absolute CD19      107 - 698 cells/uL <1 (L)   Protein Random Urine      g/L 0.75   Protein Total Urine g/gr Creatinine      0 - 0.2 g/g Cr 0.40 (H)   Creatinine Urine Random      mg/dL 182   Sed Rate      0 - 30 mm/h 27   DNA-ds      <10 IU/mL 102 (H)   CRP Inflammation      0.0 - 8.0 mg/L 8.9 (H)   Complement C3      81 - 157 mg/dL 104   Complement C4      13 -  39 mg/dL 23   AST      0 - 45 U/L 16   Albumin      3.4 - 5.0 g/dL 4.1   ALT      0 - 50 U/L 26   Creatinine Urine      mg/dL 186     Component      Latest Ref Rng & Units 11/10/2021   WBC      4.0 - 11.0 10e3/uL 8.2   RBC Count      3.80 - 5.20 10e6/uL 4.58   Hemoglobin      11.7 - 15.7 g/dL 12.3   Hematocrit      35.0 - 47.0 % 39.7   MCV      78 - 100 fL 87   MCH      26.5 - 33.0 pg 26.9   MCHC      31.5 - 36.5 g/dL 31.0 (L)   RDW      10.0 - 15.0 % 14.2   Platelet Count      150 - 450 10e3/uL 229   % Neutrophils      % 83   % Lymphocytes      % 6   % Monocytes      % 9   % Eosinophils      % 2   % Basophils      % 0   Absolute Neutrophils      1.6 - 8.3 10e3/uL 6.9   Absolute Lymphocytes      0.8 - 5.3 10e3/uL 0.5 (L)   Absolute Monocytes      0.0 - 1.3 10e3/uL 0.7   Absolute Eosinophils      0.0 - 0.7 10e3/uL 0.1   Absolute Basophils      0.0 - 0.2 10e3/uL 0.0   Color Urine      Colorless, Straw, Light Yellow, Yellow Yellow   Appearance Urine      Clear Clear   Glucose Urine      Negative mg/dL Negative   Bilirubin Urine      Negative Negative   Ketones Urine      Negative mg/dL Trace (A)   Specific Gravity Urine      1.003 - 1.035 >=1.030   Blood Urine      Negative Small (A)   pH Urine      5.0 - 7.0 6.0   Protein Albumin Urine      Negative mg/dL Negative   Urobilinogen Urine      0.2, 1.0 E.U./dL 0.2   Nitrite Urine      Negative Negative   Leukocyte Esterase Urine      Negative Trace (A)   Sodium      133 - 144 mmol/L 138   Potassium      3.4 - 5.3 mmol/L 3.9   Chloride      94 - 109 mmol/L 103   Carbon Dioxide      20 - 32 mmol/L 29   Anion Gap      3 - 14 mmol/L 6   Urea Nitrogen      7 - 30 mg/dL 22   Creatinine      0.52 - 1.04 mg/dL 0.70   Calcium      8.5 - 10.1 mg/dL 9.6   Glucose      70 - 99 mg/dL 83   Albumin      3.4 - 5.0 g/dL 4.0   Phosphorus      2.5 - 4.5 mg/dL 3.9   GFR Estimate      >60 mL/min/1.73m2 >90   Bacteria Urine      None Seen /HPF Few (A)   RBC Urine      0-2 /HPF /HPF 0-2   WBC  Urine      0-5 /HPF /HPF 0-5   Squamous Epithelial /LPF Urine      None Seen /LPF Few (A)   Mucus Urine      None Seen /LPF Present (A)   MPO Cari IgG Instrument Value      <3.5 U/mL <0.3   Myeloperoxidase Antibody IgG      Negative Negative   Proteinase 3 Cari IgG Instrument Value      <2.0 U/mL <1.0   Proteinase 3 Antibody IgG      Negative Negative   IGG      610-1,616 mg/dL 398 (L)   IGA      84 - 499 mg/dL 184   IGM      35 - 242 mg/dL 16 (L)   Protein Random Urine      g/L 0.23   Protein Total Urine g/gr Creatinine      0.00 - 0.20 g/g Cr 0.11   Creatinine Urine      mg/dL 212   Neutrophil Cytoplasmic Antibody      <1:10 <1:10   Neutrophil Cytoplasmic Antibody Pattern       The ANCA IFA is <1:10.  No further testing will be performed.   CD19 B Cells      6 - 27 % <1 (L)   Absolute CD19      107 - 698 cells/uL <1 (L)   ALT      0 - 50 U/L 27   AST      0 - 45 U/L 19   Complement C4      13 - 39 mg/dL 28   Complement C3      81 - 157 mg/dL 138   CRP Inflammation      0.0 - 8.0 mg/L 8.9 (H)   DNA-ds      <10.0 IU/mL 90.0 (H)   Sed Rate      0 - 30 mm/hr 25     Component      Latest Ref Rng & Units 4/29/2022   WBC      4.0 - 11.0 10e3/uL 7.1   RBC Count      3.80 - 5.20 10e6/uL 4.62   Hemoglobin      11.7 - 15.7 g/dL 12.0   Hematocrit      35.0 - 47.0 % 38.3   MCV      78 - 100 fL 83   MCH      26.5 - 33.0 pg 26.0 (L)   MCHC      31.5 - 36.5 g/dL 31.3 (L)   RDW      10.0 - 15.0 % 14.3   Platelet Count      150 - 450 10e3/uL 213   % Neutrophils      % 85   % Lymphocytes      % 6   % Monocytes      % 7   % Eosinophils      % 1   % Basophils      % 1   % Immature Granulocytes      % 0   NRBCs per 100 WBC      <1 /100 0   Absolute Neutrophils      1.6 - 8.3 10e3/uL 6.1   Absolute Lymphocytes      0.8 - 5.3 10e3/uL 0.4 (L)   Absolute Monocytes      0.0 - 1.3 10e3/uL 0.5   Absolute Eosinophils      0.0 - 0.7 10e3/uL 0.0   Absolute Basophils      0.0 - 0.2 10e3/uL 0.1   Absolute Immature Granulocytes      <=0.4 10e3/uL 0.0  "  Absolute NRBCs      10e3/uL 0.0   Color Urine      Colorless, Straw, Light Yellow, Yellow Yellow   Appearance Urine      Clear Clear   Glucose Urine      Negative mg/dL Negative   Bilirubin Urine      Negative Negative   Ketones Urine      Negative mg/dL Negative   Specific Gravity Urine      1.003 - 1.035 1.015   Blood Urine      Negative Negative   pH Urine      5.0 - 7.0 5.0   Protein Albumin Urine      Negative mg/dL Negative   Urobilinogen mg/dL      Normal, 2.0 mg/dL Normal   Nitrite Urine      Negative Negative   Leukocyte Esterase Urine      Negative Negative   Bacteria Urine      None Seen /HPF Few (A)   Mucus Urine      None Seen /LPF Present (A)   RBC Urine      <=2 /HPF 1   WBC Urine      <=5 /HPF 1   Squamous Epithelial /HPF Urine      <=1 /HPF <1   Protein Random Urine      g/L 0.12   Protein Total Urine g/gr Creatinine      0.00 - 0.20 g/g Cr 0.18   Creatinine Urine      mg/dL 66   Creatinine      0.52 - 1.04 mg/dL 0.68   GFR Estimate      >60 mL/min/1.73m2 >90   AST      0 - 45 U/L 16   ALT      0 - 50 U/L 32   Albumin      3.4 - 5.0 g/dL 3.7   CRP Inflammation      0.0 - 8.0 mg/L 7.0   Sed Rate      0 - 30 mm/hr 19       Physical Exam:    Ht 1.676 m (5' 6\")   Wt 145.2 kg (320 lb)   BMI 51.65 kg/m    Constitutional: Pleasant, NAD  HEENT: EOMI, sclera anicteric, conj not injected. No oral ulcers or thrush  Chest: CTAB  CV: no M/R/G, RRR  Abdomen: soft, NT  MSK: No active synovitis or joint tenderness  Skin: No skin rash  Neuro: CN grossly intact without focal deficit  Psych: nl affect    Assessment/Plan:  Taina Singh is a 58 year old female who is here for follow up of SLE and lupus nephritis.    Systemic lupus erythematous  History of lupus nephritis  - Presented in 12/2017 for 2nd opinion of m/o her SLE. She was diagnosed with SLE at age 27. Her lupus has been marked by +KARLIE (highest titer 1:640, speckled and nucleolar patterns), +anti-DNA, low C3/C4, arthritis, malar rash, serositis " (pleuritic CP), lupus nephritis, hemolytic anemia, enteritis supported by +SSA/SSB Ab, +RF, high ESR/CRP. She had neg anti-RNP, anti-Sm, acL IgM/G/A, LAC, cryo and anti-CCP. She was treated with prednisone and HCQ at the time of Dx. Can't remember how long she was on HCQ and why HCQ was stopped, but it probably was stopped because of stable disease, no report on HCQ toxicity. Reportedly her SLE was mild all these years.  - Her lupus flared in 7/2017 with no triggers when she presented with arthritis, HCQ was resumed, arthritis resolved. Mild pulm HTN on 2D-Echo 8/2017 but neg R cardiac cath and VQ scan in 3/2018, also neg 2D-Echo.  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 Previously failed AZA, benlysta. Patient received 1st dose cyclophosphamide on 6/15/19, had 6 doses.  Initiated Rituxin 1/22/2020 as failed cytoxan.  Recommend rituxin infusion every 4-6 months, closer to 4 due to worsening of symptoms as infusions near at 6 month interval. Have decreased dose to 600 mg and it is well tolerated. Recommend continuation of rituximab as it is the only med that has helped with LN. Also tx options are limited (also necrotizing lesions on renal biopsy, can not rule out ANCA neg vasculitis overlap and rituximab is FDA approved for GPA/MPA).     10/2022:  - Current regimen: Prednisone 5 mg every day, MMF 1000 mg BID (tapered from 3 gr every day) daily, and  mg a day, and Rituximab (last infusion 6/3/2022).      10/6/2023: SLE is under excellent control but back on prednisone 5 mg qd, labs were reviewed, stable. No change in meds. Last rituximab infusion was 500 mg on 5/5/2023.    Sjogren's with sicca  - Secondary, +SSA/SSB Ab and h/o MALT lymphoma at age 44 in remission. Uses blink eyedrops and salagen. No lip biopsy was done to confirm Dx of Sjogren's.       Recommendations 3/2023:  - Last rituximab 500 mg on 11/18/2022. Continue at interval q4-6 months depending on severity symptoms.  - Continue  mg  BID  -Off prednisone and doing well.  - Annual eye exam for HCQ monitoring  - PCP prophylaxis on atovaquone 10 mL (1500 mg). Patient did not tolerate inhaled pentamidine. Avoiding TMP-SMX given sulfa reaction and potential increase risk of SLE flare. Hesitant to use dapsone given risk of hemolytic anemia.  - Ambien prn for insomnia  -Rituximab 500 mg one dose in early May 2023  -Go down on cellcept to 3 tabs a day  -Labs this month then every 3 months      Plan 10/6/2023:    Rituximab 500 mg IV one dose 1st week of Nov     Labs early November with rituximab infusion    Stay on prednisone 5 mg a day, cellcept 2 tabs twice a day, plaquenil 1 tab twice a day    Yearly eye exam    Labs q6months    Covid booster shot next week    Return video visit in about 6 months    Today 4/3/2024:    Stable SLE    Rituximab 500 mg one dose in early May 2024 for ANCA negative vasculitis    Try pilocarpine    DEXA bone density    Labs due in 9/2024 then every 6 months    Return in 6 months in person    Killian Marroquin MD      Orders Placed This Encounter   Procedures    DX Bone Density    ALT    Albumin level    AST    Creatinine    Complement C4    Complement C3    CRP inflammation    DNA double stranded antibodies    Erythrocyte sedimentation rate auto    UA with Microscopic reflex to Culture    Protein  random urine    Creatinine random urine    Immunoglobulins A G and M    CD19 B Cell Count (B-lymphocyte antigen)    CBC with Platelets & Differential       Killian Marroquin MD

## 2024-04-03 NOTE — PROGRESS NOTES
Virtual Visit Details    Type of service:  Video Visit     Joined the call at 4/3/2024, 3:48:28 pm.  Left the call at 4/3/2024, 4:01:52 pm.    Originating Location (pt. Location): Home    Distant Location (provider location):  On-site  Platform used for Video Visit: Well    [facility] Rheumatology Follow-up Visit Note    Reason for visit: f/u for lupus      4/3/2024      History of Present Illness:  Taina Singh is a 58 year old female who is here for follow up of SLE and lupus nephritis.    History:  - She was diagnosed with lupus at age 27. Her 1st sx were malar rash and fatigue. No skin biopsy was done (reportedly had +KARLIE). She was treated with prednisone taper and HCQ. HCQ helped and she tolerated it well. She was diagnosed with Sjogren's at the same time. Had dryness of eyes and mouth. No lip biopsy to confirm the Dx. Reportedly she had very mild stable lupus for many years and eventually at some point, stopped taking HCQ (can't remember when). Her lupus started to flare in 7/2017 initially with pleuritic CP, was put back on HCQ. 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 Since then failed AZA, cytoxan and benlysta. MMF was not enough to control her LN and eventually rituximab was added (which was initially denied by insurance even with h/o lymphoma) given necrosis on the renal biopsy (rituximab is FDA approved for ANCA vasculitis). On HCQ+MMF after adding rituximab, LN and SLE started too improve.  - She was diagnosed with MALT lymphoma at 44, had enlarged LN over R parotid gland, on biopsy it was MALT lymphoma. Tx was resection only, no radiation or chemo.    2/4/2021:  - Taina is on prednisone 8 mg every day, it was 17.5 mg every day at last visit. She feels achy and tired this week, but thinks the cold weather is responsible for her sx. Had rituximab  375 mg/m2 on 1/22/2020, 2/6/2020, then 6/26/2020, 7/20/2020 and last dose 1 gram on 12/15/2020. On mepron for PJP prophylaxis. On  " mg bid, MMF 1500 mg bid. She works from home. Has intermittent joint pain, nothing consistent. one day shoulders hurt and the other day her hips hurt. AM stiffness is 30-60 minutes. no pleurisy, sob or cough or fevers. No skin rash. Her hair grew back.    11/12/2021:  - Taina is feeling well, she thinks her lupus is under fair control, no major complaints today.    4/29/2022:  - Today, Taina reports that she is feeling well.  Has noted some general diffuse aching and fatigue, difficulty making fist, and middle finger with trigger symptoms, which she typically has as she nears her rituximab infusion.  She has noted some morning stiffness, but this is very responsive to activity and stretching. Continues to have some dry mouth managed via increased hydration. She has had an intermittent mild rash of her nose and cheeks, but this is not consistent and not dependent on sun exposure. She has had a good appetite and an overall positive mood. Feels as though rituximab has been a \"huge game changer\" for her.    10/28/2022:  Ms. Singh presents for follow up.  -Patient reports that she is doing well.  She was able to completely wean and discontinue the prednisone.  She is doing well on rituximab infusions.  Her last rituximab treatment was in June and is scheduled to get the next Rituxan in November.  She is compliant with CellCept and Plaquenil.  She recently received the Evusheld.  Denies any oral ulcers, rashes, joint pains, body aches, vision changes, shortness of breath and  Exertion.  Reports morning stiffness is minimal.  Good appetite and normal mood.    3/8/2023:    Doing well  Stable  No flares  Last prednisone use, was about 2 wk ago, 2.5 mg every day x 2 days after a cold.      10/6/2023:    Doing very well, no symptoms, no lupus flare, back on prednisone 5 mg every day, did not tolerate coming off prednisone as developed diffuse arthralgia.      Today 4/3/2024:    Doing well since she resumed " prednisone 5 mg every day, can't taper below 5 mg every day.    Stable SLE, no flares.    Down for a day or two here and there, feels achy and tired.    S/p Rituximab 500 mg once dose on 11/10/2023    Dry eyes and mouth is an issue.    Trouble sleeping wakes up a lot due to dryness.    ROS:    A comprehensive ROS was done, positives are per HPI.    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 44    No chemo or radiation    Other forms of systemic lupus erythematosus (H) 2018    Pulmonary embolism (H)     2019 seen on abd CT at [facility]    Sjogren's syndrome (H24)     secondary Sjogrens, secondary to lupus    Vitamin D deficiency      Past Surgical History:  Past Surgical History:   Procedure Laterality Date    BIOPSY/REMOVAL, LYMPH NODE(S)       SECTION  age 32    HC HYSTEROS W PERMANENT FALLOPAIN IMPLANT      Essure b/l    PAROTIDECTOMY Right 2006    TONSILLECTOMY       Family history:  Family History   Problem Relation Age of Onset    Alopecia Brother     Rheumatoid Arthritis Brother     LUNG DISEASE No family hx of      Social history:  Social History     Socioeconomic History    Marital status:      Spouse name: Not on file    Number of children: Not on file    Years of education: 1    Highest education level: Not on file   Occupational History     Comment: , 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      Social Determinants of Health     Financial Resource Strain: Not on file   Food Insecurity: Not on file   Transportation Needs: Not on file   Physical Activity: Not on file   Stress: Not on file   Social Connections: Not on file   Interpersonal Safety: Not on file   Housing Stability: Not on file     Allergies:  Allergies   Allergen Reactions    Pentamidine Shortness Of Breath    Penicillins Rash    Sulfa Antibiotics Rash     Medications:  Outpatient Encounter Medications as of 4/3/2024   Medication Sig Dispense Refill    albuterol (PROAIR HFA/PROVENTIL HFA/VENTOLIN HFA) 108 (90 Base) MCG/ACT inhaler Inhale 2 puffs into the lungs every 6 hours as needed for shortness of breath / dyspnea or wheezing 3 Inhaler 3    Calcium-Magnesium 300-300 MG TABS daily       hydroxychloroquine (PLAQUENIL) 200 MG tablet Take 1 tablet (200 mg) by mouth 2 times daily 180 tablet 3    Multiple Vitamins-Minerals (WOMENS MULTI PO) Take by mouth daily       mycophenolate (GENERIC EQUIVALENT) 500 MG tablet Take 2 tablets (1,000 mg) by mouth 2 times daily 360 tablet 3    Omega-3 Fatty Acids (OMEGA-3 FISH OIL) 500 MG CAPS Take 500 mg by mouth daily       predniSONE (DELTASONE) 5 MG tablet Take 1 tablet (5 mg) by mouth daily 90 tablet 3    traMADol (ULTRAM) 50 MG tablet TAKE 1 TABLET(50 MG) BY MOUTH EVERY 12 HOURS AS NEEDED FOR SEVERE PAIN 60 tablet 5    vitamin D3 (CHOLECALCIFEROL) 50 mcg (2000 units) tablet Take 1 tablet (50 mcg) by mouth daily 90 tablet 3    warfarin ANTICOAGULANT  (COUMADIN) 5 MG tablet   1    zolpidem (AMBIEN) 5 MG tablet TAKE 1 TABLET(5 MG) BY MOUTH EVERY EVENING AS NEEDED FOR SLEEP 30 tablet 5     No facility-administered encounter medications on file as of 4/3/2024.       Labs/Imaging:      Latest Ref Rng & Units 8/4/2023    11:13 AM 11/10/2023     9:27 AM 3/29/2024     1:26 PM   RHEUM RESULTS   Albumin 3.5 - 5.2 g/dL 4.2  4.3  4.4    ALT 0 - 50 U/L 18  18  24    AST 0 - 45 U/L 24  23  23    Complement C3 81 - 157 mg/dL 137  139  151    Complement C4 13 - 39 mg/dL 26  28  28    Creatinine 0.51 - 0.95 mg/dL 0.62  0.61  0.70    CRP Inflammation <5.00 mg/L 7.14  8.23  8.58    DNA-ds <10.0 IU/mL 54.0  46.0     GFR Estimate >60 mL/min/1.73m2 >90  >90  >90    Hematocrit 35.0 - 47.0 % 39.3  40.0  41.4    Hemoglobin 11.7 - 15.7 g/dL 12.3  12.7  13.0    IGA 84 - 499 mg/dL 142  142  160    IGM 35 - 242 mg/dL 17  12  11     - 1,616 mg/dL 358  345  359    WBC 4.0 - 11.0 10e3/uL 7.9  7.2  8.9    RBC Count 3.80 - 5.20 10e6/uL 4.71  4.73  4.95    RDW 10.0 - 15.0 % 14.6  13.7  14.4    MCHC 31.5 - 36.5 g/dL 31.3  31.8  31.4    MCV 78 - 100 fL 83  85  84    Platelet Count 150 - 450 10e3/uL 203  227  229    Sed Rate 0 - 30 mm/hr 13  11  10      Component      Latest Ref Rng & Units 2/1/2021   WBC      4.0 - 11.0 10e9/L 7.8   RBC Count      3.8 - 5.2 10e12/L 4.50   Hemoglobin      11.7 - 15.7 g/dL 11.7   Hematocrit      35.0 - 47.0 % 37.9   MCV      78 - 100 fl 84   MCH      26.5 - 33.0 pg 26.0 (L)   MCHC      31.5 - 36.5 g/dL 30.9 (L)   RDW      10.0 - 15.0 % 14.9   Platelet Count      150 - 450 10e9/L 236   % Neutrophils      % 84.9   % Lymphocytes      % 6.3   % Monocytes      % 7.9   % Eosinophils      % 0.6   % Basophils      % 0.3   Absolute Neutrophil      1.6 - 8.3 10e9/L 6.6   Absolute Lymphocytes      0.8 - 5.3 10e9/L 0.5 (L)   Absolute Monocytes      0.0 - 1.3 10e9/L 0.6   Absolute Eosinophils      0.0 - 0.7 10e9/L 0.1   Absolute Basophils      0.0 - 0.2 10e9/L 0.0   Diff  Method       Automated Method   Color Urine       Yellow   Appearance Urine       Clear   Glucose Urine      NEG:Negative mg/dL Negative   Bilirubin Urine      NEG:Negative Negative   Ketones Urine      NEG:Negative mg/dL Trace (A)   Specific Gravity Urine      1.003 - 1.035 >1.030   pH Urine      5.0 - 7.0 pH 6.0   Protein Albumin Urine      NEG:Negative mg/dL 100 (A)   Urobilinogen Urine      0.2 - 1.0 EU/dL 0.2   Nitrite Urine      NEG:Negative Negative   Blood Urine      NEG:Negative Trace (A)   Leukocyte Esterase Urine      NEG:Negative Trace (A)   Source       Midstream Urine   WBC Urine      OTO5:0 - 5 /HPF 10-25 (A)   RBC Urine      OTO2:O - 2 /HPF 2-5 (A)   Squamous Epithelial /LPF Urine      FEW:Few /LPF Few   Bacteria Urine      NEG:Negative /HPF Moderate (A)   Triple Phosphates      NEG:Negative /HPF Few (A)   IGG      610 - 1,616 mg/dL 399 (L)   IGA      84 - 499 mg/dL 190   IGM      35 - 242 mg/dL 16 (L)   Creatinine      0.52 - 1.04 mg/dL 0.60   GFR Estimate      >60 mL/min/1.73:m2 >90   GFR Estimate If Black      >60 mL/min/1.73:m2 >90   Specimen Description       Midstream Urine   Special Requests       Specimen received in preservative   Culture Micro       <10,000 colonies/mL . . .   CD19 B Cells      6 - 27 % <1 (L)   Absolute CD19      107 - 698 cells/uL <1 (L)   Protein Random Urine      g/L 0.75   Protein Total Urine g/gr Creatinine      0 - 0.2 g/g Cr 0.40 (H)   Creatinine Urine Random      mg/dL 182   Sed Rate      0 - 30 mm/h 27   DNA-ds      <10 IU/mL 102 (H)   CRP Inflammation      0.0 - 8.0 mg/L 8.9 (H)   Complement C3      81 - 157 mg/dL 104   Complement C4      13 - 39 mg/dL 23   AST      0 - 45 U/L 16   Albumin      3.4 - 5.0 g/dL 4.1   ALT      0 - 50 U/L 26   Creatinine Urine      mg/dL 186     Component      Latest Ref Rng & Units 11/10/2021   WBC      4.0 - 11.0 10e3/uL 8.2   RBC Count      3.80 - 5.20 10e6/uL 4.58   Hemoglobin      11.7 - 15.7 g/dL 12.3   Hematocrit      35.0 -  47.0 % 39.7   MCV      78 - 100 fL 87   MCH      26.5 - 33.0 pg 26.9   MCHC      31.5 - 36.5 g/dL 31.0 (L)   RDW      10.0 - 15.0 % 14.2   Platelet Count      150 - 450 10e3/uL 229   % Neutrophils      % 83   % Lymphocytes      % 6   % Monocytes      % 9   % Eosinophils      % 2   % Basophils      % 0   Absolute Neutrophils      1.6 - 8.3 10e3/uL 6.9   Absolute Lymphocytes      0.8 - 5.3 10e3/uL 0.5 (L)   Absolute Monocytes      0.0 - 1.3 10e3/uL 0.7   Absolute Eosinophils      0.0 - 0.7 10e3/uL 0.1   Absolute Basophils      0.0 - 0.2 10e3/uL 0.0   Color Urine      Colorless, Straw, Light Yellow, Yellow Yellow   Appearance Urine      Clear Clear   Glucose Urine      Negative mg/dL Negative   Bilirubin Urine      Negative Negative   Ketones Urine      Negative mg/dL Trace (A)   Specific Gravity Urine      1.003 - 1.035 >=1.030   Blood Urine      Negative Small (A)   pH Urine      5.0 - 7.0 6.0   Protein Albumin Urine      Negative mg/dL Negative   Urobilinogen Urine      0.2, 1.0 E.U./dL 0.2   Nitrite Urine      Negative Negative   Leukocyte Esterase Urine      Negative Trace (A)   Sodium      133 - 144 mmol/L 138   Potassium      3.4 - 5.3 mmol/L 3.9   Chloride      94 - 109 mmol/L 103   Carbon Dioxide      20 - 32 mmol/L 29   Anion Gap      3 - 14 mmol/L 6   Urea Nitrogen      7 - 30 mg/dL 22   Creatinine      0.52 - 1.04 mg/dL 0.70   Calcium      8.5 - 10.1 mg/dL 9.6   Glucose      70 - 99 mg/dL 83   Albumin      3.4 - 5.0 g/dL 4.0   Phosphorus      2.5 - 4.5 mg/dL 3.9   GFR Estimate      >60 mL/min/1.73m2 >90   Bacteria Urine      None Seen /HPF Few (A)   RBC Urine      0-2 /HPF /HPF 0-2   WBC Urine      0-5 /HPF /HPF 0-5   Squamous Epithelial /LPF Urine      None Seen /LPF Few (A)   Mucus Urine      None Seen /LPF Present (A)   MPO Cari IgG Instrument Value      <3.5 U/mL <0.3   Myeloperoxidase Antibody IgG      Negative Negative   Proteinase 3 Cari IgG Instrument Value      <2.0 U/mL <1.0   Proteinase 3 Antibody  IgG      Negative Negative   IGG      610-1,616 mg/dL 398 (L)   IGA      84 - 499 mg/dL 184   IGM      35 - 242 mg/dL 16 (L)   Protein Random Urine      g/L 0.23   Protein Total Urine g/gr Creatinine      0.00 - 0.20 g/g Cr 0.11   Creatinine Urine      mg/dL 212   Neutrophil Cytoplasmic Antibody      <1:10 <1:10   Neutrophil Cytoplasmic Antibody Pattern       The ANCA IFA is <1:10.  No further testing will be performed.   CD19 B Cells      6 - 27 % <1 (L)   Absolute CD19      107 - 698 cells/uL <1 (L)   ALT      0 - 50 U/L 27   AST      0 - 45 U/L 19   Complement C4      13 - 39 mg/dL 28   Complement C3      81 - 157 mg/dL 138   CRP Inflammation      0.0 - 8.0 mg/L 8.9 (H)   DNA-ds      <10.0 IU/mL 90.0 (H)   Sed Rate      0 - 30 mm/hr 25     Component      Latest Ref Rng & Units 4/29/2022   WBC      4.0 - 11.0 10e3/uL 7.1   RBC Count      3.80 - 5.20 10e6/uL 4.62   Hemoglobin      11.7 - 15.7 g/dL 12.0   Hematocrit      35.0 - 47.0 % 38.3   MCV      78 - 100 fL 83   MCH      26.5 - 33.0 pg 26.0 (L)   MCHC      31.5 - 36.5 g/dL 31.3 (L)   RDW      10.0 - 15.0 % 14.3   Platelet Count      150 - 450 10e3/uL 213   % Neutrophils      % 85   % Lymphocytes      % 6   % Monocytes      % 7   % Eosinophils      % 1   % Basophils      % 1   % Immature Granulocytes      % 0   NRBCs per 100 WBC      <1 /100 0   Absolute Neutrophils      1.6 - 8.3 10e3/uL 6.1   Absolute Lymphocytes      0.8 - 5.3 10e3/uL 0.4 (L)   Absolute Monocytes      0.0 - 1.3 10e3/uL 0.5   Absolute Eosinophils      0.0 - 0.7 10e3/uL 0.0   Absolute Basophils      0.0 - 0.2 10e3/uL 0.1   Absolute Immature Granulocytes      <=0.4 10e3/uL 0.0   Absolute NRBCs      10e3/uL 0.0   Color Urine      Colorless, Straw, Light Yellow, Yellow Yellow   Appearance Urine      Clear Clear   Glucose Urine      Negative mg/dL Negative   Bilirubin Urine      Negative Negative   Ketones Urine      Negative mg/dL Negative   Specific Gravity Urine      1.003 - 1.035 1.015   Blood  "Urine      Negative Negative   pH Urine      5.0 - 7.0 5.0   Protein Albumin Urine      Negative mg/dL Negative   Urobilinogen mg/dL      Normal, 2.0 mg/dL Normal   Nitrite Urine      Negative Negative   Leukocyte Esterase Urine      Negative Negative   Bacteria Urine      None Seen /HPF Few (A)   Mucus Urine      None Seen /LPF Present (A)   RBC Urine      <=2 /HPF 1   WBC Urine      <=5 /HPF 1   Squamous Epithelial /HPF Urine      <=1 /HPF <1   Protein Random Urine      g/L 0.12   Protein Total Urine g/gr Creatinine      0.00 - 0.20 g/g Cr 0.18   Creatinine Urine      mg/dL 66   Creatinine      0.52 - 1.04 mg/dL 0.68   GFR Estimate      >60 mL/min/1.73m2 >90   AST      0 - 45 U/L 16   ALT      0 - 50 U/L 32   Albumin      3.4 - 5.0 g/dL 3.7   CRP Inflammation      0.0 - 8.0 mg/L 7.0   Sed Rate      0 - 30 mm/hr 19       Physical Exam:    Ht 1.676 m (5' 6\")   Wt 145.2 kg (320 lb)   BMI 51.65 kg/m    Constitutional: Pleasant, NAD  HEENT: EOMI, sclera anicteric, conj not injected. No oral ulcers or thrush  Chest: CTAB  CV: no M/R/G, RRR  Abdomen: soft, NT  MSK: No active synovitis or joint tenderness  Skin: No skin rash  Neuro: CN grossly intact without focal deficit  Psych: nl affect    Assessment/Plan:  Taina Singh is a 58 year old female who is here for follow up of SLE and lupus nephritis.    Systemic lupus erythematous  History of lupus nephritis  - Presented in 12/2017 for 2nd opinion of m/o her SLE. She was diagnosed with SLE at age 27. Her lupus has been marked by +KARLIE (highest titer 1:640, speckled and nucleolar patterns), +anti-DNA, low C3/C4, arthritis, malar rash, serositis (pleuritic CP), lupus nephritis, hemolytic anemia, enteritis supported by +SSA/SSB Ab, +RF, high ESR/CRP. She had neg anti-RNP, anti-Sm, acL IgM/G/A, LAC, cryo and anti-CCP. She was treated with prednisone and HCQ at the time of Dx. Can't remember how long she was on HCQ and why HCQ was stopped, but it probably was stopped because " of stable disease, no report on HCQ toxicity. Reportedly her SLE was mild all these years.  - Her lupus flared in 7/2017 with no triggers when she presented with arthritis, HCQ was resumed, arthritis resolved. Mild pulm HTN on 2D-Echo 8/2017 but neg R cardiac cath and VQ scan in 3/2018, also neg 2D-Echo.  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 Previously failed AZA, benlysta. Patient received 1st dose cyclophosphamide on 6/15/19, had 6 doses.  Initiated Rituxin 1/22/2020 as failed cytoxan.  Recommend rituxin infusion every 4-6 months, closer to 4 due to worsening of symptoms as infusions near at 6 month interval. Have decreased dose to 600 mg and it is well tolerated. Recommend continuation of rituximab as it is the only med that has helped with LN. Also tx options are limited (also necrotizing lesions on renal biopsy, can not rule out ANCA neg vasculitis overlap and rituximab is FDA approved for GPA/MPA).     10/2022:  - Current regimen: Prednisone 5 mg every day, MMF 1000 mg BID (tapered from 3 gr every day) daily, and  mg a day, and Rituximab (last infusion 6/3/2022).      10/6/2023: SLE is under excellent control but back on prednisone 5 mg qd, labs were reviewed, stable. No change in meds. Last rituximab infusion was 500 mg on 5/5/2023.    Sjogren's with sicca  - Secondary, +SSA/SSB Ab and h/o MALT lymphoma at age 44 in remission. Uses blink eyedrops and salagen. No lip biopsy was done to confirm Dx of Sjogren's.       Recommendations 3/2023:  - Last rituximab 500 mg on 11/18/2022. Continue at interval q4-6 months depending on severity symptoms.  - Continue  mg BID  -Off prednisone and doing well.  - Annual eye exam for HCQ monitoring  - PCP prophylaxis on atovaquone 10 mL (1500 mg). Patient did not tolerate inhaled pentamidine. Avoiding TMP-SMX given sulfa reaction and potential increase risk of SLE flare. Hesitant to use dapsone given risk of hemolytic anemia.  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

## 2024-04-03 NOTE — PATIENT INSTRUCTIONS
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

## 2024-04-03 NOTE — NURSING NOTE
Patient reviewed medications and allergies in Mychart during e-check in and said everything looked correct.        Is the patient currently in the state of [place]? YES    Visit mode:VIDEO    If the visit is dropped, the patient can be reconnected by: VIDEO VISIT: Text to cell phone:   Telephone Information:   Mobile [phone]       Will anyone else be joining the visit? NO  (If patient encounters technical issues they should call [phone])    How would you like to obtain your AVS? MyChart    Are changes needed to the allergy or medication list? Pt stated no med changes    Are refills needed on medications prescribed by this physician?     Reason for visit: RECHECK (Been having dry mouth and dry eyes symptoms that started with in the past 6 weeks. )      Vannesa GUIDO

## 2024-04-03 NOTE — TELEPHONE ENCOUNTER
Therapy plan orders entered per direction of provider, Dr. Dominguez.      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

## 2024-04-04 LAB — DSDNA AB SER-ACNC: 41 IU/ML

## 2024-04-05 ENCOUNTER — TELEPHONE (OUTPATIENT)
Dept: RHEUMATOLOGY | Facility: CLINIC | Age: 59
End: 2024-04-05
Payer: COMMERCIAL

## 2024-04-05 RX ORDER — MEPERIDINE HYDROCHLORIDE 25 MG/ML
25 INJECTION INTRAMUSCULAR; INTRAVENOUS; SUBCUTANEOUS EVERY 30 MIN PRN
OUTPATIENT
Start: 2024-04-05

## 2024-04-05 RX ORDER — EPINEPHRINE 1 MG/ML
0.3 INJECTION, SOLUTION, CONCENTRATE INTRAVENOUS EVERY 5 MIN PRN
OUTPATIENT
Start: 2024-04-05

## 2024-04-05 RX ORDER — ACETAMINOPHEN 325 MG/1
650 TABLET ORAL ONCE
Status: CANCELLED
Start: 2024-04-05

## 2024-04-05 RX ORDER — METHYLPREDNISOLONE SODIUM SUCCINATE 125 MG/2ML
125 INJECTION, POWDER, LYOPHILIZED, FOR SOLUTION INTRAMUSCULAR; INTRAVENOUS ONCE
Status: CANCELLED | OUTPATIENT
Start: 2024-04-05

## 2024-04-05 RX ORDER — HEPARIN SODIUM (PORCINE) LOCK FLUSH IV SOLN 100 UNIT/ML 100 UNIT/ML
5 SOLUTION INTRAVENOUS
OUTPATIENT
Start: 2024-04-05

## 2024-04-05 RX ORDER — METHYLPREDNISOLONE SODIUM SUCCINATE 125 MG/2ML
125 INJECTION, POWDER, LYOPHILIZED, FOR SOLUTION INTRAMUSCULAR; INTRAVENOUS
Start: 2024-04-05

## 2024-04-05 RX ORDER — DIPHENHYDRAMINE HYDROCHLORIDE 50 MG/ML
50 INJECTION INTRAMUSCULAR; INTRAVENOUS
Start: 2024-04-05

## 2024-04-05 RX ORDER — HEPARIN SODIUM,PORCINE 10 UNIT/ML
5-20 VIAL (ML) INTRAVENOUS DAILY PRN
OUTPATIENT
Start: 2024-04-05

## 2024-04-05 RX ORDER — ALBUTEROL SULFATE 0.83 MG/ML
2.5 SOLUTION RESPIRATORY (INHALATION)
OUTPATIENT
Start: 2024-04-05

## 2024-04-05 RX ORDER — ALBUTEROL SULFATE 90 UG/1
1-2 AEROSOL, METERED RESPIRATORY (INHALATION)
Start: 2024-04-05

## 2024-04-05 NOTE — TELEPHONE ENCOUNTER
Patient confirmed scheduled appointment:  Date: October 11th, 20204  Time: 2pm  Visit type: Return  Provider: Dr. Marroquin  Location: [facility]  Testing/imaging: DEXA (unsure if insurance will cover as last one was performed December 2022. Pt calling insurance to verify coverage while writer verifies with Dr. Marroquin if needs to be completed prior to follow up or if can wait til December)  Additional notes: Pt will schedule labs on her own

## 2024-04-06 RX ORDER — ZOLPIDEM TARTRATE 5 MG/1
5 TABLET ORAL
Qty: 30 TABLET | Refills: 5 | Status: SHIPPED | OUTPATIENT
Start: 2024-04-06

## 2024-04-06 NOTE — TELEPHONE ENCOUNTER
ZOLPIDEM 5MG TABLETS   Last Written Prescription Date:  8/8/2023  Last Fill Quantity: 30,   # refills: 5  Last Office Visit : 4/3/2024  Future Office visit:  10/11/2024    Routing refill request to provider for review/approval because:  Drug not on the [facility], [facility] or [facility] refill protocol or controlled substance    Gemma Hebert RN  Central Triage Red Flags/Med Refills

## 2024-04-24 ENCOUNTER — MYC MEDICAL ADVICE (OUTPATIENT)
Dept: RHEUMATOLOGY | Facility: CLINIC | Age: 59
End: 2024-04-24
Payer: COMMERCIAL

## 2024-05-10 ENCOUNTER — INFUSION THERAPY VISIT (OUTPATIENT)
Dept: INFUSION THERAPY | Facility: CLINIC | Age: 59
End: 2024-05-10
Attending: INTERNAL MEDICINE
Payer: COMMERCIAL

## 2024-05-10 VITALS
TEMPERATURE: 97.8 F | BODY MASS INDEX: 51.89 KG/M2 | DIASTOLIC BLOOD PRESSURE: 75 MMHG | SYSTOLIC BLOOD PRESSURE: 143 MMHG | WEIGHT: 293 LBS | RESPIRATION RATE: 18 BRPM | OXYGEN SATURATION: 97 % | HEART RATE: 90 BPM

## 2024-05-10 DIAGNOSIS — M06.09 RHEUMATOID ARTHRITIS, SERONEGATIVE, MULTIPLE SITES (H): ICD-10-CM

## 2024-05-10 DIAGNOSIS — M32.14 LUPUS NEPHRITIS, ISN/RPS CLASS IV (H): ICD-10-CM

## 2024-05-10 DIAGNOSIS — I77.82 ANCA-NEGATIVE VASCULITIS (H): Primary | ICD-10-CM

## 2024-05-10 DIAGNOSIS — M32.9 SYSTEMIC LUPUS ERYTHEMATOSUS, UNSPECIFIED SLE TYPE, UNSPECIFIED ORGAN INVOLVEMENT STATUS (H): ICD-10-CM

## 2024-05-10 PROCEDURE — 36415 COLL VENOUS BLD VENIPUNCTURE: CPT | Performed by: INTERNAL MEDICINE

## 2024-05-10 PROCEDURE — 86355 B CELLS TOTAL COUNT: CPT | Performed by: INTERNAL MEDICINE

## 2024-05-10 PROCEDURE — 250N000011 HC RX IP 250 OP 636: Performed by: INTERNAL MEDICINE

## 2024-05-10 PROCEDURE — 96413 CHEMO IV INFUSION 1 HR: CPT

## 2024-05-10 PROCEDURE — 96415 CHEMO IV INFUSION ADDL HR: CPT

## 2024-05-10 PROCEDURE — 99207 PR NO CHARGE LOS: CPT

## 2024-05-10 PROCEDURE — 96375 TX/PRO/DX INJ NEW DRUG ADDON: CPT

## 2024-05-10 PROCEDURE — 96367 TX/PROPH/DG ADDL SEQ IV INF: CPT

## 2024-05-10 PROCEDURE — 82784 ASSAY IGA/IGD/IGG/IGM EACH: CPT | Performed by: INTERNAL MEDICINE

## 2024-05-10 PROCEDURE — 250N000013 HC RX MED GY IP 250 OP 250 PS 637: Performed by: INTERNAL MEDICINE

## 2024-05-10 PROCEDURE — 258N000003 HC RX IP 258 OP 636: Performed by: INTERNAL MEDICINE

## 2024-05-10 RX ORDER — ALBUTEROL SULFATE 90 UG/1
1-2 AEROSOL, METERED RESPIRATORY (INHALATION)
Start: 2024-05-10

## 2024-05-10 RX ORDER — METHYLPREDNISOLONE SODIUM SUCCINATE 125 MG/2ML
125 INJECTION, POWDER, LYOPHILIZED, FOR SOLUTION INTRAMUSCULAR; INTRAVENOUS ONCE
Status: CANCELLED | OUTPATIENT
Start: 2024-05-10

## 2024-05-10 RX ORDER — DIPHENHYDRAMINE HYDROCHLORIDE 50 MG/ML
50 INJECTION INTRAMUSCULAR; INTRAVENOUS
Start: 2024-05-10

## 2024-05-10 RX ORDER — ACETAMINOPHEN 325 MG/1
650 TABLET ORAL ONCE
Status: CANCELLED
Start: 2024-05-10

## 2024-05-10 RX ORDER — HEPARIN SODIUM,PORCINE 10 UNIT/ML
5-20 VIAL (ML) INTRAVENOUS DAILY PRN
OUTPATIENT
Start: 2024-05-10

## 2024-05-10 RX ORDER — HEPARIN SODIUM (PORCINE) LOCK FLUSH IV SOLN 100 UNIT/ML 100 UNIT/ML
5 SOLUTION INTRAVENOUS
OUTPATIENT
Start: 2024-05-10

## 2024-05-10 RX ORDER — METHYLPREDNISOLONE SODIUM SUCCINATE 125 MG/2ML
125 INJECTION, POWDER, LYOPHILIZED, FOR SOLUTION INTRAMUSCULAR; INTRAVENOUS ONCE
Status: COMPLETED | OUTPATIENT
Start: 2024-05-10 | End: 2024-05-10

## 2024-05-10 RX ORDER — EPINEPHRINE 1 MG/ML
0.3 INJECTION, SOLUTION INTRAMUSCULAR; SUBCUTANEOUS EVERY 5 MIN PRN
OUTPATIENT
Start: 2024-05-10

## 2024-05-10 RX ORDER — METHYLPREDNISOLONE SODIUM SUCCINATE 125 MG/2ML
125 INJECTION, POWDER, LYOPHILIZED, FOR SOLUTION INTRAMUSCULAR; INTRAVENOUS
Start: 2024-05-10

## 2024-05-10 RX ORDER — ACETAMINOPHEN 325 MG/1
650 TABLET ORAL ONCE
Status: COMPLETED | OUTPATIENT
Start: 2024-05-10 | End: 2024-05-10

## 2024-05-10 RX ORDER — MEPERIDINE HYDROCHLORIDE 25 MG/ML
25 INJECTION INTRAMUSCULAR; INTRAVENOUS; SUBCUTANEOUS EVERY 30 MIN PRN
OUTPATIENT
Start: 2024-05-10

## 2024-05-10 RX ORDER — ALBUTEROL SULFATE 0.83 MG/ML
2.5 SOLUTION RESPIRATORY (INHALATION)
OUTPATIENT
Start: 2024-05-10

## 2024-05-10 RX ADMIN — RITUXIMAB-ABBS 500 MG: 10 INJECTION, SOLUTION INTRAVENOUS at 14:11

## 2024-05-10 RX ADMIN — SODIUM CHLORIDE 250 ML: 9 INJECTION, SOLUTION INTRAVENOUS at 13:32

## 2024-05-10 RX ADMIN — METHYLPREDNISOLONE SODIUM SUCCINATE 125 MG: 125 INJECTION, POWDER, FOR SOLUTION INTRAMUSCULAR; INTRAVENOUS at 13:36

## 2024-05-10 RX ADMIN — DIPHENHYDRAMINE HYDROCHLORIDE 50 MG: 50 INJECTION, SOLUTION INTRAMUSCULAR; INTRAVENOUS at 13:38

## 2024-05-10 RX ADMIN — ACETAMINOPHEN 650 MG: 325 TABLET ORAL at 13:36

## 2024-05-10 NOTE — PROGRESS NOTES
Infusion Nursing Note:  Taina Singh presents today for Rapid Rituximab.    Patient seen by provider today: No   present during visit today: Not Applicable.    Note: Pt denies any new medical concerns. Pt states feeling at her baseline today. The pt reports tolerating their treatments well.    Pt has orders to draw CD19 and IGG levels in treatment plan prior to Rituximab infusion. These orders to draw were placed on 4/3/2024 after the previous CD19 and IGG were drawn on 3/29/2024. Dr. Marroquin was unavailable on all platforms today and was informed by Myra Alvarez RN that he does not currently have an RNCC working with him. As we were unable to contact the provider for clarification on if these levels should be drawn today, discussed with Charge RN and will draw the levels today as the order to draw them was placed AFTER the 3/29/24 levels were drawn.     Intravenous Access:  Labs drawn without difficulty.  Peripheral IV placed.    Treatment Conditions:  Biological Infusion Checklist:  ~~~ NOTE: If the patient answers yes to any of the questions below, hold the infusion and contact ordering provider or on-call provider.    Have you recently had an elevated temperature, fever, chills, productive cough, coughing for 3 weeks or longer or hemoptysis,  abnormal vital signs, night sweats,  chest pain or have you noticed a decrease in your appetite, unexplained weight loss or fatigue? No  Do you have any open wounds or new incisions? No  Do you have any upcoming hospitalizations or surgeries? Does not include esophagogastroduodenoscopy, colonoscopy, endoscopic retrograde cholangiopancreatography (ERCP), endoscopic ultrasound (EUS), dental procedures or joint aspiration/steroid injections No  Do you currently have any signs of illness or infection or are you on any antibiotics? No  Have you had any new, sudden or worsening abdominal pain? No  Have you or anyone in your household received a live vaccination in  the past 4 weeks? Please note: No live vaccines while on biologic/chemotherapy until 6 months after the last treatment. Patient can receive the flu vaccine (shot only), pneumovax and the Covid vaccine. It is optimal for the patient to get these vaccines mid cycle, but they can be given at any time as long as it is not on the day of the infusion. No  Have you recently been diagnosed with any new nervous system diseases (ie. Multiple sclerosis, Guillain [place], seizures, neurological changes) or cancer diagnosis? Are you on any form of radiation or chemotherapy? No  Are you pregnant or breast feeding or do you have plans of pregnancy in the future? No  Have there been any other new onset medical symptoms? No      Post Infusion Assessment:  Patient tolerated infusion without incident.  Blood return noted pre and post infusion.  Site patent and intact, free from redness, edema or discomfort.  No evidence of extravasations.  Access discontinued per protocol.  Biologic Infusion Post Education: Call the triage nurse at your clinic or seek medical attention if you have chills and/or temperature greater than or equal to 100.5, uncontrolled nausea/vomiting, diarrhea, constipation, dizziness, shortness of breath, chest pain, heart palpitations, weakness or any other new or concerning symptoms, questions or concerns.  You cannot have any live virus vaccines prior to or during treatment or up to 6 months post infusion.  If you have an upcoming surgery, medical procedure or dental procedure during treatment, this should be discussed with your ordering physician and your surgeon/dentist.  If you are having any concerning symptom, if you are unsure if you should get your next infusion or wish to speak to a provider before your next infusion, please call your care coordinator or triage nurse at your clinic to notify them so we can adequately serve you.       Discharge Plan:   Patient and/or family verbalized understanding of  discharge instructions and all questions answered.  AVS to patient via [employer].  Patient will return in 6 months depending on provider discretion for next appointment, she states she will schedule at a later date once she hears from her provider.  Patient discharged in stable condition accompanied by: self.  Departure Mode: Ambulatory.      Lavonne Galindo RN

## 2024-05-11 LAB
CD19 B CELL COMMENT: ABNORMAL
CD19 CELLS # BLD: 2 CELLS/UL (ref 107–698)
CD19 CELLS NFR BLD: <1 % (ref 6–27)

## 2024-05-13 LAB
IGA SERPL-MCNC: 146 MG/DL (ref 84–499)
IGG SERPL-MCNC: 328 MG/DL (ref 610–1616)
IGM SERPL-MCNC: 11 MG/DL (ref 35–242)

## 2024-08-04 ENCOUNTER — MYC REFILL (OUTPATIENT)
Dept: RHEUMATOLOGY | Facility: CLINIC | Age: 59
End: 2024-08-04
Payer: COMMERCIAL

## 2024-08-04 DIAGNOSIS — M32.9 SYSTEMIC LUPUS ERYTHEMATOSUS, UNSPECIFIED SLE TYPE, UNSPECIFIED ORGAN INVOLVEMENT STATUS (H): ICD-10-CM

## 2024-08-06 RX ORDER — TRAMADOL HYDROCHLORIDE 50 MG/1
50 TABLET ORAL EVERY 12 HOURS PRN
Qty: 60 TABLET | Refills: 5 | Status: SHIPPED | OUTPATIENT
Start: 2024-08-06

## 2024-08-06 NOTE — TELEPHONE ENCOUNTER
traMADol (ULTRAM) 50 MG tablet   Disp-60 tablet, R-5   Start: 10/21/2023     4/3/2024  [facility] Killian Bailey MD  Rheumatology    Routed because:  controlled

## 2024-09-06 ENCOUNTER — ANCILLARY PROCEDURE (OUTPATIENT)
Dept: BONE DENSITY | Facility: CLINIC | Age: 59
End: 2024-09-06
Attending: INTERNAL MEDICINE
Payer: COMMERCIAL

## 2024-09-06 DIAGNOSIS — Z79.52 CURRENT CHRONIC USE OF SYSTEMIC STEROIDS: ICD-10-CM

## 2024-09-06 DIAGNOSIS — M85.80 OSTEOPENIA, UNSPECIFIED LOCATION: ICD-10-CM

## 2024-09-06 PROCEDURE — 77080 DXA BONE DENSITY AXIAL: CPT | Mod: TC | Performed by: PHYSICIAN ASSISTANT

## 2024-09-08 ENCOUNTER — HEALTH MAINTENANCE LETTER (OUTPATIENT)
Age: 59
End: 2024-09-08

## 2024-09-08 ENCOUNTER — MYC MEDICAL ADVICE (OUTPATIENT)
Dept: RHEUMATOLOGY | Facility: CLINIC | Age: 59
End: 2024-09-08
Payer: COMMERCIAL

## 2024-09-08 DIAGNOSIS — M32.14 LUPUS NEPHRITIS, ISN/RPS CLASS IV (H): ICD-10-CM

## 2024-09-08 DIAGNOSIS — M32.9 SYSTEMIC LUPUS ERYTHEMATOSUS, UNSPECIFIED SLE TYPE, UNSPECIFIED ORGAN INVOLVEMENT STATUS (H): Primary | ICD-10-CM

## 2024-09-08 DIAGNOSIS — M85.80 OSTEOPENIA, UNSPECIFIED LOCATION: ICD-10-CM

## 2024-09-12 RX ORDER — PREDNISONE 1 MG/1
TABLET ORAL
Qty: 280 TABLET | Refills: 0 | Status: SHIPPED | OUTPATIENT
Start: 2024-09-13 | End: 2025-01-03

## 2024-09-12 NOTE — TELEPHONE ENCOUNTER
Per Dr. Marroquin:    1-DEXA still shows osteopenia, not osteoporosis, could refer to endocrinology   2-Agree with trying taepring prednisone again, 1 mg down every 4 weeks from 5 mg a day, she would need 1 mg size tabs to be called in.     Plan:  Patient called, she agrees with endo referral and prednisone taper. She is understanding of the plan and advised to reach out if she has symptoms worsen during her taper.     Alicia Brewer RN  [facility]

## 2024-10-01 DIAGNOSIS — M32.9 SYSTEMIC LUPUS ERYTHEMATOSUS, UNSPECIFIED SLE TYPE, UNSPECIFIED ORGAN INVOLVEMENT STATUS (H): ICD-10-CM

## 2024-10-01 DIAGNOSIS — Z79.52 LONG TERM SYSTEMIC STEROID USER: ICD-10-CM

## 2024-10-04 ENCOUNTER — OFFICE VISIT (OUTPATIENT)
Dept: URGENT CARE | Facility: URGENT CARE | Age: 59
End: 2024-10-04
Payer: COMMERCIAL

## 2024-10-04 ENCOUNTER — LAB (OUTPATIENT)
Dept: LAB | Facility: CLINIC | Age: 59
End: 2024-10-04
Payer: COMMERCIAL

## 2024-10-04 VITALS
BODY MASS INDEX: 50.68 KG/M2 | HEART RATE: 85 BPM | SYSTOLIC BLOOD PRESSURE: 154 MMHG | DIASTOLIC BLOOD PRESSURE: 83 MMHG | TEMPERATURE: 97.5 F | WEIGHT: 293 LBS

## 2024-10-04 DIAGNOSIS — I77.82 ANCA-NEGATIVE VASCULITIS (H): ICD-10-CM

## 2024-10-04 DIAGNOSIS — L93.0 LUPUS ERYTHEMATOSUS, UNSPECIFIED FORM: ICD-10-CM

## 2024-10-04 DIAGNOSIS — R45.82 WORRIES: Primary | ICD-10-CM

## 2024-10-04 DIAGNOSIS — M32.9 SYSTEMIC LUPUS ERYTHEMATOSUS, UNSPECIFIED SLE TYPE, UNSPECIFIED ORGAN INVOLVEMENT STATUS (H): ICD-10-CM

## 2024-10-04 DIAGNOSIS — Z79.899 HIGH RISK MEDICATIONS (NOT ANTICOAGULANTS) LONG-TERM USE: ICD-10-CM

## 2024-10-04 LAB
ALBUMIN UR-MCNC: NEGATIVE MG/DL
APPEARANCE UR: CLEAR
BACTERIA #/AREA URNS HPF: ABNORMAL /HPF
BASOPHILS # BLD AUTO: 0.1 10E3/UL (ref 0–0.2)
BASOPHILS NFR BLD AUTO: 1 %
BILIRUB UR QL STRIP: NEGATIVE
COLOR UR AUTO: YELLOW
EOSINOPHIL # BLD AUTO: 0.2 10E3/UL (ref 0–0.7)
EOSINOPHIL NFR BLD AUTO: 3 %
ERYTHROCYTE [DISTWIDTH] IN BLOOD BY AUTOMATED COUNT: 13.6 % (ref 10–15)
ERYTHROCYTE [SEDIMENTATION RATE] IN BLOOD BY WESTERGREN METHOD: 15 MM/HR (ref 0–30)
GLUCOSE UR STRIP-MCNC: NEGATIVE MG/DL
HCT VFR BLD AUTO: 39.9 % (ref 35–47)
HGB BLD-MCNC: 13 G/DL (ref 11.7–15.7)
HGB UR QL STRIP: NEGATIVE
IMM GRANULOCYTES # BLD: 0.1 10E3/UL
IMM GRANULOCYTES NFR BLD: 1 %
KETONES UR STRIP-MCNC: NEGATIVE MG/DL
LEUKOCYTE ESTERASE UR QL STRIP: ABNORMAL
LYMPHOCYTES # BLD AUTO: 0.8 10E3/UL (ref 0.8–5.3)
LYMPHOCYTES NFR BLD AUTO: 10 %
MCH RBC QN AUTO: 27.2 PG (ref 26.5–33)
MCHC RBC AUTO-ENTMCNC: 32.6 G/DL (ref 31.5–36.5)
MCV RBC AUTO: 84 FL (ref 78–100)
MONOCYTES # BLD AUTO: 0.8 10E3/UL (ref 0–1.3)
MONOCYTES NFR BLD AUTO: 11 %
NEUTROPHILS # BLD AUTO: 5.9 10E3/UL (ref 1.6–8.3)
NEUTROPHILS NFR BLD AUTO: 75 %
NITRATE UR QL: NEGATIVE
NRBC # BLD AUTO: 0 10E3/UL
NRBC BLD AUTO-RTO: 0 /100
PH UR STRIP: 5.5 [PH] (ref 5–7)
PLATELET # BLD AUTO: 245 10E3/UL (ref 150–450)
RBC # BLD AUTO: 4.78 10E6/UL (ref 3.8–5.2)
RBC #/AREA URNS AUTO: ABNORMAL /HPF
SP GR UR STRIP: 1.02 (ref 1–1.03)
SQUAMOUS #/AREA URNS AUTO: ABNORMAL /LPF
UROBILINOGEN UR STRIP-ACNC: 0.2 E.U./DL
WBC # BLD AUTO: 7.9 10E3/UL (ref 4–11)
WBC #/AREA URNS AUTO: ABNORMAL /HPF

## 2024-10-04 PROCEDURE — 86355 B CELLS TOTAL COUNT: CPT

## 2024-10-04 PROCEDURE — 82565 ASSAY OF CREATININE: CPT

## 2024-10-04 PROCEDURE — 85025 COMPLETE CBC W/AUTO DIFF WBC: CPT

## 2024-10-04 PROCEDURE — 84156 ASSAY OF PROTEIN URINE: CPT

## 2024-10-04 PROCEDURE — 81001 URINALYSIS AUTO W/SCOPE: CPT

## 2024-10-04 PROCEDURE — 36415 COLL VENOUS BLD VENIPUNCTURE: CPT

## 2024-10-04 PROCEDURE — 82784 ASSAY IGA/IGD/IGG/IGM EACH: CPT

## 2024-10-04 PROCEDURE — 85652 RBC SED RATE AUTOMATED: CPT

## 2024-10-04 PROCEDURE — 82040 ASSAY OF SERUM ALBUMIN: CPT

## 2024-10-04 PROCEDURE — 86225 DNA ANTIBODY NATIVE: CPT

## 2024-10-04 PROCEDURE — 84460 ALANINE AMINO (ALT) (SGPT): CPT

## 2024-10-04 PROCEDURE — 86140 C-REACTIVE PROTEIN: CPT

## 2024-10-04 PROCEDURE — 84450 TRANSFERASE (AST) (SGOT): CPT

## 2024-10-04 PROCEDURE — 86160 COMPLEMENT ANTIGEN: CPT

## 2024-10-04 PROCEDURE — 99213 OFFICE O/P EST LOW 20 MIN: CPT | Performed by: PHYSICIAN ASSISTANT

## 2024-10-05 LAB
ALBUMIN MFR UR ELPH: 6.7 MG/DL
ALBUMIN SERPL BCG-MCNC: 4.3 G/DL (ref 3.5–5.2)
ALT SERPL W P-5'-P-CCNC: 27 U/L (ref 0–50)
AST SERPL W P-5'-P-CCNC: 25 U/L (ref 0–45)
CD19 B CELL COMMENT: ABNORMAL
CD19 CELLS # BLD: <1 CELLS/UL (ref 107–698)
CD19 CELLS NFR BLD: <1 % (ref 6–27)
CREAT SERPL-MCNC: 0.64 MG/DL (ref 0.51–0.95)
CREAT UR-MCNC: 102 MG/DL
CRP SERPL-MCNC: 9.78 MG/L
EGFRCR SERPLBLD CKD-EPI 2021: >90 ML/MIN/1.73M2
PROT/CREAT 24H UR: 0.07 MG/MG CR (ref 0–0.2)

## 2024-10-05 NOTE — PROGRESS NOTES
SUBJECTIVE:   Taina Singh is a 59 year old female presenting with a chief complaint of   Chief Complaint   Patient presents with    Urgent Care    Urinary Problem       She is a new patient of [facility].    Patient presenting with concerns over a recent UA performed at rheumatologist office today.  States she also has LBP, fatigue.    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44    No chemo or radiation    Other forms of systemic lupus erythematosus (H) 2/13/2018    Pulmonary embolism (H)     6/11/2019 seen on abd CT at [facility]    Sjogren's syndrome (H)     secondary Sjogrens, secondary to lupus    Vitamin D deficiency      Family History   Problem Relation Age of Onset    Alopecia Brother     Rheumatoid Arthritis Brother     LUNG DISEASE No family hx of      Current Outpatient Medications   Medication Sig Dispense Refill    albuterol (PROAIR HFA/PROVENTIL HFA/VENTOLIN HFA) 108 (90 Base) MCG/ACT inhaler Inhale 2 puffs into the lungs every 6 hours as needed for shortness of breath / dyspnea or wheezing 3 Inhaler 3    Calcium-Magnesium 300-300 MG TABS daily       hydroxychloroquine (PLAQUENIL) 200 MG tablet Take 1 tablet (200 mg) by mouth 2 times daily 180 tablet 3    Multiple Vitamins-Minerals (WOMENS MULTI PO) Take by mouth daily       mycophenolate (GENERIC EQUIVALENT) 500 MG tablet Take 2 tablets (1,000 mg) by mouth 2 times daily 360 tablet 3    Omega-3 Fatty Acids (OMEGA-3 FISH OIL) 500 MG CAPS Take 500 mg by mouth daily        pilocarpine (SALAGEN) 5 MG tablet Take 1 tablet (5 mg) by mouth 4 times daily 360 tablet 1    predniSONE (DELTASONE) 1 MG tablet Take 4 tablets (4 mg) by mouth daily for 28 days, THEN 3 tablets (3 mg) daily for 28 days, THEN 2 tablets (2 mg) daily for 28 days, THEN 1 tablet (1 mg) daily for 28 days. 280 tablet 0    predniSONE (DELTASONE) 5 MG tablet Take 1 tablet (5 mg) by mouth daily 90 tablet 3    traMADol (ULTRAM) 50 MG tablet Take 1 tablet (50 mg) by mouth every 12 hours as needed for severe pain 60 tablet 5    vitamin D3 (CHOLECALCIFEROL) 50 mcg (2000 units) tablet Take 1 tablet (50 mcg) by mouth daily 90 tablet 3    warfarin ANTICOAGULANT (COUMADIN) 5 MG tablet   1    zolpidem (AMBIEN) 5 MG tablet Take 1 tablet (5 mg) by mouth nightly as needed for sleep 30 tablet 5     Social History     Tobacco Use    Smoking status: Never     Passive exposure: Never    Smokeless tobacco: Never   Substance Use Topics    Alcohol use: No       OBJECTIVE  BP (!) 154/83   Pulse 85   Temp 97.5  F (36.4  C) (Tympanic)   Wt 142.4 kg (314 lb)   BMI 50.68 kg/m      Physical Exam  Vitals and nursing note reviewed.   Constitutional:       General: She is not in acute distress.     Appearance: Normal appearance. She is obese. She is not ill-appearing, toxic-appearing or diaphoretic.   Cardiovascular:      Rate and Rhythm: Normal rate.   Neurological:      Mental Status: She is alert.         Labs:  Results for orders placed or performed in visit on 10/04/24 (from the past 24 hour(s))   CBC with Platelets & Differential    Narrative    The following orders were created for panel order CBC with Platelets & Differential.  Procedure                               Abnormality         Status                     ---------                               -----------         ------                     CBC with platelets and d...[[id number]]                      Final result                 Please view results for these tests on the individual  orders.   Erythrocyte sedimentation rate auto   Result Value Ref Range    Erythrocyte Sedimentation Rate 15 0 - 30 mm/hr   UA with Microscopic reflex to Culture    Specimen: Urine, Clean Catch   Result Value Ref Range    Color Urine Yellow Colorless, Straw, Light Yellow, Yellow    Appearance Urine Clear Clear    Glucose Urine Negative Negative mg/dL    Bilirubin Urine Negative Negative    Ketones Urine Negative Negative mg/dL    Specific Gravity Urine 1.025 1.003 - 1.035    Blood Urine Negative Negative    pH Urine 5.5 5.0 - 7.0    Protein Albumin Urine Negative Negative mg/dL    Urobilinogen Urine 0.2 0.2, 1.0 E.U./dL    Nitrite Urine Negative Negative    Leukocyte Esterase Urine Small (A) Negative   CBC with platelets and differential   Result Value Ref Range    WBC Count 7.9 4.0 - 11.0 10e3/uL    RBC Count 4.78 3.80 - 5.20 10e6/uL    Hemoglobin 13.0 11.7 - 15.7 g/dL    Hematocrit 39.9 35.0 - 47.0 %    MCV 84 78 - 100 fL    MCH 27.2 26.5 - 33.0 pg    MCHC 32.6 31.5 - 36.5 g/dL    RDW 13.6 10.0 - 15.0 %    Platelet Count 245 150 - 450 10e3/uL    % Neutrophils 75 %    % Lymphocytes 10 %    % Monocytes 11 %    % Eosinophils 3 %    % Basophils 1 %    % Immature Granulocytes 1 %    NRBCs per 100 WBC 0 <1 /100    Absolute Neutrophils 5.9 1.6 - 8.3 10e3/uL    Absolute Lymphocytes 0.8 0.8 - 5.3 10e3/uL    Absolute Monocytes 0.8 0.0 - 1.3 10e3/uL    Absolute Eosinophils 0.2 0.0 - 0.7 10e3/uL    Absolute Basophils 0.1 0.0 - 0.2 10e3/uL    Absolute Immature Granulocytes 0.1 <=0.4 10e3/uL    Absolute NRBCs 0.0 10e3/uL   UA Microscopic with Reflex to Culture   Result Value Ref Range    Bacteria Urine Moderate (A) None Seen /HPF    RBC Urine 0-2 0-2 /HPF /HPF    WBC Urine 5-10 (A) 0-5 /HPF /HPF    Squamous Epithelials Urine Few (A) None Seen /LPF    Narrative    Urine Culture not indicated         ASSESSMENT:    No diagnosis found.     Medical Decision Making:    Differential Diagnosis:  Abnormal UA, UTI    Serious Comorbid  Conditions:  Adult:   reviewed    PLAN:    Discussed lab results with patient.  No indications for abx.  Discussed CBC, no infection suggested.  Patient seemed upset at these results and did not want to be examined.      Followup:    If not improving or if condition worsens, follow up with your Primary Care Provider, If not improving or if conditions worsens over the next 12-24 hours, go to the Emergency Department    There are no Patient Instructions on file for this visit.

## 2024-10-07 LAB
C3 SERPL-MCNC: 146 MG/DL (ref 81–157)
C4 SERPL-MCNC: 28 MG/DL (ref 13–39)
IGA SERPL-MCNC: 129 MG/DL (ref 84–499)
IGG SERPL-MCNC: 327 MG/DL (ref 610–1616)
IGM SERPL-MCNC: 15 MG/DL (ref 35–242)

## 2024-10-08 LAB — DSDNA AB SER-ACNC: 38 IU/ML

## 2024-10-08 RX ORDER — PREDNISONE 5 MG/1
5 TABLET ORAL DAILY
Qty: 90 TABLET | Refills: 3 | OUTPATIENT
Start: 2024-10-08

## 2024-10-08 RX ORDER — CHOLECALCIFEROL (VITAMIN D3) 50 MCG
1 TABLET ORAL DAILY
Qty: 90 TABLET | Refills: 3 | Status: SHIPPED | OUTPATIENT
Start: 2024-10-08

## 2024-10-08 NOTE — TELEPHONE ENCOUNTER
"   PREDNISONE TABS 5MG    TAKE 1 TABLET DAILY    Last Written Prescription Date:  10/9/23  Last Fill Quantity: 90,   # refills: 3 DENIED( [facility] scripts request)  Started taper 9/12/24( prescription sent to [facility] [id number]) Stopped 5 MG Prednisone daily       VIT D-3 TABS (CHOLECALCIFER) 2,000U     Last Written Prescription Date:  10/9/23  Last Fill Quantity: 90,   # refills: 3  Last Office Visit : 4/3/24   Future Office visit:  10/11/24.   Refilled per protocol.           Dr Marroquin:  Plan 9/12/24: 2-Agree with trying tapering prednisone again, 1 mg down every 4 weeks from 5 mg a day, she would need 1 mg size tabs to be called in.     9/12/24 #280[id number]1 MG Tabs, Take 4 tablets (4 mg) by mouth daily for 28 days, THEN 3 tablets (3 mg) daily for 28 days, THEN 2 tablets (2 mg) daily for 28 days, THEN 1 tablet (1 mg) daily for 28 days.       "

## 2024-10-11 ENCOUNTER — OFFICE VISIT (OUTPATIENT)
Dept: RHEUMATOLOGY | Facility: CLINIC | Age: 59
End: 2024-10-11
Attending: INTERNAL MEDICINE
Payer: COMMERCIAL

## 2024-10-11 ENCOUNTER — MYC MEDICAL ADVICE (OUTPATIENT)
Dept: RHEUMATOLOGY | Facility: CLINIC | Age: 59
End: 2024-10-11

## 2024-10-11 VITALS
OXYGEN SATURATION: 98 % | BODY MASS INDEX: 50.28 KG/M2 | WEIGHT: 293 LBS | HEART RATE: 77 BPM | SYSTOLIC BLOOD PRESSURE: 133 MMHG | TEMPERATURE: 97.8 F | DIASTOLIC BLOOD PRESSURE: 75 MMHG

## 2024-10-11 DIAGNOSIS — L93.0 LUPUS ERYTHEMATOSUS, UNSPECIFIED FORM: Primary | ICD-10-CM

## 2024-10-11 DIAGNOSIS — I77.82 ANCA-NEGATIVE VASCULITIS (H): ICD-10-CM

## 2024-10-11 DIAGNOSIS — M32.14 LUPUS NEPHRITIS, ISN/RPS CLASS IV (H): ICD-10-CM

## 2024-10-11 DIAGNOSIS — L93.0 LUPUS ERYTHEMATOSUS, UNSPECIFIED FORM: ICD-10-CM

## 2024-10-11 DIAGNOSIS — G47.11 HYPERSOMNIA, IDIOPATHIC: Primary | ICD-10-CM

## 2024-10-11 DIAGNOSIS — M32.9 SYSTEMIC LUPUS ERYTHEMATOSUS, UNSPECIFIED SLE TYPE, UNSPECIFIED ORGAN INVOLVEMENT STATUS (H): ICD-10-CM

## 2024-10-11 DIAGNOSIS — Z79.899 HIGH RISK MEDICATIONS (NOT ANTICOAGULANTS) LONG-TERM USE: ICD-10-CM

## 2024-10-11 PROCEDURE — 99213 OFFICE O/P EST LOW 20 MIN: CPT | Performed by: INTERNAL MEDICINE

## 2024-10-11 PROCEDURE — G2211 COMPLEX E/M VISIT ADD ON: HCPCS | Performed by: INTERNAL MEDICINE

## 2024-10-11 PROCEDURE — 99215 OFFICE O/P EST HI 40 MIN: CPT | Performed by: INTERNAL MEDICINE

## 2024-10-11 RX ORDER — MODAFINIL 100 MG/1
100 TABLET ORAL DAILY
Qty: 30 TABLET | Refills: 5 | Status: SHIPPED | OUTPATIENT
Start: 2024-10-11

## 2024-10-11 NOTE — PROGRESS NOTES
[facility] Rheumatology Follow-up Visit Note    Reason for visit: f/u for lupus, high risk med use      Last seen: 4/3/2024  DOS: 10/11/2024      History of Present Illness:  Taina Singh is a 58 year old female who is here for follow up of SLE and lupus nephritis.    History:  - She was diagnosed with lupus at age 27. Her 1st sx were malar rash and fatigue. No skin biopsy was done (reportedly had +KARLIE). She was treated with prednisone taper and HCQ. HCQ helped and she tolerated it well. She was diagnosed with Sjogren's at the same time. Had dryness of eyes and mouth. No lip biopsy to confirm the Dx. Reportedly she had very mild stable lupus for many years and eventually at some point, stopped taking HCQ (can't remember when). Her lupus started to flare in 7/2017 initially with pleuritic CP, was put back on HCQ. 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 Since then failed AZA, cytoxan and benlysta. MMF was not enough to control her LN and eventually rituximab was added (which was initially denied by insurance even with h/o lymphoma) given necrosis on the renal biopsy (rituximab is FDA approved for ANCA vasculitis). On HCQ+MMF after adding rituximab, LN and SLE started too improve.  - She was diagnosed with MALT lymphoma at 44, had enlarged LN over R parotid gland, on biopsy it was MALT lymphoma. Tx was resection only, no radiation or chemo.    2/4/2021:  - Taina is on prednisone 8 mg every day, it was 17.5 mg every day at last visit. She feels achy and tired this week, but thinks the cold weather is responsible for her sx. Had rituximab  375 mg/m2 on 1/22/2020, 2/6/2020, then 6/26/2020, 7/20/2020 and last dose 1 gram on 12/15/2020. On mepron for PJP prophylaxis. On  mg bid, MMF 1500 mg bid. She works from home. Has intermittent joint pain, nothing consistent. one day shoulders hurt and the other day her hips hurt. AM stiffness is 30-60 minutes. no pleurisy, sob or cough or fevers.  "No skin rash. Her hair grew back.    11/12/2021:  - Taina is feeling well, she thinks her lupus is under fair control, no major complaints today.    4/29/2022:  - Today, Taina reports that she is feeling well.  Has noted some general diffuse aching and fatigue, difficulty making fist, and middle finger with trigger symptoms, which she typically has as she nears her rituximab infusion.  She has noted some morning stiffness, but this is very responsive to activity and stretching. Continues to have some dry mouth managed via increased hydration. She has had an intermittent mild rash of her nose and cheeks, but this is not consistent and not dependent on sun exposure. She has had a good appetite and an overall positive mood. Feels as though rituximab has been a \"huge game changer\" for her.    10/28/2022:  Ms. Singh presents for follow up.  -Patient reports that she is doing well.  She was able to completely wean and discontinue the prednisone.  She is doing well on rituximab infusions.  Her last rituximab treatment was in June and is scheduled to get the next Rituxan in November.  She is compliant with CellCept and Plaquenil.  She recently received the Evusheld.  Denies any oral ulcers, rashes, joint pains, body aches, vision changes, shortness of breath and  Exertion.  Reports morning stiffness is minimal.  Good appetite and normal mood.    3/8/2023:    Doing well  Stable  No flares  Last prednisone use, was about 2 wk ago, 2.5 mg every day x 2 days after a cold.      10/6/2023:    Doing very well, no symptoms, no lupus flare, back on prednisone 5 mg every day, did not tolerate coming off prednisone as developed diffuse arthralgia.      4/3/2024:    Doing well since she resumed prednisone 5 mg every day, can't taper below 5 mg every day.    Stable SLE, no flares.    Down for a day or two here and there, feels achy and tired.    S/p Rituximab 500 mg once dose on 11/10/2023    Dry eyes and mouth is an " issue.    Trouble sleeping wakes up a lot due to dryness.      Today 10/11/2024:    -no lupus flare up    -fatigue is the major concern, more pronounced by going down on prednisone, now 2 mg qd    ROS:    A comprehensive ROS was done, positives are per HPI.    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    Sjogren's syndrome (H)     secondary Sjogrens, secondary to lupus    Vitamin D deficiency      Past Surgical History:  Past Surgical History:   Procedure Laterality Date    BIOPSY/REMOVAL, LYMPH NODE(S)       SECTION  age 32    HC HYSTEROS W PERMANENT FALLOPAIN IMPLANT      Essure b/l    PAROTIDECTOMY Right 2006    TONSILLECTOMY       Family history:  Family History   Problem Relation Age of Onset    Alopecia Brother     Rheumatoid Arthritis Brother     LUNG DISEASE No family hx of      Social history:  Social History     Socioeconomic History    Marital status:      Spouse name: Not on file    Number of children: Not on file    Years of education: 1    Highest education level: Not on file   Occupational History     Comment: , 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      Social Determinants of Health     Financial Resource Strain: Low Risk  (3/31/2023)    Received from [employer] [facility], [facility] [employer] [facility]    Financial Resource Strain     Difficulty of Paying Living Expenses: 3     Difficulty of Paying Living Expenses: Not on file   Food Insecurity: No Food Insecurity (3/31/2023)    Received from [employer] [facility], [facility] [employer] [facility] [employer] [facility]    Food Insecurity     Worried About Running Out of Food in the Last Year: 1   Transportation Needs: No Transportation Needs (3/31/2023)    [facility], [facility]    Transportation Needs     Lack of Transportation (Medical): 1   Physical Activity: Not on file   Stress: Not on file   Social Connections: Unknown (4/1/2024)    Received from [employer] [facility], [facility]    Social Connections     Frequency of Communication with Friends and Family: Not on file   Interpersonal Safety: Not on file   Housing Stability: Low Risk  (3/31/2023)    [facility], [employer] [facility]    Housing Stability     Unable to Pay for Housing in the Last Year: 1     Allergies:  Allergies   Allergen Reactions    Pentamidine Shortness Of Breath    Penicillins Rash    Sulfa Antibiotics Rash     Medications:  Outpatient Encounter Medications as of 10/11/2024   Medication Sig Dispense Refill     Calcium-Magnesium 300-300 MG TABS daily       hydroxychloroquine (PLAQUENIL) 200 MG tablet Take 1 tablet (200 mg) by mouth 2 times daily 180 tablet 3    Multiple Vitamins-Minerals (WOMENS MULTI PO) Take by mouth daily       mycophenolate (GENERIC EQUIVALENT) 500 MG tablet Take 2 tablets (1,000 mg) by mouth 2 times daily 360 tablet 3    Omega-3 Fatty Acids (OMEGA-3 FISH OIL) 500 MG CAPS Take 500 mg by mouth daily       pilocarpine (SALAGEN) 5 MG tablet Take 1 tablet (5 mg) by mouth 4 times daily 360 tablet 1    predniSONE (DELTASONE) 1 MG tablet Take 4 tablets (4 mg) by mouth daily for 28 days, THEN 3 tablets (3 mg) daily for 28 days, THEN 2 tablets (2 mg) daily for 28 days, THEN 1 tablet (1 mg) daily for 28 days. 280 tablet 0    predniSONE (DELTASONE) 5 MG tablet Take 1 tablet (5 mg) by mouth daily 90 tablet 3    traMADol (ULTRAM) 50 MG tablet Take 1 tablet (50 mg) by mouth every 12 hours as needed for severe pain 60 tablet 5    VITAMIN D3 50 MCG (2000 UT) tablet TAKE 1 TABLET DAILY 90 tablet 3    warfarin ANTICOAGULANT (COUMADIN) 5 MG tablet   1    zolpidem (AMBIEN) 5 MG tablet Take 1 tablet (5 mg) by mouth nightly as needed for sleep 30 tablet 5    [DISCONTINUED] albuterol (PROAIR HFA/PROVENTIL HFA/VENTOLIN HFA) 108 (90 Base) MCG/ACT inhaler Inhale 2 puffs into the lungs every 6 hours as needed for shortness of breath / dyspnea or wheezing 3 Inhaler 3     No facility-administered encounter medications on file as of 10/11/2024.       Labs/Imaging:      Latest Ref Rng & Units 3/29/2024     1:26 PM 5/10/2024     1:32 PM 10/4/2024     2:50 PM   RHEUM RESULTS   Albumin 3.5 - 5.2 g/dL 4.4   4.3    ALT 0 - 50 U/L 24   27    AST 0 - 45 U/L 23   25    Complement C3 81 - 157 mg/dL 151   146    Complement C4 13 - 39 mg/dL 28   28    Creatinine 0.51 - 0.95 mg/dL 0.70   0.64    CRP Inflammation <5.00 mg/L 8.58   9.78    DNA-ds <10.0 IU/mL 41.0   38.0    GFR Estimate >60 mL/min/1.73m2 >90   >90    Hematocrit 35.0 - 47.0 % 41.4    39.9    Hemoglobin 11.7 - 15.7 g/dL 13.0   13.0    IGA 84 - 499 mg/dL 160  146  129    IGM 35 - 242 mg/dL 11  11  15     - 1,616 mg/dL 359  328  327    WBC 4.0 - 11.0 10e3/uL 8.9   7.9    RBC Count 3.80 - 5.20 10e6/uL 4.95   4.78    RDW 10.0 - 15.0 % 14.4   13.6    MCHC 31.5 - 36.5 g/dL 31.4   32.6    MCV 78 - 100 fL 84   84    Platelet Count 150 - 450 10e3/uL 229   245    Sed Rate 0 - 30 mm/hr 10   15      Component      Latest Ref Rng & Units 2/1/2021   WBC      4.0 - 11.0 10e9/L 7.8   RBC Count      3.8 - 5.2 10e12/L 4.50   Hemoglobin      11.7 - 15.7 g/dL 11.7   Hematocrit      35.0 - 47.0 % 37.9   MCV      78 - 100 fl 84   MCH      26.5 - 33.0 pg 26.0 (L)   MCHC      31.5 - 36.5 g/dL 30.9 (L)   RDW      10.0 - 15.0 % 14.9   Platelet Count      150 - 450 10e9/L 236   % Neutrophils      % 84.9   % Lymphocytes      % 6.3   % Monocytes      % 7.9   % Eosinophils      % 0.6   % Basophils      % 0.3   Absolute Neutrophil      1.6 - 8.3 10e9/L 6.6   Absolute Lymphocytes      0.8 - 5.3 10e9/L 0.5 (L)   Absolute Monocytes      0.0 - 1.3 10e9/L 0.6   Absolute Eosinophils      0.0 - 0.7 10e9/L 0.1   Absolute Basophils      0.0 - 0.2 10e9/L 0.0   Diff Method       Automated Method   Color Urine       Yellow   Appearance Urine       Clear   Glucose Urine      NEG:Negative mg/dL Negative   Bilirubin Urine      NEG:Negative Negative   Ketones Urine      NEG:Negative mg/dL Trace (A)   Specific Gravity Urine      1.003 - 1.035 >1.030   pH Urine      5.0 - 7.0 pH 6.0   Protein Albumin Urine      NEG:Negative mg/dL 100 (A)   Urobilinogen Urine      0.2 - 1.0 EU/dL 0.2   Nitrite Urine      NEG:Negative Negative   Blood Urine      NEG:Negative Trace (A)   Leukocyte Esterase Urine      NEG:Negative Trace (A)   Source       Midstream Urine   WBC Urine      OTO5:0 - 5 /HPF 10-25 (A)   RBC Urine      OTO2:O - 2 /HPF 2-5 (A)   Squamous Epithelial /LPF Urine      FEW:Few /LPF Few   Bacteria Urine      NEG:Negative /HPF Moderate  (A)   Triple Phosphates      NEG:Negative /HPF Few (A)   IGG      610 - 1,616 mg/dL 399 (L)   IGA      84 - 499 mg/dL 190   IGM      35 - 242 mg/dL 16 (L)   Creatinine      0.52 - 1.04 mg/dL 0.60   GFR Estimate      >60 mL/min/1.73:m2 >90   GFR Estimate If Black      >60 mL/min/1.73:m2 >90   Specimen Description       Midstream Urine   Special Requests       Specimen received in preservative   Culture Micro       <10,000 colonies/mL . . .   CD19 B Cells      6 - 27 % <1 (L)   Absolute CD19      107 - 698 cells/uL <1 (L)   Protein Random Urine      g/L 0.75   Protein Total Urine g/gr Creatinine      0 - 0.2 g/g Cr 0.40 (H)   Creatinine Urine Random      mg/dL 182   Sed Rate      0 - 30 mm/h 27   DNA-ds      <10 IU/mL 102 (H)   CRP Inflammation      0.0 - 8.0 mg/L 8.9 (H)   Complement C3      81 - 157 mg/dL 104   Complement C4      13 - 39 mg/dL 23   AST      0 - 45 U/L 16   Albumin      3.4 - 5.0 g/dL 4.1   ALT      0 - 50 U/L 26   Creatinine Urine      mg/dL 186     Component      Latest Ref Rng & Units 11/10/2021   WBC      4.0 - 11.0 10e3/uL 8.2   RBC Count      3.80 - 5.20 10e6/uL 4.58   Hemoglobin      11.7 - 15.7 g/dL 12.3   Hematocrit      35.0 - 47.0 % 39.7   MCV      78 - 100 fL 87   MCH      26.5 - 33.0 pg 26.9   MCHC      31.5 - 36.5 g/dL 31.0 (L)   RDW      10.0 - 15.0 % 14.2   Platelet Count      150 - 450 10e3/uL 229   % Neutrophils      % 83   % Lymphocytes      % 6   % Monocytes      % 9   % Eosinophils      % 2   % Basophils      % 0   Absolute Neutrophils      1.6 - 8.3 10e3/uL 6.9   Absolute Lymphocytes      0.8 - 5.3 10e3/uL 0.5 (L)   Absolute Monocytes      0.0 - 1.3 10e3/uL 0.7   Absolute Eosinophils      0.0 - 0.7 10e3/uL 0.1   Absolute Basophils      0.0 - 0.2 10e3/uL 0.0   Color Urine      Colorless, Straw, Light Yellow, Yellow Yellow   Appearance Urine      Clear Clear   Glucose Urine      Negative mg/dL Negative   Bilirubin Urine      Negative Negative   Ketones Urine      Negative mg/dL  Trace (A)   Specific Gravity Urine      1.003 - 1.035 >=1.030   Blood Urine      Negative Small (A)   pH Urine      5.0 - 7.0 6.0   Protein Albumin Urine      Negative mg/dL Negative   Urobilinogen Urine      0.2, 1.0 E.U./dL 0.2   Nitrite Urine      Negative Negative   Leukocyte Esterase Urine      Negative Trace (A)   Sodium      133 - 144 mmol/L 138   Potassium      3.4 - 5.3 mmol/L 3.9   Chloride      94 - 109 mmol/L 103   Carbon Dioxide      20 - 32 mmol/L 29   Anion Gap      3 - 14 mmol/L 6   Urea Nitrogen      7 - 30 mg/dL 22   Creatinine      0.52 - 1.04 mg/dL 0.70   Calcium      8.5 - 10.1 mg/dL 9.6   Glucose      70 - 99 mg/dL 83   Albumin      3.4 - 5.0 g/dL 4.0   Phosphorus      2.5 - 4.5 mg/dL 3.9   GFR Estimate      >60 mL/min/1.73m2 >90   Bacteria Urine      None Seen /HPF Few (A)   RBC Urine      0-2 /HPF /HPF 0-2   WBC Urine      0-5 /HPF /HPF 0-5   Squamous Epithelial /LPF Urine      None Seen /LPF Few (A)   Mucus Urine      None Seen /LPF Present (A)   MPO Cari IgG Instrument Value      <3.5 U/mL <0.3   Myeloperoxidase Antibody IgG      Negative Negative   Proteinase 3 Cari IgG Instrument Value      <2.0 U/mL <1.0   Proteinase 3 Antibody IgG      Negative Negative   IGG      610-1,616 mg/dL 398 (L)   IGA      84 - 499 mg/dL 184   IGM      35 - 242 mg/dL 16 (L)   Protein Random Urine      g/L 0.23   Protein Total Urine g/gr Creatinine      0.00 - 0.20 g/g Cr 0.11   Creatinine Urine      mg/dL 212   Neutrophil Cytoplasmic Antibody      <1:10 <1:10   Neutrophil Cytoplasmic Antibody Pattern       The ANCA IFA is <1:10.  No further testing will be performed.   CD19 B Cells      6 - 27 % <1 (L)   Absolute CD19      107 - 698 cells/uL <1 (L)   ALT      0 - 50 U/L 27   AST      0 - 45 U/L 19   Complement C4      13 - 39 mg/dL 28   Complement C3      81 - 157 mg/dL 138   CRP Inflammation      0.0 - 8.0 mg/L 8.9 (H)   DNA-ds      <10.0 IU/mL 90.0 (H)   Sed Rate      0 - 30 mm/hr 25     Component      Latest  Ref Rng & Units 4/29/2022   WBC      4.0 - 11.0 10e3/uL 7.1   RBC Count      3.80 - 5.20 10e6/uL 4.62   Hemoglobin      11.7 - 15.7 g/dL 12.0   Hematocrit      35.0 - 47.0 % 38.3   MCV      78 - 100 fL 83   MCH      26.5 - 33.0 pg 26.0 (L)   MCHC      31.5 - 36.5 g/dL 31.3 (L)   RDW      10.0 - 15.0 % 14.3   Platelet Count      150 - 450 10e3/uL 213   % Neutrophils      % 85   % Lymphocytes      % 6   % Monocytes      % 7   % Eosinophils      % 1   % Basophils      % 1   % Immature Granulocytes      % 0   NRBCs per 100 WBC      <1 /100 0   Absolute Neutrophils      1.6 - 8.3 10e3/uL 6.1   Absolute Lymphocytes      0.8 - 5.3 10e3/uL 0.4 (L)   Absolute Monocytes      0.0 - 1.3 10e3/uL 0.5   Absolute Eosinophils      0.0 - 0.7 10e3/uL 0.0   Absolute Basophils      0.0 - 0.2 10e3/uL 0.1   Absolute Immature Granulocytes      <=0.4 10e3/uL 0.0   Absolute NRBCs      10e3/uL 0.0   Color Urine      Colorless, Straw, Light Yellow, Yellow Yellow   Appearance Urine      Clear Clear   Glucose Urine      Negative mg/dL Negative   Bilirubin Urine      Negative Negative   Ketones Urine      Negative mg/dL Negative   Specific Gravity Urine      1.003 - 1.035 1.015   Blood Urine      Negative Negative   pH Urine      5.0 - 7.0 5.0   Protein Albumin Urine      Negative mg/dL Negative   Urobilinogen mg/dL      Normal, 2.0 mg/dL Normal   Nitrite Urine      Negative Negative   Leukocyte Esterase Urine      Negative Negative   Bacteria Urine      None Seen /HPF Few (A)   Mucus Urine      None Seen /LPF Present (A)   RBC Urine      <=2 /HPF 1   WBC Urine      <=5 /HPF 1   Squamous Epithelial /HPF Urine      <=1 /HPF <1   Protein Random Urine      g/L 0.12   Protein Total Urine g/gr Creatinine      0.00 - 0.20 g/g Cr 0.18   Creatinine Urine      mg/dL 66   Creatinine      0.52 - 1.04 mg/dL 0.68   GFR Estimate      >60 mL/min/1.73m2 >90   AST      0 - 45 U/L 16   ALT      0 - 50 U/L 32   Albumin      3.4 - 5.0 g/dL 3.7   CRP Inflammation       0.0 - 8.0 mg/L 7.0   Sed Rate      0 - 30 mm/hr 19       Physical Exam:    /75   Pulse 77   Temp 97.8  F (36.6  C) (Oral)   Wt 141.3 kg (311 lb 8 oz)   SpO2 98%   BMI 50.28 kg/m    Constitutional: Pleasant, NAD  HEENT: EOMI, sclera anicteric, conj not injected. No oral ulcers or thrush  Chest: CTAB  CV: no M/R/G, RRR  Abdomen: soft, NT  MSK: No active synovitis or joint tenderness  Skin: No skin rash  Neuro: CN grossly intact without focal deficit  Psych: nl affect    Assessment/Plan:  Taina Singh is a 58 year old female who is here for follow up of SLE and lupus nephritis.    Systemic lupus erythematous  History of lupus nephritis  - Presented in 12/2017 for 2nd opinion of m/o her SLE. She was diagnosed with SLE at age 27. Her lupus has been marked by +KARLIE (highest titer 1:640, speckled and nucleolar patterns), +anti-DNA, low C3/C4, arthritis, malar rash, serositis (pleuritic CP), lupus nephritis, hemolytic anemia, enteritis supported by +SSA/SSB Ab, +RF, high ESR/CRP. She had neg anti-RNP, anti-Sm, acL IgM/G/A, LAC, cryo and anti-CCP. She was treated with prednisone and HCQ at the time of Dx. Can't remember how long she was on HCQ and why HCQ was stopped, but it probably was stopped because of stable disease, no report on HCQ toxicity. Reportedly her SLE was mild all these years.  - Her lupus flared in 7/2017 with no triggers when she presented with arthritis, HCQ was resumed, arthritis resolved. Mild pulm HTN on 2D-Echo 8/2017 but neg R cardiac cath and VQ scan in 3/2018, also neg 2D-Echo.  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 Previously failed AZA, benlysta. Patient received 1st dose cyclophosphamide on 6/15/19, had 6 doses.  Initiated Rituxin 1/22/2020 as failed cytoxan.  Recommend rituxin infusion every 4-6 months, closer to 4 due to worsening of symptoms as infusions near at 6 month interval. Have decreased dose to 600 mg and it is well tolerated. Recommend continuation of rituximab as it  is the only med that has helped with LN. Also tx options are limited (also necrotizing lesions on renal biopsy, can not rule out ANCA neg vasculitis overlap and rituximab is FDA approved for GPA/MPA).     10/2022:  - Current regimen: Prednisone 5 mg every day, MMF 1000 mg BID (tapered from 3 gr every day) daily, and  mg a day, and Rituximab (last infusion 6/3/2022).      10/6/2023: SLE is under excellent control but back on prednisone 5 mg qd, labs were reviewed, stable. No change in meds. Last rituximab infusion was 500 mg on 5/5/2023.    Sjogren's with sicca  - Secondary, +SSA/SSB Ab and h/o MALT lymphoma at age 44 in remission. Uses blink eyedrops and salagen. No lip biopsy was done to confirm Dx of Sjogren's.       Recommendations 3/2023:  - Last rituximab 500 mg on 11/18/2022. Continue at interval q4-6 months depending on severity symptoms.  - Continue  mg BID  -Off prednisone and doing well.  - Annual eye exam for HCQ monitoring  - PCP prophylaxis on atovaquone 10 mL (1500 mg). Patient did not tolerate inhaled pentamidine. Avoiding TMP-SMX given sulfa reaction and potential increase risk of SLE flare. Hesitant to use dapsone given risk of hemolytic anemia.  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 Will try provigil for fatigue; risks were discussed. She he will be seen in bone clinic for osteopenia. Recommend pemgarda; risks were discussed. Stable labs.    Plan:     mg bid with eye exam    MMF 1 gr bid    Prednisone 2 mg every day slow taper to off    Rituximab 500 mg IV one dose in early Nov    Labs every 6 months (due 4/2025)    Continue with prednisone taper to off    Try provigil 100 mg in AM (could use good Rx)    Pemgarda infusion    Return in about 5 months (in person)      TT 40 min was spent on date of the encounter doing chart review, history and exam, documentation and further activities as noted above. Any prior notes, outside records, laboratory results, and imaging studies were reviewed if relevant.      The longitudinal plan of care for the diagnosis(es)/condition(s) as documented were addressed during this visit. Due to the added complexity in care, I will continue to support Taina in the subsequent management and with ongoing continuity of care.        Killian Marroquin MD      Orders Placed This Encounter   Procedures    ALT    Albumin level    AST    Creatinine    Complement C4    Complement C3    CRP inflammation    DNA double stranded antibodies    Erythrocyte sedimentation rate auto    UA with Microscopic reflex to Culture    Protein  random urine    Creatinine random urine    Vitamin D Deficiency    CBC with Platelets & Differential

## 2024-10-11 NOTE — PATIENT INSTRUCTIONS
Rituximab 500 mg IV one dose in early Nov    Labs every 6 months (due 4/2025)    Continue with prednisone taper to off    Try provigil 100 mg in AM (could use good Rx)    Pemgarda infusion    Return in about 5 months (in person)

## 2024-10-11 NOTE — LETTER
10/11/2024       RE: Taina Singh  [address] [place] [address]     Dear Colleague,    Thank you for referring your patient, Taina Singh, to the [facility] [place] [facility]. Please see a copy of my visit note below.      [facility] Rhe[facility] Follow-up Visit Note    Reason for visit: f/u for lupus, high risk med use      Last seen: 4/3/2024  DOS: 10/11/2024      History of Present Illness:  Taina Singh is a 58 year old female who is here for follow up of SLE and lupus nephritis.    History:  - She was diagnosed with lupus at age 27. Her 1st sx were malar rash and fatigue. No skin biopsy was done (reportedly had +KARLIE). She was treated with prednisone taper and HCQ. HCQ helped and she tolerated it well. She was diagnosed with Sjogren's at the same time. Had dryness of eyes and mouth. No lip biopsy to confirm the Dx. Reportedly she had very mild stable lupus for many years and eventually at some point, stopped taking HCQ (can't remember when). Her lupus started to flare in 7/2017 initially with pleuritic CP, was put back on HCQ. 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 Since then failed AZA, cytoxan and benlysta. MMF was not enough to control her LN and eventually rituximab was added (which was initially denied by insurance even with h/o lymphoma) given necrosis on the renal biopsy (rituximab is FDA approved for ANCA vasculitis). On HCQ+MMF after adding rituximab, LN and SLE started too improve.  - She was diagnosed with MALT lymphoma at 44, had enlarged LN over R parotid gland, on biopsy it was MALT lymphoma. Tx was resection only, no radiation or chemo.    2/4/2021:  - Taina is on prednisone 8 mg every day, it was 17.5 mg every day at last visit. She feels achy and tired this week, but thinks the cold weather is responsible for her sx. Had rituximab  375 mg/m2 on 1/22/2020, 2/6/2020, then  "6/26/2020, 7/20/2020 and last dose 1 gram on 12/15/2020. On mepron for PJP prophylaxis. On  mg bid, MMF 1500 mg bid. She works from home. Has intermittent joint pain, nothing consistent. one day shoulders hurt and the other day her hips hurt. AM stiffness is 30-60 minutes. no pleurisy, sob or cough or fevers. No skin rash. Her hair grew back.    11/12/2021:  - Taina is feeling well, she thinks her lupus is under fair control, no major complaints today.    4/29/2022:  - Today, Taina reports that she is feeling well.  Has noted some general diffuse aching and fatigue, difficulty making fist, and middle finger with trigger symptoms, which she typically has as she nears her rituximab infusion.  She has noted some morning stiffness, but this is very responsive to activity and stretching. Continues to have some dry mouth managed via increased hydration. She has had an intermittent mild rash of her nose and cheeks, but this is not consistent and not dependent on sun exposure. She has had a good appetite and an overall positive mood. Feels as though rituximab has been a \"huge game changer\" for her.    10/28/2022:  Ms. Singh presents for follow up.  -Patient reports that she is doing well.  She was able to completely wean and discontinue the prednisone.  She is doing well on rituximab infusions.  Her last rituximab treatment was in June and is scheduled to get the next Rituxan in November.  She is compliant with CellCept and Plaquenil.  She recently received the Evusheld.  Denies any oral ulcers, rashes, joint pains, body aches, vision changes, shortness of breath and  Exertion.  Reports morning stiffness is minimal.  Good appetite and normal mood.    3/8/2023:    Doing well  Stable  No flares  Last prednisone use, was about 2 wk ago, 2.5 mg every day x 2 days after a cold.      10/6/2023:    Doing very well, no symptoms, no lupus flare, back on prednisone 5 mg every day, did not tolerate coming off prednisone as " developed diffuse arthralgia.      4/3/2024:    Doing well since she resumed prednisone 5 mg every day, can't taper below 5 mg every day.    Stable SLE, no flares.    Down for a day or two here and there, feels achy and tired.    S/p Rituximab 500 mg once dose on 11/10/2023    Dry eyes and mouth is an issue.    Trouble sleeping wakes up a lot due to dryness.      Today 10/11/2024:    -no lupus flare up    -fatigue is the major concern, more pronounced by going down on prednisone, now 2 mg qd    ROS:    A comprehensive ROS was done, positives are per HPI.    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 44    No chemo or radiation     Other forms of systemic lupus erythematosus (H) 2018     Pulmonary embolism (H)     2019 seen on abd CT at [facility]     Sjogren's syndrome (H)     secondary Sjogrens, secondary to lupus     Vitamin D deficiency      Past Surgical History:  Past Surgical History:   Procedure Laterality Date     BIOPSY/REMOVAL, LYMPH NODE(S)        SECTION  age 32     HC HYSTEROS W PERMANENT FALLOPAIN IMPLANT      Essure b/l     PAROTIDECTOMY Right 2006     TONSILLECTOMY       Family history:  Family History   Problem Relation Age of Onset     Alopecia Brother      Rheumatoid Arthritis Brother      LUNG DISEASE No family hx of      Social history:  Social History     Socioeconomic History     Marital status:      Spouse name: Not on file     Number of children: Not on file     Years of education: 1     Highest  education level: Not on file   Occupational History     Comment: , 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      Social Determinants of Health     Financial Resource Strain: Low Risk  (3/31/2023)    [facility]    Financial Resource Strain      Difficulty of Paying Living Expenses: 3      Difficulty of Paying Living Expenses: Not on file   Food Insecurity: No Food Insecurity (3/31/2023)    Received [facility], [facility]    Food Insecurity      Worried About Running Out of Food in the Last Year: 1   Transportation Needs: No Transportation Needs (3/31/2023)    Received from [facility], [facility]    Transportation Needs      Lack of Transportation (Medical): 1   Physical Activity: Not on file   Stress: Not on file   Social Connections: Unknown (2024)    Received from [facility], [facility]    Social Connections      Frequency of Communication with Friends and Family: Not on file   Interpersonal Safety: Not on file   Housing Stability: Low Risk  (3/31/2023)    [facility], [facility]    Housing Stability      Unable to Pay for Housing in the Last Year: 1     Allergies:  Allergies   Allergen Reactions     Pentamidine Shortness Of Breath     Penicillins Rash     Sulfa Antibiotics Rash     Medications:  Outpatient Encounter Medications as of 10/11/2024   Medication Sig Dispense Refill     Calcium-Magnesium 300-300 MG TABS daily        hydroxychloroquine (PLAQUENIL) 200 MG tablet Take 1 tablet (200 mg) by mouth 2 times daily 180 tablet 3     Multiple Vitamins-Minerals (WOMENS MULTI PO) Take by mouth daily        mycophenolate (GENERIC EQUIVALENT) 500 MG tablet Take 2 tablets (1,000 mg) by mouth 2 times daily 360 tablet 3     Omega-3 Fatty Acids (OMEGA-3 FISH OIL) 500 MG CAPS Take 500 mg by mouth daily        pilocarpine (SALAGEN) 5 MG tablet Take 1 tablet (5 mg) by mouth 4 times daily 360 tablet 1     predniSONE (DELTASONE) 1 MG tablet Take 4 tablets (4 mg) by mouth daily for 28 days, THEN 3 tablets (3 mg) daily for 28 days, THEN 2 tablets (2 mg) daily for 28 days, THEN 1 tablet (1 mg) daily for 28 days. 280 tablet 0     predniSONE (DELTASONE) 5 MG tablet Take 1 tablet (5 mg) by mouth daily 90 tablet 3     traMADol (ULTRAM) 50 MG tablet Take 1 tablet (50 mg) by mouth every 12 hours as needed for severe pain 60 tablet 5     VITAMIN D3 50 MCG (2000 UT) tablet TAKE 1 TABLET DAILY 90 tablet 3     warfarin ANTICOAGULANT (COUMADIN) 5 MG tablet   1     zolpidem (AMBIEN) 5 MG tablet Take 1 tablet (5 mg) by mouth nightly as needed for sleep 30 tablet 5     [DISCONTINUED] albuterol (PROAIR HFA/PROVENTIL HFA/VENTOLIN HFA) 108 (90 Base) MCG/ACT inhaler Inhale 2 puffs into the lungs every 6 hours as needed for shortness of breath / dyspnea or wheezing 3 Inhaler 3     No facility-administered encounter medications on file as of 10/11/2024.       Labs/Imaging:      Latest Ref Rng & Units 3/29/2024     1:26 PM 5/10/2024     1:32 PM 10/4/2024     2:50 PM   RHEUM RESULTS    Albumin 3.5 - 5.2 g/dL 4.4   4.3    ALT 0 - 50 U/L 24   27    AST 0 - 45 U/L 23   25    Complement C3 81 - 157 mg/dL 151   146    Complement C4 13 - 39 mg/dL 28   28    Creatinine 0.51 - 0.95 mg/dL 0.70   0.64    CRP Inflammation <5.00 mg/L 8.58   9.78    DNA-ds <10.0 IU/mL 41.0   38.0    GFR Estimate >60 mL/min/1.73m2 >90   >90    Hematocrit 35.0 - 47.0 % 41.4   39.9    Hemoglobin 11.7 - 15.7 g/dL 13.0   13.0    IGA 84 - 499 mg/dL 160  146  129    IGM 35 - 242 mg/dL 11  11  15     - 1,616 mg/dL 359  328  327    WBC 4.0 - 11.0 10e3/uL 8.9   7.9    RBC Count 3.80 - 5.20 10e6/uL 4.95   4.78    RDW 10.0 - 15.0 % 14.4   13.6    MCHC 31.5 - 36.5 g/dL 31.4   32.6    MCV 78 - 100 fL 84   84    Platelet Count 150 - 450 10e3/uL 229   245    Sed Rate 0 - 30 mm/hr 10   15      Component      Latest Ref Rng & Units 2/1/2021   WBC      4.0 - 11.0 10e9/L 7.8   RBC Count      3.8 - 5.2 10e12/L 4.50   Hemoglobin      11.7 - 15.7 g/dL 11.7   Hematocrit      35.0 - 47.0 % 37.9   MCV      78 - 100 fl 84   MCH      26.5 - 33.0 pg 26.0 (L)   MCHC      31.5 - 36.5 g/dL 30.9 (L)   RDW      10.0 - 15.0 % 14.9   Platelet Count      150 - 450 10e9/L 236   % Neutrophils      % 84.9   % Lymphocytes      % 6.3   % Monocytes      % 7.9   % Eosinophils      % 0.6   % Basophils      % 0.3   Absolute Neutrophil      1.6 - 8.3 10e9/L 6.6   Absolute Lymphocytes      0.8 - 5.3 10e9/L 0.5 (L)   Absolute Monocytes      0.0 - 1.3 10e9/L 0.6   Absolute Eosinophils      0.0 - 0.7 10e9/L 0.1   Absolute Basophils      0.0 - 0.2 10e9/L 0.0   Diff Method       Automated Method   Color Urine       Yellow   Appearance Urine       Clear   Glucose Urine      NEG:Negative mg/dL Negative   Bilirubin Urine      NEG:Negative Negative   Ketones Urine      NEG:Negative mg/dL Trace (A)   Specific Gravity Urine      1.003 - 1.035 >1.030   pH Urine      5.0 - 7.0 pH 6.0   Protein Albumin Urine      NEG:Negative mg/dL 100 (A)   Urobilinogen Urine      0.2 - 1.0  EU/dL 0.2   Nitrite Urine      NEG:Negative Negative   Blood Urine      NEG:Negative Trace (A)   Leukocyte Esterase Urine      NEG:Negative Trace (A)   Source       Midstream Urine   WBC Urine      OTO5:0 - 5 /HPF 10-25 (A)   RBC Urine      OTO2:O - 2 /HPF 2-5 (A)   Squamous Epithelial /LPF Urine      FEW:Few /LPF Few   Bacteria Urine      NEG:Negative /HPF Moderate (A)   Triple Phosphates      NEG:Negative /HPF Few (A)   IGG      610 - 1,616 mg/dL 399 (L)   IGA      84 - 499 mg/dL 190   IGM      35 - 242 mg/dL 16 (L)   Creatinine      0.52 - 1.04 mg/dL 0.60   GFR Estimate      >60 mL/min/1.73:m2 >90   GFR Estimate If Black      >60 mL/min/1.73:m2 >90   Specimen Description       Midstream Urine   Special Requests       Specimen received in preservative   Culture Micro       <10,000 colonies/mL . . .   CD19 B Cells      6 - 27 % <1 (L)   Absolute CD19      107 - 698 cells/uL <1 (L)   Protein Random Urine      g/L 0.75   Protein Total Urine g/gr Creatinine      0 - 0.2 g/g Cr 0.40 (H)   Creatinine Urine Random      mg/dL 182   Sed Rate      0 - 30 mm/h 27   DNA-ds      <10 IU/mL 102 (H)   CRP Inflammation      0.0 - 8.0 mg/L 8.9 (H)   Complement C3      81 - 157 mg/dL 104   Complement C4      13 - 39 mg/dL 23   AST      0 - 45 U/L 16   Albumin      3.4 - 5.0 g/dL 4.1   ALT      0 - 50 U/L 26   Creatinine Urine      mg/dL 186     Component      Latest Ref Rng & Units 11/10/2021   WBC      4.0 - 11.0 10e3/uL 8.2   RBC Count      3.80 - 5.20 10e6/uL 4.58   Hemoglobin      11.7 - 15.7 g/dL 12.3   Hematocrit      35.0 - 47.0 % 39.7   MCV      78 - 100 fL 87   MCH      26.5 - 33.0 pg 26.9   MCHC      31.5 - 36.5 g/dL 31.0 (L)   RDW      10.0 - 15.0 % 14.2   Platelet Count      150 - 450 10e3/uL 229   % Neutrophils      % 83   % Lymphocytes      % 6   % Monocytes      % 9   % Eosinophils      % 2   % Basophils      % 0   Absolute Neutrophils      1.6 - 8.3 10e3/uL 6.9   Absolute Lymphocytes      0.8 - 5.3 10e3/uL 0.5 (L)    Absolute Monocytes      0.0 - 1.3 10e3/uL 0.7   Absolute Eosinophils      0.0 - 0.7 10e3/uL 0.1   Absolute Basophils      0.0 - 0.2 10e3/uL 0.0   Color Urine      Colorless, Straw, Light Yellow, Yellow Yellow   Appearance Urine      Clear Clear   Glucose Urine      Negative mg/dL Negative   Bilirubin Urine      Negative Negative   Ketones Urine      Negative mg/dL Trace (A)   Specific Gravity Urine      1.003 - 1.035 >=1.030   Blood Urine      Negative Small (A)   pH Urine      5.0 - 7.0 6.0   Protein Albumin Urine      Negative mg/dL Negative   Urobilinogen Urine      0.2, 1.0 E.U./dL 0.2   Nitrite Urine      Negative Negative   Leukocyte Esterase Urine      Negative Trace (A)   Sodium      133 - 144 mmol/L 138   Potassium      3.4 - 5.3 mmol/L 3.9   Chloride      94 - 109 mmol/L 103   Carbon Dioxide      20 - 32 mmol/L 29   Anion Gap      3 - 14 mmol/L 6   Urea Nitrogen      7 - 30 mg/dL 22   Creatinine      0.52 - 1.04 mg/dL 0.70   Calcium      8.5 - 10.1 mg/dL 9.6   Glucose      70 - 99 mg/dL 83   Albumin      3.4 - 5.0 g/dL 4.0   Phosphorus      2.5 - 4.5 mg/dL 3.9   GFR Estimate      >60 mL/min/1.73m2 >90   Bacteria Urine      None Seen /HPF Few (A)   RBC Urine      0-2 /HPF /HPF 0-2   WBC Urine      0-5 /HPF /HPF 0-5   Squamous Epithelial /LPF Urine      None Seen /LPF Few (A)   Mucus Urine      None Seen /LPF Present (A)   MPO Cari IgG Instrument Value      <3.5 U/mL <0.3   Myeloperoxidase Antibody IgG      Negative Negative   Proteinase 3 Cari IgG Instrument Value      <2.0 U/mL <1.0   Proteinase 3 Antibody IgG      Negative Negative   IGG      610-1,616 mg/dL 398 (L)   IGA      84 - 499 mg/dL 184   IGM      35 - 242 mg/dL 16 (L)   Protein Random Urine      g/L 0.23   Protein Total Urine g/gr Creatinine      0.00 - 0.20 g/g Cr 0.11   Creatinine Urine      mg/dL 212   Neutrophil Cytoplasmic Antibody      <1:10 <1:10   Neutrophil Cytoplasmic Antibody Pattern       The ANCA IFA is <1:10.  No further testing  will be performed.   CD19 B Cells      6 - 27 % <1 (L)   Absolute CD19      107 - 698 cells/uL <1 (L)   ALT      0 - 50 U/L 27   AST      0 - 45 U/L 19   Complement C4      13 - 39 mg/dL 28   Complement C3      81 - 157 mg/dL 138   CRP Inflammation      0.0 - 8.0 mg/L 8.9 (H)   DNA-ds      <10.0 IU/mL 90.0 (H)   Sed Rate      0 - 30 mm/hr 25     Component      Latest Ref Rng & Units 4/29/2022   WBC      4.0 - 11.0 10e3/uL 7.1   RBC Count      3.80 - 5.20 10e6/uL 4.62   Hemoglobin      11.7 - 15.7 g/dL 12.0   Hematocrit      35.0 - 47.0 % 38.3   MCV      78 - 100 fL 83   MCH      26.5 - 33.0 pg 26.0 (L)   MCHC      31.5 - 36.5 g/dL 31.3 (L)   RDW      10.0 - 15.0 % 14.3   Platelet Count      150 - 450 10e3/uL 213   % Neutrophils      % 85   % Lymphocytes      % 6   % Monocytes      % 7   % Eosinophils      % 1   % Basophils      % 1   % Immature Granulocytes      % 0   NRBCs per 100 WBC      <1 /100 0   Absolute Neutrophils      1.6 - 8.3 10e3/uL 6.1   Absolute Lymphocytes      0.8 - 5.3 10e3/uL 0.4 (L)   Absolute Monocytes      0.0 - 1.3 10e3/uL 0.5   Absolute Eosinophils      0.0 - 0.7 10e3/uL 0.0   Absolute Basophils      0.0 - 0.2 10e3/uL 0.1   Absolute Immature Granulocytes      <=0.4 10e3/uL 0.0   Absolute NRBCs      10e3/uL 0.0   Color Urine      Colorless, Straw, Light Yellow, Yellow Yellow   Appearance Urine      Clear Clear   Glucose Urine      Negative mg/dL Negative   Bilirubin Urine      Negative Negative   Ketones Urine      Negative mg/dL Negative   Specific Gravity Urine      1.003 - 1.035 1.015   Blood Urine      Negative Negative   pH Urine      5.0 - 7.0 5.0   Protein Albumin Urine      Negative mg/dL Negative   Urobilinogen mg/dL      Normal, 2.0 mg/dL Normal   Nitrite Urine      Negative Negative   Leukocyte Esterase Urine      Negative Negative   Bacteria Urine      None Seen /HPF Few (A)   Mucus Urine      None Seen /LPF Present (A)   RBC Urine      <=2 /HPF 1   WBC Urine      <=5 /HPF 1    Squamous Epithelial /HPF Urine      <=1 /HPF <1   Protein Random Urine      g/L 0.12   Protein Total Urine g/gr Creatinine      0.00 - 0.20 g/g Cr 0.18   Creatinine Urine      mg/dL 66   Creatinine      0.52 - 1.04 mg/dL 0.68   GFR Estimate      >60 mL/min/1.73m2 >90   AST      0 - 45 U/L 16   ALT      0 - 50 U/L 32   Albumin      3.4 - 5.0 g/dL 3.7   CRP Inflammation      0.0 - 8.0 mg/L 7.0   Sed Rate      0 - 30 mm/hr 19       Physical Exam:    /75   Pulse 77   Temp 97.8  F (36.6  C) (Oral)   Wt 141.3 kg (311 lb 8 oz)   SpO2 98%   BMI 50.28 kg/m    Constitutional: Pleasant, NAD  HEENT: EOMI, sclera anicteric, conj not injected. No oral ulcers or thrush  Chest: CTAB  CV: no M/R/G, RRR  Abdomen: soft, NT  MSK: No active synovitis or joint tenderness  Skin: No skin rash  Neuro: CN grossly intact without focal deficit  Psych: nl affect    Assessment/Plan:  Taina Singh is a 58 year old female who is here for follow up of SLE and lupus nephritis.    Systemic lupus erythematous  History of lupus nephritis  - Presented in 12/2017 for 2nd opinion of m/o her SLE. She was diagnosed with SLE at age 27. Her lupus has been marked by +KARLIE (highest titer 1:640, speckled and nucleolar patterns), +anti-DNA, low C3/C4, arthritis, malar rash, serositis (pleuritic CP), lupus nephritis, hemolytic anemia, enteritis supported by +SSA/SSB Ab, +RF, high ESR/CRP. She had neg anti-RNP, anti-Sm, acL IgM/G/A, LAC, cryo and anti-CCP. She was treated with prednisone and HCQ at the time of Dx. Can't remember how long she was on HCQ and why HCQ was stopped, but it probably was stopped because of stable disease, no report on HCQ toxicity. Reportedly her SLE was mild all these years.  - Her lupus flared in 7/2017 with no triggers when she presented with arthritis, HCQ was resumed, arthritis resolved. Mild pulm HTN on 2D-Echo 8/2017 but neg R cardiac cath and VQ scan in 3/2018, also neg 2D-Echo.  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 Previously failed AZA, benlysta. Patient received 1st dose cyclophosphamide on 6/15/19, had 6 doses.  Initiated Rituxin 1/22/2020 as failed cytoxan.  Recommend rituxin infusion every 4-6 months, closer to 4 due to worsening of symptoms as infusions near at 6 month interval. Have decreased dose to 600 mg and it is well tolerated. Recommend continuation of rituximab as it is the only med that has helped with LN. Also tx options are limited (also necrotizing lesions on renal biopsy, can not rule out ANCA neg vasculitis overlap and rituximab is FDA approved for GPA/MPA).     10/2022:  - Current regimen: Prednisone 5 mg every day, MMF 1000 mg BID (tapered from 3 gr every day) daily, and  mg a day, and Rituximab (last infusion 6/3/2022).      10/6/2023: SLE is under excellent control but back on prednisone 5 mg qd, labs were reviewed, stable. No change in meds. Last rituximab infusion was 500 mg on 5/5/2023.    Sjogren's with sicca  - Secondary, +SSA/SSB Ab and h/o MALT lymphoma at age 44 in remission. Uses blink eyedrops and salagen. No lip biopsy was done to confirm Dx of Sjogren's.       Recommendations 3/2023:  - Last rituximab 500 mg on 11/18/2022. Continue at interval q4-6 months depending on severity symptoms.  - Continue  mg BID  -Off prednisone and doing well.  - Annual eye exam for HCQ monitoring  - PCP prophylaxis on atovaquone 10 mL (1500 mg). Patient did not tolerate inhaled pentamidine. Avoiding TMP-SMX given sulfa reaction and potential increase risk of SLE flare. Hesitant to use dapsone given risk of hemolytic anemia.  - Ambien prn for insomnia  -Rituximab 500 mg one dose in early May 2023  -Go down on cellcept to 3 tabs a day  -Labs this month then every 3 months      Plan 10/6/2023:    Rituximab 500 mg IV one dose 1st week of Nov     Labs early November with rituximab infusion    Stay on prednisone 5 mg a day, cellcept 2 tabs twice a day, plaquenil 1 tab twice a day    Yearly eye  exam    Labs q6months    Covid booster shot next week    Return video visit in about 6 months    4/3/2024:    Stable SLE    Rituximab 500 mg one dose in early May 2024 for ANCA negative vasculitis    Try pilocarpine    DEXA bone density    Labs due in 9/2024 then every 6 months    Return in 6 months in person      Today 10/11/2024:    Stable SLE on current regimen, fatigue could be due tos steroid withdrawal, now 2 mg every day from 5 mg every day, will continue slow taper to off. Will try provigil for fatigue; risks were discussed. She he will be seen in bone clinic for osteopenia. Recommend pemgarda; risks were discussed. Stable labs.    Plan:     mg bid with eye exam    MMF 1 gr bid    Prednisone 2 mg every day slow taper to off    Rituximab 500 mg IV one dose in early Nov    Labs every 6 months (due 4/2025)    Continue with prednisone taper to off    Try provigil 100 mg in AM (could use good Rx)    Pemgarda infusion    Return in about 5 months (in person)      TT 40 min was spent on date of the encounter doing chart review, history and exam, documentation and further activities as noted above. Any prior notes, outside records, laboratory results, and imaging studies were reviewed if relevant.      The longitudinal plan of care for the diagnosis(es)/condition(s) as documented were addressed during this visit. Due to the added complexity in care, I will continue to support Taina in the subsequent management and with ongoing continuity of care.        Killian Marroquin MD      Orders Placed This Encounter   Procedures     ALT     Albumin level     AST     Creatinine     Complement C4     Complement C3     CRP inflammation     DNA double stranded antibodies     Erythrocyte sedimentation rate auto     UA with Microscopic reflex to Culture     Protein  random urine     Creatinine random urine     Vitamin D Deficiency     CBC with Platelets & Differential             Again, thank you for allowing me to participate  in the care of your patient.      Sincerely,    Killian Marroquin MD

## 2024-10-15 ENCOUNTER — TELEPHONE (OUTPATIENT)
Dept: RHEUMATOLOGY | Facility: CLINIC | Age: 59
End: 2024-10-15
Payer: COMMERCIAL

## 2024-10-15 NOTE — TELEPHONE ENCOUNTER
PRIOR AUTHORIZATION DENIED    Medication: MODAFINIL 100 MG PO TABS  Insurance Company: [employer]ty [employer]'s - Phone [phone] Fax [phone]  Denial Date: 10/15/2024  Denial Reason(s): Diagnosis is not covered      Appeal Information:     Patient Notified: No

## 2024-10-16 NOTE — TELEPHONE ENCOUNTER
Killian Marroquin MD  You; Modesta Jean, RPH21 hours ago (4:09 PM)       Could use good rx coupon.       Spoke with patient regarding modafinil PA denial and option to use Good Rx. She is undecided if she is going to take modafinil at this point. She has an MTM visit tomorrow and will discuss further. I advised her to update us down the road if she would like use Good Rx.      Alicia Brewer RN  Adult Rheumatology Clinic

## 2024-10-17 ENCOUNTER — VIRTUAL VISIT (OUTPATIENT)
Dept: PHARMACY | Facility: CLINIC | Age: 59
End: 2024-10-17
Attending: INTERNAL MEDICINE
Payer: COMMERCIAL

## 2024-10-17 DIAGNOSIS — M32.9 SYSTEMIC LUPUS ERYTHEMATOSUS, UNSPECIFIED SLE TYPE, UNSPECIFIED ORGAN INVOLVEMENT STATUS (H): ICD-10-CM

## 2024-10-17 DIAGNOSIS — G47.00 INSOMNIA, UNSPECIFIED TYPE: ICD-10-CM

## 2024-10-17 DIAGNOSIS — D84.9 IMMUNOSUPPRESSION (H): ICD-10-CM

## 2024-10-17 DIAGNOSIS — I77.82 ANCA-NEGATIVE VASCULITIS (H): Primary | ICD-10-CM

## 2024-10-17 DIAGNOSIS — M32.14 LUPUS NEPHRITIS, ISN/RPS CLASS IV (H): ICD-10-CM

## 2024-10-17 DIAGNOSIS — I77.82 ANCA-NEGATIVE VASCULITIS (H): ICD-10-CM

## 2024-10-17 DIAGNOSIS — R53.83 OTHER FATIGUE: ICD-10-CM

## 2024-10-17 DIAGNOSIS — Z79.899 HIGH RISK MEDICATION USE: ICD-10-CM

## 2024-10-17 DIAGNOSIS — M32.9 SYSTEMIC LUPUS ERYTHEMATOSUS, UNSPECIFIED SLE TYPE, UNSPECIFIED ORGAN INVOLVEMENT STATUS (H): Primary | ICD-10-CM

## 2024-10-17 DIAGNOSIS — Z78.9 TAKES DIETARY SUPPLEMENTS: ICD-10-CM

## 2024-10-17 DIAGNOSIS — M06.09 RHEUMATOID ARTHRITIS, SERONEGATIVE, MULTIPLE SITES (H): ICD-10-CM

## 2024-10-17 DIAGNOSIS — R68.2 DRY MOUTH: ICD-10-CM

## 2024-10-17 DIAGNOSIS — Z86.711 HISTORY OF PULMONARY EMBOLISM: ICD-10-CM

## 2024-10-17 RX ORDER — ALBUTEROL SULFATE 90 UG/1
1-2 INHALANT RESPIRATORY (INHALATION)
Start: 2024-11-04

## 2024-10-17 RX ORDER — DIPHENHYDRAMINE HCL 25 MG
50 CAPSULE ORAL ONCE
Start: 2024-11-04

## 2024-10-17 RX ORDER — METHYLPREDNISOLONE SODIUM SUCCINATE 125 MG/2ML
125 INJECTION INTRAMUSCULAR; INTRAVENOUS
Start: 2024-11-04

## 2024-10-17 RX ORDER — ALBUTEROL SULFATE 0.83 MG/ML
2.5 SOLUTION RESPIRATORY (INHALATION)
OUTPATIENT
Start: 2024-10-24

## 2024-10-17 RX ORDER — ACETAMINOPHEN 325 MG/1
650 TABLET ORAL ONCE
Start: 2024-11-04

## 2024-10-17 RX ORDER — DIPHENHYDRAMINE HYDROCHLORIDE 50 MG/ML
50 INJECTION INTRAMUSCULAR; INTRAVENOUS
Start: 2024-10-24

## 2024-10-17 RX ORDER — DIPHENHYDRAMINE HYDROCHLORIDE 50 MG/ML
50 INJECTION INTRAMUSCULAR; INTRAVENOUS
Start: 2024-11-04

## 2024-10-17 RX ORDER — EPINEPHRINE 1 MG/ML
0.3 INJECTION, SOLUTION, CONCENTRATE INTRAVENOUS EVERY 5 MIN PRN
OUTPATIENT
Start: 2024-10-24

## 2024-10-17 RX ORDER — METHYLPREDNISOLONE SODIUM SUCCINATE 125 MG/2ML
125 INJECTION INTRAMUSCULAR; INTRAVENOUS
Start: 2024-10-24

## 2024-10-17 RX ORDER — EPINEPHRINE 1 MG/ML
0.3 INJECTION, SOLUTION, CONCENTRATE INTRAVENOUS EVERY 5 MIN PRN
OUTPATIENT
Start: 2024-11-04

## 2024-10-17 RX ORDER — HEPARIN SODIUM,PORCINE 10 UNIT/ML
5-20 VIAL (ML) INTRAVENOUS DAILY PRN
OUTPATIENT
Start: 2024-11-04

## 2024-10-17 RX ORDER — HEPARIN SODIUM (PORCINE) LOCK FLUSH IV SOLN 100 UNIT/ML 100 UNIT/ML
5 SOLUTION INTRAVENOUS
OUTPATIENT
Start: 2024-10-24

## 2024-10-17 RX ORDER — ALBUTEROL SULFATE 0.83 MG/ML
2.5 SOLUTION RESPIRATORY (INHALATION)
OUTPATIENT
Start: 2024-11-04

## 2024-10-17 RX ORDER — METHYLPREDNISOLONE SODIUM SUCCINATE 125 MG/2ML
125 INJECTION INTRAMUSCULAR; INTRAVENOUS ONCE
OUTPATIENT
Start: 2024-11-04

## 2024-10-17 RX ORDER — HEPARIN SODIUM,PORCINE 10 UNIT/ML
5-20 VIAL (ML) INTRAVENOUS DAILY PRN
OUTPATIENT
Start: 2024-10-24

## 2024-10-17 RX ORDER — HEPARIN SODIUM (PORCINE) LOCK FLUSH IV SOLN 100 UNIT/ML 100 UNIT/ML
5 SOLUTION INTRAVENOUS
OUTPATIENT
Start: 2024-11-04

## 2024-10-17 RX ORDER — ALBUTEROL SULFATE 90 UG/1
1-2 INHALANT RESPIRATORY (INHALATION)
Start: 2024-10-24

## 2024-10-17 NOTE — PROGRESS NOTES
Medication Therapy Management (MTM) Encounter    ASSESSMENT:                            Medication Adherence/Access: No issues identified    Systemic Lupus Erythematosus/ANCA Vasculitis/Fatigue/Dry Mouth: Provided education on Pemgarda today including dosing, general administration, side effects (both common/serious), precautions, monitoring and time to efficacy. Reviewed infusion can only be given at [facility] infusion center; provided scheduling phone number. Briefly reviewed rituximab scheduling and securing through [facility]. Will plan to wait to start modafinil through Good[facility] until after rituximab infusion if patient feels she needs this.     Hx of PE: Would benefit from continued follow up with cardiology and INR monitoring.     Insomnia: Stable.    Supplements: Stable.    PLAN:                            1. Please call the [facility] infusion center at [phone] to schedule your rituximab infusion and the [facility] infusion center at [phone] to schedule your Pemgarda infusion.    2. If you decide to start the modafinil please search the medication in [employer] and print off the coupon for the pharmacy you wish to use so they do not try to bill your insurance.    Follow-up: Return in about 3 months (around 1/17/2025) for MTM Pharmacist Visit.    SUBJECTIVE/OBJECTIVE:                          Taina Singh is a 59 year old female seen for an initial visit. She was referred to me from Killian Marroquin MD.      Reason for visit: Pemgarda education and Rituximab coordination    Allergies/ADRs: Reviewed in chart  Past Medical History: Reviewed in chart  Tobacco: She reports that she has never smoked. She has never been exposed to tobacco smoke. She has never used smokeless tobacco.  Alcohol: not currently using    Medication Adherence/Access: no issues reported    Systemic Lupus Erythematosus/ANCA Vasculitis/Fatigue/Dry Mouth:   Prednisone 2 mg  daily  Hydroxychloroquine 200 mg twice daily   Mycophenolate 1000 mg twice daily   Rituximab infusion (last dose:  Pilocarpine 5 mg four times daily   Tramadol 50 mg twice daily as needed   Modafinil 100 mg daily (not started yet)    Reports she is noticing more fatigue - thinks this is due to going down on prednisone dose and needing rituximab infusion. Was prescribed modafinil but does not want to start until after her infusion since she is not sure if she really needs it. Interested in getting Pemgarda infusions - has some questions on administration and timing of infusions.    Last eye exam: 5/16/22 - planning to schedule follow up eye exam today    Liver Function Studies -   Recent Labs   Lab Test 10/04/24  1450   ALBUMIN 4.3   AST 25   ALT 27     CBC RESULTS:   Recent Labs   Lab Test 10/04/24  1450   WBC 7.9   RBC 4.78   HGB 13.0   HCT 39.9   MCV 84   MCH 27.2   MCHC 32.6   RDW 13.6         Hx of PE:  Warfarin 7.5 mg every Tues and Thurs, 5 mg daily all other days    No side effects or concerns noted.     Insomnia   Zolpidem 5 mg at bedtime as needed     Patient reports no issues at this time. No side effects noted.      Supplements:   Vitamin D 2000 units daily  Multivitamin daily  Calcium/magnesium daily  Fish Oil daily    No reported issues at this time.      Today's Vitals: There were no vitals taken for this visit.  ----------------    I spent 42 minutes with this patient today. All changes were made via collaborative practice agreement with Killian Marroquin. A copy of the visit note was provided to the patient's provider(s).    A summary of these recommendations was sent via [employer].    Modesta Jean, PharmD  Medication Therapy Management Pharmacist  [facility]  Phone: [phone]    Telemedicine Visit Details  The patient's medications can be safely assessed via a telemedicine encounter.  Type of service:  Telephone visit  Originating Location (pt. Location):  Home  Distant Location (provider location):  Off-site  Start Time: 10:00 AM  End Time: 10:42 AM     Medication Therapy Recommendations  Systemic lupus erythematosus (H)    Current Medication: mycophenolate (GENERIC EQUIVALENT) 500 MG tablet   Rationale: Synergistic therapy - Needs additional medication therapy - Indication   Recommendation: Start Medication - Pemgarda 500 MG/4ML Soln - Every 3 months   Status: Accepted per Provider

## 2024-10-17 NOTE — PATIENT INSTRUCTIONS
"Recommendations from today's MTM visit:                                                       1. Please call the [place] infusion center at [phone] to schedule your rituximab infusion and the [facility] infusion center at [phone] to schedule your Pemgarda infusion.    2. If you decide to start the modafinil please search the medication in [employer] and print off the coupon for the pharmacy you wish to use so they do not try to bill your insurance.    Follow-up: Return in about 3 months (around 1/17/2025) for MTM Pharmacist Visit.    It was great speaking with you today.  I value your experience and would be very thankful for your time in providing feedback in our clinic survey. In the next few days, you may receive an email or text message from [employer] with a link to a survey related to your  clinical pharmacist.\"     To schedule another MTM appointment, please call the clinic directly or you may call the MTM scheduling line at [phone].    My Clinical Pharmacist's contact information:                                                      Please feel free to contact me with any questions or concerns you have.      Modesta Jean, PharmD  Medication Therapy Management Pharmacist  [facility]  Phone: [phone]     "

## 2024-10-20 DIAGNOSIS — L93.0 LUPUS ERYTHEMATOSUS, UNSPECIFIED FORM: ICD-10-CM

## 2024-10-22 ENCOUNTER — TELEPHONE (OUTPATIENT)
Dept: RHEUMATOLOGY | Facility: CLINIC | Age: 59
End: 2024-10-22
Payer: COMMERCIAL

## 2024-10-22 NOTE — TELEPHONE ENCOUNTER
Patient confirmed scheduled appointment:  Date: May 9th, 2025  Time: 2pm  Visit type: Return Rheumatolog  Provider: Dr. Marroquin  Location: [facility]  Testing/imaging: NA  Additional notes: Rescheduled 4/4 appt

## 2024-10-25 NOTE — TELEPHONE ENCOUNTER
hydroxychloroquine (PLAQUENIL) 200 MG tablet  Last filled 10/9/23  QTY:180 tabletRF:3     Last Office Visit : 10/11/24  Future Office visit:  5/9/25  Eye Exam 9/27/23 [facility], scanned into media/ patient entered attachment  Routing refill request to provider team for review/approval because:   Eye exam due:  Last eye exam found: Eye Exam 9/27/23 [facility], scanned into media/ patient entered attachment. Care Everywhere reviewed

## 2024-10-28 RX ORDER — HYDROXYCHLOROQUINE SULFATE 200 MG/1
200 TABLET, FILM COATED ORAL 2 TIMES DAILY
Qty: 180 TABLET | Refills: 0 | Status: SHIPPED | OUTPATIENT
Start: 2024-10-28

## 2024-10-28 NOTE — TELEPHONE ENCOUNTER
Called and spoke with patient. She has an eye exam next week. Advised her to send us the results through [facility].     Plan: will send refill request to provider per protocol (no recent eye exam).      Alicia Brewer RN  Adult Rheumatology Clinic

## 2024-11-17 ENCOUNTER — HEALTH MAINTENANCE LETTER (OUTPATIENT)
Age: 59
End: 2024-11-17

## 2024-11-24 DIAGNOSIS — G47.00 INSOMNIA, UNSPECIFIED TYPE: ICD-10-CM

## 2024-11-24 NOTE — TELEPHONE ENCOUNTER
zolpidem (AMBIEN) 5 MG tablet   Disp 30  rf  5  Start: 04/06/2024     10/11/2024  [facility] Killian Bailey MD  Rheumatology    Routed because:  controlled. rheum triage to fill

## 2024-11-25 RX ORDER — ZOLPIDEM TARTRATE 5 MG/1
5 TABLET ORAL
Qty: 30 TABLET | Refills: 5 | Status: SHIPPED | OUTPATIENT
Start: 2024-11-25

## 2024-11-25 NOTE — TELEPHONE ENCOUNTER
Ambien, 5mg tabs: Take 1 tablet (5 mg) by mouth nightly as needed for sleep - Oral   Last Written Prescription Date:  4/6/24  Last Fill Quantity: 30,  # refills: 5   Last office visit: 10/11/2024 ; last virtual visit: 4/3/2024   Future Office Visit:  5/9/25    Plan:   Rx pended and sent to provider to review (controlled substance)     Alicia Brewer RN  Adult Rheumatology Clinic

## 2024-11-30 ENCOUNTER — MYC MEDICAL ADVICE (OUTPATIENT)
Dept: RHEUMATOLOGY | Facility: CLINIC | Age: 59
End: 2024-11-30
Payer: COMMERCIAL

## 2024-11-30 DIAGNOSIS — L93.0 LUPUS ERYTHEMATOSUS, UNSPECIFIED FORM: Primary | ICD-10-CM

## 2024-11-30 DIAGNOSIS — M06.09 RHEUMATOID ARTHRITIS, SERONEGATIVE, MULTIPLE SITES (H): ICD-10-CM

## 2024-12-02 RX ORDER — PREDNISONE 5 MG/1
5 TABLET ORAL DAILY
Qty: 90 TABLET | Refills: 3 | Status: SHIPPED | OUTPATIENT
Start: 2024-12-02

## 2024-12-23 ENCOUNTER — MYC MEDICAL ADVICE (OUTPATIENT)
Dept: RHEUMATOLOGY | Facility: CLINIC | Age: 59
End: 2024-12-23
Payer: COMMERCIAL

## 2024-12-23 DIAGNOSIS — M32.9 SYSTEMIC LUPUS ERYTHEMATOSUS, UNSPECIFIED SLE TYPE, UNSPECIFIED ORGAN INVOLVEMENT STATUS (H): ICD-10-CM

## 2024-12-23 DIAGNOSIS — M06.09 RHEUMATOID ARTHRITIS, SERONEGATIVE, MULTIPLE SITES (H): ICD-10-CM

## 2024-12-23 DIAGNOSIS — M32.14 LUPUS NEPHRITIS, ISN/RPS CLASS IV (H): ICD-10-CM

## 2024-12-23 DIAGNOSIS — I77.82 ANCA-NEGATIVE VASCULITIS (H): Primary | ICD-10-CM

## 2024-12-24 RX ORDER — PREDNISONE 2.5 MG/1
2.5 TABLET ORAL DAILY
Qty: 90 TABLET | Refills: 0 | Status: SHIPPED | OUTPATIENT
Start: 2024-12-24

## 2024-12-24 NOTE — TELEPHONE ENCOUNTER
Routed to Rheum RN and not the refill team. Patient is requesting a new strength of a medication.     Laura Haro,[facility]  Rheumatology & ID  [address] [place] [address]  [phone]

## 2024-12-24 NOTE — TELEPHONE ENCOUNTER
"Per Dr. Marroquin ok to send 90 day script of 2.5 mg prednisone tablets. \"Ok verbal rx could be sent\"     Bebe Choe RN    "

## 2024-12-26 NOTE — TELEPHONE ENCOUNTER
Received prescription clarification request via fax. Called and discussed with [employer] pharmacist. Clarified dosing of prednisone. Per below, patient will alternate prednisone, 5mg and 2.5mg every other day starting January 1st. No further action needed.     Alicia Brewer RN  Adult Rheumatology Clinic

## 2025-01-07 ENCOUNTER — MYC MEDICAL ADVICE (OUTPATIENT)
Dept: RHEUMATOLOGY | Facility: CLINIC | Age: 60
End: 2025-01-07
Payer: COMMERCIAL

## 2025-01-07 NOTE — TELEPHONE ENCOUNTER
Printed off disability for and placed in Dr. Marroquin's folder to be signed.    Madison Melendrez, CMA

## 2025-01-09 NOTE — TELEPHONE ENCOUNTER
Form completed signed and mailed to patient. Notified patient via [employer]eaty[employer].     Jayleen Gan CMA   1/9/2025 10:49 AM

## 2025-02-10 DIAGNOSIS — L93.0 LUPUS ERYTHEMATOSUS, UNSPECIFIED FORM: ICD-10-CM

## 2025-02-14 NOTE — TELEPHONE ENCOUNTER
Last Written Prescription:  hydroxychloroquine (PLAQUENIL) 200 MG tablet 180 tablet 0 10/28/2024 -- No   Sig - Route: TAKE 1 TABLET TWICE A DAY - Oral     ----------------------  Last Visit Date: 10-11-25  Future Visit Date: 5-9-25    Last eye exam:10-14-21 Flow sheet    10-4-24  Creatinine  0.51 - 0.95 mg/dL 0.64         [x]  Refill decision: Medication unable to be refilled by RN due to: Other:  Overdue eye exam, to clinic        Request from pharmacy:  Requested Prescriptions   Pending Prescriptions Disp Refills    hydroxychloroquine (PLAQUENIL) 200 MG tablet [Pharmacy Med Name: HYDROXYCHLOROQUINE TABS 200MG] 180 tablet 3     Sig: TAKE 1 TABLET TWICE A DAY       There is no refill protocol information for this order

## 2025-02-18 ENCOUNTER — MYC REFILL (OUTPATIENT)
Dept: RHEUMATOLOGY | Facility: CLINIC | Age: 60
End: 2025-02-18
Payer: COMMERCIAL

## 2025-02-18 DIAGNOSIS — M32.9 SYSTEMIC LUPUS ERYTHEMATOSUS, UNSPECIFIED SLE TYPE, UNSPECIFIED ORGAN INVOLVEMENT STATUS (H): ICD-10-CM

## 2025-02-19 RX ORDER — TRAMADOL HYDROCHLORIDE 50 MG/1
50 TABLET ORAL EVERY 12 HOURS PRN
Qty: 60 TABLET | Refills: 5 | Status: SHIPPED | OUTPATIENT
Start: 2025-02-19

## 2025-02-19 RX ORDER — HYDROXYCHLOROQUINE SULFATE 200 MG/1
200 TABLET, FILM COATED ORAL 2 TIMES DAILY
Qty: 180 TABLET | Refills: 0 | Status: SHIPPED | OUTPATIENT
Start: 2025-02-19

## 2025-02-19 NOTE — TELEPHONE ENCOUNTER
plaquenil    TAKE 1 TABLET TWICE A DAY - Oral   Last Written Prescription Date:  10/28/24  Last Fill Quantity: 180,   # refills: 0  Last Office Visit: 10/11/24  Future Office visit:  5/9/25    CBC RESULTS:   Recent Labs   Lab Test 10/04/24  1450   WBC 7.9   RBC 4.78   HGB 13.0   HCT 39.9   MCV 84   MCH 27.2   MCHC 32.6   RDW 13.6          Creatinine   Date Value Ref Range Status   10/04/2024 0.64 0.51 - 0.95 mg/dL Final   06/28/2021 0.64 0.52 - 1.04 mg/dL Final   ]    Liver Function Studies -   Recent Labs   Lab Test 10/04/24  1450   ALBUMIN 4.3   AST 25   ALT 27       Routing refill request to provider for review/approval because:  Patient was planned for eye exam October of 2024. We have not received notes (message out to patient to get those sent to us). Will send to provider to see if they would refill while we are waiting.         Alicia Brewer RN  Adult Rheumatology Clinic

## 2025-02-19 NOTE — TELEPHONE ENCOUNTER
Update: patient did not get eye exam done yet. She is working to schedule now. She is nearly out of med.

## 2025-02-19 NOTE — TELEPHONE ENCOUNTER
Medication/Dose: ultram Take 1 tablet (50 mg) by mouth every 12 hours as needed for severe pain   Last Written Prescription Date: 8/6/24  Last Fill Quantity: 60, # refills: 5  Last Office Visit:  10/11/2024  Next Enc:  5/9/25    Sending to provider: controlled substance.       Alicia Brewer RN  Adult Rheumatology Clinic

## 2025-02-20 ENCOUNTER — MYC MEDICAL ADVICE (OUTPATIENT)
Dept: RHEUMATOLOGY | Facility: CLINIC | Age: 60
End: 2025-02-20
Payer: COMMERCIAL

## 2025-03-08 DIAGNOSIS — M06.09 RHEUMATOID ARTHRITIS, SERONEGATIVE, MULTIPLE SITES (H): ICD-10-CM

## 2025-03-08 DIAGNOSIS — M32.9 SYSTEMIC LUPUS ERYTHEMATOSUS, UNSPECIFIED SLE TYPE, UNSPECIFIED ORGAN INVOLVEMENT STATUS (H): ICD-10-CM

## 2025-03-08 DIAGNOSIS — I77.82 ANCA-NEGATIVE VASCULITIS (H): ICD-10-CM

## 2025-03-10 ENCOUNTER — MYC MEDICAL ADVICE (OUTPATIENT)
Dept: RHEUMATOLOGY | Facility: CLINIC | Age: 60
End: 2025-03-10
Payer: COMMERCIAL

## 2025-03-10 DIAGNOSIS — M32.9 SYSTEMIC LUPUS ERYTHEMATOSUS, UNSPECIFIED SLE TYPE, UNSPECIFIED ORGAN INVOLVEMENT STATUS (H): ICD-10-CM

## 2025-03-10 DIAGNOSIS — I77.82 ANCA-NEGATIVE VASCULITIS (H): ICD-10-CM

## 2025-03-10 DIAGNOSIS — M06.09 RHEUMATOID ARTHRITIS, SERONEGATIVE, MULTIPLE SITES (H): ICD-10-CM

## 2025-03-11 RX ORDER — PREDNISONE 2.5 MG/1
2.5 TABLET ORAL DAILY
Qty: 90 TABLET | Refills: 3 | Status: SHIPPED | OUTPATIENT
Start: 2025-03-11

## 2025-03-11 RX ORDER — PREDNISONE 2.5 MG/1
2.5 TABLET ORAL DAILY
Qty: 90 TABLET | Refills: 3 | OUTPATIENT
Start: 2025-03-11

## 2025-03-11 NOTE — TELEPHONE ENCOUNTER
Patient requesting refill of 2.5 mg prednisone tabs. Per chart review, last plan was for patient to take 2.5/5mg alternating each day. Rx pended for provider to review.       Alicia JACKSON RN  Adult Rheumatology Clinic

## 2025-04-06 DIAGNOSIS — M32.14 LUPUS NEPHRITIS, ISN/RPS CLASS IV (H): ICD-10-CM

## 2025-04-07 ENCOUNTER — TELEPHONE (OUTPATIENT)
Dept: RHEUMATOLOGY | Facility: CLINIC | Age: 60
End: 2025-04-07
Payer: COMMERCIAL

## 2025-04-07 ENCOUNTER — MYC MEDICAL ADVICE (OUTPATIENT)
Dept: RHEUMATOLOGY | Facility: CLINIC | Age: 60
End: 2025-04-07
Payer: COMMERCIAL

## 2025-04-07 DIAGNOSIS — M32.14 LUPUS NEPHRITIS, ISN/RPS CLASS IV (H): ICD-10-CM

## 2025-04-07 RX ORDER — MYCOPHENOLATE MOFETIL 500 MG/1
1000 TABLET ORAL 2 TIMES DAILY
Qty: 360 TABLET | Refills: 3 | OUTPATIENT
Start: 2025-04-07

## 2025-04-07 RX ORDER — MYCOPHENOLATE MOFETIL 500 MG/1
1000 TABLET ORAL 2 TIMES DAILY
Qty: 360 TABLET | Refills: 3 | Status: SHIPPED | OUTPATIENT
Start: 2025-04-07

## 2025-04-24 ENCOUNTER — VIRTUAL VISIT (OUTPATIENT)
Dept: PHARMACY | Facility: CLINIC | Age: 60
End: 2025-04-24
Attending: INTERNAL MEDICINE

## 2025-04-24 DIAGNOSIS — R53.83 OTHER FATIGUE: ICD-10-CM

## 2025-04-24 DIAGNOSIS — M32.9 SYSTEMIC LUPUS ERYTHEMATOSUS, UNSPECIFIED SLE TYPE, UNSPECIFIED ORGAN INVOLVEMENT STATUS (H): Primary | ICD-10-CM

## 2025-04-24 DIAGNOSIS — I77.82 ANCA-NEGATIVE VASCULITIS (H): ICD-10-CM

## 2025-04-24 DIAGNOSIS — R68.2 DRY MOUTH: ICD-10-CM

## 2025-04-26 NOTE — PROGRESS NOTES
"Medication Therapy Management (MTM) Encounter    ASSESSMENT:                            Medication Adherence/Access: {adherencechoices:601433}    ***:  ***      PLAN:                            ***    Follow-up: {followuptest2:017017}    SUBJECTIVE/OBJECTIVE:                          Taina Singh is a 60 year old female seen for {mtmvisit:149034}     Reason for visit: ***.    Allergies/ADRs: {1/2/3/4/5:993150}  Past Medical History: {1/2/3/4/5:672802}  Tobacco: She reports that she has never smoked. She has never been exposed to tobacco smoke. She has never used smokeless tobacco.  Alcohol: {ALCOHOL CONSUMPTION HX:307291}  {Social and Goals:328656}    Medication Adherence/Access: {fumedadherence:394537}    ***:   ***    {MTM SUBJECTIVE (Optional):049606}      Today's Vitals: There were no vitals taken for this visit.  ----------------  {JULIÁN?:295316}    I spent {mtm total time 3:696230} with this patient today. { :993614}.     A summary of these recommendations {GIVEN/NOT GIVEN:361262}.    ***    Telemedicine Visit Details  The patient's medications can be safely assessed via a telemedicine encounter.  Type of service:  {telemedvisitmtm:443237::\"Telephone visit\"}  Originating Location (pt. Location): {video visit patient location:723918::\"Home\"}  {PROVIDER LOCATION On-site should be selected for visits conducted from your clinic location or adjoining [facility] hospital, academic office, or other nearby [facility] building. Off-site should be selected for all other provider locations, including home:826705}  Distant Location (provider location):  {virtual location provider:789490}  Start Time: {video/phone visit start time:152948}  End Time: {video/phone visit end time:152948}     Medication Therapy Recommendations  No medication therapy recommendations to display     " prior to for her next rituximab dose. No side effects or concerns with other medications.     Last eye exam: 5/16/22 - has eye exam scheduled for next week    Last lab monitoring completed: 10/4/24    Today's Vitals: There were no vitals taken for this visit.  ----------------    I spent 16 minutes with this patient today. All changes were made via collaborative practice agreement with Killian Marroquin.     A summary of these recommendations was sent via [employer].    Radha MooreD  Medication Therapy Management Pharmacist  [facility]  Phone: [phone]    Telemedicine Visit Details  The patient's medications can be safely assessed via a telemedicine encounter.  Type of service:  Telephone visit  Originating Location (pt. Location): Home    Distant Location (provider location):  On-site  Start Time: 10:00 AM  End Time: 10:16 AM     Medication Therapy Recommendations  No medication therapy recommendations to display

## 2025-05-03 NOTE — PATIENT INSTRUCTIONS
"Recommendations from today's MTM visit:                                                       1. Continue decreasing prednisone dose as able. We discussed next step would be trying prednisone 2.5 mg every 3rd day.    2. Continue all other medications as prescribed.    Follow-up: Return in about 6 months (around 10/24/2025) for MTM Pharmacist Visit.    It was great speaking with you today.  I value your experience and would be very thankful for your time in providing feedback in our clinic survey. In the next few days, you may receive an email or text message from [employer] with a link to a survey related to your  clinical pharmacist.\"     To schedule another MTM appointment, please call the clinic directly or you may call the MTM scheduling line at [phone].    My Clinical Pharmacist's contact information:                                                      Please feel free to contact me with any questions or concerns you have.      Modesta Jean, PharmD  Medication Therapy Management Pharmacist  [facility]  Phone: [phone]     "

## 2025-05-09 ENCOUNTER — TELEPHONE (OUTPATIENT)
Dept: RHEUMATOLOGY | Facility: CLINIC | Age: 60
End: 2025-05-09

## 2025-05-09 ENCOUNTER — OFFICE VISIT (OUTPATIENT)
Dept: RHEUMATOLOGY | Facility: CLINIC | Age: 60
End: 2025-05-09
Attending: INTERNAL MEDICINE
Payer: COMMERCIAL

## 2025-05-09 VITALS
BODY MASS INDEX: 49.26 KG/M2 | OXYGEN SATURATION: 98 % | TEMPERATURE: 98 F | SYSTOLIC BLOOD PRESSURE: 147 MMHG | WEIGHT: 293 LBS | HEART RATE: 69 BPM | DIASTOLIC BLOOD PRESSURE: 75 MMHG

## 2025-05-09 DIAGNOSIS — M32.14 LUPUS NEPHRITIS, ISN/RPS CLASS IV (H): ICD-10-CM

## 2025-05-09 DIAGNOSIS — M06.09 RHEUMATOID ARTHRITIS, SERONEGATIVE, MULTIPLE SITES (H): ICD-10-CM

## 2025-05-09 DIAGNOSIS — M32.9 SYSTEMIC LUPUS ERYTHEMATOSUS, UNSPECIFIED SLE TYPE, UNSPECIFIED ORGAN INVOLVEMENT STATUS (H): ICD-10-CM

## 2025-05-09 DIAGNOSIS — D84.9 IMMUNOSUPPRESSION: ICD-10-CM

## 2025-05-09 DIAGNOSIS — I77.82 ANCA-NEGATIVE VASCULITIS (H): Primary | ICD-10-CM

## 2025-05-09 DIAGNOSIS — R68.2 DRY MOUTH, UNSPECIFIED: ICD-10-CM

## 2025-05-09 PROCEDURE — G2211 COMPLEX E/M VISIT ADD ON: HCPCS | Performed by: INTERNAL MEDICINE

## 2025-05-09 PROCEDURE — 99215 OFFICE O/P EST HI 40 MIN: CPT | Performed by: INTERNAL MEDICINE

## 2025-05-09 PROCEDURE — 3078F DIAST BP <80 MM HG: CPT | Performed by: INTERNAL MEDICINE

## 2025-05-09 PROCEDURE — 3077F SYST BP >= 140 MM HG: CPT | Performed by: INTERNAL MEDICINE

## 2025-05-09 PROCEDURE — 99213 OFFICE O/P EST LOW 20 MIN: CPT | Performed by: INTERNAL MEDICINE

## 2025-05-09 RX ORDER — PILOCARPINE HYDROCHLORIDE 5 MG/1
5 TABLET, FILM COATED ORAL 4 TIMES DAILY
Qty: 360 TABLET | Refills: 1 | Status: SHIPPED | OUTPATIENT
Start: 2025-05-09

## 2025-05-09 NOTE — TELEPHONE ENCOUNTER
Rituximab 500mg one time dose pended (early June) for provider to review/sign.      Alicia JACKSON RN  Adult Rheumatology Clinic

## 2025-05-09 NOTE — LETTER
5/9/2025       RE: Taina Singh  [address]  [place] [address]     Dear Colleague,    Thank you for referring your patient, Taina Singh, to the [facility] [place] at [facility]. Please see a copy of my visit note below.      [facility] Rheumatology Follow-up Visit Note    Reason for visit: f/u for lupus, high risk med use      Last seen: 10/11/2024  DOS: 5/9/2025      History of Present Illness:  Taina Singh is a 58 year old female who is here for follow up of SLE and lupus nephritis.    History:  - She was diagnosed with lupus at age 27. Her 1st sx were malar rash and fatigue. No skin biopsy was done (reportedly had +KARLIE). She was treated with prednisone taper and HCQ. HCQ helped and she tolerated it well. She was diagnosed with Sjogren's at the same time. Had dryness of eyes and mouth. No lip biopsy to confirm the Dx. Reportedly she had very mild stable lupus for many years and eventually at some point, stopped taking HCQ (can't remember when). Her lupus started to flare in 7/2017 initially with pleuritic CP, was put back on HCQ. 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 Since then failed AZA, cytoxan and benlysta. MMF was not enough to control her LN and eventually rituximab was added (which was initially denied by insurance even with h/o lymphoma) given necrosis on the renal biopsy (rituximab is FDA approved for ANCA vasculitis). On HCQ+MMF after adding rituximab, LN and SLE started too improve.  - She was diagnosed with MALT lymphoma at 44, had enlarged LN over R parotid gland, on biopsy it was MALT lymphoma. Tx was resection only, no radiation or chemo.    2/4/2021:  - Taina is on prednisone 8 mg every day, it was 17.5 mg every day at last visit. She feels achy and tired this week, but thinks the cold weather is responsible for her sx. Had rituximab  375 mg/m2 on 1/22/2020, 2/6/2020, then  "6/26/2020, 7/20/2020 and last dose 1 gram on 12/15/2020. On mepron for PJP prophylaxis. On  mg bid, MMF 1500 mg bid. She works from home. Has intermittent joint pain, nothing consistent. one day shoulders hurt and the other day her hips hurt. AM stiffness is 30-60 minutes. no pleurisy, sob or cough or fevers. No skin rash. Her hair grew back.    11/12/2021:  - Taina is feeling well, she thinks her lupus is under fair control, no major complaints today.    4/29/2022:  - Today, Taina reports that she is feeling well.  Has noted some general diffuse aching and fatigue, difficulty making fist, and middle finger with trigger symptoms, which she typically has as she nears her rituximab infusion.  She has noted some morning stiffness, but this is very responsive to activity and stretching. Continues to have some dry mouth managed via increased hydration. She has had an intermittent mild rash of her nose and cheeks, but this is not consistent and not dependent on sun exposure. She has had a good appetite and an overall positive mood. Feels as though rituximab has been a \"huge game changer\" for her.    10/28/2022:  Ms. Singh presents for follow up.  -Patient reports that she is doing well.  She was able to completely wean and discontinue the prednisone.  She is doing well on rituximab infusions.  Her last rituximab treatment was in June and is scheduled to get the next Rituxan in November.  She is compliant with CellCept and Plaquenil.  She recently received the Evusheld.  Denies any oral ulcers, rashes, joint pains, body aches, vision changes, shortness of breath and  Exertion.  Reports morning stiffness is minimal.  Good appetite and normal mood.    3/8/2023:    Doing well  Stable  No flares  Last prednisone use, was about 2 wk ago, 2.5 mg every day x 2 days after a cold.      10/6/2023:    Doing very well, no symptoms, no lupus flare, back on prednisone 5 mg every day, did not tolerate coming off prednisone as " developed diffuse arthralgia.      4/3/2024:    Doing well since she resumed prednisone 5 mg every day, can't taper below 5 mg every day.    Stable SLE, no flares.    Down for a day or two here and there, feels achy and tired.    S/p Rituximab 500 mg once dose on 11/10/2023    Dry eyes and mouth is an issue.    Trouble sleeping wakes up a lot due to dryness.      10/11/2024:    -no lupus flare up    -fatigue is the major concern, more pronounced by going down on prednisone, now 2 mg every day      Today 2025:    -feeling well, trying to taper off prednisone, only takes 2.5 mg every day three times a week with occasional increase in dose    ROS:    A comprehensive ROS was done, positives are per HPI.    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 44    No chemo or radiation     Other forms of systemic lupus erythematosus (H) 2018     Pulmonary embolism (H)     2019 seen on abd [facility] at [facility]     Sjogren's syndrome     secondary Sjogrens, secondary to lupus     Vitamin D deficiency      Past Surgical History:  Past Surgical History:   Procedure Laterality Date     BIOPSY/REMOVAL, LYMPH NODE(S)        SECTION  age 32     HC HYSTEROS W PERMANENT FALLOPAIN IMPLANT      Essure b/l     PAROTIDECTOMY Right 2006     TONSILLECTOMY       Family history:  Family History   Problem Relation Age of Onset     Alopecia Brother      Rheumatoid Arthritis Brother      LUNG DISEASE No family hx of      Social history:  Social History     Socioeconomic  History     Marital status:      Spouse name: Not on file     Number of children: Not on file     Years of education: 1     Highest education level: Not on file   Occupational History     Comment: , 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      Social Drivers of Health     Financial Resource Strain: Low Risk  (2024)    Received from [employer]    Financial Resource Strain      Difficulty of Paying Living Expenses: 3      Difficulty of Paying Living Expenses: Not on file   Food Insecurity: No Food Insecurity (2024)    Received from [employer]    Food Insecurity      Do you worry your food will run out before you are able to buy more?: 1   Transportation Needs: No Transportation Needs (2024)    Received from [employer]    Transportation Needs      Does lack of transportation keep you from medical appointments?: 1      Does lack of transportation keep you from work, meetings or getting things that you need?: 1   Physical Activity: Not on file   Stress: Not on file   Social Connections: Socially Integrated (2024)    Received from [employer]    Social Connections      Do you often feel lonely or isolated from those around you?: 0   Interpersonal Safety: Not on file    Housing Stability: Low Risk  (12/8/2024)    Received from [facility] [employer] [facility]    Housing Stability      What is your housing situation today?: 1     Allergies:  Allergies   Allergen Reactions     Pentamidine Shortness Of Breath     Penicillins Rash     Sulfa Antibiotics Rash     Medications:  Outpatient Encounter Medications as of 5/9/2025   Medication Sig Dispense Refill     Calcium-Magnesium 300-300 MG TABS daily        hydroxychloroquine (PLAQUENIL) 200 MG tablet Take 1 tablet (200 mg) by mouth 2 times daily. Please complete annual eye exam/plaquenil screening for refills 180 tablet 0     hydrOXYzine HCl (ATARAX) 25 MG tablet Take by mouth.       metFORMIN (GLUCOPHAGE) 500 MG tablet Take 500 mg by mouth 2 times daily (with meals).       Multiple Vitamins-Minerals (WOMENS MULTI PO) Take by mouth daily        mycophenolate (GENERIC EQUIVALENT) 500 MG tablet Take 2 tablets (1,000 mg) by mouth 2 times daily. 360 tablet 3     Omega-3 Fatty Acids (OMEGA-3 FISH OIL) 500 MG CAPS Take 500 mg by mouth daily        pilocarpine (SALAGEN) 5 MG tablet Take 1 tablet (5 mg) by mouth 4 times daily. 360 tablet 1     predniSONE (DELTASONE) 2.5 MG tablet Take 1 tablet (2.5 mg) by mouth daily. 90 tablet 3     traMADol (ULTRAM) 50 MG tablet Take 1 tablet (50 mg) by mouth every 12 hours as needed for severe pain. 60 tablet 5     VITAMIN D3 50 MCG (2000 UT) tablet TAKE 1 TABLET DAILY 90 tablet 3     warfarin ANTICOAGULANT (COUMADIN) 5 MG tablet   1     zolpidem (AMBIEN) 5 MG tablet TAKE 1 TABLET(5 MG) BY MOUTH EVERY NIGHT AS NEEDED FOR SLEEP 30 tablet 5     modafinil (PROVIGIL) 100 MG tablet Take 1 tablet (100 mg) by mouth daily. (Patient not taking: Reported on 5/9/2025) 30 tablet 5     [DISCONTINUED] pilocarpine (SALAGEN) 5 MG tablet Take 1 tablet (5 mg) by mouth 4 times daily 360 tablet 1     [DISCONTINUED] predniSONE (DELTASONE) 5 MG tablet Take 1 tablet (5 mg) by mouth daily. 90 tablet 3     No  facility-administered encounter medications on file as of 5/9/2025.       Labs/Imaging:      Latest Ref Rng & Units 5/10/2024     1:32 PM 10/4/2024     2:50 PM 4/25/2025    11:20 AM   RHEUM RESULTS   Albumin 3.5 - 5.2 g/dL  4.3  4.1    ALT 0 - 50 U/L  27  20    AST 0 - 45 U/L  25  23    Complement C3 81 - 157 mg/dL  146  132    Complement C4 13 - 39 mg/dL  28  23    Creatinine 0.51 - 0.95 mg/dL  0.64  0.59    CRP Inflammation <5.00 mg/L  9.78  3.54    DNA-ds <10.0 IU/mL  38.0  34.0    GFR Estimate >60 mL/min/1.73m2  >90  >90    Hematocrit 35.0 - 47.0 %  39.9  40.4    Hemoglobin 11.7 - 15.7 g/dL  13.0  12.5    IGA 84 - 499 mg/dL 146  129     IGM 35 - 242 mg/dL 11  15      - 1,616 mg/dL 328  327     WBC 4.0 - 11.0 10e3/uL  7.9  6.3    RBC Count 3.80 - 5.20 10e6/uL  4.78  4.73    RDW 10.0 - 15.0 %  13.6  14.3    MCHC 31.5 - 36.5 g/dL  32.6  30.9    MCV 78 - 100 fL  84  85    Platelet Count 150 - 450 10e3/uL  245  201    Sed Rate 0 - 30 mm/hr  15  10      Component      Latest Ref Rng & Units 2/1/2021   WBC      4.0 - 11.0 10e9/L 7.8   RBC Count      3.8 - 5.2 10e12/L 4.50   Hemoglobin      11.7 - 15.7 g/dL 11.7   Hematocrit      35.0 - 47.0 % 37.9   MCV      78 - 100 fl 84   MCH      26.5 - 33.0 pg 26.0 (L)   MCHC      31.5 - 36.5 g/dL 30.9 (L)   RDW      10.0 - 15.0 % 14.9   Platelet Count      150 - 450 10e9/L 236   % Neutrophils      % 84.9   % Lymphocytes      % 6.3   % Monocytes      % 7.9   % Eosinophils      % 0.6   % Basophils      % 0.3   Absolute Neutrophil      1.6 - 8.3 10e9/L 6.6   Absolute Lymphocytes      0.8 - 5.3 10e9/L 0.5 (L)   Absolute Monocytes      0.0 - 1.3 10e9/L 0.6   Absolute Eosinophils      0.0 - 0.7 10e9/L 0.1   Absolute Basophils      0.0 - 0.2 10e9/L 0.0   Diff Method       Automated Method   Color Urine       Yellow   Appearance Urine       Clear   Glucose Urine      NEG:Negative mg/dL Negative   Bilirubin Urine      NEG:Negative Negative   Ketones Urine      NEG:Negative mg/dL  Trace (A)   Specific Gravity Urine      1.003 - 1.035 >1.030   pH Urine      5.0 - 7.0 pH 6.0   Protein Albumin Urine      NEG:Negative mg/dL 100 (A)   Urobilinogen Urine      0.2 - 1.0 EU/dL 0.2   Nitrite Urine      NEG:Negative Negative   Blood Urine      NEG:Negative Trace (A)   Leukocyte Esterase Urine      NEG:Negative Trace (A)   Source       Midstream Urine   WBC Urine      OTO5:0 - 5 /HPF 10-25 (A)   RBC Urine      OTO2:O - 2 /HPF 2-5 (A)   Squamous Epithelial /LPF Urine      FEW:Few /LPF Few   Bacteria Urine      NEG:Negative /HPF Moderate (A)   Triple Phosphates      NEG:Negative /HPF Few (A)   IGG      610 - 1,616 mg/dL 399 (L)   IGA      84 - 499 mg/dL 190   IGM      35 - 242 mg/dL 16 (L)   Creatinine      0.52 - 1.04 mg/dL 0.60   GFR Estimate      >60 mL/min/1.73:m2 >90   GFR Estimate If Black      >60 mL/min/1.73:m2 >90   Specimen Description       Midstream Urine   Special Requests       Specimen received in preservative   Culture Micro       <10,000 colonies/mL . . .   CD19 B Cells      6 - 27 % <1 (L)   Absolute CD19      107 - 698 cells/uL <1 (L)   Protein Random Urine      g/L 0.75   Protein Total Urine g/gr Creatinine      0 - 0.2 g/g Cr 0.40 (H)   Creatinine Urine Random      mg/dL 182   Sed Rate      0 - 30 mm/h 27   DNA-ds      <10 IU/mL 102 (H)   CRP Inflammation      0.0 - 8.0 mg/L 8.9 (H)   Complement C3      81 - 157 mg/dL 104   Complement C4      13 - 39 mg/dL 23   AST      0 - 45 U/L 16   Albumin      3.4 - 5.0 g/dL 4.1   ALT      0 - 50 U/L 26   Creatinine Urine      mg/dL 186     Component      Latest Ref Rng & Units 11/10/2021   WBC      4.0 - 11.0 10e3/uL 8.2   RBC Count      3.80 - 5.20 10e6/uL 4.58   Hemoglobin      11.7 - 15.7 g/dL 12.3   Hematocrit      35.0 - 47.0 % 39.7   MCV      78 - 100 fL 87   MCH      26.5 - 33.0 pg 26.9   MCHC      31.5 - 36.5 g/dL 31.0 (L)   RDW      10.0 - 15.0 % 14.2   Platelet Count      150 - 450 10e3/uL 229   % Neutrophils      % 83   %  Lymphocytes      % 6   % Monocytes      % 9   % Eosinophils      % 2   % Basophils      % 0   Absolute Neutrophils      1.6 - 8.3 10e3/uL 6.9   Absolute Lymphocytes      0.8 - 5.3 10e3/uL 0.5 (L)   Absolute Monocytes      0.0 - 1.3 10e3/uL 0.7   Absolute Eosinophils      0.0 - 0.7 10e3/uL 0.1   Absolute Basophils      0.0 - 0.2 10e3/uL 0.0   Color Urine      Colorless, Straw, Light Yellow, Yellow Yellow   Appearance Urine      Clear Clear   Glucose Urine      Negative mg/dL Negative   Bilirubin Urine      Negative Negative   Ketones Urine      Negative mg/dL Trace (A)   Specific Gravity Urine      1.003 - 1.035 >=1.030   Blood Urine      Negative Small (A)   pH Urine      5.0 - 7.0 6.0   Protein Albumin Urine      Negative mg/dL Negative   Urobilinogen Urine      0.2, 1.0 E.U./dL 0.2   Nitrite Urine      Negative Negative   Leukocyte Esterase Urine      Negative Trace (A)   Sodium      133 - 144 mmol/L 138   Potassium      3.4 - 5.3 mmol/L 3.9   Chloride      94 - 109 mmol/L 103   Carbon Dioxide      20 - 32 mmol/L 29   Anion Gap      3 - 14 mmol/L 6   Urea Nitrogen      7 - 30 mg/dL 22   Creatinine      0.52 - 1.04 mg/dL 0.70   Calcium      8.5 - 10.1 mg/dL 9.6   Glucose      70 - 99 mg/dL 83   Albumin      3.4 - 5.0 g/dL 4.0   Phosphorus      2.5 - 4.5 mg/dL 3.9   GFR Estimate      >60 mL/min/1.73m2 >90   Bacteria Urine      None Seen /HPF Few (A)   RBC Urine      0-2 /HPF /HPF 0-2   WBC Urine      0-5 /HPF /HPF 0-5   Squamous Epithelial /LPF Urine      None Seen /LPF Few (A)   Mucus Urine      None Seen /LPF Present (A)   MPO Cari IgG Instrument Value      <3.5 U/mL <0.3   Myeloperoxidase Antibody IgG      Negative Negative   Proteinase 3 Cari IgG Instrument Value      <2.0 U/mL <1.0   Proteinase 3 Antibody IgG      Negative Negative   IGG      610-1,616 mg/dL 398 (L)   IGA      84 - 499 mg/dL 184   IGM      35 - 242 mg/dL 16 (L)   Protein Random Urine      g/L 0.23   Protein Total Urine g/gr Creatinine      0.00 -  0.20 g/g Cr 0.11   Creatinine Urine      mg/dL 212   Neutrophil Cytoplasmic Antibody      <1:10 <1:10   Neutrophil Cytoplasmic Antibody Pattern       The ANCA IFA is <1:10.  No further testing will be performed.   CD19 B Cells      6 - 27 % <1 (L)   Absolute CD19      107 - 698 cells/uL <1 (L)   ALT      0 - 50 U/L 27   AST      0 - 45 U/L 19   Complement C4      13 - 39 mg/dL 28   Complement C3      81 - 157 mg/dL 138   CRP Inflammation      0.0 - 8.0 mg/L 8.9 (H)   DNA-ds      <10.0 IU/mL 90.0 (H)   Sed Rate      0 - 30 mm/hr 25     Component      Latest Ref Rng & Units 4/29/2022   WBC      4.0 - 11.0 10e3/uL 7.1   RBC Count      3.80 - 5.20 10e6/uL 4.62   Hemoglobin      11.7 - 15.7 g/dL 12.0   Hematocrit      35.0 - 47.0 % 38.3   MCV      78 - 100 fL 83   MCH      26.5 - 33.0 pg 26.0 (L)   MCHC      31.5 - 36.5 g/dL 31.3 (L)   RDW      10.0 - 15.0 % 14.3   Platelet Count      150 - 450 10e3/uL 213   % Neutrophils      % 85   % Lymphocytes      % 6   % Monocytes      % 7   % Eosinophils      % 1   % Basophils      % 1   % Immature Granulocytes      % 0   NRBCs per 100 WBC      <1 /100 0   Absolute Neutrophils      1.6 - 8.3 10e3/uL 6.1   Absolute Lymphocytes      0.8 - 5.3 10e3/uL 0.4 (L)   Absolute Monocytes      0.0 - 1.3 10e3/uL 0.5   Absolute Eosinophils      0.0 - 0.7 10e3/uL 0.0   Absolute Basophils      0.0 - 0.2 10e3/uL 0.1   Absolute Immature Granulocytes      <=0.4 10e3/uL 0.0   Absolute NRBCs      10e3/uL 0.0   Color Urine      Colorless, Straw, Light Yellow, Yellow Yellow   Appearance Urine      Clear Clear   Glucose Urine      Negative mg/dL Negative   Bilirubin Urine      Negative Negative   Ketones Urine      Negative mg/dL Negative   Specific Gravity Urine      1.003 - 1.035 1.015   Blood Urine      Negative Negative   pH Urine      5.0 - 7.0 5.0   Protein Albumin Urine      Negative mg/dL Negative   Urobilinogen mg/dL      Normal, 2.0 mg/dL Normal   Nitrite Urine      Negative Negative    Leukocyte Esterase Urine      Negative Negative   Bacteria Urine      None Seen /HPF Few (A)   Mucus Urine      None Seen /LPF Present (A)   RBC Urine      <=2 /HPF 1   WBC Urine      <=5 /HPF 1   Squamous Epithelial /HPF Urine      <=1 /HPF <1   Protein Random Urine      g/L 0.12   Protein Total Urine g/gr Creatinine      0.00 - 0.20 g/g Cr 0.18   Creatinine Urine      mg/dL 66   Creatinine      0.52 - 1.04 mg/dL 0.68   GFR Estimate      >60 mL/min/1.73m2 >90   AST      0 - 45 U/L 16   ALT      0 - 50 U/L 32   Albumin      3.4 - 5.0 g/dL 3.7   CRP Inflammation      0.0 - 8.0 mg/L 7.0   Sed Rate      0 - 30 mm/hr 19       Physical Exam:    BP (!) 147/75   Pulse 69   Temp 98  F (36.7  C) (Oral)   Wt (!) 138.4 kg (305 lb 3.2 oz)   SpO2 98%   BMI 49.26 kg/m    Constitutional: Pleasant, NAD  HEENT: EOMI, sclera anicteric, conj not injected. No oral ulcers or thrush  Chest: CTAB  CV: no M/R/G, RRR  Abdomen: soft, NT  MSK: No active synovitis or joint tenderness  Skin: No skin rash  Neuro: CN grossly intact without focal deficit  Psych: nl affect    Assessment/Plan:  Taina Singh is a 58 year old female who is here for follow up of SLE and lupus nephritis.    Systemic lupus erythematous  History of lupus nephritis  - Presented in 12/2017 for 2nd opinion of m/o her SLE. She was diagnosed with SLE at age 27. Her lupus has been marked by +KARLIE (highest titer 1:640, speckled and nucleolar patterns), +anti-DNA, low C3/C4, arthritis, malar rash, serositis (pleuritic CP), lupus nephritis, hemolytic anemia, enteritis supported by +SSA/SSB Ab, +RF, high ESR/CRP. She had neg anti-RNP, anti-Sm, acL IgM/G/A, LAC, cryo and anti-CCP. She was treated with prednisone and HCQ at the time of Dx. Can't remember how long she was on HCQ and why HCQ was stopped, but it probably was stopped because of stable disease, no report on HCQ toxicity. Reportedly her SLE was mild all these years.  - Her lupus flared in 7/2017 with no triggers  when she presented with arthritis, HCQ was resumed, arthritis resolved. Mild pulm HTN on 2D-Echo 8/2017 but neg R cardiac cath and VQ scan in 3/2018, also neg 2D-Echo.  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 Previously failed AZA, benlysta. Patient received 1st dose cyclophosphamide on 6/15/19, had 6 doses.  Initiated Rituxin 1/22/2020 as failed cytoxan.  Recommend rituxin infusion every 4-6 months, closer to 4 due to worsening of symptoms as infusions near at 6 month interval. Have decreased dose to 600 mg and it is well tolerated. Recommend continuation of rituximab as it is the only med that has helped with LN. Also tx options are limited (also necrotizing lesions on renal biopsy, can not rule out ANCA neg vasculitis overlap and rituximab is FDA approved for GPA/MPA).     10/2022:  - Current regimen: Prednisone 5 mg every day, MMF 1000 mg BID (tapered from 3 gr every day) daily, and  mg a day, and Rituximab (last infusion 6/3/2022).      10/6/2023: SLE is under excellent control but back on prednisone 5 mg qd, labs were reviewed, stable. No change in meds. Last rituximab infusion was 500 mg on 5/5/2023.    Sjogren's with sicca  - Secondary, +SSA/SSB Ab and h/o MALT lymphoma at age 44 in remission. Uses blink eyedrops and salagen. No lip biopsy was done to confirm Dx of Sjogren's.       Recommendations 3/2023:  - Last rituximab 500 mg on 11/18/2022. Continue at interval q4-6 months depending on severity symptoms.  - Continue  mg BID  -Off prednisone and doing well.  - Annual eye exam for HCQ monitoring  - PCP prophylaxis on atovaquone 10 mL (1500 mg). Patient did not tolerate inhaled pentamidine. Avoiding TMP-SMX given sulfa reaction and potential increase risk of SLE flare. Hesitant to use dapsone given risk of hemolytic anemia.  - Ambien prn for insomnia  -Rituximab 500 mg one dose in early May 2023  -Go down on cellcept to 3 tabs a day  -Labs this month then every 3 months      Plan  10/6/2023:    Rituximab 500 mg IV one dose 1st week of Nov     Labs early November with rituximab infusion    Stay on prednisone 5 mg a day, cellcept 2 tabs twice a day, plaquenil 1 tab twice a day    Yearly eye exam    Labs q6months    Covid booster shot next week    Return video visit in about 6 months    4/3/2024:    Stable SLE    Rituximab 500 mg one dose in early May 2024 for ANCA negative vasculitis    Try pilocarpine    DEXA bone density    Labs due in 9/2024 then every 6 months    Return in 6 months in person      10/11/2024:    Stable SLE on current regimen, fatigue could be due tos steroid withdrawal, now 2 mg every day from 5 mg every day, will continue slow taper to off. Will try provigil for fatigue; risks were discussed. She he will be seen in bone clinic for osteopenia. Recommend pemgarda; risks were discussed. Stable labs.    Plan:     mg bid with eye exam    MMF 1 gr bid    Prednisone 2 mg every day slow taper to off    Rituximab 500 mg IV one dose in early Nov    Labs every 6 months (due 4/2025)    Continue with prednisone taper to off    Try provigil 100 mg in AM (could use good Rx)    Pemgarda infusion    Return in about 5 months (in person)        Today 5/9/2025:    Stable lupus and labs, almost done with prednisone, last rituximab 1 gram on 12/20/2024, will repeat in 6 months, but lower dose of 500 mg.    Plan:    Rituximab 500 mg IV one dose early June    Labs in July then every 3 months     Return in 6 months in person        TT 40 min was spent on date of the encounter doing chart review, history and exam, documentation and further activities as noted above. Any prior notes, outside records, laboratory results, and imaging studies were reviewed if relevant.      The longitudinal plan of care for the diagnosis(es)/condition(s) as documented were addressed during this visit. Due to the added complexity in care, I will continue to support Taina in the subsequent management and with  ongoing continuity of care.        Killian Marroquin MD      Orders Placed This Encounter   Procedures     AST     ALT     Albumin level     Creatinine     CRP inflammation     UA with Microscopic reflex to Culture     Protein  random urine     Creatinine random urine     Erythrocyte sedimentation rate auto     CD19 B Cell Count     Immunoglobulins A G and M     Complement C4     Complement C3     DNA double stranded antibodies     CBC with Platelets & Differential             Again, thank you for allowing me to participate in the care of your patient.      Sincerely,    Killian Marroquin MD

## 2025-05-09 NOTE — TELEPHONE ENCOUNTER
----- Message from Killian Marroquin sent at 5/9/2025  2:28 PM CDT -----  Regarding: rituximab tx plan  Rituximab 500 mg IV one dose early June

## 2025-05-09 NOTE — PATIENT INSTRUCTIONS
Rituximab 500 mg IV one dose early June    Labs in July then every 3 months     Return in 6 months in person

## 2025-05-09 NOTE — PROGRESS NOTES
[facility] Rheumatology Follow-up Visit Note    Reason for visit: f/u for lupus, high risk med use      Last seen: 10/11/2024  DOS: 5/9/2025      History of Present Illness:  Taina Singh is a 58 year old female who is here for follow up of SLE and lupus nephritis.    History:  - She was diagnosed with lupus at age 27. Her 1st sx were malar rash and fatigue. No skin biopsy was done (reportedly had +KARLIE). She was treated with prednisone taper and HCQ. HCQ helped and she tolerated it well. She was diagnosed with Sjogren's at the same time. Had dryness of eyes and mouth. No lip biopsy to confirm the Dx. Reportedly she had very mild stable lupus for many years and eventually at some point, stopped taking HCQ (can't remember when). Her lupus started to flare in 7/2017 initially with pleuritic CP, was put back on HCQ. 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 Since then failed AZA, cytoxan and benlysta. MMF was not enough to control her LN and eventually rituximab was added (which was initially denied by insurance even with h/o lymphoma) given necrosis on the renal biopsy (rituximab is FDA approved for ANCA vasculitis). On HCQ+MMF after adding rituximab, LN and SLE started too improve.  - She was diagnosed with MALT lymphoma at 44, had enlarged LN over R parotid gland, on biopsy it was MALT lymphoma. Tx was resection only, no radiation or chemo.    2/4/2021:  - Taina is on prednisone 8 mg every day, it was 17.5 mg every day at last visit. She feels achy and tired this week, but thinks the cold weather is responsible for her sx. Had rituximab  375 mg/m2 on 1/22/2020, 2/6/2020, then 6/26/2020, 7/20/2020 and last dose 1 gram on 12/15/2020. On mepron for PJP prophylaxis. On  mg bid, MMF 1500 mg bid. She works from home. Has intermittent joint pain, nothing consistent. one day shoulders hurt and the other day her hips hurt. AM stiffness is 30-60 minutes. no pleurisy, sob or cough or fevers.  "No skin rash. Her hair grew back.    11/12/2021:  - Taina is feeling well, she thinks her lupus is under fair control, no major complaints today.    4/29/2022:  - Today, Taina reports that she is feeling well.  Has noted some general diffuse aching and fatigue, difficulty making fist, and middle finger with trigger symptoms, which she typically has as she nears her rituximab infusion.  She has noted some morning stiffness, but this is very responsive to activity and stretching. Continues to have some dry mouth managed via increased hydration. She has had an intermittent mild rash of her nose and cheeks, but this is not consistent and not dependent on sun exposure. She has had a good appetite and an overall positive mood. Feels as though rituximab has been a \"huge game changer\" for her.    10/28/2022:  Ms. Singh presents for follow up.  -Patient reports that she is doing well.  She was able to completely wean and discontinue the prednisone.  She is doing well on rituximab infusions.  Her last rituximab treatment was in June and is scheduled to get the next Rituxan in November.  She is compliant with CellCept and Plaquenil.  She recently received the Evusheld.  Denies any oral ulcers, rashes, joint pains, body aches, vision changes, shortness of breath and  Exertion.  Reports morning stiffness is minimal.  Good appetite and normal mood.    3/8/2023:    Doing well  Stable  No flares  Last prednisone use, was about 2 wk ago, 2.5 mg every day x 2 days after a cold.      10/6/2023:    Doing very well, no symptoms, no lupus flare, back on prednisone 5 mg every day, did not tolerate coming off prednisone as developed diffuse arthralgia.      4/3/2024:    Doing well since she resumed prednisone 5 mg every day, can't taper below 5 mg every day.    Stable SLE, no flares.    Down for a day or two here and there, feels achy and tired.    S/p Rituximab 500 mg once dose on 11/10/2023    Dry eyes and mouth is an " issue.    Trouble sleeping wakes up a lot due to dryness.      10/11/2024:    -no lupus flare up    -fatigue is the major concern, more pronounced by going down on prednisone, now 2 mg every day      Today 2025:    -feeling well, trying to taper off prednisone, only takes 2.5 mg every day three times a week with occasional increase in dose    ROS:    A comprehensive ROS was done, positives are per HPI.    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 44    No chemo or radiation    Other forms of systemic lupus erythematosus (H) 2018    Pulmonary embolism (H)     2019 seen on abd [facility] at [facility]    Sjogren's syndrome     secondary Sjogrens, secondary to lupus    Vitamin D deficiency      Past Surgical History:  Past Surgical History:   Procedure Laterality Date    BIOPSY/REMOVAL, LYMPH NODE(S)       SECTION  age 32    HC HYSTEROS W PERMANENT FALLOPAIN IMPLANT      Essure b/l    PAROTIDECTOMY Right 2006    TONSILLECTOMY       Family history:  Family History   Problem Relation Age of Onset    Alopecia Brother     Rheumatoid Arthritis Brother     LUNG DISEASE No family hx of      Social history:  Social History     Socioeconomic History    Marital status:      Spouse name: Not on file    Number of children: Not on file    Years of education: 1    Highest education level: Not on file   Occupational History     Comment: , 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      Social Drivers of Health     Financial Resource Strain: Low Risk  (12/9/2024)    Received from [employer]    Financial Resource Strain     Difficulty of Paying Living Expenses: 3     Difficulty of Paying Living Expenses: Not on file   Food Insecurity: No Food Insecurity (12/8/2024)    Received from [employer]    Food Insecurity     Do you worry your food will run out before you are able to buy more?: 1   Transportation Needs: No Transportation Needs (12/8/2024)    Received from [employer]    Transportation Needs     Does lack of transportation keep you from medical appointments?: 1     Does lack of transportation keep you from work, meetings or getting things that you need?: 1   Physical Activity: Not on file   Stress: Not on file   Social Connections: Socially Integrated (12/8/2024)    Received from [employer]    Social Connections     Do you often feel lonely or isolated from those around you?: 0   Interpersonal Safety: Not on file   Housing Stability: Low Risk  (12/8/2024)    Received from [employer]    Housing Stability     What is your housing situation today?: 1     Allergies:  Allergies   Allergen Reactions    Pentamidine Shortness Of Breath    Penicillins Rash    Sulfa Antibiotics Rash     Medications:  Outpatient Encounter  Medications as of 5/9/2025   Medication Sig Dispense Refill    Calcium-Magnesium 300-300 MG TABS daily       hydroxychloroquine (PLAQUENIL) 200 MG tablet Take 1 tablet (200 mg) by mouth 2 times daily. Please complete annual eye exam/plaquenil screening for refills 180 tablet 0    hydrOXYzine HCl (ATARAX) 25 MG tablet Take by mouth.      metFORMIN (GLUCOPHAGE) 500 MG tablet Take 500 mg by mouth 2 times daily (with meals).      Multiple Vitamins-Minerals (WOMENS MULTI PO) Take by mouth daily       mycophenolate (GENERIC EQUIVALENT) 500 MG tablet Take 2 tablets (1,000 mg) by mouth 2 times daily. 360 tablet 3    Omega-3 Fatty Acids (OMEGA-3 FISH OIL) 500 MG CAPS Take 500 mg by mouth daily       pilocarpine (SALAGEN) 5 MG tablet Take 1 tablet (5 mg) by mouth 4 times daily. 360 tablet 1    predniSONE (DELTASONE) 2.5 MG tablet Take 1 tablet (2.5 mg) by mouth daily. 90 tablet 3    traMADol (ULTRAM) 50 MG tablet Take 1 tablet (50 mg) by mouth every 12 hours as needed for severe pain. 60 tablet 5    VITAMIN D3 50 MCG (2000 UT) tablet TAKE 1 TABLET DAILY 90 tablet 3    warfarin ANTICOAGULANT (COUMADIN) 5 MG tablet   1    zolpidem (AMBIEN) 5 MG tablet TAKE 1 TABLET(5 MG) BY MOUTH EVERY NIGHT AS NEEDED FOR SLEEP 30 tablet 5    modafinil (PROVIGIL) 100 MG tablet Take 1 tablet (100 mg) by mouth daily. (Patient not taking: Reported on 5/9/2025) 30 tablet 5    [DISCONTINUED] pilocarpine (SALAGEN) 5 MG tablet Take 1 tablet (5 mg) by mouth 4 times daily 360 tablet 1    [DISCONTINUED] predniSONE (DELTASONE) 5 MG tablet Take 1 tablet (5 mg) by mouth daily. 90 tablet 3     No facility-administered encounter medications on file as of 5/9/2025.       Labs/Imaging:      Latest Ref Rng & Units 5/10/2024     1:32 PM 10/4/2024     2:50 PM 4/25/2025    11:20 AM   RHEUM RESULTS   Albumin 3.5 - 5.2 g/dL  4.3  4.1    ALT 0 - 50 U/L  27  20    AST 0 - 45 U/L  25  23    Complement C3 81 - 157 mg/dL  146  132    Complement C4 13 - 39 mg/dL  28  23     Creatinine 0.51 - 0.95 mg/dL  0.64  0.59    CRP Inflammation <5.00 mg/L  9.78  3.54    DNA-ds <10.0 IU/mL  38.0  34.0    GFR Estimate >60 mL/min/1.73m2  >90  >90    Hematocrit 35.0 - 47.0 %  39.9  40.4    Hemoglobin 11.7 - 15.7 g/dL  13.0  12.5    IGA 84 - 499 mg/dL 146  129     IGM 35 - 242 mg/dL 11  15      - 1,616 mg/dL 328  327     WBC 4.0 - 11.0 10e3/uL  7.9  6.3    RBC Count 3.80 - 5.20 10e6/uL  4.78  4.73    RDW 10.0 - 15.0 %  13.6  14.3    MCHC 31.5 - 36.5 g/dL  32.6  30.9    MCV 78 - 100 fL  84  85    Platelet Count 150 - 450 10e3/uL  245  201    Sed Rate 0 - 30 mm/hr  15  10      Component      Latest Ref Rng & Units 2/1/2021   WBC      4.0 - 11.0 10e9/L 7.8   RBC Count      3.8 - 5.2 10e12/L 4.50   Hemoglobin      11.7 - 15.7 g/dL 11.7   Hematocrit      35.0 - 47.0 % 37.9   MCV      78 - 100 fl 84   MCH      26.5 - 33.0 pg 26.0 (L)   MCHC      31.5 - 36.5 g/dL 30.9 (L)   RDW      10.0 - 15.0 % 14.9   Platelet Count      150 - 450 10e9/L 236   % Neutrophils      % 84.9   % Lymphocytes      % 6.3   % Monocytes      % 7.9   % Eosinophils      % 0.6   % Basophils      % 0.3   Absolute Neutrophil      1.6 - 8.3 10e9/L 6.6   Absolute Lymphocytes      0.8 - 5.3 10e9/L 0.5 (L)   Absolute Monocytes      0.0 - 1.3 10e9/L 0.6   Absolute Eosinophils      0.0 - 0.7 10e9/L 0.1   Absolute Basophils      0.0 - 0.2 10e9/L 0.0   Diff Method       Automated Method   Color Urine       Yellow   Appearance Urine       Clear   Glucose Urine      NEG:Negative mg/dL Negative   Bilirubin Urine      NEG:Negative Negative   Ketones Urine      NEG:Negative mg/dL Trace (A)   Specific Gravity Urine      1.003 - 1.035 >1.030   pH Urine      5.0 - 7.0 pH 6.0   Protein Albumin Urine      NEG:Negative mg/dL 100 (A)   Urobilinogen Urine      0.2 - 1.0 EU/dL 0.2   Nitrite Urine      NEG:Negative Negative   Blood Urine      NEG:Negative Trace (A)   Leukocyte Esterase Urine      NEG:Negative Trace (A)   Source       Midstream Urine    WBC Urine      OTO5:0 - 5 /HPF 10-25 (A)   RBC Urine      OTO2:O - 2 /HPF 2-5 (A)   Squamous Epithelial /LPF Urine      FEW:Few /LPF Few   Bacteria Urine      NEG:Negative /HPF Moderate (A)   Triple Phosphates      NEG:Negative /HPF Few (A)   IGG      610 - 1,616 mg/dL 399 (L)   IGA      84 - 499 mg/dL 190   IGM      35 - 242 mg/dL 16 (L)   Creatinine      0.52 - 1.04 mg/dL 0.60   GFR Estimate      >60 mL/min/1.73:m2 >90   GFR Estimate If Black      >60 mL/min/1.73:m2 >90   Specimen Description       Midstream Urine   Special Requests       Specimen received in preservative   Culture Micro       <10,000 colonies/mL . . .   CD19 B Cells      6 - 27 % <1 (L)   Absolute CD19      107 - 698 cells/uL <1 (L)   Protein Random Urine      g/L 0.75   Protein Total Urine g/gr Creatinine      0 - 0.2 g/g Cr 0.40 (H)   Creatinine Urine Random      mg/dL 182   Sed Rate      0 - 30 mm/h 27   DNA-ds      <10 IU/mL 102 (H)   CRP Inflammation      0.0 - 8.0 mg/L 8.9 (H)   Complement C3      81 - 157 mg/dL 104   Complement C4      13 - 39 mg/dL 23   AST      0 - 45 U/L 16   Albumin      3.4 - 5.0 g/dL 4.1   ALT      0 - 50 U/L 26   Creatinine Urine      mg/dL 186     Component      Latest Ref Rng & Units 11/10/2021   WBC      4.0 - 11.0 10e3/uL 8.2   RBC Count      3.80 - 5.20 10e6/uL 4.58   Hemoglobin      11.7 - 15.7 g/dL 12.3   Hematocrit      35.0 - 47.0 % 39.7   MCV      78 - 100 fL 87   MCH      26.5 - 33.0 pg 26.9   MCHC      31.5 - 36.5 g/dL 31.0 (L)   RDW      10.0 - 15.0 % 14.2   Platelet Count      150 - 450 10e3/uL 229   % Neutrophils      % 83   % Lymphocytes      % 6   % Monocytes      % 9   % Eosinophils      % 2   % Basophils      % 0   Absolute Neutrophils      1.6 - 8.3 10e3/uL 6.9   Absolute Lymphocytes      0.8 - 5.3 10e3/uL 0.5 (L)   Absolute Monocytes      0.0 - 1.3 10e3/uL 0.7   Absolute Eosinophils      0.0 - 0.7 10e3/uL 0.1   Absolute Basophils      0.0 - 0.2 10e3/uL 0.0   Color Urine      Colorless, Straw,  Light Yellow, Yellow Yellow   Appearance Urine      Clear Clear   Glucose Urine      Negative mg/dL Negative   Bilirubin Urine      Negative Negative   Ketones Urine      Negative mg/dL Trace (A)   Specific Gravity Urine      1.003 - 1.035 >=1.030   Blood Urine      Negative Small (A)   pH Urine      5.0 - 7.0 6.0   Protein Albumin Urine      Negative mg/dL Negative   Urobilinogen Urine      0.2, 1.0 E.U./dL 0.2   Nitrite Urine      Negative Negative   Leukocyte Esterase Urine      Negative Trace (A)   Sodium      133 - 144 mmol/L 138   Potassium      3.4 - 5.3 mmol/L 3.9   Chloride      94 - 109 mmol/L 103   Carbon Dioxide      20 - 32 mmol/L 29   Anion Gap      3 - 14 mmol/L 6   Urea Nitrogen      7 - 30 mg/dL 22   Creatinine      0.52 - 1.04 mg/dL 0.70   Calcium      8.5 - 10.1 mg/dL 9.6   Glucose      70 - 99 mg/dL 83   Albumin      3.4 - 5.0 g/dL 4.0   Phosphorus      2.5 - 4.5 mg/dL 3.9   GFR Estimate      >60 mL/min/1.73m2 >90   Bacteria Urine      None Seen /HPF Few (A)   RBC Urine      0-2 /HPF /HPF 0-2   WBC Urine      0-5 /HPF /HPF 0-5   Squamous Epithelial /LPF Urine      None Seen /LPF Few (A)   Mucus Urine      None Seen /LPF Present (A)   MPO Cari IgG Instrument Value      <3.5 U/mL <0.3   Myeloperoxidase Antibody IgG      Negative Negative   Proteinase 3 Cari IgG Instrument Value      <2.0 U/mL <1.0   Proteinase 3 Antibody IgG      Negative Negative   IGG      610-1,616 mg/dL 398 (L)   IGA      84 - 499 mg/dL 184   IGM      35 - 242 mg/dL 16 (L)   Protein Random Urine      g/L 0.23   Protein Total Urine g/gr Creatinine      0.00 - 0.20 g/g Cr 0.11   Creatinine Urine      mg/dL 212   Neutrophil Cytoplasmic Antibody      <1:10 <1:10   Neutrophil Cytoplasmic Antibody Pattern       The ANCA IFA is <1:10.  No further testing will be performed.   CD19 B Cells      6 - 27 % <1 (L)   Absolute CD19      107 - 698 cells/uL <1 (L)   ALT      0 - 50 U/L 27   AST      0 - 45 U/L 19   Complement C4      13 - 39  mg/dL 28   Complement C3      81 - 157 mg/dL 138   CRP Inflammation      0.0 - 8.0 mg/L 8.9 (H)   DNA-ds      <10.0 IU/mL 90.0 (H)   Sed Rate      0 - 30 mm/hr 25     Component      Latest Ref Rng & Units 4/29/2022   WBC      4.0 - 11.0 10e3/uL 7.1   RBC Count      3.80 - 5.20 10e6/uL 4.62   Hemoglobin      11.7 - 15.7 g/dL 12.0   Hematocrit      35.0 - 47.0 % 38.3   MCV      78 - 100 fL 83   MCH      26.5 - 33.0 pg 26.0 (L)   MCHC      31.5 - 36.5 g/dL 31.3 (L)   RDW      10.0 - 15.0 % 14.3   Platelet Count      150 - 450 10e3/uL 213   % Neutrophils      % 85   % Lymphocytes      % 6   % Monocytes      % 7   % Eosinophils      % 1   % Basophils      % 1   % Immature Granulocytes      % 0   NRBCs per 100 WBC      <1 /100 0   Absolute Neutrophils      1.6 - 8.3 10e3/uL 6.1   Absolute Lymphocytes      0.8 - 5.3 10e3/uL 0.4 (L)   Absolute Monocytes      0.0 - 1.3 10e3/uL 0.5   Absolute Eosinophils      0.0 - 0.7 10e3/uL 0.0   Absolute Basophils      0.0 - 0.2 10e3/uL 0.1   Absolute Immature Granulocytes      <=0.4 10e3/uL 0.0   Absolute NRBCs      10e3/uL 0.0   Color Urine      Colorless, Straw, Light Yellow, Yellow Yellow   Appearance Urine      Clear Clear   Glucose Urine      Negative mg/dL Negative   Bilirubin Urine      Negative Negative   Ketones Urine      Negative mg/dL Negative   Specific Gravity Urine      1.003 - 1.035 1.015   Blood Urine      Negative Negative   pH Urine      5.0 - 7.0 5.0   Protein Albumin Urine      Negative mg/dL Negative   Urobilinogen mg/dL      Normal, 2.0 mg/dL Normal   Nitrite Urine      Negative Negative   Leukocyte Esterase Urine      Negative Negative   Bacteria Urine      None Seen /HPF Few (A)   Mucus Urine      None Seen /LPF Present (A)   RBC Urine      <=2 /HPF 1   WBC Urine      <=5 /HPF 1   Squamous Epithelial /HPF Urine      <=1 /HPF <1   Protein Random Urine      g/L 0.12   Protein Total Urine g/gr Creatinine      0.00 - 0.20 g/g Cr 0.18   Creatinine Urine      mg/dL 66    Creatinine      0.52 - 1.04 mg/dL 0.68   GFR Estimate      >60 mL/min/1.73m2 >90   AST      0 - 45 U/L 16   ALT      0 - 50 U/L 32   Albumin      3.4 - 5.0 g/dL 3.7   CRP Inflammation      0.0 - 8.0 mg/L 7.0   Sed Rate      0 - 30 mm/hr 19       Physical Exam:    BP (!) 147/75   Pulse 69   Temp 98  F (36.7  C) (Oral)   Wt (!) 138.4 kg (305 lb 3.2 oz)   SpO2 98%   BMI 49.26 kg/m    Constitutional: Pleasant, NAD  HEENT: EOMI, sclera anicteric, conj not injected. No oral ulcers or thrush  Chest: CTAB  CV: no M/R/G, RRR  Abdomen: soft, NT  MSK: No active synovitis or joint tenderness  Skin: No skin rash  Neuro: CN grossly intact without focal deficit  Psych: nl affect    Assessment/Plan:  Taina Singh is a 58 year old female who is here for follow up of SLE and lupus nephritis.    Systemic lupus erythematous  History of lupus nephritis  - Presented in 12/2017 for 2nd opinion of m/o her SLE. She was diagnosed with SLE at age 27. Her lupus has been marked by +KARLIE (highest titer 1:640, speckled and nucleolar patterns), +anti-DNA, low C3/C4, arthritis, malar rash, serositis (pleuritic CP), lupus nephritis, hemolytic anemia, enteritis supported by +SSA/SSB Ab, +RF, high ESR/CRP. She had neg anti-RNP, anti-Sm, acL IgM/G/A, LAC, cryo and anti-CCP. She was treated with prednisone and HCQ at the time of Dx. Can't remember how long she was on HCQ and why HCQ was stopped, but it probably was stopped because of stable disease, no report on HCQ toxicity. Reportedly her SLE was mild all these years.  - Her lupus flared in 7/2017 with no triggers when she presented with arthritis, HCQ was resumed, arthritis resolved. Mild pulm HTN on 2D-Echo 8/2017 but neg R cardiac cath and VQ scan in 3/2018, also neg 2D-Echo.  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 Previously failed AZA, benlysta. Patient received 1st dose cyclophosphamide on 6/15/19, had 6 doses.  Initiated Rituxin 1/22/2020 as failed cytoxan.  Recommend rituxin  infusion every 4-6 months, closer to 4 due to worsening of symptoms as infusions near at 6 month interval. Have decreased dose to 600 mg and it is well tolerated. Recommend continuation of rituximab as it is the only med that has helped with LN. Also tx options are limited (also necrotizing lesions on renal biopsy, can not rule out ANCA neg vasculitis overlap and rituximab is FDA approved for GPA/MPA).     10/2022:  - Current regimen: Prednisone 5 mg every day, MMF 1000 mg BID (tapered from 3 gr every day) daily, and  mg a day, and Rituximab (last infusion 6/3/2022).      10/6/2023: SLE is under excellent control but back on prednisone 5 mg qd, labs were reviewed, stable. No change in meds. Last rituximab infusion was 500 mg on 5/5/2023.    Sjogren's with sicca  - Secondary, +SSA/SSB Ab and h/o MALT lymphoma at age 44 in remission. Uses blink eyedrops and salagen. No lip biopsy was done to confirm Dx of Sjogren's.       Recommendations 3/2023:  - Last rituximab 500 mg on 11/18/2022. Continue at interval q4-6 months depending on severity symptoms.  - Continue  mg BID  -Off prednisone and doing well.  - Annual eye exam for HCQ monitoring  - PCP prophylaxis on atovaquone 10 mL (1500 mg). Patient did not tolerate inhaled pentamidine. Avoiding TMP-SMX given sulfa reaction and potential increase risk of SLE flare. Hesitant to use dapsone given risk of hemolytic anemia.  - Ambien prn for insomnia  -Rituximab 500 mg one dose in early May 2023  -Go down on cellcept to 3 tabs a day  -Labs this month then every 3 months      Plan 10/6/2023:    Rituximab 500 mg IV one dose 1st week of Nov     Labs early November with rituximab infusion    Stay on prednisone 5 mg a day, cellcept 2 tabs twice a day, plaquenil 1 tab twice a day    Yearly eye exam    Labs q6months    Covid booster shot next week    Return video visit in about 6 months    4/3/2024:    Stable SLE    Rituximab 500 mg one dose in early May 2024 for ANCA  negative vasculitis    Try pilocarpine    DEXA bone density    Labs due in 9/2024 then every 6 months    Return in 6 months in person      10/11/2024:    Stable SLE on current regimen, fatigue could be due tos steroid withdrawal, now 2 mg every day from 5 mg every day, will continue slow taper to off. Will try provigil for fatigue; risks were discussed. She he will be seen in bone clinic for osteopenia. Recommend pemgarda; risks were discussed. Stable labs.    Plan:     mg bid with eye exam    MMF 1 gr bid    Prednisone 2 mg every day slow taper to off    Rituximab 500 mg IV one dose in early Nov    Labs every 6 months (due 4/2025)    Continue with prednisone taper to off    Try provigil 100 mg in AM (could use good Rx)    Pemgarda infusion    Return in about 5 months (in person)        Today 5/9/2025:    Stable lupus and labs, almost done with prednisone, last rituximab 1 gram on 12/20/2024, will repeat in 6 months, but lower dose of 500 mg.    Plan:    Rituximab 500 mg IV one dose early June    Labs in July then every 3 months     Return in 6 months in person        TT 40 min was spent on date of the encounter doing chart review, history and exam, documentation and further activities as noted above. Any prior notes, outside records, laboratory results, and imaging studies were reviewed if relevant.      The longitudinal plan of care for the diagnosis(es)/condition(s) as documented were addressed during this visit. Due to the added complexity in care, I will continue to support Taina in the subsequent management and with ongoing continuity of care.        Killian Marroquin MD      Orders Placed This Encounter   Procedures    AST    ALT    Albumin level    Creatinine    CRP inflammation    UA with Microscopic reflex to Culture    Protein  random urine    Creatinine random urine    Erythrocyte sedimentation rate auto    CD19 B Cell Count    Immunoglobulins A G and M    Complement C4    Complement C3    DNA  double stranded antibodies    CBC with Platelets & Differential

## 2025-05-10 ENCOUNTER — RESULTS FOLLOW-UP (OUTPATIENT)
Dept: RHEUMATOLOGY | Facility: CLINIC | Age: 60
End: 2025-05-10

## 2025-05-10 RX ORDER — DIPHENHYDRAMINE HYDROCHLORIDE 50 MG/ML
50 INJECTION, SOLUTION INTRAMUSCULAR; INTRAVENOUS
Start: 2025-05-10

## 2025-05-10 RX ORDER — DIPHENHYDRAMINE HYDROCHLORIDE 50 MG/ML
25 INJECTION, SOLUTION INTRAMUSCULAR; INTRAVENOUS
Start: 2025-05-10

## 2025-05-10 RX ORDER — ACETAMINOPHEN 325 MG/1
650 TABLET ORAL ONCE
Start: 2025-05-10

## 2025-05-10 RX ORDER — MEPERIDINE HYDROCHLORIDE 25 MG/ML
25 INJECTION INTRAMUSCULAR; INTRAVENOUS; SUBCUTANEOUS
OUTPATIENT
Start: 2025-05-10

## 2025-05-10 RX ORDER — METHYLPREDNISOLONE SODIUM SUCCINATE 40 MG/ML
40 INJECTION INTRAMUSCULAR; INTRAVENOUS
Start: 2025-05-10

## 2025-05-10 RX ORDER — HEPARIN SODIUM (PORCINE) LOCK FLUSH IV SOLN 100 UNIT/ML 100 UNIT/ML
5 SOLUTION INTRAVENOUS
OUTPATIENT
Start: 2025-05-10

## 2025-05-10 RX ORDER — ALBUTEROL SULFATE 0.83 MG/ML
2.5 SOLUTION RESPIRATORY (INHALATION)
OUTPATIENT
Start: 2025-05-10

## 2025-05-10 RX ORDER — METHYLPREDNISOLONE SODIUM SUCCINATE 125 MG/2ML
125 INJECTION INTRAMUSCULAR; INTRAVENOUS ONCE
OUTPATIENT
Start: 2025-05-10

## 2025-05-10 RX ORDER — EPINEPHRINE 1 MG/ML
0.3 INJECTION, SOLUTION, CONCENTRATE INTRAVENOUS EVERY 5 MIN PRN
OUTPATIENT
Start: 2025-05-10

## 2025-05-10 RX ORDER — DIPHENHYDRAMINE HCL 25 MG
50 CAPSULE ORAL ONCE
Start: 2025-05-10

## 2025-05-10 RX ORDER — HEPARIN SODIUM,PORCINE 10 UNIT/ML
5-20 VIAL (ML) INTRAVENOUS DAILY PRN
OUTPATIENT
Start: 2025-05-10

## 2025-05-10 RX ORDER — ALBUTEROL SULFATE 90 UG/1
1-2 INHALANT RESPIRATORY (INHALATION)
Start: 2025-05-10

## 2025-05-12 ENCOUNTER — MYC MEDICAL ADVICE (OUTPATIENT)
Dept: RHEUMATOLOGY | Facility: CLINIC | Age: 60
End: 2025-05-12
Payer: COMMERCIAL

## 2025-06-02 DIAGNOSIS — G47.00 INSOMNIA, UNSPECIFIED TYPE: ICD-10-CM

## 2025-06-04 ENCOUNTER — MYC REFILL (OUTPATIENT)
Dept: RHEUMATOLOGY | Facility: CLINIC | Age: 60
End: 2025-06-04
Payer: COMMERCIAL

## 2025-06-04 DIAGNOSIS — G47.00 INSOMNIA, UNSPECIFIED TYPE: ICD-10-CM

## 2025-06-04 NOTE — TELEPHONE ENCOUNTER
Medication/Dose: Ambien TAKE 1 TABLET(5 MG) BY MOUTH EVERY NIGHT AS NEEDED FOR SLEEP   Last Written Prescription Date: 11/25/24  Last Fill Quantity: 30, # refills: 5  Last Office Visit:  5/9/2025  Next Enc:  11/14/25    Controlled substance: routing to provider.         Alicia Brewer RN  [facility]

## 2025-06-04 NOTE — TELEPHONE ENCOUNTER
Last Written Prescription:  zolpidem (AMBIEN) 5 MG tablet  5 mg, EVERY EVENING PRN           Summary: TAKE 1 TABLET(5 MG) BY MOUTH EVERY NIGHT AS NEEDED FOR SLEEP, Disp-30 tablet, R-5, E-Prescribe  Dose, Route, Frequency: 5 mg, Oral, EVERY EVENING PRNStart: 11/25/2024Ordered On: 11/25/2024Pharmacy: [id number] - [place], [place] - [address] [facility] [address] Associated: Taking: Long-term: Med Note:                Directions: TAKE 1 TABLET(5 MG) BY MOUTH EVERY NIGHT AS NEEDED FOR SLEEP  Ordering Department:  ADULT RHEUMATOLOGY  Authorized By: Killian Marroquin MD  Dispense: 30 tablet  Refills: 5 ordered       ----------------------  Last Visit Date: 05/09/2025 Office Visit Rheumatology - ANGEL Marroquin [facility]  Future Visit Date: 11/14/25  ----------------------      Refill decision: Medication refilled per  Medication Refill in Ambulatory Care  policy.   []  If no future appointment scheduled: Route to Clinic Coordinators to contact the pt for appointment.      Refill decision: Medication unable to be refilled by RN due to: Controlled medication  Rx Protocol Controlled Substance [id number]/2025 06:40 PM   Protocol Details Urine drug screeen results on file in past 12 months    Controlled Substance Agreement on file in last 12 months    Auto Fail - Please forward to Provider         Request from pharmacy:  Requested Prescriptions   Pending Prescriptions Disp Refills    zolpidem (AMBIEN) 5 MG tablet [Pharmacy Med Name: ZOLPIDEM 5MG TABLETS] 30 tablet      Sig: TAKE 1 TABLET(5 MG) BY MOUTH EVERY NIGHT AS NEEDED FOR SLEEP       Rx Protocol Controlled Substance Failed - 6/4/2025  2:56 PM        Failed - Urine drug screeen results on file in past 12 months     [unfilled]           Failed - Controlled Substance Agreement on file in last 12 months     Please review last Controlled Substance Pain agreement document.   CSA -- Encounter Level:    CSA: None found at the encounter  level.       CSA -- Patient Level:    CSA: None found at the patient level.               Failed - Auto Fail - Please forward to Provider        Passed - Visit with relevant provider in past 3 months or upcoming 3 months (provided they have been seen in the last 6 months)        Passed - Medication is active on med list and the sig matches        Passed - Medication not refilled in past 28 days     Invalid Medication Grouper          Passed - No active pregnancy on record        Passed - No pregnancy test in past 12 months or most recent test was negative         Mima HERNANDEZ RN  P Central Nursing/Red Flag Triage & Med Refill Team

## 2025-06-05 RX ORDER — ZOLPIDEM TARTRATE 5 MG/1
5 TABLET ORAL
Qty: 30 TABLET | OUTPATIENT
Start: 2025-06-05

## 2025-06-05 RX ORDER — ZOLPIDEM TARTRATE 5 MG/1
5 TABLET ORAL
Qty: 30 TABLET | Refills: 5 | Status: SHIPPED | OUTPATIENT
Start: 2025-06-05

## 2025-07-09 ENCOUNTER — INFUSION THERAPY VISIT (OUTPATIENT)
Dept: INFUSION THERAPY | Facility: CLINIC | Age: 60
End: 2025-07-09
Attending: INTERNAL MEDICINE
Payer: COMMERCIAL

## 2025-07-09 VITALS
RESPIRATION RATE: 18 BRPM | BODY MASS INDEX: 48.08 KG/M2 | OXYGEN SATURATION: 95 % | SYSTOLIC BLOOD PRESSURE: 119 MMHG | DIASTOLIC BLOOD PRESSURE: 71 MMHG | HEART RATE: 89 BPM | TEMPERATURE: 97.9 F | WEIGHT: 293 LBS

## 2025-07-09 DIAGNOSIS — M32.14 LUPUS NEPHRITIS, ISN/RPS CLASS IV (H): ICD-10-CM

## 2025-07-09 DIAGNOSIS — I77.82 ANCA-NEGATIVE VASCULITIS (H): ICD-10-CM

## 2025-07-09 DIAGNOSIS — M32.9 SYSTEMIC LUPUS ERYTHEMATOSUS, UNSPECIFIED SLE TYPE, UNSPECIFIED ORGAN INVOLVEMENT STATUS (H): Primary | ICD-10-CM

## 2025-07-09 DIAGNOSIS — M06.09 RHEUMATOID ARTHRITIS, SERONEGATIVE, MULTIPLE SITES (H): ICD-10-CM

## 2025-07-09 PROCEDURE — 96415 CHEMO IV INFUSION ADDL HR: CPT

## 2025-07-09 PROCEDURE — 99207 PR NO CHARGE LOS: CPT

## 2025-07-09 PROCEDURE — 250N000013 HC RX MED GY IP 250 OP 250 PS 637: Performed by: INTERNAL MEDICINE

## 2025-07-09 PROCEDURE — 258N000003 HC RX IP 258 OP 636: Performed by: INTERNAL MEDICINE

## 2025-07-09 PROCEDURE — 96413 CHEMO IV INFUSION 1 HR: CPT

## 2025-07-09 PROCEDURE — 96375 TX/PRO/DX INJ NEW DRUG ADDON: CPT

## 2025-07-09 PROCEDURE — 250N000011 HC RX IP 250 OP 636: Mod: JZ | Performed by: INTERNAL MEDICINE

## 2025-07-09 RX ORDER — MEPERIDINE HYDROCHLORIDE 25 MG/ML
25 INJECTION INTRAMUSCULAR; INTRAVENOUS; SUBCUTANEOUS
OUTPATIENT
Start: 2025-08-06

## 2025-07-09 RX ORDER — HEPARIN SODIUM (PORCINE) LOCK FLUSH IV SOLN 100 UNIT/ML 100 UNIT/ML
5 SOLUTION INTRAVENOUS
OUTPATIENT
Start: 2025-08-06

## 2025-07-09 RX ORDER — METHYLPREDNISOLONE SODIUM SUCCINATE 125 MG/2ML
125 INJECTION INTRAMUSCULAR; INTRAVENOUS ONCE
Status: CANCELLED | OUTPATIENT
Start: 2025-08-06

## 2025-07-09 RX ORDER — DIPHENHYDRAMINE HCL 25 MG
50 CAPSULE ORAL ONCE
Status: CANCELLED
Start: 2025-08-06

## 2025-07-09 RX ORDER — DIPHENHYDRAMINE HYDROCHLORIDE 50 MG/ML
25 INJECTION, SOLUTION INTRAMUSCULAR; INTRAVENOUS
Start: 2025-08-06

## 2025-07-09 RX ORDER — METHYLPREDNISOLONE SODIUM SUCCINATE 125 MG/2ML
125 INJECTION INTRAMUSCULAR; INTRAVENOUS ONCE
Status: COMPLETED | OUTPATIENT
Start: 2025-07-09 | End: 2025-07-09

## 2025-07-09 RX ORDER — HEPARIN SODIUM,PORCINE 10 UNIT/ML
5-20 VIAL (ML) INTRAVENOUS DAILY PRN
OUTPATIENT
Start: 2025-08-06

## 2025-07-09 RX ORDER — DIPHENHYDRAMINE HCL 25 MG
50 CAPSULE ORAL ONCE
Status: COMPLETED | OUTPATIENT
Start: 2025-07-09 | End: 2025-07-09

## 2025-07-09 RX ORDER — ACETAMINOPHEN 325 MG/1
650 TABLET ORAL ONCE
Status: CANCELLED
Start: 2025-08-06

## 2025-07-09 RX ORDER — DIPHENHYDRAMINE HYDROCHLORIDE 50 MG/ML
50 INJECTION, SOLUTION INTRAMUSCULAR; INTRAVENOUS
Start: 2025-08-06

## 2025-07-09 RX ORDER — METHYLPREDNISOLONE SODIUM SUCCINATE 40 MG/ML
40 INJECTION INTRAMUSCULAR; INTRAVENOUS
Start: 2025-08-06

## 2025-07-09 RX ORDER — ALBUTEROL SULFATE 0.83 MG/ML
2.5 SOLUTION RESPIRATORY (INHALATION)
OUTPATIENT
Start: 2025-08-06

## 2025-07-09 RX ORDER — EPINEPHRINE 1 MG/ML
0.3 INJECTION, SOLUTION INTRAMUSCULAR; SUBCUTANEOUS EVERY 5 MIN PRN
OUTPATIENT
Start: 2025-08-06

## 2025-07-09 RX ORDER — ALBUTEROL SULFATE 90 UG/1
1-2 INHALANT RESPIRATORY (INHALATION)
Start: 2025-08-06

## 2025-07-09 RX ORDER — ACETAMINOPHEN 325 MG/1
650 TABLET ORAL ONCE
Status: COMPLETED | OUTPATIENT
Start: 2025-07-09 | End: 2025-07-09

## 2025-07-09 RX ADMIN — DIPHENHYDRAMINE HYDROCHLORIDE 50 MG: 25 CAPSULE ORAL at 08:52

## 2025-07-09 RX ADMIN — ACETAMINOPHEN 650 MG: 325 TABLET ORAL at 08:52

## 2025-07-09 RX ADMIN — METHYLPREDNISOLONE SODIUM SUCCINATE 125 MG: 125 INJECTION, POWDER, FOR SOLUTION INTRAMUSCULAR; INTRAVENOUS at 08:52

## 2025-07-09 RX ADMIN — SODIUM CHLORIDE 250 ML: 0.9 INJECTION, SOLUTION INTRAVENOUS at 08:51

## 2025-07-09 RX ADMIN — RITUXIMAB-ABBS 500 MG: 10 INJECTION, SOLUTION INTRAVENOUS at 09:00

## 2025-07-09 ASSESSMENT — PAIN SCALES - GENERAL: PAINLEVEL_OUTOF10: MILD PAIN (2)

## 2025-07-09 NOTE — PROGRESS NOTES
Infusion Nursing Note:  Taina Singh presents today for rapid rituxan.    Patient seen by provider today: No   present during visit today: Not Applicable.    Note: Pt rates joint achiness 2/10 today. Otherwise denies any new concerns since her last infusion.      Intravenous Access:  Peripheral IV placed.    Treatment Conditions:  Biological Infusion Checklist:  ~~~ NOTE: If the patient answers yes to any of the questions below, hold the infusion and contact ordering provider or on-call provider.    Have you recently had an elevated temperature, fever, chills, productive cough, coughing for 3 weeks or longer or hemoptysis,  abnormal vital signs, night sweats,  chest pain or have you noticed a decrease in your appetite, unexplained weight loss or fatigue? No  Do you have any open wounds or new incisions? No  Do you have any upcoming hospitalizations or surgeries? Does not include esophagogastroduodenoscopy, colonoscopy, endoscopic retrograde cholangiopancreatography (ERCP), endoscopic ultrasound (EUS), dental procedures or joint aspiration/steroid injections No  Do you currently have any signs of illness or infection or are you on any antibiotics? No  Have you had any new, sudden or worsening abdominal pain? No  Have you or anyone in your household received a live vaccination in the past 4 weeks? Please note: No live vaccines while on biologic/chemotherapy until 6 months after the last treatment. Patient can receive the flu vaccine (shot only), pneumovax and the Covid vaccine. It is optimal for the patient to get these vaccines mid cycle, but they can be given at any time as long as it is not on the day of the infusion. No  Have you recently been diagnosed with any new nervous system diseases (ie. Multiple sclerosis, Guillain Stanton, seizures, neurological changes) or cancer diagnosis? Are you on any form of radiation or chemotherapy? No  Are you pregnant or breast feeding or do you have plans of  pregnancy in the future? No  Have you been having any signs of worsening depression or suicidal ideations?  (benlysta only) No  Have there been any other new onset medical symptoms? No  Have you had any new blood clots? (IVIG only) No      Post Infusion Assessment:  Patient tolerated infusion without incident.  Blood return noted pre and post infusion.  Site patent and intact, free from redness, edema or discomfort.  No evidence of extravasations.  Access discontinued per protocol.  Biologic Infusion Post Education: Call the triage nurse at your clinic or seek medical attention if you have chills and/or temperature greater than or equal to 100.5, uncontrolled nausea/vomiting, diarrhea, constipation, dizziness, shortness of breath, chest pain, heart palpitations, weakness or any other new or concerning symptoms, questions or concerns.  You cannot have any live virus vaccines prior to or during treatment or up to 6 months post infusion.  If you have an upcoming surgery, medical procedure or dental procedure during treatment, this should be discussed with your ordering physician and your surgeon/dentist.  If you are having any concerning symptom, if you are unsure if you should get your next infusion or wish to speak to a provider before your next infusion, please call your care coordinator or triage nurse at your clinic to notify them so we can adequately serve you.       Discharge Plan:   AVS to patient via [employer].  Patient will return for her follow up appt with Dr. Marroquin 11/14/25 and will order treatment plan from there.  Patient discharged in stable condition accompanied by: self.  Departure Mode: Ambulatory.      Nataliia Norman RN

## 2025-07-10 NOTE — TELEPHONE ENCOUNTER
[facility]  Name: Cammy Prieto      : 1970      MRN: [id number]  Encounter Provider: Amanda Dejesus PA-C  Encounter Date: 7/10/2025   Encounter department: [facility]  :  Assessment & Plan  Lower abdominal pain    Orders:    Poct Covid 19 Rapid Antigen Test    ECG 12 lead    Transfer to other facility    Pt with lower abdominal pain. Requesting COVID test. Rapid COVID in clinic negative. Abdomen is distended and firm to palpation. Fever 102.2 with reported dysuria. Concern for developing urosepsis vs peritonitis secondary bowel perforation. Recommend ED evaluation with EMS transport. EMS contacted at 1410. EMS assumed care of patient at 1425.     Patient Instructions  Follow up with PCP in 3-5 days.  Proceed to  ER if symptoms worsen.    If tests are performed, our office will contact you with results only if changes need to made to the care plan discussed with you at the visit. You can review your full results on [facility] MyChart.    Chief Complaint:   Chief Complaint   Patient presents with    Abdominal Pain     Abd pain, gas pain, burping mid lower stomach are, eating drinking makes worse.  Charcoal pills, Exposed to Covid maybe?      History of Present Illness   55 year old female presents with complaint of severe abdominal pain, burping, and bloating x 2 days. Pt also notes fever, chills, and diarrhea with dysuria. Diarrhea yesterday none today. Pt reports she is post-menopausal.     Abdominal Pain  Associated symptoms include a fever. Pertinent negatives include no diarrhea, nausea or vomiting.         Review of Systems   Constitutional:  Positive for fever.   Gastrointestinal:  Positive for abdominal pain. Negative for diarrhea, nausea and vomiting.     Past Medical History   Past Medical History[1]  Past Surgical History[2]  Family History[3]  she reports that she has never smoked. She has never used smokeless tobacco.  Current Outpatient Medications  Telephone encounter for rheumatology flare  Called and spoke with patient.     Diagnosis:Lupus  Provider: Dr. Marroquin  Current Rheum Medications:   Hcq  Prednisone   Mmf  Rituximab     Description of problem:   Patient has tapered down to 2mg of prednisone/day from 5mg. This has not been effective for her. She has had her typical lupus symptoms of joint pain and fatigue and she is to the point where she cannot tolerate the pain on 2mg. Her Rituximab infusion was delayed until December 20th. She would like to go back to 5mg daily until after her infusion and then plan to taper down once the infusion has shown benefit. She states she is confident she could taper down after the infusion.     Follow up plan:  -Routing to provider to review and provide recommendation  -Rx pended: 5mg prednisone tabs pended. (Quantity left blank for provider to review)      Alicia Brewer RN  Adult Rheumatology Clinic     "  Medication Instructions    amLODIPine-valsartan (EXFORGE) 5-320 MG per tablet 1 tablet    aspirin (ECOTRIN LOW STRENGTH) 81 mg    atorvastatin (LIPITOR) 20 mg tablet     bisoprolol-hydrochlorothiazide (ZIAC) 5-6.25 MG per tablet 1 tablet, Every morning    Brilinta 90 MG 1 tablet, 2 times daily   Allergies[4]     Objective   /84   Pulse 101   Temp (!) 102.2 °F (39 °C)   Resp 19   Wt 135 kg (297 lb)   SpO2 98%      Physical Exam  Vitals and nursing note reviewed.   Constitutional:       General: She is awake. She is in acute distress.      Appearance: She is ill-appearing and diaphoretic.      Comments: Pt in obvious severe pain    HENT:      Head: Normocephalic and atraumatic.      Right Ear: Hearing and external ear normal.      Left Ear: Hearing and external ear normal.      Nose: No nasal deformity.      Mouth/Throat:      Lips: Pink. No lesions.   Abdominal:      General: Abdomen is protuberant. Bowel sounds are normal. There is distension.      Palpations: Abdomen is rigid.      Tenderness: There is generalized abdominal tenderness. There is guarding and rebound.      Comments: Generalized abdominal tenderness to light palpation, worst to the left lower quadrant.      Skin:     General: Skin is cool and moist.      Coloration: Skin is not pale.     Neurological:      Mental Status: She is alert and easily aroused.     Psychiatric:         Attention and Perception: Attention and perception normal.         Mood and Affect: Mood and affect normal.         Behavior: Behavior normal. Behavior is cooperative.         Portions of the record may have been created with voice recognition software.  Occasional wrong word or \"sound a like\" substitutions may have occurred due to the inherent limitations of voice recognition software.  Read the chart carefully and recognize, using context, where substitutions have occurred.       [1]   Past Medical History:  Diagnosis Date    Brain aneurysm    [2]   Past Surgical " History:  Procedure Laterality Date    IR CEREBRAL ANGIOGRAPHY  6/26/2022   [3] No family history on file.  [4]   Allergies  Allergen Reactions    Penicillins Rash

## 2025-07-13 ENCOUNTER — MYC MEDICAL ADVICE (OUTPATIENT)
Dept: RHEUMATOLOGY | Facility: CLINIC | Age: 60
End: 2025-07-13
Payer: COMMERCIAL

## 2025-07-16 ENCOUNTER — TRANSFERRED RECORDS (OUTPATIENT)
Dept: HEALTH INFORMATION MANAGEMENT | Facility: CLINIC | Age: 60
End: 2025-07-16
Payer: COMMERCIAL

## 2025-07-30 ENCOUNTER — MYC MEDICAL ADVICE (OUTPATIENT)
Dept: RHEUMATOLOGY | Facility: CLINIC | Age: 60
End: 2025-07-30
Payer: COMMERCIAL

## (undated) RX ORDER — ACETAMINOPHEN 325 MG/1
TABLET ORAL
Status: DISPENSED
Start: 2019-06-07

## (undated) RX ORDER — LIDOCAINE HYDROCHLORIDE 10 MG/ML
INJECTION, SOLUTION EPIDURAL; INFILTRATION; INTRACAUDAL; PERINEURAL
Status: DISPENSED
Start: 2019-06-07

## (undated) RX ORDER — LIDOCAINE 40 MG/G
CREAM TOPICAL
Status: DISPENSED
Start: 2018-03-14

## (undated) RX ORDER — LORAZEPAM 0.5 MG/1
TABLET ORAL
Status: DISPENSED
Start: 2019-06-07

## (undated) RX ORDER — ALBUTEROL SULFATE 0.83 MG/ML
SOLUTION RESPIRATORY (INHALATION)
Status: DISPENSED
Start: 2018-08-24